# Patient Record
Sex: FEMALE | Race: WHITE | NOT HISPANIC OR LATINO | Employment: FULL TIME | URBAN - METROPOLITAN AREA
[De-identification: names, ages, dates, MRNs, and addresses within clinical notes are randomized per-mention and may not be internally consistent; named-entity substitution may affect disease eponyms.]

---

## 2017-02-24 ENCOUNTER — GENERIC CONVERSION - ENCOUNTER (OUTPATIENT)
Dept: OTHER | Facility: OTHER | Age: 47
End: 2017-02-24

## 2017-02-24 LAB
LEFT EYE DIABETIC RETINOPATHY: NORMAL
RIGHT EYE DIABETIC RETINOPATHY: NORMAL

## 2017-04-10 ENCOUNTER — ALLSCRIPTS OFFICE VISIT (OUTPATIENT)
Dept: OTHER | Facility: OTHER | Age: 47
End: 2017-04-10

## 2017-04-10 DIAGNOSIS — R73.01 IMPAIRED FASTING GLUCOSE: ICD-10-CM

## 2017-04-10 DIAGNOSIS — M79.601 PAIN OF RIGHT ARM: ICD-10-CM

## 2017-04-10 DIAGNOSIS — M79.89 OTHER DISORDERS OF SOFT TISSUE: ICD-10-CM

## 2017-04-10 DIAGNOSIS — E11.9 TYPE 2 DIABETES MELLITUS WITHOUT COMPLICATIONS (HCC): ICD-10-CM

## 2017-04-14 ENCOUNTER — APPOINTMENT (OUTPATIENT)
Dept: LAB | Facility: CLINIC | Age: 47
End: 2017-04-14
Payer: COMMERCIAL

## 2017-04-14 ENCOUNTER — TRANSCRIBE ORDERS (OUTPATIENT)
Dept: LAB | Facility: CLINIC | Age: 47
End: 2017-04-14

## 2017-04-14 DIAGNOSIS — R73.01 IMPAIRED FASTING GLUCOSE: ICD-10-CM

## 2017-04-14 LAB
ANION GAP SERPL CALCULATED.3IONS-SCNC: 12 MMOL/L (ref 4–13)
BUN SERPL-MCNC: 11 MG/DL (ref 5–25)
CALCIUM SERPL-MCNC: 8.8 MG/DL (ref 8.3–10.1)
CHLORIDE SERPL-SCNC: 102 MMOL/L (ref 100–108)
CO2 SERPL-SCNC: 26 MMOL/L (ref 21–32)
CREAT SERPL-MCNC: 0.57 MG/DL (ref 0.6–1.3)
EST. AVERAGE GLUCOSE BLD GHB EST-MCNC: 174 MG/DL
GFR SERPL CREATININE-BSD FRML MDRD: >60 ML/MIN/1.73SQ M
GLUCOSE P FAST SERPL-MCNC: 158 MG/DL (ref 65–99)
HBA1C MFR BLD: 7.7 % (ref 4.2–6.3)
POTASSIUM SERPL-SCNC: 3.9 MMOL/L (ref 3.5–5.3)
SODIUM SERPL-SCNC: 140 MMOL/L (ref 136–145)

## 2017-04-14 PROCEDURE — 80048 BASIC METABOLIC PNL TOTAL CA: CPT

## 2017-04-14 PROCEDURE — 83036 HEMOGLOBIN GLYCOSYLATED A1C: CPT

## 2017-04-14 PROCEDURE — 36415 COLL VENOUS BLD VENIPUNCTURE: CPT

## 2017-04-24 ENCOUNTER — ALLSCRIPTS OFFICE VISIT (OUTPATIENT)
Dept: OTHER | Facility: OTHER | Age: 47
End: 2017-04-24

## 2017-05-01 ENCOUNTER — GENERIC CONVERSION - ENCOUNTER (OUTPATIENT)
Dept: OTHER | Facility: OTHER | Age: 47
End: 2017-05-01

## 2017-05-17 ENCOUNTER — HOSPITAL ENCOUNTER (EMERGENCY)
Facility: HOSPITAL | Age: 47
Discharge: HOME | End: 2017-05-17
Attending: EMERGENCY MEDICINE
Payer: COMMERCIAL

## 2017-05-17 VITALS
RESPIRATION RATE: 18 BRPM | OXYGEN SATURATION: 96 % | SYSTOLIC BLOOD PRESSURE: 132 MMHG | WEIGHT: 224 LBS | BODY MASS INDEX: 34.06 KG/M2 | TEMPERATURE: 97.8 F | DIASTOLIC BLOOD PRESSURE: 74 MMHG | HEART RATE: 79 BPM

## 2017-05-17 DIAGNOSIS — K62.5 RECTAL BLEEDING: Primary | ICD-10-CM

## 2017-05-17 LAB
ABO GROUP BLD: NORMAL
ALBUMIN SERPL BCP-MCNC: 3.9 G/DL (ref 3.5–5)
ALP SERPL-CCNC: 85 U/L (ref 46–116)
ALT SERPL W P-5'-P-CCNC: 194 U/L (ref 12–78)
ANION GAP SERPL CALCULATED.3IONS-SCNC: 10 MMOL/L (ref 4–13)
APTT PPP: 31 SECONDS (ref 23–35)
AST SERPL W P-5'-P-CCNC: 86 U/L (ref 5–45)
BASOPHILS # BLD AUTO: 0.06 THOUSANDS/ΜL (ref 0–0.1)
BASOPHILS NFR BLD AUTO: 1 % (ref 0–1)
BILIRUB SERPL-MCNC: 0.4 MG/DL (ref 0.2–1)
BLD GP AB SCN SERPL QL: NEGATIVE
BUN SERPL-MCNC: 10 MG/DL (ref 5–25)
CALCIUM SERPL-MCNC: 9.3 MG/DL (ref 8.3–10.1)
CHLORIDE SERPL-SCNC: 101 MMOL/L (ref 100–108)
CO2 SERPL-SCNC: 27 MMOL/L (ref 21–32)
CREAT SERPL-MCNC: 0.68 MG/DL (ref 0.6–1.3)
EOSINOPHIL # BLD AUTO: 0.62 THOUSAND/ΜL (ref 0–0.61)
EOSINOPHIL NFR BLD AUTO: 5 % (ref 0–6)
ERYTHROCYTE [DISTWIDTH] IN BLOOD BY AUTOMATED COUNT: 12.9 % (ref 11.6–15.1)
GFR SERPL CREATININE-BSD FRML MDRD: >60 ML/MIN/1.73SQ M
GLUCOSE SERPL-MCNC: 107 MG/DL (ref 65–140)
HCT VFR BLD AUTO: 45.6 % (ref 34.8–46.1)
HGB BLD-MCNC: 15.1 G/DL (ref 11.5–15.4)
INR PPP: 0.93 (ref 0.86–1.16)
LYMPHOCYTES # BLD AUTO: 1.8 THOUSANDS/ΜL (ref 0.6–4.47)
LYMPHOCYTES NFR BLD AUTO: 15 % (ref 14–44)
MCH RBC QN AUTO: 29.7 PG (ref 26.8–34.3)
MCHC RBC AUTO-ENTMCNC: 33.1 G/DL (ref 31.4–37.4)
MCV RBC AUTO: 90 FL (ref 82–98)
MONOCYTES # BLD AUTO: 0.77 THOUSAND/ΜL (ref 0.17–1.22)
MONOCYTES NFR BLD AUTO: 6 % (ref 4–12)
NEUTROPHILS # BLD AUTO: 8.78 THOUSANDS/ΜL (ref 1.85–7.62)
NEUTS SEG NFR BLD AUTO: 73 % (ref 43–75)
PLATELET # BLD AUTO: 219 THOUSANDS/UL (ref 149–390)
PMV BLD AUTO: 10.4 FL (ref 8.9–12.7)
POTASSIUM SERPL-SCNC: 4 MMOL/L (ref 3.5–5.3)
PROT SERPL-MCNC: 8.4 G/DL (ref 6.4–8.2)
PROTHROMBIN TIME: 12.7 SECONDS (ref 12.1–14.4)
RBC # BLD AUTO: 5.08 MILLION/UL (ref 3.81–5.12)
RH BLD: POSITIVE
SODIUM SERPL-SCNC: 138 MMOL/L (ref 136–145)
SPECIMEN EXPIRATION DATE: NORMAL
WBC # BLD AUTO: 12.03 THOUSAND/UL (ref 4.31–10.16)

## 2017-05-17 PROCEDURE — 80053 COMPREHEN METABOLIC PANEL: CPT | Performed by: EMERGENCY MEDICINE

## 2017-05-17 PROCEDURE — 85730 THROMBOPLASTIN TIME PARTIAL: CPT | Performed by: EMERGENCY MEDICINE

## 2017-05-17 PROCEDURE — 96361 HYDRATE IV INFUSION ADD-ON: CPT

## 2017-05-17 PROCEDURE — 85610 PROTHROMBIN TIME: CPT | Performed by: EMERGENCY MEDICINE

## 2017-05-17 PROCEDURE — 86850 RBC ANTIBODY SCREEN: CPT | Performed by: EMERGENCY MEDICINE

## 2017-05-17 PROCEDURE — 99285 EMERGENCY DEPT VISIT HI MDM: CPT

## 2017-05-17 PROCEDURE — 86900 BLOOD TYPING SEROLOGIC ABO: CPT | Performed by: EMERGENCY MEDICINE

## 2017-05-17 PROCEDURE — 36415 COLL VENOUS BLD VENIPUNCTURE: CPT | Performed by: EMERGENCY MEDICINE

## 2017-05-17 PROCEDURE — 86901 BLOOD TYPING SEROLOGIC RH(D): CPT | Performed by: EMERGENCY MEDICINE

## 2017-05-17 PROCEDURE — 85025 COMPLETE CBC W/AUTO DIFF WBC: CPT | Performed by: EMERGENCY MEDICINE

## 2017-05-17 PROCEDURE — 96360 HYDRATION IV INFUSION INIT: CPT

## 2017-05-17 RX ADMIN — SODIUM CHLORIDE 1000 ML: 0.9 INJECTION, SOLUTION INTRAVENOUS at 17:15

## 2017-05-18 ENCOUNTER — ALLSCRIPTS OFFICE VISIT (OUTPATIENT)
Dept: OTHER | Facility: OTHER | Age: 47
End: 2017-05-18

## 2017-05-23 ENCOUNTER — ALLSCRIPTS OFFICE VISIT (OUTPATIENT)
Dept: OTHER | Facility: OTHER | Age: 47
End: 2017-05-23

## 2017-05-30 ENCOUNTER — ANESTHESIA (OUTPATIENT)
Dept: GASTROENTEROLOGY | Facility: HOSPITAL | Age: 47
End: 2017-05-30
Payer: COMMERCIAL

## 2017-05-30 ENCOUNTER — GENERIC CONVERSION - ENCOUNTER (OUTPATIENT)
Dept: OTHER | Facility: OTHER | Age: 47
End: 2017-05-30

## 2017-05-30 ENCOUNTER — ANESTHESIA EVENT (OUTPATIENT)
Dept: GASTROENTEROLOGY | Facility: HOSPITAL | Age: 47
End: 2017-05-30
Payer: COMMERCIAL

## 2017-05-30 ENCOUNTER — HOSPITAL ENCOUNTER (OUTPATIENT)
Facility: HOSPITAL | Age: 47
Setting detail: OUTPATIENT SURGERY
Discharge: HOME | End: 2017-05-30
Attending: INTERNAL MEDICINE | Admitting: INTERNAL MEDICINE
Payer: COMMERCIAL

## 2017-05-30 VITALS
OXYGEN SATURATION: 97 % | BODY MASS INDEX: 33.84 KG/M2 | RESPIRATION RATE: 18 BRPM | HEIGHT: 67 IN | TEMPERATURE: 97.6 F | SYSTOLIC BLOOD PRESSURE: 127 MMHG | HEART RATE: 77 BPM | WEIGHT: 215.61 LBS | DIASTOLIC BLOOD PRESSURE: 55 MMHG

## 2017-05-30 DIAGNOSIS — K62.5 RECTAL BLEEDING: ICD-10-CM

## 2017-05-30 PROCEDURE — 88305 TISSUE EXAM BY PATHOLOGIST: CPT | Performed by: INTERNAL MEDICINE

## 2017-05-30 RX ORDER — PROPOFOL 10 MG/ML
INJECTION, EMULSION INTRAVENOUS AS NEEDED
Status: DISCONTINUED | OUTPATIENT
Start: 2017-05-30 | End: 2017-05-30 | Stop reason: SURG

## 2017-05-30 RX ORDER — SODIUM CHLORIDE, SODIUM LACTATE, POTASSIUM CHLORIDE, CALCIUM CHLORIDE 600; 310; 30; 20 MG/100ML; MG/100ML; MG/100ML; MG/100ML
75 INJECTION, SOLUTION INTRAVENOUS CONTINUOUS
Status: DISCONTINUED | OUTPATIENT
Start: 2017-05-30 | End: 2017-05-30 | Stop reason: HOSPADM

## 2017-05-30 RX ORDER — SODIUM CHLORIDE 9 MG/ML
125 INJECTION, SOLUTION INTRAVENOUS CONTINUOUS
Status: DISCONTINUED | OUTPATIENT
Start: 2017-05-30 | End: 2017-05-30 | Stop reason: HOSPADM

## 2017-05-30 RX ORDER — PANTOPRAZOLE SODIUM 20 MG/1
20 TABLET, DELAYED RELEASE ORAL DAILY
COMMUNITY
End: 2018-02-12 | Stop reason: SDUPTHER

## 2017-05-30 RX ORDER — QUETIAPINE FUMARATE 25 MG/1
25 TABLET, FILM COATED ORAL
COMMUNITY
End: 2018-09-05 | Stop reason: SDUPTHER

## 2017-05-30 RX ORDER — BUPROPION HYDROCHLORIDE 150 MG/1
150 TABLET, EXTENDED RELEASE ORAL 2 TIMES DAILY
Status: ON HOLD | COMMUNITY
End: 2017-07-06 | Stop reason: ALTCHOICE

## 2017-05-30 RX ADMIN — PROPOFOL 30 MG: 10 INJECTION, EMULSION INTRAVENOUS at 09:36

## 2017-05-30 RX ADMIN — PROPOFOL 30 MG: 10 INJECTION, EMULSION INTRAVENOUS at 09:27

## 2017-05-30 RX ADMIN — PROPOFOL 30 MG: 10 INJECTION, EMULSION INTRAVENOUS at 09:25

## 2017-05-30 RX ADMIN — PROPOFOL 30 MG: 10 INJECTION, EMULSION INTRAVENOUS at 09:31

## 2017-05-30 RX ADMIN — PROPOFOL 50 MG: 10 INJECTION, EMULSION INTRAVENOUS at 09:28

## 2017-05-30 RX ADMIN — SODIUM CHLORIDE, POTASSIUM CHLORIDE, SODIUM LACTATE AND CALCIUM CHLORIDE 75 ML/HR: 600; 310; 30; 20 INJECTION, SOLUTION INTRAVENOUS at 08:33

## 2017-05-30 RX ADMIN — PROPOFOL 30 MG: 10 INJECTION, EMULSION INTRAVENOUS at 09:35

## 2017-05-30 RX ADMIN — PROPOFOL 30 MG: 10 INJECTION, EMULSION INTRAVENOUS at 09:33

## 2017-05-30 RX ADMIN — PROPOFOL 30 MG: 10 INJECTION, EMULSION INTRAVENOUS at 09:22

## 2017-05-30 RX ADMIN — PROPOFOL 30 MG: 10 INJECTION, EMULSION INTRAVENOUS at 09:34

## 2017-05-30 RX ADMIN — PROPOFOL 100 MG: 10 INJECTION, EMULSION INTRAVENOUS at 09:21

## 2017-05-30 RX ADMIN — PROPOFOL 20 MG: 10 INJECTION, EMULSION INTRAVENOUS at 09:24

## 2017-05-31 ENCOUNTER — GENERIC CONVERSION - ENCOUNTER (OUTPATIENT)
Dept: OTHER | Facility: OTHER | Age: 47
End: 2017-05-31

## 2017-06-04 ENCOUNTER — GENERIC CONVERSION - ENCOUNTER (OUTPATIENT)
Dept: OTHER | Facility: OTHER | Age: 47
End: 2017-06-04

## 2017-06-19 ENCOUNTER — APPOINTMENT (OUTPATIENT)
Dept: LAB | Facility: CLINIC | Age: 47
End: 2017-06-19
Payer: COMMERCIAL

## 2017-06-19 ENCOUNTER — TRANSCRIBE ORDERS (OUTPATIENT)
Dept: LAB | Facility: CLINIC | Age: 47
End: 2017-06-19

## 2017-06-19 DIAGNOSIS — E11.9 TYPE 2 DIABETES MELLITUS WITHOUT COMPLICATIONS (HCC): ICD-10-CM

## 2017-06-19 LAB
EST. AVERAGE GLUCOSE BLD GHB EST-MCNC: 157 MG/DL
HBA1C MFR BLD: 7.1 % (ref 4.2–6.3)

## 2017-06-19 PROCEDURE — 83036 HEMOGLOBIN GLYCOSYLATED A1C: CPT

## 2017-06-19 PROCEDURE — 36415 COLL VENOUS BLD VENIPUNCTURE: CPT

## 2017-07-06 ENCOUNTER — HOSPITAL ENCOUNTER (OUTPATIENT)
Dept: CT IMAGING | Facility: HOSPITAL | Age: 47
Discharge: HOME | End: 2017-07-06
Attending: OBSTETRICS & GYNECOLOGY
Payer: COMMERCIAL

## 2017-07-06 ENCOUNTER — GENERIC CONVERSION - ENCOUNTER (OUTPATIENT)
Dept: OTHER | Facility: OTHER | Age: 47
End: 2017-07-06

## 2017-07-06 ENCOUNTER — HOSPITAL ENCOUNTER (INPATIENT)
Facility: HOSPITAL | Age: 47
LOS: 5 days | Discharge: HOME | DRG: 390 | End: 2017-07-11
Attending: INTERNAL MEDICINE | Admitting: INTERNAL MEDICINE
Payer: COMMERCIAL

## 2017-07-06 ENCOUNTER — TRANSCRIBE ORDERS (OUTPATIENT)
Dept: ADMINISTRATIVE | Facility: HOSPITAL | Age: 47
End: 2017-07-06

## 2017-07-06 ENCOUNTER — ALLSCRIPTS OFFICE VISIT (OUTPATIENT)
Dept: OTHER | Facility: OTHER | Age: 47
End: 2017-07-06

## 2017-07-06 DIAGNOSIS — K58.9 IBS (IRRITABLE BOWEL SYNDROME): Primary | ICD-10-CM

## 2017-07-06 DIAGNOSIS — K56.7 ILEUS (HCC): ICD-10-CM

## 2017-07-06 DIAGNOSIS — K37 APPENDICITIS, UNSPECIFIED APPENDICITIS TYPE: ICD-10-CM

## 2017-07-06 DIAGNOSIS — K37 APPENDICITIS, UNSPECIFIED APPENDICITIS TYPE: Primary | ICD-10-CM

## 2017-07-06 LAB
ALBUMIN SERPL BCP-MCNC: 3.5 G/DL (ref 3.5–5)
ALP SERPL-CCNC: 62 U/L (ref 46–116)
ALT SERPL W P-5'-P-CCNC: 113 U/L (ref 12–78)
ANION GAP SERPL CALCULATED.3IONS-SCNC: 10 MMOL/L (ref 4–13)
APTT PPP: 36 SECONDS (ref 23–35)
AST SERPL W P-5'-P-CCNC: 66 U/L (ref 5–45)
BASOPHILS # BLD AUTO: 0.02 THOUSANDS/ΜL (ref 0–0.1)
BASOPHILS NFR BLD AUTO: 0 % (ref 0–1)
BILIRUB SERPL-MCNC: 0.77 MG/DL (ref 0.2–1)
BUN SERPL-MCNC: 10 MG/DL (ref 5–25)
CALCIUM SERPL-MCNC: 8.8 MG/DL (ref 8.3–10.1)
CHLORIDE SERPL-SCNC: 101 MMOL/L (ref 100–108)
CO2 SERPL-SCNC: 28 MMOL/L (ref 21–32)
CREAT SERPL-MCNC: 0.58 MG/DL (ref 0.6–1.3)
EOSINOPHIL # BLD AUTO: 0.39 THOUSAND/ΜL (ref 0–0.61)
EOSINOPHIL NFR BLD AUTO: 4 % (ref 0–6)
ERYTHROCYTE [DISTWIDTH] IN BLOOD BY AUTOMATED COUNT: 13 % (ref 11.6–15.1)
GFR SERPL CREATININE-BSD FRML MDRD: >60 ML/MIN/1.73SQ M
GLUCOSE SERPL-MCNC: 102 MG/DL (ref 65–140)
GLUCOSE SERPL-MCNC: 115 MG/DL (ref 65–140)
GLUCOSE SERPL-MCNC: 126 MG/DL (ref 65–140)
HCT VFR BLD AUTO: 39.6 % (ref 34.8–46.1)
HGB BLD-MCNC: 13.9 G/DL (ref 11.5–15.4)
INR PPP: 1.04 (ref 0.86–1.16)
LACTATE SERPL-SCNC: 0.6 MMOL/L (ref 0.5–2)
LYMPHOCYTES # BLD AUTO: 1.76 THOUSANDS/ΜL (ref 0.6–4.47)
LYMPHOCYTES NFR BLD AUTO: 17 % (ref 14–44)
MCH RBC QN AUTO: 31.6 PG (ref 26.8–34.3)
MCHC RBC AUTO-ENTMCNC: 35.1 G/DL (ref 31.4–37.4)
MCV RBC AUTO: 90 FL (ref 82–98)
MONOCYTES # BLD AUTO: 0.64 THOUSAND/ΜL (ref 0.17–1.22)
MONOCYTES NFR BLD AUTO: 6 % (ref 4–12)
NEUTROPHILS # BLD AUTO: 7.64 THOUSANDS/ΜL (ref 1.85–7.62)
NEUTS SEG NFR BLD AUTO: 73 % (ref 43–75)
NRBC BLD AUTO-RTO: 0 /100 WBCS
PLATELET # BLD AUTO: 216 THOUSANDS/UL (ref 149–390)
PMV BLD AUTO: 10.7 FL (ref 8.9–12.7)
POTASSIUM SERPL-SCNC: 3.9 MMOL/L (ref 3.5–5.3)
PROT SERPL-MCNC: 7.9 G/DL (ref 6.4–8.2)
PROTHROMBIN TIME: 13.6 SECONDS (ref 12.1–14.4)
RBC # BLD AUTO: 4.4 MILLION/UL (ref 3.81–5.12)
SODIUM SERPL-SCNC: 139 MMOL/L (ref 136–145)
WBC # BLD AUTO: 10.45 THOUSAND/UL (ref 4.31–10.16)

## 2017-07-06 PROCEDURE — 83605 ASSAY OF LACTIC ACID: CPT | Performed by: PHYSICIAN ASSISTANT

## 2017-07-06 PROCEDURE — 82948 REAGENT STRIP/BLOOD GLUCOSE: CPT

## 2017-07-06 PROCEDURE — C9113 INJ PANTOPRAZOLE SODIUM, VIA: HCPCS | Performed by: PHYSICIAN ASSISTANT

## 2017-07-06 PROCEDURE — 85730 THROMBOPLASTIN TIME PARTIAL: CPT | Performed by: PHYSICIAN ASSISTANT

## 2017-07-06 PROCEDURE — 80053 COMPREHEN METABOLIC PANEL: CPT | Performed by: PHYSICIAN ASSISTANT

## 2017-07-06 PROCEDURE — 85025 COMPLETE CBC W/AUTO DIFF WBC: CPT | Performed by: PHYSICIAN ASSISTANT

## 2017-07-06 PROCEDURE — 85610 PROTHROMBIN TIME: CPT | Performed by: PHYSICIAN ASSISTANT

## 2017-07-06 PROCEDURE — 74177 CT ABD & PELVIS W/CONTRAST: CPT

## 2017-07-06 RX ORDER — OXYCODONE HYDROCHLORIDE AND ACETAMINOPHEN 5; 325 MG/1; MG/1
1 TABLET ORAL EVERY 4 HOURS PRN
Status: DISCONTINUED | OUTPATIENT
Start: 2017-07-06 | End: 2017-07-09

## 2017-07-06 RX ORDER — HEPARIN SODIUM 5000 [USP'U]/ML
5000 INJECTION, SOLUTION INTRAVENOUS; SUBCUTANEOUS EVERY 8 HOURS SCHEDULED
Status: DISCONTINUED | OUTPATIENT
Start: 2017-07-06 | End: 2017-07-11 | Stop reason: HOSPADM

## 2017-07-06 RX ORDER — AMOXICILLIN 250 MG
1 CAPSULE ORAL 2 TIMES DAILY
Status: DISCONTINUED | OUTPATIENT
Start: 2017-07-06 | End: 2017-07-11

## 2017-07-06 RX ORDER — POLYETHYLENE GLYCOL 3350 17 G/17G
17 POWDER, FOR SOLUTION ORAL DAILY
Status: DISCONTINUED | OUTPATIENT
Start: 2017-07-07 | End: 2017-07-11

## 2017-07-06 RX ORDER — ESCITALOPRAM OXALATE 10 MG/1
20 TABLET ORAL DAILY
Status: DISCONTINUED | OUTPATIENT
Start: 2017-07-06 | End: 2017-07-11 | Stop reason: HOSPADM

## 2017-07-06 RX ORDER — SODIUM CHLORIDE 9 MG/ML
75 INJECTION, SOLUTION INTRAVENOUS CONTINUOUS
Status: DISCONTINUED | OUTPATIENT
Start: 2017-07-06 | End: 2017-07-11

## 2017-07-06 RX ORDER — PANTOPRAZOLE SODIUM 40 MG/1
40 INJECTION, POWDER, FOR SOLUTION INTRAVENOUS EVERY 12 HOURS SCHEDULED
Status: DISCONTINUED | OUTPATIENT
Start: 2017-07-06 | End: 2017-07-11

## 2017-07-06 RX ORDER — ACETAMINOPHEN 325 MG/1
650 TABLET ORAL EVERY 6 HOURS PRN
Status: DISCONTINUED | OUTPATIENT
Start: 2017-07-06 | End: 2017-07-11 | Stop reason: HOSPADM

## 2017-07-06 RX ORDER — MAGNESIUM HYDROXIDE/ALUMINUM HYDROXICE/SIMETHICONE 120; 1200; 1200 MG/30ML; MG/30ML; MG/30ML
30 SUSPENSION ORAL EVERY 6 HOURS PRN
Status: DISCONTINUED | OUTPATIENT
Start: 2017-07-06 | End: 2017-07-11 | Stop reason: HOSPADM

## 2017-07-06 RX ORDER — MORPHINE SULFATE 2 MG/ML
1 INJECTION, SOLUTION INTRAMUSCULAR; INTRAVENOUS EVERY 4 HOURS PRN
Status: DISCONTINUED | OUTPATIENT
Start: 2017-07-06 | End: 2017-07-09

## 2017-07-06 RX ORDER — QUETIAPINE FUMARATE 25 MG/1
25 TABLET, FILM COATED ORAL
Status: DISCONTINUED | OUTPATIENT
Start: 2017-07-06 | End: 2017-07-11 | Stop reason: HOSPADM

## 2017-07-06 RX ORDER — ONDANSETRON 2 MG/ML
4 INJECTION INTRAMUSCULAR; INTRAVENOUS EVERY 4 HOURS PRN
Status: DISCONTINUED | OUTPATIENT
Start: 2017-07-06 | End: 2017-07-11 | Stop reason: HOSPADM

## 2017-07-06 RX ADMIN — IOHEXOL 50 ML: 240 INJECTION, SOLUTION INTRATHECAL; INTRAVASCULAR; INTRAVENOUS; ORAL at 13:37

## 2017-07-06 RX ADMIN — PANTOPRAZOLE SODIUM 40 MG: 40 INJECTION, POWDER, FOR SOLUTION INTRAVENOUS at 21:50

## 2017-07-06 RX ADMIN — QUETIAPINE FUMARATE 25 MG: 25 TABLET, FILM COATED ORAL at 21:51

## 2017-07-06 RX ADMIN — SODIUM CHLORIDE 75 ML/HR: 0.9 INJECTION, SOLUTION INTRAVENOUS at 17:15

## 2017-07-06 RX ADMIN — Medication 1 TABLET: at 17:12

## 2017-07-06 RX ADMIN — OXYCODONE HYDROCHLORIDE AND ACETAMINOPHEN 1 TABLET: 5; 325 TABLET ORAL at 19:24

## 2017-07-06 RX ADMIN — HEPARIN SODIUM 5000 UNITS: 5000 INJECTION, SOLUTION INTRAVENOUS; SUBCUTANEOUS at 17:12

## 2017-07-06 RX ADMIN — ESCITALOPRAM OXALATE 20 MG: 10 TABLET ORAL at 17:12

## 2017-07-06 RX ADMIN — MORPHINE SULFATE 1 MG: 2 INJECTION, SOLUTION INTRAMUSCULAR; INTRAVENOUS at 21:51

## 2017-07-06 RX ADMIN — IOHEXOL 100 ML: 350 INJECTION, SOLUTION INTRAVENOUS at 13:37

## 2017-07-06 RX ADMIN — MORPHINE SULFATE 1 MG: 2 INJECTION, SOLUTION INTRAMUSCULAR; INTRAVENOUS at 17:20

## 2017-07-06 RX ADMIN — OXYCODONE HYDROCHLORIDE AND ACETAMINOPHEN 1 TABLET: 5; 325 TABLET ORAL at 23:34

## 2017-07-07 LAB
ALBUMIN SERPL BCP-MCNC: 3.1 G/DL (ref 3.5–5)
ALP SERPL-CCNC: 64 U/L (ref 46–116)
ALT SERPL W P-5'-P-CCNC: 112 U/L (ref 12–78)
ANION GAP SERPL CALCULATED.3IONS-SCNC: 8 MMOL/L (ref 4–13)
AST SERPL W P-5'-P-CCNC: 62 U/L (ref 5–45)
BILIRUB SERPL-MCNC: 0.76 MG/DL (ref 0.2–1)
BUN SERPL-MCNC: 7 MG/DL (ref 5–25)
CALCIUM SERPL-MCNC: 8.2 MG/DL (ref 8.3–10.1)
CHLORIDE SERPL-SCNC: 104 MMOL/L (ref 100–108)
CO2 SERPL-SCNC: 30 MMOL/L (ref 21–32)
CREAT SERPL-MCNC: 0.68 MG/DL (ref 0.6–1.3)
ERYTHROCYTE [DISTWIDTH] IN BLOOD BY AUTOMATED COUNT: 13.1 % (ref 11.6–15.1)
GFR SERPL CREATININE-BSD FRML MDRD: >60 ML/MIN/1.73SQ M
GLUCOSE SERPL-MCNC: 113 MG/DL (ref 65–140)
GLUCOSE SERPL-MCNC: 114 MG/DL (ref 65–140)
GLUCOSE SERPL-MCNC: 128 MG/DL (ref 65–140)
GLUCOSE SERPL-MCNC: 153 MG/DL (ref 65–140)
GLUCOSE SERPL-MCNC: 170 MG/DL (ref 65–140)
GLUCOSE SERPL-MCNC: 96 MG/DL (ref 65–140)
HCT VFR BLD AUTO: 37.6 % (ref 34.8–46.1)
HGB BLD-MCNC: 12.5 G/DL (ref 11.5–15.4)
MCH RBC QN AUTO: 30.5 PG (ref 26.8–34.3)
MCHC RBC AUTO-ENTMCNC: 33.2 G/DL (ref 31.4–37.4)
MCV RBC AUTO: 92 FL (ref 82–98)
PLATELET # BLD AUTO: 212 THOUSANDS/UL (ref 149–390)
PMV BLD AUTO: 10.7 FL (ref 8.9–12.7)
POTASSIUM SERPL-SCNC: 3.6 MMOL/L (ref 3.5–5.3)
PROT SERPL-MCNC: 7.2 G/DL (ref 6.4–8.2)
RBC # BLD AUTO: 4.1 MILLION/UL (ref 3.81–5.12)
SODIUM SERPL-SCNC: 142 MMOL/L (ref 136–145)
WBC # BLD AUTO: 7.81 THOUSAND/UL (ref 4.31–10.16)

## 2017-07-07 PROCEDURE — 80053 COMPREHEN METABOLIC PANEL: CPT | Performed by: PHYSICIAN ASSISTANT

## 2017-07-07 PROCEDURE — 82948 REAGENT STRIP/BLOOD GLUCOSE: CPT

## 2017-07-07 PROCEDURE — 87493 C DIFF AMPLIFIED PROBE: CPT | Performed by: PHYSICIAN ASSISTANT

## 2017-07-07 PROCEDURE — 85027 COMPLETE CBC AUTOMATED: CPT | Performed by: PHYSICIAN ASSISTANT

## 2017-07-07 PROCEDURE — C9113 INJ PANTOPRAZOLE SODIUM, VIA: HCPCS | Performed by: PHYSICIAN ASSISTANT

## 2017-07-07 RX ADMIN — MORPHINE SULFATE 1 MG: 2 INJECTION, SOLUTION INTRAMUSCULAR; INTRAVENOUS at 02:12

## 2017-07-07 RX ADMIN — Medication 1 TABLET: at 08:43

## 2017-07-07 RX ADMIN — INSULIN LISPRO 1 UNITS: 100 INJECTION, SOLUTION INTRAVENOUS; SUBCUTANEOUS at 08:43

## 2017-07-07 RX ADMIN — MORPHINE SULFATE 1 MG: 2 INJECTION, SOLUTION INTRAMUSCULAR; INTRAVENOUS at 15:33

## 2017-07-07 RX ADMIN — QUETIAPINE FUMARATE 25 MG: 25 TABLET, FILM COATED ORAL at 21:38

## 2017-07-07 RX ADMIN — ESCITALOPRAM OXALATE 20 MG: 10 TABLET ORAL at 08:42

## 2017-07-07 RX ADMIN — HEPARIN SODIUM 5000 UNITS: 5000 INJECTION, SOLUTION INTRAVENOUS; SUBCUTANEOUS at 02:15

## 2017-07-07 RX ADMIN — SODIUM CHLORIDE 75 ML/HR: 0.9 INJECTION, SOLUTION INTRAVENOUS at 20:30

## 2017-07-07 RX ADMIN — INSULIN LISPRO 1 UNITS: 100 INJECTION, SOLUTION INTRAVENOUS; SUBCUTANEOUS at 12:12

## 2017-07-07 RX ADMIN — PANTOPRAZOLE SODIUM 40 MG: 40 INJECTION, POWDER, FOR SOLUTION INTRAVENOUS at 21:38

## 2017-07-07 RX ADMIN — OXYCODONE HYDROCHLORIDE AND ACETAMINOPHEN 1 TABLET: 5; 325 TABLET ORAL at 06:22

## 2017-07-07 RX ADMIN — ONDANSETRON 4 MG: 2 INJECTION INTRAMUSCULAR; INTRAVENOUS at 09:34

## 2017-07-07 RX ADMIN — SODIUM CHLORIDE 75 ML/HR: 0.9 INJECTION, SOLUTION INTRAVENOUS at 06:23

## 2017-07-07 RX ADMIN — PANTOPRAZOLE SODIUM 40 MG: 40 INJECTION, POWDER, FOR SOLUTION INTRAVENOUS at 08:43

## 2017-07-07 RX ADMIN — HEPARIN SODIUM 5000 UNITS: 5000 INJECTION, SOLUTION INTRAVENOUS; SUBCUTANEOUS at 12:12

## 2017-07-07 RX ADMIN — HEPARIN SODIUM 5000 UNITS: 5000 INJECTION, SOLUTION INTRAVENOUS; SUBCUTANEOUS at 17:43

## 2017-07-07 RX ADMIN — OXYCODONE HYDROCHLORIDE AND ACETAMINOPHEN 1 TABLET: 5; 325 TABLET ORAL at 12:15

## 2017-07-07 RX ADMIN — POLYETHYLENE GLYCOL 3350 17 G: 17 POWDER, FOR SOLUTION ORAL at 08:43

## 2017-07-07 RX ADMIN — MORPHINE SULFATE 1 MG: 2 INJECTION, SOLUTION INTRAMUSCULAR; INTRAVENOUS at 08:57

## 2017-07-07 RX ADMIN — ACETAMINOPHEN 650 MG: 325 TABLET, FILM COATED ORAL at 20:33

## 2017-07-08 LAB
C DIFF TOX GENS STL QL NAA+PROBE: NORMAL
GLUCOSE SERPL-MCNC: 106 MG/DL (ref 65–140)
GLUCOSE SERPL-MCNC: 107 MG/DL (ref 65–140)
GLUCOSE SERPL-MCNC: 186 MG/DL (ref 65–140)
GLUCOSE SERPL-MCNC: 81 MG/DL (ref 65–140)

## 2017-07-08 PROCEDURE — C9113 INJ PANTOPRAZOLE SODIUM, VIA: HCPCS | Performed by: PHYSICIAN ASSISTANT

## 2017-07-08 PROCEDURE — 82948 REAGENT STRIP/BLOOD GLUCOSE: CPT

## 2017-07-08 RX ORDER — LACTULOSE 20 G/30ML
20 SOLUTION ORAL ONCE
Status: COMPLETED | OUTPATIENT
Start: 2017-07-08 | End: 2017-07-08

## 2017-07-08 RX ADMIN — ONDANSETRON 4 MG: 2 INJECTION INTRAMUSCULAR; INTRAVENOUS at 20:14

## 2017-07-08 RX ADMIN — QUETIAPINE FUMARATE 25 MG: 25 TABLET, FILM COATED ORAL at 23:38

## 2017-07-08 RX ADMIN — MORPHINE SULFATE 1 MG: 2 INJECTION, SOLUTION INTRAMUSCULAR; INTRAVENOUS at 20:25

## 2017-07-08 RX ADMIN — ONDANSETRON 4 MG: 2 INJECTION INTRAMUSCULAR; INTRAVENOUS at 11:50

## 2017-07-08 RX ADMIN — Medication 1 TABLET: at 18:02

## 2017-07-08 RX ADMIN — PANTOPRAZOLE SODIUM 40 MG: 40 INJECTION, POWDER, FOR SOLUTION INTRAVENOUS at 10:00

## 2017-07-08 RX ADMIN — HEPARIN SODIUM 5000 UNITS: 5000 INJECTION, SOLUTION INTRAVENOUS; SUBCUTANEOUS at 02:25

## 2017-07-08 RX ADMIN — HEPARIN SODIUM 5000 UNITS: 5000 INJECTION, SOLUTION INTRAVENOUS; SUBCUTANEOUS at 18:02

## 2017-07-08 RX ADMIN — ESCITALOPRAM OXALATE 20 MG: 10 TABLET ORAL at 10:00

## 2017-07-08 RX ADMIN — SODIUM CHLORIDE 75 ML/HR: 0.9 INJECTION, SOLUTION INTRAVENOUS at 23:34

## 2017-07-08 RX ADMIN — OXYCODONE HYDROCHLORIDE AND ACETAMINOPHEN 1 TABLET: 5; 325 TABLET ORAL at 02:25

## 2017-07-08 RX ADMIN — OXYCODONE HYDROCHLORIDE AND ACETAMINOPHEN 1 TABLET: 5; 325 TABLET ORAL at 10:08

## 2017-07-08 RX ADMIN — MORPHINE SULFATE 1 MG: 2 INJECTION, SOLUTION INTRAMUSCULAR; INTRAVENOUS at 11:50

## 2017-07-08 RX ADMIN — HEPARIN SODIUM 5000 UNITS: 5000 INJECTION, SOLUTION INTRAVENOUS; SUBCUTANEOUS at 11:50

## 2017-07-08 RX ADMIN — PANTOPRAZOLE SODIUM 40 MG: 40 INJECTION, POWDER, FOR SOLUTION INTRAVENOUS at 20:26

## 2017-07-08 RX ADMIN — Medication 1 TABLET: at 10:00

## 2017-07-08 RX ADMIN — LACTULOSE 20 G: 20 SOLUTION ORAL at 15:00

## 2017-07-09 ENCOUNTER — APPOINTMENT (INPATIENT)
Dept: RADIOLOGY | Facility: HOSPITAL | Age: 47
DRG: 390 | End: 2017-07-09
Payer: COMMERCIAL

## 2017-07-09 LAB
GLUCOSE SERPL-MCNC: 107 MG/DL (ref 65–140)
GLUCOSE SERPL-MCNC: 141 MG/DL (ref 65–140)
GLUCOSE SERPL-MCNC: 204 MG/DL (ref 65–140)
GLUCOSE SERPL-MCNC: 232 MG/DL (ref 65–140)

## 2017-07-09 PROCEDURE — 74022 RADEX COMPL AQT ABD SERIES: CPT

## 2017-07-09 PROCEDURE — C9113 INJ PANTOPRAZOLE SODIUM, VIA: HCPCS | Performed by: PHYSICIAN ASSISTANT

## 2017-07-09 PROCEDURE — 82948 REAGENT STRIP/BLOOD GLUCOSE: CPT

## 2017-07-09 RX ORDER — TRAMADOL HYDROCHLORIDE 50 MG/1
50 TABLET ORAL EVERY 6 HOURS PRN
Status: DISCONTINUED | OUTPATIENT
Start: 2017-07-09 | End: 2017-07-11 | Stop reason: HOSPADM

## 2017-07-09 RX ORDER — KETOROLAC TROMETHAMINE 30 MG/ML
15 INJECTION, SOLUTION INTRAMUSCULAR; INTRAVENOUS EVERY 6 HOURS PRN
Status: DISCONTINUED | OUTPATIENT
Start: 2017-07-09 | End: 2017-07-11 | Stop reason: HOSPADM

## 2017-07-09 RX ADMIN — Medication 1 TABLET: at 10:00

## 2017-07-09 RX ADMIN — POLYETHYLENE GLYCOL 3350 17 G: 17 POWDER, FOR SOLUTION ORAL at 10:00

## 2017-07-09 RX ADMIN — ESCITALOPRAM OXALATE 20 MG: 10 TABLET ORAL at 10:00

## 2017-07-09 RX ADMIN — ONDANSETRON 4 MG: 2 INJECTION INTRAMUSCULAR; INTRAVENOUS at 10:13

## 2017-07-09 RX ADMIN — HEPARIN SODIUM 5000 UNITS: 5000 INJECTION, SOLUTION INTRAVENOUS; SUBCUTANEOUS at 17:37

## 2017-07-09 RX ADMIN — INSULIN LISPRO 2 UNITS: 100 INJECTION, SOLUTION INTRAVENOUS; SUBCUTANEOUS at 21:52

## 2017-07-09 RX ADMIN — QUETIAPINE FUMARATE 25 MG: 25 TABLET, FILM COATED ORAL at 21:52

## 2017-07-09 RX ADMIN — HEPARIN SODIUM 5000 UNITS: 5000 INJECTION, SOLUTION INTRAVENOUS; SUBCUTANEOUS at 10:13

## 2017-07-09 RX ADMIN — TRAMADOL HYDROCHLORIDE 50 MG: 50 TABLET, COATED ORAL at 17:43

## 2017-07-09 RX ADMIN — MORPHINE SULFATE 1 MG: 2 INJECTION, SOLUTION INTRAMUSCULAR; INTRAVENOUS at 10:29

## 2017-07-09 RX ADMIN — Medication 1 TABLET: at 17:37

## 2017-07-09 RX ADMIN — PANTOPRAZOLE SODIUM 40 MG: 40 INJECTION, POWDER, FOR SOLUTION INTRAVENOUS at 10:00

## 2017-07-09 RX ADMIN — MAGNESIUM HYDROXIDE 30 ML: 400 SUSPENSION ORAL at 17:37

## 2017-07-09 RX ADMIN — PANTOPRAZOLE SODIUM 40 MG: 40 INJECTION, POWDER, FOR SOLUTION INTRAVENOUS at 21:52

## 2017-07-09 RX ADMIN — ONDANSETRON 4 MG: 2 INJECTION INTRAMUSCULAR; INTRAVENOUS at 21:56

## 2017-07-09 RX ADMIN — HEPARIN SODIUM 5000 UNITS: 5000 INJECTION, SOLUTION INTRAVENOUS; SUBCUTANEOUS at 02:35

## 2017-07-10 LAB
ALBUMIN SERPL BCP-MCNC: 2.3 G/DL (ref 3.5–5)
ALP SERPL-CCNC: 57 U/L (ref 46–116)
ALT SERPL W P-5'-P-CCNC: 138 U/L (ref 12–78)
ANION GAP SERPL CALCULATED.3IONS-SCNC: 8 MMOL/L (ref 4–13)
AST SERPL W P-5'-P-CCNC: 93 U/L (ref 5–45)
BILIRUB SERPL-MCNC: 0.39 MG/DL (ref 0.2–1)
BUN SERPL-MCNC: 3 MG/DL (ref 5–25)
CALCIUM SERPL-MCNC: 7 MG/DL (ref 8.3–10.1)
CHLORIDE SERPL-SCNC: 110 MMOL/L (ref 100–108)
CO2 SERPL-SCNC: 24 MMOL/L (ref 21–32)
CREAT SERPL-MCNC: 0.43 MG/DL (ref 0.6–1.3)
GFR SERPL CREATININE-BSD FRML MDRD: >60 ML/MIN/1.73SQ M
GLUCOSE SERPL-MCNC: 103 MG/DL (ref 65–140)
GLUCOSE SERPL-MCNC: 108 MG/DL (ref 65–140)
GLUCOSE SERPL-MCNC: 128 MG/DL (ref 65–140)
GLUCOSE SERPL-MCNC: 169 MG/DL (ref 65–140)
GLUCOSE SERPL-MCNC: 225 MG/DL (ref 65–140)
POTASSIUM SERPL-SCNC: 3.7 MMOL/L (ref 3.5–5.3)
PROT SERPL-MCNC: 5.8 G/DL (ref 6.4–8.2)
SODIUM SERPL-SCNC: 142 MMOL/L (ref 136–145)

## 2017-07-10 PROCEDURE — C9113 INJ PANTOPRAZOLE SODIUM, VIA: HCPCS | Performed by: PHYSICIAN ASSISTANT

## 2017-07-10 PROCEDURE — 80053 COMPREHEN METABOLIC PANEL: CPT | Performed by: PHYSICIAN ASSISTANT

## 2017-07-10 PROCEDURE — 82948 REAGENT STRIP/BLOOD GLUCOSE: CPT

## 2017-07-10 RX ORDER — METOCLOPRAMIDE HYDROCHLORIDE 5 MG/ML
5 INJECTION INTRAMUSCULAR; INTRAVENOUS ONCE
Status: COMPLETED | OUTPATIENT
Start: 2017-07-10 | End: 2017-07-10

## 2017-07-10 RX ORDER — LACTULOSE 20 G/30ML
20 SOLUTION ORAL ONCE
Status: COMPLETED | OUTPATIENT
Start: 2017-07-10 | End: 2017-07-10

## 2017-07-10 RX ADMIN — ESCITALOPRAM OXALATE 20 MG: 10 TABLET ORAL at 08:31

## 2017-07-10 RX ADMIN — MAGNESIUM HYDROXIDE 30 ML: 400 SUSPENSION ORAL at 08:31

## 2017-07-10 RX ADMIN — MAGNESIUM HYDROXIDE 30 ML: 400 SUSPENSION ORAL at 17:03

## 2017-07-10 RX ADMIN — PANTOPRAZOLE SODIUM 40 MG: 40 INJECTION, POWDER, FOR SOLUTION INTRAVENOUS at 21:30

## 2017-07-10 RX ADMIN — SODIUM CHLORIDE 75 ML/HR: 0.9 INJECTION, SOLUTION INTRAVENOUS at 02:56

## 2017-07-10 RX ADMIN — ONDANSETRON 4 MG: 2 INJECTION INTRAMUSCULAR; INTRAVENOUS at 12:49

## 2017-07-10 RX ADMIN — METOCLOPRAMIDE 5 MG: 5 INJECTION, SOLUTION INTRAMUSCULAR; INTRAVENOUS at 15:01

## 2017-07-10 RX ADMIN — INSULIN LISPRO 1 UNITS: 100 INJECTION, SOLUTION INTRAVENOUS; SUBCUTANEOUS at 21:31

## 2017-07-10 RX ADMIN — QUETIAPINE FUMARATE 25 MG: 25 TABLET, FILM COATED ORAL at 21:30

## 2017-07-10 RX ADMIN — Medication 1 TABLET: at 08:31

## 2017-07-10 RX ADMIN — HEPARIN SODIUM 5000 UNITS: 5000 INJECTION, SOLUTION INTRAVENOUS; SUBCUTANEOUS at 04:01

## 2017-07-10 RX ADMIN — INSULIN LISPRO 2 UNITS: 100 INJECTION, SOLUTION INTRAVENOUS; SUBCUTANEOUS at 11:06

## 2017-07-10 RX ADMIN — POLYETHYLENE GLYCOL 3350 17 G: 17 POWDER, FOR SOLUTION ORAL at 08:31

## 2017-07-10 RX ADMIN — SODIUM CHLORIDE 75 ML/HR: 0.9 INJECTION, SOLUTION INTRAVENOUS at 16:58

## 2017-07-10 RX ADMIN — BISACODYL 10 MG: 5 TABLET, COATED ORAL at 15:36

## 2017-07-10 RX ADMIN — HEPARIN SODIUM 5000 UNITS: 5000 INJECTION, SOLUTION INTRAVENOUS; SUBCUTANEOUS at 11:06

## 2017-07-10 RX ADMIN — Medication 1 TABLET: at 17:03

## 2017-07-10 RX ADMIN — HEPARIN SODIUM 5000 UNITS: 5000 INJECTION, SOLUTION INTRAVENOUS; SUBCUTANEOUS at 16:12

## 2017-07-10 RX ADMIN — ONDANSETRON 4 MG: 2 INJECTION INTRAMUSCULAR; INTRAVENOUS at 08:32

## 2017-07-10 RX ADMIN — LACTULOSE 20 G: 20 SOLUTION ORAL at 15:00

## 2017-07-10 RX ADMIN — PANTOPRAZOLE SODIUM 40 MG: 40 INJECTION, POWDER, FOR SOLUTION INTRAVENOUS at 08:31

## 2017-07-10 RX ADMIN — ONDANSETRON 4 MG: 2 INJECTION INTRAMUSCULAR; INTRAVENOUS at 23:58

## 2017-07-11 VITALS
DIASTOLIC BLOOD PRESSURE: 73 MMHG | HEIGHT: 67 IN | SYSTOLIC BLOOD PRESSURE: 129 MMHG | OXYGEN SATURATION: 96 % | HEART RATE: 61 BPM | RESPIRATION RATE: 17 BRPM | TEMPERATURE: 97.1 F

## 2017-07-11 LAB
GLUCOSE SERPL-MCNC: 143 MG/DL (ref 65–140)
GLUCOSE SERPL-MCNC: 147 MG/DL (ref 65–140)
GLUCOSE SERPL-MCNC: 186 MG/DL (ref 65–140)

## 2017-07-11 PROCEDURE — C9113 INJ PANTOPRAZOLE SODIUM, VIA: HCPCS | Performed by: PHYSICIAN ASSISTANT

## 2017-07-11 PROCEDURE — 82948 REAGENT STRIP/BLOOD GLUCOSE: CPT

## 2017-07-11 RX ORDER — PANTOPRAZOLE SODIUM 40 MG/1
40 TABLET, DELAYED RELEASE ORAL
Status: DISCONTINUED | OUTPATIENT
Start: 2017-07-11 | End: 2017-07-11 | Stop reason: HOSPADM

## 2017-07-11 RX ADMIN — PANTOPRAZOLE SODIUM 40 MG: 40 TABLET, DELAYED RELEASE ORAL at 16:00

## 2017-07-11 RX ADMIN — PANTOPRAZOLE SODIUM 40 MG: 40 INJECTION, POWDER, FOR SOLUTION INTRAVENOUS at 08:52

## 2017-07-11 RX ADMIN — ESCITALOPRAM OXALATE 20 MG: 10 TABLET ORAL at 08:52

## 2017-07-11 RX ADMIN — HEPARIN SODIUM 5000 UNITS: 5000 INJECTION, SOLUTION INTRAVENOUS; SUBCUTANEOUS at 05:26

## 2017-07-11 RX ADMIN — Medication 1 TABLET: at 08:52

## 2017-07-17 ENCOUNTER — ALLSCRIPTS OFFICE VISIT (OUTPATIENT)
Dept: OTHER | Facility: OTHER | Age: 47
End: 2017-07-17

## 2017-07-17 DIAGNOSIS — K58.0 IRRITABLE BOWEL SYNDROME WITH DIARRHEA: ICD-10-CM

## 2017-07-17 DIAGNOSIS — K76.0 FATTY (CHANGE OF) LIVER, NOT ELSEWHERE CLASSIFIED: ICD-10-CM

## 2017-07-17 DIAGNOSIS — N94.89 OTHER SPECIFIED CONDITIONS ASSOCIATED WITH FEMALE GENITAL ORGANS AND MENSTRUAL CYCLE: ICD-10-CM

## 2017-07-17 DIAGNOSIS — E11.9 TYPE 2 DIABETES MELLITUS WITHOUT COMPLICATIONS (HCC): ICD-10-CM

## 2017-07-22 ENCOUNTER — TRANSCRIBE ORDERS (OUTPATIENT)
Dept: LAB | Facility: CLINIC | Age: 47
End: 2017-07-22

## 2017-07-22 ENCOUNTER — APPOINTMENT (OUTPATIENT)
Dept: LAB | Facility: CLINIC | Age: 47
End: 2017-07-22
Payer: COMMERCIAL

## 2017-07-22 DIAGNOSIS — Z00.8 HEALTH EXAMINATION IN POPULATION SURVEY: Primary | ICD-10-CM

## 2017-07-22 DIAGNOSIS — Z00.8 HEALTH EXAMINATION IN POPULATION SURVEY: ICD-10-CM

## 2017-07-22 LAB
CHOLEST SERPL-MCNC: 237 MG/DL (ref 50–200)
EST. AVERAGE GLUCOSE BLD GHB EST-MCNC: 148 MG/DL
HBA1C MFR BLD: 6.8 % (ref 4.2–6.3)
HDLC SERPL-MCNC: 47 MG/DL (ref 40–60)
LDLC SERPL CALC-MCNC: 158 MG/DL (ref 0–100)
TRIGL SERPL-MCNC: 160 MG/DL

## 2017-07-22 PROCEDURE — 83036 HEMOGLOBIN GLYCOSYLATED A1C: CPT

## 2017-07-22 PROCEDURE — 36415 COLL VENOUS BLD VENIPUNCTURE: CPT

## 2017-07-22 PROCEDURE — 80061 LIPID PANEL: CPT

## 2017-08-03 ENCOUNTER — TRANSCRIBE ORDERS (OUTPATIENT)
Dept: ADMINISTRATIVE | Facility: HOSPITAL | Age: 47
End: 2017-08-03

## 2017-08-03 ENCOUNTER — ALLSCRIPTS OFFICE VISIT (OUTPATIENT)
Dept: OTHER | Facility: OTHER | Age: 47
End: 2017-08-03

## 2017-08-03 ENCOUNTER — APPOINTMENT (OUTPATIENT)
Dept: LAB | Facility: CLINIC | Age: 47
End: 2017-08-03
Payer: COMMERCIAL

## 2017-08-03 DIAGNOSIS — K76.0 FATTY (CHANGE OF) LIVER, NOT ELSEWHERE CLASSIFIED: ICD-10-CM

## 2017-08-03 DIAGNOSIS — K56.609 MECHANICAL ILEUS (HCC): Primary | ICD-10-CM

## 2017-08-03 DIAGNOSIS — N94.89 OTHER SPECIFIED CONDITIONS ASSOCIATED WITH FEMALE GENITAL ORGANS AND MENSTRUAL CYCLE: ICD-10-CM

## 2017-08-03 LAB
ALBUMIN SERPL BCP-MCNC: 3.5 G/DL (ref 3.5–5)
ALP SERPL-CCNC: 61 U/L (ref 46–116)
ALT SERPL W P-5'-P-CCNC: 96 U/L (ref 12–78)
AST SERPL W P-5'-P-CCNC: 48 U/L (ref 5–45)
B-HCG SERPL-ACNC: <2 MIU/ML
BILIRUB DIRECT SERPL-MCNC: 0.09 MG/DL (ref 0–0.2)
BILIRUB SERPL-MCNC: 0.3 MG/DL (ref 0.2–1)
CRP SERPL QL: 8.6 MG/L
ERYTHROCYTE [SEDIMENTATION RATE] IN BLOOD: 26 MM/HOUR (ref 0–20)
ESTRADIOL SERPL-MCNC: 50 PG/ML
FSH SERPL-ACNC: 3.8 MIU/ML
IGA SERPL-MCNC: 344 MG/DL (ref 70–400)
IGG SERPL-MCNC: 1050 MG/DL (ref 700–1600)
IGM SERPL-MCNC: 98 MG/DL (ref 40–230)
LH SERPL-ACNC: 3.8 MIU/ML
PROLACTIN SERPL-MCNC: 10.5 NG/ML
PROT SERPL-MCNC: 7.8 G/DL (ref 6.4–8.2)
T4 FREE SERPL-MCNC: 0.87 NG/DL (ref 0.76–1.46)
TSH SERPL DL<=0.05 MIU/L-ACNC: 3.9 UIU/ML (ref 0.36–3.74)

## 2017-08-03 PROCEDURE — 84146 ASSAY OF PROLACTIN: CPT

## 2017-08-03 PROCEDURE — 86704 HEP B CORE ANTIBODY TOTAL: CPT

## 2017-08-03 PROCEDURE — 83002 ASSAY OF GONADOTROPIN (LH): CPT

## 2017-08-03 PROCEDURE — 83001 ASSAY OF GONADOTROPIN (FSH): CPT

## 2017-08-03 PROCEDURE — 86706 HEP B SURFACE ANTIBODY: CPT

## 2017-08-03 PROCEDURE — 82784 ASSAY IGA/IGD/IGG/IGM EACH: CPT

## 2017-08-03 PROCEDURE — 84702 CHORIONIC GONADOTROPIN TEST: CPT

## 2017-08-03 PROCEDURE — 80076 HEPATIC FUNCTION PANEL: CPT

## 2017-08-03 PROCEDURE — 86235 NUCLEAR ANTIGEN ANTIBODY: CPT

## 2017-08-03 PROCEDURE — 82390 ASSAY OF CERULOPLASMIN: CPT

## 2017-08-03 PROCEDURE — 84439 ASSAY OF FREE THYROXINE: CPT

## 2017-08-03 PROCEDURE — 82103 ALPHA-1-ANTITRYPSIN TOTAL: CPT

## 2017-08-03 PROCEDURE — 86038 ANTINUCLEAR ANTIBODIES: CPT

## 2017-08-03 PROCEDURE — 86140 C-REACTIVE PROTEIN: CPT

## 2017-08-03 PROCEDURE — 82670 ASSAY OF TOTAL ESTRADIOL: CPT

## 2017-08-03 PROCEDURE — 85652 RBC SED RATE AUTOMATED: CPT

## 2017-08-03 PROCEDURE — 86256 FLUORESCENT ANTIBODY TITER: CPT

## 2017-08-03 PROCEDURE — 36415 COLL VENOUS BLD VENIPUNCTURE: CPT

## 2017-08-03 PROCEDURE — 80074 ACUTE HEPATITIS PANEL: CPT

## 2017-08-03 PROCEDURE — 84443 ASSAY THYROID STIM HORMONE: CPT

## 2017-08-04 ENCOUNTER — GENERIC CONVERSION - ENCOUNTER (OUTPATIENT)
Dept: OTHER | Facility: OTHER | Age: 47
End: 2017-08-04

## 2017-08-04 LAB
A1AT SERPL-MCNC: 146 MG/DL (ref 90–200)
ACTIN IGG SERPL-ACNC: 5 UNITS (ref 0–19)
CERULOPLASMIN SERPL-MCNC: 33.5 MG/DL (ref 19–39)
HAV IGM SER QL: NORMAL
HBV CORE AB SER QL: NORMAL
HBV CORE IGM SER QL: NORMAL
HBV SURFACE AB SER-ACNC: 62.23 MIU/ML
HBV SURFACE AG SER QL: NORMAL
HCV AB SER QL: NORMAL
MITOCHONDRIA M2 IGG SER-ACNC: 1.9 UNITS (ref 0–20)
RYE IGE QN: NEGATIVE

## 2017-09-01 ENCOUNTER — HOSPITAL ENCOUNTER (OUTPATIENT)
Dept: CT IMAGING | Facility: HOSPITAL | Age: 47
Discharge: HOME | End: 2017-09-01
Attending: INTERNAL MEDICINE
Payer: COMMERCIAL

## 2017-09-01 DIAGNOSIS — K58.0 IRRITABLE BOWEL SYNDROME WITH DIARRHEA: ICD-10-CM

## 2017-09-01 PROCEDURE — 74177 CT ABD & PELVIS W/CONTRAST: CPT

## 2017-09-01 RX ADMIN — IOHEXOL 100 ML: 350 INJECTION, SOLUTION INTRAVENOUS at 08:27

## 2017-09-03 DIAGNOSIS — K56.699 OTHER INTESTINAL OBSTRUCTION: ICD-10-CM

## 2017-09-05 ENCOUNTER — GENERIC CONVERSION - ENCOUNTER (OUTPATIENT)
Dept: OTHER | Facility: OTHER | Age: 47
End: 2017-09-05

## 2017-09-06 ENCOUNTER — GENERIC CONVERSION - ENCOUNTER (OUTPATIENT)
Dept: OTHER | Facility: OTHER | Age: 47
End: 2017-09-06

## 2017-09-14 ENCOUNTER — HOSPITAL ENCOUNTER (OUTPATIENT)
Facility: HOSPITAL | Age: 47
Setting detail: OUTPATIENT SURGERY
Discharge: HOME | End: 2017-09-14
Attending: INTERNAL MEDICINE | Admitting: INTERNAL MEDICINE
Payer: COMMERCIAL

## 2017-09-14 ENCOUNTER — ANESTHESIA (OUTPATIENT)
Dept: GASTROENTEROLOGY | Facility: HOSPITAL | Age: 47
End: 2017-09-14
Payer: COMMERCIAL

## 2017-09-14 ENCOUNTER — ANESTHESIA EVENT (OUTPATIENT)
Dept: GASTROENTEROLOGY | Facility: HOSPITAL | Age: 47
End: 2017-09-14
Payer: COMMERCIAL

## 2017-09-14 ENCOUNTER — GENERIC CONVERSION - ENCOUNTER (OUTPATIENT)
Dept: OTHER | Facility: OTHER | Age: 47
End: 2017-09-14

## 2017-09-14 VITALS
HEIGHT: 67 IN | SYSTOLIC BLOOD PRESSURE: 99 MMHG | BODY MASS INDEX: 33.74 KG/M2 | OXYGEN SATURATION: 96 % | TEMPERATURE: 97 F | RESPIRATION RATE: 16 BRPM | HEART RATE: 66 BPM | DIASTOLIC BLOOD PRESSURE: 68 MMHG | WEIGHT: 215 LBS

## 2017-09-14 DIAGNOSIS — K21.9 GASTROESOPHAGEAL REFLUX DISEASE: ICD-10-CM

## 2017-09-14 PROCEDURE — 82948 REAGENT STRIP/BLOOD GLUCOSE: CPT

## 2017-09-14 PROCEDURE — 88342 IMHCHEM/IMCYTCHM 1ST ANTB: CPT | Performed by: INTERNAL MEDICINE

## 2017-09-14 PROCEDURE — 88305 TISSUE EXAM BY PATHOLOGIST: CPT | Performed by: INTERNAL MEDICINE

## 2017-09-14 RX ORDER — LIDOCAINE HYDROCHLORIDE 10 MG/ML
INJECTION, SOLUTION INFILTRATION; PERINEURAL AS NEEDED
Status: DISCONTINUED | OUTPATIENT
Start: 2017-09-14 | End: 2017-09-14 | Stop reason: SURG

## 2017-09-14 RX ORDER — SODIUM CHLORIDE 9 MG/ML
INJECTION, SOLUTION INTRAVENOUS CONTINUOUS PRN
Status: DISCONTINUED | OUTPATIENT
Start: 2017-09-14 | End: 2017-09-14 | Stop reason: SURG

## 2017-09-14 RX ORDER — PROPOFOL 10 MG/ML
INJECTION, EMULSION INTRAVENOUS CONTINUOUS PRN
Status: DISCONTINUED | OUTPATIENT
Start: 2017-09-14 | End: 2017-09-14 | Stop reason: SURG

## 2017-09-14 RX ORDER — PROPOFOL 10 MG/ML
INJECTION, EMULSION INTRAVENOUS AS NEEDED
Status: DISCONTINUED | OUTPATIENT
Start: 2017-09-14 | End: 2017-09-14 | Stop reason: SURG

## 2017-09-14 RX ADMIN — LIDOCAINE HYDROCHLORIDE 100 MG: 10 INJECTION, SOLUTION INFILTRATION; PERINEURAL at 08:29

## 2017-09-14 RX ADMIN — SODIUM CHLORIDE: 0.9 INJECTION, SOLUTION INTRAVENOUS at 08:20

## 2017-09-14 RX ADMIN — PROPOFOL 50 MG: 10 INJECTION, EMULSION INTRAVENOUS at 08:33

## 2017-09-14 RX ADMIN — PROPOFOL 140 MCG/KG/MIN: 10 INJECTION, EMULSION INTRAVENOUS at 08:29

## 2017-09-14 RX ADMIN — SODIUM CHLORIDE: 0.9 INJECTION, SOLUTION INTRAVENOUS at 08:10

## 2017-09-14 RX ADMIN — PROPOFOL 100 MG: 10 INJECTION, EMULSION INTRAVENOUS at 08:29

## 2017-09-15 LAB — GLUCOSE SERPL-MCNC: 128 MG/DL (ref 65–140)

## 2017-09-21 ENCOUNTER — GENERIC CONVERSION - ENCOUNTER (OUTPATIENT)
Dept: OTHER | Facility: OTHER | Age: 47
End: 2017-09-21

## 2017-09-25 ENCOUNTER — GENERIC CONVERSION - ENCOUNTER (OUTPATIENT)
Dept: OTHER | Facility: OTHER | Age: 47
End: 2017-09-25

## 2017-10-06 ENCOUNTER — ALLSCRIPTS OFFICE VISIT (OUTPATIENT)
Dept: OTHER | Facility: OTHER | Age: 47
End: 2017-10-06

## 2017-10-07 NOTE — PROGRESS NOTES
Assessment  1  Amenorrhea, secondary (626 0) (N91 1)   2  Acute pelvic pain, female (625 9) (R10 2)    Plan  Acute pelvic pain, female, Amenorrhea, secondary    · Schedule Surgery Treatment  Procedure  Status: Hold For - Scheduling  Requested for:  00BPV9302   Ordered; For: Acute pelvic pain, female, Amenorrhea, secondary; Ordered By: Mariano Chahal Performed:  Due: 56GVG7457    Discussion/Summary  Goals and Barriers: The patient has the current Goals: Resolution of pain, return of menses  The patent has the current Barriers: Unsure of source of amenorrhea  Patient's Capacity to Self-Care: Patient is able to Self-Care  Chief Complaint  Chief Complaint Free Text Note Form: results/management      History of Present Illness  HPI: Pt is a 49 y o female with LMP 4/1/2017 who presents to review results and management options  She reports that she has a h o of an endometrial ablation, but continued to have monthly menses, just significantly lighter than prior  For the last 3 months, she reports she feels like she is going to have a period and has significant pain and cramping, which she has never had before, but does not have any bleeding  She reports she was seen by Dr Jake Nixon and was given blood work to have done to see if she achieved menopause and was given a prescription for Provera  She reports she only too the provera x 5 days and stopped secondary to pelvic pain  She reports she only had spotting after she stopped the pills  We reviewed that her labs are not consistent with menopause and that her prolactin and TSH is mildly elevated by her FT4 is normal  She reports she was told there is a possibility she is bleeding internally, but it is not coming out of the uterus due to her h o ablation and she wants to know how to see if that is a problem  We reviewed the option of u/s with possible SIS to evaluate endometrium vs D&C hysteroscopy which could be diagnostic and therapeutic at the same time   The pt reports she would like to proceed with D&C, hysteroscopy  We reviewed the technique of the procedure and the risks including bleeding, infection, uterine perforation, damage to adjacent structures, DVT/PE/MI and death and the pt desires to proceed  Consents were obtained  Review of Systems  Focused-Female:   Constitutional: feeling poorly, but-no fever-and-no chills  ENT: no ear ache, no loss of hearing, no nosebleeds or nasal discharge, no sore throat or hoarseness  Cardiovascular: no complaints of slow or fast heart rate, no chest pain, no palpitations, no leg claudication or lower extremity edema  Respiratory: no complaints of shortness of breath, no wheezing, no dyspnea on exertion, no orthopnea or PND  Breasts: no complaints of breast pain, breast lump or nipple discharge  Gastrointestinal: no complaints of abdominal pain, no constipation, no nausea or diarrhea, no vomiting, no bloody stools  Genitourinary: as noted in HPI  Musculoskeletal: no complaints of arthralgia, no myalgia, no joint swelling or stiffness, no limb pain or swelling  Integumentary: no complaints of skin rash or lesion, no itching or dry skin, no skin wounds  Neurological: no complaints of headache, no confusion, no numbness or tingling, no dizziness or fainting  Active Problems  1  Acute pelvic pain, female (625 9) (R10 2)   2  Amenorrhea, secondary (626 0) (N91 1)   3  Anxiety (300 00) (F41 9)   4  Cervical radiculopathy (723 4) (M54 12)   5  Depression (311) (F32 9)   6  Gastroesophageal reflux disease (530 81) (K21 9)   7  Hepatic steatosis (571 8) (K76 0)   8  Ileus (560 1) (K56 7)   9  Irritable bowel syndrome with diarrhea (564 1) (K58 0)   10  Microscopic hematuria (599 72) (R31 29)   11  Obesity (BMI 30 0-34 9) (278 00) (E66 9)   12  Perimenopause (627 2) (N95 1)   13  Pulmonary nodule seen on imaging study (793 11) (R91 1)   14  Right lumbar radiculopathy (724 4) (M54 16)   15   Suppression of menstruation (626  8) (N94 89)   16  Thickened small bowel (569 89) (K63 9)   17  Type 2 diabetes mellitus (250 00) (E11 9)   18  Vitamin D deficiency (268 9) (E55 9)    Past Medical History  1  History of Carpal tunnel syndrome, right (354 0) (G56 01)   2  History of Community acquired pneumonia [phone]) (J18 9)   3  History of Depression (311) (F32 9)   4  Denied: History of abnormality of cervix   5  History of colon polyps (V12 72) (Z86 010)   6  History of gestational diabetes (V12 21) (Z86 32)   7  History of rectal bleeding (V12 79) (Z87 19)   8  History of Labial cyst (624 8) (N90 7)   9  History of Left ovarian cyst (620 2) (N83 202)   10  History of Myofascial pain (729 1) (M79 1)   11  History of Trochanteric bursitis of both hips (726 5) (M70 61,M70 62)   12  History of Trochanteric bursitis of left hip (726 5) (M70 62)   13  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) (G56 20)  Active Problems And Past Medical History Reviewed: The active problems and past medical history were reviewed and updated today  Surgical History  1  History of  Section   2  History of Cholecystectomy   3  History of Endoscopic Removal Of Stone(S) From Biliary Tract   4  History of ERCP With Endoscopic Sphincterotomy   5  History of Gynecologic Services Thermal Endometrial Ablation   6  History of Neuroplasty With Transposition Of Ulnar Nerve - At Elbow   7  History of Oral Surgery Tooth Extraction   8  History of Thorax Excision Of First Rib   9  History of Tonsillectomy With Adenoidectomy   10  History of Venous Ligation With Stripping  Surgical History Reviewed: The surgical history was reviewed and updated today  Family History  Mother    1  Family history of Diabetes Mellitus (V18 0)   2  Family history of Essential Hypercholesterolemia   3  Family history of malignant neoplasm of breast (V16 3) (Z80 3)  Father    4  Family history of Diabetes Mellitus (V18 0)   5   Family history of Essential Hypercholesterolemia 6  Family history of lung cancer (V16 1) (Z80 1)  Brother    7  Family history of Diabetes Mellitus (V18 0)   8  Family history of Essential Hypercholesterolemia   9  Family history of Hypertension (V17 49)  Unknown    10  Family history of neuropathy (V17 2) (Z82 0)  Family History    11  Family history of Alcoholism   12  Family history of Asthma (V17 5)   13  Family history of Back Pain   14  Family history of Cancer   15  Family history of Depression   16  Family history of Diabetes Mellitus (V18 0)   17  Family history of Essential Hypercholesterolemia   18  Family history of Heart Disease (V17 49)   19  Family history of Hypertension (V17 49)  Family History Reviewed: The family history was reviewed and updated today  Social History   · Being A Social Drinker   ·    · Education history   · Exercise habits   · Former smoker (V15 82) (Y70 078)   · General equivalency diploma (GED)   · Denied: History of No drug use   · No preference on [employer]ism beliefs   · Occupation  Social History Reviewed: The social history was reviewed and updated today  The social history was reviewed and is unchanged  Current Meds   1  Escitalopram Oxalate 20 MG Oral Tablet; TAKE 1 TABLET DAILY  Requested for:   10Apr2017; Last Rx:10Apr2017 Ordered   2  MetFORMIN HCl - 500 MG Oral Tablet; 1 tab po qam and 2 po qhs  Requested for:   21CUO4241; Last DK:57FRQ6166 Ordered   3  OneTouch Delica Lancets Fine Miscellaneous; TEST ONCE DAILY; Therapy: 24Apr2017 to (Evaluate:54Fmq6566)  Requested for: 21Jun2017; Last   SA:26SHE3770 Ordered   4  OneTouch Ultra 2 w/Device Kit; USE AS DIRECTED; Therapy: 24Apr2017 to (Last Rx:24Apr2017) Ordered   5  OneTouch UltraSoft Lancets Miscellaneous; TEST ONCE DAILY; Therapy: 77Iic4393 to (); Last Rx:24Apr2017 Ordered   6  Pantoprazole Sodium 40 MG Oral Tablet Delayed Release; ONE TABLET THIRTY   MINUTES BEFORE BREAKFAST DAILY;    Therapy: 09DQB3735 to ([phone])  Requested for: 73MXS2202; Last   Rx:27Qxf1776 Ordered   7  QUEtiapine Fumarate 25 MG Oral Tablet; TAKE 1 TABLET AT BEDTIME  Requested for:   83Jwe7637; Last Rx:74Imb7637 Ordered  Medication List Reviewed: The medication list was reviewed and updated today  Allergies  1  No Known Drug Allergies    Vitals  Vital Signs    Recorded: 02KKU6954 58:10KM   Systolic 183   Diastolic 70   Height 5 ft 6 5 in   Weight 219 lb    BMI Calculated 34 82   BSA Calculated 2 09   LMP 35CSF2643     Physical Exam    Constitutional   General appearance: Abnormal   obese  Neck   Neck: Normal, supple, trachea midline, no masses  Pulmonary   Respiratory effort: No increased work of breathing or signs of respiratory distress  Auscultation of lungs: Clear to auscultation  Cardiovascular   Auscultation of heart: Normal rate and rhythm, normal S1 and S2, no murmurs  Peripheral vascular exam: Normal pulses Throughout  Genitourinary   External genitalia: Normal and no lesions appreciated  Vagina: Normal, no lesions or dryness appreciated  Urethra: Normal     Urethral meatus: Normal     Bladder: Normal, soft, non-tender and no prolapse or masses appreciated  Cervix: Normal, no palpable masses  Examination of the cervix revealed normal findings  Uterus: Normal, non-tender, not enlarged, and no palpable masses  -unable to palpate due to guarding  Abdomen   Abdomen: Abnormal   The abdomen was distended-and-obese  Bowel sounds were normal  There was moderate tenderness that was diffuse  Liver and spleen: No hepatomegaly or splenomegaly  Examination for hernias: No hernias appreciated  Skin   Skin and subcutaneous tissue: Normal skin turgor and no rashes      Psychiatric   Orientation to person, place, and time: Normal     Mood and affect: Normal        Results/Data  (1) ESTRADIOL 31Zyn2101 08:22AM Carolin Rosas Order Number: [id number]     Test Name Result Flag Reference   ESTRADIOL 50 0 pg/mL     ESTRADIOL:    Mentruating Females: Follicular phase:  10 4-162 6  pg/mL      Mid-cycle phase:   49 9- 367 2 pg/mL      Luteal phase:      40 2-259    pg/mL     Postmenopausal females (untreated):  <11-58 3  pg/mL  On Menopausal Hormone Therapy (MHT): < 1 pg/mL    Women taking the drug Fulvestrant (Faslodex) may have falsely elevated Estradiol results  Suggest ordering [facility] Estradiol, LC/MS -test number [id number], to monitor these patients  (1) PROLACTIN 03Aug2017 08:22AM Alen Oxford Order Number: [id number]     Test Name Result Flag Reference   PROLACTIN 10 5 ng/mL     PROLACTIN:     Females:   ? ??Non-pregnant ??? ???2 2-30 3 ? ? ?ng/mL   ? ??Pregnant ??? ??? ??? ???8 1-347 6 ng/mL   ? ? ?Post-menopausal 0 7-31 5 ? ??ng/mL     (1) [address] [place] [address] 03Aug2017 08:22AM Alen Oxford Order Number: [id number]     Test Name Result Flag Reference   FOLLICLE STIMULATING HORMONE 3 8 mIU/mL     FSH:  Menstruating Females: Follicular Phase  5 0-02 0 mIU/mL    Mid-Cycle Phase   5 2-17 5 mIU/mL    Luteal Phase      1 7-9 5  mIU/mL  Postmenopausal Females    On Menopausal Hormone Therapy(HRT) 5 9-72 8   mIU/mL    Untreated                          12 7-132 2 mIU/mL     (1) LH (LEUTINIZING HORMONE) 03Aug2017 08:22AM Alen Oxford Order Number: [id number]     Test Name Result Flag Reference   LUTEINIZING HORMONE 3 8 mIU/mL     LH:   Menstruating Females:   ??? Follicular Phase ? ??1 9-12 8 ? ? ? mIU/mL   ? ?? Mid-Cycle Phase ? ?? 22 8-76 1 mIU/mL   ? ?? Luteal Phase ??? ??? ???0 6-13 5 ? ? ? mIU/mL   Postmenopausal Females:   ??? On Menopausal Hormone Therapy(MHT) 1 1-52 4 mIU/mL   ? ?? Untreated ??? ??? ??? ??? ??? ??? ??? ??? ??? ??? ??? ??? ???8 6-61 8 mIU/mL     (1) TSH WITH FT4 REFLEX 73Xoi6887 08:22AM Alen Mueller Order Number: [id number]     Test Name Result Flag Reference   TSH 3 899 uIU/mL H 0 358-3 740   A FT4 has been ordered      Patients undergoing fluorescein dye angiography may retain small amounts of fluorescein in the body for 48-72 hours post procedure  Samples containing fluorescein can produce falsely depressed TSH values  If the patient had this procedure,a specimen should be resubmitted post fluorescein clearance  The recommended reference ranges for TSH during pregnancy are as follows:  First trimester 0 1 to 2 5 uIU/mL  Second trimester  0 2 to 3 0 uIU/mL  Third trimester 0 3 to 3 0 uIU/m     (1) TSH WITH FT4 REFLEX 31Qqh9558 08:22AM Melilssa Gonsales Order Number: [id number]     Test Name Result Flag Reference   TSH 3 899 uIU/mL H 0 358-3 740   A FT4 has been ordered      Patients undergoing fluorescein dye angiography may retain small amounts of fluorescein in the body for 48-72 hours post procedure  Samples containing fluorescein can produce falsely depressed TSH values  If the patient had this procedure,a specimen should be resubmitted post fluorescein clearance  The recommended reference ranges for TSH during pregnancy are as follows:  First trimester 0 1 to 2 5 uIU/mL  Second trimester  0 2 to 3 0 uIU/mL  Third trimester 0 3 to 3 0 uIU/m   T4,FREE 0 87 ng/dL  0 76-1 46     Health Management  History of colon polyps   COLONOSCOPY (GI, SURG); every 5 years; Last 06ZLM4658; Next Due: 18BES5413; Active    Future Appointments    Date/Time Provider Specialty Site   10/31/2017 08:00 AM Bariatric 1, Nurse Schedule  [facility]   10/31/2017 12:00 PM BIRDIE Kitchen   Obstetrics/Gynecology [facility]   11/28/2017 10:00 AM Medhat Liu MD Gastroenterology Adult [facility]   11/06/2017 10:20 AM Malaika Carlson DO Internal Medicine [facility]     Signatures   Electronically signed by : BIRDIE Garcia ; Oct  6 2017  2:19PM EST                       (Author)

## 2017-10-17 RX ORDER — IBUPROFEN 200 MG
200-800 TABLET ORAL EVERY 6 HOURS PRN
COMMUNITY
End: 2018-02-08 | Stop reason: HOSPADM

## 2017-10-17 NOTE — PRE-PROCEDURE INSTRUCTIONS
Pre-Surgery Instructions:   Medication Instructions    escitalopram (LEXAPRO) 20 mg tablet Patient was instructed by Physician and understands   ibuprofen (MOTRIN) 200 mg tablet Patient was instructed by Physician and understands   metFORMIN (GLUCOPHAGE) 500 mg tablet Patient was instructed by Physician and understands   pantoprazole (PROTONIX) 20 mg tablet Patient was instructed by Physician and understands   QUEtiapine (SEROquel) 25 mg tablet Patient was instructed by Physician and understands  Pt instructed to take protonix and lexapro with a small sip of water the morning of surgery  [facility] preop instructions reviewed with pt  Pt instructed to purchase dial antibacterial soap

## 2017-10-19 ENCOUNTER — ANESTHESIA EVENT (OUTPATIENT)
Dept: PERIOP | Facility: HOSPITAL | Age: 47
End: 2017-10-19
Payer: COMMERCIAL

## 2017-10-20 ENCOUNTER — TRANSCRIBE ORDERS (OUTPATIENT)
Dept: LAB | Facility: CLINIC | Age: 47
End: 2017-10-20

## 2017-10-20 ENCOUNTER — HOSPITAL ENCOUNTER (OUTPATIENT)
Facility: HOSPITAL | Age: 47
Setting detail: OUTPATIENT SURGERY
Discharge: HOME | End: 2017-10-20
Attending: OBSTETRICS & GYNECOLOGY | Admitting: OBSTETRICS & GYNECOLOGY
Payer: COMMERCIAL

## 2017-10-20 ENCOUNTER — ANESTHESIA (OUTPATIENT)
Dept: PERIOP | Facility: HOSPITAL | Age: 47
End: 2017-10-20
Payer: COMMERCIAL

## 2017-10-20 VITALS
WEIGHT: 219 LBS | SYSTOLIC BLOOD PRESSURE: 117 MMHG | BODY MASS INDEX: 35.2 KG/M2 | HEIGHT: 66 IN | DIASTOLIC BLOOD PRESSURE: 58 MMHG | RESPIRATION RATE: 20 BRPM | OXYGEN SATURATION: 94 % | TEMPERATURE: 98.4 F | HEART RATE: 79 BPM

## 2017-10-20 DIAGNOSIS — R10.2 PELVIC AND PERINEAL PAIN: ICD-10-CM

## 2017-10-20 DIAGNOSIS — N91.1 SECONDARY AMENORRHEA: ICD-10-CM

## 2017-10-20 PROBLEM — N91.2 AMENORRHEA: Status: ACTIVE | Noted: 2017-10-20

## 2017-10-20 LAB
EXT PREGNANCY TEST URINE: NEGATIVE
GLUCOSE SERPL-MCNC: 113 MG/DL (ref 65–140)
GLUCOSE SERPL-MCNC: 134 MG/DL (ref 65–140)

## 2017-10-20 PROCEDURE — 81025 URINE PREGNANCY TEST: CPT | Performed by: OBSTETRICS & GYNECOLOGY

## 2017-10-20 PROCEDURE — 82948 REAGENT STRIP/BLOOD GLUCOSE: CPT

## 2017-10-20 PROCEDURE — 88304 TISSUE EXAM BY PATHOLOGIST: CPT | Performed by: OBSTETRICS & GYNECOLOGY

## 2017-10-20 PROCEDURE — 88305 TISSUE EXAM BY PATHOLOGIST: CPT | Performed by: OBSTETRICS & GYNECOLOGY

## 2017-10-20 RX ORDER — MIDAZOLAM HYDROCHLORIDE 1 MG/ML
INJECTION INTRAMUSCULAR; INTRAVENOUS AS NEEDED
Status: DISCONTINUED | OUTPATIENT
Start: 2017-10-20 | End: 2017-10-20 | Stop reason: SURG

## 2017-10-20 RX ORDER — FENTANYL CITRATE 50 UG/ML
INJECTION, SOLUTION INTRAMUSCULAR; INTRAVENOUS AS NEEDED
Status: DISCONTINUED | OUTPATIENT
Start: 2017-10-20 | End: 2017-10-20 | Stop reason: SURG

## 2017-10-20 RX ORDER — ONDANSETRON 2 MG/ML
INJECTION INTRAMUSCULAR; INTRAVENOUS AS NEEDED
Status: DISCONTINUED | OUTPATIENT
Start: 2017-10-20 | End: 2017-10-20 | Stop reason: SURG

## 2017-10-20 RX ORDER — LIDOCAINE HYDROCHLORIDE 10 MG/ML
INJECTION, SOLUTION EPIDURAL; INFILTRATION; INTRACAUDAL; PERINEURAL AS NEEDED
Status: DISCONTINUED | OUTPATIENT
Start: 2017-10-20 | End: 2017-10-20 | Stop reason: HOSPADM

## 2017-10-20 RX ORDER — SODIUM CHLORIDE 9 MG/ML
125 INJECTION, SOLUTION INTRAVENOUS CONTINUOUS
Status: DISCONTINUED | OUTPATIENT
Start: 2017-10-20 | End: 2017-10-20 | Stop reason: HOSPADM

## 2017-10-20 RX ORDER — IBUPROFEN 600 MG/1
600 TABLET ORAL EVERY 6 HOURS PRN
Qty: 30 TABLET | Refills: 0
Start: 2017-10-20 | End: 2018-01-18

## 2017-10-20 RX ORDER — IBUPROFEN 600 MG/1
600 TABLET ORAL EVERY 6 HOURS PRN
Status: DISCONTINUED | OUTPATIENT
Start: 2017-10-20 | End: 2017-10-20 | Stop reason: HOSPADM

## 2017-10-20 RX ORDER — PROPOFOL 10 MG/ML
INJECTION, EMULSION INTRAVENOUS AS NEEDED
Status: DISCONTINUED | OUTPATIENT
Start: 2017-10-20 | End: 2017-10-20 | Stop reason: SURG

## 2017-10-20 RX ORDER — KETOROLAC TROMETHAMINE 30 MG/ML
INJECTION, SOLUTION INTRAMUSCULAR; INTRAVENOUS AS NEEDED
Status: DISCONTINUED | OUTPATIENT
Start: 2017-10-20 | End: 2017-10-20 | Stop reason: SURG

## 2017-10-20 RX ORDER — ONDANSETRON 2 MG/ML
4 INJECTION INTRAMUSCULAR; INTRAVENOUS ONCE AS NEEDED
Status: DISCONTINUED | OUTPATIENT
Start: 2017-10-20 | End: 2017-10-20 | Stop reason: HOSPADM

## 2017-10-20 RX ORDER — ONDANSETRON 2 MG/ML
4 INJECTION INTRAMUSCULAR; INTRAVENOUS EVERY 6 HOURS PRN
Status: DISCONTINUED | OUTPATIENT
Start: 2017-10-20 | End: 2017-10-20 | Stop reason: HOSPADM

## 2017-10-20 RX ORDER — FENTANYL CITRATE/PF 50 MCG/ML
25 SYRINGE (ML) INJECTION
Status: DISCONTINUED | OUTPATIENT
Start: 2017-10-20 | End: 2017-10-20 | Stop reason: HOSPADM

## 2017-10-20 RX ADMIN — KETOROLAC TROMETHAMINE 30 MG: 30 INJECTION, SOLUTION INTRAMUSCULAR at 13:56

## 2017-10-20 RX ADMIN — ONDANSETRON HYDROCHLORIDE 4 MG: 2 INJECTION, SOLUTION INTRAVENOUS at 13:54

## 2017-10-20 RX ADMIN — SODIUM CHLORIDE 125 ML/HR: 0.9 INJECTION, SOLUTION INTRAVENOUS at 12:26

## 2017-10-20 RX ADMIN — MIDAZOLAM HYDROCHLORIDE 2 MG: 1 INJECTION, SOLUTION INTRAMUSCULAR; INTRAVENOUS at 13:06

## 2017-10-20 RX ADMIN — PROPOFOL 200 MG: 10 INJECTION, EMULSION INTRAVENOUS at 13:17

## 2017-10-20 RX ADMIN — DEXAMETHASONE SODIUM PHOSPHATE 8 MG: 10 INJECTION INTRAMUSCULAR; INTRAVENOUS at 13:17

## 2017-10-20 RX ADMIN — SODIUM CHLORIDE: 0.9 INJECTION, SOLUTION INTRAVENOUS at 13:33

## 2017-10-20 RX ADMIN — FENTANYL CITRATE 50 MCG: 50 INJECTION, SOLUTION INTRAMUSCULAR; INTRAVENOUS at 13:17

## 2017-10-20 RX ADMIN — IBUPROFEN 600 MG: 600 TABLET, FILM COATED ORAL at 15:46

## 2017-10-20 RX ADMIN — FENTANYL CITRATE 50 MCG: 50 INJECTION, SOLUTION INTRAMUSCULAR; INTRAVENOUS at 13:53

## 2017-10-20 NOTE — ANESTHESIA PREPROCEDURE EVALUATION
Review of Systems/Medical History  Patient summary reviewed  Chart reviewed  No history of anesthetic complications     Cardiovascular  Hyperlipidemia, DVT   Pulmonary  Smoker ex-smoker , ,        GI/Hepatic    GERD well controlled,   Comment: IBS          Endo/Other  Diabetes well controlled type 2 Oral agent,      GYN       Hematology  Negative hematology ROS      Musculoskeletal  Obesity  morbid obesity, Back pain , chronic back pain, cervical pain and lumbar pain,        Neurology    No neuromuscular disease ,   Comment: Thoracic outlet syndrome  Cervical radiculopathy Psychology   Anxiety, Depression , being treated for depression,            Physical Exam    Airway    Mallampati score: II  TM Distance: >3 FB  Neck ROM: full     Dental   No notable dental hx     Cardiovascular  Rhythm: regular, Rate: normal, Cardiovascular exam normal    Pulmonary  Pulmonary exam normal Breath sounds clear to auscultation,     Other Findings        Anesthesia Plan  ASA Score- 3       Anesthesia Type- general with ASA Monitors  Additional Monitors:   Airway Plan:           Induction- intravenous  Informed Consent- Anesthetic plan and risks discussed with patient

## 2017-10-20 NOTE — DISCHARGE INSTRUCTIONS
Dilation and Curettage   WHAT YOU SHOULD KNOW:   Dilation and curettage (D and C) is a procedure to remove tissue from the lining of your uterus  AFTER YOU LEAVE:   Medicines:   · Pain medicine: You may be given medicine to take away or decrease pain  Do not wait until the pain is severe before you take your medicine  · Antibiotics: This medicine will help fight or prevent an infection  · Take your medicine as directed  Call your healthcare provider if you think your medicine is not helping or if you have side effects  Tell him if you are allergic to any medicine  Keep a list of the medicines, vitamins, and herbs you take  Include the amounts, and when and why you take them  Bring the list or the pill bottles to follow-up visits  Carry your medicine list with you in case of an emergency  Follow up with your healthcare provider as directed:  Write down your questions so you remember to ask them during your visits  Activity:  Ask your primary healthcare provider when you can return to your normal activities  Contact your primary healthcare provider if:   · You have foul-smelling vaginal discharge  · You do not get your monthly period  · You feel depressed or anxious  · You feel very tired and weak  · You have questions or concerns about your condition or care  Seek care immediately or call 911 if:   · You have heavy vaginal bleeding that soaks 1 pad in 1 hour for 2 hours in a row  · You have a fever and abdominal cramps  · Your pain does not get better, even after treatment  © 2014 [address] [employer] is for End User's use only and may not be sold, redistributed or otherwise used for commercial purposes  All illustrations and images included in CareNotes® are the copyrighted property of [employer]  or [address]  The above information is an  only  It is not intended as medical advice for individual conditions or treatments  Talk to your doctor, nurse or pharmacist before following any medical regimen to see if it is safe and effective for you  Ibuprofen (By mouth)   Ibuprofen (eye-bue-PROE-fen)  Treats pain and fever  This medicine is an NSAID  Brand Name(s): Advil, Advil Children's, Advil Liqui-Gels, Advil Migraine, All-Purpose First Aid Kit, Children's Ibuprofen, Children's Motrin, Comfort Pac, Concentrated Motrin Infants' Drops, Equate Ibuprofen Tank Strength, Genpril, Good Neighbor Cap-Profen, Good Neighbor Ibuprofen Infants', [facility] Children's Ibuprofen, [facility] Ibuprofen   There may be other brand names for this medicine  When This Medicine Should Not Be Used: This medicine is not right for everyone  Do not use if you had an allergic reaction (including asthma) to ibuprofen, aspirin, or another NSAID, or right before or after heart surgery  How to Use This Medicine:   Capsule, Liquid Filled Capsule, Suspension, Tablet, Chewable Tablet  · Your doctor will tell you how much medicine to use  Do not use more than directed  · Prescription ibuprofen should come with a Medication Guide  Ask your pharmacist for the Medication Guide if you do not have one  · Follow the instructions on the medicine label if you are using this medicine without a prescription  · Take this medicine with food or milk if it upsets your stomach  · Oral liquid: Shake well just before using  Measure with a marked measuring spoon, oral syringe, or medicine cup  · Chewable tablet: Chew completely before you swallow it  Then drink some water to make sure you swallow all of the medicine  · For Children: Ask your pharmacist if you are not sure how much medicine to give a child  The dose is usually based on weight, not age  Never give more medicine than directed  · For Adults: Do not take more than 6 pills in 1 day (24 hours) unless your doctor tells you to  · Missed dose:  If you take this medicine on a regular basis and miss a dose, take it as soon as you can  If it is almost time for your next dose, wait until then to use the medicine and skip the missed dose  Do not use extra medicine to make up for a missed dose  · Store the medicine in a closed container at room temperature, away from heat, moisture, and direct light  Do not freeze the oral liquid  Drugs and Foods to Avoid:   Ask your doctor or pharmacist before using any other medicine, including over-the-counter medicines, vitamins, and herbal products  · Some foods and medicine can affect how ibuprofen works  Tell your doctor if you are also using lithium, methotrexate, a blood thinner (such as warfarin), a steroid medicine (such as hydrocortisone, prednisolone, prednisone), a diuretic (water pill), or an ACE inhibitor blood pressure medicine  · Do not use any other NSAID medicine unless your doctor says it is okay  Some other NSAIDs are aspirin, diclofenac, naproxen, or celecoxib  · Do not drink alcohol while you are using this medicine  Warnings While Using This Medicine:   · Tell your doctor if you are pregnant or breastfeeding  Do not use this medicine during the later part of pregnancy  · Tell your doctor if you have kidney disease, liver disease, asthma, lupus or a similar connective tissue disease, or a history of ulcers or other digestion problems  Tell your doctor if you smoke or have heart or blood circulation problems, including high blood pressure, heart failure (CHF), or bleeding problems  · This medicine may cause the following problems:  ¨ Bleeding and ulcers in the stomach or intestines  ¨ Higher risk of heart attack or stroke  ¨ Liver damage  ¨ Kidney damage  ¨ Vision problems  · Call your doctor if symptoms get worse, pain lasts more than 10 days, or fever lasts more than 3 days  · This medicine might contain sugar or phenylalanine (aspartame)  · Tell any doctor or dentist who treats you that you are using this medicine    · Keep all medicine out of the reach of children  Never share your medicine with anyone  Possible Side Effects While Using This Medicine:   Call your doctor right away if you notice any of these side effects:  · Allergic reaction: Itching or hives, swelling in your face or hands, swelling or tingling in your mouth or throat, chest tightness, trouble breathing  · Blistering, peeling, or red skin rash  · Change in how much or how often you urinate  · Chest pain that may spread to your arms, jaw, back, or neck, trouble breathing, nausea, unusual sweating, faintness  · Chest pain, trouble breathing, weakness on one side of your body, severe headache, trouble seeing or talking, pain in your lower leg  · Dark urine or pale stools, nausea, vomiting, loss of appetite, stomach pain, yellow skin or eyes  · Fever, neck pain, stiff neck  · Severe stomach pain, vomiting blood, bloody or black, tarry stools  · Swelling in your hands, ankles, or feet, rapid weight gain  · Trouble seeing, blind spots, change in how you see colors  · Unusual bleeding, bruising, or weakness  If you notice these less serious side effects, talk with your doctor:   · Constipation, diarrhea, gas, mild upset stomach  · Dizziness, headache, ringing in the ears  If you notice other side effects that you think are caused by this medicine, tell your doctor  Call your doctor for medical advice about side effects  You may report side effects to FDA at 7-105-FDA-1569  © 2017 [address] [employer] Information is for End User's use only and may not be sold, redistributed or otherwise used for commercial purposes  The above information is an  only  It is not intended as medical advice for individual conditions or treatments  Talk to your doctor, nurse or pharmacist before following any medical regimen to see if it is safe and effective for you

## 2017-10-20 NOTE — OP NOTE
OPERATIVE REPORT  PATIENT NAME: Sabra Pepper    :  1970  MRN: [id number]  Pt Location: [facility] OR ROOM 07    SURGERY DATE: 10/20/2017    Surgeon(s) and Role:     * Uma Arora MD - Primary     * Iker Kelsey MD - Assisting    Preop Diagnosis:  Pelvic and perineal pain [R10 2]  Secondary amenorrhea [n91 1]  Right vulvar lesion    Post-Op Diagnosis Codes:     * Pelvic and perineal pain [R10 2]     * Secondary amenorrhea [n91 1]  Right vulvar lesion    Procedure(s) (LRB):  DILATATION AND CURETTAGE (D&C) WITH HYSTEROSCOPY  REMOVAL VULVAR RT  LESION (N/A)    Specimen(s):  ID Type Source Tests Collected by Time Destination   1 :  Tissue Polyp, Cervical/Endometrial TISSUE EXAM Uma Arora MD 10/20/2017 1251    2 : RIGHT VULVAR LESION Tissue Vaginal TISSUE EXAM Uma Arora MD 10/20/2017 1320        Estimated Blood Loss:   10 cc    IV fluids: 1400 ml  Urine: 350 ml clear urine via straight catheterization    Hysteroscopy in: 450 ml  Hysteroscopy out: 375 ml    Drains:   None    Anesthesia Type:   General LMA    Operative Indications:  Pelvic and perineal pain [R10 2]  Secondary amenorrhea [N91 1]    Operative Findings:  1  Grossly normal sized anteverted uterus with no palpable adnexal masses  2  Uterus via hysteroscopy had scant endometrial tissue, and appeared mostly atrophic following previous ablation  No ostia visualized  3   Small 5 mm right vulvar mass    Complications:   None    Procedure and Technique:  Brief History  50-year-old female with amenorrhea in labs not consistent with menopause presenting for hysteroscopy D & C today  She is status post endometrial ablation the past and had had regular menses monthly which has since stopped  Patient also complaining of right vulvar mass and is requesting removal     All risks, benefits, and alternatives to the procedure were discussed with the patient and she had the opportunity to ask questions  Informed consent was obtained       Description of Procedure    Patient was taken to the operating room were a time out was performed to confirm correct patient and correct procedure  General LMA anesthesia (LMA) was administered and the patient was positioned on the OR table in the dorsal lithotomy position  All pressure points were padded and a shantanu hugger was placed to maintain control of core body temperature  A bimanual exam was performed and the uterus was noted to be anteverted, normal in size and consistency with no palpable adnexal masses or fullness  The patient was prepped and draped in the usual sterile fashion  Operative Technique    A straight catheter was introduced into the bladder, which was drained of 350 cc of clear yellow urine  A weighted speculum was inserted into the vagina and a Marcelino retractor was used to visualize the anterior lip of the cervix, which was then grasped with a single toothed tenaculum  The uterus was sounded to 6 25 cm  The cervix was serially dilated to 16 using Hanks dilators for introduction of the hysteroscope  Hysteroscope was introduced under direct visualization using normal saline solution as the distention media  Hysteroscope was advanced to the uterine fundus and the entire uterine cavity was inspected in a systematic manner  There was noted to be a scant amount of endometrial tissue and atrophic endometrium consistent with previous ablation  No ostia were visualized  Hysteroscope was withdrawn  The cervix was dilated up to 20 using Hanks dilators and re- sounded  to 7 5 cm and sharp curetting was performed, starting at the 12'oclock position and rotating a total of 360 degrees to cover all surfaces  Minimal endometrial tissue was obtained and sent for pathology  The hysteroscope was then re-introduced under direct visualization, again using normal saline solution as the distention media  Entire uterine cavity was inspected in a systematic manner   Adequate curetting was confirmed and hysteroscopy was withdrawn  The single toothed tenaculum was removed from the anterior lip of the cervix  Good hemostasis was confirmed at the tenaculum puncture sites  Weighted speculum was then removed from the vagina  Attention was then drawn to the small 5 mm right vulvar lesion  An elliptical incision was formed around the mass using an 11 surgical blade and mass was incised and removed ensuring to capture the entire mass  The incision was brought together using 2 0 Vicryl rapide using a running subcuticular stitch followed by 2 figure-of-eight stitches in order to achieve hemostasis which was confirmed  At the conclusion of the procedure, all needle, sponge, and instrument counts were noted to be correct x2  Patient tolerated the procedure well and was transferred to PACU in stable condition prior to discharge with follow up in 1-2 weeks  Dr Jen Harrington was present and participated in all key portions of the case        Patient Disposition:  PACU     SIGNATURE: Kaylin Acuña MD  DATE: October 20, 2017  TIME: 2:32 PM

## 2017-10-28 ENCOUNTER — LAB (OUTPATIENT)
Dept: LAB | Facility: CLINIC | Age: 47
End: 2017-10-28
Payer: COMMERCIAL

## 2017-10-28 DIAGNOSIS — K56.699 OTHER INTESTINAL OBSTRUCTION: ICD-10-CM

## 2017-10-28 DIAGNOSIS — E11.9 TYPE 2 DIABETES MELLITUS WITHOUT COMPLICATIONS (HCC): ICD-10-CM

## 2017-10-28 LAB
BUN SERPL-MCNC: 18 MG/DL (ref 5–25)
CHOLEST SERPL-MCNC: 251 MG/DL (ref 50–200)
CREAT SERPL-MCNC: 0.56 MG/DL (ref 0.6–1.3)
CREAT UR-MCNC: 146 MG/DL
EST. AVERAGE GLUCOSE BLD GHB EST-MCNC: 166 MG/DL
GFR SERPL CREATININE-BSD FRML MDRD: 111 ML/MIN/1.73SQ M
HBA1C MFR BLD: 7.4 % (ref 4.2–6.3)
HDLC SERPL-MCNC: 38 MG/DL (ref 40–60)
LDLC SERPL CALC-MCNC: 149 MG/DL (ref 0–100)
MICROALBUMIN UR-MCNC: 656 MG/L (ref 0–20)
MICROALBUMIN/CREAT 24H UR: 449 MG/G CREATININE (ref 0–30)
TRIGL SERPL-MCNC: 321 MG/DL

## 2017-10-28 PROCEDURE — 36415 COLL VENOUS BLD VENIPUNCTURE: CPT

## 2017-10-28 PROCEDURE — 83036 HEMOGLOBIN GLYCOSYLATED A1C: CPT

## 2017-10-28 PROCEDURE — 80061 LIPID PANEL: CPT

## 2017-10-28 PROCEDURE — 82565 ASSAY OF CREATININE: CPT

## 2017-10-28 PROCEDURE — 84520 ASSAY OF UREA NITROGEN: CPT

## 2017-10-28 PROCEDURE — 82570 ASSAY OF URINE CREATININE: CPT

## 2017-10-28 PROCEDURE — 82043 UR ALBUMIN QUANTITATIVE: CPT

## 2017-10-29 ENCOUNTER — GENERIC CONVERSION - ENCOUNTER (OUTPATIENT)
Dept: OTHER | Facility: OTHER | Age: 47
End: 2017-10-29

## 2017-10-31 ENCOUNTER — GENERIC CONVERSION - ENCOUNTER (OUTPATIENT)
Dept: OTHER | Facility: OTHER | Age: 47
End: 2017-10-31

## 2017-11-06 ENCOUNTER — ALLSCRIPTS OFFICE VISIT (OUTPATIENT)
Dept: OTHER | Facility: OTHER | Age: 47
End: 2017-11-06

## 2017-11-06 DIAGNOSIS — E11.21 TYPE 2 DIABETES MELLITUS WITH DIABETIC NEPHROPATHY (HCC): ICD-10-CM

## 2017-11-06 DIAGNOSIS — E78.5 HYPERLIPIDEMIA: ICD-10-CM

## 2017-11-07 NOTE — PROGRESS NOTES
Assessment  1  Type 2 diabetes mellitus with diabetic nephropathy (250 40,583 81) (E11 21)   2  Hyperlipidemia (272 4) (E78 5)   3  Obesity (BMI 30 0-34 9) (278 00) (E66 9)   4  Depression (311) (F32 9)    Plan  Hyperlipidemia    · (1) LIPID PANEL, FASTING; Status:Active; Requested QPY:[id number];   Hyperlipidemia, Type 2 diabetes mellitus with diabetic nephropathy    · Atorvastatin Calcium 10 MG Oral Tablet (Lipitor); TAKE 1 TABLET AT BEDTIME   · (1) COMPREHENSIVE METABOLIC PANEL; Status:Active; Requested IGF:[id number];   Type 2 diabetes mellitus with diabetic nephropathy    · Lisinopril 5 MG Oral Tablet; Take 1 tablet daily   · MetFORMIN HCl - 1000 MG Oral Tablet; TAKE 1 TABLET TWICE DAILY   · (1) HEMOGLOBIN A1C; Status:Active; Requested ZFP:[id number];     Discussion/Summary  Discussion Summary:   Tawnya Newby was seen and examined in the office today  The following was discussed:2 DM with microalbuminuria: A1c increased to 7 4%  I am changing her metformin to 1000 mg BID and encouraged her to work on her diet and weight  To consider adding on Victoza as this will also help with her weight  With the detected microalbuminuria, I added Lisinopril to her regime  Advised to start Lipitor at bedtime  Follow up labs will be done in about 3 months  Appears relatively stable  Continue with current regime  Given her underlying conditions as well as morbid obesity with inability to lose weight, she would be a candidate for gastric bypass/sleeve  She is motivated to lose weight and is attending classes as well as follow up as instructed  Counseling Documentation With Imm: The patient was counseled regarding instructions for management  Medication SE Review and Pt Understands Tx: Possible side effects of new medications were reviewed with the patient/guardian today  The treatment plan was reviewed with the patient/guardian   The patient/guardian understands and agrees with the treatment plan      Chief Complaint  Chief Complaint Free Text Note Form: pt is here for follow up      History of Present Illness  HPI: Marilee Dupree is here today for a routine follow up  She has been having pelvic pain and is following with Dr Guera Bañuelos  She underwent a hysteroscopy and will be following her pain  There is a chance she will undergo a hysterectomy  reports also following with bariatric team and is strongly considering the gastric sleeve  see discussion for details  Review of Systems  Complete-Female:   Constitutional: No fever, no chills, feels well, no tiredness, no recent weight gain or weight loss  Eyes: no eyesight problems  Cardiovascular: No complaints of slow heart rate, no fast heart rate, no chest pain, no palpitations, no leg claudication, no lower extremity edema  Respiratory: No complaints of shortness of breath, no wheezing, no cough, no SOB on exertion, no orthopnea, no PND  Gastrointestinal: diarrhea, but-- no abdominal pain,-- no nausea,-- no vomiting-- and-- no constipation  Genitourinary: pelvic pain  Neurological: no headache,-- no numbness,-- no tingling-- and-- no dizziness  Psychiatric: depression, but-- no anxiety  Hematologic/Lymphatic: No complaints of swollen glands, no swollen glands in the neck, does not bleed easily, does not bruise easily  Active Problems  1  Acute pelvic pain, female (625 9) (R10 2)   2  Amenorrhea, secondary (626 0) (N91 1)   3  Anxiety (300 00) (F41 9)   4  Cervical radiculopathy (723 4) (M54 12)   5  Depression (311) (F32 9)   6  Gastroesophageal reflux disease (530 81) (K21 9)   7  Hepatic steatosis (571 8) (K76 0)   8  Irritable bowel syndrome with diarrhea (564 1) (K58 0)   9  Microscopic hematuria (599 72) (R31 29)   10  Obesity (BMI 30 0-34 9) (278 00) (E66 9)   11  Pulmonary nodule seen on imaging study (793 11) (R91 1)   12  Right lumbar radiculopathy (724 4) (M54 16)   13  Thickened small bowel (569 89) (K63 9)   14   Type 2 diabetes mellitus with diabetic nephropathy (250 40,583 81) (E11 21)   15  Vitamin D deficiency (268 9) (E55 9)    Past Medical History  1  History of Carpal tunnel syndrome, right (354 0) (G56 01)   2  History of Community acquired pneumonia (5) (J18 9)   3  History of colon polyps (V12 72) (Z86 010)   4  History of gestational diabetes (V12 21) (Z86 32)   5  History of ileus (V12 79) (Z87 19)   6  History of Labial cyst (624 8) (N90 7)   7  History of Left ovarian cyst (620 2) (N83 202)   8  History of Myofascial pain (729 1) (M79 1)   9  History of Trochanteric bursitis of both hips (726 5) (M70 61,M70 62)   10  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) (G56 20)    Surgical History  1  History of Biopsy Vulvar   2  History of  Section   3  History of Cholecystectomy   4  History of Endoscopic Removal Of Stone(S) From Biliary Tract   5  History of ERCP With Endoscopic Sphincterotomy   6  History of Gynecologic Services Thermal Endometrial Ablation   7  History of Hysteroscopy   8  History of Neuroplasty With Transposition Of Ulnar Nerve - At Elbow   9  History of Oral Surgery Tooth Extraction   10  History of Thorax Excision Of First Rib   11  History of Tonsillectomy With Adenoidectomy   12  History of Venous Ligation With Stripping  Surgical History Reviewed: The surgical history was reviewed and updated today  Family History  Mother    1  Family history of Diabetes Mellitus (V18 0)   2  Family history of Essential Hypercholesterolemia   3  Family history of malignant neoplasm of breast (V16 3) (Z80 3)  Father    4  Family history of Diabetes Mellitus (V18 0)   5  Family history of Essential Hypercholesterolemia   6  Family history of lung cancer (V16 1) (Z80 1)  Brother    7  Family history of Diabetes Mellitus (V18 0)   8  Family history of Essential Hypercholesterolemia   9  Family history of Hypertension (V17 49)  Unknown    10  Family history of neuropathy (V17 2) (Z82 0)  Family History    11  Family history of Alcoholism   12  Family history of Asthma (V17 5)   13  Family history of Back Pain   14  Family history of Cancer   15  Family history of Depression   16  Family history of Diabetes Mellitus (V18 0)   17  Family history of Essential Hypercholesterolemia   18  Family history of Heart Disease (V17 49)   19  Family history of Hypertension (V17 49)    Social History   · Being A Social Drinker   ·    · Education history   · Exercise habits   · Former smoker (V15 82) (Z56 453)   · General equivalency diploma (GED)   · Denied: History of No drug use   · No preference on Religion beliefs   · Occupation  Social History Reviewed: The social history was reviewed and updated today  Current Meds   1  Escitalopram Oxalate 20 MG Oral Tablet; TAKE 1 TABLET DAILY  Requested for: 18Xek9963; Last   Rx:65Mhn3910 Ordered   2  MetFORMIN HCl - 500 MG Oral Tablet; 1 tab po qam and 2 po qhs  Requested for: 93SVD3162; Last   EA:86YZU1276 Ordered   3  OneTouch Delica Lancets Fine Miscellaneous; TEST ONCE DAILY; Therapy: 04Awv2860 to (Evaluate:46Xtt1370)  Requested for: 21Jun2017; Last ME:42JNG7294   Ordered   4  OneTouch Ultra 2 w/Device Kit; USE AS DIRECTED; Therapy: 23Boh0671 to (Last Rx:24Apr2017) Ordered   5  OneTouch UltraSoft Lancets Miscellaneous; TEST ONCE DAILY; Therapy: 60Jbh2727 to (); Last Rx:24Apr2017 Ordered   6  Pantoprazole Sodium 40 MG Oral Tablet Delayed Release; ONE TABLET THIRTY MINUTES BEFORE   BREAKFAST DAILY; Therapy: 69NJL5775 to (Evaluate:01Apr2018)  Requested for: 96FHD8271; Last Rx:85Klh6318   Ordered   7  QUEtiapine Fumarate 25 MG Oral Tablet; TAKE 1 TABLET AT BEDTIME  Requested for: 10Apr2017;   Last Rx:60Frm0711 Ordered    Allergies  1   No Known Drug Allergies    Vitals  Vital Signs    Recorded: 71QTQ0088 09:58AM   Heart Rate 70   Systolic 982   Diastolic 72   Height 5 ft 6 in   Weight 222 lb 8 0 oz   BMI Calculated 35 91   BSA Calculated 2 1   O2 Saturation 96     Physical Exam    Constitutional   General appearance: No acute distress, well appearing and well nourished  obese  Eyes   Conjunctiva and lids: No swelling, erythema or discharge  Pulmonary   Respiratory effort: No increased work of breathing or signs of respiratory distress  Auscultation of lungs: Clear to auscultation  Cardiovascular   Auscultation of heart: Normal rate and rhythm, normal S1 and S2, without murmurs  Examination of extremities for edema and/or varicosities: Normal     Abdomen   Abdomen: Non-tender, no masses  Lymphatic   Palpation of lymph nodes in neck: No lymphadenopathy  Musculoskeletal   Gait and station: Normal     Psychiatric   Orientation to person, place, and time: Normal     Mood and affect: Normal          Results/Data  PHQ-9 Adult Depression Screening [id number] 10:32AM Derik Najera     Test Name Result Flag Reference   PHQ-9 Adult Depression Score 12     Over the last two weeks, how often have you been bothered by any of the following problems? Little interest or pleasure in doing things: Several days - 1  Feeling down, depressed, or hopeless: Several days - 1  Trouble falling or staying asleep, or sleeping too much: Nearly every day - 3  Feeling tired or having little energy: Nearly every day - 3  Poor appetite or over eating: Several days - 1  Feeling bad about yourself - or that you are a failure or have let yourself or your family down: More than half the days - 2  Trouble concentrating on things, such as reading the newspaper or watching television: Several days - 1  Moving or speaking so slowly that other people could have noticed   Or the opposite -  being so fidgety or restless that you have been moving around a lot more than usual: Not at all - 0  Thoughts that you would be better off dead, or of hurting yourself in some way: Not at all - 0   PHQ-9 Adult Depression Screening Positive     PHQ-9 Difficulty Level Not difficult at all     PHQ-9 Severity Moderate Depression       PHQ-2 Adult Depression Screening [id number] 10:32AM Chadwick Matamoros     Test Name Result Flag Reference   PHQ-2 Adult Depression Score 2     Over the last two weeks, how often have you been bothered by any of the following problems? Little interest or pleasure in doing things: Several days - 1  Feeling down, depressed, or hopeless: Several days - 1   PHQ-2 Adult Depression Screening Negative         Health Management  History of colon polyps   COLONOSCOPY (GI, SURG); every 5 years; Last 38KKV3869; Next Due: 86TQP9660; Active    Future Appointments    Date/Time Provider Specialty Site   12/15/2017 11:00 AM BIRDIE Merida   General Surgery [facility]   11/28/2017 10:00 AM Jerardo Campbell MD Gastroenterology [facility]   02/07/2018 10:20 AM Chadwick Matamoros DO Internal Medicine [facility]   11/29/2017 11:20 AM BIRDIE White , PhD Cardiology [facility]     Signatures   Electronically signed by : Phineas Kanner, DO; Nov 6 2017 10:59AM EST                       (Author)

## 2017-11-29 ENCOUNTER — ALLSCRIPTS OFFICE VISIT (OUTPATIENT)
Dept: OTHER | Facility: OTHER | Age: 47
End: 2017-11-29

## 2017-11-30 NOTE — CONSULTS
Plan  Palpitation    · EKG/ECG- POC; Status:Active - Perform Order,Retrospective By Protocol Authorization; Requested for:29Nov2017;    Perform: In Office; [phone]; Last Updated Yani Bautistap; 11/29/2017 11:33:57 AM;Ordered;Ordered By:Fang Delgadillo;    Discussion/Summary    50 y/o woman with PMHx of DMII and HLD on statin, metformin, and ACEi (last for proteinuria) p/f preop evaluation for bariatric surgery  ECG w/ RAD and RSR' but exam is benign  Patient is asymptomatic and has good exercise tolerance  Can achieve much > 4 METS based on hx  No indication for further testing  She is optimized from a CV standpoint for surgery  prn  Chief Complaint  F/U      History of Present Illness  Cardiology HPI Free Text Note Form [facility]: Very pleasant 50 y/o woman with PMHx of DMII and HLD on statin, metformin, and ACEi (last for proteinuria) p/f preop evaluation for bariatric surgery  Unclear which surgery yet, but likely gastric sleeve  She is asymptomatic  She can walk miles and has no trouble with stairs  She denies CP, SOB, palpitations, presyncope, or syncope  She also denies orthopnea, PND, or LE edema  Review of Systems     Cardiac: No complaints of chest pain, no palpitations, no fainting  Skin: No complaints of nonhealing sores or skin rash  Genitourinary: No complaints of recurrent urinary tract infections, frequent urination at night, difficult urination, blood in urine, kidney stones, loss of bladder control, kidney problems, denies any birth control or hormone replacement, is not post menopausal, not currently pregnant  Psychological: No complaints of feeling depressed, anxiety, panic attacks, or difficulty concentrating  General: No complaints of trouble sleeping, lack of energy, fatigue, appetite changes, weight changes, fever, frequent infections, or night sweats  Respiratory: No complaints of shortness of breath, cough with sputum, or wheezing    HEENT: No complaints of serious problems, hearing problems, nose problems, throat problems, or snoring  Gastrointestinal: No complaints of liver problems, nausea, vomiting, heartburn, constipation, bloody stools, diarrhea, problems swallowing, adbominal pain, or rectal bleeding  Hematologic: No complaints of bleeding disorders, anemia, blood clots, or excessive brusing  Neurological: No complaints of numbness, tingling, dizziness, weakness, seizures, headaches, syncope or fainting, AM fatigue, daytime sleepiness, no witnessed apnea episodes  Musculoskeletal: No complaints of arthritis, back pain, or painfull swelling  ROS reviewed  Active Problems  1  Acute pelvic pain, female (625 9) (R10 2)   2  Amenorrhea, secondary (626 0) (N91 1)   3  Anxiety (300 00) (F41 9)   4  Cervical radiculopathy (723 4) (M54 12)   5  Depression (311) (F32 9)   6  Gastroesophageal reflux disease (530 81) (K21 9)   7  Hepatic steatosis (571 8) (K76 0)   8  Hyperlipidemia (272 4) (E78 5)   9  Irritable bowel syndrome with diarrhea (564 1) (K58 0)   10  Microscopic hematuria (599 72) (R31 29)   11  Obesity (BMI 30 0-34 9) (278 00) (E66 9)   12  Pulmonary nodule seen on imaging study (793 11) (R91 1)   13  Right lumbar radiculopathy (724 4) (M54 16)   14  Thickened small bowel (569 89) (K63 9)   15  Type 2 diabetes mellitus with diabetic nephropathy (250 40,583 81) (E11 21)   16   Vitamin D deficiency (268 9) (E55 9)    Past Medical History   · History of Carpal tunnel syndrome, right (354 0) (G56 01)   · History of Community acquired pneumonia (5) (J18 9)   · History of colon polyps (V12 72) (Z86 010)   · History of gestational diabetes (V12 21) (I03 43)   · History of ileus (V12 79) (Z87 19)   · History of Labial cyst (624 8) (N90 7)   · History of Left ovarian cyst (620 2) (N83 202)   · History of Myofascial pain (729 1) (M79 1)   · History of Trochanteric bursitis of both hips (726 5) (M70 61,M70 62)   · 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) (G56 20)    The active problems and past medical history were reviewed and updated today  Surgical History   · History of Biopsy Vulvar   · History of  Section   · History of Cholecystectomy   · History of Endoscopic Removal Of Stone(S) From Biliary Tract   · History of ERCP With Endoscopic Sphincterotomy   · History of Gynecologic Services Thermal Endometrial Ablation   · History of Hysteroscopy   · History of Neuroplasty With Transposition Of Ulnar Nerve - At Elbow   · History of Oral Surgery Tooth Extraction   · History of Thorax Excision Of First Rib   · History of Tonsillectomy With Adenoidectomy   · History of Venous Ligation With Stripping    The surgical history was reviewed and updated today  Family History  Mother    · Family history of Diabetes Mellitus (V18 0)   · Family history of Essential Hypercholesterolemia   · Family history of malignant neoplasm of breast (V16 3) (Z80 3)  Father    · Family history of Diabetes Mellitus (V18 0)   · Family history of Essential Hypercholesterolemia   · Family history of lung cancer (V16 1) (Z80 1)  Brother    · Family history of Diabetes Mellitus (V18 0)   · Family history of Essential Hypercholesterolemia   · Family history of Hypertension (V17 49)  Unknown    · Family history of neuropathy (V17 2) (Z82 0)  Family History    · Family history of Alcoholism   · Family history of Asthma (V17 5)   · Family history of Back Pain   · Family history of Cancer   · Family history of Depression   · Family history of Diabetes Mellitus (V18 0)   · Family history of Essential Hypercholesterolemia   · Family history of Heart Disease (V17 49)   · Family history of Hypertension (V17 49)  Family History Reviewed: The family history was reviewed and updated today         Social History     · Being A Social Drinker   ·    · Education history   · College 2 year program   · Exercise habits   · minimal due to recent back injury   · Former smoker (V15 82) (Z81 330)   · General equivalency diploma (GED)   · Denied: History of No drug use   · No preference on [employer] beliefs   · 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[facility] [address]  The social history was reviewed and updated today  The social history was reviewed and is unchanged  Current Meds   1  Atorvastatin Calcium 10 MG Oral Tablet; TAKE 1 TABLET AT BEDTIME; Therapy: 54PRV8595 to (Evaluate:02Uou8985)  Requested for: 83KRX6127; Last Rx:06Nov2017 Ordered   2  Escitalopram Oxalate 20 MG Oral Tablet; TAKE 1 TABLET DAILY  Requested for: 07Oyp5623; Last Rx:10Apr2017 Ordered   3  Lisinopril 5 MG Oral Tablet; Take 1 tablet daily; Therapy: 07QEI3711 to (Evaluate:05Jan2018)  Requested for: 92BSR3401; Last Rx:06Nov2017 Ordered   4  MetFORMIN HCl  MG Oral Tablet Extended Release 24 Hour; TAKE 1 TABLET DAILY AS DIRECTED; Therapy: 38MHC3139 to (Kaylee Aguirre)  Requested for: 67LWM4468; Last Rx:14Nov2017 Ordered   5  OneTouch Delica Lancets Fine Miscellaneous; TEST ONCE DAILY; Therapy: 40Rok0826 to (Evaluate:47Rlx7038)  Requested for: 21Jun2017; Last VE:92OSY1648 Ordered   6  OneTouch Ultra 2 w/Device Kit; USE AS DIRECTED; Therapy: 03Hhp6741 to (Last Rx:24Apr2017) Ordered   7  OneTouch UltraSoft Lancets Miscellaneous; TEST ONCE DAILY; Therapy: 13Lig5261 to [phone]); Last Rx:24Apr2017 Ordered   8  Pantoprazole Sodium 40 MG Oral Tablet Delayed Release; ONE TABLET THIRTY MINUTES BEFORE BREAKFAST DAILY; Therapy: 91INV3234 to (Evaluate:01Apr2018)  Requested for: 17WMW3125; Last Rx:34Jdf8013 Ordered   9  QUEtiapine Fumarate 25 MG Oral Tablet; TAKE 1 TABLET AT BEDTIME  Requested for: 10Apr2017; Last Rx:10Apr2017 Ordered    The medication list was reviewed and updated today  Allergies  1   No Known Drug Allergies    Vitals  Signs     Heart Rate: 72, Apical  Systolic: 835, LUE, Sitting  Diastolic: 62, LUE, Sitting  Height: 5 ft 6 in  Weight: 223 lb   BMI Calculated: 35 99  BSA Calculated: 2 09  O2 Saturation: 96    Physical Exam   Constitutional  General appearance: No acute distress, well appearing and well nourished  Eyes  Conjunctiva and Sclera examination: Conjunctiva pink, sclera anicteric  Ears, Nose, Mouth, and Throat - Oropharynx: Clear, nares are clear, mucous membranes are moist   Neck  Neck and thyroid: Normal, supple, trachea midline, no thyromegaly  Pulmonary  Respiratory effort: No increased work of breathing or signs of respiratory distress  Auscultation of lungs: Clear to auscultation, no rales, no rhonchi, no wheezing, good air movement  Cardiovascular  Auscultation of heart: Normal rate and rhythm, normal S1 and S2, no murmurs  Carotid pulses: Normal, 2+ bilaterally  Peripheral vascular exam: Normal pulses throughout, no tenderness, erythema or swelling  Pedal pulses: Normal, 2+ bilaterally  Examination of extremities for edema and/or varicosities: Normal    Abdomen  Abdomen: Non-tender and no distention  Liver and spleen: No hepatomegaly or splenomegaly  Musculoskeletal Gait and station: Normal gait  -- Digits and nails: Normal without clubbing or cyanosis  -- Inspection/palpation of joints, bones, and muscles: Normal, ROM normal    Skin - Skin and subcutaneous tissue: Normal without rashes or lesions  Skin is warm and well perfused, normal turgor  Neurologic - Cranial nerves: II - XII intact  -- Speech: Normal    Psychiatric - Orientation to person, place, and time: Normal -- Mood and affect: Normal       Results/Data  I personally reviewed the recording/images in the office today  My interpretation follows  NSR w/ RAD and RV conduction delay      Future Appointments    Date/Time Provider Specialty Site   12/15/2017 11:00 AM BIRDIE Acevedo  General Surgery [facility]   02/07/2018 10:20 AM Oneyda Negrete DO Internal Medicine [facility]     End of Encounter Meds    1   Escitalopram Oxalate 20 MG Oral Tablet (Lexapro); TAKE 1 TABLET DAILY  Requested for: 85Nra7217; Last Rx:10Apr2017 Ordered   2  QUEtiapine Fumarate 25 MG Oral Tablet (SEROquel); TAKE 1 TABLET AT BEDTIME  Requested for: 68Lgy8582; Last Rx:10Apr2017 Ordered    3  Pantoprazole Sodium 40 MG Oral Tablet Delayed Release; ONE TABLET THIRTY MINUTES BEFORE BREAKFAST DAILY; Therapy: 79XNU1086 to (Evaluate:01Apr2018)  Requested for: 60YAC4789; Last Rx:06Efo9521 Ordered    4  Atorvastatin Calcium 10 MG Oral Tablet (Lipitor); TAKE 1 TABLET AT BEDTIME; Therapy: 26FDG6191 to (Evaluate:53Rbp7266)  Requested for: 83BZK2216; Last Rx:06Nov2017 Ordered    5  OneTouch Delica Lancets Fine Miscellaneous; TEST ONCE DAILY; Therapy: 35Qzp1996 to (Evaluate:49Zia6899)  Requested for: 21Jun2017; Last FJ:22SGC1093 Ordered   6  OneTouch Ultra 2 w/Device Kit; USE AS DIRECTED; Therapy: 15Pbi2384 to (Last Rx:24Apr2017) Ordered   7  OneTouch UltraSoft Lancets Miscellaneous; TEST ONCE DAILY; Therapy: 80Gdf4564 to (77 [phone]); Last Rx:24Apr2017 Ordered    8  Lisinopril 5 MG Oral Tablet; Take 1 tablet daily; Therapy: 10AYF8679 to (Evaluate:05Jan2018)  Requested for: 42SIF0991; Last Rx:06Nov2017 Ordered   9  MetFORMIN HCl  MG Oral Tablet Extended Release 24 Hour; TAKE 1 TABLET DAILY AS DIRECTED; Therapy: 32JDD9391 to (Teetee Levin)  Requested for: 21ZRX4366; Last Rx:14Nov2017 Ordered    Signatures   Electronically signed by : BIRDIE Rm  Nov 29 2017 11:47AM EST                       (Author)

## 2017-12-05 ENCOUNTER — ALLSCRIPTS OFFICE VISIT (OUTPATIENT)
Dept: OTHER | Facility: OTHER | Age: 47
End: 2017-12-05

## 2017-12-05 LAB
BILIRUB UR QL STRIP: NEGATIVE
CLARITY UR: NORMAL
COLOR UR: YELLOW
GLUCOSE (HISTORICAL): 250
GLUCOSE SERPL-MCNC: 152 MG/DL
HGB UR QL STRIP.AUTO: NEGATIVE
KETONES UR STRIP-MCNC: NEGATIVE MG/DL
LEUKOCYTE ESTERASE UR QL STRIP: NEGATIVE
NITRITE UR QL STRIP: NEGATIVE
PH UR STRIP.AUTO: 5 [PH]
PROT UR STRIP-MCNC: NEGATIVE MG/DL
SP GR UR STRIP.AUTO: 1.02
UROBILINOGEN UR QL STRIP.AUTO: 0.2

## 2017-12-15 ENCOUNTER — GENERIC CONVERSION - ENCOUNTER (OUTPATIENT)
Dept: OTHER | Facility: OTHER | Age: 47
End: 2017-12-15

## 2017-12-18 ENCOUNTER — GENERIC CONVERSION - ENCOUNTER (OUTPATIENT)
Dept: OTHER | Facility: OTHER | Age: 47
End: 2017-12-18

## 2017-12-18 DIAGNOSIS — E78.5 HYPERLIPIDEMIA: ICD-10-CM

## 2017-12-18 DIAGNOSIS — K76.0 FATTY (CHANGE OF) LIVER, NOT ELSEWHERE CLASSIFIED: ICD-10-CM

## 2017-12-18 DIAGNOSIS — E11.21 TYPE 2 DIABETES MELLITUS WITH DIABETIC NEPHROPATHY (HCC): ICD-10-CM

## 2017-12-18 DIAGNOSIS — E66.9 OBESITY: ICD-10-CM

## 2017-12-23 ENCOUNTER — APPOINTMENT (OUTPATIENT)
Dept: LAB | Facility: CLINIC | Age: 47
End: 2017-12-23
Payer: COMMERCIAL

## 2017-12-23 ENCOUNTER — TRANSCRIBE ORDERS (OUTPATIENT)
Dept: LAB | Facility: CLINIC | Age: 47
End: 2017-12-23

## 2017-12-23 DIAGNOSIS — K76.0 FATTY (CHANGE OF) LIVER, NOT ELSEWHERE CLASSIFIED: ICD-10-CM

## 2017-12-23 DIAGNOSIS — E66.9 OBESITY: ICD-10-CM

## 2017-12-23 DIAGNOSIS — E78.5 HYPERLIPIDEMIA: ICD-10-CM

## 2017-12-23 DIAGNOSIS — E11.21 TYPE 2 DIABETES MELLITUS WITH DIABETIC NEPHROPATHY (HCC): ICD-10-CM

## 2017-12-23 LAB
ALBUMIN SERPL BCP-MCNC: 3.6 G/DL (ref 3.5–5)
ALP SERPL-CCNC: 64 U/L (ref 46–116)
ALT SERPL W P-5'-P-CCNC: 96 U/L (ref 12–78)
ANION GAP SERPL CALCULATED.3IONS-SCNC: 9 MMOL/L (ref 4–13)
AST SERPL W P-5'-P-CCNC: 44 U/L (ref 5–45)
BILIRUB SERPL-MCNC: 0.4 MG/DL (ref 0.2–1)
BUN SERPL-MCNC: 11 MG/DL (ref 5–25)
CALCIUM SERPL-MCNC: 9 MG/DL (ref 8.3–10.1)
CHLORIDE SERPL-SCNC: 105 MMOL/L (ref 100–108)
CHOLEST SERPL-MCNC: 126 MG/DL (ref 50–200)
CO2 SERPL-SCNC: 26 MMOL/L (ref 21–32)
CREAT SERPL-MCNC: 0.58 MG/DL (ref 0.6–1.3)
ERYTHROCYTE [DISTWIDTH] IN BLOOD BY AUTOMATED COUNT: 12.6 % (ref 11.6–15.1)
EST. AVERAGE GLUCOSE BLD GHB EST-MCNC: 154 MG/DL
GFR SERPL CREATININE-BSD FRML MDRD: 109 ML/MIN/1.73SQ M
GLUCOSE P FAST SERPL-MCNC: 91 MG/DL (ref 65–99)
HBA1C MFR BLD: 7 % (ref 4.2–6.3)
HCT VFR BLD AUTO: 40.9 % (ref 34.8–46.1)
HDLC SERPL-MCNC: 42 MG/DL (ref 40–60)
HGB BLD-MCNC: 13.2 G/DL (ref 11.5–15.4)
LDLC SERPL CALC-MCNC: 44 MG/DL (ref 0–100)
MCH RBC QN AUTO: 29.3 PG (ref 26.8–34.3)
MCHC RBC AUTO-ENTMCNC: 32.3 G/DL (ref 31.4–37.4)
MCV RBC AUTO: 91 FL (ref 82–98)
PLATELET # BLD AUTO: 257 THOUSANDS/UL (ref 149–390)
PMV BLD AUTO: 10.5 FL (ref 8.9–12.7)
POTASSIUM SERPL-SCNC: 3.8 MMOL/L (ref 3.5–5.3)
PROT SERPL-MCNC: 7.3 G/DL (ref 6.4–8.2)
RBC # BLD AUTO: 4.51 MILLION/UL (ref 3.81–5.12)
SODIUM SERPL-SCNC: 140 MMOL/L (ref 136–145)
TRIGL SERPL-MCNC: 202 MG/DL
WBC # BLD AUTO: 8.12 THOUSAND/UL (ref 4.31–10.16)

## 2017-12-23 PROCEDURE — 80061 LIPID PANEL: CPT

## 2017-12-23 PROCEDURE — 83036 HEMOGLOBIN GLYCOSYLATED A1C: CPT

## 2017-12-23 PROCEDURE — 80053 COMPREHEN METABOLIC PANEL: CPT

## 2017-12-23 PROCEDURE — 36415 COLL VENOUS BLD VENIPUNCTURE: CPT

## 2017-12-23 PROCEDURE — 85027 COMPLETE CBC AUTOMATED: CPT

## 2018-01-10 NOTE — MISCELLANEOUS
Message   Recorded as Task   Date: 03/23/2016 02:16 PM, Created By: Raynard Halsted   Task Name: Miscellaneous   Assigned To: [facility] [facility]   Regarding Patient: Liberty Stephenson, Status: In Progress   Comment:    Dolores Howe - 23 Mar 2016 2:16 PM     TASK CREATED  Caller: Dianet[facility] pharm; General Medical Question; [phone]  Received a call from Georgette Hamm at [facility] at [phone] stating the Pennsaid cream you ordered is not covered under her WC and will cost $1500 and it would have to be ordered  What do you think about ordering Voltaren gel? It is in stock  Can you switch the order? HD to advise  Ankur Campbell - 23 Mar 2016 4:07 PM     TASK REPLIED TO: Previously Assigned To [facility] [facility]  I have changed order to LAILA NICHOLSILLEN [facility] - 24 Mar 2016 9:40 AM     TASK EDITED  Attempted to call pt  and left a detailed mom regarding order in change of cream  Pt  to CB c/ questions if any  Dolores Howe - 24 Mar 2016 9:40 AM     TASK IN PROGRESS   Dolores Howe - 24 Mar 2016 10:03 AM     TASK EDITED  s/w pt  and she would like the script sent to [facility] , because it is closer to her house  Caden Roper - 24 Mar 2016 12:15 PM     TASK REPLIED TO: Previously Assigned To Shiva Diggs to Dolores Quezada - 24 Mar 2016 1:04 PM     TASK EDITED  Received MLOM from [facility] stating the voltaren gel will not be covered unless [facility]  Attempted to call pt  and left VM stating as such  Sorry ! Can you please cancel script for [facility] and send back to [facility]  Thanks   Caden Roper - 24 Mar 2016 2:05 PM     TASK REPLIED TO: Previously Assigned To Shiva Diggs to Bere Cox - 24 Mar 2016 3:16 PM     TASK EDITED  Pt advised about the call we received from [facility] that voltaren gel will not be covered unless sent to [place] [facility] not [facility], so Dr Kylah Payne sent script to [facility]  Active Problems    1   Analgesic use (V58 69) (Z79 899)   2  Anxiety (300 00) (F41 9)   3  Back pain, sacroiliac (724 6) (M53 3)   4  Bartholin gland cyst (616 2) (N75 0)   5  Chronic pain syndrome (338 4) (G89 4)   6  DDD (degenerative disc disease), lumbosacral (722 52) (M51 37)   7  Depression (311) (F32 9)   8  Dietary calcium deficiency (269 3) (E58)   9  Esophageal reflux (530 81) (K21 9)   10  Fatty liver (571 8) (K76 0)   11  Impaired fasting glucose (790 21) (R73 01)   12  Irritable bowel syndrome (564 1) (K58 9)   13  Microscopic hematuria (599 72) (R31 2)   14  Myofascial pain (729 1) (M79 1)   15  Nonspecific (abnormal) findings on radiological and other examination of genitourinary    organs (793 5) (R93 8)   16  Obesity (BMI 30 0-34 9) (278 00) (E66 9)   17  Opioid dependence (304 00) (F11 20)   18  Pap smear for cervical cancer screening (V76 2) (Z12 4)   19  Pulmonary nodule seen on imaging study (793 11) (R91 1)   20  Right lumbar radiculopathy (724 4) (M54 16)   21  Sacroiliitis (720 2) (M46 1)   22  Trochanteric bursitis of both hips (726 5) (M70 61,M70 62)   23  Trochanteric bursitis of left hip (726 5) (M70 62)   24  Visit for screening mammogram (V76 12) (Z12 31)   25  Vitamin D deficiency (268 9) (E55 9)   26  Well female exam with routine gynecological exam (V72 31) (Z01 419)    Current Meds   1  Cyclobenzaprine HCl - 10 MG Oral Tablet; TAKE 1 TABLET 3 TIMES DAILY; Therapy: 52FGP2709 to (Evaluate:97Qqs2680)  Requested for: 62TAD9701; Last   Rx:18Mar2016 Ordered   2  Gabapentin 300 MG Oral Capsule; TAKE 1 CAPSULE 3 TIMES DAILY; Therapy: 11DGE8818 to (Evaluate:26Reb9436)  Requested for: 30BRR7790; Last   Rx:58Oiv0176 Ordered   3  Lexapro 20 MG Oral Tablet (Escitalopram Oxalate) Recorded   4  LORazepam 0 5 MG Oral Tablet; TAKE 1 TABLET Other PRN Take 1 tab 1 hour before   procedure, may repeat if needed in 15 mins; Therapy: 09EVL9330 to (Evaluate:19Mar2016); Last Rx:18Mar2016 Ordered   5   Multi Vitamin Daily Oral Tablet; Therapy: (Recorded:08Mar2016) to Recorded   6  Oxycodone-Acetaminophen 5-325 MG Oral Tablet; TAKE 1 TABLET EVERY 6 HOURS; Therapy: 97PSC3313 to (Evaluate:42Uog7210); Last Rx:18Mar2016 Ordered   7  SEROquel 50 MG Oral Tablet (QUEtiapine Fumarate); Therapy: (921 090 189) to Recorded   8  Vitamin B Complex Oral Tablet; Therapy: (Recorded:08Mar2016) to Recorded   9  Voltaren 1 % Transdermal Gel; Apply to affected joints 2 to 3 times daily; Therapy: 86KSZ7773 to (Last Rx:24Mar2016)  Requested for: 24Mar2016 Ordered    Allergies    1   No Known Drug Allergies    Signatures   Electronically signed by : Edyta Henry, ; Mar 24 2016  3:16PM EST                       (Author)

## 2018-01-10 NOTE — RESULT NOTES
Verified Results  (1) HEPATIC FUNCTION PANEL 03Aug2017 11:48AM [employer] Order Number: [id number]     Test Name Result Flag Reference   ALBUMIN 3 5 g/dL  3 5-5 0   ALK PHOSPHATAS 61 U/L     ALT (SGPT) 96 U/L H 12-78   AST(SGOT) 48 U/L H 5-45   BILI, DIRECT 0 09 mg/dL  0 00-0 20   BILI, TOTAL 0 30 mg/dL  0 20-1 00   TOTAL PROTEIN 7 8 g/dL  6 4-8 2     (1) ARLEY SCREEN W/REFLEX TO TITER/PATTERN 03Aug2017 11:48AM [facility] Order Number: [id number]     Test Name Result Flag Reference   ARLEY SCREEN   Negative  Negative     (1) IgG,IgA,IgM QUANTITATIVE, BLOOD 03Aug2017 11:48AM [employer] Order Number: [id number]     Test Name Result Flag Reference    0 mg/dL  70 0-400 0   GAMMAGLOBULIN; IGG 1050 0 mg/dL  700 0-1600 0   IGM 98 0 mg/dL  40 0-230 0     (1) CERULOPLASMIN 03Aug2017 11:48AM [employer] Order Number: [id number]     Test Name Result Flag Reference   CERULOPLASMIN 33 5 mg/dL  19 0 - 39 0   Performed at:  [facility] [address]  [id number]  : Garry Stokes MD, Phone:  [phone]     (1) ALPHA 1 ANTITRYPSIN 18PQN1163 11:48AM [employer] Order Number: [id number]     Test Name Result Flag Reference   ALPHA 1 ANTITRY 146 mg/dL  90 - 200   Performed at:  [facility] [address]  [id number]  : Garry Stokes MD, Phone:  [phone]     (1) CHRONIC HEPATITIS PANEL 03Aug2017 11:48AM [employer] Order Number: [id number]     Test Name Result Flag Reference   HEPATITIS B SURFACE ANTIGEN Non-reactive  Non-reactive, NonReactive - Confirmed   HEPATITIS C ANTIBODY Non-reactive  Non-reactive   HEPATITIS B CORE IGM ANTIBODY Non-reactive  Non-reactive   HEPATITIS B CORE TOTAL ANTIBODY Non-reactive  Non-reactive     (1) HEP B SURFACE ANTIBODY 60Nxk3210 11:48AM John Booker Order Number: [id number]     Test Name Result Flag Reference   HEPATITIS B SURFACE ANTIBODY 62 23 mIU/mL     Protective Immunity: Hep B Surface Antibody >= 10 mIu/ml (Traceable to [facility] Reference Preparation)     (1) HEP A AB, IGM 03Aug2017 11:48AM [facility] Order Number: [id number]     Test Name Result Flag Reference   HEPATITIS A IGM ANTIBODY Non-reactive  Non-reactive, Equivocal-Suggest Recollect     (1) C-REACTIVE PROTEIN 03Aug2017 11:48AM Franks Gosselin    Order Number: [id number]     Test Name Result Flag Reference   C-REACT PROTEIN 8 6 mg/L H <3 0     (1) SED RATE 03Aug2017 11:48AM [facility] Order Number: [id number]     Test Name Result Flag Reference   SED RATE 26 mm/hour H 0-20

## 2018-01-10 NOTE — MISCELLANEOUS
Message   Recorded as Task   Date: 02/17/2016 01:10 PM, Created By: Yesica Baxter   Task Name: Follow Up   Assigned To: [facility] [facility]   Regarding Patient: Junito White, Status: Active   Comment:    Yesica Baxter - 17 Feb 2016 1:10 PM     TASK CREATED  Please inform patient her MRI demonstrates no significant changes  The radiologist did call to discuss ovarian follicles that can cause back pain as well  She should f/u with PCP or GYN physicians  Ed Nichole - 17 Feb 2016 3:12 PM     TASK REPLIED TO: Previously Assigned To [facility] [facility]  Spoke with pt and advised of the same  Pt states she had no back pain until after her injury so she doesn't believe her pain would be caused by ovarian follicles  Pt states she still has bulging discs so would an injection help? Yesica Baxter - 17 Feb 2016 4:15 PM     TASK REPLIED TO: Previously Assigned To Caden Roper  There has not been significant changes on the MRI since the new injury, this does not explain what she is feeling  If she continues to have pain, we can schedule for L5-S1 JESSICA with right sided approach  She should still f/u with pcp/gyn as it is an incidental finding     Lisa Mason - 18 Feb 2016 9:02 AM     TASK EDITED  S/W patient - patient scheduled for Wednesday, Feb 24 at [place] with Dr Noe Ravi - patient advised nothing to eat or drink 1 hour prior to procedure but encouraged to have a light breakfast - patient denies any blood thinners/abx's - patient also advised to arrange for  - patient aware and agreed   Bere Gallego - 18 Feb 2016 9:03 AM     TASK EDITED  Patient scheduled for injection and is asking for Lorazepam prior to procedure to be sent to [facility] in [place] - thank you   Baudilio Marino - 18 Feb 2016 9:04 AM     TASK REASSIGNED: Previously Assigned To Allyn Nation - 18 Feb 2016 12:29 PM     TASK REPLIED TO: Previously Assigned To Yesica Baxter  Can you please call in Lorazepam 0 5mg 1 tab PO 1 hour before and can repeat 20 mins prior to prcedure #2? No refills  Thank you  Bere Araiza - 18 Feb 2016 12:58 PM     TASK EDITED  Pt informed that Dr Heather Moreno approved lorazepam 0 5mg 1 tab 1 Hr  prior to procedure, may repeat 20 min prior to procedure  Rx called to [facility] at [facility] in [facility]  Pt asked what an ovarian follicle was that was seen on her MRI, I explained I didn't know exactly how to describe but that it is a sac, could be an early cyst but she needs to f/u with her PCP or GYN for further explaination  Pt said she did sched appt with her GYN already  Lorazepam called to Mona at [facility] in [facility]  Active Problems    1  Analgesic use (V58 69) (Z79 899)   2  Anxiety (300 00) (F41 9)   3  Bartholin gland cyst (616 2) (N75 0)   4  Chronic pain syndrome (338 4) (G89 4)   5  DDD (degenerative disc disease), lumbosacral (722 52) (M51 37)   6  Depression (311) (F32 9)   7  Dietary calcium deficiency (269 3) (E58)   8  Esophageal reflux (530 81) (K21 9)   9  Fatty liver (571 8) (K76 0)   10  Flu vaccine need (V04 81) (Z23)   11  Impaired fasting glucose (790 21) (R73 01)   12  Irritable bowel syndrome (564 1) (K58 9)   13  Microscopic hematuria (599 72) (R31 2)   14  Myofascial pain (729 1) (M79 1)   15  Obesity (BMI 30 0-34 9) (278 00) (E66 9)   16  Opioid dependence (304 00) (F11 20)   17  Pap smear for cervical cancer screening (V76 2) (Z12 4)   18  Pulmonary nodule seen on imaging study (793 11) (R91 1)   19  Right lumbar radiculopathy (724 4) (M54 16)   20  Sacroiliitis (720 2) (M46 1)   21  Trochanteric bursitis of left hip (726 5) (M70 62)   22  Visit for screening mammogram (V76 12) (Z12 31)   23  Vitamin D deficiency (268 9) (E55 9)   24  Well female exam with routine gynecological exam (V72 31) (Z01 419)    Current Meds   1   Amrix 15 MG Oral Capsule Extended Release 24 Hour (Cyclobenzaprine HCl); TAKE 1   CAPSULE DAILY AS NEEDED; Therapy: 63CVL8997 to (Evaluate:91Dyj1868)  Requested for: 64HEO0928; Last   Rx:09Feb2016 Ordered   2  Gabapentin 100 MG Oral Capsule; TAKE 1 CAPSULE BEDTIME MAY INCREASE TO 3   CAPSULES AT BEDTIME AS TOLERATED; Therapy: 27KEQ5503 to (Evaluate:10Mar2016)  Requested for: 11IPM9220; Last   Rx:09Feb2016 Ordered   3  LORazepam 0 5 MG Oral Tablet; TAKE 1 TABLET Other PRN Take 1 tab 1 hour before   procedure, may repeat if needed in 15 mins; Therapy: 03BFX8594 to (Evaluate:02Oct2015); Last Rx:01Oct2015 Ordered   4  Oxycodone-Acetaminophen 5-325 MG Oral Tablet; TAKE 1 TABLET EVERY 6 HOURS; Therapy: 54BTG1607 to (Evaluate:24Feb2016); Last Rx:09Feb2016 Ordered   5  SEROquel 50 MG Oral Tablet (QUEtiapine Fumarate); Therapy: (643 398 189) to Recorded   6  Valium 5 MG Oral Tablet (Diazepam); TAKE 1 TABLET  AS NEEDED; Therapy: (Recorded:09Feb2016) to Recorded   7  Ventolin  (90 Base) MCG/ACT Inhalation Aerosol Solution; INHALE 1 PUFFS   Every 8 hours PRN Shortness of breath; Therapy: 98Vtx0152 to (Last Domingo Vera)  Requested for: 98Rhk0535 Ordered    Allergies    1   No Known Drug Allergies    Plan  Anxiety    · LORazepam 0 5 MG Oral Tablet; TAKE 1 TABLET Other PRN Take 1 tab 1 hour  before procedure, may repeat if needed in 15 mins    Signatures   Electronically signed by : Bessy Melgar, ; Feb 18 2016 12:58PM EST                       (Author)

## 2018-01-11 NOTE — MISCELLANEOUS
Provider Comments  Provider Comments:   Dear Barak Morgan,    You missed your follow up appointment with Dr Jaz Clemente on 09/25/2017; please contact our office to reschedule at [phone]  We understand that many situations arise that occasionally prevents patients from keeping scheduled appointments  It is the policy of [address] [place] [address] [facility] that patients notify us 24 hours in advance if unable to keep a scheduled appointment  Missed appointments jeopardize strong physician-patient relationships  The appointment you missed could have easily been made available to another patient if you had contacted us to cancel  We like to accommodate all of our patients, but when patients miss an appointment it prevents us from being able to help everyone  In the future, we request at least 24 hours' notice of cancellation so we can make your appointment available to someone else in need  Sincerely,    The Physicians and Staff of [facility]          Signatures   Electronically signed by :  Dennis Rosa, ; Sep 25 2017  2:31PM EST                       (Author)

## 2018-01-11 NOTE — RESULT NOTES
Verified Results  CT SMALL BOWEL ENTEROGRAPHY [id number] 07:13AM Madiha Gist   TW Order Number: [id number]    - Patient Instructions: To schedule this appointment, please contact Central Scheduling at [phone]  Test Name Result Flag Reference   CT SMALL BOWEL ENTEROGRAPHY (Report)     CT ABDOMEN AND PELVIS WITH IV CONTRAST- ENTEROGRAPHY - WITH CONTRAST     INDICATION: Irritable bowel syndrome with diarrhea     COMPARISON: July 6, 2017     TECHNIQUE: Contrast-enhanced CT examination of the abdomen and pelvis was performed utilizing thin section technique and after the administration of low density enteric contrast according to protocol designed specifically to obtain sensitive evaluation    of the small bowel  Reformatted images were created in axial, sagittal, and coronal planes  Radiation dose length product (DLP) for this visit: 755 mGy-cm   This examination, like all CT scans performed in the [facility], was performed utilizing techniques to minimize radiation dose exposure, including the use of iterative    reconstruction and automated exposure control  IV Contrast: 100 mL of iohexol (OMNIPAQUE)     Enteric Contrast: 1350 mL volumen     FINDINGS:      ABDOMEN     SMALL BOWEL: There is no small bowel wall thickening, abnormal bowel wall enhancement, or mesenteric inflammatory process  The terminal ileum appears within normal limits  Small bowel is normal in caliber  LARGE BOWEL: Normal caliber  There is no colonic wall thickening  Along the anterior aspect of the descending colon there is an area of localized inflammatory change surrounding a fat density structure, most compatible with epiploic appendagitis  In    retrospect, this may have been present on the prior study but was less prominent with less inflammation present at that time  Correlate for any pain along the left side of the abdomen  LOWER CHEST: No significant abnormality in the lung bases  STOMACH: Unremarkable  LIVER/BILIARY TREE: Extensive fatty infiltration throughout most of the liver with small areas of fatty sparing  Pneumobilia is no longer seen  GALLBLADDER: Gallbladder is surgically absent  SPLEEN: Unremarkable  PANCREAS: Unremarkable  ADRENAL GLANDS: Unremarkable  KIDNEYS/URETERS: Unremarkable  No hydronephrosis  PELVIS     REPRODUCTIVE ORGANS: Unremarkable for patient's age  URINARY BLADDER: Unremarkable  APPENDIX: No findings to suggest appendicitis  ABDOMINOPELVIC CAVITY: No ascites or free intraperitoneal air  No lymphadenopathy  VESSELS: Unremarkable for patient's age  ABDOMINAL WALL/INGUINAL REGIONS: There is a fat-containing midline anterior abdominal wall hernia which is 18 mm diameter  OSSEOUS STRUCTURES: No destructive osseous lesion  IMPRESSION:     Normal appearance of small bowel      Small area of inflammation adjacent to the descending colon with features most compatible with epiploic appendagitis       Extensive fatty infiltration of liver     Fat-containing midline abdominal wall hernia       Workstation performed: [id number]     Signed by:   Carlos Allen MD   9/5/17

## 2018-01-11 NOTE — RESULT NOTES
Verified Results  (1) BUN 28Oct2017 08:38AM Pratibha Vazquez    Order Number: [id number]     Test Name Result Flag Reference   BLOOD UREA NITROGEN 18 mg/dL  5-25     (1) CREATININE 28Oct2017 08:38AM [facility]: [id number]     Test Name Result Flag Reference   CREATININE 0 56 mg/dL L 0 60-1 30   Standardized to IDMS reference method   eGFR 111 ml/min/1 73sq m     National Kidney Disease Education Program recommendations are as follows:  GFR calculation is accurate only with a steady state creatinine  Chronic Kidney disease less than 60 ml/min/1 73 sq  meters  Kidney failure less than 15 ml/min/1 73 sq  meters

## 2018-01-11 NOTE — MISCELLANEOUS
Message   Recorded as Task   Date: 02/29/2016 10:22 AM, Created By: Adarsh Matos   Task Name: Follow Up   Assigned To: [facility]   Regarding Patient: Lum Sink, Status: Active   CommentRocathy Real - 29 Feb 2016 10:22 AM     TASK CREATED  Caller: Self; General Medical Question; [phone] (Home); [phone] (Work)  Received VM from pt  on [facility] triage line from 948 am  Pt  states that she is experiencing a lot of pain in her lower spine on both the L and R side  Pt  states that it becomes worse when the medications wear off  Pt  requesting c/b at [phone]  Ed Nichole - 29 Feb 2016 10:37 AM     TASK REPLIED TO: Previously Assigned To [employer] with pt who states she is having lower back pain that is worse on the left side-states she cannot lean over the kitchen sink  C/O muscle spasms  Pt states she is taking amrix qhs-she states it works but can only take once/day  Takes gabapentin tid-200mg,200mg,300mg  States she has about #10 percocets left which she will take q4hrs prn  Using heat prn  S/P LESI-L5-S1 2-24-16  ADvised to give the injection more time and that I would update you and cb with any other recommendations  Damon Salmeron - 29 Feb 2016 2:39 PM     TASK REPLIED TO: Previously Assigned To Caden Roper  For spasms, if she prefers to take flexeril TID prn, I can order that  Ed Nichole - 29 Feb 2016 2:55 PM     TASK REPLIED TO: Previously Assigned To [facility]  So,D/C amrix and start flexeril? Damon Salmeron - 29 Feb 2016 3:03 PM     TASK REPLIED TO: Previously Assigned To Damon Salmeron  yes, but i will order if she's intersted in that  if she still has severe spasms despite flexeril, I can consider valium  But she has relief wtih amrix, so the flexeril might be better choice at this time     Ed Nichole - 29 Feb 2016 3:51 PM     TASK REPLIED TO: Previously Assigned To [facility]  Spoke with pt,she would like to try the flexeril-pt aware to D/C amrix  Pharmacy per [employer]  Pt now asking about refill of percocets? Advised pt to give injection some more time and to see how she does with the flexeril  Caden Roper - 29 Feb 2016 4:06 PM     TASK REPLIED TO: Previously Assigned To Caden Roper  [facility]  For spasms/muscle pain, muscle relaxants are a better choice  Active Problems    1  Analgesic use (V58 69) (Z79 899)   2  Anxiety (300 00) (F41 9)   3  Bartholin gland cyst (616 2) (N75 0)   4  Chronic pain syndrome (338 4) (G89 4)   5  DDD (degenerative disc disease), lumbosacral (722 52) (M51 37)   6  Depression (311) (F32 9)   7  Dietary calcium deficiency (269 3) (E58)   8  Esophageal reflux (530 81) (K21 9)   9  Fatty liver (571 8) (K76 0)   10  Flu vaccine need (V04 81) (Z23)   11  Impaired fasting glucose (790 21) (R73 01)   12  Irritable bowel syndrome (564 1) (K58 9)   13  Microscopic hematuria (599 72) (R31 2)   14  Myofascial pain (729 1) (M79 1)   15  Obesity (BMI 30 0-34 9) (278 00) (E66 9)   16  Opioid dependence (304 00) (F11 20)   17  Pap smear for cervical cancer screening (V76 2) (Z12 4)   18  Pulmonary nodule seen on imaging study (793 11) (R91 1)   19  Right lumbar radiculopathy (724 4) (M54 16)   20  Sacroiliitis (720 2) (M46 1)   21  Trochanteric bursitis of left hip (726 5) (M70 62)   22  Visit for screening mammogram (V76 12) (Z12 31)   23  Vitamin D deficiency (268 9) (E55 9)   24  Well female exam with routine gynecological exam (V72 31) (Z01 419)    Current Meds   1  Cyclobenzaprine HCl - 10 MG Oral Tablet; TAKE 1 TABLET AT BEDTIME AS NEEDED; Therapy: 02ATL3102 to (Evaluate:30Mar2016)  Requested for: 10IEW9901; Last   Rx:42Lzk9132 Ordered   2  Gabapentin 100 MG Oral Capsule; TAKE 2 CAPSULES 3 TIMES DAILY; Therapy: 85OPS8314 to (Evaluate:25Mar2016)  Requested for: 61Joq7116; Last   Rx:88Oxi4669 Ordered   3   LORazepam 0 5 MG Oral Tablet; TAKE 1 TABLET Other PRN Take 1 tab 1 hour before   procedure, may repeat if needed in 15 mins; Therapy: 73CDK3334 to (Evaluate:88Fop6070); Last Rx:27Dny8209 Ordered   4  Oxycodone-Acetaminophen 5-325 MG Oral Tablet; TAKE 1 TABLET EVERY 6 HOURS; Therapy: 01RXU6963 to (Evaluate:64Uku5291); Last Rx:95Qsj3810 Ordered   5  SEROquel 50 MG Oral Tablet (QUEtiapine Fumarate); Therapy: (25-62-29-72) to Recorded   6  Valium 5 MG Oral Tablet (Diazepam); TAKE 1 TABLET  AS NEEDED; Therapy: (Recorded:09Feb2016) to Recorded   7  Ventolin  (90 Base) MCG/ACT Inhalation Aerosol Solution; INHALE 1 PUFFS   Every 8 hours PRN Shortness of breath; Therapy: 53Fwm2352 to (Last Nilam Ellsworthbenny)  Requested for: 51Loc1737 Ordered    Allergies    1  No Known Drug Allergies    Signatures   Electronically signed by :  Leif Ramsay, ; Feb 29 2016  4:11PM EST                       (Author)

## 2018-01-11 NOTE — RESULT NOTES
Discussion/Summary   Please inform patient that the 2 polyps removed were tubular adenomas, there was no high-grade dysplasia and no cancer  I recommend repeat colonoscopy in 5 years, please put in for recall  The random biopsies throughout her colon were normal also, no evidence of microscopic colitis  Please make sure that she takes a daily fiber supplement, I have prescribed Anusol suppositories for her hemorrhoids  Please have her follow up in the office as needed  Verified Results  (1) TISSUE EXAM 63CEC6564 09:26AM Yeni Elias     Test Name Result Flag Reference   LAB AP CASE REPORT (Report)     Surgical Pathology Report             Case: [id number]                   Authorizing Provider: Shiva Gutierrez MD      Collected:      05/30/2017 0926        Ordering Location:   [facility]    Received:      05/30/2017 [place] [address] Endoscopy                               Pathologist:      Zayra Neal MD                                 Specimens:  A) - Colon, Bx Random Colon                                      B) - Large Intestine, Right/Ascending Colon, Cold snare polypectomy ascending             C) - Large Intestine, Left/Descending Colon, Cold snare polypectomy descending   LAB AP FINAL DIAGNOSIS (Report)     A  Colon (random biopsy):    - Colonic mucosa with no significant pathologic abnormalities  - No active inflammation or histologic evidence of a microscopic   (lymphocytic)     or collagenous colitis noted  - No granulomas, dysplasia or neoplasia identified  B  Ascending colon polyp (cold snare polypectomy):    - Portions of serrated colon polyp with features of sessile serrated   adenoma  - No high-grade dysplasia or malignancy identified  C  Descending colon polyp (cold snare polypectomy):    - Tubular adenoma  - No high-grade dysplasia or malignancy identified     Electronically signed by Zayra Neal MD on 6/1/2017 at 1:11 PM   LAB AP NOTE Interpretation performed at [facility], [address] [facility], [address] [facility]  LAB AP SURGICAL ADDITIONAL INFORMATION (Report)     These tests were developed and their performance characteristics   determined by [facility] or [employer]  They may not be cleared or approved by the [place]  Food and   Drug Administration  The FDA has determined that such clearance or   approval is not necessary  These tests are used for clinical purposes  They should not be regarded as investigational or for research  This   laboratory has been approved by CLIA 88, designated as a high-complexity   laboratory and is qualified to perform these tests  LAB AP GROSS DESCRIPTION (Report)     A  The specimen is received in formalin, labeled with the patient's name   and hospital number, and is designated biopsy random colon rule out   microscopic colitis  The specimen consists of multiple tan soft tissue   fragments measuring in aggregate 0 5 x 0 5 x 0 1 cm  Entirely submitted  One cassette  B  The specimen is received in formalin, labeled with the patient's name   and hospital number, and is designated polypectomy ascending  The   specimen consists of a tan soft tissue fragment measuring 0 5 cm  Entirely   submitted  One cassette  C  The specimen is received in formalin, labeled with the patient's name   and hospital number, and is designated polypectomy descending  The   specimen consists of a tan soft tissue fragment measuring 0 4 cm  Entirely   submitted  One cassette  Note: The estimated total formalin fixation time based upon information   provided by the submitting clinician and the standard processing schedule   is 19 0 hours      [facility]   LAB AP CLINICAL INFORMATION Rectal bleeding     R/O microscopic colitis

## 2018-01-11 NOTE — RESULT NOTES
Verified Results  (1) C  DIFFICILE TOXIN BY PCR 81KMT3505 05:18PM [employer] Order Number: [id number]     Test Name Result Flag Reference   C  DIFFICILE TOXIN BY PCR   NEGATIVE for C difficle toxin by PCR  NEGATIVE for C difficle toxin by PCR  (1) CELIAC DISEASE AB PROFILE 07SLG1680 08:41AM [employer] Order Number: [id number]     Test Name Result Flag Reference   tTG IGG <2 U/mL  0 - 5   Negative        0 - 5                              Weak Positive   6 - 9                              Positive           >9   tTG IGA <2 U/mL  0 - 3   Negative        0 -  3                              Weak Positive   4 - 10                              Positive           >10 Tissue Transglutaminase (tTG) has been identified as the endomysial antigen  Studies have demonstr- ated that endomysial IgA antibodies have over 99% specificity for gluten sensitive enteropathy     GLIADA 8 units  0 - 19   Negative                   0 - 19                   Weak Positive             20 - 30                   Moderate to Strong Positive   >30   GLIADG 2 units  0 - 19   Negative                   0 - 19                   Weak Positive             20 - 30                   Moderate to Strong Positive   >30   ENDOMYSIAL AB IGA Negative  Negative   Performed at:  [address] [place] [address]  [id number]  : Remedios Ryan MD, Phone:  [phone]    mg/dL  87 - 352     (1) TSH 23Jun2016 08:41AM [employer] Order Number: [id number]   Order Number: [id number] Order Number: [id number]     Test Name Result Flag Reference   TSH 2 447 uIU/mL  0 358-3 740   The recommended reference ranges for TSH during pregnancy are as follows:  First trimester 0 1 to 2 5 uIU/mL  Second trimester  0 2 to 3 0 uIU/mL  Third trimester 0 3 to 3 0 uIU/m     (1) CBC/ PLT (NO DIFF) 28OCO7230 08:41AM [employer] Order Number: [id number]  TW Order Number: [id number]     Test Name Result Flag Reference   HEMATOCRIT 42 9 %  34 8-46 1   HEMOGLOBIN 14 4 g/dL  11 5-15 4   MCHC 33 6 g/dL  31 4-37 4   MCH 30 8 pg  26 8-34 3   MCV 92 fL  82-98   PLATELET COUNT 469 Thousands/uL  149-390   RBC COUNT 4 68 Million/uL  3 81-5 12   RDW 12 6 %  11 6-15 1   WBC COUNT 9 79 Thousand/uL  4 31-10 16   MPV 10 3 fL  8 9-12 7     (1) COMPREHENSIVE METABOLIC PANEL 16ACZ7567 45:44ZG Malik Felling Order Number: [id number]  TW Order Number: [id number] Order Number: [id number]     Test Name Result Flag Reference   GLUCOSE,RANDM 127 mg/dL     If the patient is fasting, the ADA then defines impaired fasting glucose as > 100 mg/dL and diabetes as > or equal to 123 mg/dL  SODIUM 138 mmol/L  136-145   POTASSIUM 4 1 mmol/L  3 5-5 3   CHLORIDE 103 mmol/L  100-108   CARBON DIOXIDE 28 mmol/L  21-32   ANION GAP (CALC) 7 mmol/L  4-13   BLOOD UREA NITROGEN 10 mg/dL  5-25   CREATININE 0 60 mg/dL  0 60-1 30   Standardized to IDMS reference method   CALCIUM 9 0 mg/dL  8 3-10 1   BILI, TOTAL 0 50 mg/dL  0 20-1 00   ALK PHOSPHATAS 62 U/L     ALT (SGPT) 112 U/L H 12-78   AST(SGOT) 50 U/L H 5-45   ALBUMIN 3 7 g/dL  3 5-5 0   TOTAL PROTEIN 7 9 g/dL  6 4-8 2   eGFR Non-African American      >60 0 ml/min/1 73sq [facility] Disease Education Program recommendations are as follows:  GFR calculation is accurate only with a steady state creatinine  Chronic Kidney disease less than 60 ml/min/1 73 sq  meters  Kidney failure less than 15 ml/min/1 73 sq  meters

## 2018-01-12 VITALS
BODY MASS INDEX: 34.37 KG/M2 | HEIGHT: 67 IN | DIASTOLIC BLOOD PRESSURE: 70 MMHG | WEIGHT: 219 LBS | SYSTOLIC BLOOD PRESSURE: 110 MMHG

## 2018-01-12 VITALS
BODY MASS INDEX: 36.54 KG/M2 | HEART RATE: 78 BPM | HEIGHT: 66 IN | SYSTOLIC BLOOD PRESSURE: 120 MMHG | RESPIRATION RATE: 18 BRPM | WEIGHT: 227.38 LBS | DIASTOLIC BLOOD PRESSURE: 70 MMHG | OXYGEN SATURATION: 94 %

## 2018-01-12 VITALS
BODY MASS INDEX: 34.55 KG/M2 | WEIGHT: 215 LBS | HEIGHT: 66 IN | SYSTOLIC BLOOD PRESSURE: 130 MMHG | DIASTOLIC BLOOD PRESSURE: 80 MMHG

## 2018-01-12 NOTE — MISCELLANEOUS
Message   Recorded as Task   Date: 03/23/2016 08:34 AM, Created By: Migel Holliday   Task Name: Follow Up   Assigned To: [facility]   Regarding Patient: Tian Tolentino, Status: Active   Comment:    DionisioRadhaEd - 23 Mar 2016 8:34 AM     TASK CREATED  While pt here for procedure today she is inquiring if you can order a back brace? Also states that her flexeril was increased to tid but the script says qd  Pt states script is at [facility] and is asking if you can change order to tid? Caden Frank - 24 Mar 2016 10:13 AM     TASK REPLIED TO: Previously Assigned To [facility]  We will send brace order to [facility]  She should expect call from them  Flexeril was ordered on 3/18 for TID and sent to [facility] aid  If she has nto received this, I can order again  Dolores Howe - 24 Mar 2016 12:14 PM     TASK EDITED  S/w pt  and she is aware  Active Problems    1  Analgesic use (V58 69) (Z79 899)   2  Anxiety (300 00) (F41 9)   3  Back pain, sacroiliac (724 6) (M53 3)   4  Bartholin gland cyst (616 2) (N75 0)   5  Chronic pain syndrome (338 4) (G89 4)   6  DDD (degenerative disc disease), lumbosacral (722 52) (M51 37)   7  Depression (311) (F32 9)   8  Dietary calcium deficiency (269 3) (E58)   9  Esophageal reflux (530 81) (K21 9)   10  Fatty liver (571 8) (K76 0)   11  Impaired fasting glucose (790 21) (R73 01)   12  Irritable bowel syndrome (564 1) (K58 9)   13  Microscopic hematuria (599 72) (R31 2)   14  Myofascial pain (729 1) (M79 1)   15  Nonspecific (abnormal) findings on radiological and other examination of genitourinary    organs (793 5) (R93 8)   16  Obesity (BMI 30 0-34 9) (278 00) (E66 9)   17  Opioid dependence (304 00) (F11 20)   18  Pap smear for cervical cancer screening (V76 2) (Z12 4)   19  Pulmonary nodule seen on imaging study (793 11) (R91 1)   20  Right lumbar radiculopathy (724 4) (M54 16)   21  Sacroiliitis (720 2) (M46 1)   22  Trochanteric bursitis of both hips (726 5) (M70 61,M70 62)   23  Trochanteric bursitis of left hip (726 5) (M70 62)   24  Visit for screening mammogram (V76 12) (Z12 31)   25  Vitamin D deficiency (268 9) (E55 9)   26  Well female exam with routine gynecological exam (V72 31) (Z01 419)    Current Meds   1  Cyclobenzaprine HCl - 10 MG Oral Tablet; TAKE 1 TABLET 3 TIMES DAILY; Therapy: 78PIT2472 to (Evaluate:40Avc7165)  Requested for: 33WVK9458; Last   Rx:18Mar2016 Ordered   2  Gabapentin 300 MG Oral Capsule; TAKE 1 CAPSULE 3 TIMES DAILY; Therapy: 08VTV6370 to (Evaluate:47Twi0302)  Requested for: 58OHU9535; Last   Rx:18Mar2016 Ordered   3  Lexapro 20 MG Oral Tablet (Escitalopram Oxalate) Recorded   4  LORazepam 0 5 MG Oral Tablet; TAKE 1 TABLET Other PRN Take 1 tab 1 hour before   procedure, may repeat if needed in 15 mins; Therapy: 38VQY1608 to (Evaluate:19Mar2016); Last Rx:18Mar2016 Ordered   5  Multi Vitamin Daily Oral Tablet; Therapy: (Recorded:08Mar2016) to Recorded   6  Oxycodone-Acetaminophen 5-325 MG Oral Tablet; TAKE 1 TABLET EVERY 6 HOURS; Therapy: 45QBV2631 to (Evaluate:37Yku0475); Last Rx:18Mar2016 Ordered   7  SEROquel 50 MG Oral Tablet (QUEtiapine Fumarate); Therapy: (Fadi Orozco) to Recorded   8  Vitamin B Complex Oral Tablet; Therapy: (Recorded:08Mar2016) to Recorded   9  Voltaren 1 % Transdermal Gel; Apply to affected joints 2 to 3 times daily; Therapy: 34ABA2431 to (Last Rx:23Mar2016)  Requested for: 64OYG9366 Ordered    Allergies    1   No Known Drug Allergies    Signatures   Electronically signed by : Rodrigo Hunter, ; Mar 24 2016 12:15PM EST                       (Author)

## 2018-01-12 NOTE — PROGRESS NOTES
Plan    1  DSMT/MNT Time Record; Status:Complete;   Done: 03KLH8146 05:05PM    Discussion/Summary    PATIENT EDUCATION RECORD   Indication for Services: type 2 Diabetes Mellitus and obesity  She is ready to learn  She has no barriers to learning  Diabetes Disease Process:   She understands the pathophysiology of diabetes: Method: Instruction  Response: Verbalizes Understanding   Discussed benefits of control: Method: Instruction  Response: Verbalizes Understanding   Discussed treatment options: Method: Instruction  Response: Verbalizes Understanding   Healthy Eating:   Discussed importance of meal timing/consistency: Method: Instruction and Handout  Response: Verbalizes Understanding   Discussed nutrient types ( Cho/Fat/Protein): Method: Instruction and Handout  Response: Verbalizes Understanding   Discussed portion sizes: Method: Instruction, Handout and Demonstration  Response: Verbalizes Understanding   Discussed Eating Out: Method: Instruction  Response: Verbalizes Understanding   Discussed food label reading: Method: Instruction, Handout and Demonstration  Response: Verbalizes Understanding  Provided food diary and instructions on use: Method: Instruction, Handout and DemonstrationResponse: Verbalizes Understanding   Provided meal planning: Method: Instruction, Handout and Demonstration  Response: Verbalizes Understanding Her keal needs are 1485  Her CHO's per meal are 111 g/day 30% carb  He/She was provided a meal plan for: fixed carbohydrates and weight loss  Discussed weight management/weight loss: Method: Instruction  Response: Verbalizes Understanding   Her current BMI 36  Discussed basic carbohydrate counting: Method: Instruction, Handout and Demonstration  Response: Verbalizes Understanding   Being Active:   Discussed proper exercise program: Method: Instruction  Response: Verbalizes Understanding   Her blood glucose targets are: Pre-meal target <110, Post-meal target <140 and HS target   Monitoring:   Discussed target blood glucose ranges: Method: Instruction and Handout  Response: Verbalizes Understanding  Discussed testing times: Method: Instruction and Handout  Response: Verbalizes Understanding  Discussed reporting of readings to M D : Method: Instruction and Handout  Response: Verbalizes Understanding  Taking Medication:   Stated name,dose, and timing of oral meds  Method: Instruction  Response: [employer]  Stated side effects/precautions with diabetes meds  Method: Instruction  Response: [employer]  She is taking  biguanides  Healthy Coping Class:   Identified lifestyle behaviors that need to change: Method: Instruction  Response: Verbalizes Understanding   Discussed motivation to change: Method: Instruction  Response: Verbalizes Understanding   Identified goals for behavior change: Method: Instruction  Response: Verbalizes Understanding   Discussed strategies for change: Method: Instruction  Response: Verbalizes Understanding   She participated in goal setting  She was given the following educational materials: portion book, Personal Meal Plan 1485 calories, Planning Healthy Meals and Calorie and Carbohydrate Tracking Books/Websites/Phone Apps   Chief Complaint  Patient is here today for Medical Nutrition Therapy for T2DM      History of Present Illness  Patient is a 50 y/o female with T2DM, Obesity  A1c 7 7, BMI 36  Patient lost 5 lbs in the past month  She is exercising 45 minutes at spin class 2-3 times per week  She was encouraged to do strength training to decrease insulin resistance  She is testing her BG fasting in the a m only  Self reported readings range from 134-283  She was given BG testing times and targets in order to find patterns of hyperglycemia  She is taking Metformin and complains of diarrhea  She states she has IBS   We discussed asking Dr  to change to extended release Metformin and use of psyllium seed husk fiber to reduce loose stool as well as reduce BG and LDL cholesterol  Her food history shows moderate carb intake, some processed foods and skipping of lunch  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  She was educated on meal planning/carb counting and given a low carb (30%) personal meal plan  We discussed alternative to some of the processed foods she is eating and importance of eating 3 meals per day  This is her initial assessment    Present at session: patient    She eats breakfast at  9-10 AM [employer] chocolate cereal with milk, water    She eats lunch at  [employer]   She snacks at PM Cucumbers and carrots, water  6 PM [place] [facility]) burger, salad, cheese and grapes, water   She snacks at at bedtime 1 c  cereal with milk and nuts     NUTRITION DIAGNOSES   Food And Nutrition Related Knowledge Defici   Food and nutrition related knowledge deficit related to  lack of prior exposure to accurate nutrition related information  As evidenced by  no prior knowledge of need for food and nutrition related recommendations  Medical Nutrition Therapy Intervention: Carbohydrate counting, Meal planning, Individualized meal plan, Strategies to monitor portion control, Label reading, Meal timing, Exercise Guidelines, Behavior modification strategies and Weight/BMI Goals  Her comprehension was good   Her motivation was good   Her compliance was good   Goals:  1  Follow meal plan/count carbs  2, Eat 3 meals per day  3  Add resistance training to exercise regimen      Active Problems    1  Abnormal mammogram (793 80) (R92 8)   2  Analgesic use (V58 69) (Z79 899)   3  Anxiety (300 00) (F41 9)   4  Carpal tunnel syndrome, right (354 0) (G56 01)   5  Cervical radiculopathy (723 4) (M54 12)   6  DDD (degenerative disc disease), lumbosacral (722 52) (M51 37)   7  Decreased sex drive (279 23) (C94 07)   8  Depression (311) (F32 9)   9  Dietary calcium deficiency (269 3) (E58)   10  Fatty liver (571 8) (K76 0)   11   Gastroesophageal reflux disease (530 81) (K21 9)   12  Irritable bowel syndrome with diarrhea (564 1) (K58 0)   13  Labial cyst (624 8) (N90 7)   14  Left ovarian cyst (620 2) (N83 202)   15  Microscopic hematuria (599 72) (R31 29)   16  Myofascial pain (729 1) (M79 1)   17  Obesity (BMI 30 0-34 9) (278 00) (E66 9)   18  Pain and swelling of right upper extremity (729 5,729 81) (M79 601,M79 89)   19  Pap smear for cervical cancer screening (V76 2) (Z12 4)   20  Pelvic pain in female (625 9) (R10 2)   21  Perimenopause (627 2) (N95 1)   22  Pulmonary nodule seen on imaging study (793 11) (R91 1)   23  Right lumbar radiculopathy (724 4) (M54 16)   24  Screening for colon cancer (V76 51) (Z12 11)   25  Tobacco use (305 1) (Z72 0)   26  Trochanteric bursitis of both hips (726 5) (M70 61,M70 62)   27  Trochanteric bursitis of left hip (726 5) (M70 62)   28  Type 2 diabetes mellitus (250 00) (E11 9)   29  Ulnar neuropathy at elbow (354 2) (G56 20)   30  Vitamin D deficiency (268 9) (E55 9)    Past Medical History    1  History of Community acquired pneumonia (5) (J18 9)   2  History of Depression (311) (F32 9)   3  Denied: History of abnormality of cervix   4  History of gestational diabetes (V12 21) (Z86 32)   5  History of Opioid dependence (304 00) (F11 20)    Surgical History    1  History of  Section   2  History of Cholecystectomy   3  History of Endoscopic Removal Of Stone(S) From Biliary Tract   4  History of ERCP With Endoscopic Sphincterotomy   5  History of Gynecologic Services Thermal Endometrial Ablation   6  History of Neuroplasty With Transposition Of Ulnar Nerve - At Elbow   7  History of Oral Surgery Tooth Extraction   8  History of Thorax Excision Of First Rib   9  History of Tonsillectomy With Adenoidectomy   10  History of Venous Ligation With Stripping    Family History  Mother    1  Family history of Diabetes Mellitus (V18 0)   2  Family history of Essential Hypercholesterolemia   3   Family history of malignant neoplasm of breast (V16 3) (Z80 3)  Father    4  Family history of Diabetes Mellitus (V18 0)   5  Family history of Essential Hypercholesterolemia   6  Family history of lung cancer (V16 1) (Z80 1)  Brother    7  Family history of Diabetes Mellitus (V18 0)   8  Family history of Essential Hypercholesterolemia   9  Family history of Hypertension (V17 49)  Unknown    10  Family history of neuropathy (V17 2) (Z82 0)  Family History    11  Family history of Alcoholism   12  Family history of Asthma (V17 5)   13  Family history of Back Pain   14  Family history of Cancer   15  Family history of Depression   16  Family history of Diabetes Mellitus (V18 0)   17  Family history of Essential Hypercholesterolemia   18  Family history of Heart Disease (V17 49)   19  Family history of Hypertension (V17 49)    Social History    · Being A Social Drinker   ·    · Education history   · Exercise habits   · General equivalency diploma (GED)   · Denied: History of No drug use   · No preference on [employer]alism beliefs   · Occupation   · Tobacco use (305 1) (Z72 0)   · Denied: History of Tobacco Use    Current Meds   1  BuPROPion HCl ER (SR) 150 MG Oral Tablet Extended Release 12 Hour; TAKE 1   TABLET EVERY 12 HOURS DAILY; Therapy: 99Xhh7657 to (Evaluate:59Yjw3501)  Requested for: 99Hvc6698; Last   Rx:79Iak0370 Ordered   2  Cyclobenzaprine HCl - 10 MG Oral Tablet; TAKE 1 TABLET 3 TIMES DAILY; Therapy: 48AYC3947 to (Evaluate:17Btz5729)  Requested for: 72SNA9062; Last   Rx:79Dtr3955 Ordered   3  Escitalopram Oxalate 20 MG Oral Tablet; TAKE 1 TABLET DAILY  Requested for:   68Gpi1300; Last Rx:84Fer1290 Ordered   4  MetFORMIN HCl - 500 MG Oral Tablet; take 1 tablet by mouth twice daily; Therapy: 78Bec0651 to (Last Rx:63Kqa8054)  Requested for: 89Gon9399 Ordered   5  OneTouch Delica Lancets Fine Miscellaneous; TEST ONCE DAILY; Therapy: 73Wje2697 to [phone]); Last Rx:64Vni3199 Ordered   6   OneTouch Ultra 2 w/Device Kit; USE AS DIRECTED; Therapy: 97Leu5285 to (Last Rx:24Apr2017) Ordered   7  OneTouch UltraSoft Lancets Miscellaneous; TEST ONCE DAILY; Therapy: 15Grr4547 to (03 [phone]); Last Rx:24Apr2017 Ordered   8  Pantoprazole Sodium 40 MG Oral Tablet Delayed Release; TAKE 1 TABLET DAILY; Therapy: 32WJV7942 to (Evaluate:33Dqs4506)  Requested for: 20Mar2017; Last   Rx:20Mar2017 Ordered   9  QUEtiapine Fumarate 25 MG Oral Tablet; TAKE 1 TABLET AT BEDTIME  Requested for:   10Apr2017; Last Rx:10Apr2017 Ordered   10  Voltaren 1 % Transdermal Gel; Apply to affected joints 2 to 3 times daily; Therapy: 83FKA9267 to (Last Rx:24Mar2016)  Requested for: 24Mar2016 Ordered    Allergies    1   No Known Drug Allergies    Vitals  Signs   Recorded: 92MBI9998 05:23PM   Weight: 221 lb 6 oz  BMI Calculated: 35 73  BSA Calculated: 2 09    Results/Data  DSMT/MNT Time Record 97VHU0399 05:05PM Latisha Koh     Test Name Result Flag Reference   Date of Service 5/18/17     Start - Stop Time 8:30-9:45     Total MInutes 75     Group Or Individual Instruction I-MNT       Future Appointments    Date/Time Provider Specialty Site   05/30/2017 10:00 AM Alma Aldana MD Gastroenterology Adult [facility]   07/19/2017 10:20 AM Juan Krishnamurthy DO Internal Medicine [facility]     Signatures   Electronically signed by : Georgette Amaya RD; May 18 2017  5:22PM EST                       (Author)    Electronically signed by : BIRDIE Colorado ; May 19 2017  9:39AM EST

## 2018-01-12 NOTE — MISCELLANEOUS
Message   Recorded as Task   Date: 05/13/2016 10:43 AM, Created By: System   Task Name: Schedule Appointment   Assigned To: [facility],Team   Regarding Patient: Molina Paulino, Status: Active   Comment:    System - 13 May 2016 10:43 AM     Preferred Communication: Mail  Ordering Site: [facility]    Referred To: Katlyn Rubio MD, Mildred Cohen  Neurosurgery  To Be Done: 13 May 2016   Barbara Barr - 16 May 2016 2:07 PM     Jarrett Gray - 16 May 2016 2:07 PM     TASK REASSIGNED: Previously Assigned To Ute Nirmala - 16 May 2016 2:47 PM     TASK EDITED  WAITING FOR PT TO CALL OUR OFFICE BACK WITH W/C Zelda Roger - 17 May 2016 11:24 AM     TASK EDITED  PT IS SCHEDULED W/DR Sandoval Ro ON 6/23/2016 @ 11:00am IN OUR [place] OFFICE  TY        Active Problems    1  Acute bronchitis, bacterial (466 0,041 9) (J20 8,B96 89)   2  Analgesic use (V58 69) (Z79 899)   3  Anxiety (300 00) (F41 9)   4  Back pain, sacroiliac (724 6) (M53 3)   5  Chronic pain syndrome (338 4) (G89 4)   6  DDD (degenerative disc disease), lumbosacral (722 52) (M51 37)   7  Depression (311) (F32 9)   8  Dietary calcium deficiency (269 3) (E58)   9  Esophageal reflux (530 81) (K21 9)   10  Fatty liver (571 8) (K76 0)   11  Impaired fasting glucose (790 21) (R73 01)   12  Irritable bowel syndrome (564 1) (K58 9)   13  Labial cyst (624 8) (N90 7)   14  Microscopic hematuria (599 72) (R31 2)   15  Myofascial pain (729 1) (M79 1)   16  Nonspecific (abnormal) findings on radiological and other examination of genitourinary    organs (793 5) (R93 8)   17  Obesity (BMI 30 0-34 9) (278 00) (E66 9)   18  Pulmonary nodule seen on imaging study (793 11) (R91 1)   19  Right lumbar radiculopathy (724 4) (M54 16)   20  Sacroiliitis (720 2) (M46 1)   21  Trochanteric bursitis of both hips (726 5) (M70 61,M70 62)   22  Trochanteric bursitis of left hip (726 5) (M70 62)   23   Visit for screening mammogram (V76 12) (Z12 31)   24  Vitamin D deficiency (268 9) (E55 9)   25  Well female exam with routine gynecological exam (V72 31) (Z01 419)    Current Meds   1  Cyclobenzaprine HCl - 10 MG Oral Tablet; TAKE 1 TABLET 3 TIMES DAILY; Therapy: 59XDB5554 to (Evaluate:10Kjx5836)  Requested for: 50EMR0513; Last   Rx:18Mar2016 Ordered   2  Gabapentin 300 MG Oral Capsule; TAKE 1 CAPSULE 3 TIMES DAILY; Therapy: 80LGM3808 to (Evaluate:54Ujz5391)  Requested for: 47CAR4292; Last   Rx:18Mar2016 Ordered   3  Lexapro 20 MG Oral Tablet (Escitalopram Oxalate) Recorded   4  LORazepam 0 5 MG Oral Tablet; TAKE 1 TABLET Other PRN Take 1 tab 1 hour before   procedure, may repeat if needed in 15 mins; Therapy: 67ZNY6946 to (Evaluate:19Mar2016); Last Rx:18Mar2016 Ordered   5  Multi Vitamin Daily Oral Tablet; Therapy: (Recorded:08Mar2016) to Recorded   6  Pennsaid 2 % Transdermal Solution; 2 pumps BID prn; Therapy: 66KUT4535 to (Evaluate:12Jun2016)  Requested for: 70NOP4949; Last   Rx:96Een8784 Ordered   7  SEROquel 50 MG Oral Tablet (QUEtiapine Fumarate); Therapy: (Zackary Marin) to Recorded   8  Voltaren 1 % Transdermal Gel (Diclofenac Sodium); Apply to affected joints 2 to 3 times   daily; Therapy: 92JLW1389 to (Last Rx:24Mar2016)  Requested for: 24Mar2016 Ordered    Allergies    1  No Known Drug Allergies    Signatures   Electronically signed by :  Edu Coy, ; May 17 2016 11:48AM EST                       (Author)

## 2018-01-12 NOTE — MISCELLANEOUS
Signatures   Electronically signed by : Hiram Sanz MD; Aug 11 2016  2:17PM EST                       (Author)

## 2018-01-13 VITALS
DIASTOLIC BLOOD PRESSURE: 80 MMHG | HEIGHT: 66 IN | SYSTOLIC BLOOD PRESSURE: 132 MMHG | WEIGHT: 215 LBS | BODY MASS INDEX: 34.55 KG/M2

## 2018-01-13 VITALS
HEIGHT: 67 IN | BODY MASS INDEX: 33.63 KG/M2 | SYSTOLIC BLOOD PRESSURE: 124 MMHG | HEART RATE: 64 BPM | OXYGEN SATURATION: 98 % | DIASTOLIC BLOOD PRESSURE: 82 MMHG | WEIGHT: 214.25 LBS

## 2018-01-13 VITALS
BODY MASS INDEX: 35.84 KG/M2 | HEIGHT: 66 IN | SYSTOLIC BLOOD PRESSURE: 100 MMHG | HEART RATE: 72 BPM | WEIGHT: 223 LBS | OXYGEN SATURATION: 96 % | DIASTOLIC BLOOD PRESSURE: 62 MMHG

## 2018-01-13 VITALS
DIASTOLIC BLOOD PRESSURE: 72 MMHG | HEART RATE: 70 BPM | OXYGEN SATURATION: 96 % | WEIGHT: 222.5 LBS | BODY MASS INDEX: 35.76 KG/M2 | HEIGHT: 66 IN | SYSTOLIC BLOOD PRESSURE: 112 MMHG

## 2018-01-13 VITALS
WEIGHT: 226 LBS | HEART RATE: 65 BPM | BODY MASS INDEX: 36.32 KG/M2 | OXYGEN SATURATION: 96 % | HEIGHT: 66 IN | SYSTOLIC BLOOD PRESSURE: 120 MMHG | DIASTOLIC BLOOD PRESSURE: 62 MMHG | RESPIRATION RATE: 16 BRPM

## 2018-01-13 VITALS
HEIGHT: 66 IN | OXYGEN SATURATION: 98 % | SYSTOLIC BLOOD PRESSURE: 119 MMHG | DIASTOLIC BLOOD PRESSURE: 81 MMHG | BODY MASS INDEX: 35.44 KG/M2 | RESPIRATION RATE: 18 BRPM | TEMPERATURE: 97.8 F | WEIGHT: 220.5 LBS | HEART RATE: 78 BPM

## 2018-01-13 NOTE — MISCELLANEOUS
Message  7/6/17 1420: pt aware will be admitted to medicine service for observation  Medicine aware I will follow        Signatures   Electronically signed by : BIRDIE Patterson ; Jul 6 2017  2:30PM EST                       (Author)

## 2018-01-13 NOTE — MISCELLANEOUS
Message  GI Reminder Recall [address] [place] [address] [address]:   Date: 02/03/2016   Dear Estephania Allen:     Review of our records shows you are due for the following: Follow Up Visit  Please call the following office to schedule your appointment:   [address] [facility], [address], [address] [facility] [phone]  We look forward to hearing from you!      Sincerely,     [facility]      Signatures   Electronically signed by : Dominique Hough, ; Feb  3 2016 10:41AM EST                       (Author)

## 2018-01-13 NOTE — RESULT NOTES
Discussion/Summary   biopsies are unremarkable  Verified Results  (1) TISSUE EXAM 73Foh0535 08:32AM Gareld Mass     Test Name Result Flag Reference   LAB AP CASE REPORT (Report)     Surgical Pathology Report             Case: [id number]                   Authorizing Provider: Rochelle Burk MD       Collected:      09/14/2017 4837        Ordering Location:   [address] [place] [address]   Received:      09/15/2017 9 [facility]                               Pathologist:      Skylar Gonzalez MD                              Specimens:  A) - Duodenum, R/o celiac                                       B) - Stomach, Gastric body r/o h pylori   LAB AP FINAL DIAGNOSIS (Report)     A  Duodenum, biopsy:    - No significant pathologic abnormalities  - No villous atrophy, increased intraepithelial lymphocytes or crypt   hyperplasia to suggest     malabsorptive enteropathy     - No active inflammation, granulomas, organisms, dysplasia or neoplasia   identified  B  Stomach, body, biopsy:    - Oxyntic mucosa with mild superficial chronic inactive gastritis  - Immunostain for H  pylori (with appropriate positive control) is   negative  - No intestinal metaplasia, dysplasia or neoplasia identified  Electronically signed by Skylar Gonzalez MD on 9/18/2017 at 3:03 PM   LAB AP SURGICAL ADDITIONAL INFORMATION (Report)     All controls performed with the immunohistochemical stains reported above   reacted appropriately  These tests were developed and their performance   characteristics determined by [facility] or   [facility]  They may not be cleared or approved by the [place]  Food and Drug Administration  The FDA has determined that such clearance   or approval is not necessary  These tests are used for clinical purposes  They should not be regarded as investigational or for research   This   laboratory has been approved by CLIA 88, designated as a high-complexity   laboratory and is qualified to perform these tests  - Interpretation performed at [employer]   LAB AP GROSS DESCRIPTION (Report)     A  The specimen is received in formalin, labeled with the patient's name   and hospital number, and is designated duodenum  The specimen consists   of multiple tan-pink soft tissue fragments measuring in aggregate 0 7 x   0 7 x 0 2 cm  Entirely submitted  One cassette  B  The specimen is received in formalin, labeled with the patient's name   and hospital number, and is designated gastric body  The specimen   consists of 2 tan-pink soft tissue fragments measuring 0 3 cm and 0 5 cm   in greatest dimension  Entirely submitted  One cassette  Note: The estimated total formalin fixation time based upon information   provided by the submitting clinician and the standard processing schedule   is under 72 hours    MAS

## 2018-01-13 NOTE — MISCELLANEOUS
Message  9/6/17 1930: LMOVM to call back  Not menopausal (what response to provera?) but thyroid ?slightly hypo  Recommend f/u thyroid testing late September  BEO  9/21/17 1550: pt aware of options (trial aygestin, Mirena for endometrial protection and recommendation for hysteroscopy c D&C to assess cavity for possible Mirena  Pt notes significant discomfort while using provera and discontinued same on D#7 rather than completing course  Had single day of light bleeding  on bleeding in September to date  (s/p ablation 2013)  Labs not supportive of menopause  Encouraged pt to schedle f/u appt to further address   BEO      Signatures   Electronically signed by : BIRDIE Ortiz ; Sep 21 2017  4:08PM EST                       (Author)

## 2018-01-14 VITALS — HEIGHT: 66 IN | WEIGHT: 219.5 LBS | BODY MASS INDEX: 35.27 KG/M2

## 2018-01-15 VITALS
SYSTOLIC BLOOD PRESSURE: 112 MMHG | DIASTOLIC BLOOD PRESSURE: 64 MMHG | TEMPERATURE: 98 F | RESPIRATION RATE: 22 BRPM | BODY MASS INDEX: 35.27 KG/M2 | WEIGHT: 219.5 LBS | HEIGHT: 66 IN | HEART RATE: 72 BPM

## 2018-01-15 NOTE — RESULT NOTES
Verified Results  COLONOSCOPY 65SYP3861 12:57PM Jazmin Sommers     Test Name Result Flag Reference   Colonoscopy 05/30/2017        Summary / No summary entered :      No summary entered   Documents attached :      EPIC OP NOTE - Sangita Paniagua: [id number] - Appointment - Swapnil Ya - (Gastroenterology Adult) (Additional Information Document)

## 2018-01-15 NOTE — MISCELLANEOUS
Assessment    1  Ileus (560 1) (K56 7)   2  Type 2 diabetes mellitus (250 00) (E11 9)   3  Obesity (BMI 30 0-34 9) (278 00) (E66 9)    Plan  Type 2 diabetes mellitus    · MetFORMIN HCl - 500 MG Oral Tablet; TAKE 1 TABLET IN THE AM AND 2 IN THE  PM   Rx By: Alma Delia Lindo; Dispense: 0 Days ; #:90 Tablet; Refill: 1; For: Type 2 diabetes mellitus; JESSICA = N; Verified Transmission to [address] [place] [address]; Last Updated By: System, [employer]; 7/17/2017 11:22:44 AM   · (1) HEMOGLOBIN A1C; Status:Active; Requested LINDSAY:[id number];    Perform:[facility]; Due:63Nou7530; Ordered; For:Type 2 diabetes mellitus; Ordered By:Jacki Ansari;   · (1) LIPID PANEL, FASTING; Status:Active; Requested for:27Pwh0457;    Perform:[facility]; Due:61Abf0096; Ordered; For:Type 2 diabetes mellitus; Ordered By:Jacki Ansari;   · (1) MICROALBUMIN CREATININE RATIO, RANDOM URINE; Status:Active; Requested  for:63Yfv9435;    Perform:[facility]; Due:87Wnv1580; Ordered; For:Type 2 diabetes mellitus; Ordered By:Jacki Ansari;    Discussion/Summary  Discussion Summary:   Rosi Encinas was seen and examined in the office today  Since her last visit, she has quit smoking after being on the wellbutrin  She is no longer taking the Wellbutrin and will have cravings but so far has done well with this  She has recovered from her ileus and unfortunately because of her previous bowel surgeries, she may be at increased risk for developing this again  She has managed to lose weight overall and she was encouraged to continue to do so  Her diabetic control has improved overall and will need a repeat A1c in the fall  Blood work was given to complete  Otherwise no other changes were made  Counseling Documentation With Imm: The patient was counseled regarding instructions for management  Medication SE Review and Pt Understands Tx: Possible side effects of new medications were reviewed with the patient/guardian today   The treatment plan was reviewed with the patient/guardian  The patient/guardian understands and agrees with the treatment plan      Chief Complaint  Chief Complaint Free Text Note Form: ARIEL      History of Present Illness  TCM Communication  [facility]: The patient is being contacted for follow-up after hospitalization  She was hospitalized [facility]  The dates of hospitalization: 07/06/2017, date of admission: 07/06/2017, date of discharge: 07/11/2017  Diagnosis: ADYNAMIC ILEUS  She was discharged to home  Medications were not reviewed today  The patient is currently asymptomatic  Counseling was provided to the patient  Communication performed and completed by Cristina Zarate   HPI: Rosi Encinas is here today for a follow up from her recent hospitalization  She initially presented to Dr Luca Mixon office with complaints of right lower quadrant pain  CT scan of the A/P was completed and revealed an ileus  She was then admitted to the hospital for care  She was kept NPO and supportive care was elected  She was discharged home on 7/11/17  She reports no complaints at this time but notes nausea at times  She reports monitoring her blood glucose readings  She checks her sugars fasting that have been running in the the 140s-180s range  She has been monitoring her diet as well  She has lost weight as well  Review of Systems  Complete-Female:   Constitutional: recent weight loss, but no fever, not feeling poorly, no chills and not feeling tired  Cardiovascular: No complaints of slow heart rate, no fast heart rate, no chest pain, no palpitations, no leg claudication, no lower extremity edema  Respiratory: No complaints of shortness of breath, no wheezing, no cough, no SOB on exertion, no orthopnea, no PND  Gastrointestinal: nausea, but no abdominal pain, no constipation and no diarrhea  Musculoskeletal: arthralgias  Neurological: no headache  Psychiatric: no anxiety, no sleep disturbances and no depression  Active Problems     1  Anxiety (300 00) (F41 9)   2  Cervical radiculopathy (723 4) (M54 12)   3  Depression (311) (F32 9)   4  Gastroesophageal reflux disease (530 81) (K21 9)   5  Hepatic steatosis (571 8) (K76 0)   6  Irritable bowel syndrome with diarrhea (564 1) (K58 0)   7  Microscopic hematuria (599 72) (R31 29)   8  Obesity (BMI 30 0-34 9) (278 00) (E66 9)   9  Perimenopause (627 2) (N95 1)   10  Pulmonary nodule seen on imaging study (793 11) (R91 1)   11  Right lumbar radiculopathy (724 4) (M54 16)   12  Type 2 diabetes mellitus (250 00) (E11 9)   13  Vitamin D deficiency (268 9) (E55 9)    Labial cyst (624 8) (N90 7)       History of colon polyps (V12 72) (Z86 010)          Past Medical History    1  History of Carpal tunnel syndrome, right (354 0) (G56 01)   2  History of Community acquired pneumonia (5) (J18 9)   3  History of Depression (311) (F32 9)   4  Denied: History of abnormality of cervix   5  History of gestational diabetes (V12 21) (Z86 32)   6  History of rectal bleeding (V12 79) (Z87 19)   7  History of Left ovarian cyst (620 2) (N83 202)   8  History of Myofascial pain (729 1) (M79 1)   9  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) (G56 20)    Surgical History    1  History of  Section   2  History of Cholecystectomy   3  History of Endoscopic Removal Of Stone(S) From Biliary Tract   4  History of ERCP With Endoscopic Sphincterotomy   5  History of Gynecologic Services Thermal Endometrial Ablation   6  History of Neuroplasty With Transposition Of Ulnar Nerve - At Elbow   7  History of Oral Surgery Tooth Extraction   8  History of Thorax Excision Of First Rib   9  History of Tonsillectomy With Adenoidectomy   10  History of Venous Ligation With Stripping    Family History  Mother    1  Family history of Diabetes Mellitus (V18 0)   2  Family history of Essential Hypercholesterolemia   3  Family history of malignant neoplasm of breast (V16 3) (Z80 3)  Father    4   Family history of Diabetes Mellitus (V18 0)   5  Family history of Essential Hypercholesterolemia   6  Family history of lung cancer (V16 1) (Z80 1)  Brother    7  Family history of Diabetes Mellitus (V18 0)   8  Family history of Essential Hypercholesterolemia   9  Family history of Hypertension (V17 49)  Unknown    10  Family history of neuropathy (V17 2) (Z82 0)  Family History    11  Family history of Alcoholism   12  Family history of Asthma (V17 5)   13  Family history of Back Pain   14  Family history of Cancer   15  Family history of Depression   16  Family history of Diabetes Mellitus (V18 0)   17  Family history of Essential Hypercholesterolemia   18  Family history of Heart Disease (V17 49)   19  Family history of Hypertension (V17 49)    Social History    · Being A Social Drinker   ·    · Education history   · Exercise habits   · General equivalency diploma (GED)   · Denied: History of No drug use   · No preference on Hinduism beliefs   · Occupation   · Denied: History of Tobacco Use    Current Meds   1  Escitalopram Oxalate 20 MG Oral Tablet; TAKE 1 TABLET DAILY  Requested for:   10Apr2017; Last Rx:10Apr2017 Ordered   2  MetFORMIN HCl - 500 MG Oral Tablet; take 1 tablet by mouth twice daily; Therapy: 24Apr2017 to (Last Rx:24Apr2017)  Requested for: 24Apr2017 Ordered   3  OneTouch Delica Lancets Fine Miscellaneous; TEST ONCE DAILY; Therapy: 24Apr2017 to (Evaluate:57Gtx1574)  Requested for: 21Jun2017; Last   IT:21WHO5349 Ordered   4  OneTouch Ultra 2 w/Device Kit; USE AS DIRECTED; Therapy: 24Apr2017 to (Last Rx:24Apr2017) Ordered   5  OneTouch UltraSoft Lancets Miscellaneous; TEST ONCE DAILY; Therapy: 24Apr2017 to ([id number]); Last Rx:24Apr2017 Ordered   6  Pantoprazole Sodium 40 MG Oral Tablet Delayed Release; ONE TABLET THIRTY   MINUTES BEFORE BREAKFAST DAILY; Therapy: 03AYN1735 to (Evaluate:01Apr2018)  Requested for: 65EPU7256; Last   Rx:52Wgr1436 Ordered   7   QUEtiapine Fumarate 25 MG Oral Tablet; TAKE 1 TABLET AT BEDTIME  Requested for:   10Apr2017; Last Rx:10Apr2017 Ordered    Allergies    1  No Known Drug Allergies    Vitals  Signs   Recorded: 17Jul2017 10:58AM   Heart Rate: 62  Pulse Quality: Normal  Respiration Quality: Normal  Respiration: 16  Systolic: 075, LUE, Sitting  Diastolic: 60, LUE, Sitting  BP Cuff Size: Large  Height: 5 ft 6 in  Weight: 212 lb   BMI Calculated: 34 22  BSA Calculated: 2 05  O2 Saturation: 98, RA    Physical Exam    Constitutional   General appearance: No acute distress, well appearing and well nourished  obese  Eyes   Conjunctiva and lids: No swelling, erythema or discharge  Pulmonary   Respiratory effort: No increased work of breathing or signs of respiratory distress  Auscultation of lungs: Clear to auscultation  Cardiovascular   Auscultation of heart: Normal rate and rhythm, normal S1 and S2, without murmurs  Examination of extremities for edema and/or varicosities: Normal     Abdomen   Abdomen: Non-tender, no masses  Lymphatic   Palpation of lymph nodes in neck: No lymphadenopathy  Musculoskeletal   Gait and station: Normal     Psychiatric   Orientation to person, place, and time: Normal     Mood and affect: Normal          Results/Data  PHQ-9 Adult Depression Screening 95Vju4401 11:00AM User, [employer]     Test Name Result Flag Reference   PHQ-9 Adult Depression Score 10     Over the last two weeks, how often have you been bothered by any of the following problems?   Little interest or pleasure in doing things: More than half the days - 2  Feeling down, depressed, or hopeless: Several days - 1  Trouble falling or staying asleep, or sleeping too much: Nearly every day - 3  Feeling tired or having little energy: More than half the days - 2  Poor appetite or over eating: Several days - 1  Feeling bad about yourself - or that you are a failure or have let yourself or your family down: Several days - 1  Trouble concentrating on things, such as reading the newspaper or watching television: Not at all - 0  Moving or speaking so slowly that other people could have noticed  Or the opposite -  being so fidgety or restless that you have been moving around a lot more than usual: Not at all - 0  Thoughts that you would be better off dead, or of hurting yourself in some way: Not at all - 0   PHQ-9 Adult Depression Screening Negative     PHQ-9 Difficulty Level Not difficult at all     PHQ-9 Severity Moderate Depression         Health Management  History of colon polyps   COLONOSCOPY (GI, SURG); every 5 years; Last 13NMR8735; Next Due: 75SJM4176;  Active    Future Appointments    Date/Time Provider Specialty Site   11/06/2017 10:20 AM Kaleigh Wagner DO Internal Medicine [facility] IM     Signatures   Electronically signed by : Candance Morelle, DO; Jul 17 2017 11:40AM EST                       (Author)
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
## 2018-01-17 NOTE — PROGRESS NOTES
History of Present Illness  Bariatric MNT [facility] Screening Preop [facility]:     She was on time  Her surgeon is Dr Marina Lawrence  The patient was present at the session  The diagnosis/reason for the appointment is: She is morbidly obese with a BMI of 35 4  She has the following comorbidities: diabetes type 2, diagnosis in Apirl 2017, most recent HgbA1C 7 4 and thyroid disease, vitamin D def, heartburn  Labs: Johnson Baltazar She was reminded to have her labs drawn   She self monitors her blood glucose  Her casual blood glucose is 160-200s fasting in the AM   Exercise Frequency:  She does not exercise  Relationship to food: She grazes  She does not know the difference between being comfortably full and uncomfortably full and does not know the difference between being stuffed and comfortably full  She felt/thought they felt her best at pounds   She completed a food journal (Patient is often skipping meals and is often choosing CHo based snacks (fruits) without a protein source)  She drinks 64oz plus daily cups of water daily She drinks 40 oz diet soda daily, 1 coffee daily caffienated beverages daily Her motivators are:improve health, improved management of DM  Her obesity/being overweight is related to excessive enrgy intakes, sedentary lifestyle and is evidenced by: BMI 35 4  Knowledge Deficit Prior to Education  She is new to the diet  Barriers to education:  She has no barriers to education  Medical Nutrition Therapy Intervention: Individualized Meal Plan, Daily Food /Exercise Diary, Lifestyle /Behavior Modification Techniques, Basic Pathophysiology of Obesity, Label Reading, Checklist of the Overview of Lesson Plans, Surgical changes to Stomach/GI and Food/Medication Interactions  Area's Reviewed: Post - surgery goal weight, Web forum, Lifestyle Rosanna Chahal Modification Techniques, [employer] in [employer] ( hand out for [employer]), Pre- surgery goals /rules and Appetite Awareness  Brief Review of Vitamins, diet progression for post-op and Pathophysiology of Obesity  Her comprehension of the presented material was very good  Her receptivity to the presented material was very good  Her motivation was very good  Provided: Nutrition Guidelines for Gastric Surgery, Food Journaling Application handout, Bariatric Program Pre- Surgery Nutrition and Goals  Patient is a 49year old who is here for nutritional evaluation for weight loss surgery  I reviewed comorbidities and medications prior to session  She first recalls having problems with weight gain in her late 29's   She has dieted in the past with variable success but would regain the weight back  (refer to diet history for details)  For her personal pre-operative goals she will:   1  consume 3 consistent meals per day  2  consume a lean protein source with all meals and snacks  3  meal plan/food journal at least 3 days per week   She will complete all 6 lesson plans in her bariatric booklet  She was instructed on the importance of consistent vitamin and mineral intake after her surgery to prevent deficiencies  She currently does not take any vitamin or mineral supplements   Recommended that she take an adult multivitamin/mineral plus 2000 IU of vitamin D3  Reviewed importance of support after surgery and discussed the web forum, [employer]ia[employer], pep rally and support group  Patient states adequate knowledge of nutrition, exercise and behavior modification required for long term success  Pt  is recommended for surgery and is aware that she will be required to follow the 2 week pre operative liver shrinking diet prior to surgery  Pt also understands that she needs to implement lifestyle changes in preparation for surgery  Active Problems    1  Acute pelvic pain, female (625 9) (R10 2)   2  Amenorrhea, secondary (626 0) (N91 1)   3  Anxiety (300 00) (F41 9)   4  Cervical radiculopathy (723 4) (M54 12)   5  Depression (311) (F32 9)   6  Gastroesophageal reflux disease (530 81) (K21 9)   7  Heartburn (787 1) (R12)   8  Hepatic steatosis (571 8) (K76 0)   9  Ileus (560 1) (K56 7)   10  Irritable bowel syndrome with diarrhea (564 1) (K58 0)   11  Microscopic hematuria (599 72) (R31 29)   12  Obesity (BMI 30 0-34 9) (278 00) (E66 9)   13  Perimenopause (627 2) (N95 1)   14  Pulmonary nodule seen on imaging study (793 11) (R91 1)   15  Right lumbar radiculopathy (724 4) (M54 16)   16  Thickened small bowel (569 89) (K63 9)   17  Thyroid disease (246 9) (E07 9)   18  Type 2 diabetes mellitus with diabetic nephropathy (250 40,583 81) (E11 21)   19  Vitamin D deficiency (268 9) (E55 9)    Past Medical History    1  History of Carpal tunnel syndrome, right (354 0) (G56 01)   2  History of Community acquired pneumonia (5) (J18 9)   3  History of colon polyps (V12 72) (Z86 010)   4  History of gestational diabetes (V12 21) (Z86 32)   5  History of Labial cyst (624 8) (N90 7)   6  History of Left ovarian cyst (620 2) (N83 202)   7  History of Myofascial pain (729 1) (M79 1)   8  History of Trochanteric bursitis of both hips (726 5) (M70 61,M70 62)   9  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) (G56 20)    Surgical History    1  History of  Section   2  History of Cholecystectomy   3  History of Endoscopic Removal Of Stone(S) From Biliary Tract   4  History of ERCP With Endoscopic Sphincterotomy   5  History of Gynecologic Services Thermal Endometrial Ablation   6  History of Neuroplasty With Transposition Of Ulnar Nerve - At Elbow   7  History of Oral Surgery Tooth Extraction   8  History of Thorax Excision Of First Rib   9  History of Tonsillectomy With Adenoidectomy   10  History of Venous Ligation With Stripping    Family History  Mother    1  Family history of Diabetes Mellitus (V18 0)   2  Family history of Essential Hypercholesterolemia   3  Family history of malignant neoplasm of breast (V16 3) (Z80 3)  Father    4   Family history of Diabetes Mellitus (V18 0)   5  Family history of Essential Hypercholesterolemia   6  Family history of lung cancer (V16 1) (Z80 1)  Brother    7  Family history of Diabetes Mellitus (V18 0)   8  Family history of Essential Hypercholesterolemia   9  Family history of Hypertension (V17 49)  Unknown    10  Family history of neuropathy (V17 2) (Z82 0)  Family History    11  Family history of Alcoholism   12  Family history of Asthma (V17 5)   13  Family history of Back Pain   14  Family history of Cancer   15  Family history of Depression   16  Family history of Diabetes Mellitus (V18 0)   17  Family history of Essential Hypercholesterolemia   18  Family history of Heart Disease (V17 49)   19  Family history of Hypertension (V17 49)    Social History    · Being A Social Drinker   ·    · Education history   · Exercise habits   · Former smoker (V15 82) (B38 455)   · General equivalency diploma (GED)   · Denied: History of No drug use   · No preference on Congregation beliefs   · Occupation    Current Meds   1  Escitalopram Oxalate 20 MG Oral Tablet; TAKE 1 TABLET DAILY  Requested for:   10Apr2017; Last Rx:03Ozi9922 Ordered   2  MetFORMIN HCl - 500 MG Oral Tablet; 1 tab po qam and 2 po qhs  Requested for:   86IUE3126; Last XW:65DBH6030 Ordered   3  OneTouch Delica Lancets Fine Miscellaneous; TEST ONCE DAILY; Therapy: 24Apr2017 to (Evaluate:87Czu2045)  Requested for: 21Jun2017; Last   PN:23RJP7688 Ordered   4  OneTouch Ultra 2 w/Device Kit; USE AS DIRECTED; Therapy: 24Apr2017 to (Last Rx:24Apr2017) Ordered   5  OneTouch UltraSoft Lancets Miscellaneous; TEST ONCE DAILY; Therapy: 24Apr2017 to ([id number]); Last Rx:24Apr2017 Ordered   6  Pantoprazole Sodium 40 MG Oral Tablet Delayed Release; ONE TABLET THIRTY   MINUTES BEFORE BREAKFAST DAILY; Therapy: 38YKJ2085 to (Evaluate:01Apr2018)  Requested for: 95OUJ7495; Last   Rx:31Exh1712 Ordered   7   QUEtiapine Fumarate 25 MG Oral Tablet; TAKE 1 TABLET AT BEDTIME Requested for:   33Txr9371; Last Rx:63Byt8960 Ordered    Allergies    1  No Known Drug Allergies    Vitals  Signs   Recorded: 94MGM3126 11:26AM   Height: 5 ft 6 in  Weight: 219 lb 8 0 oz  BMI Calculated: 35 43  BSA Calculated: 2 08    Future Appointments    Date/Time Provider Specialty Site   10/31/2017 12:00 PM BIRDIE Carl  Obstetrics/Gynecology [facility]   12/15/2017 11:00 AM BIRDIE Tavarez   General Surgery [facility]   11/28/2017 10:00 AM Kylee Alarcon MD Gastroenterology Adult [facility]   11/06/2017 10:20 AM Greg Horta DO Internal Medicine [facility]   11/29/2017 11:20 AM BIRDIE Freed , PhD Cardiology [address] [facility]     Signatures   Electronically signed by : BROOKLYNN Nova; Oct 31 2017 12:07PM EST                       (Author)    Electronically signed by : BIRDIE Covarrubias ; Nov 1 2017 11:30AM EST                       (Validation)

## 2018-01-18 ENCOUNTER — ALLSCRIPTS OFFICE VISIT (OUTPATIENT)
Dept: OTHER | Facility: OTHER | Age: 48
End: 2018-01-18

## 2018-01-18 ENCOUNTER — ANESTHESIA EVENT (OUTPATIENT)
Dept: PERIOP | Facility: HOSPITAL | Age: 48
DRG: 621 | End: 2018-01-18
Payer: COMMERCIAL

## 2018-01-18 RX ORDER — LISINOPRIL 10 MG/1
5 TABLET ORAL DAILY
COMMUNITY
End: 2018-02-08 | Stop reason: HOSPADM

## 2018-01-18 RX ORDER — METFORMIN HYDROCHLORIDE 750 MG/1
750 TABLET, EXTENDED RELEASE ORAL EVERY EVENING
COMMUNITY
End: 2018-02-08 | Stop reason: HOSPADM

## 2018-01-18 RX ORDER — LORAZEPAM 0.5 MG/1
TABLET ORAL
COMMUNITY
Start: 2015-10-01 | End: 2018-01-18

## 2018-01-18 RX ORDER — ATORVASTATIN CALCIUM 10 MG/1
10 TABLET, FILM COATED ORAL DAILY
COMMUNITY
End: 2018-04-03 | Stop reason: ALTCHOICE

## 2018-01-18 NOTE — ANESTHESIA PREPROCEDURE EVALUATION
Review of Systems/Medical History  Patient summary reviewed  Chart reviewed      Cardiovascular  Hyperlipidemia,    Pulmonary  Smoker ex-smoker  Cumulative Pack Years: 8,        GI/Hepatic    GERD well controlled,        Negative  ROS        Endo/Other  Diabetes well controlled type 2 Oral agent,   Comment: On metformin and Victoza    ( non insulin injection)    GYN  Negative gynecology ROS          Hematology  Negative hematology ROS      Musculoskeletal       Neurology    Neuromuscular disease ,   Comment: Surgery for thoracic outlet syndrome Psychology   Anxiety, Depression , being treated for depression,              Physical Exam    Airway    Mallampati score: III  TM Distance: >3 FB  Neck ROM: full     Dental   No notable dental hx     Cardiovascular  Rhythm: regular, Rate: normal, Cardiovascular exam normal    Pulmonary  Pulmonary exam normal Breath sounds clear to auscultation,     Other Findings        Anesthesia Plan  ASA Score- 3     Anesthesia Type- general with ASA Monitors  Additional Monitors:   Airway Plan: ETT  Plan Factors-    Induction- intravenous  Postoperative Plan- Plan for postoperative opioid use  Planned trial extubation    Informed Consent- Anesthetic plan and risks discussed with patient

## 2018-01-18 NOTE — MISCELLANEOUS
Message   Recorded as Task   Date: 03/11/2016 10:19 AM, Created By: Kirk Jones   Task Name: Follow Up   Assigned To: [facility],Team   Regarding Patient: Susan Hubbard, Status: In Progress   Comment:    TonyaJayashree pruett - 11 Mar 2016 10:19 AM     TASK CREATED  Caller: Self; General Medical Question; [phone] (Home); [phone] (Work)  Received VM from pt  on [facility]ieran triage line from 1003 am  Pt  states that she is returning a call and can be reached at [phone]  Dolores Howe - 11 Mar 2016 11:08 AM     TASK IN PROGRESS   Radha Dow - 11 Mar 2016 1:54 PM     TASK EDITED    I spoke with pt, states she is returning our call, states she spoke with someone earlier this week  I looked at task, pt is scheduled for OV 3/18/16, advised pt that Dr Lorraine Miller will reeval at [address] [facility] and discuss tx options at that time  Pt thankful for call  ************        Active Problems    1  Analgesic use (V58 69) (Z79 899)   2  Anxiety (300 00) (F41 9)   3  Bartholin gland cyst (616 2) (N75 0)   4  Chronic pain syndrome (338 4) (G89 4)   5  DDD (degenerative disc disease), lumbosacral (722 52) (M51 37)   6  Depression (311) (F32 9)   7  Dietary calcium deficiency (269 3) (E58)   8  Esophageal reflux (530 81) (K21 9)   9  Fatty liver (571 8) (K76 0)   10  Impaired fasting glucose (790 21) (R73 01)   11  Irritable bowel syndrome (564 1) (K58 9)   12  Microscopic hematuria (599 72) (R31 2)   13  Myofascial pain (729 1) (M79 1)   14  Nonspecific (abnormal) findings on radiological and other examination of genitourinary    organs (793 5) (R93 8)   15  Obesity (BMI 30 0-34 9) (278 00) (E66 9)   16  Opioid dependence (304 00) (F11 20)   17  Pap smear for cervical cancer screening (V76 2) (Z12 4)   18  Pulmonary nodule seen on imaging study (793 11) (R91 1)   19  Right lumbar radiculopathy (724 4) (M54 16)   20  Sacroiliitis (720 2) (M46 1)   21  Trochanteric bursitis of left hip (726 5) (M70 62)   22   Visit for screening mammogram (V76 12) (Z12 31)   23  Vitamin D deficiency (268 9) (E55 9)   24  Well female exam with routine gynecological exam (V72 31) (Z01 419)    Current Meds   1  Cyclobenzaprine HCl - 10 MG Oral Tablet; TAKE 1 TABLET AT BEDTIME AS NEEDED; Therapy: 96ORK5842 to (Evaluate:30Mar2016)  Requested for: 34WCU8049; Last   Rx:05Trq8717 Ordered   2  Gabapentin 100 MG Oral Capsule; TAKE 2 CAPSULES 3 TIMES DAILY; Therapy: 10TDL0272 to (Evaluate:25Mar2016)  Requested for: 66Fss2604; Last   Rx:22Zkf7592 Ordered   3  LORazepam 0 5 MG Oral Tablet; TAKE 1 TABLET Other PRN Take 1 tab 1 hour before   procedure, may repeat if needed in 15 mins; Therapy: 66WJZ0609 to (Evaluate:79Gxb3771); Last Rx:32Xlm6491 Ordered   4  Multi Vitamin Daily Oral Tablet; Therapy: (Recorded:08Mar2016) to Recorded   5  Oxycodone-Acetaminophen 5-325 MG Oral Tablet; TAKE 1 TABLET EVERY 6 HOURS; Therapy: 84BXS5660 to (Evaluate:61Bpc7469); Last Rx:08Kbm2807 Ordered   6  SEROquel 50 MG Oral Tablet (QUEtiapine Fumarate); Therapy: (Mikie Ye) to Recorded   7  Vitamin B Complex Oral Tablet; Therapy: (Recorded:08Mar2016) to Recorded    Allergies    1  No Known Drug Allergies    Signatures   Electronically signed by :  Virginia Ferrari, ; Mar 11 2016  1:54PM EST                       (Author)

## 2018-01-18 NOTE — PRE-PROCEDURE INSTRUCTIONS
Pre-Surgery Instructions:   Medication Instructions    atorvastatin (LIPITOR) 10 mg tablet Instructed patient per Anesthesia Guidelines   diclofenac sodium (VOLTAREN) 1 % Patient was instructed to contact Physician for medication instruction   escitalopram (LEXAPRO) 20 mg tablet Instructed patient per Anesthesia Guidelines   ibuprofen (MOTRIN) 200 mg tablet Patient was instructed by Physician and understands   Liraglutide (VICTOZA) 18 MG/3ML SOPN Instructed patient per Anesthesia Guidelines   lisinopril (ZESTRIL) 10 mg tablet Instructed patient per Anesthesia Guidelines   metFORMIN (GLUCOPHAGE-XR) 750 mg 24 hr tablet Instructed patient per Anesthesia Guidelines   pantoprazole (PROTONIX) 20 mg tablet Instructed patient per Anesthesia Guidelines   QUEtiapine (SEROquel) 25 mg tablet Instructed patient per Anesthesia Guidelines  Instructed to take pantoprazole and lexapro am of surgery with sip of water per anesthesia DR ORTEGA

## 2018-01-18 NOTE — RESULT NOTES
Message   Recorded as Task   Date: 03/30/2016 08:23 AM, Created By: Temo Chase   Task Name: Follow Up   Assigned To: [facility],Team   Regarding Patient: Susan Hubbard, Status: Active   CommentLucinda Bush - 30 Mar 6451 1:93 AM     TASK CREATED  Pt reports 60% improvement and would like to start PT, should she do that? Also advised pt that the BEL[employer] rep is trying to reach her because they have her belt ready  Gave patient the phone # to contact the rep - she said she would do so  Temo Chase - 30 Mar 5567 2:34 PM     TASK EDITED   Laurne Zamora - 30 Mar 2016 4:35 PM     TASK REPLIED TO: Previously Assigned To Lauren Zamora  She can return to PT  Temo Chase - 01 Apr 6665 0:49 AM     TASK EDITED  Pt advised          Signatures   Electronically signed by : Katie Hernandez, ; Apr 1 2016  8:42AM EST                       (Author)

## 2018-01-19 NOTE — PROGRESS NOTES
Assessment   1  Obesity (BMI 30 0-34 9) (278 00) (E66 9)   2  Hyperlipidemia (272 4) (E78 5)   3  Type 2 diabetes mellitus with diabetic nephropathy (250 40,583 81) (E11 21)   4  Gastroesophageal reflux disease (530 81) (K21 9)   5  Hepatic steatosis (571 8) (K76 0)    Discussion/Summary   Discussion Summary:    48 yo female morbidly obese found to be a good candidate to undergo a weight loss operation upon being enrolled here at the [facility]  has been pre certified to undergo a Laparoscopic Sleeve gastrectomy  today to review her pre op test results  been medically cleared for the procedure  have discussed with her at length the risks and benefits of the operation and reiterated the components of our multidisciplinary program and the importance of compliance and follow up in the post operative period  Although there is a great statistical chance of improvement or even resolution of most of her associated comorbidities, the results vary from patient to patient and they largely depend on her commitment  patient was also instructed with regards to the importance of behavior modification, nutritional counseling, support meeting attendance and lifestyle changes that are important to ensure success  was given the opportunity to ask questions and I have answered all of them   have addressed with the patient the level of CODE STATUS for this hospital stay and after explaining the different options currently she wishes to be a Level I  understands and wishes to proceed  has lost all the weight required prior to the surgery  Chief Complaint   Chief Complaint Free Text Note Form: Patient in office today for final H&P/sleeve  History of Present Illness   HPI: 49-year-old female with a long-standing history morbid obesity  was found to be a good candidate to undergo a bariatric operation upon [facility]    here today to discuss details of the surgery  Bariatric Surgery Pre-op History and Physical (Brief) [facility]: The patient is being seen for a pre-op history and physical visit for bariatric surgery  Symptoms:  inability to lose weight  Review of Systems   Complete-Female:      Constitutional: No fever, no chills, feels well, no tiredness, no recent weight gain or weight loss  Eyes: No complaints of eye pain, no red eyes, no eyesight problems, no discharge, no dry eyes, no itching of eyes  ENT: no complaints of earache, no loss of hearing, no nose bleeds, no nasal discharge, no sore throat, no hoarseness  Cardiovascular: No complaints of slow heart rate, no fast heart rate, no chest pain, no palpitations, no leg claudication, no lower extremity edema  Respiratory: No complaints of shortness of breath, no wheezing, no cough, no SOB on exertion, no orthopnea, no PND  Gastrointestinal: No complaints of abdominal pain, no constipation, no nausea or vomiting, no diarrhea, no bloody stools  Genitourinary: No complaints of dysuria, no incontinence, no pelvic pain, no dysmenorrhea, no vaginal discharge or bleeding  Musculoskeletal: No complaints of arthralgias, no myalgias, no joint swelling or stiffness, no limb pain or swelling  Integumentary: No complaints of skin rash or lesions, no itching, no skin wounds, no breast pain or lump  Neurological: No complaints of headache, no confusion, no convulsions, no numbness, no dizziness or fainting, no tingling, no limb weakness, no difficulty walking  Psychiatric: Not suicidal, no sleep disturbance, no anxiety or depression, no change in personality, no emotional problems  Endocrine: No complaints of proptosis, no hot flashes, no muscle weakness, no deepening of the voice, no feelings of weakness  Hematologic/Lymphatic: No complaints of swollen glands, no swollen glands in the neck, does not bleed easily, does not bruise easily      ROS Reviewed: ROS reviewed  Active Problems   1  Acute pelvic pain, female (625 9) (R10 2)   2  Amenorrhea, secondary (626 0) (N91 1)   3  Anxiety (300 00) (F41 9)   4  Cervical radiculopathy (723 4) (M54 12)   5  Depression (311) (F32 9)   6  Gastroesophageal reflux disease (530 81) (K21 9)   7  Hepatic steatosis (571 8) (K76 0)   8  Hyperlipidemia (272 4) (E78 5)   9  Irritable bowel syndrome with diarrhea (564 1) (K58 0)   10  Microscopic hematuria (599 72) (R31 29)   11  Obesity (BMI 30 0-34 9) (278 00) (E66 9)   12  Palpitation (785 1) (R00 2)   13  Pulmonary nodule seen on imaging study (793 11) (R91 1)   14  Right lumbar radiculopathy (724 4) (M54 16)   15  Thickened small bowel (569 89) (K63 9)   16  Type 2 diabetes mellitus with diabetic nephropathy (250 40,583 81) (E11 21)   17  Urinary incontinence (788 30) (R32)   18  Vitamin D deficiency (268 9) (E55 9)    Past Medical History   1  History of Carpal tunnel syndrome, right (354 0) (G56 01)   2  History of Community acquired pneumonia (5) (J18 9)   3  History of colon polyps (V12 72) (Z86 010)   4  History of gestational diabetes (V12 21) (Z86 32)   5  History of ileus (V12 79) (Z87 19)   6  History of Labial cyst (624 8) (N90 7)   7  History of Left ovarian cyst (620 2) (N83 202)   8  History of Myofascial pain (729 1) (M79 1)   9  History of Trochanteric bursitis of both hips (726 5) (M70 61,M70 62)   10  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) (G56 20)  Active Problems And Past Medical History Reviewed: The active problems and past medical history were reviewed and updated today  Surgical History   1  History of Biopsy Vulvar   2  History of  Section   3  History of Cholecystectomy   4  History of Endoscopic Removal Of Stone(S) From Biliary Tract   5  History of ERCP With Endoscopic Sphincterotomy   6  History of Gynecologic Services Thermal Endometrial Ablation   7  History of Hysteroscopy   8   History of Neuroplasty With Transposition Of Ulnar Nerve - At Elbow   9  History of Oral Surgery Tooth Extraction   10  History of Thorax Excision Of First Rib   11  History of Tonsillectomy With Adenoidectomy   12  History of Venous Ligation With Stripping  Surgical History Reviewed: The surgical history was reviewed and updated today  Family History   Mother    1  Family history of Diabetes Mellitus (V18 0)   2  Family history of Essential Hypercholesterolemia   3  Family history of malignant neoplasm of breast (V16 3) (Z80 3)  Father    4  Family history of Diabetes Mellitus (V18 0)   5  Family history of Essential Hypercholesterolemia   6  Family history of lung cancer (V16 1) (Z80 1)  Brother    7  Family history of Diabetes Mellitus (V18 0)   8  Family history of Essential Hypercholesterolemia   9  Family history of Hypertension (V17 49)  Unknown    10  Family history of neuropathy (V17 2) (Z82 0)  Family History    11  Family history of Alcoholism   12  Family history of Asthma (V17 5)   13  Family history of Back Pain   14  Family history of Cancer   15  Family history of Depression   16  Family history of Diabetes Mellitus (V18 0)   17  Family history of Essential Hypercholesterolemia   18  Family history of Heart Disease (V17 49)   19  Family history of Hypertension (V17 49)  Family History Reviewed: The family history was reviewed and updated today  Social History    · Being A Social Drinker   ·    · Education history   · Exercise habits   · Former smoker (V15 82) (E50 342)   · General equivalency diploma (GED)   · Denied: History of No drug use   · No preference on Caodaism beliefs   · Occupation  Social History Reviewed: The social history was reviewed and updated today  Current Meds    1  Atorvastatin Calcium 10 MG Oral Tablet; TAKE 1 TABLET AT BEDTIME; Therapy: 58NXY7147 to Danyel Hill)  Requested for: 01LLA8541; Last     Rx:04Jan2018 Ordered   2   BD Pen Needle Francesca U/F 32G X 4 MM Miscellaneous; Use as directed with Victozia     pens; Therapy: 49RLO3852 to (Last ID:[id number])  Requested for: 86NHG0927 Ordered   3  Escitalopram Oxalate 20 MG Oral Tablet; TAKE 1 TABLET DAILY  Requested for:     80CAG3259; Last Rx:2018 Ordered   4  Lisinopril 5 MG Oral Tablet; take 1 tablet by mouth daily; Therapy: 14XXL0839 to (Last Rx:2018)  Requested for: 41NTS3744 Ordered   5  MetFORMIN HCl  MG Oral Tablet Extended Release 24 Hour; TAKE 1 TABLET     DAILY AS DIRECTED; Therapy: 58QRX6713 to (Ashlee Mood)  Requested for: 87VUV4366; Last     R86QHZ0026 Ordered   6  OneTouch Delica Lancets Fine Miscellaneous; TEST ONCE DAILY; Therapy: 76Acm5401 to ()  Requested for: 25YJG1955; Last     Rx:2018 Ordered   7  OneTouch Ultra 2 w/Device Kit; USE AS DIRECTED; Therapy: 44Gfn4054 to (Last Rx:2017) Ordered   8  OneTouch UltraSoft Lancets Miscellaneous; TEST ONCE DAILY; Therapy: 33Ahl9669 to ()  Requested for: 07XEZ3705; Last     [id number] Ordered   9  Pantoprazole Sodium 40 MG Oral Tablet Delayed Release; ONE TABLET THIRTY     MINUTES BEFORE BREAKFAST DAILY; Therapy: 93FAS6509 to (Zulema Looney)  Requested for: 78POX9240; Last     Rx:2018 Ordered   10  QUEtiapine Fumarate 25 MG Oral Tablet; TAKE 1 TABLET AT BEDTIME  Requested for:      98EJQ1242; Last Rx:2018 Ordered   11  Victoza 18 MG/3ML Subcutaneous Solution Pen-injector; INJECT 1 2 MG DAILY; Therapy: 02BYX5586 to (Last IE:[id number])  Requested for: 64QSD7504 Ordered  Medication List Reviewed: The medication list was reviewed and updated today  Allergies   1   No Known Drug Allergies    Vitals   Vital Signs    Recorded: 75FKA3625 02:44PM   Temperature 98 2 F   Heart Rate 70   Respiration 18   Systolic 292   Diastolic 74   Height 5 ft 6 in   Weight 219 lb 8 0 oz   BMI Calculated 35 43   BSA Calculated 2 08     Physical Exam Constitutional      General appearance: No acute distress, well appearing and well nourished  well developed,-- appears healthy,-- morbidly obese,-- well hydrated-- and-- appearance reflects stated age  Eyes      Conjunctiva and lids: No swelling, erythema or discharge  Ears, Nose, Mouth, and Throat      External inspection of ears and nose: Normal        Oropharynx: Normal with no erythema, edema, exudate or lesions  Pulmonary      Respiratory effort: No increased work of breathing or signs of respiratory distress  Auscultation of lungs: Clear to auscultation  Cardiovascular      Auscultation of heart: Normal rate and rhythm, normal S1 and S2, without murmurs  Examination of extremities for edema and/or varicosities: Normal        Abdomen      Abdomen: Non-tender, no masses  The abdomen was obese  Bowel sounds were normal  Skin findings: a scar in the suprapubic area and in the mid-line   The abdomen was soft and nontender  The abdomen was normal to percussion  no CVA tenderness      Liver and spleen: No hepatomegaly or splenomegaly  Lymphatic      Palpation of lymph nodes in neck: No lymphadenopathy  Musculoskeletal      Gait and station: Normal        Digits and nails: Normal without clubbing or cyanosis  Inspection/palpation of joints, bones, and muscles: Normal        Skin      Skin and subcutaneous tissue: Normal without rashes or lesions  Neurologic      Sensation: No sensory loss  Psychiatric      Orientation to person, place, and time: Normal        Mood and affect: Normal           Health Management   History of colon polyps   COLONOSCOPY (GI, SURG); every 5 years; Last 46WAQ3383; Next Due: 68UFG2954; Active    Future Appointments      Date/Time Provider Specialty Site   02/16/2018 10:45 AM BIRDIE Beverly   General Surgery [facility]   02/07/2018 10:20 AM Anthony Trent DO Internal Medicine [facility]   02/12/2018 01:00 PM Hailee Howard, DO Internal Medicine [facility]     Signatures    Electronically signed by : BIRDIE Ann ; Jan 18 2018  3:06PM EST                       (Author)

## 2018-01-22 VITALS
OXYGEN SATURATION: 98 % | HEART RATE: 62 BPM | RESPIRATION RATE: 16 BRPM | HEIGHT: 66 IN | DIASTOLIC BLOOD PRESSURE: 60 MMHG | WEIGHT: 212 LBS | BODY MASS INDEX: 34.07 KG/M2 | SYSTOLIC BLOOD PRESSURE: 118 MMHG

## 2018-01-22 VITALS — BODY MASS INDEX: 35.73 KG/M2 | WEIGHT: 221.38 LBS

## 2018-01-22 VITALS — SYSTOLIC BLOOD PRESSURE: 114 MMHG | DIASTOLIC BLOOD PRESSURE: 64 MMHG

## 2018-01-23 VITALS
RESPIRATION RATE: 20 BRPM | HEIGHT: 66 IN | DIASTOLIC BLOOD PRESSURE: 60 MMHG | HEART RATE: 73 BPM | TEMPERATURE: 98.6 F | BODY MASS INDEX: 36.18 KG/M2 | WEIGHT: 225.13 LBS | OXYGEN SATURATION: 98 % | SYSTOLIC BLOOD PRESSURE: 100 MMHG

## 2018-01-23 VITALS
HEART RATE: 70 BPM | RESPIRATION RATE: 18 BRPM | DIASTOLIC BLOOD PRESSURE: 74 MMHG | HEIGHT: 66 IN | BODY MASS INDEX: 35.27 KG/M2 | SYSTOLIC BLOOD PRESSURE: 118 MMHG | TEMPERATURE: 98.2 F | WEIGHT: 219.5 LBS

## 2018-01-23 NOTE — RESULT NOTES
Dear Sandra Kaplan,   Your test results have returned and are listed below:      Plan  Hepatic steatosis, Hyperlipidemia, Obesity (BMI 30 0-34 9), Type 2  diabetes mellitus with diabetic nephropathy    · (1) CBC/ PLT (NO DIFF); Status:Active - Retrospective By Protocol  Authorization; Requested for:[id number];    · (1) COMPREHENSIVE METABOLIC PANEL; Status:Active -  Retrospective By Protocol Authorization; Requested for:[id number];     Discussion/Summary  We have reviewed your most recent bloodwork in your chart  Your Lipid Profile is elevated, which can increase your risk of cardiovascular disease  Please continue to follow up regularly with your primary care physician and take all medications as prescribed  Weight loss amd regular exercise can also help to improve this  Please also refer to chapter two in your bariatric manual for dietary information to help improve your cholesterol levels  Your Hemoglobin A1c (HgbA1c), which is a measure of your average blood sugar over a 3 month period, is elevated at 7 4%  Although this will not delay your surgery, elevated blood sugars can increase infection risk and delay wound healing  Please continue to follow up regularly with your primary care physician and take all medications as prescribed  Check your blood sugars regularly  Weight loss and regular physical activity can also help to improve blood sugars  Please also refer to chapter two in your bariatric manual for dietary information for control of blood sugars  Your Thyroid Stimulating Hormone (TSH), is elevated, which may indicate impaired thyroid function  However ,your free circulating thyroid hormone levels are normal   Please continue to follow up with your doctor on a regular basis for this as well  Enclosed is a lab slip to check your Complete Metabolic Panel (CMP), and Complete Blood Count (CBC), which are also required prior to surgery         If you have any questions, please don't hesitate to call the office     Sincerely,      Signatures   Electronically signed by : GUCCI Felipe RD; Dec 18 2017 11:33AM EST                       (Author)    Electronically signed by : BIRDIE Tanner ; Dec 21 2017 12:42PM EST                       (Validation)

## 2018-01-24 VITALS
BODY MASS INDEX: 34.96 KG/M2 | TEMPERATURE: 97.6 F | DIASTOLIC BLOOD PRESSURE: 82 MMHG | HEIGHT: 66 IN | HEART RATE: 72 BPM | RESPIRATION RATE: 18 BRPM | WEIGHT: 217.5 LBS | SYSTOLIC BLOOD PRESSURE: 118 MMHG

## 2018-02-01 ENCOUNTER — TELEPHONE (OUTPATIENT)
Dept: BARIATRICS | Facility: CLINIC | Age: 48
End: 2018-02-01

## 2018-02-02 DIAGNOSIS — Z78.9 DEEP VEIN THROMBOSIS (DVT) PROPHYLAXIS PRESCRIBED AT DISCHARGE: Primary | ICD-10-CM

## 2018-02-06 ENCOUNTER — HOSPITAL ENCOUNTER (INPATIENT)
Facility: HOSPITAL | Age: 48
LOS: 2 days | Discharge: HOME | DRG: 621 | End: 2018-02-08
Attending: SURGERY | Admitting: SURGERY
Payer: COMMERCIAL

## 2018-02-06 ENCOUNTER — ANESTHESIA (OUTPATIENT)
Dept: PERIOP | Facility: HOSPITAL | Age: 48
DRG: 621 | End: 2018-02-06
Payer: COMMERCIAL

## 2018-02-06 DIAGNOSIS — Z78.9 DEEP VEIN THROMBOSIS (DVT) PROPHYLAXIS PRESCRIBED AT DISCHARGE: ICD-10-CM

## 2018-02-06 DIAGNOSIS — E78.5 HYPERLIPIDEMIA: ICD-10-CM

## 2018-02-06 DIAGNOSIS — E11.21 TYPE 2 DIABETES MELLITUS WITH DIABETIC NEPHROPATHY (HCC): ICD-10-CM

## 2018-02-06 DIAGNOSIS — E66.01 MORBID OBESITY DUE TO EXCESS CALORIES (HCC): Primary | ICD-10-CM

## 2018-02-06 DIAGNOSIS — E66.9 OBESITY: ICD-10-CM

## 2018-02-06 DIAGNOSIS — K21.9 GASTRO-ESOPHAGEAL REFLUX DISEASE WITHOUT ESOPHAGITIS: ICD-10-CM

## 2018-02-06 LAB
EXT PREGNANCY TEST URINE: NEGATIVE
GLUCOSE SERPL-MCNC: 178 MG/DL (ref 65–140)
GLUCOSE SERPL-MCNC: 236 MG/DL (ref 65–140)
GLUCOSE SERPL-MCNC: 276 MG/DL (ref 65–140)

## 2018-02-06 PROCEDURE — C9113 INJ PANTOPRAZOLE SODIUM, VIA: HCPCS | Performed by: SURGERY

## 2018-02-06 PROCEDURE — 99253 IP/OBS CNSLTJ NEW/EST LOW 45: CPT | Performed by: PHYSICIAN ASSISTANT

## 2018-02-06 PROCEDURE — 0DJ08ZZ INSPECTION OF UPPER INTESTINAL TRACT, VIA NATURAL OR ARTIFICIAL OPENING ENDOSCOPIC: ICD-10-PCS | Performed by: SURGERY

## 2018-02-06 PROCEDURE — 43775 LAP SLEEVE GASTRECTOMY: CPT | Performed by: SURGERY

## 2018-02-06 PROCEDURE — 82948 REAGENT STRIP/BLOOD GLUCOSE: CPT

## 2018-02-06 PROCEDURE — 88307 TISSUE EXAM BY PATHOLOGIST: CPT | Performed by: PATHOLOGY

## 2018-02-06 PROCEDURE — 88307 TISSUE EXAM BY PATHOLOGIST: CPT | Performed by: SURGERY

## 2018-02-06 PROCEDURE — 81025 URINE PREGNANCY TEST: CPT | Performed by: ANESTHESIOLOGY

## 2018-02-06 PROCEDURE — 0DB64Z3 EXCISION OF STOMACH, PERCUTANEOUS ENDOSCOPIC APPROACH, VERTICAL: ICD-10-PCS | Performed by: SURGERY

## 2018-02-06 RX ORDER — MORPHINE SULFATE 4 MG/ML
4 INJECTION, SOLUTION INTRAMUSCULAR; INTRAVENOUS EVERY 2 HOUR PRN
Status: DISCONTINUED | OUTPATIENT
Start: 2018-02-06 | End: 2018-02-08 | Stop reason: HOSPADM

## 2018-02-06 RX ORDER — FENTANYL CITRATE 50 UG/ML
INJECTION, SOLUTION INTRAMUSCULAR; INTRAVENOUS AS NEEDED
Status: DISCONTINUED | OUTPATIENT
Start: 2018-02-06 | End: 2018-02-06 | Stop reason: SURG

## 2018-02-06 RX ORDER — PROPOFOL 10 MG/ML
INJECTION, EMULSION INTRAVENOUS AS NEEDED
Status: DISCONTINUED | OUTPATIENT
Start: 2018-02-06 | End: 2018-02-06 | Stop reason: SURG

## 2018-02-06 RX ORDER — FENTANYL CITRATE/PF 50 MCG/ML
50 SYRINGE (ML) INJECTION
Status: COMPLETED | OUTPATIENT
Start: 2018-02-06 | End: 2018-02-06

## 2018-02-06 RX ORDER — MORPHINE SULFATE 2 MG/ML
2 INJECTION, SOLUTION INTRAMUSCULAR; INTRAVENOUS EVERY 2 HOUR PRN
Status: DISCONTINUED | OUTPATIENT
Start: 2018-02-06 | End: 2018-02-08 | Stop reason: HOSPADM

## 2018-02-06 RX ORDER — ROCURONIUM BROMIDE 10 MG/ML
INJECTION, SOLUTION INTRAVENOUS AS NEEDED
Status: DISCONTINUED | OUTPATIENT
Start: 2018-02-06 | End: 2018-02-06 | Stop reason: SURG

## 2018-02-06 RX ORDER — GLYCOPYRROLATE 0.2 MG/ML
INJECTION INTRAMUSCULAR; INTRAVENOUS AS NEEDED
Status: DISCONTINUED | OUTPATIENT
Start: 2018-02-06 | End: 2018-02-06 | Stop reason: SURG

## 2018-02-06 RX ORDER — OXYCODONE HCL 5 MG/5 ML
10 SOLUTION, ORAL ORAL EVERY 4 HOURS PRN
Status: DISCONTINUED | OUTPATIENT
Start: 2018-02-06 | End: 2018-02-08 | Stop reason: HOSPADM

## 2018-02-06 RX ORDER — ACETAMINOPHEN 160 MG/5ML
320 SUSPENSION, ORAL (FINAL DOSE FORM) ORAL EVERY 4 HOURS PRN
Status: DISCONTINUED | OUTPATIENT
Start: 2018-02-06 | End: 2018-02-08 | Stop reason: HOSPADM

## 2018-02-06 RX ORDER — ONDANSETRON 2 MG/ML
4 INJECTION INTRAMUSCULAR; INTRAVENOUS ONCE AS NEEDED
Status: COMPLETED | OUTPATIENT
Start: 2018-02-06 | End: 2018-02-06

## 2018-02-06 RX ORDER — MIDAZOLAM HYDROCHLORIDE 1 MG/ML
INJECTION INTRAMUSCULAR; INTRAVENOUS AS NEEDED
Status: DISCONTINUED | OUTPATIENT
Start: 2018-02-06 | End: 2018-02-06 | Stop reason: SURG

## 2018-02-06 RX ORDER — SODIUM CHLORIDE, SODIUM LACTATE, POTASSIUM CHLORIDE, CALCIUM CHLORIDE 600; 310; 30; 20 MG/100ML; MG/100ML; MG/100ML; MG/100ML
125 INJECTION, SOLUTION INTRAVENOUS CONTINUOUS
Status: DISCONTINUED | OUTPATIENT
Start: 2018-02-06 | End: 2018-02-08 | Stop reason: HOSPADM

## 2018-02-06 RX ORDER — BUPIVACAINE HYDROCHLORIDE AND EPINEPHRINE 2.5; 5 MG/ML; UG/ML
INJECTION, SOLUTION EPIDURAL; INFILTRATION; INTRACAUDAL; PERINEURAL AS NEEDED
Status: DISCONTINUED | OUTPATIENT
Start: 2018-02-06 | End: 2018-02-06 | Stop reason: HOSPADM

## 2018-02-06 RX ORDER — SCOLOPAMINE TRANSDERMAL SYSTEM 1 MG/1
1 PATCH, EXTENDED RELEASE TRANSDERMAL ONCE AS NEEDED
Status: DISCONTINUED | OUTPATIENT
Start: 2018-02-06 | End: 2018-02-06

## 2018-02-06 RX ORDER — ONDANSETRON 2 MG/ML
INJECTION INTRAMUSCULAR; INTRAVENOUS AS NEEDED
Status: DISCONTINUED | OUTPATIENT
Start: 2018-02-06 | End: 2018-02-06 | Stop reason: SURG

## 2018-02-06 RX ORDER — ENALAPRILAT 2.5 MG/2ML
0.62 INJECTION INTRAVENOUS EVERY 6 HOURS PRN
Status: DISCONTINUED | OUTPATIENT
Start: 2018-02-06 | End: 2018-02-07

## 2018-02-06 RX ORDER — NALOXONE HYDROCHLORIDE 0.4 MG/ML
INJECTION, SOLUTION INTRAMUSCULAR; INTRAVENOUS; SUBCUTANEOUS AS NEEDED
Status: DISCONTINUED | OUTPATIENT
Start: 2018-02-06 | End: 2018-02-06 | Stop reason: SURG

## 2018-02-06 RX ORDER — PANTOPRAZOLE SODIUM 40 MG/1
40 INJECTION, POWDER, FOR SOLUTION INTRAVENOUS
Status: DISCONTINUED | OUTPATIENT
Start: 2018-02-06 | End: 2018-02-08 | Stop reason: HOSPADM

## 2018-02-06 RX ORDER — OXYCODONE HCL 5 MG/5 ML
5 SOLUTION, ORAL ORAL EVERY 4 HOURS PRN
Status: DISCONTINUED | OUTPATIENT
Start: 2018-02-06 | End: 2018-02-08 | Stop reason: HOSPADM

## 2018-02-06 RX ORDER — PROMETHAZINE HYDROCHLORIDE 25 MG/ML
25 INJECTION, SOLUTION INTRAMUSCULAR; INTRAVENOUS EVERY 4 HOURS PRN
Status: DISCONTINUED | OUTPATIENT
Start: 2018-02-06 | End: 2018-02-08 | Stop reason: HOSPADM

## 2018-02-06 RX ORDER — HYDROMORPHONE HYDROCHLORIDE 2 MG/ML
INJECTION, SOLUTION INTRAMUSCULAR; INTRAVENOUS; SUBCUTANEOUS AS NEEDED
Status: DISCONTINUED | OUTPATIENT
Start: 2018-02-06 | End: 2018-02-06 | Stop reason: SURG

## 2018-02-06 RX ORDER — METOCLOPRAMIDE HYDROCHLORIDE 5 MG/ML
INJECTION INTRAMUSCULAR; INTRAVENOUS AS NEEDED
Status: DISCONTINUED | OUTPATIENT
Start: 2018-02-06 | End: 2018-02-06 | Stop reason: SURG

## 2018-02-06 RX ORDER — ACETAMINOPHEN 160 MG/5ML
325 SUSPENSION, ORAL (FINAL DOSE FORM) ORAL EVERY 4 HOURS PRN
Status: DISCONTINUED | OUTPATIENT
Start: 2018-02-06 | End: 2018-02-08 | Stop reason: HOSPADM

## 2018-02-06 RX ORDER — ONDANSETRON 2 MG/ML
4 INJECTION INTRAMUSCULAR; INTRAVENOUS EVERY 4 HOURS PRN
Status: DISCONTINUED | OUTPATIENT
Start: 2018-02-06 | End: 2018-02-08 | Stop reason: HOSPADM

## 2018-02-06 RX ORDER — QUETIAPINE FUMARATE 25 MG/1
25 TABLET, FILM COATED ORAL
Status: DISCONTINUED | OUTPATIENT
Start: 2018-02-06 | End: 2018-02-08 | Stop reason: HOSPADM

## 2018-02-06 RX ORDER — SODIUM CHLORIDE 9 MG/ML
125 INJECTION, SOLUTION INTRAVENOUS CONTINUOUS
Status: DISCONTINUED | OUTPATIENT
Start: 2018-02-06 | End: 2018-02-06

## 2018-02-06 RX ADMIN — OXYCODONE HYDROCHLORIDE 10 MG: 5 SOLUTION ORAL at 22:47

## 2018-02-06 RX ADMIN — ACETAMINOPHEN 325 MG: 160 SUSPENSION ORAL at 18:35

## 2018-02-06 RX ADMIN — ONDANSETRON HYDROCHLORIDE 4 MG: 2 INJECTION, SOLUTION INTRAVENOUS at 10:09

## 2018-02-06 RX ADMIN — METOCLOPRAMIDE HYDROCHLORIDE 10 MG: 5 INJECTION INTRAMUSCULAR; INTRAVENOUS at 09:04

## 2018-02-06 RX ADMIN — MORPHINE SULFATE 4 MG: 4 INJECTION, SOLUTION INTRAMUSCULAR; INTRAVENOUS at 14:02

## 2018-02-06 RX ADMIN — TRIMETHOBENZAMIDE HYDROCHLORIDE 200 MG: 100 INJECTION INTRAMUSCULAR at 11:40

## 2018-02-06 RX ADMIN — PROPOFOL 200 MG: 10 INJECTION, EMULSION INTRAVENOUS at 08:47

## 2018-02-06 RX ADMIN — FENTANYL CITRATE 150 MCG: 50 INJECTION INTRAMUSCULAR; INTRAVENOUS at 08:47

## 2018-02-06 RX ADMIN — SODIUM CHLORIDE 125 ML/HR: 0.9 INJECTION, SOLUTION INTRAVENOUS at 07:02

## 2018-02-06 RX ADMIN — LIDOCAINE HYDROCHLORIDE 50 MG: 20 INJECTION, SOLUTION INTRAVENOUS at 08:47

## 2018-02-06 RX ADMIN — ACETAMINOPHEN 325 MG: 160 SUSPENSION ORAL at 22:46

## 2018-02-06 RX ADMIN — PANTOPRAZOLE SODIUM 40 MG: 40 INJECTION, POWDER, FOR SOLUTION INTRAVENOUS at 14:01

## 2018-02-06 RX ADMIN — FENTANYL CITRATE 50 MCG: 50 INJECTION, SOLUTION INTRAMUSCULAR; INTRAVENOUS at 11:00

## 2018-02-06 RX ADMIN — HYDROMORPHONE HYDROCHLORIDE 0.5 MG: 2 INJECTION, SOLUTION INTRAMUSCULAR; INTRAVENOUS; SUBCUTANEOUS at 09:34

## 2018-02-06 RX ADMIN — SCOPALAMINE 1 PATCH: 1 PATCH, EXTENDED RELEASE TRANSDERMAL at 07:04

## 2018-02-06 RX ADMIN — INSULIN LISPRO 4 UNITS: 100 INJECTION, SOLUTION INTRAVENOUS; SUBCUTANEOUS at 20:55

## 2018-02-06 RX ADMIN — NEOSTIGMINE METHYLSULFATE 5 MG: 1 INJECTION, SOLUTION INTRAMUSCULAR; INTRAVENOUS; SUBCUTANEOUS at 10:09

## 2018-02-06 RX ADMIN — FENTANYL CITRATE 50 MCG: 50 INJECTION, SOLUTION INTRAMUSCULAR; INTRAVENOUS at 10:52

## 2018-02-06 RX ADMIN — OXYCODONE HYDROCHLORIDE 10 MG: 5 SOLUTION ORAL at 18:35

## 2018-02-06 RX ADMIN — SODIUM CHLORIDE, SODIUM LACTATE, POTASSIUM CHLORIDE, AND CALCIUM CHLORIDE 125 ML/HR: .6; .31; .03; .02 INJECTION, SOLUTION INTRAVENOUS at 11:54

## 2018-02-06 RX ADMIN — QUETIAPINE FUMARATE 25 MG: 25 TABLET ORAL at 22:45

## 2018-02-06 RX ADMIN — NALOXONE HYDROCHLORIDE 0.05 MG: 0.4 INJECTION, SOLUTION INTRAMUSCULAR; INTRAVENOUS; SUBCUTANEOUS at 10:26

## 2018-02-06 RX ADMIN — ONDANSETRON 4 MG: 2 INJECTION INTRAMUSCULAR; INTRAVENOUS at 20:05

## 2018-02-06 RX ADMIN — SODIUM CHLORIDE, SODIUM LACTATE, POTASSIUM CHLORIDE, AND CALCIUM CHLORIDE 125 ML/HR: .6; .31; .03; .02 INJECTION, SOLUTION INTRAVENOUS at 20:04

## 2018-02-06 RX ADMIN — NALOXONE HYDROCHLORIDE 0.1 MG: 0.4 INJECTION, SOLUTION INTRAMUSCULAR; INTRAVENOUS; SUBCUTANEOUS at 10:34

## 2018-02-06 RX ADMIN — FENTANYL CITRATE 50 MCG: 50 INJECTION INTRAMUSCULAR; INTRAVENOUS at 09:33

## 2018-02-06 RX ADMIN — HYDROMORPHONE HYDROCHLORIDE 0.5 MG: 2 INJECTION, SOLUTION INTRAMUSCULAR; INTRAVENOUS; SUBCUTANEOUS at 09:27

## 2018-02-06 RX ADMIN — ROCURONIUM BROMIDE 20 MG: 10 INJECTION INTRAVENOUS at 09:33

## 2018-02-06 RX ADMIN — SODIUM CHLORIDE: 0.9 INJECTION, SOLUTION INTRAVENOUS at 09:57

## 2018-02-06 RX ADMIN — MIDAZOLAM HYDROCHLORIDE 2 MG: 1 INJECTION, SOLUTION INTRAMUSCULAR; INTRAVENOUS at 08:38

## 2018-02-06 RX ADMIN — ONDANSETRON 4 MG: 2 INJECTION INTRAMUSCULAR; INTRAVENOUS at 14:02

## 2018-02-06 RX ADMIN — HYDROMORPHONE HYDROCHLORIDE 0.5 MG: 2 INJECTION, SOLUTION INTRAMUSCULAR; INTRAVENOUS; SUBCUTANEOUS at 09:58

## 2018-02-06 RX ADMIN — CEFAZOLIN SODIUM 2000 MG: 2 SOLUTION INTRAVENOUS at 08:54

## 2018-02-06 RX ADMIN — ROCURONIUM BROMIDE 50 MG: 10 INJECTION INTRAVENOUS at 08:48

## 2018-02-06 RX ADMIN — GLYCOPYRROLATE 0.8 MG: 0.2 INJECTION, SOLUTION INTRAMUSCULAR; INTRAVENOUS at 10:09

## 2018-02-06 RX ADMIN — ONDANSETRON 4 MG: 2 INJECTION INTRAMUSCULAR; INTRAVENOUS at 11:12

## 2018-02-06 RX ADMIN — HYDROMORPHONE HYDROCHLORIDE 0.5 MG: 2 INJECTION, SOLUTION INTRAMUSCULAR; INTRAVENOUS; SUBCUTANEOUS at 09:21

## 2018-02-06 RX ADMIN — ENOXAPARIN SODIUM 40 MG: 40 INJECTION SUBCUTANEOUS at 08:07

## 2018-02-06 NOTE — ADDENDUM NOTE
Addendum  created 02/06/18 1147 by Newman Cranker, CRNA    Anesthesia Intra LDAs edited, LDA properties accepted

## 2018-02-06 NOTE — H&P
Patient seen and examined by me  H&P updated  Original H&P on paper in chart      Georgia Ramirez MD  2/6/2018  8:36 AM

## 2018-02-06 NOTE — PLAN OF CARE
DISCHARGE PLANNING     Discharge to home or other facility with appropriate resources Progressing        GASTROINTESTINAL - ADULT     Minimal or absence of nausea and/or vomiting Progressing        INFECTION - ADULT     Absence or prevention of progression during hospitalization Progressing     Absence of fever/infection during neutropenic period Progressing        Knowledge Deficit     Patient/family/caregiver demonstrates understanding of disease process, treatment plan, medications, and discharge instructions Progressing        PAIN - ADULT     Verbalizes/displays adequate comfort level or baseline comfort level Progressing        Potential for Falls     Patient will remain free of falls Progressing        SAFETY ADULT     Maintain or return to baseline ADL function Progressing     Maintain or return mobility status to optimal level Progressing     Patient will remain free of falls Progressing

## 2018-02-06 NOTE — OP NOTE
[facility]   [address] [facility], [address]  [phone] (Fax)      Operative Report  GASTRECTOMY SLEEVE LAPAROSCOPIC; INTRAOPERATIVE EGD      Patient Name: Amanda Gabriel    :  1970  MRN: [id number]  Patient Location: [facility] OR ROOM 07  Surgery Date : 2018  Surgeons:  Surgeon(s) and Role:     * Jake Bo MD - Primary     * Lillie Ang MD - Assistant    Diagnosis:    Pre-Op Diagnosis Codes:  Obesity [E66 9]  BMI 35  Type 2 diabetes mellitus with diabetic nephropathy ([facility] [address] ) [E11 21]  Gastro-esophageal reflux disease without esophagitis [K21 9]  Hyperlipidemia [E78 5]    Post-Op Diagnosis Codes:     * Obesity [E66 9]     * BMI 35     * Type 2 diabetes mellitus with diabetic nephropathy ([facility] [address] ) [E11 21]     * Gastro-esophageal reflux disease without esophagitis [K21 9]     * Hyperlipidemia [E78 5]    Procedure  1  Laparoscopic Sleeve Gastrectomy  2  Intraoperative Endoscopy    Specimen(s):  ID Type Source Tests Collected by Time Destination   1 : Portion of stomach Tissue Stomach TISSUE EXAM Jake Bo MD 2018 0940        Estimated Blood Loss:    Minimal   NG/OG/Enteral Tube Orogastric  Center mouth (Active)   Site Assessment Clean; Intact 2018  9:37 AM   Status Suction-low continuous 2018  9:37 AM   Drainage Appearance Bile;Clear; Thin 2018  9:37 AM   Output (mL) 50 mL 2018  9:37 AM   Number of days: 0       Anesthesia Type:     General    Operative Indications:    Obesity [E66 9]  BMI 35  Type 2 diabetes mellitus with diabetic nephropathy ([facility] [address] ) [E11 21]  Gastro-esophageal reflux disease without esophagitis [K21 9]  Hyperlipidemia [E78 5]      Operative Findings:    Intra abdominal adhesions  Small umbilical incisional hernia    Complications:     None    Procedure and Technique:    INDICATION    Amanda Gabriel is a 49 y o  female with a Body mass index is 34 11 kg/m²   and a long standing history of morbid obesity and inability to lose a significant amount of weight on its own  This patient was found to be a good candidate to undergo a bariatric procedure upon being enrolled here at the [address] [place] [address]  OPERATIVE TECHNIQUE    The patient was taken to the operating room and placed in a supine position  A dose of IV antibiotic prophylaxis that consisted of Ancef 2g was given  Also 40 mg of subcutaneous Enoxaparin to prevent deep vein thrombosis were administered  Sequential compression devices were placed on both lower extremities  After satisfactory general anesthesia induction and endotracheal intubation was achieved, the extremities were placed and properly secured to prevent neuromuscular damage as best as possible  Subsequently, the abdominal wall was prepped and draped in a surgical standard sterile fashion  After a timeout was done and the patient was properly identified and the type of procedure was confirmed a supra-umbilical transverse skin incision was made and the subcutaneous tissues dissected  Access to the peritoneal cavity was gained with the Visiport trocar  With this device, we were able to visualize the layers of the abdominal wall, and enter the peritoneal cavity under direct visualization  Pneumoperitoneum was then established with CO2 insufflation  Under direct laparoscopic visualization, four additional trocars were placed: a 12 mm in the right upper quadrant subcostal position in the anterior axillary line, a 12-mm port was placed in the right flank midclavicular line, a 12-mm port was placed in the left upper quadrant subcostal position in the midclavicular line and another 12-mm port was placed in the left quadrant anterior axillary line lateral to the supraumbilical port  The [facility] liver retractor was placed in the subxiphoid position through the use of a 5-mm trocar incision    Multiple dense adhesions were and countered involving the omentum to the anterior abdominal wall was a her previous surgeries  Some of these adhesions were taken down over the right flank in order to get access for the sleeve gastrectomy  We found a small umbilical incisional hernia flanked by omental adhesions inferiorly  The patient was repositioned to a reverse Trendelenburg position  With the trocars in place, the dissection was begun  We divided the gastrocolic ligament with the energy device to enter the lesser sac  We continued to divide this ligament along the greater curvature of the stomach towards the angle of His  Special care was taken while dividing the short gastric vessels close to the spleen  This process was completely hemostatic  We then turned to the creation of an elongated and thin gastric pouch  A 36 Ivorian calibration tube was placed by the anesthesia staff into the stomach under our laparoscopic surveillance  Once the tip of the bougie was confirmed to be next to the pylorus, serial firings of the laparoscopic stapler with 60-mm cartridges were utilized  We started in a point inferior to the incisura angularis and the Crows foot nerve looking to preserve the gastric emptying  This was 5 to 6 centimeters proximal to the pylorus  The staple lines were reinforced with buttressing material  We created a pouch based on the lesser curve, and in vertical orientation  We continued the vertical serial firings of the stapler to the angle of His gently cinching the bougie with our laparoscopic stapler looking to create a thin pouch  As we approached the fundus of the stomach and the angle of His, the stapler loads were changed appropriately according to the variable thickness of the tissue  This completely  the pouch from the remnant stomach  We then turned our attention to the newly created pouch and examined it for bleeding or obvious defects on the staple lines and none were found      The distal stomach pouch was occluded with a Daniella clamp, and an EGD as well as an air insufflation test was performed  Neither intraoperative bleeding nor leaks were detected  The periumbilical trocar site was dilated and the gastric remnant was externalized through it and passed off the surgical field to be sent to pathology  I then covered the staple line with a tongue of omentum in a Jax patch fashion and secured it in place with a single 2/0 Vicryl stitch  The sponge, needle and instrument count was reported complete  The previously dilated trocar site was then closed with use of a suture closure device and a figure-of-eight with absorbable suture  The liver retractor and the remainder ports were then removed under direct laparoscopic visualization and no back bleeding was noted  The skin incisions were all closed with 4-0 absorbable subcuticular suture  The patient tolerated the procedure well, was extubated uneventfully and was transferred to the recovery room in stable condition  I was present for the entire length of the procedure as the attending of record  No qualified resident was available to assist   The presence of an assistant was necessary for camera holding, traction and counter traction and for help with suturing and stapling in addition to performing the intraop-EGD        Patient Disposition:    PACU     Signature: Aisha Gipson MD  Date: February 6, 2018  Time: 10:11 AM

## 2018-02-06 NOTE — CASE MANAGEMENT
Initial Clinical Review    Age/Sex: 49 y o  female    Surgery Date:   2/6/2018    Procedure:    Pre-Op Diagnosis Codes:  Obesity [E66 9]  BMI 35  Type 2 diabetes mellitus with diabetic nephropathy ([address] ) [E11 21]  Gastro-esophageal reflux disease without esophagitis [K21 9]  Hyperlipidemia [E78 5]     Post-Op Diagnosis Codes:     * Obesity [E66 9]     * BMI 35     * Type 2 diabetes mellitus with diabetic nephropathy (HCC) [E11 21]     * Gastro-esophageal reflux disease without esophagitis [K21 9]     * Hyperlipidemia [E78 5]     Procedure  1  Laparoscopic Sleeve Gastrectomy  Intraoperative Endoscopy    Anesthesia:   general    Admission Orders: Date/Time/Statement: 2/6/18 @ 1016     Orders Placed This Encounter   Procedures    Inpatient Admission     Standing Status:   Standing     Number of Occurrences:   1     Order Specific Question:   Admitting Physician     Answer:   Yue Levi     Order Specific Question:   Level of Care     Answer:   Med Surg [16]     Order Specific Question:   Bed Type     Answer:   Bariatric [1]     Order Specific Question:   Estimated length of stay     Answer:   One Midnight       Vital Signs: /51   Pulse 80   Temp 98 °F (36 7 °C)   Resp 18   Ht 5' 7" (1 702 m)   Wt 98 8 kg (217 lb 12 8 oz)   LMP 11/18/2017   SpO2 93%   Breastfeeding? No   BMI 34 11 kg/m²     Diet:        Diet Orders            Start     Ordered    02/06/18 1800  Diet Bariatric; Bariatric Clear Liquid  Diet effective now     Question Answer Comment   Diet Type Bariatric    Bariatric Bariatric Clear Liquid    RD to adjust diet per protocol? No        02/06/18 1226    02/06/18 1225  Room Service  Once     Question:  Type of Service  Answer:  Room Service-Appropriate    02/06/18 1224          Mobility:    As james    DVT Prophylaxis:   SCD'S    Pain Control:   Pain Medications             escitalopram (LEXAPRO) 20 mg tablet Take 20 mg by mouth daily      QUEtiapine (SEROquel) 25 mg tablet Take 25 mg by mouth daily at bedtime    ibuprofen (MOTRIN) 200 mg tablet Take 200-800 mg by mouth every 6 (six) hours as needed for mild pain        bariatric cl liq  Diet  Tele  Cons  IM    Thank you,  [address] [facility] in the [facility] by [address] for 2017  Network Utilization Review Department  Phone: [phone]; Fax [phone]  ATTENTION: The Network Utilization Review Department is now centralized for our 7 Facilities  Make a note that we have a new phone and fax numbers for our Department  Please call with any questions or concerns to [phone] and carefully follow the prompts so that you are directed to the right person  All voicemails are confidential  Fax any determinations, approvals, denials, and requests for initial or continue stay review clinical to [phone]  Due to HIGH CALL volume, it would be easier if you could please send faxed requests to expedite your requests and in part, help us provide discharge notifications faster

## 2018-02-07 LAB
ANION GAP SERPL CALCULATED.3IONS-SCNC: 9 MMOL/L (ref 4–13)
BUN SERPL-MCNC: 8 MG/DL (ref 5–25)
CALCIUM SERPL-MCNC: 8.5 MG/DL (ref 8.3–10.1)
CHLORIDE SERPL-SCNC: 101 MMOL/L (ref 100–108)
CO2 SERPL-SCNC: 28 MMOL/L (ref 21–32)
CREAT SERPL-MCNC: 0.78 MG/DL (ref 0.6–1.3)
ERYTHROCYTE [DISTWIDTH] IN BLOOD BY AUTOMATED COUNT: 13.3 % (ref 11.6–15.1)
GFR SERPL CREATININE-BSD FRML MDRD: 90 ML/MIN/1.73SQ M
GLUCOSE SERPL-MCNC: 175 MG/DL (ref 65–140)
GLUCOSE SERPL-MCNC: 175 MG/DL (ref 65–140)
GLUCOSE SERPL-MCNC: 194 MG/DL (ref 65–140)
GLUCOSE SERPL-MCNC: 204 MG/DL (ref 65–140)
GLUCOSE SERPL-MCNC: 207 MG/DL (ref 65–140)
GLUCOSE SERPL-MCNC: 217 MG/DL (ref 65–140)
HCT VFR BLD AUTO: 38.4 % (ref 34.8–46.1)
HGB BLD-MCNC: 12.6 G/DL (ref 11.5–15.4)
MCH RBC QN AUTO: 30.4 PG (ref 26.8–34.3)
MCHC RBC AUTO-ENTMCNC: 32.8 G/DL (ref 31.4–37.4)
MCV RBC AUTO: 93 FL (ref 82–98)
PLATELET # BLD AUTO: 207 THOUSANDS/UL (ref 149–390)
PMV BLD AUTO: 10.4 FL (ref 8.9–12.7)
POTASSIUM SERPL-SCNC: 3.5 MMOL/L (ref 3.5–5.3)
RBC # BLD AUTO: 4.14 MILLION/UL (ref 3.81–5.12)
SODIUM SERPL-SCNC: 138 MMOL/L (ref 136–145)
WBC # BLD AUTO: 13.07 THOUSAND/UL (ref 4.31–10.16)

## 2018-02-07 PROCEDURE — 82948 REAGENT STRIP/BLOOD GLUCOSE: CPT

## 2018-02-07 PROCEDURE — 80048 BASIC METABOLIC PNL TOTAL CA: CPT | Performed by: SURGERY

## 2018-02-07 PROCEDURE — 99024 POSTOP FOLLOW-UP VISIT: CPT | Performed by: SURGERY

## 2018-02-07 PROCEDURE — C9113 INJ PANTOPRAZOLE SODIUM, VIA: HCPCS | Performed by: SURGERY

## 2018-02-07 PROCEDURE — 85027 COMPLETE CBC AUTOMATED: CPT | Performed by: SURGERY

## 2018-02-07 PROCEDURE — 99232 SBSQ HOSP IP/OBS MODERATE 35: CPT | Performed by: INTERNAL MEDICINE

## 2018-02-07 RX ORDER — DEXAMETHASONE SODIUM PHOSPHATE 4 MG/ML
4 INJECTION, SOLUTION INTRA-ARTICULAR; INTRALESIONAL; INTRAMUSCULAR; INTRAVENOUS; SOFT TISSUE ONCE
Status: COMPLETED | OUTPATIENT
Start: 2018-02-07 | End: 2018-02-07

## 2018-02-07 RX ORDER — LANCETS
EACH MISCELLANEOUS DAILY
COMMUNITY
Start: 2017-04-24 | End: 2018-04-20 | Stop reason: CLARIF

## 2018-02-07 RX ADMIN — OXYCODONE HYDROCHLORIDE 10 MG: 5 SOLUTION ORAL at 16:39

## 2018-02-07 RX ADMIN — OXYCODONE HYDROCHLORIDE 10 MG: 5 SOLUTION ORAL at 09:56

## 2018-02-07 RX ADMIN — INSULIN LISPRO 2 UNITS: 100 INJECTION, SOLUTION INTRAVENOUS; SUBCUTANEOUS at 17:20

## 2018-02-07 RX ADMIN — INSULIN LISPRO 1 UNITS: 100 INJECTION, SOLUTION INTRAVENOUS; SUBCUTANEOUS at 06:18

## 2018-02-07 RX ADMIN — ACETAMINOPHEN 325 MG: 160 SUSPENSION ORAL at 09:56

## 2018-02-07 RX ADMIN — SODIUM CHLORIDE, SODIUM LACTATE, POTASSIUM CHLORIDE, AND CALCIUM CHLORIDE 125 ML/HR: .6; .31; .03; .02 INJECTION, SOLUTION INTRAVENOUS at 16:55

## 2018-02-07 RX ADMIN — ONDANSETRON 4 MG: 2 INJECTION INTRAMUSCULAR; INTRAVENOUS at 09:56

## 2018-02-07 RX ADMIN — SODIUM CHLORIDE, SODIUM LACTATE, POTASSIUM CHLORIDE, AND CALCIUM CHLORIDE 125 ML/HR: .6; .31; .03; .02 INJECTION, SOLUTION INTRAVENOUS at 12:44

## 2018-02-07 RX ADMIN — OXYCODONE HYDROCHLORIDE 10 MG: 5 SOLUTION ORAL at 05:20

## 2018-02-07 RX ADMIN — OXYCODONE HYDROCHLORIDE 10 MG: 5 SOLUTION ORAL at 21:35

## 2018-02-07 RX ADMIN — ENOXAPARIN SODIUM 40 MG: 40 INJECTION SUBCUTANEOUS at 09:51

## 2018-02-07 RX ADMIN — ACETAMINOPHEN 325 MG: 160 SUSPENSION ORAL at 21:35

## 2018-02-07 RX ADMIN — ACETAMINOPHEN 325 MG: 160 SUSPENSION ORAL at 16:39

## 2018-02-07 RX ADMIN — ACETAMINOPHEN 325 MG: 160 SUSPENSION ORAL at 05:21

## 2018-02-07 RX ADMIN — DEXAMETHASONE SODIUM PHOSPHATE 4 MG: 4 INJECTION, SOLUTION INTRAMUSCULAR; INTRAVENOUS at 09:50

## 2018-02-07 RX ADMIN — SODIUM CHLORIDE, SODIUM LACTATE, POTASSIUM CHLORIDE, AND CALCIUM CHLORIDE 125 ML/HR: .6; .31; .03; .02 INJECTION, SOLUTION INTRAVENOUS at 04:50

## 2018-02-07 RX ADMIN — QUETIAPINE FUMARATE 25 MG: 25 TABLET ORAL at 21:35

## 2018-02-07 RX ADMIN — PANTOPRAZOLE SODIUM 40 MG: 40 INJECTION, POWDER, FOR SOLUTION INTRAVENOUS at 09:51

## 2018-02-07 RX ADMIN — INSULIN LISPRO 2 UNITS: 100 INJECTION, SOLUTION INTRAVENOUS; SUBCUTANEOUS at 12:31

## 2018-02-07 RX ADMIN — INSULIN LISPRO 1 UNITS: 100 INJECTION, SOLUTION INTRAVENOUS; SUBCUTANEOUS at 01:20

## 2018-02-07 NOTE — PROGRESS NOTES
Progress Note - Carina Delgado 49 y o  female MRN: [id number]    Unit/Bed#: E5 -01 Encounter: [id number]      Subjective: The patient is having quite a bit of pain  She is nauseous at times  She has not been taking liquids very well  She has been able to walk  She has no chest pain or shortness of breath  Physical Exam:   Temp:  [96 6 °F (35 9 °C)-100 1 °F (37 8 °C)] 100 1 °F (37 8 °C)  HR:  [66-92] 74  Resp:  [12-21] 20  BP: (104-165)/(51-67) 113/59    Gen:  Well-developed, obese, in no distress  Neck:  Supple  No lymphadenopathy, goiter, or bruit  Heart:  Regular rhythm  No murmur, gallop, or rub  Lungs:  Clear to auscultation and percussion  No wheezing, rales, or rhonchi   Abd:  Soft with active bowel sounds  Typical postoperative tenderness is noted  There is no mass or organomegaly  Extremities:  No clubbing, cyanosis, or edema  No calf tenderness  Neuro:  Alert and oriented  No focal sign  Skin:  Warm and dry      LABS:   CBC:   Lab Results   Component Value Date    WBC 13 07 (H) 02/07/2018    HGB 12 6 02/07/2018    HCT 38 4 02/07/2018    MCV 93 02/07/2018     02/07/2018    MCH 30 4 02/07/2018    MCHC 32 8 02/07/2018    RDW 13 3 02/07/2018    MPV 10 4 02/07/2018   , CMP:   Lab Results   Component Value Date     02/07/2018    K 3 5 02/07/2018     02/07/2018    CO2 28 02/07/2018    ANIONGAP 9 02/07/2018    BUN 8 02/07/2018    CREATININE 0 78 02/07/2018    GLUCOSE 204 (H) 02/07/2018    CALCIUM 8 5 02/07/2018    EGFR 90 02/07/2018       Assessment/Plan:  1  Obesity, status post sleeve gastrectomy  2  Type 2 diabetes  3  Hypertension  4  Hyperlipidemia  5  Peptic steatosis  6  History of depression  7  Gastroesophageal reflux    Overall, the patient is doing fairly well  Her blood pressure is stable  Her diabetic control is satisfactory  She will continue IV fluid for now until her oral intake is better    I would anticipate her going home off of diabetic medications      VTE Pharmacologic Prophylaxis: Enoxaparin (Lovenox)  VTE Mechanical Prophylaxis: sequential compression device

## 2018-02-07 NOTE — PROGRESS NOTES
Bariatric Surgery Progress Note    Subjective  No adverse events  Advancing, ambulation, spirometry  Pain/nausea control fair   Still unable to tolerate much PO due to pain/nausea    Objective  Vitals:    02/07/18 0700   BP: 113/59   Pulse: 74   Resp: 20   Temp: 100 1 °F (37 8 °C)   SpO2: 94%     NAD, alert  Normal inspiratory effort  Abdomen soft, non-distended, appropriately tender  Incisions c/d/i    Labs  BMP unremarkable  H/H [de-identified]    Assessment  47F POD 1 s/p lap sleeve gastrectomy    Plan  - stop tele/pulse ox  - encourage incentive spirometry, ambulation, clear liquids  - decadron 4 mg IV x1 for nausea  - will keep inpatient pending improved PO intake and pain/nausea control

## 2018-02-07 NOTE — CONSULTS
Inpatient Medical Consultation - [address] [facility]    Patient Information: Claire Huang 49 y o  female MRN: [id number]  Unit/Bed#: E5 -01 Encounter: [id number]  PCP: Alexander Gonzales DO  Date of Admission:  2/6/2018  Date of Consultation: 02/06/18  Requesting Physician: Renetta Mendoza MD    Reason For Consultation:   Obesity, DM II, GERD    Assessment/Plan:    · Morbid obesity   S/p lap sleeve gastrectomy w/robotics POD #0   Pain controlled w/ analgesics, nause w/o vomiting   Continue ppi, lovenox, bariatric diet per 1* team   Encouraged IS    DM II    NIDDM recent hgb a1c approx 7 5% by review of OP records   W/hyperglycemia, start SSI and POC BS checks   Recommend d/c'ing metformin on d/c Will need to restart victoza upon d/c will need to f/u with PCP for additional pharmacotherapy as indicated    HTN   Pt reports BP typically in low 100s on lisinopril which she takes for renal protective status   Recommend holding for at least next 48H postoperatively or until cleared by surgery for restarting    HLD   W/hypertriglyceridemia on lipitor 10mg   No hx of cad/cva/pad   Recommend OP f/u with PCP for lipid panel in 3-6 mo and titration as needed per [place] Risk Factor     Depression   Continue seroquel/lexapro   F/u with PCP and titrate as needed    GERD   Continue PPI    Lightheadedness   resolved   Suspect 2* hyperglycemia, no arrhythmias VS stable   Continue to monitor     VTE Prophylaxis: Enoxaparin (Lovenox)  / reason for no mechanical VTE prophylaxis  per 1* team     Recommendations for Discharge:  ·     Counseling / Coordination of Care Time: 30 minutes  Greater than 50% of total time spent on patient counseling and coordination of care  Collaboration of Care: Were Recommendations Directly Discussed with Primary Treatment Team? - No     History of Present Illness:    Claire Huang is a 49 y o  female who is originally admitted to the weight mgmt service on 2/6/2018 due to morbid obesity  We are consulted for morbid obesity/dm/hld  Patient has been dealing with her morbid obesity for some time  She is followed with weight management and try conservative measures such as weight loss and exercise with minimal improvement  She was found to be candidate given her comorbidities for bariatric surgery and is now status post laparoscopic back sleeve gastrectomy performed assist with robotic  Patient is postop day number 0 per she reports that her pain is well controlled with her analgesics  She has nausea but no vomiting  She reports some mild substernal and upper epigastric pain but reports that I do not think to heart attack   She says is mildly improved with burping although she is not passing much gas  She has no shortness of breath, currently has no lightheadedness or dizziness but was feeling lightheaded when ambulating earlier  Instructed patient's comorbidities she has diabetes and has never been on insulin  She is on Victoza and metformin her last hemoglobin A1c was approximately 7 5%  She checks her blood sugars once daily and reports that they are anywhere from upper 100s to to upper 200s max and 300  She does not attribute this to her diet  She reports that she is on lisinopril not because of hypertension but because of its renal protective status given her diabetes  She is also on Lipitor because of her hypertriglyceridemia as well as my LDL is always up and down   She denies any history of CAD, CVA, peripheral arterial disease  Review of Systems:    Review of Systems   Constitutional: Negative for chills and fever  Respiratory: Negative for shortness of breath  Cardiovascular: Positive for chest pain  Gastrointestinal: Positive for abdominal pain and nausea  Negative for vomiting  Neurological: Positive for light-headedness  All other systems reviewed and are negative        Past Medical and Surgical History:     Past Medical History:   Diagnosis Date    Anxiety     Bulging lumbar disc     Carpal tunnel syndrome     RIGHT  LAST ASSESSED: 16    Chronic back pain     low    Colon polyps     Depression     Diabetes mellitus ([facility] [address] )     on victoza    GERD (gastroesophageal reflux disease)     Gestational diabetes     Hearing loss     left ear    Hyperlipidemia     IBS (irritable bowel syndrome)     Ileus ([facility] [address] )     LAST ASSESSED: 8/3/17    Labial cyst     LAST ASSESSED: 16    Myofascial pain     LAST ASSESSED: 16    Obesity     Ovarian cyst     LEFT  LAST ASSESSED: 16    Seasonal allergies     Thoracic outlet syndrome     2010    Trochanteric bursitis of both hips     LAST ASSESSED: 3/18/16    Ulnar neuropathy at elbow     Wears glasses     Wears glasses        Past Surgical History:   Procedure Laterality Date    BILE DUCT EXPLORATION      ENDOSCOPIC REMOVAL OF STONES FROM BILIARY TRACT     SECTION      x3    CHOLECYSTECTOMY      COLONOSCOPY      DILATION AND CURETTAGE OF UTERUS      ENDOMETRIAL ABLATION      ERCP W/ SPHICTEROTOMY      FIRST RIB REMOVAL      FIRST RIB REMOVAL      THORAX EXCISION OF FIRST RIB    HYSTEROSCOPY      NEUROPLASTY / TRANSPOSITION ULNAR NERVE AT ELBOW      NC COLONOSCOPY FLX DX W/COLLJ SPEC WHEN PFRMD N/A 2017    Procedure: COLONOSCOPY;  Surgeon: Louie Atkinson MD;  [facility]; Service: Gastroenterology    NC ESOPHAGOGASTRODUODENOSCOPY TRANSORAL DIAGNOSTIC N/A 2017    Procedure: ESOPHAGOGASTRODUODENOSCOPY (EGD); Surgeon: Davian Bey MD;  Location: [facility];   Service: Gastroenterology    NC HYSTEROSCOPY,W/ENDO BX N/A 10/20/2017    Procedure: DILATATION AND CURETTAGE (D&C) WITH HYSTEROSCOPY  REMOVAL VULVAR RT  LESION;  Surgeon: Parris Solitario MD;  [facility]: [facility];  Service: Gynecology    TONSILLECTOMY AND ADENOIDECTOMY      TUBAL LIGATION      ULNAR NERVE REPAIR Right     VEIN LIGATION AND STRIPPING Right     VULVA SURGERY  10/20/2017    BIOPSY    GRIFFIN TOOTH EXTRACTION         Meds/Allergies:    all medications and allergies reviewed    Allergies: No Known Allergies    Social History:     Marital Status:     Substance Use History:   History   Alcohol Use    0 6 oz/week    1 Glasses of wine per week     Comment:  1x month     History   Smoking Status    Former Smoker    Quit date: 4/30/2017   Smokeless Tobacco    Never Used     History   Drug Use No       Family History:    Family History   Problem Relation Age of Onset    Diabetes Mother     Other Mother      HYPERCHOLESTEROLEMIA    Diabetes Father     Other Father      HYPERCHOLESTEROLEMIA    Hypertension Brother     Diabetes Brother     Other Brother      HYPERCHOLESTEROLEMIA    Alcohol abuse Family     Asthma Family     Other Family      BACK PAIN    Cancer Family     Depression Family     Neuropathy Family     Heart disease Family        Physical Exam:     Vitals:   Blood Pressure: 136/64 (02/06/18 1508)  Pulse: 74 (02/06/18 1508)  Temperature: (!) 96 6 °F (35 9 °C) (02/06/18 1508)  Temp Source: Temporal (02/06/18 1508)  Respirations: 20 (02/06/18 1508)  Height: 5' 7" (170 2 cm) (02/06/18 0640)  Weight - Scale: 98 8 kg (217 lb 12 8 oz) (02/06/18 0640)  SpO2: 90 % (02/06/18 1508)    Physical Exam   Constitutional: She appears well-developed  No distress  HENT:   Head: Normocephalic and atraumatic  Right Ear: External ear normal    Left Ear: External ear normal    Eyes: Conjunctivae are normal  Right eye exhibits no discharge  Left eye exhibits no discharge  No scleral icterus  Neck: Normal range of motion  Cardiovascular: Normal rate, regular rhythm and normal heart sounds  Exam reveals no gallop and no friction rub  No murmur heard  Pulmonary/Chest: Effort normal and breath sounds normal  No respiratory distress  She has no wheezes  She has no rales  Abdominal: She exhibits no distension  There is tenderness  There is no rebound and no guarding     Musculoskeletal: She exhibits no edema  Neurological: She is alert  Skin: Skin is warm and dry  She is not diaphoretic  Psychiatric: She has a normal mood and affect  Vitals reviewed  Additional Data:     Lab Results: I have personally reviewed pertinent reports  POC BS checks            Invalid input(s): LABALBU        Imaging:      No results found  EKG, Pathology, and Other Studies Reviewed on Admission:   · EKG:     ** Please Note: This note has been constructed using a voice recognition system   **

## 2018-02-08 VITALS
BODY MASS INDEX: 34.18 KG/M2 | DIASTOLIC BLOOD PRESSURE: 57 MMHG | RESPIRATION RATE: 18 BRPM | WEIGHT: 217.8 LBS | OXYGEN SATURATION: 95 % | HEART RATE: 65 BPM | TEMPERATURE: 99.2 F | HEIGHT: 67 IN | SYSTOLIC BLOOD PRESSURE: 122 MMHG

## 2018-02-08 PROBLEM — I10 ESSENTIAL HYPERTENSION: Status: ACTIVE | Noted: 2018-02-08

## 2018-02-08 PROBLEM — E11.9 TYPE 2 DIABETES MELLITUS WITHOUT COMPLICATION (HCC): Status: ACTIVE | Noted: 2018-02-08

## 2018-02-08 LAB — GLUCOSE SERPL-MCNC: 149 MG/DL (ref 65–140)

## 2018-02-08 PROCEDURE — 99232 SBSQ HOSP IP/OBS MODERATE 35: CPT | Performed by: INTERNAL MEDICINE

## 2018-02-08 PROCEDURE — C9113 INJ PANTOPRAZOLE SODIUM, VIA: HCPCS | Performed by: SURGERY

## 2018-02-08 PROCEDURE — 99024 POSTOP FOLLOW-UP VISIT: CPT | Performed by: PHYSICIAN ASSISTANT

## 2018-02-08 PROCEDURE — 82948 REAGENT STRIP/BLOOD GLUCOSE: CPT

## 2018-02-08 RX ORDER — OXYCODONE HYDROCHLORIDE AND ACETAMINOPHEN 5; 325 MG/1; MG/1
1-2 TABLET ORAL EVERY 4 HOURS PRN
Qty: 30 TABLET | Refills: 0 | Status: SHIPPED | OUTPATIENT
Start: 2018-02-08 | End: 2018-04-03 | Stop reason: ALTCHOICE

## 2018-02-08 RX ADMIN — PANTOPRAZOLE SODIUM 40 MG: 40 INJECTION, POWDER, FOR SOLUTION INTRAVENOUS at 09:12

## 2018-02-08 RX ADMIN — ENOXAPARIN SODIUM 40 MG: 40 INJECTION SUBCUTANEOUS at 09:12

## 2018-02-08 RX ADMIN — ACETAMINOPHEN 325 MG: 160 SUSPENSION ORAL at 04:14

## 2018-02-08 RX ADMIN — ACETAMINOPHEN 325 MG: 160 SUSPENSION ORAL at 09:12

## 2018-02-08 RX ADMIN — OXYCODONE HYDROCHLORIDE 10 MG: 5 SOLUTION ORAL at 04:14

## 2018-02-08 RX ADMIN — INSULIN LISPRO 2 UNITS: 100 INJECTION, SOLUTION INTRAVENOUS; SUBCUTANEOUS at 06:32

## 2018-02-08 RX ADMIN — OXYCODONE HYDROCHLORIDE 10 MG: 5 SOLUTION ORAL at 09:13

## 2018-02-08 RX ADMIN — SODIUM CHLORIDE, SODIUM LACTATE, POTASSIUM CHLORIDE, AND CALCIUM CHLORIDE 125 ML/HR: .6; .31; .03; .02 INJECTION, SOLUTION INTRAVENOUS at 03:44

## 2018-02-08 NOTE — DISCHARGE SUMMARY
Discharge Summary - Amanda Gabriel 49 y o  female MRN: [id number]    Unit/Bed#: E5 -01 Encounter: [id number]    Admission Date: 2/6/2018     Discharge Date: 02/08/18    Admitting Diagnosis: Obesity [E66 9]  Type 2 diabetes mellitus with diabetic nephropathy ([facility] [address] ) [E11 21]  Gastro-esophageal reflux disease without esophagitis [K21 9]  Hyperlipidemia [E78 5]    Secondary Diagnosis:   Past Medical History:   Diagnosis Date    Anxiety     Bulging lumbar disc     Carpal tunnel syndrome     RIGHT  LAST ASSESSED: 12/7/16    Chronic back pain     low    Colon polyps     Depression     Diabetes mellitus ([facility] [address] )     on victoza    GERD (gastroesophageal reflux disease)     Gestational diabetes     Hearing loss     left ear    Hyperlipidemia     IBS (irritable bowel syndrome)     [facility] )     LAST ASSESSED: 8/3/17    Labial cyst     LAST ASSESSED: 4/21/16    Myofascial pain     LAST ASSESSED: 4/12/16    Obesity     Ovarian cyst     LEFT  LAST ASSESSED: 9/2/16    Seasonal allergies     Thoracic outlet syndrome     2010    Trochanteric bursitis of both hips     LAST ASSESSED: 3/18/16    Ulnar neuropathy at elbow     Wears glasses     Wears glasses        Discharge Diagnosis: Same    Procedures Performed: Procedure(s):  GASTRECTOMY SLEEVE LAPAROSCOPIC; INTRAOPERATIVE EGD     Consults: [facility] for Medical Management      Hospital Course: Patient with morbid obesity was admitted to the hospital on 2/6/2018 for elective Procedure(s):  GASTRECTOMY SLEEVE LAPAROSCOPIC; INTRAOPERATIVE EGD   Postoperatively, the patient was stable and transferred to the [place] [address] floor for further observation  By POD #2, the patient was tolerating a liquid diet and pain was controlled oral pain medications  The patient was ambulating without assistance, vital signs and lab work were stable  The patient was cleared for discharge on 02/08/18          Disposition: The patient should follow up with [facility] Darrell Albert MD in 2 weeks for a post op check and with Angelica Collado, DO as needed for medical management  The patient should refer to the handout "Discharge Instructions" for further information  Call physician for temperature over 101, wound redness or discharge, vomiting, or intolerance to diet  Discharge Medications:  See after visit summary for reconciled discharge medications provided to patient and family  This text is generated with voice recognition software  There may be translation, syntax,  or grammatical errors  If you have any questions, please contact the dictating provider

## 2018-02-08 NOTE — CASE MANAGEMENT
Notification of Discharge  This is a Notification of Discharge from our facility [address]  Please be advised that this patient has been discharge from our facility  Below you will find the admission and discharge date and time including the patients disposition  PRESENTATION DATE: 2/6/2018  6:24 AM  IP ADMISSION DATE: 2/6/18 1016  DISCHARGE DATE: 2/8/2018  9:32 AM  DISPOSITION: Home/Self Care    [address] [facility] in the [place] by [address] for 2017  Network Utilization Review Department  Phone: [phone]; Fax [phone]  ATTENTION: The Network Utilization Review Department is now centralized for our 7 Facilities  Make a note that we have a new phone and fax numbers for our Department  Please call with any questions or concerns to [phone] and carefully follow the prompts so that you are directed to the right person  All voicemails are confidential  Fax any determinations, approvals, denials, and requests for initial or continue stay review clinical to [phone]  Due to HIGH CALL volume, it would be easier if you could please send faxed requests to expedite your requests and in part, help us provide discharge notifications faster

## 2018-02-08 NOTE — POST OP PROGRESS NOTES
Progress Note - General Surgery   Cookie Valenzuela 49 y o  female MRN: [id number]  Unit/Bed#: E5 -01 Encounter: [id number]      Subjective/Objective     Subjective: Patient with morbid obesity s/p Procedure(s):  GASTRECTOMY SLEEVE LAPAROSCOPIC; INTRAOPERATIVE EGD  POD #2   Feels better, pain better controlled  Tolerating liquid diet without nausea or vomiting, pain controlled on oral pain medication, ambulating without assistance, voiding well, using incentive spirometer  Objective:    /60 (BP Location: Left arm)   Pulse 76   Temp 97 6 °F (36 4 °C) (Temporal)   Resp 18   Ht 5' 7" (1 702 m)   Wt 98 8 kg (217 lb 12 8 oz)   LMP 11/18/2017   SpO2 94%   Breastfeeding? No   BMI 34 11 kg/m²       Intake/Output Summary (Last 24 hours) at 02/08/18 1746  Last data filed at 02/08/18 0401   Gross per 24 hour   Intake          3050 83 ml   Output             1700 ml   Net          1350 83 ml       Invasive Devices     Peripheral Intravenous Line            Peripheral IV 02/06/18 Left Hand 2 days                  Physical Exam    General Appearance:    Alert, cooperative, no distress, appears stated age   Head:    Normocephalic, without obvious abnormality, atraumatic   Lungs:     Clear to auscultation bilaterally, respirations unlabored   Heart:    Regular rate and rhythm, S1 and S2 normal, no murmur, rub    or gallop   Abdomen:     Soft, appropriate tenderness, bowel sounds active all four quadrants, no masses, no organomegaly, non distended, incisions clean, dry, and intact   Extremities:   Extremities normal, atraumatic, no cyanosis or edema   Neurologic:   CNII-XII intact  Normal strength and sensation               Lab, Imaging and other studies:I have personally reviewed pertinent lab results  Ayah Valiente yesterday    VTE Mechanical Prophylaxis: sequential compression device    Assessment/Plan  #1  Morbid Obesity s/p Procedure(s):  GASTRECTOMY SLEEVE LAPAROSCOPIC; INTRAOPERATIVE EGD  POD #2    Surgically stable for discharge  #2  DM: home off po DM medications per SLIM  #3  HTN: stable off meds  #4  Hyperlipidemia: cont lipitor  #5  MDD: restart seroquel    Plan of care was discussed with patient's nurse    Dispo: Continue liquid diet, ambulation, incentive spirometry  Discharge    This text is generated with voice recognition software  There may be translation, syntax,  or grammatical errors  If you have any questions, please contact the dictating provider

## 2018-02-08 NOTE — PROGRESS NOTES
Progress Note - Cookie Valenzuela 49 y o  female MRN: [id number]    Unit/Bed#: E5 -01 Encounter: [id number]      Subjective: The patient feels considerably better  Her pain is less  She has no nausea  She has been tolerating clear liquids  She is walking well  She denies chest pain or shortness of breath  Physical Exam:   Temp:  [97 6 °F (36 4 °C)-99 4 °F (37 4 °C)] 99 2 °F (37 3 °C)  HR:  [65-76] 65  Resp:  [18] 18  BP: (107-129)/(57-60) 122/57    Gen:  Well-developed, obese, in no distress  Neck:  Supple  No lymphadenopathy, goiter, or bruit  Heart:  Regular rhythm  No murmur, gallop, or rub  Lungs:  Clear to auscultation and percussion  No wheezing, rales, or rhonchi   Abd:  Soft with active bowel sounds  Minimal tenderness  No mass or organomegaly  Extremities:  No clubbing, cyanosis, or edema  No calf tenderness  Neuro:  Alert and oriented  No focal sign  Skin:  Warm and dry      LABS:  No new labs        Patient Active Problem List   Diagnosis    IBS (irritable bowel syndrome)    Hyperlipidemia    GERD (gastroesophageal reflux disease)    Type 2 diabetes mellitus with diabetic nephropathy (HCC)    Depression    Chronic back pain    Anxiety    Ileus (HCC)    Amenorrhea    Cervical radiculopathy    Hepatic steatosis    Pulmonary nodule seen on imaging study    Vitamin D deficiency    Morbid obesity due to excess calories (HCC)       Assessment/Plan:  1  Obesity, status post sleeve gastrectomy  2  Type 2 diabetes, improving  3  Hypertension  4  Hyperlipidemia  5  Hepatic steatosis  6  History of depression  7  Esophageal reflux    The patient is doing well  She anticipates discharge later today  She is medically stable for discharge  Her medications were reviewed      VTE Pharmacologic Prophylaxis: Enoxaparin (Lovenox)  VTE Mechanical Prophylaxis: sequential compression device

## 2018-02-08 NOTE — DISCHARGE INSTRUCTIONS
Bariatric/Weight Loss Surgery  Hospital Discharge Instructions  1  ACTIVITY:  a  Progress as feels comfortable - a good rule is:  if you are doing something and it begins to hurt, stop doing the activity  Walk at least 3 times per day at home  b  Sona Pace may walk stairs if you do so slowly  c  You may shower 48 hours after surgery  d  Use your incentive spirometer 10 times per hour while awake for 1 week  e  No driving if you are still taking certain prescription pain medication  Examples of such medication are Percocet, Darvocet, Oxycodone, Tylenol #3, and Tylenol with Codeine  Follow your pharmacists orders  2  DIET  a  Stay on a liquid diet for 7 days after your surgery date, sipping slowly  Refer to your manual for examples of choices  Remember to keep your liquids sugar free or low calorie  You may have protein drinks  Make sure to drink 48 to 64 ounces per day of fluids  b  On the 8th day after surgery, start a pureed/blenderized diet  (As an example, if you had surgery on a Monday, you can start your pureed/blenderized diet the following Monday)  Start 3 meals per day, breakfast, lunch and dinner, each meal to consist of 30 minutes without drinking, 20 minutes eating food and then another 60 minutes without drinking  Follow the directions in your manual under Diet Progression, section 3  You will be on a pureed/blenderized diet for 3 to 5 days  c  Once you start the pureed/blenderized diet, remember to keep hydrated by drinking water or low calorie liquids between meal times (48 to 64 ounces per day) following the 30/60 minute rule  d  Sona Pace may start a soft diet once you get approval from your surgeon at your first follow up visit  3  MEDICATIONS:  a  Start vitamins and minerals when you get home  b  Pain and Anti-acid Medication as per prescription  c  Other medications as indicated on the Physician Patient Discharge Instructions form given to you at the time of discharge    d  Make sure that you are splitting your pill or tablet medications in halves or fourths or even crushing them before you take them  Capsules should be opened and mixed with water or jello  You need to do this for at least 2 to 4 weeks after surgery  Eventually you will be able to take your medications the regular way as they were prescribed  Ask your nurse prior to discharge about your medications  benny  Rosangela Silvermanavan will need to consult with your Family Doctor in regards to all your prescribed medication, particularly those for blood pressure and diabetes  As you lose weight, medical conditions may change, requiring an alteration or elimination of the drug dose  4  INCISION CARE  a  You may shower and get incisions wet 2 days after surgery  b  If you have a drain, empty the drain as the nurses instructed  5  FOLLOW-UP APPOINTMENT should be made for one week after discharge  Call surgeons office at [phone] to schedule an appointment  6  CALL YOUR DOCTOR FOR:  pain not controlled by pain medications, a temperature greater than 101 5° F, any increase or change in drainage or redness from any incision, any vomiting or inability to keep liquids down, shortness of breath, shoulder pain, or bleeding    ********Letter and Information for Patient's Primary Health Care Provider************      Dear Perry Bae Provider,       Your patient had bariatric surgery on this admission to [address] [place] [address]  Due to the restrictive and/or malabsorptive nature of their procedure our surgeons recommend routine lab studies  Your patients surgeon will provide orders initially and ask that they continue to follow-up with us for life even if they see you  As time moves on some patients prefer to follow-up only with their family doctor  We ask that you discuss this regular work-up with them when they make an appointment to see you                           *The lab studies recommended to best identify deficiencies are: CBC, CMP, Lipid Profile, Fe, TIBC, %Sat, Vitamin D, Folate, B12, Whole Blood Thiamine, Vitamin A, PTH, Zinc and Ferritin  We recommend HgbA1C for diabetics  *These studies should be done minimally at 6 months and a year post-operatively and then yearly thereafter  *Recommended Daily Supplements: Please see the attached Vitamin Sheet    If your patient has any dietary or psycho-social concerns, they can follow-up with our Team Dietitian and Licensed Clinical   They can reach these team members by calling the [facility]  We also encourage them to follow up at our regularly scheduled support groups or join our BLOVES at [phone] Patient Forum  For your convenience we have also included a list of medicines that should be avoided after weight loss surgery and a list of the vitamin and minerals they should be taking for the rest of their lives  The patients are provided this information as well  The [facility] Bariatric Team would like to thank you for the opportunity to assist in the care of your patient  Feel free to contact us with any questions by calling the [facility] office at [phone]    Sincerely,         [facility] Team      ****************Additional Information for Providers and Patients******************                    Vitamins After       Rebecca en Y Gastric Bypass or Sleeve Gastrectomy Surgery    Due to the decreased absorption of nutrients and the decreased amount of food eaten it is difficult to obtain all the nutrients needed consuming food  We recommend a bariatric formulated vitamin for the rest of your life  If you wish to use an over the counter vitamins please understand you may not get all the recommended daily requirements  Use the following guidelines for over the counter vitamins        Multivitamins    We recommend 2 chewable multivitamins with iron (do not take gummy chewable as they do not contain thiamine)     You can continue with the chewable or take any well formulated, high potency multivitamin containing 22 nutrients including zinc and copper   If you decide to take a bariatric vitamin the number of vitamins that you need to take will vary  Refer to the chart provided at team meeting    Calcium - Calcium is absorbed in the part of the small bowel that is bypassed in gastric bypass patients  In addition, as you lose weight, you are more at risk for loss of bone density leading to osteoporosis   The best form of calcium is Calcium Citrate  This form of calcium is better absorbed after your surgery    Recommended daily dose is 1500 mg  Take 500 mg in (3) divided doses  You can only absorb about 500 mg at a time   We recommend 2000 IU of vitamin D3 per day, in addition to what is in your calcium supplement  If you were instructed to take a higher dose based on a deficiency, then continue to take the higher vitamin D dose  Iron - Iron is absorbed in the part of the small bowel that is bypassed   You will need to take extra iron in addition to what is already in the multivitamin if you are a menstruating woman (25-45 mg of additional elemental iron) or have been diagnosed with an iron deficiency   We recommend iron in the form of Ferrous Fumarate with Vitamin C    Follow the instructions on the package or bottle unless your physician has given other dosage amounts  B12 (Cyanocobalamin) may also be decreased   Additional Vitamin B12 is recommended if you are not taking a bariatric vitamin   Take 350 to 500 micrograms (mcg) per day of B12 (Cyanocobalamin) in a sublingual form (for under the tongue)   Note:  Calcium interferes with the absorption of iron, so it is recommended that you take the calcium at least 2 hours apart from iron  The tannins in tea also interfere with the absorption of iron       Note: Anti-ulcer medications interfere with the absorption of calcium iron and B12  Space your anti-ulcer medication 2 hours apart from your vitamins  * Based on the recommendations of the ASMBS and the National Osteoporosis foundation    Non-steroidal anti-inflammatory drugs or medications containing them  You should take caution or avoid these medications as they could harm your pouch or sleeve  **This is a sample list and is not all inclusive  Please read labels carefully  **      Non Steroidal anti-inflammatory drugs  Advil (ibuprofen)  Aleve (naproxen)  Anaprox (naproxen)  Ansaid (flurbiprofen)  Azolid (phenylbutazone)  Bextra (valdecoxib)  Butazolidin (phenylbutazone)  Celebrex (celecoxib)  Clinoril (sulindac)  Dolobid (diflunisal)  Excedrin IB (ibuprofen)  Feldene (piroxicam)  Ibuprin (ibuprofen)  Indocin (indomethacin)  Lodine (etodolac)  Meclomen (meclofenamate)  Midol IB (ibuprofen)  Motrin IB (ibuprofen)  Nalfon (fenoprofen)  Naprosyn (naproxen)  Nuprin (ibuprofen)  Orudis (ketoprofen)  Oruvail (ketoprofen)  Pamprin - IB (ibuprofen)  Ponstel (mefenamic acid)  Rexolate (sodium thiosalicylate)  Tandearil (oxyphenbutazone)  Tolectin (tolmetin)  Voltaren (diclofenac)      Barbiturate  Fiorinal (butalbital/aspirin/caffeine)    Salicylates  Amigesic (salsalate)  Anacin (aspirin)  Arthropan (choline salicylate)  Ascriptin (buffered aspirin)  Aspirin (aspirin)  Aspirtab (aspirin)  Bufferin (buffered aspirin)  Disalcid (salsalate)  Ecotrin (aspirin)  Uracel (sodium salicylate)    Analgesics  Equagesic (meprobamate/aspirin)  Micrainin (meprobamate/aspirin)  Percodan (oxycodone/aspirin)    OTC  Pepto-Bismol®  Amanda-[place]®  Excedrin®    For Gastric Bypass Patients  Extended Release Medications  Sustained Release Medications  Time Released Medications

## 2018-02-09 ENCOUNTER — TELEPHONE (OUTPATIENT)
Dept: BARIATRICS | Facility: CLINIC | Age: 48
End: 2018-02-09

## 2018-02-09 ENCOUNTER — TELEPHONE (OUTPATIENT)
Dept: INTERNAL MEDICINE CLINIC | Facility: CLINIC | Age: 48
End: 2018-02-09

## 2018-02-09 ENCOUNTER — TRANSITIONAL CARE MANAGEMENT (OUTPATIENT)
Dept: INTERNAL MEDICINE CLINIC | Facility: CLINIC | Age: 48
End: 2018-02-09

## 2018-02-09 NOTE — TELEPHONE ENCOUNTER
Post op follow up phone call completed  Pt is sipping liquids, using IS as instructed, reinforced importance of using IS to help prevent pneumonia  Ambulating about home without difficulty  Pain controlled with analgesia  Reaffirmed examples of liquid diet over the next week  Pt stated understanding about discharge instructions and medication adjustments  Tolerating self administration of Lovenox injections without difficulty  Follow up appt with surgeon scheduled for next week  Instructed to call with any additional questions or concerns

## 2018-02-09 NOTE — TELEPHONE ENCOUNTER
Post op follow up phone call attempted  No answer obtained on  listed phone number  Generic message left requesting call back with an update on progress

## 2018-02-12 ENCOUNTER — OFFICE VISIT (OUTPATIENT)
Dept: INTERNAL MEDICINE CLINIC | Facility: CLINIC | Age: 48
End: 2018-02-12
Payer: COMMERCIAL

## 2018-02-12 VITALS
SYSTOLIC BLOOD PRESSURE: 100 MMHG | OXYGEN SATURATION: 97 % | RESPIRATION RATE: 18 BRPM | BODY MASS INDEX: 32.68 KG/M2 | HEART RATE: 83 BPM | WEIGHT: 208.2 LBS | DIASTOLIC BLOOD PRESSURE: 70 MMHG | HEIGHT: 67 IN | TEMPERATURE: 97.9 F

## 2018-02-12 DIAGNOSIS — E11.9 TYPE 2 DIABETES MELLITUS WITHOUT COMPLICATION, WITHOUT LONG-TERM CURRENT USE OF INSULIN (HCC): ICD-10-CM

## 2018-02-12 PROCEDURE — 99496 TRANSJ CARE MGMT HIGH F2F 7D: CPT | Performed by: INTERNAL MEDICINE

## 2018-02-12 RX ORDER — PANTOPRAZOLE SODIUM 40 MG/1
1 TABLET, DELAYED RELEASE ORAL DAILY
Status: ON HOLD | COMMUNITY
Start: 2016-06-17 | End: 2018-09-01 | Stop reason: SDUPTHER

## 2018-02-12 NOTE — PROGRESS NOTES
Assessment/Plan:    No problem-specific Assessment & Plan notes found for this encounter  Diagnoses and all orders for this visit:    Type 2 diabetes mellitus without complication, without long-term current use of insulin (HCC)  -     Hemoglobin A1c; Future    Other orders  -     pantoprazole (PROTONIX) 40 mg tablet; Take 1 tablet by mouth daily  -     Liraglutide (VICTOZA) 18 MG/3ML SOPN; Inject under the skin daily  -     metFORMIN (GLUCOPHAGE) 1000 MG tablet; [facility] was seen and examined in the office today  Overall, she is recovering okay  She remains on a liquid diet and will have a follow up within a week with the bariatric team   Her Metformin, Victoza, and Lipitor are on hold right now  She has been monitoring sugars and they do seem to be trending downward now  She will follow up in 3-4 months  Subjective:      Patient ID: Dee Infante is a 49 y o  female  [facility] is here today for a follow up on her recent hospitalization  She was hospitalized last Tuesday for elective gastric bypass surgery (sleeve) and subsequently discharged home  She has been doing okay and remains on a liquid diet  She reports having diarrhea without blood  She notes no vomiting but will feel like something is sitting in her chest  She reports following instructions including Lovenox injections but she is rather mobile  She denies any fevers or LE swelling  Denies cough  The following portions of the patient's history were reviewed and updated as appropriate: past family history, past medical history and problem list     Review of Systems   Constitutional: Positive for appetite change  Negative for fatigue, fever and unexpected weight change  Weight loss from sleeve procedure   HENT: Negative for sinus pain, sinus pressure and sore throat  Respiratory: Negative for cough, chest tightness, shortness of breath and wheezing      Cardiovascular: Negative for chest pain, palpitations and leg swelling  Gastrointestinal: Positive for abdominal pain, diarrhea and nausea  Negative for blood in stool, constipation and vomiting  Musculoskeletal: Negative for arthralgias  Neurological: Negative for dizziness, numbness and headaches  Psychiatric/Behavioral: Negative for sleep disturbance  The patient is not nervous/anxious  Objective:    Vitals:    02/12/18 1253   BP: 100/70   Pulse: 83   Resp: 18   Temp: 97 9 °F (36 6 °C)   SpO2: 97%        Physical Exam   Constitutional: She is oriented to person, place, and time  She appears well-developed and well-nourished  HENT:   Head: Normocephalic and atraumatic  Eyes: Conjunctivae are normal    Neck: Neck supple  No thyromegaly present  Cardiovascular: Normal rate and regular rhythm  No murmur heard  Pulmonary/Chest: Effort normal and breath sounds normal  No respiratory distress  She has no wheezes  Abdominal: Soft  Bowel sounds are normal  There is no rebound  Incision sites noted   Neurological: She is alert and oriented to person, place, and time  Skin: Skin is warm and dry  Psychiatric: She has a normal mood and affect   Her behavior is normal  Judgment and thought content normal

## 2018-02-13 ENCOUNTER — TELEPHONE (OUTPATIENT)
Dept: BARIATRICS | Facility: CLINIC | Age: 48
End: 2018-02-13

## 2018-02-15 ENCOUNTER — DOCUMENTATION (OUTPATIENT)
Dept: BARIATRICS | Facility: CLINIC | Age: 48
End: 2018-02-15

## 2018-02-15 ENCOUNTER — OFFICE VISIT (OUTPATIENT)
Dept: BARIATRICS | Facility: CLINIC | Age: 48
End: 2018-02-15

## 2018-02-15 VITALS
RESPIRATION RATE: 20 BRPM | SYSTOLIC BLOOD PRESSURE: 106 MMHG | TEMPERATURE: 98.2 F | WEIGHT: 260 LBS | BODY MASS INDEX: 41.78 KG/M2 | HEART RATE: 72 BPM | DIASTOLIC BLOOD PRESSURE: 62 MMHG | HEIGHT: 66 IN

## 2018-02-15 DIAGNOSIS — E66.01 MORBID OBESITY DUE TO EXCESS CALORIES (HCC): ICD-10-CM

## 2018-02-15 DIAGNOSIS — L30.9 DERMATITIS: Primary | ICD-10-CM

## 2018-02-15 DIAGNOSIS — Z98.84 S/P LAPAROSCOPIC SLEEVE GASTRECTOMY: ICD-10-CM

## 2018-02-15 DIAGNOSIS — E11.9 TYPE 2 DIABETES MELLITUS WITHOUT COMPLICATION, WITHOUT LONG-TERM CURRENT USE OF INSULIN (HCC): ICD-10-CM

## 2018-02-15 PROCEDURE — 99024 POSTOP FOLLOW-UP VISIT: CPT | Performed by: SURGERY

## 2018-02-15 RX ORDER — HYDROXYZINE HCL 10 MG/5 ML
25 SOLUTION, ORAL ORAL 4 TIMES DAILY PRN
Qty: 240 ML | Refills: 0 | Status: SHIPPED | OUTPATIENT
Start: 2018-02-15 | End: 2018-04-03 | Stop reason: ALTCHOICE

## 2018-02-15 NOTE — PROGRESS NOTES
Assessment/Plan:  50-year-old woman 2 weeks status post laparoscopic sleeve gastrectomy  Had called the on-call bariatric surgeon concerned for a slightly opened epigastric incision as well as some dermatitis and erythema surrounding her other incisions  These appear to be related to her Dermabond bandage and a presently on concerning  She is otherwise doing well from a postoperative bariatric prospective and will be referred to dietitian today  Diagnoses and all orders for this visit:    Dermatitis  -     hydrOXYzine (ATARAX) 10 mg/5 mL syrup; Take 12 5 mL (25 mg total) by mouth 4 (four) times a day as needed for itching    Type 2 diabetes mellitus without complication, without long-term current use of insulin ([facility])    Morbid obesity due to excess calories ([address] )    S/P laparoscopic sleeve gastrectomy          Subjective:     Patient ID: Elizabeth Lynch is a 49 y o  female      HPI Per above    Review of Systems    No fever/chills  + 1 episode of early postprandial satiety he after eating a single ravioli  + difficulty drinking more than 1 bottle of water per day  + yellow urine  Denies nausea/vomiting with clears/protein shakes  No pain  No dyspnea/chest pain    Objective:     Physical Exam    Vitals:    02/15/18 0933   BP: 106/62   Pulse: 72   Resp: 20   Temp: 98 2 °F (36 8 °C)     NAD, alert  No pallor  MMM  No JVD  RRR  Normal inspiratory effort  Abdomen soft, NT/ND  Incisions c/d/i, no masses or hernias  + slight erythema surrounding most of the incisions, blanching, no drainage or pain  Epigastric incision slightly open with scant fibrinous exudate and scab  No peripheral edema  Normal affect, no agitation

## 2018-02-15 NOTE — PROGRESS NOTES
Weight Management Nutrition Class     Diagnosis: Obesity    Bariatric Surgeon: Dr Josephine Moreno    Surgery: Vertical Sleeve Gastrectomy    Class: first post op note    Topics discussed today include:     fluid goals post op, protein goals post op, constipation, chew food well, exercise, avoidance of alcohol, PPI use, diet progression, dumping syndrome, protein supplems, vitamin/mineral supplements and calcium supplements    Patient was able to verbalize basic diet (protein, fluid, vitamin and mineral) recommendations and possible nutrition-related complications   Yes

## 2018-03-20 ENCOUNTER — CLINICAL SUPPORT (OUTPATIENT)
Dept: BARIATRICS | Facility: CLINIC | Age: 48
End: 2018-03-20

## 2018-03-20 VITALS — HEIGHT: 67 IN | BODY MASS INDEX: 31.17 KG/M2 | WEIGHT: 198.6 LBS

## 2018-03-20 DIAGNOSIS — E66.01 MORBID (SEVERE) OBESITY DUE TO EXCESS CALORIES (HCC): Primary | ICD-10-CM

## 2018-03-20 PROCEDURE — RECHECK

## 2018-03-20 NOTE — PROGRESS NOTES
Weight Management Nutrition Class     Diagnosis: Morbid Obesity    Bariatric Surgeon: Dr Galen Ramesh    Surgery: Vertical Sleeve Gastrectomy    Class: 5 week post op     Topics discussed today include:     fluid goals post op, protein goals post op, constipation, chew food well, exercise, avoidance of alcohol, PPI use, diet progression, hypoglycemia, dumping syndrome, protein supplems, vitamin/mineral supplements, calcium supplements, additional vitamin B12 and iron supplements    Patient was able to verbalize basic diet (protein, fluid, vitamin and mineral) recommendations and possible nutrition-related complications   Yes

## 2018-04-03 ENCOUNTER — APPOINTMENT (OUTPATIENT)
Dept: LAB | Facility: CLINIC | Age: 48
End: 2018-04-03
Payer: COMMERCIAL

## 2018-04-03 ENCOUNTER — TRANSCRIBE ORDERS (OUTPATIENT)
Dept: LAB | Facility: CLINIC | Age: 48
End: 2018-04-03

## 2018-04-03 ENCOUNTER — OFFICE VISIT (OUTPATIENT)
Dept: INTERNAL MEDICINE CLINIC | Facility: CLINIC | Age: 48
End: 2018-04-03
Payer: COMMERCIAL

## 2018-04-03 VITALS
HEIGHT: 67 IN | SYSTOLIC BLOOD PRESSURE: 108 MMHG | HEART RATE: 56 BPM | WEIGHT: 192.8 LBS | OXYGEN SATURATION: 97 % | BODY MASS INDEX: 30.26 KG/M2 | TEMPERATURE: 97.7 F | DIASTOLIC BLOOD PRESSURE: 60 MMHG | RESPIRATION RATE: 16 BRPM

## 2018-04-03 DIAGNOSIS — R53.83 OTHER FATIGUE: ICD-10-CM

## 2018-04-03 DIAGNOSIS — E11.9 TYPE 2 DIABETES MELLITUS WITHOUT COMPLICATION, WITHOUT LONG-TERM CURRENT USE OF INSULIN (HCC): ICD-10-CM

## 2018-04-03 DIAGNOSIS — Z98.84 S/P LAPAROSCOPIC SLEEVE GASTRECTOMY: ICD-10-CM

## 2018-04-03 DIAGNOSIS — E55.9 VITAMIN D DEFICIENCY: ICD-10-CM

## 2018-04-03 DIAGNOSIS — Z98.84 S/P LAPAROSCOPIC SLEEVE GASTRECTOMY: Primary | ICD-10-CM

## 2018-04-03 LAB
25(OH)D3 SERPL-MCNC: 36.8 NG/ML (ref 30–100)
BASOPHILS # BLD AUTO: 0.01 THOUSANDS/ΜL (ref 0–0.1)
BASOPHILS NFR BLD AUTO: 0 % (ref 0–1)
EOSINOPHIL # BLD AUTO: 0.19 THOUSAND/ΜL (ref 0–0.61)
EOSINOPHIL NFR BLD AUTO: 2 % (ref 0–6)
ERYTHROCYTE [DISTWIDTH] IN BLOOD BY AUTOMATED COUNT: 13.2 % (ref 11.6–15.1)
ERYTHROCYTE [SEDIMENTATION RATE] IN BLOOD: 34 MM/HOUR (ref 0–20)
EST. AVERAGE GLUCOSE BLD GHB EST-MCNC: 146 MG/DL
FERRITIN SERPL-MCNC: 54 NG/ML (ref 8–388)
HBA1C MFR BLD: 6.7 % (ref 4.2–6.3)
HCT VFR BLD AUTO: 43.9 % (ref 34.8–46.1)
HGB BLD-MCNC: 14 G/DL (ref 11.5–15.4)
IRON SERPL-MCNC: 88 UG/DL (ref 50–170)
LYMPHOCYTES # BLD AUTO: 1.87 THOUSANDS/ΜL (ref 0.6–4.47)
LYMPHOCYTES NFR BLD AUTO: 21 % (ref 14–44)
MCH RBC QN AUTO: 30 PG (ref 26.8–34.3)
MCHC RBC AUTO-ENTMCNC: 31.9 G/DL (ref 31.4–37.4)
MCV RBC AUTO: 94 FL (ref 82–98)
MONOCYTES # BLD AUTO: 0.56 THOUSAND/ΜL (ref 0.17–1.22)
MONOCYTES NFR BLD AUTO: 6 % (ref 4–12)
NEUTROPHILS # BLD AUTO: 6.17 THOUSANDS/ΜL (ref 1.85–7.62)
NEUTS SEG NFR BLD AUTO: 71 % (ref 43–75)
NRBC BLD AUTO-RTO: 0 /100 WBCS
PLATELET # BLD AUTO: 242 THOUSANDS/UL (ref 149–390)
PMV BLD AUTO: 11.5 FL (ref 8.9–12.7)
RBC # BLD AUTO: 4.67 MILLION/UL (ref 3.81–5.12)
VIT B12 SERPL-MCNC: 1042 PG/ML (ref 100–900)
WBC # BLD AUTO: 8.82 THOUSAND/UL (ref 4.31–10.16)

## 2018-04-03 PROCEDURE — 36415 COLL VENOUS BLD VENIPUNCTURE: CPT | Performed by: INTERNAL MEDICINE

## 2018-04-03 PROCEDURE — 83036 HEMOGLOBIN GLYCOSYLATED A1C: CPT

## 2018-04-03 PROCEDURE — 82728 ASSAY OF FERRITIN: CPT | Performed by: INTERNAL MEDICINE

## 2018-04-03 PROCEDURE — 82306 VITAMIN D 25 HYDROXY: CPT

## 2018-04-03 PROCEDURE — 85652 RBC SED RATE AUTOMATED: CPT

## 2018-04-03 PROCEDURE — 83540 ASSAY OF IRON: CPT | Performed by: INTERNAL MEDICINE

## 2018-04-03 PROCEDURE — 82607 VITAMIN B-12: CPT

## 2018-04-03 PROCEDURE — 99213 OFFICE O/P EST LOW 20 MIN: CPT | Performed by: INTERNAL MEDICINE

## 2018-04-03 PROCEDURE — 85025 COMPLETE CBC W/AUTO DIFF WBC: CPT | Performed by: INTERNAL MEDICINE

## 2018-04-03 RX ORDER — GLUCOSAMINE/D3/BOSWELLIA SERRA 1500MG-400
TABLET ORAL
COMMUNITY
End: 2021-11-26

## 2018-04-03 NOTE — PROGRESS NOTES
Assessment/Plan:       Diagnoses and all orders for this visit:    S/P laparoscopic sleeve gastrectomy  -     CBC and differential  -     Ferritin  -     Vitamin B12; Future  -     HEMOGLOBIN A1C W/ EAG ESTIMATION; Future    Other fatigue  -     Sedimentation rate, automated; Future  -     CBC and differential  -     Ferritin  -     Vitamin B12; Future  -     Iron    Vitamin D deficiency  -     Vitamin D 25 hydroxy; Future    Other orders  -     Biotin 54271 MCG TABS; Take by mouth  -     Multiple Vitamins-Minerals (MULTI COMPLETE PO); Take by mouth  -     Calcium Citrate-Vitamin D 500-500 MG-UNIT PACK; Take by mouth  -     Cyanocobalamin ([employer] VITAMIN B12 PO); Take by mouth      Given her known gastric sleeve, I am going to check a few things including a B12 and iron/ferritin levels  In the past, there was an elevated sed rate noted so I'd like to repeat this  She is now off of metformin but reports sugars range from 130s-200s and I'd like to repeat the A1c as well  Will follow  Subjective:      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  Primitivo Guadarrama is here today with complaints of fatigue  See below for details  She reports feeling like that she is sleeping enough  She notes being more irritable at work because of feeling tired all the time  She is s/p lap sleeve and feels she is doing well with this overall  She continues to try and work out and remain active  Fatigue   This is a new problem  The current episode started 1 to 4 weeks ago  The problem occurs constantly  The problem has been gradually worsening  Associated symptoms include arthralgias, a change in bowel habit, chills and fatigue  Pertinent negatives include no abdominal pain, anorexia, chest pain, congestion, coughing, fever, headaches, joint swelling, neck pain, rash, sore throat, vertigo, visual change or vomiting  Nothing aggravates the symptoms  She has tried sleep for the symptoms  The treatment provided no relief         The following portions of the patient's history were reviewed and updated as appropriate: current medications, past family history, past medical history, past social history, past surgical history and problem list     Review of Systems   Constitutional: Positive for chills, fatigue and unexpected weight change  Negative for fever  HENT: Negative for congestion, sinus pain, sinus pressure and sore throat  Respiratory: Negative for cough  Cardiovascular: Negative for chest pain, palpitations and leg swelling  Gastrointestinal: Positive for change in bowel habit  Negative for abdominal pain, anorexia and vomiting  Musculoskeletal: Positive for arthralgias  Negative for joint swelling and neck pain  Skin: Negative for rash  Neurological: Negative for vertigo and headaches  Objective:      /60 (BP Location: Left arm, Patient Position: Sitting, Cuff Size: Large)   Pulse 56   Temp 97 7 °F (36 5 °C) (Tympanic)   Resp 16   Ht 5' 7" (1 702 m)   Wt 87 5 kg (192 lb 12 8 oz)   SpO2 97%   BMI 30 20 kg/m²          Physical Exam   Constitutional: She is oriented to person, place, and time  She appears well-developed and well-nourished  No distress  Eyes: Conjunctivae and EOM are normal    Neck: No thyromegaly present  Cardiovascular: Normal rate and regular rhythm  Pulmonary/Chest: Effort normal and breath sounds normal  No respiratory distress  Abdominal: Soft  Bowel sounds are normal    Lymphadenopathy:     She has no cervical adenopathy  Neurological: She is alert and oriented to person, place, and time  Skin: Skin is warm and dry  She is not diaphoretic  Psychiatric: She has a normal mood and affect   Her behavior is normal  Judgment and thought content normal

## 2018-04-04 DIAGNOSIS — R70.0 ELEVATED SED RATE: Primary | ICD-10-CM

## 2018-04-04 DIAGNOSIS — R53.83 OTHER FATIGUE: ICD-10-CM

## 2018-04-06 ENCOUNTER — APPOINTMENT (OUTPATIENT)
Dept: LAB | Facility: CLINIC | Age: 48
End: 2018-04-06
Payer: COMMERCIAL

## 2018-04-06 DIAGNOSIS — R53.83 OTHER FATIGUE: ICD-10-CM

## 2018-04-06 DIAGNOSIS — R70.0 ELEVATED SED RATE: ICD-10-CM

## 2018-04-06 PROCEDURE — 86200 CCP ANTIBODY: CPT

## 2018-04-06 PROCEDURE — 36415 COLL VENOUS BLD VENIPUNCTURE: CPT

## 2018-04-06 PROCEDURE — 86038 ANTINUCLEAR ANTIBODIES: CPT

## 2018-04-06 PROCEDURE — 86430 RHEUMATOID FACTOR TEST QUAL: CPT

## 2018-04-06 PROCEDURE — 86618 LYME DISEASE ANTIBODY: CPT

## 2018-04-08 LAB — CCP IGA+IGG SERPL IA-ACNC: 7 UNITS (ref 0–19)

## 2018-04-09 LAB
B BURGDOR IGG SER IA-ACNC: 0.11
B BURGDOR IGM SER IA-ACNC: 0.26
RHEUMATOID FACT SER QL LA: NEGATIVE
RYE IGE QN: NEGATIVE

## 2018-04-20 ENCOUNTER — ANNUAL EXAM (OUTPATIENT)
Dept: OBGYN CLINIC | Facility: CLINIC | Age: 48
End: 2018-04-20
Payer: COMMERCIAL

## 2018-04-20 VITALS
WEIGHT: 188 LBS | SYSTOLIC BLOOD PRESSURE: 90 MMHG | HEIGHT: 67 IN | BODY MASS INDEX: 29.51 KG/M2 | DIASTOLIC BLOOD PRESSURE: 68 MMHG

## 2018-04-20 DIAGNOSIS — Z12.4 PAP SMEAR FOR CERVICAL CANCER SCREENING: ICD-10-CM

## 2018-04-20 DIAGNOSIS — Z01.419 ENCOUNTER FOR ANNUAL ROUTINE GYNECOLOGICAL EXAMINATION: Primary | ICD-10-CM

## 2018-04-20 DIAGNOSIS — Z12.31 VISIT FOR SCREENING MAMMOGRAM: ICD-10-CM

## 2018-04-20 DIAGNOSIS — E66.3 OVERWEIGHT (BMI 25.0-29.9): ICD-10-CM

## 2018-04-20 PROBLEM — N91.2 AMENORRHEA: Status: RESOLVED | Noted: 2017-10-20 | Resolved: 2018-04-20

## 2018-04-20 PROCEDURE — 99396 PREV VISIT EST AGE 40-64: CPT | Performed by: OBSTETRICS & GYNECOLOGY

## 2018-04-20 PROCEDURE — G0145 SCR C/V CYTO,THINLAYER,RESCR: HCPCS | Performed by: OBSTETRICS & GYNECOLOGY

## 2018-04-20 PROCEDURE — 87624 HPV HI-RISK TYP POOLED RSLT: CPT | Performed by: OBSTETRICS & GYNECOLOGY

## 2018-04-20 RX ORDER — THIAMINE MONONITRATE (VIT B1) 100 MG
100 TABLET ORAL DAILY
COMMUNITY
End: 2018-04-20 | Stop reason: CLARIF

## 2018-04-20 NOTE — PROGRESS NOTES
Pt is a 49 y o  [id number] with Patient's last menstrual period was 2018 (exact date)  using BS/BTL for [facility] presents for preventive care  She notes the same partner since her last STI evaluation  In her lifetime she has been involved with >5 partners   Safe sexual practices (monogomy, condoms) are followed consistently  · She does  feel safe in the relationship  She does feel safe in her home  · Her calcium intake encompasses vitamin shake twice daily (each has 50% calcium), and calcium supplement for a total of 1500 mg daily servings daily on average  She does take additional Vitamin D (MVI or supplement)  · She exercises 3 times per week  · Her menses occur every irregular since weight loss surgery  Days, last 2-7 days and require regular pad every 3-24 hours hours  Menstrual History:  OB History      Para Term  AB Living    3 3 3     3    SAB TAB Ectopic Multiple Live Births                      Menarche age: 14  Patient's last menstrual period was 2018 (exact date)  ·      · She has never recieved an HPV vaccine  · tobacco use : does not use tobacco              · Colonoscopy: --removed precancerous polyps--repeat   · Last pap: --repeat today  · Last mammogram 2016--due for repeat    Past Medical History:   Diagnosis Date    Anxiety     Bulging lumbar disc     Carpal tunnel syndrome     RIGHT  LAST ASSESSED: 16    Chronic back pain     low    Colon polyps     Depression     Diabetes mellitus ([address] )     on victoza    GERD (gastroesophageal reflux disease)     Gestational diabetes     Hearing loss     left ear    Hyperlipidemia     IBS (irritable bowel syndrome)     Ileus ([address] )     LAST ASSESSED: 8/3/17    Labial cyst     LAST ASSESSED: 16    Myofascial pain     LAST ASSESSED: 16    Obesity     Ovarian cyst     LEFT   LAST ASSESSED: 16    Seasonal allergies     Thoracic outlet syndrome     2010    Trochanteric bursitis of both hips     LAST ASSESSED: 3/18/16    Ulnar neuropathy at elbow     Wears glasses        Past Surgical History:   Procedure Laterality Date    BILE DUCT EXPLORATION      ENDOSCOPIC REMOVAL OF STONES FROM BILIARY TRACT     SECTION      x3    CHOLECYSTECTOMY      COLONOSCOPY      DILATION AND CURETTAGE OF UTERUS      ENDOMETRIAL ABLATION      ERCP W/ SPHICTEROTOMY      FIRST RIB REMOVAL      FIRST RIB REMOVAL      THORAX EXCISION OF FIRST RIB    HYSTEROSCOPY      NEUROPLASTY / TRANSPOSITION ULNAR NERVE AT ELBOW      HI COLONOSCOPY FLX DX W/COLLJ SPEC WHEN PFRMD N/A 2017    Procedure: COLONOSCOPY;  Surgeon: Bev Thomas MD;  [facility]; Service: Gastroenterology    HI ESOPHAGOGASTRODUODENOSCOPY TRANSORAL DIAGNOSTIC N/A 2017    Procedure: ESOPHAGOGASTRODUODENOSCOPY (EGD); Surgeon: Raymond Alicea MD;  [facility];   Service: Gastroenterology    HI HYSTEROSCOPY,W/ENDO BX N/A 10/20/2017    Procedure: DILATATION AND CURETTAGE (D&C) WITH HYSTEROSCOPY  REMOVAL VULVAR RT  LESION;  Surgeon: Chandler Hahn MD;  [facility];  Service: Gynecology    HI LAP, ÁLVARO RESTRICT PROC, LONGITUDINAL GASTRECTOMY N/A 2018    Procedure: GASTRECTOMY SLEEVE LAPAROSCOPIC; INTRAOPERATIVE EGD ;  Surgeon: Chalmer Apley, MD;  [facility];  Service: Bariatrics    TONSILLECTOMY AND ADENOIDECTOMY      TUBAL LIGATION      ULNAR NERVE REPAIR Right     VEIN LIGATION AND [address] Right     VULVA SURGERY  10/20/2017    BIOPSY    WISDOM TOOTH EXTRACTION         OB History    Para Term  AB Living   3 3 3     3   SAB TAB Ectopic Multiple Live Births                  # Outcome Date GA Lbr Manuel/2nd Weight Sex Delivery Anes PTL Lv   3 Term            2 Term            1 Term                   Gyn HX:  menorrhagia and irregular menses       Current Outpatient Prescriptions:     Biotin 55051 MCG TABS, Take by mouth, Disp: , Rfl:     Calcium Citrate-Vitamin D 500-500 MG-UNIT PACK, Take by mouth, Disp: , Rfl:     Cyanocobalamin ([employer] VITAMIN B12 PO), Take by mouth, Disp: , Rfl:     escitalopram (LEXAPRO) 20 mg tablet, Take 20 mg by mouth daily  , Disp: , Rfl:     Multiple Vitamins-Minerals (MULTI COMPLETE PO), Take by mouth, Disp: , Rfl:     pantoprazole (PROTONIX) 40 mg tablet, Take 1 tablet by mouth daily, Disp: , Rfl:     QUEtiapine (SEROquel) 25 mg tablet, Take 25 mg by mouth daily at bedtime, Disp: , Rfl:     No Known Allergies    Social History     Social History    Marital status:      Spouse name: N/A    Number of children: 3    Years of education: GED then 2 year college program     Occupational History    [employer]     Social History Main Topics    Smoking status: Former Smoker     Quit date: 4/30/2017    Smokeless tobacco: Never Used    Alcohol use No    Drug use: No    Sexual activity: Yes     Partners: Male     Birth control/ protection: Female Sterilization      Comment: lifetime partners: 10     Other Topics Concern    None     Social History Narrative    NO PREFERENCE ON Gnosticism BELIEFS        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    Accepts blood products       Family History   Problem Relation Age of Onset    Diabetes Mother     Other Mother      HYPERCHOLESTEROLEMIA    Diabetes Father     Other Father      HYPERCHOLESTEROLEMIA    Colon cancer Father     Hypertension Brother     Diabetes Brother     Other Brother      HYPERCHOLESTEROLEMIA    Alcohol abuse Family     Asthma Family     Other Family      BACK PAIN    Cancer Family     Depression Family     Neuropathy Family     Heart disease Family        Blood pressure 90/68, height 5' 7 2" (1 707 m), weight 85 3 kg (188 lb), last menstrual period 04/09/2018, not currently breastfeeding  and Body mass index is 29 27 kg/m²  Physical Exam   Constitutional: She is oriented to person, place, and time  She appears well-developed and well-nourished  HENT:   Head: Normocephalic and atraumatic  Eyes: Conjunctivae and EOM are normal    Neck: Normal range of motion  Neck supple  No tracheal deviation present  No thyromegaly present  Cardiovascular: Normal rate, regular rhythm and normal heart sounds  Exam reveals no gallop and no friction rub  No murmur heard  Pulmonary/Chest: Effort normal and breath sounds normal  No stridor  No respiratory distress  She has no wheezes  She has no rales  Abdominal: Soft  Bowel sounds are normal  She exhibits no distension and no mass  There is no tenderness  There is no rebound and no guarding  Musculoskeletal: Normal range of motion  She exhibits no edema or tenderness  Lymphadenopathy:     She has no cervical adenopathy  Neurological: She is alert and oriented to person, place, and time  Skin: Skin is warm  No rash noted  No erythema  Psychiatric: She has a normal mood and affect  Her behavior is normal  Judgment and thought content normal    Breasts: breasts appear normal, no suspicious masses, no skin or nipple changes or axillary nodes, symmetric fibrous changes in both upper outer quadrants, right breast normal without mass, skin or nipple changes or axillary nodes, left breast normal without mass, skin or nipple changes or axillary nodes  vulva: normal external genitalia for age and no lesions, masses, epithelial changes, or exudate  vagina: color pink, rugae  well formed rugae and bleeding  without any bleeding  cervix: nullip, no lesions  and pap obtained  uterus: NSSC, AF, NT, mobile  adnexa: no masses or tenderness      A/P:  Pt is a 49 y o  [id number] with      There are no diagnoses linked to this encounter

## 2018-04-24 LAB — HPV RRNA GENITAL QL NAA+PROBE: NORMAL

## 2018-04-25 LAB
LAB AP GYN PRIMARY INTERPRETATION: NORMAL
LAB AP LMP: NORMAL
Lab: NORMAL

## 2018-06-21 ENCOUNTER — OFFICE VISIT (OUTPATIENT)
Dept: BARIATRICS | Facility: CLINIC | Age: 48
End: 2018-06-21
Payer: COMMERCIAL

## 2018-06-21 VITALS
DIASTOLIC BLOOD PRESSURE: 58 MMHG | HEART RATE: 62 BPM | WEIGHT: 175.5 LBS | BODY MASS INDEX: 28.21 KG/M2 | TEMPERATURE: 97.7 F | SYSTOLIC BLOOD PRESSURE: 90 MMHG | HEIGHT: 66 IN

## 2018-06-21 DIAGNOSIS — Z98.84 S/P LAPAROSCOPIC SLEEVE GASTRECTOMY: Primary | ICD-10-CM

## 2018-06-21 DIAGNOSIS — E66.3 OVERWEIGHT (BMI 25.0-29.9): ICD-10-CM

## 2018-06-21 DIAGNOSIS — K91.2 POSTSURGICAL MALABSORPTION: ICD-10-CM

## 2018-06-21 DIAGNOSIS — I10 ESSENTIAL HYPERTENSION: ICD-10-CM

## 2018-06-21 DIAGNOSIS — M79.10 MUSCLE PAIN: ICD-10-CM

## 2018-06-21 DIAGNOSIS — R53.83 OTHER FATIGUE: ICD-10-CM

## 2018-06-21 DIAGNOSIS — E78.1 PURE HYPERGLYCERIDEMIA: ICD-10-CM

## 2018-06-21 DIAGNOSIS — K21.9 GASTROESOPHAGEAL REFLUX DISEASE, ESOPHAGITIS PRESENCE NOT SPECIFIED: ICD-10-CM

## 2018-06-21 DIAGNOSIS — E11.9 TYPE 2 DIABETES MELLITUS WITHOUT COMPLICATION, WITHOUT LONG-TERM CURRENT USE OF INSULIN (HCC): ICD-10-CM

## 2018-06-21 PROBLEM — E66.01 MORBID OBESITY DUE TO EXCESS CALORIES (HCC): Status: RESOLVED | Noted: 2018-02-06 | Resolved: 2018-06-21

## 2018-06-21 PROCEDURE — 99213 OFFICE O/P EST LOW 20 MIN: CPT | Performed by: PHYSICIAN ASSISTANT

## 2018-06-21 NOTE — PROGRESS NOTES
Assessment/Plan:    Muscle pain  She reports 4 days ago she was putting on ice skates and notes when pulling them on hard and felt a "pop" in Left lateral side  She notes pain is dull to sharp in nature  Not related to meals  No nausea/vomiting/fever or chills  Pain is lateral/not around her incision sites  No incisional hernia appreciated  Appears to be muscle pull/strain or possibly muscle tear  Patient notes she "did not think it was from my surgery"  She denies any fever,chills, nausea/vomiting  Advised patient  that this is likely muscle pain/tear and should rest, use ice for 15 minutes on and 15 minutes off for at least 24 hours-then my try heat to the area for 15-20 minute intervals on and off but if ice feels better stay on ice- and  400 mg ibuprofen every 8 hours for the next 5 days  -take with food and Increase pantoprazole to twice daily during this time frame  S/P laparoscopic sleeve gastrectomy  -s/p Vertical Sleeve Gastrectomy with Dr Adrian Humphries on 2/4/2018  Overall doing Well  Initial:  219 5 lb  Current: 175 5 lb  EWL: 69% which is above average  Bautista: Current  Current BMI is Body mass index is 28 33 kg/m²  Tolerating a regular diet-yes  Eating at least 60 grams of protein per day-yes  Following 30/60 minute rule with liquids-yes  Drinking at least 64 ounces of fluid per day-yes  Drinking carbonated beverages-no  Sufficient exercise-yes  Using NSAIDs regularly-no  Using nicotine-no  Using alcohol-tried once and has not done so since/advised on effect and encouraged to abstain    Essential hypertension  Blood pressure ok/off medication /weight loss has helped  Type 2 diabetes mellitus without complication (HCC)  Lab Results   Component Value Date    HGBA1C 6 7 (H) 04/03/2018     She is followed by PCP-off diabetic medication-will be also getting hgA1c for her [employer]  Is improved from pre-op/weight loss has helped    No results for input(s): POCGLU in the last 72 hours  Blood Sugar Average: Last 72 hrs:      Overweight (BMI 25 0-29  9)  Improved  Weight loss is above average and advised she can expect weight loss to significantly slow or stop  Advised on diet to include some healthy carbohydrates as well    Other fatigue  She is complaining of some fatigue  She is eating primarily protein in diet and advised on balance to include some healthy carbohydrates-advised on good hydration and continuing  bariatric supplements    Her routine labs are being checked to assess for vitamin/mineral deficiency which could also contribute to fatigue    GERD (gastroesophageal reflux disease)  She is taking ppi daily-does note occasional breakthrough- will increase ppi to twice daily for now    Hyperlipidemia  Her TG was high on last lipids-she is also getting this checked with her labs for her [facility] insurance  Her excellent weight loss should likely improve this level  -but may have genetic component as well if levels remain high as her weight loss is above average       Diagnoses and all orders for this visit:    S/P laparoscopic sleeve gastrectomy  -     Comprehensive metabolic panel; Future  -     Copper Level; Future  -     Folate; Future  -     PTH, intact; Future  -     Vitamin A; Future  -     Vitamin B1, whole blood; Future  -     Zinc; Future    Gastroesophageal reflux disease, esophagitis presence not specified    Postsurgical malabsorption  -     Comprehensive metabolic panel; Future  -     Copper Level; Future  -     Folate; Future  -     PTH, intact; Future  -     Vitamin A; Future  -     Vitamin B1, whole blood; Future  -     Zinc; Future    Essential hypertension    Type 2 diabetes mellitus without complication, without long-term current use of insulin (HCC)    Muscle pain    Overweight (BMI 25 0-29  9)    Other fatigue    Pure hyperglyceridemia          Subjective:      Patient ID: Ran Cabrera is a 51 y o  female  She is here in routine follow-up   Notes a couple days ago she was lacing up her ice skates and pulled on laces hard-her a "pop" to her left side and has pain since  Pain is on side not abdominal  No associated fever, chills, nausea or vomiting    She is tolerating a regular diet  Taking supplements and up until a couple days ago she is exercising regularly  The following portions of the patient's history were reviewed and updated as appropriate: allergies, current medications, past family history, past medical history, past social history, past surgical history and problem list     Review of Systems   Constitutional: Negative for chills and fever  Unexpected weight change: planned weight loss  Respiratory: Negative for shortness of breath and wheezing  Cardiovascular: Negative for chest pain and palpitations  Gastrointestinal: Negative for abdominal pain, constipation, diarrhea, nausea and vomiting  Psychiatric/Behavioral: Suicidal ideas: no complait of anxiety or depression  Objective:      BP 90/58   Pulse 62   Temp 97 7 °F (36 5 °C)   Ht 5' 6" (1 676 m)   Wt 79 6 kg (175 lb 8 oz)   BMI 28 33 kg/m²          Physical Exam   Constitutional: She is oriented to person, place, and time  She appears well-developed and well-nourished  HENT:   Mouth/Throat: Oropharynx is clear and moist    Eyes: Conjunctivae are normal  No scleral icterus  Cardiovascular: Normal rate and regular rhythm  Pulmonary/Chest: Effort normal and breath sounds normal    Abdominal: Soft  Bowel sounds are normal  She exhibits no distension  There is no tenderness  There is no rebound and no guarding  No incisional hernias appreciated-left side particularly evaluated without an issue    Pain to left lateral side with some radiation to abdomen on palpation     Musculoskeletal:   Normal gait   Neurological: She is alert and oriented to person, place, and time  Psychiatric: She has a normal mood and affect   Her behavior is normal  Judgment and thought content normal    Nursing note and vitals reviewed  GOALS: Maintain +/-Continued weight loss with good nutrition intakes    Normal vitamin and mineral levels  Exercise as tolerated    BARRIERS: none identified

## 2018-06-21 NOTE — ASSESSMENT & PLAN NOTE
Lab Results   Component Value Date    HGBA1C 6 7 (H) 04/03/2018     She is followed by PCP-off diabetic medication-will be also getting hgA1c for her [employer]  Is improved from pre-op/weight loss has helped  No results for input(s): POCGLU in the last 72 hours      Blood Sugar Average: Last 72 hrs:

## 2018-06-21 NOTE — ASSESSMENT & PLAN NOTE
-s/p Vertical Sleeve Gastrectomy with Dr Loretta Turk on 2/4/2018  Overall doing Well  Initial:  219 5 lb  Current: 175 5 lb  EWL: 69% which is above average  Bautista: Current  Current BMI is Body mass index is 28 33 kg/m²      Tolerating a regular diet-yes  Eating at least 60 grams of protein per day-yes  Following 30/60 minute rule with liquids-yes  Drinking at least 64 ounces of fluid per day-yes  Drinking carbonated beverages-no  Sufficient exercise-yes  Using NSAIDs regularly-no  Using nicotine-no  Using alcohol-tried once and has not done so since/advised on effect and encouraged to abstain

## 2018-06-21 NOTE — PATIENT INSTRUCTIONS
Rest for the next few days as able  Avoid heavy lifting  Take 400 mg ibuprofen with food or milk every 8 hours for the next five days  Try ice to left side for 15 minute intervals on and off for 24 hours then can try heating pad/ or microwave heat roll up for 15-20 minutes on then 15-20 minutes off-do NOT sleep with heating pad (if ice feels better-can stay with use of ice)  If no better then recommend follow-up with PCP  Go to ER if you have pain with fever,chills, nausea/vomiting  Follow-up in 3 months  We kindly ask that you arrive 15 minutes before your scheduled appointment time with your provider to allow for our staff to room you and check your vital signs and update your chart  We thank you for your patience at your visit  Follow diet as discussed  Get lab work done prior to next office visit  It is recommended to check with your insurance BEFORE getting labs done to make sure they are covered by your policy  Make sure to HOLD any multivitamins that may contain biotin and any biotin supplements FOR 5 DAYS before any labs since it can affect the results  Follow vitamin  and mineral recommendations as reviewed with you  Exercise as tolerated  Call our office if you have any problems with abdominal pain especially associated with fever, chills, nausea, vomiting or any other concerns  All  Post-bariatric surgery patients should be aware that very small quantities of any alcohol  can cause impairment and it is very possible not to feel the effect  The effect can be in the system for several hours  It is also a stomach irritant  It is advised to AVOID alcohol, Nonsteroidal antiinflammatory drugs (NSAIDS) and nicotine of all forms   Any of these can cause stomach irritation/pain

## 2018-06-21 NOTE — ASSESSMENT & PLAN NOTE
Her TG was high on last lipids-she is also getting this checked with her labs for her [facility] insurance   Her excellent weight loss should likely improve this level  -but may have genetic component as well if levels remain high as her weight loss is above average

## 2018-06-21 NOTE — ASSESSMENT & PLAN NOTE
She reports 4 days ago she was putting on ice skates and notes when pulling them on hard and felt a "pop" in Left lateral side  She notes pain is dull to sharp in nature  Not related to meals  No nausea/vomiting/fever or chills  Pain is lateral/not around her incision sites  No incisional hernia appreciated  Appears to be muscle pull/strain or possibly muscle tear  Patient notes she "did not think it was from my surgery"  She denies any fever,chills, nausea/vomiting  Advised patient  that this is likely muscle pain/tear and should rest, use ice for 15 minutes on and 15 minutes off for at least 24 hours-then my try heat to the area for 15-20 minute intervals on and off but if ice feels better stay on ice- and  400 mg ibuprofen every 8 hours for the next 5 days  -take with food and Increase pantoprazole to twice daily during this time frame

## 2018-06-21 NOTE — ASSESSMENT & PLAN NOTE
Improved  Weight loss is above average and advised she can expect weight loss to significantly slow or stop    Advised on diet to include some healthy carbohydrates as well

## 2018-06-21 NOTE — ASSESSMENT & PLAN NOTE
She is complaining of some fatigue   She is eating primarily protein in diet and advised on balance to include some healthy carbohydrates-advised on good hydration and continuing  bariatric supplements    Her routine labs are being checked to assess for vitamin/mineral deficiency which could also contribute to fatigue

## 2018-07-03 ENCOUNTER — APPOINTMENT (OUTPATIENT)
Dept: LAB | Facility: CLINIC | Age: 48
End: 2018-07-03
Payer: COMMERCIAL

## 2018-07-03 ENCOUNTER — APPOINTMENT (OUTPATIENT)
Dept: LAB | Facility: CLINIC | Age: 48
End: 2018-07-03

## 2018-07-03 ENCOUNTER — TRANSCRIBE ORDERS (OUTPATIENT)
Dept: LAB | Facility: CLINIC | Age: 48
End: 2018-07-03

## 2018-07-03 DIAGNOSIS — Z98.84 S/P LAPAROSCOPIC SLEEVE GASTRECTOMY: ICD-10-CM

## 2018-07-03 DIAGNOSIS — K91.2 POSTSURGICAL MALABSORPTION: ICD-10-CM

## 2018-07-03 DIAGNOSIS — Z00.8 HEALTH EXAMINATION IN POPULATION SURVEY: Primary | ICD-10-CM

## 2018-07-03 DIAGNOSIS — Z00.8 HEALTH EXAMINATION IN POPULATION SURVEY: ICD-10-CM

## 2018-07-03 LAB
ALBUMIN SERPL BCP-MCNC: 3.4 G/DL (ref 3.5–5)
ALP SERPL-CCNC: 57 U/L (ref 46–116)
ALT SERPL W P-5'-P-CCNC: 31 U/L (ref 12–78)
ANION GAP SERPL CALCULATED.3IONS-SCNC: 7 MMOL/L (ref 4–13)
AST SERPL W P-5'-P-CCNC: 17 U/L (ref 5–45)
BILIRUB SERPL-MCNC: 0.3 MG/DL (ref 0.2–1)
BUN SERPL-MCNC: 21 MG/DL (ref 5–25)
CALCIUM SERPL-MCNC: 8.6 MG/DL (ref 8.3–10.1)
CHLORIDE SERPL-SCNC: 105 MMOL/L (ref 100–108)
CHOLEST SERPL-MCNC: 168 MG/DL (ref 50–200)
CO2 SERPL-SCNC: 29 MMOL/L (ref 21–32)
CREAT SERPL-MCNC: 0.61 MG/DL (ref 0.6–1.3)
EST. AVERAGE GLUCOSE BLD GHB EST-MCNC: 131 MG/DL
FOLATE SERPL-MCNC: >20 NG/ML (ref 3.1–17.5)
GFR SERPL CREATININE-BSD FRML MDRD: 107 ML/MIN/1.73SQ M
GLUCOSE SERPL-MCNC: 95 MG/DL (ref 65–140)
HBA1C MFR BLD: 6.2 % (ref 4.2–6.3)
HDLC SERPL-MCNC: 43 MG/DL (ref 40–60)
LDLC SERPL CALC-MCNC: 89 MG/DL (ref 0–100)
NONHDLC SERPL-MCNC: 125 MG/DL
POTASSIUM SERPL-SCNC: 3.8 MMOL/L (ref 3.5–5.3)
PROT SERPL-MCNC: 7.2 G/DL (ref 6.4–8.2)
PTH-INTACT SERPL-MCNC: 36.5 PG/ML (ref 18.4–80.1)
SODIUM SERPL-SCNC: 141 MMOL/L (ref 136–145)
TRIGL SERPL-MCNC: 182 MG/DL

## 2018-07-03 PROCEDURE — 36415 COLL VENOUS BLD VENIPUNCTURE: CPT

## 2018-07-03 PROCEDURE — 84630 ASSAY OF ZINC: CPT

## 2018-07-03 PROCEDURE — 83036 HEMOGLOBIN GLYCOSYLATED A1C: CPT

## 2018-07-03 PROCEDURE — 80053 COMPREHEN METABOLIC PANEL: CPT

## 2018-07-03 PROCEDURE — 82525 ASSAY OF COPPER: CPT

## 2018-07-03 PROCEDURE — 84425 ASSAY OF VITAMIN B-1: CPT

## 2018-07-03 PROCEDURE — 80061 LIPID PANEL: CPT

## 2018-07-03 PROCEDURE — 84590 ASSAY OF VITAMIN A: CPT

## 2018-07-03 PROCEDURE — 83970 ASSAY OF PARATHORMONE: CPT

## 2018-07-03 PROCEDURE — 82746 ASSAY OF FOLIC ACID SERUM: CPT

## 2018-07-06 ENCOUNTER — HOSPITAL ENCOUNTER (OUTPATIENT)
Dept: RADIOLOGY | Age: 48
Discharge: HOME | End: 2018-07-06
Payer: COMMERCIAL

## 2018-07-06 DIAGNOSIS — Z12.31 VISIT FOR SCREENING MAMMOGRAM: ICD-10-CM

## 2018-07-06 LAB
COPPER SERPL-MCNC: 123 UG/DL (ref 72–166)
VIT A SERPL-MCNC: 24.5 UG/DL (ref 33.1–100)
ZINC SERPL-MCNC: 90 UG/DL (ref 56–134)

## 2018-07-06 PROCEDURE — 77067 SCR MAMMO BI INCL CAD: CPT

## 2018-07-07 LAB — VIT B1 BLD-SCNC: 138.1 NMOL/L (ref 66.5–200)

## 2018-08-16 ENCOUNTER — OFFICE VISIT (OUTPATIENT)
Dept: OBGYN CLINIC | Facility: CLINIC | Age: 48
End: 2018-08-16
Payer: COMMERCIAL

## 2018-08-16 VITALS
HEART RATE: 75 BPM | HEIGHT: 65 IN | OXYGEN SATURATION: 97 % | DIASTOLIC BLOOD PRESSURE: 62 MMHG | SYSTOLIC BLOOD PRESSURE: 100 MMHG | BODY MASS INDEX: 28.32 KG/M2 | WEIGHT: 170 LBS

## 2018-08-16 DIAGNOSIS — N89.8 VAGINAL DISCHARGE: ICD-10-CM

## 2018-08-16 DIAGNOSIS — R10.2 PELVIC PAIN IN FEMALE: Primary | ICD-10-CM

## 2018-08-16 PROCEDURE — 87660 TRICHOMONAS VAGIN DIR PROBE: CPT | Performed by: NURSE PRACTITIONER

## 2018-08-16 PROCEDURE — 87480 CANDIDA DNA DIR PROBE: CPT | Performed by: NURSE PRACTITIONER

## 2018-08-16 PROCEDURE — 87510 GARDNER VAG DNA DIR PROBE: CPT | Performed by: NURSE PRACTITIONER

## 2018-08-16 PROCEDURE — 99213 OFFICE O/P EST LOW 20 MIN: CPT | Performed by: NURSE PRACTITIONER

## 2018-08-16 NOTE — PROGRESS NOTES
Assessment/Plan:    1  Pelvic pain in female  Elicited on pelvic exam   Check pelvic US  2  Vaginal discharge  r/o infection since vaginal discharge present and will treat accordingly  - US pelvis complete non OB; Future      Subjective:      Patient ID: Stefano Kurtz is a 51 y o  female  HPI  PROBLEM VISIT  CC: pelvic pain    Here with c/o ovarian pain-- "feels like I am ovulating all month"  Has had problems off and on through years-- ie, abnormal bleeding  H/o EMA, periods were light after this procedure, but have since become longer and heavier  H/o gastric sleeve-- unable to take NSAIDs  Down 50+ lbs-- no longer diabetic or with hyperlipidemia  H/o IBS  Colonoscopy Q 3yrs for h/o polyps    BC: tubal ligation    Periods every 28d, 7 d of bleeding, moderate amount, cramps  The following portions of the patient's history were reviewed and updated as appropriate: allergies, current medications, past family history, past medical history, past social history, past surgical history and problem list     Review of Systems   Constitutional: Negative for chills and fever  Gastrointestinal: Negative for abdominal distention, abdominal pain, constipation, diarrhea, nausea and vomiting  Genitourinary: Positive for menstrual problem and pelvic pain  Negative for difficulty urinating, dysuria, frequency, genital sores, hematuria, urgency, vaginal bleeding and vaginal discharge  Objective:    /62 (BP Location: Left arm, Patient Position: Sitting, Cuff Size: Standard)   Pulse 75   Ht 5' 5 25" (1 657 m)   Wt 77 1 kg (170 lb)   LMP 08/02/2018   SpO2 97%   BMI 28 07 kg/m²      Physical Exam   Constitutional: She appears well-developed  Genitourinary: There is no rash, tenderness, lesion or injury on the right labia  There is no rash, tenderness, lesion or injury on the left labia  Uterus is tender  Uterus is not enlarged   Cervix exhibits no motion tenderness, no discharge and no friability  Right adnexum displays tenderness  Right adnexum displays no mass  Left adnexum displays tenderness  Left adnexum displays no mass  No erythema, tenderness or bleeding in the vagina  No foreign body in the vagina  No signs of injury around the vagina  Vaginal discharge found  Genitourinary Comments: Diffusely tender in pelvis   Lymphadenopathy:        Right: No inguinal adenopathy present  Left: No inguinal adenopathy present

## 2018-08-19 LAB
CANDIDA RRNA VAG QL PROBE: NEGATIVE
G VAGINALIS RRNA GENITAL QL PROBE: NEGATIVE
T VAGINALIS RRNA GENITAL QL PROBE: NEGATIVE

## 2018-09-01 DIAGNOSIS — K21.9 GASTROESOPHAGEAL REFLUX DISEASE WITHOUT ESOPHAGITIS: Primary | ICD-10-CM

## 2018-09-03 ENCOUNTER — HOSPITAL ENCOUNTER (OUTPATIENT)
Facility: HOSPITAL | Age: 48
Setting detail: OBSERVATION
Discharge: HOME | End: 2018-09-05
Attending: EMERGENCY MEDICINE | Admitting: HOSPITALIST
Payer: COMMERCIAL

## 2018-09-03 ENCOUNTER — APPOINTMENT (EMERGENCY)
Dept: CT IMAGING | Facility: HOSPITAL | Age: 48
End: 2018-09-03
Payer: COMMERCIAL

## 2018-09-03 DIAGNOSIS — R07.9 CHEST PAIN: Primary | ICD-10-CM

## 2018-09-03 DIAGNOSIS — R42 DIZZINESS: ICD-10-CM

## 2018-09-03 DIAGNOSIS — M79.10 MUSCLE PAIN: ICD-10-CM

## 2018-09-03 LAB
ANION GAP SERPL CALCULATED.3IONS-SCNC: 7 MMOL/L (ref 4–13)
ATRIAL RATE: 68 BPM
BASOPHILS # BLD AUTO: 0.05 THOUSANDS/ΜL (ref 0–0.1)
BASOPHILS NFR BLD AUTO: 1 % (ref 0–1)
BUN SERPL-MCNC: 17 MG/DL (ref 5–25)
CALCIUM SERPL-MCNC: 8.8 MG/DL (ref 8.3–10.1)
CHLORIDE SERPL-SCNC: 102 MMOL/L (ref 100–108)
CO2 SERPL-SCNC: 30 MMOL/L (ref 21–32)
CREAT SERPL-MCNC: 0.67 MG/DL (ref 0.6–1.3)
DEPRECATED D DIMER PPP: 306 NG/ML (FEU) (ref 0–424)
EOSINOPHIL # BLD AUTO: 0.22 THOUSAND/ΜL (ref 0–0.61)
EOSINOPHIL NFR BLD AUTO: 3 % (ref 0–6)
ERYTHROCYTE [DISTWIDTH] IN BLOOD BY AUTOMATED COUNT: 12.5 % (ref 11.6–15.1)
GFR SERPL CREATININE-BSD FRML MDRD: 104 ML/MIN/1.73SQ M
GLUCOSE SERPL-MCNC: 112 MG/DL (ref 65–140)
HCT VFR BLD AUTO: 42.3 % (ref 34.8–46.1)
HGB BLD-MCNC: 13.8 G/DL (ref 11.5–15.4)
IMM GRANULOCYTES # BLD AUTO: 0.02 THOUSAND/UL (ref 0–0.2)
IMM GRANULOCYTES NFR BLD AUTO: 0 % (ref 0–2)
LYMPHOCYTES # BLD AUTO: 2.28 THOUSANDS/ΜL (ref 0.6–4.47)
LYMPHOCYTES NFR BLD AUTO: 28 % (ref 14–44)
MCH RBC QN AUTO: 30.3 PG (ref 26.8–34.3)
MCHC RBC AUTO-ENTMCNC: 32.6 G/DL (ref 31.4–37.4)
MCV RBC AUTO: 93 FL (ref 82–98)
MONOCYTES # BLD AUTO: 0.58 THOUSAND/ΜL (ref 0.17–1.22)
MONOCYTES NFR BLD AUTO: 7 % (ref 4–12)
NEUTROPHILS # BLD AUTO: 4.94 THOUSANDS/ΜL (ref 1.85–7.62)
NEUTS SEG NFR BLD AUTO: 61 % (ref 43–75)
NRBC BLD AUTO-RTO: 0 /100 WBCS
P AXIS: 256 DEGREES
PLATELET # BLD AUTO: 248 THOUSANDS/UL (ref 149–390)
PMV BLD AUTO: 9.7 FL (ref 8.9–12.7)
POTASSIUM SERPL-SCNC: 4 MMOL/L (ref 3.5–5.3)
PR INTERVAL: 184 MS
QRS AXIS: 124 DEGREES
QRSD INTERVAL: 88 MS
QT INTERVAL: 408 MS
QTC INTERVAL: 433 MS
RBC # BLD AUTO: 4.55 MILLION/UL (ref 3.81–5.12)
SODIUM SERPL-SCNC: 139 MMOL/L (ref 136–145)
T WAVE AXIS: 57 DEGREES
TROPONIN I SERPL-MCNC: <0.02 NG/ML
TROPONIN I SERPL-MCNC: <0.02 NG/ML
VENTRICULAR RATE: 68 BPM
WBC # BLD AUTO: 8.09 THOUSAND/UL (ref 4.31–10.16)

## 2018-09-03 PROCEDURE — 96375 TX/PRO/DX INJ NEW DRUG ADDON: CPT

## 2018-09-03 PROCEDURE — 99219 PR INITIAL OBSERVATION CARE/DAY 50 MINUTES: CPT | Performed by: PHYSICIAN ASSISTANT

## 2018-09-03 PROCEDURE — 99285 EMERGENCY DEPT VISIT HI MDM: CPT

## 2018-09-03 PROCEDURE — 84484 ASSAY OF TROPONIN QUANT: CPT | Performed by: EMERGENCY MEDICINE

## 2018-09-03 PROCEDURE — 96374 THER/PROPH/DIAG INJ IV PUSH: CPT

## 2018-09-03 PROCEDURE — 84484 ASSAY OF TROPONIN QUANT: CPT | Performed by: PHYSICIAN ASSISTANT

## 2018-09-03 PROCEDURE — 36415 COLL VENOUS BLD VENIPUNCTURE: CPT | Performed by: EMERGENCY MEDICINE

## 2018-09-03 PROCEDURE — 93010 ELECTROCARDIOGRAM REPORT: CPT | Performed by: INTERNAL MEDICINE

## 2018-09-03 PROCEDURE — 93005 ELECTROCARDIOGRAM TRACING: CPT

## 2018-09-03 PROCEDURE — 85025 COMPLETE CBC W/AUTO DIFF WBC: CPT | Performed by: EMERGENCY MEDICINE

## 2018-09-03 PROCEDURE — 96361 HYDRATE IV INFUSION ADD-ON: CPT

## 2018-09-03 PROCEDURE — 70498 CT ANGIOGRAPHY NECK: CPT

## 2018-09-03 PROCEDURE — 70496 CT ANGIOGRAPHY HEAD: CPT

## 2018-09-03 PROCEDURE — 85379 FIBRIN DEGRADATION QUANT: CPT | Performed by: EMERGENCY MEDICINE

## 2018-09-03 PROCEDURE — 80048 BASIC METABOLIC PNL TOTAL CA: CPT | Performed by: EMERGENCY MEDICINE

## 2018-09-03 RX ORDER — KETOROLAC TROMETHAMINE 30 MG/ML
15 INJECTION, SOLUTION INTRAMUSCULAR; INTRAVENOUS ONCE
Status: COMPLETED | OUTPATIENT
Start: 2018-09-03 | End: 2018-09-03

## 2018-09-03 RX ORDER — ASPIRIN 81 MG/1
324 TABLET, CHEWABLE ORAL ONCE
Status: COMPLETED | OUTPATIENT
Start: 2018-09-03 | End: 2018-09-03

## 2018-09-03 RX ORDER — PANTOPRAZOLE SODIUM 40 MG/1
40 TABLET, DELAYED RELEASE ORAL
Status: DISCONTINUED | OUTPATIENT
Start: 2018-09-04 | End: 2018-09-05 | Stop reason: HOSPADM

## 2018-09-03 RX ORDER — ACETAMINOPHEN 325 MG/1
650 TABLET ORAL EVERY 4 HOURS PRN
Status: DISCONTINUED | OUTPATIENT
Start: 2018-09-03 | End: 2018-09-05 | Stop reason: HOSPADM

## 2018-09-03 RX ORDER — ONDANSETRON 2 MG/ML
4 INJECTION INTRAMUSCULAR; INTRAVENOUS ONCE
Status: COMPLETED | OUTPATIENT
Start: 2018-09-03 | End: 2018-09-03

## 2018-09-03 RX ORDER — ONDANSETRON 2 MG/ML
4 INJECTION INTRAMUSCULAR; INTRAVENOUS EVERY 6 HOURS PRN
Status: DISCONTINUED | OUTPATIENT
Start: 2018-09-03 | End: 2018-09-05 | Stop reason: HOSPADM

## 2018-09-03 RX ORDER — MORPHINE SULFATE 4 MG/ML
4 INJECTION, SOLUTION INTRAMUSCULAR; INTRAVENOUS ONCE
Status: COMPLETED | OUTPATIENT
Start: 2018-09-03 | End: 2018-09-03

## 2018-09-03 RX ORDER — ESCITALOPRAM OXALATE 20 MG/1
20 TABLET ORAL DAILY
Status: DISCONTINUED | OUTPATIENT
Start: 2018-09-04 | End: 2018-09-05 | Stop reason: HOSPADM

## 2018-09-03 RX ORDER — QUETIAPINE FUMARATE 25 MG/1
25 TABLET, FILM COATED ORAL
Status: DISCONTINUED | OUTPATIENT
Start: 2018-09-03 | End: 2018-09-05 | Stop reason: HOSPADM

## 2018-09-03 RX ORDER — B-COMPLEX WITH VITAMIN C
1 TABLET ORAL
Status: DISCONTINUED | OUTPATIENT
Start: 2018-09-04 | End: 2018-09-05 | Stop reason: HOSPADM

## 2018-09-03 RX ADMIN — ACETAMINOPHEN 650 MG: 325 TABLET, FILM COATED ORAL at 23:32

## 2018-09-03 RX ADMIN — SODIUM CHLORIDE 1000 ML: 0.9 INJECTION, SOLUTION INTRAVENOUS at 20:10

## 2018-09-03 RX ADMIN — KETOROLAC TROMETHAMINE 15 MG: 30 INJECTION, SOLUTION INTRAMUSCULAR at 19:33

## 2018-09-03 RX ADMIN — QUETIAPINE FUMARATE 25 MG: 25 TABLET ORAL at 23:33

## 2018-09-03 RX ADMIN — MORPHINE SULFATE 4 MG: 4 INJECTION INTRAVENOUS at 20:24

## 2018-09-03 RX ADMIN — ONDANSETRON 4 MG: 2 INJECTION INTRAMUSCULAR; INTRAVENOUS at 23:32

## 2018-09-03 RX ADMIN — IOHEXOL 85 ML: 350 INJECTION, SOLUTION INTRAVENOUS at 20:01

## 2018-09-03 RX ADMIN — ASPIRIN 81 MG 324 MG: 81 TABLET ORAL at 18:37

## 2018-09-03 RX ADMIN — ONDANSETRON 4 MG: 2 INJECTION INTRAMUSCULAR; INTRAVENOUS at 18:41

## 2018-09-03 NOTE — LETTER
[facility]  [address] [address]  Dept: [phone]    September 5, 2018     Patient: Rio Addison   YOB: 1970   Date of Visit: 9/3/2018       To Whom it May Concern:    Rio Addison is under my professional care  She was seen in the hospital from 9/3/2018   to 09/05/18  She may return to work on 9/11/2018  If you have any questions or concerns, please don't hesitate to call           Sincerely,          Esther Whyte MD  [address] [place] [address] Internal Medicine Service

## 2018-09-04 ENCOUNTER — APPOINTMENT (OUTPATIENT)
Dept: MRI IMAGING | Facility: HOSPITAL | Age: 48
End: 2018-09-04
Payer: COMMERCIAL

## 2018-09-04 ENCOUNTER — APPOINTMENT (OUTPATIENT)
Dept: RADIOLOGY | Facility: HOSPITAL | Age: 48
End: 2018-09-04
Payer: COMMERCIAL

## 2018-09-04 LAB
ANION GAP SERPL CALCULATED.3IONS-SCNC: 7 MMOL/L (ref 4–13)
ATRIAL RATE: 56 BPM
ATRIAL RATE: 60 BPM
ATRIAL RATE: 68 BPM
BUN SERPL-MCNC: 12 MG/DL (ref 5–25)
CALCIUM SERPL-MCNC: 8.1 MG/DL (ref 8.3–10.1)
CHLORIDE SERPL-SCNC: 107 MMOL/L (ref 100–108)
CO2 SERPL-SCNC: 29 MMOL/L (ref 21–32)
CREAT SERPL-MCNC: 0.65 MG/DL (ref 0.6–1.3)
GFR SERPL CREATININE-BSD FRML MDRD: 105 ML/MIN/1.73SQ M
GLUCOSE SERPL-MCNC: 113 MG/DL (ref 65–140)
P AXIS: 57 DEGREES
P AXIS: 69 DEGREES
P AXIS: 69 DEGREES
POTASSIUM SERPL-SCNC: 3.4 MMOL/L (ref 3.5–5.3)
PR INTERVAL: 194 MS
PR INTERVAL: 208 MS
PR INTERVAL: 210 MS
QRS AXIS: 115 DEGREES
QRS AXIS: 116 DEGREES
QRS AXIS: 124 DEGREES
QRSD INTERVAL: 84 MS
QRSD INTERVAL: 96 MS
QRSD INTERVAL: 96 MS
QT INTERVAL: 436 MS
QT INTERVAL: 456 MS
QT INTERVAL: 458 MS
QTC INTERVAL: 440 MS
QTC INTERVAL: 458 MS
QTC INTERVAL: 463 MS
SODIUM SERPL-SCNC: 143 MMOL/L (ref 136–145)
T WAVE AXIS: 60 DEGREES
T WAVE AXIS: 65 DEGREES
T WAVE AXIS: 71 DEGREES
TROPONIN I SERPL-MCNC: <0.02 NG/ML
TROPONIN I SERPL-MCNC: <0.02 NG/ML
VENTRICULAR RATE: 56 BPM
VENTRICULAR RATE: 60 BPM
VENTRICULAR RATE: 68 BPM

## 2018-09-04 PROCEDURE — 93010 ELECTROCARDIOGRAM REPORT: CPT | Performed by: INTERNAL MEDICINE

## 2018-09-04 PROCEDURE — 72141 MRI NECK SPINE W/O DYE: CPT

## 2018-09-04 PROCEDURE — 93005 ELECTROCARDIOGRAM TRACING: CPT

## 2018-09-04 PROCEDURE — 84484 ASSAY OF TROPONIN QUANT: CPT | Performed by: HOSPITALIST

## 2018-09-04 PROCEDURE — 99225 PR SBSQ OBSERVATION CARE/DAY 25 MINUTES: CPT | Performed by: INTERNAL MEDICINE

## 2018-09-04 PROCEDURE — 84484 ASSAY OF TROPONIN QUANT: CPT | Performed by: PHYSICIAN ASSISTANT

## 2018-09-04 PROCEDURE — 72050 X-RAY EXAM NECK SPINE 4/5VWS: CPT

## 2018-09-04 PROCEDURE — 80048 BASIC METABOLIC PNL TOTAL CA: CPT | Performed by: PHYSICIAN ASSISTANT

## 2018-09-04 RX ORDER — METHOCARBAMOL 500 MG/1
500 TABLET, FILM COATED ORAL EVERY 8 HOURS SCHEDULED
Status: DISCONTINUED | OUTPATIENT
Start: 2018-09-04 | End: 2018-09-05 | Stop reason: HOSPADM

## 2018-09-04 RX ORDER — OXYCODONE HYDROCHLORIDE 5 MG/1
5 TABLET ORAL EVERY 4 HOURS PRN
Status: DISCONTINUED | OUTPATIENT
Start: 2018-09-04 | End: 2018-09-05 | Stop reason: HOSPADM

## 2018-09-04 RX ORDER — PANTOPRAZOLE SODIUM 40 MG/1
TABLET, DELAYED RELEASE ORAL
Qty: 90 TABLET | Refills: 0 | Status: SHIPPED | OUTPATIENT
Start: 2018-09-04 | End: 2018-09-05 | Stop reason: SDUPTHER

## 2018-09-04 RX ORDER — POTASSIUM CHLORIDE 20 MEQ/1
40 TABLET, EXTENDED RELEASE ORAL ONCE
Status: COMPLETED | OUTPATIENT
Start: 2018-09-04 | End: 2018-09-04

## 2018-09-04 RX ORDER — MORPHINE SULFATE 2 MG/ML
2 INJECTION, SOLUTION INTRAMUSCULAR; INTRAVENOUS EVERY 4 HOURS PRN
Status: DISCONTINUED | OUTPATIENT
Start: 2018-09-04 | End: 2018-09-05 | Stop reason: HOSPADM

## 2018-09-04 RX ORDER — SODIUM CHLORIDE 9 MG/ML
100 INJECTION, SOLUTION INTRAVENOUS CONTINUOUS
Status: DISCONTINUED | OUTPATIENT
Start: 2018-09-04 | End: 2018-09-05 | Stop reason: HOSPADM

## 2018-09-04 RX ORDER — KETOROLAC TROMETHAMINE 30 MG/ML
15 INJECTION, SOLUTION INTRAMUSCULAR; INTRAVENOUS EVERY 6 HOURS SCHEDULED
Status: COMPLETED | OUTPATIENT
Start: 2018-09-04 | End: 2018-09-04

## 2018-09-04 RX ADMIN — KETOROLAC TROMETHAMINE 15 MG: 30 INJECTION, SOLUTION INTRAMUSCULAR at 23:31

## 2018-09-04 RX ADMIN — PANTOPRAZOLE SODIUM 40 MG: 40 TABLET, DELAYED RELEASE ORAL at 05:31

## 2018-09-04 RX ADMIN — METHOCARBAMOL 500 MG: 500 TABLET ORAL at 22:20

## 2018-09-04 RX ADMIN — OXYCODONE HYDROCHLORIDE 5 MG: 5 TABLET ORAL at 16:21

## 2018-09-04 RX ADMIN — SODIUM CHLORIDE 1000 ML: 0.9 INJECTION, SOLUTION INTRAVENOUS at 12:05

## 2018-09-04 RX ADMIN — KETOROLAC TROMETHAMINE 15 MG: 30 INJECTION, SOLUTION INTRAMUSCULAR at 17:31

## 2018-09-04 RX ADMIN — ESCITALOPRAM OXALATE 20 MG: 20 TABLET ORAL at 08:14

## 2018-09-04 RX ADMIN — KETOROLAC TROMETHAMINE 15 MG: 30 INJECTION, SOLUTION INTRAMUSCULAR at 12:04

## 2018-09-04 RX ADMIN — OYSTER SHELL CALCIUM WITH VITAMIN D 1 TABLET: 500; 200 TABLET, FILM COATED ORAL at 08:14

## 2018-09-04 RX ADMIN — SODIUM CHLORIDE 100 ML/HR: 0.9 INJECTION, SOLUTION INTRAVENOUS at 16:31

## 2018-09-04 RX ADMIN — MORPHINE SULFATE 2 MG: 2 INJECTION, SOLUTION INTRAMUSCULAR; INTRAVENOUS at 20:00

## 2018-09-04 RX ADMIN — QUETIAPINE FUMARATE 25 MG: 25 TABLET ORAL at 22:20

## 2018-09-04 RX ADMIN — POTASSIUM CHLORIDE 40 MEQ: 1500 TABLET, EXTENDED RELEASE ORAL at 12:04

## 2018-09-04 RX ADMIN — ACETAMINOPHEN 650 MG: 325 TABLET, FILM COATED ORAL at 08:15

## 2018-09-04 RX ADMIN — METHOCARBAMOL 500 MG: 500 TABLET ORAL at 14:11

## 2018-09-04 NOTE — H&P
H&P- Leyda Howard 1970, 51 y o  female MRN: [id number]    Unit/Bed#: -01 Encounter: [id number]    Primary Care Provider: Salo Doherty DO   Date and time admitted to hospital: 9/3/2018  6:10 PM    * Dizziness   Assessment & Plan    · Cannot r/o anginal equivalent  · Pt does have h/o thoracic outlet syndrome  · CTA head and neck unremarkable   · Initial troponin negative, EKG unremarkable  · D-dimer negative  · LISS score 2, will check inpatient stress test   · Will trend troponins, serial EKG's   · Monitor on telemetry   · Will check C-spine XR as there is some suspicion that right arm pain/paraesthesias may be isolated symptom   · Orthostatic vitals         Diabetes mellitus (HCC)   Assessment & Plan    · Last A1c 6 7, has been monitored off medication following sleeve gastrectomy        Depression   Assessment & Plan    · Continue home medications          VTE Prophylaxis: not required, low risk   / reason for no mechanical VTE prophylaxis : ambulate pt   Code Status: FULL  POLST: POLST form is not discussed and not completed at this time  Discussion with family: none    Anticipated Length of Stay:  Patient will be admitted on an Observation basis with an anticipated length of stay of  < 2 midnights  Justification for Hospital Stay: per plan above    Total Time for Visit, including Counseling / Coordination of Care: 30 minutes  Greater than 50% of this total time spent on direct patient counseling and coordination of care  Chief Complaint:   Chest tightness    History of Present Illness:    Leyda Howard is a 51 y o  female with PMHx of DM who presents with chest tightness and lightheadedness  She states that she has been having right neck, shoulder and arm pain for the past two days with some paraesthesias and dorsal right hand numbness  Then today she was in [place] when she suddenly became extremely lightheaded and her vision "went black"  She says she broke out in a cold sweat    She stopped walking and closed her eyes and says eventually she was [facility]  She went right to [facility] and then started developing some chest pressure and return of the cold sweats  She also became very nauseous and almost vomited  She called a coworker in the ED who advised her to come in  She says she was worried she was having thoracic outlet syndrome on the other side as she says her symptoms are somewhat similar to when she had it on her left side years ago  However she denies any lightheadedness or increased pain with flexion of her neck or turning her head to the right side which she does admit to lightheadedness with turning her head to the left when she had left sided thoracic outlet syndrome  She does report some weakness of the right hand as well for the past couple days  She denies any trauma to her neck but does admit that 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  She otherwise denies abdominal pain or diarrhea, denies urinary symptoms, denies HA, denies palpitations, BOOKER or LE edema  Denies slurred speech, facial droop, gait imbalance  Review of Systems:    Review of Systems   HENT: Negative  Eyes: Negative  Respiratory: Positive for chest tightness  Gastrointestinal: Positive for nausea  Endocrine: Negative  Musculoskeletal: Positive for neck pain  Allergic/Immunologic: Negative  Neurological: Positive for weakness and light-headedness  Hematological: Negative  Psychiatric/Behavioral: Negative  Past Medical and Surgical History:     Past Medical History:   Diagnosis Date    Anxiety     Bulging lumbar disc     Carpal tunnel syndrome     RIGHT    LAST ASSESSED: 12/7/16    Chronic back pain     low    Colon polyps     Depression     Diabetes mellitus (HCC)     on victoza    GERD (gastroesophageal reflux disease)     Gestational diabetes     Hearing loss     left ear    Hyperlipidemia     IBS (irritable bowel syndrome)     Ileus ([facility] [address] )     LAST ASSESSED: 8/3/17    Labial cyst     LAST ASSESSED: 16    Myofascial pain     LAST ASSESSED: 16    Obesity     Ovarian cyst     LEFT  LAST ASSESSED: 16    Pap smear for cervical cancer screening     2018--pap wnl, HRHPV neg    Seasonal allergies     Thoracic outlet syndrome     2010    Trochanteric bursitis of both hips     LAST ASSESSED: 3/18/16    Ulnar neuropathy at elbow     Wears glasses        Past Surgical History:   Procedure Laterality Date    BILE DUCT EXPLORATION      ENDOSCOPIC REMOVAL OF STONES FROM BILIARY TRACT     SECTION      x3    CHOLECYSTECTOMY      COLONOSCOPY      DILATION AND CURETTAGE OF UTERUS      ENDOMETRIAL ABLATION      ERCP W/ SPHICTEROTOMY      FIRST RIB REMOVAL      FIRST RIB REMOVAL      THORAX EXCISION OF FIRST RIB    HYSTEROSCOPY      NEUROPLASTY / TRANSPOSITION ULNAR NERVE AT ELBOW      NH COLONOSCOPY FLX DX W/COLLJ SPEC WHEN PFRMD N/A 2017    Procedure: COLONOSCOPY;  Surgeon: Lucy Rico MD;  [facility]: [facility]; Service: Gastroenterology    NH ESOPHAGOGASTRODUODENOSCOPY TRANSORAL DIAGNOSTIC N/A 2017    Procedure: ESOPHAGOGASTRODUODENOSCOPY (EGD); Surgeon: Paulie Rodriguez MD;  Location: [facility];   Service: Gastroenterology    NH HYSTEROSCOPY,W/ENDO BX N/A 10/20/2017    Procedure: DILATATION AND CURETTAGE (D&C) WITH HYSTEROSCOPY  REMOVAL VULVAR RT  LESION;  Surgeon: Greer Burroughs MD;  Location: [facility];  Service: Gynecology    NH LAP, ÁLVARO RESTRICT [address], LONGITUDINAL GASTRECTOMY N/A 2018    Procedure: GASTRECTOMY SLEEVE LAPAROSCOPIC; INTRAOPERATIVE EGD ;  Surgeon: Levi Kenny MD;  [facility];  Service: Bariatrics    TONSILLECTOMY AND ADENOIDECTOMY      TUBAL LIGATION      ULNAR NERVE REPAIR Right     VEIN LIGATION AND STRIPPING Right     VULVA SURGERY  10/20/2017    BIOPSY    WISDOM TOOTH EXTRACTION         Meds/Allergies:    Prior to Admission medications    Medication Sig Start Date End Date Taking? Authorizing Provider   Biotin 47585 MCG TABS Take by mouth   Yes Historical Provider, MD   Calcium Citrate-Vitamin D 500-500 MG-UNIT PACK Take by mouth   Yes Historical Provider, MD   Cyanocobalamin ([employer] VITAMIN B12 PO) Take by mouth   Yes Historical Provider, MD   escitalopram (LEXAPRO) 20 mg tablet Take 20 mg by mouth daily  Yes Historical Provider, MD   Multiple Vitamins-Minerals (MULTI COMPLETE PO) Take by mouth   Yes Historical Provider, MD   pantoprazole (PROTONIX) 40 mg tablet Take 1 tablet by mouth daily 6/17/16  Yes Historical Provider, MD   QUEtiapine (SEROquel) 25 mg tablet Take 25 mg by mouth daily at bedtime   Yes Historical Provider, MD     I have reviewed home medications with patient personally  Allergies: No Known Allergies    Social History:     Marital Status:    Occupation: [employer]  Patient Pre-hospital Living Situation: not discussed  Patient Pre-hospital Level of Mobility: independent  Patient Pre-hospital Diet Restrictions: none  Substance Use History:   History   Alcohol Use No     History   Smoking Status    Former Smoker    Quit date: 4/30/2017   Smokeless Tobacco    Never Used     History   Drug Use No       Family History:    non-contributory    Physical Exam:     Vitals:   Blood Pressure: 96/52 (09/03/18 2258)  Pulse: 62 (09/03/18 2258)  Temperature: 97 9 °F (36 6 °C) (09/03/18 2258)  Temp Source: Oral (09/03/18 2258)  Respirations: 18 (09/03/18 2258)  Height: 5' 8" (172 7 cm) (09/03/18 1814)  Weight - Scale: 78 6 kg (173 lb 4 5 oz) (09/03/18 2251)  SpO2: 97 % (09/03/18 2258)    Physical Exam   Constitutional: She appears well-developed and well-nourished  No distress  HENT:   Head: Normocephalic  Mouth/Throat: Oropharynx is clear and moist    Cardiovascular: Normal rate, regular rhythm, normal heart sounds and intact distal pulses  Exam reveals no gallop and no friction rub      No murmur heard   Pulmonary/Chest: Effort normal and breath sounds normal  No respiratory distress  She has no wheezes  She has no rales  She exhibits no tenderness  Abdominal: Soft  Bowel sounds are normal  She exhibits no distension and no mass  There is no tenderness  There is no rebound and no guarding  Musculoskeletal: She exhibits no edema or tenderness  Neurological: She is alert  ?  strength 4/5 of right hand, 5/5 left hand   Decreased sensation of dorsum of right hand  Full active ROM of right arm/elbow/digits   Skin: Skin is warm and dry  No rash noted  She is not diaphoretic  No erythema  No pallor  Psychiatric: She has a normal mood and affect  Her behavior is normal    Nursing note and vitals reviewed  Additional Data:     Lab Results: I have personally reviewed pertinent reports  Results from last 7 days  Lab Units 09/03/18  1841   WBC Thousand/uL 8 09   HEMOGLOBIN g/dL 13 8   HEMATOCRIT % 42 3   PLATELETS Thousands/uL 248   NEUTROS PCT % 61   LYMPHS PCT % 28   MONOS PCT % 7   EOS PCT % 3       Results from last 7 days  Lab Units 09/03/18  1841   SODIUM mmol/L 139   POTASSIUM mmol/L 4 0   CHLORIDE mmol/L 102   CO2 mmol/L 30   BUN mg/dL 17   CREATININE mg/dL 0 67   CALCIUM mg/dL 8 8                   Imaging: I have personally reviewed pertinent reports  CTA head and neck w wo contrast   Final Result by Victor Hugo Mcgee MD (09/03 2037)      No acute noncontrast CT head or CTA head and neck findings  Nonspecific 1 9 cm right hilar lymph node  Left supraclavicular postsurgical changes  Findings were directly discussed with on Current Date                    Workstation performed: [id number]         XR spine cervical complete 4 or 5 vw non injury    (Results Pending)       EKG, Pathology, and Other Studies Reviewed on Admission:   · EKG: SR, RAD, 68 BPM     Allscripts / Epic Records Reviewed: Yes     ** Please Note: This note has been constructed using a voice recognition system   **

## 2018-09-04 NOTE — CASE MANAGEMENT
Thank you,  [address]  Utilization Review Department  Phone: [phone]; Fax [phone]  ATTENTION: Please call with any questions or concerns to [phone]  and carefully follow the prompts so that you are directed to the right person  Send all requests for admission clinical reviews, approved or denied determinations and any other requests to fax [phone]  All voicemails are confidential    Initial Clinical Review    Admission: Date/Time/Statement: observation admission 09/03/18 2147    Orders Placed This Encounter   Procedures    Place in Observation (expected length of stay for this patient is less than two midnights)     Standing Status:   Standing     Number of Occurrences:   1     Order Specific Question:   Admitting Physician     Answer:   Zoie Gilliam [[id number]]     Order Specific Question:   Level of Care     Answer:   Med Surg [16]         ED: Date/Time/Mode of Arrival:   ED Arrival Information     Expected Arrival Acuity Means of Arrival Escorted By Service Admission Type    - 9/3/2018 18:10 Urgent [facility] General Medicine Urgent    Arrival Complaint    DIZZINESS           Chief Complaint:   Chief Complaint   Patient presents with    Dizziness     Pt  reports while shoping today aprox  30 mins ago began to get lightheaded and dizziness then broke out in cold sweat  Upon arrival started with substernal chest pressure that worsens with inspiration and R arm numbness and tingling        History of Illness: 51 y o  female presents with chest tightness and lightheadedness  She states that she has been having right neck, shoulder and arm pain for the past two days with some paraesthesias and dorsal right hand numbness  Today she was in [place] when she suddenly became extremely lightheaded and her vision "went black"  She says she broke out in a cold sweat   She stopped walking and closed her eyes and says eventually she was able to walk out of [facility] without passing out  She went right to [facility] and then started developing some chest pressure and return of the cold sweats  She became very nauseous and almost vomited  She called a coworker in the ED who advised her to come in  She says she was worried she was having thoracic outlet syndrome on the other side as she says her symptoms are somewhat similar to when she had it on her left side years ago  She does admit to lightheadedness with turning her head to the left when she had left sided thoracic outlet syndrome  ED Vital Signs:   ED Triage Vitals   Temperature Pulse Respirations Blood Pressure SpO2   09/03/18 1846 09/03/18 1814 09/03/18 1814 09/03/18 1814 09/03/18 1935   98 4 °F (36 9 °C) 75 18 120/64 95 %      Temp Source Heart Rate Source Patient Position - Orthostatic VS BP Location FiO2 (%)   09/03/18 1846 09/03/18 1814 09/03/18 1814 09/03/18 1814 --   Oral Monitor Lying Right arm       Pain Score       09/03/18 1814       No Pain        Wt Readings from Last 1 Encounters:   09/03/18 78 6 kg (173 lb 4 5 oz)       Vital Signs (abnormal): none    Abnormal Labs/Diagnostic Test Results: 09/03: EKG: Sinus rhythm  Right axis deviation RSR' or QR pattern in V1 suggests right ventricular conduction delay Abnormal ECG;   09/04: potassium 3 4, calcium 8 1    ED Treatment:   Medication Administration from 09/03/2018 1810 to 09/03/2018 2203       Date/Time Order Dose Route Action Comments     09/03/2018 1837 aspirin chewable tablet 324 mg 324 mg Oral Given      09/03/2018 1841 ondansetron (ZOFRAN) injection 4 mg 4 mg Intravenous Given      09/03/2018 1933 ketorolac (TORADOL) injection 15 mg 15 mg Intravenous Given      09/03/2018 2010 sodium chloride 0 9 % bolus 1,000 mL 1,000 mL Intravenous New Bag      09/03/2018 2024 morphine (PF) 4 mg/mL injection 4 mg 4 mg Intravenous Given           Past Medical/Surgical History:    Active Ambulatory Problems     Diagnosis Date Noted    IBS (irritable bowel syndrome)     Hyperlipidemia     GERD (gastroesophageal reflux disease)     Diabetes mellitus ([facility] [address] )     Depression     Chronic back pain     Anxiety     Ileus ([facility] [address] ) 07/06/2017    Cervical radiculopathy 11/18/2016    Hepatic steatosis 02/18/2014    Pulmonary nodule seen on imaging study 02/18/2014    Vitamin D deficiency 10/28/2013    Type 2 diabetes mellitus without complication ([facility] [address] ) 48/81/1613    Essential hypertension 02/08/2018    Dermatitis 02/15/2018    S/P laparoscopic sleeve gastrectomy 02/15/2018    Other fatigue 04/03/2018    Elevated sed rate 04/04/2018    Encounter for annual routine gynecological examination 04/20/2018    Pap smear for cervical cancer screening 04/20/2018    Visit for screening mammogram 04/20/2018    Overweight (BMI 25 0-29 9) 04/20/2018    Muscle pain 06/21/2018    Postsurgical malabsorption 06/21/2018     Resolved Ambulatory Problems     Diagnosis Date Noted    Amenorrhea 10/20/2017    Morbid obesity due to excess calories ([facility] [address] ) 02/06/2018     Past Medical History:   Diagnosis Date    Anxiety     Bulging lumbar disc     Carpal tunnel syndrome     Chronic back pain     Colon polyps     Depression     Diabetes mellitus ([facility] [address] )     GERD (gastroesophageal reflux disease)     Gestational diabetes     Hearing loss     Hyperlipidemia     IBS (irritable bowel syndrome)     Ileus (HCC)     Labial cyst     Myofascial pain     Obesity     Ovarian cyst     Pap smear for cervical cancer screening     Seasonal allergies     Thoracic outlet syndrome     Trochanteric bursitis of both hips     Ulnar neuropathy at elbow     Wears glasses        Admitting Diagnosis: Dizziness [R42]  Chest pain [R07 9]    Age/Sex: 51 y o  female    Assessment/Plan:   Dizziness   Assessment & Plan     · Cannot r/o anginal equivalent  ? Pt does have h/o thoracic outlet syndrome  ? CTA head and neck unremarkable   ? Initial troponin negative, EKG unremarkable  ?  D-dimer negative  · LISS score 2, will check inpatient stress test   · Will trend troponins, serial EKG's   · Monitor on telemetry   · Will check C-spine XR as there is some suspicion that right arm pain/paraesthesias may be isolated symptom   · Orthostatic vitals           Diabetes mellitus (HCC)   Assessment & Plan     · Last A1c 6 7, has been monitored off medication following sleeve gastrectomy          Depression   Assessment & Plan     · Continue home medications         Admission Orders:  Scheduled Meds:   Current Facility-Administered Medications:  acetaminophen 650 mg Oral Q4H PRN   calcium carbonate-vitamin D 1 tablet Oral Daily With Breakfast   escitalopram 20 mg Oral Daily   ondansetron 4 mg Intravenous Q6H PRN   pantoprazole 40 mg Oral Early Morning   QUEtiapine 25 mg Oral HS     Continuous Infusions:    PRN Meds:   48 hr tele  peripheral IV  Continuous ST segment monitoring  ECG 12 lead lead with chest pain or ST segment changes  XR spine cervical complete 4-5 vw non injury  Ambulate pt  Regular diet

## 2018-09-04 NOTE — ASSESSMENT & PLAN NOTE
· Cannot r/o anginal equivalent  · Pt does have h/o thoracic outlet syndrome  · CTA head and neck unremarkable   · Initial troponin negative, EKG unremarkable  · D-dimer negative  · LISS score 2, will check inpatient stress test   · Will trend troponins, serial EKG's   · Monitor on telemetry   · Will check C-spine XR as there is some suspicion that right arm pain/paraesthesias may be isolated symptom   · Orthostatic vitals

## 2018-09-04 NOTE — PROGRESS NOTES
Patient's vitals are stable  Patient has no complaints or shows no signs of distress  Patient is normal sinus on the monitor  Will continue to monitor

## 2018-09-05 ENCOUNTER — TRANSITIONAL CARE MANAGEMENT (OUTPATIENT)
Dept: INTERNAL MEDICINE CLINIC | Facility: CLINIC | Age: 48
End: 2018-09-05

## 2018-09-05 VITALS
DIASTOLIC BLOOD PRESSURE: 57 MMHG | OXYGEN SATURATION: 97 % | TEMPERATURE: 97.7 F | BODY MASS INDEX: 26.26 KG/M2 | HEART RATE: 63 BPM | HEIGHT: 68 IN | SYSTOLIC BLOOD PRESSURE: 125 MMHG | RESPIRATION RATE: 18 BRPM | WEIGHT: 173.28 LBS

## 2018-09-05 DIAGNOSIS — F32.A DEPRESSION, UNSPECIFIED DEPRESSION TYPE: Primary | ICD-10-CM

## 2018-09-05 DIAGNOSIS — K21.9 GASTROESOPHAGEAL REFLUX DISEASE WITHOUT ESOPHAGITIS: ICD-10-CM

## 2018-09-05 LAB
ANION GAP SERPL CALCULATED.3IONS-SCNC: 7 MMOL/L (ref 4–13)
BUN SERPL-MCNC: 8 MG/DL (ref 5–25)
CALCIUM SERPL-MCNC: 8.2 MG/DL (ref 8.3–10.1)
CHLORIDE SERPL-SCNC: 109 MMOL/L (ref 100–108)
CO2 SERPL-SCNC: 28 MMOL/L (ref 21–32)
CREAT SERPL-MCNC: 0.57 MG/DL (ref 0.6–1.3)
GFR SERPL CREATININE-BSD FRML MDRD: 109 ML/MIN/1.73SQ M
GLUCOSE SERPL-MCNC: 81 MG/DL (ref 65–140)
POTASSIUM SERPL-SCNC: 4.3 MMOL/L (ref 3.5–5.3)
SODIUM SERPL-SCNC: 144 MMOL/L (ref 136–145)

## 2018-09-05 PROCEDURE — 80048 BASIC METABOLIC PNL TOTAL CA: CPT | Performed by: INTERNAL MEDICINE

## 2018-09-05 PROCEDURE — 99217 PR OBSERVATION CARE DISCHARGE MANAGEMENT: CPT | Performed by: INTERNAL MEDICINE

## 2018-09-05 RX ORDER — METHOCARBAMOL 500 MG/1
500 TABLET, FILM COATED ORAL EVERY 8 HOURS SCHEDULED
Qty: 30 TABLET | Refills: 0 | Status: SHIPPED | OUTPATIENT
Start: 2018-09-05 | End: 2018-12-27

## 2018-09-05 RX ORDER — METHYLPREDNISOLONE 4 MG/1
TABLET ORAL
Qty: 21 TABLET | Refills: 0 | Status: SHIPPED | OUTPATIENT
Start: 2018-09-05 | End: 2018-11-08

## 2018-09-05 RX ORDER — PANTOPRAZOLE SODIUM 40 MG/1
40 TABLET, DELAYED RELEASE ORAL
Qty: 90 TABLET | Refills: 0 | Status: SHIPPED | OUTPATIENT
Start: 2018-09-05 | End: 2019-01-31 | Stop reason: SDUPTHER

## 2018-09-05 RX ORDER — ESCITALOPRAM OXALATE 20 MG/1
20 TABLET ORAL DAILY
Qty: 90 TABLET | Refills: 0 | Status: SHIPPED | OUTPATIENT
Start: 2018-09-05 | End: 2019-01-31 | Stop reason: SDUPTHER

## 2018-09-05 RX ORDER — OXYCODONE HYDROCHLORIDE 5 MG/1
5 TABLET ORAL EVERY 4 HOURS PRN
Qty: 30 TABLET | Refills: 0 | Status: SHIPPED | OUTPATIENT
Start: 2018-09-05 | End: 2018-09-15

## 2018-09-05 RX ORDER — QUETIAPINE FUMARATE 25 MG/1
25 TABLET, FILM COATED ORAL
Qty: 90 TABLET | Refills: 0 | Status: SHIPPED | OUTPATIENT
Start: 2018-09-05 | End: 2019-01-31 | Stop reason: SDUPTHER

## 2018-09-05 RX ADMIN — OYSTER SHELL CALCIUM WITH VITAMIN D 1 TABLET: 500; 200 TABLET, FILM COATED ORAL at 08:59

## 2018-09-05 RX ADMIN — ESCITALOPRAM OXALATE 20 MG: 20 TABLET ORAL at 08:59

## 2018-09-05 RX ADMIN — SODIUM CHLORIDE 100 ML/HR: 0.9 INJECTION, SOLUTION INTRAVENOUS at 03:45

## 2018-09-05 RX ADMIN — OXYCODONE HYDROCHLORIDE 5 MG: 5 TABLET ORAL at 03:33

## 2018-09-05 RX ADMIN — METHOCARBAMOL 500 MG: 500 TABLET ORAL at 05:23

## 2018-09-05 RX ADMIN — PANTOPRAZOLE SODIUM 40 MG: 40 TABLET, DELAYED RELEASE ORAL at 05:22

## 2018-09-05 RX ADMIN — OXYCODONE HYDROCHLORIDE 5 MG: 5 TABLET ORAL at 09:02

## 2018-09-05 RX ADMIN — MORPHINE SULFATE 2 MG: 2 INJECTION, SOLUTION INTRAMUSCULAR; INTRAVENOUS at 07:47

## 2018-09-05 NOTE — NURSING NOTE
Patient resting in bed with pain of 8 out of 10 in neck  Pain medication given and hot packs applied, which patient stated she did find "some relief" from  Vital signs stable  Call bell and bed side table within reach

## 2018-09-05 NOTE — CASE MANAGEMENT
Thank you,  [address]  Utilization Review Department  Phone: [phone]; Fax [phone]  ATTENTION: Please call with any questions or concerns to [phone]  and carefully follow the prompts so that you are directed to the right person  Send all requests for admission clinical reviews, approved or denied determinations and any other requests to fax [phone]   All voicemails are confidential    Continued Stay Review    Date: 09/05/2018    Vital Signs: /57 (BP Location: Left arm)   Pulse 63   Temp 97 7 °F (36 5 °C) (Oral)   Resp 18   Ht 5' 8" (1 727 m)   Wt 78 6 kg (173 lb 4 5 oz)   SpO2 97%   BMI 26 35 kg/m²     Medications:   Scheduled Meds:   Current Facility-Administered Medications:  acetaminophen 650 mg Oral Q4H PRN    calcium carbonate-vitamin D 1 tablet Oral Daily With Breakfast    escitalopram 20 mg Oral Daily    methocarbamol 500 mg Oral Q8H [address]    morphine injection 2 mg Intravenous Q4H PRN    ondansetron 4 mg Intravenous Q6H PRN    oxyCODONE 5 mg Oral Q4H PRN    pantoprazole 40 mg Oral Early Morning    QUEtiapine 25 mg Oral HS    sodium chloride 100 mL/hr Intravenous Continuous Last Rate: 100 mL/hr (09/05/18 0345)     Continuous Infusions:   sodium chloride 100 mL/hr Last Rate: 100 mL/hr (09/05/18 0345)     PRN Meds:   acetaminophen    morphine injection x1 IV    ondansetron    oxyCODONE x2 PO    Abnormal Labs/Diagnostic Results: 09/05: chloride 109, CREAT 0 57, CA 8 2    Age/Sex: 51 y o  female     Assessment/Plan:     Discharge Plan: TBD

## 2018-09-05 NOTE — DISCHARGE SUMMARY
Discharge Summary - [facility]    Patient Information: Parker Ramires 51 y o  female MRN: [id number]  Unit/Bed#: -01 Encounter: [id number]    Discharging Physician / Practitioner: Jesse Hadley MD  PCP: Marianne Bardales DO  Admission Date: 9/3/2018  Discharge Date: 09/05/18    Disposition:     Home    Reason for Admission:  Neck pain and dizziness    Discharge Diagnoses:     Principal Problem:    Dizziness  Active Problems:    GERD (gastroesophageal reflux disease)    Diabetes mellitus ([facility] )    Depression    S/P laparoscopic sleeve gastrectomy  Resolved Problems:    * No resolved hospital problems  *      Consultations During Hospital Stay:  ·  none    Procedures Performed:     ·  MRI cervical spine:  Scattered mild degenerative changes are noted  No greater than mild central or foraminal narrowing  CTA head and neck  No acute noncontrast CT head or CTA head and neck findings      Nonspecific 1 9 cm right hilar lymph node  Left supraclavicular postsurgical changes        Significant Findings / Test Results:     ·  as above    Incidental Findings:   ·  right hilar lymph node will require follow-up    Test Results Pending at Discharge (will require follow up):   ·  none     Outpatient Tests Requested:  ·  none    Complications:   none    Hospital Course:     Parker Ramires is a 51 y o  female patient who originally presented to the hospital on 9/3/2018 due to  Dizziness and neck pain  Patient was shopping in [employer] and felt neck stiffness and pain radiating down right arm  In addition she felt very lightheaded and thought that she was going to pass out  He also experiences some chest tightness  She subsequently came to the emergency department for evaluation  She does have a history of thoracic outlet syndrome for which she had surgical resection of rib on the left side  She thought that she was having similar symptoms       she was subsequently evaluated in the emergency department and admitted  For cervical x-ray showed degenerative changes with findings consistent with muscle spasm  In addition on clinical examination she had stiffness  And tenderness of neck and shoulder muscles  She was treated with IV hydration and muscle relaxers/ local application of heat  Her symptoms gradually improved  Her dizziness resolved  Her presyncopal episode thought to be  Vasovagal response due to pain  she is being discharged home in stable condition with muscle relaxer Robaxin  And Medrol Dosepak  In addition she was prescribed oxycodone for few days  She was advised on rest and relaxation exercises  If symptoms are not improving her she was also to follow up with pain management for possible epidural steroid injections  She was provided with contact numbers for the same  Condition at Discharge: good     Discharge Day Visit / Exam:     Subjective:   Feeling better today  Dizziness resolved  Pain somewhat better  Vitals: Blood Pressure: 125/57 (09/05/18 0737)  Pulse: 63 (09/05/18 0737)  Temperature: 97 7 °F (36 5 °C) (09/05/18 0737)  Temp Source: Oral (09/05/18 0737)  Respirations: 18 (09/05/18 0737)  Height: 5' 8" (172 7 cm) (09/03/18 1814)  Weight - Scale: 78 6 kg (173 lb 4 5 oz) (09/03/18 2251)  SpO2: 97 % (09/05/18 0737)  Exam:   Physical Exam     Gen -Patient comfortable  At rest  Neck- Supple  No thyromegaly or lymphadenopathy  Lungs-Clear bilaterally without any wheeze or rales   Heart S1-S2, regular rate and rhythm, no murmurs  Abdomen-soft nontender, no organomegaly  Bowel sounds present  Extremities-no cyanosi,  clubbing or edema   tenderness right shoulder and neck muscles  Skin- no rash  Neuro-nonfocal     Discussion with Family:    Discharge instructions/Information to patient and family:   See after visit summary for information provided to patient and family        Provisions for Follow-Up Care:  See after visit summary for information related to follow-up care and any pertinent home health orders  Planned Readmission: no     Discharge Statement:  I spent 35 minutes discharging the patient  This time was spent on the day of discharge  I had direct contact with the patient on the day of discharge  Greater than 50% of the total time was spent examining patient, answering all patient questions, arranging and discussing plan of care with patient as well as directly providing post-discharge instructions  Additional time then spent on discharge activities  Discharge Medications:  See after visit summary for reconciled discharge medications provided to patient and family        ** Please Note: This note has been constructed using a voice recognition system **

## 2018-09-06 ENCOUNTER — TRANSITIONAL CARE MANAGEMENT (OUTPATIENT)
Dept: INTERNAL MEDICINE CLINIC | Facility: CLINIC | Age: 48
End: 2018-09-06

## 2018-09-06 NOTE — ED PROVIDER NOTES
History  Chief Complaint   Patient presents with    Dizziness     Pt  reports while shoping today aprox  30 mins ago began to get lightheaded and dizziness then broke out in cold sweat  Upon arrival started with substernal chest pressure that worsens with inspiration and R arm numbness and tingling      HPI     62-year-old female presents for lightheadedness and dizziness onset was just prior to arrival while shopping  Associated substernal chest pain nonradiating  Waxing waning constant since onset  Seems to be worse with breathing  Right arm numbness and tingling to start her on the same time  Patient had diaphoresis no vomiting no syncope  Patient recently had gastric sleeve surgery six months ago up until that was diabetic  No other modifying factors or associated symptoms moderate severity exam is unremarkable  Assessment plan chest pain, diaphoresis dizziness  D-dimer rule out PE   CTA head and neck to evaluate vertebral artery insufficiency  Cardiac workup may need admission given diaphoresis  Right arm pain possibly related to cardiac versus radiculopathy or thoracic outlet syndrome    Prior to Admission Medications   Prescriptions Last Dose Informant Patient Reported? Taking? Biotin 70875 MCG TABS 9/3/2018 at Unknown time Self Yes Yes   Sig: Take by mouth   Calcium Citrate-Vitamin D 500-500 MG-UNIT PACK 9/3/2018 at Unknown time  Yes Yes   Sig: Take by mouth   Cyanocobalamin ([employer] VITAMIN B12 PO) 9/3/2018 at Unknown time  Yes Yes   Sig: Take by mouth   Multiple Vitamins-Minerals (MULTI COMPLETE PO) 9/3/2018 at Unknown time  Yes Yes   Sig: Take by mouth      Facility-Administered Medications: None       Past Medical History:   Diagnosis Date    Anxiety     Bulging lumbar disc     Carpal tunnel syndrome     RIGHT    LAST ASSESSED: 12/7/16    Chronic back pain     low    Colon polyps     Depression     Diabetes mellitus (HCC)     on victoza    GERD (gastroesophageal reflux disease)     Gestational diabetes     Hearing loss     left ear    Hyperlipidemia     IBS (irritable bowel syndrome)     Ileus ([address] )     LAST ASSESSED: 8/3/17    Labial cyst     LAST ASSESSED: 16    Myofascial pain     LAST ASSESSED: 16    Obesity     Ovarian cyst     LEFT  LAST ASSESSED: 16    Pap smear for cervical cancer screening     2018--pap wnl, HRHPV neg    Seasonal allergies     Thoracic outlet syndrome     2010    Trochanteric bursitis of both hips     LAST ASSESSED: 3/18/16    Ulnar neuropathy at elbow     Wears glasses        Past Surgical History:   Procedure Laterality Date    BILE DUCT EXPLORATION      ENDOSCOPIC REMOVAL OF STONES FROM BILIARY TRACT     SECTION      x3    CHOLECYSTECTOMY      COLONOSCOPY      DILATION AND CURETTAGE OF UTERUS      ENDOMETRIAL ABLATION      ERCP W/ SPHICTEROTOMY      FIRST RIB REMOVAL      FIRST RIB REMOVAL      THORAX EXCISION OF FIRST RIB    HYSTEROSCOPY      NEUROPLASTY / TRANSPOSITION ULNAR NERVE AT ELBOW      ND COLONOSCOPY FLX DX W/COLLJ SPEC WHEN PFRMD N/A 2017    Procedure: COLONOSCOPY;  Surgeon: Kobe Chris MD;  [facility]; Service: Gastroenterology    ND ESOPHAGOGASTRODUODENOSCOPY TRANSORAL DIAGNOSTIC N/A 2017    Procedure: ESOPHAGOGASTRODUODENOSCOPY (EGD); Surgeon: Ruthie Hill MD;  [facility];   Service: Gastroenterology    ND HYSTEROSCOPY,W/ENDO BX N/A 10/20/2017    Procedure: DILATATION AND CURETTAGE (D&C) WITH HYSTEROSCOPY  REMOVAL VULVAR RT  LESION;  Surgeon: Karla Caldera MD;  [facility];  Service: Gynecology    ND LAP, ÁLVARO RESTRICT [address], LONGITUDINAL GASTRECTOMY N/A 2018    Procedure: GASTRECTOMY SLEEVE LAPAROSCOPIC; INTRAOPERATIVE EGD ;  Surgeon: Pooja Conn MD;  [facility];  Service: [address]     VEIN LIGATION AND [address] Right     VULVA SURGERY  10/20/2017 BIOPSY    WISDOM TOOTH EXTRACTION         Family History   Problem Relation Age of Onset    Diabetes Mother     Other Mother         HYPERCHOLESTEROLEMIA    Breast cancer Mother         >50    Diabetes Father     Other Father         HYPERCHOLESTEROLEMIA    Prostate cancer Father     Hypertension Brother     Diabetes Brother     Other Brother         HYPERCHOLESTEROLEMIA    Alcohol abuse Family     Asthma Family     Other Family         BACK PAIN    Cancer Family     Depression Family     Neuropathy Family     Heart disease Family     Colon cancer Maternal Grandfather     Ovarian cancer Neg Hx     Uterine cancer Neg Hx      I have reviewed and agree with the history as documented  Social History   Substance Use Topics    Smoking status: Former Smoker     Quit date: 4/30/2017    Smokeless tobacco: Never Used    Alcohol use No        Review of Systems   Constitutional: Negative for chills, fatigue and fever  Eyes: Negative for photophobia and visual disturbance  Respiratory: Negative for cough and shortness of breath  Cardiovascular: Positive for chest pain  Negative for palpitations and leg swelling  Gastrointestinal: Negative for diarrhea, nausea and vomiting  Endocrine: Negative for polydipsia and polyuria  Genitourinary: Negative for decreased urine volume, difficulty urinating, dysuria and frequency  Musculoskeletal: Negative for back pain, neck pain and neck stiffness  Skin: Negative for color change and rash  Allergic/Immunologic: Negative for environmental allergies and immunocompromised state  Neurological: Negative for dizziness and headaches  Hematological: Negative for adenopathy  Does not bruise/bleed easily  Psychiatric/Behavioral: Negative for dysphoric mood  The patient is not nervous/anxious  Physical Exam  Physical Exam   Constitutional: She is oriented to person, place, and time  She appears well-developed and well-nourished  No distress  HENT:   Head: Normocephalic and atraumatic  Nose: Nose normal    Eyes: Conjunctivae and EOM are normal  Pupils are equal, round, and reactive to light  No scleral icterus  Neck: Normal range of motion  Neck supple  No JVD present  No tracheal deviation present  No thyromegaly present  Cardiovascular: Normal rate, regular rhythm, normal heart sounds and intact distal pulses  Exam reveals no gallop and no friction rub  Pulmonary/Chest: Effort normal and breath sounds normal  No respiratory distress  She has no wheezes  She has no rales  She exhibits no tenderness  Abdominal: Soft  Bowel sounds are normal  She exhibits no distension and no mass  There is no tenderness  There is no rebound and no guarding  No hernia  Musculoskeletal: Normal range of motion  She exhibits no edema, tenderness or deformity  Neurological: She is alert and oriented to person, place, and time  She has normal reflexes  No cranial nerve deficit  Coordination normal    Skin: Skin is warm and dry  She is not diaphoretic  No erythema  Psychiatric: She has a normal mood and affect  Her behavior is normal    Nursing note and vitals reviewed        Vital Signs  ED Triage Vitals   Temperature Pulse Respirations Blood Pressure SpO2   09/03/18 1846 09/03/18 1814 09/03/18 1814 09/03/18 1814 09/03/18 1935   98 4 °F (36 9 °C) 75 18 120/64 95 %      Temp Source Heart Rate Source Patient Position - Orthostatic VS BP Location FiO2 (%)   09/03/18 1846 09/03/18 1814 09/03/18 1814 09/03/18 1814 --   Oral Monitor Lying Right arm       Pain Score       09/03/18 1814       No Pain           Vitals:    09/04/18 0919 09/04/18 1507 09/04/18 2224 09/05/18 0737   BP: 101/58 115/56 99/54 125/57   Pulse: 70 60 72 63   Patient Position - Orthostatic VS: Standing for 3 minutes - Orthostatic VS Lying Lying Sitting       Visual Acuity      ED Medications  Medications   aspirin chewable tablet 324 mg (324 mg Oral Given 9/3/18 1837)   ondansetron (ZOFRAN) injection 4 mg (4 mg Intravenous Given 9/3/18 1841)   ketorolac (TORADOL) injection 15 mg (15 mg Intravenous Given 9/3/18 1933)   sodium chloride 0 9 % bolus 1,000 mL (0 mL Intravenous Stopped 9/3/18 2300)   iohexol (OMNIPAQUE) 350 MG/ML injection (MULTI-DOSE) 85 mL (85 mL Intravenous Given 9/3/18 2001)   morphine (PF) 4 mg/mL injection 4 mg (4 mg Intravenous Given 9/3/18 2024)   potassium chloride (K-DUR,KLOR-CON) CR tablet 40 mEq (40 mEq Oral Given 9/4/18 1204)   ketorolac (TORADOL) injection 15 mg (15 mg Intravenous Given 9/4/18 2331)   sodium chloride 0 9 % bolus 1,000 mL (1,000 mL Intravenous New Bag 9/4/18 1205)       Diagnostic Studies  Results Reviewed     Procedure Component Value Units Date/Time    Troponin I [[id number]]  (Normal) Collected:  09/03/18 1841    Lab Status:  Final result Specimen:  Blood from Arm, Right Updated:  09/03/18 1909     Troponin I <0 02 ng/mL     D-Dimer [[id number]]  (Normal) Collected:  09/03/18 1841    Lab Status:  Final result Specimen:  Blood from Arm, Right Updated:  09/03/18 1904     D-Dimer, Quant 306 ng/ml (FEU)     Basic metabolic panel [[id number]] Collected:  09/03/18 1841    Lab Status:  Final result Specimen:  Blood from Arm, Right Updated:  09/03/18 1902     Sodium 139 mmol/L      Potassium 4 0 mmol/L      Chloride 102 mmol/L      CO2 30 mmol/L      ANION GAP 7 mmol/L      BUN 17 mg/dL      Creatinine 0 67 mg/dL      Glucose 112 mg/dL      Calcium 8 8 mg/dL      eGFR 104 ml/min/1 73sq m     Narrative:         National Kidney Disease Education Program recommendations are as follows:  GFR calculation is accurate only with a steady state creatinine  Chronic Kidney disease less than 60 ml/min/1 73 sq  meters  Kidney failure less than 15 ml/min/1 73 sq  meters      CBC and differential [[id number]] Collected:  09/03/18 1841    Lab Status:  Final result Specimen:  Blood from Arm, Right Updated:  09/03/18 1847     WBC 8 09 Thousand/uL      RBC 4 55 Million/uL      Hemoglobin 13 8 g/dL      Hematocrit 42 3 %      MCV 93 fL      MCH 30 3 pg      MCHC 32 6 g/dL      RDW 12 5 %      MPV 9 7 fL      Platelets 973 Thousands/uL      nRBC 0 /100 WBCs      Neutrophils Relative 61 %      Immat GRANS % 0 %      Lymphocytes Relative 28 %      Monocytes Relative 7 %      Eosinophils Relative 3 %      Basophils Relative 1 %      Neutrophils Absolute 4 94 Thousands/µL      Immature Grans Absolute 0 02 Thousand/uL      Lymphocytes Absolute 2 28 Thousands/µL      Monocytes Absolute 0 58 Thousand/µL      Eosinophils Absolute 0 22 Thousand/µL      Basophils Absolute 0 05 Thousands/µL                  MRI cervical spine wo contrast   Final Result by Eliza Barbour DO (09/05 0692)      Scattered mild degenerative changes are noted  No greater than mild central or foraminal narrowing  Workstation performed: [id number]         XR spine cervical complete 4 or 5 vw non injury   Final Result by Orlando Galindo MD (09/04 1056)      No acute osseous abnormality  Degenerative changes as above  Workstation performed: [id number]         CTA head and neck w wo contrast   Final Result by Piyush Morales MD (09/03 2037)      No acute noncontrast CT head or CTA head and neck findings  Nonspecific 1 9 cm right hilar lymph node  Left supraclavicular postsurgical changes  Findings were directly discussed with on Current Date                    Workstation performed: [id number]                    Procedures  Procedures       Phone Contacts  ED Phone Contact    ED Course                       LISS Risk Score      Most Recent Value   Age >= 72  0 Filed at: 09/03/2018 2214   Known CAD (stenosis >= 50%)  0 Filed at: 09/03/2018 2214   Recent (<=24 hrs) Service Angina  1 Filed at: 09/03/2018 2214   ST Deviation >= 0 5 mm  0 Filed at: 09/03/2018 2214   3+ CAD Risk Factors (FHx, HTN, HLP, DM, Smoker)  1 Filed at: 09/03/2018 2214   Aspirin Use Past 7 Days  0 Filed at: 09/03/2018 2214   Elevated Cardiac Markers  0 Filed at: 09/03/2018 2214   LISS Risk Score (Calculated)  2 Filed at: 09/03/2018 2214              [facility]  Number of Diagnoses or Management Options  Chest pain: new and requires workup  Dizziness: new and requires workup     Amount and/or Complexity of Data Reviewed  Clinical lab tests: reviewed and ordered  Tests in the radiology section of CPT®: reviewed and ordered  Tests in the medicine section of CPT®: ordered and reviewed  Independent visualization of images, tracings, or specimens: yes    Patient Progress  Patient progress: stable    CritCare Time    Disposition  Final diagnoses:   Chest pain   Dizziness     Time reflects when diagnosis was documented in both MDM as applicable and the Disposition within this note     Time User Action Codes Description Comment    9/3/2018  9:46 PM Milton Lands Add [R07 9] Chest pain     9/3/2018  9:46 PM Milton Lands Add [R42] Dizziness and giddiness     9/3/2018  9:46 PM Milton Lands Remove [R42] Dizziness and giddiness     9/3/2018  9:46 PM Pedro Lands Add [R42] Dizziness     9/5/2018 10:33 AM Gertrude Uriostegui Add [M79 1] Muscle pain       ED Disposition     ED Disposition Condition Comment    Admit  Case was discussed with Ivan Monk and the patient's admission status was agreed to be Admission Status: observation status to the service of Dr Dajuan Patterson   Follow-up Information     Follow up With Specialties Details Why [facility], DO Internal Medicine Follow up  [address]      Yvette Fan MD Pain Medicine Follow up call for appointment [address] [facility]  [phone]            Discharge Medication List as of 9/5/2018 10:45 AM      START taking these medications    Details   methocarbamol (ROBAXIN) 500 mg tablet Take 1 tablet (500 mg total) by mouth every 8 (eight) hours, Starting Wed 9/5/2018, Normal      Methylprednisolone 4 MG TBPK Use as directed on package, Normal      oxyCODONE (ROXICODONE) 5 mg immediate release tablet Take 1 tablet (5 mg total) by mouth every 4 (four) hours as needed for moderate pain for up to 10 days Max Daily Amount: 30 mg, Starting Wed 9/5/2018, Until Sat 9/15/2018, Normal         CONTINUE these medications which have NOT CHANGED    Details   Biotin 58546 MCG TABS Take by mouth, Historical Med      Calcium Citrate-Vitamin D 500-500 MG-UNIT PACK Take by mouth, Historical Med      Cyanocobalamin ([employer]S VITAMIN B12 PO) Take by mouth, Historical Med      Multiple Vitamins-Minerals (MULTI COMPLETE PO) Take by mouth, Historical Med      escitalopram (LEXAPRO) 20 mg tablet Take 20 mg by mouth daily  , Historical Med      QUEtiapine (SEROquel) 25 mg tablet Take 25 mg by mouth daily at bedtime, Historical Med      pantoprazole (PROTONIX) 40 mg tablet ONE TABLET THIRTY MINUTES BEFORE BREAKFAST DAILY, Normal             Outpatient Discharge Orders  Discharge Diet     Activity as tolerated         ED Provider  Electronically Signed by           Luz Jaramillo DO  09/06/18 6366

## 2018-09-10 ENCOUNTER — OFFICE VISIT (OUTPATIENT)
Dept: INTERNAL MEDICINE CLINIC | Facility: CLINIC | Age: 48
End: 2018-09-10
Payer: COMMERCIAL

## 2018-09-10 VITALS
SYSTOLIC BLOOD PRESSURE: 90 MMHG | BODY MASS INDEX: 25.64 KG/M2 | TEMPERATURE: 100 F | OXYGEN SATURATION: 94 % | HEART RATE: 74 BPM | DIASTOLIC BLOOD PRESSURE: 62 MMHG | WEIGHT: 169.2 LBS | HEIGHT: 68 IN

## 2018-09-10 DIAGNOSIS — M54.12 CERVICAL RADICULOPATHY: Primary | ICD-10-CM

## 2018-09-10 DIAGNOSIS — R59.9 LYMPH NODE ENLARGEMENT: ICD-10-CM

## 2018-09-10 PROBLEM — M79.10 MUSCLE PAIN: Status: RESOLVED | Noted: 2018-06-21 | Resolved: 2018-09-10

## 2018-09-10 PROBLEM — R42 DIZZINESS: Status: RESOLVED | Noted: 2018-09-03 | Resolved: 2018-09-10

## 2018-09-10 PROBLEM — Z12.4 PAP SMEAR FOR CERVICAL CANCER SCREENING: Status: RESOLVED | Noted: 2018-04-20 | Resolved: 2018-09-10

## 2018-09-10 PROBLEM — I10 ESSENTIAL HYPERTENSION: Status: RESOLVED | Noted: 2018-02-08 | Resolved: 2018-09-10

## 2018-09-10 PROBLEM — Z12.31 VISIT FOR SCREENING MAMMOGRAM: Status: RESOLVED | Noted: 2018-04-20 | Resolved: 2018-09-10

## 2018-09-10 PROCEDURE — 1111F DSCHRG MED/CURRENT MED MERGE: CPT | Performed by: INTERNAL MEDICINE

## 2018-09-10 PROCEDURE — 99495 TRANSJ CARE MGMT MOD F2F 14D: CPT | Performed by: INTERNAL MEDICINE

## 2018-09-10 NOTE — LETTER
September 10, 2018     Patient: Olaf Sheth   YOB: 1970   Date of Visit: 9/10/2018       To Whom it May Concern:    Olaf Sheth is under my professional care  She was seen in my office on 9/10/2018  She may return to work on 9/11/18  If you have any questions or concerns, please don't hesitate to call           Sincerely,          Joe Roque, DO        CC: No Recipients

## 2018-09-10 NOTE — PROGRESS NOTES
Assessment/Plan:     Diagnoses and all orders for this visit:    Cervical radiculopathy    Lymph node enlargement      Vianca Shipman was seen and examined in the office today  Examination today was largely normal  Imaging was reviewed and incidental hilar lymph node was noted  We discussed we can repeat imaging in about 3-4 months but denies any systemic symptoms including cough  In terms of her neck/arm discomfort, MRI does not show anything significant but has had previous similar symptoms on the left  She ended up having her first rib removed which resolved her symptoms  At this point, I am going to have her monitor  Her repeat BP was 92/58  She does appear to get vasovagal at times  Adequate hydration was recommended as well  Otherwise no other changes were made  Subjective:      Patient ID: Socorro Lawson is a 51 y o  female  Vianca Shipman is here today for a hospital follow up  Prior to going to the hospital, she had noted feeling lightheaded, like she was going to pass out, along with chest tightness/presure and headache  She noted neck pain with radiation down the right arm as well  She felt numbness and tingling  Because of these symptoms, she was went to the ED for evaluation  Chart was reviewed  She reports that the chest symptoms resolved essentially but notes some of the continued neck/arm symptoms  She denies trauma or known injury  She has been taking medication prescribed sparingly  Imaging was completed as well  The following portions of the patient's history were reviewed and updated as appropriate: allergies, current medications, past family history, past medical history, past social history, past surgical history and problem list     Review of Systems   Constitutional: Negative for chills, fever and unexpected weight change  Eyes: Negative for visual disturbance  Respiratory: Negative for cough, chest tightness, shortness of breath and wheezing      Cardiovascular: Negative for chest pain, palpitations and leg swelling  Gastrointestinal: Negative for abdominal pain, blood in stool, constipation, diarrhea, nausea and vomiting  Musculoskeletal: Negative for arthralgias, back pain and myalgias  Neurological: Positive for light-headedness, numbness and headaches  Negative for dizziness and syncope  Psychiatric/Behavioral: Negative for dysphoric mood and sleep disturbance  The patient is not nervous/anxious  Objective:      BP 90/62 (BP Location: Left arm, Patient Position: Sitting, Cuff Size: Standard)   Pulse 74   Temp 100 °F (37 8 °C) (Tympanic)   Ht 5' 8" (1 727 m)   Wt 76 7 kg (169 lb 3 2 oz)   SpO2 94%   BMI 25 73 kg/m²          Physical Exam   Constitutional: She is oriented to person, place, and time  She appears well-developed and well-nourished  No distress  HENT:   Head: Normocephalic and atraumatic  Eyes: Conjunctivae and EOM are normal  Right eye exhibits no discharge  Left eye exhibits no discharge  No scleral icterus  Neck: Normal range of motion  No thyromegaly present  Cardiovascular: Normal rate, regular rhythm and normal heart sounds  No murmur heard  Pulmonary/Chest: Effort normal and breath sounds normal  No respiratory distress  She has no wheezes  She exhibits no tenderness  Abdominal: There is no tenderness  Musculoskeletal: Normal range of motion  She exhibits no edema  Lymphadenopathy:     She has no cervical adenopathy  Neurological: She is alert and oriented to person, place, and time  No cranial nerve deficit  Skin: Skin is warm and dry  She is not diaphoretic  No erythema  Psychiatric: She has a normal mood and affect  Her speech is normal and behavior is normal  Judgment and thought content normal    Vitals reviewed          Date and time hospital follow up call was made:  9/5/2018 10:45 AM  Hospital care reviewed:  Records reviewed  Patient was hopsitalized at:  [facility]  Date of admission:  9/3/18  Date of discharge: 9/5/18  Diagnosis:  pinched cervical nerver  Disposition:  Home  Were the patients medicaitons reviewed and updated:  No  Current symptoms:  Headache, Arm pain - right side  Right side arm pain severity:  Moderate  Arm pain, right side, onset:  Progressive  Headache pain severity:  Moderate  Headache pain onset:  Progressive  Headache location / laterality:  Always unilateral  Preorbital:  Right  Frontal:  Right  Temporal:  Right  Occipital:  Right  Parietal:  Right  Hemicranium:  Right  Pain Descriptors:  Dull  Headache Radition / Laterality:  Radiating  Headache pain level:  8  Cause certainty:  Possibly  Clinical progress:  Unchanged  Should patient be enrolled in anticoag monitoring?:  No  Scheduled for follow up?:  Yes  Did you obtain your prescribed medications:  Yes  Do you need help managing your perscriptions or medications:  No  Is transportation to your appointments needed:  No  I have advised the patient to call PCP with any new or worsening symptoms (please type in name along with any credentials):  jennifer schultz   Living Arrangements:  Spouse or Significiant other  Support System:  Partner  Do you have social support:  Yes, as much as I need  Are you recieving outpatient services:  No  Are you recieving home care services:  No  Are you using any community resources:  No  Current waiver service:  No  Have you fallen in the last 12 months:  No  Interperter language line required?:  No  Counseling:  Patient

## 2018-09-24 ENCOUNTER — TELEPHONE (OUTPATIENT)
Dept: INTERNAL MEDICINE CLINIC | Facility: CLINIC | Age: 48
End: 2018-09-24

## 2018-09-24 NOTE — TELEPHONE ENCOUNTER
PT called to share she still feels lightheaded and dizzy off and on since hospitalization  This am her BP was 84/59  She Alexandra working at [employer] 19 today  PT aware of benefit of hydration  Offered PT appt today or tomorrow  What do you advise?

## 2018-11-08 ENCOUNTER — OFFICE VISIT (OUTPATIENT)
Dept: OBGYN CLINIC | Facility: CLINIC | Age: 48
End: 2018-11-08
Payer: COMMERCIAL

## 2018-11-08 VITALS
BODY MASS INDEX: 26.33 KG/M2 | HEART RATE: 66 BPM | SYSTOLIC BLOOD PRESSURE: 104 MMHG | WEIGHT: 173.2 LBS | DIASTOLIC BLOOD PRESSURE: 62 MMHG

## 2018-11-08 DIAGNOSIS — N92.6 IRREGULAR MENSES: Primary | ICD-10-CM

## 2018-11-08 DIAGNOSIS — N94.6 DYSMENORRHEA: ICD-10-CM

## 2018-11-08 PROBLEM — E11.9 TYPE 2 DIABETES MELLITUS WITHOUT COMPLICATION (HCC): Status: RESOLVED | Noted: 2018-02-08 | Resolved: 2018-11-08

## 2018-11-08 PROCEDURE — 99214 OFFICE O/P EST MOD 30 MIN: CPT | Performed by: OBSTETRICS & GYNECOLOGY

## 2018-11-08 RX ORDER — NORGESTIMATE AND ETHINYL ESTRADIOL 0.25-0.035
1 KIT ORAL DAILY
Qty: 28 TABLET | Refills: 0 | Status: SHIPPED | OUTPATIENT
Start: 2018-11-08 | End: 2018-11-08 | Stop reason: SDUPTHER

## 2018-11-08 RX ORDER — NORGESTIMATE AND ETHINYL ESTRADIOL 0.25-0.035
1 KIT ORAL DAILY
Qty: 84 TABLET | Refills: 0 | Status: SHIPPED | OUTPATIENT
Start: 2018-11-08 | End: 2019-02-08

## 2018-11-08 NOTE — PROGRESS NOTES
Patient is a 51 y o  Darel Sever with Patient's last menstrual period was 10/15/2018 (approximate)  who presents requesting evaluation of painful menses and irregular bleeding  She reports during ovulation she has pain and she has bleeding during that time and then 1-2 weeks later she has a menses again  She reports she gets pale and is tired during that time  She reports she can't take ibuprofen, but she uses tylenol  Sometimes she uses her old oxycodone from when she had a back injury  She reports that relieves her pain but she can't use that at work  She tried pamprin and midol which both don't work  She is interested in cycle regulation and pain management  Past Medical History:   Diagnosis Date    Anxiety     Bulging lumbar disc     Carpal tunnel syndrome     RIGHT  LAST ASSESSED: 16    Chronic back pain     low    Colon polyps     Depression     Diabetes mellitus ([facility] [address] )     on victoza    GERD (gastroesophageal reflux disease)     Gestational diabetes     Hearing loss     left ear    Hyperlipidemia     IBS (irritable bowel syndrome)     Ileus ([facility] [address] )     LAST ASSESSED: 8/3/17    Labial cyst     LAST ASSESSED: 16    Myofascial pain     LAST ASSESSED: 16    Obesity     Ovarian cyst     LEFT   LAST ASSESSED: 16    Pap smear for cervical cancer screening     2018--pap wnl, HRHPV neg    Seasonal allergies     Thoracic outlet syndrome         Trochanteric bursitis of both hips     LAST ASSESSED: 3/18/16    Ulnar neuropathy at elbow     Wears glasses        Past Surgical History:   Procedure Laterality Date    BILE DUCT EXPLORATION      ENDOSCOPIC REMOVAL OF STONES FROM BILIARY TRACT     SECTION      x3    CHOLECYSTECTOMY      COLONOSCOPY      DILATION AND CURETTAGE OF UTERUS      ENDOMETRIAL ABLATION      ERCP W/ SPHICTEROTOMY      FIRST RIB REMOVAL      FIRST RIB REMOVAL      THORAX EXCISION OF FIRST RIB    HYSTEROSCOPY      Atif Zabala / Barron Hernandez ULNAR NERVE AT ELBOW      DC COLONOSCOPY FLX DX W/COLLJ SPEC WHEN PFRMD N/A 2017    Procedure: COLONOSCOPY;  Surgeon: Faustino Sun MD;  [facility]; Service: Gastroenterology    DC ESOPHAGOGASTRODUODENOSCOPY TRANSORAL DIAGNOSTIC N/A 2017    Procedure: ESOPHAGOGASTRODUODENOSCOPY (EGD); Surgeon: Garry Vences MD;  [facility];   Service: Gastroenterology    DC HYSTEROSCOPY,W/ENDO BX N/A 10/20/2017    Procedure: DILATATION AND CURETTAGE (D&C) WITH HYSTEROSCOPY  REMOVAL VULVAR RT  LESION;  Surgeon: Eric Gabriel MD;  [facility];  Service: Gynecology    DC LAP, ÁLVARO RESTRICT PROC, LONGITUDINAL GASTRECTOMY N/A 2018    Procedure: GASTRECTOMY SLEEVE LAPAROSCOPIC; INTRAOPERATIVE EGD ;  Surgeon: Prabhu Jones MD;  [facility];  Service: Bariatrics    TONSILLECTOMY AND ADENOIDECTOMY      TUBAL LIGATION      ULNAR NERVE REPAIR Right     VEIN LIGATION AND STRIPPING Right     VULVA SURGERY  10/20/2017    BIOPSY    WISDOM TOOTH EXTRACTION         OB History    Para Term  AB Living   3 3 3     3   SAB TAB Ectopic Multiple Live Births                  # Outcome Date GA Lbr Manuel/2nd Weight Sex Delivery Anes PTL Lv   3 Term            2 Term            1 Term               Obstetric Comments   C/S x 3; BTL at time of third   Menarche 14           Current Outpatient Prescriptions:     Biotin 13950 MCG TABS, Take by mouth, Disp: , Rfl:     Calcium Citrate-Vitamin D 500-500 MG-UNIT PACK, Take by mouth, Disp: , Rfl:     Cyanocobalamin (CVS VITAMIN B12 PO), Take by mouth, Disp: , Rfl:     escitalopram (LEXAPRO) 20 mg tablet, Take 1 tablet (20 mg total) by mouth daily, Disp: 90 tablet, Rfl: 0    Multiple Vitamins-Minerals (MULTI COMPLETE PO), Take by mouth, Disp: , Rfl:     pantoprazole (PROTONIX) 40 mg tablet, Take 1 tablet (40 mg total) by mouth daily before breakfast Take 30 minutes before, Disp: 90 tablet, Rfl: 0    QUEtiapine (SEROquel) 25 mg tablet, Take 1 tablet (25 mg total) by mouth daily at bedtime, Disp: 90 tablet, Rfl: 0    methocarbamol (ROBAXIN) 500 mg tablet, Take 1 tablet (500 mg total) by mouth every 8 (eight) hours (Patient not taking: Reported on 11/8/2018 ), Disp: 30 tablet, Rfl: 0    norgestimate-ethinyl estradiol (ORTHO-CYCLEN) 0 25-35 MG-MCG per tablet, Take 1 tablet by mouth daily, Disp: 84 tablet, Rfl: 0    No Known Allergies    Social History     Social History    Marital status:      Spouse name: N/A    Number of children: 3    Years of education: GED then 2 year college program     Occupational History     [employer]     Social History Main Topics    Smoking status: Former Smoker     Quit date: 4/30/2017    Smokeless tobacco: Never Used    Alcohol use No    Drug use: No    Sexual activity: Yes     Partners: Male     Birth control/ protection: Female Sterilization      Comment: lifetime partners: 6     Other Topics Concern    None     Social History Narrative    NO PREFERENCE ON [employer] BELIEFS        COLLEGE 2 [facility] blood products       Family History   Problem Relation Age of Onset    Diabetes Mother     Other Mother         HYPERCHOLESTEROLEMIA    Breast cancer Mother         >50    Diabetes Father     Other Father         HYPERCHOLESTEROLEMIA    Prostate cancer Father     Hypertension Brother     Diabetes Brother     Other Brother         HYPERCHOLESTEROLEMIA    Alcohol abuse Family     Asthma Family     Other Family         BACK PAIN    Cancer Family     Depression Family     Neuropathy Family     Heart disease Family     Colon cancer Maternal Grandfather     Ovarian cancer Neg Hx     Uterine cancer Neg Hx        Review of Systems   Constitutional: Negative for chills, fatigue, fever and unexpected weight change  HENT: Negative for congestion, mouth sores and sore throat      Respiratory: Negative for cough, chest tightness, shortness of breath and wheezing  Cardiovascular: Negative for chest pain and palpitations  Gastrointestinal: Negative for abdominal distention, abdominal pain, constipation, diarrhea, nausea and vomiting  Endocrine: Negative for cold intolerance and heat intolerance  Genitourinary: Positive for menstrual problem and pelvic pain  Negative for dyspareunia, dysuria, genital sores, vaginal bleeding, vaginal discharge and vaginal pain  Musculoskeletal: Negative for arthralgias  Skin: Negative for color change and rash  Neurological: Positive for dizziness and light-headedness  Negative for headaches  Hematological: Negative for adenopathy  Blood pressure 104/62, pulse 66, weight 78 6 kg (173 lb 3 2 oz), last menstrual period 10/15/2018, not currently breastfeeding  and Body mass index is 26 33 kg/m²  Physical Exam   Constitutional: She is oriented to person, place, and time  She appears well-developed and well-nourished  HENT:   Head: Normocephalic  Eyes: Conjunctivae and EOM are normal    Neck: Normal range of motion  Pulmonary/Chest: Effort normal    Abdominal: Soft  She exhibits no distension and no mass  There is tenderness (generalized B/L LQ tenderness)  There is no rebound and no guarding  Musculoskeletal: Normal range of motion  She exhibits no edema or tenderness  Neurological: She is alert and oriented to person, place, and time  Skin: No rash noted  No erythema  Psychiatric: She has a normal mood and affect   Her behavior is normal  Judgment and thought content normal         vulva: normal external genitalia for age and no lesions, masses, epithelial changes, or exudate  vagina: color pink, rugae  well formed rugae and bleeding  without any bleeding  cervix: parous and no lesions   uterus: NSSC, AF, NT, mobile  adnexa: no masses or tenderness      A/P:  Pt is a 51 y o  [id number] with      Diagnoses and all orders for this visit:    Irregular menses  -     Discontinue: norgestimate-ethinyl estradiol (ORTHO-CYCLEN) 0 25-35 MG-MCG per tablet; Take 1 tablet by mouth daily  -     US pelvis complete w transvaginal; Future  -     norgestimate-ethinyl estradiol (ORTHO-CYCLEN) 0 25-35 MG-MCG per tablet; Take 1 tablet by mouth daily    Dysmenorrhea  -     Discontinue: norgestimate-ethinyl estradiol (ORTHO-CYCLEN) 0 25-35 MG-MCG per tablet; Take 1 tablet by mouth daily  -     US pelvis complete w transvaginal; Future  -     norgestimate-ethinyl estradiol (ORTHO-CYCLEN) 0 25-35 MG-MCG per tablet; Take 1 tablet by mouth daily      Reviewed options for cycle regulation as pt had benign D&C <1 year ago  Pt interested in OCPs--reviewed side effects of nausea, breast tenderness, btb, DVT/PE/MI and she desires to proceed  F/U 3 months

## 2018-11-26 ENCOUNTER — TELEPHONE (OUTPATIENT)
Dept: BARIATRICS | Facility: CLINIC | Age: 48
End: 2018-11-26

## 2018-11-26 NOTE — TELEPHONE ENCOUNTER
Patient called the office today and said she is experiencing difficulty with eating  After she eats she gets nauseous and has pain  She is taking a PPI once daily  She also reports having issues with constipation  She is not taking anything but said she has been increasing her fluids  I advised patient to start Miralax daily  Also advised patient to increase PPI to BID and go back to the post op liquid diet for 2 days  Patient said she feels like she is getting an ulcer  She denies NSAID use, says she only drinks very occasionally, she does not take Asprin  She states she just knows the symptoms and feels like she is starting with one  She said she would like to be seen and is off tomorrow  Told her the only appointment I have tomorrow is the emergency slot and she would need to call back in the morning to schedule it  She understood and said she would

## 2018-11-30 ENCOUNTER — HOSPITAL ENCOUNTER (OUTPATIENT)
Dept: ULTRASOUND IMAGING | Facility: HOSPITAL | Age: 48
Discharge: HOME | End: 2018-11-30
Payer: COMMERCIAL

## 2018-11-30 DIAGNOSIS — N94.6 DYSMENORRHEA: ICD-10-CM

## 2018-11-30 DIAGNOSIS — N92.6 IRREGULAR MENSES: ICD-10-CM

## 2018-11-30 PROCEDURE — 76830 TRANSVAGINAL US NON-OB: CPT

## 2018-11-30 PROCEDURE — 76856 US EXAM PELVIC COMPLETE: CPT

## 2018-12-03 ENCOUNTER — TELEPHONE (OUTPATIENT)
Dept: OBGYN CLINIC | Facility: CLINIC | Age: 48
End: 2018-12-03

## 2018-12-03 NOTE — TELEPHONE ENCOUNTER
I called the patient to review her pelvic u/s  Overall, pelvic ultrasound shows normal size uterus but heterogeneous in nature  ET 3 mm and bilateral adnexa wnl  We reviewed that her pain may be from adenomyosis but I cannot diagnose that without pathology  Pt reports she would like to trial her ocp x 3 months as planned and if no improvement she will proceed with hysterectomy

## 2018-12-10 ENCOUNTER — TELEPHONE (OUTPATIENT)
Dept: PAIN MEDICINE | Facility: CLINIC | Age: 48
End: 2018-12-10

## 2018-12-10 ENCOUNTER — TRANSCRIBE ORDERS (OUTPATIENT)
Dept: RADIOLOGY | Facility: CLINIC | Age: 48
End: 2018-12-10

## 2018-12-10 ENCOUNTER — APPOINTMENT (OUTPATIENT)
Dept: RADIOLOGY | Facility: CLINIC | Age: 48
End: 2018-12-10
Payer: COMMERCIAL

## 2018-12-10 ENCOUNTER — HOSPITAL ENCOUNTER (EMERGENCY)
Facility: HOSPITAL | Age: 48
Discharge: HOME | End: 2018-12-10
Attending: EMERGENCY MEDICINE
Payer: COMMERCIAL

## 2018-12-10 ENCOUNTER — OFFICE VISIT (OUTPATIENT)
Dept: PAIN MEDICINE | Facility: CLINIC | Age: 48
End: 2018-12-10
Payer: COMMERCIAL

## 2018-12-10 ENCOUNTER — TELEPHONE (OUTPATIENT)
Dept: PHYSICAL THERAPY | Facility: OTHER | Age: 48
End: 2018-12-10

## 2018-12-10 VITALS
WEIGHT: 172 LBS | DIASTOLIC BLOOD PRESSURE: 76 MMHG | BODY MASS INDEX: 26.15 KG/M2 | SYSTOLIC BLOOD PRESSURE: 120 MMHG | TEMPERATURE: 98.3 F | HEART RATE: 64 BPM

## 2018-12-10 VITALS
BODY MASS INDEX: 27.29 KG/M2 | RESPIRATION RATE: 18 BRPM | SYSTOLIC BLOOD PRESSURE: 116 MMHG | DIASTOLIC BLOOD PRESSURE: 66 MMHG | HEART RATE: 58 BPM | TEMPERATURE: 98.5 F | WEIGHT: 179.45 LBS | OXYGEN SATURATION: 96 %

## 2018-12-10 DIAGNOSIS — M54.50 ACUTE BILATERAL LOW BACK PAIN WITHOUT SCIATICA: Primary | ICD-10-CM

## 2018-12-10 DIAGNOSIS — M46.1 SACROILIITIS (HCC): ICD-10-CM

## 2018-12-10 DIAGNOSIS — M79.18 MYOFASCIAL PAIN SYNDROME: ICD-10-CM

## 2018-12-10 DIAGNOSIS — M25.552 LEFT HIP PAIN: ICD-10-CM

## 2018-12-10 DIAGNOSIS — S76.012A PSOAS MUSCLE STRAIN, LEFT, INITIAL ENCOUNTER: ICD-10-CM

## 2018-12-10 DIAGNOSIS — M25.552 LEFT HIP PAIN: Primary | ICD-10-CM

## 2018-12-10 PROCEDURE — 99283 EMERGENCY DEPT VISIT LOW MDM: CPT

## 2018-12-10 PROCEDURE — 99214 OFFICE O/P EST MOD 30 MIN: CPT | Performed by: NURSE PRACTITIONER

## 2018-12-10 PROCEDURE — 73502 X-RAY EXAM HIP UNI 2-3 VIEWS: CPT

## 2018-12-10 RX ORDER — CYCLOBENZAPRINE HCL 10 MG
10 TABLET ORAL 3 TIMES DAILY PRN
Qty: 20 TABLET | Refills: 0 | Status: SHIPPED | OUTPATIENT
Start: 2018-12-10 | End: 2018-12-27 | Stop reason: SDUPTHER

## 2018-12-10 RX ORDER — METHYLPREDNISOLONE 4 MG/1
TABLET ORAL
Qty: 21 TABLET | Refills: 0 | Status: SHIPPED | OUTPATIENT
Start: 2018-12-10 | End: 2018-12-27

## 2018-12-10 NOTE — ED PROVIDER NOTES
History  Chief Complaint   Patient presents with    Back Pain     Mid back pain that radiates to left hip, onset three days ago  Worse today       History provided by:  Patient  Back Pain   Location:  Lumbar spine  Quality:  Aching  Radiates to:  Does not radiate  Pain severity:  Moderate  Pain is: Worse during the day  Duration:  1 week  Timing:  Intermittent  Progression:  Worsening  Chronicity:  Recurrent  Context comment:  History of recurrent back pain, now also with pain over her left hip flexor, both do occur at the same time particularly with walking prompting ED evaluation  Relieved by:  Being still  Worsened by: Movement and twisting  Associated symptoms: no abdominal swelling, no bladder incontinence, no bowel incontinence, no chest pain, no fever, no headaches, no leg pain, no numbness, no paresthesias, no perianal numbness, no tingling, no weakness and no weight loss        Prior to Admission Medications   Prescriptions Last Dose Informant Patient Reported? Taking?    Biotin 54562 MCG TABS  Self Yes No   Sig: Take by mouth   Calcium Citrate-Vitamin D 500-500 MG-UNIT PACK  Self Yes No   Sig: Take by mouth   Cyanocobalamin (CVS VITAMIN B12 PO)  Self Yes No   Sig: Take by mouth   Multiple Vitamins-Minerals (MULTI COMPLETE PO)  Self Yes No   Sig: Take by mouth   QUEtiapine (SEROquel) 25 mg tablet  Self No No   Sig: Take 1 tablet (25 mg total) by mouth daily at bedtime   escitalopram (LEXAPRO) 20 mg tablet  Self No No   Sig: Take 1 tablet (20 mg total) by mouth daily   methocarbamol (ROBAXIN) 500 mg tablet  Self No No   Sig: Take 1 tablet (500 mg total) by mouth every 8 (eight) hours   Patient not taking: Reported on 11/8/2018    norgestimate-ethinyl estradiol (ORTHO-CYCLEN) 0 25-35 MG-MCG per tablet   No No   Sig: Take 1 tablet by mouth daily   pantoprazole (PROTONIX) 40 mg tablet  Self No No   Sig: Take 1 tablet (40 mg total) by mouth daily before breakfast Take 30 minutes before Facility-Administered Medications: None       Past Medical History:   Diagnosis Date    Anxiety     Bulging lumbar disc     Carpal tunnel syndrome     RIGHT  LAST ASSESSED: 16    Chronic back pain     low    Colon polyps     Depression     Diabetes mellitus ([facility] [address] )     on victoza    GERD (gastroesophageal reflux disease)     Gestational diabetes     Hearing loss     left ear    Hyperlipidemia     IBS (irritable bowel syndrome)     Ileus ([facility] [address] )     LAST ASSESSED: 8/3/17    Labial cyst     LAST ASSESSED: 16    Myofascial pain     LAST ASSESSED: 16    Obesity     Ovarian cyst     LEFT  LAST ASSESSED: 16    Pap smear for cervical cancer screening     2018--pap wnl, HRHPV neg    Seasonal allergies     Thoracic outlet syndrome     2010    Trochanteric bursitis of both hips     LAST ASSESSED: 3/18/16    Ulnar neuropathy at elbow     Wears glasses        Past Surgical History:   Procedure Laterality Date    BILE DUCT EXPLORATION      ENDOSCOPIC REMOVAL OF STONES FROM BILIARY TRACT     SECTION      x3    CHOLECYSTECTOMY      COLONOSCOPY      DILATION AND CURETTAGE OF UTERUS      ENDOMETRIAL ABLATION      ERCP W/ SPHICTEROTOMY      FIRST RIB REMOVAL      FIRST RIB REMOVAL      THORAX EXCISION OF FIRST RIB    HYSTEROSCOPY      NEUROPLASTY / TRANSPOSITION ULNAR NERVE AT ELBOW      FL COLONOSCOPY FLX DX W/COLLJ SPEC WHEN PFRMD N/A 2017    Procedure: COLONOSCOPY;  Surgeon: Karon Gray MD;  [facility]; Service: Gastroenterology    FL ESOPHAGOGASTRODUODENOSCOPY TRANSORAL DIAGNOSTIC N/A 2017    Procedure: ESOPHAGOGASTRODUODENOSCOPY (EGD); Surgeon: Greyson Chicas MD;  [facility];   Service: Gastroenterology    FL HYSTEROSCOPY,W/ENDO BX N/A 10/20/2017    Procedure: DILATATION AND CURETTAGE (D&C) WITH HYSTEROSCOPY  REMOVAL VULVAR RT  LESION;  Surgeon: Swapnil Rodriguez MD;  [facility];  Service: Gynecology    FL LAP, ÁLVARO RESTRICT PROC, LONGITUDINAL GASTRECTOMY N/A 2/6/2018    Procedure: GASTRECTOMY SLEEVE LAPAROSCOPIC; INTRAOPERATIVE EGD ;  Surgeon: Alvaro Mera MD;  [facility];  Service: Bariatrics    TONSILLECTOMY AND ADENOIDECTOMY      TUBAL LIGATION      ULNAR NERVE REPAIR Right     VEIN LIGATION AND STRIPPING Right     VULVA SURGERY  10/20/2017    BIOPSY    WISDOM TOOTH EXTRACTION         Family History   Problem Relation Age of Onset    Diabetes Mother     Other Mother         HYPERCHOLESTEROLEMIA    Breast cancer Mother         >50    Diabetes Father     Other Father         HYPERCHOLESTEROLEMIA    Prostate cancer Father     Hypertension Brother     Diabetes Brother     Other Brother         HYPERCHOLESTEROLEMIA    Alcohol abuse Family     Asthma Family     Other Family         BACK PAIN    Cancer Family     Depression Family     Neuropathy Family     Heart disease Family     Colon cancer Maternal Grandfather     Ovarian cancer Neg Hx     Uterine cancer Neg Hx      I have reviewed and agree with the history as documented  Social History   Substance Use Topics    Smoking status: Former Smoker     Quit date: 4/30/2017    Smokeless tobacco: Never Used    Alcohol use No        Review of Systems   Constitutional: Negative for fever and weight loss  Respiratory: Negative for chest tightness and shortness of breath  Cardiovascular: Negative for chest pain  Gastrointestinal: Negative for bowel incontinence  Genitourinary: Negative for bladder incontinence  Musculoskeletal: Positive for back pain  Skin: Negative for rash  Neurological: Negative for dizziness, tingling, weakness, light-headedness, numbness, headaches and paresthesias  Physical Exam  Physical Exam   Constitutional: She appears well-developed  HENT:   Head: Atraumatic  Eyes: Conjunctivae are normal  Right eye exhibits no discharge  Left eye exhibits no discharge  No scleral icterus  Neck: Neck supple   No tracheal deviation present  Pulmonary/Chest: Effort normal  No stridor  No respiratory distress  Musculoskeletal: She exhibits no deformity  No spinous process tenderness, hypertonicity paraspinal musculature consistent with acute muscle spasm , +2 patella and Achilles reflex bilaterally  Normal sensation normal muscle strength bilateral lower extremities     Neurological: She is alert  She exhibits normal muscle tone  Coordination normal    Skin: Skin is warm and dry  No rash noted  No erythema  No pallor  Psychiatric: She has a normal mood and affect  Vitals reviewed  Vital Signs  ED Triage Vitals   Temperature Pulse Respirations Blood Pressure SpO2   12/10/18 1014 12/10/18 1014 12/10/18 1014 12/10/18 1015 12/10/18 1014   98 5 °F (36 9 °C) 58 18 116/66 96 %      Temp Source Heart Rate Source Patient Position - Orthostatic VS BP Location FiO2 (%)   12/10/18 1014 12/10/18 1014 12/10/18 1014 12/10/18 1014 --   Oral Monitor Lying Right arm       Pain Score       --                  Vitals:    12/10/18 1014 12/10/18 1015   BP:  116/66   Pulse: 58    Patient Position - Orthostatic VS: Lying        Visual Acuity      ED Medications  Medications - No data to display    Diagnostic Studies  Results Reviewed     None                 No orders to display              Procedures  Procedures       Phone Contacts  ED Phone Contact    ED Course                               MDM  Number of Diagnoses or Management Options  Acute bilateral low back pain without sciatica: new and requires workup  Psoas muscle strain, left, initial encounter: new and requires workup  Diagnosis management comments: Denies history of severe or worsening lower extremity weakness/numbness  Denies history of saddle anesthesia/perineal anesthesia  Denies bowel or bladder incontinence/retention  History does not suggest diagnosis of cauda equina syndrome    Patient denies history of IVDA, denies history of fevers, no recent surgeries or any procedures to suggest a transient bacteremia leading to a diagnosis of epidural abscess  Denies history of blood thinner use with recent history of lumbar puncture or any violation of the epidural space to suggest history of epidural hematoma  Therefore these above diagnoses (cauda equina syndrome, epidural abscess, epidural hematoma) were not pursued with diagnostic imaging  Amount and/or Complexity of Data Reviewed  Review and summarize past medical records: yes      CritCare Time    Disposition  Final diagnoses:   Acute bilateral low back pain without sciatica   Psoas muscle strain, left, initial encounter     Time reflects when diagnosis was documented in both MDM as applicable and the Disposition within this note     Time User Action Codes Description Comment    12/10/2018 10:15 AM Timi Plascencia Add [M54 5] Acute bilateral low back pain without sciatica     12/10/2018 10:17 AM Timi Plascencia Add [S76 012A] Psoas muscle strain, left, initial encounter       ED Disposition     ED Disposition Condition Comment    Discharge  Baltazar Briceno discharge to home/self care      Condition at discharge: Good        Follow-up Information     Follow up With Specialties Details Why Contact Info Additional Information    [facility] Physical Therapy Call today To arrange for the next available appointment [address] [facility] [id number] [phone] [address] [facility], [place], [place], [address]          Discharge Medication List as of 12/10/2018 10:18 AM      START taking these medications    Details   cyclobenzaprine (FLEXERIL) 10 mg tablet Take 1 tablet (10 mg total) by mouth 3 (three) times a day as needed for muscle spasms, Starting Mon 12/10/2018, Normal         CONTINUE these medications which have NOT CHANGED    Details   Biotin 38131 MCG TABS Take by mouth, Historical Med      Calcium Citrate-Vitamin D 500-500 MG-UNIT PACK Take by mouth, Historical Med      Cyanocobalamin (CVS VITAMIN B12 PO) Take by mouth, Historical Med      escitalopram (LEXAPRO) 20 mg tablet Take 1 tablet (20 mg total) by mouth daily, Starting Wed 9/5/2018, Normal      methocarbamol (ROBAXIN) 500 mg tablet Take 1 tablet (500 mg total) by mouth every 8 (eight) hours, Starting Wed 9/5/2018, Normal      Multiple Vitamins-Minerals (MULTI COMPLETE PO) Take by mouth, Historical Med      norgestimate-ethinyl estradiol (ORTHO-CYCLEN) 0 25-35 MG-MCG per tablet Take 1 tablet by mouth daily, Starting u 11/8/2018, Normal      pantoprazole (PROTONIX) 40 mg tablet Take 1 tablet (40 mg total) by mouth daily before breakfast Take 30 minutes before, Starting Wed 9/5/2018, Normal      QUEtiapine (SEROquel) 25 mg tablet Take 1 tablet (25 mg total) by mouth daily at bedtime, Starting Wed 9/5/2018, Normal             Outpatient Discharge Orders  Ambulatory Referral to [facility]   Standing Status: Future  Standing Exp   Date: 06/10/19         ED Provider  Electronically Signed by           Valentin Gonzalez DO  12/10/18 1425

## 2018-12-10 NOTE — DISCHARGE INSTRUCTIONS
Acute Low Back Pain, Ambulatory Care   GENERAL INFORMATION:   Acute low back pain  is discomfort in your lower back area that lasts for less than 12 weeks  The word acute is used to describe pain that starts suddenly, worsens quickly, and lasts for a short time  Common symptoms include the following:   · Back stiffness or spasms    · Pain down the back or side of one leg    · Holding yourself in an unusual position or posture to decrease your back pain    · Not being able to find a sitting position that is comfortable    · Slow increase in your pain for 24 to 48 hours after you stress your back    · Tenderness on your lower back or severe pain when you move your back  Seek immediate care for the following symptoms:   · Severe pain    · Sudden stiffness and heaviness in both buttocks down to both legs    · Numbness or weakness in one leg, or pain in both legs    · Numbness in your genital area or across your lower back    · Unable to control your urine or bowel movements  Treatment for acute low back pain  may include any of the following:  · Medicines:      ¨ NSAIDs  help decrease swelling and pain or fever  This medicine is available with or without a doctor's order  NSAIDs can cause stomach bleeding or kidney problems in certain people  If you take blood thinner medicine, always ask your healthcare provider if NSAIDs are safe for you  Always read the medicine label and follow directions  ¨ Muscle relaxers  help decrease muscle spasms pain  ¨ Prescription pain medicine  may be given  Ask how to take this medicine safely  · Surgery  may be needed if your pain is severe and other treatments do not work  Surgery may be needed for conditions of the lumbar spine, such as herniated disc or spinal stenosis  Manage your symptoms:   · Sleep on a firm mattress  If you do not have a firm mattress, have someone move your mattress to the floor for a few days   A piece of plywood under your mattress can also help make it firmer  · Apply ice  on your lower back for 15 to 20 minutes every hour or as directed  Use an ice pack, or put crushed ice in a plastic bag  Cover it with a towel  Ice helps prevent tissue damage and decreases swelling and pain  You can alternate ice and heat  · Apply heat  on your lower back for 20 to 30 minutes every 2 hours for as many days as directed  Heat helps decrease pain and muscle spasms  · Go to physical therapy  A physical therapist teaches you exercises to help improve movement and strength, and to decrease pain  Prevent acute low back pain:   · Use proper body mechanics  ¨ Bend at the hips and knees when you  objects  Do not bend from the waist  Use your leg muscles as you lift the load  Do not use your back  Keep the object close to your chest as you lift it  Try not to twist or lift anything above your waist     ¨ Change your position often when you stand for long periods of time  Rest one foot on a small box or footrest, and then switch to the other foot often  ¨ Try not to sit for long periods of time  When you do, sit in a straight-backed chair with your feet flat on the floor  Never reach, pull, or push while you are sitting  · Exercise regularly  Warm up before you exercise  Do exercises that strengthen your back muscles  Ask about the best exercise plan for you  · Maintain a healthy weight  Ask your healthcare provider how much you should weigh  Ask him to help you create a weight loss plan if you are overweight  Follow up with your healthcare provider as directed:  Return for a follow-up visit if you still have pain after 1 to 3 weeks of treatment  You may need to visit an orthopedist if your back pain lasts more than 6 to 12 weeks  Write down your questions so you remember to ask them during your visits  CARE AGREEMENT:   You have the right to help plan your care  Learn about your health condition and how it may be treated   Discuss treatment options with your caregivers to decide what care you want to receive  You always have the right to refuse treatment  The above information is an  only  It is not intended as medical advice for individual conditions or treatments  Talk to your doctor, nurse or pharmacist before following any medical regimen to see if it is safe and effective for you  © 2014 [address] is for End User's use only and may not be sold, redistributed or otherwise used for commercial purposes  All illustrations and images included in CareNotes® are the copyrighted property of [employer]  or Omar Choudhury  Groin Strain   WHAT YOU NEED TO KNOW:   A groin strain occurs when a muscle or tendon is stretched or torn  The groin is the area where your abdomen meets your upper leg  Tendons are cords of tissue that attach muscle to bone  DISCHARGE INSTRUCTIONS:   Rest your groin area: You will need to rest your groin area from activities that may cause you pain  This will help decrease the risk of more damage to your groin  Use crutches or a cane as directed  Ice your groin area: Ice your groin area to help decrease swelling and pain  Put crushed ice in a plastic bag and cover it with a towel  Put the ice on your groin area for 15 to 20 minutes every hour  Do this for as many days as directed  Wrap your groin:  Your healthcare provider will instruct you on how to wrap your groin with an elastic bandage or tape  When you wrap your groin, it becomes more stable  Wrapping your groin can help decrease your pain  Elevate the injured area:  Keep the leg on your injured side raised to help decrease pain and swelling in your groin area  Use pillows, blankets, or rolled towels to elevate your leg as often as you can  Medicine:   · Pain medicine: You may be given medicine such as aspirin or ibuprofen to decrease or take away pain   Do not wait until the pain is severe before you take your medicine  · Take your medicine as directed:  Call your healthcare provider if you think your medicine is not helping or if you have side effects  Tell him if you take any vitamins, herbs, or other medicines  Keep a list of the medicines you take  Include the amounts, and when and why you take them  Bring the list or the pill bottles to follow-up visits  Activity:  You may need to exercise the injured area after your pain and swelling have decreased  Exercises will help prevent stiffness in the injured area and increase strength  Return to your normal activities slowly  You could injure yourself again if you try to return to normal activity too soon  Return to your normal level of activity when:  · You do not have pain when you walk, run, or jump  · Your injured leg moves like your uninjured leg  · Your injured leg feels as strong as your uninjured leg  Prevent another injury:   · Warm up and stretch before you exercise  · Wear shoes that fit well  · Wear equipment to protect yourself when you play sports  · Do exercises as directed to build muscle strength  Stop if you feel pain or tightness in your groin  Follow up with your healthcare provider as directed:  Write down any questions you have so you remember to ask them in your follow-up visits  Contact your healthcare provider if:   · You have increased swelling and pain in your injured area  · You have increased groin pain when standing or with movement  · You have questions or concerns about your injury or treatment  © 2017 [address] is for End User's use only and may not be sold, redistributed or otherwise used for commercial purposes  All illustrations and images included in CareNotes® are the copyrighted property of A D A M , Inc  or Omar Choudhury  The above information is an  only  It is not intended as medical advice for individual conditions or treatments   Talk to your doctor, nurse or pharmacist before following any medical regimen to see if it is safe and effective for you

## 2018-12-10 NOTE — TELEPHONE ENCOUNTER
Patient is calling asking to be seen by Dr Divya Shine  Patient is stating that she is having hip/lower back pain  Patient is stating that she has a herniated disc  We do have some records in the chart  Patient is also stating that she saw a pain management type of doctor by the name of Dr Nilam Griggs  Patient is stating that Dr Nilam Griggs was through [address] but she is no longer with us  Patient is stating that she saw this doctor through [address] [facility]  Patient went to [place] but she works at [employer] would like to be seen at [facility]  Please advise what we need to do to schedule this patient      [phone]

## 2018-12-10 NOTE — TELEPHONE ENCOUNTER
Looks like pt was last seen 5/13/16 by Dr Aneta Suárez for back pain, sacroiliitis  Pls advise and send task to [facility] clerical how this pt should be scheduled, ov or cons?

## 2018-12-10 NOTE — PROGRESS NOTES
Assessment:  1  Left hip pain    2  Myofascial pain syndrome    3  Sacroiliitis ([address] )        Plan:  Katelyn Navarrete is a 51 y o  female with a history of sacroiliitis  The patient present with left hip pain which started 2 days ago her pain and symptom are most likely stemming from her left hip  She was noted to have left hip tenderness on examination and pain with internal and external rotation of her left hip  Therefore, she was ordered an x-ray of her left hip for further evaluation  She was instructed that our office will call with results of this study once is completed  To help with the inflammatory component of the patient's pain I have started the patient on a Medrol Dosepak  She was instructed to take this medication as prescribed  She was educated on the medications most common side effects and was instructed to call the office after the completion of this medication, or with adverse medication side effects  She reports that she was also prescribed cyclobenzaprine by the ER and has not started taking his medication yet and is planning to start this medication tonight to help with the myofascial component of her pain  The patient will follow up after the completion of her left hip x-ray or sooner with the worsening of symptoms  My impressions and treatment recommendations were discussed in detail with the patient who verbalized understanding and had no further questions  Discharge instructions were provided  I personally saw and examined the patient and I agree with the above discussed plan of care  Orders Placed This Encounter   Procedures    XR hip/pelv 2-3 vws left if performed     Standing Status:   Future     Number of Occurrences:   1     Standing Expiration Date:   12/10/2022     Scheduling Instructions:      Bring along any outside films relating to this procedure             Order Specific Question:   Reason for Exam:     Answer:   Left hip pain     Order Specific Question: Is the patient pregnant? Answer:   No     New Medications Ordered This Visit   Medications    Methylprednisolone 4 MG TBPK     Sig: Use as directed on package     Dispense:  21 tablet     Refill:  0       History of Present Illness:  Anali Reyes is a 51 y o  female with a history of sacroiliitis  The patient was last seen office on 5/13/2016 where she was referred to Dr Latanya Lundberg for SI fusion and was prescribed Pennsaid cream   She presents for a follow up office visit in regards to Back Pain  The patients current symptoms include back pain that radiated into in her left groin  She reports that he symptoms started 2 days ago  She reports that she stepped off of a step at a friend's house and fell and landed on her left hip  She reports that she has had spasms in her low back  She denies weakness of her bilateral legs, or bowel or bladder issues  She describes her pain as throbbing, pulling pain that is intermittent nature  She reports that her pain is symptoms have worsened since last visit  She currently rates her pain at 10 out 10 numeric pain scale  I have personally reviewed and/or updated the patient's past medical history, past surgical history, family history, social history, current medications, allergies, and vital signs today  Review of Systems   Respiratory: Negative for shortness of breath  Cardiovascular: Negative for chest pain  Gastrointestinal: Negative for constipation, diarrhea, nausea and vomiting  Musculoskeletal: Positive for gait problem  Negative for arthralgias, joint swelling and myalgias  Skin: Negative for rash  Neurological: Negative for dizziness, seizures and weakness  All other systems reviewed and are negative        Patient Active Problem List   Diagnosis    IBS (irritable bowel syndrome)    GERD (gastroesophageal reflux disease)    Depression    Chronic back pain    Anxiety    Ileus (HCC)    Cervical radiculopathy    Hepatic steatosis    Pulmonary nodule seen on imaging study    Vitamin D deficiency    Dermatitis    S/P laparoscopic sleeve gastrectomy    Other fatigue    Elevated sed rate    Encounter for annual routine gynecological examination    Overweight (BMI 25 0-29  9)    Postsurgical malabsorption    Lymph node enlargement       Past Medical History:   Diagnosis Date    Anxiety     Bulging lumbar disc     Carpal tunnel syndrome     RIGHT  LAST ASSESSED: 16    Chronic back pain     low    Colon polyps     Depression     Diabetes mellitus ([facility] [address] )     on victoza    GERD (gastroesophageal reflux disease)     Gestational diabetes     Hearing loss     left ear    Hyperlipidemia     IBS (irritable bowel syndrome)     Ileus ([address] )     LAST ASSESSED: 8/3/17    Labial cyst     LAST ASSESSED: 16    Myofascial pain     LAST ASSESSED: 16    Obesity     Ovarian cyst     LEFT  LAST ASSESSED: 16    Pap smear for cervical cancer screening     2018--pap wnl, HRHPV neg    Seasonal allergies     Thoracic outlet syndrome     2010    Trochanteric bursitis of both hips     LAST ASSESSED: 3/18/16    Ulnar neuropathy at elbow     Wears glasses        Past Surgical History:   Procedure Laterality Date    BILE DUCT EXPLORATION      ENDOSCOPIC REMOVAL OF STONES FROM BILIARY TRACT     SECTION      x3    CHOLECYSTECTOMY      COLONOSCOPY      DILATION AND CURETTAGE OF UTERUS      ENDOMETRIAL ABLATION      ERCP W/ SPHICTEROTOMY      FIRST RIB REMOVAL      FIRST RIB REMOVAL      THORAX EXCISION OF FIRST RIB    HYSTEROSCOPY      NEUROPLASTY / TRANSPOSITION ULNAR NERVE AT ELBOW      AL COLONOSCOPY FLX DX W/COLLJ SPEC WHEN PFRMD N/A 2017    Procedure: COLONOSCOPY;  Surgeon: Manny Summers MD;  [facility]: [facility]; Service: Gastroenterology    AL ESOPHAGOGASTRODUODENOSCOPY TRANSORAL DIAGNOSTIC N/A 2017    Procedure: ESOPHAGOGASTRODUODENOSCOPY (EGD);   Surgeon: Dequan Mccormack MD;  [facility]: [facility]; Service: Gastroenterology    MO HYSTEROSCOPY,W/ENDO BX N/A 10/20/2017    Procedure: DILATATION AND CURETTAGE (D&C) WITH HYSTEROSCOPY  REMOVAL VULVAR RT  LESION;  Surgeon: Gemma Infante MD;  [facility];  Service: Gynecology    MO LAP, ÁLVARO RESTRICT PROC, LONGITUDINAL GASTRECTOMY N/A 2/6/2018    Procedure: GASTRECTOMY SLEEVE LAPAROSCOPIC; INTRAOPERATIVE EGD ;  Surgeon: Guru Bird MD;  [facility];  Service: Bariatrics    TONSILLECTOMY AND ADENOIDECTOMY      TUBAL LIGATION      ULNAR NERVE REPAIR Right     VEIN LIGATION AND [address] Right     VULVA SURGERY  10/20/2017    BIOPSY    WISDOM TOOTH EXTRACTION         Family History   Problem Relation Age of Onset    Diabetes Mother     Other Mother         HYPERCHOLESTEROLEMIA    Breast cancer Mother         >50    Diabetes Father     Other Father         HYPERCHOLESTEROLEMIA    Prostate cancer Father     Hypertension Brother     Diabetes Brother     Other Brother         HYPERCHOLESTEROLEMIA    Alcohol abuse Family     Asthma Family     Other Family         BACK PAIN    Cancer Family     Depression Family     Neuropathy Family     Heart disease Family     Colon cancer Maternal Grandfather     Ovarian cancer Neg Hx     Uterine cancer Neg Hx        Social History     Occupational History    [employer]     Social History Main Topics    Smoking status: Former Smoker     Quit date: 4/30/2017    Smokeless tobacco: Never Used    Alcohol use No    Drug use: No    Sexual activity: Not on file      Comment: lifetime partners: 6       Current Outpatient Prescriptions on File Prior to Visit   Medication Sig    Biotin 49102 MCG TABS Take by mouth    Calcium Citrate-Vitamin D 500-500 MG-UNIT PACK Take by mouth    Cyanocobalamin ([employer] VITAMIN B12 PO) Take by mouth    cyclobenzaprine (FLEXERIL) 10 mg tablet Take 1 tablet (10 mg total) by mouth 3 (three) times a day as needed for muscle spasms    escitalopram (LEXAPRO) 20 mg tablet Take 1 tablet (20 mg total) by mouth daily    methocarbamol (ROBAXIN) 500 mg tablet Take 1 tablet (500 mg total) by mouth every 8 (eight) hours (Patient not taking: Reported on 11/8/2018 )    Multiple Vitamins-Minerals (MULTI COMPLETE PO) Take by mouth    norgestimate-ethinyl estradiol (ORTHO-CYCLEN) 0 25-35 MG-MCG per tablet Take 1 tablet by mouth daily    pantoprazole (PROTONIX) 40 mg tablet Take 1 tablet (40 mg total) by mouth daily before breakfast Take 30 minutes before    QUEtiapine (SEROquel) 25 mg tablet Take 1 tablet (25 mg total) by mouth daily at bedtime     No current facility-administered medications on file prior to visit  No Known Allergies    Physical Exam:    /76   Pulse 64   Temp 98 3 °F (36 8 °C) (Oral)   Wt 78 kg (172 lb)   BMI 26 15 kg/m²     Constitutional:normal, well developed, well nourished, alert, in no distress and non-toxic and no overt pain behavior   and overweight  Eyes:anicteric  HEENT:grossly intact  Neck:supple, symmetric, trachea midline and no masses   Pulmonary:even and unlabored  Cardiovascular:No edema or pitting edema present  Skin:Normal without rashes or lesions and well hydrated  Psychiatric:Mood and affect appropriate  Neurologic:Cranial Nerves II-XII grossly intact  Musculoskeletal:normal     Lumbar Spine Exam    Appearance:  Normal lordosis  Palpation/Tenderness:  left sacroiliac joint tenderness  Left hip tenderness   Sensory:  no sensory deficits noted  Range of Motion:  Flexion:  Minimally limited  with pain  Extension:  Minimally limited  with pain  Lateral Flexion - Left:  Minimally limited  with pain  Lateral Flexion - Right:  Minimally limited  with pain  Rotation - Left:  Minimally limited  with pain  Rotation - Right:  Minimally limited  with pain  Motor Strength:  Left hip flexion:  4/5  Right hip flexion:  5/5  Left knee extension:  4/5  Right knee extension:  5/5  Left foot dorsiflexion:  5/5  Left foot plantar flexion:  5/5  Right foot dorsiflexion:  5/5  Right foot plantar flexion:  5/5        Imaging

## 2018-12-10 NOTE — TELEPHONE ENCOUNTER
TOOTIE BHAGAT  spoke with Pt  about the Comp  Spine program in detail, but Pt  has pain man  appointment at 3pm today  She would like to wait till after her injection before scheduling any physical therapy  Pt  stated that she has received these injections in the past and that they seem to work for this kind of back pain  R OLAYINKA  again gave the phone # for comp spine program and our hours

## 2018-12-11 ENCOUNTER — TELEPHONE (OUTPATIENT)
Dept: PAIN MEDICINE | Facility: CLINIC | Age: 48
End: 2018-12-11

## 2018-12-11 NOTE — TELEPHONE ENCOUNTER
Patient would like to know if the X-Ray results are back yet? Patient also would like to know if she can have a work note for tomorrow and Thursday

## 2018-12-12 ENCOUNTER — TELEPHONE (OUTPATIENT)
Dept: PAIN MEDICINE | Facility: CLINIC | Age: 48
End: 2018-12-12

## 2018-12-12 NOTE — TELEPHONE ENCOUNTER
Elizabeth  [phone]  Dr Alonso Perez    Patient called in to check on the status x-rays that were taken on 12/10/18  She says that she is still having back pain, and her back is very tight  She had to call out of work for 2 days due to this  Can you please also write her a work note please fax [phone]  Also please call patient before you fax the note over to her boss

## 2018-12-12 NOTE — TELEPHONE ENCOUNTER
1 I s/w pt and told her her x-ray done 12/10 only shows exam ended, there is no report yet  Told pt it can take a couple days to be read, we will call her once report is final  I told pt I would call the dept and ask how long until report would be avail  I s/w someone in the [facility] x-ray dept and was told x-ray reports can take up to 1 wk for results unless they are stat, it's all based on how many they have  2 Pt said she called out of work for today and tomorrow b/c pain and [facility]  Pt asking if we would write her a  work note for today and tomorrow? Tues 12/11 was her regular day off  Pt wants work note faxed to the Attention: Clarence KAPOOR, fax # [phone]  Pt also wants a copy of the work note mailed to her

## 2018-12-13 NOTE — TELEPHONE ENCOUNTER
Her left hip x-ray is still  pending without a final report  I have wrote a note in epic for the patient's employer excusing her from work on 12/12/2018 and 12/13/2018  This letter is ready to be faxed to her employer and mailed to the patient

## 2018-12-13 NOTE — TELEPHONE ENCOUNTER
SW patient, made aware a work note has been faxed to [employer]  (ER) at [phone]  Received confirmation that fax was received  Mailed a copy of the work note to patient's home address  Called the patient and made aware fax has been sent and a copy mailed to her home address  Advised patient, results of the x-rays are not back at this time  Patient verbalized understanding and appreciative of call back

## 2018-12-20 ENCOUNTER — TELEPHONE (OUTPATIENT)
Dept: PAIN MEDICINE | Facility: CLINIC | Age: 48
End: 2018-12-20

## 2018-12-20 NOTE — TELEPHONE ENCOUNTER
LALY patient and reviewed results of x-rays per HA previous noted  ++Patient states that she completed the medrol dose antonia a couple days ago, but continues to have spasms in her lower back and it is not getting any better  Patient  would like to know if she should have an injection for the pain?

## 2018-12-20 NOTE — TELEPHONE ENCOUNTER
Please call the patient and let her know that her left hip x-ray showed no significant degenerative changes and no fractures or dislocations  It did show degenerative changes of her pelvis as well as her lower spine  There also is a 6 mm thin curvilinear radiopaque density projecting over the right pelvis  Which was noted on a previous CT study  I would recommend that she follow up with her family doctor for further evaluation of this finding

## 2018-12-26 NOTE — TELEPHONE ENCOUNTER
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

## 2018-12-26 NOTE — TELEPHONE ENCOUNTER
2nd attempt to reach pt  I left a detailed vm on number provided that Jennifer Grey would like to know if she is taking the cyclobenzaprine prescribed by the ER to help with the back spasms? Did it help the spasms? Does she have any left? Jennifer Grey needs this info to determine what the next step should be  Pls c/b and provide info for Usha  C/B # and OH provided

## 2018-12-27 ENCOUNTER — HOSPITAL ENCOUNTER (OUTPATIENT)
Dept: RADIOLOGY | Facility: HOSPITAL | Age: 48
Discharge: HOME | End: 2018-12-27
Payer: COMMERCIAL

## 2018-12-27 ENCOUNTER — OFFICE VISIT (OUTPATIENT)
Dept: PAIN MEDICINE | Facility: CLINIC | Age: 48
End: 2018-12-27
Payer: COMMERCIAL

## 2018-12-27 ENCOUNTER — TRANSCRIBE ORDERS (OUTPATIENT)
Dept: ADMINISTRATIVE | Facility: HOSPITAL | Age: 48
End: 2018-12-27

## 2018-12-27 VITALS
RESPIRATION RATE: 20 BRPM | BODY MASS INDEX: 27.06 KG/M2 | TEMPERATURE: 98 F | SYSTOLIC BLOOD PRESSURE: 122 MMHG | WEIGHT: 178 LBS | DIASTOLIC BLOOD PRESSURE: 68 MMHG

## 2018-12-27 DIAGNOSIS — M54.42 CHRONIC LEFT-SIDED LOW BACK PAIN WITH LEFT-SIDED SCIATICA: ICD-10-CM

## 2018-12-27 DIAGNOSIS — G89.29 CHRONIC LEFT-SIDED LOW BACK PAIN WITH LEFT-SIDED SCIATICA: Primary | ICD-10-CM

## 2018-12-27 DIAGNOSIS — G89.29 CHRONIC LEFT-SIDED LOW BACK PAIN WITH LEFT-SIDED SCIATICA: ICD-10-CM

## 2018-12-27 DIAGNOSIS — M46.1 SACROILIITIS (HCC): ICD-10-CM

## 2018-12-27 DIAGNOSIS — M54.42 CHRONIC LEFT-SIDED LOW BACK PAIN WITH LEFT-SIDED SCIATICA: Primary | ICD-10-CM

## 2018-12-27 DIAGNOSIS — M79.18 MYOFASCIAL MUSCLE PAIN: ICD-10-CM

## 2018-12-27 PROCEDURE — 72110 X-RAY EXAM L-2 SPINE 4/>VWS: CPT

## 2018-12-27 PROCEDURE — 99213 OFFICE O/P EST LOW 20 MIN: CPT | Performed by: NURSE PRACTITIONER

## 2018-12-27 RX ORDER — CYCLOBENZAPRINE HCL 10 MG
10 TABLET ORAL
Qty: 30 TABLET | Refills: 0 | Status: SHIPPED | OUTPATIENT
Start: 2018-12-27 | End: 2019-03-19 | Stop reason: SDUPTHER

## 2018-12-27 NOTE — PROGRESS NOTES
Pt c/o back pain that radiates to the leg groin    Assessment:  1  Chronic left-sided low back pain with left-sided sciatica    2  Myofascial muscle pain    3  Sacroiliitis ([address] )        Plan:  Madhu Coppola is a 51 y o  female with a history of sacroiliitis, left hip pain and myofascial pain syndrome  The patient continues with ongoing left-sided low back pain  She was noted to have tenderness over her left sacroiliac joint  However, she reported that her symptoms and pain are different from the pain she experienced in the past from left-sided sacroiliitis  She is not interest in moving forward with a left sacroiliac joint injection at this time  She is also complaining of left facet tenderness and central spinal tenderness  Therefore, at this time I ordered an x-ray of her lumbar spine for further evaluation  She was instructed that our office will call with results of this study once is completed  The patient was also noted to have a large myofascial component to her pain  I believe that she will benefit from a course of physical therapy to help with her low back pain and the myofascial component of her pain  Therefore she was provided a referral to physical therapy at this time  I discussed with the patient that if she did not have relief of symptoms after completing 6 weeks of physical therapy, I can order an MRI of her lumbar spine for further evaluation  She verbalized understanding  The patient reports that cyclobenzaprine is providing about 50% relief of symptoms due to muscle spasms  Therefore she will continue on cyclobenzaprine at this time a refill for this medication was sent electronically to the patient's pharmacy on file  The patient will follow up in 6 weeks after the completion of physical therapy, or sooner with the worsening of symptoms       My impressions and treatment recommendations were discussed in detail with the patient who verbalized understanding and had no further questions  Discharge instructions were provided  I personally saw and examined the patient and I agree with the above discussed plan of care  Orders Placed This Encounter   Procedures    X-ray lumbar spine complete 4+ views     Standing Status:   Future     Standing Expiration Date:   12/27/2022     Scheduling Instructions:      Bring along any outside films relating to this procedure  Order Specific Question:   Reason for Exam:     Answer:   Low back pain     Order Specific Question:   Is the patient pregnant? Answer:   No    Ambulatory referral to Physical Therapy     Standing Status:   Future     Standing Expiration Date:   6/27/2019     Referral Priority:   Routine     Referral Type:   Physical Therapy     Referral Reason:   Specialty Services Required     Requested Specialty:   Physical Therapy     Number of Visits Requested:   1     Expiration Date:   12/27/2019     New Medications Ordered This Visit   Medications    cyclobenzaprine (FLEXERIL) 10 mg tablet     Sig: Take 1 tablet (10 mg total) by mouth daily at bedtime     Dispense:  30 tablet     Refill:  0       History of Present Illness:  Osmar Gifford is a 51 y o  female with a history of sacroiliitis, left hip pain and myofascial pain syndrome  The patient was last seen office on 12/10/2018 where she was ordered an x-ray of her left hip and prescribed a Medrol Dosepak  She presents for a follow up office visit in regards to Back Pain; Leg Pain; and Groin Pain  The patients current symptoms include low back pain that starts in her low back and radiates into her left groin  She denies bilateral leg weakness or bowel or bladder issues  She describes her pain as dull aching, throbbing, pressure-like pain that is constant nature with symptoms worsening during the morning hours  She reports that her pain is symptoms are unchanged since last office visit  She currently rates her pain 8 out 10 numeric pain scale      Current pain medications includes:  Cyclobenzaprine 10 mg at bedtime  The patient reports that this regimen is providing 50% pain relief  The patient is reporting sleepiness from this pain medication regimen  I have personally reviewed and/or updated the patient's past medical history, past surgical history, family history, social history, current medications, allergies, and vital signs today  Review of Systems   Respiratory: Negative for shortness of breath  Cardiovascular: Negative for chest pain  Gastrointestinal: Negative for constipation, diarrhea, nausea and vomiting  Musculoskeletal: Positive for gait problem  Negative for arthralgias, joint swelling and myalgias  Joint stiffness   Skin: Negative for rash  Neurological: Negative for dizziness, seizures and weakness  All other systems reviewed and are negative  Patient Active Problem List   Diagnosis    IBS (irritable bowel syndrome)    GERD (gastroesophageal reflux disease)    Depression    Chronic back pain    Anxiety    Ileus (HCC)    Cervical radiculopathy    Hepatic steatosis    Pulmonary nodule seen on imaging study    Vitamin D deficiency    Dermatitis    S/P laparoscopic sleeve gastrectomy    Other fatigue    Elevated sed rate    Encounter for annual routine gynecological examination    Overweight (BMI 25 0-29  9)    Postsurgical malabsorption    Lymph node enlargement       Past Medical History:   Diagnosis Date    Anxiety     Bulging lumbar disc     Carpal tunnel syndrome     RIGHT    LAST ASSESSED: 12/7/16    Chronic back pain     low    Colon polyps     Depression     Diabetes mellitus ([address] )     on victoza    GERD (gastroesophageal reflux disease)     Gestational diabetes     Hearing loss     left ear    Hyperlipidemia     IBS (irritable bowel syndrome)     Ileus ([address] )     LAST ASSESSED: 8/3/17    Labial cyst     LAST ASSESSED: 4/21/16    Myofascial pain     LAST ASSESSED: 4/12/16    Obesity     Ovarian cyst     LEFT  LAST ASSESSED: 16    Pap smear for cervical cancer screening     2018--pap wnl, HRHPV neg    Seasonal allergies     Thoracic outlet syndrome     2010    Trochanteric bursitis of both hips     LAST ASSESSED: 3/18/16    Ulnar neuropathy at elbow     Wears glasses        Past Surgical History:   Procedure Laterality Date    BILE DUCT EXPLORATION      ENDOSCOPIC REMOVAL OF STONES FROM BILIARY TRACT     SECTION      x3    CHOLECYSTECTOMY      COLONOSCOPY      DILATION AND CURETTAGE OF UTERUS      ENDOMETRIAL ABLATION      ERCP W/ SPHICTEROTOMY      FIRST RIB REMOVAL      FIRST RIB REMOVAL      THORAX EXCISION OF FIRST RIB    HYSTEROSCOPY      NEUROPLASTY / TRANSPOSITION ULNAR NERVE AT ELBOW      NH COLONOSCOPY FLX DX W/COLLJ SPEC WHEN PFRMD N/A 2017    Procedure: COLONOSCOPY;  Surgeon: Tri Keller MD;  [facility]; Service: Gastroenterology    NH ESOPHAGOGASTRODUODENOSCOPY TRANSORAL DIAGNOSTIC N/A 2017    Procedure: ESOPHAGOGASTRODUODENOSCOPY (EGD); Surgeon: Mary Griggs MD;  [facility];   Service: Gastroenterology    NH HYSTEROSCOPY,W/ENDO BX N/A 10/20/2017    Procedure: DILATATION AND CURETTAGE (D&C) WITH HYSTEROSCOPY  REMOVAL VULVAR RT  LESION;  Surgeon: Lindsey Yancey MD;  [facility];  Service: Gynecology    NH LAP, ÁLVARO RESTRICT PROC, LONGITUDINAL GASTRECTOMY N/A 2018    Procedure: GASTRECTOMY SLEEVE LAPAROSCOPIC; INTRAOPERATIVE EGD ;  Surgeon: Linus Voss MD;  [facility];  Service: Bariatrics    TONSILLECTOMY AND ADENOIDECTOMY      TUBAL LIGATION      ULNAR NERVE REPAIR Right     VEIN LIGATION AND STRIPPING Right     VULVA SURGERY  10/20/2017    BIOPSY    WISDOM TOOTH EXTRACTION         Family History   Problem Relation Age of Onset    Diabetes Mother     Other Mother         HYPERCHOLESTEROLEMIA    Breast cancer Mother         >50    Diabetes Father     Other Father HYPERCHOLESTEROLEMIA    Prostate cancer Father     Hypertension Brother     Diabetes Brother     Other Brother         HYPERCHOLESTEROLEMIA    Alcohol abuse Family     Asthma Family     Other Family         BACK PAIN    Cancer Family     Depression Family     Neuropathy Family     Heart disease Family     Colon cancer Maternal Grandfather     Ovarian cancer Neg Hx     Uterine cancer Neg Hx        Social History     Occupational History     [employer] Employees     Social History Main Topics    Smoking status: Former Smoker     Quit date: 4/30/2017    Smokeless tobacco: Never Used    Alcohol use No    Drug use: No    Sexual activity: Not on file      Comment: lifetime partners: 6       Current Outpatient Prescriptions on File Prior to Visit   Medication Sig    Biotin 03469 MCG TABS Take by mouth    Calcium Citrate-Vitamin D 500-500 MG-UNIT PACK Take by mouth    Cyanocobalamin ([employer] VITAMIN B12 PO) Take by mouth    escitalopram (LEXAPRO) 20 mg tablet Take 1 tablet (20 mg total) by mouth daily    Multiple Vitamins-Minerals (MULTI COMPLETE PO) Take by mouth    norgestimate-ethinyl estradiol (ORTHO-CYCLEN) 0 25-35 MG-MCG per tablet Take 1 tablet by mouth daily    pantoprazole (PROTONIX) 40 mg tablet Take 1 tablet (40 mg total) by mouth daily before breakfast Take 30 minutes before    QUEtiapine (SEROquel) 25 mg tablet Take 1 tablet (25 mg total) by mouth daily at bedtime    [DISCONTINUED] cyclobenzaprine (FLEXERIL) 10 mg tablet Take 1 tablet (10 mg total) by mouth 3 (three) times a day as needed for muscle spasms    [DISCONTINUED] methocarbamol (ROBAXIN) 500 mg tablet Take 1 tablet (500 mg total) by mouth every 8 (eight) hours    [DISCONTINUED] Methylprednisolone 4 MG TBPK Use as directed on package (Patient not taking: Reported on 12/27/2018 )     No current facility-administered medications on file prior to visit          No Known Allergies    Physical Exam:    /68   Temp 98 °F (36 7 °C)   Resp 20   Wt 80 7 kg (178 lb)   BMI 27 06 kg/m²     Constitutional:normal, well developed, well nourished, alert, in no distress and non-toxic and no overt pain behavior  and overweight  Eyes:anicteric  HEENT:grossly intact  Neck:supple, symmetric, trachea midline and no masses   Pulmonary:even and unlabored  Cardiovascular:No edema or pitting edema present  Skin:Normal without rashes or lesions and well hydrated  Psychiatric:Mood and affect appropriate  Neurologic:Cranial Nerves II-XII grossly intact  Musculoskeletal:normal     Lumbar Spine Exam    Appearance:  Normal lordosis  Palpation/Tenderness:  left lumbar paraspinal tenderness  left sacroiliac joint tenderness  Sensory:  no sensory deficits noted  Range of Motion:  Flexion:  Minimally limited  with pain  Extension:  Minimally limited  with pain  Lateral Flexion - Left:  No limitation  without pain  Lateral Flexion - Right:  No limitation  without pain  Rotation - Left:  No limitation  without pain  Rotation - Right:  No limitation  without pain  Motor Strength:  Left hip flexion:  5/5  Right hip flexion:  5/5  Left knee extension:  5/5  Right knee extension:  5/5  Left foot dorsiflexion:  5/5  Left foot plantar flexion:  5/5  Right foot dorsiflexion:  5/5  Right foot plantar flexion:  5/5    Imaging    MRI LUMBAR SPINE WITHOUT CONTRAST     INDICATION:  LBP since 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     COMPARISON: Lumbar MRIs from Mar 21, 2015 and Dec 6, 2006 and x-rays dated Jan 31, 2016      TECHNIQUE:  Sagittal T1, sagittal T2, sagittal inversion recovery, axial T1 and axial T2, coronal T2        IMAGE QUALITY:  Diagnostic     FINDINGS:     ALIGNMENT:  Normal alignment of the lumbar spine  No compression fracture  No spondylolysis or spondylolisthesis  No scoliosis      MARROW SIGNAL:  Again noted is heterogenous signal in the sacrum, T1/T2 hyperintense, unchanged, and likely representing hemangiomas   No discrete marrow lesion      DISTAL CORD AND CONUS:  Normal size and signal within the distal cord and conus  The conus ends at the L1 level      PARASPINAL SOFT TISSUES:  Paraspinal soft tissues are unremarkable      SACRUM:  Normal signal within the sacrum  No evidence of insufficiency or stress fracture      LOWER THORACIC DISC SPACES:  Normal disc height and signal   No disc herniation, canal stenosis or foraminal narrowing      LUMBAR DISC SPACES:  There is disc dessication involving the L3-S1 levels      L1-L2:  Normal      L2-L3:  Normal      L3-L4:  Normal      L4-L5:  Left foraminal disc bulge causing mild neuroforaminal narrowing, unchanged  No spinal canal stenosis   Mild endplate changes are noted at this level      L5-S1:  Right foraminal disc bulge causing mild neuroforaminal narrowing, unchanged  No spinal canal stenosis      Incidental note is made of partially imaged bilateral ovarian follicles      IMPRESSION:  1  Left L4-L5 and right L5-S1 foraminal disc bulges causing mild neuroforaminal narrowing, unchanged  2  Incidental note is made of partially imaged bilateral ovarian follicles   Pelvic ultrasound may be performed if clinically indicated      Discussed with Dr Phill Rasmussen by Dr Dulce Maria Bernard on 2/17/2016 at 12:30pm

## 2019-01-02 ENCOUNTER — TELEPHONE (OUTPATIENT)
Dept: OBGYN CLINIC | Facility: CLINIC | Age: 49
End: 2019-01-02

## 2019-01-02 NOTE — TELEPHONE ENCOUNTER
Pt decided to come in tomorrow on Veda's schedule just to be seen, she is concerned because her mother had cancer

## 2019-01-02 NOTE — TELEPHONE ENCOUNTER
Pt called stating she started with a vaginal odor and extremely sore breasts - she is due for her period in a few days but has never had these issues before stating birth control  Is this normal ?    I did let her know most likely you would need to see her to rule out infection for the vaginal odor  She works every day that you have an opening  What would you recommend?

## 2019-01-10 ENCOUNTER — EVALUATION (OUTPATIENT)
Dept: PHYSICAL THERAPY | Facility: CLINIC | Age: 49
End: 2019-01-10
Payer: COMMERCIAL

## 2019-01-10 ENCOUNTER — TELEPHONE (OUTPATIENT)
Dept: FAMILY MEDICINE CLINIC | Facility: CLINIC | Age: 49
End: 2019-01-10

## 2019-01-10 DIAGNOSIS — G89.29 CHRONIC LEFT-SIDED LOW BACK PAIN WITH LEFT-SIDED SCIATICA: Primary | ICD-10-CM

## 2019-01-10 DIAGNOSIS — M54.42 CHRONIC LEFT-SIDED LOW BACK PAIN WITH LEFT-SIDED SCIATICA: Primary | ICD-10-CM

## 2019-01-10 PROCEDURE — 97162 PT EVAL MOD COMPLEX 30 MIN: CPT | Performed by: PHYSICAL THERAPIST

## 2019-01-10 PROCEDURE — G8982 BODY POS GOAL STATUS: HCPCS | Performed by: PHYSICAL THERAPIST

## 2019-01-10 PROCEDURE — G8981 BODY POS CURRENT STATUS: HCPCS | Performed by: PHYSICAL THERAPIST

## 2019-01-10 PROCEDURE — 97110 THERAPEUTIC EXERCISES: CPT | Performed by: PHYSICAL THERAPIST

## 2019-01-10 NOTE — PROGRESS NOTES
PT Evaluation     Today's date: 1/10/2019  Patient name: Vikram Bell  : 1970  MRN: [id number]  Referring provider: OSMANI Nguyen  Dx:   Encounter Diagnosis     ICD-10-CM    1  Chronic left-sided low back pain with left-sided sciatica M54 42 Ambulatory referral to Physical Therapy    G89 [id number]                   Assessment  Assessment details: Pt presents with signs and symptoms synonymous of admitting diagnosis as well as mechanical diagnosis of Ext bias for centralization of L groin pain however this may cause a challenge as Ext caused some R radicular symptoms when OP performed  Pt presents with pain, decreased strength, decreased range, flexibility, as well as tolerance to activity and Postural awareness  Pt would benefit skilled PT intervention in order to address these impairments in order to be able to perform all desired activities with minimal to nil symptom exacerbation however given her extensive history of LS pain, trials of epidural, therapy, and rigorous vocational demands, she may benefit from Neurosurgery consultation if there are no improvement in the next 3-4 weeks  Thank you very much for this referral      Impairments: abnormal or restricted ROM, impaired physical strength, lacks appropriate home exercise program, pain with function and poor posture   Understanding of Dx/Px/POC: good   Prognosis: good    Goals  STG 4 Weeks:  Decrease pain at worst to 6/10 50% centralization  Improve range to 10 Degre LS flex/ext  Improve strength to TA draw 20", hip to 4-  Independent with HEP  LTG 8 Weeks:  Decrease pain at worst to 3/10, 90% centralization  Improve range to SLR to 65  B  Improve strength to TA draw 30", hip to 4     Able to perform all desired activities with minimal to nil symptom exacerbation      Plan  Patient would benefit from: skilled physical therapy  Planned modality interventions: cryotherapy, thermotherapy: hydrocollator packs and TENS  Planned therapy interventions: abdominal trunk stabilization, joint mobilization, manual therapy, neuromuscular re-education, patient education, postural training, strengthening, stretching, therapeutic activities, therapeutic exercise, home exercise program, graded motor, graded exercise, graded activity, gait training, functional ROM exercises and flexibility  Frequency: 2x week  Duration in weeks: 8  Treatment plan discussed with: patient        Subjective Evaluation    History of Present Illness  Date of onset: 1/10/2019  Mechanism of injury: Pt is a 51 yofemale who presents today stating that she has had been having LBP for quite a few years, but within the most recently had an exacerbation with increased lifting and 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, states that she was considering a fusion for her LS but declined  Pt reports that she does use muscle relaxers, pt reports that she does have periods of time where she is without pain, she states that she has had a 10/10 and did go to the Hospital within the last two months  She states that she has L sided low back pain and has tightness like a tourniquet within her L hip/groin  Pt reports that lifting, walking, and standing, but she says that it will improve with laying on her Side, or sitting down  Pt reports that she has had MRI in the past indicating HNP, she has had WC relation to this injury and she indicates that she also has had epidurals, medications and really considered surgical intervention but she is really hoping PT intervention will assist with her symptoms  Pt reports that Tlely Samuels offered her PT intervention and thus she presents today for that  Pt follows jesse Kerr on 19  Pt reports her goals at this time are to be able to improve her symptoms to continue working at the best of her ability  Pt denies any further radicular symptoms, no change in bowel or bladder    Pain  Current pain ratin  At best pain ratin  At worst pain rating: 10  Quality: needle-like, radiating and sharp  Aggravating factors: standing and walking  Progression: no change      Diagnostic Tests  X-ray: normal  MRI studies: abnormal  Treatments  Previous treatment: injection treatment, physical therapy and medication  Patient Goals  Patient goals for therapy: decreased pain, improved balance, increased motion, increased strength and return to sport/leisure activities          Objective     Active Range of Motion     Lumbar   Flexion: 40 degrees   Extension: 10 degrees   Left lateral flexion: 10 degrees with pain  Right lateral flexion: 10 degrees   Left rotation: 75 degrees   Right rotation: 75 degrees     Additional Active Range of Motion Details  Forward head, rounded shoulders, decreased Lordosis  B/L Sensation intact to L3,4,5,S1,S2  DTR 1+ B/L  Repeated motion   Flex back pain  Ext nil leg pain, back pain  SB R back pain  SB L back pain (less intense vs R)  Hip Strength  L  Flex 3+ Ext 4 Abd 3+ Add 4  R Flex 4 Ext 5 Abd 4- Add 4  Joint Mobility  Palpation: Pain along L3-4-5, S1 equal R Sided pain, no pain with L sided irritation  STs  L + Scour, + Maite, + Fadir, + Distraction , - SI compression/Distraction, SLR + at 55   (after R le assessment, radicular symptoms to R leg)  R + Scour, + Maite, + Fadir, Distraction painful, - SI compression/Distraction, SLR + at 55   (after R le assessment, radicular symptoms to R leg)     TA draw 12"        Flowsheet Rows      Most Recent Value   PT/OT G-Codes   Assessment Type  Evaluation   G code set  Changing & Maintaining Body Position   Changing and Maintaining Body Position Current Status ()  CK   Changing and Maintaining Body Position Goal Status ()  CI          Precautions: Hx of Depression and DM2    Daily Treatment Diary     Manual  1/10            L SL IASTM with Pillow in between knees                                                                     Exercise Diary              Abdominal Bracing Education 10" x 10 APT Seated 10" x 10            Standing LS Ext 3" x 15            Seated LS Ext with Bolster 3" x 15            Seated Pball (small) isometrics             Seated TA Draw Marchign             Tband Multi Rots B  RTB           Tband Row + Ext  RTB           Standing Hip Abd + Ext                                                                                                                                                                Modalities              HP to LS and L groin prn

## 2019-01-10 NOTE — TELEPHONE ENCOUNTER
----- Message from Jo Galo sent at 1/9/2019  2:47 PM EST -----  Regarding: RE: Schedule  Scheduled yearly phys for 1/31/19 pawan tellez   ----- Message -----  From: Harriet Garcia  Sent: 12/27/2018   1:59 PM  To:  Elaina Swan MA  Subject: Schedule                                         Please attempt to call and schedule pt for a PE/ DM check

## 2019-01-11 ENCOUNTER — OFFICE VISIT (OUTPATIENT)
Dept: GASTROENTEROLOGY | Facility: CLINIC | Age: 49
End: 2019-01-11
Payer: COMMERCIAL

## 2019-01-11 VITALS
BODY MASS INDEX: 27.21 KG/M2 | DIASTOLIC BLOOD PRESSURE: 70 MMHG | HEART RATE: 66 BPM | WEIGHT: 173.4 LBS | HEIGHT: 67 IN | TEMPERATURE: 97.6 F | SYSTOLIC BLOOD PRESSURE: 112 MMHG

## 2019-01-11 DIAGNOSIS — K21.9 GASTROESOPHAGEAL REFLUX DISEASE WITHOUT ESOPHAGITIS: ICD-10-CM

## 2019-01-11 DIAGNOSIS — K76.0 FATTY LIVER: ICD-10-CM

## 2019-01-11 DIAGNOSIS — K58.0 IRRITABLE BOWEL SYNDROME WITH DIARRHEA: Primary | ICD-10-CM

## 2019-01-11 PROCEDURE — 99214 OFFICE O/P EST MOD 30 MIN: CPT | Performed by: PHYSICIAN ASSISTANT

## 2019-01-11 NOTE — PROGRESS NOTES
[facility] - Outpatient Follow-up Note  Katelyn Navarrete 51 y o  female MRN: [id number]  Encounter: [id number]          ASSESSMENT AND PLAN:    1  IBS-D   - Her recent colonoscopy with random biopsy was negative for microscopic colitis  Her terminal ileum was also normal on her recent colonoscopy  -inflammatory markers were mildly elevated but fortunately a CT enterography to evaluate for small bowel Crohn's disease was unremarkable   -duodenal biopsies were negative for celiac disease   -recommend starting probiotics   -discussed the low FODMAP diet in detail today and she was given a handout regarding this, I recommend she use this to identify her trigger foods   -she does have a history of ileus and we discussed indications to present to the ED, she verbalized understanding      2  Hepatic steatosis was elevated LFTs    -in the past, she did have LFTs in a hepatocellular pattern but recently in the past year her LFTs have been normal   -workup for underlying cause of liver disease was negative   -repeat CMP, if her LFTs were to be elevated consistently would recommend liver biopsy for further evaluation    3  Gastroesophageal reflux disease    -continue pantoprazole 40 mg daily    -she had a recent upper endoscopy that showed mild nonerosive gastritis but was otherwise unremarkable, biopsies were negative for H pylori and duodenal biopsies were negative for celiac disease on a previous endoscopy    -discussed reflux precautions including lifestyle and dietary changes    Follow-up in 3 months to see how she is doing  ____________________________________________________________    SUBJECTIVE:    62-year-old female presents to the office for follow-up of multiple GI complaints  She has a history of reflux and is currently taking pantoprazole 40 mg daily  She states that she does occasionally get breakthrough symptoms but overall this is under fairly good control    She denies any new or worsening symptoms  She notes she has been having some occasional loose stools, she feels that in the past she has had more alternating constipation and diarrhea but recently her stools have been slightly looser  She has not tried anything for this yet at home  She does have associated bloating as well as abdominal pain prior to a bowel movement that resolved after  She denies any hematochezia, melena, change in appetite, unintended weight loss, difficulty swallowing or jaundice  She recently had an upper endoscopy that showed mild nonerosive gastritis but was otherwise unremarkable, biopsies were negative for H pylori  Duodenal biopsies on previous EGD were negative for celiac disease  She had a colonoscopy in 2017 with polyps removed and hemorrhoids noted, the polyps were tubular adenomas and random colon biopsies were negative for microscopic colitis  REVIEW OF SYSTEMS IS OTHERWISE NEGATIVE  Historical Information   Past Medical History:   Diagnosis Date    Anxiety     Bulging lumbar disc     Carpal tunnel syndrome     RIGHT  LAST ASSESSED: 16    Chronic back pain     low    Colon polyps     Depression     Diabetes mellitus ([facility] [address] )     on victoza    GERD (gastroesophageal reflux disease)     Gestational diabetes     Hearing loss     left ear    Hyperlipidemia     IBS (irritable bowel syndrome)     Ileus ([facility] [address] )     LAST ASSESSED: 8/3/17    Labial cyst     LAST ASSESSED: 16    Myofascial pain     LAST ASSESSED: 16    Obesity     Ovarian cyst     LEFT   LAST ASSESSED: 16    Pap smear for cervical cancer screening     2018--pap wnl, HRHPV neg    Seasonal allergies     Thoracic outlet syndrome         Trochanteric bursitis of both hips     LAST ASSESSED: 3/18/16    Ulnar neuropathy at elbow     Wears glasses      Past Surgical History:   Procedure Laterality Date    BILE DUCT EXPLORATION      ENDOSCOPIC REMOVAL OF STONES FROM BILIARY TRACT     SECTION      x3    CHOLECYSTECTOMY      COLONOSCOPY      DILATION AND CURETTAGE OF UTERUS      ENDOMETRIAL ABLATION      ERCP W/ SPHICTEROTOMY      FIRST RIB REMOVAL      FIRST RIB REMOVAL      THORAX EXCISION OF FIRST RIB    HYSTEROSCOPY      NEUROPLASTY / TRANSPOSITION ULNAR NERVE AT ELBOW      CO COLONOSCOPY FLX DX W/COLLJ SPEC WHEN PFRMD N/A 5/30/2017    Procedure: COLONOSCOPY;  Surgeon: Obdulia Hammond MD;  [facility]; Service: Gastroenterology    CO ESOPHAGOGASTRODUODENOSCOPY TRANSORAL DIAGNOSTIC N/A 9/14/2017    Procedure: ESOPHAGOGASTRODUODENOSCOPY (EGD); Surgeon: Ivana Rosales MD;  [facility];   Service: Gastroenterology    CO HYSTEROSCOPY,W/ENDO BX N/A 10/20/2017    Procedure: DILATATION AND CURETTAGE (D&C) WITH HYSTEROSCOPY  REMOVAL VULVAR RT  LESION;  Surgeon: Buck Palacios MD;  [facility];  Service: Gynecology    CO LAP, ÁLVARO RESTRICT PROC, LONGITUDINAL GASTRECTOMY N/A 2/6/2018    Procedure: GASTRECTOMY SLEEVE LAPAROSCOPIC; INTRAOPERATIVE EGD ;  Surgeon: Mariya Wick MD;  [facility];  Service: Bariatrics    TONSILLECTOMY AND ADENOIDECTOMY      TUBAL LIGATION      ULNAR NERVE REPAIR Right     VEIN LIGATION AND [address] Right     VULVA SURGERY  10/20/2017    BIOPSY    WISDOM TOOTH EXTRACTION       Social History   History   Alcohol Use    Yes     Comment: socially     History   Drug Use No     History   Smoking Status    Former Smoker    Quit date: 4/30/2017   Smokeless Tobacco    Never Used     Family History   Problem Relation Age of Onset    Diabetes Mother     Other Mother         HYPERCHOLESTEROLEMIA    Breast cancer Mother         >50    Diabetes Father     Other Father         HYPERCHOLESTEROLEMIA    Prostate cancer Father     Hypertension Brother     Diabetes Brother     Other Brother         HYPERCHOLESTEROLEMIA    Alcohol abuse Family     Asthma Family     Other Family         BACK PAIN    Cancer Family     Depression Family     Neuropathy Family     Heart disease Family     Colon cancer Maternal Grandfather     Ovarian cancer Neg Hx     Uterine cancer Neg Hx        Meds/Allergies       Current Outpatient Prescriptions:     Biotin 03124 MCG TABS    Calcium Citrate-Vitamin D 500-500 MG-UNIT PACK    Cyanocobalamin ([employer] VITAMIN B12 PO)    cyclobenzaprine (FLEXERIL) 10 mg tablet    escitalopram (LEXAPRO) 20 mg tablet    Multiple Vitamins-Minerals (MULTI COMPLETE PO)    norgestimate-ethinyl estradiol (ORTHO-CYCLEN) 0 25-35 MG-MCG per tablet    pantoprazole (PROTONIX) 40 mg tablet    QUEtiapine (SEROquel) 25 mg tablet    No Known Allergies        Objective     Blood pressure 112/70, pulse 66, temperature 97 6 °F (36 4 °C), temperature source Tympanic, height 5' 7" (1 702 m), weight 78 7 kg (173 lb 6 4 oz), not currently breastfeeding  PHYSICAL EXAM:      Physical Exam   Constitutional: She is oriented to person, place, and time  No distress  Over weight   HENT:   Head: Normocephalic and atraumatic  Eyes: Right eye exhibits no discharge  Left eye exhibits no discharge  No scleral icterus  Neck: Neck supple  No tracheal deviation present  Cardiovascular: Normal rate, regular rhythm and normal heart sounds  Exam reveals no gallop and no friction rub  No murmur heard  Pulmonary/Chest: Effort normal  No respiratory distress  She has no wheezes  She has no rales  Abdominal: Soft  Bowel sounds are normal  She exhibits no distension  There is no tenderness  There is no rebound and no guarding  Neurological: She is alert and oriented to person, place, and time  Skin: Skin is warm and dry  Psychiatric: She has a normal mood and affect  Lab Results:   No visits with results within 1 Day(s) from this visit     Latest known visit with results is:   Admission on 09/03/2018, Discharged on 09/05/2018   Component Date Value    WBC 09/03/2018 8 09     RBC 09/03/2018 4 55     Hemoglobin 09/03/2018 13 8     Hematocrit 09/03/2018 42 3     MCV 09/03/2018 93     MCH 09/03/2018 30 3     MCHC 09/03/2018 32 6     RDW 09/03/2018 12 5     MPV 09/03/2018 9 7     Platelets 36/68/2093 248     nRBC 09/03/2018 0     Neutrophils Relative 09/03/2018 61     Immat GRANS % 09/03/2018 0     Lymphocytes Relative 09/03/2018 28     Monocytes Relative 09/03/2018 7     Eosinophils Relative 09/03/2018 3     Basophils Relative 09/03/2018 1     Neutrophils Absolute 09/03/2018 4 94     Immature Grans Absolute 09/03/2018 0 02     Lymphocytes Absolute 09/03/2018 2 28     Monocytes Absolute 09/03/2018 0 58     Eosinophils Absolute 09/03/2018 0 22     Basophils Absolute 09/03/2018 0 05     Sodium 09/03/2018 139     Potassium 09/03/2018 4 0     Chloride 09/03/2018 102     CO2 09/03/2018 30     ANION GAP 09/03/2018 7     BUN 09/03/2018 17     Creatinine 09/03/2018 0 67     Glucose 09/03/2018 112     Calcium 09/03/2018 8 8     eGFR 09/03/2018 104     Troponin I 09/03/2018 <0 02     D-Dimer, Quant 09/03/2018 306     Ventricular Rate 09/03/2018 68     Atrial Rate 09/03/2018 68     SC Interval 09/03/2018 184     QRSD Interval 09/03/2018 88     QT Interval 09/03/2018 408     QTC Interval 09/03/2018 433     P Axis 09/03/2018 256     QRS Axis 09/03/2018 124     T Wave Axis 09/03/2018 57     Troponin I 09/03/2018 <0 02     Troponin I 09/04/2018 <0 02     Troponin I 09/04/2018 <0 02     Sodium 09/04/2018 143     Potassium 09/04/2018 3 4*    Chloride 09/04/2018 107     CO2 09/04/2018 29     ANION GAP 09/04/2018 7     BUN 09/04/2018 12     Creatinine 09/04/2018 0 65     Glucose 09/04/2018 113     Calcium 09/04/2018 8 1*    eGFR 09/04/2018 105     Ventricular Rate 09/03/2018 60     Atrial Rate 09/03/2018 60     SC Interval 09/03/2018 194     QRSD Interval 09/03/2018 84     QT Interval 09/03/2018 458     QTC Interval 09/03/2018 458     P Axis 09/03/2018 69     QRS [place] 09/03/2018 116     T Wave Axis 09/03/2018 65     Ventricular Rate 09/04/2018 68     Atrial Rate 09/04/2018 68     ID Interval 09/04/2018 208     QRSD Interval 09/04/2018 96     QT Interval 09/04/2018 436     QTC Interval 09/04/2018 463     P Axis 09/04/2018 57     QRS Axis 09/04/2018 124     T Wave Axis 09/04/2018 60     Ventricular Rate 09/04/2018 56     Atrial Rate 09/04/2018 56     ID Interval 09/04/2018 210     QRSD Interval 09/04/2018 96     QT Interval 09/04/2018 456     QTC Interval 09/04/2018 440     P Axis 09/04/2018 69     QRS Axis 09/04/2018 115     T Wave Axis 09/04/2018 71     Sodium 09/05/2018 144     Potassium 09/05/2018 4 3     Chloride 09/05/2018 109*    CO2 09/05/2018 28     ANION GAP 09/05/2018 7     BUN 09/05/2018 8     Creatinine 09/05/2018 0 57*    Glucose 09/05/2018 81     Calcium 09/05/2018 8 2*    eGFR 09/05/2018 109          Radiology Results:   X-ray Lumbar Spine Complete 4+ Views    Result Date: 12/27/2018  Narrative: LUMBAR SPINE INDICATION:   M54 42: Lumbago with sciatica, left side G89 29: Other chronic pain  COMPARISON:  11/6/2016 VIEWS:  XR SPINE LUMBAR MINIMUM 4 VIEWS NON INJURY FINDINGS: There is no evidence of acute fracture or destructive osseous lesion  Alignment is unremarkable  There is multilevel degenerative disc disease of the lumbar spine with further disc space narrowing at L3-L4, L4-L5, L5-S1  The pedicles appear intact  Soft tissues are unremarkable  Impression: Degenerative disc disease at L3-L4, L4-L5, L5-S1   There is no compression fracture or subluxation Workstation performed: [id number]

## 2019-01-17 ENCOUNTER — OFFICE VISIT (OUTPATIENT)
Dept: PHYSICAL THERAPY | Facility: CLINIC | Age: 49
End: 2019-01-17
Payer: COMMERCIAL

## 2019-01-17 ENCOUNTER — TELEPHONE (OUTPATIENT)
Dept: PAIN MEDICINE | Facility: CLINIC | Age: 49
End: 2019-01-17

## 2019-01-17 DIAGNOSIS — M54.42 CHRONIC LEFT-SIDED LOW BACK PAIN WITH LEFT-SIDED SCIATICA: Primary | ICD-10-CM

## 2019-01-17 DIAGNOSIS — G89.29 CHRONIC LEFT-SIDED LOW BACK PAIN WITH LEFT-SIDED SCIATICA: Primary | ICD-10-CM

## 2019-01-17 PROCEDURE — 97140 MANUAL THERAPY 1/> REGIONS: CPT | Performed by: PHYSICAL THERAPIST

## 2019-01-17 PROCEDURE — 97110 THERAPEUTIC EXERCISES: CPT | Performed by: PHYSICAL THERAPIST

## 2019-01-17 NOTE — PROGRESS NOTES
Daily Note     Today's date: 2019  Patient name: Jose R Bradshaw  : 1970  MRN: [id number]  Referring provider: OSMANI Abreu  Dx:   Encounter Diagnosis     ICD-10-CM    1  Chronic left-sided low back pain with left-sided sciatica M54 42     G89 29                   Subjective: Pt presents today stating the leg pain is about the same, back pain has been more intense though since she had a busy work week  Objective: See treatment diary below      Assessment: Reviewed HEP needing minor cues for correction as she believed she had a twisting exercises, Ext appeared to bother her in the seated position, but standing nil   Fair tolerance at best, continue to progress at best      Precautions: Hx of Depression and DM2    Daily Treatment Diary     Manual  1/10 1/17           L SL IASTM with Pillow in between knees  10 min                                                                   Exercise Diary              Abdominal Bracing Education 10" x 10 10" x 10           APT Seated 10" x 10 10" x 10 (TA Draw)           Standing LS Ext 3" x 15 3" x 20           Seated LS Ext with Bolster 3" x 15 3' x 20           Seated Pball (small) isometrics  10" x 10            Seated TA Draw Marching  nv           Tband Multi Rots B  RTB           Tband Row + Ext  RTB           Standing Hip Abd + Ext                                                                                                                                                                Modalities              HP to LS in SL R  10 min

## 2019-01-18 ENCOUNTER — OFFICE VISIT (OUTPATIENT)
Dept: PHYSICAL THERAPY | Facility: CLINIC | Age: 49
End: 2019-01-18
Payer: COMMERCIAL

## 2019-01-18 DIAGNOSIS — G89.29 CHRONIC LEFT-SIDED LOW BACK PAIN WITH LEFT-SIDED SCIATICA: Primary | ICD-10-CM

## 2019-01-18 DIAGNOSIS — M54.42 CHRONIC LEFT-SIDED LOW BACK PAIN WITH LEFT-SIDED SCIATICA: Primary | ICD-10-CM

## 2019-01-18 PROCEDURE — 97140 MANUAL THERAPY 1/> REGIONS: CPT

## 2019-01-18 PROCEDURE — 97110 THERAPEUTIC EXERCISES: CPT

## 2019-01-18 NOTE — PROGRESS NOTES
Daily Note     Today's date: 2019  Patient name: Cookie Valenzuela  : 1970  MRN: [id number]  Referring provider: OSMANI Gallo  Dx:   Encounter Diagnosis     ICD-10-CM    1  Chronic left-sided low back pain with left-sided sciatica M54 42     G89 29                   Subjective: Patient states that she noticed an increase in pain in R and LLE following yesterdays treatment session  States that symptoms dissipated by this AM   Patient states that she experiences the most discomfort when standing/walking for longer periods of time  Objective: See treatment diary below  Precautions: Hx of Depression and DM2    Daily Treatment Diary     Manual  1/10 1/17 1/18          L SL IASTM with Pillow in between knees  10 min 10 min                                                                  Exercise Diary              Abdominal Bracing Education 10" x 10 10" x 10 :10x10           APT Seated 10" x 10 10" x 10 (TA Draw) :10x10          Standing LS Ext 3" x 15 3" x 20 3" x 20          Seated LS Ext with Bolster 3" x 15 3' x 20 3" x 20          Seated Pball press down (small) isometrics  10" x 10  :10x10          Seated TA Draw Marching  nv 1x10           Tband Multi Rots B  RTB           Tband Row + Ext  RTB           Standing Hip Abd + Ext                                                                                                                                                                Modalities              HP to LS in SL R  10 min                                           Assessment: Tolerated treatment fair  Patient would benefit from continued PT  Patient was TTP with IASTM and tolerance was limited  Plan: Continue per plan of care

## 2019-01-24 ENCOUNTER — OFFICE VISIT (OUTPATIENT)
Dept: PAIN MEDICINE | Facility: CLINIC | Age: 49
End: 2019-01-24
Payer: COMMERCIAL

## 2019-01-24 ENCOUNTER — OFFICE VISIT (OUTPATIENT)
Dept: PHYSICAL THERAPY | Facility: CLINIC | Age: 49
End: 2019-01-24
Payer: COMMERCIAL

## 2019-01-24 ENCOUNTER — APPOINTMENT (OUTPATIENT)
Dept: URGENT CARE | Age: 49
End: 2019-01-24
Payer: OTHER MISCELLANEOUS

## 2019-01-24 VITALS
RESPIRATION RATE: 18 BRPM | BODY MASS INDEX: 27.15 KG/M2 | HEIGHT: 67 IN | SYSTOLIC BLOOD PRESSURE: 106 MMHG | DIASTOLIC BLOOD PRESSURE: 70 MMHG | WEIGHT: 173 LBS | TEMPERATURE: 98.6 F

## 2019-01-24 DIAGNOSIS — G89.29 CHRONIC LEFT-SIDED LOW BACK PAIN WITH LEFT-SIDED SCIATICA: Primary | ICD-10-CM

## 2019-01-24 DIAGNOSIS — M79.18 MYOFASCIAL PAIN SYNDROME: ICD-10-CM

## 2019-01-24 DIAGNOSIS — M47.816 LUMBAR SPONDYLOSIS: Primary | ICD-10-CM

## 2019-01-24 DIAGNOSIS — M54.16 LUMBAR RADICULOPATHY: ICD-10-CM

## 2019-01-24 DIAGNOSIS — M54.42 CHRONIC LEFT-SIDED LOW BACK PAIN WITH LEFT-SIDED SCIATICA: Primary | ICD-10-CM

## 2019-01-24 PROCEDURE — 99284 EMERGENCY DEPT VISIT MOD MDM: CPT | Performed by: PREVENTIVE MEDICINE

## 2019-01-24 PROCEDURE — 97112 NEUROMUSCULAR REEDUCATION: CPT | Performed by: PHYSICAL THERAPIST

## 2019-01-24 PROCEDURE — G0383 LEV 4 HOSP TYPE B ED VISIT: HCPCS | Performed by: PREVENTIVE MEDICINE

## 2019-01-24 PROCEDURE — 97014 ELECTRIC STIMULATION THERAPY: CPT | Performed by: PHYSICAL THERAPIST

## 2019-01-24 PROCEDURE — 99213 OFFICE O/P EST LOW 20 MIN: CPT | Performed by: NURSE PRACTITIONER

## 2019-01-24 NOTE — PROGRESS NOTES
Daily Note     Today's date: 2019  Patient name: Yesika Carmona  : 1970  MRN: [id number]  Referring provider: OSMANI Bolanos  Dx:   Encounter Diagnosis     ICD-10-CM    1  Chronic left-sided low back pain with left-sided sciatica M54 42     G89 29                   Subjective: Pt presents today stating that yesterday her Back popped while working with increased radicular symptoms on 19  She reports that she is going to be seeing pain management today after PT intervention  Pt reports her appointment is at [phone]  She does not have radicular symptoms today, but is in a lot of pain at the moment  Objective: See treatment diary below  Precautions: Hx of Depression and DM2    Daily Treatment Diary     Manual  1/10 1/17 1/18 1/24         L SL IASTM with Pillow in between knees  10 min 10 min                                                                  Exercise Diary             Abdominal Bracing Education 10" x 10 10" x 10 :10x10  10" x 10 seated         APT Seated 10" x 10 10" x 10 (TA Draw) :10x10 np         Standing LS Ext 3" x 15 3" x 20 3" x 20 np         Seated LS Ext with Bolster 3" x 15 3' x 20 3" x 20 np         Seated Pball press down (small) isometrics  10" x 10  :10x10          Seated TA Draw Marching  nv 1x10  2 x 10 seated         Tband Multi Rots B  RTB           Tband Row + Ext  RTB           Standing Hip Abd + Ext                                                                                                                                                                Modalities              HP to LS in SL R  10 min           TENs + MHP seated  10 min + HP  10 min + HP                              Assessment:  Session heavily modified due to recent pain exacerbation as well as needing to go to her appointment at [phone]     Pt was without radicular symptoms, indicated feeling "walkable" s/p TENs unit, follow up with pt with her appointment tomorrow in regards of her physician visit  Plan: Continue per plan of care

## 2019-01-24 NOTE — PROGRESS NOTES
Pt c/o back pain that radiates to the hips and legs    Assessment:  1  Lumbar spondylosis    2  Lumbar radiculopathy    3  Myofascial pain syndrome        Plan:  Leydi Charles is a 51 y o  female with a history of chronic left-sided low back pain with left-sided sciatica, sacroiliitis and myofascial pain  The patient presents today with worsening low back pain after a work injury that occurred 2 days ago  She has been attending physical therapy as well for her ongoing low back pain, without relief of symptoms  Therefore, at this time I will order her an MRI of her lumbar spine for further evaluation  She was instructed that our office will call the results of this study once is completed  The patient in the meantime has been taking left over Percocet she has prescribed in September for pain, which has been helpful  She has been taking cyclobenzaprine, however does not report that is effective  She reports that she has methocarbamol home and will try methocarbamol to help with the myofascial component of her pain  I discussed her about starting a Medrol Dosepak to help with the inflammatory component of her pain  However the patient reports that she recently completed to steroid packs in the last 2 months and they were not effective  Therefore at this time she would not like to move forward with a additional Medrol Dosepak at this time  She reports that she is off from work for the next 4 days,she encouraged to relax and apply heat and ice to her low back in 20 min increments  She will also try lidocaine patches that she has remaining at home  The patient will follow up after the completion of her lumbar MRI, or sooner with the worsening of symptoms  My impressions and treatment recommendations were discussed in detail with the patient who verbalized understanding and had no further questions  Discharge instructions were provided   I personally saw and examined the patient and I agree with the above discussed plan of care  Orders Placed This Encounter   Procedures    MRI lumbar spine wo contrast     Standing Status:   Future     Standing Expiration Date:   1/24/2023     Scheduling Instructions: There is no preparation for this test  Please leave your jewelry and valuables at home, wedding rings are the exception  Please bring your insurance cards, a form of photo ID and a list of your medications with you  Arrive 15 minutes prior to your appointment time in order to register  Please bring any prior CT or MRI studies of this area that were not performed at a [facility]  To schedule this appointment, please contact Central Scheduling at [phone]  Order Specific Question:   Is the patient pregnant? Answer:   No     Order Specific Question:   What is the patient's sedation requirement? Answer:   No Sedation     No orders of the defined types were placed in this encounter  History of Present Illness:  Sammi Rai is a 51 y o  female with a history of chronic left-sided low back pain with left-sided sciatica, sacroiliitis and myofascial pain  The patient was last seen office on 12/27/2018 where she was ordered an x-ray of her lumbar spine and referred to physical therapy  She was also continued on cyclobenzaprine for muscle spasms  She  presents for a follow up office visit in regards to Back Pain (radaites to the hips and legs); Leg Pain; and Hip Pain  The patients current symptoms include low back pain that with radiation of symptoms into her bilateral legs only when walking  She reports that her back pain worsening 2 days ago 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  She reports that she was not evaluated by the ER after this incident  She stated that she told her manager and went home  She reports that 10 minutes after going home she experienced shooting pain into her bilateral legs and buttocks   She denies bilateral leg weakness or bowel or bladder incontinence  She denies saddle paresthesia  She describes her pain as sharp, throbbing, shooting intermittent pain that is worsened during the morning hours  She reports that her pain is symptoms have worsened since last office visit  She currently rates her pain 8 out 10 numeric pain scale  Current pain medications includes:  Percocet and  cyclobenzaprine  The patient reports that this regimen is providing minimal to moderate % pain relief  The patient is reporting no side effects from this pain medication regimen  I have personally reviewed and/or updated the patient's past medical history, past surgical history, family history, social history, current medications, allergies, and vital signs today  Review of Systems   Respiratory: Negative for shortness of breath  Cardiovascular: Negative for chest pain  Gastrointestinal: Positive for constipation  Negative for diarrhea, nausea and vomiting  Musculoskeletal: Positive for gait problem  Negative for arthralgias, joint swelling and myalgias  Skin: Negative for rash  Neurological: Negative for dizziness, seizures and weakness  All other systems reviewed and are negative  Patient Active Problem List   Diagnosis    IBS (irritable bowel syndrome)    GERD (gastroesophageal reflux disease)    Depression    Chronic back pain    Anxiety    Ileus (HCC)    Cervical radiculopathy    Hepatic steatosis    Pulmonary nodule seen on imaging study    Vitamin D deficiency    Dermatitis    S/P laparoscopic sleeve gastrectomy    Other fatigue    Elevated sed rate    Encounter for annual routine gynecological examination    Overweight (BMI 25 0-29  9)    Postsurgical malabsorption    Lymph node enlargement       Past Medical History:   Diagnosis Date    Anxiety     Bulging lumbar disc     Carpal tunnel syndrome     RIGHT    LAST ASSESSED: 12/7/16    Chronic back pain     low    Colon polyps     Depression     Diabetes mellitus ([facility] [address] )     on victoza    GERD (gastroesophageal reflux disease)     Gestational diabetes     Hearing loss     left ear    Hyperlipidemia     IBS (irritable bowel syndrome)     Ileus ([facility] [address] )     LAST ASSESSED: 8/3/17    Labial cyst     LAST ASSESSED: 16    Myofascial pain     LAST ASSESSED: 16    Obesity     Ovarian cyst     LEFT  LAST ASSESSED: 16    Pap smear for cervical cancer screening     2018--pap wnl, HRHPV neg    Seasonal allergies     Thoracic outlet syndrome     2010    Trochanteric bursitis of both hips     LAST ASSESSED: 3/18/16    Ulnar neuropathy at elbow     Wears glasses        Past Surgical History:   Procedure Laterality Date    BILE DUCT EXPLORATION      ENDOSCOPIC REMOVAL OF STONES FROM BILIARY TRACT     SECTION      x3    CHOLECYSTECTOMY      COLONOSCOPY      DILATION AND CURETTAGE OF UTERUS      ENDOMETRIAL ABLATION      ERCP W/ SPHICTEROTOMY      FIRST RIB REMOVAL      FIRST RIB REMOVAL      THORAX EXCISION OF FIRST RIB    HYSTEROSCOPY      NEUROPLASTY / TRANSPOSITION ULNAR NERVE AT ELBOW      CA COLONOSCOPY FLX DX W/COLLJ SPEC WHEN PFRMD N/A 2017    Procedure: COLONOSCOPY;  Surgeon: Darlene Bernstein MD;  [facility]; Service: Gastroenterology    CA ESOPHAGOGASTRODUODENOSCOPY TRANSORAL DIAGNOSTIC N/A 2017    Procedure: ESOPHAGOGASTRODUODENOSCOPY (EGD); Surgeon: Evita Stevenson MD;  [facility];   Service: Gastroenterology    CA HYSTEROSCOPY,W/ENDO BX N/A 10/20/2017    Procedure: DILATATION AND CURETTAGE (D&C) WITH HYSTEROSCOPY  REMOVAL VULVAR RT  LESION;  Surgeon: Leighton Hidalgo MD;  [facility];  Service: Gynecology    CA LAP, ÁLVARO RESTRICT PROC, LONGITUDINAL GASTRECTOMY N/A 2018    Procedure: GASTRECTOMY SLEEVE LAPAROSCOPIC; INTRAOPERATIVE EGD ;  Surgeon: Risa Walsh MD;  [facility];  Service: [address] [place] [address] TUBAL LIGATION      ULNAR NERVE REPAIR Right     VEIN LIGATION AND STRIPPING Right     VULVA SURGERY  10/20/2017    BIOPSY    WISDOM TOOTH EXTRACTION         Family History   Problem Relation Age of Onset    Diabetes Mother     Other Mother         HYPERCHOLESTEROLEMIA    Breast cancer Mother         >50    Diabetes Father     Other Father         HYPERCHOLESTEROLEMIA    Prostate cancer Father     Hypertension Brother     Diabetes Brother     Other Brother         HYPERCHOLESTEROLEMIA    Alcohol abuse Family     Asthma Family     Other Family         BACK PAIN    Cancer Family     Depression Family     Neuropathy Family     Heart disease Family     Colon cancer Maternal Grandfather     Ovarian cancer Neg Hx     Uterine cancer Neg Hx        Social History     Occupational History     [employer]     Social History Main Topics    Smoking status: Former Smoker     Quit date: 4/30/2017    Smokeless tobacco: Never Used    Alcohol use Yes      Comment: socially    Drug use: No    Sexual activity: Not on file      Comment: lifetime partners: 6       Current Outpatient Prescriptions on File Prior to Visit   Medication Sig    Biotin 68680 MCG TABS Take by mouth    Calcium Citrate-Vitamin D 500-500 MG-UNIT PACK Take by mouth    Cyanocobalamin ([employer] VITAMIN B12 PO) Take by mouth    cyclobenzaprine (FLEXERIL) 10 mg tablet Take 1 tablet (10 mg total) by mouth daily at bedtime    escitalopram (LEXAPRO) 20 mg tablet Take 1 tablet (20 mg total) by mouth daily    Multiple Vitamins-Minerals (MULTI COMPLETE PO) Take by mouth    norgestimate-ethinyl estradiol (ORTHO-CYCLEN) 0 25-35 MG-MCG per tablet Take 1 tablet by mouth daily    pantoprazole (PROTONIX) 40 mg tablet Take 1 tablet (40 mg total) by mouth daily before breakfast Take 30 minutes before    QUEtiapine (SEROquel) 25 mg tablet Take 1 tablet (25 mg total) by mouth daily at bedtime     No current facility-administered medications on file prior to visit  No Known Allergies    Physical Exam:    /70   Temp 98 6 °F (37 °C)   Resp 18   Ht 5' 7" (1 702 m)   Wt 78 5 kg (173 lb)   BMI 27 10 kg/m²     Constitutional:normal, well developed, well nourished, alert, in no distress and non-toxic and no overt pain behavior  and overweight  Eyes:anicteric  HEENT:grossly intact  Neck:supple, symmetric, trachea midline and no masses   Pulmonary:even and unlabored  Cardiovascular:No edema or pitting edema present  Skin:Normal without rashes or lesions and well hydrated  Psychiatric:Mood and affect appropriate  Neurologic:Cranial Nerves II-XII grossly intact  Musculoskeletal:normal     Lumbar Spine Exam    Appearance:  Normal lordosis  Palpation/Tenderness:  left lumbar paraspinal tenderness  right lumbar paraspinal tenderness  Sensory:  no sensory deficits noted  Range of Motion:  Flexion:  Minimally limited  with pain  Extension:  Minimally limited  with pain  Lateral Flexion - Left:  Minimally limited  with pain  Lateral Flexion - Right:  Minimally limited  with pain  Rotation - Left:  Minimally limited  with pain  Rotation - Right:  Minimally limited  with pain  Motor Strength:  Left hip flexion:  5/5  Right hip flexion:  5/5  Left knee extension:  5/5  Right knee extension:  5/5  Left foot dorsiflexion:  5/5  Left foot plantar flexion:  5/5  Right foot dorsiflexion:  5/5  Right foot plantar flexion:  5/5        Imaging  LUMBAR SPINE     INDICATION:   M54 42: Lumbago with sciatica, left side  G89 29:  Other chronic pain      COMPARISON:  11/6/2016     VIEWS:  XR SPINE LUMBAR MINIMUM 4 VIEWS NON INJURY        FINDINGS:     There is no evidence of acute fracture or destructive osseous lesion      Alignment is unremarkable      There is multilevel degenerative disc disease of the lumbar spine with further disc space narrowing at L3-L4, L4-L5, L5-S1      The pedicles appear intact      Soft tissues are unremarkable      IMPRESSION:     Degenerative disc disease at L3-L4, L4-L5, L5-S1      There is no compression fracture or subluxation    MRI LUMBAR SPINE WITHOUT CONTRAST     INDICATION:  LBP since 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     COMPARISON: Lumbar MRIs from Mar 21, 2015 and Dec 6, 2006 and x-rays dated Jan 31, 2016      TECHNIQUE:  Sagittal T1, sagittal T2, sagittal inversion recovery, axial T1 and axial T2, coronal T2        IMAGE QUALITY:  Diagnostic     FINDINGS:     ALIGNMENT:  Normal alignment of the lumbar spine  No compression fracture  No spondylolysis or spondylolisthesis  No scoliosis      MARROW SIGNAL:  Again noted is heterogenous signal in the sacrum, T1/T2 hyperintense, unchanged, and likely representing hemangiomas  No discrete marrow lesion      DISTAL CORD AND CONUS:  Normal size and signal within the distal cord and conus  The conus ends at the L1 level      PARASPINAL SOFT TISSUES:  Paraspinal soft tissues are unremarkable      SACRUM:  Normal signal within the sacrum  No evidence of insufficiency or stress fracture      LOWER THORACIC DISC SPACES:  Normal disc height and signal   No disc herniation, canal stenosis or foraminal narrowing      LUMBAR DISC SPACES:  There is disc dessication involving the L3-S1 levels      L1-L2:  Normal      L2-L3:  Normal      L3-L4:  Normal      L4-L5:  Left foraminal disc bulge causing mild neuroforaminal narrowing, unchanged  No spinal canal stenosis   Mild endplate changes are noted at this level      L5-S1:  Right foraminal disc bulge causing mild neuroforaminal narrowing, unchanged  No spinal canal stenosis      Incidental note is made of partially imaged bilateral ovarian follicles      IMPRESSION:  1  Left L4-L5 and right L5-S1 foraminal disc bulges causing mild neuroforaminal narrowing, unchanged  2  Incidental note is made of partially imaged bilateral ovarian follicles   Pelvic ultrasound may be performed if clinically indicated      Discussed with Dr Mihaela Rodriguez by Dr Tiny Cardenas on 2/17/2016 at 12:30pm

## 2019-01-25 ENCOUNTER — OFFICE VISIT (OUTPATIENT)
Dept: PHYSICAL THERAPY | Facility: CLINIC | Age: 49
End: 2019-01-25
Payer: COMMERCIAL

## 2019-01-25 DIAGNOSIS — M54.42 CHRONIC LEFT-SIDED LOW BACK PAIN WITH LEFT-SIDED SCIATICA: Primary | ICD-10-CM

## 2019-01-25 DIAGNOSIS — G89.29 CHRONIC LEFT-SIDED LOW BACK PAIN WITH LEFT-SIDED SCIATICA: Primary | ICD-10-CM

## 2019-01-25 PROCEDURE — 97110 THERAPEUTIC EXERCISES: CPT | Performed by: PHYSICAL THERAPIST

## 2019-01-25 PROCEDURE — 97014 ELECTRIC STIMULATION THERAPY: CPT | Performed by: PHYSICAL THERAPIST

## 2019-01-25 NOTE — PROGRESS NOTES
Daily Note     Today's date: 2019  Patient name: Jaylin Cornelius  : 1970  MRN: [id number]  Referring provider: OSMANI Jackson  Dx:   Encounter Diagnosis     ICD-10-CM    1  Chronic left-sided low back pain with left-sided sciatica M54 42     G89 29                   Subjective: Pt presents today stating that she followed up with Ashley Gonzales yesterday and she scheduled for an MRI next week, she also had to follow up with [facility] as Ashley Gonzales indicated that the re-exacerbation occurred earlier this week  She followed up with [facility] and the physician said it was likely Arthritis  Pt reports that she has B/L LE symptoms right now  Pt reports that she was not pleased with her visit with [facility] physician  Pt reports that she has a long history of back pain, she knows she has several HNPs and likely OA and this being work related injury or not she believes aside from surgery she has done as much as she can to assist with her symptoms         Objective: See treatment diary below  Precautions: Hx of Depression and DM2    Daily Treatment Diary     Manual  1/10 1/17 1/18 1/24 1/25        L SL IASTM with Pillow in between knees  10 min 10 min                                                                  Exercise Diary            Abdominal Bracing  10" x 10 10" x 10 :10x10  10" x 10 seated 10" x 10        APT Seated 10" x 10 10" x 10 (TA Draw) :10x10 np 10" x 10        Standing LS Ext 3" x 15 3" x 20 3" x 20 np         Seated LS Ext with Bolster 3" x 15 3' x 20 3" x 20 np 3" x 20        Seated Pball press down (small) isometrics  10" x 10  :10x10  6"x  20         Seated TA Draw Marching  nv 1x10  2 x 10 seated 2 x 10 seated        Tband Multi Rots B  RTB           Tband Row + Ext  RTB           Standing Hip Abd + Ext                                                                                                                                                                Modalities HP to LS in SL R  10 min           TENs + MHP seated  10 min + HP  10 min + HP  10 min + HP                             Assessment:  Able to proceed with further trunk stabilization activities, only exacerbation of symptoms were initial sit to stand  Still very sensitive to palpation of LS, thus IASTM/DTM np, TENs response with initial irritation, Ext is very limited in range since last visit  Plan: Continue per plan of care

## 2019-01-29 ENCOUNTER — HOSPITAL ENCOUNTER (OUTPATIENT)
Dept: RADIOLOGY | Facility: IMAGING CENTER | Age: 49
Discharge: HOME | End: 2019-01-29
Payer: COMMERCIAL

## 2019-01-29 DIAGNOSIS — M54.16 LUMBAR RADICULOPATHY: ICD-10-CM

## 2019-01-29 DIAGNOSIS — M47.816 LUMBAR SPONDYLOSIS: ICD-10-CM

## 2019-01-29 PROCEDURE — 72148 MRI LUMBAR SPINE W/O DYE: CPT

## 2019-01-31 ENCOUNTER — APPOINTMENT (OUTPATIENT)
Dept: URGENT CARE | Age: 49
End: 2019-01-31
Payer: OTHER MISCELLANEOUS

## 2019-01-31 ENCOUNTER — OFFICE VISIT (OUTPATIENT)
Dept: PHYSICAL THERAPY | Facility: CLINIC | Age: 49
End: 2019-01-31
Payer: COMMERCIAL

## 2019-01-31 ENCOUNTER — TELEPHONE (OUTPATIENT)
Dept: PAIN MEDICINE | Facility: CLINIC | Age: 49
End: 2019-01-31

## 2019-01-31 ENCOUNTER — OFFICE VISIT (OUTPATIENT)
Dept: FAMILY MEDICINE CLINIC | Facility: CLINIC | Age: 49
End: 2019-01-31
Payer: COMMERCIAL

## 2019-01-31 VITALS
SYSTOLIC BLOOD PRESSURE: 110 MMHG | RESPIRATION RATE: 18 BRPM | OXYGEN SATURATION: 94 % | TEMPERATURE: 97.8 F | HEIGHT: 67 IN | BODY MASS INDEX: 27.81 KG/M2 | WEIGHT: 177.2 LBS | DIASTOLIC BLOOD PRESSURE: 74 MMHG | HEART RATE: 62 BPM

## 2019-01-31 DIAGNOSIS — K21.9 GASTROESOPHAGEAL REFLUX DISEASE, ESOPHAGITIS PRESENCE NOT SPECIFIED: ICD-10-CM

## 2019-01-31 DIAGNOSIS — G89.29 CHRONIC BILATERAL LOW BACK PAIN, WITH SCIATICA PRESENCE UNSPECIFIED: ICD-10-CM

## 2019-01-31 DIAGNOSIS — G89.29 CHRONIC LEFT-SIDED LOW BACK PAIN WITH LEFT-SIDED SCIATICA: Primary | ICD-10-CM

## 2019-01-31 DIAGNOSIS — M54.42 CHRONIC LEFT-SIDED LOW BACK PAIN WITH LEFT-SIDED SCIATICA: Primary | ICD-10-CM

## 2019-01-31 DIAGNOSIS — K21.9 GASTROESOPHAGEAL REFLUX DISEASE WITHOUT ESOPHAGITIS: ICD-10-CM

## 2019-01-31 DIAGNOSIS — E66.3 OVERWEIGHT (BMI 25.0-29.9): ICD-10-CM

## 2019-01-31 DIAGNOSIS — F32.A DEPRESSION, UNSPECIFIED DEPRESSION TYPE: ICD-10-CM

## 2019-01-31 DIAGNOSIS — Z00.00 ANNUAL PHYSICAL EXAM: Primary | ICD-10-CM

## 2019-01-31 DIAGNOSIS — M54.16 LUMBAR RADICULOPATHY: ICD-10-CM

## 2019-01-31 DIAGNOSIS — K76.0 HEPATIC STEATOSIS: ICD-10-CM

## 2019-01-31 DIAGNOSIS — M54.5 CHRONIC BILATERAL LOW BACK PAIN, WITH SCIATICA PRESENCE UNSPECIFIED: ICD-10-CM

## 2019-01-31 DIAGNOSIS — E11.8 TYPE 2 DIABETES MELLITUS WITH COMPLICATION, UNSPECIFIED WHETHER LONG TERM INSULIN USE: ICD-10-CM

## 2019-01-31 DIAGNOSIS — Z98.84 S/P LAPAROSCOPIC SLEEVE GASTRECTOMY: ICD-10-CM

## 2019-01-31 PROBLEM — K56.7 ILEUS (HCC): Status: RESOLVED | Noted: 2017-07-06 | Resolved: 2019-01-31

## 2019-01-31 PROBLEM — R70.0 ELEVATED SED RATE: Status: RESOLVED | Noted: 2018-04-04 | Resolved: 2019-01-31

## 2019-01-31 PROBLEM — K91.2 POSTSURGICAL MALABSORPTION: Status: RESOLVED | Noted: 2018-06-21 | Resolved: 2019-01-31

## 2019-01-31 PROBLEM — R59.9 LYMPH NODE ENLARGEMENT: Status: RESOLVED | Noted: 2018-09-10 | Resolved: 2019-01-31

## 2019-01-31 LAB — SL AMB POCT HEMOGLOBIN AIC: 6 (ref ?–6.5)

## 2019-01-31 PROCEDURE — 97110 THERAPEUTIC EXERCISES: CPT | Performed by: PHYSICAL THERAPIST

## 2019-01-31 PROCEDURE — 99396 PREV VISIT EST AGE 40-64: CPT | Performed by: INTERNAL MEDICINE

## 2019-01-31 PROCEDURE — 99213 OFFICE O/P EST LOW 20 MIN: CPT | Performed by: PREVENTIVE MEDICINE

## 2019-01-31 PROCEDURE — 97014 ELECTRIC STIMULATION THERAPY: CPT | Performed by: PHYSICAL THERAPIST

## 2019-01-31 PROCEDURE — 83036 HEMOGLOBIN GLYCOSYLATED A1C: CPT | Performed by: INTERNAL MEDICINE

## 2019-01-31 RX ORDER — ESCITALOPRAM OXALATE 20 MG/1
20 TABLET ORAL DAILY
Qty: 90 TABLET | Refills: 1 | Status: SHIPPED | OUTPATIENT
Start: 2019-01-31 | End: 2019-03-18

## 2019-01-31 RX ORDER — GABAPENTIN 100 MG/1
100 CAPSULE ORAL 2 TIMES DAILY
Qty: 60 CAPSULE | Refills: 1 | Status: SHIPPED | OUTPATIENT
Start: 2019-01-31 | End: 2019-02-05

## 2019-01-31 RX ORDER — PANTOPRAZOLE SODIUM 40 MG/1
40 TABLET, DELAYED RELEASE ORAL
Qty: 90 TABLET | Refills: 1 | Status: SHIPPED | OUTPATIENT
Start: 2019-01-31 | End: 2019-08-31 | Stop reason: SDUPTHER

## 2019-01-31 RX ORDER — QUETIAPINE FUMARATE 25 MG/1
25 TABLET, FILM COATED ORAL
Qty: 90 TABLET | Refills: 1 | Status: SHIPPED | OUTPATIENT
Start: 2019-01-31 | End: 2019-05-09

## 2019-01-31 NOTE — PROGRESS NOTES
Patient's shoes and socks removed  Right Foot/Ankle   Right Foot Inspection  Skin Exam: skin normal and skin intact no dry skin, no warmth, no callus, no erythema, no maceration, no abnormal color, no pre-ulcer, no ulcer and no callus                            Sensory   Vibration: intact    Monofilament testing: intact  Vascular    The right DP pulse is 2+  The right PT pulse is 2+  Left Foot/Ankle  Left Foot Inspection  Skin Exam: skin normal and skin intactno dry skin, no warmth, no erythema, no maceration, normal color, no pre-ulcer, no ulcer and no callus                                         Sensory   Vibration: intact    Monofilament: intact  Vascular    The left DP pulse is 2+  The left PT pulse is 2+  Assign Risk Category:  No deformity present; No loss of protective sensation;  No weak pulses       Risk: 0

## 2019-01-31 NOTE — PROGRESS NOTES
Assessment/Plan:       Diagnoses and all orders for this visit:    Annual physical exam    Overweight (BMI 25 0-29  9)    S/P laparoscopic sleeve gastrectomy    Hepatic steatosis    Gastroesophageal reflux disease, esophagitis presence not specified    Type 2 diabetes mellitus with complication, unspecified whether long term insulin use (HCC)  -     POCT hemoglobin A1c    Lumbar radiculopathy  -     gabapentin (NEURONTIN) 100 mg capsule; Take 1 capsule (100 mg total) by mouth 2 (two) times a day    Chronic bilateral low back pain, with sciatica presence unspecified  -     gabapentin (NEURONTIN) 100 mg capsule; Take 1 capsule (100 mg total) by mouth 2 (two) times a day    Depression, unspecified depression type  -     escitalopram (LEXAPRO) 20 mg tablet; Take 1 tablet (20 mg total) by mouth daily  -     QUEtiapine (SEROquel) 25 mg tablet; Take 1 tablet (25 mg total) by mouth daily at bedtime    Gastroesophageal reflux disease without esophagitis  -     pantoprazole (PROTONIX) 40 mg tablet; Take 1 tablet (40 mg total) by mouth daily before breakfast Take 30 minutes before      Pat Eden was seen and examined in the office today  BP is controlled  A1c was taken today and was 6 0%  Her weight can fluctuate but I encouraged that she stick to her diet and try and incorporate exercise  She is limited secondary to her back  We discussed possibly doing a trial of gabapentin to see if this helps  She was given refills for her medication as well  Reflux appears largely controlled  Otherwise no other changes were made  BMI Counseling: Body mass index is 27 75 kg/m²  Discussed the patient's BMI with her  The BMI is above average  BMI counseling and education was provided to the patient  Nutrition recommendations include decreasing overall calorie intake  Subjective:      Patient ID: Yesika Carmona is a 51 y o  female  Pat Eden is here today for a physical examination  Chart was reviewed and updated   She has been recently bothered by her back pain (has had chronic back pain 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)  She is following with PT and takes Flexeril at bedtime when she can  She notes it is worse when on her feet all day and does travel down her leg  She reports some increased depression secondary to her son but is seeing more help for him  He is autistic and things can get stressful  The following portions of the patient's history were reviewed and updated as appropriate: allergies, current medications, past family history, past medical history, past social history, past surgical history and problem list     Review of Systems   Constitutional: Negative for chills, fever and unexpected weight change  HENT: Negative for sinus pain, sinus pressure and sore throat  Eyes: Negative for visual disturbance  Respiratory: Negative for cough, chest tightness, shortness of breath and wheezing  Cardiovascular: Negative for chest pain, palpitations and leg swelling  Gastrointestinal: Negative for abdominal pain, blood in stool, constipation, diarrhea, nausea and vomiting  Genitourinary: Negative for difficulty urinating and dysuria  Musculoskeletal: Negative for arthralgias, back pain and myalgias  Neurological: Negative for dizziness, syncope, numbness and headaches  Hematological: Negative for adenopathy  Does not bruise/bleed easily  Psychiatric/Behavioral: Positive for dysphoric mood  Negative for sleep disturbance  The patient is not nervous/anxious  Objective:      /74   Pulse 62   Temp 97 8 °F (36 6 °C)   Resp 18   Ht 5' 7" (1 702 m)   Wt 80 4 kg (177 lb 3 2 oz)   SpO2 94%   BMI 27 75 kg/m²          Physical Exam   Constitutional: She is oriented to person, place, and time  She appears well-developed and well-nourished  No distress  HENT:   Head: Normocephalic and atraumatic     Right Ear: External ear normal    Left Ear: External ear normal    Mouth/Throat: Oropharynx is clear and moist    Eyes: Pupils are equal, round, and reactive to light  Conjunctivae and EOM are normal  Right eye exhibits no discharge  Left eye exhibits no discharge  Neck: Normal range of motion  Neck supple  No JVD present  No tracheal deviation present  No thyromegaly present  Cardiovascular: Normal rate, regular rhythm and normal heart sounds  Pulmonary/Chest: Effort normal and breath sounds normal  No respiratory distress  She has no wheezes  Abdominal: Soft  Bowel sounds are normal  There is no tenderness  Musculoskeletal: Normal range of motion  Neurological: She is alert and oriented to person, place, and time  No cranial nerve deficit  Skin: Skin is warm and dry  She is not diaphoretic  Psychiatric: She has a normal mood and affect  Her speech is normal and behavior is normal  Judgment and thought content normal    Vitals reviewed

## 2019-01-31 NOTE — PROGRESS NOTES
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 as she had MRI on Tuesday  Pt reports that she is very tired and wants something done with her spine as it has been several years of back pain with PT intervention, epidurals, and medication and she feels as though she has exhausted all of her avenues  Objective: See treatment diary below  Precautions: Hx of Depression and DM2    Daily Treatment Diary     Manual  1/10 1/17 1/18 1/24 1/25 1/31       L SL IASTM with Pillow in between knees  10 min 10 min                                                                  Exercise Diary           Abdominal Bracing  10" x 10 10" x 10 :10x10  10" x 10 seated 10" x 10 10" x 10       APT Seated 10" x 10 10" x 10 (TA Draw) :10x10 np 10" x 10 10" x 10       Standing LS Ext 3" x 15 3" x 20 3" x 20 np         Seated LS Ext with Bolster 3" x 15 3' x 20 3" x 20 np 3" x 20 3" x 20       Seated Pball press down (small) isometrics  10" x 10  :10x10  6"x  20  6" x 20       Seated TA Draw Marching  nv 1x10  2 x 10 seated 2 x 10 seated 2 x 10 seated       Tband Multi Rots B  RTB           Tband Row + Ext  RTB           Standing Hip Abd + Ext                                                                                                                                                                Modalities              HP to LS in SL R  10 min           TENs + MHP seated       10 min + HP  10 min + HP  10 min + HP  10 min + HP Assessment:  Able to tolerate further base activities without significant symptom exacerbation, no radicular symptoms however this may be related to pt having ample amount of rest   RE nv       Plan: Continue per plan of care

## 2019-02-01 NOTE — TELEPHONE ENCOUNTER
I called the patient and left a message on her voicemail to call the office back for the results of her lumbar MRI

## 2019-02-05 DIAGNOSIS — M54.16 LUMBAR RADICULOPATHY: Primary | ICD-10-CM

## 2019-02-05 RX ORDER — GABAPENTIN 300 MG/1
300 CAPSULE ORAL
Qty: 30 CAPSULE | Refills: 0 | Status: SHIPPED | OUTPATIENT
Start: 2019-02-05 | End: 2019-03-07 | Stop reason: SDUPTHER

## 2019-02-05 NOTE — TELEPHONE ENCOUNTER
Patient returned your call  She states she will be able to answer her phone after 3:15pm today so she is asking for you to call after

## 2019-02-05 NOTE — TELEPHONE ENCOUNTER
I called the patient back with the results her lumbar MRI  She reports ongoing left sided back pain and radiating left leg pain  She was noted to have a left sided disc protrusion,which is causing left L4 radiculopathy  This is unchanged since her last MRI  I discussed with her about moving forward with a left L4-L5 TFESI  She reported that she would not like to move forward with any injections at this time  She reported that her family doctor started her on gabapentin 100 mg 1 capsule 2 times daily on 01/31/2019  She denies any side effects on this medication and would like to see if gabapentin will help her pain and symptoms prior to moving forward with an injection  I discussed with the patient about increasing her gabapentin further to help with her neuropathic pain  I instructed her that increasing this medication can increase her risk for medication side effects such as dizziness and drowsiness  She verbalized understanding and would like to increase this medication  Therefore, a prescription for gabapentin 300 mg 1 capsule at bedtime, was sent to the patient's pharmacy on file  She was instructed to call the office in 1-2 weeks to update office on the effectiveness of increasing this medication, or sooner with adverse medication side effects

## 2019-02-06 NOTE — TELEPHONE ENCOUNTER
Patient  [phone]    Patient called stating that she would like to go ahead and continue with the injection  Please call her back tomorrow as she is working right now and wont be able to answer the phone

## 2019-02-07 NOTE — TELEPHONE ENCOUNTER
I left detailed vm for pt that HA and FQ are both out of the office until Mon  I stated I will forward a message to Ramya Javier to make her aware that the pt is now interested in moving forward with the inj that bakari had discussed   Once HA has placed the the order for the inj our  will call pt to set up date for the inj

## 2019-02-08 ENCOUNTER — OFFICE VISIT (OUTPATIENT)
Dept: OBGYN CLINIC | Facility: CLINIC | Age: 49
End: 2019-02-08
Payer: COMMERCIAL

## 2019-02-08 VITALS
SYSTOLIC BLOOD PRESSURE: 122 MMHG | BODY MASS INDEX: 27.78 KG/M2 | WEIGHT: 177 LBS | DIASTOLIC BLOOD PRESSURE: 68 MMHG | HEIGHT: 67 IN

## 2019-02-08 DIAGNOSIS — Z30.41 ORAL CONTRACEPTIVE PILL SURVEILLANCE: Primary | ICD-10-CM

## 2019-02-08 PROCEDURE — 99213 OFFICE O/P EST LOW 20 MIN: CPT | Performed by: OBSTETRICS & GYNECOLOGY

## 2019-02-08 RX ORDER — DROSPIRENONE AND ETHINYL ESTRADIOL 0.02-3(28)
1 KIT ORAL DAILY
Qty: 84 TABLET | Refills: 0 | Status: SHIPPED | OUTPATIENT
Start: 2019-02-08 | End: 2019-05-03 | Stop reason: SDUPTHER

## 2019-02-08 NOTE — PROGRESS NOTES
Patient is a 51 y o  Alcon Richard with Patient's last menstrual period was 2019 (approximate)  who presents for follow up to initiation to [place] [address]  The pt reports that while using OCPS she is still having cramping 3-4 days prior to her menses  She reports she then has bleeding for 3-7 days  She reports she uses a regular pad every 2-3 hours at its worst  She reports her cycle is every 28 days  She also reports significant breast tenderness before and during her menses  She would like to try another brand of pills to see if her symptoms are improved  Will try Merna-pt agreeable  Past Medical History:   Diagnosis Date    Anxiety     Bulging lumbar disc     Carpal tunnel syndrome     RIGHT  LAST ASSESSED: 16    Chronic back pain     low    Colon polyps     Depression     Diabetes mellitus ([facility] [address] )     on victoza    GERD (gastroesophageal reflux disease)     Gestational diabetes     Hearing loss     left ear    Hyperlipidemia     IBS (irritable bowel syndrome)     Ileus ([facility] [address] )     LAST ASSESSED: 8/3/17    Labial cyst     LAST ASSESSED: 16    Myofascial pain     LAST ASSESSED: 16    Obesity     Ovarian cyst     LEFT   LAST ASSESSED: 16    Pap smear for cervical cancer screening     2018--pap wnl, HRHPV neg    Seasonal allergies     Thoracic outlet syndrome     2010    Trochanteric bursitis of both hips     LAST ASSESSED: 3/18/16    Ulnar neuropathy at elbow     Varicella     Wears glasses        Past Surgical History:   Procedure Laterality Date    BILE DUCT EXPLORATION      ENDOSCOPIC REMOVAL OF STONES FROM BILIARY TRACT     SECTION      x3    CHOLECYSTECTOMY      COLONOSCOPY      DILATION AND CURETTAGE OF UTERUS      ENDOMETRIAL ABLATION      ERCP W/ SPHICTEROTOMY      FIRST RIB REMOVAL      FIRST RIB REMOVAL      THORAX EXCISION OF FIRST RIB    HYSTEROSCOPY      NEUROPLASTY / TRANSPOSITION ULNAR NERVE AT ELBOW      NM COLONOSCOPY FLX DX W/COLLJ SPEC WHEN PFRMD N/A 2017    Procedure: COLONOSCOPY;  Surgeon: Lucy Manzo MD;  [facility]; Service: Gastroenterology    [place] ESOPHAGOGASTRODUODENOSCOPY TRANSORAL DIAGNOSTIC N/A 2017    Procedure: ESOPHAGOGASTRODUODENOSCOPY (EGD); Surgeon: Joan Fitzgerald MD;  [facility];   Service: Gastroenterology    [place] HYSTEROSCOPY,W/ENDO BX N/A 10/20/2017    Procedure: DILATATION AND CURETTAGE (D&C) WITH HYSTEROSCOPY  REMOVAL VULVAR RT  LESION;  Surgeon: Johnson Cloud MD;  [facility];  Service: Gynecology    [place] LAP, ÁLVARO RESTRICT PROC, LONGITUDINAL GASTRECTOMY N/A 2018    Procedure: GASTRECTOMY SLEEVE LAPAROSCOPIC; INTRAOPERATIVE EGD ;  Surgeon: Aisha Gipson MD;  [facility];  Service: Bariatrics    TONSILLECTOMY AND ADENOIDECTOMY      TUBAL LIGATION      ULNAR NERVE REPAIR Right     VEIN LIGATION AND STRIPPING Right     VULVA SURGERY  10/20/2017    BIOPSY    WISDOM TOOTH EXTRACTION         OB History    Para Term  AB Living   3 3 3     3   SAB TAB Ectopic Multiple Live Births                  # Outcome Date GA Lbr Manuel/2nd Weight Sex Delivery Anes PTL Lv   3 Term            2 Term            1 Term               Obstetric Comments   C/S x 3; BTL at time of third   Menarche 14           Current Outpatient Prescriptions:     Biotin 55784 MCG TABS, Take by mouth, Disp: , Rfl:     Calcium Citrate-Vitamin D 500-500 MG-UNIT PACK, Take by mouth, Disp: , Rfl:     Cyanocobalamin (CVS VITAMIN B12 PO), Take by mouth, Disp: , Rfl:     cyclobenzaprine (FLEXERIL) 10 mg tablet, Take 1 tablet (10 mg total) by mouth daily at bedtime, Disp: 30 tablet, Rfl: 0    escitalopram (LEXAPRO) 20 mg tablet, Take 1 tablet (20 mg total) by mouth daily, Disp: 90 tablet, Rfl: 1    gabapentin (NEURONTIN) 300 mg capsule, Take 1 capsule (300 mg total) by mouth daily at bedtime, Disp: 30 capsule, Rfl: 0    Multiple Vitamins-Minerals (MULTI COMPLETE PO), Take by mouth, Disp: , Rfl:    pantoprazole (PROTONIX) 40 mg tablet, Take 1 tablet (40 mg total) by mouth daily before breakfast Take 30 minutes before, Disp: 90 tablet, Rfl: 1    Probiotic Product (PRO-BIOTIC BLEND PO), Take by mouth, Disp: , Rfl:     QUEtiapine (SEROquel) 25 mg tablet, Take 1 tablet (25 mg total) by mouth daily at bedtime, Disp: 90 tablet, Rfl: 1    drospirenone-ethinyl estradiol (LIEN) 3-0 02 MG per tablet, Take 1 tablet by mouth daily, Disp: 84 tablet, Rfl: 0    No Known Allergies    Social History     Social History    Marital status:      Spouse name: N/A    Number of children: 3    Years of education: GED then 2 year college program     Occupational History    [employer]     Social History Main Topics    Smoking status: Former Smoker     Quit date: 4/30/2017    Smokeless tobacco: Never Used    Alcohol use Yes      Comment: socially    Drug use: No    Sexual activity: Yes     Birth control/ protection: OCP      Comment: lifetime partners: 6     Other Topics Concern    None     Social History Narrative    NO PREFERENCE ON Buddhist BELIEFS        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    Accepts blood products       Family History   Problem Relation Age of Onset    Diabetes Mother     Other Mother         HYPERCHOLESTEROLEMIA    Breast cancer Mother         >50    Diabetes Father     Other Father         HYPERCHOLESTEROLEMIA    Prostate cancer Father     Hypertension Brother     Diabetes Brother     Other Brother         HYPERCHOLESTEROLEMIA    Alcohol abuse Family     Asthma Family     Other Family         BACK PAIN    Cancer Family     Depression Family     Neuropathy Family     Heart disease Family     Colon cancer Maternal Grandfather     Ovarian cancer Neg Hx     Uterine cancer Neg Hx        Review of Systems   Constitutional: Negative for chills, fatigue, fever and unexpected weight change  HENT: Negative for congestion, mouth sores and sore throat      Respiratory: Negative for cough, chest tightness, shortness of breath and wheezing  Cardiovascular: Negative for chest pain and palpitations  Gastrointestinal: Positive for nausea  Negative for abdominal distention, abdominal pain, constipation, diarrhea and vomiting  Endocrine: Negative for cold intolerance and heat intolerance  Genitourinary: Positive for menstrual problem (painful menses)  Negative for dyspareunia, dysuria, genital sores, pelvic pain, vaginal bleeding, vaginal discharge and vaginal pain  Musculoskeletal: Negative for arthralgias  Skin: Negative for color change and rash  Neurological: Negative for dizziness, light-headedness and headaches  Hematological: Negative for adenopathy  Blood pressure 122/68, height 5' 7" (1 702 m), weight 80 3 kg (177 lb), last menstrual period 01/07/2019, not currently breastfeeding  and Body mass index is 27 72 kg/m²  Physical Exam   Constitutional: She is oriented to person, place, and time  She appears well-developed and well-nourished  HENT:   Head: Normocephalic and atraumatic  Eyes: Conjunctivae and EOM are normal    Neck: Normal range of motion  Pulmonary/Chest: Effort normal    Musculoskeletal: Normal range of motion  She exhibits no edema or tenderness  Neurological: She is alert and oriented to person, place, and time  Skin: Skin is warm  No rash noted  No erythema  Psychiatric: She has a normal mood and affect  Her behavior is normal  Judgment and thought content normal            A/P:  Pt is a 51 y o  [id number] with      Diagnoses and all orders for this visit:    Oral contraceptive pill surveillance  -     drospirenone-ethinyl estradiol (LIEN) 3-0 02 MG per tablet; Take 1 tablet by mouth daily    Will change pills and reassess pain and side effects in 3 months

## 2019-02-11 DIAGNOSIS — M51.16 INTERVERTEBRAL DISC DISORDERS WITH RADICULOPATHY, LUMBAR REGION: Primary | ICD-10-CM

## 2019-02-11 NOTE — TELEPHONE ENCOUNTER
I called the patient back and discussed with her about moving forward with an injection  She would like to proceed  Therefore, I placed an order in Epic for the patient to undergo a Left L4 and Right L5 TFESI  Can you please call and schedule this patient for this procedure?

## 2019-02-13 ENCOUNTER — TELEPHONE (OUTPATIENT)
Dept: RADIOLOGY | Facility: CLINIC | Age: 49
End: 2019-02-13

## 2019-02-13 DIAGNOSIS — F41.9 ANXIETY: Primary | ICD-10-CM

## 2019-02-13 RX ORDER — LORAZEPAM 0.5 MG/1
TABLET ORAL
Qty: 2 TABLET | Refills: 0 | Status: SHIPPED | OUTPATIENT
Start: 2019-02-13 | End: 2019-04-04 | Stop reason: SDUPTHER

## 2019-02-13 NOTE — TELEPHONE ENCOUNTER
Scheduled patient for procedure and she is having it on March 5  She would like to know if you can prescribe her Ativan for the procedure  If so, please call it in to [facility]   Thank you

## 2019-02-13 NOTE — TELEPHONE ENCOUNTER
I informed pt that  has sent script for lorazepam 0 5 mg take o 5 tab 2 hrs before inj and may repeat after 1 hour  Pt should not drive after taking the ativan, pt confirmed she will have a  for the procedure

## 2019-02-21 NOTE — PROGRESS NOTES
PT Discharge    Today's date: 2019  Patient name: Katelyn Navarrete  : 1970  MRN: [id number]  Referring provider: OSMANI Rice  Dx:   Encounter Diagnosis     ICD-10-CM    1  Chronic left-sided low back pain with left-sided sciatica M54 42     G89 29        Start Time: 1000  Stop Time: 1035  Total time in clinic (min): 35 minutes    Assessment/Plan Pt has not been present since 19  Pt's chart will be DC in compliance of facility policy as all Charts are DC within 30 days of last scheduled visit          Subjective    Objective

## 2019-03-07 ENCOUNTER — TELEPHONE (OUTPATIENT)
Dept: RADIOLOGY | Facility: CLINIC | Age: 49
End: 2019-03-07

## 2019-03-07 ENCOUNTER — HOSPITAL ENCOUNTER (OUTPATIENT)
Dept: RADIOLOGY | Facility: CLINIC | Age: 49
Discharge: HOME | End: 2019-03-07
Attending: ANESTHESIOLOGY
Payer: COMMERCIAL

## 2019-03-07 VITALS
TEMPERATURE: 98.1 F | RESPIRATION RATE: 16 BRPM | DIASTOLIC BLOOD PRESSURE: 71 MMHG | HEART RATE: 67 BPM | OXYGEN SATURATION: 98 % | SYSTOLIC BLOOD PRESSURE: 106 MMHG

## 2019-03-07 DIAGNOSIS — M54.16 LUMBAR RADICULOPATHY: ICD-10-CM

## 2019-03-07 DIAGNOSIS — M51.16 INTERVERTEBRAL DISC DISORDERS WITH RADICULOPATHY, LUMBAR REGION: ICD-10-CM

## 2019-03-07 PROCEDURE — 64483 NJX AA&/STRD TFRM EPI L/S 1: CPT | Performed by: ANESTHESIOLOGY

## 2019-03-07 PROCEDURE — 64484 NJX AA&/STRD TFRM EPI L/S EA: CPT | Performed by: ANESTHESIOLOGY

## 2019-03-07 RX ORDER — BUPIVACAINE HCL/PF 2.5 MG/ML
10 VIAL (ML) INJECTION ONCE
Status: COMPLETED | OUTPATIENT
Start: 2019-03-07 | End: 2019-03-07

## 2019-03-07 RX ORDER — GABAPENTIN 300 MG/1
300 CAPSULE ORAL
Qty: 30 CAPSULE | Refills: 5 | Status: SHIPPED | OUTPATIENT
Start: 2019-03-07 | End: 2019-06-05

## 2019-03-07 RX ORDER — 0.9 % SODIUM CHLORIDE 0.9 %
10 VIAL (ML) INJECTION ONCE
Status: COMPLETED | OUTPATIENT
Start: 2019-03-07 | End: 2019-03-07

## 2019-03-07 RX ORDER — METHYLPREDNISOLONE ACETATE 80 MG/ML
80 INJECTION, SUSPENSION INTRA-ARTICULAR; INTRALESIONAL; INTRAMUSCULAR; PARENTERAL; SOFT TISSUE ONCE
Status: COMPLETED | OUTPATIENT
Start: 2019-03-07 | End: 2019-03-07

## 2019-03-07 RX ADMIN — METHYLPREDNISOLONE ACETATE 80 MG: 80 INJECTION, SUSPENSION INTRA-ARTICULAR; INTRALESIONAL; INTRAMUSCULAR; PARENTERAL; SOFT TISSUE at 11:08

## 2019-03-07 RX ADMIN — IOHEXOL 1 ML: 300 INJECTION, SOLUTION INTRAVENOUS at 11:08

## 2019-03-07 RX ADMIN — Medication 5 ML: at 11:06

## 2019-03-07 RX ADMIN — BUPIVACAINE HYDROCHLORIDE 2 ML: 2.5 INJECTION, SOLUTION EPIDURAL; INFILTRATION; INTRACAUDAL at 11:08

## 2019-03-07 RX ADMIN — SODIUM CHLORIDE 5 ML: 9 INJECTION, SOLUTION INTRAMUSCULAR; INTRAVENOUS; SUBCUTANEOUS at 11:06

## 2019-03-07 NOTE — H&P
History of Present Illness: The patient is a 51 y o  female who presents with complaints of lower back and bilateral lower extremity pain is here today for left L4 and right L5 transforaminal epidural steroid injection  Patient Active Problem List   Diagnosis    IBS (irritable bowel syndrome)    GERD (gastroesophageal reflux disease)    Depression    Chronic back pain    Anxiety    Cervical radiculopathy    Hepatic steatosis    Pulmonary nodule seen on imaging study    Vitamin D deficiency    Dermatitis    S/P laparoscopic sleeve gastrectomy    Other fatigue    Encounter for annual routine gynecological examination    Overweight (BMI 25 0-29  9)    Lumbar radiculopathy       Past Medical History:   Diagnosis Date    Anxiety     Bulging lumbar disc     Carpal tunnel syndrome     RIGHT  LAST ASSESSED: 16    Chronic back pain     low    Colon polyps     Depression     Diabetes mellitus ([address] )     on victoza    GERD (gastroesophageal reflux disease)     Gestational diabetes     Hearing loss     left ear    Hyperlipidemia     IBS (irritable bowel syndrome)     Ileus ([facility] [address] )     LAST ASSESSED: 8/3/17    Labial cyst     LAST ASSESSED: 16    Myofascial pain     LAST ASSESSED: 16    Obesity     Ovarian cyst     LEFT   LAST ASSESSED: 16    Pap smear for cervical cancer screening     2018--pap wnl, HRHPV neg    Seasonal allergies     Thoracic outlet syndrome     2010    Trochanteric bursitis of both hips     LAST ASSESSED: 3/18/16    Ulnar neuropathy at elbow     Varicella     Wears glasses        Past Surgical History:   Procedure Laterality Date    BILE DUCT EXPLORATION      ENDOSCOPIC REMOVAL OF STONES FROM BILIARY TRACT     SECTION      x3    CHOLECYSTECTOMY      COLONOSCOPY      DILATION AND CURETTAGE OF UTERUS      ENDOMETRIAL ABLATION      ERCP W/ SPHICTEROTOMY      FIRST RIB REMOVAL      FIRST RIB REMOVAL      THORAX EXCISION OF FIRST RIB    HYSTEROSCOPY      NEUROPLASTY / TRANSPOSITION ULNAR NERVE AT ELBOW      CT COLONOSCOPY FLX DX W/COLLJ SPEC WHEN PFRMD N/A 5/30/2017    Procedure: COLONOSCOPY;  Surgeon: Maryana Mckeon MD;  [facility]; Service: Gastroenterology    CT ESOPHAGOGASTRODUODENOSCOPY TRANSORAL DIAGNOSTIC N/A 9/14/2017    Procedure: ESOPHAGOGASTRODUODENOSCOPY (EGD); Surgeon: Gila Almeida MD;  [facility]; Service: Gastroenterology    CT HYSTEROSCOPY,W/ENDO BX N/A 10/20/2017    Procedure: DILATATION AND CURETTAGE (D&C) WITH HYSTEROSCOPY  REMOVAL VULVAR RT  LESION;  Surgeon: Bonnie Guaman MD;  [facility];  Service: Gynecology    CT LAP, ÁLVARO RESTRICT [address], LONGITUDINAL GASTRECTOMY N/A 2/6/2018    Procedure: GASTRECTOMY SLEEVE LAPAROSCOPIC; INTRAOPERATIVE EGD ;  Surgeon: Addi Santiago MD;  [facility];  Service: Bariatrics    TONSILLECTOMY AND ADENOIDECTOMY      TUBAL LIGATION      ULNAR NERVE REPAIR Right     VEIN LIGATION AND STRIPPING Right     VULVA SURGERY  10/20/2017    BIOPSY    WISDOM TOOTH EXTRACTION           Current Outpatient Medications:     Biotin 88734 MCG TABS, Take by mouth, Disp: , Rfl:     Calcium Citrate-Vitamin D 500-500 MG-UNIT PACK, Take by mouth, Disp: , Rfl:     Cyanocobalamin (CVS VITAMIN B12 PO), Take by mouth, Disp: , Rfl:     cyclobenzaprine (FLEXERIL) 10 mg tablet, Take 1 tablet (10 mg total) by mouth daily at bedtime, Disp: 30 tablet, Rfl: 0    drospirenone-ethinyl estradiol (LIEN) 3-0 02 MG per tablet, Take 1 tablet by mouth daily, Disp: 84 tablet, Rfl: 0    escitalopram (LEXAPRO) 20 mg tablet, Take 1 tablet (20 mg total) by mouth daily, Disp: 90 tablet, Rfl: 1    gabapentin (NEURONTIN) 300 mg capsule, Take 1 capsule (300 mg total) by mouth daily at bedtime, Disp: 30 capsule, Rfl: 0    LORazepam (ATIVAN) 0 5 mg tablet, Take 0 5 tablet 2 hours prior to injection    May repeat after 1 hour, Disp: 2 tablet, Rfl: 0    Multiple Vitamins-Minerals (MULTI COMPLETE PO), Take by mouth, Disp: , Rfl:     pantoprazole (PROTONIX) 40 mg tablet, Take 1 tablet (40 mg total) by mouth daily before breakfast Take 30 minutes before, Disp: 90 tablet, Rfl: 1    Probiotic Product (PRO-BIOTIC BLEND PO), Take by mouth, Disp: , Rfl:     QUEtiapine (SEROquel) 25 mg tablet, Take 1 tablet (25 mg total) by mouth daily at bedtime, Disp: 90 tablet, Rfl: 1    Current Facility-Administered Medications:     bupivacaine (PF) (MARCAINE) 0 25 % injection 10 mL, 10 mL, Epidural, Once, Jaquelin Navarrete MD    iohexol (OMNIPAQUE) 300 mg/mL injection 50 mL, 50 mL, Epidural, Once, Jaquelin Navarrete MD    lidocaine (PF) (XYLOCAINE-MPF) 2 % injection 5 mL, 5 mL, Infiltration, Once, Jaquelin Navarrete MD    methylPREDNISolone acetate (DEPO-MEDROL) injection 80 mg, 80 mg, Epidural, Once, Jaquelin Navarrete MD    sodium chloride (PF) 0 9 % injection 10 mL, 10 mL, Infiltration, Once, Jaquelin Navarrete MD    No Known Allergies    Physical Exam:   Vitals:    03/07/19 1052   BP: 105/66   Pulse: 69   Resp: 18   Temp: 98 1 °F (36 7 °C)   SpO2: 97%     General: Awake, Alert, Oriented x 3, Mood and affect appropriate  Respiratory: Respirations even and unlabored  Cardiovascular: Peripheral pulses intact; no edema  Musculoskeletal Exam:   Left lower back and right lower back tenderness    ASA Score: 2    Patient/Chart Verification  Patient ID Verified: Verbal  ID Band Applied: No  Consents Confirmed: Procedural  H&P( within 30 days) Verified: To be obtained in the Pre-Procedure area  Interval H&P(within 24 hr) Complete (required for Outpatients and Surgery Admit only): To be obtained in the Pre-Procedure area  Allergies Reviewed: Yes  Anticoag/NSAID held?: No  Currently on antibiotics?: No  Pregnancy denied?: NA    Assessment:   1   Intervertebral disc disorders with radiculopathy, lumbar region        Plan: Left L4 and Right L5 TFESI

## 2019-03-07 NOTE — DISCHARGE INSTR - LAB
Epidural Steroid Injection   WHAT YOU NEED TO KNOW:   An epidural steroid injection (JESSICA) is a procedure to inject steroid medicine into the epidural space  The epidural space is between your spinal cord and vertebrae  Steroids reduce inflammation and fluid buildup in your spine that may be causing pain  You may be given pain medicine along with the steroids  ACTIVITY  · Do not drive or operate machinery today  · No strenuous activity today - bending, lifting, etc   · You may resume normal activites starting tomorrow - start slowly and as tolerated  · You may shower today, but no tub baths or hot tubs  · You may have numbness for several hours from the local anesthetic  Please use caution and common sense, especially with weight-bearing activities  CARE OF THE INJECTION SITE  · If you have soreness or pain, apply ice to the area today (20 minutes on/20 minutes off)  · Starting tomorrow, you may use warm, moist heat or ice if needed  · You may have an increase or change in your discomfort for 36-48 hours after your treatment  · Apply ice and continue with any pain medication you have been prescribed  · Notify [facility] if you have any of the following: redness, drainage, swelling, headache, stiff neck or fever above 100°F     SPECIAL INSTRUCTIONS  · Our office will contact you in approximately 7 days for a progress report  MEDICATIONS  · Continue to take all routine medications  · Our office may have instructed you to hold some medications  If you have a problem specifically related to your procedure, please call our office at [phone]  Problems not related to your procedure should be directed to your primary care physician

## 2019-03-13 ENCOUNTER — TELEPHONE (OUTPATIENT)
Dept: PAIN MEDICINE | Facility: CLINIC | Age: 49
End: 2019-03-13

## 2019-03-13 NOTE — TELEPHONE ENCOUNTER
Patient  [phone]  Yaneth Mention     Patient called in stating that she had to schedule a fu appt with Dr Cheikh Fuentes  However I didn't see any notes in patients chart about this  I called the  by no one answered   Patient said that she works in the [employer] [facility] [employer] and [facility] and Friday's are best for her  Please have someone reach out to patient for scheduling

## 2019-03-14 ENCOUNTER — OFFICE VISIT (OUTPATIENT)
Dept: PAIN MEDICINE | Facility: CLINIC | Age: 49
End: 2019-03-14
Payer: COMMERCIAL

## 2019-03-14 ENCOUNTER — TELEPHONE (OUTPATIENT)
Dept: PAIN MEDICINE | Facility: CLINIC | Age: 49
End: 2019-03-14

## 2019-03-14 VITALS
HEIGHT: 67 IN | BODY MASS INDEX: 27.78 KG/M2 | SYSTOLIC BLOOD PRESSURE: 124 MMHG | DIASTOLIC BLOOD PRESSURE: 86 MMHG | RESPIRATION RATE: 16 BRPM | WEIGHT: 177 LBS | HEART RATE: 61 BPM

## 2019-03-14 DIAGNOSIS — M51.16 INTERVERTEBRAL DISC DISORDERS WITH RADICULOPATHY, LUMBAR REGION: Primary | ICD-10-CM

## 2019-03-14 DIAGNOSIS — M47.816 LUMBAR SPONDYLOSIS: ICD-10-CM

## 2019-03-14 DIAGNOSIS — M79.18 MYOFASCIAL PAIN SYNDROME: ICD-10-CM

## 2019-03-14 PROCEDURE — 99213 OFFICE O/P EST LOW 20 MIN: CPT | Performed by: NURSE PRACTITIONER

## 2019-03-14 NOTE — PROGRESS NOTES
Pt c/o lower back pain that radiates to the right hip and leg    Assessment:  1  Intervertebral disc disorders with radiculopathy, lumbar region    2  Lumbar spondylosis    3  Myofascial pain syndrome        Plan:  Dee Infante is a 51 y o  female with a history of lumbar spondylosis, lumbar radiculopathy and myofascial pain syndrome  The patient continues with ongoing low back pain  She recently underwent a left L4 transforaminal epidural steroid injection and a right L5 transforaminal steroid injection on 3/7/2019  She reports 50-60% improvement on the left side and improvement of her right leg radiculopathy, but no improvement in pain  I discussed with the patient that it can take up to 2 weeks to see the full effect of the steroid injection  She verbalized understanding and stated that she would give the injection some more time  She reports that Dr Erin Ruiz discussed with her about proceeding with a surgical consultation  I offered to provide her with a surgical consultation at this office visit  However the patient reports that she would like to give her injection 1 more week to see of she has increased relief of symptoms, prior to considering a surgical consultation  She will call the office in 1 week with an update  To help with the neuropathic component of the patient's pain I will increase her gabapentin to 300 mg 2 capsules at bedtime  She reports that she had increased drowsiness with taking gabapentin during the daytime hours  Therefore we will have her take the 2 capsules at bedtime to decrease the daytime sleepiness  She was educated that increasing her gabapentin dose can increase her risk for medication side effects such as dizziness, drowsiness and swelling of the hands and feet  She was instructed to call the office in 1-2 weeks to update office on the effectiveness of increasing this medication, or sooner with adverse medication side effects      The patient will call the office in 1-2 weeks with an update on increasing gabapentin, or sooner with adverse medication side effects, or the worsening of symptoms  My impressions and treatment recommendations were discussed in detail with the patient who verbalized understanding and had no further questions  Discharge instructions were provided  I personally saw and examined the patient and I agree with the above discussed plan of care  No orders of the defined types were placed in this encounter  No orders of the defined types were placed in this encounter  History of Present Illness:  Darlene Wylie is a 51 y o  female with a history of lumbar spondylosis, lumbar radiculopathy and myofascial pain syndrome  The patient was last seen office on 3/7/2019 where she underwent a left L4 and right L5 transforaminal steroid injection  She reports 50-60% relief of symptoms in the left side  She reports no improvement in pain on the right side as of yet  But she reports improvement in the radiating leg symptoms  she reports prior to her injection that her symptoms were radiating down her entire right leg  She now reports that her symptoms only radiate to her upper thigh  She presents for a follow up office visit in regards to Back Pain (radaites to the right hip and leg); Hip Pain; and Leg Pain  The patients current symptoms include right-sided back pain that radiates down the lateral aspect of her right thigh  She denies bilateral leg weakness, or bowel or bladder issues  She describes her pain as dull aching, throbbing, pressure-like pain that is intermittent nature with symptoms worsening during the morning and nighttime hours  She reports that her pain is symptoms have improved since last office visit  Current pain medications includes:  Gabapentin 300 mg at bedtime, cyclobenzaprine 10 mg at bedtime     The patient reports that this regimen is providing minimal to moderate % pain relief    The patient is reporting no side effects from this pain medication regimen  I have personally reviewed and/or updated the patient's past medical history, past surgical history, family history, social history, current medications, allergies, and vital signs today  Review of Systems   Respiratory: Negative for shortness of breath  Cardiovascular: Negative for chest pain  Gastrointestinal: Negative for constipation, diarrhea, nausea and vomiting  Musculoskeletal: Positive for gait problem and joint swelling  Negative for arthralgias and myalgias  Decreased ROM  Joint stiffness   Skin: Negative for rash  Neurological: Negative for dizziness, seizures and weakness  All other systems reviewed and are negative  Patient Active Problem List   Diagnosis    IBS (irritable bowel syndrome)    GERD (gastroesophageal reflux disease)    Depression    Chronic back pain    Anxiety    Cervical radiculopathy    Hepatic steatosis    Pulmonary nodule seen on imaging study    Vitamin D deficiency    Dermatitis    S/P laparoscopic sleeve gastrectomy    Other fatigue    Encounter for annual routine gynecological examination    Overweight (BMI 25 0-29  9)    Lumbar radiculopathy    Intervertebral disc disorders with radiculopathy, lumbar region       Past Medical History:   Diagnosis Date    Anxiety     Bulging lumbar disc     Carpal tunnel syndrome     RIGHT  LAST ASSESSED: 12/7/16    Chronic back pain     low    Colon polyps     Depression     Diabetes mellitus ([address] )     on victoza    GERD (gastroesophageal reflux disease)     Gestational diabetes     Hearing loss     left ear    Hyperlipidemia     IBS (irritable bowel syndrome)     Ileus ([address] )     LAST ASSESSED: 8/3/17    Labial cyst     LAST ASSESSED: 4/21/16    Myofascial pain     LAST ASSESSED: 4/12/16    Obesity     Ovarian cyst     LEFT   LAST ASSESSED: 9/2/16    Pap smear for cervical cancer screening     4/2018--pap wnl, HRHPV neg    Seasonal allergies     Thoracic outlet syndrome     2010    Trochanteric bursitis of both hips     LAST ASSESSED: 3/18/16    Ulnar neuropathy at elbow     Varicella     Wears glasses        Past Surgical History:   Procedure Laterality Date    BILE DUCT EXPLORATION      ENDOSCOPIC REMOVAL OF STONES FROM BILIARY TRACT     SECTION      x3    CHOLECYSTECTOMY      COLONOSCOPY      DILATION AND CURETTAGE OF UTERUS      ENDOMETRIAL ABLATION      ERCP W/ SPHICTEROTOMY      FIRST RIB REMOVAL      FIRST RIB REMOVAL      THORAX EXCISION OF FIRST RIB    HYSTEROSCOPY      NEUROPLASTY / TRANSPOSITION ULNAR NERVE AT ELBOW      DC COLONOSCOPY FLX DX W/COLLJ SPEC WHEN PFRMD N/A 2017    Procedure: COLONOSCOPY;  Surgeon: Karon Gray MD;  [facility]; Service: Gastroenterology    DC ESOPHAGOGASTRODUODENOSCOPY TRANSORAL DIAGNOSTIC N/A 2017    Procedure: ESOPHAGOGASTRODUODENOSCOPY (EGD); Surgeon: Greyson Chicas MD;  [facility];   Service: Gastroenterology    DC HYSTEROSCOPY,W/ENDO BX N/A 10/20/2017    Procedure: DILATATION AND CURETTAGE (D&C) WITH HYSTEROSCOPY  REMOVAL VULVAR RT  LESION;  Surgeon: Swapnil Rodriguez MD;  [facility];  Service: Gynecology    DC LAP, ÁLVARO RESTRICT PROC, LONGITUDINAL GASTRECTOMY N/A 2018    Procedure: GASTRECTOMY SLEEVE LAPAROSCOPIC; INTRAOPERATIVE EGD ;  Surgeon: Galen Ramesh MD;  [facility];  Service: Bariatrics    TONSILLECTOMY AND ADENOIDECTOMY      TUBAL LIGATION      ULNAR NERVE REPAIR Right     VEIN LIGATION AND STRIPPING Right     VULVA SURGERY  10/20/2017    BIOPSY    WISDOM TOOTH EXTRACTION         Family History   Problem Relation Age of Onset    Diabetes Mother     Other Mother         HYPERCHOLESTEROLEMIA    Breast cancer Mother         >50    Diabetes Father     Other Father         HYPERCHOLESTEROLEMIA    Prostate cancer Father     Hypertension Brother     Diabetes Brother     Other Brother         HYPERCHOLESTEROLEMIA    Alcohol abuse Family     Asthma Family     Other Family         BACK PAIN    Cancer Family     Depression Family     Neuropathy Family     Heart disease Family     Colon cancer Maternal Grandfather     Ovarian cancer Neg Hx     Uterine cancer Neg Hx        Social History     Occupational History    Occupation: ER TECH     Employer: [employer]   Tobacco Use    Smoking status: Former Smoker     Last attempt to quit: 2017     Years since quittin 8    Smokeless tobacco: Never Used   Substance and Sexual Activity    Alcohol use: Yes     Comment: socially    Drug use: No    Sexual activity: Yes     Birth control/protection: OCP     Comment: lifetime partners: 6       Current Outpatient Medications on File Prior to Visit   Medication Sig    Biotin 71942 MCG TABS Take by mouth    Calcium Citrate-Vitamin D 500-500 MG-UNIT PACK Take by mouth    Cyanocobalamin (CVS VITAMIN B12 PO) Take by mouth    cyclobenzaprine (FLEXERIL) 10 mg tablet Take 1 tablet (10 mg total) by mouth daily at bedtime    drospirenone-ethinyl estradiol (LIEN) 3-0 02 MG per tablet Take 1 tablet by mouth daily    escitalopram (LEXAPRO) 20 mg tablet Take 1 tablet (20 mg total) by mouth daily    gabapentin (NEURONTIN) 300 mg capsule Take 1 capsule (300 mg total) by mouth daily at bedtime    LORazepam (ATIVAN) 0 5 mg tablet Take 0 5 tablet 2 hours prior to injection  May repeat after 1 hour    Multiple Vitamins-Minerals (MULTI COMPLETE PO) Take by mouth    pantoprazole (PROTONIX) 40 mg tablet Take 1 tablet (40 mg total) by mouth daily before breakfast Take 30 minutes before    Probiotic Product (PRO-BIOTIC BLEND PO) Take by mouth    QUEtiapine (SEROquel) 25 mg tablet Take 1 tablet (25 mg total) by mouth daily at bedtime     No current facility-administered medications on file prior to visit          No Known Allergies    Physical Exam:    /86 (BP Location: Right arm, Patient Position: Sitting, Cuff Size: Standard)   Pulse 61   Resp 16   Ht 5' 7" (1 702 m)   Wt 80 3 kg (177 lb)   BMI 27 72 kg/m²     Constitutional:normal, well developed, well nourished, alert, in no distress and non-toxic and no overt pain behavior  and overweight  Eyes:anicteric  HEENT:grossly intact  Neck:supple, symmetric, trachea midline and no masses   Pulmonary:even and unlabored  Cardiovascular:No edema or pitting edema present  Skin:Normal without rashes or lesions and well hydrated  Psychiatric:Mood and affect appropriate  Neurologic:Cranial Nerves II-XII grossly intact  Musculoskeletal:normal     Lumbar Spine Exam    Appearance:  Normal lordosis  Palpation/Tenderness:  right lumbar paraspinal tenderness  Sensory:  no sensory deficits noted  Range of Motion:  Flexion:  Minimally limited  with pain  Extension:  Minimally limited  with pain  Lateral Flexion - Left:  Minimally limited  without pain  Lateral Flexion - Right:  Minimally limited  with pain  Rotation - Left:  No limitation  without pain  Rotation - Right:  No limitation  without pain  Motor Strength:  Left hip flexion:  5/5  Right hip flexion:  5/5  Left knee extension:  5/5  Right knee extension:  5/5  Left foot dorsiflexion:  5/5  Left foot plantar flexion:  5/5  Right foot dorsiflexion:  5/5  Right foot plantar flexion:  5/5      Imaging    MRI LUMBAR SPINE WITHOUT CONTRAST     INDICATION: Chronic low back pain, left leg pain     COMPARISON:  Lumbar spine MRI 2/16/2016     TECHNIQUE:  Sagittal T1, sagittal T2, sagittal inversion recovery, axial T1 and axial T2, coronal T2        IMAGE QUALITY:  Diagnostic     FINDINGS:     VERTEBRAL BODIES:  There is mild straightening of normal lumbar lordosis  No spondylolisthesis  No compression fracture deformity  No abnormal bone marrow signal or suspicious discrete marrow lesion     SACRUM:  Normal signal within the sacrum   No evidence of insufficiency or stress fracture      DISTAL CORD AND CONUS:  Normal size and signal within the distal cord and conus        PARASPINAL SOFT TISSUES:  Visualized prevertebral and paraspinal soft tissue are unremarkable     LOWER THORACIC DISC SPACES:  Unremarkable     LUMBAR DISC SPACES:  Disc desiccation and disc height loss more pronounced at level L4-5 and L5-S1     L1-L2:  Mild bilateral facet arthropathy  No canal or foraminal stenosis     L2-L3:  Mild bilateral facet arthropathy  No canal or foraminal stenosis     L3-L4:  Minimal right asymmetric disc bulge  Mild bilateral facet arthropathy  No canal or foraminal stenosis     L4-L5:  Disc bulge with superimposed small left subarticular disc protrusion with small endplate osteophyte abutting infrasurface of exiting left L4 nerve root  Mild bilateral facet arthropathy  No spinal canal stenosis  Mild left foraminal narrowing      L5-S1:  Disc bulge with superimposed right subarticular disc protrusion abutting infrasurface of exiting right L5 nerve root  Mild bilateral facet arthropathy      IMPRESSION:     1  Degenerative changes without high-grade canal or foraminal stenosis  This is not significantly progressed from 2/16/2016      2  At the level L4-5 there is disc bulge with superimposed small left subarticular disc protrusion and a small endplate osteophyte abutting infrasurface of exiting left L4 nerve root without significant foraminal stenosis, not significantly changed from   2/16/2016  Correlate for clinical findings of left L4 radiculopathy      3  At the level of L5-S1 there is disc bulge with superimposed right subarticular disc protrusion abutting the infrasurface of exiting right L5 nerve root without significant foraminal stenosis, not significantly changed from 2/16/2016    Correlate for   clinical findings of right L5 radiculopathy

## 2019-03-18 ENCOUNTER — TELEPHONE (OUTPATIENT)
Dept: FAMILY MEDICINE CLINIC | Facility: CLINIC | Age: 49
End: 2019-03-18

## 2019-03-18 DIAGNOSIS — F32.A DEPRESSION, UNSPECIFIED DEPRESSION TYPE: ICD-10-CM

## 2019-03-18 RX ORDER — ESCITALOPRAM OXALATE 20 MG/1
10 TABLET ORAL DAILY
Qty: 90 TABLET | Refills: 1 | OUTPATIENT
Start: 2019-03-18 | End: 2019-03-27

## 2019-03-18 RX ORDER — VENLAFAXINE 25 MG/1
25 TABLET ORAL 2 TIMES DAILY
Qty: 60 TABLET | Refills: 0 | OUTPATIENT
Start: 2019-03-18 | End: 2019-04-04 | Stop reason: ALTCHOICE

## 2019-03-18 NOTE — TELEPHONE ENCOUNTER
She would be willing to try this, but is concerned that if she has to wean off lexapro , that she will get worse until the other med takes effect  How is she to wean off of it?

## 2019-03-18 NOTE — TELEPHONE ENCOUNTER
20 mg of the Lexapro tends to the max and I dont know if she'd really have much more benefit from it  Does she possibly want to wean off the lexapro and try something like cymbalta or effexor?

## 2019-03-18 NOTE — TELEPHONE ENCOUNTER
She is having increased anxiety  Not sleeping  Works in [employer] and it is very busy  On lexapro and seroquel  Also taking gabapentin , which was increased recently,  And on BC pills  Doesn't know if any of these other meds can cause her increased anxiety, but asking if she can increase her lexapro dose?

## 2019-03-18 NOTE — TELEPHONE ENCOUNTER
If she can cut it, I would start doing 1/2 tablet daily for the next 5 days   She can stop it on the 5th day and start effexor 25 mg bid to start

## 2019-03-19 ENCOUNTER — TELEPHONE (OUTPATIENT)
Dept: PAIN MEDICINE | Facility: CLINIC | Age: 49
End: 2019-03-19

## 2019-03-19 DIAGNOSIS — M79.18 MYOFASCIAL MUSCLE PAIN: ICD-10-CM

## 2019-03-19 RX ORDER — CYCLOBENZAPRINE HCL 10 MG
10 TABLET ORAL
Qty: 30 TABLET | Refills: 0 | Status: SHIPPED | OUTPATIENT
Start: 2019-03-19 | End: 2019-09-05 | Stop reason: SDUPTHER

## 2019-03-19 NOTE — TELEPHONE ENCOUNTER
I spoke to pt regarding her request for her refill of Flexeril  She expressed she is taking it as prescribed, it is helping with her pain  She denies SE and only has a few left, as she takes it PRN, not every night as written  Please send scropt into [address] [place] [address], thank you

## 2019-03-19 NOTE — TELEPHONE ENCOUNTER
Patient  [phone]  Loribelem Gates    Patient is calling in to remind you that she was requesting a refill of Flexeril  Please send script to the [facility] @ [facility] thank you

## 2019-03-26 ENCOUNTER — TELEPHONE (OUTPATIENT)
Dept: FAMILY MEDICINE CLINIC | Facility: CLINIC | Age: 49
End: 2019-03-26

## 2019-03-27 ENCOUNTER — OFFICE VISIT (OUTPATIENT)
Dept: FAMILY MEDICINE CLINIC | Facility: CLINIC | Age: 49
End: 2019-03-27
Payer: COMMERCIAL

## 2019-03-27 VITALS
BODY MASS INDEX: 27.47 KG/M2 | TEMPERATURE: 96.7 F | HEART RATE: 65 BPM | HEIGHT: 67 IN | OXYGEN SATURATION: 98 % | WEIGHT: 175 LBS | DIASTOLIC BLOOD PRESSURE: 80 MMHG | SYSTOLIC BLOOD PRESSURE: 110 MMHG

## 2019-03-27 DIAGNOSIS — J01.10 ACUTE FRONTAL SINUSITIS, RECURRENCE NOT SPECIFIED: ICD-10-CM

## 2019-03-27 DIAGNOSIS — H65.93 BILATERAL SEROUS OTITIS MEDIA, UNSPECIFIED CHRONICITY: Primary | ICD-10-CM

## 2019-03-27 PROCEDURE — 1036F TOBACCO NON-USER: CPT | Performed by: FAMILY MEDICINE

## 2019-03-27 PROCEDURE — 99213 OFFICE O/P EST LOW 20 MIN: CPT | Performed by: FAMILY MEDICINE

## 2019-03-27 RX ORDER — AMOXICILLIN 875 MG/1
875 TABLET, COATED ORAL 2 TIMES DAILY
Qty: 20 TABLET | Refills: 0 | Status: SHIPPED | OUTPATIENT
Start: 2019-03-27 | End: 2019-04-06

## 2019-03-27 NOTE — PROGRESS NOTES
Assessment/Plan:    She has serous otitis and probable early sinusitis  Will have her continue with the Mucinex, Flonase and increased hydration with water  I have given her prescription for amoxicillin 875 mg to take b i d  For a 10 day course  She should contact us if symptoms are not resolving  Diagnoses and all orders for this visit:    Bilateral serous otitis media, unspecified chronicity  -     amoxicillin (AMOXIL) 875 mg tablet; Take 1 tablet (875 mg total) by mouth 2 (two) times a day for 10 days    Acute frontal sinusitis, recurrence not specified  -     amoxicillin (AMOXIL) 875 mg tablet; Take 1 tablet (875 mg total) by mouth 2 (two) times a day for 10 days          Subjective:      Patient ID: Damian Herbert is a 51 y o  female  She has had ear pressure for about 3 weeks  It started on the right side and then spread to the left side  The last few days she has had increasing sinus pressure he and achiness in the back of the neck  She has had chills but no fever  She feels nasal congestion but not getting a lot of mucus out when she blows her nose  She has been using Flonase nasal spray and Mucinex without relief  She denies cough  No one else is sick at home but 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[place] [address]  The following portions of the patient's history were reviewed and updated as appropriate: allergies, current medications, past family history, past medical history, past social history, past surgical history and problem list     Review of Systems   Constitutional: Positive for chills and fever  HENT: Positive for congestion, ear pain and hearing loss  Respiratory: Positive for cough  Gastrointestinal: Negative for diarrhea and nausea  Neurological: Positive for dizziness           Objective:      /80   Pulse 65   Temp (!) 96 7 °F (35 9 °C) (Tympanic)   Ht 5' 7" (1 702 m)   Wt 79 4 kg (175 lb)   SpO2 98%   BMI 27 41 kg/m²          Physical Exam Constitutional: She is oriented to person, place, and time  She appears well-developed and well-nourished  HENT:   Head: Normocephalic and atraumatic  Right Ear: External ear normal    Left Ear: External ear normal    Mouth/Throat: Oropharynx is clear and moist    TMs are both slightly dull but normal color  Nose with mucosal edema  Tender frontal sinuses   Neck: Normal range of motion  Neck supple  Cardiovascular: Normal rate, regular rhythm and normal heart sounds  Pulmonary/Chest: Effort normal and breath sounds normal    Musculoskeletal: Normal range of motion  Lymphadenopathy:     She has no cervical adenopathy  Neurological: She is alert and oriented to person, place, and time  Skin: Skin is warm and dry  Nursing note and vitals reviewed

## 2019-04-03 ENCOUNTER — TELEPHONE (OUTPATIENT)
Dept: FAMILY MEDICINE CLINIC | Facility: CLINIC | Age: 49
End: 2019-04-03

## 2019-04-04 ENCOUNTER — OFFICE VISIT (OUTPATIENT)
Dept: FAMILY MEDICINE CLINIC | Facility: CLINIC | Age: 49
End: 2019-04-04
Payer: COMMERCIAL

## 2019-04-04 ENCOUNTER — TELEPHONE (OUTPATIENT)
Dept: PSYCHIATRY | Facility: CLINIC | Age: 49
End: 2019-04-04

## 2019-04-04 VITALS
SYSTOLIC BLOOD PRESSURE: 112 MMHG | TEMPERATURE: 98.1 F | WEIGHT: 178.4 LBS | HEIGHT: 67 IN | DIASTOLIC BLOOD PRESSURE: 76 MMHG | HEART RATE: 70 BPM | BODY MASS INDEX: 28 KG/M2 | RESPIRATION RATE: 18 BRPM | OXYGEN SATURATION: 96 %

## 2019-04-04 DIAGNOSIS — F41.9 ANXIETY: ICD-10-CM

## 2019-04-04 DIAGNOSIS — F32.A DEPRESSION, UNSPECIFIED DEPRESSION TYPE: ICD-10-CM

## 2019-04-04 PROCEDURE — 3008F BODY MASS INDEX DOCD: CPT | Performed by: INTERNAL MEDICINE

## 2019-04-04 PROCEDURE — 99213 OFFICE O/P EST LOW 20 MIN: CPT | Performed by: INTERNAL MEDICINE

## 2019-04-04 RX ORDER — VENLAFAXINE HYDROCHLORIDE 75 MG/1
75 CAPSULE, EXTENDED RELEASE ORAL DAILY
Qty: 30 CAPSULE | Refills: 1 | Status: SHIPPED | OUTPATIENT
Start: 2019-04-04 | End: 2019-05-09 | Stop reason: SDUPTHER

## 2019-04-04 RX ORDER — LORAZEPAM 0.5 MG/1
TABLET ORAL
Qty: 5 TABLET | Refills: 0 | Status: SHIPPED | OUTPATIENT
Start: 2019-04-04 | End: 2019-07-12 | Stop reason: SDUPTHER

## 2019-04-05 ENCOUNTER — OFFICE VISIT (OUTPATIENT)
Dept: BEHAVIORAL/MENTAL HEALTH CLINIC | Facility: CLINIC | Age: 49
End: 2019-04-05
Payer: COMMERCIAL

## 2019-04-05 DIAGNOSIS — F32.A DEPRESSION, UNSPECIFIED DEPRESSION TYPE: ICD-10-CM

## 2019-04-05 DIAGNOSIS — F41.9 ANXIETY: ICD-10-CM

## 2019-04-05 DIAGNOSIS — F43.10 POST TRAUMATIC STRESS DISORDER (PTSD): Primary | ICD-10-CM

## 2019-04-05 PROCEDURE — 90791 PSYCH DIAGNOSTIC EVALUATION: CPT | Performed by: SOCIAL WORKER

## 2019-04-05 PROCEDURE — 96127 BRIEF EMOTIONAL/BEHAV ASSMT: CPT | Performed by: SOCIAL WORKER

## 2019-04-08 PROBLEM — F43.10 POST TRAUMATIC STRESS DISORDER (PTSD): Status: ACTIVE | Noted: 2019-04-08

## 2019-04-16 ENCOUNTER — OFFICE VISIT (OUTPATIENT)
Dept: BEHAVIORAL/MENTAL HEALTH CLINIC | Facility: CLINIC | Age: 49
End: 2019-04-16
Payer: COMMERCIAL

## 2019-04-16 DIAGNOSIS — F41.9 ANXIETY: ICD-10-CM

## 2019-04-16 DIAGNOSIS — F43.10 POST TRAUMATIC STRESS DISORDER (PTSD): Primary | ICD-10-CM

## 2019-04-16 DIAGNOSIS — F33.1 MODERATE EPISODE OF RECURRENT MAJOR DEPRESSIVE DISORDER (HCC): ICD-10-CM

## 2019-04-16 PROCEDURE — 90834 PSYTX W PT 45 MINUTES: CPT | Performed by: SOCIAL WORKER

## 2019-04-19 ENCOUNTER — TELEPHONE (OUTPATIENT)
Dept: PSYCHIATRY | Facility: CLINIC | Age: 49
End: 2019-04-19

## 2019-04-26 ENCOUNTER — OFFICE VISIT (OUTPATIENT)
Dept: BEHAVIORAL/MENTAL HEALTH CLINIC | Facility: CLINIC | Age: 49
End: 2019-04-26
Payer: COMMERCIAL

## 2019-04-26 DIAGNOSIS — F41.9 ANXIETY: Primary | ICD-10-CM

## 2019-04-26 DIAGNOSIS — F33.1 MODERATE EPISODE OF RECURRENT MAJOR DEPRESSIVE DISORDER (HCC): ICD-10-CM

## 2019-04-26 PROCEDURE — 90834 PSYTX W PT 45 MINUTES: CPT | Performed by: SOCIAL WORKER

## 2019-05-02 ENCOUNTER — OFFICE VISIT (OUTPATIENT)
Dept: BEHAVIORAL/MENTAL HEALTH CLINIC | Facility: CLINIC | Age: 49
End: 2019-05-02
Payer: COMMERCIAL

## 2019-05-02 DIAGNOSIS — F41.9 ANXIETY: Primary | ICD-10-CM

## 2019-05-02 DIAGNOSIS — F43.10 POST TRAUMATIC STRESS DISORDER (PTSD): ICD-10-CM

## 2019-05-02 DIAGNOSIS — F33.1 MODERATE EPISODE OF RECURRENT MAJOR DEPRESSIVE DISORDER (HCC): ICD-10-CM

## 2019-05-02 PROCEDURE — 90834 PSYTX W PT 45 MINUTES: CPT | Performed by: SOCIAL WORKER

## 2019-05-03 ENCOUNTER — OFFICE VISIT (OUTPATIENT)
Dept: OBGYN CLINIC | Facility: CLINIC | Age: 49
End: 2019-05-03
Payer: COMMERCIAL

## 2019-05-03 VITALS — SYSTOLIC BLOOD PRESSURE: 114 MMHG | DIASTOLIC BLOOD PRESSURE: 72 MMHG | BODY MASS INDEX: 27.64 KG/M2 | WEIGHT: 176.5 LBS

## 2019-05-03 DIAGNOSIS — Z30.41 ORAL CONTRACEPTIVE PILL SURVEILLANCE: Primary | ICD-10-CM

## 2019-05-03 PROCEDURE — 99214 OFFICE O/P EST MOD 30 MIN: CPT | Performed by: OBSTETRICS & GYNECOLOGY

## 2019-05-03 RX ORDER — DROSPIRENONE AND ETHINYL ESTRADIOL 0.02-3(28)
1 KIT ORAL DAILY
Qty: 84 TABLET | Refills: 2 | Status: SHIPPED | OUTPATIENT
Start: 2019-05-03 | End: 2019-09-25 | Stop reason: SDUPTHER

## 2019-05-09 ENCOUNTER — OFFICE VISIT (OUTPATIENT)
Dept: PSYCHIATRY | Facility: CLINIC | Age: 49
End: 2019-05-09
Payer: COMMERCIAL

## 2019-05-09 ENCOUNTER — OFFICE VISIT (OUTPATIENT)
Dept: FAMILY MEDICINE CLINIC | Facility: CLINIC | Age: 49
End: 2019-05-09
Payer: COMMERCIAL

## 2019-05-09 VITALS
WEIGHT: 175 LBS | HEART RATE: 64 BPM | BODY MASS INDEX: 27.41 KG/M2 | SYSTOLIC BLOOD PRESSURE: 111 MMHG | DIASTOLIC BLOOD PRESSURE: 74 MMHG

## 2019-05-09 VITALS
BODY MASS INDEX: 28.25 KG/M2 | TEMPERATURE: 98.7 F | WEIGHT: 180 LBS | DIASTOLIC BLOOD PRESSURE: 74 MMHG | SYSTOLIC BLOOD PRESSURE: 116 MMHG | OXYGEN SATURATION: 97 % | HEIGHT: 67 IN | RESPIRATION RATE: 20 BRPM | HEART RATE: 68 BPM

## 2019-05-09 DIAGNOSIS — F32.A DEPRESSION, UNSPECIFIED DEPRESSION TYPE: ICD-10-CM

## 2019-05-09 DIAGNOSIS — F33.1 MODERATE EPISODE OF RECURRENT MAJOR DEPRESSIVE DISORDER (HCC): Primary | ICD-10-CM

## 2019-05-09 DIAGNOSIS — F90.2 ADHD (ATTENTION DEFICIT HYPERACTIVITY DISORDER), COMBINED TYPE: ICD-10-CM

## 2019-05-09 DIAGNOSIS — J32.0 MAXILLARY SINUSITIS, UNSPECIFIED CHRONICITY: ICD-10-CM

## 2019-05-09 DIAGNOSIS — F33.2 SEVERE EPISODE OF RECURRENT MAJOR DEPRESSIVE DISORDER, WITHOUT PSYCHOTIC FEATURES (HCC): Primary | ICD-10-CM

## 2019-05-09 DIAGNOSIS — F41.9 ANXIETY: ICD-10-CM

## 2019-05-09 DIAGNOSIS — J30.2 SEASONAL ALLERGIES: ICD-10-CM

## 2019-05-09 PROCEDURE — 90834 PSYTX W PT 45 MINUTES: CPT | Performed by: NURSE PRACTITIONER

## 2019-05-09 PROCEDURE — 99213 OFFICE O/P EST LOW 20 MIN: CPT | Performed by: INTERNAL MEDICINE

## 2019-05-09 RX ORDER — QUETIAPINE FUMARATE 50 MG/1
50 TABLET, FILM COATED ORAL
Qty: 30 TABLET | Refills: 2 | Status: SHIPPED | OUTPATIENT
Start: 2019-05-09 | End: 2019-06-21 | Stop reason: SDUPTHER

## 2019-05-09 RX ORDER — AMOXICILLIN 875 MG/1
875 TABLET, COATED ORAL 2 TIMES DAILY
Qty: 20 TABLET | Refills: 0 | Status: SHIPPED | OUTPATIENT
Start: 2019-05-09 | End: 2019-05-19

## 2019-05-09 RX ORDER — MONTELUKAST SODIUM 10 MG/1
10 TABLET ORAL
Qty: 30 TABLET | Refills: 1 | Status: SHIPPED | OUTPATIENT
Start: 2019-05-09 | End: 2019-06-13 | Stop reason: SDUPTHER

## 2019-05-09 RX ORDER — VENLAFAXINE HYDROCHLORIDE 75 MG/1
75 CAPSULE, EXTENDED RELEASE ORAL DAILY
Qty: 30 CAPSULE | Refills: 1 | Status: SHIPPED | OUTPATIENT
Start: 2019-05-09 | End: 2019-06-21

## 2019-05-09 RX ORDER — ATOMOXETINE 10 MG/1
10 CAPSULE ORAL DAILY
Qty: 30 CAPSULE | Refills: 2 | Status: SHIPPED | OUTPATIENT
Start: 2019-05-09 | End: 2019-06-21

## 2019-05-10 ENCOUNTER — OFFICE VISIT (OUTPATIENT)
Dept: BEHAVIORAL/MENTAL HEALTH CLINIC | Facility: CLINIC | Age: 49
End: 2019-05-10
Payer: COMMERCIAL

## 2019-05-10 DIAGNOSIS — F33.2 MAJOR DEPRESSIVE DISORDER, RECURRENT SEVERE WITHOUT PSYCHOTIC FEATURES (HCC): Primary | ICD-10-CM

## 2019-05-10 DIAGNOSIS — F43.10 POST TRAUMATIC STRESS DISORDER (PTSD): ICD-10-CM

## 2019-05-10 DIAGNOSIS — F41.1 GENERALIZED ANXIETY DISORDER: ICD-10-CM

## 2019-05-10 PROCEDURE — 90834 PSYTX W PT 45 MINUTES: CPT | Performed by: SOCIAL WORKER

## 2019-05-17 ENCOUNTER — OFFICE VISIT (OUTPATIENT)
Dept: BEHAVIORAL/MENTAL HEALTH CLINIC | Facility: CLINIC | Age: 49
End: 2019-05-17
Payer: COMMERCIAL

## 2019-05-17 DIAGNOSIS — F43.10 POST TRAUMATIC STRESS DISORDER (PTSD): ICD-10-CM

## 2019-05-17 DIAGNOSIS — F41.1 GENERALIZED ANXIETY DISORDER: ICD-10-CM

## 2019-05-17 DIAGNOSIS — F33.2 MAJOR DEPRESSIVE DISORDER, RECURRENT SEVERE WITHOUT PSYCHOTIC FEATURES (HCC): Primary | ICD-10-CM

## 2019-05-17 PROCEDURE — 90834 PSYTX W PT 45 MINUTES: CPT | Performed by: SOCIAL WORKER

## 2019-05-27 ENCOUNTER — APPOINTMENT (EMERGENCY)
Dept: RADIOLOGY | Facility: HOSPITAL | Age: 49
End: 2019-05-27
Payer: COMMERCIAL

## 2019-05-27 ENCOUNTER — HOSPITAL ENCOUNTER (OUTPATIENT)
Facility: HOSPITAL | Age: 49
Setting detail: OBSERVATION
Discharge: HOME | End: 2019-05-28
Attending: EMERGENCY MEDICINE | Admitting: HOSPITALIST
Payer: COMMERCIAL

## 2019-05-27 DIAGNOSIS — R07.9 CHEST PAIN: Primary | ICD-10-CM

## 2019-05-27 DIAGNOSIS — R07.9 CHEST PAIN, RULE OUT ACUTE MYOCARDIAL INFARCTION: ICD-10-CM

## 2019-05-27 LAB
ALBUMIN SERPL BCP-MCNC: 3.2 G/DL (ref 3.5–5)
ALP SERPL-CCNC: 47 U/L (ref 46–116)
ALT SERPL W P-5'-P-CCNC: 24 U/L (ref 12–78)
ANION GAP SERPL CALCULATED.3IONS-SCNC: 12 MMOL/L (ref 4–13)
AST SERPL W P-5'-P-CCNC: 19 U/L (ref 5–45)
ATRIAL RATE: 66 BPM
ATRIAL RATE: 67 BPM
BASOPHILS # BLD AUTO: 0.04 THOUSANDS/ΜL (ref 0–0.1)
BASOPHILS NFR BLD AUTO: 1 % (ref 0–1)
BILIRUB SERPL-MCNC: 0.1 MG/DL (ref 0.2–1)
BUN SERPL-MCNC: 15 MG/DL (ref 5–25)
CALCIUM SERPL-MCNC: 8.3 MG/DL (ref 8.3–10.1)
CHLORIDE SERPL-SCNC: 104 MMOL/L (ref 100–108)
CO2 SERPL-SCNC: 24 MMOL/L (ref 21–32)
CREAT SERPL-MCNC: 0.75 MG/DL (ref 0.6–1.3)
EOSINOPHIL # BLD AUTO: 0.19 THOUSAND/ΜL (ref 0–0.61)
EOSINOPHIL NFR BLD AUTO: 3 % (ref 0–6)
ERYTHROCYTE [DISTWIDTH] IN BLOOD BY AUTOMATED COUNT: 12.2 % (ref 11.6–15.1)
GFR SERPL CREATININE-BSD FRML MDRD: 93 ML/MIN/1.73SQ M
GLUCOSE SERPL-MCNC: 127 MG/DL (ref 65–140)
HCT VFR BLD AUTO: 37.8 % (ref 34.8–46.1)
HGB BLD-MCNC: 12.7 G/DL (ref 11.5–15.4)
IMM GRANULOCYTES # BLD AUTO: 0.03 THOUSAND/UL (ref 0–0.2)
IMM GRANULOCYTES NFR BLD AUTO: 0 % (ref 0–2)
LYMPHOCYTES # BLD AUTO: 2.01 THOUSANDS/ΜL (ref 0.6–4.47)
LYMPHOCYTES NFR BLD AUTO: 27 % (ref 14–44)
MCH RBC QN AUTO: 31 PG (ref 26.8–34.3)
MCHC RBC AUTO-ENTMCNC: 33.6 G/DL (ref 31.4–37.4)
MCV RBC AUTO: 92 FL (ref 82–98)
MONOCYTES # BLD AUTO: 0.44 THOUSAND/ΜL (ref 0.17–1.22)
MONOCYTES NFR BLD AUTO: 6 % (ref 4–12)
NEUTROPHILS # BLD AUTO: 4.87 THOUSANDS/ΜL (ref 1.85–7.62)
NEUTS SEG NFR BLD AUTO: 63 % (ref 43–75)
NRBC BLD AUTO-RTO: 0 /100 WBCS
P AXIS: 57 DEGREES
P AXIS: 87 DEGREES
PLATELET # BLD AUTO: 236 THOUSANDS/UL (ref 149–390)
PMV BLD AUTO: 10 FL (ref 8.9–12.7)
POTASSIUM SERPL-SCNC: 3.9 MMOL/L (ref 3.5–5.3)
PR INTERVAL: 158 MS
PR INTERVAL: 176 MS
PROT SERPL-MCNC: 7.2 G/DL (ref 6.4–8.2)
QRS AXIS: 118 DEGREES
QRS AXIS: 118 DEGREES
QRSD INTERVAL: 72 MS
QRSD INTERVAL: 76 MS
QT INTERVAL: 420 MS
QT INTERVAL: 426 MS
QTC INTERVAL: 443 MS
QTC INTERVAL: 446 MS
RBC # BLD AUTO: 4.1 MILLION/UL (ref 3.81–5.12)
SODIUM SERPL-SCNC: 140 MMOL/L (ref 136–145)
T WAVE AXIS: 37 DEGREES
T WAVE AXIS: 41 DEGREES
TROPONIN I SERPL-MCNC: <0.02 NG/ML
TSH SERPL DL<=0.05 MIU/L-ACNC: 2.75 UIU/ML (ref 0.36–3.74)
VENTRICULAR RATE: 66 BPM
VENTRICULAR RATE: 67 BPM
WBC # BLD AUTO: 7.58 THOUSAND/UL (ref 4.31–10.16)

## 2019-05-27 PROCEDURE — 93005 ELECTROCARDIOGRAM TRACING: CPT

## 2019-05-27 PROCEDURE — 99244 OFF/OP CNSLTJ NEW/EST MOD 40: CPT | Performed by: INTERNAL MEDICINE

## 2019-05-27 PROCEDURE — 93010 ELECTROCARDIOGRAM REPORT: CPT | Performed by: INTERNAL MEDICINE

## 2019-05-27 PROCEDURE — 80053 COMPREHEN METABOLIC PANEL: CPT | Performed by: EMERGENCY MEDICINE

## 2019-05-27 PROCEDURE — 36415 COLL VENOUS BLD VENIPUNCTURE: CPT | Performed by: EMERGENCY MEDICINE

## 2019-05-27 PROCEDURE — 84484 ASSAY OF TROPONIN QUANT: CPT | Performed by: HOSPITALIST

## 2019-05-27 PROCEDURE — 84484 ASSAY OF TROPONIN QUANT: CPT | Performed by: EMERGENCY MEDICINE

## 2019-05-27 PROCEDURE — 99284 EMERGENCY DEPT VISIT MOD MDM: CPT | Performed by: EMERGENCY MEDICINE

## 2019-05-27 PROCEDURE — 84443 ASSAY THYROID STIM HORMONE: CPT | Performed by: NURSE PRACTITIONER

## 2019-05-27 PROCEDURE — 99219 PR INITIAL OBSERVATION CARE/DAY 50 MINUTES: CPT | Performed by: HOSPITALIST

## 2019-05-27 PROCEDURE — 99285 EMERGENCY DEPT VISIT HI MDM: CPT

## 2019-05-27 PROCEDURE — 71046 X-RAY EXAM CHEST 2 VIEWS: CPT

## 2019-05-27 PROCEDURE — 85025 COMPLETE CBC W/AUTO DIFF WBC: CPT | Performed by: EMERGENCY MEDICINE

## 2019-05-27 RX ORDER — QUETIAPINE FUMARATE 25 MG/1
50 TABLET, FILM COATED ORAL
Status: DISCONTINUED | OUTPATIENT
Start: 2019-05-27 | End: 2019-05-28 | Stop reason: HOSPADM

## 2019-05-27 RX ORDER — ASPIRIN 81 MG/1
81 TABLET, CHEWABLE ORAL DAILY
Status: DISCONTINUED | OUTPATIENT
Start: 2019-05-28 | End: 2019-05-28 | Stop reason: HOSPADM

## 2019-05-27 RX ORDER — MONTELUKAST SODIUM 10 MG/1
10 TABLET ORAL
Status: DISCONTINUED | OUTPATIENT
Start: 2019-05-27 | End: 2019-05-28 | Stop reason: HOSPADM

## 2019-05-27 RX ORDER — NITROGLYCERIN 0.4 MG/1
0.4 TABLET SUBLINGUAL
Status: DISCONTINUED | OUTPATIENT
Start: 2019-05-27 | End: 2019-05-28 | Stop reason: HOSPADM

## 2019-05-27 RX ORDER — VENLAFAXINE HYDROCHLORIDE 37.5 MG/1
75 CAPSULE, EXTENDED RELEASE ORAL DAILY
Status: DISCONTINUED | OUTPATIENT
Start: 2019-05-27 | End: 2019-05-28 | Stop reason: HOSPADM

## 2019-05-27 RX ORDER — GABAPENTIN 300 MG/1
300 CAPSULE ORAL
Status: DISCONTINUED | OUTPATIENT
Start: 2019-05-27 | End: 2019-05-28 | Stop reason: HOSPADM

## 2019-05-27 RX ORDER — ONDANSETRON 2 MG/ML
4 INJECTION INTRAMUSCULAR; INTRAVENOUS EVERY 6 HOURS PRN
Status: DISCONTINUED | OUTPATIENT
Start: 2019-05-27 | End: 2019-05-28 | Stop reason: HOSPADM

## 2019-05-27 RX ORDER — ASPIRIN 81 MG/1
324 TABLET, CHEWABLE ORAL ONCE
Status: COMPLETED | OUTPATIENT
Start: 2019-05-27 | End: 2019-05-27

## 2019-05-27 RX ORDER — PANTOPRAZOLE SODIUM 40 MG/1
40 TABLET, DELAYED RELEASE ORAL
Status: DISCONTINUED | OUTPATIENT
Start: 2019-05-27 | End: 2019-05-28 | Stop reason: HOSPADM

## 2019-05-27 RX ORDER — MORPHINE SULFATE 4 MG/ML
4 INJECTION, SOLUTION INTRAMUSCULAR; INTRAVENOUS EVERY 4 HOURS PRN
Status: DISCONTINUED | OUTPATIENT
Start: 2019-05-27 | End: 2019-05-27

## 2019-05-27 RX ORDER — ACETAMINOPHEN 325 MG/1
650 TABLET ORAL EVERY 4 HOURS PRN
Status: DISCONTINUED | OUTPATIENT
Start: 2019-05-27 | End: 2019-05-28 | Stop reason: HOSPADM

## 2019-05-27 RX ADMIN — GABAPENTIN 300 MG: 300 CAPSULE ORAL at 21:18

## 2019-05-27 RX ADMIN — MONTELUKAST SODIUM 10 MG: 10 TABLET, FILM COATED ORAL at 21:18

## 2019-05-27 RX ADMIN — ASPIRIN 81 MG 324 MG: 81 TABLET ORAL at 07:15

## 2019-05-27 RX ADMIN — QUETIAPINE FUMARATE 50 MG: 25 TABLET ORAL at 21:18

## 2019-05-27 RX ADMIN — SODIUM CHLORIDE 500 ML: 0.9 INJECTION, SOLUTION INTRAVENOUS at 06:38

## 2019-05-28 ENCOUNTER — APPOINTMENT (OUTPATIENT)
Dept: NUCLEAR MEDICINE | Facility: HOSPITAL | Age: 49
End: 2019-05-28
Payer: COMMERCIAL

## 2019-05-28 ENCOUNTER — APPOINTMENT (OUTPATIENT)
Dept: NON INVASIVE DIAGNOSTICS | Facility: HOSPITAL | Age: 49
End: 2019-05-28
Payer: COMMERCIAL

## 2019-05-28 VITALS
BODY MASS INDEX: 27.97 KG/M2 | OXYGEN SATURATION: 96 % | DIASTOLIC BLOOD PRESSURE: 59 MMHG | RESPIRATION RATE: 19 BRPM | WEIGHT: 178.57 LBS | TEMPERATURE: 98.2 F | SYSTOLIC BLOOD PRESSURE: 108 MMHG | HEART RATE: 64 BPM

## 2019-05-28 LAB
ALBUMIN SERPL BCP-MCNC: 3 G/DL (ref 3.5–5)
ALP SERPL-CCNC: 47 U/L (ref 46–116)
ALT SERPL W P-5'-P-CCNC: 22 U/L (ref 12–78)
ANION GAP SERPL CALCULATED.3IONS-SCNC: 9 MMOL/L (ref 4–13)
AST SERPL W P-5'-P-CCNC: 15 U/L (ref 5–45)
BILIRUB SERPL-MCNC: 0.2 MG/DL (ref 0.2–1)
BUN SERPL-MCNC: 11 MG/DL (ref 5–25)
CALCIUM SERPL-MCNC: 8.3 MG/DL (ref 8.3–10.1)
CHEST PAIN STATEMENT: NORMAL
CHLORIDE SERPL-SCNC: 106 MMOL/L (ref 100–108)
CHOLEST SERPL-MCNC: 208 MG/DL (ref 50–200)
CO2 SERPL-SCNC: 27 MMOL/L (ref 21–32)
CREAT SERPL-MCNC: 0.72 MG/DL (ref 0.6–1.3)
ERYTHROCYTE [DISTWIDTH] IN BLOOD BY AUTOMATED COUNT: 12.2 % (ref 11.6–15.1)
EST. AVERAGE GLUCOSE BLD GHB EST-MCNC: 134 MG/DL
GFR SERPL CREATININE-BSD FRML MDRD: 98 ML/MIN/1.73SQ M
GLUCOSE P FAST SERPL-MCNC: 97 MG/DL (ref 65–99)
GLUCOSE SERPL-MCNC: 97 MG/DL (ref 65–140)
HBA1C MFR BLD: 6.3 % (ref 4.2–6.3)
HCT VFR BLD AUTO: 37.2 % (ref 34.8–46.1)
HDLC SERPL-MCNC: 54 MG/DL (ref 40–60)
HGB BLD-MCNC: 12.3 G/DL (ref 11.5–15.4)
LDLC SERPL CALC-MCNC: 102 MG/DL (ref 0–100)
MAGNESIUM SERPL-MCNC: 1.9 MG/DL (ref 1.6–2.6)
MAX DIASTOLIC BP: 78 MMHG
MAX HEART RATE: 179 BPM
MAX PREDICTED HEART RATE: 170 BPM
MAX. SYSTOLIC BP: 132 MMHG
MCH RBC QN AUTO: 30.4 PG (ref 26.8–34.3)
MCHC RBC AUTO-ENTMCNC: 33.1 G/DL (ref 31.4–37.4)
MCV RBC AUTO: 92 FL (ref 82–98)
PLATELET # BLD AUTO: 227 THOUSANDS/UL (ref 149–390)
PMV BLD AUTO: 9.8 FL (ref 8.9–12.7)
POTASSIUM SERPL-SCNC: 3.8 MMOL/L (ref 3.5–5.3)
PROT SERPL-MCNC: 6.9 G/DL (ref 6.4–8.2)
PROTOCOL NAME: NORMAL
RBC # BLD AUTO: 4.04 MILLION/UL (ref 3.81–5.12)
REASON FOR TERMINATION: NORMAL
SODIUM SERPL-SCNC: 142 MMOL/L (ref 136–145)
TARGET HR FORMULA: NORMAL
TEST INDICATION: NORMAL
TIME IN EXERCISE PHASE: NORMAL
TRIGL SERPL-MCNC: 260 MG/DL
WBC # BLD AUTO: 7.82 THOUSAND/UL (ref 4.31–10.16)

## 2019-05-28 PROCEDURE — 80053 COMPREHEN METABOLIC PANEL: CPT | Performed by: HOSPITALIST

## 2019-05-28 PROCEDURE — 83735 ASSAY OF MAGNESIUM: CPT | Performed by: HOSPITALIST

## 2019-05-28 PROCEDURE — 93017 CV STRESS TEST TRACING ONLY: CPT

## 2019-05-28 PROCEDURE — 99217 PR OBSERVATION CARE DISCHARGE MANAGEMENT: CPT | Performed by: HOSPITALIST

## 2019-05-28 PROCEDURE — 99213 OFFICE O/P EST LOW 20 MIN: CPT | Performed by: INTERNAL MEDICINE

## 2019-05-28 PROCEDURE — 99225 PR SBSQ OBSERVATION CARE/DAY 25 MINUTES: CPT | Performed by: HOSPITALIST

## 2019-05-28 PROCEDURE — 93016 CV STRESS TEST SUPVJ ONLY: CPT | Performed by: INTERNAL MEDICINE

## 2019-05-28 PROCEDURE — 80061 LIPID PANEL: CPT | Performed by: NURSE PRACTITIONER

## 2019-05-28 PROCEDURE — 78452 HT MUSCLE IMAGE SPECT MULT: CPT | Performed by: INTERNAL MEDICINE

## 2019-05-28 PROCEDURE — 83036 HEMOGLOBIN GLYCOSYLATED A1C: CPT | Performed by: HOSPITALIST

## 2019-05-28 PROCEDURE — 78452 HT MUSCLE IMAGE SPECT MULT: CPT

## 2019-05-28 PROCEDURE — A9502 TC99M TETROFOSMIN: HCPCS

## 2019-05-28 PROCEDURE — 93018 CV STRESS TEST I&R ONLY: CPT | Performed by: INTERNAL MEDICINE

## 2019-05-28 PROCEDURE — 85027 COMPLETE CBC AUTOMATED: CPT | Performed by: HOSPITALIST

## 2019-05-28 RX ORDER — CYCLOBENZAPRINE HCL 10 MG
10 TABLET ORAL 3 TIMES DAILY PRN
Status: DISCONTINUED | OUTPATIENT
Start: 2019-05-28 | End: 2019-05-28 | Stop reason: HOSPADM

## 2019-05-28 RX ORDER — ASPIRIN 81 MG/1
81 TABLET, CHEWABLE ORAL DAILY
Qty: 30 TABLET | Refills: 1 | Status: SHIPPED | OUTPATIENT
Start: 2019-05-28 | End: 2019-11-15

## 2019-05-28 RX ADMIN — VENLAFAXINE HYDROCHLORIDE 75 MG: 37.5 CAPSULE, EXTENDED RELEASE ORAL at 07:57

## 2019-05-28 RX ADMIN — ASPIRIN 81 MG 81 MG: 81 TABLET ORAL at 07:56

## 2019-05-28 RX ADMIN — CYCLOBENZAPRINE HYDROCHLORIDE 10 MG: 10 TABLET, FILM COATED ORAL at 11:29

## 2019-05-28 RX ADMIN — PANTOPRAZOLE SODIUM 40 MG: 40 TABLET, DELAYED RELEASE ORAL at 05:03

## 2019-05-30 LAB
ATRIAL RATE: 64 BPM
ATRIAL RATE: 69 BPM
ATRIAL RATE: 70 BPM
P AXIS: 61 DEGREES
P AXIS: 62 DEGREES
P AXIS: 66 DEGREES
PR INTERVAL: 170 MS
PR INTERVAL: 186 MS
PR INTERVAL: 194 MS
QRS AXIS: 111 DEGREES
QRS AXIS: 118 DEGREES
QRS AXIS: 123 DEGREES
QRSD INTERVAL: 78 MS
QRSD INTERVAL: 80 MS
QRSD INTERVAL: 84 MS
QT INTERVAL: 386 MS
QT INTERVAL: 404 MS
QT INTERVAL: 430 MS
QTC INTERVAL: 413 MS
QTC INTERVAL: 416 MS
QTC INTERVAL: 464 MS
T WAVE AXIS: 47 DEGREES
T WAVE AXIS: 50 DEGREES
T WAVE AXIS: 53 DEGREES
VENTRICULAR RATE: 64 BPM
VENTRICULAR RATE: 69 BPM
VENTRICULAR RATE: 70 BPM

## 2019-05-30 PROCEDURE — 93010 ELECTROCARDIOGRAM REPORT: CPT | Performed by: INTERNAL MEDICINE

## 2019-05-31 ENCOUNTER — TRANSITIONAL CARE MANAGEMENT (OUTPATIENT)
Dept: FAMILY MEDICINE CLINIC | Facility: CLINIC | Age: 49
End: 2019-05-31

## 2019-06-04 ENCOUNTER — TELEPHONE (OUTPATIENT)
Dept: FAMILY MEDICINE CLINIC | Facility: CLINIC | Age: 49
End: 2019-06-04

## 2019-06-05 ENCOUNTER — TELEPHONE (OUTPATIENT)
Dept: PAIN MEDICINE | Facility: CLINIC | Age: 49
End: 2019-06-05

## 2019-06-05 DIAGNOSIS — M54.16 LUMBAR RADICULOPATHY: ICD-10-CM

## 2019-06-05 RX ORDER — GABAPENTIN 600 MG/1
600 TABLET ORAL
Qty: 90 TABLET | Refills: 3 | Status: SHIPPED | OUTPATIENT
Start: 2019-06-05 | End: 2020-02-27 | Stop reason: SDUPTHER

## 2019-06-13 ENCOUNTER — OFFICE VISIT (OUTPATIENT)
Dept: FAMILY MEDICINE CLINIC | Facility: CLINIC | Age: 49
End: 2019-06-13
Payer: COMMERCIAL

## 2019-06-13 VITALS
HEIGHT: 67 IN | BODY MASS INDEX: 27.34 KG/M2 | OXYGEN SATURATION: 87 % | RESPIRATION RATE: 18 BRPM | DIASTOLIC BLOOD PRESSURE: 68 MMHG | HEART RATE: 72 BPM | WEIGHT: 174.2 LBS | SYSTOLIC BLOOD PRESSURE: 112 MMHG | TEMPERATURE: 97.2 F

## 2019-06-13 DIAGNOSIS — F33.2 MAJOR DEPRESSIVE DISORDER, RECURRENT SEVERE WITHOUT PSYCHOTIC FEATURES (HCC): ICD-10-CM

## 2019-06-13 DIAGNOSIS — R39.89 ABNORMAL URINE COLOR: ICD-10-CM

## 2019-06-13 DIAGNOSIS — K58.8 OTHER IRRITABLE BOWEL SYNDROME: ICD-10-CM

## 2019-06-13 DIAGNOSIS — J30.2 SEASONAL ALLERGIES: ICD-10-CM

## 2019-06-13 DIAGNOSIS — E78.5 DYSLIPIDEMIA: Primary | ICD-10-CM

## 2019-06-13 LAB
SL AMB  POCT GLUCOSE, UA: NEGATIVE
SL AMB LEUKOCYTE ESTERASE,UA: NEGATIVE
SL AMB POCT BILIRUBIN,UA: NORMAL
SL AMB POCT BLOOD,UA: NEGATIVE
SL AMB POCT CLARITY,UA: CLEAR
SL AMB POCT COLOR,UA: NORMAL
SL AMB POCT KETONES,UA: NEGATIVE
SL AMB POCT NITRITE,UA: NEGATIVE
SL AMB POCT PH,UA: 8
SL AMB POCT SPECIFIC GRAVITY,UA: 1.01
SL AMB POCT URINE PROTEIN: NORMAL
SL AMB POCT UROBILINOGEN: 0.2

## 2019-06-13 PROCEDURE — 3008F BODY MASS INDEX DOCD: CPT | Performed by: INTERNAL MEDICINE

## 2019-06-13 PROCEDURE — 99214 OFFICE O/P EST MOD 30 MIN: CPT | Performed by: INTERNAL MEDICINE

## 2019-06-13 PROCEDURE — 81002 URINALYSIS NONAUTO W/O SCOPE: CPT | Performed by: INTERNAL MEDICINE

## 2019-06-13 RX ORDER — MONTELUKAST SODIUM 10 MG/1
10 TABLET ORAL
Qty: 90 TABLET | Refills: 1 | Status: SHIPPED | OUTPATIENT
Start: 2019-06-13 | End: 2020-01-16 | Stop reason: SDUPTHER

## 2019-06-14 ENCOUNTER — OFFICE VISIT (OUTPATIENT)
Dept: BEHAVIORAL/MENTAL HEALTH CLINIC | Facility: CLINIC | Age: 49
End: 2019-06-14
Payer: COMMERCIAL

## 2019-06-14 DIAGNOSIS — F41.1 GENERALIZED ANXIETY DISORDER: Primary | ICD-10-CM

## 2019-06-14 DIAGNOSIS — F33.2 MAJOR DEPRESSIVE DISORDER, RECURRENT SEVERE WITHOUT PSYCHOTIC FEATURES (HCC): ICD-10-CM

## 2019-06-14 DIAGNOSIS — F43.10 POST TRAUMATIC STRESS DISORDER (PTSD): ICD-10-CM

## 2019-06-14 PROCEDURE — 90834 PSYTX W PT 45 MINUTES: CPT | Performed by: SOCIAL WORKER

## 2019-06-19 ENCOUNTER — TELEPHONE (OUTPATIENT)
Dept: PSYCHIATRY | Facility: CLINIC | Age: 49
End: 2019-06-19

## 2019-06-21 ENCOUNTER — OFFICE VISIT (OUTPATIENT)
Dept: PSYCHIATRY | Facility: CLINIC | Age: 49
End: 2019-06-21
Payer: COMMERCIAL

## 2019-06-21 DIAGNOSIS — F90.2 ADHD (ATTENTION DEFICIT HYPERACTIVITY DISORDER), COMBINED TYPE: ICD-10-CM

## 2019-06-21 DIAGNOSIS — F33.2 SEVERE EPISODE OF RECURRENT MAJOR DEPRESSIVE DISORDER, WITHOUT PSYCHOTIC FEATURES (HCC): Primary | ICD-10-CM

## 2019-06-21 PROCEDURE — 99213 OFFICE O/P EST LOW 20 MIN: CPT | Performed by: NURSE PRACTITIONER

## 2019-06-21 RX ORDER — QUETIAPINE FUMARATE 50 MG/1
50 TABLET, FILM COATED ORAL
Qty: 90 TABLET | Refills: 0 | Status: SHIPPED | OUTPATIENT
Start: 2019-06-21 | End: 2019-09-26 | Stop reason: SDUPTHER

## 2019-06-21 RX ORDER — VENLAFAXINE HYDROCHLORIDE 150 MG/1
150 CAPSULE, EXTENDED RELEASE ORAL DAILY
Qty: 90 CAPSULE | Refills: 0 | Status: SHIPPED | OUTPATIENT
Start: 2019-06-21 | End: 2019-08-19 | Stop reason: SDUPTHER

## 2019-06-21 RX ORDER — ATOMOXETINE 25 MG/1
25 CAPSULE ORAL DAILY
Qty: 90 CAPSULE | Refills: 0 | Status: SHIPPED | OUTPATIENT
Start: 2019-06-21 | End: 2019-08-19 | Stop reason: SDUPTHER

## 2019-07-02 ENCOUNTER — TELEPHONE (OUTPATIENT)
Dept: BARIATRICS | Facility: CLINIC | Age: 49
End: 2019-07-02

## 2019-07-02 NOTE — TELEPHONE ENCOUNTER
called pt to schedule overdue f/u apt - pt stated that she is working and will call back when she is ready         ----- Message from Hanna Scherer RN sent at 2019 10:41 AM EDT -----  Regardin year f/u appointment  Please call patient to schedule 1 year f/u appointment with our office  Thank you

## 2019-07-03 ENCOUNTER — TELEPHONE (OUTPATIENT)
Dept: BARIATRICS | Facility: CLINIC | Age: 49
End: 2019-07-03

## 2019-07-03 ENCOUNTER — TELEPHONE (OUTPATIENT)
Dept: OBGYN CLINIC | Facility: CLINIC | Age: 49
End: 2019-07-03

## 2019-07-03 NOTE — TELEPHONE ENCOUNTER
Many patients try black cohosh but it has been proven in studies to not really help  Pt may try if she desires  Otherwise, I would recommend scheduling an appointment to discuss the risks and benefits of hormonal replacement therapy

## 2019-07-03 NOTE — TELEPHONE ENCOUNTER
Patient is having extreme hot flashes  She is asking if there is anything over the counter that can try to help her  Patient can be reached on her cell phone, she stated staff can leave a message

## 2019-07-09 ENCOUNTER — TELEPHONE (OUTPATIENT)
Dept: PAIN MEDICINE | Facility: MEDICAL CENTER | Age: 49
End: 2019-07-09

## 2019-07-09 NOTE — TELEPHONE ENCOUNTER
Pt called stating that her back pain is returning and would like to know what would be the next recommendation will be to help her with her pain      Pt can be reached at [phone]

## 2019-07-09 NOTE — TELEPHONE ENCOUNTER
Left detailed vm for pt that she should c/b and make ov with HA since she has not been seen since March  Kathya Grey has open appt on wed/Thurs  C/B # to make appt provided along with OH  Pls make ov with HA for this week when pt c/b

## 2019-07-11 ENCOUNTER — OFFICE VISIT (OUTPATIENT)
Dept: PAIN MEDICINE | Facility: CLINIC | Age: 49
End: 2019-07-11
Payer: COMMERCIAL

## 2019-07-11 VITALS
HEART RATE: 71 BPM | WEIGHT: 174 LBS | SYSTOLIC BLOOD PRESSURE: 118 MMHG | DIASTOLIC BLOOD PRESSURE: 84 MMHG | BODY MASS INDEX: 27.31 KG/M2 | RESPIRATION RATE: 16 BRPM | HEIGHT: 67 IN

## 2019-07-11 DIAGNOSIS — F41.9 ANXIETY: ICD-10-CM

## 2019-07-11 DIAGNOSIS — M79.18 MYOFASCIAL PAIN SYNDROME: ICD-10-CM

## 2019-07-11 DIAGNOSIS — M47.816 LUMBAR SPONDYLOSIS: ICD-10-CM

## 2019-07-11 DIAGNOSIS — Z12.31 VISIT FOR SCREENING MAMMOGRAM: Primary | ICD-10-CM

## 2019-07-11 DIAGNOSIS — M51.16 INTERVERTEBRAL DISC DISORDERS WITH RADICULOPATHY, LUMBAR REGION: Primary | ICD-10-CM

## 2019-07-11 PROCEDURE — 99214 OFFICE O/P EST MOD 30 MIN: CPT | Performed by: NURSE PRACTITIONER

## 2019-07-11 NOTE — TELEPHONE ENCOUNTER
Spoke with patient, she will try supplement first  Order placed for mammogram  Has appointment scheduled for November 2019

## 2019-07-11 NOTE — PROGRESS NOTES
Pt c/o back pain that radiates to the hips    Assessment:  1  Intervertebral disc disorders with radiculopathy, lumbar region    2  Anxiety    3  Lumbar spondylosis    4  Myofascial pain syndrome        Plan:  Krish Dixon is a 47 y o  female with lumbar intervertebral disc disorder radiculopathy, lumbar spondylosis and myofascial pain syndrome  The patient presents with ongoing low back pain, which has improved since last office visit  She contributes the improvement of her low back pain due to undergoing a left L4 and right L5 transforaminal epidural steroid injection on 3/7/2019  She reports that her pain and symptoms are starting to return and  would like to schedule a repeat injection at this time  She was educated on the procedures most common risks, and was given a brochure the procedure  She verbalized understanding, would like to proceed with the procedure  Therefore the patient be scheduled for upcoming Tuesday, Thursday or Friday  The patient reports increased anxiety with procedures  Therefore she was prescribed lorazepam 0 5 mg to be taken 2 hours prior to her procedure  She was instructed that she may repeat a 2nd dose just prior to the procedure if anxiety persists  This prescription was sent to her pharmacy on file  She will continue on gabapentin 600 mg 3 times daily and reports that she does not refills at this time  She is will continue on cyclobenzaprine 10 mg at bedtime as needed for muscle spasms  She reports that she does not need a refill this medication at this time  Complete risks and benefits including bleeding, infection, tissue reaction, nerve injury and allergic reaction were discussed  The approach was demonstrated using models and literature was provided  The patient will follow up after the completion of her left L4 and right L5 transforaminal steroid injection, or sooner with the worsening of symptoms      My impressions and treatment recommendations were discussed in detail with the patient who verbalized understanding and had no further questions  Discharge instructions were provided  I personally saw and examined the patient and I agree with the above discussed plan of care  Orders Placed This Encounter   Procedures    FL spine and pain procedure     Standing Status:   Future     Standing Expiration Date:   7/11/2023     Order Specific Question:   Reason for Exam:     Answer:   Left L4 and right L5 TFESI     Order Specific Question:   Is the patient pregnant? Answer:   No     Order Specific Question:   Anticoagulant hold needed? Answer:   No     New Medications Ordered This Visit   Medications    LORazepam (ATIVAN) 0 5 mg tablet     Sig: Take 1 tablet 2 hours prior to procedure and may repeat just prior to procedure if anxiety persists  Dispense:  2 tablet     Refill:  0     Must have a  to and from procedure  History of Present Illness:  Rio Addison is a 47 y o  female with lumbar intervertebral disc disorder radiculopathy, lumbar spondylosis and myofascial pain syndrome  The patient was last seen in the office on 3/14/2019 where she followed up after a left L4 and right L5 transforaminal steroid injection  She reported that this injection provided her 80-90% pain relief until 2 weeks ago when symptoms re emerged  She reports ongoing residual pain relief,but feels like her pain and symptoms are slowly worsening  She presents for a follow up office visit in regards to Back Pain and Hip Pain  The patients current symptoms include bilateral low back pain that radiates into her right and down the lateral aspect of her right leg to her calf  She denies bilateral leg weakness, or bowel or bladder issues  She describes her pain as throbbing, pressure-like pain that is intermittent nature with symptoms worsening during the morning hours  The patient reports that her pain and symptoms have improved since last office visit        I have personally reviewed and/or updated the patient's past medical history, past surgical history, family history, social history, current medications, allergies, and vital signs today  Review of Systems   Respiratory: Negative for shortness of breath  Cardiovascular: Negative for chest pain  Gastrointestinal: Negative for constipation, diarrhea, nausea and vomiting  Musculoskeletal: Negative for arthralgias, gait problem, joint swelling and myalgias  Decreased ROM  Joint stiffness   Skin: Negative for rash  Neurological: Negative for dizziness, seizures and weakness  All other systems reviewed and are negative  Patient Active Problem List   Diagnosis    IBS (irritable bowel syndrome)    GERD (gastroesophageal reflux disease)    Depression    Chronic back pain    Anxiety    Cervical radiculopathy    Hepatic steatosis    Pulmonary nodule seen on imaging study    Vitamin D deficiency    Dermatitis    S/P laparoscopic sleeve gastrectomy    Other fatigue    Encounter for annual routine gynecological examination    Overweight (BMI 25 0-29  9)    Lumbar radiculopathy    Intervertebral disc disorders with radiculopathy, lumbar region    Post traumatic stress disorder (PTSD)    Major depressive disorder, recurrent severe without psychotic features ([address] )    Generalized anxiety disorder       Past Medical History:   Diagnosis Date    ADHD (attention deficit hyperactivity disorder)     Anxiety     Bulging lumbar disc     Carpal tunnel syndrome     RIGHT    LAST ASSESSED: 12/7/16    Chronic back pain     low    Colon polyps     Depression     Diabetes mellitus ([address] )     on victoza    GERD (gastroesophageal reflux disease)     Gestational diabetes     Hearing loss     left ear    Hyperlipidemia     IBS (irritable bowel syndrome)     Ileus ([address] )     LAST ASSESSED: 8/3/17    Labial cyst     LAST ASSESSED: 4/21/16    Myofascial pain     LAST ASSESSED: 4/12/16    Obesity  Ovarian cyst     LEFT  LAST ASSESSED: 16    Panic attack     Pap smear for cervical cancer screening     2018--pap wnl, HRHPV neg    PTSD (post-traumatic stress disorder)     Seasonal allergies     Thoracic outlet syndrome     2010    Trochanteric bursitis of both hips     LAST ASSESSED: 3/18/16    Ulnar neuropathy at elbow     Varicella     Wears glasses        Past Surgical History:   Procedure Laterality Date    BILE DUCT EXPLORATION      ENDOSCOPIC REMOVAL OF STONES FROM BILIARY TRACT     SECTION      x3    CHOLECYSTECTOMY      COLONOSCOPY      DILATION AND CURETTAGE OF UTERUS      ENDOMETRIAL ABLATION      ERCP W/ SPHICTEROTOMY      FIRST RIB REMOVAL      FIRST RIB REMOVAL      THORAX EXCISION OF FIRST RIB    HYSTEROSCOPY      NEUROPLASTY / TRANSPOSITION ULNAR NERVE AT ELBOW      OR COLONOSCOPY FLX DX W/COLLJ SPEC WHEN PFRMD N/A 2017    Procedure: COLONOSCOPY;  Surgeon: Mendez Pedraza MD;  [facility]; Service: Gastroenterology    OR ESOPHAGOGASTRODUODENOSCOPY TRANSORAL DIAGNOSTIC N/A 2017    Procedure: ESOPHAGOGASTRODUODENOSCOPY (EGD); Surgeon: Bertrand Ramos MD;  [facility];   Service: Gastroenterology    OR HYSTEROSCOPY,W/ENDO BX N/A 10/20/2017    Procedure: DILATATION AND CURETTAGE (D&C) WITH HYSTEROSCOPY  REMOVAL VULVAR RT  LESION;  Surgeon: Cari Grande MD;  [facility];  Service: Gynecology    OR LAP, ÁLVARO RESTRICT PROC, LONGITUDINAL GASTRECTOMY N/A 2018    Procedure: GASTRECTOMY SLEEVE LAPAROSCOPIC; INTRAOPERATIVE EGD ;  Surgeon: Adore Grijalva MD;  [facility];  Service: Bariatrics    TONSILLECTOMY AND ADENOIDECTOMY      TUBAL LIGATION      ULNAR NERVE REPAIR Right     VEIN LIGATION AND STRIPPING Right     VULVA SURGERY  10/20/2017    BIOPSY    WISDOM TOOTH EXTRACTION         Family History   Problem Relation Age of Onset    Diabetes Mother     Other Mother         HYPERCHOLESTEROLEMIA    Breast cancer Mother         >50    Diabetes Father     Other Father         HYPERCHOLESTEROLEMIA    Prostate cancer Father     Hypertension Brother     Diabetes Brother     Other Brother         HYPERCHOLESTEROLEMIA    Alcohol abuse Family     Asthma Family     Other Family         BACK PAIN    Cancer Family     Depression Family     Neuropathy Family     Heart disease Family     Colon cancer Maternal Grandfather     Ovarian cancer Neg Hx     Uterine cancer Neg Hx        Social History     Occupational History    Occupation: ER TECH     Employer: [employer]   Tobacco Use    Smoking status: Former Smoker     Last attempt to quit: 2017     Years since quittin 2    Smokeless tobacco: Never Used   Substance and Sexual Activity    Alcohol use:  Yes     Alcohol/week: 1 0 standard drinks     Types: 1 Standard drinks or equivalent per week     Comment: socially    Drug use: No    Sexual activity: Yes     Birth control/protection: OCP     Comment: lifetime partners: 6       Current Outpatient Medications on File Prior to Visit   Medication Sig    aspirin 81 mg chewable tablet Chew 1 tablet (81 mg total) daily    atoMOXetine (STRATTERA) 25 mg capsule Take 1 capsule (25 mg total) by mouth daily for 90 days    Biotin 76511 MCG TABS Take by mouth    Calcium Citrate-Vitamin D 500-500 MG-UNIT PACK Take by mouth    Cyanocobalamin ([employer]S VITAMIN B12 PO) Take by mouth    cyclobenzaprine (FLEXERIL) 10 mg tablet Take 1 tablet (10 mg total) by mouth daily at bedtime    drospirenone-ethinyl estradiol (LIEN) 3-0 02 MG per tablet Take 1 tablet by mouth daily    gabapentin (NEURONTIN) 600 MG tablet Take 1 tablet (600 mg total) by mouth daily at bedtime    montelukast (SINGULAIR) 10 mg tablet Take 1 tablet (10 mg total) by mouth daily at bedtime    Multiple Vitamins-Minerals (MULTI COMPLETE PO) Take by mouth    pantoprazole (PROTONIX) 40 mg tablet Take 1 tablet (40 mg total) by mouth daily before breakfast Take 30 minutes before    Probiotic Product (PRO-BIOTIC BLEND PO) Take by mouth    QUEtiapine (SEROquel) 50 mg tablet Take 1 tablet (50 mg total) by mouth daily at bedtime for 90 days    venlafaxine (EFFEXOR-XR) 150 mg 24 hr capsule Take 1 capsule (150 mg total) by mouth daily for 90 days    [DISCONTINUED] LORazepam (ATIVAN) 0 5 mg tablet Take 0 5 tablet as needed daily  No current facility-administered medications on file prior to visit  Allergies   Allergen Reactions    Ibuprofen      Due to gastric sleeve       Physical Exam:    /84 (BP Location: Left arm, Patient Position: Sitting, Cuff Size: Standard)   Pulse 71   Resp 16   Ht 5' 7" (1 702 m)   Wt 78 9 kg (174 lb)   BMI 27 25 kg/m²     Constitutional:normal, well developed, well nourished, alert, in no distress and non-toxic and no overt pain behavior   and overweight  Eyes:anicteric  HEENT:grossly intact  Neck:supple, symmetric, trachea midline and no masses   Pulmonary:even and unlabored  Cardiovascular:No edema or pitting edema present  Skin:Normal without rashes or lesions and well hydrated  Psychiatric:Mood and affect appropriate  Neurologic:Cranial Nerves II-XII grossly intact  Musculoskeletal:antalgic     Lumbar Spine Exam    Appearance:  Normal lordosis  Palpation/Tenderness:  left lumbar paraspinal tenderness  right lumbar paraspinal tenderness  Sensory:  no sensory deficits noted  Range of Motion:  Flexion:  Minimally limited  with pain  Extension:  Minimally limited  with pain  Lateral Flexion - Left:  Minimally limited  with pain  Lateral Flexion - Right:  Minimally limited  with pain  Rotation - Left:  Minimally limited  with pain  Rotation - Right:  Minimally limited  with pain  Motor Strength:  Left hip flexion:  5/5  Right hip flexion:  5/5  Left knee extension:  5/5  Right knee extension:  5/5  Left foot dorsiflexion:  5/5  Left foot plantar flexion:  5/5  Right foot dorsiflexion:  5/5  Right foot plantar flexion:  5/5      Imaging  MRI LUMBAR SPINE WITHOUT CONTRAST     INDICATION: Chronic low back pain, left leg pain     COMPARISON:  Lumbar spine MRI 2/16/2016     TECHNIQUE:  Sagittal T1, sagittal T2, sagittal inversion recovery, axial T1 and axial T2, coronal T2        IMAGE QUALITY:  Diagnostic     FINDINGS:     VERTEBRAL BODIES:  There is mild straightening of normal lumbar lordosis  No spondylolisthesis  No compression fracture deformity  No abnormal bone marrow signal or suspicious discrete marrow lesion     SACRUM:  Normal signal within the sacrum  No evidence of insufficiency or stress fracture      DISTAL CORD AND CONUS:  Normal size and signal within the distal cord and conus        PARASPINAL SOFT TISSUES:  Visualized prevertebral and paraspinal soft tissue are unremarkable     LOWER THORACIC DISC SPACES:  Unremarkable     LUMBAR DISC SPACES:  Disc desiccation and disc height loss more pronounced at level L4-5 and L5-S1     L1-L2:  Mild bilateral facet arthropathy  No canal or foraminal stenosis     L2-L3:  Mild bilateral facet arthropathy  No canal or foraminal stenosis     L3-L4:  Minimal right asymmetric disc bulge  Mild bilateral facet arthropathy  No canal or foraminal stenosis     L4-L5:  Disc bulge with superimposed small left subarticular disc protrusion with small endplate osteophyte abutting infrasurface of exiting left L4 nerve root  Mild bilateral facet arthropathy  No spinal canal stenosis  Mild left foraminal narrowing      L5-S1:  Disc bulge with superimposed right subarticular disc protrusion abutting infrasurface of exiting right L5 nerve root  Mild bilateral facet arthropathy      IMPRESSION:     1  Degenerative changes without high-grade canal or foraminal stenosis  This is not significantly progressed from 2/16/2016      2    At the level L4-5 there is disc bulge with superimposed small left subarticular disc protrusion and a small endplate osteophyte abutting infrasurface of exiting left L4 nerve root without significant foraminal stenosis, not significantly changed from   2/16/2016  Correlate for clinical findings of left L4 radiculopathy      3  At the level of L5-S1 there is disc bulge with superimposed right subarticular disc protrusion abutting the infrasurface of exiting right L5 nerve root without significant foraminal stenosis, not significantly changed from 2/16/2016    Correlate for   clinical findings of right L5 radiculopathy

## 2019-07-12 ENCOUNTER — SOCIAL WORK (OUTPATIENT)
Dept: BEHAVIORAL/MENTAL HEALTH CLINIC | Facility: CLINIC | Age: 49
End: 2019-07-12
Payer: COMMERCIAL

## 2019-07-12 DIAGNOSIS — F43.10 POST TRAUMATIC STRESS DISORDER (PTSD): ICD-10-CM

## 2019-07-12 DIAGNOSIS — F41.1 GENERALIZED ANXIETY DISORDER: ICD-10-CM

## 2019-07-12 DIAGNOSIS — F33.2 MAJOR DEPRESSIVE DISORDER, RECURRENT SEVERE WITHOUT PSYCHOTIC FEATURES (HCC): Primary | ICD-10-CM

## 2019-07-12 PROCEDURE — 90834 PSYTX W PT 45 MINUTES: CPT | Performed by: SOCIAL WORKER

## 2019-07-12 RX ORDER — LORAZEPAM 0.5 MG/1
TABLET ORAL
Qty: 2 TABLET | Refills: 0 | Status: SHIPPED | OUTPATIENT
Start: 2019-07-12 | End: 2019-07-12 | Stop reason: SDUPTHER

## 2019-07-12 RX ORDER — LORAZEPAM 0.5 MG/1
TABLET ORAL
Qty: 2 TABLET | Refills: 0 | Status: SHIPPED | OUTPATIENT
Start: 2019-07-12 | End: 2019-09-18 | Stop reason: SDUPTHER

## 2019-07-12 NOTE — PSYCH
Psychotherapy Provided: Individual Psychotherapy 50 minutes     Length of time in session: 50 minutes, follow up in 2 weeks    Goals addressed in session: Goal 1     Pain:      none    0    Current suicide risk : Low      DATA: Met with Jayesh Lamas for scheduled individual session  Christina's father was injured by the back door of a truck and she has been assisting him with medical care and going from one doctor to the next  He has fallen twice since the incident and it is recommended he see a neurologist  Jayesh Cydney feels bad she was late to today's appointment because she has been running around  This clinician encouraged her to be easy on herself, as we were able to work out a plan to complete the session, as she had expressed a need for therapy today  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  Jayesh Lamas forgot to take her medication the other day (Effexor) and she states that she experienced significant emotion dysregulation  She states she is typically very mindful about taking her medications on a regular basis  Jayesh Lamas rates her mood today at about a 5 with 10 being the most positive mood  Lavonne Aase discussed her plans for her ongoing education  She states that her highest source of stress is her education  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  She is not sure what she would like to do  She expressed a great deal of fear about taking licensing exams  She has a plan to take an A&P course to see how she does  If she does well, she will consider changing her major  This clinician offered validation for her having this plan in place  Yessy and I reviewed her treatment plan  She states, "I want to know why I am the way I am " She states she has never processed any of her past trauma and is interested in doing so  We discussed doing this in a safe manner, by working on developing and practicing effective coping skills   She agrees that this is a good plan for the next few months  ASSESSMENT: Gisel Allen presents with an anxious mood  Full range of affect is congruent with topic of conversation  Gisel Allen exhibits good rapport with this clinician  Gisel Allen appears to have good insight regarding her mood and her mood stability in relation to he medication  Gisel Allen exhibits good judgement with regard to her plans for determining her future plans for her education  Gisel Allen continues to exhibit willingness to work on treatment goals and objectives  PLAN: Gisel Allen will return in two weeks for the next session  Between sessions, Gisel Allen will monitor her moods and continue to work on balancing her multiple responsibilities and stressors  She will report back during the next session re: successes and barriers  At the next session, this clinician will use DBT-informed skills and client-centered therapy to address her mood regulation and stress management, in an effort to assist Gisel Allen with meeting treatment goals  Behavioral Health Treatment Plan [facility]: Diagnosis and Treatment Plan explained to Judah Peralta relates understanding diagnosis and is agreeable to Treatment Plan   Yes

## 2019-07-12 NOTE — BH TREATMENT PLAN
Rella Host  1970       Date of Initial Treatment Plan: April 5, 2019   Date of Current Treatment Plan: 07/12/19    Treatment Plan Number 2     Strengths/Personal Resources for Self Care: "I always do things for people, not expecting anything in return "      Regular attendance in therapy  Good therapeutic relationship  Motivation to work on improving herself  Diagnosis:   1  Major depressive disorder, recurrent severe without psychotic features ([address] )     2  Generalized anxiety disorder     3  Post traumatic stress disorder (PTSD)         Area of Needs: Depression; Anxiety; History of trauma that continues to impact her life today      Long Term Goal 1: "I would like to eventually be able to manage my moods without needing psychiatric medications "    Target Date: October 31, 2019  Completion Date: to be determined         Short Term Objectives for Goal 1:  1  Rani Araujo will identify the triggers and prompting events that increase her level of depression and anxiety; and she will discuss these        in sessions           2  Rani Araujo will learn DBT skills to improve her distress tolerance  She will practice skills between sessions  [facility] will learn DBT emotion regulation skills and practice these skills between sessions  4  When she is ready and has shown evidence of utilizing appropriate coping skills, Rani Araujo will begin to process past traumatic events      GOAL 1: Modality: Individual 1-2 x per month   Completion Date to be determined, Medication Management and The person(s) responsible for carrying out the plan is  Rani Araujo (client), Chauncey Alfaro (clinician); Dorotha Gilford (CRNP)    Clinician will use mindfulness-based strategies, DBT-informed skills, client-centered therapy, and Prolonged Exposure techniques to address Christina's symptoms of depression, anxiety, and PTSD  Davis Rojas will practice skills between sessions and will report back, during subsequent sessions regarding successes and barriers  Behavioral Health Treatment Plan [facility]: Diagnosis and Treatment Plan explained to Christopher Butler relates understanding diagnosis and is agreeable to Treatment Plan         Client Comments : Please share your thoughts, feelings, need and/or experiences regarding your treatment plan: "That's good "

## 2019-07-24 ENCOUNTER — DOCUMENTATION (OUTPATIENT)
Dept: PSYCHIATRY | Facility: CLINIC | Age: 49
End: 2019-07-24

## 2019-07-24 NOTE — PROGRESS NOTES
Treatment Plan not completed within required time limits due to: John Oconnor  cancelled appointment  on 7/25/2019

## 2019-07-26 ENCOUNTER — HOSPITAL ENCOUNTER (OUTPATIENT)
Dept: RADIOLOGY | Facility: CLINIC | Age: 49
Discharge: HOME | End: 2019-07-26
Attending: ANESTHESIOLOGY
Payer: COMMERCIAL

## 2019-07-26 VITALS
OXYGEN SATURATION: 97 % | RESPIRATION RATE: 20 BRPM | HEART RATE: 72 BPM | SYSTOLIC BLOOD PRESSURE: 110 MMHG | DIASTOLIC BLOOD PRESSURE: 68 MMHG | TEMPERATURE: 98.3 F

## 2019-07-26 DIAGNOSIS — M51.16 INTERVERTEBRAL DISC DISORDERS WITH RADICULOPATHY, LUMBAR REGION: ICD-10-CM

## 2019-07-26 PROCEDURE — 64484 NJX AA&/STRD TFRM EPI L/S EA: CPT | Performed by: ANESTHESIOLOGY

## 2019-07-26 PROCEDURE — 64483 NJX AA&/STRD TFRM EPI L/S 1: CPT | Performed by: ANESTHESIOLOGY

## 2019-07-26 RX ORDER — BUPIVACAINE HCL/PF 2.5 MG/ML
10 VIAL (ML) INJECTION ONCE
Status: COMPLETED | OUTPATIENT
Start: 2019-07-26 | End: 2019-07-26

## 2019-07-26 RX ORDER — 0.9 % SODIUM CHLORIDE 0.9 %
10 VIAL (ML) INJECTION ONCE
Status: COMPLETED | OUTPATIENT
Start: 2019-07-26 | End: 2019-07-26

## 2019-07-26 RX ORDER — METHYLPREDNISOLONE ACETATE 80 MG/ML
80 INJECTION, SUSPENSION INTRA-ARTICULAR; INTRALESIONAL; INTRAMUSCULAR; PARENTERAL; SOFT TISSUE ONCE
Status: COMPLETED | OUTPATIENT
Start: 2019-07-26 | End: 2019-07-26

## 2019-07-26 RX ADMIN — Medication 5 ML: at 08:34

## 2019-07-26 RX ADMIN — SODIUM CHLORIDE 5 ML: 9 INJECTION, SOLUTION INTRAMUSCULAR; INTRAVENOUS; SUBCUTANEOUS at 08:34

## 2019-07-26 RX ADMIN — IOHEXOL 1 ML: 300 INJECTION, SOLUTION INTRAVENOUS at 08:36

## 2019-07-26 RX ADMIN — BUPIVACAINE HYDROCHLORIDE 2 ML: 2.5 INJECTION, SOLUTION EPIDURAL; INFILTRATION; INTRACAUDAL at 08:36

## 2019-07-26 RX ADMIN — METHYLPREDNISOLONE ACETATE 80 MG: 80 INJECTION, SUSPENSION INTRA-ARTICULAR; INTRALESIONAL; INTRAMUSCULAR; PARENTERAL; SOFT TISSUE at 08:36

## 2019-07-26 NOTE — H&P
History of Present Illness: The patient is a 47 y o  female who presents with complaints of lower back and leg pain and is here today for left L4 right L5 transforaminal epidural steroid injection  Patient Active Problem List   Diagnosis    IBS (irritable bowel syndrome)    GERD (gastroesophageal reflux disease)    Depression    Chronic back pain    Anxiety    Cervical radiculopathy    Hepatic steatosis    Pulmonary nodule seen on imaging study    Vitamin D deficiency    Dermatitis    S/P laparoscopic sleeve gastrectomy    Other fatigue    Encounter for annual routine gynecological examination    Overweight (BMI 25 0-29  9)    Lumbar radiculopathy    Intervertebral disc disorders with radiculopathy, lumbar region    Post traumatic stress disorder (PTSD)    Major depressive disorder, recurrent severe without psychotic features ([address] )    Generalized anxiety disorder       Past Medical History:   Diagnosis Date    ADHD (attention deficit hyperactivity disorder)     Anxiety     Bulging lumbar disc     Carpal tunnel syndrome     RIGHT  LAST ASSESSED: 12/7/16    Chronic back pain     low    Colon polyps     Depression     Diabetes mellitus ([address] )     on victoza    GERD (gastroesophageal reflux disease)     Gestational diabetes     Hearing loss     left ear    Hyperlipidemia     IBS (irritable bowel syndrome)     Ileus ([address] )     LAST ASSESSED: 8/3/17    Labial cyst     LAST ASSESSED: 4/21/16    Myofascial pain     LAST ASSESSED: 4/12/16    Obesity     Ovarian cyst     LEFT   LAST ASSESSED: 9/2/16    Panic attack     Pap smear for cervical cancer screening     4/2018--pap wnl, HRHPV neg    PTSD (post-traumatic stress disorder)     Seasonal allergies     Thoracic outlet syndrome     2010    Trochanteric bursitis of both hips     LAST ASSESSED: 3/18/16    Ulnar neuropathy at elbow     Varicella     Wears glasses        Past Surgical History:   Procedure Laterality Date    BILE DUCT EXPLORATION      ENDOSCOPIC REMOVAL OF STONES FROM BILIARY TRACT     SECTION      x3    CHOLECYSTECTOMY      COLONOSCOPY      DILATION AND CURETTAGE OF UTERUS      ENDOMETRIAL ABLATION      ERCP W/ SPHICTEROTOMY      FIRST RIB REMOVAL      FIRST RIB REMOVAL      THORAX EXCISION OF FIRST RIB    HYSTEROSCOPY      NEUROPLASTY / TRANSPOSITION ULNAR NERVE AT ELBOW      AZ COLONOSCOPY FLX DX W/COLLJ SPEC WHEN PFRMD N/A 2017    Procedure: COLONOSCOPY;  Surgeon: Jazmin Baca MD;  [facility]; Service: Gastroenterology    AZ ESOPHAGOGASTRODUODENOSCOPY TRANSORAL DIAGNOSTIC N/A 2017    Procedure: ESOPHAGOGASTRODUODENOSCOPY (EGD); Surgeon: Sheryle Congress, MD;  [facility];   Service: Gastroenterology    AZ HYSTEROSCOPY,W/ENDO BX N/A 10/20/2017    Procedure: DILATATION AND CURETTAGE (D&C) WITH HYSTEROSCOPY  REMOVAL VULVAR RT  LESION;  Surgeon: Juan J Freeman MD;  [facility];  Service: Gynecology    AZ LAP, ÁLVARO RESTRICT PROC, LONGITUDINAL GASTRECTOMY N/A 2018    Procedure: GASTRECTOMY SLEEVE LAPAROSCOPIC; INTRAOPERATIVE EGD ;  Surgeon: Medhat Hyman MD;  [facility];  Service: Bariatrics    TONSILLECTOMY AND ADENOIDECTOMY      TUBAL LIGATION      ULNAR NERVE REPAIR Right     VEIN LIGATION AND STRIPPING Right     VULVA SURGERY  10/20/2017    BIOPSY    WISDOM TOOTH EXTRACTION           Current Outpatient Medications:     aspirin 81 mg chewable tablet, Chew 1 tablet (81 mg total) daily, Disp: 30 tablet, Rfl: 1    atoMOXetine (STRATTERA) 25 mg capsule, Take 1 capsule (25 mg total) by mouth daily for 90 days, Disp: 90 capsule, Rfl: 0    Biotin 53381 MCG TABS, Take by mouth, Disp: , Rfl:     Calcium Citrate-Vitamin D 500-500 MG-UNIT PACK, Take by mouth, Disp: , Rfl:     Cyanocobalamin ([employer] VITAMIN B12 PO), Take by mouth, Disp: , Rfl:     cyclobenzaprine (FLEXERIL) 10 mg tablet, Take 1 tablet (10 mg total) by mouth daily at bedtime, Disp: 30 tablet, Rfl: 0    drospirenone-ethinyl estradiol (LIEN) 3-0 02 MG per tablet, Take 1 tablet by mouth daily, Disp: 84 tablet, Rfl: 2    gabapentin (NEURONTIN) 600 MG tablet, Take 1 tablet (600 mg total) by mouth daily at bedtime, Disp: 90 tablet, Rfl: 3    LORazepam (ATIVAN) 0 5 mg tablet, Take 1 tablet 2 hours prior to procedure and may repeat just prior to procedure if anxiety persists  , Disp: 2 tablet, Rfl: 0    montelukast (SINGULAIR) 10 mg tablet, Take 1 tablet (10 mg total) by mouth daily at bedtime, Disp: 90 tablet, Rfl: 1    Multiple Vitamins-Minerals (MULTI COMPLETE PO), Take by mouth, Disp: , Rfl:     pantoprazole (PROTONIX) 40 mg tablet, Take 1 tablet (40 mg total) by mouth daily before breakfast Take 30 minutes before, Disp: 90 tablet, Rfl: 1    Probiotic Product (PRO-BIOTIC BLEND PO), Take by mouth, Disp: , Rfl:     QUEtiapine (SEROquel) 50 mg tablet, Take 1 tablet (50 mg total) by mouth daily at bedtime for 90 days, Disp: 90 tablet, Rfl: 0    venlafaxine (EFFEXOR-XR) 150 mg 24 hr capsule, Take 1 capsule (150 mg total) by mouth daily for 90 days, Disp: 90 capsule, Rfl: 0    Current Facility-Administered Medications:     bupivacaine (PF) (MARCAINE) 0 25 % injection 10 mL, 10 mL, Epidural, Once, Nohemi Eden MD    iohexol (OMNIPAQUE) 300 mg/mL injection 50 mL, 50 mL, Epidural, Once, Nohemi Eden MD    lidocaine (PF) (XYLOCAINE-MPF) 2 % injection 5 mL, 5 mL, Infiltration, Once, Nohemi Eden MD    methylPREDNISolone acetate (DEPO-MEDROL) injection 80 mg, 80 mg, Epidural, Once, Nohemi Eden MD    sodium chloride (PF) 0 9 % injection 10 mL, 10 mL, Infiltration, Once, Nohemi Eden MD    Allergies   Allergen Reactions    Ibuprofen      Due to gastric sleeve       Physical Exam:   Vitals:    07/26/19 0811   BP: 104/66   Pulse: 73   Resp: 18   Temp: 98 3 °F (36 8 °C)   SpO2: 98%     General: Awake, Alert, Oriented x 3, Mood and affect appropriate  Respiratory: Respirations even and unlabored  Cardiovascular: Peripheral pulses intact; no edema  Musculoskeletal Exam:   Lower back tenderness    ASA Score: 2    Patient/Chart Verification  Patient ID Verified: Verbal  ID Band Applied: No  Consents Confirmed: Procedural, To be obtained in the Pre-Procedure area  H&P( within 30 days) Verified: To be obtained in the Pre-Procedure area  Interval H&P(within 24 hr) Complete (required for Outpatients and Surgery Admit only): To be obtained in the Pre-Procedure area  Allergies Reviewed: Yes  Anticoag/NSAID held?: NA  Currently on antibiotics?: No    Assessment:   1   Intervertebral disc disorders with radiculopathy, lumbar region        Plan: Left L4 and right L5 TFESI

## 2019-07-26 NOTE — DISCHARGE INSTR - LAB
Epidural Steroid Injection   WHAT YOU NEED TO KNOW:   An epidural steroid injection (JESSICA) is a procedure to inject steroid medicine into the epidural space  The epidural space is between your spinal cord and vertebrae  Steroids reduce inflammation and fluid buildup in your spine that may be causing pain  You may be given pain medicine along with the steroids  ACTIVITY  · Do not drive or operate machinery today  · No strenuous activity today - bending, lifting, etc   · You may resume normal activites starting tomorrow - start slowly and as tolerated  · You may shower today, but no tub baths or hot tubs  · You may have numbness for several hours from the local anesthetic  Please use caution and common sense, especially with weight-bearing activities  CARE OF THE INJECTION SITE  · If you have soreness or pain, apply ice to the area today (20 minutes on/20 minutes off)  · Starting tomorrow, you may use warm, moist heat or ice if needed  · You may have an increase or change in your discomfort for 36-48 hours after your treatment  · Apply ice and continue with any pain medication you have been prescribed  · Notify [facility] if you have any of the following: redness, drainage, swelling, headache, stiff neck or fever above 100°F     SPECIAL INSTRUCTIONS  · Our office will contact you in approximately 7 days for a progress report  MEDICATIONS  · Continue to take all routine medications  · Our office may have instructed you to hold some medications  If you have a problem specifically related to your procedure, please call our office at [phone]  Problems not related to your procedure should be directed to your primary care physician

## 2019-08-01 ENCOUNTER — OFFICE VISIT (OUTPATIENT)
Dept: BARIATRICS | Facility: CLINIC | Age: 49
End: 2019-08-01
Payer: COMMERCIAL

## 2019-08-01 VITALS
DIASTOLIC BLOOD PRESSURE: 82 MMHG | BODY MASS INDEX: 26.53 KG/M2 | HEART RATE: 99 BPM | HEIGHT: 67 IN | RESPIRATION RATE: 18 BRPM | SYSTOLIC BLOOD PRESSURE: 118 MMHG | TEMPERATURE: 98.2 F | WEIGHT: 169 LBS

## 2019-08-01 DIAGNOSIS — K91.2 POSTSURGICAL MALABSORPTION: ICD-10-CM

## 2019-08-01 DIAGNOSIS — E78.2 MIXED HYPERLIPIDEMIA: ICD-10-CM

## 2019-08-01 DIAGNOSIS — E66.3 OVERWEIGHT (BMI 25.0-29.9): ICD-10-CM

## 2019-08-01 DIAGNOSIS — K21.9 GASTROESOPHAGEAL REFLUX DISEASE, ESOPHAGITIS PRESENCE NOT SPECIFIED: ICD-10-CM

## 2019-08-01 DIAGNOSIS — Z98.84 S/P LAPAROSCOPIC SLEEVE GASTRECTOMY: Primary | ICD-10-CM

## 2019-08-01 DIAGNOSIS — R53.83 OTHER FATIGUE: ICD-10-CM

## 2019-08-01 PROCEDURE — 99214 OFFICE O/P EST MOD 30 MIN: CPT | Performed by: PHYSICIAN ASSISTANT

## 2019-08-01 NOTE — ASSESSMENT & PLAN NOTE
-s/p Vertical Sleeve Gastrectomy with Dr Adrian Humphries on 2/6/2018  Overall doing Well  Initial:219 5 lb  Current:169 lb  EWL  which is the average weight loss  Bautista:  Current BMI is Body mass index is 26 47 kg/m²      Tolerating a regular diet-yes  Eating at least 60 grams of protein per day-yes  Following 30/60 minute rule with liquids-no-and does drink with meals-advised on rationale of same  Drinking at least 64 ounces of fluid per day-yes  Drinking carbonated beverages-no  Sufficient exercise-yes  Using NSAIDs regularly-yes-started on baby ASA/she continues on daily ppi-would continue same  Using nicotine-no  Using alcohol-yes-notes vodka and diet cranberry once per month or less-advised on effect after gastric surgery and encouraged to avoid

## 2019-08-01 NOTE — ASSESSMENT & PLAN NOTE
Improved from her 3 month post-op visit in June 2018- was morbidly obese with bmi of 46 1 at that time-now 27 3-overweight

## 2019-08-01 NOTE — ASSESSMENT & PLAN NOTE
Recent lipids with quite high TG, slightly high LDL-advised on diet implications   Her weight loss has been excellent-advised to follow-up with PCP as they ordered these labs as she may need medication for further management

## 2019-08-01 NOTE — PATIENT INSTRUCTIONS
Follow-up in one year  We kindly ask that you arrive 15 minutes before your scheduled appointment time with your provider to allow you to be roomed, have your vital signs checked and your chart updated by our staff  We thank you for your patience at your visit  Follow diet as discussed  Follow  vitamin and mineral recommendations as reviewed with you  Bariatric vitamins are highly recommended  Vitamins are important for a life-time  Low levels can lead to deficiency which can lead to other medical problems  Exercise as tolerated    If you have gotten a lab slip at this visit, please note that most labs are FASTING-but you need to drink water the night before and the morning before your labs are done  It is HIGHLY RECOMMENDED that you check with your insurance to make sure all the labs ordered are covered by your individual insurance policy  This is especially important if you also get labs done by other providers outside of [facility]  You want to avoid having duplicate labs done  Note you will be given a lab slip AFTER  your annual visit next year to check your vitamin and mineral levels  You will not need to make a second appointment after the labs are received/reviewed  You will receive a letter/and or phone call with your results  Most labs do NOT honor a lab slip dated one year in advance now  Call our office if he have any problems with abdominal pain especially if associated with fever, chills, nausea, vomiting or any other concerns  All  Post-bariatric surgery patients should be aware that very small quantities of any alcohol  can cause impairment and it is very possible not to feel the effect  The effect can be in the system for several hours  It is also a stomach irritant  It is advised to AVOID alcohol, Nonsteroidal antiinflammatory drugs (NSAIDS) and nicotine of all forms   Any of these can cause stomach irritation/pain

## 2019-08-01 NOTE — ASSESSMENT & PLAN NOTE
Malabsorption- patient is at risk for malabsorption of vitamins/minerals secondary to malabsorption from her procedure and restriction of intakes  Reviewed current supplements and advised on same    She is taking one procare with iron-does not know the dose  She is taking 3 upcal calcium

## 2019-08-01 NOTE — PROGRESS NOTES
Assessment/Plan:    S/P laparoscopic sleeve gastrectomy  -s/p Vertical Sleeve Gastrectomy with Dr Alexander Duong on 2/6/2018  Overall doing Well  Initial:219 5 lb  Current:169 lb  EWL  which is the average weight loss  Bautista:  Current BMI is Body mass index is 26 47 kg/m²  Tolerating a regular diet-yes  Eating at least 60 grams of protein per day-yes  Following 30/60 minute rule with liquids-no-and does drink with meals-advised on rationale of same  Drinking at least 64 ounces of fluid per day-yes  Drinking carbonated beverages-no  Sufficient exercise-yes  Using NSAIDs regularly-yes-started on baby ASA/she continues on daily ppi-would continue same  Using nicotine-no  Using alcohol-yes-notes vodka and diet cranberry once per month or less-advised on effect after gastric surgery and encouraged to avoid      Postsurgical malabsorption  Malabsorption- patient is at risk for malabsorption of vitamins/minerals secondary to malabsorption from her procedure and restriction of intakes  Reviewed current supplements and advised on same    She is taking one procare with iron-does not know the dose  She is taking 3 upcal calcium     GERD (gastroesophageal reflux disease)  Controlled on ppi/she is on daily aspirin -so would continue ppi for now    Overweight (BMI 25 0-29  9)  Improved from her 3 month post-op visit in June 2018- was morbidly obese with bmi of 46 1 at that time-now 27 3-overweight    Other fatigue  She notes long-term fatigue-she does have a busy life-style -working and going to school  May be multifactorial and advised her of same  I will check her routine vitamin/mineral levels now    Mixed hyperlipidemia  Recent lipids with quite high TG, slightly high LDL-advised on diet implications   Her weight loss has been excellent-advised to follow-up with PCP as they ordered these labs as she may need medication for further management       Diagnoses and all orders for this visit:    S/P laparoscopic sleeve gastrectomy  -     Copper Level; Future  -     Ferritin; Future  -     Folate; Future  -     Iron Saturation %; Future  -     PTH, intact; Future  -     Vitamin A; Future  -     Vitamin B1, whole blood; Future  -     Vitamin B12; Future  -     Vitamin D 25 hydroxy; Future  -     Zinc; Future    Postsurgical malabsorption  -     Copper Level; Future  -     Ferritin; Future  -     Folate; Future  -     Iron Saturation %; Future  -     PTH, intact; Future  -     Vitamin A; Future  -     Vitamin B1, whole blood; Future  -     Vitamin B12; Future  -     Vitamin D 25 hydroxy; Future  -     Zinc; Future    Gastroesophageal reflux disease, esophagitis presence not specified    Overweight (BMI 25 0-29 9)  -     Copper Level; Future  -     Ferritin; Future  -     Folate; Future  -     Iron Saturation %; Future  -     PTH, intact; Future  -     Vitamin A; Future  -     Vitamin B1, whole blood; Future  -     Vitamin B12; Future  -     Vitamin D 25 hydroxy; Future  -     Zinc; Future    Other fatigue  -     Copper Level; Future  -     Ferritin; Future  -     Folate; Future  -     Iron Saturation %; Future  -     PTH, intact; Future  -     Vitamin A; Future  -     Vitamin B1, whole blood; Future  -     Vitamin B12; Future  -     Vitamin D 25 hydroxy; Future  -     Zinc; Future    Mixed hyperlipidemia          Subjective:      Patient ID: Dayami Dumont is a 47 y o  female  She is here in late follow-up  She has been lost to follow-up since her 3 month post-op visit with us  She is tolerating a regular diet  She has been exercising and taking bariatric supplements  She has no  Complaints today      The following portions of the patient's history were reviewed and updated as appropriate: allergies, current medications, past family history, past medical history, past social history, past surgical history and problem list     Review of Systems   Constitutional: Negative for chills and fever   Unexpected weight change: planned weight loss    Respiratory: Negative for shortness of breath and wheezing  Cardiovascular: Negative for chest pain and palpitations  Gastrointestinal: Negative for abdominal pain, constipation, diarrhea, nausea and vomiting  Psychiatric/Behavioral: Suicidal ideas: no complait of anxiety or depression  Objective:      /82   Pulse 99   Temp 98 2 °F (36 8 °C)   Resp 18   Ht 5' 7" (1 702 m)   Wt 76 7 kg (169 lb)   BMI 26 47 kg/m²          Physical Exam   Constitutional: She is oriented to person, place, and time  She appears well-developed and well-nourished  HENT:   Mouth/Throat: Oropharynx is clear and moist    Eyes: Conjunctivae are normal  No scleral icterus  Cardiovascular: Normal rate, regular rhythm and normal heart sounds  Pulmonary/Chest: Effort normal and breath sounds normal    Abdominal: Soft  There is no tenderness  No incisional hernias appreciated   Musculoskeletal:   Normal gait   Neurological: She is alert and oriented to person, place, and time  Psychiatric: She has a normal mood and affect  Her behavior is normal  Judgment and thought content normal    Nursing note and vitals reviewed  GOALS: Maintain  weight loss with good nutrition intakes    Normal vitamin and mineral levels  Exercise as tolerated    BARRIERS: none identified

## 2019-08-01 NOTE — ASSESSMENT & PLAN NOTE
She notes long-term fatigue-she does have a busy life-style -working and going to school  May be multifactorial and advised her of same      I will check her routine vitamin/mineral levels now

## 2019-08-13 ENCOUNTER — TELEPHONE (OUTPATIENT)
Dept: FAMILY MEDICINE CLINIC | Facility: CLINIC | Age: 49
End: 2019-08-13

## 2019-08-13 NOTE — TELEPHONE ENCOUNTER
Pt called wanting to know if she should still be taking ASA daily  If so, she will need a refill sent to the pharmacy because it is covered by her insurance

## 2019-08-16 ENCOUNTER — APPOINTMENT (OUTPATIENT)
Dept: LAB | Facility: CLINIC | Age: 49
End: 2019-08-16
Payer: COMMERCIAL

## 2019-08-16 ENCOUNTER — TRANSCRIBE ORDERS (OUTPATIENT)
Dept: LAB | Facility: CLINIC | Age: 49
End: 2019-08-16

## 2019-08-16 DIAGNOSIS — Z98.84 S/P LAPAROSCOPIC SLEEVE GASTRECTOMY: ICD-10-CM

## 2019-08-16 DIAGNOSIS — K91.2 POSTSURGICAL MALABSORPTION: ICD-10-CM

## 2019-08-16 DIAGNOSIS — R53.83 OTHER FATIGUE: ICD-10-CM

## 2019-08-16 DIAGNOSIS — E66.3 OVERWEIGHT (BMI 25.0-29.9): ICD-10-CM

## 2019-08-16 LAB
25(OH)D3 SERPL-MCNC: 23.7 NG/ML (ref 30–100)
FERRITIN SERPL-MCNC: 76 NG/ML (ref 8–388)
FOLATE SERPL-MCNC: >20 NG/ML (ref 3.1–17.5)
IRON SATN MFR SERPL: 26 %
IRON SERPL-MCNC: 113 UG/DL (ref 50–170)
PTH-INTACT SERPL-MCNC: 70.2 PG/ML (ref 18.4–80.1)
TIBC SERPL-MCNC: 429 UG/DL (ref 250–450)
VIT B12 SERPL-MCNC: 691 PG/ML (ref 100–900)

## 2019-08-16 PROCEDURE — 84590 ASSAY OF VITAMIN A: CPT

## 2019-08-16 PROCEDURE — 83970 ASSAY OF PARATHORMONE: CPT

## 2019-08-16 PROCEDURE — 36415 COLL VENOUS BLD VENIPUNCTURE: CPT

## 2019-08-16 PROCEDURE — 84425 ASSAY OF VITAMIN B-1: CPT

## 2019-08-16 PROCEDURE — 83540 ASSAY OF IRON: CPT

## 2019-08-16 PROCEDURE — 83550 IRON BINDING TEST: CPT

## 2019-08-16 PROCEDURE — 82306 VITAMIN D 25 HYDROXY: CPT

## 2019-08-16 PROCEDURE — 82746 ASSAY OF FOLIC ACID SERUM: CPT

## 2019-08-16 PROCEDURE — 84630 ASSAY OF ZINC: CPT

## 2019-08-16 PROCEDURE — 82607 VITAMIN B-12: CPT

## 2019-08-16 PROCEDURE — 82525 ASSAY OF COPPER: CPT

## 2019-08-16 PROCEDURE — 82728 ASSAY OF FERRITIN: CPT

## 2019-08-17 LAB — ZINC SERPL-MCNC: 93 UG/DL (ref 56–134)

## 2019-08-19 DIAGNOSIS — F90.2 ADHD (ATTENTION DEFICIT HYPERACTIVITY DISORDER), COMBINED TYPE: ICD-10-CM

## 2019-08-19 DIAGNOSIS — F33.2 SEVERE EPISODE OF RECURRENT MAJOR DEPRESSIVE DISORDER, WITHOUT PSYCHOTIC FEATURES (HCC): ICD-10-CM

## 2019-08-19 RX ORDER — ATOMOXETINE 25 MG/1
CAPSULE ORAL
Qty: 90 CAPSULE | Refills: 0 | Status: SHIPPED | OUTPATIENT
Start: 2019-08-19 | End: 2019-09-05 | Stop reason: SDUPTHER

## 2019-08-19 RX ORDER — VENLAFAXINE HYDROCHLORIDE 150 MG/1
CAPSULE, EXTENDED RELEASE ORAL
Qty: 90 CAPSULE | Refills: 0 | Status: SHIPPED | OUTPATIENT
Start: 2019-08-19 | End: 2019-09-05 | Stop reason: SDUPTHER

## 2019-08-20 ENCOUNTER — TELEPHONE (OUTPATIENT)
Dept: PAIN MEDICINE | Facility: CLINIC | Age: 49
End: 2019-08-20

## 2019-08-20 LAB
COPPER SERPL-MCNC: 207 UG/DL (ref 72–166)
VIT A SERPL-MCNC: 52 UG/DL (ref 20.1–62)
VIT B1 BLD-SCNC: 117.7 NMOL/L (ref 66.5–200)

## 2019-08-20 NOTE — TELEPHONE ENCOUNTER
Left a detailed MOM informing pt, FQ is doing procedures Friday AM, keep appt on Thursday with Caryle Purser  Left c/b# for questions

## 2019-08-20 NOTE — TELEPHONE ENCOUNTER
Pt says she is having left side back pain  She says she prefers to see dr Meryl Ch  I did make her an appt with Chiquita Russell on 8/22  Pt said she is off on Friday if dr Meryl Ch can see her   Call back# [phone]

## 2019-08-28 DIAGNOSIS — R78.79 HIGH BLOOD COPPER LEVEL: Primary | ICD-10-CM

## 2019-08-28 DIAGNOSIS — Z98.84 BARIATRIC SURGERY STATUS: ICD-10-CM

## 2019-08-30 ENCOUNTER — SOCIAL WORK (OUTPATIENT)
Dept: BEHAVIORAL/MENTAL HEALTH CLINIC | Facility: CLINIC | Age: 49
End: 2019-08-30

## 2019-08-30 DIAGNOSIS — F33.1 MODERATE EPISODE OF RECURRENT MAJOR DEPRESSIVE DISORDER (HCC): Primary | ICD-10-CM

## 2019-08-30 DIAGNOSIS — F41.9 ANXIETY: ICD-10-CM

## 2019-08-30 PROCEDURE — 90834 PSYTX W PT 45 MINUTES: CPT | Performed by: SOCIAL WORKER

## 2019-08-30 NOTE — PSYCH
Psychotherapy Provided: Individual Psychotherapy 45 minutes     Length of time in session: 45 minutes, follow up in 2 weeks    Goals addressed in session: Goal 1     Pain:      moderate to severe symptoms of anxiety        Current suicide risk : Low     DATA: Met with Nneka Castillo for scheduled individual session  Nneka Castillo has some anxiety about starting a new school semester in which she will be taking a biology class  She had done well in the past when she had taken biology  She has reached out to her professor about her anxiety about the class and he or she encouraged her in looking at it as something new and a different age and time for her to be taking the class  Nneka Castillo feels pressured as she has to work on the weekend and needs to complete the first week's course work on this date  Nneka Castillo reported success in her past two classes  She got an A in psychology and a B in communications last semester  She reported she is not sure how she passed communications because she does not test well  She has not asked for any accommodations for her learning disability but feels that taking ADD medication (Strattera) has been helping her  She sometimes contemplates whether going to school was a good choice because she find it very stressful however she wants to advance in her life  Nneka Castillo has set a date to get  (August 1, 2020)  She feels a push to get  to appease her parents  They are getting older and they believe in marriage  She has some apprehension about getting  as she has been  twice before and she admits there are some things she is bothered by in the relationship  She thinks this is true of any relationship  She reported some stressors regarding his children and his inability to "say no" to his kids and a lack of boundaries  He has two daughters, ages 15 and 25  His one daughter is diagnosed with Bipolar and high functioning autism  Christina's partner is in recovery for six years   Her first  had issues with addiction  He recently got  and she stated she hopes he treats his new wife better than he did her  Laquita Moe also discussed feeling frustrated in trying to help her son who has a learning disability  He has a job but they are saying he is too slow  She has been trying to communicate with [employer] but they are not returning her calls  She reports feeling "stressed" because she does not want to financially support him forever  Her plan is if he can get an accomodation she may be able file for disability for him  Laquita Moe is also struggling with his schooling as he is not getting the instruction she feels he needs  He has an upcoming IEP meeting  Laquita Moe reports she feels "frustrated" overall  She reported she is 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  She states that she does not want to be a "," and just wants to work on the floor  She reported feeling irritable at work dealing with patients who come in reporting they are suicidal  She thinks they have various motivations and reported particular frustration dealing with one particular patient and her daughter who threatened to file a complaint against her  ASSESSMENT: Laquita Moe presents with a slightly depressed, somewhat agitated and anxious mood  Full range of affect is congruent to topic area  Laquita Moe exhibits good rapport with this clinician  Laquita Moe appears to have fair insight regarding triggers to her irritable mood  Laquita Moe continues to exhibit willingness to work on treatment goals and objectives  PLAN: Laquita Moe will return in two weeks for the next session  Between sessions, Laquita Moe will manage her moods and practice some mindfulness-based strategies and will report back during the next session re: successes and barriers   At the next session, this clinician will use client-centered therapy and mindfulness-based strategies to address her anxiety and depression, in an effort to assist Laquita Moe with meeting treatment goals  Behavioral Health Treatment Plan [facility]: Diagnosis and Treatment Plan explained to Mylene Mota relates understanding diagnosis and is agreeable to Treatment Plan   Yes

## 2019-08-31 DIAGNOSIS — K21.9 GASTROESOPHAGEAL REFLUX DISEASE WITHOUT ESOPHAGITIS: ICD-10-CM

## 2019-09-03 RX ORDER — PANTOPRAZOLE SODIUM 40 MG/1
TABLET, DELAYED RELEASE ORAL
Qty: 90 TABLET | Refills: 0 | Status: SHIPPED | OUTPATIENT
Start: 2019-09-03 | End: 2019-12-13 | Stop reason: SDUPTHER

## 2019-09-05 ENCOUNTER — OFFICE VISIT (OUTPATIENT)
Dept: PAIN MEDICINE | Facility: CLINIC | Age: 49
End: 2019-09-05
Payer: COMMERCIAL

## 2019-09-05 VITALS
HEIGHT: 67 IN | BODY MASS INDEX: 26.53 KG/M2 | DIASTOLIC BLOOD PRESSURE: 84 MMHG | SYSTOLIC BLOOD PRESSURE: 126 MMHG | WEIGHT: 169 LBS | RESPIRATION RATE: 18 BRPM

## 2019-09-05 DIAGNOSIS — F90.2 ADHD (ATTENTION DEFICIT HYPERACTIVITY DISORDER), COMBINED TYPE: ICD-10-CM

## 2019-09-05 DIAGNOSIS — M79.18 MYOFASCIAL PAIN SYNDROME: ICD-10-CM

## 2019-09-05 DIAGNOSIS — M51.16 INTERVERTEBRAL DISC DISORDERS WITH RADICULOPATHY, LUMBAR REGION: ICD-10-CM

## 2019-09-05 DIAGNOSIS — F33.2 SEVERE EPISODE OF RECURRENT MAJOR DEPRESSIVE DISORDER, WITHOUT PSYCHOTIC FEATURES (HCC): ICD-10-CM

## 2019-09-05 DIAGNOSIS — M47.816 LUMBAR SPONDYLOSIS: ICD-10-CM

## 2019-09-05 DIAGNOSIS — M79.18 MYOFASCIAL MUSCLE PAIN: ICD-10-CM

## 2019-09-05 DIAGNOSIS — M46.1 SACROILIITIS (HCC): Primary | ICD-10-CM

## 2019-09-05 PROCEDURE — 99214 OFFICE O/P EST MOD 30 MIN: CPT | Performed by: NURSE PRACTITIONER

## 2019-09-05 RX ORDER — VENLAFAXINE HYDROCHLORIDE 150 MG/1
CAPSULE, EXTENDED RELEASE ORAL
Qty: 90 CAPSULE | Refills: 0 | Status: SHIPPED | OUTPATIENT
Start: 2019-09-05 | End: 2019-11-07 | Stop reason: SDUPTHER

## 2019-09-05 RX ORDER — CYCLOBENZAPRINE HCL 10 MG
10 TABLET ORAL
Qty: 30 TABLET | Refills: 0 | Status: SHIPPED | OUTPATIENT
Start: 2019-09-05 | End: 2020-04-02 | Stop reason: ALTCHOICE

## 2019-09-05 RX ORDER — ATOMOXETINE 25 MG/1
CAPSULE ORAL
Qty: 90 CAPSULE | Refills: 0 | Status: SHIPPED | OUTPATIENT
Start: 2019-09-05 | End: 2019-11-07 | Stop reason: SDUPTHER

## 2019-09-05 NOTE — PROGRESS NOTES
Pt c/o lower back pain that radiates to the hip and leg    Assessment:  1  Sacroiliitis ([address] )    2  Intervertebral disc disorders with radiculopathy, lumbar region    3  Lumbar spondylosis    4  Myofascial pain syndrome    5  Myofascial muscle pain        Plan:  Stefano Kurtz is a 47 y o  female with a history of lumbar intervertebral disc disorder radiculopathy, lumbar spondylosis and myofascial pain syndrome  The patient presents today with ongoing left sided low back pain, despite recently undergoing a left L4 and right L5 transforaminal steroid injection  She reports 100% resolution of her right low back pain  She continues today with left-sided low back pain  She was noted to have tenderness over her left sacroiliac joint as well as a positive left Gaenslen and Fred's maneuver on examination  I discussed with the patient about proceeding with a left sacroiliac joint injection  She was educated on the procedures most common risks  She verbalized understanding, would like to proceed with the procedure  Therefore the patient be scheduled for an upcoming Tuesday, Thursday or Friday  The patient will continue on cyclobenzaprine as prescribed and a refill for this medication was sent electronically to the patient's pharmacy on file  Complete risks and benefits including bleeding, infection, tissue reaction, nerve injury and allergic reaction were discussed  The approach was demonstrated using models and literature was provided  The patient will follow up after her left sacroiliac joint injection, or sooner with the worsening of symptoms  My impressions and treatment recommendations were discussed in detail with the patient who verbalized understanding and had no further questions  Discharge instructions were provided  I personally saw and examined the patient and I agree with the above discussed plan of care      Orders Placed This Encounter   Procedures    FL spine and pain procedure Standing Status:   Future     Standing Expiration Date:   9/5/2023     Order Specific Question:   Reason for Exam:     Answer:   Left SI injection     Order Specific Question:   Is the patient pregnant? Answer:   No     Order Specific Question:   Anticoagulant hold needed? Answer:   No     New Medications Ordered This Visit   Medications    cyclobenzaprine (FLEXERIL) 10 mg tablet     Sig: Take 1 tablet (10 mg total) by mouth daily at bedtime     Dispense:  30 tablet     Refill:  0       History of Present Illness:  Leyda Howard is a 47 y o  female with a history of lumbar intervertebral disc disorder radiculopathy, lumbar spondylosis and myofascial pain syndrome  The patient was last seen office on 7/26/2019 where she underwent a left L4 and right L5 transforaminal steroid injection  She reports a 100% ongoing relief of symptoms on the right   She reports no relief of symptoms on the left  She presents for a follow up office visit in regards to Back Pain (radiates to the hips and legs); Hip Pain; and Leg Pain  The patients current symptoms include low back pain that radiates down the lateral aspect of her left leg to her knee  She denies bilateral leg weakness, or bowel or bladder issues  She describes her pain as sharp, throbbing, shooting pain that is intermittent nature with symptoms worsening during the evening hours  The patient reports that her pain and symptoms are unchanged since last office visit  She currently rates her pain a 6/10 numeric pain scale  I have personally reviewed and/or updated the patient's past medical history, past surgical history, family history, social history, current medications, allergies, and vital signs today  Review of Systems   Respiratory: Negative for shortness of breath  Cardiovascular: Negative for chest pain  Gastrointestinal: Negative for constipation, diarrhea, nausea and vomiting  Musculoskeletal: Positive for gait problem   Negative for arthralgias, joint swelling and myalgias  Decreased ROM  Joint stiffness   Skin: Negative for rash  Neurological: Negative for dizziness, seizures and weakness  All other systems reviewed and are negative  Patient Active Problem List   Diagnosis    IBS (irritable bowel syndrome)    Mixed hyperlipidemia    GERD (gastroesophageal reflux disease)    Depression    Chronic back pain    Anxiety    Cervical radiculopathy    Hepatic steatosis    Pulmonary nodule seen on imaging study    Dermatitis    S/P laparoscopic sleeve gastrectomy    Other fatigue    Encounter for annual routine gynecological examination    Overweight (BMI 25 0-29  9)    Postsurgical malabsorption    Lumbar radiculopathy    Intervertebral disc disorders with radiculopathy, lumbar region    Post traumatic stress disorder (PTSD)    Major depressive disorder, recurrent severe without psychotic features ([address] )    Generalized anxiety disorder       Past Medical History:   Diagnosis Date    ADHD (attention deficit hyperactivity disorder)     Anxiety     Bulging lumbar disc     Carpal tunnel syndrome     RIGHT  LAST ASSESSED: 12/7/16    Chronic back pain     low    Colon polyps     Depression     Diabetes mellitus ([address] )     on victoza    GERD (gastroesophageal reflux disease)     Gestational diabetes     Hearing loss     left ear    Hyperlipidemia     IBS (irritable bowel syndrome)     Ileus [address] [address] [facility] )     LAST ASSESSED: 8/3/17    Labial cyst     LAST ASSESSED: 4/21/16    Myofascial pain     LAST ASSESSED: 4/12/16    Obesity     Ovarian cyst     LEFT   LAST ASSESSED: 9/2/16    Panic attack     Pap smear for cervical cancer screening     4/2018--pap wnl, HRHPV neg    PTSD (post-traumatic stress disorder)     Seasonal allergies     Thoracic outlet syndrome     2010    Trochanteric bursitis of both hips     LAST ASSESSED: 3/18/16    Ulnar neuropathy at elbow     Varicella     Wears glasses        Past Surgical History:   Procedure Laterality Date    BILE DUCT EXPLORATION      ENDOSCOPIC REMOVAL OF STONES FROM BILIARY TRACT     SECTION      x3    CHOLECYSTECTOMY      COLONOSCOPY      DILATION AND CURETTAGE OF UTERUS      ENDOMETRIAL ABLATION      ERCP W/ SPHICTEROTOMY      FIRST RIB REMOVAL      FIRST RIB REMOVAL      THORAX EXCISION OF FIRST RIB    HYSTEROSCOPY      NEUROPLASTY / TRANSPOSITION ULNAR NERVE AT ELBOW      MT COLONOSCOPY FLX DX W/COLLJ SPEC WHEN PFRMD N/A 2017    Procedure: COLONOSCOPY;  Surgeon: Bev Thomas MD;  [facility]; Service: Gastroenterology    MT ESOPHAGOGASTRODUODENOSCOPY TRANSORAL DIAGNOSTIC N/A 2017    Procedure: ESOPHAGOGASTRODUODENOSCOPY (EGD); Surgeon: Raymond Alicea MD;  [facility];   Service: Gastroenterology    MT HYSTEROSCOPY,W/ENDO BX N/A 10/20/2017    Procedure: DILATATION AND CURETTAGE (D&C) WITH HYSTEROSCOPY  REMOVAL VULVAR RT  LESION;  Surgeon: Chandler Hahn MD;  [facility];  Service: Gynecology    MT LAP, ÁLVARO RESTRICT PROC, LONGITUDINAL GASTRECTOMY N/A 2018    Procedure: GASTRECTOMY SLEEVE LAPAROSCOPIC; INTRAOPERATIVE EGD ;  Surgeon: Chalmer Apley, MD;  [facility];  Service: Bariatrics    TONSILLECTOMY AND ADENOIDECTOMY      TUBAL LIGATION      ULNAR NERVE REPAIR Right     VEIN LIGATION AND STRIPPING Right     VULVA SURGERY  10/20/2017    BIOPSY    WISDOM TOOTH EXTRACTION         Family History   Problem Relation Age of Onset    Diabetes Mother     Other Mother         HYPERCHOLESTEROLEMIA    Breast cancer Mother         >50    Diabetes Father     Other Father         HYPERCHOLESTEROLEMIA    Prostate cancer Father     Hypertension Brother     Diabetes Brother     Other Brother         HYPERCHOLESTEROLEMIA    Alcohol abuse Family     Asthma Family     Other Family         BACK PAIN    Cancer Family     Depression Family     Neuropathy Family     Heart disease Family     Colon cancer Maternal Grandfather     Ovarian cancer Neg Hx     Uterine cancer Neg Hx        Social History     Occupational History    Occupation: ER TECH     Employer: [employer]   Tobacco Use    Smoking status: Former Smoker     Last attempt to quit: 2017     Years since quittin 3    Smokeless tobacco: Never Used   Substance and Sexual Activity    Alcohol use: Yes     Alcohol/week: 1 0 standard drinks     Types: 1 Standard drinks or equivalent per week     Comment: socially    Drug use: No    Sexual activity: Yes     Birth control/protection: OCP     Comment: lifetime partners: 6       Current Outpatient Medications on File Prior to Visit   Medication Sig    Biotin 94557 MCG TABS Take by mouth    Calcium Citrate-Vitamin D 500-500 MG-UNIT PACK Take by mouth    Cyanocobalamin ([employer]S VITAMIN B12 PO) Take by mouth    drospirenone-ethinyl estradiol (LIEN) 3-0 02 MG per tablet Take 1 tablet by mouth daily    gabapentin (NEURONTIN) 600 MG tablet Take 1 tablet (600 mg total) by mouth daily at bedtime    LORazepam (ATIVAN) 0 5 mg tablet Take 1 tablet 2 hours prior to procedure and may repeat just prior to procedure if anxiety persists   montelukast (SINGULAIR) 10 mg tablet Take 1 tablet (10 mg total) by mouth daily at bedtime    Multiple Vitamins-Minerals (MULTI COMPLETE PO) Take by mouth    pantoprazole (PROTONIX) 40 mg tablet TAKE ONE TABLET BY MOUTH DAILY 30 MINUTES BEFORE BEFORE BREAKFAST    Probiotic Product (PRO-BIOTIC BLEND PO) Take by mouth    QUEtiapine (SEROquel) 50 mg tablet Take 1 tablet (50 mg total) by mouth daily at bedtime for 90 days    aspirin 81 mg chewable tablet Chew 1 tablet (81 mg total) daily (Patient not taking: Reported on 2019)     No current facility-administered medications on file prior to visit          Allergies   Allergen Reactions    Ibuprofen      Due to gastric sleeve       Physical Exam:    /84 (BP Location: Left arm, Patient Position: Sitting, Cuff Size: Standard)   Resp 18   Ht 5' 7" (1 702 m)   Wt 76 7 kg (169 lb)   BMI 26 47 kg/m²     Constitutional:normal, well developed, well nourished, alert, in no distress and non-toxic and no overt pain behavior  and overweight  Eyes:anicteric  HEENT:grossly intact  Neck:supple, symmetric, trachea midline and no masses   Pulmonary:even and unlabored  Cardiovascular:No edema or pitting edema present  Skin:Normal without rashes or lesions and well hydrated  Psychiatric:Mood and affect appropriate  Neurologic:Cranial Nerves II-XII grossly intact  Musculoskeletal:antalgic     Lumbar Spine Exam    Appearance:  Normal lordosis  Palpation/Tenderness:  left sacroiliac joint tenderness  Sensory:  no sensory deficits noted  Range of Motion:  Flexion:  Minimally limited  with pain  Extension:  Minimally limited  with pain  Lateral Flexion - Left:  Minimally limited  with pain  Lateral Flexion - Right:  Minimally limited  with pain  Rotation - Left:  Minimally limited  with pain  Rotation - Right:  Minimally limited  with pain  Motor Strength:  Left hip flexion:  5/5  Right hip flexion:  5/5  Left knee extension:  5/5  Right knee extension:  5/5  Left foot dorsiflexion:  5/5  Left foot plantar flexion:  5/5  Right foot dorsiflexion:  5/5  Right foot plantar flexion:  5/5  Special Tests:  Left Fred's Maneuver:  positive  Right Fred's Maneuver:  negative  Left Gaenslen's Test:  positive  Right Gaenslen's Test:  negative]      Imaging  MRI LUMBAR SPINE WITHOUT CONTRAST     INDICATION: Chronic low back pain, left leg pain     COMPARISON:  Lumbar spine MRI 2/16/2016     TECHNIQUE:  Sagittal T1, sagittal T2, sagittal inversion recovery, axial T1 and axial T2, coronal T2        IMAGE QUALITY:  Diagnostic     FINDINGS:     VERTEBRAL BODIES:  There is mild straightening of normal lumbar lordosis  No spondylolisthesis  No compression fracture deformity    No abnormal bone marrow signal or suspicious discrete marrow lesion     SACRUM:  Normal signal within the sacrum  No evidence of insufficiency or stress fracture      DISTAL CORD AND CONUS:  Normal size and signal within the distal cord and conus        PARASPINAL SOFT TISSUES:  Visualized prevertebral and paraspinal soft tissue are unremarkable     LOWER THORACIC DISC SPACES:  Unremarkable     LUMBAR DISC SPACES:  Disc desiccation and disc height loss more pronounced at level L4-5 and L5-S1     L1-L2:  Mild bilateral facet arthropathy  No canal or foraminal stenosis     L2-L3:  Mild bilateral facet arthropathy  No canal or foraminal stenosis     L3-L4:  Minimal right asymmetric disc bulge  Mild bilateral facet arthropathy  No canal or foraminal stenosis     L4-L5:  Disc bulge with superimposed small left subarticular disc protrusion with small endplate osteophyte abutting infrasurface of exiting left L4 nerve root  Mild bilateral facet arthropathy  No spinal canal stenosis  Mild left foraminal narrowing      L5-S1:  Disc bulge with superimposed right subarticular disc protrusion abutting infrasurface of exiting right L5 nerve root  Mild bilateral facet arthropathy      IMPRESSION:     1  Degenerative changes without high-grade canal or foraminal stenosis  This is not significantly progressed from 2/16/2016      2  At the level L4-5 there is disc bulge with superimposed small left subarticular disc protrusion and a small endplate osteophyte abutting infrasurface of exiting left L4 nerve root without significant foraminal stenosis, not significantly changed from   2/16/2016  Correlate for clinical findings of left L4 radiculopathy      3  At the level of L5-S1 there is disc bulge with superimposed right subarticular disc protrusion abutting the infrasurface of exiting right L5 nerve root without significant foraminal stenosis, not significantly changed from 2/16/2016    Correlate for   clinical findings of right L5 radiculopathy         Workstation performed: [id number]

## 2019-09-11 ENCOUNTER — TELEPHONE (OUTPATIENT)
Dept: BEHAVIORAL/MENTAL HEALTH CLINIC | Facility: CLINIC | Age: 49
End: 2019-09-11

## 2019-09-11 NOTE — TELEPHONE ENCOUNTER
T/C to confirm appointment  Client did not auto-confirm this appointment; therefore, I made a direct confirmation call  She answered the call and confirmed that she will be in attendance

## 2019-09-12 ENCOUNTER — SOCIAL WORK (OUTPATIENT)
Dept: BEHAVIORAL/MENTAL HEALTH CLINIC | Facility: CLINIC | Age: 49
End: 2019-09-12
Payer: COMMERCIAL

## 2019-09-12 DIAGNOSIS — F33.2 MAJOR DEPRESSIVE DISORDER, RECURRENT SEVERE WITHOUT PSYCHOTIC FEATURES (HCC): Primary | ICD-10-CM

## 2019-09-12 DIAGNOSIS — F43.10 POST TRAUMATIC STRESS DISORDER (PTSD): ICD-10-CM

## 2019-09-12 DIAGNOSIS — F41.1 GENERALIZED ANXIETY DISORDER: ICD-10-CM

## 2019-09-12 PROCEDURE — 90834 PSYTX W PT 45 MINUTES: CPT | Performed by: SOCIAL WORKER
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
## 2019-09-16 ENCOUNTER — HOSPITAL ENCOUNTER (EMERGENCY)
Facility: HOSPITAL | Age: 49
Discharge: HOME | End: 2019-09-16
Attending: EMERGENCY MEDICINE
Payer: COMMERCIAL

## 2019-09-16 ENCOUNTER — APPOINTMENT (EMERGENCY)
Dept: RADIOLOGY | Facility: HOSPITAL | Age: 49
End: 2019-09-16
Payer: COMMERCIAL

## 2019-09-16 VITALS
BODY MASS INDEX: 27.11 KG/M2 | OXYGEN SATURATION: 96 % | DIASTOLIC BLOOD PRESSURE: 73 MMHG | HEART RATE: 70 BPM | RESPIRATION RATE: 18 BRPM | WEIGHT: 173.06 LBS | TEMPERATURE: 98.1 F | SYSTOLIC BLOOD PRESSURE: 118 MMHG

## 2019-09-16 DIAGNOSIS — R07.9 CHEST PAIN: ICD-10-CM

## 2019-09-16 DIAGNOSIS — R00.2 RAPID PALPITATIONS: Primary | ICD-10-CM

## 2019-09-16 DIAGNOSIS — R55 NEAR SYNCOPE: ICD-10-CM

## 2019-09-16 LAB
ALBUMIN SERPL BCP-MCNC: 3.4 G/DL (ref 3.5–5)
ALP SERPL-CCNC: 45 U/L (ref 46–116)
ALT SERPL W P-5'-P-CCNC: 33 U/L (ref 12–78)
ANION GAP SERPL CALCULATED.3IONS-SCNC: 10 MMOL/L (ref 4–13)
AST SERPL W P-5'-P-CCNC: 22 U/L (ref 5–45)
BASOPHILS # BLD AUTO: 0.01 THOUSANDS/ΜL (ref 0–0.1)
BASOPHILS NFR BLD AUTO: 0 % (ref 0–1)
BILIRUB SERPL-MCNC: 0.4 MG/DL (ref 0.2–1)
BUN SERPL-MCNC: 16 MG/DL (ref 5–25)
CALCIUM SERPL-MCNC: 8.9 MG/DL (ref 8.3–10.1)
CHLORIDE SERPL-SCNC: 104 MMOL/L (ref 100–108)
CO2 SERPL-SCNC: 26 MMOL/L (ref 21–32)
CREAT SERPL-MCNC: 0.75 MG/DL (ref 0.6–1.3)
EOSINOPHIL # BLD AUTO: 0.1 THOUSAND/ΜL (ref 0–0.61)
EOSINOPHIL NFR BLD AUTO: 2 % (ref 0–6)
ERYTHROCYTE [DISTWIDTH] IN BLOOD BY AUTOMATED COUNT: 12.4 % (ref 11.6–15.1)
GFR SERPL CREATININE-BSD FRML MDRD: 93 ML/MIN/1.73SQ M
GLUCOSE SERPL-MCNC: 108 MG/DL (ref 65–140)
HCT VFR BLD AUTO: 39.2 % (ref 34.8–46.1)
HGB BLD-MCNC: 13 G/DL (ref 11.5–15.4)
IMM GRANULOCYTES # BLD AUTO: 0.01 THOUSAND/UL (ref 0–0.2)
IMM GRANULOCYTES NFR BLD AUTO: 0 % (ref 0–2)
LYMPHOCYTES # BLD AUTO: 1.46 THOUSANDS/ΜL (ref 0.6–4.47)
LYMPHOCYTES NFR BLD AUTO: 22 % (ref 14–44)
MCH RBC QN AUTO: 30.1 PG (ref 26.8–34.3)
MCHC RBC AUTO-ENTMCNC: 33.2 G/DL (ref 31.4–37.4)
MCV RBC AUTO: 91 FL (ref 82–98)
MONOCYTES # BLD AUTO: 0.43 THOUSAND/ΜL (ref 0.17–1.22)
MONOCYTES NFR BLD AUTO: 6 % (ref 4–12)
NEUTROPHILS # BLD AUTO: 4.7 THOUSANDS/ΜL (ref 1.85–7.62)
NEUTS SEG NFR BLD AUTO: 70 % (ref 43–75)
NRBC BLD AUTO-RTO: 0 /100 WBCS
PLATELET # BLD AUTO: 236 THOUSANDS/UL (ref 149–390)
PMV BLD AUTO: 9.7 FL (ref 8.9–12.7)
POTASSIUM SERPL-SCNC: 3.9 MMOL/L (ref 3.5–5.3)
PROT SERPL-MCNC: 7.3 G/DL (ref 6.4–8.2)
RBC # BLD AUTO: 4.32 MILLION/UL (ref 3.81–5.12)
SODIUM SERPL-SCNC: 140 MMOL/L (ref 136–145)
TROPONIN I SERPL-MCNC: <0.02 NG/ML
TROPONIN I SERPL-MCNC: <0.02 NG/ML
WBC # BLD AUTO: 6.71 THOUSAND/UL (ref 4.31–10.16)

## 2019-09-16 PROCEDURE — 99285 EMERGENCY DEPT VISIT HI MDM: CPT

## 2019-09-16 PROCEDURE — 96361 HYDRATE IV INFUSION ADD-ON: CPT

## 2019-09-16 PROCEDURE — 80053 COMPREHEN METABOLIC PANEL: CPT | Performed by: EMERGENCY MEDICINE

## 2019-09-16 PROCEDURE — 99285 EMERGENCY DEPT VISIT HI MDM: CPT | Performed by: EMERGENCY MEDICINE

## 2019-09-16 PROCEDURE — 85025 COMPLETE CBC W/AUTO DIFF WBC: CPT | Performed by: EMERGENCY MEDICINE

## 2019-09-16 PROCEDURE — 36415 COLL VENOUS BLD VENIPUNCTURE: CPT | Performed by: EMERGENCY MEDICINE

## 2019-09-16 PROCEDURE — 96374 THER/PROPH/DIAG INJ IV PUSH: CPT

## 2019-09-16 PROCEDURE — 84484 ASSAY OF TROPONIN QUANT: CPT | Performed by: EMERGENCY MEDICINE

## 2019-09-16 PROCEDURE — 71046 X-RAY EXAM CHEST 2 VIEWS: CPT

## 2019-09-16 PROCEDURE — 93005 ELECTROCARDIOGRAM TRACING: CPT

## 2019-09-16 RX ORDER — MELATONIN
2000 DAILY
COMMUNITY
End: 2020-01-09 | Stop reason: ALTCHOICE

## 2019-09-16 RX ORDER — ONDANSETRON 2 MG/ML
4 INJECTION INTRAMUSCULAR; INTRAVENOUS ONCE
Status: COMPLETED | OUTPATIENT
Start: 2019-09-16 | End: 2019-09-16

## 2019-09-16 RX ADMIN — SODIUM CHLORIDE 1000 ML: 0.9 INJECTION, SOLUTION INTRAVENOUS at 08:13

## 2019-09-16 RX ADMIN — ONDANSETRON 4 MG: 2 INJECTION INTRAMUSCULAR; INTRAVENOUS at 08:14

## 2019-09-16 NOTE — ED PROVIDER NOTES
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, pt  felt "like a cold sweat, heart racing, and room was spinning " Pt  heart rate 180s  Pt  diaphoretic     50-year-old female presents to the ED with rapid palpitations  Patient is a hospital employee  She ran up 4 flights of stairs carrying a fire extinguisher and became very fatigued and diaphoretic and her heart began to race  When she return to her job she felt as if she was going to pass out and had a near syncopal episode  She did not lose consciousness  She denies feeling short of breath  She also developed left-sided chest pain and pain into the left arm that lasted for a few seconds  She was diaphoretic after exerting herself  She notified nursing that she was not feeling well and felt that her heart was racing and when she was initially placed on the monitor her heart rate was reading greater than 180 beats per minute  Her initial EKG was interpreted by the computer to have a rate of 148 however this is an error  Her pulse and EKG rate correlates with a heart rate of 75  It does appear that she has a possible ectopic P wave focus on the initial EKG that had resolved by the repeat EKG 5 minutes later  Patient currently states that she feels better  She admits to feeling anxious after having the rapid heart rate  She had a recent cardiac workup in May of this year after having an episode of chest pain  She had a normal exercise stress test at that time    She is scheduled to have an outpatient echocardiogram         History provided by:  Parent and medical records   used: No    Chest Pain   Pain location:  L chest  Pain quality: aching and sharp    Pain radiates to:  L arm  Pain radiates to the back: no    Pain severity:  Unable to specify  Onset quality:  Sudden  Progression:  Resolved  Chronicity: New  Context: movement and stress    Context: no trauma    Relieved by:  Rest  Worsened by:  Exertion and movement  Ineffective treatments:  None tried  Associated symptoms: anxiety, diaphoresis, dizziness, fatigue and palpitations    Associated symptoms: no abdominal pain, no anorexia, no cough, no fever, no shortness of breath and no syncope    Risk factors: diabetes mellitus and high cholesterol    Risk factors: no coronary artery disease and no smoking        Prior to Admission Medications   Prescriptions Last Dose Informant Patient Reported? Taking? Biotin 63533 MCG TABS  Self Yes Yes   Sig: Take by mouth   Calcium Citrate-Vitamin D 500-500 MG-UNIT PACK  Self Yes Yes   Sig: Take by mouth   Cyanocobalamin ([employer] VITAMIN B12 PO)  Self Yes Yes   Sig: Take by mouth   LORazepam (ATIVAN) 0 5 mg tablet   No Yes   Sig: Take 1 tablet 2 hours prior to procedure and may repeat just prior to procedure if anxiety persists     Multiple Vitamins-Minerals (MULTI COMPLETE PO)  Self Yes Yes   Sig: Take by mouth   Probiotic Product (PRO-BIOTIC BLEND PO)  Self Yes Yes   Sig: Take by mouth   QUEtiapine (SEROquel) 50 mg tablet   No Yes   Sig: Take 1 tablet (50 mg total) by mouth daily at bedtime for 90 days   aspirin 81 mg chewable tablet Not Taking at Unknown time  No No   Sig: Chew 1 tablet (81 mg total) daily   Patient not taking: Reported on 9/5/2019   atoMOXetine (STRATTERA) 25 mg capsule   No Yes   Sig: TAKE ONE CAPSULE BY MOUTH DAILY   cholecalciferol (VITAMIN D3) 1,000 units tablet   Yes Yes   Sig: Take 2,000 Units by mouth daily   cyclobenzaprine (FLEXERIL) 10 mg tablet   No Yes   Sig: Take 1 tablet (10 mg total) by mouth daily at bedtime   drospirenone-ethinyl estradiol (LIEN) 3-0 02 MG per tablet   No Yes   Sig: Take 1 tablet by mouth daily   gabapentin (NEURONTIN) 600 MG tablet   No Yes   Sig: Take 1 tablet (600 mg total) by mouth daily at bedtime   montelukast (SINGULAIR) 10 mg tablet   No Yes   Sig: Take 1 tablet (10 mg total) by mouth daily at bedtime   pantoprazole (PROTONIX) 40 mg tablet   No Yes   Sig: TAKE ONE TABLET BY MOUTH DAILY 30 MINUTES BEFORE BEFORE BREAKFAST   venlafaxine (EFFEXOR-XR) 150 mg 24 hr capsule   No Yes   Sig: TAKE ONE CAPSULE BY MOUTH DAILY      Facility-Administered Medications: None       Past Medical History:   Diagnosis Date    ADHD (attention deficit hyperactivity disorder)     Anxiety     Bulging lumbar disc     Carpal tunnel syndrome     RIGHT  LAST ASSESSED: 16    Chronic back pain     low    Colon polyps     Depression     Diabetes mellitus ([facility] [address] )     on victoza    GERD (gastroesophageal reflux disease)     Gestational diabetes     Hearing loss     left ear    Hyperlipidemia     IBS (irritable bowel syndrome)     Ileus ([facility] [address] )     LAST ASSESSED: 8/3/17    Labial cyst     LAST ASSESSED: 16    Myofascial pain     LAST ASSESSED: 16    Obesity     Ovarian cyst     LEFT  LAST ASSESSED: 16    Panic attack     Pap smear for cervical cancer screening     2018--pap wnl, HRHPV neg    PTSD (post-traumatic stress disorder)     Seasonal allergies     Thoracic outlet syndrome     2010    Trochanteric bursitis of both hips     LAST ASSESSED: 3/18/16    Ulnar neuropathy at elbow     Varicella     Wears glasses        Past Surgical History:   Procedure Laterality Date    BILE DUCT EXPLORATION      ENDOSCOPIC REMOVAL OF STONES FROM BILIARY TRACT     SECTION      x3    CHOLECYSTECTOMY      COLONOSCOPY      DILATION AND CURETTAGE OF UTERUS      ENDOMETRIAL ABLATION      ERCP W/ SPHICTEROTOMY      FIRST RIB REMOVAL      FIRST RIB REMOVAL      THORAX EXCISION OF FIRST RIB    HYSTEROSCOPY      NEUROPLASTY / TRANSPOSITION ULNAR NERVE AT ELBOW      ND COLONOSCOPY FLX DX W/COLLJ SPEC WHEN PFRMD N/A 2017    Procedure: COLONOSCOPY;  Surgeon: Mary Kate Slater MD;  [facility];   Service: Gastroenterology    ND ESOPHAGOGASTRODUODENOSCOPY TRANSORAL DIAGNOSTIC N/A 2017    Procedure: ESOPHAGOGASTRODUODENOSCOPY (EGD); Surgeon: Andriy Weaver MD;  [facility]; Service: Gastroenterology    GA HYSTEROSCOPY,W/ENDO BX N/A 10/20/2017    Procedure: DILATATION AND CURETTAGE (D&C) WITH HYSTEROSCOPY  REMOVAL VULVAR RT  LESION;  Surgeon: Debo Bo MD;  [facility];  Service: Gynecology    GA LAP, ÁLVARO RESTRICT PROC, LONGITUDINAL GASTRECTOMY N/A 2018    Procedure: GASTRECTOMY SLEEVE LAPAROSCOPIC; INTRAOPERATIVE EGD ;  Surgeon: Luz Maria Powell MD;  [facility];  Service: Bariatrics    TONSILLECTOMY AND ADENOIDECTOMY      TUBAL LIGATION      ULNAR NERVE REPAIR Right     VEIN LIGATION AND STRIPPING Right     VULVA SURGERY  10/20/2017    BIOPSY    WISDOM TOOTH EXTRACTION         Family History   Problem Relation Age of Onset    Diabetes Mother     Other Mother         HYPERCHOLESTEROLEMIA    Breast cancer Mother         >50    Diabetes Father     Other Father         HYPERCHOLESTEROLEMIA    Prostate cancer Father     Hypertension Brother     Diabetes Brother     Other Brother         HYPERCHOLESTEROLEMIA    Alcohol abuse Family     Asthma Family     Other Family         BACK PAIN    Cancer Family     Depression Family     Neuropathy Family     Heart disease Family     Colon cancer Maternal Grandfather     Ovarian cancer Neg Hx     Uterine cancer Neg Hx      I have reviewed and agree with the history as documented  Social History     Tobacco Use    Smoking status: Former Smoker     Last attempt to quit: 2017     Years since quittin 3    Smokeless tobacco: Never Used   Substance Use Topics    Alcohol use: Yes     Alcohol/week: 1 0 standard drinks     Types: 1 Standard drinks or equivalent per week     Comment: socially    Drug use: No        Review of Systems   Constitutional: Positive for diaphoresis and fatigue  Negative for fever  Respiratory: Negative for cough and shortness of breath  Cardiovascular: Positive for chest pain and palpitations  Negative for syncope  Gastrointestinal: Negative for abdominal pain and anorexia  Neurological: Positive for dizziness  All other systems reviewed and are negative  Physical Exam  Physical Exam   Constitutional: She is oriented to person, place, and time  She appears well-developed and well-nourished  No distress  HENT:   Head: Normocephalic  Nose: Nose normal    Mouth/Throat: Oropharynx is clear and moist  No oropharyngeal exudate  Eyes: Pupils are equal, round, and reactive to light  Conjunctivae and EOM are normal    Neck: Normal range of motion  Neck supple  Cardiovascular: Normal rate, regular rhythm, normal heart sounds and intact distal pulses  No extrasystoles are present  No murmur heard  Pulmonary/Chest: Effort normal and breath sounds normal  No tachypnea  No respiratory distress  She has no wheezes  She has no rhonchi  She has no rales  Abdominal: Soft  Bowel sounds are normal  She exhibits no distension  There is no tenderness  There is no rebound and no guarding  Musculoskeletal: Normal range of motion  She exhibits no edema, tenderness or deformity  Lymphadenopathy:     She has no cervical adenopathy  Neurological: She is alert and oriented to person, place, and time  She has normal strength and normal reflexes  No cranial nerve deficit or sensory deficit  She exhibits normal muscle tone  Coordination and gait normal    Skin: Skin is warm, dry and intact  No rash noted  Psychiatric: Her behavior is normal  Judgment and thought content normal  Her mood appears anxious  Nursing note and vitals reviewed        Vital Signs  ED Triage Vitals [09/16/19 0746]   Temperature Pulse Respirations Blood Pressure SpO2   98 1 °F (36 7 °C) (!) 187 20 137/62 98 %      Temp Source Heart Rate Source Patient Position - Orthostatic VS BP Location FiO2 (%)   Oral Monitor Lying Right arm --      Pain Score       5           Vitals: 09/16/19 0746 09/16/19 0749 09/16/19 0846   BP: 137/62 125/75 118/73   Pulse: (!) 187 82 70   Patient Position - Orthostatic VS: Lying Lying Lying         Visual Acuity      ED Medications  Medications   ondansetron (ZOFRAN) injection 4 mg (4 mg Intravenous Given 9/16/19 0814)   sodium chloride 0 9 % bolus 1,000 mL (0 mL Intravenous Stopped 9/16/19 0913)       Diagnostic Studies  Results Reviewed     Procedure Component Value Units Date/Time    Troponin I [[id number]]  (Normal) Collected:  09/16/19 1151    Lab Status:  Final result Specimen:  Blood from Arm, Right Updated:  09/16/19 1217     Troponin I <0 02 ng/mL     Troponin I [[id number]]  (Normal) Collected:  09/16/19 0809    Lab Status:  Final result Specimen:  Blood from Arm, Right Updated:  09/16/19 0833     Troponin I <0 02 ng/mL     Comprehensive metabolic panel [[id number]]  (Abnormal) Collected:  09/16/19 0809    Lab Status:  Final result Specimen:  Blood from Arm, Right Updated:  09/16/19 0830     Sodium 140 mmol/L      Potassium 3 9 mmol/L      Chloride 104 mmol/L      CO2 26 mmol/L      ANION GAP 10 mmol/L      BUN 16 mg/dL      Creatinine 0 75 mg/dL      Glucose 108 mg/dL      Calcium 8 9 mg/dL      AST 22 U/L      ALT 33 U/L      Alkaline Phosphatase 45 U/L      Total Protein 7 3 g/dL      Albumin 3 4 g/dL      Total Bilirubin 0 40 mg/dL      eGFR 93 ml/min/1 73sq m     Narrative:       José guidelines for Chronic Kidney Disease (CKD):     Stage 1 with normal or high GFR (GFR > 90 mL/min/1 73 square meters)    Stage 2 Mild CKD (GFR = 60-89 mL/min/1 73 square meters)    Stage 3A Moderate CKD (GFR = 45-59 mL/min/1 73 square meters)    Stage 3B Moderate CKD (GFR = 30-44 mL/min/1 73 square meters)    Stage 4 Severe CKD (GFR = 15-29 mL/min/1 73 square meters)    Stage 5 End Stage CKD (GFR <15 mL/min/1 73 square meters)  Note: GFR calculation is accurate only with a steady state creatinine    CBC and differential [[id number]] Collected:  09/16/19 0809    Lab Status:  Final result Specimen:  Blood from Arm, Right Updated:  09/16/19 0816     WBC 6 71 Thousand/uL      RBC 4 32 Million/uL      Hemoglobin 13 0 g/dL      Hematocrit 39 2 %      MCV 91 fL      MCH 30 1 pg      MCHC 33 2 g/dL      RDW 12 4 %      MPV 9 7 fL      Platelets 143 Thousands/uL      nRBC 0 /100 WBCs      Neutrophils Relative 70 %      Immat GRANS % 0 %      Lymphocytes Relative 22 %      Monocytes Relative 6 %      Eosinophils Relative 2 %      Basophils Relative 0 %      Neutrophils Absolute 4 70 Thousands/µL      Immature Grans Absolute 0 01 Thousand/uL      Lymphocytes Absolute 1 46 Thousands/µL      Monocytes Absolute 0 43 Thousand/µL      Eosinophils Absolute 0 10 Thousand/µL      Basophils Absolute 0 01 Thousands/µL                  XR chest 2 views   Final Result by Adithya Ortiz MD (09/16 0830)      No acute cardiopulmonary disease              Workstation performed: [id number]                    Procedures  ECG 12 Lead Documentation Only  Date/Time: 9/16/2019 7:49 AM  Performed by: Bharathi Mckeon DO  Authorized by: Bharathi Mckeon DO     Indications / Diagnosis:  Near syncope  ECG reviewed by me, the ED Provider: yes    Patient location:  ED  Previous ECG:     Previous ECG:  Compared to current    Comparison ECG info:  5/27/19    Similarity:  Changes noted  Interpretation:     Interpretation: non-specific    Quality:     Tracing quality:  Limited by artifact  Rate:     ECG rate:  75    ECG rate assessment: normal    Rhythm:     Rhythm: other rhythm      Rhythm comment:  ? ectopic atrial focus  QRS:     QRS axis:  Right    QRS intervals:  Normal  Conduction:     Conduction: abnormal      Abnormal conduction: incomplete RBBB    ST segments:     ST segments:  Normal  T waves:     T waves: normal      ECG 12 Lead Documentation Only  Date/Time: 9/16/2019 7:54 AM  Performed by: Bharathi Mckeon DO  Authorized by: Bharathi Mckeon DO     Indications / Diagnosis: Repeat  ECG reviewed by me, the ED Provider: yes    Patient location:  ED  Previous ECG:     Previous ECG:  Compared to current    Similarity:  Changes noted  Interpretation:     Interpretation: non-specific    Rate:     ECG rate:  70    ECG rate assessment: normal    Rhythm:     Rhythm: sinus rhythm    Ectopy:     Ectopy: none    QRS:     QRS axis:  Normal  Conduction:     Conduction: normal    ST segments:     ST segments:  Normal  T waves:     T waves: normal             ED Course  ED Course as of Sep 16 1657   Mon Sep 16, 2019   [id number] Patient resting comfortably in no acute distress she was updated with available blood work results              HEART Risk Score      Most Recent Value   History  1 Filed at: 09/16/2019 0813   ECG  0 Filed at: 09/16/2019 0813   Age  1 Filed at: 09/16/2019 0813   Risk Factors  2 Filed at: 09/16/2019 0813   Troponin  0 Filed at: 09/16/2019 0813   Heart Score Risk Calculator   History  1 Filed at: 09/16/2019 0813   ECG  0 Filed at: 09/16/2019 0813   Age  1 Filed at: 09/16/2019 0813   Risk Factors  2 Filed at: 09/16/2019 0813   Troponin  0 Filed at: 09/16/2019 0813   HEART Score  4 Filed at: 09/16/2019 0813   HEART Score  4 Filed at: 09/16/2019 0813                            MDM  Number of Diagnoses or Management Options  Chest pain: new and requires workup  Near syncope: new and requires workup  Rapid palpitations: new and requires workup     Amount and/or Complexity of Data Reviewed  Clinical lab tests: ordered and reviewed  Tests in the radiology section of CPT®: ordered and reviewed  Tests in the medicine section of CPT®: ordered and reviewed  Decide to obtain previous medical records or to obtain history from someone other than the patient: yes  Obtain history from someone other than the patient: yes  Independent visualization of images, tracings, or specimens: yes    Risk of Complications, Morbidity, and/or Mortality  General comments: 58-year-old female presents with rapid palpitations and near syncopal episode after extreme exertion running up steps and carrying a fire extinguisher  Patient had a brief episode of chest pain  She has a normal EKG and 2 negative troponins in the department  Her symptoms resolved shortly after onset and have not recurred she reports feeling better  I suspect she may have had a brief episode of SVT that may have caused her symptoms that resolved by the time she was placed on the monitor  She had a recent outpatient cardiac stress test (May) that was normal   She does have follow up with her PCP and does plan to have an outpatient echocardiogram   If she has recurrent palpitations she may benefit from Holter monitor  Discussed signs and symptoms to return to the emergency department  Patient Progress  Patient progress: stable      Disposition  Final diagnoses:   Rapid palpitations   Chest pain   Near syncope     Time reflects when diagnosis was documented in both MDM as applicable and the Disposition within this note     Time User Action Codes Description Comment    9/16/2019 12:39 PM Eda Madrid [R00 2] Rapid palpitations     9/16/2019 12:39 PM Eda Madrid [R07 9] Chest pain     9/16/2019  1:02 PM Eda Madrid [R55] Near syncope       ED Disposition     ED Disposition Condition Date/Time Comment    Discharge Stable Mon Sep 16, 2019 12:39 PM Blane Barron discharge to home/self care  Follow-up Information     Follow up With Specialties Details Why Contact Info Additional [address] [facility] [place] Schedule an appointment as soon as possible for a visit in 2 days For recheck of current symptoms [phone] Cardiology Schedule an appointment as soon as possible for a visit  For recheck of current symptoms [phone] [facility] [place], [facility] [place], [place], [address]          Discharge Medication List as of 9/16/2019 12:41 PM      CONTINUE these medications which have NOT CHANGED    Details   atoMOXetine (STRATTERA) 25 mg capsule TAKE ONE CAPSULE BY MOUTH DAILY, Normal      Biotin 31940 MCG TABS Take by mouth, Historical Med      Calcium Citrate-Vitamin D 500-500 MG-UNIT PACK Take by mouth, Historical Med      cholecalciferol (VITAMIN D3) 1,000 units tablet Take 2,000 Units by mouth daily, Historical Med      Cyanocobalamin ([employer] VITAMIN B12 PO) Take by mouth, Historical Med      cyclobenzaprine (FLEXERIL) 10 mg tablet Take 1 tablet (10 mg total) by mouth daily at bedtime, Starting Thu 9/5/2019, Normal      drospirenone-ethinyl estradiol (LIEN) 3-0 02 MG per tablet Take 1 tablet by mouth daily, Starting Fri 5/3/2019, Normal      gabapentin (NEURONTIN) 600 MG tablet Take 1 tablet (600 mg total) by mouth daily at bedtime, Starting Wed 6/5/2019, Normal      LORazepam (ATIVAN) 0 5 mg tablet Take 1 tablet 2 hours prior to procedure and may repeat just prior to procedure if anxiety persists , Normal      montelukast (SINGULAIR) 10 mg tablet Take 1 tablet (10 mg total) by mouth daily at bedtime, Starting Thu 6/13/2019, Normal      Multiple Vitamins-Minerals (MULTI COMPLETE PO) Take by mouth, Historical Med      pantoprazole (PROTONIX) 40 mg tablet TAKE ONE TABLET BY MOUTH DAILY 30 MINUTES BEFORE BEFORE BREAKFAST, Normal      Probiotic Product (PRO-BIOTIC BLEND PO) Take by mouth, Historical Med      QUEtiapine (SEROquel) 50 mg tablet Take 1 tablet (50 mg total) by mouth daily at bedtime for 90 days, Starting Fri 6/21/2019, Until Thu 9/19/2019, Normal      venlafaxine (EFFEXOR-XR) 150 mg 24 hr capsule TAKE ONE CAPSULE BY MOUTH DAILY, Normal      aspirin 81 mg chewable tablet Chew 1 tablet (81 mg total) daily, Starting Tue 5/28/2019, Normal           No discharge procedures on file      ED Provider  Electronically Signed by           James Peters,   09/16/19 Kaitlyn [id number], DO  09/16/19 8668

## 2019-09-17 ENCOUNTER — TELEPHONE (OUTPATIENT)
Dept: PAIN MEDICINE | Facility: MEDICAL CENTER | Age: 49
End: 2019-09-17

## 2019-09-17 LAB
ATRIAL RATE: 66 BPM
ATRIAL RATE: 74 BPM
ATRIAL RATE: 88 BPM
P AXIS: 229 DEGREES
P AXIS: 57 DEGREES
P AXIS: 67 DEGREES
PR INTERVAL: 154 MS
PR INTERVAL: 182 MS
PR INTERVAL: 182 MS
QRS AXIS: 131 DEGREES
QRS AXIS: 141 DEGREES
QRS AXIS: 155 DEGREES
QRSD INTERVAL: 82 MS
QRSD INTERVAL: 88 MS
QRSD INTERVAL: 90 MS
QT INTERVAL: 360 MS
QT INTERVAL: 380 MS
QT INTERVAL: 404 MS
QTC INTERVAL: 422 MS
QTC INTERVAL: 423 MS
QTC INTERVAL: 565 MS
T WAVE AXIS: 50 DEGREES
T WAVE AXIS: 50 DEGREES
T WAVE AXIS: 68 DEGREES
VENTRICULAR RATE: 148 BPM
VENTRICULAR RATE: 66 BPM
VENTRICULAR RATE: 74 BPM

## 2019-09-17 PROCEDURE — 93010 ELECTROCARDIOGRAM REPORT: CPT | Performed by: INTERNAL MEDICINE

## 2019-09-17 NOTE — TELEPHONE ENCOUNTER
Pt called stating [facility] was going to send ativan over to the pharmacy because pt is getting injections on Thursday      Pt can be reached at [phone]

## 2019-09-18 DIAGNOSIS — F41.9 ANXIETY: ICD-10-CM

## 2019-09-18 RX ORDER — LORAZEPAM 0.5 MG/1
TABLET ORAL
Qty: 2 TABLET | Refills: 0 | Status: SHIPPED | OUTPATIENT
Start: 2019-09-18 | End: 2019-12-16 | Stop reason: SDUPTHER

## 2019-09-18 NOTE — TELEPHONE ENCOUNTER
The message below from yest was never routed until now  Pt needs HA to send the ativan over to the pharmacy ASAP as her inj is tomorrow  Looks like you LP prescribed the ativan back in July before her inj too  Pls notify pt once script has been sent

## 2019-09-18 NOTE — TELEPHONE ENCOUNTER
Prescription for lorazepam was sent to the patient's pharmacy on file  She was instructed to take lorazepam 0 5 mg 1 tablet 2 hours prior to her procedure and she may take an additional tablet just prior to her procedure if anxiety persist     Please inform her that she must have a  to and from the procedure  [place] prescription monitoring drug program report was reviewed and was appropriate

## 2019-09-19 ENCOUNTER — HOSPITAL ENCOUNTER (OUTPATIENT)
Dept: RADIOLOGY | Facility: CLINIC | Age: 49
Discharge: HOME | End: 2019-09-19
Attending: ANESTHESIOLOGY | Admitting: ANESTHESIOLOGY
Payer: COMMERCIAL

## 2019-09-19 VITALS
SYSTOLIC BLOOD PRESSURE: 129 MMHG | RESPIRATION RATE: 16 BRPM | DIASTOLIC BLOOD PRESSURE: 80 MMHG | TEMPERATURE: 98.2 F | HEART RATE: 68 BPM | OXYGEN SATURATION: 98 %

## 2019-09-19 DIAGNOSIS — M46.1 SACROILIITIS (HCC): ICD-10-CM

## 2019-09-19 PROCEDURE — 27096 INJECT SACROILIAC JOINT: CPT | Performed by: ANESTHESIOLOGY

## 2019-09-19 RX ORDER — METHYLPREDNISOLONE ACETATE 80 MG/ML
80 INJECTION, SUSPENSION INTRA-ARTICULAR; INTRALESIONAL; INTRAMUSCULAR; PARENTERAL; SOFT TISSUE ONCE
Status: COMPLETED | OUTPATIENT
Start: 2019-09-19 | End: 2019-09-19

## 2019-09-19 RX ORDER — BUPIVACAINE HCL/PF 2.5 MG/ML
10 VIAL (ML) INJECTION ONCE
Status: COMPLETED | OUTPATIENT
Start: 2019-09-19 | End: 2019-09-19

## 2019-09-19 RX ORDER — 0.9 % SODIUM CHLORIDE 0.9 %
10 VIAL (ML) INJECTION ONCE
Status: COMPLETED | OUTPATIENT
Start: 2019-09-19 | End: 2019-09-19

## 2019-09-19 RX ADMIN — SODIUM CHLORIDE 3 ML: 9 INJECTION, SOLUTION INTRAMUSCULAR; INTRAVENOUS; SUBCUTANEOUS at 15:20

## 2019-09-19 RX ADMIN — IOHEXOL 1 ML: 300 INJECTION, SOLUTION INTRAVENOUS at 15:21

## 2019-09-19 RX ADMIN — Medication 3 ML: at 15:20

## 2019-09-19 RX ADMIN — METHYLPREDNISOLONE ACETATE 80 MG: 80 INJECTION, SUSPENSION INTRA-ARTICULAR; INTRALESIONAL; INTRAMUSCULAR; PARENTERAL; SOFT TISSUE at 15:21

## 2019-09-19 RX ADMIN — BUPIVACAINE HYDROCHLORIDE 3 ML: 2.5 INJECTION, SOLUTION EPIDURAL; INFILTRATION; INTRACAUDAL at 15:21

## 2019-09-19 NOTE — DISCHARGE INSTRUCTIONS
Steroid Joint Injection   WHAT YOU NEED TO KNOW:   A steroid joint injection is a procedure to inject steroid medicine into a joint  Steroid medicine decreases pain and inflammation  The injection may also contain an anesthetic (numbing medicine) to decrease pain  It may be done to treat conditions such as arthritis, gout, or carpal tunnel syndrome  The injections may be given in your knee, ankle, shoulder, elbow, wrist, ankle or sacroiliac joint  1  Do not apply heat to any area that is numb  If you have discomfort or soreness at the injection site, you may apply ice today, 20 minutes on and 20 minutes off  Tomorrow you may use ice or warm, moist heat  Do not apply ice or heat directly to the skin  2  You may have an increase or change in the discomfort for 36-48 hours after your treatment  Apply ice and continue with any pain medicine you have been prescribed  3  Do not do anything strenuous today  You may shower, but no tub baths or hot tubs today  You may resume your normal activities tomorrow, but do not overdo it  Resume normal activities slowly when you are feeling better  4  If you experience redness, drainage or swelling at the injection site, or if you develop a fever above 100 degrees, please call [facility] at [phone] or go to the Emergency Room  5  Continue to take all routine medicines prescribed by your primary care physician unless otherwise instructed by our staff  Most blood thinners should be started again according to your regularly scheduled dosing  If you have any questions, please give our office a call  If you have a problem specifically related to your procedure, please call our office at [phone]  Problems not related to your procedure should be directed to your primary care physician

## 2019-09-19 NOTE — H&P
History of Present Illness: The patient is a 47 y o  female who presents with complaints of left low back pain secondary to sacroiliitis and is here today for left-sided sacroiliac joint injection  Patient Active Problem List   Diagnosis    IBS (irritable bowel syndrome)    Mixed hyperlipidemia    GERD (gastroesophageal reflux disease)    Depression    Chronic back pain    Anxiety    Cervical radiculopathy    Hepatic steatosis    Pulmonary nodule seen on imaging study    Dermatitis    S/P laparoscopic sleeve gastrectomy    Other fatigue    Encounter for annual routine gynecological examination    Overweight (BMI 25 0-29  9)    Postsurgical malabsorption    Lumbar radiculopathy    Intervertebral disc disorders with radiculopathy, lumbar region    Post traumatic stress disorder (PTSD)    Major depressive disorder, recurrent severe without psychotic features ([address] )    Generalized anxiety disorder       Past Medical History:   Diagnosis Date    ADHD (attention deficit hyperactivity disorder)     Anxiety     Bulging lumbar disc     Carpal tunnel syndrome     RIGHT  LAST ASSESSED: 12/7/16    Chronic back pain     low    Colon polyps     Depression     Diabetes mellitus ([address] )     on victoza    GERD (gastroesophageal reflux disease)     Gestational diabetes     Hearing loss     left ear    Hyperlipidemia     IBS (irritable bowel syndrome)     Ileus ([address] )     LAST ASSESSED: 8/3/17    Labial cyst     LAST ASSESSED: 4/21/16    Myofascial pain     LAST ASSESSED: 4/12/16    Obesity     Ovarian cyst     LEFT   LAST ASSESSED: 9/2/16    Panic attack     Pap smear for cervical cancer screening     4/2018--pap wnl, HRHPV neg    PTSD (post-traumatic stress disorder)     Seasonal allergies     Thoracic outlet syndrome     2010    Trochanteric bursitis of both hips     LAST ASSESSED: 3/18/16    Ulnar neuropathy at elbow     Varicella     Wears glasses        Past Surgical History: Procedure Laterality Date    BILE DUCT EXPLORATION      ENDOSCOPIC REMOVAL OF STONES FROM BILIARY TRACT     SECTION      x3    CHOLECYSTECTOMY      COLONOSCOPY      DILATION AND CURETTAGE OF UTERUS      ENDOMETRIAL ABLATION      ERCP W/ SPHICTEROTOMY      FIRST RIB REMOVAL      FIRST RIB REMOVAL      THORAX EXCISION OF FIRST RIB    HYSTEROSCOPY      NEUROPLASTY / TRANSPOSITION ULNAR NERVE AT ELBOW      NE COLONOSCOPY FLX DX W/COLLJ SPEC WHEN PFRMD N/A 2017    Procedure: COLONOSCOPY;  Surgeon: Mary Kate Slater MD;  [facility]; Service: Gastroenterology    NE ESOPHAGOGASTRODUODENOSCOPY TRANSORAL DIAGNOSTIC N/A 2017    Procedure: ESOPHAGOGASTRODUODENOSCOPY (EGD); Surgeon: Steve Costa MD;  [facility];   Service: Gastroenterology    NE HYSTEROSCOPY,W/ENDO BX N/A 10/20/2017    Procedure: DILATATION AND CURETTAGE (D&C) WITH HYSTEROSCOPY  REMOVAL VULVAR RT  LESION;  Surgeon: Yvonne Christian MD;  [facility];  Service: Gynecology    NE LAP, ÁLVARO RESTRICT PROC, LONGITUDINAL GASTRECTOMY N/A 2018    Procedure: GASTRECTOMY SLEEVE LAPAROSCOPIC; INTRAOPERATIVE EGD ;  Surgeon: Eder Marroquin MD;  [facility];  Service: Bariatrics    TONSILLECTOMY AND ADENOIDECTOMY      TUBAL LIGATION      ULNAR NERVE REPAIR Right     VEIN LIGATION AND STRIPPING Right     VULVA SURGERY  10/20/2017    BIOPSY    WISDOM TOOTH EXTRACTION           Current Outpatient Medications:     aspirin 81 mg chewable tablet, Chew 1 tablet (81 mg total) daily (Patient not taking: Reported on 2019), Disp: 30 tablet, Rfl: 1    atoMOXetine (STRATTERA) 25 mg capsule, TAKE ONE CAPSULE BY MOUTH DAILY, Disp: 90 capsule, Rfl: 0    Biotin 17121 MCG TABS, Take by mouth, Disp: , Rfl:     Calcium Citrate-Vitamin D 500-500 MG-UNIT PACK, Take by mouth, Disp: , Rfl:     cholecalciferol (VITAMIN D3) 1,000 units tablet, Take 2,000 Units by mouth daily, Disp: , Rfl:     Cyanocobalamin ([employer] VITAMIN B12 PO), Take by mouth, Disp: , Rfl:     cyclobenzaprine (FLEXERIL) 10 mg tablet, Take 1 tablet (10 mg total) by mouth daily at bedtime, Disp: 30 tablet, Rfl: 0    drospirenone-ethinyl estradiol (LIEN) 3-0 02 MG per tablet, Take 1 tablet by mouth daily, Disp: 84 tablet, Rfl: 2    gabapentin (NEURONTIN) 600 MG tablet, Take 1 tablet (600 mg total) by mouth daily at bedtime, Disp: 90 tablet, Rfl: 3    LORazepam (ATIVAN) 0 5 mg tablet, Take 1 tablet 2 hours prior to procedure and may repeat just prior to procedure if anxiety persists  , Disp: 2 tablet, Rfl: 0    montelukast (SINGULAIR) 10 mg tablet, Take 1 tablet (10 mg total) by mouth daily at bedtime, Disp: 90 tablet, Rfl: 1    Multiple Vitamins-Minerals (MULTI COMPLETE PO), Take by mouth, Disp: , Rfl:     pantoprazole (PROTONIX) 40 mg tablet, TAKE ONE TABLET BY MOUTH DAILY 30 MINUTES BEFORE BEFORE BREAKFAST, Disp: 90 tablet, Rfl: 0    Probiotic Product (PRO-BIOTIC BLEND PO), Take by mouth, Disp: , Rfl:     QUEtiapine (SEROquel) 50 mg tablet, Take 1 tablet (50 mg total) by mouth daily at bedtime for 90 days, Disp: 90 tablet, Rfl: 0    venlafaxine (EFFEXOR-XR) 150 mg 24 hr capsule, TAKE ONE CAPSULE BY MOUTH DAILY, Disp: 90 capsule, Rfl: 0    Current Facility-Administered Medications:     bupivacaine (PF) (MARCAINE) 0 25 % injection 10 mL, 10 mL, Intra-articular, Once, Moy Brunson MD    iohexol (OMNIPAQUE) 300 mg/mL injection 50 mL, 50 mL, Intra-articular, Once, Moy Brunson MD    lidocaine (PF) (XYLOCAINE-MPF) 2 % injection 5 mL, 5 mL, Infiltration, Once, Moy Brunson MD    methylPREDNISolone acetate (DEPO-MEDROL) injection 80 mg, 80 mg, Intra-articular, Once, Moy Brunson MD    sodium chloride (PF) 0 9 % injection 10 mL, 10 mL, Infiltration, Once, Moy Brunson MD    Allergies   Allergen Reactions    Ibuprofen      Due to gastric sleeve       Physical Exam:   Vitals:    09/19/19 1451   BP: 112/75   Pulse: 67   Resp: 16   Temp: 98 2 °F (36 8 °C)   SpO2: 99%     General: Awake, Alert, Oriented x 3, Mood and affect appropriate  Respiratory: Respirations even and unlabored  Cardiovascular: Peripheral pulses intact; no edema  Musculoskeletal Exam:   Left lower back pain    ASA Score: 2    Patient/Chart Verification  Patient ID Verified: Verbal  Consents Confirmed: Procedural, To be obtained in the Pre-Procedure area  H&P( within 30 days) Verified: To be obtained in the Pre-Procedure area  Allergies Reviewed: No  Anticoag/NSAID held?: No  Currently on antibiotics?: No  Pregnancy denied?: NA    Assessment:   1   Sacroiliitis (HCC)        Plan: Left SI injection

## 2019-09-25 ENCOUNTER — TELEPHONE (OUTPATIENT)
Dept: OBGYN CLINIC | Facility: CLINIC | Age: 49
End: 2019-09-25

## 2019-09-25 DIAGNOSIS — Z30.41 ORAL CONTRACEPTIVE PILL SURVEILLANCE: ICD-10-CM

## 2019-09-25 RX ORDER — DROSPIRENONE AND ETHINYL ESTRADIOL 0.02-3(28)
1 KIT ORAL DAILY
Qty: 84 TABLET | Refills: 0 | Status: SHIPPED | OUTPATIENT
Start: 2019-09-25 | End: 2019-11-15 | Stop reason: SDUPTHER
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
## 2019-09-25 NOTE — TELEPHONE ENCOUNTER
Patient called asking for 90 day refill of her birth control medication   Has an annual scheduled for 11/15/2019 with Dr Chastity Lopez

## 2019-09-26 ENCOUNTER — OFFICE VISIT (OUTPATIENT)
Dept: PSYCHIATRY | Facility: CLINIC | Age: 49
End: 2019-09-26
Payer: COMMERCIAL

## 2019-09-26 ENCOUNTER — OFFICE VISIT (OUTPATIENT)
Dept: PAIN MEDICINE | Facility: CLINIC | Age: 49
End: 2019-09-26
Payer: COMMERCIAL

## 2019-09-26 ENCOUNTER — TELEPHONE (OUTPATIENT)
Dept: PAIN MEDICINE | Facility: CLINIC | Age: 49
End: 2019-09-26

## 2019-09-26 ENCOUNTER — HOSPITAL ENCOUNTER (OUTPATIENT)
Dept: RADIOLOGY | Facility: HOSPITAL | Age: 49
Discharge: HOME | End: 2019-09-26
Payer: COMMERCIAL

## 2019-09-26 VITALS
HEIGHT: 67 IN | RESPIRATION RATE: 16 BRPM | WEIGHT: 173 LBS | DIASTOLIC BLOOD PRESSURE: 86 MMHG | SYSTOLIC BLOOD PRESSURE: 130 MMHG | BODY MASS INDEX: 27.15 KG/M2

## 2019-09-26 DIAGNOSIS — M53.3 COCCYX PAIN: ICD-10-CM

## 2019-09-26 DIAGNOSIS — F33.2 SEVERE EPISODE OF RECURRENT MAJOR DEPRESSIVE DISORDER, WITHOUT PSYCHOTIC FEATURES (HCC): Primary | ICD-10-CM

## 2019-09-26 DIAGNOSIS — M54.16 LUMBAR RADICULOPATHY: ICD-10-CM

## 2019-09-26 DIAGNOSIS — M53.3 COCCYX PAIN: Primary | ICD-10-CM

## 2019-09-26 DIAGNOSIS — M51.16 INTERVERTEBRAL DISC DISORDERS WITH RADICULOPATHY, LUMBAR REGION: ICD-10-CM

## 2019-09-26 DIAGNOSIS — F90.2 ADHD (ATTENTION DEFICIT HYPERACTIVITY DISORDER), COMBINED TYPE: ICD-10-CM

## 2019-09-26 DIAGNOSIS — M54.12 CERVICAL RADICULOPATHY: ICD-10-CM

## 2019-09-26 PROCEDURE — 99213 OFFICE O/P EST LOW 20 MIN: CPT | Performed by: NURSE PRACTITIONER

## 2019-09-26 PROCEDURE — 72220 X-RAY EXAM SACRUM TAILBONE: CPT

## 2019-09-26 RX ORDER — QUETIAPINE FUMARATE 50 MG/1
50 TABLET, FILM COATED ORAL
Qty: 90 TABLET | Refills: 1 | Status: SHIPPED | OUTPATIENT
Start: 2019-09-26 | End: 2019-09-27 | Stop reason: SDUPTHER

## 2019-09-26 RX ORDER — LAMOTRIGINE 25 MG/1
25 TABLET ORAL DAILY
Qty: 90 TABLET | Refills: 0 | Status: SHIPPED | OUTPATIENT
Start: 2019-09-26 | End: 2019-09-27 | Stop reason: SDUPTHER

## 2019-09-26 NOTE — PROGRESS NOTES
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 and reports that her pain was slightly irritated further  She denies any new symptoms at this time  She recently had a left sacroiliac joint injection performed on 9/19/2019  I discussed with the patient that it can take up to 2 weeks to see the full results of this procedure  I encouraged her to give the injection a little more time to see if it will provide her any increased pain relief  She verbalized understanding  She is reporting increased sacral pain today and was noted to have pain with palpation of her sacrum  Therefore at this time I ordered the patient an x-ray of her sacrum for further evaluation  She was instructed that our office will call with results of this study once is completed  The patient was prescribed cyclobenzaprine last office visit for the myofascial component her pain  I did encourage her to continue with this medication on a consistent basis to help with the myofascial component her pain  She verbalized understanding  The patient will follow up in 4 weeks or sooner with the worsening of symptoms  My impressions and treatment recommendations were discussed in detail with the patient who verbalized understanding and had no further questions  Discharge instructions were provided  I personally saw and examined the patient and I agree with the above discussed plan of care      Orders Placed This Encounter Procedures    XR sacrum and coccyx     Standing Status:   Future     Number of Occurrences:   1     Standing Expiration Date:   9/26/2023     Scheduling Instructions:      Bring along any outside films relating to this procedure  Order Specific Question:   Is the patient pregnant? Answer:   No     No orders of the defined types were placed in this encounter  History of Present Illness:  John Oconnor is a 47 y o  female with a history of lumbar intervertebral disc disorder radiculopathy, sacroiliitis, lumbar spondylosis, myofascial pain syndrome  The patient was last seen office on 9/19/2019 where she underwent a left sacroiliac joint injection  She is reporting no relief of symptoms as of yet  She presents for a follow up office visit in regards to Back Pain (radiates to the left leg); Hip Pain; and Leg Pain  The patients current symptoms include left-sided low back pain that radiates into her left hip  She describes her pain as dull aching, sharp, throbbing, shooting pain that is constant nature with symptoms worsening during the morning hours  The patient reports that her pain and symptoms are unchanged since last office visit  She reports that her pain and symptoms slightly worsened since last office visit due to 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  She reported that she ran after the patient and grabbed the patient and felt increased pain in her low back and left leg  Current pain medications includes:  Cyclobenzaprine 10 mg at bedtime  The patient reports that this regimen is providing minimal pain relief  The patient is reporting no side effects from this pain medication regimen  I have personally reviewed and/or updated the patient's past medical history, past surgical history, family history, social history, current medications, allergies, and vital signs today  Review of Systems   Respiratory: Negative for shortness of breath  Cardiovascular: Negative for chest pain  Gastrointestinal: Negative for constipation, diarrhea, nausea and vomiting  Musculoskeletal: Positive for gait problem  Negative for arthralgias, joint swelling and myalgias  Joint stiffness   Skin: Negative for rash  Neurological: Negative for dizziness, seizures and weakness  All other systems reviewed and are negative  Patient Active Problem List   Diagnosis    IBS (irritable bowel syndrome)    Mixed hyperlipidemia    GERD (gastroesophageal reflux disease)    Depression    Chronic back pain    Anxiety    Cervical radiculopathy    Hepatic steatosis    Pulmonary nodule seen on imaging study    Dermatitis    S/P laparoscopic sleeve gastrectomy    Other fatigue    Encounter for annual routine gynecological examination    Overweight (BMI 25 0-29  9)    Postsurgical malabsorption    Lumbar radiculopathy    Intervertebral disc disorders with radiculopathy, lumbar region    Post traumatic stress disorder (PTSD)    Major depressive disorder, recurrent severe without psychotic features ([address] )    Generalized anxiety disorder    Sacroiliitis ([address] )       Past Medical History:   Diagnosis Date    ADHD (attention deficit hyperactivity disorder)     Anxiety     Bulging lumbar disc     Carpal tunnel syndrome     RIGHT  LAST ASSESSED: 12/7/16    Chronic back pain     low    Colon polyps     Depression     Diabetes mellitus ([address] )     on victoza    GERD (gastroesophageal reflux disease)     Gestational diabetes     Hearing loss     left ear    Hyperlipidemia     IBS (irritable bowel syndrome)     Ileus ([address] )     LAST ASSESSED: 8/3/17    Labial cyst     LAST ASSESSED: 4/21/16    Myofascial pain     LAST ASSESSED: 4/12/16    Obesity     Ovarian cyst     LEFT   LAST ASSESSED: 9/2/16    Panic attack     Pap smear for cervical cancer screening     4/2018--pap wnl, HRHPV neg    PTSD (post-traumatic stress disorder)     Seasonal allergies     Thoracic outlet syndrome     2010    Trochanteric bursitis of both hips     LAST ASSESSED: 3/18/16    Ulnar neuropathy at elbow     Varicella     Wears glasses        Past Surgical History:   Procedure Laterality Date    BILE DUCT EXPLORATION      ENDOSCOPIC REMOVAL OF STONES FROM BILIARY TRACT     SECTION      x3    CHOLECYSTECTOMY      COLONOSCOPY      DILATION AND CURETTAGE OF UTERUS      ENDOMETRIAL ABLATION      ERCP W/ SPHICTEROTOMY      FIRST RIB REMOVAL      FIRST RIB REMOVAL      THORAX EXCISION OF FIRST RIB    HYSTEROSCOPY      NEUROPLASTY / TRANSPOSITION ULNAR NERVE AT ELBOW      TX COLONOSCOPY FLX DX W/COLLJ SPEC WHEN PFRMD N/A 2017    Procedure: COLONOSCOPY;  Surgeon: Kylie Hendrickson MD;  [facility]; Service: Gastroenterology    TX ESOPHAGOGASTRODUODENOSCOPY TRANSORAL DIAGNOSTIC N/A 2017    Procedure: ESOPHAGOGASTRODUODENOSCOPY (EGD); Surgeon: Claudia Ahumada MD;  [facility];   Service: Gastroenterology    TX HYSTEROSCOPY,W/ENDO BX N/A 10/20/2017    Procedure: DILATATION AND CURETTAGE (D&C) WITH HYSTEROSCOPY  REMOVAL VULVAR RT  LESION;  Surgeon: Mohan Brush MD;  [facility];  Service: Gynecology    TX LAP, ÁLVARO RESTRICT PROC, LONGITUDINAL GASTRECTOMY N/A 2018    Procedure: GASTRECTOMY SLEEVE LAPAROSCOPIC; INTRAOPERATIVE EGD ;  Surgeon: Trista Talley MD;  [facility];  Service: Bariatrics    TONSILLECTOMY AND ADENOIDECTOMY      TUBAL LIGATION      ULNAR NERVE REPAIR Right     VEIN LIGATION AND STRIPPING Right     VULVA SURGERY  10/20/2017    BIOPSY    WISDOM TOOTH EXTRACTION         Family History   Problem Relation Age of Onset    Diabetes Mother     Other Mother         HYPERCHOLESTEROLEMIA    Breast cancer Mother         >50    Diabetes Father     Other Father         HYPERCHOLESTEROLEMIA    Prostate cancer Father     Hypertension Brother     Diabetes Brother     Other Brother HYPERCHOLESTEROLEMIA    Alcohol abuse Family     Asthma Family     Other Family         BACK PAIN    Cancer Family     Depression Family     Neuropathy Family     Heart disease Family     Colon cancer Maternal Grandfather     Ovarian cancer Neg Hx     Uterine cancer Neg Hx        Social History     Occupational History    Occupation: ER TECH     Employer: [employer]   Tobacco Use    Smoking status: Former Smoker     Last attempt to quit: 2017     Years since quittin 4    Smokeless tobacco: Never Used   Substance and Sexual Activity    Alcohol use: Yes     Alcohol/week: 1 0 standard drinks     Types: 1 Standard drinks or equivalent per week     Comment: socially    Drug use: No    Sexual activity: Not on file     Comment: lifetime partners: 6       Current Outpatient Medications on File Prior to Visit   Medication Sig    aspirin 81 mg chewable tablet Chew 1 tablet (81 mg total) daily    atoMOXetine (STRATTERA) 25 mg capsule TAKE ONE CAPSULE BY MOUTH DAILY    Biotin 69996 MCG TABS Take by mouth    Calcium Citrate-Vitamin D 500-500 MG-UNIT PACK Take by mouth    cholecalciferol (VITAMIN D3) 1,000 units tablet Take 2,000 Units by mouth daily    Cyanocobalamin ([employer] VITAMIN B12 PO) Take by mouth    cyclobenzaprine (FLEXERIL) 10 mg tablet Take 1 tablet (10 mg total) by mouth daily at bedtime    drospirenone-ethinyl estradiol (ILEN) 3-0 02 MG per tablet Take 1 tablet by mouth daily    gabapentin (NEURONTIN) 600 MG tablet Take 1 tablet (600 mg total) by mouth daily at bedtime    lamoTRIgine (LaMICtal) 25 mg tablet Take 1 tablet (25 mg total) by mouth daily Take 1 tablet (25mg total) for 3 weeks then increase to 2 tablets (50mg total)   LORazepam (ATIVAN) 0 5 mg tablet Take 1 tablet 2 hours prior to procedure and may repeat just prior to procedure if anxiety persists      montelukast (SINGULAIR) 10 mg tablet Take 1 tablet (10 mg total) by mouth daily at bedtime    Multiple Vitamins-Minerals (MULTI COMPLETE PO) Take by mouth    pantoprazole (PROTONIX) 40 mg tablet TAKE ONE TABLET BY MOUTH DAILY 30 MINUTES BEFORE BEFORE BREAKFAST    Probiotic Product (PRO-BIOTIC BLEND PO) Take by mouth    QUEtiapine (SEROquel) 50 mg tablet Take 1 tablet (50 mg total) by mouth daily at bedtime    venlafaxine (EFFEXOR-XR) 150 mg 24 hr capsule TAKE ONE CAPSULE BY MOUTH DAILY     No current facility-administered medications on file prior to visit  Allergies   Allergen Reactions    Ibuprofen      Due to gastric sleeve       Physical Exam:    /86 (BP Location: Right arm, Patient Position: Sitting, Cuff Size: Standard)   Resp 16   Ht 5' 7" (1 702 m)   Wt 78 5 kg (173 lb)   BMI 27 10 kg/m²     Constitutional:normal, well developed, well nourished, alert, in no distress and non-toxic and no overt pain behavior   and overweight  Eyes:anicteric  HEENT:grossly intact  Neck:supple, symmetric, trachea midline and no masses   Pulmonary:even and unlabored  Cardiovascular:No edema or pitting edema present  Skin:Normal without rashes or lesions and well hydrated  Psychiatric:Mood and affect appropriate  Neurologic:Cranial Nerves II-XII grossly intact  Musculoskeletal:antalgic    Lumbar Spine Exam    Appearance:  Normal lordosis  Palpation/Tenderness:  Sacral pain  Sensory:  no sensory deficits noted  Range of Motion:  Flexion:  Minimally limited  with pain  Extension:  Minimally limited  with pain  Lateral Flexion - Left:  Minimally limited  with pain  Lateral Flexion - Right:  Minimally limited  with pain  Rotation - Left:  Minimally limited  with pain  Rotation - Right:  Minimally limited  with pain  Motor Strength:  Left hip flexion:  5/5  Right hip flexion:  5/5  Left knee extension:  5/5  Right knee extension:  5/5  Left foot dorsiflexion:  5/5  Left foot plantar flexion:  5/5  Right foot dorsiflexion:  5/5  Right foot plantar flexion:  5/5    Imaging  MRI LUMBAR SPINE WITHOUT CONTRAST     INDICATION: Chronic low back pain, left leg pain     COMPARISON:  Lumbar spine MRI 2/16/2016     TECHNIQUE:  Sagittal T1, sagittal T2, sagittal inversion recovery, axial T1 and axial T2, coronal T2        IMAGE QUALITY:  Diagnostic     FINDINGS:     VERTEBRAL BODIES:  There is mild straightening of normal lumbar lordosis  No spondylolisthesis  No compression fracture deformity  No abnormal bone marrow signal or suspicious discrete marrow lesion     SACRUM:  Normal signal within the sacrum  No evidence of insufficiency or stress fracture      DISTAL CORD AND CONUS:  Normal size and signal within the distal cord and conus        PARASPINAL SOFT TISSUES:  Visualized prevertebral and paraspinal soft tissue are unremarkable     LOWER THORACIC DISC SPACES:  Unremarkable     LUMBAR DISC SPACES:  Disc desiccation and disc height loss more pronounced at level L4-5 and L5-S1     L1-L2:  Mild bilateral facet arthropathy  No canal or foraminal stenosis     L2-L3:  Mild bilateral facet arthropathy  No canal or foraminal stenosis     L3-L4:  Minimal right asymmetric disc bulge  Mild bilateral facet arthropathy  No canal or foraminal stenosis     L4-L5:  Disc bulge with superimposed small left subarticular disc protrusion with small endplate osteophyte abutting infrasurface of exiting left L4 nerve root  Mild bilateral facet arthropathy  No spinal canal stenosis  Mild left foraminal narrowing      L5-S1:  Disc bulge with superimposed right subarticular disc protrusion abutting infrasurface of exiting right L5 nerve root  Mild bilateral facet arthropathy      IMPRESSION:     1  Degenerative changes without high-grade canal or foraminal stenosis  This is not significantly progressed from 2/16/2016      2    At the level L4-5 there is disc bulge with superimposed small left subarticular disc protrusion and a small endplate osteophyte abutting infrasurface of exiting left L4 nerve root without significant foraminal stenosis, not significantly changed from   2/16/2016  Correlate for clinical findings of left L4 radiculopathy      3  At the level of L5-S1 there is disc bulge with superimposed right subarticular disc protrusion abutting the infrasurface of exiting right L5 nerve root without significant foraminal stenosis, not significantly changed from 2/16/2016    Correlate for   clinical findings of right L5 radiculopathy

## 2019-09-27 DIAGNOSIS — F33.2 SEVERE EPISODE OF RECURRENT MAJOR DEPRESSIVE DISORDER, WITHOUT PSYCHOTIC FEATURES (HCC): ICD-10-CM

## 2019-09-27 RX ORDER — QUETIAPINE FUMARATE 50 MG/1
50 TABLET, FILM COATED ORAL
Qty: 90 TABLET | Refills: 1 | Status: SHIPPED | OUTPATIENT
Start: 2019-09-27 | End: 2019-11-07 | Stop reason: SDUPTHER

## 2019-09-27 RX ORDER — LAMOTRIGINE 25 MG/1
25 TABLET ORAL DAILY
Qty: 90 TABLET | Refills: 0 | Status: SHIPPED | OUTPATIENT
Start: 2019-09-27 | End: 2019-11-07 | Stop reason: SDUPTHER

## 2019-09-27 NOTE — PROGRESS NOTES
Patient called office and stated that Enoc Alcantara in [place] never received the order that was placed for her Seroquel and Lamictal  Resent orders

## 2019-10-01 NOTE — TELEPHONE ENCOUNTER
Patient has upcoming appt with Donna Myers on 10/17  Please change appt to be with me on 10/18 or 10/21   Ok to double book

## 2019-10-02 NOTE — TELEPHONE ENCOUNTER
Left v/m for patient to call and schedule with Dr Per Flores  Per Dr Per Flores ok to book either 10/18 or 10/21

## 2019-10-03 ENCOUNTER — TELEPHONE (OUTPATIENT)
Dept: PAIN MEDICINE | Facility: CLINIC | Age: 49
End: 2019-10-03

## 2019-10-03 NOTE — TELEPHONE ENCOUNTER
Please call and let the patient know that the x-ray of her sacrum and coccyx was with in normal limits

## 2019-10-04 ENCOUNTER — SOCIAL WORK (OUTPATIENT)
Dept: BEHAVIORAL/MENTAL HEALTH CLINIC | Facility: CLINIC | Age: 49
End: 2019-10-04
Payer: COMMERCIAL

## 2019-10-04 DIAGNOSIS — F33.2 MAJOR DEPRESSIVE DISORDER, RECURRENT SEVERE WITHOUT PSYCHOTIC FEATURES (HCC): ICD-10-CM

## 2019-10-04 DIAGNOSIS — F43.10 POST TRAUMATIC STRESS DISORDER (PTSD): ICD-10-CM

## 2019-10-04 DIAGNOSIS — F41.1 GENERALIZED ANXIETY DISORDER: Primary | ICD-10-CM

## 2019-10-04 PROCEDURE — 90834 PSYTX W PT 45 MINUTES: CPT | Performed by: SOCIAL WORKER

## 2019-10-04 NOTE — PSYCH
Psychotherapy Provided: Individual Psychotherapy 45 minutes     Length of time in session: 45 minutes, follow up in 2 weeks    Goals addressed in session: Goal 1     Pain:      moderate to severe anxiety symptoms      Current suicide risk : Low      DATA: Met with Sasha Temple for scheduled individual session  "My anxiety has been really bad " Sasha Temple began taking Lamictal following a recent visit with Sulam Singh  She will progressively increase her dosage  She has been taking the medication for only a week and does not feel any difference in her anxiety at this time  Sasha Temple is concerned that her heart has been racing and wondering if her anxiety is the cause  Kenton Ranulfo states she has an upcoming cardiology appointment to ensure that there is no physiological reason for her tachycardia  This clinician provided positive encouragement regarding her decision to seek medical clearance for her symptoms, rather than just assuming that they were related to her anxiety  She reports stress at work and due to school work  She is also having a lot of stress with family relationships  She reports trying to help her son Elsy Getting has "been a lot " She is also frustrated with her fitiffany's daughter who has regular visits in the home and not respecting her rules  Sasha Temple also recently learned of the suicide of a co-worker  She feels she is a bit numb to suicide as she has experienced those around her commit suicide in the past  Kenton Winchester feels like she is unable to maintain focus at this time, and she states she has noticed that she is jumping from one thing to the next  She reports recently having to attend a meeting with human resources and needing to keep herself focused and calm which felt like "work," given her mood  She was advocating for a raise during the meeting for her level of employment and was supported by her boss  She reported she has to wait for the process now to see what happens   Kenton Winchester stated she is not afraid to speak up about what is right  This clinician pointed out that this is a strength of hers and validated her willingness to speak up in an appropriate manner  ASSESSMENT: Marybel Yarbrough presents with a depressed, anxious and irrtable mood  Affect is somewhat constricted, mood congruent and consistent with topic area  Marybel Yarbrough exhibits continued positive rapport with this clinician  Marybel Yarbrough appears to have normal insight and judgment  Marybel Yarbrough continues to exhibit willingness to work on treatment goals and objectives  PLAN: Marybel Yarbrough will return in 2 weeks for the next scheduled session  Between sessions, Marybel Yarbrough will continue to take her medications as prescribed and track her mood and will report back during the next session re: successes and barriers  At the next session, this clinician will use client-centered therapy, mindfulness-based strategies, DBT-informed skills and CBT techniques to address Christina's anxiety and depression in an effort to assist Marybel Yarbrough with meeting treatment goals  Behavioral Health Treatment Plan [facility]: Diagnosis and Treatment Plan explained to Harleen Small relates understanding diagnosis and is agreeable to Treatment Plan   Yes

## 2019-10-12 ENCOUNTER — HOSPITAL ENCOUNTER (EMERGENCY)
Facility: HOSPITAL | Age: 49
Discharge: HOME | End: 2019-10-12
Attending: EMERGENCY MEDICINE | Admitting: EMERGENCY MEDICINE
Payer: COMMERCIAL

## 2019-10-12 ENCOUNTER — APPOINTMENT (EMERGENCY)
Dept: CT IMAGING | Facility: HOSPITAL | Age: 49
End: 2019-10-12
Payer: COMMERCIAL

## 2019-10-12 VITALS
OXYGEN SATURATION: 97 % | WEIGHT: 181.44 LBS | HEART RATE: 79 BPM | RESPIRATION RATE: 18 BRPM | DIASTOLIC BLOOD PRESSURE: 64 MMHG | TEMPERATURE: 98 F | BODY MASS INDEX: 28.42 KG/M2 | SYSTOLIC BLOOD PRESSURE: 107 MMHG

## 2019-10-12 DIAGNOSIS — K52.9 GASTROENTERITIS: Primary | ICD-10-CM

## 2019-10-12 LAB
ALBUMIN SERPL BCP-MCNC: 3.2 G/DL (ref 3.5–5)
ALP SERPL-CCNC: 49 U/L (ref 46–116)
ALT SERPL W P-5'-P-CCNC: 32 U/L (ref 12–78)
ANION GAP SERPL CALCULATED.3IONS-SCNC: 10 MMOL/L (ref 4–13)
AST SERPL W P-5'-P-CCNC: 18 U/L (ref 5–45)
BASOPHILS # BLD AUTO: 0.03 THOUSANDS/ΜL (ref 0–0.1)
BASOPHILS NFR BLD AUTO: 0 % (ref 0–1)
BILIRUB SERPL-MCNC: 0.2 MG/DL (ref 0.2–1)
BILIRUB UR QL STRIP: NEGATIVE
BUN SERPL-MCNC: 12 MG/DL (ref 5–25)
CALCIUM SERPL-MCNC: 8.4 MG/DL (ref 8.3–10.1)
CHLORIDE SERPL-SCNC: 104 MMOL/L (ref 100–108)
CLARITY UR: CLEAR
CO2 SERPL-SCNC: 26 MMOL/L (ref 21–32)
COLOR UR: YELLOW
CREAT SERPL-MCNC: 0.65 MG/DL (ref 0.6–1.3)
EOSINOPHIL # BLD AUTO: 0.15 THOUSAND/ΜL (ref 0–0.61)
EOSINOPHIL NFR BLD AUTO: 2 % (ref 0–6)
ERYTHROCYTE [DISTWIDTH] IN BLOOD BY AUTOMATED COUNT: 12.7 % (ref 11.6–15.1)
GFR SERPL CREATININE-BSD FRML MDRD: 104 ML/MIN/1.73SQ M
GLUCOSE SERPL-MCNC: 94 MG/DL (ref 65–140)
GLUCOSE UR STRIP-MCNC: NEGATIVE MG/DL
HCT VFR BLD AUTO: 39.2 % (ref 34.8–46.1)
HGB BLD-MCNC: 13 G/DL (ref 11.5–15.4)
HGB UR QL STRIP.AUTO: NEGATIVE
IMM GRANULOCYTES # BLD AUTO: 0.02 THOUSAND/UL (ref 0–0.2)
IMM GRANULOCYTES NFR BLD AUTO: 0 % (ref 0–2)
KETONES UR STRIP-MCNC: NEGATIVE MG/DL
LACTATE SERPL-SCNC: 1.3 MMOL/L (ref 0.5–2)
LEUKOCYTE ESTERASE UR QL STRIP: NEGATIVE
LYMPHOCYTES # BLD AUTO: 1.97 THOUSANDS/ΜL (ref 0.6–4.47)
LYMPHOCYTES NFR BLD AUTO: 24 % (ref 14–44)
MCH RBC QN AUTO: 30.7 PG (ref 26.8–34.3)
MCHC RBC AUTO-ENTMCNC: 33.2 G/DL (ref 31.4–37.4)
MCV RBC AUTO: 93 FL (ref 82–98)
MONOCYTES # BLD AUTO: 0.56 THOUSAND/ΜL (ref 0.17–1.22)
MONOCYTES NFR BLD AUTO: 7 % (ref 4–12)
NEUTROPHILS # BLD AUTO: 5.47 THOUSANDS/ΜL (ref 1.85–7.62)
NEUTS SEG NFR BLD AUTO: 67 % (ref 43–75)
NITRITE UR QL STRIP: NEGATIVE
NRBC BLD AUTO-RTO: 0 /100 WBCS
PH UR STRIP.AUTO: 6.5 [PH] (ref 4.5–8)
PLATELET # BLD AUTO: 236 THOUSANDS/UL (ref 149–390)
PMV BLD AUTO: 10.4 FL (ref 8.9–12.7)
POTASSIUM SERPL-SCNC: 3.7 MMOL/L (ref 3.5–5.3)
PROT SERPL-MCNC: 7.3 G/DL (ref 6.4–8.2)
PROT UR STRIP-MCNC: NEGATIVE MG/DL
RBC # BLD AUTO: 4.24 MILLION/UL (ref 3.81–5.12)
SODIUM SERPL-SCNC: 140 MMOL/L (ref 136–145)
SP GR UR STRIP.AUTO: <=1.005 (ref 1–1.03)
UROBILINOGEN UR QL STRIP.AUTO: 0.2 E.U./DL
WBC # BLD AUTO: 8.2 THOUSAND/UL (ref 4.31–10.16)

## 2019-10-12 PROCEDURE — 96375 TX/PRO/DX INJ NEW DRUG ADDON: CPT

## 2019-10-12 PROCEDURE — 83605 ASSAY OF LACTIC ACID: CPT | Performed by: EMERGENCY MEDICINE

## 2019-10-12 PROCEDURE — 80053 COMPREHEN METABOLIC PANEL: CPT | Performed by: EMERGENCY MEDICINE

## 2019-10-12 PROCEDURE — 99285 EMERGENCY DEPT VISIT HI MDM: CPT | Performed by: EMERGENCY MEDICINE

## 2019-10-12 PROCEDURE — 81003 URINALYSIS AUTO W/O SCOPE: CPT

## 2019-10-12 PROCEDURE — 96361 HYDRATE IV INFUSION ADD-ON: CPT

## 2019-10-12 PROCEDURE — 74177 CT ABD & PELVIS W/CONTRAST: CPT

## 2019-10-12 PROCEDURE — 36415 COLL VENOUS BLD VENIPUNCTURE: CPT | Performed by: EMERGENCY MEDICINE

## 2019-10-12 PROCEDURE — 96374 THER/PROPH/DIAG INJ IV PUSH: CPT

## 2019-10-12 PROCEDURE — 96376 TX/PRO/DX INJ SAME DRUG ADON: CPT

## 2019-10-12 PROCEDURE — 99284 EMERGENCY DEPT VISIT MOD MDM: CPT

## 2019-10-12 PROCEDURE — 85025 COMPLETE CBC W/AUTO DIFF WBC: CPT | Performed by: EMERGENCY MEDICINE

## 2019-10-12 RX ORDER — ONDANSETRON 2 MG/ML
4 INJECTION INTRAMUSCULAR; INTRAVENOUS ONCE
Status: COMPLETED | OUTPATIENT
Start: 2019-10-12 | End: 2019-10-12

## 2019-10-12 RX ORDER — DIPHENHYDRAMINE HYDROCHLORIDE 50 MG/ML
50 INJECTION INTRAMUSCULAR; INTRAVENOUS ONCE
Status: COMPLETED | OUTPATIENT
Start: 2019-10-12 | End: 2019-10-12

## 2019-10-12 RX ORDER — METOCLOPRAMIDE 10 MG/1
10 TABLET ORAL EVERY 6 HOURS PRN
Qty: 15 TABLET | Refills: 0 | Status: SHIPPED | OUTPATIENT
Start: 2019-10-12 | End: 2020-04-02 | Stop reason: ALTCHOICE

## 2019-10-12 RX ORDER — HYDROMORPHONE HCL/PF 1 MG/ML
0.5 SYRINGE (ML) INJECTION ONCE
Status: COMPLETED | OUTPATIENT
Start: 2019-10-12 | End: 2019-10-12

## 2019-10-12 RX ORDER — METOCLOPRAMIDE HYDROCHLORIDE 5 MG/ML
10 INJECTION INTRAMUSCULAR; INTRAVENOUS ONCE
Status: COMPLETED | OUTPATIENT
Start: 2019-10-12 | End: 2019-10-12

## 2019-10-12 RX ADMIN — HYDROMORPHONE HYDROCHLORIDE 0.5 MG: 1 INJECTION, SOLUTION INTRAMUSCULAR; INTRAVENOUS; SUBCUTANEOUS at 09:31

## 2019-10-12 RX ADMIN — DIPHENHYDRAMINE HYDROCHLORIDE 50 MG: 50 INJECTION, SOLUTION INTRAMUSCULAR; INTRAVENOUS at 10:07

## 2019-10-12 RX ADMIN — IOHEXOL 50 ML: 240 INJECTION, SOLUTION INTRATHECAL; INTRAVASCULAR; INTRAVENOUS; ORAL at 08:49

## 2019-10-12 RX ADMIN — SODIUM CHLORIDE 1000 ML: 0.9 INJECTION, SOLUTION INTRAVENOUS at 08:15

## 2019-10-12 RX ADMIN — ONDANSETRON 4 MG: 2 INJECTION INTRAMUSCULAR; INTRAVENOUS at 08:24

## 2019-10-12 RX ADMIN — ONDANSETRON 4 MG: 2 INJECTION INTRAMUSCULAR; INTRAVENOUS at 08:58

## 2019-10-12 RX ADMIN — METOCLOPRAMIDE 10 MG: 5 INJECTION, SOLUTION INTRAMUSCULAR; INTRAVENOUS at 09:31

## 2019-10-12 RX ADMIN — IOHEXOL 100 ML: 350 INJECTION, SOLUTION INTRAVENOUS at 10:31

## 2019-10-12 RX ADMIN — HYDROMORPHONE HYDROCHLORIDE 0.5 MG: 1 INJECTION, SOLUTION INTRAMUSCULAR; INTRAVENOUS; SUBCUTANEOUS at 08:26

## 2019-10-12 NOTE — ED PROVIDER NOTES
History  Chief Complaint   Patient presents with    Abdominal Pain     abd pain mostly left mid quadrant, "feels bloated" unable to keep anything down  , diarrhea yesterday with vomiting today, hx  of gastric bypass approx 3 years ago     Patient is a 50-year-old female who presents to the emergency department for evaluation with discomfort on left side nausea vomiting  Appetite intake decreased over last days she general malaise & sensation of fullness  She notes that she had an episode diarrhea yesterday  This was soft brown without appreciable blood or mucus  This morning she vomited between 4 and 05:00-the coffee she had consumed a few hours earlier  She has been nauseous since with some dry heaves  Discomfort in left side started shortly after vomiting  She gestures to the left flank and left abdomen and describes a throbbing sensation  She additionally notes her abdomen is much more bloated than usual   She has not had a bowel movement today nor passed flatus to her knowledge  She does not have an urge to do so  She did void once today and notes that this was unremarkable  She did consume her vitamins in usual morning medications a couple of hours ago and describes it feeling is though they are sitting in her stomach  She feels lightheaded and dizzy  History significant for having undergone gastric bypass surgery in February 2017  She did experience 1 bowel obstruction following this at the region of the ileum which resolved with medical treatment  No history of kidney stones  Prior to Admission Medications   Prescriptions Last Dose Informant Patient Reported? Taking?    Biotin 85392 MCG TABS  Self Yes Yes   Sig: Take by mouth   Calcium Citrate-Vitamin D 500-500 MG-UNIT PACK  Self Yes Yes   Sig: Take by mouth   Cyanocobalamin ([employer] VITAMIN B12 PO)  Self Yes Yes   Sig: Take by mouth   LORazepam (ATIVAN) 0 5 mg tablet   No Yes   Sig: Take 1 tablet 2 hours prior to procedure and may repeat just prior to procedure if anxiety persists  Multiple Vitamins-Minerals (MULTI COMPLETE PO)  Self Yes Yes   Sig: Take by mouth   Probiotic Product (PRO-BIOTIC BLEND PO)  Self Yes Yes   Sig: Take by mouth   QUEtiapine (SEROquel) 50 mg tablet   No Yes   Sig: Take 1 tablet (50 mg total) by mouth daily at bedtime   aspirin 81 mg chewable tablet Not Taking at Unknown time  No No   Sig: Chew 1 tablet (81 mg total) daily   Patient not taking: Reported on 10/12/2019   atoMOXetine (STRATTERA) 25 mg capsule   No Yes   Sig: TAKE ONE CAPSULE BY MOUTH DAILY   cholecalciferol (VITAMIN D3) 1,000 units tablet   Yes Yes   Sig: Take 2,000 Units by mouth daily   cyclobenzaprine (FLEXERIL) 10 mg tablet   No Yes   Sig: Take 1 tablet (10 mg total) by mouth daily at bedtime   drospirenone-ethinyl estradiol (LIEN) 3-0 02 MG per tablet   No Yes   Sig: Take 1 tablet by mouth daily   gabapentin (NEURONTIN) 600 MG tablet   No Yes   Sig: Take 1 tablet (600 mg total) by mouth daily at bedtime   Patient taking differently: Take 900 mg by mouth daily at bedtime    lamoTRIgine (LaMICtal) 25 mg tablet   No Yes   Sig: Take 1 tablet (25 mg total) by mouth daily Take 1 tablet (25mg total) for 3 weeks then increase to 2 tablets (50mg total)  montelukast (SINGULAIR) 10 mg tablet   No Yes   Sig: Take 1 tablet (10 mg total) by mouth daily at bedtime   pantoprazole (PROTONIX) 40 mg tablet   No Yes   Sig: TAKE ONE TABLET BY MOUTH DAILY 30 MINUTES BEFORE BEFORE BREAKFAST   venlafaxine (EFFEXOR-XR) 150 mg 24 hr capsule   No Yes   Sig: TAKE ONE CAPSULE BY MOUTH DAILY      Facility-Administered Medications: None       Past Medical History:   Diagnosis Date    ADHD (attention deficit hyperactivity disorder)     Anxiety     Bulging lumbar disc     Carpal tunnel syndrome     RIGHT    LAST ASSESSED: 12/7/16    Chronic back pain     low    Colon polyps     Depression     Diabetes mellitus (HCC)     on victoza    GERD (gastroesophageal reflux disease)     Gestational diabetes     Hearing loss     left ear    Hyperlipidemia     IBS (irritable bowel syndrome)     Ileus ([address] )     LAST ASSESSED: 8/3/17    Labial cyst     LAST ASSESSED: 16    Myofascial pain     LAST ASSESSED: 16    Obesity     Ovarian cyst     LEFT  LAST ASSESSED: 16    Panic attack     Pap smear for cervical cancer screening     2018--pap wnl, HRHPV neg    PTSD (post-traumatic stress disorder)     Seasonal allergies     Thoracic outlet syndrome         Trochanteric bursitis of both hips     LAST ASSESSED: 3/18/16    Ulnar neuropathy at elbow     Varicella     Wears glasses        Past Surgical History:   Procedure Laterality Date    BILE DUCT EXPLORATION      ENDOSCOPIC REMOVAL OF STONES FROM BILIARY TRACT     SECTION      x3    CHOLECYSTECTOMY      COLONOSCOPY      DILATION AND CURETTAGE OF UTERUS      ENDOMETRIAL ABLATION      ERCP W/ SPHICTEROTOMY      FIRST RIB REMOVAL      FIRST RIB REMOVAL      THORAX EXCISION OF FIRST RIB    HYSTEROSCOPY      NEUROPLASTY / TRANSPOSITION ULNAR NERVE AT ELBOW      DC COLONOSCOPY FLX DX W/COLLJ SPEC WHEN PFRMD N/A 2017    Procedure: COLONOSCOPY;  Surgeon: Mani Gomez MD;  [facility]; Service: Gastroenterology    DC ESOPHAGOGASTRODUODENOSCOPY TRANSORAL DIAGNOSTIC N/A 2017    Procedure: ESOPHAGOGASTRODUODENOSCOPY (EGD); Surgeon: Miquel Rivera MD;  [facility];   Service: Gastroenterology    DC HYSTEROSCOPY,W/ENDO BX N/A 10/20/2017    Procedure: DILATATION AND CURETTAGE (D&C) WITH HYSTEROSCOPY  REMOVAL VULVAR RT  LESION;  Surgeon: Alfredo Larson MD;  [facility];  Service: Gynecology    DC LAP, ÁLVARO RESTRICT PROC, LONGITUDINAL GASTRECTOMY N/A 2018    Procedure: GASTRECTOMY SLEEVE LAPAROSCOPIC; INTRAOPERATIVE EGD ;  Surgeon: Cuong Gillespie MD;  [facility];  Service: [address] [place] [address] Right     VEIN LIGATION AND STRIPPING Right     VULVA SURGERY  10/20/2017    BIOPSY    WISDOM TOOTH EXTRACTION         Family History   Problem Relation Age of Onset    Diabetes Mother     Other Mother         HYPERCHOLESTEROLEMIA    Breast cancer Mother         >50    Diabetes Father     Other Father         HYPERCHOLESTEROLEMIA    Prostate cancer Father     Hypertension Brother     Diabetes Brother     Other Brother         HYPERCHOLESTEROLEMIA    Alcohol abuse Family     Asthma Family     Other Family         BACK PAIN    Cancer Family     Depression Family     Neuropathy Family     Heart disease Family     Colon cancer Maternal Grandfather     Ovarian cancer Neg Hx     Uterine cancer Neg Hx      I have reviewed and agree with the history as documented  Social History     Tobacco Use    Smoking status: Former Smoker     Last attempt to quit: 2017     Years since quittin 4    Smokeless tobacco: Never Used   Substance Use Topics    Alcohol use: Yes     Alcohol/week: 1 0 standard drinks     Types: 1 Standard drinks or equivalent per week     Comment: socially    Drug use: No        Review of Systems   All other systems reviewed and are negative  Physical Exam  Physical Exam   Constitutional: She is oriented to person, place, and time  She appears well-developed and well-nourished  Appears malaised   HENT:   Head: Normocephalic  Eyes: Pupils are equal, round, and reactive to light  EOM are normal    Cardiovascular: Normal rate and regular rhythm  Pulmonary/Chest: Effort normal and breath sounds normal    Abdominal: Normal appearance and bowel sounds are normal  She exhibits distension (mild)  There is tenderness in the epigastric area, left upper quadrant and left lower quadrant  There is CVA tenderness (left)  There is no guarding  Neurological: She is alert and oriented to person, place, and time  Skin: Skin is warm and dry     Psychiatric: She has a normal mood and affect  Her behavior is normal    Nursing note and vitals reviewed        Vital Signs  ED Triage Vitals [10/12/19 0752]   Temperature Pulse Respirations Blood Pressure SpO2   98 °F (36 7 °C) 73 20 134/78 96 %      Temp Source Heart Rate Source Patient Position - Orthostatic VS BP Location FiO2 (%)   Oral Monitor Lying Right arm --      Pain Score       7           Vitals:    10/12/19 0752 10/12/19 1013 10/12/19 1214   BP: 134/78 125/74 107/64   Pulse: 73 86 79   Patient Position - Orthostatic VS: Lying           Visual Acuity      ED Medications  Medications   sodium chloride 0 9 % bolus 1,000 mL (0 mL Intravenous Stopped 10/12/19 0937)   ondansetron (ZOFRAN) injection 4 mg (4 mg Intravenous Given 10/12/19 0824)   HYDROmorphone (DILAUDID) injection 0 5 mg (0 5 mg Intravenous Given 10/12/19 0826)   iohexol (OMNIPAQUE) 240 MG/ML solution 50 mL (50 mL Oral Given 10/12/19 0849)   ondansetron (ZOFRAN) injection 4 mg (4 mg Intravenous Given 10/12/19 0858)   metoclopramide (REGLAN) injection 10 mg (10 mg Intravenous Given 10/12/19 0931)   HYDROmorphone (DILAUDID) injection 0 5 mg (0 5 mg Intravenous Given 10/12/19 0931)   diphenhydrAMINE (BENADRYL) injection 50 mg (50 mg Intravenous Given 10/12/19 1007)   iohexol (OMNIPAQUE) 350 MG/ML injection (SINGLE-DOSE) 100 mL (100 mL Intravenous Given 10/12/19 1031)       Diagnostic Studies  Results Reviewed     Procedure Component Value Units Date/Time    ED Urine Macroscopic [[id number]] Collected:  10/12/19 1217    Lab Status:  Final result Specimen:  Urine Updated:  10/12/19 1201     Color, UA Yellow     Clarity, UA Clear     pH, UA 6 5     Leukocytes, UA Negative     Nitrite, UA Negative     Protein, UA Negative mg/dl      Glucose, UA Negative mg/dl      Ketones, UA Negative mg/dl      Urobilinogen, UA 0 2 E U /dl      Bilirubin, UA Negative     Blood, UA Negative     Specific Gravity, UA <=1 005    Narrative:       CLINITEK RESULT    Lactic acid, plasma [[id number]]  (Normal) Collected:  10/12/19 0835    Lab Status:  Final result Specimen:  Blood from Arm, Right Updated:  10/12/19 0906     LACTIC ACID 1 3 mmol/L     Narrative:       Result may be elevated if tourniquet was used during collection      Comprehensive metabolic panel [[id number]]  (Abnormal) Collected:  10/12/19 0835    Lab Status:  Final result Specimen:  Blood from Arm, Right Updated:  10/12/19 0904     Sodium 140 mmol/L      Potassium 3 7 mmol/L      Chloride 104 mmol/L      CO2 26 mmol/L      ANION GAP 10 mmol/L      BUN 12 mg/dL      Creatinine 0 65 mg/dL      Glucose 94 mg/dL      Calcium 8 4 mg/dL      AST 18 U/L      ALT 32 U/L      Alkaline Phosphatase 49 U/L      Total Protein 7 3 g/dL      Albumin 3 2 g/dL      Total Bilirubin 0 20 mg/dL      eGFR 104 ml/min/1 73sq m     Narrative:       National Kidney Disease Foundation guidelines for Chronic Kidney Disease (CKD):     Stage 1 with normal or high GFR (GFR > 90 mL/min/1 73 square meters)    Stage 2 Mild CKD (GFR = 60-89 mL/min/1 73 square meters)    Stage 3A Moderate CKD (GFR = 45-59 mL/min/1 73 square meters)    Stage 3B Moderate CKD (GFR = 30-44 mL/min/1 73 square meters)    Stage 4 Severe CKD (GFR = 15-29 mL/min/1 73 square meters)    Stage 5 End Stage CKD (GFR <15 mL/min/1 73 square meters)  Note: GFR calculation is accurate only with a steady state creatinine    CBC and differential [[id number]] Collected:  10/12/19 0835    Lab Status:  Final result Specimen:  Blood from Arm, Right Updated:  10/12/19 0847     WBC 8 20 Thousand/uL      RBC 4 24 Million/uL      Hemoglobin 13 0 g/dL      Hematocrit 39 2 %      MCV 93 fL      MCH 30 7 pg      MCHC 33 2 g/dL      RDW 12 7 %      MPV 10 4 fL      Platelets 277 Thousands/uL      nRBC 0 /100 WBCs      Neutrophils Relative 67 %      Immat GRANS % 0 %      Lymphocytes Relative 24 %      Monocytes Relative 7 %      Eosinophils Relative 2 %      Basophils Relative 0 %      Neutrophils Absolute 5 47 Thousands/µL Immature Grans Absolute 0 02 Thousand/uL      Lymphocytes Absolute 1 97 Thousands/µL      Monocytes Absolute 0 56 Thousand/µL      Eosinophils Absolute 0 15 Thousand/µL      Basophils Absolute 0 03 Thousands/µL                  CT abdomen pelvis with contrast   Final Result by Velvet Mar MD (10/12 1050)   1  No acute intra-abdominal pathology  2   Stable splenic hypodensities  Workstation performed: [id number]                    Procedures  Procedures       ED Course  ED Course as of Oct 12 1228   Sat Oct 12, 2019   3377 Patient has not appreciated any improvement nausea thus far  She has had dry heaves  Additional antiemetic dose ordered  4475 Patient experiencing any improvement in symptoms  Additional analgesic and antiemetic ordered      0957 I spoke with patient's RN  She relates that the CT tech was in with the patient he patient expressed concern for having had itching after last CT scan  I spoke with her in further detail regarding this  She has had numerous CT scans with contrast without any adverse effect  She noted that she was slightly itchy after her last CT but did not have any rash or other associated symptoms  She relates that she thought the itching might have been from pain medicine she received around the same time  She notes that she did receive a small amount of Benadryl and did not require any further treatment  I am comfortable proceeding with IV contrast   Will premedicate with diphenhydramine  CT tech aware  Patient has consumed close to half of the oral contrast thus far  She does not feel like she will be able to drink any further as she feels that it is completely filling her stomach and backing up to her throat  She notes that the nausea does feel improved following metoclopramide  Pain is better controlled as well  She reports that this is adequately relieved presently  1126 Patient no longer nauseous  CT scan unremarkable    Patient updated on this  She will provide urine specimen shortly  1220 Urine results unremarkable  Patient is starting to notice some cramping in the left side of the abdomen and believes that she will have diarrhea again  Suspect viral etiology to symptoms  Patient will be prescribed metoclopramide see use as needed at home  She is aware to take frequent sips of clears and to advance her diet as improved  Off work the rest of today and tomorrow with return beyond that as improved  MDM    Disposition  Final diagnoses:   Gastroenteritis     Time reflects when diagnosis was documented in both MDM as applicable and the Disposition within this note     Time User Action Codes Description Comment    10/12/2019 12:24 PM Devora MAJOR Add [K52 9] Gastroenteritis       ED Disposition     ED Disposition Condition Date/Time Comment    Discharge Stable Sat Oct 12, 2019 12:24 PM Josephine Iyer discharge to home/self care  Follow-up Information     Follow up With Specialties Details Why Contact Info Additional [address] [facility] [place],  Internal Medicine Schedule an appointment as soon as possible for a visit   [address] [place] [address],[address] Emergency Department Emergency Medicine  As needed, If symptoms worsen [address] [address]  [phone] [facility],  [address] [place] [address], Critical access hospital          Patient's Medications   Discharge Prescriptions    METOCLOPRAMIDE (REGLAN) 10 MG TABLET    Take 1 tablet (10 mg total) by mouth every 6 (six) hours as needed (nausea)       Start Date: 10/12/2019End Date: --       Order Dose: 10 mg       Quantity: 15 tablet    Refills: 0     No discharge procedures on file      ED Provider  Electronically Signed by           Kenton Mendez MD  10/12/19 5260

## 2019-10-12 NOTE — DISCHARGE INSTRUCTIONS
Drink plenty of fluids to stay well hydrated  You may take metoclopramide 10 mg every 6 hours as needed for nausea  Gradually advance your diet to plain foods & ultimately regular foods over the next few days as you feel improved

## 2019-10-17 ENCOUNTER — IMMUNIZATIONS (OUTPATIENT)
Dept: FAMILY MEDICINE CLINIC | Facility: CLINIC | Age: 49
End: 2019-10-17
Payer: COMMERCIAL

## 2019-10-17 ENCOUNTER — TELEPHONE (OUTPATIENT)
Dept: BEHAVIORAL/MENTAL HEALTH CLINIC | Facility: CLINIC | Age: 49
End: 2019-10-17

## 2019-10-17 DIAGNOSIS — Z23 FLU VACCINE NEED: Primary | ICD-10-CM

## 2019-10-17 PROCEDURE — 90471 IMMUNIZATION ADMIN: CPT

## 2019-10-17 PROCEDURE — 90682 RIV4 VACC RECOMBINANT DNA IM: CPT

## 2019-10-17 NOTE — TELEPHONE ENCOUNTER
T/C to confirm appointment scheduled for October 18, 2019 @ 3:00pm  Luis Antonio Brantley did not auto-confirm this appointment; therefore, I made a direct confirmation call  Spoke with Emmett  Confirmed she will be in attendance at the scheduled time

## 2019-10-18 ENCOUNTER — OFFICE VISIT (OUTPATIENT)
Dept: PAIN MEDICINE | Facility: CLINIC | Age: 49
End: 2019-10-18
Payer: COMMERCIAL

## 2019-10-18 ENCOUNTER — TELEPHONE (OUTPATIENT)
Dept: PAIN MEDICINE | Facility: CLINIC | Age: 49
End: 2019-10-18

## 2019-10-18 ENCOUNTER — SOCIAL WORK (OUTPATIENT)
Dept: BEHAVIORAL/MENTAL HEALTH CLINIC | Facility: CLINIC | Age: 49
End: 2019-10-18
Payer: COMMERCIAL

## 2019-10-18 ENCOUNTER — TRANSCRIBE ORDERS (OUTPATIENT)
Dept: LAB | Facility: CLINIC | Age: 49
End: 2019-10-18

## 2019-10-18 ENCOUNTER — APPOINTMENT (OUTPATIENT)
Dept: LAB | Facility: CLINIC | Age: 49
End: 2019-10-18
Payer: COMMERCIAL

## 2019-10-18 VITALS
WEIGHT: 181 LBS | HEART RATE: 97 BPM | TEMPERATURE: 98.2 F | SYSTOLIC BLOOD PRESSURE: 120 MMHG | DIASTOLIC BLOOD PRESSURE: 78 MMHG | BODY MASS INDEX: 28.35 KG/M2

## 2019-10-18 DIAGNOSIS — F33.1 MODERATE EPISODE OF RECURRENT MAJOR DEPRESSIVE DISORDER (HCC): Primary | ICD-10-CM

## 2019-10-18 DIAGNOSIS — M46.1 SACROILIITIS (HCC): ICD-10-CM

## 2019-10-18 DIAGNOSIS — M46.1 SACROILIITIS (HCC): Primary | ICD-10-CM

## 2019-10-18 DIAGNOSIS — F41.1 GENERALIZED ANXIETY DISORDER: ICD-10-CM

## 2019-10-18 DIAGNOSIS — M51.16 INTERVERTEBRAL DISC DISORDERS WITH RADICULOPATHY, LUMBAR REGION: ICD-10-CM

## 2019-10-18 DIAGNOSIS — F43.10 POST TRAUMATIC STRESS DISORDER (PTSD): ICD-10-CM

## 2019-10-18 LAB — CRP SERPL QL: 5.5 MG/L

## 2019-10-18 PROCEDURE — 90834 PSYTX W PT 45 MINUTES: CPT | Performed by: SOCIAL WORKER

## 2019-10-18 PROCEDURE — 86038 ANTINUCLEAR ANTIBODIES: CPT

## 2019-10-18 PROCEDURE — 36415 COLL VENOUS BLD VENIPUNCTURE: CPT

## 2019-10-18 PROCEDURE — 86140 C-REACTIVE PROTEIN: CPT

## 2019-10-18 PROCEDURE — 86430 RHEUMATOID FACTOR TEST QUAL: CPT

## 2019-10-18 PROCEDURE — 99214 OFFICE O/P EST MOD 30 MIN: CPT | Performed by: ANESTHESIOLOGY

## 2019-10-18 RX ORDER — PREDNISONE 10 MG/1
TABLET ORAL
Qty: 30 TABLET | Refills: 0 | Status: SHIPPED | OUTPATIENT
Start: 2019-10-18 | End: 2019-11-15

## 2019-10-18 RX ORDER — TRAMADOL HYDROCHLORIDE 50 MG/1
50 TABLET ORAL 2 TIMES DAILY PRN
Qty: 30 TABLET | Refills: 0 | Status: SHIPPED | OUTPATIENT
Start: 2019-10-18 | End: 2019-11-12 | Stop reason: SDUPTHER

## 2019-10-18 NOTE — PSYCH
Psychotherapy Provided: Individual Psychotherapy 45 minutes     Length of time in session: 45 minutes, follow up in approximately 1 month    Goals addressed in session: Goal 1     Pain:      moderate to severe back pain    Current suicide risk : Low     DATA: Met with Lou Moreira for scheduled individual session  Lou Moreira has been struggling with her back pain  She is going to have a follow up MRI and surgery to shave down her discs is possible  She has a prescription for Tramadol for five days  She would rather not have to take it but is in serious pain  She is frustrated that her mobility is impacted by her pain  She is also experiencing stress because her fiance is on strike and she is responsible for ensuring they have money coming in  She has been working but in great pain  Christina's son received accommodations through work so that there is less stress at work  Despite this he doesn't like it but Lou Moreira is supporting that he remains there to maintain he job especially because he has support to help him and limit his responsibilities  Lou Moreira discussed some of her past trauma and how it might impact her currently  She was able to focus on her strengths and her resilience at this point in her life in part due to her experiences  ASSESSMENT: Lou Moreira presents with a anxious, irritable mood  Affect is somewhat constricted, congruent to topic area  Lou Moreira exhibits continued positive rapport with this clinician  Lou Moreira appears to have normal insight and judgment  Lou Moreira continues to exhibit willingness to work on treatment goals and objectives  PLAN: Lou Moreira will return in 1 month for the next scheduled session; however, she will remain on the cancellation wait list for a sooner appointment  Between sessions, Lou Moreira will follow up on her medical needs and practice coping skills for her anxiety and will report back during the next session re: successes and barriers   At the next session, this clinician will use client-centered therapy, mindfulness-based strategies and DBT-informed skills to address , in an effort to assist BASIAganesh BASIA with meeting treatment goals  Behavioral Health Treatment Plan [facility]: Diagnosis and Treatment Plan explained to Harleen Small relates understanding diagnosis and is agreeable to Treatment Plan   Yes

## 2019-10-18 NOTE — PROGRESS NOTES
Assessment:  1  Sacroiliitis ([facility] [address] )    2  Intervertebral disc disorders with radiculopathy, lumbar region        Plan:  The patient is currently experiencing a significant exacerbation of lower back pain with symptoms radiating into the right leg  At this time, due to the weakness she has in her right leg I will order an updated MRI of the lumbar spine  I advised we will call the results and discuss treatment moving forward  In addition, I will order an inflammatory blood work panel and start her on a prednisone taper  I will also prescribe her short term course of tramadol 50 mg twice daily as needed for pain  There are risks associated with opioid medications, including dependence, addiction and tolerance  The patient understands and agrees to use these medications only as prescribed  Potential side effects of the medications include, but are not limited to, constipation, drowsiness, addiction, impaired judgment and risk of fatal overdose if not taken as prescribed  The patient was warned against driving while taking sedation medications  Sharing medications is a felony  At this point in time, the patient is showing no signs of addiction, abuse, diversion or suicidal ideation  [facility] Prescription Drug Monitoring Program report was reviewed and was appropriate     My impressions and treatment recommendations were discussed in detail with the patient who verbalized understanding and had no further questions  Discharge instructions were provided  I personally saw and examined the patient and I agree with the above discussed plan of care  Orders Placed This Encounter   Procedures    MRI lumbar spine without contrast     Standing Status:   Future     Standing Expiration Date:   10/18/2023     Scheduling Instructions: There is no preparation for this test  Please leave your jewelry and valuables at home, wedding rings are the exception   Please bring your insurance cards, a form of photo ID and a list of your medications with you  Arrive 15 minutes prior to your appointment time in order to register  Please bring any prior CT or MRI studies of this area that were not performed at a [facility]  To schedule this appointment, please contact Central Scheduling at [phone]  Order Specific Question:   Is the patient pregnant? Answer:   No     Order Specific Question:   What is the patient's sedation requirement? Answer:   No Sedation    ARLEY Screen w/ Reflex to Titer/Pattern     Standing Status:   Future     Standing Expiration Date:   10/18/2020    RF Screen w/ Reflex to Titer     Standing Status:   Future     Standing Expiration Date:   10/18/2020    C-reactive protein     Standing Status:   Future     Standing Expiration Date:   10/18/2020     New Medications Ordered This Visit   Medications    predniSONE 10 mg tablet     Sig: Take 4 pills for 4 days, 3 pills for 3 days, 2 pills for 2 days, then 1 pill for 1 day     Dispense:  30 tablet     Refill:  0    traMADol (ULTRAM) 50 mg tablet     Sig: Take 1 tablet (50 mg total) by mouth 2 (two) times a day as needed for moderate pain     Dispense:  30 tablet     Refill:  0       History of Present Illness:  Jessie Joyce is a 47 y o  female who presents for a follow up office visit in regards to Back Pain and Leg Pain  The patient has a history of lumbar disc disorder with radiculopathy and is status post bilateral L5 transforaminal epidural steroid injection in March and then again in July as well as sacroiliitis and is status with bilateral sacroiliac joint injection in September  She reports no improvement and continues with constant pain in her lower back that is throbbing, pressure-like and shooting traveling into both legs, but worse on the right  She has been using gabapentin with minimal relief      I have personally reviewed and/or updated the patient's past medical history, past surgical history, family history, social history, current medications, allergies, and vital signs today  Review of Systems   Respiratory: Negative for shortness of breath  Cardiovascular: Negative for chest pain  Gastrointestinal: Negative for constipation, diarrhea, nausea and vomiting  Musculoskeletal: Positive for gait problem  Negative for arthralgias, joint swelling and myalgias  Skin: Negative for rash  Neurological: Negative for dizziness, seizures and weakness  All other systems reviewed and are negative  Patient Active Problem List   Diagnosis    IBS (irritable bowel syndrome)    Mixed hyperlipidemia    GERD (gastroesophageal reflux disease)    Depression    Chronic back pain    Anxiety    Cervical radiculopathy    Hepatic steatosis    Pulmonary nodule seen on imaging study    Dermatitis    S/P laparoscopic sleeve gastrectomy    Other fatigue    Encounter for annual routine gynecological examination    Overweight (BMI 25 0-29  9)    Postsurgical malabsorption    Lumbar radiculopathy    Intervertebral disc disorders with radiculopathy, lumbar region    Post traumatic stress disorder (PTSD)    Major depressive disorder, recurrent severe without psychotic features ([address] )    Generalized anxiety disorder    Sacroiliitis ([address] )       Past Medical History:   Diagnosis Date    ADHD (attention deficit hyperactivity disorder)     Anxiety     Bulging lumbar disc     Carpal tunnel syndrome     RIGHT  LAST ASSESSED: 12/7/16    Chronic back pain     low    Colon polyps     Depression     Diabetes mellitus ([address] )     on victoza    GERD (gastroesophageal reflux disease)     Gestational diabetes     Hearing loss     left ear    Hyperlipidemia     IBS (irritable bowel syndrome)     Ileus ([address] )     LAST ASSESSED: 8/3/17    Labial cyst     LAST ASSESSED: 4/21/16    Myofascial pain     LAST ASSESSED: 4/12/16    Obesity     Ovarian cyst     LEFT   LAST ASSESSED: 9/2/16    Panic attack     Pap smear for cervical cancer screening     2018--pap wnl, HRHPV neg    PTSD (post-traumatic stress disorder)     Seasonal allergies     Thoracic outlet syndrome     2010    Trochanteric bursitis of both hips     LAST ASSESSED: 3/18/16    Ulnar neuropathy at elbow     Varicella     Wears glasses        Past Surgical History:   Procedure Laterality Date    BILE DUCT EXPLORATION      ENDOSCOPIC REMOVAL OF STONES FROM BILIARY TRACT     SECTION      x3    CHOLECYSTECTOMY      COLONOSCOPY      DILATION AND CURETTAGE OF UTERUS      ENDOMETRIAL ABLATION      ERCP W/ SPHICTEROTOMY      FIRST RIB REMOVAL      FIRST RIB REMOVAL      THORAX EXCISION OF FIRST RIB    HYSTEROSCOPY      NEUROPLASTY / TRANSPOSITION ULNAR NERVE AT ELBOW      [place] COLONOSCOPY FLX DX W/COLLJ SPEC WHEN PFRMD N/A 2017    Procedure: COLONOSCOPY;  Surgeon: Jessi Addison MD;  [facility]; Service: Gastroenterology    [place] ESOPHAGOGASTRODUODENOSCOPY TRANSORAL DIAGNOSTIC N/A 2017    Procedure: ESOPHAGOGASTRODUODENOSCOPY (EGD); Surgeon: Radha Fan MD;  [facility];   Service: Gastroenterology    [place] HYSTEROSCOPY,W/ENDO BX N/A 10/20/2017    Procedure: DILATATION AND CURETTAGE (D&C) WITH HYSTEROSCOPY  REMOVAL VULVAR RT  LESION;  Surgeon: Leonor Crawford MD;  [facility];  Service: Gynecology    NJ LAP, ÁLVARO RESTRICT PROC, LONGITUDINAL GASTRECTOMY N/A 2018    Procedure: GASTRECTOMY SLEEVE LAPAROSCOPIC; INTRAOPERATIVE EGD ;  Surgeon: Yessenia Finch MD;  [facility];  Service: Bariatrics    TONSILLECTOMY AND ADENOIDECTOMY      TUBAL LIGATION      ULNAR NERVE REPAIR Right     VEIN LIGATION AND STRIPPING Right     VULVA SURGERY  10/20/2017    BIOPSY    WISDOM TOOTH EXTRACTION         Family History   Problem Relation Age of Onset    Diabetes Mother     Other Mother         HYPERCHOLESTEROLEMIA    Breast cancer Mother         >50    Diabetes Father     Other Father HYPERCHOLESTEROLEMIA    Prostate cancer Father     Hypertension Brother     Diabetes Brother     Other Brother         HYPERCHOLESTEROLEMIA    Alcohol abuse Family     Asthma Family     Other Family         BACK PAIN    Cancer Family     Depression Family     Neuropathy Family     Heart disease Family     Colon cancer Maternal Grandfather     Ovarian cancer Neg Hx     Uterine cancer Neg Hx        Social History     Occupational History    Occupation: ER TECH     Employer: [employer]   Tobacco Use    Smoking status: Former Smoker     Last attempt to quit: 2017     Years since quittin 4    Smokeless tobacco: Never Used   Substance and Sexual Activity    Alcohol use: Yes     Alcohol/week: 1 0 standard drinks     Types: 1 Standard drinks or equivalent per week     Comment: socially    Drug use: No    Sexual activity: Not on file     Comment: lifetime partners: 6       Current Outpatient Medications on File Prior to Visit   Medication Sig    aspirin 81 mg chewable tablet Chew 1 tablet (81 mg total) daily (Patient not taking: Reported on 10/12/2019)    atoMOXetine (STRATTERA) 25 mg capsule TAKE ONE CAPSULE BY MOUTH DAILY    Biotin 93306 MCG TABS Take by mouth    Calcium Citrate-Vitamin D 500-500 MG-UNIT PACK Take by mouth    cholecalciferol (VITAMIN D3) 1,000 units tablet Take 2,000 Units by mouth daily    Cyanocobalamin (CVS VITAMIN B12 PO) Take by mouth    cyclobenzaprine (FLEXERIL) 10 mg tablet Take 1 tablet (10 mg total) by mouth daily at bedtime    drospirenone-ethinyl estradiol (LIEN) 3-0 02 MG per tablet Take 1 tablet by mouth daily    gabapentin (NEURONTIN) 600 MG tablet Take 1 tablet (600 mg total) by mouth daily at bedtime (Patient taking differently: Take 900 mg by mouth daily at bedtime )    lamoTRIgine (LaMICtal) 25 mg tablet Take 1 tablet (25 mg total) by mouth daily Take 1 tablet (25mg total) for 3 weeks then increase to 2 tablets (50mg total)      LORazepam (ATIVAN) 0 5 mg tablet Take 1 tablet 2 hours prior to procedure and may repeat just prior to procedure if anxiety persists   metoclopramide (REGLAN) 10 mg tablet Take 1 tablet (10 mg total) by mouth every 6 (six) hours as needed (nausea)    montelukast (SINGULAIR) 10 mg tablet Take 1 tablet (10 mg total) by mouth daily at bedtime    Multiple Vitamins-Minerals (MULTI COMPLETE PO) Take by mouth    pantoprazole (PROTONIX) 40 mg tablet TAKE ONE TABLET BY MOUTH DAILY 30 MINUTES BEFORE BEFORE BREAKFAST    Probiotic Product (PRO-BIOTIC BLEND PO) Take by mouth    QUEtiapine (SEROquel) 50 mg tablet Take 1 tablet (50 mg total) by mouth daily at bedtime    venlafaxine (EFFEXOR-XR) 150 mg 24 hr capsule TAKE ONE CAPSULE BY MOUTH DAILY     No current facility-administered medications on file prior to visit  Allergies   Allergen Reactions    Ibuprofen      Due to gastric sleeve       Physical Exam:    /78   Pulse 97   Temp 98 2 °F (36 8 °C) (Oral)   Wt 82 1 kg (181 lb)   BMI 28 35 kg/m²     Constitutional:normal, well developed, well nourished, alert, in no distress and non-toxic and no overt pain behavior  Eyes:anicteric  HEENT:grossly intact  Neck:supple, symmetric, trachea midline and no masses   Pulmonary:even and unlabored  Cardiovascular:No edema or pitting edema present  Skin:Normal without rashes or lesions and well hydrated  Psychiatric:Mood and affect appropriate  Neurologic:Cranial Nerves II-XII grossly intact  Musculoskeletal:normal     Lumbar Spine Exam  Appearance:  Normal lordosis  Palpation/Tenderness:  left lumbar paraspinal tenderness  right lumbar paraspinal tenderness  left sacroiliac joint tenderness  right sacroiliac joint tenderness  Sensory:  no sensory deficits noted  Range of Motion:  Flexion:   Moderately limited  with pain  Extension:  Moderately limited  with pain  Lateral Flexion - Left:  Moderately limited  with pain  Lateral Flexion - Right:  Moderately limited with pain  Rotation - Left:  Moderately limited  with pain  Rotation - Right:  Moderately limited  with pain  Motor Strength:  Left hip flexion:  5/5  Left hip extension:  5/5  Right hip flexion:  4/5  Right hip extension:  5/5  Left knee flexion:  5/5  Left knee extension:  5/5  Right knee flexion:  5/5  Right knee extension:  4/5  Left foot dorsiflexion:  5/5  Left foot plantar flexion:  5/5  Right foot dorsiflexion:  5/5  Right foot plantar flexion:  5/5  Special Tests:  Left Straight Leg Test:  negative  Right Straight Leg Test:  positive  Left Fred's Maneuver:  negative  Right Fred's Maneuver:  negative      Imaging    MRI LUMBAR SPINE WITHOUT CONTRAST (1/29/2019)     INDICATION: Chronic low back pain, left leg pain     COMPARISON:  Lumbar spine MRI 2/16/2016     TECHNIQUE:  Sagittal T1, sagittal T2, sagittal inversion recovery, axial T1 and axial T2, coronal T2        IMAGE QUALITY:  Diagnostic     FINDINGS:     VERTEBRAL BODIES:  There is mild straightening of normal lumbar lordosis  No spondylolisthesis  No compression fracture deformity  No abnormal bone marrow signal or suspicious discrete marrow lesion     SACRUM:  Normal signal within the sacrum  No evidence of insufficiency or stress fracture      DISTAL CORD AND CONUS:  Normal size and signal within the distal cord and conus        PARASPINAL SOFT TISSUES:  Visualized prevertebral and paraspinal soft tissue are unremarkable     LOWER THORACIC DISC SPACES:  Unremarkable     LUMBAR DISC SPACES:  Disc desiccation and disc height loss more pronounced at level L4-5 and L5-S1     L1-L2:  Mild bilateral facet arthropathy  No canal or foraminal stenosis     L2-L3:  Mild bilateral facet arthropathy  No canal or foraminal stenosis     L3-L4:  Minimal right asymmetric disc bulge  Mild bilateral facet arthropathy    No canal or foraminal stenosis     L4-L5:  Disc bulge with superimposed small left subarticular disc protrusion with small endplate osteophyte abutting infrasurface of exiting left L4 nerve root  Mild bilateral facet arthropathy  No spinal canal stenosis  Mild left foraminal narrowing      L5-S1:  Disc bulge with superimposed right subarticular disc protrusion abutting infrasurface of exiting right L5 nerve root  Mild bilateral facet arthropathy

## 2019-10-18 NOTE — TELEPHONE ENCOUNTER
Patient states that she was written for a 30 day supply for Tramadol today at her visit  [employer] will only pay for 5 days   Prior Authorization is needed  Patient has never had a script written for Tramadol [facility]  Please notify the patient and pharmacy when approved

## 2019-10-21 LAB
RHEUMATOID FACT SER QL LA: NEGATIVE
RYE IGE QN: NEGATIVE

## 2019-10-25 ENCOUNTER — HOSPITAL ENCOUNTER (OUTPATIENT)
Dept: RADIOLOGY | Facility: HOSPITAL | Age: 49
Discharge: HOME | End: 2019-10-25
Attending: ANESTHESIOLOGY
Payer: COMMERCIAL

## 2019-10-25 DIAGNOSIS — M51.16 INTERVERTEBRAL DISC DISORDERS WITH RADICULOPATHY, LUMBAR REGION: ICD-10-CM

## 2019-10-25 PROCEDURE — 72148 MRI LUMBAR SPINE W/O DYE: CPT

## 2019-10-29 ENCOUNTER — TELEPHONE (OUTPATIENT)
Dept: PAIN MEDICINE | Facility: MEDICAL CENTER | Age: 49
End: 2019-10-29

## 2019-10-29 DIAGNOSIS — M51.16 INTERVERTEBRAL DISC DISORDER WITH RADICULOPATHY OF LUMBAR REGION: Primary | ICD-10-CM

## 2019-10-29 NOTE — TELEPHONE ENCOUNTER
Discussed MRI L-spine results with patient and advise that very mild degenerative changes and would not recommend surgical consultation  Based on her symptoms, feel she would be a good candidate for spinal cord stimulation which she is interested in  Will place order for MRI T-spine and paper order for psychological referral   She already see a a psychiatric nurse practitioner who can fill out form    Please set up for education with [facility] and send brochure

## 2019-10-29 NOTE — TELEPHONE ENCOUNTER
MRI L-spine shows stable findings multilevel spondylosis/arthritis and small left L4-L5 disc protrusion and small right L5-S1 foraminal disc protrusion

## 2019-10-29 NOTE — TELEPHONE ENCOUNTER
Pt called stating she would like the results to her MRI   Please advise pt can be reached at [phone]

## 2019-10-30 ENCOUNTER — TELEPHONE (OUTPATIENT)
Dept: BEHAVIORAL/MENTAL HEALTH CLINIC | Facility: CLINIC | Age: 49
End: 2019-10-30

## 2019-10-30 NOTE — TELEPHONE ENCOUNTER
Spoke with patient and she is scheduled for education 11/14/19 @ 1400 in [place] office  Pt will stop up and  script for psych eval and [facility] booklet

## 2019-10-30 NOTE — TELEPHONE ENCOUNTER
Patient called to speak with you in reference to needing a psychiatric evaluation for surgery to have a nerve stimulator placed in spine    Please call [phone]

## 2019-10-31 ENCOUNTER — CONSULT (OUTPATIENT)
Dept: CARDIOLOGY CLINIC | Facility: CLINIC | Age: 49
End: 2019-10-31
Payer: COMMERCIAL

## 2019-10-31 VITALS
WEIGHT: 168 LBS | BODY MASS INDEX: 26.37 KG/M2 | HEIGHT: 67 IN | OXYGEN SATURATION: 95 % | DIASTOLIC BLOOD PRESSURE: 74 MMHG | HEART RATE: 82 BPM | SYSTOLIC BLOOD PRESSURE: 118 MMHG

## 2019-10-31 DIAGNOSIS — R07.89 CHEST PRESSURE: Primary | ICD-10-CM

## 2019-10-31 DIAGNOSIS — R06.02 SOB (SHORTNESS OF BREATH): ICD-10-CM

## 2019-10-31 DIAGNOSIS — R00.0 TACHYCARDIA: ICD-10-CM

## 2019-10-31 DIAGNOSIS — R42 DIZZINESS: ICD-10-CM

## 2019-10-31 PROCEDURE — 99243 OFF/OP CNSLTJ NEW/EST LOW 30: CPT | Performed by: INTERNAL MEDICINE

## 2019-11-05 PROBLEM — R00.0 TACHYCARDIA: Status: ACTIVE | Noted: 2019-11-05

## 2019-11-05 PROBLEM — R06.02 SOB (SHORTNESS OF BREATH): Status: ACTIVE | Noted: 2019-11-05

## 2019-11-05 PROBLEM — R42 DIZZINESS: Status: ACTIVE | Noted: 2019-11-05

## 2019-11-05 PROCEDURE — 93000 ELECTROCARDIOGRAM COMPLETE: CPT | Performed by: INTERNAL MEDICINE

## 2019-11-05 NOTE — PROGRESS NOTES
Consultation - Cardiology   Shelby Solorzano 47 y o  female MRN: [id number]  Unit/Bed#:  Encounter: [id number]  Physician Requesting Consult: No att  providers found  Reason for Consult / Principal Problem: tachycardia    Assessment:  1  Tachycardia  2  SOB / dizziness  3  Chest pressure    Plan:  We discussed the possibility that she went into a SVT which broke by the time she was placed on telemetry and had an ECG  She has a normal EF, no ischemia by recent nuclear stress test, and a normal heart exam   I discussed the option of a 30 day event recorder  We decided to follow her clinically for now  I have reviewed her medications and made no changes  History of Present Illness     HPI: Shelby Solorzano is a 47y o  year old female who denies any past cardiac history  She did have a nuclear stress test 5/28/2019 - 7 minutes 30 seconds Avinash protocol  EF 58 %  No ischemia  ECGs have shown possible ectopic atrial rhythm  She is seen for cardiac evaluation after ER visit on 9/16/2019  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  She then became SOB and dizziness and felt her heart racing  Her Apple Watch recorded a HR of 187 BPM   She was brought down to the ER and a pulse ox also recorded a HR on 180s  On telemetry, she was in a SR with normal HR and ECG showed the same  She was discharged and has not had any episodes since  She is active and denies CP, SOB, dizziness,    ECG today - NSR , low QRS voltage  TSH was normal   She denies smoking or recreational drug use          Review of Systems:    Alert awake oriented, comfortable, denies any complaints  No fevers chills nausea vomiting  No weakness, dizziness, seizures  no cough, shortness of breath, or wheezing  Denies any palpitations, chest pain, diaphoresis  Denies leg edema, pain or paresthesias  Denies any skin rashes  Denies abdominal pain, bloody stools, masses  Denies any depression or suicidal ideations      Historical Information   Past Medical History:   Diagnosis Date    ADHD (attention deficit hyperactivity disorder)     Anxiety     Bulging lumbar disc     Carpal tunnel syndrome     RIGHT  LAST ASSESSED: 16    Chronic back pain     low    Colon polyps     Depression     Diabetes mellitus ([facility] [address] )     on victoza    GERD (gastroesophageal reflux disease)     Gestational diabetes     Hearing loss     left ear    Hyperlipidemia     IBS (irritable bowel syndrome)     Ileus ([facility] [address] )     LAST ASSESSED: 8/3/17    Labial cyst     LAST ASSESSED: 16    Myofascial pain     LAST ASSESSED: 16    Obesity     Ovarian cyst     LEFT  LAST ASSESSED: 16    Panic attack     Pap smear for cervical cancer screening     2018--pap wnl, HRHPV neg    PTSD (post-traumatic stress disorder)     Seasonal allergies     Thoracic outlet syndrome     2010    Trochanteric bursitis of both hips     LAST ASSESSED: 3/18/16    Ulnar neuropathy at elbow     Varicella     Wears glasses      Past Surgical History:   Procedure Laterality Date    BILE DUCT EXPLORATION      ENDOSCOPIC REMOVAL OF STONES FROM BILIARY TRACT     SECTION      x3    CHOLECYSTECTOMY      COLONOSCOPY      DILATION AND CURETTAGE OF UTERUS      ENDOMETRIAL ABLATION      ERCP W/ SPHICTEROTOMY      FIRST RIB REMOVAL      FIRST RIB REMOVAL      THORAX EXCISION OF FIRST RIB    HYSTEROSCOPY      NEUROPLASTY / TRANSPOSITION ULNAR NERVE AT ELBOW      [place] COLONOSCOPY FLX DX W/COLLJ SPEC WHEN PFRMD N/A 2017    Procedure: COLONOSCOPY;  Surgeon: Erlinda Estrada MD;  [facility]; Service: Gastroenterology    MN ESOPHAGOGASTRODUODENOSCOPY TRANSORAL DIAGNOSTIC N/A 2017    Procedure: ESOPHAGOGASTRODUODENOSCOPY (EGD); Surgeon: Siddharth Gross MD;  [facility];   Service: Gastroenterology    MN HYSTEROSCOPY,W/ENDO BX N/A 10/20/2017    Procedure: DILATATION AND CURETTAGE (D&C) WITH HYSTEROSCOPY  REMOVAL VULVAR RT  LESION;  Surgeon: Brittany Ahn MD;  [facility];  Service: Gynecology    [place] LAP, ÁLVARO RESTRICT PROC, LONGITUDINAL GASTRECTOMY N/A 2018    Procedure: GASTRECTOMY SLEEVE LAPAROSCOPIC; INTRAOPERATIVE EGD ;  Surgeon: Leif Moser MD;  [facility];  Service: [address]      TUBAL LIGATION      ULNAR NERVE REPAIR Right     VEIN LIGATION AND [address] Right     VULVA SURGERY  10/20/2017    BIOPSY    WISDOM TOOTH EXTRACTION       Social History     Substance and Sexual Activity   Alcohol Use Yes    Alcohol/week: 1 0 standard drinks    Types: 1 Standard drinks or equivalent per week    Comment: socially     Social History     Substance and Sexual Activity   Drug Use No     Social History     Tobacco Use   Smoking Status Former Smoker    Last attempt to quit: 2017    Years since quittin 5   Smokeless Tobacco Never Used     Family History: non-contributory    Meds/Allergies   all current active meds have been reviewed  Allergies   Allergen Reactions    Ibuprofen      Due to gastric sleeve       Objective   Vitals: Blood pressure 118/74, pulse 82, height 5' 7" (1 702 m), weight 76 2 kg (168 lb), last menstrual period 2019, SpO2 95 %, not currently breastfeeding , Body mass index is 26 31 kg/m²  ,   Weight (last 2 days)     None                    Physical Exam:  GEN: Jacob Stanton appears well, alert and oriented x 3, pleasant and cooperative   HEENT: pupils equal, round, and reactive to light; extraocular muscles intact  NECK: supple, no carotid bruits   HEART: regular rhythm, normal S1 and S2, no murmurs, clicks, gallops or rubs   LUNGS: clear to auscultation bilaterally; no wheezes, rales, or rhonchi   ABDOMEN: normal bowel sounds, soft, no tenderness, no distention  EXTREMITIES: peripheral pulses normal; no clubbing, cyanosis, or edema  NEURO: no focal findings   SKIN: normal without suspicious lesions on exposed skin    Lab Results:   Consult on 10/31/2019   Component Date Value Ref Range Status    INTERPRETATIONS 11/05/2019    Final    NSR  PRWP  Imaging: I have personally reviewed pertinent reports

## 2019-11-06 NOTE — PSYCH
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 increased anxiety related to treatment with stimulant medications  She verbalizes understanding and agreement for treatment  Controlled Medication Discussion:     Swapna Eden has been filling controlled prescriptions on time as [facility]  Discussed with Swapna Eden the risks of sedation, respiratory depression, impairment of ability to drive and potential for abuse and addiction related to treatment with benzodiazepine medications  She understands risk of treatment with benzodiazepine medications, agrees to not drive if feels impaired and agrees to take medications as prescribed  Discussed with Filemon Acosta warning on concurrent use of benzodiazepines and opioid medications including sedation, respiratory depression, coma and death  She understands the risk of treatment with benzodiazepines in addition to opioids and wants to continue taking those medications    Psychotherapy Provided:     Individual psychotherapy provided: No      Portions of the record may have been created with voice recognition software   Occasional wrong word or "sound a like" substitutions may have occurred due to the inherent limitations of voice recognition software  Read the chart carefully and recognize, using context, where substitutions have occurred

## 2019-11-07 ENCOUNTER — OFFICE VISIT (OUTPATIENT)
Dept: PSYCHIATRY | Facility: CLINIC | Age: 49
End: 2019-11-07
Payer: COMMERCIAL

## 2019-11-07 DIAGNOSIS — F33.2 SEVERE EPISODE OF RECURRENT MAJOR DEPRESSIVE DISORDER, WITHOUT PSYCHOTIC FEATURES (HCC): Primary | ICD-10-CM

## 2019-11-07 DIAGNOSIS — F90.2 ADHD (ATTENTION DEFICIT HYPERACTIVITY DISORDER), COMBINED TYPE: ICD-10-CM

## 2019-11-07 PROCEDURE — 99213 OFFICE O/P EST LOW 20 MIN: CPT | Performed by: NURSE PRACTITIONER

## 2019-11-07 RX ORDER — VENLAFAXINE HYDROCHLORIDE 150 MG/1
150 CAPSULE, EXTENDED RELEASE ORAL DAILY
Qty: 90 CAPSULE | Refills: 1 | Status: SHIPPED | OUTPATIENT
Start: 2019-11-07 | End: 2020-06-04 | Stop reason: SDUPTHER

## 2019-11-07 RX ORDER — LAMOTRIGINE 25 MG/1
25 TABLET ORAL DAILY
Qty: 90 TABLET | Refills: 1 | Status: SHIPPED | OUTPATIENT
Start: 2019-11-07 | End: 2019-12-26 | Stop reason: SDUPTHER

## 2019-11-07 RX ORDER — ATOMOXETINE 25 MG/1
25 CAPSULE ORAL DAILY
Qty: 90 CAPSULE | Refills: 0 | Status: SHIPPED | OUTPATIENT
Start: 2019-11-07 | End: 2020-04-03

## 2019-11-07 RX ORDER — LAMOTRIGINE 100 MG/1
50 TABLET ORAL DAILY
Qty: 45 TABLET | Refills: 1 | Status: SHIPPED | OUTPATIENT
Start: 2019-11-07 | End: 2019-12-05

## 2019-11-07 RX ORDER — QUETIAPINE FUMARATE 50 MG/1
50 TABLET, FILM COATED ORAL
Qty: 90 TABLET | Refills: 1 | Status: SHIPPED | OUTPATIENT
Start: 2019-11-07 | End: 2020-06-04 | Stop reason: SDUPTHER

## 2019-11-12 ENCOUNTER — TELEPHONE (OUTPATIENT)
Dept: PAIN MEDICINE | Facility: MEDICAL CENTER | Age: 49
End: 2019-11-12

## 2019-11-12 DIAGNOSIS — M46.1 SACROILIITIS (HCC): ICD-10-CM

## 2019-11-12 DIAGNOSIS — M51.16 INTERVERTEBRAL DISC DISORDERS WITH RADICULOPATHY, LUMBAR REGION: ICD-10-CM

## 2019-11-12 RX ORDER — TRAMADOL HYDROCHLORIDE 50 MG/1
50 TABLET ORAL DAILY PRN
Qty: 30 TABLET | Refills: 2 | Status: SHIPPED | OUTPATIENT
Start: 2019-11-12 | End: 2020-01-29 | Stop reason: HOSPADM

## 2019-11-12 NOTE — TELEPHONE ENCOUNTER
S/w pt, she said the Tramadol 50 mg is helping a little bit, but she does not want to be on it forever  Pt said that she takes it daily PRN, not BID  Pt said she has about a week left  Pt said she completed her psych eval last week, and has her stim class Thursday and MRI on Friday   Benja Fernandez did you receive pt's psych eval?

## 2019-11-12 NOTE — TELEPHONE ENCOUNTER
Pt contacted Call Center requested refill of their medication          Medication Name:  tamadol     Dosage of Med:  50 mg     Frequency of Med:  1 tab BID     Remaining Medication:  1 week left     Pharmacy and Location:    [facility]         Pt states she has an MRI for Friday and the spinal stimulator class on Thursday

## 2019-11-13 ENCOUNTER — SOCIAL WORK (OUTPATIENT)
Dept: BEHAVIORAL/MENTAL HEALTH CLINIC | Facility: CLINIC | Age: 49
End: 2019-11-13
Payer: COMMERCIAL

## 2019-11-13 ENCOUNTER — TELEPHONE (OUTPATIENT)
Dept: BEHAVIORAL/MENTAL HEALTH CLINIC | Facility: CLINIC | Age: 49
End: 2019-11-13

## 2019-11-13 DIAGNOSIS — F43.10 POST TRAUMATIC STRESS DISORDER (PTSD): ICD-10-CM

## 2019-11-13 DIAGNOSIS — F33.2 MAJOR DEPRESSIVE DISORDER, RECURRENT SEVERE WITHOUT PSYCHOTIC FEATURES (HCC): Primary | ICD-10-CM

## 2019-11-13 DIAGNOSIS — F41.1 GENERALIZED ANXIETY DISORDER: ICD-10-CM

## 2019-11-13 PROCEDURE — 90834 PSYTX W PT 45 MINUTES: CPT | Performed by: SOCIAL WORKER

## 2019-11-13 NOTE — BH TREATMENT PLAN
Chencho Arenas  1970       Date of Initial Treatment Plan: April 5, 2019   Date of Current Treatment Plan: 11/13/19    Treatment Plan Number 3     Strengths/Personal Resources for Self Care: supportive fiance; motivation to be a good mother; caring person    Diagnosis:   1  Major depressive disorder, recurrent severe without psychotic features ([facility] [address] )     2  Generalized anxiety disorder     3  Post traumatic stress disorder (PTSD)         Area of Needs: Depression, anxiety, past trauma history      Long Term Goal 1: "I want to get rid of the nightmares and feeling bad about myself  I felt bad about myself since I was little  I have never had very much self-worth  I want to get better "     Target Date: March 12, 2020  Completion Date: to be determined         Short Term Objectives for Goal 1:      1  Stephen Alfredo will identify triggers and prompting events that increase symptoms of depression and anxiety   2  Stephen Alfredo will learn and exhibit understanding of a minimum of three distress tolerance skills to assist with management of depression and anxiety symptoms  3  Stephen Alfredo will learn and exhibit understanding of effective communication skills (using a DBT-informed perspective), so she can effectively discuss her emotions and her thoughts  4  When appropriate, the clinician and Stephen Alfredo will begin to identify target areas to explore and process past trauma that is effecting current relationships and functioning   5  Clinician will provide psychoeducation regarding options for processing past trauma (including, but not limited to, prolonged exposure, bilateral stimulation)   6  Stephen Alfredo will maintain a level of depression that does not surpass a 5/10 on most days  Incidents that surpass this limit will be process in therapy sessions  GOAL 1: Modality: Individual 1-2 x per month   Completion Date to be determined, Medication Management and The person(s) responsible for carrying out the plan is  Christoph Ramos (client);  Nandini Glasgow Brenda (clinician); Black Maciel (CRNP)     Clinician will use client-centered therapy, mindfulness-based strategies, DBT-informed skills, prolonged exposure, bilateral stimulation and solution-focused therapy to address Christina's symptoms of PTSD, anxiety, and depression  Tedjason Cool will practice skills between sessions and will report back, during subsequent sessions regarding successes and barriers  Behavioral Health Treatment Plan [facility]: Diagnosis and Treatment Plan explained to Mechelle Javier relates understanding diagnosis and is agreeable to Treatment Plan  Client Comments : Please share your thoughts, feelings, need and/or experiences regarding your treatment plan: "I like coming here  I feel like you care   If I can't get in to see you, and I see Enmanuel Simms, she helps me "
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
## 2019-11-13 NOTE — TELEPHONE ENCOUNTER
Patient called in reference to the letter for the stimulator  [facility] is stating it is not correct  I must specifically state that the evaluation is for said stimulator  When complete this can be faxed to [phone]  Mirna Berkowitz    Thank you

## 2019-11-13 NOTE — TELEPHONE ENCOUNTER
Spoke with patient and advised this was a psychiatric eval from 5/2019 and that we require an evaluation specific for clearance for elective surgery for stim  Advised pt to call back if there are any questions or concerns

## 2019-11-14 NOTE — TELEPHONE ENCOUNTER
All clinical info including signed off psych eval faxed to [facility] for [facility]  Pt aware

## 2019-11-15 ENCOUNTER — ANNUAL EXAM (OUTPATIENT)
Dept: OBGYN CLINIC | Facility: CLINIC | Age: 49
End: 2019-11-15
Payer: COMMERCIAL

## 2019-11-15 ENCOUNTER — HOSPITAL ENCOUNTER (OUTPATIENT)
Dept: MRI IMAGING | Facility: HOSPITAL | Age: 49
Discharge: HOME | End: 2019-11-15
Attending: ANESTHESIOLOGY
Payer: COMMERCIAL

## 2019-11-15 VITALS
DIASTOLIC BLOOD PRESSURE: 68 MMHG | HEIGHT: 67 IN | SYSTOLIC BLOOD PRESSURE: 118 MMHG | BODY MASS INDEX: 26.18 KG/M2 | WEIGHT: 166.8 LBS

## 2019-11-15 DIAGNOSIS — Z01.419 ENCOUNTER FOR ANNUAL ROUTINE GYNECOLOGICAL EXAMINATION: Primary | ICD-10-CM

## 2019-11-15 DIAGNOSIS — Z12.31 VISIT FOR SCREENING MAMMOGRAM: ICD-10-CM

## 2019-11-15 DIAGNOSIS — Z30.41 ORAL CONTRACEPTIVE PILL SURVEILLANCE: ICD-10-CM

## 2019-11-15 DIAGNOSIS — Z12.11 COLON CANCER SCREENING: ICD-10-CM

## 2019-11-15 DIAGNOSIS — M51.16 INTERVERTEBRAL DISC DISORDER WITH RADICULOPATHY OF LUMBAR REGION: ICD-10-CM

## 2019-11-15 PROBLEM — R42 DIZZINESS: Status: RESOLVED | Noted: 2019-11-05 | Resolved: 2019-11-15

## 2019-11-15 PROCEDURE — 72146 MRI CHEST SPINE W/O DYE: CPT

## 2019-11-15 PROCEDURE — 99396 PREV VISIT EST AGE 40-64: CPT | Performed by: OBSTETRICS & GYNECOLOGY

## 2019-11-15 RX ORDER — DROSPIRENONE AND ETHINYL ESTRADIOL 0.02-3(28)
1 KIT ORAL DAILY
Qty: 84 TABLET | Refills: 4 | Status: SHIPPED | OUTPATIENT
Start: 2019-11-15 | End: 2020-11-19 | Stop reason: SDUPTHER

## 2019-11-15 NOTE — PROGRESS NOTES
Pt is a 47 y o  [id number] with Patient's last menstrual period was 2019 (approximate)  using BS/BTL for [facility] presents for preventive care  She notes the same partner since her last STI evaluation  In her lifetime she has been involved with >5 partners   Safe sexual practices (monogomy, condoms) are followed consistently  · She does  feel safe in the relationship  She does feel safe in her home  · Her calcium intake encompasses bariatric vitamins, milk (cow, goat, almond, cashew, soy, etc), cheese and yogurt for a total of 4-5 servings daily on average  She does take additional Vitamin D (MVI or supplement)  · She exercises 0 times per week  · Her menses are very irregular  She last had a menses 2019  ·   Menstrual History:  OB History        3    Para   3    Term   3            AB        Living   3       SAB        TAB        Ectopic        Multiple        Live Births               Obstetric Comments   C/S x 3; BTL at time of third  Menarche 11    Menses: last menses 2019            Menarche age: 14  Patient's last menstrual period was 2019 (approximate)  ·      · She has never recieved an HPV vaccine  · tobacco use : does not use tobacco              · Colonoscopy: -polyp, repeat , hemoccult given  · Pap: 2018-wnl, HRHPV neg; repeat   · Mammogram:2018-wnl, rx given    Past Medical History:   Diagnosis Date    ADHD (attention deficit hyperactivity disorder)     Anxiety     Bulging lumbar disc     Carpal tunnel syndrome     RIGHT    LAST ASSESSED: 16    Chronic back pain     low    Colon polyps     Depression     Diabetes mellitus ([facility] [address] )     on victoza    GERD (gastroesophageal reflux disease)     Gestational diabetes     Hearing loss     left ear    Hyperlipidemia     IBS (irritable bowel syndrome)     Ileus ([address] )     LAST ASSESSED: 8/3/17    Labial cyst     LAST ASSESSED: 16    Myofascial pain     LAST ASSESSED: 16    Obesity     Ovarian cyst     LEFT  LAST ASSESSED: 16    Panic attack     Pap smear for cervical cancer screening     2018--pap wnl, HRHPV neg    PTSD (post-traumatic stress disorder)     Seasonal allergies     Thoracic outlet syndrome     2010    Trochanteric bursitis of both hips     LAST ASSESSED: 3/18/16    Ulnar neuropathy at elbow     Varicella     Wears glasses        Past Surgical History:   Procedure Laterality Date    BILE DUCT EXPLORATION      ENDOSCOPIC REMOVAL OF STONES FROM BILIARY TRACT     SECTION      x3    CHOLECYSTECTOMY      COLONOSCOPY      -polyp, repeat     DILATION AND CURETTAGE OF UTERUS      ENDOMETRIAL ABLATION      ERCP W/ SPHICTEROTOMY      FIRST RIB REMOVAL      FIRST RIB REMOVAL      THORAX EXCISION OF FIRST RIB    HYSTEROSCOPY      NEUROPLASTY / TRANSPOSITION ULNAR NERVE AT ELBOW      WV COLONOSCOPY FLX DX W/COLLJ SPEC WHEN PFRMD N/A 2017    Procedure: COLONOSCOPY;  Surgeon: Rosie Acosta MD;  [facility]: [facility]; Service: Gastroenterology    WV ESOPHAGOGASTRODUODENOSCOPY TRANSORAL DIAGNOSTIC N/A 2017    Procedure: ESOPHAGOGASTRODUODENOSCOPY (EGD); Surgeon: Amina Braxton MD;  Location: [facility];   Service: Gastroenterology    WV HYSTEROSCOPY,W/ENDO BX N/A 10/20/2017    Procedure: DILATATION AND CURETTAGE (D&C) WITH HYSTEROSCOPY  REMOVAL VULVAR RT  LESION;  Surgeon: Latia Arora MD;  [facility];  Service: Gynecology    WV LAP, ÁLVARO RESTRICT PROC, LONGITUDINAL GASTRECTOMY N/A 2018    Procedure: GASTRECTOMY SLEEVE LAPAROSCOPIC; INTRAOPERATIVE EGD ;  Surgeon: James Myles MD;  [facility];  Service: Bariatrics    TONSILLECTOMY AND ADENOIDECTOMY      TUBAL LIGATION      ULNAR NERVE REPAIR Right     VEIN LIGATION AND STRIPPING Right     VULVA SURGERY  10/20/2017    BIOPSY    WISDOM TOOTH EXTRACTION         OB History    Para Term  AB Living   3 3 3     3   SAB TAB Ectopic Multiple Live Births                  # Outcome Date GA Lbr Manuel/2nd Weight Sex Delivery Anes PTL Lv   3 Term            2 Term            1 Term               Obstetric Comments   C/S x 3; BTL at time of third   Menarche 14      Menses: last menses 1/2019              Current Outpatient Medications:     atoMOXetine (STRATTERA) 25 mg capsule, Take 1 capsule (25 mg total) by mouth daily, Disp: 90 capsule, Rfl: 0    Biotin 23485 MCG TABS, Take by mouth, Disp: , Rfl:     Calcium Citrate-Vitamin D 500-500 MG-UNIT PACK, Take by mouth, Disp: , Rfl:     cholecalciferol (VITAMIN D3) 1,000 units tablet, Take 2,000 Units by mouth daily, Disp: , Rfl:     Cyanocobalamin (CVS VITAMIN B12 PO), Take by mouth, Disp: , Rfl:     cyclobenzaprine (FLEXERIL) 10 mg tablet, Take 1 tablet (10 mg total) by mouth daily at bedtime, Disp: 30 tablet, Rfl: 0    drospirenone-ethinyl estradiol (LIEN) 3-0 02 MG per tablet, Take 1 tablet by mouth daily, Disp: 84 tablet, Rfl: 4    gabapentin (NEURONTIN) 600 MG tablet, Take 1 tablet (600 mg total) by mouth daily at bedtime (Patient taking differently: Take 900 mg by mouth daily at bedtime ), Disp: 90 tablet, Rfl: 3    lamoTRIgine (LaMICtal) 100 mg tablet, Take 0 5 tablets (50 mg total) by mouth daily, Disp: 45 tablet, Rfl: 1    lamoTRIgine (LaMICtal) 25 mg tablet, Take 1 tablet (25 mg total) by mouth daily, Disp: 90 tablet, Rfl: 1    LORazepam (ATIVAN) 0 5 mg tablet, Take 1 tablet 2 hours prior to procedure and may repeat just prior to procedure if anxiety persists  , Disp: 2 tablet, Rfl: 0    metoclopramide (REGLAN) 10 mg tablet, Take 1 tablet (10 mg total) by mouth every 6 (six) hours as needed (nausea), Disp: 15 tablet, Rfl: 0    montelukast (SINGULAIR) 10 mg tablet, Take 1 tablet (10 mg total) by mouth daily at bedtime, Disp: 90 tablet, Rfl: 1    Multiple Vitamins-Minerals (MULTI COMPLETE PO), Take by mouth, Disp: , Rfl:     pantoprazole (PROTONIX) 40 mg tablet, TAKE ONE TABLET BY MOUTH DAILY 30 MINUTES BEFORE BEFORE BREAKFAST, Disp: 90 tablet, Rfl: 0    QUEtiapine (SEROquel) 50 mg tablet, Take 1 tablet (50 mg total) by mouth daily at bedtime, Disp: 90 tablet, Rfl: 1    traMADol (ULTRAM) 50 mg tablet, Take 1 tablet (50 mg total) by mouth daily as needed for moderate pain, Disp: 30 tablet, Rfl: 2    venlafaxine (EFFEXOR-XR) 150 mg 24 hr capsule, Take 1 capsule (150 mg total) by mouth daily, Disp: 90 capsule, Rfl: 1    Probiotic Product (PRO-BIOTIC BLEND PO), Take by mouth, Disp: , Rfl:     Allergies   Allergen Reactions    Ibuprofen      Due to gastric sleeve -can only take for 5 days, then needs to stop       Social History     Socioeconomic History    Marital status:      Spouse name: None    Number of children: 3    Years of education: GED then 2 year college program    Highest education level: None   Occupational History    Occupation: ER TECH     Employer: [employer]   Social Needs    Financial resource strain: Somewhat hard    Food insecurity:     Worry: Never true     Inability: Never true    Transportation needs:     Medical: No     Non-medical: No   Tobacco Use    Smoking status: Former Smoker     Last attempt to quit: 2017     Years since quittin 5    Smokeless tobacco: Never Used   Substance and Sexual Activity    Alcohol use:  Yes     Alcohol/week: 1 0 standard drinks     Types: 1 Standard drinks or equivalent per week     Frequency: Monthly or less     Comment: socially    Drug use: No    Sexual activity: Yes     Partners: Male     Birth control/protection: OCP     Comment: lifetime partners: 6; current partner    Lifestyle    Physical activity:     Days per week: 0 days     Minutes per session: None    Stress: Very much   Relationships    Social connections:     Talks on phone: Once a week     Gets together: Once a week     Attends Muslim service: Never     Active member of club or organization: No     Attends meetings of clubs or organizations: Never     Relationship status:     Intimate partner violence:     Fear of current or ex partner: No     Emotionally abused: No     Physically abused: No     Forced sexual activity: No   Other Topics Concern    None   Social History Narrative    Judaism: no preference    Accepts blood products        Exercise: unable with back issues    Calcium: 1 cheese 4-5x/week, 1 yogurt a few times/week, 1 c almond milk daily, calcium in bariatric vitamin       Family History   Problem Relation Age of Onset    Diabetes Mother     Other Mother         HYPERCHOLESTEROLEMIA    Breast cancer Mother         >50    Diabetes Father     Other Father         traumatic brain injury    Prostate cancer Father     Alcohol abuse Father         in remission    Heart disease Father     Neuropathy Father     Hyperlipidemia Father     Hypertension Brother     Diabetes Brother     Other Brother         HYPERCHOLESTEROLEMIA    Alcohol abuse Brother     Depression Brother         attempted suicide    Colon cancer Maternal Grandfather     Diabetes Brother     Alcohol abuse Brother     Asthma Son     Ovarian cancer Neg Hx     Uterine cancer Neg Hx        Blood pressure 118/68, height 5' 7" (1 702 m), weight 75 7 kg (166 lb 12 8 oz), last menstrual period 01/07/2019, not currently breastfeeding  and Body mass index is 26 12 kg/m²  Physical Exam   Constitutional: She is oriented to person, place, and time  She appears well-developed and well-nourished  HENT:   Head: Normocephalic and atraumatic  Eyes: Conjunctivae and EOM are normal    Neck: Normal range of motion  Neck supple  No tracheal deviation present  No thyromegaly present  Cardiovascular: Normal rate, regular rhythm and normal heart sounds  Pulmonary/Chest: Effort normal and breath sounds normal  No stridor  No respiratory distress  She has no wheezes  She has no rales  Abdominal: Soft   Bowel sounds are normal  She exhibits no distension and no mass  There is no tenderness  There is no rebound and no guarding  Musculoskeletal: Normal range of motion  She exhibits no edema or tenderness  Lymphadenopathy:     She has no cervical adenopathy  Neurological: She is alert and oriented to person, place, and time  Skin: Skin is warm  No rash noted  No erythema  Psychiatric: She has a normal mood and affect  Her behavior is normal  Judgment and thought content normal      Breasts: breasts appear normal, no suspicious masses, no skin or nipple changes or axillary nodes, symmetric fibrous changes in both upper outer quadrants  vulva: normal external genitalia for age and no lesions, masses, epithelial changes, or exudate  vagina: color pink and rugae  well formed rugae  cervix: nullip and no lesions   uterus: NSSC, AF, NT, mobile  adnexa: no masses or tenderness  rectum: no masses or nodularity      A/P:  Pt is a 47 y o  [id number] with      Diagnoses and all orders for this visit:    Encounter for annual routine gynecological examination    Visit for screening mammogram  -     Mammo screening bilateral w cad; Future    Colon cancer screening  -     Occult Blood, Fecal Immunochemical; Future    Oral contraceptive pill surveillance  -     drospirenone-ethinyl estradiol (LIEN) 3-0 02 MG per tablet;  Take 1 tablet by mouth daily

## 2019-11-18 ENCOUNTER — TELEPHONE (OUTPATIENT)
Dept: PAIN MEDICINE | Facility: CLINIC | Age: 49
End: 2019-11-18

## 2019-11-22 NOTE — TELEPHONE ENCOUNTER
S/w Behavioral Health have been trying to fax eval form on this patient to  back fax line [phone] all day w/no success      Per rafael Recinos to fax to #: [phone]

## 2019-11-22 NOTE — TELEPHONE ENCOUNTER
S/w patient, she states her insurance has approved the STIM  Is in a lot of pain       # [phone]

## 2019-11-25 RX ORDER — CEFAZOLIN SODIUM 1 G/50ML
1000 SOLUTION INTRAVENOUS ONCE
Status: CANCELLED | OUTPATIENT
Start: 2019-11-25 | End: 2019-11-25

## 2019-11-25 NOTE — TELEPHONE ENCOUNTER
Received authorization, Auth #[id number], valid 11/20/19-2/21/20    Spoke with patient and she is scheduled as follows:  12/11/19 @ 1130 for consents  12/17/19 arriving @ 1350 for 1450 trial, ok'd by   12/23/19 @ 4711 with Aubree Salazar for lead removal  No PCN allergy  Pt takes PRN Ibuprofen but denies any other blood thinning meds  Email sent to [facility] to inform them of trial dates

## 2019-12-04 DIAGNOSIS — M54.16 LUMBAR RADICULOPATHY: ICD-10-CM

## 2019-12-04 DIAGNOSIS — G89.29 CHRONIC BILATERAL LOW BACK PAIN WITH LEFT-SIDED SCIATICA: ICD-10-CM

## 2019-12-04 DIAGNOSIS — M54.10 CHRONIC RADICULAR PAIN OF LOWER EXTREMITY: Primary | ICD-10-CM

## 2019-12-04 DIAGNOSIS — Z79.01 CHRONIC ANTICOAGULATION: ICD-10-CM

## 2019-12-04 DIAGNOSIS — Z79.899 ENCOUNTER FOR LONG-TERM CURRENT USE OF MEDICATION: ICD-10-CM

## 2019-12-04 DIAGNOSIS — G89.29 CHRONIC RADICULAR PAIN OF LOWER EXTREMITY: Primary | ICD-10-CM

## 2019-12-04 DIAGNOSIS — M54.42 CHRONIC BILATERAL LOW BACK PAIN WITH LEFT-SIDED SCIATICA: ICD-10-CM
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
## 2019-12-05 ENCOUNTER — OFFICE VISIT (OUTPATIENT)
Dept: PSYCHIATRY | Facility: CLINIC | Age: 49
End: 2019-12-05
Payer: COMMERCIAL

## 2019-12-05 ENCOUNTER — HOSPITAL ENCOUNTER (OUTPATIENT)
Dept: MAMMOGRAPHY | Facility: MEDICAL CENTER | Age: 49
Discharge: HOME | End: 2019-12-05
Payer: COMMERCIAL

## 2019-12-05 VITALS — BODY MASS INDEX: 25.74 KG/M2 | WEIGHT: 164 LBS | HEIGHT: 67 IN

## 2019-12-05 DIAGNOSIS — Z12.31 VISIT FOR SCREENING MAMMOGRAM: ICD-10-CM

## 2019-12-05 DIAGNOSIS — F33.2 SEVERE EPISODE OF RECURRENT MAJOR DEPRESSIVE DISORDER, WITHOUT PSYCHOTIC FEATURES (HCC): Primary | ICD-10-CM

## 2019-12-05 PROCEDURE — 99213 OFFICE O/P EST LOW 20 MIN: CPT | Performed by: NURSE PRACTITIONER

## 2019-12-05 PROCEDURE — 77063 BREAST TOMOSYNTHESIS BI: CPT

## 2019-12-05 PROCEDURE — 77067 SCR MAMMO BI INCL CAD: CPT

## 2019-12-05 RX ORDER — VENLAFAXINE HYDROCHLORIDE 75 MG/1
75 CAPSULE, EXTENDED RELEASE ORAL DAILY
Qty: 90 CAPSULE | Refills: 0 | Status: SHIPPED | OUTPATIENT
Start: 2019-12-05 | End: 2020-02-27

## 2019-12-05 RX ORDER — LAMOTRIGINE 100 MG/1
100 TABLET ORAL DAILY
Qty: 90 TABLET | Refills: 0
Start: 2019-12-05 | End: 2019-12-23 | Stop reason: SDUPTHER

## 2019-12-06 ENCOUNTER — SOCIAL WORK (OUTPATIENT)
Dept: BEHAVIORAL/MENTAL HEALTH CLINIC | Facility: CLINIC | Age: 49
End: 2019-12-06
Payer: COMMERCIAL

## 2019-12-06 DIAGNOSIS — F33.2 MAJOR DEPRESSIVE DISORDER, RECURRENT SEVERE WITHOUT PSYCHOTIC FEATURES (HCC): Primary | ICD-10-CM

## 2019-12-06 DIAGNOSIS — F43.10 POST TRAUMATIC STRESS DISORDER (PTSD): ICD-10-CM

## 2019-12-06 DIAGNOSIS — F41.1 GENERALIZED ANXIETY DISORDER: ICD-10-CM

## 2019-12-06 PROCEDURE — 90834 PSYTX W PT 45 MINUTES: CPT | Performed by: SOCIAL WORKER
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
## 2019-12-11 ENCOUNTER — APPOINTMENT (OUTPATIENT)
Dept: LAB | Facility: CLINIC | Age: 49
End: 2019-12-11
Payer: COMMERCIAL

## 2019-12-11 ENCOUNTER — OFFICE VISIT (OUTPATIENT)
Dept: PAIN MEDICINE | Facility: CLINIC | Age: 49
End: 2019-12-11
Payer: COMMERCIAL

## 2019-12-11 VITALS
WEIGHT: 164 LBS | BODY MASS INDEX: 25.69 KG/M2 | TEMPERATURE: 98.5 F | HEART RATE: 69 BPM | DIASTOLIC BLOOD PRESSURE: 64 MMHG | SYSTOLIC BLOOD PRESSURE: 121 MMHG

## 2019-12-11 DIAGNOSIS — Z79.899 ENCOUNTER FOR LONG-TERM CURRENT USE OF MEDICATION: ICD-10-CM

## 2019-12-11 DIAGNOSIS — M54.16 LUMBAR RADICULOPATHY: ICD-10-CM

## 2019-12-11 DIAGNOSIS — G89.29 CHRONIC RADICULAR PAIN OF LOWER EXTREMITY: ICD-10-CM

## 2019-12-11 DIAGNOSIS — M54.10 CHRONIC RADICULAR PAIN OF LOWER EXTREMITY: ICD-10-CM

## 2019-12-11 DIAGNOSIS — M51.16 INTERVERTEBRAL DISC DISORDER WITH RADICULOPATHY OF LUMBAR REGION: ICD-10-CM

## 2019-12-11 DIAGNOSIS — G89.4 CHRONIC PAIN SYNDROME: Primary | ICD-10-CM

## 2019-12-11 DIAGNOSIS — Z79.01 CHRONIC ANTICOAGULATION: ICD-10-CM

## 2019-12-11 DIAGNOSIS — M54.42 CHRONIC BILATERAL LOW BACK PAIN WITH LEFT-SIDED SCIATICA: ICD-10-CM

## 2019-12-11 DIAGNOSIS — G89.29 CHRONIC BILATERAL LOW BACK PAIN WITH LEFT-SIDED SCIATICA: ICD-10-CM

## 2019-12-11 LAB
ALBUMIN SERPL BCP-MCNC: 3.6 G/DL (ref 3.5–5)
ALP SERPL-CCNC: 48 U/L (ref 46–116)
ALT SERPL W P-5'-P-CCNC: 31 U/L (ref 12–78)
ANION GAP SERPL CALCULATED.3IONS-SCNC: 9 MMOL/L (ref 4–13)
APTT PPP: 29 SECONDS (ref 23–37)
AST SERPL W P-5'-P-CCNC: 22 U/L (ref 5–45)
BILIRUB SERPL-MCNC: 0.14 MG/DL (ref 0.2–1)
BUN SERPL-MCNC: 13 MG/DL (ref 5–25)
CALCIUM SERPL-MCNC: 9.1 MG/DL (ref 8.3–10.1)
CHLORIDE SERPL-SCNC: 103 MMOL/L (ref 100–108)
CO2 SERPL-SCNC: 29 MMOL/L (ref 21–32)
CREAT SERPL-MCNC: 0.58 MG/DL (ref 0.6–1.3)
ERYTHROCYTE [DISTWIDTH] IN BLOOD BY AUTOMATED COUNT: 12.6 % (ref 11.6–15.1)
EST. AVERAGE GLUCOSE BLD GHB EST-MCNC: 117 MG/DL
GFR SERPL CREATININE-BSD FRML MDRD: 108 ML/MIN/1.73SQ M
GLUCOSE SERPL-MCNC: 93 MG/DL (ref 65–140)
HBA1C MFR BLD: 5.7 % (ref 4.2–6.3)
HCT VFR BLD AUTO: 40.1 % (ref 34.8–46.1)
HGB BLD-MCNC: 13 G/DL (ref 11.5–15.4)
INR PPP: 1.01 (ref 0.84–1.19)
MCH RBC QN AUTO: 30.4 PG (ref 26.8–34.3)
MCHC RBC AUTO-ENTMCNC: 32.4 G/DL (ref 31.4–37.4)
MCV RBC AUTO: 94 FL (ref 82–98)
PLATELET # BLD AUTO: 248 THOUSANDS/UL (ref 149–390)
PMV BLD AUTO: 10.1 FL (ref 8.9–12.7)
POTASSIUM SERPL-SCNC: 3.6 MMOL/L (ref 3.5–5.3)
PROT SERPL-MCNC: 7.7 G/DL (ref 6.4–8.2)
PROTHROMBIN TIME: 12.7 SECONDS (ref 11.6–14.5)
RBC # BLD AUTO: 4.28 MILLION/UL (ref 3.81–5.12)
SODIUM SERPL-SCNC: 141 MMOL/L (ref 136–145)
WBC # BLD AUTO: 7.96 THOUSAND/UL (ref 4.31–10.16)

## 2019-12-11 PROCEDURE — 36415 COLL VENOUS BLD VENIPUNCTURE: CPT

## 2019-12-11 PROCEDURE — 85027 COMPLETE CBC AUTOMATED: CPT

## 2019-12-11 PROCEDURE — 99214 OFFICE O/P EST MOD 30 MIN: CPT | Performed by: ANESTHESIOLOGY

## 2019-12-11 PROCEDURE — 80053 COMPREHEN METABOLIC PANEL: CPT

## 2019-12-11 PROCEDURE — 83036 HEMOGLOBIN GLYCOSYLATED A1C: CPT

## 2019-12-11 PROCEDURE — 85730 THROMBOPLASTIN TIME PARTIAL: CPT

## 2019-12-11 PROCEDURE — 85610 PROTHROMBIN TIME: CPT

## 2019-12-11 NOTE — PROGRESS NOTES
Assessment:  1  Chronic pain syndrome     2  Intervertebral disc disorder with radiculopathy of lumbar region         Plan:  The spinal cord stimulator trial was discussed in depth with the patient  The patient was advised that a stimulator lead will be placed percutaneously through an epidural needle, with intra-op stimulation done to confirm appropriate lead placement  The lead will then be connected to an external generator and secured to the skin  The device representative will then program the stimulator and explain how to use it  During the trial, the patient was instructed to perform their activities of daily living, but limit twisting and bending motions, and no lifting greater than 10 pounds  The patient was advised to refrain from tub baths, showers, swimming, and hot tubs during the trial  The patient will return to the office for trial evaluation and lead removal on 12/23/2019  At that time, if the trial is successful, the patient will be referred to Dr Kailey Nieto for permanent spinal cord stimulator placement  Pre-procedure instructions were reviewed with the patient  The patient was given a prescription for blood work to be done  Complete risks and benefits including bleeding, infection, headache, tissue reaction, nerve injury and allergic reaction were discussed  The approach was demonstrated using models and literature was provided  Verbal and written consent was obtained  The patient will next follow- up on 12/17/2019 for the stimulator trial     History of Present Illness: The patient is a 47 y o  female scheduled for an Abbott spinal cord stimulator trial on 12/17/2019 to treat chronic pain from lumbar disc disorder with radiculopathy  The current pain pattern includes constant lower back pain that is sharp, throbbing, shooting that radiates in both legs rated 8/10  She has difficulty with walking or standing and working      I have personally reviewed and/or updated the patient's past medical history, past surgical history, family history, social history, current medications, allergies, and vital signs today  Review of Systems  Review of Systems   Respiratory: Negative for shortness of breath  Cardiovascular: Negative for chest pain  Gastrointestinal: Negative for constipation, diarrhea, nausea and vomiting  Musculoskeletal: Positive for gait problem and joint swelling  Negative for arthralgias and myalgias  Skin: Negative for rash  Neurological: Negative for dizziness, seizures and weakness  All other systems reviewed and are negative  Past Medical History:   Diagnosis Date    ADHD (attention deficit hyperactivity disorder)     Anxiety     Bulging lumbar disc     Carpal tunnel syndrome     RIGHT  LAST ASSESSED: 16    Chronic back pain     low    Colon polyps     Depression     [facility]     on victoza    GERD (gastroesophageal reflux disease)     Gestational diabetes     Hearing loss     left ear    Hyperlipidemia     IBS (irritable bowel syndrome)     Ileus ([facility] )     LAST ASSESSED: 8/3/17    Labial cyst     LAST ASSESSED: 16    Myofascial pain     LAST ASSESSED: 16    Obesity     Ovarian cyst     LEFT   LAST ASSESSED: 16    Panic attack     Pap smear for cervical cancer screening     2018--pap wnl, HRHPV neg    PTSD (post-traumatic stress disorder)     Seasonal allergies     Thoracic outlet syndrome     2010    Trochanteric bursitis of both hips     LAST ASSESSED: 3/18/16    Ulnar neuropathy at elbow     Varicella     Wears glasses        Past Surgical History:   Procedure Laterality Date    BILE DUCT EXPLORATION      ENDOSCOPIC REMOVAL OF STONES FROM BILIARY TRACT     SECTION      x3    CHOLECYSTECTOMY      COLONOSCOPY      -polyp, repeat     DILATION AND CURETTAGE OF UTERUS      ENDOMETRIAL ABLATION      ERCP W/ SPHICTEROTOMY      FIRST RIB REMOVAL      FIRST RIB REMOVAL      THORAX EXCISION OF FIRST RIB    HYSTEROSCOPY      NEUROPLASTY / TRANSPOSITION ULNAR NERVE AT ELBOW      GA COLONOSCOPY FLX DX W/COLLJ SPEC WHEN PFRMD N/A 5/30/2017    Procedure: COLONOSCOPY;  Surgeon: Mani Gomez MD;  [facility]; Service: Gastroenterology    GA ESOPHAGOGASTRODUODENOSCOPY TRANSORAL DIAGNOSTIC N/A 9/14/2017    Procedure: ESOPHAGOGASTRODUODENOSCOPY (EGD); Surgeon: Miquel Rivera MD;  [facility];   Service: Gastroenterology    GA HYSTEROSCOPY,W/ENDO BX N/A 10/20/2017    Procedure: DILATATION AND CURETTAGE (D&C) WITH HYSTEROSCOPY  REMOVAL VULVAR RT  LESION;  Surgeon: Alfredo Larson MD;  [facility];  Service: Gynecology    GA LAP, ÁLVARO RESTRICT PROC, LONGITUDINAL GASTRECTOMY N/A 2/6/2018    Procedure: GASTRECTOMY SLEEVE LAPAROSCOPIC; INTRAOPERATIVE EGD ;  Surgeon: Cuong Gillespie MD;  [facility];  Service: Bariatrics    TONSILLECTOMY AND ADENOIDECTOMY      TUBAL LIGATION      ULNAR NERVE REPAIR Right     VEIN LIGATION AND [address] Right     VULVA SURGERY  10/20/2017    BIOPSY    WISDOM TOOTH EXTRACTION         Family History   Problem Relation Age of Onset    Diabetes Mother     Other Mother         HYPERCHOLESTEROLEMIA    Breast cancer Mother         >50    BRCA1 Negative Mother     BRCA2 Negative Mother     Diabetes Father     Other Father         traumatic brain injury    Prostate cancer Father     Alcohol abuse Father         in remission    Heart disease Father     Neuropathy Father     Hyperlipidemia Father     Hypertension Brother     Diabetes Brother     Other Brother         HYPERCHOLESTEROLEMIA    Alcohol abuse Brother     Depression Brother         attempted suicide    Colon cancer Maternal Grandfather     No Known Problems Maternal Grandmother     No Known Problems Paternal Grandmother     No Known Problems Paternal Grandfather     Diabetes Brother     Alcohol abuse Brother     Asthma Son     No Known Problems Daughter     Ovarian cancer Neg Hx     Uterine cancer Neg Hx        Social History     Occupational History    Occupation: ER TECH     Employer: [employer]   Tobacco Use    Smoking status: Former Smoker     Last attempt to quit: 2017     Years since quittin 6    Smokeless tobacco: Never Used   Substance and Sexual Activity    Alcohol use: Yes     Alcohol/week: 1 0 standard drinks     Types: 1 Standard drinks or equivalent per week     Frequency: Monthly or less     Comment: socially    Drug use: No    Sexual activity: Yes     Partners: Male     Birth control/protection: OCP     Comment: lifetime partners: 6; current partner 2014         Current Outpatient Medications:     atoMOXetine (STRATTERA) 25 mg capsule, Take 1 capsule (25 mg total) by mouth daily, Disp: 90 capsule, Rfl: 0    Biotin 71882 MCG TABS, Take by mouth, Disp: , Rfl:     Calcium Citrate-Vitamin D 500-500 MG-UNIT PACK, Take by mouth, Disp: , Rfl:     cholecalciferol (VITAMIN D3) 1,000 units tablet, Take 2,000 Units by mouth daily, Disp: , Rfl:     Cyanocobalamin ([employer] VITAMIN B12 PO), Take by mouth, Disp: , Rfl:     cyclobenzaprine (FLEXERIL) 10 mg tablet, Take 1 tablet (10 mg total) by mouth daily at bedtime, Disp: 30 tablet, Rfl: 0    drospirenone-ethinyl estradiol (LIEN) 3-0 02 MG per tablet, Take 1 tablet by mouth daily, Disp: 84 tablet, Rfl: 4    gabapentin (NEURONTIN) 600 MG tablet, Take 1 tablet (600 mg total) by mouth daily at bedtime (Patient taking differently: Take 900 mg by mouth daily at bedtime ), Disp: 90 tablet, Rfl: 3    lamoTRIgine (LaMICtal) 100 mg tablet, Take 1 tablet (100 mg total) by mouth daily, Disp: 90 tablet, Rfl: 0    lamoTRIgine (LaMICtal) 25 mg tablet, Take 1 tablet (25 mg total) by mouth daily, Disp: 90 tablet, Rfl: 1    LORazepam (ATIVAN) 0 5 mg tablet, Take 1 tablet 2 hours prior to procedure and may repeat just prior to procedure if anxiety persists  , Disp: 2 tablet, Rfl: 0    metoclopramide (REGLAN) 10 mg tablet, Take 1 tablet (10 mg total) by mouth every 6 (six) hours as needed (nausea), Disp: 15 tablet, Rfl: 0    montelukast (SINGULAIR) 10 mg tablet, Take 1 tablet (10 mg total) by mouth daily at bedtime, Disp: 90 tablet, Rfl: 1    Multiple Vitamins-Minerals (MULTI COMPLETE PO), Take by mouth, Disp: , Rfl:     pantoprazole (PROTONIX) 40 mg tablet, TAKE ONE TABLET BY MOUTH DAILY 30 MINUTES BEFORE BEFORE BREAKFAST, Disp: 90 tablet, Rfl: 0    Probiotic Product (PRO-BIOTIC BLEND PO), Take by mouth, Disp: , Rfl:     QUEtiapine (SEROquel) 50 mg tablet, Take 1 tablet (50 mg total) by mouth daily at bedtime, Disp: 90 tablet, Rfl: 1    traMADol (ULTRAM) 50 mg tablet, Take 1 tablet (50 mg total) by mouth daily as needed for moderate pain, Disp: 30 tablet, Rfl: 2    venlafaxine (EFFEXOR-XR) 150 mg 24 hr capsule, Take 1 capsule (150 mg total) by mouth daily, Disp: 90 capsule, Rfl: 1    venlafaxine (EFFEXOR-XR) 75 mg 24 hr capsule, Take 1 capsule (75 mg total) by mouth daily, Disp: 90 capsule, Rfl: 0    Allergies   Allergen Reactions    Ibuprofen      Due to gastric sleeve -can only take for 5 days, then needs to stop       Physical Exam:    /64   Pulse 69   Temp 98 5 °F (36 9 °C) (Oral)   Wt 74 4 kg (164 lb)   LMP 01/07/2019 (Approximate)   BMI 25 69 kg/m²     Constitutional:normal, well developed, well nourished, alert, in no distress and non-toxic and no overt pain behavior    Eyes:anicteric  HEENT:grossly intact  Neck:supple, symmetric, trachea midline and no masses   Pulmonary:even and unlabored  Cardiovascular:No edema or pitting edema present  Skin:Normal without rashes or lesions and well hydrated  Psychiatric:Mood and affect appropriate  Neurologic:Cranial Nerves II-XII grossly intact  Musculoskeletal:normal     Lumbar Spine Exam  Appearance:  Normal lordosis  Palpation/Tenderness:  left lumbar paraspinal tenderness  right lumbar paraspinal tenderness  left sacroiliac joint tenderness  right sacroiliac joint tenderness  Sensory:  no sensory deficits noted  Range of Motion:  Flexion: Moderately limited  with pain  Extension:  Moderately limited  with pain  Lateral Flexion - Left:  Moderately limited  with pain  Lateral Flexion - Right:  Moderately limited  with pain  Rotation - Left:  Moderately limited  with pain  Rotation - Right:  Moderately limited  with pain  Motor Strength:  Left hip flexion:  5/5  Left hip extension:  5/5  Right hip flexion:  5/5  Right hip extension:  5/5  Left knee flexion:  5/5  Left knee extension:  5/5  Right knee flexion:  5/5  Right knee extension:  5/5  Left foot dorsiflexion:  5/5  Left foot plantar flexion:  5/5  Right foot dorsiflexion:  5/5  Right foot plantar flexion:  5/5    Imaging  MRI THORACIC SPINE WITHOUT CONTRAST (11/15/2019)     INDICATION: M51 16: Intervertebral disc disorders with radiculopathy, lumbar region      COMPARISON:  None      TECHNIQUE:  Sagittal T1, sagittal T2, sagittal inversion recovery, axial T2,  axial 2D MERGE      IMAGE QUALITY: Diagnostic      FINDINGS:     ALIGNMENT: Normal alignment of the thoracic spine  No compression fracture  No subluxation  No scoliosis      MARROW SIGNAL:  Normal marrow signal is identified within the visualized bony structures  No discrete marrow lesion      THORACIC CORD: Normal signal within the thoracic cord      PARAVERTEBRAL SOFT TISSUES:  Several slightly heterogeneous T2 hyperintense abnormalities are identified within the periphery of the spleen  These appear unchanged from multiple prior CT examinations      THORACIC DEGENERATIVE CHANGE: No disc herniation  No canal stenosis or foraminal narrowing  There are small root sleeve cysts identified within several thoracic neural foramen

## 2019-12-11 NOTE — H&P (VIEW-ONLY)
Assessment:  1  Chronic pain syndrome     2  Intervertebral disc disorder with radiculopathy of lumbar region         Plan:  The spinal cord stimulator trial was discussed in depth with the patient  The patient was advised that a stimulator lead will be placed percutaneously through an epidural needle, with intra-op stimulation done to confirm appropriate lead placement  The lead will then be connected to an external generator and secured to the skin  The device representative will then program the stimulator and explain how to use it  During the trial, the patient was instructed to perform their activities of daily living, but limit twisting and bending motions, and no lifting greater than 10 pounds  The patient was advised to refrain from tub baths, showers, swimming, and hot tubs during the trial  The patient will return to the office for trial evaluation and lead removal on 12/23/2019  At that time, if the trial is successful, the patient will be referred to Dr Leeanne Yepez for permanent spinal cord stimulator placement  Pre-procedure instructions were reviewed with the patient  The patient was given a prescription for blood work to be done  Complete risks and benefits including bleeding, infection, headache, tissue reaction, nerve injury and allergic reaction were discussed  The approach was demonstrated using models and literature was provided  Verbal and written consent was obtained  The patient will next follow- up on 12/17/2019 for the stimulator trial     History of Present Illness: The patient is a 47 y o  female scheduled for an Abbott spinal cord stimulator trial on 12/17/2019 to treat chronic pain from lumbar disc disorder with radiculopathy  The current pain pattern includes constant lower back pain that is sharp, throbbing, shooting that radiates in both legs rated 8/10  She has difficulty with walking or standing and working      I have personally reviewed and/or updated the patient's past medical history, past surgical history, family history, social history, current medications, allergies, and vital signs today  Review of Systems  Review of Systems   Respiratory: Negative for shortness of breath  Cardiovascular: Negative for chest pain  Gastrointestinal: Negative for constipation, diarrhea, nausea and vomiting  Musculoskeletal: Positive for gait problem and joint swelling  Negative for arthralgias and myalgias  Skin: Negative for rash  Neurological: Negative for dizziness, seizures and weakness  All other systems reviewed and are negative  Past Medical History:   Diagnosis Date    ADHD (attention deficit hyperactivity disorder)     Anxiety     Bulging lumbar disc     Carpal tunnel syndrome     RIGHT  LAST ASSESSED: 16    Chronic back pain     low    Colon polyps     Depression     [facility]     on victoza    GERD (gastroesophageal reflux disease)     Gestational diabetes     Hearing loss     left ear    Hyperlipidemia     IBS (irritable bowel syndrome)     Ileus ([facility] )     LAST ASSESSED: 8/3/17    Labial cyst     LAST ASSESSED: 16    Myofascial pain     LAST ASSESSED: 16    Obesity     Ovarian cyst     LEFT   LAST ASSESSED: 16    Panic attack     Pap smear for cervical cancer screening     2018--pap wnl, HRHPV neg    PTSD (post-traumatic stress disorder)     Seasonal allergies     Thoracic outlet syndrome     2010    Trochanteric bursitis of both hips     LAST ASSESSED: 3/18/16    Ulnar neuropathy at elbow     Varicella     Wears glasses        Past Surgical History:   Procedure Laterality Date    BILE DUCT EXPLORATION      ENDOSCOPIC REMOVAL OF STONES FROM BILIARY TRACT     SECTION      x3    CHOLECYSTECTOMY      COLONOSCOPY      -polyp, repeat     DILATION AND CURETTAGE OF UTERUS      ENDOMETRIAL ABLATION      ERCP W/ SPHICTEROTOMY      FIRST RIB REMOVAL      FIRST RIB REMOVAL      THORAX EXCISION OF FIRST RIB    HYSTEROSCOPY      NEUROPLASTY / TRANSPOSITION ULNAR NERVE AT ELBOW      IN COLONOSCOPY FLX DX W/COLLJ SPEC WHEN PFRMD N/A 5/30/2017    Procedure: COLONOSCOPY;  Surgeon: Yudelka Bethea MD;  [facility]; Service: Gastroenterology    IN ESOPHAGOGASTRODUODENOSCOPY TRANSORAL DIAGNOSTIC N/A 9/14/2017    Procedure: ESOPHAGOGASTRODUODENOSCOPY (EGD); Surgeon: Linda Trotter MD;  [facility];   Service: Gastroenterology    IN HYSTEROSCOPY,W/ENDO BX N/A 10/20/2017    Procedure: DILATATION AND CURETTAGE (D&C) WITH HYSTEROSCOPY  REMOVAL VULVAR RT  LESION;  Surgeon: Cele Kruse MD;  [facility];  Service: Gynecology    IN LAP, ÁLVARO RESTRICT PROC, LONGITUDINAL GASTRECTOMY N/A 2/6/2018    Procedure: GASTRECTOMY SLEEVE LAPAROSCOPIC; INTRAOPERATIVE EGD ;  Surgeon: Adelaide Blas MD;  [facility];  Service: Bariatrics    TONSILLECTOMY AND ADENOIDECTOMY      TUBAL LIGATION      ULNAR NERVE REPAIR Right     VEIN LIGATION AND [address]     VULVA SURGERY  10/20/2017    BIOPSY    WISDOM TOOTH EXTRACTION         Family History   Problem Relation Age of Onset    Diabetes Mother     Other Mother         HYPERCHOLESTEROLEMIA    Breast cancer Mother         >50    BRCA1 Negative Mother     BRCA2 Negative Mother     Diabetes Father     Other Father         traumatic brain injury    Prostate cancer Father     Alcohol abuse Father         in remission    Heart disease Father     Neuropathy Father     Hyperlipidemia Father     Hypertension Brother     Diabetes Brother     Other Brother         HYPERCHOLESTEROLEMIA    Alcohol abuse Brother     Depression Brother         attempted suicide    Colon cancer Maternal Grandfather     No Known Problems Maternal Grandmother     No Known Problems Paternal Grandmother     No Known Problems Paternal Grandfather     Diabetes Brother     Alcohol abuse Brother     Asthma Son     No Known Problems Daughter     Ovarian cancer Neg Hx     Uterine cancer Neg Hx        Social History     Occupational History    Occupation: ER TECH     Employer: [employer]   Tobacco Use    Smoking status: Former Smoker     Last attempt to quit: 2017     Years since quittin 6    Smokeless tobacco: Never Used   Substance and Sexual Activity    Alcohol use: Yes     Alcohol/week: 1 0 standard drinks     Types: 1 Standard drinks or equivalent per week     Frequency: Monthly or less     Comment: socially    Drug use: No    Sexual activity: Yes     Partners: Male     Birth control/protection: OCP     Comment: lifetime partners: 6; current partner 2014         Current Outpatient Medications:     atoMOXetine (STRATTERA) 25 mg capsule, Take 1 capsule (25 mg total) by mouth daily, Disp: 90 capsule, Rfl: 0    Biotin 74040 MCG TABS, Take by mouth, Disp: , Rfl:     Calcium Citrate-Vitamin D 500-500 MG-UNIT PACK, Take by mouth, Disp: , Rfl:     cholecalciferol (VITAMIN D3) 1,000 units tablet, Take 2,000 Units by mouth daily, Disp: , Rfl:     Cyanocobalamin ([employer]S VITAMIN B12 PO), Take by mouth, Disp: , Rfl:     cyclobenzaprine (FLEXERIL) 10 mg tablet, Take 1 tablet (10 mg total) by mouth daily at bedtime, Disp: 30 tablet, Rfl: 0    drospirenone-ethinyl estradiol (LIEN) 3-0 02 MG per tablet, Take 1 tablet by mouth daily, Disp: 84 tablet, Rfl: 4    gabapentin (NEURONTIN) 600 MG tablet, Take 1 tablet (600 mg total) by mouth daily at bedtime (Patient taking differently: Take 900 mg by mouth daily at bedtime ), Disp: 90 tablet, Rfl: 3    lamoTRIgine (LaMICtal) 100 mg tablet, Take 1 tablet (100 mg total) by mouth daily, Disp: 90 tablet, Rfl: 0    lamoTRIgine (LaMICtal) 25 mg tablet, Take 1 tablet (25 mg total) by mouth daily, Disp: 90 tablet, Rfl: 1    LORazepam (ATIVAN) 0 5 mg tablet, Take 1 tablet 2 hours prior to procedure and may repeat just prior to procedure if anxiety persists  , Disp: 2 tablet, Rfl: 0    metoclopramide (REGLAN) 10 mg tablet, Take 1 tablet (10 mg total) by mouth every 6 (six) hours as needed (nausea), Disp: 15 tablet, Rfl: 0    montelukast (SINGULAIR) 10 mg tablet, Take 1 tablet (10 mg total) by mouth daily at bedtime, Disp: 90 tablet, Rfl: 1    Multiple Vitamins-Minerals (MULTI COMPLETE PO), Take by mouth, Disp: , Rfl:     pantoprazole (PROTONIX) 40 mg tablet, TAKE ONE TABLET BY MOUTH DAILY 30 MINUTES BEFORE BEFORE BREAKFAST, Disp: 90 tablet, Rfl: 0    Probiotic Product (PRO-BIOTIC BLEND PO), Take by mouth, Disp: , Rfl:     QUEtiapine (SEROquel) 50 mg tablet, Take 1 tablet (50 mg total) by mouth daily at bedtime, Disp: 90 tablet, Rfl: 1    traMADol (ULTRAM) 50 mg tablet, Take 1 tablet (50 mg total) by mouth daily as needed for moderate pain, Disp: 30 tablet, Rfl: 2    venlafaxine (EFFEXOR-XR) 150 mg 24 hr capsule, Take 1 capsule (150 mg total) by mouth daily, Disp: 90 capsule, Rfl: 1    venlafaxine (EFFEXOR-XR) 75 mg 24 hr capsule, Take 1 capsule (75 mg total) by mouth daily, Disp: 90 capsule, Rfl: 0    Allergies   Allergen Reactions    Ibuprofen      Due to gastric sleeve -can only take for 5 days, then needs to stop       Physical Exam:    /64   Pulse 69   Temp 98 5 °F (36 9 °C) (Oral)   Wt 74 4 kg (164 lb)   LMP 01/07/2019 (Approximate)   BMI 25 69 kg/m²     Constitutional:normal, well developed, well nourished, alert, in no distress and non-toxic and no overt pain behavior    Eyes:anicteric  HEENT:grossly intact  Neck:supple, symmetric, trachea midline and no masses   Pulmonary:even and unlabored  Cardiovascular:No edema or pitting edema present  Skin:Normal without rashes or lesions and well hydrated  Psychiatric:Mood and affect appropriate  Neurologic:Cranial Nerves II-XII grossly intact  Musculoskeletal:normal     Lumbar Spine Exam  Appearance:  Normal lordosis  Palpation/Tenderness:  left lumbar paraspinal tenderness  right lumbar paraspinal tenderness  left sacroiliac joint tenderness  right sacroiliac joint tenderness  Sensory:  no sensory deficits noted  Range of Motion:  Flexion: Moderately limited  with pain  Extension:  Moderately limited  with pain  Lateral Flexion - Left:  Moderately limited  with pain  Lateral Flexion - Right:  Moderately limited  with pain  Rotation - Left:  Moderately limited  with pain  Rotation - Right:  Moderately limited  with pain  Motor Strength:  Left hip flexion:  5/5  Left hip extension:  5/5  Right hip flexion:  5/5  Right hip extension:  5/5  Left knee flexion:  5/5  Left knee extension:  5/5  Right knee flexion:  5/5  Right knee extension:  5/5  Left foot dorsiflexion:  5/5  Left foot plantar flexion:  5/5  Right foot dorsiflexion:  5/5  Right foot plantar flexion:  5/5    Imaging  MRI THORACIC SPINE WITHOUT CONTRAST (11/15/2019)     INDICATION: M51 16: Intervertebral disc disorders with radiculopathy, lumbar region      COMPARISON:  None      TECHNIQUE:  Sagittal T1, sagittal T2, sagittal inversion recovery, axial T2,  axial 2D MERGE      IMAGE QUALITY: Diagnostic      FINDINGS:     ALIGNMENT: Normal alignment of the thoracic spine  No compression fracture  No subluxation  No scoliosis      MARROW SIGNAL:  Normal marrow signal is identified within the visualized bony structures  No discrete marrow lesion      THORACIC CORD: Normal signal within the thoracic cord      PARAVERTEBRAL SOFT TISSUES:  Several slightly heterogeneous T2 hyperintense abnormalities are identified within the periphery of the spleen  These appear unchanged from multiple prior CT examinations      THORACIC DEGENERATIVE CHANGE: No disc herniation  No canal stenosis or foraminal narrowing  There are small root sleeve cysts identified within several thoracic neural foramen

## 2019-12-13 DIAGNOSIS — K21.9 GASTROESOPHAGEAL REFLUX DISEASE WITHOUT ESOPHAGITIS: ICD-10-CM

## 2019-12-13 RX ORDER — PANTOPRAZOLE SODIUM 40 MG/1
40 TABLET, DELAYED RELEASE ORAL DAILY
Qty: 90 TABLET | Refills: 0 | Status: SHIPPED | OUTPATIENT
Start: 2019-12-13 | End: 2020-04-02 | Stop reason: SDUPTHER

## 2019-12-16 ENCOUNTER — TELEPHONE (OUTPATIENT)
Dept: PAIN MEDICINE | Facility: CLINIC | Age: 49
End: 2019-12-16

## 2019-12-16 DIAGNOSIS — F41.9 ANXIETY: ICD-10-CM

## 2019-12-16 RX ORDER — LORAZEPAM 0.5 MG/1
TABLET ORAL
Qty: 2 TABLET | Refills: 0 | Status: SHIPPED | OUTPATIENT
Start: 2019-12-16 | End: 2020-01-09 | Stop reason: ALTCHOICE

## 2019-12-16 NOTE — TELEPHONE ENCOUNTER
Patient states that there was suppose to be a script sent over to [address] for Ativan  Ativan is be prescribed for STIM procedure tomorrow  Can you please send a script?

## 2019-12-17 ENCOUNTER — HOSPITAL ENCOUNTER (OUTPATIENT)
Dept: RADIOLOGY | Facility: CLINIC | Age: 49
Discharge: HOME | End: 2019-12-17
Payer: COMMERCIAL

## 2019-12-17 ENCOUNTER — TELEPHONE (OUTPATIENT)
Dept: RADIOLOGY | Facility: CLINIC | Age: 49
End: 2019-12-17

## 2019-12-17 VITALS
HEART RATE: 71 BPM | OXYGEN SATURATION: 95 % | TEMPERATURE: 98.1 F | SYSTOLIC BLOOD PRESSURE: 133 MMHG | RESPIRATION RATE: 20 BRPM | DIASTOLIC BLOOD PRESSURE: 61 MMHG

## 2019-12-17 DIAGNOSIS — G89.29 OTHER CHRONIC PAIN: ICD-10-CM

## 2019-12-17 DIAGNOSIS — M54.16 LUMBAR RADICULOPATHY: ICD-10-CM

## 2019-12-17 PROCEDURE — 95972 ALYS CPLX SP/PN NPGT W/PRGRM: CPT | Performed by: ANESTHESIOLOGY

## 2019-12-17 PROCEDURE — 96374 THER/PROPH/DIAG INJ IV PUSH: CPT

## 2019-12-17 PROCEDURE — 63650 IMPLANT NEUROELECTRODES: CPT | Performed by: ANESTHESIOLOGY

## 2019-12-17 PROCEDURE — C1787 PATIENT PROGR, NEUROSTIM: HCPCS

## 2019-12-17 PROCEDURE — C1897 LEAD, NEUROSTIM TEST KIT: HCPCS

## 2019-12-17 RX ORDER — CEPHALEXIN 500 MG/1
500 CAPSULE ORAL EVERY 6 HOURS SCHEDULED
Qty: 28 CAPSULE | Refills: 0 | Status: SHIPPED | OUTPATIENT
Start: 2019-12-17 | End: 2019-12-24

## 2019-12-17 RX ORDER — CEFAZOLIN SODIUM 1 G/50ML
1000 SOLUTION INTRAVENOUS ONCE
Status: COMPLETED | OUTPATIENT
Start: 2019-12-17 | End: 2019-12-17

## 2019-12-17 RX ADMIN — CEFAZOLIN SODIUM 1000 MG: 1 SOLUTION INTRAVENOUS at 14:08

## 2019-12-17 NOTE — DISCHARGE INSTR - LAB
SPINE AND PAIN: SPINAL CORD STIMULATION/PERIPHERAL NERVE STIMULATION TRIAL/ PERMANENT IMPLANT DISCHARGE INSTRUCTIONS    ACTIVITY RESTRICTIONS DURING TRIAL PERIOD  · Do not drive with the SCS "on"  · Do not bend or twist at the waist   · Do not lift anything that weighs over 5 pounds  · When you lie down, "log roll" (keep spine aligned) to change positions  Do not sleep on your stomach  · Limit stair climbing and strenuous activity  CARE OF THE INJECTION SITE  · CHECK THE DRESSING DAILY  It should intact  · Notify [facility] if you have any of the following: redness, drainage, swelling, chills or fever over 100°F   · Do not change dressing, only reinforce edges if necessary  · Keep the dressing dry  Do not shower, (sponge baths only) until evaluated in office  SPECIAL INSTRUCTIONS  · Take your temperature daily  · Follow-up for lead removal scheduled for ***  · The  box is expensive  DO NOT drop or get wet  · Do not pull on the cable or leads  Do not disconnect the cable and lead  · Do activities that normally cause you to have pain and try different  settings  MEDICATIONS  · Continue to take all routine medications  · Our office may have instructed you to hold some medications  · You may not taken any aspirin containing medications, aleve, advil, motrin or ibuprofen  · Take antiobiotic as prescribed:__keflex every 6 hrs for 7 days  If you have any problems specifically related to your procedure, please call our office at [phone]  Problems not related to your procedure should be directed at your primary care physician  Contact the company representative with any questions regarding settings or operation of the external  box

## 2019-12-17 NOTE — INTERVAL H&P NOTE
Update:     H&P reviewed  After examining the patient, I find no changed to the H&P since it had been written  Patient re-evaluated   Accept as history and physical     Gloriann Severe, MD/December 17, 2019/2:48 PM

## 2019-12-18 NOTE — TELEPHONE ENCOUNTER
Pt reports last night was rough b/c she was hurting a lot at the procedure site and it was uncomfortable to try and sleep  She took a muscle relaxer and (2) tramadol and felt a little better  Her drsg last night was fine, no one is awake yet to look at it this morning  She denies fevers and started her abx this morning  Her right ankle and foot feel like they are asleep and the Rt foot feels like it's cold  When she touches her feet they both feel equally cold  I advised her the sensations she feels in her Rt ankle and foot may improve over the next 1-2 days as the stimulator settles in but she could also d/w with Heather Khan from [facility] when he calls  She was happy to say that she is not having any more of the shooting pain down her legs, back/buttock or knee pain  I confirmed lead removal for 12/23, arriving at 8:15  Pt told to contact office with any issues or concerns

## 2019-12-23 ENCOUNTER — OFFICE VISIT (OUTPATIENT)
Dept: PAIN MEDICINE | Facility: CLINIC | Age: 49
End: 2019-12-23

## 2019-12-23 VITALS
DIASTOLIC BLOOD PRESSURE: 76 MMHG | SYSTOLIC BLOOD PRESSURE: 118 MMHG | BODY MASS INDEX: 25.69 KG/M2 | HEART RATE: 79 BPM | TEMPERATURE: 98.7 F | WEIGHT: 164 LBS

## 2019-12-23 DIAGNOSIS — G89.4 CHRONIC PAIN SYNDROME: Primary | ICD-10-CM

## 2019-12-23 DIAGNOSIS — M54.50 CHRONIC BILATERAL LOW BACK PAIN, UNSPECIFIED WHETHER SCIATICA PRESENT: ICD-10-CM

## 2019-12-23 DIAGNOSIS — M51.16 INTERVERTEBRAL DISC DISORDER WITH RADICULOPATHY OF LUMBAR REGION: ICD-10-CM

## 2019-12-23 DIAGNOSIS — F33.2 SEVERE EPISODE OF RECURRENT MAJOR DEPRESSIVE DISORDER, WITHOUT PSYCHOTIC FEATURES (HCC): ICD-10-CM

## 2019-12-23 DIAGNOSIS — G89.29 CHRONIC BILATERAL LOW BACK PAIN, UNSPECIFIED WHETHER SCIATICA PRESENT: ICD-10-CM

## 2019-12-23 PROCEDURE — 99024 POSTOP FOLLOW-UP VISIT: CPT | Performed by: NURSE PRACTITIONER

## 2019-12-23 NOTE — PROGRESS NOTES
Assessment  1  Chronic pain syndrome     2  Chronic bilateral low back pain, unspecified whether sciatica present     3  Intervertebral disc disorder with radiculopathy of lumbar region         Plan  The patient had a successful Abbott spinal cord stimulator trial   The patient will be referred to Dr Freddy Beavers for permanent implantation  The patient will then follow back in our office 6 weeks after permanent implantation for re-evaluation  The patient was advised to complete their antibiotics course and educated on the signs and symptoms of infection which would include pain at the incision site, redness, fever, and purulent drainage  She was told that she can shower today, but keep a Band-Aid over the incision site  I asked that she refrain from tub baths, swimming, or hot tubs, until her incision site heals     I asked that she make an appointment 6 weeks after permanent placement of her spinal cord stimulator    No orders of the defined types were placed in this encounter  History of Present Illness    The patient is a 47 y o  female status post Abbott spinal cord stimulator percutaneous lead placement on 12/17/19   The patient's reported an overall reduction in pain of 100% during the trial   The patient reported functional improvement included walking, standing, and lifting  During the trial she did still have pain with sitting  She states she went to the movies, and still felt uncomfortable  However when she walked, and stood she had no pain  She was also able to lift heavy scaffolding with no pain  The patient states she also got  this past weekend and was able to do so without pain   She is interested in permanent placement     Pain Assessment Measures   Numeric Rating Scale 0   Oswestry Disability Index Score/Neck Disability Index Score 1   PROMIS-29   - Physical Function 20   - Anxiety 12   - Depression 11   - Fatigue 4   - Sleep Disturbance 9   - Ability to Participate in Social Roles And Activities 20   - Pain Interference 4     Review of Systems   Respiratory: Negative for shortness of breath  Cardiovascular: Negative for chest pain  Gastrointestinal: Negative for constipation, diarrhea, nausea and vomiting  Musculoskeletal: Negative for arthralgias, gait problem, joint swelling and myalgias  Skin: Negative for rash  Neurological: Negative for dizziness, seizures and weakness  All other systems reviewed and are negative  Patient Active Problem List   Diagnosis    IBS (irritable bowel syndrome)    Mixed hyperlipidemia    GERD (gastroesophageal reflux disease)    Depression    Chronic back pain    Anxiety    Cervical radiculopathy    Hepatic steatosis    Pulmonary nodule seen on imaging study    Dermatitis    S/P laparoscopic sleeve gastrectomy    Other fatigue    Encounter for annual routine gynecological examination    Overweight (BMI 25 0-29  9)    Postsurgical malabsorption    Lumbar radiculopathy    Intervertebral disc disorder with radiculopathy of lumbar region    Post traumatic stress disorder (PTSD)    Major depressive disorder, recurrent severe without psychotic features ([address] )    Generalized anxiety disorder    Sacroiliitis (HCC)    Tachycardia    SOB (shortness of breath)    Chronic pain syndrome    Other chronic pain        Past Medical History:   Diagnosis Date    ADHD (attention deficit hyperactivity disorder)     Anxiety     Bulging lumbar disc     Carpal tunnel syndrome     RIGHT    LAST ASSESSED: 12/7/16    Chronic back pain     low    Colon polyps     Depression     Diabetes mellitus ([address] )     on victoza    GERD (gastroesophageal reflux disease)     Gestational diabetes     Hearing loss     left ear    Hyperlipidemia     IBS (irritable bowel syndrome)     Ileus ([address] )     LAST ASSESSED: 8/3/17    Labial cyst     LAST ASSESSED: 4/21/16    Myofascial pain     LAST ASSESSED: 4/12/16    Obesity     Ovarian cyst LEFT  LAST ASSESSED: 16    Panic attack     Pap smear for cervical cancer screening     2018--pap wnl, HRHPV neg    PTSD (post-traumatic stress disorder)     Seasonal allergies     Thoracic outlet syndrome     2010    Trochanteric bursitis of both hips     LAST ASSESSED: 3/18/16    Ulnar neuropathy at elbow     Varicella     Wears glasses        Past Surgical History:   Procedure Laterality Date    BILE DUCT EXPLORATION      ENDOSCOPIC REMOVAL OF STONES FROM BILIARY TRACT     SECTION      x3    CHOLECYSTECTOMY      COLONOSCOPY      -polyp, repeat     DILATION AND CURETTAGE OF UTERUS      ENDOMETRIAL ABLATION      ERCP W/ SPHICTEROTOMY      FIRST RIB REMOVAL      FIRST RIB REMOVAL      THORAX EXCISION OF FIRST RIB    HYSTEROSCOPY      NEUROPLASTY / TRANSPOSITION ULNAR NERVE AT ELBOW      WY COLONOSCOPY FLX DX W/COLLJ SPEC WHEN PFRMD N/A 2017    Procedure: COLONOSCOPY;  Surgeon: Girish You MD;  [facility]; Service: Gastroenterology    WY ESOPHAGOGASTRODUODENOSCOPY TRANSORAL DIAGNOSTIC N/A 2017    Procedure: ESOPHAGOGASTRODUODENOSCOPY (EGD); Surgeon: Tavia Esquivel MD;  [facility];   Service: Gastroenterology    WY HYSTEROSCOPY,W/ENDO BX N/A 10/20/2017    Procedure: DILATATION AND CURETTAGE (D&C) WITH HYSTEROSCOPY  REMOVAL VULVAR RT  LESION;  Surgeon: Mickie Anne MD;  [facility];  Service: Gynecology    WY LAP, ÁLVARO RESTRICT PROC, LONGITUDINAL GASTRECTOMY N/A 2018    Procedure: GASTRECTOMY SLEEVE LAPAROSCOPIC; INTRAOPERATIVE EGD ;  Surgeon: Max Ramirez MD;  [facility];  Service: Bariatrics    TONSILLECTOMY AND ADENOIDECTOMY      TUBAL LIGATION      ULNAR NERVE REPAIR Right     VEIN LIGATION AND STRIPPING Right     VULVA SURGERY  10/20/2017    BIOPSY    WISDOM TOOTH EXTRACTION         Family History   Problem Relation Age of Onset    Diabetes Mother     Other Mother         HYPERCHOLESTEROLEMIA    Breast cancer Mother         >50    BRCA1 Negative Mother     BRCA2 Negative Mother     Diabetes Father     Other Father         traumatic brain injury    Prostate cancer Father     Alcohol abuse Father         in remission    Heart disease Father     Neuropathy Father     Hyperlipidemia Father     Hypertension Brother     Diabetes Brother     Other Brother         HYPERCHOLESTEROLEMIA    Alcohol abuse Brother     Depression Brother         attempted suicide    Colon cancer Maternal Grandfather     No Known Problems Maternal Grandmother     No Known Problems Paternal Grandmother     No Known Problems Paternal Grandfather     Diabetes Brother     Alcohol abuse Brother     Asthma Son     No Known Problems Daughter     Ovarian cancer Neg Hx     Uterine cancer Neg Hx        Social History     Occupational History    Occupation: ER TECH     Employer: [employer]   Tobacco Use    Smoking status: Former Smoker     Last attempt to quit: 2017     Years since quittin 6    Smokeless tobacco: Never Used   Substance and Sexual Activity    Alcohol use:  Yes     Alcohol/week: 1 0 standard drinks     Types: 1 Standard drinks or equivalent per week     Frequency: Monthly or less     Comment: socially    Drug use: No    Sexual activity: Yes     Partners: Male     Birth control/protection: OCP     Comment: lifetime partners: 6; current partner          Current Outpatient Medications:     atoMOXetine (STRATTERA) 25 mg capsule, Take 1 capsule (25 mg total) by mouth daily, Disp: 90 capsule, Rfl: 0    Biotin 23048 MCG TABS, Take by mouth, Disp: , Rfl:     Calcium Citrate-Vitamin D 500-500 MG-UNIT PACK, Take by mouth, Disp: , Rfl:     cephalexin (KEFLEX) 500 mg capsule, Take 1 capsule (500 mg total) by mouth every 6 (six) hours for 7 days, Disp: 28 capsule, Rfl: 0    cholecalciferol (VITAMIN D3) 1,000 units tablet, Take 2,000 Units by mouth daily, Disp: , Rfl:     Cyanocobalamin ([employer] VITAMIN B12 PO), Take by mouth, Disp: , Rfl:     cyclobenzaprine (FLEXERIL) 10 mg tablet, Take 1 tablet (10 mg total) by mouth daily at bedtime, Disp: 30 tablet, Rfl: 0    drospirenone-ethinyl estradiol (LIEN) 3-0 02 MG per tablet, Take 1 tablet by mouth daily, Disp: 84 tablet, Rfl: 4    gabapentin (NEURONTIN) 600 MG tablet, Take 1 tablet (600 mg total) by mouth daily at bedtime (Patient taking differently: Take 900 mg by mouth daily at bedtime ), Disp: 90 tablet, Rfl: 3    lamoTRIgine (LaMICtal) 100 mg tablet, Take 1 tablet (100 mg total) by mouth daily, Disp: 90 tablet, Rfl: 0    lamoTRIgine (LaMICtal) 25 mg tablet, Take 1 tablet (25 mg total) by mouth daily, Disp: 90 tablet, Rfl: 1    LORazepam (ATIVAN) 0 5 mg tablet, Take 1 tablet 2 hours prior to procedure and may repeat just prior to procedure if anxiety persists  , Disp: 2 tablet, Rfl: 0    metoclopramide (REGLAN) 10 mg tablet, Take 1 tablet (10 mg total) by mouth every 6 (six) hours as needed (nausea), Disp: 15 tablet, Rfl: 0    montelukast (SINGULAIR) 10 mg tablet, Take 1 tablet (10 mg total) by mouth daily at bedtime, Disp: 90 tablet, Rfl: 1    Multiple Vitamins-Minerals (MULTI COMPLETE PO), Take by mouth, Disp: , Rfl:     pantoprazole (PROTONIX) 40 mg tablet, Take 1 tablet (40 mg total) by mouth daily, Disp: 90 tablet, Rfl: 0    Probiotic Product (PRO-BIOTIC BLEND PO), Take by mouth, Disp: , Rfl:     QUEtiapine (SEROquel) 50 mg tablet, Take 1 tablet (50 mg total) by mouth daily at bedtime, Disp: 90 tablet, Rfl: 1    traMADol (ULTRAM) 50 mg tablet, Take 1 tablet (50 mg total) by mouth daily as needed for moderate pain, Disp: 30 tablet, Rfl: 2    venlafaxine (EFFEXOR-XR) 150 mg 24 hr capsule, Take 1 capsule (150 mg total) by mouth daily, Disp: 90 capsule, Rfl: 1    venlafaxine (EFFEXOR-XR) 75 mg 24 hr capsule, Take 1 capsule (75 mg total) by mouth daily, Disp: 90 capsule, Rfl: 0    Allergies   Allergen Reactions    Ibuprofen      Due to gastric sleeve -can only take for 5 days, then needs to stop         Physical Exam    LMP 01/07/2019 (Approximate)     Thoracic Spine:  Spinal cord stimulator lead removed with tip intact; no erythema, tenderness or signs of infection; area cleansed with alcohol and bandage placed

## 2019-12-23 NOTE — TELEPHONE ENCOUNTER
Patient called and said she was taking the 25mg and she was told to up the dosage in 2 weeks to the whole pill instead of the half of pill but she doesn't have enough  Only requesting the 100mg

## 2019-12-23 NOTE — LETTER
December 23, 2019     Patient: Tish Wilson   YOB: 1970   Date of Visit: 12/23/2019       To Whom it May Concern:    Tish Wilson is under my professional care  Please excuse her form work from 12/17/19-12/22/19  She was seen in my office on 12/23/2019  She may return to work on 12/23/19 with no restrictions  If you have any questions or concerns, please don't hesitate to call           Sincerely,          OSMANI Alexander        CC: No Recipients

## 2019-12-24 RX ORDER — LAMOTRIGINE 100 MG/1
100 TABLET ORAL DAILY
Qty: 90 TABLET | Refills: 0
Start: 2019-12-24 | End: 2019-12-26 | Stop reason: SDUPTHER

## 2019-12-26 DIAGNOSIS — F33.2 SEVERE EPISODE OF RECURRENT MAJOR DEPRESSIVE DISORDER, WITHOUT PSYCHOTIC FEATURES (HCC): ICD-10-CM

## 2019-12-26 RX ORDER — LAMOTRIGINE 25 MG/1
25 TABLET ORAL DAILY
Qty: 90 TABLET | Refills: 1 | Status: SHIPPED | OUTPATIENT
Start: 2019-12-26 | End: 2020-01-10

## 2019-12-26 RX ORDER — LAMOTRIGINE 100 MG/1
100 TABLET ORAL DAILY
Qty: 90 TABLET | Refills: 0
Start: 2019-12-26 | End: 2019-12-26 | Stop reason: SDUPTHER

## 2019-12-26 RX ORDER — LAMOTRIGINE 100 MG/1
100 TABLET ORAL DAILY
Qty: 90 TABLET | Refills: 0 | Status: SHIPPED | OUTPATIENT
Start: 2019-12-26 | End: 2020-01-10

## 2019-12-26 NOTE — TELEPHONE ENCOUNTER
Arsenio Garcia called and she said she needs a refill for the (LaMICtal) 100MG she is about to run out before her scheduled apt  Can you please give her a call thank you

## 2019-12-26 NOTE — TELEPHONE ENCOUNTER
12/26 @ [id number] [facility] Muriel called back regarding her RX  I see it says discontinued? Not sure what to tell the pt  Is there going to be another medication for her?

## 2019-12-31 ENCOUNTER — TELEPHONE (OUTPATIENT)
Dept: BEHAVIORAL/MENTAL HEALTH CLINIC | Facility: CLINIC | Age: 49
End: 2019-12-31

## 2019-12-31 NOTE — TELEPHONE ENCOUNTER
T/C to confirm appointment scheduled for 1/2/2020 @ 9:30am  Chio Kohli did not auto-confirm this appointment; therefore, I made a direct confirmation call  Spoke with client  Confirmed they will be in attendance at the scheduled time

## 2020-01-02 ENCOUNTER — SOCIAL WORK (OUTPATIENT)
Dept: BEHAVIORAL/MENTAL HEALTH CLINIC | Facility: CLINIC | Age: 50
End: 2020-01-02
Payer: COMMERCIAL

## 2020-01-02 DIAGNOSIS — F33.2 MAJOR DEPRESSIVE DISORDER, RECURRENT SEVERE WITHOUT PSYCHOTIC FEATURES (HCC): Primary | ICD-10-CM

## 2020-01-02 DIAGNOSIS — F43.10 POST TRAUMATIC STRESS DISORDER (PTSD): ICD-10-CM

## 2020-01-02 DIAGNOSIS — F41.1 GENERALIZED ANXIETY DISORDER: ICD-10-CM

## 2020-01-02 PROCEDURE — 90834 PSYTX W PT 45 MINUTES: CPT | Performed by: SOCIAL WORKER
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
## 2020-01-09 ENCOUNTER — APPOINTMENT (OUTPATIENT)
Dept: LAB | Facility: AMBULARY SURGERY CENTER | Age: 50
End: 2020-01-09
Payer: COMMERCIAL

## 2020-01-09 ENCOUNTER — CONSULT (OUTPATIENT)
Dept: NEUROSURGERY | Facility: CLINIC | Age: 50
End: 2020-01-09
Payer: COMMERCIAL

## 2020-01-09 VITALS
WEIGHT: 167.4 LBS | SYSTOLIC BLOOD PRESSURE: 100 MMHG | DIASTOLIC BLOOD PRESSURE: 71 MMHG | RESPIRATION RATE: 16 BRPM | HEART RATE: 170 BPM | HEIGHT: 68 IN | BODY MASS INDEX: 25.37 KG/M2

## 2020-01-09 DIAGNOSIS — M54.16 LUMBAR RADICULOPATHY: ICD-10-CM

## 2020-01-09 DIAGNOSIS — Z01.818 PRE-PROCEDURAL EXAMINATION: ICD-10-CM

## 2020-01-09 DIAGNOSIS — M54.16 LUMBAR RADICULOPATHY: Primary | ICD-10-CM

## 2020-01-09 DIAGNOSIS — G89.4 CHRONIC PAIN SYNDROME: ICD-10-CM

## 2020-01-09 DIAGNOSIS — E78.5 DYSLIPIDEMIA: ICD-10-CM

## 2020-01-09 LAB
B-HCG SERPL-ACNC: <2 MIU/ML
BASOPHILS # BLD AUTO: 0.04 THOUSANDS/ΜL (ref 0–0.1)
BASOPHILS NFR BLD AUTO: 0 % (ref 0–1)
CHOLEST SERPL-MCNC: 224 MG/DL (ref 50–200)
EOSINOPHIL # BLD AUTO: 0.09 THOUSAND/ΜL (ref 0–0.61)
EOSINOPHIL NFR BLD AUTO: 1 % (ref 0–6)
ERYTHROCYTE [DISTWIDTH] IN BLOOD BY AUTOMATED COUNT: 12.2 % (ref 11.6–15.1)
HCT VFR BLD AUTO: 44.3 % (ref 34.8–46.1)
HDLC SERPL-MCNC: 73 MG/DL
HGB BLD-MCNC: 14.4 G/DL (ref 11.5–15.4)
IMM GRANULOCYTES # BLD AUTO: 0.03 THOUSAND/UL (ref 0–0.2)
IMM GRANULOCYTES NFR BLD AUTO: 0 % (ref 0–2)
LDLC SERPL CALC-MCNC: 104 MG/DL (ref 0–100)
LYMPHOCYTES # BLD AUTO: 0.62 THOUSANDS/ΜL (ref 0.6–4.47)
LYMPHOCYTES NFR BLD AUTO: 5 % (ref 14–44)
MCH RBC QN AUTO: 30.1 PG (ref 26.8–34.3)
MCHC RBC AUTO-ENTMCNC: 32.5 G/DL (ref 31.4–37.4)
MCV RBC AUTO: 93 FL (ref 82–98)
MONOCYTES # BLD AUTO: 0.46 THOUSAND/ΜL (ref 0.17–1.22)
MONOCYTES NFR BLD AUTO: 4 % (ref 4–12)
NEUTROPHILS # BLD AUTO: 10.41 THOUSANDS/ΜL (ref 1.85–7.62)
NEUTS SEG NFR BLD AUTO: 90 % (ref 43–75)
NONHDLC SERPL-MCNC: 151 MG/DL
NRBC BLD AUTO-RTO: 0 /100 WBCS
PLATELET # BLD AUTO: 240 THOUSANDS/UL (ref 149–390)
PMV BLD AUTO: 10.4 FL (ref 8.9–12.7)
RBC # BLD AUTO: 4.79 MILLION/UL (ref 3.81–5.12)
TRIGL SERPL-MCNC: 233 MG/DL
WBC # BLD AUTO: 11.65 THOUSAND/UL (ref 4.31–10.16)

## 2020-01-09 PROCEDURE — 80061 LIPID PANEL: CPT

## 2020-01-09 PROCEDURE — 99244 OFF/OP CNSLTJ NEW/EST MOD 40: CPT | Performed by: NEUROLOGICAL SURGERY

## 2020-01-09 PROCEDURE — 85025 COMPLETE CBC W/AUTO DIFF WBC: CPT

## 2020-01-09 PROCEDURE — 84702 CHORIONIC GONADOTROPIN TEST: CPT

## 2020-01-09 PROCEDURE — 36415 COLL VENOUS BLD VENIPUNCTURE: CPT

## 2020-01-09 RX ORDER — CEFAZOLIN SODIUM 2 G/50ML
2000 SOLUTION INTRAVENOUS ONCE
Status: CANCELLED | OUTPATIENT
Start: 2020-01-09 | End: 2020-01-09

## 2020-01-09 RX ORDER — CHLORHEXIDINE GLUCONATE 0.12 MG/ML
15 RINSE ORAL ONCE
Status: CANCELLED | OUTPATIENT
Start: 2020-01-09 | End: 2020-01-09

## 2020-01-09 RX ORDER — ACETAMINOPHEN 325 MG/1
975 TABLET ORAL ONCE
Status: CANCELLED | OUTPATIENT
Start: 2020-01-09 | End: 2020-01-09

## 2020-01-09 NOTE — PROGRESS NOTES
Office Note - Neurosurgery   Roxana Field 47 y o  female MRN: [id number]      Assessment:    Patient is stable  79-year-old woman with chronic pain syndrome and bilateral lumbar radiculopathy  Over the years she has tried a number of different pain medications and physical therapy without improvement in her symptoms  She recently underwent [employer] spinal cord stimulator trial and had 80 percent improvement in both her lower back and bilateral leg pain  Overall her activity level improved and she did not take any retracted all during the trial   Her chronic pain symptoms returned after removing the trial electrode  She is a candidate for insertion of thoracic spinal cord stimulator electrode via laminotomy and placement of left buttock implantable pulse generator  The goal of surgery is to improve chronic pain but not necessarily completely resolved pain  Weakness and numbness are unlikely to improve  The risks of surgery were reviewed in detail  1   Risk of general anesthetic with possible cardiac and respiratory complication  Risk of infection and bleeding/transfusion  2   Risk of neurological injury with new pain, weakness or numbness, paralysis, difficulties with bowel bladder function  Risk of CSF leak  3   Risk of device malfunction, and failure of treatment  Need for revision surgery  Possible limitations on MRI were reviewed as well  Expected postoperative course, including activity restrictions, expected pain and postoperative medication were reviewed  Patient provided verbal consent to surgical procedure and signed consent form: Yes  History, physical examination and diagnostic tests were reviewed and questions answered  Diagnosis, care plan and treatment options were discussed  The patient and spouse/SO understand instructions and will follow up as directed      Plan:    Follow-up:  Surgery    Problem List Items Addressed This Visit        Nervous and Auditory    Lumbar radiculopathy - Primary    Relevant Orders    Case request operating room: Insertion of thoracic spinal cord stimulator extra via laminotomy and placement of left buttock implantable pulse generator (Completed)       Other    Chronic pain syndrome    Relevant Orders    Case request operating room: Insertion of thoracic spinal cord stimulator extra via laminotomy and placement of left buttock implantable pulse generator (Completed)          Subjective/Objective     Chief Complaint    Lower back and bilateral leg pain  HPI    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 after slipping on ice  She describes constant lower back pain radiating diffusely into both legs regardless of position or activity  She constantly needs to change position in order to try to improve her symptoms  She has tried a number of different pain medications and physical therapy without significant improvement in her symptoms  She is able to work but not comfortably  She denies any difficulties with bowel bladder function or changing perineal sensation  She does feel weakness in her legs on occasion  She has not had any previous lower back surgery  She underwent [employer] spinal cord stimulator trial and noted 80 percent improvement in both her lower back and bilateral leg pain  She is more comfortable and more active and stop taking her pain medication for the duration of the trial   Her symptoms recurred after the electrode was removed  She presents today to discuss implantation of a permanent system  MARLINE HORAN personally reviewed and updated  Review of Systems   Constitutional: Negative  HENT: Negative  Eyes: Negative  Respiratory: Negative  Cardiovascular: Negative  Gastrointestinal: Negative  Endocrine: Negative  Genitourinary: Negative      Musculoskeletal: Positive for back pain (across lower back radiates into bilateral buttocks, bilateral hips, and down bilateral legs ) and gait problem  Skin: Negative  Allergic/Immunologic: Positive for environmental allergies  Neurological: Positive for weakness (bilateral legs )  Negative for dizziness, seizures, syncope, numbness and headaches  Hematological: Negative  Psychiatric/Behavioral: Negative          Family History    Family History   Problem Relation Age of Onset    Diabetes Mother     Other Mother         HYPERCHOLESTEROLEMIA    Breast cancer Mother         >50    BRCA1 Negative Mother     BRCA2 Negative Mother     Diabetes Father     Other Father         traumatic brain injury    Prostate cancer Father     Alcohol abuse Father         in remission    Heart disease Father     Neuropathy Father     Hyperlipidemia Father     Hypertension Brother     Diabetes Brother     Other Brother         HYPERCHOLESTEROLEMIA    Alcohol abuse Brother     Depression Brother         attempted suicide    Colon cancer Maternal Grandfather     No Known Problems Maternal Grandmother     No Known Problems Paternal Grandmother     No Known Problems Paternal Grandfather     Diabetes Brother     Alcohol abuse Brother     Asthma Son     No Known Problems Daughter     Ovarian cancer Neg Hx     Uterine cancer Neg Hx        Social History    Social History     Socioeconomic History    Marital status:      Spouse name: Not on file    Number of children: 3    Years of education: GED then 2 year college program    Highest education level: Not on file   Occupational History    Occupation: ER TECH     Employer: [employer]   Social Needs    Financial resource strain: Somewhat hard    Food insecurity:     Worry: Never true     Inability: Never true    Transportation needs:     Medical: No     Non-medical: No   Tobacco Use    Smoking status: Former Smoker     Last attempt to quit: 2017     Years since quittin 6    Smokeless tobacco: Never Used   Substance and Sexual Activity    Alcohol use: Yes     Alcohol/week: 1 0 standard drinks     Types: 1 Standard drinks or equivalent per week     Frequency: Monthly or less     Comment: socially    Drug use: No    Sexual activity: Yes     Partners: Male     Birth control/protection: OCP     Comment: lifetime partners: 6; current partner 2014   Lifestyle    Physical activity:     Days per week: 0 days     Minutes per session: Not on file    Stress: Very much   Relationships    Social connections:     Talks on phone: Once a week     Gets together: Once a week     Attends Restorationist service: Never     Active member of club or organization: No     Attends meetings of clubs or organizations: Never     Relationship status:     Intimate partner violence:     Fear of current or ex partner: No     Emotionally abused: No     Physically abused: No     Forced sexual activity: No   Other Topics Concern    Not on file   Social History Narrative    Jewish: no preference    Accepts blood products        Exercise: unable with back issues    Calcium: 1 cheese 4-5x/week, 1 yogurt a few times/week, 1 c almond milk daily, calcium in bariatric vitamin       Past Medical History    Past Medical History:   Diagnosis Date    ADHD (attention deficit hyperactivity disorder)     Anxiety     Bulging lumbar disc     Carpal tunnel syndrome     RIGHT  LAST ASSESSED: 12/7/16    Chronic back pain     low    Colon polyps     Depression     Diabetes mellitus ([facility] [address] )     on victoza    GERD (gastroesophageal reflux disease)     Gestational diabetes     Hearing loss     left ear    Hyperlipidemia     IBS (irritable bowel syndrome)     Ileus ([facility] [address] )     LAST ASSESSED: 8/3/17    Labial cyst     LAST ASSESSED: 4/21/16    Myofascial pain     LAST ASSESSED: 4/12/16    Obesity     Ovarian cyst     LEFT   LAST ASSESSED: 9/2/16    Panic attack     Pap smear for cervical cancer screening     4/2018--pap wnl, HRHPV neg    PTSD (post-traumatic stress disorder)     Seasonal allergies     Thoracic outlet syndrome     2010    Trochanteric bursitis of both hips     LAST ASSESSED: 3/18/16    Ulnar neuropathy at elbow     Varicella     Wears glasses        Surgical History    Past Surgical History:   Procedure Laterality Date    BILE DUCT EXPLORATION      ENDOSCOPIC REMOVAL OF STONES FROM BILIARY TRACT     SECTION      x3    CHOLECYSTECTOMY      COLONOSCOPY      -polyp, repeat     DILATION AND CURETTAGE OF UTERUS      ENDOMETRIAL ABLATION      ERCP W/ SPHICTEROTOMY      FIRST RIB REMOVAL      FIRST RIB REMOVAL      THORAX EXCISION OF FIRST RIB    HYSTEROSCOPY      NEUROPLASTY / TRANSPOSITION ULNAR NERVE AT ELBOW      NC COLONOSCOPY FLX DX W/COLLJ SPEC WHEN PFRMD N/A 2017    Procedure: COLONOSCOPY;  Surgeon: Wilfred Pozo MD;  [facility]; Service: Gastroenterology    NC ESOPHAGOGASTRODUODENOSCOPY TRANSORAL DIAGNOSTIC N/A 2017    Procedure: ESOPHAGOGASTRODUODENOSCOPY (EGD); Surgeon: Mona Lim MD;  [facility];   Service: Gastroenterology    NC HYSTEROSCOPY,W/ENDO BX N/A 10/20/2017    Procedure: DILATATION AND CURETTAGE (D&C) WITH HYSTEROSCOPY  REMOVAL VULVAR RT  LESION;  Surgeon: Radha Mcclure MD;  [facility];  Service: Gynecology    NC LAP, ÁLVARO RESTRICT PROC, LONGITUDINAL GASTRECTOMY N/A 2018    Procedure: GASTRECTOMY SLEEVE LAPAROSCOPIC; INTRAOPERATIVE EGD ;  Surgeon: Francisca Watt MD;  [facility];  Service: Bariatrics    TONSILLECTOMY AND ADENOIDECTOMY      TUBAL LIGATION      ULNAR NERVE REPAIR Right     VEIN LIGATION AND STRIPPING Right     VULVA SURGERY  10/20/2017    BIOPSY    WISDOM TOOTH EXTRACTION         Medications      Current Outpatient Medications:     atoMOXetine (STRATTERA) 25 mg capsule, Take 1 capsule (25 mg total) by mouth daily, Disp: 90 capsule, Rfl: 0    Biotin 33385 MCG TABS, Take by mouth, Disp: , Rfl:     Calcium Carbonate-Vit D-Min (CALCIUM 1200 PO), Take 2,400 mg by mouth daily, Disp: , Rfl:     Cyanocobalamin ([employer] VITAMIN B12 PO), Take by mouth, Disp: , Rfl:     cyclobenzaprine (FLEXERIL) 10 mg tablet, Take 1 tablet (10 mg total) by mouth daily at bedtime (Patient taking differently: Take 10 mg by mouth as needed ), Disp: 30 tablet, Rfl: 0    drospirenone-ethinyl estradiol (LIEN) 3-0 02 MG per tablet, Take 1 tablet by mouth daily, Disp: 84 tablet, Rfl: 4    gabapentin (NEURONTIN) 600 MG tablet, Take 1 tablet (600 mg total) by mouth daily at bedtime (Patient taking differently: Take 900 mg by mouth daily at bedtime ), Disp: 90 tablet, Rfl: 3    lamoTRIgine (LaMICtal) 100 mg tablet, Take 1 tablet (100 mg total) by mouth daily, Disp: 90 tablet, Rfl: 0    lamoTRIgine (LaMICtal) 25 mg tablet, Take 1 tablet (25 mg total) by mouth daily, Disp: 90 tablet, Rfl: 1    metoclopramide (REGLAN) 10 mg tablet, Take 1 tablet (10 mg total) by mouth every 6 (six) hours as needed (nausea), Disp: 15 tablet, Rfl: 0    montelukast (SINGULAIR) 10 mg tablet, Take 1 tablet (10 mg total) by mouth daily at bedtime, Disp: 90 tablet, Rfl: 1    Multiple Vitamins-Minerals (MULTI COMPLETE PO), Take by mouth, Disp: , Rfl:     pantoprazole (PROTONIX) 40 mg tablet, Take 1 tablet (40 mg total) by mouth daily, Disp: 90 tablet, Rfl: 0    Probiotic Product (PRO-BIOTIC BLEND PO), Take by mouth as needed , Disp: , Rfl:     QUEtiapine (SEROquel) 50 mg tablet, Take 1 tablet (50 mg total) by mouth daily at bedtime, Disp: 90 tablet, Rfl: 1    traMADol (ULTRAM) 50 mg tablet, Take 1 tablet (50 mg total) by mouth daily as needed for moderate pain, Disp: 30 tablet, Rfl: 2    venlafaxine (EFFEXOR-XR) 150 mg 24 hr capsule, Take 1 capsule (150 mg total) by mouth daily, Disp: 90 capsule, Rfl: 1    venlafaxine (EFFEXOR-XR) 75 mg 24 hr capsule, Take 1 capsule (75 mg total) by mouth daily, Disp: 90 capsule, Rfl: 0    Allergies    Allergies   Allergen Reactions    Ibuprofen      Due to gastric sleeve -can only take for 5 days, then needs to stop       The following portions of the patient's history were reviewed and updated as appropriate: allergies, current medications, past family history, past medical history, past social history, past surgical history and problem list     Investigations    I personally reviewed the MRI results with the patient:    MRI of the thoracic spine without contrast dated November 15th, 2019  Normal thoracic alignment  Minimal degenerative changes throughout the thoracic spine  No compression of neural structures  No significant central canal stenosis  Spinal cord is normal in signal     Physical Exam    Vitals:  Blood pressure 100/71, pulse (!) 170, resp  rate 16, height 5' 8" (1 727 m), weight 75 9 kg (167 lb 6 4 oz), last menstrual period 01/07/2019, not currently breastfeeding  ,Body mass index is 25 45 kg/m²  Physical Exam   Constitutional: She is oriented to person, place, and time  She appears well-developed and well-nourished  She appears distressed  HENT:   Head: Atraumatic  Eyes: EOM are normal    Cardiovascular: Normal rate, regular rhythm and normal heart sounds  Pulmonary/Chest: Effort normal and breath sounds normal  No respiratory distress  Musculoskeletal: She exhibits no deformity  Neurological: She is alert and oriented to person, place, and time  5/5 power in lower extremities  Reports normal light touch sensation lower extremities  Walks with a steady gait  Skin: Skin is warm and dry  Psychiatric: Her speech is normal and behavior is normal  Her mood appears anxious  Vitals reviewed  Neurologic Exam     Mental Status   Oriented to person, place, and time     Speech: speech is normal     Cranial Nerves     CN III, IV, VI   Extraocular motions are normal

## 2020-01-09 NOTE — H&P (VIEW-ONLY)
Office Note - Neurosurgery   Silvana Wilson 47 y o  female MRN: [id number]      Assessment:    Patient is stable  69-year-old woman with chronic pain syndrome and bilateral lumbar radiculopathy  Over the years she has tried a number of different pain medications and physical therapy without improvement in her symptoms  She recently underwent [employer] spinal cord stimulator trial and had 80 percent improvement in both her lower back and bilateral leg pain  Overall her activity level improved and she did not take any retracted all during the trial   Her chronic pain symptoms returned after removing the trial electrode  She is a candidate for insertion of thoracic spinal cord stimulator electrode via laminotomy and placement of left buttock implantable pulse generator  The goal of surgery is to improve chronic pain but not necessarily completely resolved pain  Weakness and numbness are unlikely to improve  The risks of surgery were reviewed in detail  1   Risk of general anesthetic with possible cardiac and respiratory complication  Risk of infection and bleeding/transfusion  2   Risk of neurological injury with new pain, weakness or numbness, paralysis, difficulties with bowel bladder function  Risk of CSF leak  3   Risk of device malfunction, and failure of treatment  Need for revision surgery  Possible limitations on MRI were reviewed as well  Expected postoperative course, including activity restrictions, expected pain and postoperative medication were reviewed  Patient provided verbal consent to surgical procedure and signed consent form: Yes  History, physical examination and diagnostic tests were reviewed and questions answered  Diagnosis, care plan and treatment options were discussed  The patient and spouse/SO understand instructions and will follow up as directed      Plan:    Follow-up:  Surgery    Problem List Items Addressed This Visit        Nervous and Auditory    Lumbar radiculopathy - Primary    Relevant Orders    Case request operating room: Insertion of thoracic spinal cord stimulator extra via laminotomy and placement of left buttock implantable pulse generator (Completed)       Other    Chronic pain syndrome    Relevant Orders    Case request operating room: Insertion of thoracic spinal cord stimulator extra via laminotomy and placement of left buttock implantable pulse generator (Completed)          Subjective/Objective     Chief Complaint    Lower back and bilateral leg pain  HPI    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 after slipping on ice  She describes constant lower back pain radiating diffusely into both legs regardless of position or activity  She constantly needs to change position in order to try to improve her symptoms  She has tried a number of different pain medications and physical therapy without significant improvement in her symptoms  She is able to work but not comfortably  She denies any difficulties with bowel bladder function or changing perineal sensation  She does feel weakness in her legs on occasion  She has not had any previous lower back surgery  She underwent Abbott spinal cord stimulator trial and noted 80 percent improvement in both her lower back and bilateral leg pain  She is more comfortable and more active and stop taking her pain medication for the duration of the trial   Her symptoms recurred after the electrode was removed  She presents today to discuss implantation of a permanent system  MARLINE HORAN personally reviewed and updated  Review of Systems   Constitutional: Negative  HENT: Negative  Eyes: Negative  Respiratory: Negative  Cardiovascular: Negative  Gastrointestinal: Negative  Endocrine: Negative  Genitourinary: Negative      Musculoskeletal: Positive for back pain (across lower back radiates into bilateral buttocks, bilateral hips, and down bilateral legs ) and gait problem  Skin: Negative  Allergic/Immunologic: Positive for environmental allergies  Neurological: Positive for weakness (bilateral legs )  Negative for dizziness, seizures, syncope, numbness and headaches  Hematological: Negative  Psychiatric/Behavioral: Negative          Family History    Family History   Problem Relation Age of Onset    Diabetes Mother     Other Mother         HYPERCHOLESTEROLEMIA    Breast cancer Mother         >50    BRCA1 Negative Mother     BRCA2 Negative Mother     Diabetes Father     Other Father         traumatic brain injury    Prostate cancer Father     Alcohol abuse Father         in remission    Heart disease Father     Neuropathy Father     Hyperlipidemia Father     Hypertension Brother     Diabetes Brother     Other Brother         HYPERCHOLESTEROLEMIA    Alcohol abuse Brother     Depression Brother         attempted suicide    Colon cancer Maternal Grandfather     No Known Problems Maternal Grandmother     No Known Problems Paternal Grandmother     No Known Problems Paternal Grandfather     Diabetes Brother     Alcohol abuse Brother     Asthma Son     No Known Problems Daughter     Ovarian cancer Neg Hx     Uterine cancer Neg Hx        Social History    Social History     Socioeconomic History    Marital status:      Spouse name: Not on file    Number of children: 3    Years of education: GED then 2 year college program    Highest education level: Not on file   Occupational History    Occupation: ER TECH     Employer: [employer]   Social Needs    Financial resource strain: Somewhat hard    Food insecurity:     Worry: Never true     Inability: Never true    Transportation needs:     Medical: No     Non-medical: No   Tobacco Use    Smoking status: Former Smoker     Last attempt to quit: 2017     Years since quittin 6    Smokeless tobacco: Never Used   Substance and Sexual Activity    Alcohol use: Yes     Alcohol/week: 1 0 standard drinks     Types: 1 Standard drinks or equivalent per week     Frequency: Monthly or less     Comment: socially    Drug use: No    Sexual activity: Yes     Partners: Male     Birth control/protection: OCP     Comment: lifetime partners: 6; current partner 2014   Lifestyle    Physical activity:     Days per week: 0 days     Minutes per session: Not on file    Stress: Very much   Relationships    Social connections:     Talks on phone: Once a week     Gets together: Once a week     Attends Evangelical service: Never     Active member of club or organization: No     Attends meetings of clubs or organizations: Never     Relationship status:     Intimate partner violence:     Fear of current or ex partner: No     Emotionally abused: No     Physically abused: No     Forced sexual activity: No   Other Topics Concern    Not on file   Social History Narrative    Sikhism: no preference    Accepts blood products        Exercise: unable with back issues    Calcium: 1 cheese 4-5x/week, 1 yogurt a few times/week, 1 c almond milk daily, calcium in bariatric vitamin       Past Medical History    Past Medical History:   Diagnosis Date    ADHD (attention deficit hyperactivity disorder)     Anxiety     Bulging lumbar disc     Carpal tunnel syndrome     RIGHT  LAST ASSESSED: 12/7/16    Chronic back pain     low    Colon polyps     Depression     Diabetes mellitus ([facility] [address] )     on victoza    GERD (gastroesophageal reflux disease)     Gestational diabetes     Hearing loss     left ear    Hyperlipidemia     IBS (irritable bowel syndrome)     Ileus ([facility] [address] )     LAST ASSESSED: 8/3/17    Labial cyst     LAST ASSESSED: 4/21/16    Myofascial pain     LAST ASSESSED: 4/12/16    Obesity     Ovarian cyst     LEFT   LAST ASSESSED: 9/2/16    Panic attack     Pap smear for cervical cancer screening     4/2018--pap wnl, HRHPV neg    PTSD (post-traumatic stress disorder)     Seasonal allergies     Thoracic outlet syndrome     2010    Trochanteric bursitis of both hips     LAST ASSESSED: 3/18/16    Ulnar neuropathy at elbow     Varicella     Wears glasses        Surgical History    Past Surgical History:   Procedure Laterality Date    BILE DUCT EXPLORATION      ENDOSCOPIC REMOVAL OF STONES FROM BILIARY TRACT     SECTION      x3    CHOLECYSTECTOMY      COLONOSCOPY      -polyp, repeat     DILATION AND CURETTAGE OF UTERUS      ENDOMETRIAL ABLATION      ERCP W/ SPHICTEROTOMY      FIRST RIB REMOVAL      FIRST RIB REMOVAL      THORAX EXCISION OF FIRST RIB    HYSTEROSCOPY      NEUROPLASTY / TRANSPOSITION ULNAR NERVE AT ELBOW      HI COLONOSCOPY FLX DX W/COLLJ SPEC WHEN PFRMD N/A 2017    Procedure: COLONOSCOPY;  Surgeon: Kat Tillman MD;  [facility]; Service: Gastroenterology    HI ESOPHAGOGASTRODUODENOSCOPY TRANSORAL DIAGNOSTIC N/A 2017    Procedure: ESOPHAGOGASTRODUODENOSCOPY (EGD); Surgeon: Joan Mercado MD;  [facility];   Service: Gastroenterology    HI HYSTEROSCOPY,W/ENDO BX N/A 10/20/2017    Procedure: DILATATION AND CURETTAGE (D&C) WITH HYSTEROSCOPY  REMOVAL VULVAR RT  LESION;  Surgeon: Treva Perry MD;  [facility];  Service: Gynecology    HI LAP, ÁLVARO RESTRICT PROC, LONGITUDINAL GASTRECTOMY N/A 2018    Procedure: GASTRECTOMY SLEEVE LAPAROSCOPIC; INTRAOPERATIVE EGD ;  Surgeon: Vicki Tiwari MD;  [facility];  Service: Bariatrics    TONSILLECTOMY AND ADENOIDECTOMY      TUBAL LIGATION      ULNAR NERVE REPAIR Right     VEIN LIGATION AND STRIPPING Right     VULVA SURGERY  10/20/2017    BIOPSY    WISDOM TOOTH EXTRACTION         Medications      Current Outpatient Medications:     atoMOXetine (STRATTERA) 25 mg capsule, Take 1 capsule (25 mg total) by mouth daily, Disp: 90 capsule, Rfl: 0    Biotin 03885 MCG TABS, Take by mouth, Disp: , Rfl:     Calcium Carbonate-Vit D-Min (CALCIUM 1200 PO), Take 2,400 mg by mouth daily, Disp: , Rfl:     Cyanocobalamin ([employer] VITAMIN B12 PO), Take by mouth, Disp: , Rfl:     cyclobenzaprine (FLEXERIL) 10 mg tablet, Take 1 tablet (10 mg total) by mouth daily at bedtime (Patient taking differently: Take 10 mg by mouth as needed ), Disp: 30 tablet, Rfl: 0    drospirenone-ethinyl estradiol (LIEN) 3-0 02 MG per tablet, Take 1 tablet by mouth daily, Disp: 84 tablet, Rfl: 4    gabapentin (NEURONTIN) 600 MG tablet, Take 1 tablet (600 mg total) by mouth daily at bedtime (Patient taking differently: Take 900 mg by mouth daily at bedtime ), Disp: 90 tablet, Rfl: 3    lamoTRIgine (LaMICtal) 100 mg tablet, Take 1 tablet (100 mg total) by mouth daily, Disp: 90 tablet, Rfl: 0    lamoTRIgine (LaMICtal) 25 mg tablet, Take 1 tablet (25 mg total) by mouth daily, Disp: 90 tablet, Rfl: 1    metoclopramide (REGLAN) 10 mg tablet, Take 1 tablet (10 mg total) by mouth every 6 (six) hours as needed (nausea), Disp: 15 tablet, Rfl: 0    montelukast (SINGULAIR) 10 mg tablet, Take 1 tablet (10 mg total) by mouth daily at bedtime, Disp: 90 tablet, Rfl: 1    Multiple Vitamins-Minerals (MULTI COMPLETE PO), Take by mouth, Disp: , Rfl:     pantoprazole (PROTONIX) 40 mg tablet, Take 1 tablet (40 mg total) by mouth daily, Disp: 90 tablet, Rfl: 0    Probiotic Product (PRO-BIOTIC BLEND PO), Take by mouth as needed , Disp: , Rfl:     QUEtiapine (SEROquel) 50 mg tablet, Take 1 tablet (50 mg total) by mouth daily at bedtime, Disp: 90 tablet, Rfl: 1    traMADol (ULTRAM) 50 mg tablet, Take 1 tablet (50 mg total) by mouth daily as needed for moderate pain, Disp: 30 tablet, Rfl: 2    venlafaxine (EFFEXOR-XR) 150 mg 24 hr capsule, Take 1 capsule (150 mg total) by mouth daily, Disp: 90 capsule, Rfl: 1    venlafaxine (EFFEXOR-XR) 75 mg 24 hr capsule, Take 1 capsule (75 mg total) by mouth daily, Disp: 90 capsule, Rfl: 0    Allergies    Allergies   Allergen Reactions    Ibuprofen      Due to gastric sleeve -can only take for 5 days, then needs to stop       The following portions of the patient's history were reviewed and updated as appropriate: allergies, current medications, past family history, past medical history, past social history, past surgical history and problem list     Investigations    I personally reviewed the MRI results with the patient:    MRI of the thoracic spine without contrast dated November 15th, 2019  Normal thoracic alignment  Minimal degenerative changes throughout the thoracic spine  No compression of neural structures  No significant central canal stenosis  Spinal cord is normal in signal     Physical Exam    Vitals:  Blood pressure 100/71, pulse (!) 170, resp  rate 16, height 5' 8" (1 727 m), weight 75 9 kg (167 lb 6 4 oz), last menstrual period 01/07/2019, not currently breastfeeding  ,Body mass index is 25 45 kg/m²  Physical Exam   Constitutional: She is oriented to person, place, and time  She appears well-developed and well-nourished  She appears distressed  HENT:   Head: Atraumatic  Eyes: EOM are normal    Cardiovascular: Normal rate, regular rhythm and normal heart sounds  Pulmonary/Chest: Effort normal and breath sounds normal  No respiratory distress  Musculoskeletal: She exhibits no deformity  Neurological: She is alert and oriented to person, place, and time  5/5 power in lower extremities  Reports normal light touch sensation lower extremities  Walks with a steady gait  Skin: Skin is warm and dry  Psychiatric: Her speech is normal and behavior is normal  Her mood appears anxious  Vitals reviewed  Neurologic Exam     Mental Status   Oriented to person, place, and time     Speech: speech is normal     Cranial Nerves     CN III, IV, VI   Extraocular motions are normal

## 2020-01-10 ENCOUNTER — OFFICE VISIT (OUTPATIENT)
Dept: PSYCHIATRY | Facility: CLINIC | Age: 50
End: 2020-01-10
Payer: COMMERCIAL

## 2020-01-10 DIAGNOSIS — F33.2 MDD (MAJOR DEPRESSIVE DISORDER), RECURRENT SEVERE, WITHOUT PSYCHOSIS (HCC): Primary | ICD-10-CM

## 2020-01-10 PROCEDURE — 99213 OFFICE O/P EST LOW 20 MIN: CPT | Performed by: NURSE PRACTITIONER

## 2020-01-10 RX ORDER — LAMOTRIGINE 150 MG/1
150 TABLET ORAL DAILY
Qty: 90 TABLET | Refills: 1 | Status: SHIPPED | OUTPATIENT
Start: 2020-01-10 | End: 2020-06-03

## 2020-01-10 NOTE — PRE-PROCEDURE INSTRUCTIONS
Pre-Surgery Instructions:   Medication Instructions    atoMOXetine (STRATTERA) 25 mg capsule Instructed patient per Anesthesia Guidelines   Biotin 30803 MCG TABS Instructed patient per Anesthesia Guidelines   Calcium Carbonate-Vit D-Min (CALCIUM 1200 PO) Instructed patient per Anesthesia Guidelines   Cyanocobalamin (CVS VITAMIN B12 PO) Instructed patient per Anesthesia Guidelines   cyclobenzaprine (FLEXERIL) 10 mg tablet Instructed patient per Anesthesia Guidelines   drospirenone-ethinyl estradiol (LIEN) 3-0 02 MG per tablet Instructed patient per Anesthesia Guidelines   gabapentin (NEURONTIN) 600 MG tablet Instructed patient per Anesthesia Guidelines   lamoTRIgine (LaMICtal) 150 MG tablet Instructed patient per Anesthesia Guidelines   metoclopramide (REGLAN) 10 mg tablet Instructed patient per Anesthesia Guidelines   montelukast (SINGULAIR) 10 mg tablet Instructed patient per Anesthesia Guidelines   Multiple Vitamins-Minerals (MULTI COMPLETE PO) Instructed patient per Anesthesia Guidelines   pantoprazole (PROTONIX) 40 mg tablet Instructed patient per Anesthesia Guidelines   Probiotic Product (PRO-BIOTIC BLEND PO) Instructed patient per Anesthesia Guidelines   QUEtiapine (SEROquel) 50 mg tablet Instructed patient per Anesthesia Guidelines   traMADol (ULTRAM) 50 mg tablet Instructed patient per Anesthesia Guidelines      Pre op,medications and showering instructionsper Dr Shannon Clifford reviewed-Patient has hibiclens soap and wipes

## 2020-01-16 ENCOUNTER — TELEPHONE (OUTPATIENT)
Dept: BEHAVIORAL/MENTAL HEALTH CLINIC | Facility: CLINIC | Age: 50
End: 2020-01-16

## 2020-01-16 DIAGNOSIS — J30.2 SEASONAL ALLERGIES: ICD-10-CM

## 2020-01-16 RX ORDER — MONTELUKAST SODIUM 10 MG/1
10 TABLET ORAL
Qty: 90 TABLET | Refills: 1 | Status: SHIPPED | OUTPATIENT
Start: 2020-01-16 | End: 2020-04-02

## 2020-01-16 NOTE — TELEPHONE ENCOUNTER
T/C to confirm appointment scheduled for 1/17/2020 @ 2:00pm  Marlene Miller did not auto-confirm this appointment; therefore, I made a direct confirmation call  Spoke with client  Confirmed they will be in attendance at the scheduled time

## 2020-01-17 ENCOUNTER — SOCIAL WORK (OUTPATIENT)
Dept: BEHAVIORAL/MENTAL HEALTH CLINIC | Facility: CLINIC | Age: 50
End: 2020-01-17
Payer: COMMERCIAL

## 2020-01-17 DIAGNOSIS — F33.2 MAJOR DEPRESSIVE DISORDER, RECURRENT SEVERE WITHOUT PSYCHOTIC FEATURES (HCC): Primary | Chronic | ICD-10-CM

## 2020-01-17 DIAGNOSIS — F41.1 GENERALIZED ANXIETY DISORDER: Chronic | ICD-10-CM

## 2020-01-17 DIAGNOSIS — F43.10 POST TRAUMATIC STRESS DISORDER (PTSD): Chronic | ICD-10-CM

## 2020-01-17 PROCEDURE — 90834 PSYTX W PT 45 MINUTES: CPT | Performed by: SOCIAL WORKER
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
## 2020-01-28 ENCOUNTER — ANESTHESIA EVENT (OUTPATIENT)
Dept: PERIOP | Facility: HOSPITAL | Age: 50
End: 2020-01-28
Payer: COMMERCIAL

## 2020-01-28 ENCOUNTER — DOCUMENTATION (OUTPATIENT)
Dept: NEUROSURGERY | Facility: CLINIC | Age: 50
End: 2020-01-28

## 2020-01-28 NOTE — ANESTHESIA PREPROCEDURE EVALUATION
Review of Systems/Medical History  Patient summary reviewed  Chart reviewed      Cardiovascular  EKG reviewed, Exercise tolerance (METS): >4,  Hyperlipidemia,   Comment: Normal sinus rhythm  Low voltage QRS  RSR' or QR pattern in V1 suggests right ventricular conduction delay  Poor R wave progression  Abnormal ECG  When compared with ECG of 16-SEP-2019 07:54, (unconfirmed)  No significant change was found  Confirmed by Emily Landis ([id number]) on 9/17/2019 8:16:55 AM,  Pulmonary  Pneumonia, Shortness of breath,        GI/Hepatic    GERD well controlled, Liver disease , Bariatric surgery,        Negative  ROS        Endo/Other  Diabetes ,   Comment: Lost 80 LBs no DM now    GYN  Negative gynecology ROS          Hematology  Negative hematology ROS      Musculoskeletal    Arthritis     Neurology   Psychology   Anxiety, Depression ,              Physical Exam    Airway    Mallampati score: II  TM Distance: >3 FB  Neck ROM: full     Dental   No notable dental hx     Cardiovascular  Cardiovascular exam normal    Pulmonary  Pulmonary exam normal     Other Findings        Anesthesia Plan  ASA Score- 2     Anesthesia Type- general with ASA Monitors  Additional Monitors:   Airway Plan:         Plan Factors-  Patient did not smoke on day of surgery  Induction- intravenous  Postoperative Plan-     Informed Consent- Anesthetic plan and risks discussed with patient  I personally reviewed this patient with the CRNA  Discussed and agreed on the Anesthesia Plan with the CRNA  Paola Angela

## 2020-01-29 ENCOUNTER — ANESTHESIA (OUTPATIENT)
Dept: PERIOP | Facility: HOSPITAL | Age: 50
End: 2020-01-29
Payer: COMMERCIAL

## 2020-01-29 ENCOUNTER — HOSPITAL ENCOUNTER (OUTPATIENT)
Facility: HOSPITAL | Age: 50
Setting detail: OUTPATIENT SURGERY
Discharge: HOME | End: 2020-01-29
Attending: NEUROLOGICAL SURGERY | Admitting: NEUROLOGICAL SURGERY
Payer: COMMERCIAL

## 2020-01-29 ENCOUNTER — APPOINTMENT (OUTPATIENT)
Dept: RADIOLOGY | Facility: HOSPITAL | Age: 50
End: 2020-01-29
Payer: COMMERCIAL

## 2020-01-29 VITALS
OXYGEN SATURATION: 100 % | HEART RATE: 75 BPM | HEIGHT: 68 IN | WEIGHT: 167 LBS | SYSTOLIC BLOOD PRESSURE: 120 MMHG | RESPIRATION RATE: 18 BRPM | BODY MASS INDEX: 25.31 KG/M2 | TEMPERATURE: 98.7 F | DIASTOLIC BLOOD PRESSURE: 65 MMHG

## 2020-01-29 DIAGNOSIS — G89.4 CHRONIC PAIN SYNDROME: Primary | ICD-10-CM

## 2020-01-29 DIAGNOSIS — G89.18 POST-OPERATIVE PAIN: Primary | ICD-10-CM

## 2020-01-29 DIAGNOSIS — I47.1 PAROXYSMAL SVT (SUPRAVENTRICULAR TACHYCARDIA) (HCC): ICD-10-CM

## 2020-01-29 DIAGNOSIS — G89.29 CHRONIC BILATERAL LOW BACK PAIN, UNSPECIFIED WHETHER SCIATICA PRESENT: ICD-10-CM

## 2020-01-29 DIAGNOSIS — M54.50 CHRONIC BILATERAL LOW BACK PAIN, UNSPECIFIED WHETHER SCIATICA PRESENT: ICD-10-CM

## 2020-01-29 DIAGNOSIS — M79.18 MYOFASCIAL MUSCLE PAIN: ICD-10-CM

## 2020-01-29 DIAGNOSIS — M54.16 LUMBAR RADICULOPATHY: ICD-10-CM

## 2020-01-29 LAB — GLUCOSE SERPL-MCNC: 106 MG/DL (ref 65–140)

## 2020-01-29 PROCEDURE — 63655 IMPLANT NEUROELECTRODES: CPT | Performed by: NEUROLOGICAL SURGERY

## 2020-01-29 PROCEDURE — C1778 LEAD, NEUROSTIMULATOR: HCPCS | Performed by: NEUROLOGICAL SURGERY

## 2020-01-29 PROCEDURE — C1767 GENERATOR, NEURO NON-RECHARG: HCPCS | Performed by: NEUROLOGICAL SURGERY

## 2020-01-29 PROCEDURE — 72070 X-RAY EXAM THORAC SPINE 2VWS: CPT

## 2020-01-29 PROCEDURE — 82948 REAGENT STRIP/BLOOD GLUCOSE: CPT

## 2020-01-29 PROCEDURE — 93005 ELECTROCARDIOGRAM TRACING: CPT

## 2020-01-29 PROCEDURE — C1787 PATIENT PROGR, NEUROSTIM: HCPCS | Performed by: NEUROLOGICAL SURGERY

## 2020-01-29 PROCEDURE — 99243 OFF/OP CNSLTJ NEW/EST LOW 30: CPT | Performed by: INTERNAL MEDICINE

## 2020-01-29 PROCEDURE — 63685 INS/RPLC SPI NPG/RCVR POCKET: CPT | Performed by: NEUROLOGICAL SURGERY

## 2020-01-29 DEVICE — 3MM LEAD, 60 CM
Type: IMPLANTABLE DEVICE | Site: SPINE THORACIC | Status: FUNCTIONAL
Brand: PENTA™

## 2020-01-29 DEVICE — IMPLANTABLE PULSE GENERATOR
Type: IMPLANTABLE DEVICE | Site: SPINE THORACIC | Status: FUNCTIONAL
Brand: PROCLAIM™

## 2020-01-29 RX ORDER — BUPIVACAINE HYDROCHLORIDE AND EPINEPHRINE 5; 5 MG/ML; UG/ML
INJECTION, SOLUTION PERINEURAL AS NEEDED
Status: DISCONTINUED | OUTPATIENT
Start: 2020-01-29 | End: 2020-01-29 | Stop reason: HOSPADM

## 2020-01-29 RX ORDER — SODIUM CHLORIDE 9 MG/ML
100 INJECTION, SOLUTION INTRAVENOUS CONTINUOUS
Status: DISCONTINUED | OUTPATIENT
Start: 2020-01-29 | End: 2020-01-29 | Stop reason: HOSPADM

## 2020-01-29 RX ORDER — DEXAMETHASONE SODIUM PHOSPHATE 10 MG/ML
INJECTION, SOLUTION INTRAMUSCULAR; INTRAVENOUS AS NEEDED
Status: DISCONTINUED | OUTPATIENT
Start: 2020-01-29 | End: 2020-01-29 | Stop reason: SURG

## 2020-01-29 RX ORDER — MAGNESIUM HYDROXIDE 1200 MG/15ML
LIQUID ORAL AS NEEDED
Status: DISCONTINUED | OUTPATIENT
Start: 2020-01-29 | End: 2020-01-29 | Stop reason: HOSPADM

## 2020-01-29 RX ORDER — TRAMADOL HYDROCHLORIDE 50 MG/1
50 TABLET ORAL EVERY 6 HOURS PRN
Qty: 28 TABLET | Refills: 0 | Status: SHIPPED | OUTPATIENT
Start: 2020-01-29 | End: 2020-01-29 | Stop reason: SDUPTHER

## 2020-01-29 RX ORDER — CEPHALEXIN 500 MG/1
500 CAPSULE ORAL EVERY 6 HOURS SCHEDULED
Qty: 20 CAPSULE | Refills: 0 | Status: SHIPPED | OUTPATIENT
Start: 2020-01-29 | End: 2020-02-03

## 2020-01-29 RX ORDER — ROCURONIUM BROMIDE 10 MG/ML
INJECTION, SOLUTION INTRAVENOUS AS NEEDED
Status: DISCONTINUED | OUTPATIENT
Start: 2020-01-29 | End: 2020-01-29 | Stop reason: SURG

## 2020-01-29 RX ORDER — PROPOFOL 10 MG/ML
INJECTION, EMULSION INTRAVENOUS AS NEEDED
Status: DISCONTINUED | OUTPATIENT
Start: 2020-01-29 | End: 2020-01-29 | Stop reason: SURG

## 2020-01-29 RX ORDER — LIDOCAINE HYDROCHLORIDE AND EPINEPHRINE 10; 10 MG/ML; UG/ML
INJECTION, SOLUTION INFILTRATION; PERINEURAL AS NEEDED
Status: DISCONTINUED | OUTPATIENT
Start: 2020-01-29 | End: 2020-01-29 | Stop reason: HOSPADM

## 2020-01-29 RX ORDER — METOPROLOL SUCCINATE 25 MG/1
25 TABLET, EXTENDED RELEASE ORAL DAILY
Status: DISCONTINUED | OUTPATIENT
Start: 2020-01-29 | End: 2020-01-29 | Stop reason: HOSPADM

## 2020-01-29 RX ORDER — SUCCINYLCHOLINE/SOD CL,ISO/PF 100 MG/5ML
SYRINGE (ML) INTRAVENOUS AS NEEDED
Status: DISCONTINUED | OUTPATIENT
Start: 2020-01-29 | End: 2020-01-29 | Stop reason: SURG

## 2020-01-29 RX ORDER — SODIUM CHLORIDE, SODIUM LACTATE, POTASSIUM CHLORIDE, CALCIUM CHLORIDE 600; 310; 30; 20 MG/100ML; MG/100ML; MG/100ML; MG/100ML
125 INJECTION, SOLUTION INTRAVENOUS CONTINUOUS
Status: DISCONTINUED | OUTPATIENT
Start: 2020-01-29 | End: 2020-01-29 | Stop reason: HOSPADM

## 2020-01-29 RX ORDER — METOPROLOL TARTRATE 5 MG/5ML
5 INJECTION INTRAVENOUS
Status: DISCONTINUED | OUTPATIENT
Start: 2020-01-29 | End: 2020-01-29 | Stop reason: HOSPADM

## 2020-01-29 RX ORDER — FENTANYL CITRATE 50 UG/ML
INJECTION, SOLUTION INTRAMUSCULAR; INTRAVENOUS AS NEEDED
Status: DISCONTINUED | OUTPATIENT
Start: 2020-01-29 | End: 2020-01-29 | Stop reason: SURG

## 2020-01-29 RX ORDER — CHLORHEXIDINE GLUCONATE 0.12 MG/ML
15 RINSE ORAL ONCE
Status: COMPLETED | OUTPATIENT
Start: 2020-01-29 | End: 2020-01-29

## 2020-01-29 RX ORDER — TRAMADOL HYDROCHLORIDE 50 MG/1
50 TABLET ORAL EVERY 6 HOURS PRN
Qty: 28 TABLET | Refills: 0 | Status: SHIPPED | OUTPATIENT
Start: 2020-01-29 | End: 2020-02-05

## 2020-01-29 RX ORDER — HYDROMORPHONE HCL/PF 1 MG/ML
0.5 SYRINGE (ML) INJECTION
Status: COMPLETED | OUTPATIENT
Start: 2020-01-29 | End: 2020-01-29

## 2020-01-29 RX ORDER — KETAMINE HCL IN NACL, ISO-OSM 100MG/10ML
SYRINGE (ML) INJECTION AS NEEDED
Status: DISCONTINUED | OUTPATIENT
Start: 2020-01-29 | End: 2020-01-29 | Stop reason: SURG

## 2020-01-29 RX ORDER — DIPHENHYDRAMINE HYDROCHLORIDE 50 MG/ML
25 INJECTION INTRAMUSCULAR; INTRAVENOUS ONCE
Status: COMPLETED | OUTPATIENT
Start: 2020-01-29 | End: 2020-01-29

## 2020-01-29 RX ORDER — ESMOLOL HYDROCHLORIDE 10 MG/ML
30 INJECTION, SOLUTION INTRAVENOUS
Status: DISCONTINUED | OUTPATIENT
Start: 2020-01-29 | End: 2020-01-29

## 2020-01-29 RX ORDER — LORAZEPAM 2 MG/ML
1 INJECTION INTRAMUSCULAR ONCE
Status: COMPLETED | OUTPATIENT
Start: 2020-01-29 | End: 2020-01-29

## 2020-01-29 RX ORDER — GLYCOPYRROLATE 0.2 MG/ML
INJECTION INTRAMUSCULAR; INTRAVENOUS AS NEEDED
Status: DISCONTINUED | OUTPATIENT
Start: 2020-01-29 | End: 2020-01-29 | Stop reason: SURG

## 2020-01-29 RX ORDER — METOPROLOL SUCCINATE 25 MG/1
25 TABLET, EXTENDED RELEASE ORAL DAILY
Status: DISCONTINUED | OUTPATIENT
Start: 2020-01-29 | End: 2020-01-29

## 2020-01-29 RX ORDER — ONDANSETRON 2 MG/ML
4 INJECTION INTRAMUSCULAR; INTRAVENOUS EVERY 6 HOURS PRN
Status: DISCONTINUED | OUTPATIENT
Start: 2020-01-29 | End: 2020-01-29 | Stop reason: HOSPADM

## 2020-01-29 RX ORDER — ONDANSETRON 2 MG/ML
4 INJECTION INTRAMUSCULAR; INTRAVENOUS EVERY 6 HOURS PRN
Status: DISCONTINUED | OUTPATIENT
Start: 2020-01-29 | End: 2020-01-29

## 2020-01-29 RX ORDER — LIDOCAINE HYDROCHLORIDE 10 MG/ML
INJECTION, SOLUTION EPIDURAL; INFILTRATION; INTRACAUDAL; PERINEURAL AS NEEDED
Status: DISCONTINUED | OUTPATIENT
Start: 2020-01-29 | End: 2020-01-29 | Stop reason: SURG

## 2020-01-29 RX ORDER — CEFAZOLIN SODIUM 2 G/50ML
2000 SOLUTION INTRAVENOUS ONCE
Status: COMPLETED | OUTPATIENT
Start: 2020-01-29 | End: 2020-01-29

## 2020-01-29 RX ORDER — ACETAMINOPHEN 325 MG/1
975 TABLET ORAL ONCE
Status: COMPLETED | OUTPATIENT
Start: 2020-01-29 | End: 2020-01-29

## 2020-01-29 RX ORDER — TRAMADOL HYDROCHLORIDE 50 MG/1
50 TABLET ORAL EVERY 6 HOURS PRN
Status: DISCONTINUED | OUTPATIENT
Start: 2020-01-29 | End: 2020-01-29 | Stop reason: HOSPADM

## 2020-01-29 RX ORDER — METHOCARBAMOL 500 MG/1
500 TABLET, FILM COATED ORAL EVERY 6 HOURS PRN
Status: DISCONTINUED | OUTPATIENT
Start: 2020-01-29 | End: 2020-01-29 | Stop reason: HOSPADM

## 2020-01-29 RX ORDER — FENTANYL CITRATE/PF 50 MCG/ML
25 SYRINGE (ML) INJECTION
Status: DISCONTINUED | OUTPATIENT
Start: 2020-01-29 | End: 2020-01-29 | Stop reason: HOSPADM

## 2020-01-29 RX ORDER — ONDANSETRON 2 MG/ML
4 INJECTION INTRAMUSCULAR; INTRAVENOUS ONCE AS NEEDED
Status: DISCONTINUED | OUTPATIENT
Start: 2020-01-29 | End: 2020-01-29 | Stop reason: HOSPADM

## 2020-01-29 RX ORDER — HYDROMORPHONE HCL/PF 1 MG/ML
0.2 SYRINGE (ML) INJECTION
Status: DISCONTINUED | OUTPATIENT
Start: 2020-01-29 | End: 2020-01-29

## 2020-01-29 RX ORDER — ESMOLOL HYDROCHLORIDE 10 MG/ML
30 INJECTION INTRAVENOUS
Status: COMPLETED | OUTPATIENT
Start: 2020-01-29 | End: 2020-01-29

## 2020-01-29 RX ORDER — METOPROLOL SUCCINATE 25 MG/1
25 TABLET, EXTENDED RELEASE ORAL DAILY
Qty: 30 TABLET | Refills: 2 | Status: SHIPPED | OUTPATIENT
Start: 2020-01-29 | End: 2020-02-12 | Stop reason: SDUPTHER

## 2020-01-29 RX ORDER — CEPHALEXIN 500 MG/1
500 CAPSULE ORAL EVERY 6 HOURS SCHEDULED
Qty: 20 CAPSULE | Refills: 0 | Status: SHIPPED | OUTPATIENT
Start: 2020-01-29 | End: 2020-01-29 | Stop reason: SDUPTHER

## 2020-01-29 RX ORDER — ONDANSETRON 2 MG/ML
INJECTION INTRAMUSCULAR; INTRAVENOUS AS NEEDED
Status: DISCONTINUED | OUTPATIENT
Start: 2020-01-29 | End: 2020-01-29 | Stop reason: SURG

## 2020-01-29 RX ORDER — PROPOFOL 10 MG/ML
INJECTION, EMULSION INTRAVENOUS CONTINUOUS PRN
Status: DISCONTINUED | OUTPATIENT
Start: 2020-01-29 | End: 2020-01-29 | Stop reason: SURG

## 2020-01-29 RX ORDER — MIDAZOLAM HYDROCHLORIDE 2 MG/2ML
INJECTION, SOLUTION INTRAMUSCULAR; INTRAVENOUS AS NEEDED
Status: DISCONTINUED | OUTPATIENT
Start: 2020-01-29 | End: 2020-01-29 | Stop reason: SURG

## 2020-01-29 RX ORDER — MORPHINE SULFATE 10 MG/ML
1 INJECTION, SOLUTION INTRAMUSCULAR; INTRAVENOUS
Status: DISCONTINUED | OUTPATIENT
Start: 2020-01-29 | End: 2020-01-29 | Stop reason: HOSPADM

## 2020-01-29 RX ADMIN — FENTANYL CITRATE 50 MCG: 50 INJECTION INTRAMUSCULAR; INTRAVENOUS at 07:35

## 2020-01-29 RX ADMIN — ESMOLOL HYDROCHLORIDE 10 MG: 10 INJECTION, SOLUTION INTRAVENOUS at 10:06

## 2020-01-29 RX ADMIN — FENTANYL CITRATE 50 MCG: 50 INJECTION INTRAMUSCULAR; INTRAVENOUS at 09:19

## 2020-01-29 RX ADMIN — MIDAZOLAM HYDROCHLORIDE 2 MG: 1 INJECTION, SOLUTION INTRAMUSCULAR; INTRAVENOUS at 07:30

## 2020-01-29 RX ADMIN — ONDANSETRON 4 MG: 2 INJECTION INTRAMUSCULAR; INTRAVENOUS at 08:34

## 2020-01-29 RX ADMIN — ESMOLOL HYDROCHLORIDE 10 MG: 10 INJECTION, SOLUTION INTRAVENOUS at 10:01

## 2020-01-29 RX ADMIN — METHOCARBAMOL TABLETS 500 MG: 500 TABLET, COATED ORAL at 12:06

## 2020-01-29 RX ADMIN — DEXAMETHASONE SODIUM PHOSPHATE 8 MG: 10 INJECTION, SOLUTION INTRAMUSCULAR; INTRAVENOUS at 07:36

## 2020-01-29 RX ADMIN — HYDROMORPHONE HYDROCHLORIDE 0.5 MG: 1 INJECTION, SOLUTION INTRAMUSCULAR; INTRAVENOUS; SUBCUTANEOUS at 09:51

## 2020-01-29 RX ADMIN — CHLORHEXIDINE GLUCONATE 0.12% ORAL RINSE 15 ML: 1.2 LIQUID ORAL at 07:10

## 2020-01-29 RX ADMIN — HYDROMORPHONE HYDROCHLORIDE 0.2 MG: 1 INJECTION, SOLUTION INTRAMUSCULAR; INTRAVENOUS; SUBCUTANEOUS at 09:29

## 2020-01-29 RX ADMIN — CEFAZOLIN SODIUM 2000 MG: 2 SOLUTION INTRAVENOUS at 07:44

## 2020-01-29 RX ADMIN — PHENYLEPHRINE HYDROCHLORIDE 20 MCG/MIN: 10 INJECTION INTRAVENOUS at 08:00

## 2020-01-29 RX ADMIN — LORAZEPAM 1 MG: 2 INJECTION INTRAMUSCULAR; INTRAVENOUS at 12:12

## 2020-01-29 RX ADMIN — REMIFENTANIL HYDROCHLORIDE 0.2 MCG/KG/MIN: 1 INJECTION, POWDER, LYOPHILIZED, FOR SOLUTION INTRAVENOUS at 07:40

## 2020-01-29 RX ADMIN — LIDOCAINE HYDROCHLORIDE 50 MG: 10 INJECTION, SOLUTION EPIDURAL; INFILTRATION; INTRACAUDAL; PERINEURAL at 07:35

## 2020-01-29 RX ADMIN — SODIUM CHLORIDE, SODIUM LACTATE, POTASSIUM CHLORIDE, AND CALCIUM CHLORIDE: .6; .31; .03; .02 INJECTION, SOLUTION INTRAVENOUS at 07:30

## 2020-01-29 RX ADMIN — Medication 100 MG: at 07:35

## 2020-01-29 RX ADMIN — METOPROLOL TARTRATE 5 MG: 1 INJECTION, SOLUTION INTRAVENOUS at 11:32

## 2020-01-29 RX ADMIN — FENTANYL CITRATE 50 MCG: 50 INJECTION INTRAMUSCULAR; INTRAVENOUS at 09:15

## 2020-01-29 RX ADMIN — HYDROMORPHONE HYDROCHLORIDE 0.5 MG: 1 INJECTION, SOLUTION INTRAMUSCULAR; INTRAVENOUS; SUBCUTANEOUS at 09:37

## 2020-01-29 RX ADMIN — ROCURONIUM BROMIDE 10 MG: 10 SOLUTION INTRAVENOUS at 07:35

## 2020-01-29 RX ADMIN — Medication 50 MG: at 07:35

## 2020-01-29 RX ADMIN — ACETAMINOPHEN 975 MG: 325 TABLET, FILM COATED ORAL at 07:10

## 2020-01-29 RX ADMIN — HYDROMORPHONE HYDROCHLORIDE 0.5 MG: 1 INJECTION, SOLUTION INTRAMUSCULAR; INTRAVENOUS; SUBCUTANEOUS at 09:57

## 2020-01-29 RX ADMIN — Medication 0.2 MCG/KG/HR: at 07:40

## 2020-01-29 RX ADMIN — DIPHENHYDRAMINE HYDROCHLORIDE 25 MG: 50 INJECTION, SOLUTION INTRAMUSCULAR; INTRAVENOUS at 10:50

## 2020-01-29 RX ADMIN — ESMOLOL HYDROCHLORIDE 30 MG: 10 INJECTION, SOLUTION INTRAVENOUS at 10:08

## 2020-01-29 RX ADMIN — METOPROLOL SUCCINATE 25 MG: 25 TABLET, EXTENDED RELEASE ORAL at 12:42

## 2020-01-29 RX ADMIN — FENTANYL CITRATE 25 MCG: 50 INJECTION, SOLUTION INTRAMUSCULAR; INTRAVENOUS at 10:54

## 2020-01-29 RX ADMIN — ESMOLOL HYDROCHLORIDE 30 MG: 10 INJECTION, SOLUTION INTRAVENOUS at 10:12

## 2020-01-29 RX ADMIN — GLYCOPYRROLATE 0.1 MG: 0.2 INJECTION, SOLUTION INTRAMUSCULAR; INTRAVENOUS at 08:36

## 2020-01-29 RX ADMIN — FENTANYL CITRATE 50 MCG: 50 INJECTION INTRAMUSCULAR; INTRAVENOUS at 09:13

## 2020-01-29 RX ADMIN — TRAMADOL HYDROCHLORIDE 50 MG: 50 TABLET, FILM COATED ORAL at 12:04

## 2020-01-29 RX ADMIN — MORPHINE SULFATE 1 MG: 10 INJECTION INTRAVENOUS at 14:42

## 2020-01-29 RX ADMIN — PROPOFOL 50 MG: 10 INJECTION, EMULSION INTRAVENOUS at 07:40

## 2020-01-29 RX ADMIN — HYDROMORPHONE HYDROCHLORIDE 0.5 MG: 1 INJECTION, SOLUTION INTRAMUSCULAR; INTRAVENOUS; SUBCUTANEOUS at 09:44

## 2020-01-29 RX ADMIN — PROPOFOL 150 MG: 10 INJECTION, EMULSION INTRAVENOUS at 07:35

## 2020-01-29 RX ADMIN — PROPOFOL 120 MCG/KG/MIN: 10 INJECTION, EMULSION INTRAVENOUS at 07:40

## 2020-01-29 NOTE — ANESTHESIA POSTPROCEDURE EVALUATION
Post-Op Assessment Note    CV Status:  Stable  Pain Score: 10    Pain management: adequate     Mental Status:  Alert and awake   Hydration Status:  Euvolemic   PONV Controlled:  Controlled   Airway Patency:  Patent   Post Op Vitals Reviewed: Yes      Staff: CRNA   Comments: 150 mcg fentanyl total given after emergence for pain           BP   147/84   Temp   98 2   Pulse  115   Resp   20   SpO2 100

## 2020-01-29 NOTE — NURSING NOTE
Pt transferred to [facility] awake alert and oriented  Upon placing pt on monitor HR up to 150-160 range, regualar on monitor  Pt complaints of tightness in her chest  Anesthesia made aware and Lopressor given as ordered however only 2 5 mg given due to decrease in HR on monitor to 100 ST on monitorl Cardiology called and will be down to see pt

## 2020-01-29 NOTE — NURSING NOTE
Pt  Ready for discharge  No further tachycardia episodes or chest pain  Pt pain under control   No resp  Issues and awake and oriented  Pt  and daughter here to take pt home  meds obtained from pharmacy with exception of tramadol  Pt states she has enough for 2 days  Pharmacy called and they will be calling the insurance company in order to get an override to fill medication due to dose change  Pt  will come back to  script at later time

## 2020-01-29 NOTE — CONSULTS
Consultation - Cardiology Team One  Kobe Neely 47 y o  female MRN: [id number]  Unit/Bed#: [facility] Encounter: [id number]    Inpatient consult to Cardiology  Consult performed by: OSMANI Carreon  Consult ordered by: Susan Grider MD      Physician Requesting Consult: Tip Zamudio MD  Reason for Consult / Principal Problem: Post-op tachycardia    Assessment/ Plan    1  Paroxysmal SVT  Telemetry reviewed showing paroxysmal SVT with -160  Now back in NSR/sinus tachycardia after IV beta blocker  Pt reports other episodes of palpitations at home, resolve on their own  Start Toprol XL 25 mg daily  Give dose now and monitor for 1 hour in PACU  If no recurrence of SVT, okay for discharge home from cardiology perspective  Will arrange close cardiology follow up in the office  Instructed her to go to ED if develops symptoms lasting several hours  2  Chronic pain syndrome s/p spinal cord stimulator   3  S/p gastric sleeve    History of Present Illness   HPI: Kobe Neely is a 47y o  year old female who has a history of chronic pain syndrome, s/p laparoscopic sleeve gastrectomy, type 2 diabetes and hyperlipidemia (both improved s/p gastric sleeve), bipolar depression, and GERD  She was evaluated by the cardiology team while hospitalized back in May of 2019 due to complaints of atypical right-sided chest pain  She did undergo a nuclear stress test at that time which did not demonstrate any ischemia, EF 58%  She was able to walk for 7 minutes and 30 seconds on Avinash protocol  She was seen by Dr Jeanne Corona in October 2019 after an ED visit in September  Her ED visit in September was due to shortness of breath, palpitations, and dizziness after responding to a fire alarm and running up the stairs  Her Apple watch recorded a heart rate of 187 beats per minute  EKG at that time showed sinus rhythm with normal heart rate        She presented to the [address] [place] [address] for elective spinal cord stimulator placement by   Tyson today  Surgery went as planned but when she was recovering in PACU she was noted to have a heart rate in the 150s for approximately 15-20 minutes associated with palpitations and chest tightness  She was given a total of 80 mg esmolol with return of normal heart rate  EKG demonstrated normal sinus rhythm  Cardiology has been consulted for evaluation of tachycardia  EKG reviewed personally: NSR     Telemetry reviewed personally: Sinus tachycardia, episode of paroxysmal SVT  Review of Systems   Constitution: Negative for chills and diaphoresis  Cardiovascular: Positive for chest pain (tightness when rates fast) and palpitations  Negative for dyspnea on exertion, leg swelling and syncope  Respiratory: Negative for cough, shortness of breath and sputum production  Skin: Positive for itching  Gastrointestinal: Negative for bloating, nausea and vomiting  Neurological: Negative for dizziness, headaches and light-headedness  All other systems reviewed and are negative  Historical Information   Past Medical History:   Diagnosis Date    ADHD (attention deficit hyperactivity disorder)     Anxiety     Bipolar disorder (HCC)     Bulging lumbar disc     Carpal tunnel syndrome     RIGHT  LAST ASSESSED: 12/7/16    Chronic back pain     low    Chronic pain disorder     Colon polyps     Depression     Diabetes mellitus ([address] )     Resolved post weight loss    GERD (gastroesophageal reflux disease)     Gestational diabetes     Hearing loss     left ear    Hyperlipidemia     Resolved with weight loss    IBS (irritable bowel syndrome)     Ileus ([address] )     LAST ASSESSED: 8/3/17    Labial cyst     LAST ASSESSED: 4/21/16    Myofascial pain     LAST ASSESSED: 4/12/16    Obesity     Ovarian cyst     LEFT   LAST ASSESSED: 9/2/16    Panic attack     Pap smear for cervical cancer screening     4/2018--pap wnl, HRHPV neg    Pneumonia     PTSD (post-traumatic stress disorder)     Seasonal allergies     Thoracic outlet syndrome     2010    Trochanteric bursitis of both hips     LAST ASSESSED: 3/18/16    Ulnar neuropathy at elbow     Varicella     Wears glasses      Past Surgical History:   Procedure Laterality Date    BILE DUCT EXPLORATION      ENDOSCOPIC REMOVAL OF STONES FROM BILIARY TRACT     SECTION      x3    CHOLECYSTECTOMY      COLONOSCOPY      -polyp, repeat     DILATION AND CURETTAGE OF UTERUS      ENDOMETRIAL ABLATION      ERCP W/ SPHICTEROTOMY      FIRST RIB REMOVAL      THORAX EXCISION OF FIRST RIB    HYSTEROSCOPY      CO COLONOSCOPY FLX DX W/COLLJ SPEC WHEN PFRMD N/A 2017    Procedure: COLONOSCOPY;  Surgeon: Girish You MD;  [facility]: [facility]; Service: Gastroenterology    CO ESOPHAGOGASTRODUODENOSCOPY TRANSORAL DIAGNOSTIC N/A 2017    Procedure: ESOPHAGOGASTRODUODENOSCOPY (EGD); Surgeon: Tavia Esquivel MD;  Location[facility];   Service: Gastroenterology    CO HYSTEROSCOPY,W/ENDO BX N/A 10/20/2017    Procedure: DILATATION AND CURETTAGE (D&C) WITH HYSTEROSCOPY  REMOVAL VULVAR RT  LESION;  Surgeon: Mickie Anne MD;  [facility];  Service: Gynecology    CO LAP, ÁLVARO RESTRICT PROC, LONGITUDINAL GASTRECTOMY N/A 2018    Procedure: GASTRECTOMY SLEEVE LAPAROSCOPIC; INTRAOPERATIVE EGD ;  Surgeon: Max Ramirez MD;  [facility];  Service: [place] TRIAL W/ LAMINOTOMY      TONSILLECTOMY AND ADENOIDECTOMY      TUBAL LIGATION      VEIN LIGATION AND [address]     VULVA SURGERY  10/20/2017    BIOPSY    WISDOM TOOTH EXTRACTION       Social History     Substance and Sexual Activity   Alcohol Use Yes    Frequency: Monthly or less    Drinks per session: 1 or 2    Binge frequency: Never    Comment: socially     Social History     Substance and Sexual Activity   Drug Use No     Social History     Tobacco Use   Smoking Status Former Smoker    Last attempt to quit: 2017    Years since quittin 7   Smokeless Tobacco Never Used     Family History:   Family History   Problem Relation Age of Onset    Diabetes Mother     Other Mother         HYPERCHOLESTEROLEMIA    Breast cancer Mother         >50    BRCA1 Negative Mother     BRCA2 Negative Mother     Diabetes Father     Other Father         traumatic brain injury    Prostate cancer Father     Alcohol abuse Father         in remission    Heart disease Father     Neuropathy Father     Hyperlipidemia Father     Hypertension Brother     Diabetes Brother     Other Brother         HYPERCHOLESTEROLEMIA    Alcohol abuse Brother     Depression Brother         attempted suicide    Colon cancer Maternal Grandfather     No Known Problems Maternal Grandmother     No Known Problems Paternal Grandmother     No Known Problems Paternal Grandfather     Diabetes Brother     Alcohol abuse Brother     Asthma Son     No Known Problems Daughter     Ovarian cancer Neg Hx     Uterine cancer Neg Hx        Meds/Allergies   all current active meds have been reviewed and current meds:   Current Facility-Administered Medications   Medication Dose Route Frequency    fentaNYL (SUBLIMAZE) injection 25 mcg  25 mcg Intravenous Q5 Min PRN    lactated ringers infusion  125 mL/hr Intravenous Continuous    methocarbamol (ROBAXIN) tablet 500 mg  500 mg Oral Q6H PRN    morphine (PF) 10 mg/mL injection 1 mg  1 mg Intravenous Q3H PRN    ondansetron (ZOFRAN) injection 4 mg  4 mg Intravenous Q6H PRN    ondansetron (ZOFRAN) injection 4 mg  4 mg Intravenous Q6H PRN    ondansetron (ZOFRAN) injection 4 mg  4 mg Intravenous Once PRN    sodium chloride 0 9 % infusion  100 mL/hr Intravenous Continuous    traMADol (ULTRAM) tablet 50 mg  50 mg Oral Q6H PRN       lactated ringers 125 mL/hr       Allergies   Allergen Reactions    Ibuprofen Other (See Comments)     Due to gastric sleeve -can only take for 5 days, then needs to stop       Objective   Vitals: Blood pressure 114/66, pulse 82, temperature 98 2 °F (36 8 °C), resp  rate (!) 28, height 5' 8" (1 727 m), weight 75 8 kg (167 lb), last menstrual period 01/07/2019, SpO2 97 %, not currently breastfeeding ,     Body mass index is 25 39 kg/m²  ,     Systolic (46WXU), UAQ:565 , Min:106 , TLB:836     Diastolic (91YTP), IHR:37, Min:60, Max:84    Intake/Output Summary (Last 24 hours) at 1/29/2020 1048  Last data filed at 1/29/2020 0919  Gross per 24 hour   Intake 700 ml   Output    Net 700 ml     Weight (last 2 days)     None        Invasive Devices     Peripheral Intravenous Line            Peripheral IV 01/29/20 Left Forearm less than 1 day    Peripheral IV 01/29/20 Left Hand less than 1 day    Peripheral IV 01/29/20 Right Antecubital less than 1 day              Physical Exam   Constitutional: She is oriented to person, place, and time  No distress  Neck: Neck supple  No JVD present  Cardiovascular: Normal rate, regular rhythm, normal heart sounds and intact distal pulses  Exam reveals no gallop and no friction rub  No murmur heard  Pulmonary/Chest: Effort normal  No respiratory distress  She has no rales  2LNC   Abdominal: Soft  Bowel sounds are normal  She exhibits no distension  Musculoskeletal: Normal range of motion  She exhibits no edema  Neurological: She is alert and oriented to person, place, and time  Skin: Skin is warm and dry  She is not diaphoretic  No erythema  Spinal cord stimulator incision dressing C/D/I  Psychiatric: She has a normal mood and affect        LABORATORY RESULTS:      Lab Results   Component Value Date    CHOL 219 07/17/2015    CHOL 224 06/10/2014     Lab Results   Component Value Date    HDL 73 01/09/2020    HDL 54 05/28/2019    HDL 43 07/03/2018     Lab Results   Component Value Date    LDLCALC 104 (H) 01/09/2020    LDLCALC 102 (H) 05/28/2019    LDLCALC 89 07/03/2018     Lab Results   Component Value Date    TRIG 233 (H) 01/09/2020    TRIG 260 (H) 05/28/2019    TRIG 182 (H) 2018     Cardiac testing:   No results found for this or any previous visit  No results found for this or any previous visit  No procedure found  Results for orders placed during the hospital encounter of 19   NM myocardial perfusion spect (stress and/or rest)    Narrative [address] [place], [address] [place] [address]  [phone]    Rest/Stress Gated SPECT Myocardial Perfusion Imaging After Exercise    Patient: Valeria Ventura  MR number: [id number]  Account number: [de-identified]  : 1970  Age: 47 years  Gender: Female  Status: Outpatient  Location: Stress lab  Height: 67 in  Weight: 178 lb  BP: 98/ 70 mmHg    Allergies: IBUPROFEN    Diagnosis: R07 9 - Chest pain, unspecified    Primary Physician:  Ajith Vega MD  RN:  Edilson Karimi RN  Technician:  Levant Power  Group:  [facility]  Report Prepared By[de-identified]  Edilson Karimi RN  Interpreting Physician:  Conrad Evans MD    INDICATIONS: Detection of Coronary artery disease  HISTORY: The patient is a 47year old  female  Chest pain status: chest pain  Other symptoms: dyspnea  Coronary artery disease risk factors: dyslipidemia, hypertension, smoking, family history of premature coronary artery disease,  and diabetes mellitus  Cardiovascular history: none significant  Co-morbidity: obesity  Medications: no cardiac drugs  PHYSICAL EXAM: Baseline physical exam screening: no wheezes audible  REST ECG: Normal sinus rhythm  74 beats per minute  PROCEDURE: The study was performed in the the Stress lab  Treadmill exercise testing was performed, using the Avinash protocol  Gated SPECT myocardial perfusion imaging was performed after stress and at rest  Systolic blood pressure was 98  mmHg, at the start of the study  Diastolic blood pressure was 70 mmHg, at the start of the study  The heart rate was 74 bpm, at the start of the study  IV double checked      AVINASH PROTOCOL:  HR bpm SBP mmHg DBP mmHg Symptoms  Baseline 74 98 70 none  Stage 1 108 110 58 none  Stage 2 136 122 60 mild chest discomfort, mild dyspnea, mild fatigue  Stage 3 176 -- -- mild chest discomfort, mild dyspnea, moderate fatigue  Immediate 176 110 60 same as above  Recovery 1 81 132 78 subsiding  Recovery 2 76 120 80 none  No medications or fluids given  STRESS SUMMARY: Duration of exercise was 7 min and 31 sec  The patient exercised to protocol stage 3  Maximal work rate was 9 3 METs  Maximal heart rate during stress was 179 bpm ( 105 % of maximal predicted heart rate)  The heart rate  response to stress was normal  There was normal resting blood pressure with an appropriate response to stress  The rate-pressure product for the peak heart rate and blood pressure was 00363  The patient experienced chest pain during  stress; pain resolved spontaneously  The stress test was terminated due to moderate fatigue  The stress test was terminated due to moderate fatigue  Pre oxygen saturation: 98 %  Peak oxygen saturation: 98 %  The stress ECG was negative for  ischemia and normal  There were no stress arrhythmias or conduction abnormalities  ISOTOPE ADMINISTRATION:  Resting isotope administration Stress isotope administration  Agent Tetrofosmin Tetrofosmin  Dose 10 97 mCi 33 mCi  Date 05/28/2019 05/28/2019  Injection time 08:12 09:50  Injection-image interval 69 min 58 min    The radiopharmaceutical was injected one minute before the end of exercise  The radiopharmaceutical was injected one minute before the end of exercise  MYOCARDIAL PERFUSION IMAGING:  The image quality was good  Left ventricular size was normal  The TID ratio was 1 32  Visually, there was no TID present  PERFUSION DEFECTS:  -  There were no perfusion defects  GATED SPECT:  The calculated left ventricular ejection fraction was 58 %  Left ventricular ejection fraction was within normal limits by visual estimate   There was no left ventricular regional abnormality  SUMMARY:  -  Stress results: Duration of exercise was 7 min and 31 sec  Target heart rate was achieved  The patient experienced chest pain during stress; pain resolved spontaneously  -  ECG conclusions: The stress ECG was negative for ischemia and normal   -  Perfusion imaging: There were no perfusion defects   -  Gated SPECT: The calculated left ventricular ejection fraction was 58 %  Left ventricular ejection fraction was within normal limits by visual estimate  There was no left ventricular regional abnormality  IMPRESSIONS: Normal study after maximal exercise without reproduction of symptoms  Myocardial perfusion imaging was normal at rest and with stress  Left ventricular systolic function was normal     Prepared and signed by    Janneth Stephenson MD  Signed 05/28/2019 13:50:55       Thank you for allowing us to participate in this patient's care  This pt will follow up with Dr Miguel Sanchez once discharged  Counseling / Coordination of Care  Total floor / unit time spent today 45 minutes  Greater than 50% of total time was spent with the patient and / or family counseling and / or coordination of care  A description of the counseling / coordination of care: Review of history, current assessment, development of a plan  Code Status: Prior    ** Please Note: Dragon 360 Dictation voice to text software may have been used in the creation of this document   **

## 2020-01-29 NOTE — NURSING NOTE
Cardiology in to see pt  Pt denies any shortness of breath or chest pain at this time  Resting comfortably   SR-ST on monitor

## 2020-01-29 NOTE — INTERVAL H&P NOTE
H&P reviewed  After examining the patient I find no changes in the patients condition since the H&P had been written  Patient personally seen and examined  Neurological examination unchanged compared to last office/progress note, with the following exceptions:    /78   Pulse 72   Temp 97 6 °F (36 4 °C) (Temporal)   Resp 18   Ht 5' 8" (1 727 m)   Wt 75 8 kg (167 lb)   LMP 01/07/2019 (Approximate)   SpO2 98%   BMI 25 39 kg/m²      None  Patient stopped all antiplatelet/anticoagulation medication as instructed  Post operative instructions and medications have been reviewed with the patient and family  Assessment and Plan:    All questions have been answered to the patient and family satisfaction  Plan to proceed with insertion of thoracic spinal cord stimulator trial via laminotomy and placement of left buttock implantable pulse generator  They are in agreement with proceeding

## 2020-01-29 NOTE — OP NOTE
OPERATIVE REPORT  PATIENT NAME: Olvin Rollins    :  1970  MRN: [id number]  Pt Location: [facility] ROOM 01    SURGERY DATE: 2020    Surgeon(s) and Role:     * Darren Howard MD - Primary     * Xander Patel PA-C - Assisting    No qualified resident was available  PURNIMA was present for the procedure, and provided essential assistance with proper exposure, retraction, hemostasis, placement of electrode and IPG, and wound closure, which was necessary secondary to the complex nature of this case  Preop Diagnosis:  Chronic pain syndrome [G89 4]  Lumbar radiculopathy [M54 16]    Post-Op Diagnosis Codes:     * Chronic pain syndrome [G89 4]     * Lumbar radiculopathy [M54 16]    Procedure:  1  Insertion of Abbott spinal cord stimulator electrode via T9-T10 laminotomy and left buttock implantable pulse generator  (#[id number]  9;#[id number])    Specimen(s):  * No specimens in log *    Estimated Blood Loss:   Minimal    Drains:  NG/OG/Enteral Tube Orogastric 18 Fr Center mouth (Active)   Number of days: 0       Anesthesia Type:   General    Operative Indications:  Chronic pain syndrome [G89 4]  Lumbar radiculopathy [M54 16]    Operative Findings:  Stable intraoperative electrophysiological monitoring  Intraoperative stimulation produced adequate bilateral lower extremity responses  Complications:   None    Procedure and Technique:  Clinical Note:    The goals and alternatives to the procedure described above were discussed with patient and family  Surgery is intended to reproduce the results of spinal cord stimulator trial   Weakness, numbness and back pain are less likely to improve  The risks of surgery were described in detail  1  Risk of general anesthetic, with possible cardiac and respiratory complication  There is risk of infection and bleeding  2  Risk of neurological injury with new pain, weakness or numbness or difficulties with bowel and bladder function  The risk of CSF leak was described   Risk of paraplegia and spinal cord injury  3  Risk of system malfunction and failure of treatment  Need for revision surgery  Once all questions were answered to their satisfaction, they asked to proceed with surgery  Operating Room Note    The patient was brought to the operating room and placed under general anesthetic  Once all appropriate lines were in position, the patient was carefully turned in the prone position onto Javier Door frame  Care was taken to ensure that all pressure points were padded and the neck was in a neutral position  AP and lateral fluoroscopy were used to identify midline of the thoracic spine and T9-T10 level  An incision was also planned over the left buttock  The skin was then prepped and draped in usual sterile fashion  A full surgical timeout was undertaken identifying the site, side and level of surgery  One percent lidocaine with 100,000 of epinephrine was used to infiltrate the soft tissue  The patient received preoperative antibiotics  10 blade was used to incise the skin at the thoracic incision  Monopolar cautery was used to dissect down along the spinous processes and lamina  The surgical site was irrigated with 50% dilute peroxide solution  The T9-T10 level was confirmed with fluoroscopy  The interspinous ligament was removed as was the base of the spinous processes  The underlying ligamentum flavum and lamina was undermined with curved curette and removed with Kerrison punch  The underlying epidural fat was identified and a Leonor Raghav was used to dissect along the dorsal aspect of the dura  The lead was then advanced under fluoroscopy to cover the T8-T9 interspace  Electrophysiological monitoring remained stable  The lead was repositioned as appropriate to identify physiological midline  The lead was then secured in position to the spinous process with a silk suture, and a strain relief loop was created by securing the lead within the paraspinal musculature and fascia  The surgical site was irrigated copiously with irrigation  FloSeal was used for hemostasis  Ten blade was used to incise the skin over the planned buttock incision  Monopolar cautery was used to dissect through the subcutaneous tissue above the fascia  Blunt and sharp dissection was used for further creation of a pocket  The electrode was then passed from the thoracic incision with a tunneler to the buttock incision  The distal portion of the electrode was connected to the generator  Excess lead and generator was then placed in the pocket and secured in position with silk suture  The system was then interrogated and was noted to be working within normal limits and impedances  Both incisions were irrigated with antibiotic irrigation  Bipolar cautery was used for further hemostasis  At the thoracic incision, Vicryl suture was used to approximate the fascia  Inverted Vicryl suture was used to approximate the subcutaneous tissues at both incisions  Running Monocryl was then used to close both incisions  0 5% Marcaine with 1/100,000 of epinephrine was used to infiltrate the soft tissue  A Miplex was applied to both incisions  The count was correct at the end of the case and there were no complications  Electrophysiological monitoring remained stable  The patient was carefully transferred onto the operating gurney and extubated  The patient was transferred to the PACU where they were noted to be hemodynamically stable and neurologically unchanged  The results of surgery were discussed with patient and family  In the postoperative period, the patient underwent electronic analysis and complex programming of the spinal cord stimulator system with the spinal cord stimulator meggan      I was present for the entire procedure    Patient Disposition:  PACU     SIGNATURE: Emily Escoto MD  DATE: January 29, 2020  TIME: 9:02 AM

## 2020-01-29 NOTE — DISCHARGE INSTRUCTIONS
Spinal Cord Stimulator Discharge Instructions    Activity:    1  Do not lift more than 20 pounds for 2 weeks  2  Avoid bending, lifting and twisting for 2 weeks  Surgical incision care:    1  Keep dressings in place for 3 days  2  Keep incisions dry for 3 days  3  May allow clean water to flow over incisions after 3 days  4  Do not immerse the incisions in water for 4 weeks  5  After 3 days, incisions may be left open to air, but should remain clean  6  Do not apply any creams or ointments to the incision, unless otherwise instructed by [facility] [place] [facility]  7  Contact office if increasing redness, drainage, pain or swelling around the incisions  8  Complete upright xray of thoracic and lumbar spine prior to 6 week post operative appointment  Postoperative medication:    1  [facility] will provide pain medication for the first 2 weeks after surgery as coordinated with your pain specialist    2  If [facility] is providing pain medication, please contact office for questions regarding dosage and modifications  3  If [facility] is not providing pain medication postoperatively, then contact pain specialist for additional instructions and prescriptions  4  No antiplatelet or anticoagulation medication for 2 weeks after surgery, unless otherwise instructed  Please contact [facility] if you have any questions about the effects of any of your medications on blood clotting  5  Do not operate heavy machinery or vehicles while taking sedating medications  *Note that it may take some time for the stimulator to improve chronic pain symptoms  Adjustment of the stimulator program can begin 2 weeks after placement  Please contact the stimulator representative if you have any questions regarding programing

## 2020-01-30 ENCOUNTER — DOCUMENTATION (OUTPATIENT)
Dept: NEUROSURGERY | Facility: CLINIC | Age: 50
End: 2020-01-30

## 2020-01-30 LAB
ATRIAL RATE: 82 BPM
P AXIS: 68 DEGREES
PR INTERVAL: 174 MS
QRS AXIS: 136 DEGREES
QRSD INTERVAL: 84 MS
QT INTERVAL: 380 MS
QTC INTERVAL: 433 MS
T WAVE AXIS: 57 DEGREES
VENTRICULAR RATE: 78 BPM

## 2020-01-30 PROCEDURE — 93010 ELECTROCARDIOGRAM REPORT: CPT | Performed by: INTERNAL MEDICINE

## 2020-01-30 RX ORDER — ACETAMINOPHEN AND CODEINE PHOSPHATE 300; 30 MG/1; MG/1
1 TABLET ORAL EVERY 6 HOURS PRN
Qty: 24 TABLET | Refills: 0 | Status: SHIPPED | OUTPATIENT
Start: 2020-01-30 | End: 2020-04-02 | Stop reason: ALTCHOICE

## 2020-01-30 NOTE — TELEPHONE ENCOUNTER
Per Ana Ny, pt may d/c Tramadol and Tylenol #3 will be ordered  She may also increase muscle relaxer to tid prn   Pt notified

## 2020-01-30 NOTE — TELEPHONE ENCOUNTER
Pt reports that she has had to double up dose of Tramadol since d/c yesterday, taking 100mg q 6 hr  She is using Flexeril as well but 1 x/day as originally prescribed by pain specialist  She denies medication allergy and when asked reports Tylenol #3 has worked best for her in the past   Percocet causes stomach upset  Will discuss with provider  Did verify while talking that she was provided with d/c instructions that were reviewed as well  She offers no questions at this time  Reviewed s/s of infection and when to call the office or present to the ED  She stated an understanding  She questioned a new med started by cardiology, and suggested she discuss with them as she has a f/u in Feb  Will call her back with medication suggestions

## 2020-02-05 DIAGNOSIS — G89.4 CHRONIC PAIN SYNDROME: ICD-10-CM

## 2020-02-05 DIAGNOSIS — M54.16 LUMBAR RADICULOPATHY: ICD-10-CM

## 2020-02-05 RX ORDER — TRAMADOL HYDROCHLORIDE 50 MG/1
TABLET ORAL
Qty: 28 TABLET | Refills: 0 | Status: SHIPPED | OUTPATIENT
Start: 2020-02-05 | End: 2020-04-02 | Stop reason: ALTCHOICE

## 2020-02-06 NOTE — PROGRESS NOTES
Hospital  Follow Up   Office Visit Note  [place]   47 y o    female   MRN: [id number]  [address]  [address]  [place] [address] [facility] Lacy Pereira [address]  [phone]    PCP: Gloria Clinton DO  Cardiologist: Dr Jennyfer Warren        Assessment/plan  SVT  On Toprol 25 mg/d  Occ palpitations with associated BOOKER  --> 7 day Zio ambulatory event monitor  Hypertension, essential; hx of   /78  Blood pressures improved after weight loss/gastric sleeve surgery  Chronic pain;S/P recent nerve stimulator implant  Now on cyclobenzaprine daily  Hyperlipidemia, mixed with hypertriglyceridemia  She is on combination oral contraceptives with estradiol  This likely contributes to her elevated triglycerides  She may benefit from alternate therapy We discussed this today  Defer to GYN      Ref  Range 5/28/2019 06:23 1/9/2020 10:17   Cholesterol Latest Ref Range: 50 - 200 mg/dL 208 (H) 224 (H)   Triglycerides Latest Ref Range: <=150 mg/dL 260 (H) 233 (H)   HDL Latest Ref Range: >=40 mg/dL 54 73   Non-HDL Cholesterol Latest Units: mg/dl  151   LDL Direct Latest Ref Range: 0 - 100 mg/dL 102 (H) 104 (H)     Type 2 diabetes mellitus  Hemoglobin A1c 5 7 December 2019, previous 6 3  Hx Obesity, S/P gastric sleeve surgery  230s--> 160s  BMI  Now 25  Chronic pain, status post nerve stimulator  She is on Flexeril HS  Cardiac testing  --NM myocardiao SPECT 5/19:  Avinash protocol x 7 min and 31 sec  Achieved target heart rate  EKG was negative for ischemia  Perfusion imaging showed no perfusion defects  Left ventricular systolic function was normal        Summary of recommendations  --Heart healthy diet  Educational information provided  --She is on estrogen by OBGYN  This likely in contributing to her elevated triglycerides; she may benefit from alterrnative therapy  --7 day ambulatory Holter monitor    Continue Toprol  --Follow up will be scheduled with Dr Lizeth Kinney interval            HPI  Jannet Paulson is a 48 yo female with a history of obesity, chronic pain, hypertension, diabetes, hyperlipidemia, bipolar depression and GERD  In the past she underwent a gastric sleeve; her hypertension and hyperlipidemia improved  She has kept weight off, her BMI is now 25  She was hospitalized May 2019 with atypical chest pain  She underwent an exercise Myoview  She had normal perfusion, and normal LV function after maximum exercise  She was in the emergency room September 2019 with shortness of breath and palpitations, after running up several flights of stairs, responding to a fire alarm  Her Apple watch showed a heart rate of 187  When in the emergency room, her EKG showed sinus rhythm  She was seen in the office by cardiologist Dr Jake Martinez afterwards  Her EKG was noted to show a possible ectopic atrial rhythm  SVT was suspected; a 30 day event monitor was discussed, however after further discussion this was deferred  Interval history  Recently she had a spinal cord stimulator placed by Dr Charan Ziegler  Surgery was uneventful  While monitored in the PACU, she was noted to have heart rates in the 150s for 15-20 minutes with associated palpitations and chest tightness  She developed chest pain and palpitations afterwards, prompting an evaluation by Cardiology  Her EKGs suggested SVT  IV Esmolol was given; her heart rate improved to 80  She had recurrence and labetalol was given, maintaining normal sinus rhythm  Per Cardiology she was initiated on Toprol 25 mg PO daily  After observation she was discharged and asked to follow in the office  She did note she had palpitations at home and they resolved on their own        2/10/2020  She returns for follow-up  She is taking the Toprol as directed  Occasionally, she notices when she walks she becomes short of breath and has some palpitations  She is not sure whether this occurs daily but is fairly often    She is currently out of work given recent back surgery  She works in the emergency room as a tech, in in the past was reluctant to wear a monitor because she thought it would be very obvious to others  She is willing to wear a monitor for a brief time  Since her back surgery she has now been taking Flexeril almost on a daily basis which does help her back  This potentially can cause some arrhythmias,but is reported as very low incidence  For now will continue the current plan  She is maintaining adequate hydration  Agreeable to wearing a monitor      Assessment  Diagnoses and all orders for this visit:    Palpitations    Paroxysmal SVT (supraventricular tachycardia) ([facility] [address] )    Mixed hyperlipidemia          Past Medical History:   Diagnosis Date    ADHD (attention deficit hyperactivity disorder)     Anxiety     Bipolar disorder (HCC)     Bulging lumbar disc     Carpal tunnel syndrome     RIGHT  LAST ASSESSED: 12/7/16    Chronic back pain     low    Chronic pain disorder     Colon polyps     Depression     Diabetes mellitus ([facility] [address] )     Resolved post weight loss    GERD (gastroesophageal reflux disease)     Gestational diabetes     Hearing loss     left ear    Hyperlipidemia     Resolved with weight loss    IBS (irritable bowel syndrome)     Ileus ([facility] [address] )     LAST ASSESSED: 8/3/17    Labial cyst     LAST ASSESSED: 4/21/16    Myofascial pain     LAST ASSESSED: 4/12/16    Obesity     Ovarian cyst     LEFT  LAST ASSESSED: 9/2/16    Panic attack     Pap smear for cervical cancer screening     4/2018--pap wnl, HRHPV neg    Pneumonia     PTSD (post-traumatic stress disorder)     Seasonal allergies     Thoracic outlet syndrome     2010    Trochanteric bursitis of both hips     LAST ASSESSED: 3/18/16    Ulnar neuropathy at elbow     Varicella     Wears glasses        Review of Systems   Constitution: Negative for chills  Cardiovascular: Positive for dyspnea on exertion and palpitations   Negative for chest pain, claudication, cyanosis, irregular heartbeat, leg swelling, near-syncope, orthopnea, paroxysmal nocturnal dyspnea and syncope  Respiratory: Negative for cough and shortness of breath  Gastrointestinal: Negative for heartburn and nausea  Neurological: Negative for dizziness, focal weakness, headaches, light-headedness and weakness  All other systems reviewed and are negative  Allergies   Allergen Reactions    Ibuprofen Other (See Comments)     Due to gastric sleeve -can only take for 5 days, then needs to stop           Current Outpatient Medications:     acetaminophen-codeine (TYLENOL #3) 300-30 mg per tablet, Take 1 tablet by mouth every 6 (six) hours as needed for moderate pain, Disp: 24 tablet, Rfl: 0    atoMOXetine (STRATTERA) 25 mg capsule, Take 1 capsule (25 mg total) by mouth daily (Patient taking differently: Take 25 mg by mouth daily at bedtime ), Disp: 90 capsule, Rfl: 0    Biotin 97639 MCG TABS, Take by mouth, Disp: , Rfl:     Calcium Carbonate-Vit D-Min (CALCIUM 1200 PO), Take 2,400 mg by mouth daily, Disp: , Rfl:     Cyanocobalamin ([employer] VITAMIN B12 PO), Take by mouth, Disp: , Rfl:     cyclobenzaprine (FLEXERIL) 10 mg tablet, Take 1 tablet (10 mg total) by mouth daily at bedtime (Patient taking differently: Take 10 mg by mouth as needed ), Disp: 30 tablet, Rfl: 0    drospirenone-ethinyl estradiol (LIEN) 3-0 02 MG per tablet, Take 1 tablet by mouth daily (Patient taking differently: Take 1 tablet by mouth daily at bedtime ), Disp: 84 tablet, Rfl: 4    gabapentin (NEURONTIN) 600 MG tablet, Take 1 tablet (600 mg total) by mouth daily at bedtime (Patient taking differently: Take 900 mg by mouth daily at bedtime Takes 1 5 tablets at Bedtime=900mg), Disp: 90 tablet, Rfl: 3    lamoTRIgine (LaMICtal) 150 MG tablet, Take 1 tablet (150 mg total) by mouth daily (Patient taking differently: Take 150 mg by mouth daily in the early morning ), Disp: 90 tablet, Rfl: 1    metoclopramide (REGLAN) 10 mg tablet, Take 1 tablet (10 mg total) by mouth every 6 (six) hours as needed (nausea), Disp: 15 tablet, Rfl: 0    metoprolol succinate (TOPROL-XL) 25 mg 24 hr tablet, Take 1 tablet (25 mg total) by mouth daily, Disp: 30 tablet, Rfl: 2    montelukast (SINGULAIR) 10 mg tablet, Take 1 tablet (10 mg total) by mouth daily at bedtime, Disp: 90 tablet, Rfl: 1    Multiple Vitamins-Minerals (MULTI COMPLETE PO), Take by mouth, Disp: , Rfl:     pantoprazole (PROTONIX) 40 mg tablet, Take 1 tablet (40 mg total) by mouth daily (Patient taking differently: Take 40 mg by mouth daily in the early morning ), Disp: 90 tablet, Rfl: 0    Probiotic Product (PRO-BIOTIC BLEND PO), Take by mouth as needed , Disp: , Rfl:     QUEtiapine (SEROquel) 50 mg tablet, Take 1 tablet (50 mg total) by mouth daily at bedtime, Disp: 90 tablet, Rfl: 1    traMADol (ULTRAM) 50 mg tablet, TAKE ONE TABLET BY MOUTH EVERY 6 HOURS AS NEEDED FOR MODERATE PAIN, Disp: 28 tablet, Rfl: 0    venlafaxine (EFFEXOR-XR) 150 mg 24 hr capsule, Take 1 capsule (150 mg total) by mouth daily, Disp: 90 capsule, Rfl: 1    venlafaxine (EFFEXOR-XR) 75 mg 24 hr capsule, Take 1 capsule (75 mg total) by mouth daily, Disp: 90 capsule, Rfl: 0        Social History     Socioeconomic History    Marital status: /Civil Union     Spouse name: Not on file    Number of children: 3    Years of education: GED then 2 year college program    Highest education level: Not on file   Occupational History    Occupation: ER TECH     Employer: [employer]   Social Needs    Financial resource strain: Somewhat hard    Food insecurity:     Worry: Never true     Inability: Never true    Transportation needs:     Medical: No     Non-medical: No   Tobacco Use    Smoking status: Former Smoker     Last attempt to quit: 2017     Years since quittin 7    Smokeless tobacco: Never Used   Substance and Sexual Activity    Alcohol use: Yes     Frequency: Monthly or less Drinks per session: 1 or 2     Binge frequency: Never     Comment: socially    Drug use: No    Sexual activity: Yes     Partners: Male     Birth control/protection: OCP     Comment: lifetime partners: 6; current partner 2014   Lifestyle    Physical activity:     Days per week: 0 days     Minutes per session: Not on file    Stress: Very much   Relationships    Social connections:     Talks on phone: Once a week     Gets together: Once a week     Attends Protestant service: Never     Active member of club or organization: No     Attends meetings of clubs or organizations: Never     Relationship status:     Intimate partner violence:     Fear of current or ex partner: No     Emotionally abused: No     Physically abused: No     Forced sexual activity: No   Other Topics Concern    Not on file   Social History Narrative    Taoist: no preference    Accepts blood products        Exercise: unable with back issues    Calcium: 1 cheese 4-5x/week, 1 yogurt a few times/week, 1 c almond milk daily, calcium in bariatric vitamin       Family History   Problem Relation Age of Onset    Diabetes Mother     Other Mother         HYPERCHOLESTEROLEMIA    Breast cancer Mother         >50    BRCA1 Negative Mother     BRCA2 Negative Mother     Diabetes Father     Other Father         traumatic brain injury    Prostate cancer Father     Alcohol abuse Father         in remission    Heart disease Father     Neuropathy Father     Hyperlipidemia Father     Hypertension Brother     Diabetes Brother     Other Brother         HYPERCHOLESTEROLEMIA    Alcohol abuse Brother     Depression Brother         attempted suicide    Colon cancer Maternal Grandfather     No Known Problems Maternal Grandmother     No Known Problems Paternal Grandmother     No Known Problems Paternal Grandfather     Diabetes Brother     Alcohol abuse Brother     Asthma Son     No Known Problems Daughter     Ovarian cancer Neg Hx     Uterine cancer Neg Hx        Physical Exam   Constitutional: She is oriented to person, place, and time  No distress  HENT:   Head: Normocephalic and atraumatic  Eyes: Conjunctivae and EOM are normal    Neck: Normal range of motion  Neck supple  Cardiovascular: Normal rate, regular rhythm, normal heart sounds and intact distal pulses  Pulmonary/Chest: Effort normal and breath sounds normal    Abdominal: Soft  Bowel sounds are normal    Musculoskeletal: Normal range of motion  Neurological: She is alert and oriented to person, place, and time  Skin: Skin is warm and dry  She is not diaphoretic  Psychiatric: She has a normal mood and affect  Nursing note and vitals reviewed  Vitals: Last menstrual period 01/07/2019, not currently breastfeeding  Wt Readings from Last 3 Encounters:   01/10/20 75 8 kg (167 lb)   01/09/20 75 9 kg (167 lb 6 4 oz)   12/23/19 74 4 kg (164 lb)         Labs & Results:  Lab Results   Component Value Date    WBC 11 65 (H) 01/09/2020    HGB 14 4 01/09/2020    HCT 44 3 01/09/2020    MCV 93 01/09/2020     01/09/2020     No results found for: BNP  No components found for: CHEM  Troponin I   Date Value Ref Range Status   09/16/2019 <0 02 <=0 04 ng/mL Final     Comment:       [employer]ens Chemistry analyzer 99% cutoff is > 0 04 ng/mL in network labs     o cTnI 99% cutoff is useful only when applied to patients in the clinical setting of myocardial ischemia   o cTnI 99% cutoff should be interpreted in the context of clinical history, ECG findings and possibly cardiac imaging to establish correct diagnosis     o cTnI 99% cutoff may be suggestive but clearly not indicative of a coronary event without the clinical setting of myocardial ischemia      09/16/2019 <0 02 <=0 04 ng/mL Final     Comment:       Siemens Chemistry analyzer 99% cutoff is > 0 04 ng/mL in network labs     o cTnI 99% cutoff is useful only when applied to patients in the clinical setting of myocardial ischemia o cTnI 99% cutoff should be interpreted in the context of clinical history, ECG findings and possibly cardiac imaging to establish correct diagnosis  o cTnI 99% cutoff may be suggestive but clearly not indicative of a coronary event without the clinical setting of myocardial ischemia      05/27/2019 <0 02 <=0 04 ng/mL Final     Comment:       Siemens Chemistry analyzer 99% cutoff is > 0 04 ng/mL in network labs     o cTnI 99% cutoff is useful only when applied to patients in the clinical setting of myocardial ischemia   o cTnI 99% cutoff should be interpreted in the context of clinical history, ECG findings and possibly cardiac imaging to establish correct diagnosis  o cTnI 99% cutoff may be suggestive but clearly not indicative of a coronary event without the clinical setting of myocardial ischemia  No results found for this or any previous visit  No results found for this or any previous visit  This note was completed in part utilizing [employer] direct voice recognition software  Grammatical errors, random word insertion, spelling mistakes, and incomplete sentences may be an occasional consequence of the system secondary to software limitations, ambient noise and hardware issues  At the time of dictation, efforts were made to edit, clarify and /or correct errors  Please read the chart carefully and recognize, using context, where substitutions have occurred    If you have any questions or concerns about the context, text or information contained within the body of this dictation, please contact myself, the provider, for further clarification

## 2020-02-10 ENCOUNTER — TELEPHONE (OUTPATIENT)
Dept: PSYCHIATRY | Facility: CLINIC | Age: 50
End: 2020-02-10

## 2020-02-10 ENCOUNTER — OFFICE VISIT (OUTPATIENT)
Dept: CARDIOLOGY CLINIC | Facility: CLINIC | Age: 50
End: 2020-02-10
Payer: COMMERCIAL

## 2020-02-10 ENCOUNTER — CLINICAL SUPPORT (OUTPATIENT)
Dept: CARDIOLOGY CLINIC | Facility: CLINIC | Age: 50
End: 2020-02-10
Payer: COMMERCIAL

## 2020-02-10 ENCOUNTER — TRANSCRIBE ORDERS (OUTPATIENT)
Dept: NEUROSURGERY | Facility: CLINIC | Age: 50
End: 2020-02-10

## 2020-02-10 ENCOUNTER — TELEPHONE (OUTPATIENT)
Dept: CARDIOLOGY CLINIC | Facility: CLINIC | Age: 50
End: 2020-02-10

## 2020-02-10 VITALS
WEIGHT: 167 LBS | SYSTOLIC BLOOD PRESSURE: 118 MMHG | BODY MASS INDEX: 25.31 KG/M2 | HEART RATE: 78 BPM | HEIGHT: 68 IN | DIASTOLIC BLOOD PRESSURE: 78 MMHG | OXYGEN SATURATION: 97 %

## 2020-02-10 DIAGNOSIS — E78.2 MIXED HYPERLIPIDEMIA: ICD-10-CM

## 2020-02-10 DIAGNOSIS — Z98.890 STATUS POST SURGERY: Primary | ICD-10-CM

## 2020-02-10 DIAGNOSIS — R00.2 PALPITATIONS: Primary | ICD-10-CM

## 2020-02-10 DIAGNOSIS — I47.1 PAROXYSMAL SVT (SUPRAVENTRICULAR TACHYCARDIA) (HCC): ICD-10-CM

## 2020-02-10 DIAGNOSIS — R00.2 PALPITATION: Primary | ICD-10-CM

## 2020-02-10 PROCEDURE — 3008F BODY MASS INDEX DOCD: CPT | Performed by: NURSE PRACTITIONER

## 2020-02-10 PROCEDURE — 99214 OFFICE O/P EST MOD 30 MIN: CPT | Performed by: NURSE PRACTITIONER

## 2020-02-10 PROCEDURE — 3078F DIAST BP <80 MM HG: CPT | Performed by: NURSE PRACTITIONER

## 2020-02-10 PROCEDURE — 1036F TOBACCO NON-USER: CPT | Performed by: NURSE PRACTITIONER

## 2020-02-10 PROCEDURE — 3074F SYST BP LT 130 MM HG: CPT | Performed by: NURSE PRACTITIONER

## 2020-02-10 PROCEDURE — 0296T PR EXT ECG > 48HR TO 21 DAY RCRD W/CONECT INTL RCRD: CPT

## 2020-02-10 NOTE — TELEPHONE ENCOUNTER
Spoke to Alfredo Argueta at [facility] patient does not require Prior authorization for [employer] and Interpretation [facility] up   Ref # [id number]

## 2020-02-10 NOTE — LETTER
February 10, 2020     DO Vamshi  [address] [facility]  [address] [facility] [place]    Patient: Reyna Rai   YOB: 1970   Date of Visit: 2/10/2020       Dear Dr Libra Manrique: Thank you for referring Reyna Rai to me for evaluation  Below are my notes for this consultation  If you have questions, please do not hesitate to call me  I look forward to following your patient along with you  Sincerely,        OSMANI Osman        CC: MD Brittany Monet MD Orlin Dingwall, [facility]  2/10/2020 10:05 AM  Sign at close encounter  Hospital  Follow Up   Office Visit Note  Reyna Rai   47 y o    female   MRN: [id number]  [address]  [address] [facility] [address] [facility] [place] [address]  [phone]    PCP: DO Vamshi  Cardiologist: Dr WESTON [facility]        Assessment/plan  SVT  On Toprol 25 mg/d  Occ palpitations with associated BOOKER  --> 7 day Zio ambulatory event monitor  Hypertension, essential; hx of   /78  Blood pressures improved after weight loss/gastric sleeve surgery  Chronic pain;S/P recent nerve stimulator implant  Now on cyclobenzaprine daily  Hyperlipidemia, mixed with hypertriglyceridemia  She is on combination oral contraceptives with estradiol  This likely contributes to her elevated triglycerides  She may benefit from alternate therapy We discussed this today  Defer to GYN      Ref  Range 5/28/2019 06:23 1/9/2020 10:17   Cholesterol Latest Ref Range: 50 - 200 mg/dL 208 (H) 224 (H)   Triglycerides Latest Ref Range: <=150 mg/dL 260 (H) 233 (H)   HDL Latest Ref Range: >=40 mg/dL 54 73   Non-HDL Cholesterol Latest Units: mg/dl  151   LDL Direct Latest Ref Range: 0 - 100 mg/dL 102 (H) 104 (H)     Type 2 diabetes mellitus  Hemoglobin A1c 5 7 December 2019, previous 6 3  Hx Obesity, S/P gastric sleeve surgery  230s--> 160s    BMI  Now 25  Chronic pain, status post nerve stimulator  She is on Flexeril HS  Cardiac testing  --NM myocardiao SPECT 5/19:  Avinash protocol x 7 min and 31 sec  Achieved target heart rate  EKG was negative for ischemia  Perfusion imaging showed no perfusion defects  Left ventricular systolic function was normal        Summary of recommendations  --Heart healthy diet  Educational information provided  --She is on estrogen by OBGYN  This likely in contributing to her elevated triglycerides; she may benefit from alterrnative therapy  --7 day ambulatory Holter monitor  Continue Toprol  --Follow up will be scheduled with Dr Nicole Boo interval            HPI  Dennie Ok is a 46 yo female with a history of obesity, chronic pain, hypertension, diabetes, hyperlipidemia, bipolar depression and GERD  In the past she underwent a gastric sleeve; her hypertension and hyperlipidemia improved  She has kept weight off, her BMI is now 25  She was hospitalized May 2019 with atypical chest pain  She underwent an exercise Myoview  She had normal perfusion, and normal LV function after maximum exercise  She was in the emergency room September 2019 with shortness of breath and palpitations, after running up several flights of stairs, responding to a fire alarm  Her Apple watch showed a heart rate of 187  When in the emergency room, her EKG showed sinus rhythm  She was seen in the office by cardiologist Dr Meriam Curling afterwards  Her EKG was noted to show a possible ectopic atrial rhythm  SVT was suspected; a 30 day event monitor was discussed, however after further discussion this was deferred  Interval history  Recently she had a spinal cord stimulator placed by Dr Alma Delia Kim  Surgery was uneventful  While monitored in the PACU, she was noted to have heart rates in the 150s for 15-20 minutes with associated palpitations and chest tightness  She developed chest pain and palpitations afterwards, prompting an evaluation by Cardiology  Her EKGs suggested SVT    IV Esmolol was given; her heart rate improved to 80  She had recurrence and labetalol was given, maintaining normal sinus rhythm  Per Cardiology she was initiated on Toprol 25 mg PO daily  After observation she was discharged and asked to follow in the office  She did note she had palpitations at home and they resolved on their own        2/10/2020  She returns for follow-up  She is taking the Toprol as directed  Occasionally, she notices when she walks she becomes short of breath and has some palpitations  She is not sure whether this occurs daily but is fairly often  She is currently out of work given recent back surgery  She works in the emergency room as a tech, in in the past was reluctant to wear a monitor because she thought it would be very obvious to others  She is willing to wear a monitor for a brief time  Since her back surgery she has now been taking Flexeril almost on a daily basis which does help her back  This potentially can cause some arrhythmias,but is reported as very low incidence  For now will continue the current plan  She is maintaining adequate hydration  Agreeable to wearing a monitor      Assessment  Diagnoses and all orders for this visit:    Palpitations    Paroxysmal SVT (supraventricular tachycardia) ([address] )    Mixed hyperlipidemia          Past Medical History:   Diagnosis Date    ADHD (attention deficit hyperactivity disorder)     Anxiety     Bipolar disorder (HCC)     Bulging lumbar disc     Carpal tunnel syndrome     RIGHT    LAST ASSESSED: 12/7/16    Chronic back pain     low    Chronic pain disorder     Colon polyps     Depression     Diabetes mellitus ([address] )     Resolved post weight loss    GERD (gastroesophageal reflux disease)     Gestational diabetes     Hearing loss     left ear    Hyperlipidemia     Resolved with weight loss    IBS (irritable bowel syndrome)     Ileus ([address] )     LAST ASSESSED: 8/3/17    Labial cyst     LAST ASSESSED: 4/21/16    Myofascial pain     LAST ASSESSED: 4/12/16    Obesity     Ovarian cyst     LEFT  LAST ASSESSED: 9/2/16    Panic attack     Pap smear for cervical cancer screening     4/2018--pap wnl, HRHPV neg    Pneumonia     PTSD (post-traumatic stress disorder)     Seasonal allergies     Thoracic outlet syndrome     2010    Trochanteric bursitis of both hips     LAST ASSESSED: 3/18/16    Ulnar neuropathy at elbow     Varicella     Wears glasses        Review of Systems   Constitution: Negative for chills  Cardiovascular: Positive for dyspnea on exertion and palpitations  Negative for chest pain, claudication, cyanosis, irregular heartbeat, leg swelling, near-syncope, orthopnea, paroxysmal nocturnal dyspnea and syncope  Respiratory: Negative for cough and shortness of breath  Gastrointestinal: Negative for heartburn and nausea  Neurological: Negative for dizziness, focal weakness, headaches, light-headedness and weakness  All other systems reviewed and are negative  Allergies   Allergen Reactions    Ibuprofen Other (See Comments)     Due to gastric sleeve -can only take for 5 days, then needs to stop           Current Outpatient Medications:     acetaminophen-codeine (TYLENOL #3) 300-30 mg per tablet, Take 1 tablet by mouth every 6 (six) hours as needed for moderate pain, Disp: 24 tablet, Rfl: 0    atoMOXetine (STRATTERA) 25 mg capsule, Take 1 capsule (25 mg total) by mouth daily (Patient taking differently: Take 25 mg by mouth daily at bedtime ), Disp: 90 capsule, Rfl: 0    Biotin 04625 MCG TABS, Take by mouth, Disp: , Rfl:     Calcium Carbonate-Vit D-Min (CALCIUM 1200 PO), Take 2,400 mg by mouth daily, Disp: , Rfl:     Cyanocobalamin (CVS VITAMIN B12 PO), Take by mouth, Disp: , Rfl:     cyclobenzaprine (FLEXERIL) 10 mg tablet, Take 1 tablet (10 mg total) by mouth daily at bedtime (Patient taking differently: Take 10 mg by mouth as needed ), Disp: 30 tablet, Rfl: 0    drospirenone-ethinyl estradiol (LIEN) 3-0 02 MG per tablet, Take 1 tablet by mouth daily (Patient taking differently: Take 1 tablet by mouth daily at bedtime ), Disp: 84 tablet, Rfl: 4    gabapentin (NEURONTIN) 600 MG tablet, Take 1 tablet (600 mg total) by mouth daily at bedtime (Patient taking differently: Take 900 mg by mouth daily at bedtime Takes 1 5 tablets at Bedtime=900mg), Disp: 90 tablet, Rfl: 3    lamoTRIgine (LaMICtal) 150 MG tablet, Take 1 tablet (150 mg total) by mouth daily (Patient taking differently: Take 150 mg by mouth daily in the early morning ), Disp: 90 tablet, Rfl: 1    metoclopramide (REGLAN) 10 mg tablet, Take 1 tablet (10 mg total) by mouth every 6 (six) hours as needed (nausea), Disp: 15 tablet, Rfl: 0    metoprolol succinate (TOPROL-XL) 25 mg 24 hr tablet, Take 1 tablet (25 mg total) by mouth daily, Disp: 30 tablet, Rfl: 2    montelukast (SINGULAIR) 10 mg tablet, Take 1 tablet (10 mg total) by mouth daily at bedtime, Disp: 90 tablet, Rfl: 1    Multiple Vitamins-Minerals (MULTI COMPLETE PO), Take by mouth, Disp: , Rfl:     pantoprazole (PROTONIX) 40 mg tablet, Take 1 tablet (40 mg total) by mouth daily (Patient taking differently: Take 40 mg by mouth daily in the early morning ), Disp: 90 tablet, Rfl: 0    Probiotic Product (PRO-BIOTIC BLEND PO), Take by mouth as needed , Disp: , Rfl:     QUEtiapine (SEROquel) 50 mg tablet, Take 1 tablet (50 mg total) by mouth daily at bedtime, Disp: 90 tablet, Rfl: 1    traMADol (ULTRAM) 50 mg tablet, TAKE ONE TABLET BY MOUTH EVERY 6 HOURS AS NEEDED FOR MODERATE PAIN, Disp: 28 tablet, Rfl: 0    venlafaxine (EFFEXOR-XR) 150 mg 24 hr capsule, Take 1 capsule (150 mg total) by mouth daily, Disp: 90 capsule, Rfl: 1    venlafaxine (EFFEXOR-XR) 75 mg 24 hr capsule, Take 1 capsule (75 mg total) by mouth daily, Disp: 90 capsule, Rfl: 0        Social History     Socioeconomic History    Marital status: /Civil Union     Spouse name: Not on file    Number of children: 3    Years of education: GED then 2 year college program    Highest education level: Not on file   Occupational History    Occupation: ER TECH     Employer: [employer] 2[place] Needs    Financial resource strain: Somewhat hard    Food insecurity:     Worry: Never true     Inability: Never true    Transportation needs:     Medical: No     Non-medical: No   Tobacco Use    Smoking status: Former Smoker     Last attempt to quit: 2017     Years since quittin 7    Smokeless tobacco: Never Used   Substance and Sexual Activity    Alcohol use: Yes     Frequency: Monthly or less     Drinks per session: 1 or 2     Binge frequency: Never     Comment: socially    Drug use: No    Sexual activity: Yes     Partners: Male     Birth control/protection: OCP     Comment: lifetime partners: 6; current partner    Lifestyle    Physical activity:     Days per week: 0 days     Minutes per session: Not on file    Stress: Very much   Relationships    Social connections:     Talks on phone: Once a week     Gets together: Once a week     Attends Tenriism service: Never     Active member of club or organization: No     Attends meetings of clubs or organizations: Never     Relationship status:     Intimate partner violence:     Fear of current or ex partner: No     Emotionally abused: No     Physically abused: No     Forced sexual activity: No   Other Topics Concern    Not on file   Social History Narrative    Pentecostal: no preference    Accepts blood products        Exercise: unable with back issues    Calcium: 1 cheese 4-5x/week, 1 yogurt a few times/week, 1 c almond milk daily, calcium in bariatric vitamin       Family History   Problem Relation Age of Onset    Diabetes Mother     Other Mother         HYPERCHOLESTEROLEMIA    Breast cancer Mother         >50    BRCA1 Negative Mother     BRCA2 Negative Mother     Diabetes Father     Other Father         traumatic brain injury    Prostate cancer Father     Alcohol abuse Father in remission    Heart disease Father     Neuropathy Father     Hyperlipidemia Father     Hypertension Brother     Diabetes Brother     Other Brother         HYPERCHOLESTEROLEMIA    Alcohol abuse Brother     Depression Brother         attempted suicide    Colon cancer Maternal Grandfather     No Known Problems Maternal Grandmother     No Known Problems Paternal Grandmother     No Known Problems Paternal Grandfather     Diabetes Brother     Alcohol abuse Brother     Asthma Son     No Known Problems Daughter     Ovarian cancer Neg Hx     Uterine cancer Neg Hx        Physical Exam   Constitutional: She is oriented to person, place, and time  No distress  HENT:   Head: Normocephalic and atraumatic  Eyes: Conjunctivae and EOM are normal    Neck: Normal range of motion  Neck supple  Cardiovascular: Normal rate, regular rhythm, normal heart sounds and intact distal pulses  Pulmonary/Chest: Effort normal and breath sounds normal    Abdominal: Soft  Bowel sounds are normal    Musculoskeletal: Normal range of motion  Neurological: She is alert and oriented to person, place, and time  Skin: Skin is warm and dry  She is not diaphoretic  Psychiatric: She has a normal mood and affect  Nursing note and vitals reviewed  Vitals: Last menstrual period 01/07/2019, not currently breastfeeding     Wt Readings from Last 3 Encounters:   01/10/20 75 8 kg (167 lb)   01/09/20 75 9 kg (167 lb 6 4 oz)   12/23/19 74 4 kg (164 lb)         Labs & Results:  Lab Results   Component Value Date    WBC 11 65 (H) 01/09/2020    HGB 14 4 01/09/2020    HCT 44 3 01/09/2020    MCV 93 01/09/2020     01/09/2020     No results found for: BNP  No components found for: CHEM  Troponin I   Date Value Ref Range Status   09/16/2019 <0 02 <=0 04 ng/mL Final     Comment:       Siemens Chemistry analyzer 99% cutoff is > 0 04 ng/mL in network labs     o cTnI 99% cutoff is useful only when applied to patients in the clinical setting of myocardial ischemia   o cTnI 99% cutoff should be interpreted in the context of clinical history, ECG findings and possibly cardiac imaging to establish correct diagnosis  o cTnI 99% cutoff may be suggestive but clearly not indicative of a coronary event without the clinical setting of myocardial ischemia      09/16/2019 <0 02 <=0 04 ng/mL Final     Comment:       Siemens Chemistry analyzer 99% cutoff is > 0 04 ng/mL in network labs     o cTnI 99% cutoff is useful only when applied to patients in the clinical setting of myocardial ischemia   o cTnI 99% cutoff should be interpreted in the context of clinical history, ECG findings and possibly cardiac imaging to establish correct diagnosis  o cTnI 99% cutoff may be suggestive but clearly not indicative of a coronary event without the clinical setting of myocardial ischemia      05/27/2019 <0 02 <=0 04 ng/mL Final     Comment:       Siemens Chemistry analyzer 99% cutoff is > 0 04 ng/mL in network labs     o cTnI 99% cutoff is useful only when applied to patients in the clinical setting of myocardial ischemia   o cTnI 99% cutoff should be interpreted in the context of clinical history, ECG findings and possibly cardiac imaging to establish correct diagnosis  o cTnI 99% cutoff may be suggestive but clearly not indicative of a coronary event without the clinical setting of myocardial ischemia  No results found for this or any previous visit  No results found for this or any previous visit  This note was completed in part utilizing [employer] direct voice recognition software  Grammatical errors, random word insertion, spelling mistakes, and incomplete sentences may be an occasional consequence of the system secondary to software limitations, ambient noise and hardware issues  At the time of dictation, efforts were made to edit, clarify and /or correct errors    Please read the chart carefully and recognize, using context, where substitutions have occurred    If you have any questions or concerns about the context, text or information contained within the body of this dictation, please contact myself, the provider, for further clarification

## 2020-02-11 ENCOUNTER — CLINICAL SUPPORT (OUTPATIENT)
Dept: NEUROSURGERY | Facility: CLINIC | Age: 50
End: 2020-02-11

## 2020-02-11 VITALS
HEIGHT: 68 IN | DIASTOLIC BLOOD PRESSURE: 68 MMHG | RESPIRATION RATE: 18 BRPM | WEIGHT: 168 LBS | BODY MASS INDEX: 25.46 KG/M2 | SYSTOLIC BLOOD PRESSURE: 118 MMHG | TEMPERATURE: 98.1 F

## 2020-02-11 DIAGNOSIS — Z98.890 STATUS POST SURGERY: Primary | ICD-10-CM

## 2020-02-11 PROCEDURE — 3008F BODY MASS INDEX DOCD: CPT

## 2020-02-11 PROCEDURE — 3074F SYST BP LT 130 MM HG: CPT

## 2020-02-11 PROCEDURE — 99024 POSTOP FOLLOW-UP VISIT: CPT

## 2020-02-11 PROCEDURE — 3078F DIAST BP <80 MM HG: CPT

## 2020-02-11 NOTE — PROGRESS NOTES
Post-Op Visit-Neurosurgery    Bethany Home 47 y o  female MRN: [id number]    Chief Complaint:  Patient presents post: Insertion of Abbott spinal cord stimulator electrode via T9-T10 laminotomy and left buttock implantable pulse generator  History of Present Illness:  Patient presents for 2 week POV for incision check unaccompanied and ambulating without a device  She reports that she is doing okay overall and denies any incisional issues or fevers  She reports having muscular soreness around the thoracic incision as well as the pain that she was experiencing prior to surgery  She reports having occasional shooting pain down her bilateral legs to her knees  She is excited to work with the rep to program the stimulator at today's visit  Denies any new weakness, numbness or tingling since the surgery         Current Outpatient Medications:     acetaminophen-codeine (TYLENOL #3) 300-30 mg per tablet, Take 1 tablet by mouth every 6 (six) hours as needed for moderate pain, Disp: 24 tablet, Rfl: 0    atoMOXetine (STRATTERA) 25 mg capsule, Take 1 capsule (25 mg total) by mouth daily (Patient taking differently: Take 25 mg by mouth daily at bedtime ), Disp: 90 capsule, Rfl: 0    Biotin 81175 MCG TABS, Take by mouth, Disp: , Rfl:     Calcium Carbonate-Vit D-Min (CALCIUM 1200 PO), Take 2,400 mg by mouth daily, Disp: , Rfl:     Cyanocobalamin (CVS VITAMIN B12 PO), Take by mouth, Disp: , Rfl:     cyclobenzaprine (FLEXERIL) 10 mg tablet, Take 1 tablet (10 mg total) by mouth daily at bedtime (Patient taking differently: Take 10 mg by mouth as needed ), Disp: 30 tablet, Rfl: 0    drospirenone-ethinyl estradiol (LIEN) 3-0 02 MG per tablet, Take 1 tablet by mouth daily (Patient taking differently: Take 1 tablet by mouth daily at bedtime ), Disp: 84 tablet, Rfl: 4    gabapentin (NEURONTIN) 600 MG tablet, Take 1 tablet (600 mg total) by mouth daily at bedtime (Patient taking differently: Take 900 mg by mouth daily at bedtime Takes 1 5 tablets at Bedtime=900mg), Disp: 90 tablet, Rfl: 3    lamoTRIgine (LaMICtal) 150 MG tablet, Take 1 tablet (150 mg total) by mouth daily (Patient taking differently: Take 150 mg by mouth daily in the early morning ), Disp: 90 tablet, Rfl: 1    metoprolol succinate (TOPROL-XL) 25 mg 24 hr tablet, Take 1 tablet (25 mg total) by mouth daily, Disp: 30 tablet, Rfl: 2    montelukast (SINGULAIR) 10 mg tablet, Take 1 tablet (10 mg total) by mouth daily at bedtime, Disp: 90 tablet, Rfl: 1    Multiple Vitamins-Minerals (MULTI COMPLETE PO), Take by mouth, Disp: , Rfl:     pantoprazole (PROTONIX) 40 mg tablet, Take 1 tablet (40 mg total) by mouth daily (Patient taking differently: Take 40 mg by mouth daily in the early morning ), Disp: 90 tablet, Rfl: 0    Probiotic Product (PRO-BIOTIC BLEND PO), Take by mouth as needed , Disp: , Rfl:     QUEtiapine (SEROquel) 50 mg tablet, Take 1 tablet (50 mg total) by mouth daily at bedtime, Disp: 90 tablet, Rfl: 1    traMADol (ULTRAM) 50 mg tablet, TAKE ONE TABLET BY MOUTH EVERY 6 HOURS AS NEEDED FOR MODERATE PAIN, Disp: 28 tablet, Rfl: 0    venlafaxine (EFFEXOR-XR) 150 mg 24 hr capsule, Take 1 capsule (150 mg total) by mouth daily, Disp: 90 capsule, Rfl: 1    venlafaxine (EFFEXOR-XR) 75 mg 24 hr capsule, Take 1 capsule (75 mg total) by mouth daily, Disp: 90 capsule, Rfl: 0    metoclopramide (REGLAN) 10 mg tablet, Take 1 tablet (10 mg total) by mouth every 6 (six) hours as needed (nausea) (Patient not taking: Reported on 2/10/2020), Disp: 15 tablet, Rfl: 0   Allergies   Allergen Reactions    Ibuprofen Other (See Comments)     Due to gastric sleeve -can only take for 5 days, then needs to stop          Assessment:    Vitals:    02/11/20 1054   BP: 118/68   Resp: 18   Temp: 98 1 °F (36 7 °C)       Wound Exam: Incisions both well approximated    Mild erythema surrounding sutures of both incisions, mild edema to left flank incision areas, no drainage present  Location: Midline back and left flank  Complications: None  Incisions GABI  Discussion/Summary:  Reviewed incision care with patient including daily observation for s/s infection including: increased erythema, edema, drainage, dehiscence of incision or fever >101  Should these be observed, she understands that she is to call and/or return immediately for reassessment  Advised patient to continue cleansing area with mild soap and water and pat dry  Not to apply any lotions, creams, or ointments, & not to submerge in any water for two more weeks  She is to maintain activity restrictions until cleared by the surgeon  Activity levels were also reviewed with the patient in detail, she is to lift no greater than 10 pounds, advised to limit bend/twisting at the waist and ambulation is encouraged as tolerated  Verified date/time/location of upcoming POV and reminded her to complete x-rays prior to her visit on 3/12/2020  She is to call the office with any further questions or concerns, or if any incisional issues or fevers would arise  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

## 2020-02-12 ENCOUNTER — TELEPHONE (OUTPATIENT)
Dept: PSYCHIATRY | Facility: CLINIC | Age: 50
End: 2020-02-12

## 2020-02-12 ENCOUNTER — TELEPHONE (OUTPATIENT)
Dept: CARDIOLOGY CLINIC | Facility: CLINIC | Age: 50
End: 2020-02-12

## 2020-02-12 DIAGNOSIS — I47.1 PAROXYSMAL SVT (SUPRAVENTRICULAR TACHYCARDIA) (HCC): ICD-10-CM

## 2020-02-12 RX ORDER — METOPROLOL SUCCINATE 25 MG/1
25 TABLET, EXTENDED RELEASE ORAL DAILY
Qty: 90 TABLET | Refills: 0 | Status: SHIPPED | OUTPATIENT
Start: 2020-02-12 | End: 2020-03-02 | Stop reason: SDUPTHER

## 2020-02-12 NOTE — TELEPHONE ENCOUNTER
Adelaide FLOR called to question how much venlafaxine she should be taking  States she is only taking the 125 mg  Per Office notes, Informed her she should be taking the whole 225 mg  Adelaide FLOR will start taking the correct dose

## 2020-02-12 NOTE — TELEPHONE ENCOUNTER
Patient needs the following medication sent to [facility] in 90 day qty (patient does not want 30 day qty) with refills  metoprolol succinate (TOPROL-XL) 25 mg 24 hr tablet

## 2020-02-14 ENCOUNTER — SOCIAL WORK (OUTPATIENT)
Dept: BEHAVIORAL/MENTAL HEALTH CLINIC | Facility: CLINIC | Age: 50
End: 2020-02-14
Payer: COMMERCIAL

## 2020-02-14 DIAGNOSIS — F43.10 POST TRAUMATIC STRESS DISORDER (PTSD): Chronic | ICD-10-CM

## 2020-02-14 DIAGNOSIS — F41.1 GENERALIZED ANXIETY DISORDER: Chronic | ICD-10-CM

## 2020-02-14 DIAGNOSIS — F33.2 MAJOR DEPRESSIVE DISORDER, RECURRENT SEVERE WITHOUT PSYCHOTIC FEATURES (HCC): Primary | Chronic | ICD-10-CM

## 2020-02-14 PROCEDURE — 90834 PSYTX W PT 45 MINUTES: CPT | Performed by: SOCIAL WORKER
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
## 2020-02-20 ENCOUNTER — TELEPHONE (OUTPATIENT)
Dept: PAIN MEDICINE | Facility: CLINIC | Age: 50
End: 2020-02-20

## 2020-02-20 NOTE — TELEPHONE ENCOUNTER
Patient called in regard to her disability forms needed   They are stating that they need page 4 of the paper  Also is there anything on page 4    Patient contact # [phone]        Marybel Nieto  If we can email it to him   [email or web address]

## 2020-02-21 ENCOUNTER — OFFICE VISIT (OUTPATIENT)
Dept: PSYCHIATRY | Facility: CLINIC | Age: 50
End: 2020-02-21

## 2020-02-21 DIAGNOSIS — F33.2 MAJOR DEPRESSIVE DISORDER, RECURRENT SEVERE WITHOUT PSYCHOTIC FEATURES (HCC): Primary | ICD-10-CM

## 2020-02-21 PROCEDURE — NOSHOW: Performed by: NURSE PRACTITIONER

## 2020-02-25 ENCOUNTER — TELEPHONE (OUTPATIENT)
Dept: NEUROSURGERY | Facility: CLINIC | Age: 50
End: 2020-02-25

## 2020-02-25 NOTE — TELEPHONE ENCOUNTER
Pt is approx 4 weeks post SCS placement  She c/o throbbing at battery site, and sutures have not dissolved and are poking thru catching on her clothes  She denies knowledge of any trauma to battery site, no redness, bruising, or swelling  She requests they be trimmed  Nurse visit offered and accepted

## 2020-02-27 ENCOUNTER — CLINICAL SUPPORT (OUTPATIENT)
Dept: NEUROSURGERY | Facility: CLINIC | Age: 50
End: 2020-02-27

## 2020-02-27 VITALS
RESPIRATION RATE: 16 BRPM | WEIGHT: 168 LBS | HEIGHT: 68 IN | BODY MASS INDEX: 25.46 KG/M2 | TEMPERATURE: 97.7 F | DIASTOLIC BLOOD PRESSURE: 72 MMHG | SYSTOLIC BLOOD PRESSURE: 118 MMHG

## 2020-02-27 DIAGNOSIS — M54.16 LUMBAR RADICULOPATHY: ICD-10-CM

## 2020-02-27 DIAGNOSIS — F33.2 SEVERE EPISODE OF RECURRENT MAJOR DEPRESSIVE DISORDER, WITHOUT PSYCHOTIC FEATURES (HCC): ICD-10-CM

## 2020-02-27 DIAGNOSIS — Z98.890 STATUS POST SURGERY: Primary | ICD-10-CM

## 2020-02-27 PROCEDURE — 3008F BODY MASS INDEX DOCD: CPT

## 2020-02-27 PROCEDURE — 3078F DIAST BP <80 MM HG: CPT

## 2020-02-27 PROCEDURE — 99024 POSTOP FOLLOW-UP VISIT: CPT

## 2020-02-27 PROCEDURE — 3074F SYST BP LT 130 MM HG: CPT

## 2020-02-27 RX ORDER — GABAPENTIN 600 MG/1
900 TABLET ORAL
Qty: 135 TABLET | Refills: 0 | Status: SHIPPED | OUTPATIENT
Start: 2020-02-27 | End: 2020-04-02 | Stop reason: ALTCHOICE

## 2020-02-27 RX ORDER — VENLAFAXINE HYDROCHLORIDE 75 MG/1
CAPSULE, EXTENDED RELEASE ORAL
Qty: 90 CAPSULE | Refills: 0 | Status: SHIPPED | OUTPATIENT
Start: 2020-02-27 | End: 2020-05-22

## 2020-02-27 NOTE — PROGRESS NOTES
Post-Op Visit-Neurosurgery    Chris Hay 47 y o  female MRN: [id number]    Chief Complaint:  Patient presents post: Insertion of [employer] spinal cord stimulator electrode via T9-T10 laminotomy and left buttock implantable pulse generator    History of Present Illness:  Patient presents 4 weeks post-op for incision check  She called the nurse line yesterday regarding throbbing/sharp pain at the batter site as well as requesting that her dissolvable sutures be trimmed because they are irritating her  She feels that her left flank incision area is swollen as well  She admits that she may be "overdoing it" with her level of activity since the surgery but states that her thoracic incision only hurts when lifting stuff  Denies any new weakness, numbness or tingling since the surgery  Patient also requests refill of her Gabapentin which she currently takes at bedtime          Current Outpatient Medications:     acetaminophen-codeine (TYLENOL #3) 300-30 mg per tablet, Take 1 tablet by mouth every 6 (six) hours as needed for moderate pain, Disp: 24 tablet, Rfl: 0    atoMOXetine (STRATTERA) 25 mg capsule, Take 1 capsule (25 mg total) by mouth daily (Patient taking differently: Take 25 mg by mouth daily at bedtime ), Disp: 90 capsule, Rfl: 0    Biotin 10306 MCG TABS, Take by mouth, Disp: , Rfl:     Calcium Carbonate-Vit D-Min (CALCIUM 1200 PO), Take 2,400 mg by mouth daily, Disp: , Rfl:     Cyanocobalamin ([employer] VITAMIN B12 PO), Take by mouth, Disp: , Rfl:     cyclobenzaprine (FLEXERIL) 10 mg tablet, Take 1 tablet (10 mg total) by mouth daily at bedtime (Patient taking differently: Take 10 mg by mouth as needed ), Disp: 30 tablet, Rfl: 0    drospirenone-ethinyl estradiol (LIEN) 3-0 02 MG per tablet, Take 1 tablet by mouth daily (Patient taking differently: Take 1 tablet by mouth daily at bedtime ), Disp: 84 tablet, Rfl: 4    lamoTRIgine (LaMICtal) 150 MG tablet, Take 1 tablet (150 mg total) by mouth daily (Patient taking differently: Take 150 mg by mouth daily in the early morning ), Disp: 90 tablet, Rfl: 1    metoclopramide (REGLAN) 10 mg tablet, Take 1 tablet (10 mg total) by mouth every 6 (six) hours as needed (nausea), Disp: 15 tablet, Rfl: 0    metoprolol succinate (TOPROL-XL) 25 mg 24 hr tablet, Take 1 tablet (25 mg total) by mouth daily, Disp: 90 tablet, Rfl: 0    montelukast (SINGULAIR) 10 mg tablet, Take 1 tablet (10 mg total) by mouth daily at bedtime, Disp: 90 tablet, Rfl: 1    Multiple Vitamins-Minerals (MULTI COMPLETE PO), Take by mouth, Disp: , Rfl:     pantoprazole (PROTONIX) 40 mg tablet, Take 1 tablet (40 mg total) by mouth daily (Patient taking differently: Take 40 mg by mouth daily in the early morning ), Disp: 90 tablet, Rfl: 0    Probiotic Product (PRO-BIOTIC BLEND PO), Take by mouth as needed , Disp: , Rfl:     QUEtiapine (SEROquel) 50 mg tablet, Take 1 tablet (50 mg total) by mouth daily at bedtime, Disp: 90 tablet, Rfl: 1    traMADol (ULTRAM) 50 mg tablet, TAKE ONE TABLET BY MOUTH EVERY 6 HOURS AS NEEDED FOR MODERATE PAIN, Disp: 28 tablet, Rfl: 0    venlafaxine (EFFEXOR-XR) 150 mg 24 hr capsule, Take 1 capsule (150 mg total) by mouth daily, Disp: 90 capsule, Rfl: 1    venlafaxine (EFFEXOR-XR) 75 mg 24 hr capsule, Take 1 capsule (75 mg total) by mouth daily, Disp: 90 capsule, Rfl: 0    gabapentin (NEURONTIN) 600 MG tablet, Take 1 5 tablets (900 mg total) by mouth daily at bedtime, Disp: 135 tablet, Rfl: 0   Allergies   Allergen Reactions    Ibuprofen Other (See Comments)     Due to gastric sleeve -can only take for 5 days, then needs to stop          Assessment:    Vitals:    02/27/20 0935   BP: 118/72   Resp: 16   Temp: 97 7 °F (36 5 °C)       Wound Exam: Incision well approximated  Mild erythema surrounding sutures, no edema or drainage present   (see images below)  Location: midline back and left flank  Complications: None  Incisions GABI  Discussion/Summary:  Reviewed incision care with patient including daily observation for s/s infection including: increased erythema, edema, drainage, dehiscence of incision or fever >101  Should these be observed, she understands that she is to call and/or return immediately for reassessment  Advised patient to continue cleansing area with mild soap and water and pat dry    She is to maintain activity restrictions until cleared by the surgeon  Activity levels were also reviewed with the patient in detail, she is to lift no greater than 10 pounds, advised to limit bend/twisting at the waist and ambulation is encouraged as tolerated  Advised patient that it is important to follow these restrictions and not to over do it because it can cause increased pain and complicate her healing process  Verified date/time/location of upcoming POV and reminded her to complete x-rays prior to her visit on 3/12/2020  She is to call the office with any further questions or concerns, or if any incisional issues or fevers would arise  Spoke with MADISON Barahona about Gabapentin refill and he authorized medication to be e-scribed to her pharmacy on file  Patient met with rep from [employer] for programming after today's visit

## 2020-02-27 NOTE — TELEPHONE ENCOUNTER
Patient requesting refill of her gabapentin  She takes 900mg Qhs as originally ordered by Dr Per Flores (1 5 tabs)  She asked if we could do a 90 day supply because it's cheaper for her and also she said it will give her time to get back in with Per Flores for any future refills  Authorized by MADISON Zimmerman and e-scribed to her pharmacy on file

## 2020-02-28 ENCOUNTER — TELEPHONE (OUTPATIENT)
Dept: BEHAVIORAL/MENTAL HEALTH CLINIC | Facility: CLINIC | Age: 50
End: 2020-02-28

## 2020-02-28 ENCOUNTER — CLINICAL SUPPORT (OUTPATIENT)
Dept: CARDIOLOGY CLINIC | Facility: CLINIC | Age: 50
End: 2020-02-28
Payer: COMMERCIAL

## 2020-02-28 DIAGNOSIS — I47.1 PAROXYSMAL SVT (SUPRAVENTRICULAR TACHYCARDIA) (HCC): ICD-10-CM

## 2020-02-28 DIAGNOSIS — R00.2 PALPITATIONS: ICD-10-CM

## 2020-02-28 PROCEDURE — 0296T PR EXT ECG > 48HR TO 21 DAY RCRD W/CONECT INTL RCRD: CPT | Performed by: INTERNAL MEDICINE

## 2020-02-28 NOTE — TELEPHONE ENCOUNTER
T/C to client  She states she had the incorrect time written in her calendar for her appointment today  She states she has been dealing with the  about her cat (who had a fever and had to be admitted to the hospital last night)  She hopes to be able to pick the cat up later today  Yessy asked about a refill on her effexor  I checked the clinical record and informed her that the prescription was sent to the pharmacy yesterday  She apologized for missing today's session  I told her that I will see her in two weeks and encouraged her to call if she has any additional needs prior to her next scheduled appointment

## 2020-03-02 DIAGNOSIS — R06.00 DYSPNEA ON EXERTION: Primary | ICD-10-CM

## 2020-03-02 DIAGNOSIS — I47.1 PAROXYSMAL SVT (SUPRAVENTRICULAR TACHYCARDIA) (HCC): ICD-10-CM

## 2020-03-02 RX ORDER — METOPROLOL SUCCINATE 25 MG/1
25 TABLET, EXTENDED RELEASE ORAL DAILY
Qty: 90 TABLET | Refills: 0 | Status: SHIPPED | OUTPATIENT
Start: 2020-03-02 | End: 2020-04-06 | Stop reason: SDUPTHER

## 2020-03-02 NOTE — PROGRESS NOTES
called to discuss Holter results- 5 runs brief PSVT in about 5 days of monitoring; advised F/U with cardiologst at next OV and to continue low dose Toprol ( refilled)    Reported BOOKER, like " I cant get my air in  " Recent surgery  Palpitations, PSVT, DM, hx obesity  Will R/O PE with CTA  Pt reports she had a tubal ligation in the past and there is no chance of pregnancy

## 2020-03-03 ENCOUNTER — HOSPITAL ENCOUNTER (OUTPATIENT)
Dept: CT IMAGING | Facility: HOSPITAL | Age: 50
Discharge: HOME | End: 2020-03-03
Payer: COMMERCIAL

## 2020-03-03 DIAGNOSIS — R06.00 DYSPNEA ON EXERTION: ICD-10-CM

## 2020-03-03 PROCEDURE — 71275 CT ANGIOGRAPHY CHEST: CPT

## 2020-03-03 RX ADMIN — IOHEXOL 85 ML: 350 INJECTION, SOLUTION INTRAVENOUS at 11:06

## 2020-03-12 ENCOUNTER — OFFICE VISIT (OUTPATIENT)
Dept: NEUROSURGERY | Facility: CLINIC | Age: 50
End: 2020-03-12

## 2020-03-12 VITALS
RESPIRATION RATE: 16 BRPM | DIASTOLIC BLOOD PRESSURE: 70 MMHG | TEMPERATURE: 100 F | HEIGHT: 68 IN | HEART RATE: 71 BPM | BODY MASS INDEX: 25.76 KG/M2 | WEIGHT: 170 LBS | SYSTOLIC BLOOD PRESSURE: 118 MMHG

## 2020-03-12 DIAGNOSIS — Z96.89 S/P INSERTION OF SPINAL CORD STIMULATOR: Primary | ICD-10-CM

## 2020-03-12 PROCEDURE — 3008F BODY MASS INDEX DOCD: CPT | Performed by: NEUROLOGICAL SURGERY

## 2020-03-12 PROCEDURE — 99024 POSTOP FOLLOW-UP VISIT: CPT | Performed by: NEUROLOGICAL SURGERY

## 2020-03-12 PROCEDURE — 3078F DIAST BP <80 MM HG: CPT | Performed by: NEUROLOGICAL SURGERY

## 2020-03-12 PROCEDURE — 3074F SYST BP LT 130 MM HG: CPT | Performed by: NEUROLOGICAL SURGERY

## 2020-03-12 PROCEDURE — 1036F TOBACCO NON-USER: CPT | Performed by: NEUROLOGICAL SURGERY

## 2020-03-12 NOTE — PROGRESS NOTES
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, here for 6 weeks follow-up of T9-10 Abott spinal cord stimulator placement and left buttock IPG battery placement  Patient reports remarkable improvement with her lower back and radiculopathy symptoms  Currently, she complains mild left buttock incision pain, otherwise denies any numbness, weakness or paresthesia and extremities  Denies fever, chills, rigors, drainage or discharge from the incision site  Patient noticed she is able to walk more distance compared to prior to placement of spinal cord stimulator  Neuro exam-strength is 5/5 bilaterally, sensation intact throughout  DTR 2+ without clonus bilaterally  Incisions healed well without erythema, swelling, drainage or discharge or sign of dehiscence  Patient forgot to have 6 weeks follow-up x-rays, but had Recent CTA of Chest and abdomen which demonstrates the electrodes in the center of the spinal cord canal at T9-10  Hx, PE, images, management plan and prognosis discussed with the patient  Neurosurgery perspective patient does not need any more follow-up with us-we recommend patient can resume her job without restriction  Also 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 here at [facility] came for follow-up of 6 weeks from T9-10 spinal cord stimulator placement and also left buttock IPG placement  Patient reports significant improvement with her lower back pain associated with radiculopathy symptoms  Denies any radiculopathy symptoms at this time, but complains mild left buttock incisional pain  Denies any fever, chills, rigors, cough or chest pain  Denies any drainage or discharge from the incision site  Denies any bowel or bladder issues  Denies any gait instability or tendency to fall  REVIEW OF SYSTEMS  Personally reviewed and updated  Review of Systems   Constitutional: Negative  HENT: Negative  Eyes: Negative  Cardiovascular: Negative  Gastrointestinal: Positive for diarrhea  Endocrine: Negative  Genitourinary: Negative  Musculoskeletal:        SCS is helping with pain  Every once in a while spasms on right buttock  IPG soreness   Neurological: Negative  Hematological: Negative  Psychiatric/Behavioral: Negative            Meds/Allergies     Current Outpatient Medications   Medication Sig Dispense Refill    atoMOXetine (STRATTERA) 25 mg capsule Take 1 capsule (25 mg total) by mouth daily (Patient taking differently: Take 25 mg by mouth daily at bedtime ) 90 capsule 0    Biotin 01627 MCG TABS Take by mouth      Calcium Carbonate-Vit D-Min (CALCIUM 1200 PO) Take 2,400 mg by mouth daily      Cyanocobalamin ([employer] VITAMIN B12 PO) Take by mouth      drospirenone-ethinyl estradiol (LIEN) 3-0 02 MG per tablet Take 1 tablet by mouth daily (Patient taking differently: Take 1 tablet by mouth daily at bedtime ) 84 tablet 4    gabapentin (NEURONTIN) 600 MG tablet Take 1 5 tablets (900 mg total) by mouth daily at bedtime 135 tablet 0    lamoTRIgine (LaMICtal) 150 MG tablet Take 1 tablet (150 mg total) by mouth daily (Patient taking differently: Take 150 mg by mouth daily in the early morning ) 90 tablet 1    metoprolol succinate (TOPROL-XL) 25 mg 24 hr tablet Take 1 tablet (25 mg total) by mouth daily 90 tablet 0  montelukast (SINGULAIR) 10 mg tablet Take 1 tablet (10 mg total) by mouth daily at bedtime 90 tablet 1    Multiple Vitamins-Minerals (MULTI COMPLETE PO) Take by mouth      pantoprazole (PROTONIX) 40 mg tablet Take 1 tablet (40 mg total) by mouth daily (Patient taking differently: Take 40 mg by mouth daily in the early morning ) 90 tablet 0    Probiotic Product (PRO-BIOTIC BLEND PO) Take by mouth as needed       QUEtiapine (SEROquel) 50 mg tablet Take 1 tablet (50 mg total) by mouth daily at bedtime 90 tablet 1    venlafaxine (EFFEXOR-XR) 150 mg 24 hr capsule Take 1 capsule (150 mg total) by mouth daily 90 capsule 1    venlafaxine (EFFEXOR-XR) 75 mg 24 hr capsule TAKE ONE CAPSULE BY MOUTH DAILY 90 capsule 0    acetaminophen-codeine (TYLENOL #3) 300-30 mg per tablet Take 1 tablet by mouth every 6 (six) hours as needed for moderate pain (Patient not taking: Reported on 3/12/2020) 24 tablet 0    cyclobenzaprine (FLEXERIL) 10 mg tablet Take 1 tablet (10 mg total) by mouth daily at bedtime (Patient not taking: Reported on 3/12/2020) 30 tablet 0    metoclopramide (REGLAN) 10 mg tablet Take 1 tablet (10 mg total) by mouth every 6 (six) hours as needed (nausea) (Patient not taking: Reported on 3/12/2020) 15 tablet 0    traMADol (ULTRAM) 50 mg tablet TAKE ONE TABLET BY MOUTH EVERY 6 HOURS AS NEEDED FOR MODERATE PAIN (Patient not taking: Reported on 3/12/2020) 28 tablet 0     No current facility-administered medications for this visit  Allergies   Allergen Reactions    Ibuprofen Other (See Comments)     Due to gastric sleeve -can only take for 5 days, then needs to stop       PAST HISTORY    Past Medical History:   Diagnosis Date    ADHD (attention deficit hyperactivity disorder)     Anxiety     Bipolar disorder (HCC)     Bulging lumbar disc     Carpal tunnel syndrome     RIGHT    LAST ASSESSED: 12/7/16    Chronic back pain     low    Chronic pain disorder     Colon polyps     Depression     Diabetes mellitus ([facility] [address] )     Resolved post weight loss    GERD (gastroesophageal reflux disease)     Gestational diabetes     Hearing loss     left ear    Hyperlipidemia     Resolved with weight loss    IBS (irritable bowel syndrome)     Ileus ([facility] [address] )     LAST ASSESSED: 8/3/17    Labial cyst     LAST ASSESSED: 16    Myofascial pain     LAST ASSESSED: 16    Obesity     Ovarian cyst     LEFT  LAST ASSESSED: 16    Panic attack     Pap smear for cervical cancer screening     2018--pap wnl, HRHPV neg    Pneumonia     PTSD (post-traumatic stress disorder)     Seasonal allergies     Thoracic outlet syndrome         Trochanteric bursitis of both hips     LAST ASSESSED: 3/18/16    Ulnar neuropathy at elbow     Varicella     Wears glasses        Past Surgical History:   Procedure Laterality Date    BILE DUCT EXPLORATION      ENDOSCOPIC REMOVAL OF STONES FROM BILIARY TRACT     SECTION      x3    CHOLECYSTECTOMY      COLONOSCOPY      -polyp, repeat     DILATION AND CURETTAGE OF UTERUS      ENDOMETRIAL ABLATION      ERCP W/ SPHICTEROTOMY      FIRST RIB REMOVAL      THORAX EXCISION OF FIRST RIB    HYSTEROSCOPY      NH COLONOSCOPY FLX DX W/COLLJ SPEC WHEN PFRMD N/A 2017    Procedure: COLONOSCOPY;  Surgeon: Digna Roman MD;  [facility]; Service: Gastroenterology    NH ESOPHAGOGASTRODUODENOSCOPY TRANSORAL DIAGNOSTIC N/A 2017    Procedure: ESOPHAGOGASTRODUODENOSCOPY (EGD); Surgeon: Tania Dela Cruz MD;  [facility];   Service: Gastroenterology    NH HYSTEROSCOPY,W/ENDO BX N/A 10/20/2017    Procedure: DILATATION AND CURETTAGE (D&C) WITH HYSTEROSCOPY  REMOVAL VULVAR RT  LESION;  Surgeon: Marcie Castro MD;  [facility];  Service: Gynecology    NH LAP, ÁLVARO RESTRICT PROC, LONGITUDINAL GASTRECTOMY N/A 2018    Procedure: GASTRECTOMY SLEEVE LAPAROSCOPIC; INTRAOPERATIVE EGD ;  Surgeon: Jeremy Alejo MD;  [facility];  Service: Bariatrics    NV SURG IMPLNT NEUROELECT,EPIDURAL Left 2020    Procedure: Insertion of thoracic spinal cord stimulator electrode via laminotomy and placement of left buttock implantable pulse generator (NEUROMONITORING); Surgeon: Silas Kunz MD;  [facility];  Service: Neurosurgery    SPINAL CORD STIMULATOR TRIAL W/ LAMINOTOMY      TONSILLECTOMY AND ADENOIDECTOMY      TUBAL LIGATION      VEIN LIGATION AND STRIPPING Right     VULVA SURGERY  10/20/2017    BIOPSY    WISDOM TOOTH EXTRACTION         Social History     Tobacco Use    Smoking status: Former Smoker     Last attempt to quit: 2017     Years since quittin 8    Smokeless tobacco: Never Used   Substance Use Topics    Alcohol use: Yes     Frequency: Monthly or less     Drinks per session: 1 or 2     Binge frequency: Never     Comment: socially    Drug use: No       Family History   Problem Relation Age of Onset    Diabetes Mother     Other Mother         HYPERCHOLESTEROLEMIA    Breast cancer Mother         >50    BRCA1 Negative Mother     BRCA2 Negative Mother     Diabetes Father     Other Father         traumatic brain injury    Prostate cancer Father     Alcohol abuse Father         in remission    Heart disease Father     Neuropathy Father     Hyperlipidemia Father     Hypertension Brother     Diabetes Brother     Other Brother         HYPERCHOLESTEROLEMIA    Alcohol abuse Brother     Depression Brother         attempted suicide    Colon cancer Maternal Grandfather     No Known Problems Maternal Grandmother     No Known Problems Paternal Grandmother     No Known Problems Paternal Grandfather     Diabetes Brother     Alcohol abuse Brother     Asthma Son     No Known Problems Daughter     Ovarian cancer Neg Hx     Uterine cancer Neg Hx          Above history personally reviewed  EXAM    Vitals:Blood pressure 118/70, pulse 71, temperature 100 °F (37 8 °C), temperature source Tympanic, resp   rate 16, height 5' 8" (1 727 m), weight 77 1 kg (170 lb), last menstrual period 01/07/2019, not currently breastfeeding  ,Body mass index is 25 85 kg/m²  Physical Exam   Constitutional: She is oriented to person, place, and time  She appears well-developed and well-nourished  HENT:   Head: Normocephalic and atraumatic  Eyes: EOM are normal    Neck: Normal range of motion  Cardiovascular: Normal rate  Pulmonary/Chest: Effort normal    Musculoskeletal: She exhibits tenderness  Slight tenderness at left buttock IPG battery incision otherwise without signs of infection or drainage   Neurological: She is alert and oriented to person, place, and time  She has normal strength and normal reflexes  No cranial nerve deficit or sensory deficit  She has a normal Finger-Nose-Finger Test  She displays no Babinski's sign on the right side  She displays no Babinski's sign on the left side  Reflex Scores:       Tricep reflexes are 2+ on the right side and 2+ on the left side  Bicep reflexes are 2+ on the right side and 2+ on the left side  Brachioradialis reflexes are 2+ on the right side and 2+ on the left side  Patellar reflexes are 2+ on the right side and 2+ on the left side  Achilles reflexes are 2+ on the right side and 2+ on the left side  Psychiatric: Her speech is normal        Neurologic Exam     Mental Status   Oriented to person, place, and time  Speech: speech is normal   Level of consciousness: alert    Cranial Nerves     CN III, IV, VI   Extraocular motions are normal    Nystagmus: none     CN V   Right facial sensation deficit: none  Left facial sensation deficit: none    CN VII   Facial expression full, symmetric     Right facial weakness: none  Left facial weakness: none    CN XI   CN XI normal      Motor Exam   Muscle bulk: normal  Overall muscle tone: normal  Right arm tone: normal  Left arm tone: normal  Right arm pronator drift: absent  Left arm pronator drift: absent  Right leg tone: normal  Left leg tone: normal    Strength   Strength 5/5 throughout  Sensory Exam   Light touch normal      Gait, Coordination, and Reflexes     Coordination   Finger to nose coordination: normal    Reflexes   Right brachioradialis: 2+  Left brachioradialis: 2+  Right biceps: 2+  Left biceps: 2+  Right triceps: 2+  Left triceps: 2+  Right patellar: 2+  Left patellar: 2+  Right achilles: 2+  Left achilles: 2+  Right : 2+  Left : 2+  Right plantar: normal  Left plantar: normal  Right Heredia: absent  Left Heredia: absent  Right ankle clonus: absent  Left ankle clonus: absent        MEDICAL DECISION MAKING    Imaging Studies:     Cta Chest Pe Study    Result Date: 3/3/2020  Narrative: CTA - CHEST WITH IV CONTRAST - PULMONARY ANGIOGRAM INDICATION:  R06 09: Other forms of dyspnea  Dyspnea on exertion, recent stimulator implant  COMPARISON: CT chest, abdomen and pelvis 2/16/2014, chest film 9/16/2019 TECHNIQUE: CTA examination of the chest was performed using angiographic technique according to a protocol specifically tailored to evaluate for pulmonary embolism  Axial, sagittal, and coronal 2D reformatted images were created from the source data and  submitted for interpretation  In addition, coronal 3D MIP postprocessing was performed on the acquisition scanner  Radiation dose length product (DLP) for this visit:  245 mGy-cm   This examination, like all CT scans performed in the [facility], was performed utilizing techniques to minimize radiation dose exposure, including the use of iterative reconstruction and automated exposure control  IV Contrast:  85 mL of iohexol (OMNIPAQUE)  FINDINGS: PULMONARY ARTERIAL TREE:  No pulmonary embolus is seen to the segmental level  Slightly limited evaluation of the subsegmental vessels  LUNGS:  No consolidation or endobronchial lesions  Minimal subsegmental atelectasis in the inferior lingula   Stable 3 mm right upper lobe nodule just above the minor fissure, benign  Small calcified granuloma left lower lobe  PLEURA:  Unremarkable  HEART/GREAT VESSELS:  The heart is borderline enlarged  MEDIASTINUM AND DAAMS:  Subcentimeter short axis right hilar lymph node  The esophagus is unremarkable  CHEST WALL AND LOWER NECK:   Unremarkable  VISUALIZED STRUCTURES IN THE UPPER ABDOMEN:  Gastric sleeve surgery noted  Small amount of pneumobilia noted  OSSEOUS STRUCTURES:  No acute fracture or destructive osseous lesion  Dorsal column similar lower thoracic spine  Impression: No evidence of pulmonary embolus to the segmental level  Borderline cardiomegaly   Workstation performed: [id number]       I have personally reviewed pertinent reports   , I have personally reviewed pertinent films in PACS and I have personally reviewed pertinent films in PACS with a Radiologist

## 2020-03-12 NOTE — LETTER
March 12, 2020     Patient: Srinivasan Gibson   YOB: 1970   Date of Visit: 3/12/2020       To Whom it May Concern:    Srinivasan Gibson is under my professional care  She was seen in my office on 3/12/2020  She may return to work on March 16, 2020  No restrictions  If you have any questions or concerns, please don't hesitate to call           Sincerely,          Merlyn Olszewski, MD        CC: No Recipients

## 2020-03-13 ENCOUNTER — SOCIAL WORK (OUTPATIENT)
Dept: BEHAVIORAL/MENTAL HEALTH CLINIC | Facility: CLINIC | Age: 50
End: 2020-03-13
Payer: COMMERCIAL

## 2020-03-13 DIAGNOSIS — F43.10 POST TRAUMATIC STRESS DISORDER (PTSD): Chronic | ICD-10-CM

## 2020-03-13 DIAGNOSIS — F41.1 GENERALIZED ANXIETY DISORDER: Chronic | ICD-10-CM

## 2020-03-13 DIAGNOSIS — F33.2 MAJOR DEPRESSIVE DISORDER, RECURRENT SEVERE WITHOUT PSYCHOTIC FEATURES (HCC): Primary | Chronic | ICD-10-CM

## 2020-03-13 PROCEDURE — 90834 PSYTX W PT 45 MINUTES: CPT | Performed by: SOCIAL WORKER

## 2020-03-13 NOTE — PSYCH
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, 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 people do not use their common sense  She states that she returns to her work (in the [employer]) on Monday  She states that she is feeling excited to go back to work, "but I'm too hyper " Juan Shannon has been taking Strattera since November  She believes she needs to have a medication adjustment; however, she missed a medication management appointment  She reports she is not able to see her prescriber until May  She feels that the Strattera initially helped, but she is experiencing a lot of "racing thoughts " She is able to acknowledge that she has a great deal of stress in her life--with her family, work, etc  She states she is struggling with her 's daughter and the custody/child support issues  Juan Shannon states she has been struggling with her memory  She states that she sometimes has difficulty finding the correct words  We discussed continuing to monitor her memory-related symptoms, and will readdress this in the future, if these symptoms continue  ASSESSMENT: Quincy Avilez presents with a primarily anxious mood  Her affect is mood-congruent  Quincy Barrientose exhibits a positive therapeutic rapport with this clinician  Radhaelliot Avilez appears to have normal insight and judgment  Frankstuart Avilez continues to exhibit willingness to work on treatment goals and objectives  PLAN: [employer] will return in two weeks for the next scheduled session  Between sessions, [employer] will continue to monitor her mood  She will practice mindfulness-based skills to manage her anxiety and will report back during the next session re: successes and barriers  At the next session, this clinician will use client-centered therapy, mindfulness-based strategies, DBT-informed skills and solution-focused therapy to address her anxiety and mood regulation, in an effort to assist [employer] with meeting treatment goals  Behavioral Health Treatment Plan [facility]: Diagnosis and Treatment Plan explained to Marcelo Colbert relates understanding diagnosis and is agreeable to Treatment Plan   Yes

## 2020-03-27 ENCOUNTER — SOCIAL WORK (OUTPATIENT)
Dept: BEHAVIORAL/MENTAL HEALTH CLINIC | Facility: CLINIC | Age: 50
End: 2020-03-27
Payer: COMMERCIAL

## 2020-03-27 DIAGNOSIS — F41.1 GENERALIZED ANXIETY DISORDER: Chronic | ICD-10-CM

## 2020-03-27 DIAGNOSIS — F33.2 MAJOR DEPRESSIVE DISORDER, RECURRENT SEVERE WITHOUT PSYCHOTIC FEATURES (HCC): Primary | Chronic | ICD-10-CM

## 2020-03-27 DIAGNOSIS — F43.10 POST TRAUMATIC STRESS DISORDER (PTSD): Chronic | ICD-10-CM

## 2020-03-27 PROCEDURE — 90834 PSYTX W PT 45 MINUTES: CPT | Performed by: SOCIAL WORKER

## 2020-03-27 NOTE — PSYCH
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  Yessy expressed some frustration with people who are not following the preventative measures (including some EMTs and other first responders)  Maria A Malin states that, even though she is not very concerned about it, she does have an increase in anxiety when she is at work  She states that it is difficult for her to stay at home, "because it's boring " She states that she went to a farmer's market today, to get some food for her family  Her  works for [employer]  He is currently unemployed and has applied for unemployment compensation  Maria A Malin states that her 's ex-wife wants to "take him back to court to try to get some of my paycheck " Maria A Malin talked about some of the family stressors she has been going through  Maria A Malin states she is struggling with her relationship with her 's daughter  Maria A Malin states that his daughter lives with them full-time, and she struggles with her disrespect  Maria A Malin states that her  is very non-confrontational with his children, and Maria A Malin wants him to set some limits and boundaries with her  Maria A Malin states that they live downstairs (in a basement apartment) from her mother-in-law  Maria A Malin states that the plan for them is to inherit the house when she dies   At that point, she plans to have her daughter move into the basement apartment, so she can assist with the care of her son  "I have been having problems with my memory " She states that she has been having some difficulty with word-finding  She states, "I know what I want to say  I just don't remember the words " She states that she has noticed this for about 4 months  She is wondering if it might be a side effect to the medications I am taking  She states that she spoke with her pain and spine doctor, and she plans to stop taking her neurontin, to see if this helps with the memory issues  Baltazar Brown states that she is doing well in school  She states that it is hard, due to her hours  She reports that her teachers are working with her and being flexible  ASSESSMENT: Katherine Melo presents with a primarily euthymic mood  She endorses anxiety when she is at work  Her affect is normal and mood-congruent  Katherine Melo exhibits a positive therapeutic rapport with this clinician  Katherine Melo appears to have normal insight and judgment  Katherine Melo continues to exhibit willingness to work on treatment goals and objectives  PLAN: Katherine Melo will return in two weeks for the next scheduled session  Between sessions, Katherine Melo will work with her doctor to decrease/discontinue her neurontin  She will continue to use her existing coping skills and will report back during the next session re: successes and barriers  At the next session, this clinician will use client-centered therapy, mindfulness-based strategies, CBT techniques and solution-focused therapy to address her anxiety and mood regulation, in an effort to assist Katherine Melo with meeting treatment goals  Behavioral Health Treatment Plan [facility]: Diagnosis and Treatment Plan explained to Socorro Case relates understanding diagnosis and is agreeable to Treatment Plan   Yes

## 2020-04-02 ENCOUNTER — TELEMEDICINE (OUTPATIENT)
Dept: FAMILY MEDICINE CLINIC | Facility: CLINIC | Age: 50
End: 2020-04-02
Payer: COMMERCIAL

## 2020-04-02 DIAGNOSIS — J30.2 SEASONAL ALLERGIES: ICD-10-CM

## 2020-04-02 DIAGNOSIS — K21.9 GASTROESOPHAGEAL REFLUX DISEASE WITHOUT ESOPHAGITIS: ICD-10-CM

## 2020-04-02 PROCEDURE — 99213 OFFICE O/P EST LOW 20 MIN: CPT | Performed by: INTERNAL MEDICINE

## 2020-04-02 RX ORDER — MONTELUKAST SODIUM 10 MG/1
10 TABLET ORAL
Qty: 90 TABLET | Refills: 1 | Status: SHIPPED | OUTPATIENT
Start: 2020-04-02 | End: 2020-12-09

## 2020-04-02 RX ORDER — IPRATROPIUM BROMIDE 21 UG/1
2 SPRAY, METERED NASAL EVERY 12 HOURS
Qty: 30 ML | Refills: 0 | Status: SHIPPED | OUTPATIENT
Start: 2020-04-02 | End: 2020-11-19 | Stop reason: ALTCHOICE

## 2020-04-02 RX ORDER — PANTOPRAZOLE SODIUM 40 MG/1
40 TABLET, DELAYED RELEASE ORAL DAILY
Qty: 90 TABLET | Refills: 0 | Status: SHIPPED | OUTPATIENT
Start: 2020-04-02 | End: 2020-05-11

## 2020-04-03 DIAGNOSIS — F90.2 ADHD (ATTENTION DEFICIT HYPERACTIVITY DISORDER), COMBINED TYPE: ICD-10-CM

## 2020-04-03 RX ORDER — ATOMOXETINE 25 MG/1
CAPSULE ORAL
Qty: 90 CAPSULE | Refills: 0 | Status: SHIPPED | OUTPATIENT
Start: 2020-04-03 | End: 2020-06-04 | Stop reason: SDUPTHER

## 2020-04-06 ENCOUNTER — TELEPHONE (OUTPATIENT)
Dept: OBGYN CLINIC | Facility: CLINIC | Age: 50
End: 2020-04-06

## 2020-04-06 ENCOUNTER — TELEPHONE (OUTPATIENT)
Dept: CARDIOLOGY CLINIC | Facility: CLINIC | Age: 50
End: 2020-04-06

## 2020-04-06 ENCOUNTER — HOSPITAL ENCOUNTER (EMERGENCY)
Facility: HOSPITAL | Age: 50
Discharge: HOME | End: 2020-04-06
Attending: EMERGENCY MEDICINE | Admitting: EMERGENCY MEDICINE
Payer: COMMERCIAL

## 2020-04-06 VITALS
DIASTOLIC BLOOD PRESSURE: 76 MMHG | BODY MASS INDEX: 26.41 KG/M2 | TEMPERATURE: 98 F | SYSTOLIC BLOOD PRESSURE: 126 MMHG | OXYGEN SATURATION: 99 % | RESPIRATION RATE: 18 BRPM | HEART RATE: 90 BPM | WEIGHT: 173.72 LBS

## 2020-04-06 DIAGNOSIS — I47.1 PAROXYSMAL SVT (SUPRAVENTRICULAR TACHYCARDIA) (HCC): ICD-10-CM

## 2020-04-06 DIAGNOSIS — R00.2 HEART PALPITATIONS: ICD-10-CM

## 2020-04-06 DIAGNOSIS — I47.1 SVT (SUPRAVENTRICULAR TACHYCARDIA) (HCC): Primary | ICD-10-CM

## 2020-04-06 PROCEDURE — 99284 EMERGENCY DEPT VISIT MOD MDM: CPT

## 2020-04-06 PROCEDURE — 93005 ELECTROCARDIOGRAM TRACING: CPT

## 2020-04-06 PROCEDURE — 99285 EMERGENCY DEPT VISIT HI MDM: CPT | Performed by: EMERGENCY MEDICINE

## 2020-04-06 RX ADMIN — METOPROLOL TARTRATE 25 MG: 25 TABLET ORAL at 12:14

## 2020-04-07 ENCOUNTER — APPOINTMENT (OUTPATIENT)
Dept: LAB | Facility: CLINIC | Age: 50
End: 2020-04-07
Payer: COMMERCIAL

## 2020-04-07 ENCOUNTER — TELEMEDICINE (OUTPATIENT)
Dept: FAMILY MEDICINE CLINIC | Facility: CLINIC | Age: 50
End: 2020-04-07
Payer: COMMERCIAL

## 2020-04-07 DIAGNOSIS — R39.9 URINARY TRACT INFECTION SYMPTOMS: Primary | ICD-10-CM

## 2020-04-07 DIAGNOSIS — N30.01 ACUTE CYSTITIS WITH HEMATURIA: Primary | ICD-10-CM

## 2020-04-07 LAB
ATRIAL RATE: 88 BPM
BACTERIA UR QL AUTO: ABNORMAL /HPF
BILIRUB UR QL STRIP: ABNORMAL
CLARITY UR: ABNORMAL
COLOR UR: ABNORMAL
GLUCOSE UR STRIP-MCNC: NEGATIVE MG/DL
HGB UR QL STRIP.AUTO: ABNORMAL
KETONES UR STRIP-MCNC: ABNORMAL MG/DL
LEUKOCYTE ESTERASE UR QL STRIP: ABNORMAL
NITRITE UR QL STRIP: NEGATIVE
NON-SQ EPI CELLS URNS QL MICRO: ABNORMAL /HPF
P AXIS: 67 DEGREES
PH UR STRIP.AUTO: 6 [PH]
PR INTERVAL: 128 MS
PROT UR STRIP-MCNC: >=300 MG/DL
QRS AXIS: 157 DEGREES
QRSD INTERVAL: 84 MS
QT INTERVAL: 358 MS
QTC INTERVAL: 585 MS
RBC #/AREA URNS AUTO: ABNORMAL /HPF
SP GR UR STRIP.AUTO: >=1.03 (ref 1–1.03)
T WAVE AXIS: 70 DEGREES
UROBILINOGEN UR QL STRIP.AUTO: 1 E.U./DL
VENTRICULAR RATE: 160 BPM
WBC #/AREA URNS AUTO: ABNORMAL /HPF

## 2020-04-07 PROCEDURE — 87186 SC STD MICRODIL/AGAR DIL: CPT | Performed by: INTERNAL MEDICINE

## 2020-04-07 PROCEDURE — 99213 OFFICE O/P EST LOW 20 MIN: CPT | Performed by: INTERNAL MEDICINE

## 2020-04-07 PROCEDURE — 87086 URINE CULTURE/COLONY COUNT: CPT | Performed by: INTERNAL MEDICINE

## 2020-04-07 PROCEDURE — 93010 ELECTROCARDIOGRAM REPORT: CPT | Performed by: INTERNAL MEDICINE

## 2020-04-07 PROCEDURE — 87077 CULTURE AEROBIC IDENTIFY: CPT | Performed by: INTERNAL MEDICINE

## 2020-04-07 PROCEDURE — 81001 URINALYSIS AUTO W/SCOPE: CPT | Performed by: INTERNAL MEDICINE

## 2020-04-07 RX ORDER — METOPROLOL SUCCINATE 25 MG/1
25 TABLET, EXTENDED RELEASE ORAL 2 TIMES DAILY
Qty: 180 TABLET | Refills: 3 | Status: SHIPPED | OUTPATIENT
Start: 2020-04-07 | End: 2020-04-30 | Stop reason: SDUPTHER

## 2020-04-07 RX ORDER — NITROFURANTOIN 25; 75 MG/1; MG/1
100 CAPSULE ORAL 2 TIMES DAILY
Qty: 10 CAPSULE | Refills: 0 | Status: SHIPPED | OUTPATIENT
Start: 2020-04-07 | End: 2020-04-12

## 2020-04-09 LAB
BACTERIA UR CULT: ABNORMAL

## 2020-04-10 ENCOUNTER — TELEMEDICINE (OUTPATIENT)
Dept: BEHAVIORAL/MENTAL HEALTH CLINIC | Facility: CLINIC | Age: 50
End: 2020-04-10
Payer: COMMERCIAL

## 2020-04-10 DIAGNOSIS — F43.10 POST TRAUMATIC STRESS DISORDER (PTSD): Chronic | ICD-10-CM

## 2020-04-10 DIAGNOSIS — F33.2 MAJOR DEPRESSIVE DISORDER, RECURRENT SEVERE WITHOUT PSYCHOTIC FEATURES (HCC): Primary | Chronic | ICD-10-CM

## 2020-04-10 DIAGNOSIS — F41.1 GENERALIZED ANXIETY DISORDER: Chronic | ICD-10-CM

## 2020-04-10 PROCEDURE — 90834 PSYTX W PT 45 MINUTES: CPT | Performed by: SOCIAL WORKER

## 2020-04-24 ENCOUNTER — TELEMEDICINE (OUTPATIENT)
Dept: BEHAVIORAL/MENTAL HEALTH CLINIC | Facility: CLINIC | Age: 50
End: 2020-04-24
Payer: COMMERCIAL

## 2020-04-24 DIAGNOSIS — F43.10 POST TRAUMATIC STRESS DISORDER (PTSD): Chronic | ICD-10-CM

## 2020-04-24 DIAGNOSIS — F41.1 GENERALIZED ANXIETY DISORDER: Chronic | ICD-10-CM

## 2020-04-24 DIAGNOSIS — F33.2 MAJOR DEPRESSIVE DISORDER, RECURRENT SEVERE WITHOUT PSYCHOTIC FEATURES (HCC): Primary | Chronic | ICD-10-CM

## 2020-04-24 PROCEDURE — 90834 PSYTX W PT 45 MINUTES: CPT | Performed by: SOCIAL WORKER

## 2020-04-30 ENCOUNTER — TELEMEDICINE (OUTPATIENT)
Dept: CARDIOLOGY CLINIC | Facility: CLINIC | Age: 50
End: 2020-04-30
Payer: COMMERCIAL

## 2020-04-30 VITALS — DIASTOLIC BLOOD PRESSURE: 60 MMHG | SYSTOLIC BLOOD PRESSURE: 100 MMHG

## 2020-04-30 DIAGNOSIS — R00.0 TACHYCARDIA: Primary | ICD-10-CM

## 2020-04-30 DIAGNOSIS — I47.1 PAROXYSMAL SVT (SUPRAVENTRICULAR TACHYCARDIA) (HCC): ICD-10-CM

## 2020-04-30 PROCEDURE — 99215 OFFICE O/P EST HI 40 MIN: CPT | Performed by: INTERNAL MEDICINE

## 2020-04-30 RX ORDER — METOPROLOL SUCCINATE 25 MG/1
25 TABLET, EXTENDED RELEASE ORAL 2 TIMES DAILY
Qty: 180 TABLET | Refills: 3 | Status: SHIPPED | OUTPATIENT
Start: 2020-04-30 | End: 2020-08-28 | Stop reason: SDUPTHER

## 2020-05-08 ENCOUNTER — TELEMEDICINE (OUTPATIENT)
Dept: BEHAVIORAL/MENTAL HEALTH CLINIC | Facility: CLINIC | Age: 50
End: 2020-05-08
Payer: COMMERCIAL

## 2020-05-08 DIAGNOSIS — F43.10 POST TRAUMATIC STRESS DISORDER (PTSD): Chronic | ICD-10-CM

## 2020-05-08 DIAGNOSIS — F33.2 MAJOR DEPRESSIVE DISORDER, RECURRENT SEVERE WITHOUT PSYCHOTIC FEATURES (HCC): Primary | Chronic | ICD-10-CM

## 2020-05-08 DIAGNOSIS — F41.1 GENERALIZED ANXIETY DISORDER: Chronic | ICD-10-CM

## 2020-05-08 PROCEDURE — 90834 PSYTX W PT 45 MINUTES: CPT | Performed by: SOCIAL WORKER

## 2020-05-11 DIAGNOSIS — K21.9 GASTROESOPHAGEAL REFLUX DISEASE WITHOUT ESOPHAGITIS: ICD-10-CM

## 2020-05-11 RX ORDER — PANTOPRAZOLE SODIUM 40 MG/1
TABLET, DELAYED RELEASE ORAL
Qty: 90 TABLET | Refills: 0 | Status: SHIPPED | OUTPATIENT
Start: 2020-05-11 | End: 2020-07-27

## 2020-05-13 ENCOUNTER — TELEPHONE (OUTPATIENT)
Dept: CARDIOLOGY CLINIC | Facility: CLINIC | Age: 50
End: 2020-05-13

## 2020-05-14 ENCOUNTER — TELEMEDICINE (OUTPATIENT)
Dept: FAMILY MEDICINE CLINIC | Facility: CLINIC | Age: 50
End: 2020-05-14
Payer: COMMERCIAL

## 2020-05-14 VITALS
HEIGHT: 68 IN | HEART RATE: 104 BPM | BODY MASS INDEX: 26.52 KG/M2 | SYSTOLIC BLOOD PRESSURE: 130 MMHG | WEIGHT: 175 LBS | DIASTOLIC BLOOD PRESSURE: 80 MMHG

## 2020-05-14 DIAGNOSIS — K21.9 GASTROESOPHAGEAL REFLUX DISEASE, ESOPHAGITIS PRESENCE NOT SPECIFIED: Primary | ICD-10-CM

## 2020-05-14 DIAGNOSIS — K58.8 OTHER IRRITABLE BOWEL SYNDROME: ICD-10-CM

## 2020-05-14 PROBLEM — R06.02 SOB (SHORTNESS OF BREATH): Status: RESOLVED | Noted: 2019-11-05 | Resolved: 2020-05-14

## 2020-05-14 PROBLEM — L30.9 DERMATITIS: Status: RESOLVED | Noted: 2018-02-15 | Resolved: 2020-05-14

## 2020-05-14 PROBLEM — Z01.419 ENCOUNTER FOR ANNUAL ROUTINE GYNECOLOGICAL EXAMINATION: Status: RESOLVED | Noted: 2018-04-20 | Resolved: 2020-05-14

## 2020-05-14 PROCEDURE — 99214 OFFICE O/P EST MOD 30 MIN: CPT | Performed by: INTERNAL MEDICINE

## 2020-05-14 RX ORDER — FAMOTIDINE 20 MG/1
20 TABLET, FILM COATED ORAL 2 TIMES DAILY
Qty: 60 TABLET | Refills: 0 | Status: SHIPPED | OUTPATIENT
Start: 2020-05-14 | End: 2020-05-28

## 2020-05-22 ENCOUNTER — TELEMEDICINE (OUTPATIENT)
Dept: BEHAVIORAL/MENTAL HEALTH CLINIC | Facility: CLINIC | Age: 50
End: 2020-05-22
Payer: COMMERCIAL

## 2020-05-22 DIAGNOSIS — F43.10 POST TRAUMATIC STRESS DISORDER (PTSD): Chronic | ICD-10-CM

## 2020-05-22 DIAGNOSIS — F41.1 GENERALIZED ANXIETY DISORDER: Chronic | ICD-10-CM

## 2020-05-22 DIAGNOSIS — F33.2 MAJOR DEPRESSIVE DISORDER, RECURRENT SEVERE WITHOUT PSYCHOTIC FEATURES (HCC): Primary | Chronic | ICD-10-CM

## 2020-05-22 DIAGNOSIS — F33.2 SEVERE EPISODE OF RECURRENT MAJOR DEPRESSIVE DISORDER, WITHOUT PSYCHOTIC FEATURES (HCC): Primary | ICD-10-CM

## 2020-05-22 PROCEDURE — 90834 PSYTX W PT 45 MINUTES: CPT | Performed by: SOCIAL WORKER

## 2020-05-22 RX ORDER — VENLAFAXINE HYDROCHLORIDE 75 MG/1
75 CAPSULE, EXTENDED RELEASE ORAL DAILY
Qty: 90 CAPSULE | Refills: 1 | Status: SHIPPED | OUTPATIENT
Start: 2020-05-22 | End: 2020-11-13 | Stop reason: SDUPTHER

## 2020-05-28 ENCOUNTER — TELEMEDICINE (OUTPATIENT)
Dept: FAMILY MEDICINE CLINIC | Facility: CLINIC | Age: 50
End: 2020-05-28
Payer: COMMERCIAL

## 2020-05-28 VITALS — WEIGHT: 167 LBS | BODY MASS INDEX: 25.31 KG/M2 | HEIGHT: 68 IN

## 2020-05-28 DIAGNOSIS — F33.2 MAJOR DEPRESSIVE DISORDER, RECURRENT SEVERE WITHOUT PSYCHOTIC FEATURES (HCC): Primary | Chronic | ICD-10-CM

## 2020-05-28 DIAGNOSIS — E78.2 MIXED HYPERLIPIDEMIA: ICD-10-CM

## 2020-05-28 DIAGNOSIS — K21.9 GASTROESOPHAGEAL REFLUX DISEASE, ESOPHAGITIS PRESENCE NOT SPECIFIED: ICD-10-CM

## 2020-05-28 PROCEDURE — 99214 OFFICE O/P EST MOD 30 MIN: CPT | Performed by: INTERNAL MEDICINE

## 2020-05-28 RX ORDER — FAMOTIDINE 20 MG/1
20 TABLET, FILM COATED ORAL DAILY
Qty: 60 TABLET | Refills: 0
Start: 2020-05-28 | End: 2020-10-22 | Stop reason: ALTCHOICE

## 2020-06-01 DIAGNOSIS — F33.2 SEVERE EPISODE OF RECURRENT MAJOR DEPRESSIVE DISORDER, WITHOUT PSYCHOTIC FEATURES (HCC): ICD-10-CM

## 2020-06-02 RX ORDER — LAMOTRIGINE 100 MG/1
TABLET ORAL
Qty: 45 TABLET | Refills: 0 | OUTPATIENT
Start: 2020-06-02

## 2020-06-03 DIAGNOSIS — F33.2 MDD (MAJOR DEPRESSIVE DISORDER), RECURRENT SEVERE, WITHOUT PSYCHOSIS (HCC): ICD-10-CM

## 2020-06-03 RX ORDER — LAMOTRIGINE 150 MG/1
TABLET ORAL
Qty: 90 TABLET | Refills: 0 | Status: SHIPPED | OUTPATIENT
Start: 2020-06-03 | End: 2020-09-22

## 2020-06-04 ENCOUNTER — TELEMEDICINE (OUTPATIENT)
Dept: PSYCHIATRY | Facility: CLINIC | Age: 50
End: 2020-06-04
Payer: COMMERCIAL

## 2020-06-04 DIAGNOSIS — F41.0 PANIC ATTACKS: ICD-10-CM

## 2020-06-04 DIAGNOSIS — F33.2 SEVERE EPISODE OF RECURRENT MAJOR DEPRESSIVE DISORDER, WITHOUT PSYCHOTIC FEATURES (HCC): Primary | ICD-10-CM

## 2020-06-04 DIAGNOSIS — F90.2 ADHD (ATTENTION DEFICIT HYPERACTIVITY DISORDER), COMBINED TYPE: ICD-10-CM

## 2020-06-04 PROCEDURE — 99214 OFFICE O/P EST MOD 30 MIN: CPT | Performed by: NURSE PRACTITIONER

## 2020-06-04 RX ORDER — HYDROXYZINE HYDROCHLORIDE 25 MG/1
25 TABLET, FILM COATED ORAL EVERY 6 HOURS PRN
Qty: 90 TABLET | Refills: 0 | Status: SHIPPED | OUTPATIENT
Start: 2020-06-04 | End: 2020-11-19 | Stop reason: ALTCHOICE

## 2020-06-04 RX ORDER — QUETIAPINE FUMARATE 50 MG/1
50 TABLET, FILM COATED ORAL
Qty: 90 TABLET | Refills: 1 | Status: SHIPPED | OUTPATIENT
Start: 2020-06-04 | End: 2020-11-13

## 2020-06-04 RX ORDER — LAMOTRIGINE 100 MG/1
100 TABLET ORAL DAILY
Qty: 90 TABLET | Refills: 1 | Status: SHIPPED | OUTPATIENT
Start: 2020-06-04 | End: 2020-11-13 | Stop reason: SDUPTHER

## 2020-06-04 RX ORDER — ATOMOXETINE 25 MG/1
25 CAPSULE ORAL DAILY
Qty: 90 CAPSULE | Refills: 0 | Status: SHIPPED | OUTPATIENT
Start: 2020-06-04 | End: 2020-09-22

## 2020-06-04 RX ORDER — VENLAFAXINE HYDROCHLORIDE 150 MG/1
150 CAPSULE, EXTENDED RELEASE ORAL DAILY
Qty: 90 CAPSULE | Refills: 1 | Status: SHIPPED | OUTPATIENT
Start: 2020-06-04 | End: 2020-11-13 | Stop reason: SDUPTHER

## 2020-06-05 ENCOUNTER — TELEMEDICINE (OUTPATIENT)
Dept: BEHAVIORAL/MENTAL HEALTH CLINIC | Facility: CLINIC | Age: 50
End: 2020-06-05
Payer: COMMERCIAL

## 2020-06-05 DIAGNOSIS — F43.10 POST TRAUMATIC STRESS DISORDER (PTSD): Chronic | ICD-10-CM

## 2020-06-05 DIAGNOSIS — F33.2 MAJOR DEPRESSIVE DISORDER, RECURRENT SEVERE WITHOUT PSYCHOTIC FEATURES (HCC): Primary | Chronic | ICD-10-CM

## 2020-06-05 DIAGNOSIS — F41.1 GENERALIZED ANXIETY DISORDER: Chronic | ICD-10-CM

## 2020-06-05 PROCEDURE — 90834 PSYTX W PT 45 MINUTES: CPT | Performed by: SOCIAL WORKER

## 2020-06-10 ENCOUNTER — TELEPHONE (OUTPATIENT)
Dept: PAIN MEDICINE | Facility: CLINIC | Age: 50
End: 2020-06-10

## 2020-06-10 DIAGNOSIS — G89.4 CHRONIC PAIN SYNDROME: Primary | ICD-10-CM

## 2020-06-11 RX ORDER — MELOXICAM 7.5 MG/1
7.5 TABLET ORAL DAILY
Qty: 15 TABLET | Refills: 0 | Status: SHIPPED | OUTPATIENT
Start: 2020-06-11 | End: 2021-06-28

## 2020-06-16 ENCOUNTER — TELEMEDICINE (OUTPATIENT)
Dept: FAMILY MEDICINE CLINIC | Facility: CLINIC | Age: 50
End: 2020-06-16
Payer: COMMERCIAL

## 2020-06-16 VITALS — OXYGEN SATURATION: 96 % | SYSTOLIC BLOOD PRESSURE: 140 MMHG | DIASTOLIC BLOOD PRESSURE: 90 MMHG

## 2020-06-16 DIAGNOSIS — R06.02 SHORTNESS OF BREATH: Primary | ICD-10-CM

## 2020-06-16 DIAGNOSIS — R00.0 TACHYCARDIA: ICD-10-CM

## 2020-06-16 PROCEDURE — 99214 OFFICE O/P EST MOD 30 MIN: CPT | Performed by: INTERNAL MEDICINE

## 2020-06-16 RX ORDER — ALBUTEROL SULFATE 90 UG/1
2 AEROSOL, METERED RESPIRATORY (INHALATION) EVERY 6 HOURS PRN
Qty: 1 INHALER | Refills: 0 | Status: SHIPPED | OUTPATIENT
Start: 2020-06-16 | End: 2020-12-12

## 2020-06-19 ENCOUNTER — TELEMEDICINE (OUTPATIENT)
Dept: BEHAVIORAL/MENTAL HEALTH CLINIC | Facility: CLINIC | Age: 50
End: 2020-06-19
Payer: COMMERCIAL

## 2020-06-19 DIAGNOSIS — F43.10 POST TRAUMATIC STRESS DISORDER (PTSD): Chronic | ICD-10-CM

## 2020-06-19 DIAGNOSIS — F33.2 MAJOR DEPRESSIVE DISORDER, RECURRENT SEVERE WITHOUT PSYCHOTIC FEATURES (HCC): Primary | Chronic | ICD-10-CM

## 2020-06-19 DIAGNOSIS — F41.1 GENERALIZED ANXIETY DISORDER: Chronic | ICD-10-CM

## 2020-06-19 PROCEDURE — 90834 PSYTX W PT 45 MINUTES: CPT | Performed by: SOCIAL WORKER

## 2020-07-02 ENCOUNTER — TELEMEDICINE (OUTPATIENT)
Dept: BEHAVIORAL/MENTAL HEALTH CLINIC | Facility: CLINIC | Age: 50
End: 2020-07-02
Payer: COMMERCIAL

## 2020-07-02 DIAGNOSIS — F41.1 GENERALIZED ANXIETY DISORDER: Chronic | ICD-10-CM

## 2020-07-02 DIAGNOSIS — F43.10 POST TRAUMATIC STRESS DISORDER (PTSD): Chronic | ICD-10-CM

## 2020-07-02 DIAGNOSIS — F33.2 MAJOR DEPRESSIVE DISORDER, RECURRENT SEVERE WITHOUT PSYCHOTIC FEATURES (HCC): Primary | Chronic | ICD-10-CM

## 2020-07-02 PROCEDURE — 90834 PSYTX W PT 45 MINUTES: CPT | Performed by: SOCIAL WORKER

## 2020-07-02 PROCEDURE — 1036F TOBACCO NON-USER: CPT | Performed by: SOCIAL WORKER

## 2020-07-02 NOTE — PSYCH
Virtual Regular Visit      Assessment/Plan:    Problem List Items Addressed This Visit        Other    Post traumatic stress disorder (PTSD) (Chronic)    Major depressive disorder, recurrent severe without psychotic features ([facility] [address] ) - Primary (Chronic)    Generalized anxiety disorder (Chronic)               Reason for visit is Behavioral Health session, conducted through video, due to COVID-19 precautions       Encounter provider LALY Sarmiento    Provider located at [address] [place] [address] [address] [id number]      Recent Visits  No visits were found meeting these conditions  Showing recent visits within past 7 days and meeting all other requirements     Future Appointments  No visits were found meeting these conditions  Showing future appointments within next 150 days and meeting all other requirements        The patient was identified by name and date of birth  Cheko López was informed that this is a telemedicine visit and that the visit is being conducted through [employer]  My office door was closed  No one else was in the room  She acknowledged consent and understanding of privacy and security of the video platform  The patient has agreed to participate and understands they can discontinue the visit at any time  Patient is aware this is a billable service  Subjective  Cheko López is a 45 y o  female  DATA: Met with Sharmaine Solis for scheduled individual session  "I'm bored  I want to be able to do things with my family " Dorado Nasra discussed her frustration with the current COVID situation and not being able to do the things she had planned for the summer  She discussed her anxiety management  "That breathing thing has gotten better  I definitely think it's related to my anxiety " We discussed any changes she has made that has led to this improvement   She states, "When things [at work] are a little slower, it's not as hectic " We discussed her work responsibilities  She states that the new supervisor is "really nice " She states that she was "petrified" of the previous boss-- "She always singled me out " WE discussed how getting a new supervisor, who she feels more comfortable with, probably helped her to decrease her anxiety  We spent some time talking about her OCD symptoms  "I wish I could just let it go " More at work than at home  At work, sheets have to be done right or I re-do them  At home, have to have everything cleaned b/f she can cook  She states that, when she is at work, she likes to make sure that everything is done correctly  She states that she is able to get all of her work done, and she tends to be frustrated--not by her own behaviors, but rather the behaviors of others  We will continue to explore potential OCD and ways that she can change behaviors that she feels are hindering her enjoyment of life  ASSESSMENT: Barrett Bermudez presents with a somewhat anxious mood  Her affect is mood-congruent  Barrett Bermudez exhibits a positive therapeutic rapport with this clinician  Barrett Bermudez appears to have normal insight and judgment  Barrett Bermudez continues to exhibit willingness to work on treatment goals and objectives  PLAN: Barrett Bermudez will return in two weeks for the next scheduled session  Between sessions, Barrett Bermudez will continue to utilize mindfulness-based strategies to manage her mood and will report back during the next session re: successes and barriers  At the next session, this clinician will use client-centered therapy, mindfulness-based strategies, DBT-informed skills and solution-focused therapy to address her mood regulation, in an effort to assist Barrett Bermudez with meeting treatment goals  HPI     Past Medical History:   Diagnosis Date    ADHD (attention deficit hyperactivity disorder)     Bulging lumbar disc     Carpal tunnel syndrome     RIGHT    LAST ASSESSED: 12/7/16    Chronic back pain     low    Chronic pain disorder     Colon polyps     Diabetes mellitus ([facility] [address] )     Resolved post weight loss    GERD (gastroesophageal reflux disease)     Gestational diabetes     Hearing loss     left ear    Hyperlipidemia     Resolved with weight loss    IBS (irritable bowel syndrome)     Ileus ([facility] [address] )     LAST ASSESSED: 8/3/17    Labial cyst     LAST ASSESSED: 16    Myofascial pain     LAST ASSESSED: 16    Obesity     Ovarian cyst     LEFT  LAST ASSESSED: 16    Panic attack     Pap smear for cervical cancer screening     2018--pap wnl, HRHPV neg    Pneumonia     Seasonal allergies     Thoracic outlet syndrome     2010    Trochanteric bursitis of both hips     LAST ASSESSED: 3/18/16    Ulnar neuropathy at elbow     Varicella     Wears glasses        Past Surgical History:   Procedure Laterality Date    BILE DUCT EXPLORATION      ENDOSCOPIC REMOVAL OF STONES FROM BILIARY TRACT     SECTION      x3    CHOLECYSTECTOMY      COLONOSCOPY      -polyp, repeat     DILATION AND CURETTAGE OF UTERUS      ENDOMETRIAL ABLATION      ERCP W/ SPHICTEROTOMY      FIRST RIB REMOVAL      THORAX EXCISION OF FIRST RIB    HYSTEROSCOPY      ME COLONOSCOPY FLX DX W/COLLJ SPEC WHEN PFRMD N/A 2017    Procedure: COLONOSCOPY;  Surgeon: Po Pepper MD;  [facility]; Service: Gastroenterology    ME ESOPHAGOGASTRODUODENOSCOPY TRANSORAL DIAGNOSTIC N/A 2017    Procedure: ESOPHAGOGASTRODUODENOSCOPY (EGD); Surgeon: Ashleigh Cueva MD;  [facility];   Service: Gastroenterology    ME HYSTEROSCOPY,W/ENDO BX N/A 10/20/2017    Procedure: DILATATION AND CURETTAGE (D&C) WITH HYSTEROSCOPY  REMOVAL VULVAR RT  LESION;  Surgeon: Modesto Warren MD;  [facility];  Service: Gynecology    ME LAP, ÁLVARO RESTRICT PROC, LONGITUDINAL GASTRECTOMY N/A 2018    Procedure: GASTRECTOMY SLEEVE LAPAROSCOPIC; INTRAOPERATIVE EGD ;  Surgeon: Ju Stallings MD;  [facility];  Service: Bariatrics    ME SURG IMPLNT NEUROELECT,EPIDURAL Left 1/29/2020    Procedure: Insertion of thoracic spinal cord stimulator electrode via laminotomy and placement of left buttock implantable pulse generator (NEUROMONITORING);   Surgeon: Jacqueline Garcia MD;  [facility];  Service: Neurosurgery    SPINAL CORD STIMULATOR TRIAL W/ LAMINOTOMY      TONSILLECTOMY AND ADENOIDECTOMY      TUBAL LIGATION      VEIN LIGATION AND STRIPPING Right     VULVA SURGERY  10/20/2017    BIOPSY    WISDOM TOOTH EXTRACTION         Current Outpatient Medications   Medication Sig Dispense Refill    albuterol (Ventolin HFA) 90 mcg/act inhaler Inhale 2 puffs every 6 (six) hours as needed for wheezing or shortness of breath 1 Inhaler 0    atoMOXetine (STRATTERA) 25 mg capsule Take 1 capsule (25 mg total) by mouth daily 90 capsule 0    Biotin 98079 MCG TABS Take by mouth      Calcium Carbonate-Vit D-Min (CALCIUM 1200 PO) Take 2,400 mg by mouth daily      Cyanocobalamin ([employer] VITAMIN B12 PO) Take by mouth      drospirenone-ethinyl estradiol (LIEN) 3-0 02 MG per tablet Take 1 tablet by mouth daily (Patient taking differently: Take 1 tablet by mouth daily at bedtime ) 84 tablet 4    famotidine (PEPCID) 20 mg tablet Take 1 tablet (20 mg total) by mouth daily 60 tablet 0    hydrOXYzine HCL (ATARAX) 25 mg tablet Take 1 tablet (25 mg total) by mouth every 6 (six) hours as needed for itching 90 tablet 0    ipratropium (ATROVENT) 0 03 % nasal spray 2 sprays into each nostril every 12 (twelve) hours 30 mL 0    lamoTRIgine (LaMICtal) 100 mg tablet Take 1 tablet (100 mg total) by mouth daily 90 tablet 1    lamoTRIgine (LaMICtal) 150 MG tablet TAKE ONE TABLET BY MOUTH DAILY 90 tablet 0    meloxicam (MOBIC) 7 5 mg tablet Take 1 tablet (7 5 mg total) by mouth daily 15 tablet 0    metoprolol succinate (TOPROL-XL) 25 mg 24 hr tablet Take 1 tablet (25 mg total) by mouth 2 (two) times a day 180 tablet 3    montelukast (SINGULAIR) 10 mg tablet Take 1 tablet (10 mg total) by mouth daily at bedtime 90 tablet 1    Multiple Vitamins-Minerals (MULTI COMPLETE PO) Take by mouth      pantoprazole (PROTONIX) 40 mg tablet TAKE ONE TABLET BY MOUTH EVERY DAY 90 tablet 0    Probiotic Product (PRO-BIOTIC BLEND PO) Take by mouth as needed       QUEtiapine (SEROquel) 50 mg tablet Take 1 tablet (50 mg total) by mouth daily at bedtime 90 tablet 1    venlafaxine (EFFEXOR-XR) 150 mg 24 hr capsule Take 1 capsule (150 mg total) by mouth daily 90 capsule 1    venlafaxine (EFFEXOR-XR) 75 mg 24 hr capsule Take 1 capsule (75 mg total) by mouth daily 90 capsule 1     No current facility-administered medications for this visit  Allergies   Allergen Reactions    Ibuprofen Other (See Comments)     Due to gastric sleeve -can only take for 5 days, then needs to stop       Review of Systems    Video Exam    There were no vitals filed for this visit  Physical Exam     As a result of this visit, I have not referred the patient for further respiratory evaluation  I spent 50 minutes directly with the patient during this visit      VIRTUAL VISIT DISCLAIMER    Lexa Post acknowledges that she has consented to an online visit or consultation  She understands that the online visit is based solely on information provided by her, and that, in the absence of a face-to-face physical evaluation by the physician, the diagnosis she receives is both limited and provisional in terms of accuracy and completeness  This is not intended to replace a full medical face-to-face evaluation by the physician  Lexa Post understands and accepts these terms

## 2020-07-14 ENCOUNTER — TELEPHONE (OUTPATIENT)
Dept: OTOLARYNGOLOGY | Facility: CLINIC | Age: 50
End: 2020-07-14

## 2020-07-14 NOTE — TELEPHONE ENCOUNTER
COVID Pre-Visit Screening     1  Is this a family member screening? NO  2  Have you traveled outside of your state in the past 2 weeks? No  3  Do you presently have a fever or flu-like symptoms? NO  4  Do you have symptoms of an upper respiratory infection like runny nose, sore throat, or cough? NO  5  Are you suffering from new headache that you have not had in the past?NO  6  Do you have/have you experienced any new shortness of breath recently? NO  7  Do you have any new diarrhea, nausea or vomiting? NO  8  Have you been in contact with anyone who has been sick or diagnosed with COVID-19? NO  9  Do you have any new loss of taste or smell? NO  10  Are you able to wear a mask without a valve for the entire visit   YES

## 2020-07-16 ENCOUNTER — OFFICE VISIT (OUTPATIENT)
Dept: AUDIOLOGY | Facility: CLINIC | Age: 50
End: 2020-07-16
Payer: COMMERCIAL

## 2020-07-16 ENCOUNTER — OFFICE VISIT (OUTPATIENT)
Dept: OTOLARYNGOLOGY | Facility: CLINIC | Age: 50
End: 2020-07-16
Payer: COMMERCIAL

## 2020-07-16 VITALS
BODY MASS INDEX: 26.14 KG/M2 | SYSTOLIC BLOOD PRESSURE: 117 MMHG | HEIGHT: 68 IN | TEMPERATURE: 97.1 F | RESPIRATION RATE: 16 BRPM | HEART RATE: 76 BPM | WEIGHT: 172.5 LBS | DIASTOLIC BLOOD PRESSURE: 73 MMHG

## 2020-07-16 DIAGNOSIS — H90.3 SENSORINEURAL HEARING LOSS OF BOTH EARS: Primary | ICD-10-CM

## 2020-07-16 DIAGNOSIS — H90.3 SENSORINEURAL HEARING LOSS (SNHL) OF BOTH EARS: Primary | ICD-10-CM

## 2020-07-16 DIAGNOSIS — J34.89 NASAL VESTIBULITIS: ICD-10-CM

## 2020-07-16 PROCEDURE — 99203 OFFICE O/P NEW LOW 30 MIN: CPT | Performed by: OTOLARYNGOLOGY

## 2020-07-16 PROCEDURE — 1036F TOBACCO NON-USER: CPT | Performed by: OTOLARYNGOLOGY

## 2020-07-16 PROCEDURE — 3008F BODY MASS INDEX DOCD: CPT | Performed by: OTOLARYNGOLOGY

## 2020-07-16 PROCEDURE — 3078F DIAST BP <80 MM HG: CPT | Performed by: OTOLARYNGOLOGY

## 2020-07-16 PROCEDURE — 92567 TYMPANOMETRY: CPT | Performed by: AUDIOLOGIST

## 2020-07-16 PROCEDURE — 3074F SYST BP LT 130 MM HG: CPT | Performed by: OTOLARYNGOLOGY

## 2020-07-16 PROCEDURE — 92557 COMPREHENSIVE HEARING TEST: CPT | Performed by: AUDIOLOGIST
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
## 2020-07-17 ENCOUNTER — TELEMEDICINE (OUTPATIENT)
Dept: BEHAVIORAL/MENTAL HEALTH CLINIC | Facility: CLINIC | Age: 50
End: 2020-07-17
Payer: COMMERCIAL

## 2020-07-17 DIAGNOSIS — F43.10 POST TRAUMATIC STRESS DISORDER (PTSD): Chronic | ICD-10-CM

## 2020-07-17 DIAGNOSIS — F33.2 MAJOR DEPRESSIVE DISORDER, RECURRENT SEVERE WITHOUT PSYCHOTIC FEATURES (HCC): Primary | Chronic | ICD-10-CM

## 2020-07-17 DIAGNOSIS — F41.1 GENERALIZED ANXIETY DISORDER: Chronic | ICD-10-CM

## 2020-07-17 PROCEDURE — 90834 PSYTX W PT 45 MINUTES: CPT | Performed by: SOCIAL WORKER

## 2020-07-17 PROCEDURE — 1036F TOBACCO NON-USER: CPT | Performed by: SOCIAL WORKER

## 2020-07-17 NOTE — PSYCH
Virtual Regular Visit      Assessment/Plan:    Problem List Items Addressed This Visit        Other    Post traumatic stress disorder (PTSD) (Chronic)    Major depressive disorder, recurrent severe without psychotic features ([facility] [address] ) - Primary (Chronic)    Generalized anxiety disorder (Chronic)               Reason for visit is Behavioral Health session, conducted through video, due to COVID-19 precautions       Encounter provider LALY Grey Sa    Provider located at [address] [place] [address] [address] [id number]      Recent Visits  No visits were found meeting these conditions  Showing recent visits within past 7 days and meeting all other requirements     Future Appointments  No visits were found meeting these conditions  Showing future appointments within next 150 days and meeting all other requirements        The patient was identified by name and date of birth  Shayan Giron was informed that this is a telemedicine visit and that the visit is being conducted through [employer]  My office door was closed  No one else was in the room  She acknowledged consent and understanding of privacy and security of the video platform  The patient has agreed to participate and understands they can discontinue the visit at any time  Patient is aware this is a billable service  Subjective  Shayan Giron is a 45 y o  female  DATA: Met with Quincy Miranda for scheduled individual session  "I had to cancel my reception " We spent some time talking about the disappointment she is experiencing regarding having to cancel her reception  She states she is hoping that she is still able to take her trip to [place]  She is monitoring the situation  Ynes Puentes discussed her cruise, which is planned for next week  She is not yet sure whether or not she will be able to go on the cruise   She states that she did pay for insurance for the cruise, so she will be able to get a refund for the payments that she made  She discussed the uncertainty about the situation and the frustration with the changes in the rules from day-to-day  Yessy discussed some health-related concerns  She states that she went to the doctor to discuss her hearing  She is struggling to hear people, especially when people are wearing masks  She states that she does have some mild hearing loss; however, there is nothing she can do at this point to resolve the problems  This is difficult for her while she is at work  She states that she has been taking some vacation days recently  We spent some time talking about Yessy's stress level  She states that, when she is at home, she has very little stress  She states that, when she is at work, she has significantly more anxiety  She states that she has very little time for practicing her mindfulness and deep breathing when she is at work  This clinician encouraged her to take time for herself, even if the breathing practice is only one minute at a time  Connor King discussed some of the things that bring her rajani in her life (e g , going on day trips, [employer], etc)  ASSESSMENT: Rosi Encinas presents with a primarily euthymic mood, except when we were talking about her work-related anxiety  Her affect is mood-congruent  Rosi Encinas exhibits a positive therapeutic rapport with this clinician  Rosi Encinas appears to have normal insight and judgment  Rosi Encinas continues to exhibit willingness to work on treatment goals and objectives  PLAN: Rosi Encinas will return in two weeks for the next scheduled session  Between sessions, Rosi Encinas will continue to practice her mindful breathing, to help her manage her physical symptoms, and she will report back during the next session re: successes and barriers   At the next session, this clinician will use client-centered therapy, mindfulness-based strategies, DBT-informed skills and solution-focused therapy to address her anxiety and mood regulation, in an effort to assist Quincy Miranda with meeting treatment goals  HPI     Past Medical History:   Diagnosis Date    ADHD (attention deficit hyperactivity disorder)     Bulging lumbar disc     Carpal tunnel syndrome     RIGHT  LAST ASSESSED: 16    Chronic back pain     low    Chronic pain disorder     Colon polyps     Diabetes mellitus ([facility] [address] )     Resolved post weight loss    GERD (gastroesophageal reflux disease)     Gestational diabetes     Hearing loss     left ear    Hyperlipidemia     Resolved with weight loss    IBS (irritable bowel syndrome)     Ileus ([facility] [address] )     LAST ASSESSED: 8/3/17    Labial cyst     LAST ASSESSED: 16    Myofascial pain     LAST ASSESSED: 16    Obesity     Ovarian cyst     LEFT  LAST ASSESSED: 16    Panic attack     Pap smear for cervical cancer screening     2018--pap wnl, HRHPV neg    Pneumonia     Seasonal allergies     Thoracic outlet syndrome     2010    Trochanteric bursitis of both hips     LAST ASSESSED: 3/18/16    Ulnar neuropathy at elbow     Varicella     Wears glasses        Past Surgical History:   Procedure Laterality Date    BILE DUCT EXPLORATION      ENDOSCOPIC REMOVAL OF STONES FROM BILIARY TRACT     SECTION      x3    CHOLECYSTECTOMY      COLONOSCOPY      -polyp, repeat     DILATION AND CURETTAGE OF UTERUS      ENDOMETRIAL ABLATION      ERCP W/ SPHICTEROTOMY      FIRST RIB REMOVAL      THORAX EXCISION OF FIRST RIB    HYSTEROSCOPY      ID COLONOSCOPY FLX DX W/COLLJ SPEC WHEN PFRMD N/A 2017    Procedure: COLONOSCOPY;  Surgeon: Tesha Zamora MD;  [facility]; Service: Gastroenterology    ID ESOPHAGOGASTRODUODENOSCOPY TRANSORAL DIAGNOSTIC N/A 2017    Procedure: ESOPHAGOGASTRODUODENOSCOPY (EGD); Surgeon: Rodolfo Nieto MD;  [facility]; Service: Gastroenterology    ID HYSTEROSCOPY,W/ENDO BX N/A 10/20/2017    Procedure: DILATATION AND CURETTAGE (D&C) WITH HYSTEROSCOPY  REMOVAL VULVAR RT  LESION;  Surgeon: Lindsey Yancey MD;  [facility];  Service: Gynecology    VT LAP, ÁLVARO RESTRICT PROC, LONGITUDINAL GASTRECTOMY N/A 2/6/2018    Procedure: GASTRECTOMY SLEEVE LAPAROSCOPIC; INTRAOPERATIVE EGD ;  Surgeon: Linus Voss MD;  [facility];  Service: Celina Fostre SURG IMPLNT [address] Left 1/29/2020    Procedure: Insertion of thoracic spinal cord stimulator electrode via laminotomy and placement of left buttock implantable pulse generator (NEUROMONITORING);   Surgeon: Yonas Early MD;  [facility];  Service: Neurosurgery    SPINAL CORD STIMULATOR TRIAL W/ LAMINOTOMY      TONSILLECTOMY AND ADENOIDECTOMY      TUBAL LIGATION      VEIN LIGATION AND STRIPPING Right     VULVA SURGERY  10/20/2017    BIOPSY    WISDOM TOOTH EXTRACTION         Current Outpatient Medications   Medication Sig Dispense Refill    albuterol (Ventolin HFA) 90 mcg/act inhaler Inhale 2 puffs every 6 (six) hours as needed for wheezing or shortness of breath 1 Inhaler 0    atoMOXetine (STRATTERA) 25 mg capsule Take 1 capsule (25 mg total) by mouth daily 90 capsule 0    Biotin 46046 MCG TABS Take by mouth      Calcium Carbonate-Vit D-Min (CALCIUM 1200 PO) Take 2,400 mg by mouth daily      Cyanocobalamin ([employer] VITAMIN B12 PO) Take by mouth      drospirenone-ethinyl estradiol (LIEN) 3-0 02 MG per tablet Take 1 tablet by mouth daily (Patient taking differently: Take 1 tablet by mouth daily at bedtime ) 84 tablet 4    famotidine (PEPCID) 20 mg tablet Take 1 tablet (20 mg total) by mouth daily 60 tablet 0    hydrOXYzine HCL (ATARAX) 25 mg tablet Take 1 tablet (25 mg total) by mouth every 6 (six) hours as needed for itching 90 tablet 0    ipratropium (ATROVENT) 0 03 % nasal spray 2 sprays into each nostril every 12 (twelve) hours 30 mL 0    lamoTRIgine (LaMICtal) 100 mg tablet Take 1 tablet (100 mg total) by mouth daily 90 tablet 1    lamoTRIgine (LaMICtal) 150 MG tablet TAKE ONE TABLET BY MOUTH DAILY 90 tablet 0    meloxicam (MOBIC) 7 5 mg tablet Take 1 tablet (7 5 mg total) by mouth daily 15 tablet 0    metoprolol succinate (TOPROL-XL) 25 mg 24 hr tablet Take 1 tablet (25 mg total) by mouth 2 (two) times a day 180 tablet 3    montelukast (SINGULAIR) 10 mg tablet Take 1 tablet (10 mg total) by mouth daily at bedtime 90 tablet 1    Multiple Vitamins-Minerals (MULTI COMPLETE PO) Take by mouth      pantoprazole (PROTONIX) 40 mg tablet TAKE ONE TABLET BY MOUTH EVERY DAY 90 tablet 0    Probiotic Product (PRO-BIOTIC BLEND PO) Take by mouth as needed       QUEtiapine (SEROquel) 50 mg tablet Take 1 tablet (50 mg total) by mouth daily at bedtime 90 tablet 1    venlafaxine (EFFEXOR-XR) 150 mg 24 hr capsule Take 1 capsule (150 mg total) by mouth daily 90 capsule 1    venlafaxine (EFFEXOR-XR) 75 mg 24 hr capsule Take 1 capsule (75 mg total) by mouth daily 90 capsule 1     No current facility-administered medications for this visit  Allergies   Allergen Reactions    Ibuprofen Other (See Comments)     Due to gastric sleeve -can only take for 5 days, then needs to stop       Review of Systems    Video Exam    There were no vitals filed for this visit  Physical Exam     I spent 45 minutes directly with the patient during this visit      VIRTUAL VISIT DISCLAIMER    Gamaliel Truong acknowledges that she has consented to an online visit or consultation  She understands that the online visit is based solely on information provided by her, and that, in the absence of a face-to-face physical evaluation by the physician, the diagnosis she receives is both limited and provisional in terms of accuracy and completeness  This is not intended to replace a full medical face-to-face evaluation by the physician  Gamaliel Truong understands and accepts these terms

## 2020-07-23 ENCOUNTER — TELEMEDICINE (OUTPATIENT)
Dept: DERMATOLOGY | Facility: CLINIC | Age: 50
End: 2020-07-23
Payer: COMMERCIAL

## 2020-07-23 DIAGNOSIS — Z87.2 HISTORY OF SUNBURN, BLISTERING: ICD-10-CM

## 2020-07-23 DIAGNOSIS — D23.9 INTRADERMAL NEVUS: Primary | ICD-10-CM

## 2020-07-23 DIAGNOSIS — D22.9 MULTIPLE BENIGN MELANOCYTIC NEVI: ICD-10-CM

## 2020-07-23 PROCEDURE — 99204 OFFICE O/P NEW MOD 45 MIN: CPT | Performed by: DERMATOLOGY

## 2020-07-23 NOTE — PROGRESS NOTES
Virtual Regular Visit      Assessment/Plan:    Problem List Items Addressed This Visit     None               Reason for visit is   Chief Complaint   Patient presents with    Virtual Regular Visit        Encounter provider Raymon Norton MD    Provider located at [address] [place] [address]  [facility]  [phone]      Recent Visits  No visits were found meeting these conditions  Showing recent visits within past 7 days and meeting all other requirements     Future Appointments  No visits were found meeting these conditions  Showing future appointments within next 150 days and meeting all other requirements        The patient was identified by name and date of birth  Vaughn Moraes was informed that this is a telemedicine visit and that the visit is being conducted through [facility] and patient was informed that this is a secure, HIPAA-compliant platform  She agrees to proceed     My office door was closed  The following individuals were in the room with me and the patient informed Federico Voss  She acknowledged consent and understanding of privacy and security of the video platform  The patient has agreed to participate and understands they can discontinue the visit at any time  Patient is aware this is a billable service  Subjective  Vaughn Moraes is a 45 y o  female See note below  HPI     Past Medical History:   Diagnosis Date    ADHD (attention deficit hyperactivity disorder)     Bulging lumbar disc     Carpal tunnel syndrome     RIGHT    LAST ASSESSED: 12/7/16    Chronic back pain     low    Chronic pain disorder     Colon polyps     Diabetes mellitus ([address] )     Resolved post weight loss    GERD (gastroesophageal reflux disease)     Gestational diabetes     Hearing loss     left ear    Hyperlipidemia     Resolved with weight loss    IBS (irritable bowel syndrome)     Ileus ([address] )     LAST ASSESSED: 8/3/17    Labial cyst LAST ASSESSED: 16    Myofascial pain     LAST ASSESSED: 16    Obesity     Ovarian cyst     LEFT  LAST ASSESSED: 16    Panic attack     Pap smear for cervical cancer screening     2018--pap wnl, HRHPV neg    Pneumonia     Seasonal allergies     Thoracic outlet syndrome     2010    Trochanteric bursitis of both hips     LAST ASSESSED: 3/18/16    Ulnar neuropathy at elbow     Varicella     Wears glasses        Past Surgical History:   Procedure Laterality Date    BILE DUCT EXPLORATION      ENDOSCOPIC REMOVAL OF STONES FROM BILIARY TRACT     SECTION      x3    CHOLECYSTECTOMY      COLONOSCOPY      -polyp, repeat     DILATION AND CURETTAGE OF UTERUS      ENDOMETRIAL ABLATION      ERCP W/ SPHICTEROTOMY      FIRST RIB REMOVAL      THORAX EXCISION OF FIRST RIB    HYSTEROSCOPY      MI COLONOSCOPY FLX DX W/COLLJ SPEC WHEN PFRMD N/A 2017    Procedure: COLONOSCOPY;  Surgeon: Esteban Aviles MD;  [facility]; Service: Gastroenterology    MI ESOPHAGOGASTRODUODENOSCOPY TRANSORAL DIAGNOSTIC N/A 2017    Procedure: ESOPHAGOGASTRODUODENOSCOPY (EGD); Surgeon: Lily Rice MD;  [facility]; Service: Gastroenterology    MI HYSTEROSCOPY,W/ENDO BX N/A 10/20/2017    Procedure: DILATATION AND CURETTAGE (D&C) WITH HYSTEROSCOPY  REMOVAL VULVAR RT  LESION;  Surgeon: Obdulia Dey MD;  [facility];  Service: Gynecology    MI LAP, ÁLVARO RESTRICT PROC, LONGITUDINAL GASTRECTOMY N/A 2018    Procedure: GASTRECTOMY SLEEVE LAPAROSCOPIC; INTRAOPERATIVE EGD ;  Surgeon: Misha Jacobson MD;  [facility];  Service: Andria Dura SURG IMPLNT [address] Left 2020    Procedure: Insertion of thoracic spinal cord stimulator electrode via laminotomy and placement of left buttock implantable pulse generator (NEUROMONITORING);   Surgeon: Erasto Cross MD;  [facility];  Service: Neurosurgery    SPINAL CORD STIMULATOR TRIAL W/ LAMINOTOMY  TONSILLECTOMY AND ADENOIDECTOMY      TUBAL LIGATION      VEIN LIGATION AND STRIPPING Right     VULVA SURGERY  10/20/2017    BIOPSY    WISDOM TOOTH EXTRACTION         Current Outpatient Medications   Medication Sig Dispense Refill    albuterol (Ventolin HFA) 90 mcg/act inhaler Inhale 2 puffs every 6 (six) hours as needed for wheezing or shortness of breath 1 Inhaler 0    atoMOXetine (STRATTERA) 25 mg capsule Take 1 capsule (25 mg total) by mouth daily 90 capsule 0    Biotin 54443 MCG TABS Take by mouth      Calcium Carbonate-Vit D-Min (CALCIUM 1200 PO) Take 2,400 mg by mouth daily      Cyanocobalamin (CVS VITAMIN B12 PO) Take by mouth      drospirenone-ethinyl estradiol (LIEN) 3-0 02 MG per tablet Take 1 tablet by mouth daily (Patient taking differently: Take 1 tablet by mouth daily at bedtime ) 84 tablet 4    famotidine (PEPCID) 20 mg tablet Take 1 tablet (20 mg total) by mouth daily 60 tablet 0    hydrOXYzine HCL (ATARAX) 25 mg tablet Take 1 tablet (25 mg total) by mouth every 6 (six) hours as needed for itching 90 tablet 0    ipratropium (ATROVENT) 0 03 % nasal spray 2 sprays into each nostril every 12 (twelve) hours 30 mL 0    lamoTRIgine (LaMICtal) 100 mg tablet Take 1 tablet (100 mg total) by mouth daily 90 tablet 1    lamoTRIgine (LaMICtal) 150 MG tablet TAKE ONE TABLET BY MOUTH DAILY 90 tablet 0    meloxicam (MOBIC) 7 5 mg tablet Take 1 tablet (7 5 mg total) by mouth daily 15 tablet 0    metoprolol succinate (TOPROL-XL) 25 mg 24 hr tablet Take 1 tablet (25 mg total) by mouth 2 (two) times a day 180 tablet 3    montelukast (SINGULAIR) 10 mg tablet Take 1 tablet (10 mg total) by mouth daily at bedtime 90 tablet 1    Multiple Vitamins-Minerals (MULTI COMPLETE PO) Take by mouth      pantoprazole (PROTONIX) 40 mg tablet TAKE ONE TABLET BY MOUTH EVERY DAY 90 tablet 0    Probiotic Product (PRO-BIOTIC BLEND PO) Take by mouth as needed       QUEtiapine (SEROquel) 50 mg tablet Take 1 tablet (50 mg total) by mouth daily at bedtime 90 tablet 1    venlafaxine (EFFEXOR-XR) 150 mg 24 hr capsule Take 1 capsule (150 mg total) by mouth daily 90 capsule 1    venlafaxine (EFFEXOR-XR) 75 mg 24 hr capsule Take 1 capsule (75 mg total) by mouth daily 90 capsule 1     No current facility-administered medications for this visit  Allergies   Allergen Reactions    Ibuprofen Other (See Comments)     Due to gastric sleeve -can only take for 5 days, then needs to stop       Review of Systems    Video Exam    There were no vitals filed for this visit  Physical Exam     I spent 10 minutes directly with the patient during this visit      VIRTUAL VISIT DISCLAIMER    Marcos Gutierrez acknowledges that she has consented to an online visit or consultation  She understands that the online visit is based solely on information provided by her, and that, in the absence of a face-to-face physical evaluation by the physician, the diagnosis she receives is both limited and provisional in terms of accuracy and completeness  This is not intended to replace a full medical face-to-face evaluation by the physician  Marcos Gutierrez understands and accepts these terms  [facility] VIRTUAL Clinic Note     Patient Name: Marcos Gutierrez  Encounter Date: 7/23/2020     Have you been cared for by a [facility] Dermatologist in the last 3 years and, if so, which one? No    · Have you traveled outside of the [address] [place] [address] in the past 3 months or outside of the [facility] area in the last 2 weeks? No     May we call your Preferred Phone number to discuss your specific medical information? Yes     May we leave a detailed message that includes your specific medical information? Yes      Today's Chief Concerns:   Concern #1:  Mole check     Past Medical History:  Have you personally ever had or currently have any of the following?     · Skin cancer (such as Melanoma, Basal Cell Carcinoma, Squamous Cell Carcinoma? (If Yes, please provide more detail)- No  · Eczema: No  · Psoriasis: No  · HIV/AIDS: No  · Hepatitis B or C: No  · Tuberculosis: No  · Systemic Immunosuppression such as Diabetes, Biologic or Immunotherapy, Chemotherapy, Organ Transplantation, Bone Marrow Transplantation (If YES, please provide more detail): YES, was a diabetic before gastric bypass   · Radiation Treatment (If YES, please provide more detail): No  · Any other major medical conditions/concerns? (If Yes, which types)- No    Social History:     What is/was your primary occupation? [employer]      What are your hobbies/past-times? Shopping, swimming     Family History:  Have any of your "first degree relatives" (parent, brother, sister, or child) had any of the following       · Skin cancer such as Melanoma or Merkel Cell Carcinoma or Pancreatic Cancer? No  · Eczema, Asthma, Hay Fever or Seasonal Allergies: No  · Psoriasis or Psoriatic Arthritis: No  · Do any other medical conditions seem to run in your family? If Yes, what condition and which relatives?   YES, family history of diabeties     Current Medications:   (please update all dermatological medications before printing patient's AVS!)      Current Outpatient Medications:     albuterol (Ventolin HFA) 90 mcg/act inhaler, Inhale 2 puffs every 6 (six) hours as needed for wheezing or shortness of breath, Disp: 1 Inhaler, Rfl: 0    atoMOXetine (STRATTERA) 25 mg capsule, Take 1 capsule (25 mg total) by mouth daily, Disp: 90 capsule, Rfl: 0    Biotin 33234 MCG TABS, Take by mouth, Disp: , Rfl:     Calcium Carbonate-Vit D-Min (CALCIUM 1200 PO), Take 2,400 mg by mouth daily, Disp: , Rfl:     Cyanocobalamin (CVS VITAMIN B12 PO), Take by mouth, Disp: , Rfl:     drospirenone-ethinyl estradiol (LIEN) 3-0 02 MG per tablet, Take 1 tablet by mouth daily (Patient taking differently: Take 1 tablet by mouth daily at bedtime ), Disp: 84 tablet, Rfl: 4    famotidine (PEPCID) 20 mg tablet, Take 1 tablet (20 mg total) by mouth daily, Disp: 60 tablet, Rfl: 0    hydrOXYzine HCL (ATARAX) 25 mg tablet, Take 1 tablet (25 mg total) by mouth every 6 (six) hours as needed for itching, Disp: 90 tablet, Rfl: 0    ipratropium (ATROVENT) 0 03 % nasal spray, 2 sprays into each nostril every 12 (twelve) hours, Disp: 30 mL, Rfl: 0    lamoTRIgine (LaMICtal) 100 mg tablet, Take 1 tablet (100 mg total) by mouth daily, Disp: 90 tablet, Rfl: 1    lamoTRIgine (LaMICtal) 150 MG tablet, TAKE ONE TABLET BY MOUTH DAILY, Disp: 90 tablet, Rfl: 0    meloxicam (MOBIC) 7 5 mg tablet, Take 1 tablet (7 5 mg total) by mouth daily, Disp: 15 tablet, Rfl: 0    metoprolol succinate (TOPROL-XL) 25 mg 24 hr tablet, Take 1 tablet (25 mg total) by mouth 2 (two) times a day, Disp: 180 tablet, Rfl: 3    montelukast (SINGULAIR) 10 mg tablet, Take 1 tablet (10 mg total) by mouth daily at bedtime, Disp: 90 tablet, Rfl: 1    Multiple Vitamins-Minerals (MULTI COMPLETE PO), Take by mouth, Disp: , Rfl:     pantoprazole (PROTONIX) 40 mg tablet, TAKE ONE TABLET BY MOUTH EVERY DAY, Disp: 90 tablet, Rfl: 0    Probiotic Product (PRO-BIOTIC BLEND PO), Take by mouth as needed , Disp: , Rfl:     QUEtiapine (SEROquel) 50 mg tablet, Take 1 tablet (50 mg total) by mouth daily at bedtime, Disp: 90 tablet, Rfl: 1    venlafaxine (EFFEXOR-XR) 150 mg 24 hr capsule, Take 1 capsule (150 mg total) by mouth daily, Disp: 90 capsule, Rfl: 1    venlafaxine (EFFEXOR-XR) 75 mg 24 hr capsule, Take 1 capsule (75 mg total) by mouth daily, Disp: 90 capsule, Rfl: 1      Review of Systems:  Have you recently had or currently have any of the following? If YES, what are you doing for the problem?     · Fever, chills or unintended weight loss: No  · Sudden loss or change in your vision: No  · Nausea, vomiting or blood in your stool: No  · Painful or swollen joints: No  · Wheezing or cough: No  · Changing mole or non-healing wound: No  · Nosebleeds: No  · Excessive sweating: No  · Easy or prolonged bleeding? No  · Over the last 2 weeks, how often have you been bothered by the following problems? · Taking little interest or pleasure in doing things: 1 - Not at All  · Feeling down, depressed, or hopeless: 1 - Not at All  · Rapid heartbeat with epinephrine:  No    · FEMALES ONLY:    · Are you pregnant or planning to become pregnant? No  · Are you currently or planning to be nursing or breast feeding? No    · Any known allergies?      Allergies   Allergen Reactions    Ibuprofen Other (See Comments)     Due to gastric sleeve -can only take for 5 days, then needs to stop   ·       Physical Exam:     Was a chaperone (Derm Clinical Assistant) present throughout the entire virtual Physical Exam? Yes     Did the Dermatology Team specifically  the patient on the technical and practical limitations of a virtual Physical Exam? Yes}  o Did the patient ultimately request or accept a virtual Physical Exam?  Yes  o Did the patient specifically refuse to have the areas "under-the-bra" examined by the Dermatologist? No  o Did the patient specifically refuse to have the areas "under-the-underwear" examined by the Dermatologist? No    CONSTITUTIONAL:   Appearance: alert, well appearing, and in no distress  PSYCH: Normal mood and affect  EYES: Normal appearing eyes with normal color; no obvious deformities  ENT: Normal ears, nose, lips and neck with normal color and no obvious deformities; no obvious difficulty swallowing  CARDIOVASCULAR: No obvious edema; no obvious jugular venous distension  RESPIRATORY: Normal appearing respirations; no obvious shortness of breath  HEME/LYMPH/IMMUNO:  Normal color without obvious pallor, jaundice, petechiae or bleeding; no obvious cervical chain masses    SKIN:  FULL ORGAN SYSTEM EXAM  Hair, Scalp, Ears, Face Normal except as noted below in Assessment   Neck, Cervical Chain Nodes    Right Arm/Hand/Fingers    Left Arm/Hand/Fingers Chest/Breasts/Axillae Viewed areas Normal except as noted below in Assessment   Abdomen, Umbilicus    Back/Spine    Groin/Genitalia/Buttocks    Right Leg, Foot, Toes    Left Leg, Foot, Toes         Assessment and Plan by Diagnosis:    History of Present Condition:     Duration:  How long has this been an issue for you?    o  years    Location Affected:  Where on the body is this affecting you?    o  back, shoulder, and face    Quality:  Is there any bleeding, pain, itch, burning/irritation, or redness associated with the skin lesion? o  brown spots    Severity:  Describe any bleeding, pain, itch, burning/irritation, or redness on a scale of 1 to 10 (with 10 being the worst)  o  0   Timing:  Does this condition seem to be there pretty constantly or do you notice it more at specific times throughout the day? o  constant    Context:  Have you ever noticed that this condition seems to be associated with specific activities you do?    o  denies   Modifying Factors:    o Anything that seems to make the condition worse?    -  sunburns   o What have you tried to do to make the condition better? -  two mole removals    Associated Signs and Symptoms:  Does this skin lesion seem to be associated with any of the following:  o  SL AMB DERM SIGNS AND SYMPTOMS: Skin color changes     1  NEVUS    Physical Exam:   Anatomic Location Affected:  Right jaw   Morphological Description:  5mm tan pap   Onset:   Recent Changes noted by patient:    Additional History of Present Condition:  Present for years  Located on the back, shoulder, face and breast  Patient reports brown spots that darken in the sun, history of blistering sunburns  Attempted Traitement with two previous mole removals which were both benign  Patinet grows facial hair from medication and uses redd to remove unwanted hair which bothers the nevus       Assessment and Plan:  Based on a thorough discussion of this condition and the management approach to it (including a comprehensive discussion of the known risks, side effects and potential benefits of treatment), the patient (family) agrees to implement the following specific plan:   In office shave biopsy, low chance of scar     Start Heliocare 240 mg caps- helps prevent burning along with sunscreen       Melanocytic Nevi  Melanocytic nevi ("moles") are caused by collections of the color producing skin cells, or melanocytes, in 1 area in the skin  They can range in color from pink to dark brown and be either raised or flat  Some moles are present at birth (I e , "congenital nevi"), while others come up later in life (i e , "acquired nevi")  Amanda Loss exposure also stimulates the body to make more moles, ie the more sun you get the more moles you'll grow  Clinically distinguishing a healthy mole from melanoma may be difficult  The "ABCDE's" of moles have been suggested as a means of helping to alert a person to a suspicious mole and the possible increased risk of melanoma  Asymmetry: Healthy moles tend to be symmetric, while melanomas are often asymmetric  Asymmetry means if you draw a line through the mole, the two halves do not match in color, size, shape, or surface texture Any mole that starts to demonstrate "asymmetry" should be examined promptly by a board certified dermatologist      Border: Healthy moles tend to have discrete, even borders  The border of a melanoma often blends into the normal skin and does not sharply delineate the mole from normal skin  Any mole that starts to demonstrate "uneven borders" should be examined promptly     Color: Healthy moles tend to be one color throughout  Melanomas tend to be made up of different colors ranging from dark black, blue, white, or red  Any mole that demonstrates a color change should be examined promptly    Diameter: Healthy moles tend to be smaller than 0 6 cm in size; an exception are "congenital nevi" that can be larger    Melanomas tend to grow and can often be greater than 0 6 cm (1/4 of an inch, or the size of a pencil eraser)  This is only a guideline, and many normal moles may be larger than 0 6 cm without being unhealthy  Any mole that starts to change in size (small to bigger or bigger to smaller) should be examined promptly    Evolving: Healthy moles tend to "stay the same "  Melanomas may often show signs of change or evolution such as a change in size, shape, color, or elevation  Any mole that starts to itch, bleed, crust, burn, hurt, or ulcerate or demonstrate a change or evolution should be examined promptly by a board certified dermatologist       What are atypical moles or dysplastic nevi? Dysplastic moles are moles that have some of the ABCDE  changes listed above but  are not cancerous  Sometimes a biopsy and microscopic examination are needed to determine the difference  They may indicate an increased risk of melanoma in that person, especially if there is a family history of melanoma  What is a Melanoma? The main concern when looking at a new or changing mole it to evaluate whether it may be a melanoma  The appearance of a "new mole" remains one of the most reliable methods for identifying a malignant melanoma  A melanoma is a type of skin cancer that can be deadly if it spreads throughout the body  The prognosis of a Melanoma depends on how deep it has penetrated in the skin  If caught early, they generally will not have had time to grow into the deeper layers of the skin and they cure rate is then very high  Once the melanoma grows deeper into the skin, the cure rate drops dramatically  Therefore, early detection and removal of a malignant melanoma results in a much better chance of complete cure      2  MELANOCYTIC NEVI ("Moles") intradermal nevous     Physical Exam:   Anatomic Location Affected:   Mostly on sun-exposed areas of the trunk and extremities    Morphological Description:  Scattered, 1-4mm round to ovoid, symmetrical-appearing, even bordered, skin colored to dark brown macules/papules, mostly in sun-exposed areas    Assessment and Plan:  Based on a thorough discussion of this condition and the management approach to it (including a comprehensive discussion of the known risks, side effects and potential benefits of treatment), the patient (family) agrees to implement the following specific plan:   Monitor for change   When outside we recommend using a wide brim hat, sunglasses, long sleeve and pants, sunscreen with SPF 47+ with reapplication every 2 hours, or SPF specific clothing      Melanocytic Nevi  Melanocytic nevi ("moles") are tan or brown, raised or flat areas of the skin which have an increased number of melanocytes  Melanocytes are the cells in our body which make pigment and account for skin color  Some moles are present at birth (I e , "congenital nevi"), while others come up later in life (i e , "acquired nevi")  The sun can stimulate the body to make more moles  Sunburns are not the only thing that triggers more moles  Chronic sun exposure can do it too  Clinically distinguishing a healthy mole from melanoma may be difficult, even for experienced dermatologists  The "ABCDE's" of moles have been suggested as a means of helping to alert a person to a suspicious mole and the possible increased risk of melanoma  The suggestions for raising alert are as follows:    Asymmetry: Healthy moles tend to be symmetric, while melanomas are often asymmetric  Asymmetry means if you draw a line through the mole, the two halves do not match in color, size, shape, or surface texture  Asymmetry can be a result of rapid enlargement of a mole, the development of a raised area on a previously flat lesion, scaling, ulceration, bleeding or scabbing within the mole    Any mole that starts to demonstrate "asymmetry" should be examined promptly by a board certified dermatologist      Border: Healthy moles tend to have discrete, even borders  The border of a melanoma often blends into the normal skin and does not sharply delineate the mole from normal skin  Any mole that starts to demonstrate "uneven borders" should be examined promptly by a board certified dermatologist      Color: Healthy moles tend to be one color throughout  Melanomas tend to be made up of different colors ranging from dark black, blue, white, or red  Any mole that demonstrates a color change should be examined promptly by a board certified dermatologist      Diameter: Healthy moles tend to be smaller than 0 6 cm in size; an exception are "congenital nevi" that can be larger  Melanomas tend to grow and can often be greater than 0 6 cm (1/4 of an inch, or the size of a pencil eraser)  This is only a guideline, and many normal moles may be larger than 0 6 cm without being unhealthy  Any mole that starts to change in size (small to bigger or bigger to smaller) should be examined promptly by a board certified dermatologist      Evolving: Healthy moles tend to "stay the same "  Melanomas may often show signs of change or evolution such as a change in size, shape, color, or elevation  Any mole that starts to itch, bleed, crust, burn, hurt, or ulcerate or demonstrate a change or evolution should be examined promptly by a board certified dermatologist       Dysplastic Nevi  Dysplastic moles are moles that fit the ABCDE rules of melanoma but are not identified as melanomas when examined under the microscope  They may indicate an increased risk of melanoma in that person  If there is a family history of melanoma, most experts agree that the person may be at an increased risk for developing a melanoma  Experts still do not agree on what dysplastic moles mean in patients without a personal or family history of melanoma  Dysplastic moles are usually larger than common moles and have different colors within it with irregular borders   The appearance can be very similar to a melanoma  Biopsies of dysplastic moles may show abnormalities which are different from a regular mole  Melanoma  Malignant melanoma is a type of skin cancer that can be deadly if it spreads throughout the body  The incidence of melanoma in the [place] is growing faster than any other cancer  Melanoma usually grows near the surface of the skin for a period of time, and then begins to grow deeper into the skin  Once it grows deeper into the skin, the risk of spread to other organs greatly increases  Therefore, early detection and removal of a malignant melanoma may result in a better chance at a complete cure; removal after the tumor has spread may not be as effective, leading to worse clinical outcomes such as death  The true rate of nevus transformation into a melanoma is unknown  It has been estimated that the lifetime risk for any acquired melanocytic nevus on any 21year-old individual transforming into melanoma by age [de-identified] is 0 03% (1 in 3,164) for men and 0 009% (1 in 10,800) for women  The appearance of a "new mole" remains one of the most reliable methods for identifying a malignant melanoma  Occasionally, melanomas appear as rapidly growing, blue-black, dome-shaped bumps within a previous mole or previous area of normal skin  Other times, melanomas are suspected when a mole suddenly appears or changes  Itching, burning, or pain in a pigmented lesion should increase suspicion, but most patients with early melanoma have no skin discomfort whatsoever  Melanoma can occur anywhere on the skin, including areas that are difficult for self-examination  Many melanomas are first noticed by other family members  Suspicious-looking moles may be removed for microscopic examination  You may be able to prevent death from melanoma by doing two simple things:    1  Try to avoid unnecessary sun exposure and protect your skin when it is exposed to the sun    People who live near the equator, people who have intermittent exposures to large amounts of sun, and people who have had sunburns in childhood or adolescence have an increased risk for melanoma  Sun sense and vigilant sun protection may be keys to helping to prevent melanoma  We recommend wearing UPF-rated sun protective clothing and sunglasses whenever possible and applying a moisturizer-sunscreen combination product (SPF 50+) such as Neutrogena Daily Defense to sun exposed areas of skin at least three times a day  2  Have your moles regularly examined by a board certified dermatologist AND by yourself or a family member/friend at home  We recommend that you have your moles examined at least once a year by a board certified dermatologist   Use your birthday as an annual reminder to have your "Birthday Suit" (I e , your skin) examined; it is a nice birthday gift to yourself to know that your skin is healthy appearing! Additionally, at-home self examinations may be helpful for detecting a possible melanoma  Use the ABCDEs we discussed and check your moles once a month at home      3 HISTORY OF BLISTERING SUNBURNS    Scribe Attestation    I,:   Jennifer May MA am acting as a scribe while in the presence of the attending physician :        I,:   Gretta Bar MD personally performed the services described in this documentation    as scribed in my presence :

## 2020-07-23 NOTE — PATIENT INSTRUCTIONS
1  NEVUS    Assessment and Plan:  Based on a thorough discussion of this condition and the management approach to it (including a comprehensive discussion of the known risks, side effects and potential benefits of treatment), the patient (family) agrees to implement the following specific plan:   In office shave biopsy, low chance of scar     Start Heliocare 240 mg caps- helps prevent burning along with sunscreen       Melanocytic Nevi  Melanocytic nevi ("moles") are caused by collections of the color producing skin cells, or melanocytes, in 1 area in the skin  They can range in color from pink to dark brown and be either raised or flat  Some moles are present at birth (I e , "congenital nevi"), while others come up later in life (i e , "acquired nevi")  Nevaeh Colonel exposure also stimulates the body to make more moles, ie the more sun you get the more moles you'll grow  Clinically distinguishing a healthy mole from melanoma may be difficult  The "ABCDE's" of moles have been suggested as a means of helping to alert a person to a suspicious mole and the possible increased risk of melanoma  Asymmetry: Healthy moles tend to be symmetric, while melanomas are often asymmetric  Asymmetry means if you draw a line through the mole, the two halves do not match in color, size, shape, or surface texture Any mole that starts to demonstrate "asymmetry" should be examined promptly by a board certified dermatologist      Border: Healthy moles tend to have discrete, even borders  The border of a melanoma often blends into the normal skin and does not sharply delineate the mole from normal skin  Any mole that starts to demonstrate "uneven borders" should be examined promptly     Color: Healthy moles tend to be one color throughout  Melanomas tend to be made up of different colors ranging from dark black, blue, white, or red    Any mole that demonstrates a color change should be examined promptly    Diameter: Healthy moles tend to be smaller than 0 6 cm in size; an exception are "congenital nevi" that can be larger  Melanomas tend to grow and can often be greater than 0 6 cm (1/4 of an inch, or the size of a pencil eraser)  This is only a guideline, and many normal moles may be larger than 0 6 cm without being unhealthy  Any mole that starts to change in size (small to bigger or bigger to smaller) should be examined promptly    Evolving: Healthy moles tend to "stay the same "  Melanomas may often show signs of change or evolution such as a change in size, shape, color, or elevation  Any mole that starts to itch, bleed, crust, burn, hurt, or ulcerate or demonstrate a change or evolution should be examined promptly by a board certified dermatologist       What are atypical moles or dysplastic nevi? Dysplastic moles are moles that have some of the ABCDE  changes listed above but  are not cancerous  Sometimes a biopsy and microscopic examination are needed to determine the difference  They may indicate an increased risk of melanoma in that person, especially if there is a family history of melanoma  What is a Melanoma? The main concern when looking at a new or changing mole it to evaluate whether it may be a melanoma  The appearance of a "new mole" remains one of the most reliable methods for identifying a malignant melanoma  A melanoma is a type of skin cancer that can be deadly if it spreads throughout the body  The prognosis of a Melanoma depends on how deep it has penetrated in the skin  If caught early, they generally will not have had time to grow into the deeper layers of the skin and they cure rate is then very high  Once the melanoma grows deeper into the skin, the cure rate drops dramatically  Therefore, early detection and removal of a malignant melanoma results in a much better chance of complete cure      2  MELANOCYTIC NEVI ("Moles") intradermal nevous     Assessment and Plan:  Based on a thorough discussion of this condition and the management approach to it (including a comprehensive discussion of the known risks, side effects and potential benefits of treatment), the patient (family) agrees to implement the following specific plan:   Monitor for change   When outside we recommend using a wide brim hat, sunglasses, long sleeve and pants, sunscreen with SPF 15+ with reapplication every 2 hours, or SPF specific clothing      Melanocytic Nevi  Melanocytic nevi ("moles") are tan or brown, raised or flat areas of the skin which have an increased number of melanocytes  Melanocytes are the cells in our body which make pigment and account for skin color  Some moles are present at birth (I e , "congenital nevi"), while others come up later in life (i e , "acquired nevi")  The sun can stimulate the body to make more moles  Sunburns are not the only thing that triggers more moles  Chronic sun exposure can do it too  Clinically distinguishing a healthy mole from melanoma may be difficult, even for experienced dermatologists  The "ABCDE's" of moles have been suggested as a means of helping to alert a person to a suspicious mole and the possible increased risk of melanoma  The suggestions for raising alert are as follows:    Asymmetry: Healthy moles tend to be symmetric, while melanomas are often asymmetric  Asymmetry means if you draw a line through the mole, the two halves do not match in color, size, shape, or surface texture  Asymmetry can be a result of rapid enlargement of a mole, the development of a raised area on a previously flat lesion, scaling, ulceration, bleeding or scabbing within the mole  Any mole that starts to demonstrate "asymmetry" should be examined promptly by a board certified dermatologist      Border: Healthy moles tend to have discrete, even borders  The border of a melanoma often blends into the normal skin and does not sharply delineate the mole from normal skin    Any mole that starts to demonstrate "uneven borders" should be examined promptly by a board certified dermatologist      Color: Healthy moles tend to be one color throughout  Melanomas tend to be made up of different colors ranging from dark black, blue, white, or red  Any mole that demonstrates a color change should be examined promptly by a board certified dermatologist      Diameter: Healthy moles tend to be smaller than 0 6 cm in size; an exception are "congenital nevi" that can be larger  Melanomas tend to grow and can often be greater than 0 6 cm (1/4 of an inch, or the size of a pencil eraser)  This is only a guideline, and many normal moles may be larger than 0 6 cm without being unhealthy  Any mole that starts to change in size (small to bigger or bigger to smaller) should be examined promptly by a board certified dermatologist      Evolving: Healthy moles tend to "stay the same "  Melanomas may often show signs of change or evolution such as a change in size, shape, color, or elevation  Any mole that starts to itch, bleed, crust, burn, hurt, or ulcerate or demonstrate a change or evolution should be examined promptly by a board certified dermatologist       Dysplastic Nevi  Dysplastic moles are moles that fit the ABCDE rules of melanoma but are not identified as melanomas when examined under the microscope  They may indicate an increased risk of melanoma in that person  If there is a family history of melanoma, most experts agree that the person may be at an increased risk for developing a melanoma  Experts still do not agree on what dysplastic moles mean in patients without a personal or family history of melanoma  Dysplastic moles are usually larger than common moles and have different colors within it with irregular borders  The appearance can be very similar to a melanoma  Biopsies of dysplastic moles may show abnormalities which are different from a regular mole        Melanoma  Malignant melanoma is a type of skin cancer that can be deadly if it spreads throughout the body  The incidence of melanoma in the [place] is growing faster than any other cancer  Melanoma usually grows near the surface of the skin for a period of time, and then begins to grow deeper into the skin  Once it grows deeper into the skin, the risk of spread to other organs greatly increases  Therefore, early detection and removal of a malignant melanoma may result in a better chance at a complete cure; removal after the tumor has spread may not be as effective, leading to worse clinical outcomes such as death  The true rate of nevus transformation into a melanoma is unknown  It has been estimated that the lifetime risk for any acquired melanocytic nevus on any 21year-old individual transforming into melanoma by age [de-identified] is 0 03% (1 in 3,164) for men and 0 009% (1 in 10,800) for women  The appearance of a "new mole" remains one of the most reliable methods for identifying a malignant melanoma  Occasionally, melanomas appear as rapidly growing, blue-black, dome-shaped bumps within a previous mole or previous area of normal skin  Other times, melanomas are suspected when a mole suddenly appears or changes  Itching, burning, or pain in a pigmented lesion should increase suspicion, but most patients with early melanoma have no skin discomfort whatsoever  Melanoma can occur anywhere on the skin, including areas that are difficult for self-examination  Many melanomas are first noticed by other family members  Suspicious-looking moles may be removed for microscopic examination  You may be able to prevent death from melanoma by doing two simple things:    1  Try to avoid unnecessary sun exposure and protect your skin when it is exposed to the sun  People who live near the equator, people who have intermittent exposures to large amounts of sun, and people who have had sunburns in childhood or adolescence have an increased risk for melanoma   Angie Bigger sense and vigilant sun protection may be keys to helping to prevent melanoma  We recommend wearing UPF-rated sun protective clothing and sunglasses whenever possible and applying a moisturizer-sunscreen combination product (SPF 50+) such as Neutrogena Daily Defense to sun exposed areas of skin at least three times a day  2  Have your moles regularly examined by a board certified dermatologist AND by yourself or a family member/friend at home  We recommend that you have your moles examined at least once a year by a board certified dermatologist   Use your birthday as an annual reminder to have your "Birthday Suit" (I e , your skin) examined; it is a nice birthday gift to yourself to know that your skin is healthy appearing! Additionally, at-home self examinations may be helpful for detecting a possible melanoma  Use the ABCDEs we discussed and check your moles once a month at home

## 2020-07-26 ENCOUNTER — OFFICE VISIT (OUTPATIENT)
Dept: URGENT CARE | Age: 50
End: 2020-07-26
Payer: COMMERCIAL

## 2020-07-26 VITALS
TEMPERATURE: 98 F | HEIGHT: 68 IN | SYSTOLIC BLOOD PRESSURE: 140 MMHG | BODY MASS INDEX: 25.76 KG/M2 | OXYGEN SATURATION: 98 % | HEART RATE: 68 BPM | WEIGHT: 170 LBS | RESPIRATION RATE: 18 BRPM | DIASTOLIC BLOOD PRESSURE: 87 MMHG

## 2020-07-26 DIAGNOSIS — K21.9 GASTROESOPHAGEAL REFLUX DISEASE WITHOUT ESOPHAGITIS: ICD-10-CM

## 2020-07-26 DIAGNOSIS — R35.0 FREQUENT URINATION: Primary | ICD-10-CM

## 2020-07-26 LAB
SL AMB  POCT GLUCOSE, UA: ABNORMAL
SL AMB LEUKOCYTE ESTERASE,UA: ABNORMAL
SL AMB POCT BILIRUBIN,UA: ABNORMAL
SL AMB POCT BLOOD,UA: ABNORMAL
SL AMB POCT CLARITY,UA: CLEAR
SL AMB POCT COLOR,UA: ABNORMAL
SL AMB POCT KETONES,UA: ABNORMAL
SL AMB POCT NITRITE,UA: ABNORMAL
SL AMB POCT PH,UA: 6
SL AMB POCT SPECIFIC GRAVITY,UA: 1
SL AMB POCT URINE PROTEIN: 30
SL AMB POCT UROBILINOGEN: 2

## 2020-07-26 PROCEDURE — 87186 SC STD MICRODIL/AGAR DIL: CPT | Performed by: PREVENTIVE MEDICINE

## 2020-07-26 PROCEDURE — G0382 LEV 3 HOSP TYPE B ED VISIT: HCPCS | Performed by: PREVENTIVE MEDICINE

## 2020-07-26 PROCEDURE — 87077 CULTURE AEROBIC IDENTIFY: CPT | Performed by: PREVENTIVE MEDICINE

## 2020-07-26 PROCEDURE — 81002 URINALYSIS NONAUTO W/O SCOPE: CPT | Performed by: PREVENTIVE MEDICINE

## 2020-07-26 PROCEDURE — 87086 URINE CULTURE/COLONY COUNT: CPT | Performed by: PREVENTIVE MEDICINE

## 2020-07-26 RX ORDER — PHENAZOPYRIDINE HYDROCHLORIDE 100 MG/1
100 TABLET, FILM COATED ORAL 3 TIMES DAILY PRN
Qty: 10 TABLET | Refills: 0 | Status: SHIPPED | OUTPATIENT
Start: 2020-07-26 | End: 2020-11-19

## 2020-07-26 RX ORDER — SULFAMETHOXAZOLE AND TRIMETHOPRIM 800; 160 MG/1; MG/1
1 TABLET ORAL EVERY 12 HOURS SCHEDULED
Qty: 6 TABLET | Refills: 0 | Status: SHIPPED | OUTPATIENT
Start: 2020-07-26 | End: 2020-07-29

## 2020-07-26 NOTE — PATIENT INSTRUCTIONS
Dysuria   AMBULATORY CARE:   Dysuria  is trouble urinating, or pain, burning, or discomfort when you urinate  Dysuria is usually a symptom of another problem, such as a blockage or urinary tract infection  Common symptoms include the following:   · Fever     · Cloudy, bad smelling urine     · Urge to urinate often but urinating little     · Back, side, or abdominal pain     · Blood in your urine     · Discharge that smells bad     · Itching  Seek care immediately if:   · You have severe back, side, or abdominal pain  · You have fever and shaking chills  · You vomit several times in a row  Contact your healthcare provider if:   · Your symptoms do not go away, even after treatment  · You have questions or concerns about your condition or care  Treatment for dysuria  may include medicines to treat a bacterial infection or help decrease bladder spasms  Manage your dysuria:   · Drink more liquids  Liquids help flush out bacteria that may be causing an infection  Ask your healthcare provider how much liquid to drink each day and which liquids are best for you  · Take sitz baths as directed  Fill a bathtub with 4 to 6 inches of warm water  You may also use a sitz bath pan that fits over a toilet  Sit in the sitz bath for 20 minutes  Do this 2 to 3 times a day, or as directed  The warm water can help decrease pain and swelling  Follow up with your healthcare provider as directed:  Write down your questions so you remember to ask them during your visits  © 2017 [employer] [employer] Information is for End User's use only and may not be sold, redistributed or otherwise used for commercial purposes  All illustrations and images included in CareNotes® are the copyrighted property of [employer] , [employer]  or Omar Choudhury  The above information is an  only  It is not intended as medical advice for individual conditions or treatments   Talk to your doctor, nurse or pharmacist before following any medical regimen to see if it is safe and effective for you

## 2020-07-27 RX ORDER — PANTOPRAZOLE SODIUM 40 MG/1
TABLET, DELAYED RELEASE ORAL
Qty: 90 TABLET | Refills: 0 | Status: SHIPPED | OUTPATIENT
Start: 2020-07-27 | End: 2020-10-07

## 2020-07-28 LAB
BACTERIA UR CULT: ABNORMAL

## 2020-08-05 ENCOUNTER — TELEPHONE (OUTPATIENT)
Dept: PAIN MEDICINE | Facility: CLINIC | Age: 50
End: 2020-08-05

## 2020-08-05 DIAGNOSIS — G89.4 CHRONIC PAIN SYNDROME: Primary | ICD-10-CM

## 2020-08-05 PROBLEM — Z48.815 ENCOUNTER FOR SURGICAL AFTERCARE FOLLOWING SURGERY OF DIGESTIVE SYSTEM: Status: ACTIVE | Noted: 2020-08-05

## 2020-08-05 RX ORDER — METHYLPREDNISOLONE 4 MG/1
TABLET ORAL
Qty: 21 TABLET | Refills: 0 | Status: SHIPPED | OUTPATIENT
Start: 2020-08-05 | End: 2020-08-20

## 2020-08-05 NOTE — TELEPHONE ENCOUNTER
SW patient, advised of FQ recommendations  Patient states she would like to try solu medrol dosepak

## 2020-08-05 NOTE — TELEPHONE ENCOUNTER
SW patient, states her lower back is hurting and very stiff  Patient states she is having a hard time trying to lean backwards  Patient is using a heating patient to help with the pain  Patient is taking mobic and flexeril without relieve  Pain is 7/10  Ask patient if she had spoken with Pepper García from [facility] regarding checking the SCS  Patient states, it has nothing to do with her SCS  Please advise   TY

## 2020-08-05 NOTE — TELEPHONE ENCOUNTER
Patient   [phone]  Dr Destiney Del Valle    Patient is calling into say that she has the stem and it is working  She isn't having pain shooting down the leg, but she does have the pain in the spine again   Please follow up with patient     Please try to call patient after 3:15 pm she will be off the floor then

## 2020-08-16 ENCOUNTER — HOSPITAL ENCOUNTER (EMERGENCY)
Facility: HOSPITAL | Age: 50
Discharge: HOME | End: 2020-08-16
Attending: EMERGENCY MEDICINE | Admitting: EMERGENCY MEDICINE
Payer: COMMERCIAL

## 2020-08-16 VITALS
WEIGHT: 170 LBS | BODY MASS INDEX: 25.85 KG/M2 | DIASTOLIC BLOOD PRESSURE: 77 MMHG | SYSTOLIC BLOOD PRESSURE: 133 MMHG | OXYGEN SATURATION: 99 % | HEART RATE: 91 BPM | RESPIRATION RATE: 18 BRPM | TEMPERATURE: 98.1 F

## 2020-08-16 DIAGNOSIS — N30.90 CYSTITIS: Primary | ICD-10-CM

## 2020-08-16 LAB
CLARITY, POC: ABNORMAL
COLOR, POC: ABNORMAL
EXT BILIRUBIN, UA: ABNORMAL
EXT BLOOD URINE: ABNORMAL
EXT GLUCOSE, UA: NEGATIVE
EXT KETONES: NEGATIVE
EXT NITRITE, UA: NEGATIVE
EXT PH, UA: 6
EXT PROTEIN, UA: 100
EXT SPECIFIC GRAVITY, UA: 1.03
EXT UROBILINOGEN: 0.2
WBC # BLD EST: ABNORMAL 10*3/UL

## 2020-08-16 PROCEDURE — 99284 EMERGENCY DEPT VISIT MOD MDM: CPT | Performed by: EMERGENCY MEDICINE

## 2020-08-16 PROCEDURE — 81002 URINALYSIS NONAUTO W/O SCOPE: CPT | Performed by: EMERGENCY MEDICINE

## 2020-08-16 PROCEDURE — 99283 EMERGENCY DEPT VISIT LOW MDM: CPT

## 2020-08-16 RX ORDER — CEPHALEXIN 250 MG/1
500 CAPSULE ORAL ONCE
Status: COMPLETED | OUTPATIENT
Start: 2020-08-16 | End: 2020-08-16

## 2020-08-16 RX ORDER — CEPHALEXIN 500 MG/1
500 CAPSULE ORAL EVERY 12 HOURS SCHEDULED
Qty: 14 CAPSULE | Refills: 0 | Status: SHIPPED | OUTPATIENT
Start: 2020-08-16 | End: 2020-08-23

## 2020-08-16 RX ADMIN — CEPHALEXIN 500 MG: 250 CAPSULE ORAL at 09:47

## 2020-08-16 NOTE — ED PROVIDER NOTES
History  Chief Complaint   Patient presents with    Possible UTI     urinary frequency, burning and bladder tenderness  symptoms started mildly yesterday, increased this morning     This is a 45 y o  old female who presents to the ED for evaluation of urinary frequency  Patient presents these today complaining of burning and dysuria with frequeny and urgency  Mild suprapubic pain  No back pain  No fever chills  History of frequent UTIs  Last 1 3 weeks ago treated with Bactrim  No nausea or vomiting  Reports recent coitus without voiding post intercourse  No history of kidney stones  Took Azo immediately prior to evaluation  Otherwise denies cough congestion runny nose, chest pain or shortness of breath, leg pain or swelling  Prior to Admission Medications   Prescriptions Last Dose Informant Patient Reported? Taking?    Biotin 05766 MCG TABS  Self Yes Yes   Sig: Take by mouth   Calcium Carbonate-Vit D-Min (CALCIUM 1200 PO)  Self Yes Yes   Sig: Take 2,400 mg by mouth daily   Cyanocobalamin ([employer] VITAMIN B12 PO)  Self Yes Yes   Sig: Take by mouth   Multiple Vitamins-Minerals (MULTI COMPLETE PO)  Self Yes Yes   Sig: Take by mouth   Polypodium Leucotomos (Heliocare) 240 MG CAPS   No Yes   Sig: Take two tablets once a day   Probiotic Product (PRO-BIOTIC BLEND PO)  Self Yes Yes   Sig: Take by mouth as needed    QUEtiapine (SEROquel) 50 mg tablet   No Yes   Sig: Take 1 tablet (50 mg total) by mouth daily at bedtime   albuterol (Ventolin HFA) 90 mcg/act inhaler   No Yes   Sig: Inhale 2 puffs every 6 (six) hours as needed for wheezing or shortness of breath   atoMOXetine (STRATTERA) 25 mg capsule   No Yes   Sig: Take 1 capsule (25 mg total) by mouth daily   drospirenone-ethinyl estradiol (LIEN) 3-0 02 MG per tablet  Self No Yes   Sig: Take 1 tablet by mouth daily   Patient taking differently: Take 1 tablet by mouth daily at bedtime    famotidine (PEPCID) 20 mg tablet   No Yes   Sig: Take 1 tablet (20 mg total) by mouth daily   hydrOXYzine HCL (ATARAX) 25 mg tablet   No Yes   Sig: Take 1 tablet (25 mg total) by mouth every 6 (six) hours as needed for itching   ipratropium (ATROVENT) 0 03 % nasal spray  Self No Yes   Si sprays into each nostril every 12 (twelve) hours   lamoTRIgine (LaMICtal) 100 mg tablet   No Yes   Sig: Take 1 tablet (100 mg total) by mouth daily   lamoTRIgine (LaMICtal) 150 MG tablet   No Yes   Sig: TAKE ONE TABLET BY MOUTH DAILY   meloxicam (MOBIC) 7 5 mg tablet   No Yes   Sig: Take 1 tablet (7 5 mg total) by mouth daily   methylPREDNISolone 4 MG tablet therapy pack   No Yes   Sig: Use as directed on package   metoprolol succinate (TOPROL-XL) 25 mg 24 hr tablet   No Yes   Sig: Take 1 tablet (25 mg total) by mouth 2 (two) times a day   montelukast (SINGULAIR) 10 mg tablet  Self No Yes   Sig: Take 1 tablet (10 mg total) by mouth daily at bedtime   pantoprazole (PROTONIX) 40 mg tablet   No Yes   Sig: TAKE ONE TABLET BY MOUTH EVERY DAY   phenazopyridine (PYRIDIUM) 100 mg tablet Not Taking at Unknown time  No No   Sig: Take 1 tablet (100 mg total) by mouth 3 (three) times a day as needed for bladder spasms   Patient not taking: Reported on 2020   venlafaxine (EFFEXOR-XR) 150 mg 24 hr capsule   No Yes   Sig: Take 1 capsule (150 mg total) by mouth daily   venlafaxine (EFFEXOR-XR) 75 mg 24 hr capsule   No Yes   Sig: Take 1 capsule (75 mg total) by mouth daily      Facility-Administered Medications: None     Past Medical History:   Diagnosis Date    ADHD (attention deficit hyperactivity disorder)     Bulging lumbar disc     Carpal tunnel syndrome     RIGHT    LAST ASSESSED: 16    Chronic back pain     low    Chronic pain disorder     Colon polyps     Diabetes mellitus ([address] )     Resolved post weight loss    GERD (gastroesophageal reflux disease)     Gestational diabetes     Hearing loss     left ear    Hyperlipidemia     Resolved with weight loss    IBS (irritable bowel syndrome)  Ileus ([facility] [address] )     LAST ASSESSED: 8/3/17    Labial cyst     LAST ASSESSED: 16    Myofascial pain     LAST ASSESSED: 16    Obesity     Ovarian cyst     LEFT  LAST ASSESSED: 16    Panic attack     Pap smear for cervical cancer screening     2018--pap wnl, HRHPV neg    Pneumonia     Seasonal allergies     Thoracic outlet syndrome         Trochanteric bursitis of both hips     LAST ASSESSED: 3/18/16    Ulnar neuropathy at elbow     Varicella     Wears glasses      Past Surgical History:   Procedure Laterality Date    BILE DUCT EXPLORATION      ENDOSCOPIC REMOVAL OF STONES FROM BILIARY TRACT     SECTION      x3    CHOLECYSTECTOMY      COLONOSCOPY      -polyp, repeat     DILATION AND CURETTAGE OF UTERUS      ENDOMETRIAL ABLATION      ERCP W/ SPHICTEROTOMY      FIRST RIB REMOVAL      THORAX EXCISION OF FIRST RIB    HYSTEROSCOPY      MT COLONOSCOPY FLX DX W/COLLJ SPEC WHEN PFRMD N/A 2017    Procedure: COLONOSCOPY;  Surgeon: Dickson Levine MD;  [facility]; Service: Gastroenterology    MT ESOPHAGOGASTRODUODENOSCOPY TRANSORAL DIAGNOSTIC N/A 2017    Procedure: ESOPHAGOGASTRODUODENOSCOPY (EGD); Surgeon: Tremayne Martines MD;  [facility]; Service: Gastroenterology    MT HYSTEROSCOPY,W/ENDO BX N/A 10/20/2017    Procedure: DILATATION AND CURETTAGE (D&C) WITH HYSTEROSCOPY  REMOVAL VULVAR RT  LESION;  Surgeon: Ping Llamas MD;  [facility];  Service: Gynecology    MT LAP, ÁLVARO RESTRICT PROC, LONGITUDINAL GASTRECTOMY N/A 2018    Procedure: GASTRECTOMY SLEEVE LAPAROSCOPIC; INTRAOPERATIVE EGD ;  Surgeon: Claudean Jubilee, MD;  [facility];  Service: [place]  SURG IMPLNT [address] Left 2020    Procedure: Insertion of thoracic spinal cord stimulator electrode via laminotomy and placement of left buttock implantable pulse generator (NEUROMONITORING);   Surgeon: Domitila Reddy MD;  [facility];  Service: Neurosurgery    SPINAL CORD STIMULATOR TRIAL W/ LAMINOTOMY      TONSILLECTOMY AND ADENOIDECTOMY      TUBAL LIGATION      VEIN LIGATION AND STRIPPING Right     VULVA SURGERY  10/20/2017    BIOPSY    WISDOM TOOTH EXTRACTION       Family History   Problem Relation Age of Onset    Diabetes Mother     Other Mother         HYPERCHOLESTEROLEMIA    Breast cancer Mother         >50    BRCA1 Negative Mother     BRCA2 Negative Mother     Diabetes Father     Other Father         traumatic brain injury    Prostate cancer Father     Alcohol abuse Father         in remission    Heart disease Father     Neuropathy Father     Hyperlipidemia Father     Hypertension Brother     Diabetes Brother     Other Brother         HYPERCHOLESTEROLEMIA    Alcohol abuse Brother     Depression Brother         attempted suicide    Colon cancer Maternal Grandfather     Heart attack Maternal Grandmother     No Known Problems Paternal Grandmother     No Known Problems Paternal Grandfather     Diabetes Brother     Alcohol abuse Brother     Asthma Son     No Known Problems Daughter     Ovarian cancer Neg Hx     Uterine cancer Neg Hx      I have reviewed and agree with the history as documented  E-Cigarette/Vaping    E-Cigarette Use Never User      E-Cigarette/Vaping Substances    Nicotine No     THC No     CBD No     Flavoring No     Other No     Unknown No      Social History     Tobacco Use    Smoking status: Former Smoker     Last attempt to quit: 4/30/2017     Years since quitting: 3 2    Smokeless tobacco: Never Used   Substance Use Topics    Alcohol use: Yes     Frequency: Monthly or less     Drinks per session: 1 or 2     Binge frequency: Never     Comment: socially    Drug use: No     Review of Systems   Constitutional: Negative for chills, fatigue, fever and unexpected weight change  HENT: Negative for congestion, rhinorrhea and sore throat  Eyes: Negative for redness and visual disturbance  Respiratory: Negative for cough and shortness of breath  Cardiovascular: Negative for chest pain and leg swelling  Gastrointestinal: Negative for abdominal pain, constipation, diarrhea, nausea and vomiting  Endocrine: Negative for cold intolerance and heat intolerance  Genitourinary: Positive for dysuria, frequency and urgency  Musculoskeletal: Negative for back pain  Skin: Negative for rash  Neurological: Negative for dizziness, syncope and numbness  All other systems reviewed and are negative      Physical Exam  Physical Exam    Vital Signs  ED Triage Vitals   Temperature Pulse Respirations Blood Pressure SpO2   08/16/20 0929 08/16/20 0922 08/16/20 0922 08/16/20 0922 08/16/20 0922   98 1 °F (36 7 °C) 91 18 133/77 99 %      Temp Source Heart Rate Source Patient Position - Orthostatic VS BP Location FiO2 (%)   08/16/20 0929 08/16/20 0922 08/16/20 0922 08/16/20 0922 --   Oral Monitor Sitting Right arm       Pain Score       08/16/20 0922       4           Vitals:    08/16/20 0922   BP: 133/77   Pulse: 91   Patient Position - Orthostatic VS: Sitting         Visual Acuity      ED Medications  Medications   cephalexin (KEFLEX) capsule 500 mg (500 mg Oral Given 8/16/20 0947)       Diagnostic Studies  Results Reviewed     Procedure Component Value Units Date/Time    POCT urinalysis dipstick [[id number]]  (Abnormal) Resulted:  08/16/20 0929    Lab Status:  Edited Result - FINAL Specimen:  Urine Updated:  08/16/20 0944     Color, UA brown     Clarity, UA hazy     Glucose, UA (Ref: Negative) negative     Bilirubin, UA (Ref: Negative) moderate     Ketones, UA (Ref: Negative) negative     Spec Grav, UA (Ref:1 003-1 030) 1 030     Blood, UA (Ref: Negative) large     pH, UA (Ref: 4 5-8 0) 6 0     Protein, UA (Ref: Negative) 100     Urobilinogen, UA (Ref: 0 2- 1 0) 0 2      Leukocytes, UA (Ref: Negative) moderate     Nitrite, UA (Ref: Negative) negative             No orders to display Procedures  Procedures     ED Course      A/P: This is a 45 y o  female who presents to the ED for evaluation of urinary symptoms  The UA is c/w UTI  Will treat symptomatically  I personally discussed return precautions with this patient  I provided the patient with written discharge instructions and particularly highlighted specific areas of interest to this patient, including but not limited to: medications for symptom managment, follow up recommendations, and return precautions  Patient is in agreement with this plan as outlined above  MDM      Disposition  Final diagnoses:   Cystitis     Time reflects when diagnosis was documented in both MDM as applicable and the Disposition within this note     Time User Action Codes Description Comment    8/16/2020  9:41 AM Sharyle Pontes Add [N30 90] Cystitis       ED Disposition     ED Disposition Condition Date/Time Comment    Discharge Stable Sun Aug 16, 2020  9:41 AM Wilfredo Norwood discharge to home/self care  Follow-up Information     Follow up With Specialties Details Why [facility], DO Internal Medicine Call   [address]    [address] [facility]  [phone]            Discharge Medication List as of 8/16/2020  9:42 AM      START taking these medications    Details   cephalexin (KEFLEX) 500 mg capsule Take 1 capsule (500 mg total) by mouth every 12 (twelve) hours for 7 days, Starting Sun 8/16/2020, Until Sun 8/23/2020, Normal         CONTINUE these medications which have NOT CHANGED    Details   albuterol (Ventolin HFA) 90 mcg/act inhaler Inhale 2 puffs every 6 (six) hours as needed for wheezing or shortness of breath, Starting Tue 6/16/2020, Normal      atoMOXetine (STRATTERA) 25 mg capsule Take 1 capsule (25 mg total) by mouth daily, Starting Thu 6/4/2020, Normal      Biotin 50744 MCG TABS Take by mouth, Historical Med      Calcium Carbonate-Vit D-Min (CALCIUM 1200 PO) Take 2,400 mg by mouth daily, Historical Med      Cyanocobalamin ([employer]S VITAMIN B12 PO) Take by mouth, Historical Med      drospirenone-ethinyl estradiol (LIEN) 3-0 02 MG per tablet Take 1 tablet by mouth daily, Starting Fri 11/15/2019, Normal      famotidine (PEPCID) 20 mg tablet Take 1 tablet (20 mg total) by mouth daily, Starting Thu 5/28/2020, No Print      hydrOXYzine HCL (ATARAX) 25 mg tablet Take 1 tablet (25 mg total) by mouth every 6 (six) hours as needed for itching, Starting Thu 6/4/2020, Until Wed 9/2/2020, Normal      ipratropium (ATROVENT) 0 03 % nasal spray 2 sprays into each nostril every 12 (twelve) hours, Starting Thu 4/2/2020, Normal      !! lamoTRIgine (LaMICtal) 100 mg tablet Take 1 tablet (100 mg total) by mouth daily, Starting Thu 6/4/2020, Until Tue 12/1/2020, Normal      !! lamoTRIgine (LaMICtal) 150 MG tablet TAKE ONE TABLET BY MOUTH DAILY, Normal      meloxicam (MOBIC) 7 5 mg tablet Take 1 tablet (7 5 mg total) by mouth daily, Starting Thu 6/11/2020, Normal      methylPREDNISolone 4 MG tablet therapy pack Use as directed on package, Normal      metoprolol succinate (TOPROL-XL) 25 mg 24 hr tablet Take 1 tablet (25 mg total) by mouth 2 (two) times a day, Starting Thu 4/30/2020, Normal      montelukast (SINGULAIR) 10 mg tablet Take 1 tablet (10 mg total) by mouth daily at bedtime, Starting Thu 4/2/2020, Normal      Multiple Vitamins-Minerals (MULTI COMPLETE PO) Take by mouth, Historical Med      pantoprazole (PROTONIX) 40 mg tablet TAKE ONE TABLET BY MOUTH EVERY DAY, Normal      Polypodium Leucotomos (Heliocare) 240 MG CAPS Take two tablets once a day, Normal      Probiotic Product (PRO-BIOTIC BLEND PO) Take by mouth as needed , Historical Med      QUEtiapine (SEROquel) 50 mg tablet Take 1 tablet (50 mg total) by mouth daily at bedtime, Starting Thu 6/4/2020, Until Tue 12/1/2020, Normal      !! venlafaxine (EFFEXOR-XR) 150 mg 24 hr capsule Take 1 capsule (150 mg total) by mouth daily, Starting Thu 6/4/2020, Normal      !! venlafaxine [facility] ) 75 mg 24 hr capsule Take 1 capsule (75 mg total) by mouth daily, Starting Fri 5/22/2020, Until Wed 11/18/2020, Normal      phenazopyridine (PYRIDIUM) 100 mg tablet Take 1 tablet (100 mg total) by mouth 3 (three) times a day as needed for bladder spasms, Starting Sun 7/26/2020, Normal       !! - Potential duplicate medications found  Please discuss with provider  No discharge procedures on file      PDMP Review       Value Time User    PDMP Reviewed  Yes 4/6/2020 12:20 PM Citlaly Horta DO          ED Provider  Electronically Signed by           Samantha Fragoso MD  08/16/20 6539

## 2020-08-20 ENCOUNTER — OFFICE VISIT (OUTPATIENT)
Dept: PAIN MEDICINE | Facility: CLINIC | Age: 50
End: 2020-08-20
Payer: COMMERCIAL

## 2020-08-20 VITALS
DIASTOLIC BLOOD PRESSURE: 70 MMHG | HEART RATE: 83 BPM | SYSTOLIC BLOOD PRESSURE: 104 MMHG | WEIGHT: 170 LBS | BODY MASS INDEX: 25.85 KG/M2 | TEMPERATURE: 97.8 F

## 2020-08-20 DIAGNOSIS — G89.4 CHRONIC PAIN SYNDROME: Primary | ICD-10-CM

## 2020-08-20 DIAGNOSIS — G89.29 CHRONIC BILATERAL LOW BACK PAIN WITH SCIATICA, SCIATICA LATERALITY UNSPECIFIED: ICD-10-CM

## 2020-08-20 DIAGNOSIS — M79.18 MYOFASCIAL PAIN SYNDROME: ICD-10-CM

## 2020-08-20 DIAGNOSIS — M51.16 INTERVERTEBRAL DISC DISORDER WITH RADICULOPATHY OF LUMBAR REGION: ICD-10-CM

## 2020-08-20 DIAGNOSIS — M54.16 LUMBAR RADICULOPATHY: ICD-10-CM

## 2020-08-20 DIAGNOSIS — M54.40 CHRONIC BILATERAL LOW BACK PAIN WITH SCIATICA, SCIATICA LATERALITY UNSPECIFIED: ICD-10-CM

## 2020-08-20 PROCEDURE — 3074F SYST BP LT 130 MM HG: CPT | Performed by: NURSE PRACTITIONER

## 2020-08-20 PROCEDURE — 99214 OFFICE O/P EST MOD 30 MIN: CPT | Performed by: NURSE PRACTITIONER

## 2020-08-20 PROCEDURE — 1036F TOBACCO NON-USER: CPT | Performed by: NURSE PRACTITIONER

## 2020-08-20 PROCEDURE — 3078F DIAST BP <80 MM HG: CPT | Performed by: NURSE PRACTITIONER

## 2020-08-20 RX ORDER — METHOCARBAMOL 750 MG/1
750 TABLET, FILM COATED ORAL EVERY 8 HOURS SCHEDULED
Qty: 90 TABLET | Refills: 1 | Status: SHIPPED | OUTPATIENT
Start: 2020-08-20 | End: 2021-06-28

## 2020-08-20 NOTE — PROGRESS NOTES
Assessment:  1  Chronic pain syndrome    2  Chronic bilateral low back pain with sciatica, sciatica laterality unspecified    3  Intervertebral disc disorder with radiculopathy of lumbar region    4  Lumbar radiculopathy    5  Myofascial pain syndrome        Plan:    Michelle You is a 45 y o  female who presents for a follow up office visit in regards to Back Pain and Hip Pain  The patient has a history of chronic pain syndrome secondary to low back pain, lumbar intervertebral disc disorder with radiculopathy, with [employer] spinal cord stimulator  Patient presents today with mid back pain that started after performing 3 codes in one day  She had met with a [employer] representative and there were no impedence's  On exam, her pain appears myofascial, with palpable trigger points  To help decrease his pain, I will prescribe her methocarbomol 750 mg twice a day  She was made aware the medication may cause drowsiness and dizziness but she may be able to tolerate this more than the cyclobenzaprine  I will also schedule her for trigger point injections which may require ultrasound guidance sutures spinal cord stimulator lead  Complete risks and benefits including bleeding, infection, tissue reaction, nerve injury and allergic reaction were discussed  The approach was demonstrated using models and literature was provided  Verbal and written consent was obtained  My impressions and treatment recommendations were discussed in detail with the patient who verbalized understanding and had no further questions  Discharge instructions were provided  I personally saw and examined the patient and I agree with the above discussed plan of care      Orders Placed This Encounter   Procedures    Injection trigger point 1 or 2 muscles     Standing Status:   Future     Standing Expiration Date:   8/20/2021     Scheduling Instructions:      Bilateral thoracic and lumbar paraspinal musculature     New Medications Ordered This Visit Medications    methocarbamol (ROBAXIN) 750 mg tablet     Sig: Take 1 tablet (750 mg total) by mouth every 8 (eight) hours     Dispense:  90 tablet     Refill:  1       History of Present Illness:  Vaughn Moraes is a 45 y o  female who presents for a follow up office visit in regards to Back Pain and Hip Pain  The patient has a history of chronic pain syndrome secondary to low back pain, lumbar intervertebral disc disorder with radiculopathy, with [employer] spinal cord stimulator  She was last seen in the office in December 23, 2019 in which she underwent a successful spinal cord stimulator trial   Since the last office visit she had implantation of her spinal cord stimulator on January 29, 2020  Since implantation, the patient states that her pain has been minimal   She is receiving excellent coverage in the low back, and 100% pain relief in her legs  However, 2 weeks ago, the patient states she had 3 codes in the emergency room  After that day she began experiencing pain in the mid back  It is worse on the right side  It is intermittent and described as dull aching, throbbing and pressure-like  She is rating it a 6/10 on numeric rating scale  She had contact our office when the increased pain started and was prescribed a Medrol Dosepak  She states she took 1 day and developed diarrhea so she stopped  She also has cyclobenzaprine 10 mg which was prescribed in the past which she restarted but this causes drowsiness so she only takes it at night  She also was seen in the emergency room on August 16, 2020 for urinary tract infection and was prescribed Keflex for 7 days  I have personally reviewed and/or updated the patient's past medical history, past surgical history, family history, social history, current medications, allergies, and vital signs today  Review of Systems   Respiratory: Negative for shortness of breath  Cardiovascular: Negative for chest pain     Gastrointestinal: Negative for constipation, diarrhea, nausea and vomiting  Musculoskeletal: Positive for back pain and joint swelling  Negative for arthralgias, gait problem and myalgias  Skin: Negative for rash  Neurological: Negative for dizziness, seizures and weakness  All other systems reviewed and are negative  Patient Active Problem List   Diagnosis    IBS (irritable bowel syndrome)    Mixed hyperlipidemia    GERD (gastroesophageal reflux disease)    Chronic back pain    Cervical radiculopathy    Hepatic steatosis    Pulmonary nodule seen on imaging study    S/P laparoscopic sleeve gastrectomy    Other fatigue    Overweight    Postsurgical malabsorption    Lumbar radiculopathy    Intervertebral disc disorder with radiculopathy of lumbar region    Post traumatic stress disorder (PTSD)    Major depressive disorder, recurrent severe without psychotic features (HCC)    Generalized anxiety disorder    Sacroiliitis (HCC)    Tachycardia    Chronic pain syndrome    Other chronic pain    Encounter for surgical aftercare following surgery of digestive system       Past Medical History:   Diagnosis Date    ADHD (attention deficit hyperactivity disorder)     Bulging lumbar disc     Carpal tunnel syndrome     RIGHT  LAST ASSESSED: 12/7/16    Chronic back pain     low    Chronic pain disorder     Colon polyps     Diabetes mellitus ([address] )     Resolved post weight loss    GERD (gastroesophageal reflux disease)     Gestational diabetes     Hearing loss     left ear    Hyperlipidemia     Resolved with weight loss    IBS (irritable bowel syndrome)     Ileus ([address] )     LAST ASSESSED: 8/3/17    Labial cyst     LAST ASSESSED: 4/21/16    Myofascial pain     LAST ASSESSED: 4/12/16    Obesity     Ovarian cyst     LEFT   LAST ASSESSED: 9/2/16    Panic attack     Pap smear for cervical cancer screening     4/2018--pap wnl, HRHPV neg    Pneumonia     Seasonal allergies     Thoracic outlet syndrome     2010    Trochanteric bursitis of both hips     LAST ASSESSED: 3/18/16    Ulnar neuropathy at elbow     Varicella     Wears glasses        Past Surgical History:   Procedure Laterality Date    BILE DUCT EXPLORATION      ENDOSCOPIC REMOVAL OF STONES FROM BILIARY TRACT     SECTION      x3    CHOLECYSTECTOMY      COLONOSCOPY      -polyp, repeat     DILATION AND CURETTAGE OF UTERUS      ENDOMETRIAL ABLATION      ERCP W/ SPHICTEROTOMY      FIRST RIB REMOVAL      THORAX EXCISION OF FIRST RIB    HYSTEROSCOPY      RI COLONOSCOPY FLX DX W/COLLJ SPEC WHEN PFRMD N/A 2017    Procedure: COLONOSCOPY;  Surgeon: Louie Atkinson MD;  [facility]; Service: Gastroenterology    RI ESOPHAGOGASTRODUODENOSCOPY TRANSORAL DIAGNOSTIC N/A 2017    Procedure: ESOPHAGOGASTRODUODENOSCOPY (EGD); Surgeon: Davian Bey MD;  [facility]; Service: Gastroenterology    RI HYSTEROSCOPY,W/ENDO BX N/A 10/20/2017    Procedure: DILATATION AND CURETTAGE (D&C) WITH HYSTEROSCOPY  REMOVAL VULVAR RT  LESION;  Surgeon: Parris Solitario MD;  [facility];  Service: Gynecology    RI LAP, ÁLVARO RESTRICT PROC, LONGITUDINAL GASTRECTOMY N/A 2018    Procedure: GASTRECTOMY SLEEVE LAPAROSCOPIC; INTRAOPERATIVE EGD ;  Surgeon: Eric Spivey MD;  [facility];  Service: Alean Odin SURG IMPLNT [address] Left 2020    Procedure: Insertion of thoracic spinal cord stimulator electrode via laminotomy and placement of left buttock implantable pulse generator (NEUROMONITORING);   Surgeon: Gerald Dudley MD;  [facility];  Service: Neurosurgery    SPINAL CORD STIMULATOR TRIAL W/ LAMINOTOMY      TONSILLECTOMY AND ADENOIDECTOMY      TUBAL LIGATION      VEIN LIGATION AND STRIPPING Right     VULVA SURGERY  10/20/2017    BIOPSY    WISDOM TOOTH EXTRACTION         Family History   Problem Relation Age of Onset    Diabetes Mother     Other Mother         HYPERCHOLESTEROLEMIA    Breast cancer Mother         >50    BRCA1 Negative Mother     BRCA2 Negative Mother     Diabetes Father     Other Father         traumatic brain injury    Prostate cancer Father     Alcohol abuse Father         in remission    Heart disease Father     Neuropathy Father     Hyperlipidemia Father     Hypertension Brother     Diabetes Brother     Other Brother         HYPERCHOLESTEROLEMIA    Alcohol abuse Brother     Depression Brother         attempted suicide    Colon cancer Maternal Grandfather     Heart attack Maternal Grandmother     No Known Problems Paternal Grandmother     No Known Problems Paternal Grandfather     Diabetes Brother     Alcohol abuse Brother     Asthma Son     No Known Problems Daughter     Ovarian cancer Neg Hx     Uterine cancer Neg Hx        Social History     Occupational History    Occupation: ER TECH     Employer: [employer]   Tobacco Use    Smoking status: Former Smoker     Last attempt to quit: 4/30/2017     Years since quitting: 3 3    Smokeless tobacco: Never Used   Substance and Sexual Activity    Alcohol use: Yes     Frequency: Monthly or less     Drinks per session: 1 or 2     Binge frequency: Never     Comment: socially    Drug use: No    Sexual activity: Yes     Partners: Male     Birth control/protection: OCP     Comment: lifetime partners: 6; current partner 2014       Current Outpatient Medications on File Prior to Visit   Medication Sig    albuterol (Ventolin HFA) 90 mcg/act inhaler Inhale 2 puffs every 6 (six) hours as needed for wheezing or shortness of breath    atoMOXetine (STRATTERA) 25 mg capsule Take 1 capsule (25 mg total) by mouth daily    Biotin 21117 MCG TABS Take by mouth    Calcium Carbonate-Vit D-Min (CALCIUM 1200 PO) Take 2,400 mg by mouth daily    cephalexin (KEFLEX) 500 mg capsule Take 1 capsule (500 mg total) by mouth every 12 (twelve) hours for 7 days    Cyanocobalamin ([employer] VITAMIN B12 PO) Take by mouth    drospirenone-ethinyl estradiol (LIEN) 3-0 02 MG per tablet Take 1 tablet by mouth daily (Patient taking differently: Take 1 tablet by mouth daily at bedtime )    famotidine (PEPCID) 20 mg tablet Take 1 tablet (20 mg total) by mouth daily    hydrOXYzine HCL (ATARAX) 25 mg tablet Take 1 tablet (25 mg total) by mouth every 6 (six) hours as needed for itching    ipratropium (ATROVENT) 0 03 % nasal spray 2 sprays into each nostril every 12 (twelve) hours    lamoTRIgine (LaMICtal) 100 mg tablet Take 1 tablet (100 mg total) by mouth daily    lamoTRIgine (LaMICtal) 150 MG tablet TAKE ONE TABLET BY MOUTH DAILY    meloxicam (MOBIC) 7 5 mg tablet Take 1 tablet (7 5 mg total) by mouth daily    metoprolol succinate (TOPROL-XL) 25 mg 24 hr tablet Take 1 tablet (25 mg total) by mouth 2 (two) times a day    montelukast (SINGULAIR) 10 mg tablet Take 1 tablet (10 mg total) by mouth daily at bedtime    Multiple Vitamins-Minerals (MULTI COMPLETE PO) Take by mouth    pantoprazole (PROTONIX) 40 mg tablet TAKE ONE TABLET BY MOUTH EVERY DAY    phenazopyridine (PYRIDIUM) 100 mg tablet Take 1 tablet (100 mg total) by mouth 3 (three) times a day as needed for bladder spasms    Polypodium Leucotomos (Heliocare) 240 MG CAPS Take two tablets once a day    Probiotic Product (PRO-BIOTIC BLEND PO) Take by mouth as needed     QUEtiapine (SEROquel) 50 mg tablet Take 1 tablet (50 mg total) by mouth daily at bedtime    venlafaxine (EFFEXOR-XR) 150 mg 24 hr capsule Take 1 capsule (150 mg total) by mouth daily    venlafaxine (EFFEXOR-XR) 75 mg 24 hr capsule Take 1 capsule (75 mg total) by mouth daily    [DISCONTINUED] methylPREDNISolone 4 MG tablet therapy pack Use as directed on package     No current facility-administered medications on file prior to visit          Allergies   Allergen Reactions    Ibuprofen Other (See Comments)     Due to gastric sleeve -can only take for 5 days, then needs to stop       Physical Exam:    /70 Pulse 83   Temp 97 8 °F (36 6 °C) (Temporal)   Wt 77 1 kg (170 lb)   LMP 01/07/2019 (Approximate)   BMI 25 85 kg/m²     Constitutional:normal, well developed, well nourished, alert, in no distress and non-toxic and no overt pain behavior  Eyes:anicteric  HEENT:grossly intact  Neck:supple, symmetric, trachea midline and no masses   Pulmonary:even and unlabored  Cardiovascular:No edema or pitting edema present  Skin:Normal without rashes or lesions and well hydrated  Psychiatric:Mood and affect appropriate  Neurologic:Cranial Nerves II-XII grossly intact  Musculoskeletal:normal    Thoracic Spine Exam    Appearance:  Normal lordosis  Palpation/Tenderness:  left thoracic paraspinal tenderness  right thoracic paraspinal tenderness  left thoracic spasm  right thoracic spasm  trigger points palpable  lumbar paraspinal musculature spasms          Imaging  MRI THORACIC SPINE WITHOUT CONTRAST     INDICATION: M51 [id number]: Intervertebral disc disorders with radiculopathy, lumbar region      COMPARISON:  None      TECHNIQUE:  Sagittal T1, sagittal T2, sagittal inversion recovery, axial T2,  axial 2D MERGE      IMAGE QUALITY: Diagnostic      FINDINGS:     ALIGNMENT: Normal alignment of the thoracic spine  No compression fracture  No subluxation  No scoliosis      MARROW SIGNAL:  Normal marrow signal is identified within the visualized bony structures  No discrete marrow lesion      THORACIC CORD: Normal signal within the thoracic cord      PARAVERTEBRAL SOFT TISSUES:  Several slightly heterogeneous T2 hyperintense abnormalities are identified within the periphery of the spleen  These appear unchanged from multiple prior CT examinations      THORACIC DEGENERATIVE CHANGE: No disc herniation  No canal stenosis or foraminal narrowing    There are small root sleeve cysts identified within several thoracic neural foramen      IMPRESSION:     Unremarkable MRI of the thoracic spine      Incidental note of small root sleeve cysts within the thoracic neural foramen      Incidentally noted slightly heterogeneous T2 hyperintensities are seen within the spleen which are unchanged from multiple prior CT exams, possibly representing hemangiomas  MRI LUMBAR SPINE WITHOUT CONTRAST     INDICATION: M51 [id number]: Intervertebral disc disorders with radiculopathy, lumbar region      COMPARISON:  1/29/2019 MRI     TECHNIQUE:  Sagittal T1, sagittal T2, sagittal inversion recovery, axial T1 and axial T2, coronal T2     IMAGE QUALITY:  Diagnostic     FINDINGS:     VERTEBRAL BODIES:  Normal alignment of the lumbar spine  No spondylolysis or spondylolisthesis  No scoliosis  No compression fracture  Normal marrow signal is identified within the visualized bony structures  No discrete marrow lesion      SACRUM:  Normal signal within the sacrum   No evidence of insufficiency or stress fracture      DISTAL CORD AND CONUS:  Normal size and signal within the distal cord and conus        PARASPINAL SOFT TISSUES:  Paraspinal soft tissues are unremarkable      LOWER THORACIC DISC SPACES:  Normal disc height and signal   No disc herniation, canal stenosis or foraminal narrowing      LUMBAR DISC SPACES:     L1-L2:  Normal disc, mild degenerative facet arthrosis, no stenosis, stable     L2-L3:  Normal disc, mild degenerative facet arthrosis, no stenosis, stable     L3-L4:  Mild degenerative spondylosis and bulging annulus, no stenosis, stable     L4-L5:  Mild degenerative spondylosis, small left foraminal disc protrusion, possible left L4 nerve root encroachment, stable      L5-S1:  Mild degenerative spondylosis, small right foraminal disc protrusion, possible right L5 nerve root encroachment, stable      IMPRESSION:  Persistent mild multilevel degenerative spondylosis     Persistent small left foraminal disc protrusion L4-5     Persistent small right foraminal disc protrusion L5-S1     Similar to previous MRI study

## 2020-08-21 ENCOUNTER — TRANSCRIBE ORDERS (OUTPATIENT)
Dept: PAIN MEDICINE | Facility: CLINIC | Age: 50
End: 2020-08-21

## 2020-08-24 ENCOUNTER — HOSPITAL ENCOUNTER (EMERGENCY)
Facility: HOSPITAL | Age: 50
Discharge: HOME | End: 2020-08-24
Attending: EMERGENCY MEDICINE | Admitting: EMERGENCY MEDICINE
Payer: COMMERCIAL

## 2020-08-24 ENCOUNTER — APPOINTMENT (EMERGENCY)
Dept: RADIOLOGY | Facility: HOSPITAL | Age: 50
End: 2020-08-24
Payer: COMMERCIAL

## 2020-08-24 VITALS
HEART RATE: 74 BPM | RESPIRATION RATE: 18 BRPM | DIASTOLIC BLOOD PRESSURE: 61 MMHG | OXYGEN SATURATION: 96 % | HEIGHT: 68 IN | TEMPERATURE: 98.4 F | SYSTOLIC BLOOD PRESSURE: 97 MMHG | WEIGHT: 174.6 LBS | BODY MASS INDEX: 26.46 KG/M2

## 2020-08-24 DIAGNOSIS — R00.2 PALPITATIONS: Primary | ICD-10-CM

## 2020-08-24 DIAGNOSIS — R07.89 ATYPICAL CHEST PAIN: ICD-10-CM

## 2020-08-24 LAB
ALBUMIN SERPL BCP-MCNC: 3.7 G/DL (ref 3.5–5)
ALP SERPL-CCNC: 51 U/L (ref 46–116)
ALT SERPL W P-5'-P-CCNC: 35 U/L (ref 12–78)
ANION GAP SERPL CALCULATED.3IONS-SCNC: 9 MMOL/L (ref 4–13)
AST SERPL W P-5'-P-CCNC: 18 U/L (ref 5–45)
ATRIAL RATE: 80 BPM
BASOPHILS # BLD AUTO: 0.01 THOUSANDS/ΜL (ref 0–0.1)
BASOPHILS NFR BLD AUTO: 0 % (ref 0–1)
BILIRUB SERPL-MCNC: 0.3 MG/DL (ref 0.2–1)
BUN SERPL-MCNC: 14 MG/DL (ref 5–25)
CALCIUM SERPL-MCNC: 9.4 MG/DL (ref 8.3–10.1)
CHLORIDE SERPL-SCNC: 102 MMOL/L (ref 100–108)
CO2 SERPL-SCNC: 27 MMOL/L (ref 21–32)
CREAT SERPL-MCNC: 0.85 MG/DL (ref 0.6–1.3)
EOSINOPHIL # BLD AUTO: 0 THOUSAND/ΜL (ref 0–0.61)
EOSINOPHIL NFR BLD AUTO: 0 % (ref 0–6)
ERYTHROCYTE [DISTWIDTH] IN BLOOD BY AUTOMATED COUNT: 12.5 % (ref 11.6–15.1)
GFR SERPL CREATININE-BSD FRML MDRD: 80 ML/MIN/1.73SQ M
GLUCOSE SERPL-MCNC: 111 MG/DL (ref 65–140)
HCT VFR BLD AUTO: 42.3 % (ref 34.8–46.1)
HGB BLD-MCNC: 14.1 G/DL (ref 11.5–15.4)
IMM GRANULOCYTES # BLD AUTO: 0.02 THOUSAND/UL (ref 0–0.2)
IMM GRANULOCYTES NFR BLD AUTO: 0 % (ref 0–2)
LYMPHOCYTES # BLD AUTO: 1.77 THOUSANDS/ΜL (ref 0.6–4.47)
LYMPHOCYTES NFR BLD AUTO: 21 % (ref 14–44)
MCH RBC QN AUTO: 30.6 PG (ref 26.8–34.3)
MCHC RBC AUTO-ENTMCNC: 33.3 G/DL (ref 31.4–37.4)
MCV RBC AUTO: 92 FL (ref 82–98)
MONOCYTES # BLD AUTO: 0.49 THOUSAND/ΜL (ref 0.17–1.22)
MONOCYTES NFR BLD AUTO: 6 % (ref 4–12)
NEUTROPHILS # BLD AUTO: 5.99 THOUSANDS/ΜL (ref 1.85–7.62)
NEUTS SEG NFR BLD AUTO: 73 % (ref 43–75)
NRBC BLD AUTO-RTO: 0 /100 WBCS
P AXIS: 57 DEGREES
PLATELET # BLD AUTO: 285 THOUSANDS/UL (ref 149–390)
PMV BLD AUTO: 9.5 FL (ref 8.9–12.7)
POTASSIUM SERPL-SCNC: 3.7 MMOL/L (ref 3.5–5.3)
PR INTERVAL: 174 MS
PROT SERPL-MCNC: 8.1 G/DL (ref 6.4–8.2)
QRS AXIS: 139 DEGREES
QRSD INTERVAL: 74 MS
QT INTERVAL: 360 MS
QTC INTERVAL: 415 MS
RBC # BLD AUTO: 4.61 MILLION/UL (ref 3.81–5.12)
SODIUM SERPL-SCNC: 138 MMOL/L (ref 136–145)
T WAVE AXIS: 58 DEGREES
TROPONIN I SERPL-MCNC: <0.02 NG/ML
TROPONIN I SERPL-MCNC: <0.02 NG/ML
TSH SERPL DL<=0.05 MIU/L-ACNC: 3.67 UIU/ML (ref 0.36–3.74)
VENTRICULAR RATE: 80 BPM
WBC # BLD AUTO: 8.28 THOUSAND/UL (ref 4.31–10.16)

## 2020-08-24 PROCEDURE — 84484 ASSAY OF TROPONIN QUANT: CPT | Performed by: EMERGENCY MEDICINE

## 2020-08-24 PROCEDURE — 93010 ELECTROCARDIOGRAM REPORT: CPT | Performed by: INTERNAL MEDICINE

## 2020-08-24 PROCEDURE — 93005 ELECTROCARDIOGRAM TRACING: CPT

## 2020-08-24 PROCEDURE — 71045 X-RAY EXAM CHEST 1 VIEW: CPT

## 2020-08-24 PROCEDURE — 99285 EMERGENCY DEPT VISIT HI MDM: CPT

## 2020-08-24 PROCEDURE — 99284 EMERGENCY DEPT VISIT MOD MDM: CPT | Performed by: EMERGENCY MEDICINE

## 2020-08-24 PROCEDURE — 85025 COMPLETE CBC W/AUTO DIFF WBC: CPT | Performed by: EMERGENCY MEDICINE

## 2020-08-24 PROCEDURE — 36415 COLL VENOUS BLD VENIPUNCTURE: CPT | Performed by: EMERGENCY MEDICINE

## 2020-08-24 PROCEDURE — 84443 ASSAY THYROID STIM HORMONE: CPT | Performed by: EMERGENCY MEDICINE

## 2020-08-24 PROCEDURE — 80053 COMPREHEN METABOLIC PANEL: CPT | Performed by: EMERGENCY MEDICINE

## 2020-08-24 RX ORDER — METOPROLOL SUCCINATE 25 MG/1
25 TABLET, EXTENDED RELEASE ORAL 2 TIMES DAILY
Qty: 20 TABLET | Refills: 0 | Status: SHIPPED | OUTPATIENT
Start: 2020-08-24 | End: 2020-08-28 | Stop reason: SDUPTHER

## 2020-08-24 NOTE — ED PROVIDER NOTES
History  Chief Complaint   Patient presents with    Chest Pain     c/o sharp chest pain/pressure that radiates into RUE, SOB, rapid heart rate/palpitations and increased sweating x 5 minutes PTA     Patient is a 59-year-old female with a history of paroxysmal SVT who presents with multiple complaints including palpitations  She was at work this morning and developed acute onset of palpitations which she describes as her heart racing  She has associated diaphoresis and lightheadedness  She states that her Apple watch recorded a heart rate in the 140s  She does have a history paroxysmal SVT and is currently metoprolol  She has been on metoprolol for several months and recently increased her dose from 25 mg daily to 25 mg b i d  She describes multiple similar episodes previously and describes 1-2 episodes per week  This episode was different because she had associated chest pressure which radiated into her right upper extremity  She has been trying to contact her cardiologist recently because of her recurrent episodes but they have not gotten back to her  She is currently feeling better now that she is laying in bed  History provided by:  Patient  Palpitations   Palpitations quality:  Fast  Onset quality:  Sudden  Timing:  Constant  Progression:  Improving  Chronicity:  New  Relieved by:  Bed rest  Associated symptoms: chest pressure, diaphoresis and shortness of breath    Associated symptoms: no back pain, no chest pain, no cough, no dizziness, no lower extremity edema, no nausea and no vomiting        Prior to Admission Medications   Prescriptions Last Dose Informant Patient Reported? Taking?    Biotin 75688 MCG TABS  Self Yes No   Sig: Take by mouth   Calcium Carbonate-Vit D-Min (CALCIUM 1200 PO)  Self Yes No   Sig: Take 2,400 mg by mouth daily   Cyanocobalamin ([employer] VITAMIN B12 PO)  Self Yes No   Sig: Take by mouth   Multiple Vitamins-Minerals (MULTI COMPLETE PO)  Self Yes No   Sig: Take by mouth Polypodium Leucotomos (Heliocare) 240 MG CAPS   No No   Sig: Take two tablets once a day   Probiotic Product (PRO-BIOTIC BLEND PO)  Self Yes No   Sig: Take by mouth as needed    QUEtiapine (SEROquel) 50 mg tablet   No No   Sig: Take 1 tablet (50 mg total) by mouth daily at bedtime   albuterol (Ventolin HFA) 90 mcg/act inhaler   No No   Sig: Inhale 2 puffs every 6 (six) hours as needed for wheezing or shortness of breath   atoMOXetine (STRATTERA) 25 mg capsule   No No   Sig: Take 1 capsule (25 mg total) by mouth daily   cephalexin (KEFLEX) 500 mg capsule   No No   Sig: Take 1 capsule (500 mg total) by mouth every 12 (twelve) hours for 7 days   drospirenone-ethinyl estradiol (LIEN) 3-0 02 MG per tablet  Self No No   Sig: Take 1 tablet by mouth daily   Patient taking differently: Take 1 tablet by mouth daily at bedtime    famotidine (PEPCID) 20 mg tablet   No No   Sig: Take 1 tablet (20 mg total) by mouth daily   hydrOXYzine HCL (ATARAX) 25 mg tablet   No No   Sig: Take 1 tablet (25 mg total) by mouth every 6 (six) hours as needed for itching   ipratropium (ATROVENT) 0 03 % nasal spray  Self No No   Si sprays into each nostril every 12 (twelve) hours   lamoTRIgine (LaMICtal) 100 mg tablet   No No   Sig: Take 1 tablet (100 mg total) by mouth daily   lamoTRIgine (LaMICtal) 150 MG tablet   No No   Sig: TAKE ONE TABLET BY MOUTH DAILY   meloxicam (MOBIC) 7 5 mg tablet   No No   Sig: Take 1 tablet (7 5 mg total) by mouth daily   methocarbamol (ROBAXIN) 750 mg tablet   No No   Sig: Take 1 tablet (750 mg total) by mouth every 8 (eight) hours   metoprolol succinate (TOPROL-XL) 25 mg 24 hr tablet   No No   Sig: Take 1 tablet (25 mg total) by mouth 2 (two) times a day   montelukast (SINGULAIR) 10 mg tablet  Self No No   Sig: Take 1 tablet (10 mg total) by mouth daily at bedtime   pantoprazole (PROTONIX) 40 mg tablet   No No   Sig: TAKE ONE TABLET BY MOUTH EVERY DAY   phenazopyridine (PYRIDIUM) 100 mg tablet   No No   Sig: Take 1 tablet (100 mg total) by mouth 3 (three) times a day as needed for bladder spasms   venlafaxine (EFFEXOR-XR) 150 mg 24 hr capsule   No No   Sig: Take 1 capsule (150 mg total) by mouth daily   venlafaxine (EFFEXOR-XR) 75 mg 24 hr capsule   No No   Sig: Take 1 capsule (75 mg total) by mouth daily      Facility-Administered Medications: None       Past Medical History:   Diagnosis Date    ADHD (attention deficit hyperactivity disorder)     Bulging lumbar disc     Carpal tunnel syndrome     RIGHT  LAST ASSESSED: 16    Chronic back pain     low    Chronic pain disorder     Colon polyps     Diabetes mellitus ([facility] [address] )     Resolved post weight loss    GERD (gastroesophageal reflux disease)     Gestational diabetes     Hearing loss     left ear    Hyperlipidemia     Resolved with weight loss    IBS (irritable bowel syndrome)     Ileus ([facility] [address] )     LAST ASSESSED: 8/3/17    Labial cyst     LAST ASSESSED: 16    Myofascial pain     LAST ASSESSED: 16    Obesity     Ovarian cyst     LEFT  LAST ASSESSED: 16    Panic attack     Pap smear for cervical cancer screening     2018--pap wnl, HRHPV neg    Pneumonia     Seasonal allergies     Thoracic outlet syndrome     2010    Trochanteric bursitis of both hips     LAST ASSESSED: 3/18/16    Ulnar neuropathy at elbow     Varicella     Wears glasses        Past Surgical History:   Procedure Laterality Date    BILE DUCT EXPLORATION      ENDOSCOPIC REMOVAL OF STONES FROM BILIARY TRACT     SECTION      x3    CHOLECYSTECTOMY      COLONOSCOPY      -polyp, repeat     DILATION AND CURETTAGE OF UTERUS      ENDOMETRIAL ABLATION      ERCP W/ SPHICTEROTOMY      FIRST RIB REMOVAL      THORAX EXCISION OF FIRST RIB    HYSTEROSCOPY      FL COLONOSCOPY FLX DX W/COLLJ SPEC WHEN PFRMD N/A 2017    Procedure: COLONOSCOPY;  Surgeon: Lily Alfaro MD;  [facility];   Service: Gastroenterology    FL ESOPHAGOGASTRODUODENOSCOPY TRANSORAL DIAGNOSTIC N/A 9/14/2017    Procedure: ESOPHAGOGASTRODUODENOSCOPY (EGD); Surgeon: Dequan Mccormack MD;  [facility]; Service: Gastroenterology    CT HYSTEROSCOPY,W/ENDO BX N/A 10/20/2017    Procedure: DILATATION AND CURETTAGE (D&C) WITH HYSTEROSCOPY  REMOVAL VULVAR RT  LESION;  Surgeon: Radha Peterson MD;  [facility];  Service: Gynecology    CT LAP, ÁLVARO RESTRICT PROC, LONGITUDINAL GASTRECTOMY N/A 2/6/2018    Procedure: GASTRECTOMY SLEEVE LAPAROSCOPIC; INTRAOPERATIVE EGD ;  Surgeon: Marlene Post MD;  [facility];  Service: Robyne Lunger SURG IMPLNT Dudley Márquez Left 1/29/2020    Procedure: Insertion of thoracic spinal cord stimulator electrode via laminotomy and placement of left buttock implantable pulse generator (NEUROMONITORING);   Surgeon: Cathy Cedeño MD;  [facility];  Service: Neurosurgery    SPINAL CORD STIMULATOR TRIAL W/ LAMINOTOMY      TONSILLECTOMY AND ADENOIDECTOMY      TUBAL LIGATION      VEIN LIGATION AND STRIPPING Right     VULVA SURGERY  10/20/2017    BIOPSY    WISDOM TOOTH EXTRACTION         Family History   Problem Relation Age of Onset    Diabetes Mother     Other Mother         HYPERCHOLESTEROLEMIA    Breast cancer Mother         >50    BRCA1 Negative Mother     BRCA2 Negative Mother     Diabetes Father     Other Father         traumatic brain injury    Prostate cancer Father     Alcohol abuse Father         in remission    Heart disease Father     Neuropathy Father     Hyperlipidemia Father     Hypertension Brother     Diabetes Brother     Other Brother         HYPERCHOLESTEROLEMIA    Alcohol abuse Brother     Depression Brother         attempted suicide    Colon cancer Maternal Grandfather     Heart attack Maternal Grandmother     No Known Problems Paternal Grandmother     No Known Problems Paternal Grandfather     Diabetes Brother     Alcohol abuse Brother     Asthma Son     No Known Problems Daughter  Ovarian cancer Neg Hx     Uterine cancer Neg Hx      I have reviewed and agree with the history as documented  E-Cigarette/Vaping    E-Cigarette Use Never User      E-Cigarette/Vaping Substances    Nicotine No     THC No     CBD No     Flavoring No     Other No     Unknown No      Social History     Tobacco Use    Smoking status: Former Smoker     Last attempt to quit: 4/30/2017     Years since quitting: 3 3    Smokeless tobacco: Never Used   Substance Use Topics    Alcohol use: Yes     Frequency: Monthly or less     Drinks per session: 1 or 2     Binge frequency: Never     Comment: socially    Drug use: No       Review of Systems   Constitutional: Positive for diaphoresis  Negative for chills and fever  HENT: Negative for nosebleeds, sore throat and trouble swallowing  Eyes: Negative for photophobia, pain and visual disturbance  Respiratory: Positive for shortness of breath  Negative for cough and chest tightness  Cardiovascular: Negative for chest pain, palpitations and leg swelling  Gastrointestinal: Negative for abdominal pain, constipation, diarrhea, nausea and vomiting  Endocrine: Negative for polydipsia and polyuria  Genitourinary: Negative for difficulty urinating, dysuria, hematuria, pelvic pain, vaginal bleeding and vaginal discharge  Musculoskeletal: Negative for back pain, neck pain and neck stiffness  Skin: Negative for pallor and rash  Neurological: Negative for dizziness, seizures, light-headedness and headaches  All other systems reviewed and are negative  Physical Exam  Physical Exam  Vitals signs and nursing note reviewed  Constitutional:       General: She is not in acute distress  Appearance: She is well-developed  HENT:      Head: Normocephalic and atraumatic  Eyes:      Pupils: Pupils are equal, round, and reactive to light  Neck:      Musculoskeletal: Normal range of motion and neck supple     Cardiovascular:      Rate and Rhythm: Normal rate and regular rhythm  Pulses: Normal pulses  Heart sounds: Normal heart sounds  Pulmonary:      Effort: Pulmonary effort is normal  No respiratory distress  Breath sounds: Normal breath sounds  Abdominal:      General: There is no distension  Palpations: Abdomen is soft  Abdomen is not rigid  Tenderness: There is no abdominal tenderness  There is no guarding or rebound  Musculoskeletal: Normal range of motion  General: No tenderness  Lymphadenopathy:      Cervical: No cervical adenopathy  Skin:     General: Skin is warm and dry  Capillary Refill: Capillary refill takes less than 2 seconds  Neurological:      Mental Status: She is alert and oriented to person, place, and time  Cranial Nerves: No cranial nerve deficit  Sensory: No sensory deficit           Vital Signs  ED Triage Vitals   Temperature Pulse Respirations Blood Pressure SpO2   08/24/20 0800 08/24/20 0750 08/24/20 0750 08/24/20 0750 08/24/20 0750   98 4 °F (36 9 °C) (!) 142 (!) 24 129/68 100 %      Temp Source Heart Rate Source Patient Position - Orthostatic VS BP Location FiO2 (%)   08/24/20 0800 08/24/20 0750 08/24/20 0750 08/24/20 0750 --   Oral Monitor Lying Right arm       Pain Score       08/24/20 0750       6           Vitals:    08/24/20 0945 08/24/20 1045 08/24/20 1100 08/24/20 1130   BP: 104/67 109/71 106/67 97/61   Pulse: 74 70 74 74   Patient Position - Orthostatic VS: Lying Lying Lying Lying         Visual Acuity  Visual Acuity      Most Recent Value   L Pupil Size (mm)  3   R Pupil Size (mm)  3          ED Medications  Medications - No data to display    Diagnostic Studies  Results Reviewed     Procedure Component Value Units Date/Time    Troponin I [[id number]]  (Normal) Collected:  08/24/20 1055    Lab Status:  Final result Specimen:  Blood from Arm, Right Updated:  08/24/20 1124     Troponin I <0 02 ng/mL     TSH [[id number]]  (Normal) Collected:  08/24/20 0755    Lab Status:  Final result Specimen:  Blood from Arm, Right Updated:  08/24/20 0855     TSH 3RD GENERATON 3 670 uIU/mL     Narrative:       Patients undergoing fluorescein dye angiography may retain small amounts of fluorescein in the body for 48-72 hours post procedure  Samples containing fluorescein can produce falsely depressed TSH values  If the patient had this procedure,a specimen should be resubmitted post fluorescein clearance        Troponin I [[id number]]  (Normal) Collected:  08/24/20 0755    Lab Status:  Final result Specimen:  Blood from Arm, Right Updated:  08/24/20 0851     Troponin I <0 02 ng/mL     Comprehensive metabolic panel [[id number]] Collected:  08/24/20 0755    Lab Status:  Final result Specimen:  Blood from Arm, Right Updated:  08/24/20 0843     Sodium 138 mmol/L      Potassium 3 7 mmol/L      Chloride 102 mmol/L      CO2 27 mmol/L      ANION GAP 9 mmol/L      BUN 14 mg/dL      Creatinine 0 85 mg/dL      Glucose 111 mg/dL      Calcium 9 4 mg/dL      AST 18 U/L      ALT 35 U/L      Alkaline Phosphatase 51 U/L      Total Protein 8 1 g/dL      Albumin 3 7 g/dL      Total Bilirubin 0 30 mg/dL      eGFR 80 ml/min/1 73sq m     Narrative:       [place] guidelines for Chronic Kidney Disease (CKD):     Stage 1 with normal or high GFR (GFR > 90 mL/min/1 73 square meters)    Stage 2 Mild CKD (GFR = 60-89 mL/min/1 73 square meters)    Stage 3A Moderate CKD (GFR = 45-59 mL/min/1 73 square meters)    Stage 3B Moderate CKD (GFR = 30-44 mL/min/1 73 square meters)    Stage 4 Severe CKD (GFR = 15-29 mL/min/1 73 square meters)    Stage 5 End Stage CKD (GFR <15 mL/min/1 73 square meters)  Note: GFR calculation is accurate only with a steady state creatinine    CBC and differential [[id number]] Collected:  08/24/20 0755    Lab Status:  Final result Specimen:  Blood from Arm, Right Updated:  08/24/20 0821     WBC 8 28 Thousand/uL      RBC 4 61 Million/uL      Hemoglobin 14 1 g/dL Hematocrit 42 3 %      MCV 92 fL      MCH 30 6 pg      MCHC 33 3 g/dL      RDW 12 5 %      MPV 9 5 fL      Platelets 778 Thousands/uL      nRBC 0 /100 WBCs      Neutrophils Relative 73 %      Immat GRANS % 0 %      Lymphocytes Relative 21 %      Monocytes Relative 6 %      Eosinophils Relative 0 %      Basophils Relative 0 %      Neutrophils Absolute 5 99 Thousands/µL      Immature Grans Absolute 0 02 Thousand/uL      Lymphocytes Absolute 1 77 Thousands/µL      Monocytes Absolute 0 49 Thousand/µL      Eosinophils Absolute 0 00 Thousand/µL      Basophils Absolute 0 01 Thousands/µL                  XR chest 1 view portable   ED Interpretation by Wilber Bonilla DO (08/24 3342)   No infiltrates  No cardiomegaly  Final Result by Sandy Garcia MD (08/24 1136)      No acute cardiopulmonary disease  Workstation performed: [id number]                    Procedures  ECG 12 Lead Documentation Only    Date/Time: 8/24/2020 8:07 AM  Performed by: Wilber Bonilla DO  Authorized by: Wilber Bonilla DO     ECG reviewed by me, the ED Provider: yes    Patient location:  ED  Previous ECG:     Previous ECG:  Compared to current    Similarity:  No change    Comparison to cardiac monitor: Yes    Comments:      Normal sinus rhythm at a rate of 80 beats per minute  Normal intervals  Right axis deviation  Incomplete right bundle-branch block  No ST T wave abnormalities  Similar to previous from 04/06/2020  ECG 12 Lead Documentation Only    Date/Time: 8/24/2020 12:43 PM  Performed by: Wilber Bonilla DO  Authorized by: Wilber Bonilla DO     ECG reviewed by me, the ED Provider: yes    Patient location:  ED  Previous ECG:     Previous ECG:  Unavailable    Comparison to cardiac monitor: Yes    Comments:      Normal sinus rhythm at a rate of 70 beats per minute  Normal intervals  Right axis deviation  Incomplete right bundle branch block  No ST T wave abnormalities    Similar to previous from earlier today              ED Course  ED Course as of Aug 24 1644   Mon Aug 24, 2020   5136 I spoke with patient who agrees with a 2nd troponin  If negative, patient will likely be discharged home  Will increase metoprolol to 50 mg b i d  KODY/DAST-10      Most Recent Value   How many times in the past year have you    Used an illegal drug or used a prescription medication for non-medical reasons? Never Filed at: 08/24/2020 8987                                MDM  Number of Diagnoses or Management Options  Atypical chest pain: new and requires workup  Palpitations: new and requires workup  Diagnosis management comments: Patient presents with palpitations and associated diaphoresis, lightheadedness and chest pressure  On cardiac monitor, patient has been in normal sinus rhythm  EKG x2 reveals no evidence of acute ischemia or dysrhythmias  Troponin negative x2  I discussed case with cardiology who recommends increasing her metoprolol to 50 mg b i d   Patient was uncomfortable with this increase so I suggested taking 25 mg in the a m  And 50 mg at night  She is comfortable with this plan and will call cardiology today to schedule a follow-up appointment  Do not suspect ACS, aortic dissection, pneumothorax, pericardial tamponade, pulmonary embolism, esophageal rupture as cause of chest pain  HEART score completed and patient is considered low risk for adverse cardiac event  Shared decision making used and patient prefers discharge and outpatient follow up  Patient will follow up with PCP and cardiology to facilitate further workup  Advised to return to ED immediately if symptoms return          Amount and/or Complexity of Data Reviewed  Clinical lab tests: ordered and reviewed  Tests in the radiology section of CPT®: ordered and reviewed  Tests in the medicine section of CPT®: ordered and reviewed  Review and summarize past medical records: yes  Discuss the patient with other providers: yes  Independent visualization of images, tracings, or specimens: yes    Risk of Complications, Morbidity, and/or Mortality  Presenting problems: high  Diagnostic procedures: moderate  Management options: moderate    Patient Progress  Patient progress: stable        Disposition  Final diagnoses:   Palpitations   Atypical chest pain     Time reflects when diagnosis was documented in both MDM as applicable and the Disposition within this note     Time User Action Codes Description Comment    8/24/2020 11:36 AM Shelah Dance L Add [R00 2] Palpitations     8/24/2020 11:36 AM Mariam Sanchez Add [R07 89] Atypical chest pain       ED Disposition     ED Disposition Condition Date/Time Comment    Discharge Stable Mon Aug 24, 2020 11:36 AM Clayton Goodpasture discharge to home/self care  Follow-up Information     Follow up With Specialties Details Why [address] [facility], DO Internal Medicine Schedule an appointment as soon as possible for a visit   [address],  Cardiology, Multidisciplinary Schedule an appointment as soon as possible for a visit   [place] [address] [address] [facility]  [phone]            Discharge Medication List as of 8/24/2020 11:45 AM      CONTINUE these medications which have NOT CHANGED    Details   albuterol (Ventolin HFA) 90 mcg/act inhaler Inhale 2 puffs every 6 (six) hours as needed for wheezing or shortness of breath, Starting Tue 6/16/2020, Normal      atoMOXetine (STRATTERA) 25 mg capsule Take 1 capsule (25 mg total) by mouth daily, Starting Thu 6/4/2020, Normal      Biotin 44708 MCG TABS Take by mouth, Historical Med      Calcium Carbonate-Vit D-Min (CALCIUM 1200 PO) Take 2,400 mg by mouth daily, Historical Med      Cyanocobalamin ([employer] VITAMIN B12 PO) Take by mouth, Historical Med      drospirenone-ethinyl estradiol (LIEN) 3-0 02 MG per tablet Take 1 tablet by mouth daily, Starting Fri 11/15/2019, Normal      famotidine (PEPCID) 20 mg tablet Take 1 tablet (20 mg total) by mouth daily, Starting Thu 5/28/2020, No Print      hydrOXYzine HCL (ATARAX) 25 mg tablet Take 1 tablet (25 mg total) by mouth every 6 (six) hours as needed for itching, Starting Thu 6/4/2020, Until Wed 9/2/2020, Normal      ipratropium (ATROVENT) 0 03 % nasal spray 2 sprays into each nostril every 12 (twelve) hours, Starting Thu 4/2/2020, Normal      !! lamoTRIgine (LaMICtal) 100 mg tablet Take 1 tablet (100 mg total) by mouth daily, Starting Thu 6/4/2020, Until Tue 12/1/2020, Normal      !! lamoTRIgine (LaMICtal) 150 MG tablet TAKE ONE TABLET BY MOUTH DAILY, Normal      meloxicam (MOBIC) 7 5 mg tablet Take 1 tablet (7 5 mg total) by mouth daily, Starting Thu 6/11/2020, Normal      methocarbamol (ROBAXIN) 750 mg tablet Take 1 tablet (750 mg total) by mouth every 8 (eight) hours, Starting Thu 8/20/2020, Normal      metoprolol succinate (TOPROL-XL) 25 mg 24 hr tablet Take 1 tablet (25 mg total) by mouth 2 (two) times a day, Starting Thu 4/30/2020, Normal      montelukast (SINGULAIR) 10 mg tablet Take 1 tablet (10 mg total) by mouth daily at bedtime, Starting Thu 4/2/2020, Normal      Multiple Vitamins-Minerals (MULTI COMPLETE PO) Take by mouth, Historical Med      pantoprazole (PROTONIX) 40 mg tablet TAKE ONE TABLET BY MOUTH EVERY DAY, Normal      phenazopyridine (PYRIDIUM) 100 mg tablet Take 1 tablet (100 mg total) by mouth 3 (three) times a day as needed for bladder spasms, Starting Sun 7/26/2020, Normal      Polypodium Leucotomos (Heliocare) 240 MG CAPS Take two tablets once a day, Normal      Probiotic Product (PRO-BIOTIC BLEND PO) Take by mouth as needed , Historical Med      QUEtiapine (SEROquel) 50 mg tablet Take 1 tablet (50 mg total) by mouth daily at bedtime, Starting Thu 6/4/2020, Until Tue 12/1/2020, Normal      !! venlafaxine (EFFEXOR-XR) 150 mg 24 hr capsule Take 1 capsule (150 mg total) by mouth daily, Starting Thu 6/4/2020, Normal      !! venlafaxine (EFFEXOR-XR) 75 mg 24 hr capsule Take 1 capsule (75 mg total) by mouth daily, Starting Fri 5/22/2020, Until Wed 11/18/2020, Normal       !! - Potential duplicate medications found  Please discuss with provider  STOP taking these medications       cephalexin (KEFLEX) 500 mg capsule Comments:   Reason for Stopping:             No discharge procedures on file      PDMP Review       Value Time User    PDMP Reviewed  Yes 4/6/2020 12:20 PM [address] Mu[address]oz DO          ED Provider  Electronically Signed by           Dominga Walsh DO  08/24/20 4945

## 2020-08-24 NOTE — ED NOTES
Pt laying on stretcher with HOB elevated watching TV/using cell phone in negative distress  Pt denies CP, SOB, or palpitations at present time  NSR on monitor  VSS  Will continue to monitor       Princess Fuentes RN  08/24/20 5443

## 2020-08-25 LAB
ATRIAL RATE: 70 BPM
P AXIS: 57 DEGREES
PR INTERVAL: 190 MS
QRS AXIS: 132 DEGREES
QRSD INTERVAL: 88 MS
QT INTERVAL: 394 MS
QTC INTERVAL: 425 MS
T WAVE AXIS: 45 DEGREES
VENTRICULAR RATE: 70 BPM

## 2020-08-25 PROCEDURE — 93010 ELECTROCARDIOGRAM REPORT: CPT | Performed by: INTERNAL MEDICINE

## 2020-08-28 ENCOUNTER — TELEMEDICINE (OUTPATIENT)
Dept: BEHAVIORAL/MENTAL HEALTH CLINIC | Facility: CLINIC | Age: 50
End: 2020-08-28
Payer: COMMERCIAL

## 2020-08-28 ENCOUNTER — OFFICE VISIT (OUTPATIENT)
Dept: CARDIOLOGY CLINIC | Facility: CLINIC | Age: 50
End: 2020-08-28
Payer: COMMERCIAL

## 2020-08-28 VITALS
WEIGHT: 172.3 LBS | TEMPERATURE: 97.3 F | BODY MASS INDEX: 26.11 KG/M2 | DIASTOLIC BLOOD PRESSURE: 64 MMHG | HEART RATE: 78 BPM | HEIGHT: 68 IN | OXYGEN SATURATION: 98 % | SYSTOLIC BLOOD PRESSURE: 118 MMHG

## 2020-08-28 DIAGNOSIS — I47.1 PAROXYSMAL SVT (SUPRAVENTRICULAR TACHYCARDIA) (HCC): ICD-10-CM

## 2020-08-28 DIAGNOSIS — F33.2 MAJOR DEPRESSIVE DISORDER, RECURRENT SEVERE WITHOUT PSYCHOTIC FEATURES (HCC): Primary | Chronic | ICD-10-CM

## 2020-08-28 DIAGNOSIS — F41.1 GENERALIZED ANXIETY DISORDER: Chronic | ICD-10-CM

## 2020-08-28 DIAGNOSIS — F43.10 POST TRAUMATIC STRESS DISORDER (PTSD): Chronic | ICD-10-CM

## 2020-08-28 PROCEDURE — 99213 OFFICE O/P EST LOW 20 MIN: CPT | Performed by: INTERNAL MEDICINE

## 2020-08-28 PROCEDURE — 90834 PSYTX W PT 45 MINUTES: CPT | Performed by: SOCIAL WORKER

## 2020-08-28 PROCEDURE — 3078F DIAST BP <80 MM HG: CPT | Performed by: INTERNAL MEDICINE

## 2020-08-28 PROCEDURE — 3074F SYST BP LT 130 MM HG: CPT | Performed by: INTERNAL MEDICINE

## 2020-08-28 PROCEDURE — 3008F BODY MASS INDEX DOCD: CPT | Performed by: INTERNAL MEDICINE

## 2020-08-28 PROCEDURE — 1036F TOBACCO NON-USER: CPT | Performed by: INTERNAL MEDICINE

## 2020-08-28 RX ORDER — METOPROLOL SUCCINATE 50 MG/1
50 TABLET, EXTENDED RELEASE ORAL 2 TIMES DAILY
Qty: 180 TABLET | Refills: 1 | Status: SHIPPED | OUTPATIENT
Start: 2020-08-28 | End: 2020-11-19
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
## 2020-09-06 ENCOUNTER — OFFICE VISIT (OUTPATIENT)
Dept: URGENT CARE | Age: 50
End: 2020-09-06
Payer: COMMERCIAL

## 2020-09-06 VITALS
TEMPERATURE: 97.6 F | WEIGHT: 170 LBS | RESPIRATION RATE: 18 BRPM | OXYGEN SATURATION: 99 % | HEART RATE: 67 BPM | DIASTOLIC BLOOD PRESSURE: 62 MMHG | SYSTOLIC BLOOD PRESSURE: 125 MMHG | HEIGHT: 68 IN | BODY MASS INDEX: 25.76 KG/M2

## 2020-09-06 DIAGNOSIS — R31.9 URINARY TRACT INFECTION WITH HEMATURIA, SITE UNSPECIFIED: Primary | ICD-10-CM

## 2020-09-06 DIAGNOSIS — N39.0 URINARY TRACT INFECTION WITH HEMATURIA, SITE UNSPECIFIED: Primary | ICD-10-CM

## 2020-09-06 LAB
SL AMB  POCT GLUCOSE, UA: ABNORMAL
SL AMB LEUKOCYTE ESTERASE,UA: ABNORMAL
SL AMB POCT BILIRUBIN,UA: ABNORMAL
SL AMB POCT BLOOD,UA: ABNORMAL
SL AMB POCT CLARITY,UA: CLEAR
SL AMB POCT COLOR,UA: ABNORMAL
SL AMB POCT KETONES,UA: ABNORMAL
SL AMB POCT NITRITE,UA: ABNORMAL
SL AMB POCT PH,UA: 5
SL AMB POCT SPECIFIC GRAVITY,UA: 1
SL AMB POCT URINE PROTEIN: ABNORMAL
SL AMB POCT UROBILINOGEN: 0.2

## 2020-09-06 PROCEDURE — 87186 SC STD MICRODIL/AGAR DIL: CPT | Performed by: PHYSICIAN ASSISTANT

## 2020-09-06 PROCEDURE — 87086 URINE CULTURE/COLONY COUNT: CPT | Performed by: PHYSICIAN ASSISTANT

## 2020-09-06 PROCEDURE — 87077 CULTURE AEROBIC IDENTIFY: CPT | Performed by: PHYSICIAN ASSISTANT

## 2020-09-06 PROCEDURE — G0382 LEV 3 HOSP TYPE B ED VISIT: HCPCS | Performed by: PHYSICIAN ASSISTANT

## 2020-09-06 PROCEDURE — 81002 URINALYSIS NONAUTO W/O SCOPE: CPT | Performed by: PHYSICIAN ASSISTANT

## 2020-09-06 RX ORDER — CIPROFLOXACIN 500 MG/1
500 TABLET, FILM COATED ORAL EVERY 12 HOURS SCHEDULED
Qty: 14 TABLET | Refills: 0 | Status: SHIPPED | OUTPATIENT
Start: 2020-09-06 | End: 2020-09-06 | Stop reason: RX

## 2020-09-06 RX ORDER — CIPROFLOXACIN 500 MG/1
500 TABLET, FILM COATED ORAL EVERY 12 HOURS SCHEDULED
Qty: 20 TABLET | Refills: 0 | Status: SHIPPED | COMMUNITY
Start: 2020-09-06 | End: 2020-09-16

## 2020-09-06 NOTE — PROGRESS NOTES
[facility]        NAME: Rebecca Mason is a 45 y o  female  : 1970    MRN: [id number]  DATE: 2020  TIME: 7:14 PM    Assessment and Plan   Urinary tract infection with hematuria, site unspecified [N39 0, R31 9]  1  Urinary tract infection with hematuria, site unspecified  POCT urine dip    Urine culture    ciprofloxacin (CIPRO) 500 mg tablet    DISCONTINUED: ciprofloxacin (CIPRO) 500 mg tablet         Patient Instructions       Follow up with PCP in 3-5 days  Proceed to  ER if symptoms worsen  Chief Complaint     Chief Complaint   Patient presents with    Possible UTI     painful urination, and frequency          History of Present Illness       Patient is here for evaluation urinary burning, pressure, frequency  Patient had a UTI about 2-3 weeks ago  She was on Keflex at that time  She had a UTI few weeks before that and was on Bactrim at that time  The cultures did show sensitivities  Review of Systems   Review of Systems   Constitutional: Negative  Genitourinary: Positive for dysuria, frequency, hematuria and urgency  Negative for flank pain and pelvic pain  Musculoskeletal: Negative            Current Medications       Current Outpatient Medications:     famotidine (PEPCID) 20 mg tablet, Take 1 tablet (20 mg total) by mouth daily, Disp: 60 tablet, Rfl: 0    ipratropium (ATROVENT) 0 03 % nasal spray, 2 sprays into each nostril every 12 (twelve) hours, Disp: 30 mL, Rfl: 0    lamoTRIgine (LaMICtal) 100 mg tablet, Take 1 tablet (100 mg total) by mouth daily, Disp: 90 tablet, Rfl: 1    lamoTRIgine (LaMICtal) 150 MG tablet, TAKE ONE TABLET BY MOUTH DAILY, Disp: 90 tablet, Rfl: 0    meloxicam (MOBIC) 7 5 mg tablet, Take 1 tablet (7 5 mg total) by mouth daily, Disp: 15 tablet, Rfl: 0    methocarbamol (ROBAXIN) 750 mg tablet, Take 1 tablet (750 mg total) by mouth every 8 (eight) hours, Disp: 90 tablet, Rfl: 1    metoprolol succinate (TOPROL-XL) 50 mg 24 hr tablet, Take 1 tablet (50 mg total) by mouth 2 (two) times a day, Disp: 180 tablet, Rfl: 1    montelukast (SINGULAIR) 10 mg tablet, Take 1 tablet (10 mg total) by mouth daily at bedtime, Disp: 90 tablet, Rfl: 1    Multiple Vitamins-Minerals (MULTI COMPLETE PO), Take by mouth, Disp: , Rfl:     pantoprazole (PROTONIX) 40 mg tablet, TAKE ONE TABLET BY MOUTH EVERY DAY, Disp: 90 tablet, Rfl: 0    phenazopyridine (PYRIDIUM) 100 mg tablet, Take 1 tablet (100 mg total) by mouth 3 (three) times a day as needed for bladder spasms, Disp: 10 tablet, Rfl: 0    Polypodium Leucotomos (Heliocare) 240 MG CAPS, Take two tablets once a day, Disp: 60 capsule, Rfl: 3    Probiotic Product (PRO-BIOTIC BLEND PO), Take by mouth as needed , Disp: , Rfl:     QUEtiapine (SEROquel) 50 mg tablet, Take 1 tablet (50 mg total) by mouth daily at bedtime, Disp: 90 tablet, Rfl: 1    venlafaxine (EFFEXOR-XR) 150 mg 24 hr capsule, Take 1 capsule (150 mg total) by mouth daily, Disp: 90 capsule, Rfl: 1    venlafaxine (EFFEXOR-XR) 75 mg 24 hr capsule, Take 1 capsule (75 mg total) by mouth daily, Disp: 90 capsule, Rfl: 1    albuterol (Ventolin HFA) 90 mcg/act inhaler, Inhale 2 puffs every 6 (six) hours as needed for wheezing or shortness of breath, Disp: 1 Inhaler, Rfl: 0    atoMOXetine (STRATTERA) 25 mg capsule, Take 1 capsule (25 mg total) by mouth daily, Disp: 90 capsule, Rfl: 0    Biotin 39684 MCG TABS, Take by mouth, Disp: , Rfl:     Calcium Carbonate-Vit D-Min (CALCIUM 1200 PO), Take 2,400 mg by mouth daily, Disp: , Rfl:     ciprofloxacin (CIPRO) 500 mg tablet, Take 1 tablet (500 mg total) by mouth every 12 (twelve) hours for 10 days, Disp: 20 tablet, Rfl: 0    Cyanocobalamin (CVS VITAMIN B12 PO), Take by mouth, Disp: , Rfl:     drospirenone-ethinyl estradiol (LIEN) 3-0 02 MG per tablet, Take 1 tablet by mouth daily (Patient taking differently: Take 1 tablet by mouth daily at bedtime ), Disp: 84 tablet, Rfl: 4    hydrOXYzine HCL (ATARAX) 25 mg tablet, Take 1 tablet (25 mg total) by mouth every 6 (six) hours as needed for itching, Disp: 90 tablet, Rfl: 0    Current Allergies     Allergies as of 2020 - Reviewed 2020   Allergen Reaction Noted    Ibuprofen Other (See Comments) 2019            The following portions of the patient's history were reviewed and updated as appropriate: allergies, current medications, past family history, past medical history, past social history, past surgical history and problem list      Past Medical History:   Diagnosis Date    ADHD (attention deficit hyperactivity disorder)     Bulging lumbar disc     Carpal tunnel syndrome     RIGHT  LAST ASSESSED: 16    Chronic back pain     low    Chronic pain disorder     Colon polyps     Diabetes mellitus ([facility] [address] )     Resolved post weight loss    GERD (gastroesophageal reflux disease)     Gestational diabetes     Hearing loss     left ear    Hyperlipidemia     Resolved with weight loss    IBS (irritable bowel syndrome)     Ileus ([facility] [address] )     LAST ASSESSED: 8/3/17    Labial cyst     LAST ASSESSED: 16    Myofascial pain     LAST ASSESSED: 16    Obesity     Ovarian cyst     LEFT   LAST ASSESSED: 16    Panic attack     Pap smear for cervical cancer screening     2018--pap wnl, HRHPV neg    Pneumonia     Seasonal allergies     Thoracic outlet syndrome     2010    Trochanteric bursitis of both hips     LAST ASSESSED: 3/18/16    Ulnar neuropathy at elbow     Varicella     Wears glasses        Past Surgical History:   Procedure Laterality Date    BILE DUCT EXPLORATION      ENDOSCOPIC REMOVAL OF STONES FROM BILIARY TRACT     SECTION      x3    CHOLECYSTECTOMY      COLONOSCOPY      -polyp, repeat     DILATION AND CURETTAGE OF UTERUS      ENDOMETRIAL ABLATION      ERCP W/ SPHICTEROTOMY      FIRST RIB REMOVAL      THORAX EXCISION OF FIRST RIB    HYSTEROSCOPY      MA COLONOSCOPY FLX DX W/COLLJ SPEC WHEN PFRMD N/A 5/30/2017    Procedure: COLONOSCOPY;  Surgeon: Foreign Engle MD;  [facility]; Service: Gastroenterology    MO ESOPHAGOGASTRODUODENOSCOPY TRANSORAL DIAGNOSTIC N/A 9/14/2017    Procedure: ESOPHAGOGASTRODUODENOSCOPY (EGD); Surgeon: James Ramirez MD;  [facility]; Service: Gastroenterology    MO HYSTEROSCOPY,W/ENDO BX N/A 10/20/2017    Procedure: DILATATION AND CURETTAGE (D&C) WITH HYSTEROSCOPY  REMOVAL VULVAR RT  LESION;  Surgeon: Tarah Martinez MD;  [facility];  Service: Gynecology    MO LAP, ÁLVARO RESTRICT PROC, LONGITUDINAL GASTRECTOMY N/A 2/6/2018    Procedure: GASTRECTOMY SLEEVE LAPAROSCOPIC; INTRAOPERATIVE EGD ;  Surgeon: Jeremie Camargo MD;  [facility];  Service: [facility] SURG IMPLNT [address] Left 1/29/2020    Procedure: Insertion of thoracic spinal cord stimulator electrode via laminotomy and placement of left buttock implantable pulse generator (NEUROMONITORING);   Surgeon: Peggy Pfeiffer MD;  [facility];  Service: Neurosurgery    SPINAL CORD STIMULATOR TRIAL W/ LAMINOTOMY      TONSILLECTOMY AND ADENOIDECTOMY      TUBAL LIGATION      VEIN LIGATION AND STRIPPING Right     VULVA SURGERY  10/20/2017    BIOPSY    WISDOM TOOTH EXTRACTION         Family History   Problem Relation Age of Onset    Diabetes Mother     Other Mother         HYPERCHOLESTEROLEMIA    Breast cancer Mother         >50    BRCA1 Negative Mother     BRCA2 Negative Mother     Diabetes Father     Other Father         traumatic brain injury    Prostate cancer Father     Alcohol abuse Father         in remission    Heart disease Father     Neuropathy Father     Hyperlipidemia Father     Hypertension Brother     Diabetes Brother     Other Brother         HYPERCHOLESTEROLEMIA    Alcohol abuse Brother     Depression Brother         attempted suicide    Colon cancer Maternal Grandfather     Heart attack Maternal Grandmother     No Known Problems Paternal Grandmother     No Known Problems Paternal Grandfather     Diabetes Brother     Alcohol abuse Brother     Asthma Son     No Known Problems Daughter     Ovarian cancer Neg Hx     Uterine cancer Neg Hx          Medications have been verified  Objective   /62   Pulse 67   Temp 97 6 °F (36 4 °C)   Resp 18   Ht 5' 8" (1 727 m)   Wt 77 1 kg (170 lb)   LMP 01/07/2019 (Approximate)   SpO2 99%   BMI 25 85 kg/m²        Physical Exam     Physical Exam  Vitals signs and nursing note reviewed  Constitutional:       General: She is not in acute distress  Appearance: She is well-developed  She is not diaphoretic  HENT:      Head: Normocephalic and atraumatic  Abdominal:      General: Bowel sounds are normal  There is no distension  Palpations: Abdomen is soft  There is no mass  Tenderness: There is no abdominal tenderness  There is no guarding or rebound  Skin:     General: Skin is warm and dry  Findings: No rash  Neurological:      Mental Status: She is alert and oriented to person, place, and time  Psychiatric:         Behavior: Behavior normal          Thought Content: Thought content normal          Judgment: Judgment normal        Recommend following up with Urology if she has continued UTIs

## 2020-09-08 LAB — BACTERIA UR CULT: ABNORMAL

## 2020-09-11 ENCOUNTER — PROCEDURE VISIT (OUTPATIENT)
Dept: PAIN MEDICINE | Facility: CLINIC | Age: 50
End: 2020-09-11
Payer: COMMERCIAL

## 2020-09-11 ENCOUNTER — TRANSCRIBE ORDERS (OUTPATIENT)
Dept: PAIN MEDICINE | Facility: CLINIC | Age: 50
End: 2020-09-11

## 2020-09-11 VITALS
DIASTOLIC BLOOD PRESSURE: 63 MMHG | TEMPERATURE: 98 F | WEIGHT: 170 LBS | SYSTOLIC BLOOD PRESSURE: 101 MMHG | HEART RATE: 72 BPM | BODY MASS INDEX: 25.85 KG/M2

## 2020-09-11 DIAGNOSIS — M79.18 MYOFASCIAL PAIN SYNDROME: ICD-10-CM

## 2020-09-11 DIAGNOSIS — M54.50 CHRONIC BILATERAL LOW BACK PAIN WITHOUT SCIATICA: Primary | ICD-10-CM

## 2020-09-11 DIAGNOSIS — G89.29 CHRONIC BILATERAL LOW BACK PAIN WITHOUT SCIATICA: Primary | ICD-10-CM

## 2020-09-11 PROCEDURE — 20552 NJX 1/MLT TRIGGER POINT 1/2: CPT | Performed by: ANESTHESIOLOGY

## 2020-09-11 RX ORDER — BUPIVACAINE HYDROCHLORIDE 2.5 MG/ML
10 INJECTION, SOLUTION EPIDURAL; INFILTRATION; INTRACAUDAL ONCE
Status: COMPLETED | OUTPATIENT
Start: 2020-09-11 | End: 2020-09-11

## 2020-09-11 RX ADMIN — BUPIVACAINE HYDROCHLORIDE 10 ML: 2.5 INJECTION, SOLUTION EPIDURAL; INFILTRATION; INTRACAUDAL at 15:58

## 2020-09-11 NOTE — PROGRESS NOTES
Pre-procedure Diagnosis:   Myofascial pain  Post-procedure Diagnosis:   Myofascial pain  Operation Title(s):  Trigger point injections into left lumbar paraspinal x2  Attending Surgeon:   Mahnaz Holcomb MD  Anesthesia:   Local    Indications: The patient is a 45y o  year-old female with a diagnosis of myofascial pain  The patient's history and physical exam were reviewed  The risks, benefits and alternatives to the procedure were discussed, and all questions were answered to the patient's satisfaction  The patient agreed to proceed, and written informed consent was obtained  Procedure in Detail: The patient was brought into the exam room and placed in the sitting position on the exam room chair with the head flexed on a pillow  Trigger points were identified in the: left lumbar paraspinal x2    Areas were cleansed with alcohol  Then, using a dry needling technique, each trigger point was injected with a 1 5 inch 25 gauge needle and 1 mL 0 25% bupivacaine     Disposition: The patient tolerated the procedure well and there were no apparent complications  The patient was given written discharge instructions for the procedure

## 2020-09-17 ENCOUNTER — TELEPHONE (OUTPATIENT)
Dept: CARDIOLOGY CLINIC | Facility: CLINIC | Age: 50
End: 2020-09-17

## 2020-09-17 NOTE — TELEPHONE ENCOUNTER
Patient called to report that Zio patch has been falling off and her skin is getting irritated  Pt will be taking off patch and sending it back

## 2020-09-18 ENCOUNTER — TELEPHONE (OUTPATIENT)
Dept: PAIN MEDICINE | Facility: CLINIC | Age: 50
End: 2020-09-18

## 2020-09-21 DIAGNOSIS — F90.2 ADHD (ATTENTION DEFICIT HYPERACTIVITY DISORDER), COMBINED TYPE: ICD-10-CM

## 2020-09-21 DIAGNOSIS — F33.2 MDD (MAJOR DEPRESSIVE DISORDER), RECURRENT SEVERE, WITHOUT PSYCHOSIS (HCC): ICD-10-CM

## 2020-09-22 RX ORDER — LAMOTRIGINE 150 MG/1
TABLET ORAL
Qty: 90 TABLET | Refills: 0 | Status: SHIPPED | OUTPATIENT
Start: 2020-09-22 | End: 2020-11-13 | Stop reason: SDUPTHER

## 2020-09-22 RX ORDER — ATOMOXETINE 25 MG/1
CAPSULE ORAL
Qty: 90 CAPSULE | Refills: 0 | Status: SHIPPED | OUTPATIENT
Start: 2020-09-22 | End: 2020-11-13 | Stop reason: SDUPTHER

## 2020-09-22 NOTE — TELEPHONE ENCOUNTER
Will ask covering provider to review in [facility] absence   Next appointment 10/9/20

## 2020-09-24 ENCOUNTER — APPOINTMENT (EMERGENCY)
Dept: RADIOLOGY | Facility: HOSPITAL | Age: 50
End: 2020-09-24
Payer: COMMERCIAL

## 2020-09-24 ENCOUNTER — HOSPITAL ENCOUNTER (EMERGENCY)
Facility: HOSPITAL | Age: 50
Discharge: HOME | End: 2020-09-24
Attending: EMERGENCY MEDICINE | Admitting: EMERGENCY MEDICINE
Payer: COMMERCIAL

## 2020-09-24 VITALS
HEART RATE: 80 BPM | OXYGEN SATURATION: 100 % | TEMPERATURE: 98.9 F | SYSTOLIC BLOOD PRESSURE: 107 MMHG | RESPIRATION RATE: 20 BRPM | DIASTOLIC BLOOD PRESSURE: 64 MMHG

## 2020-09-24 DIAGNOSIS — M25.571 RIGHT ANKLE PAIN: Primary | ICD-10-CM
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
## 2020-09-24 PROCEDURE — 96372 THER/PROPH/DIAG INJ SC/IM: CPT

## 2020-09-24 PROCEDURE — 73610 X-RAY EXAM OF ANKLE: CPT

## 2020-09-24 PROCEDURE — 73630 X-RAY EXAM OF FOOT: CPT

## 2020-09-24 PROCEDURE — 99283 EMERGENCY DEPT VISIT LOW MDM: CPT

## 2020-09-24 PROCEDURE — 99285 EMERGENCY DEPT VISIT HI MDM: CPT | Performed by: PHYSICIAN ASSISTANT

## 2020-09-24 PROCEDURE — 29125 APPL SHORT ARM SPLINT STATIC: CPT | Performed by: PHYSICIAN ASSISTANT

## 2020-09-24 RX ORDER — MORPHINE SULFATE 4 MG/ML
4 INJECTION, SOLUTION INTRAMUSCULAR; INTRAVENOUS ONCE
Status: COMPLETED | OUTPATIENT
Start: 2020-09-24 | End: 2020-09-24

## 2020-09-24 RX ORDER — TRAMADOL HYDROCHLORIDE 50 MG/1
50 TABLET ORAL ONCE
Status: COMPLETED | OUTPATIENT
Start: 2020-09-24 | End: 2020-09-24

## 2020-09-24 RX ORDER — TRAMADOL HYDROCHLORIDE 50 MG/1
50 TABLET ORAL EVERY 8 HOURS PRN
Qty: 6 TABLET | Refills: 0 | Status: SHIPPED | OUTPATIENT
Start: 2020-09-24 | End: 2020-09-26

## 2020-09-24 RX ADMIN — MORPHINE SULFATE 4 MG: 4 INJECTION INTRAVENOUS at 12:18

## 2020-09-24 RX ADMIN — TRAMADOL HYDROCHLORIDE 50 MG: 50 TABLET, FILM COATED ORAL at 13:43

## 2020-09-24 NOTE — ED PROVIDER NOTES
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) with a history of paroxysmal SVT, HTN, HLD presents to the emergency department for evaluation of right ankle pain  She reports she "missed a step" and twisted her ankle  She reports hearing a "crack" followed by intense pain  She denies any numbness or tingling in the foot or prior injuries to that extremity  She has had difficulty ambulating since the fall  She has not taken anything for the pain  She denies any chest pain, dizziness, lightheadedness prior to the fall  She denies any head strike trauma or fall on outstretched hand  Ankle Pain   Associated symptoms: no fever        Prior to Admission Medications   Prescriptions Last Dose Informant Patient Reported? Taking?    Biotin 49719 MCG TABS  Self Yes No   Sig: Take by mouth   Calcium Carbonate-Vit D-Min (CALCIUM 1200 PO)  Self Yes No   Sig: Take 2,400 mg by mouth daily   Cyanocobalamin ([employer]S VITAMIN B12 PO)  Self Yes No   Sig: Take by mouth   Multiple Vitamins-Minerals (MULTI COMPLETE PO)  Self Yes No   Sig: Take by mouth   Polypodium Leucotomos (Heliocare) 240 MG CAPS  Self No No   Sig: Take two tablets once a day   Probiotic Product (PRO-BIOTIC BLEND PO)  Self Yes No   Sig: Take by mouth as needed    QUEtiapine (SEROquel) 50 mg tablet  Self No No   Sig: Take 1 tablet (50 mg total) by mouth daily at bedtime   albuterol (Ventolin HFA) 90 mcg/act inhaler  Self No No   Sig: Inhale 2 puffs every 6 (six) hours as needed for wheezing or shortness of breath   atoMOXetine (STRATTERA) 25 mg capsule   No No   Sig: TAKE ONE CAPSULE BY MOUTH DAILY   drospirenone-ethinyl estradiol (LIEN) 3-0 02 MG per tablet  Self No No   Sig: Take 1 tablet by mouth daily   Patient taking differently: Take 1 tablet by mouth daily at bedtime    famotidine (PEPCID) 20 mg tablet  Self No No   Sig: Take 1 tablet (20 mg total) by mouth daily   hydrOXYzine HCL (ATARAX) 25 mg tablet  Self No No   Sig: Take 1 tablet (25 mg total) by mouth every 6 (six) hours as needed for itching   ipratropium (ATROVENT) 0 03 % nasal spray  Self No No   Si sprays into each nostril every 12 (twelve) hours   lamoTRIgine (LaMICtal) 100 mg tablet  Self No No   Sig: Take 1 tablet (100 mg total) by mouth daily   lamoTRIgine (LaMICtal) 150 MG tablet   No No   Sig: TAKE ONE TABLET BY MOUTH DAILY   meloxicam (MOBIC) 7 5 mg tablet  Self No No   Sig: Take 1 tablet (7 5 mg total) by mouth daily   methocarbamol (ROBAXIN) 750 mg tablet  Self No No   Sig: Take 1 tablet (750 mg total) by mouth every 8 (eight) hours   metoprolol succinate (TOPROL-XL) 50 mg 24 hr tablet   No No   Sig: Take 1 tablet (50 mg total) by mouth 2 (two) times a day   montelukast (SINGULAIR) 10 mg tablet  Self No No   Sig: Take 1 tablet (10 mg total) by mouth daily at bedtime   pantoprazole (PROTONIX) 40 mg tablet  Self No No   Sig: TAKE ONE TABLET BY MOUTH EVERY DAY   phenazopyridine (PYRIDIUM) 100 mg tablet  Self No No   Sig: Take 1 tablet (100 mg total) by mouth 3 (three) times a day as needed for bladder spasms   venlafaxine (EFFEXOR-XR) 150 mg 24 hr capsule  Self No No   Sig: Take 1 capsule (150 mg total) by mouth daily   venlafaxine (EFFEXOR-XR) 75 mg 24 hr capsule  Self No No   Sig: Take 1 capsule (75 mg total) by mouth daily      Facility-Administered Medications: None       Past Medical History:   Diagnosis Date    ADHD (attention deficit hyperactivity disorder)     Bulging lumbar disc     Carpal tunnel syndrome     RIGHT    LAST ASSESSED: 16    Chronic back pain     low    Chronic pain disorder     Colon polyps     Diabetes mellitus ([address] )     Resolved post weight loss    GERD (gastroesophageal reflux disease)     Gestational diabetes     Hearing loss     left ear    Hyperlipidemia     Resolved with weight loss    IBS (irritable bowel syndrome)     Ileus ([address] ) LAST ASSESSED: 8/3/17    Labial cyst     LAST ASSESSED: 16    Myofascial pain     LAST ASSESSED: 16    Obesity     Ovarian cyst     LEFT  LAST ASSESSED: 16    Panic attack     Pap smear for cervical cancer screening     2018--pap wnl, HRHPV neg    Pneumonia     Seasonal allergies     Thoracic outlet syndrome         Trochanteric bursitis of both hips     LAST ASSESSED: 3/18/16    Ulnar neuropathy at elbow     Varicella     Wears glasses        Past Surgical History:   Procedure Laterality Date    BILE DUCT EXPLORATION      ENDOSCOPIC REMOVAL OF STONES FROM BILIARY TRACT     SECTION      x3    CHOLECYSTECTOMY      COLONOSCOPY      -polyp, repeat     DILATION AND CURETTAGE OF UTERUS      ENDOMETRIAL ABLATION      ERCP W/ SPHICTEROTOMY      FIRST RIB REMOVAL      THORAX EXCISION OF FIRST RIB    HYSTEROSCOPY      ID COLONOSCOPY FLX DX W/COLLJ SPEC WHEN PFRMD N/A 2017    Procedure: COLONOSCOPY;  Surgeon: Foreign Engle MD;  [facility]; Service: Gastroenterology    ID ESOPHAGOGASTRODUODENOSCOPY TRANSORAL DIAGNOSTIC N/A 2017    Procedure: ESOPHAGOGASTRODUODENOSCOPY (EGD); Surgeon: James Ramirez MD;  [facility]; Service: Gastroenterology    ID HYSTEROSCOPY,W/ENDO BX N/A 10/20/2017    Procedure: DILATATION AND CURETTAGE (D&C) WITH HYSTEROSCOPY  REMOVAL VULVAR RT  LESION;  Surgeon: Tarah Martinez MD;  [facility];  Service: Gynecology    ID LAP, ÁLVARO RESTRICT PROC, LONGITUDINAL GASTRECTOMY N/A 2018    Procedure: GASTRECTOMY SLEEVE LAPAROSCOPIC; INTRAOPERATIVE EGD ;  Surgeon: Jeremie Camargo MD;  [facility];  Service: [place] SURG IMPLNT [address] Left 2020    Procedure: Insertion of thoracic spinal cord stimulator electrode via laminotomy and placement of left buttock implantable pulse generator (NEUROMONITORING);   Surgeon: Peggy Pfeiffer MD;  [facility];  Service: Neurosurgery    SPINAL CORD STIMULATOR TRIAL W/ LAMINOTOMY      TONSILLECTOMY AND ADENOIDECTOMY      TUBAL LIGATION      VEIN LIGATION AND STRIPPING Right     VULVA SURGERY  10/20/2017    BIOPSY    WISDOM TOOTH EXTRACTION         Family History   Problem Relation Age of Onset    Diabetes Mother     Other Mother         HYPERCHOLESTEROLEMIA    Breast cancer Mother         >50    BRCA1 Negative Mother     BRCA2 Negative Mother     Diabetes Father     Other Father         traumatic brain injury    Prostate cancer Father     Alcohol abuse Father         in remission    Heart disease Father     Neuropathy Father     Hyperlipidemia Father     Hypertension Brother     Diabetes Brother     Other Brother         HYPERCHOLESTEROLEMIA    Alcohol abuse Brother     Depression Brother         attempted suicide    Colon cancer Maternal Grandfather     Heart attack Maternal Grandmother     No Known Problems Paternal Grandmother     No Known Problems Paternal Grandfather     Diabetes Brother     Alcohol abuse Brother     Asthma Son     No Known Problems Daughter     Ovarian cancer Neg Hx     Uterine cancer Neg Hx      I have reviewed and agree with the history as documented  E-Cigarette/Vaping    E-Cigarette Use Never User      E-Cigarette/Vaping Substances    Nicotine No     THC No     CBD No     Flavoring No     Other No     Unknown No      Social History     Tobacco Use    Smoking status: Former Smoker     Last attempt to quit: 4/30/2017     Years since quitting: 3 4    Smokeless tobacco: Never Used   Substance Use Topics    Alcohol use: Yes     Frequency: Monthly or less     Drinks per session: 1 or 2     Binge frequency: Never     Comment: socially    Drug use: No       Review of Systems   Constitutional: Negative for chills and fever  HENT: Negative for congestion and sore throat  Respiratory: Negative for cough and shortness of breath  Cardiovascular: Negative for chest pain  Gastrointestinal: Negative for abdominal pain, diarrhea, nausea and vomiting  Musculoskeletal: Positive for arthralgias  Negative for joint swelling  Skin: Negative for rash  Neurological: Negative for headaches  All other systems reviewed and are negative  Physical Exam  Physical Exam  Vitals signs and nursing note reviewed  Constitutional:       General: She is in acute distress (Distress secondary to pain)  Appearance: She is well-developed  HENT:      Head: Normocephalic and atraumatic  Right Ear: Hearing and external ear normal       Left Ear: Hearing and external ear normal       Nose: Nose normal    Eyes:      Conjunctiva/sclera: Conjunctivae normal    Neck:      Musculoskeletal: Full passive range of motion without pain  Cardiovascular:      Rate and Rhythm: Normal rate and regular rhythm  Pulses:           Dorsalis pedis pulses are 2+ on the right side  Posterior tibial pulses are 2+ on the right side  Heart sounds: Normal heart sounds, S1 normal and S2 normal    Pulmonary:      Effort: Pulmonary effort is normal       Breath sounds: Normal breath sounds  No decreased breath sounds, wheezing, rhonchi or rales  Musculoskeletal:      Right ankle: She exhibits decreased range of motion  She exhibits no swelling, no ecchymosis, no deformity, no laceration and normal pulse  Tenderness  Lateral malleolus tenderness found  Right foot: Decreased range of motion  Normal capillary refill  Tenderness and bony tenderness present  No swelling, crepitus, deformity or laceration  Feet:    Skin:     General: Skin is warm and dry  Findings: No rash  Neurological:      Mental Status: She is alert and oriented to person, place, and time  GCS: GCS eye subscore is 4  GCS verbal subscore is 5  GCS motor subscore is 6     Psychiatric:         Mood and Affect: Mood normal          Speech: Speech normal          Vital Signs  ED Triage Vitals [09/24/20 1131] Temperature Pulse Respirations Blood Pressure SpO2   98 9 °F (37 2 °C) 80 20 107/64 100 %      Temp Source Heart Rate Source Patient Position - Orthostatic VS BP Location FiO2 (%)   Oral Monitor Lying Right arm --      Pain Score       Worst Possible Pain           Vitals:    09/24/20 1131   BP: 107/64   Pulse: 80   Patient Position - Orthostatic VS: Lying         Visual Acuity      ED Medications  Medications   morphine (PF) 4 mg/mL injection 4 mg (4 mg Intramuscular Given 9/24/20 1218)   traMADol (ULTRAM) tablet 50 mg (50 mg Oral Given 9/24/20 1343)       Diagnostic Studies  Results Reviewed     None                 XR foot 3+ views RIGHT   ED Interpretation by Fernanda Diop PA-C (09/24 1256)   Preliminary read currently by myself:  No acute osseous abnormality  Final Result by Shahnaz Franks MD (09/24 1342)      No acute osseous abnormality  Workstation performed: [id number]         XR ankle 3+ views RIGHT   ED Interpretation by Fernanda Diop PA-C (09/24 1230)   Preliminary read currently by myself:  No acute osseous abnormality  Final Result by Kylie Moraes DO (09/24 1309)      No acute osseous abnormality        Workstation performed: [id number]                    Procedures  Splint application    Date/Time: 9/24/2020 1:21 PM  Performed by: Fernanda Diop PA-C  Authorized by: Fernanda Diop PA-C     Patient location:  ED  Performing Provider:  PA  Other Assisting Provider: No    Consent:     Consent obtained:  Verbal    Consent given by:  Patient    Risks discussed:  Discoloration, numbness, pain and swelling    Alternatives discussed:  No treatment, alternative treatment and referral  Universal protocol:     Procedure explained and questions answered to patient or proxy's satisfaction: yes      Patient identity confirmed:  Verbally with patient  Indication:     Indications: sprain/strain    Pre-procedure details:     Sensation:  Normal  Procedure details: Laterality:  Right    Location:  Ankle    Ankle:  R ankle    Splint type:  Short leg    Supplies:  Ortho-Glass  Post-procedure details:     Pain:  Improved    Sensation:  Normal    Neurovascular Exam: skin pink, capillary refill <2 sec, normal pulses and skin intact, warm, and dry      Patient tolerance of procedure: Tolerated well, no immediate complications             ED Course  ED Course as of Sep 24 1912   Thu Sep 24, 2020   1230 No fracture   XR ankle 3+ views RIGHT                                       MDM  Number of Diagnoses or Management Options  Fall, initial encounter:   Right ankle pain:   Diagnosis management comments: 50-year-old female presents for right ankle pain after mechanical fall  X-ray shows no acute fracture  Due to the degree of pain, patient was splinted for comfort  Instructed to follow up with Podiatry  I or my delegate reviewed this patient through the [facility] portal and did not find any evidence of narcotic abuse or doctor shopping  Counseled the patient on importance of rest, ice, elevation, and to keep splint in place until follow up with Orthopedics for repeat evaluation  Following splint application, the patient had good capillary refill and denied any parasthesias  Counseled patient that should numbness or tingling occur, it is okay to loosen the ace wrap  Okay to remove for bathing and showering, but use caution to not wrap the ace too tight  Explained and instructed the patient to follow the RICE regimen at home, which consists of: rest, application of ice, compression of affected extremity with ace bandage, and elevation of affected extremity  Instructed patient not to immobilize for extended periods of time as this may potentially lead to DVT  The management plan was discussed in detail with the patient at bedside and all questions were answered  The prior to discharge, we provided both verbal and written instructions    We discussed with the patient the signs and symptoms for which to return to the emergency department  All questions were answered and patient was comfortable with the plan of care and discharged to home  Instructed the patient to follow up with the primary care provider and/or special as provided and their written instructions  The patient verbalized understanding of our discussion and plan of care, and agrees to return to the Emergency Department for concerns and progression of illness  Amount and/or Complexity of Data Reviewed  Tests in the radiology section of CPT®: ordered and reviewed        Disposition  Final diagnoses:   Right ankle pain   Fall, initial encounter     Time reflects when diagnosis was documented in both MDM as applicable and the Disposition within this note     Time User Action Codes Description Comment    9/24/2020  1:20 PM Matthew Armstrong 11 Right ankle pain     9/24/2020  7:08 PM Janee Fink Add [[id number]  Jose Elias Bravo, initial encounter       ED Disposition     ED Disposition Condition Date/Time Comment    Discharge Stable Thu Sep 24, 2020  1:20 PM Drema Osgood discharge to home/self care  Follow-up Information     Follow up With Specialties Details Why Contact Info Additional [address] [facility], DO Internal Medicine Schedule an appointment as soon as possible for a visit in 3 days  [address] [place] [address] Podiatry Call today follow up right ankle pain [address] [facility] [id number]  [address] [facility], [address] [place] [address]  Bridgette           Discharge Medication List as of 9/24/2020  1:24 PM      START taking these medications    Details   traMADol (ULTRAM) 50 mg tablet Take 1 tablet (50 mg total) by mouth every 8 (eight) hours as needed for moderate pain for up to 2 days, Starting Thu 9/24/2020, Until Sat 9/26/2020, Normal CONTINUE these medications which have NOT CHANGED    Details   albuterol (Ventolin HFA) 90 mcg/act inhaler Inhale 2 puffs every 6 (six) hours as needed for wheezing or shortness of breath, Starting Tue 6/16/2020, Normal      atoMOXetine (STRATTERA) 25 mg capsule TAKE ONE CAPSULE BY MOUTH DAILY, Normal      Biotin 18444 MCG TABS Take by mouth, Historical Med      Calcium Carbonate-Vit D-Min (CALCIUM 1200 PO) Take 2,400 mg by mouth daily, Historical Med      Cyanocobalamin (CVS VITAMIN B12 PO) Take by mouth, Historical Med      drospirenone-ethinyl estradiol (LIEN) 3-0 02 MG per tablet Take 1 tablet by mouth daily, Starting Fri 11/15/2019, Normal      famotidine (PEPCID) 20 mg tablet Take 1 tablet (20 mg total) by mouth daily, Starting Thu 5/28/2020, No Print      hydrOXYzine HCL (ATARAX) 25 mg tablet Take 1 tablet (25 mg total) by mouth every 6 (six) hours as needed for itching, Starting Thu 6/4/2020, Until Wed 9/2/2020, Normal      ipratropium (ATROVENT) 0 03 % nasal spray 2 sprays into each nostril every 12 (twelve) hours, Starting Thu 4/2/2020, Normal      !! lamoTRIgine (LaMICtal) 100 mg tablet Take 1 tablet (100 mg total) by mouth daily, Starting Thu 6/4/2020, Until Tue 12/1/2020, Normal      !! lamoTRIgine (LaMICtal) 150 MG tablet TAKE ONE TABLET BY MOUTH DAILY, Normal      meloxicam (MOBIC) 7 5 mg tablet Take 1 tablet (7 5 mg total) by mouth daily, Starting Thu 6/11/2020, Normal      methocarbamol (ROBAXIN) 750 mg tablet Take 1 tablet (750 mg total) by mouth every 8 (eight) hours, Starting Thu 8/20/2020, Normal      metoprolol succinate (TOPROL-XL) 50 mg 24 hr tablet Take 1 tablet (50 mg total) by mouth 2 (two) times a day, Starting Fri 8/28/2020, Normal      montelukast (SINGULAIR) 10 mg tablet Take 1 tablet (10 mg total) by mouth daily at bedtime, Starting Thu 4/2/2020, Normal      Multiple Vitamins-Minerals (MULTI COMPLETE PO) Take by mouth, Historical Med      pantoprazole (PROTONIX) 40 mg tablet TAKE ONE TABLET BY MOUTH EVERY DAY, Normal      phenazopyridine (PYRIDIUM) 100 mg tablet Take 1 tablet (100 mg total) by mouth 3 (three) times a day as needed for bladder spasms, Starting Sun 7/26/2020, Normal      Polypodium Leucotomos (Heliocare) 240 MG CAPS Take two tablets once a day, Normal      Probiotic Product (PRO-BIOTIC BLEND PO) Take by mouth as needed , Historical Med      QUEtiapine (SEROquel) 50 mg tablet Take 1 tablet (50 mg total) by mouth daily at bedtime, Starting Thu 6/4/2020, Until Tue 12/1/2020, Normal      !! venlafaxine (EFFEXOR-XR) 150 mg 24 hr capsule Take 1 capsule (150 mg total) by mouth daily, Starting Thu 6/4/2020, Normal      !! venlafaxine (EFFEXOR-XR) 75 mg 24 hr capsule Take 1 capsule (75 mg total) by mouth daily, Starting Fri 5/22/2020, Until Wed 11/18/2020, Normal       !! - Potential duplicate medications found  Please discuss with provider  No discharge procedures on file      PDMP Review       Value Time User    PDMP Reviewed  Yes 9/24/2020  1:01 PM Hinda Severe, PA-C          ED Provider  Electronically Signed by           Hinda Severe, PA-C  09/24/20 1912

## 2020-09-28 ENCOUNTER — CLINICAL SUPPORT (OUTPATIENT)
Dept: CARDIOLOGY CLINIC | Facility: CLINIC | Age: 50
End: 2020-09-28
Payer: COMMERCIAL

## 2020-09-28 DIAGNOSIS — I47.1 PAROXYSMAL SVT (SUPRAVENTRICULAR TACHYCARDIA) (HCC): ICD-10-CM

## 2020-09-28 PROCEDURE — 0298T PR EXT ECG > 48HR TO 21 DAY REVIEW AND INTERPRETATN: CPT | Performed by: INTERNAL MEDICINE

## 2020-09-30 ENCOUNTER — OFFICE VISIT (OUTPATIENT)
Dept: PODIATRY | Facility: CLINIC | Age: 50
End: 2020-09-30
Payer: COMMERCIAL

## 2020-09-30 ENCOUNTER — TELEPHONE (OUTPATIENT)
Dept: CARDIOLOGY CLINIC | Facility: CLINIC | Age: 50
End: 2020-09-30

## 2020-09-30 VITALS
WEIGHT: 170 LBS | TEMPERATURE: 97.2 F | BODY MASS INDEX: 25.76 KG/M2 | DIASTOLIC BLOOD PRESSURE: 65 MMHG | HEART RATE: 80 BPM | SYSTOLIC BLOOD PRESSURE: 101 MMHG | HEIGHT: 68 IN

## 2020-09-30 DIAGNOSIS — S84.11XA: ICD-10-CM

## 2020-09-30 DIAGNOSIS — S93.401A SPRAIN OF RIGHT ANKLE, UNSPECIFIED LIGAMENT, INITIAL ENCOUNTER: Primary | ICD-10-CM

## 2020-09-30 DIAGNOSIS — M25.571 ACUTE RIGHT ANKLE PAIN: ICD-10-CM

## 2020-09-30 DIAGNOSIS — S86.311A STRAIN OF PERONEAL TENDON, RIGHT, INITIAL ENCOUNTER: ICD-10-CM

## 2020-09-30 PROCEDURE — 99243 OFF/OP CNSLTJ NEW/EST LOW 30: CPT | Performed by: PODIATRIST

## 2020-09-30 RX ORDER — TRAMADOL HYDROCHLORIDE 50 MG/1
50 TABLET ORAL EVERY 6 HOURS PRN
COMMUNITY
End: 2021-06-28

## 2020-10-05 ENCOUNTER — TELEPHONE (OUTPATIENT)
Dept: PODIATRY | Facility: CLINIC | Age: 50
End: 2020-10-05

## 2020-10-07 DIAGNOSIS — K21.9 GASTROESOPHAGEAL REFLUX DISEASE WITHOUT ESOPHAGITIS: ICD-10-CM

## 2020-10-07 RX ORDER — PANTOPRAZOLE SODIUM 40 MG/1
TABLET, DELAYED RELEASE ORAL
Qty: 90 TABLET | Refills: 0 | Status: SHIPPED | OUTPATIENT
Start: 2020-10-07 | End: 2020-10-16

## 2020-10-08 ENCOUNTER — TELEPHONE (OUTPATIENT)
Dept: CARDIOLOGY CLINIC | Facility: CLINIC | Age: 50
End: 2020-10-08

## 2020-10-08 ENCOUNTER — OFFICE VISIT (OUTPATIENT)
Dept: CARDIOLOGY CLINIC | Facility: CLINIC | Age: 50
End: 2020-10-08
Payer: COMMERCIAL

## 2020-10-08 VITALS
TEMPERATURE: 98.7 F | HEART RATE: 64 BPM | DIASTOLIC BLOOD PRESSURE: 70 MMHG | WEIGHT: 174.8 LBS | BODY MASS INDEX: 26.49 KG/M2 | SYSTOLIC BLOOD PRESSURE: 118 MMHG | HEIGHT: 68 IN

## 2020-10-08 DIAGNOSIS — I47.1 SVT (SUPRAVENTRICULAR TACHYCARDIA) (HCC): ICD-10-CM

## 2020-10-08 DIAGNOSIS — R00.0 TACHYCARDIA: ICD-10-CM

## 2020-10-08 DIAGNOSIS — R00.2 PALPITATIONS: ICD-10-CM

## 2020-10-08 DIAGNOSIS — I47.1 PAROXYSMAL SVT (SUPRAVENTRICULAR TACHYCARDIA) (HCC): Primary | ICD-10-CM

## 2020-10-08 DIAGNOSIS — I47.1 SVT (SUPRAVENTRICULAR TACHYCARDIA) (HCC): Primary | ICD-10-CM

## 2020-10-08 PROCEDURE — 99243 OFF/OP CNSLTJ NEW/EST LOW 30: CPT | Performed by: PHYSICIAN ASSISTANT

## 2020-10-08 PROCEDURE — 93000 ELECTROCARDIOGRAM COMPLETE: CPT | Performed by: PHYSICIAN ASSISTANT

## 2020-10-09 ENCOUNTER — OFFICE VISIT (OUTPATIENT)
Dept: PSYCHIATRY | Facility: CLINIC | Age: 50
End: 2020-10-09

## 2020-10-09 ENCOUNTER — IMMUNIZATIONS (OUTPATIENT)
Dept: FAMILY MEDICINE CLINIC | Facility: CLINIC | Age: 50
End: 2020-10-09
Payer: COMMERCIAL

## 2020-10-09 VITALS — TEMPERATURE: 98 F

## 2020-10-09 DIAGNOSIS — F33.2 MAJOR DEPRESSIVE DISORDER, RECURRENT SEVERE WITHOUT PSYCHOTIC FEATURES (HCC): Primary | ICD-10-CM

## 2020-10-09 DIAGNOSIS — Z23 FLU VACCINE NEED: Primary | ICD-10-CM

## 2020-10-09 PROCEDURE — 90682 RIV4 VACC RECOMBINANT DNA IM: CPT

## 2020-10-09 PROCEDURE — 90471 IMMUNIZATION ADMIN: CPT

## 2020-10-14 DIAGNOSIS — R00.2 PALPITATIONS: ICD-10-CM

## 2020-10-14 DIAGNOSIS — R00.0 TACHYCARDIA: Primary | ICD-10-CM

## 2020-10-14 RX ORDER — DILTIAZEM HYDROCHLORIDE 180 MG/1
180 CAPSULE, COATED, EXTENDED RELEASE ORAL DAILY
Qty: 30 CAPSULE | Refills: 3 | Status: SHIPPED | OUTPATIENT
Start: 2020-10-14 | End: 2020-11-19 | Stop reason: ALTCHOICE

## 2020-10-15 DIAGNOSIS — K21.9 GASTROESOPHAGEAL REFLUX DISEASE WITHOUT ESOPHAGITIS: ICD-10-CM

## 2020-10-16 ENCOUNTER — OFFICE VISIT (OUTPATIENT)
Dept: PODIATRY | Facility: CLINIC | Age: 50
End: 2020-10-16
Payer: COMMERCIAL

## 2020-10-16 VITALS
HEIGHT: 68 IN | DIASTOLIC BLOOD PRESSURE: 75 MMHG | BODY MASS INDEX: 26.58 KG/M2 | SYSTOLIC BLOOD PRESSURE: 112 MMHG | HEART RATE: 67 BPM

## 2020-10-16 DIAGNOSIS — S84.11XD INJURY OF PERONEAL NERVE AT RIGHT LOWER LEG LEVEL, SUBSEQUENT ENCOUNTER: ICD-10-CM

## 2020-10-16 DIAGNOSIS — S86.311D STRAIN OF PERONEAL TENDON, RIGHT, SUBSEQUENT ENCOUNTER: ICD-10-CM

## 2020-10-16 DIAGNOSIS — S93.401D SPRAIN OF RIGHT ANKLE, UNSPECIFIED LIGAMENT, SUBSEQUENT ENCOUNTER: Primary | ICD-10-CM

## 2020-10-16 PROCEDURE — 99213 OFFICE O/P EST LOW 20 MIN: CPT | Performed by: PODIATRIST

## 2020-10-16 RX ORDER — PANTOPRAZOLE SODIUM 40 MG/1
TABLET, DELAYED RELEASE ORAL
Qty: 90 TABLET | Refills: 0 | Status: SHIPPED | OUTPATIENT
Start: 2020-10-16 | End: 2020-10-22 | Stop reason: SDUPTHER

## 2020-10-22 ENCOUNTER — OFFICE VISIT (OUTPATIENT)
Dept: FAMILY MEDICINE CLINIC | Facility: CLINIC | Age: 50
End: 2020-10-22
Payer: COMMERCIAL

## 2020-10-22 VITALS
RESPIRATION RATE: 18 BRPM | WEIGHT: 174 LBS | OXYGEN SATURATION: 97 % | BODY MASS INDEX: 26.37 KG/M2 | HEIGHT: 68 IN | HEART RATE: 88 BPM | TEMPERATURE: 97.3 F | DIASTOLIC BLOOD PRESSURE: 80 MMHG | SYSTOLIC BLOOD PRESSURE: 110 MMHG

## 2020-10-22 DIAGNOSIS — S86.311D STRAIN OF PERONEAL TENDON, RIGHT, SUBSEQUENT ENCOUNTER: ICD-10-CM

## 2020-10-22 DIAGNOSIS — Z80.3 FAMILY HISTORY OF BREAST CANCER: ICD-10-CM

## 2020-10-22 DIAGNOSIS — E66.3 OVERWEIGHT (BMI 25.0-29.9): ICD-10-CM

## 2020-10-22 DIAGNOSIS — K21.9 GASTROESOPHAGEAL REFLUX DISEASE WITHOUT ESOPHAGITIS: ICD-10-CM

## 2020-10-22 DIAGNOSIS — S93.401D SPRAIN OF RIGHT ANKLE, UNSPECIFIED LIGAMENT, SUBSEQUENT ENCOUNTER: Primary | ICD-10-CM

## 2020-10-22 DIAGNOSIS — Z00.00 ANNUAL PHYSICAL EXAM: Primary | ICD-10-CM

## 2020-10-22 DIAGNOSIS — F33.2 MAJOR DEPRESSIVE DISORDER, RECURRENT SEVERE WITHOUT PSYCHOTIC FEATURES (HCC): Chronic | ICD-10-CM

## 2020-10-22 DIAGNOSIS — Z12.31 ENCOUNTER FOR SCREENING MAMMOGRAM FOR BREAST CANCER: ICD-10-CM

## 2020-10-22 PROBLEM — R31.9 URINARY TRACT INFECTION WITH HEMATURIA: Status: RESOLVED | Noted: 2020-09-06 | Resolved: 2020-10-22

## 2020-10-22 PROBLEM — Z48.815 ENCOUNTER FOR SURGICAL AFTERCARE FOLLOWING SURGERY OF DIGESTIVE SYSTEM: Status: RESOLVED | Noted: 2020-08-05 | Resolved: 2020-10-22

## 2020-10-22 PROBLEM — N39.0 URINARY TRACT INFECTION WITH HEMATURIA: Status: RESOLVED | Noted: 2020-09-06 | Resolved: 2020-10-22

## 2020-10-22 PROBLEM — R53.83 OTHER FATIGUE: Status: RESOLVED | Noted: 2018-04-03 | Resolved: 2020-10-22

## 2020-10-22 PROCEDURE — 99396 PREV VISIT EST AGE 40-64: CPT | Performed by: INTERNAL MEDICINE

## 2020-10-22 RX ORDER — PANTOPRAZOLE SODIUM 40 MG/1
40 TABLET, DELAYED RELEASE ORAL DAILY
Qty: 90 TABLET | Refills: 3 | Status: SHIPPED | OUTPATIENT
Start: 2020-10-22 | End: 2021-10-01 | Stop reason: SDUPTHER

## 2020-10-23 ENCOUNTER — TELEMEDICINE (OUTPATIENT)
Dept: BEHAVIORAL/MENTAL HEALTH CLINIC | Facility: CLINIC | Age: 50
End: 2020-10-23
Payer: COMMERCIAL

## 2020-10-23 DIAGNOSIS — F41.1 GENERALIZED ANXIETY DISORDER: Chronic | ICD-10-CM

## 2020-10-23 DIAGNOSIS — F33.2 MAJOR DEPRESSIVE DISORDER, RECURRENT SEVERE WITHOUT PSYCHOTIC FEATURES (HCC): Primary | Chronic | ICD-10-CM

## 2020-10-23 DIAGNOSIS — F43.10 POST TRAUMATIC STRESS DISORDER (PTSD): Chronic | ICD-10-CM

## 2020-10-23 PROCEDURE — 90834 PSYTX W PT 45 MINUTES: CPT | Performed by: SOCIAL WORKER

## 2020-10-29 ENCOUNTER — HOSPITAL ENCOUNTER (OUTPATIENT)
Dept: NON INVASIVE DIAGNOSTICS | Facility: HOSPITAL | Age: 50
Discharge: HOME | End: 2020-10-29
Payer: COMMERCIAL

## 2020-10-29 DIAGNOSIS — R00.0 TACHYCARDIA: ICD-10-CM

## 2020-10-29 DIAGNOSIS — I47.1 PAROXYSMAL SVT (SUPRAVENTRICULAR TACHYCARDIA) (HCC): ICD-10-CM

## 2020-10-29 DIAGNOSIS — R00.2 PALPITATIONS: ICD-10-CM

## 2020-10-29 LAB
CHEST PAIN STATEMENT: NORMAL
MAX DIASTOLIC BP: 80 MMHG
MAX HEART RATE: 173 BPM
MAX PREDICTED HEART RATE: 169 BPM
MAX. SYSTOLIC BP: 122 MMHG
PROTOCOL NAME: NORMAL
TARGET HR FORMULA: NORMAL
TEST INDICATION: NORMAL
TIME IN EXERCISE PHASE: NORMAL

## 2020-10-29 PROCEDURE — 93018 CV STRESS TEST I&R ONLY: CPT | Performed by: INTERNAL MEDICINE

## 2020-10-29 PROCEDURE — 93017 CV STRESS TEST TRACING ONLY: CPT

## 2020-10-29 PROCEDURE — 93306 TTE W/DOPPLER COMPLETE: CPT

## 2020-10-29 PROCEDURE — 93306 TTE W/DOPPLER COMPLETE: CPT | Performed by: INTERNAL MEDICINE

## 2020-10-29 PROCEDURE — 93016 CV STRESS TEST SUPVJ ONLY: CPT | Performed by: INTERNAL MEDICINE

## 2020-11-05 ENCOUNTER — HOSPITAL ENCOUNTER (OUTPATIENT)
Dept: RADIOLOGY | Facility: HOSPITAL | Age: 50
Discharge: HOME | End: 2020-11-05
Payer: COMMERCIAL

## 2020-11-05 ENCOUNTER — HOSPITAL ENCOUNTER (OUTPATIENT)
Dept: RADIOLOGY | Facility: HOSPITAL | Age: 50
Discharge: HOME | End: 2020-11-05
Attending: PODIATRIST
Payer: COMMERCIAL

## 2020-11-05 ENCOUNTER — TRANSCRIBE ORDERS (OUTPATIENT)
Dept: RADIOLOGY | Facility: HOSPITAL | Age: 50
End: 2020-11-05

## 2020-11-05 DIAGNOSIS — S86.311D STRAIN OF PERONEAL TENDON, RIGHT, SUBSEQUENT ENCOUNTER: ICD-10-CM

## 2020-11-05 DIAGNOSIS — S93.401D SPRAIN OF RIGHT ANKLE, UNSPECIFIED LIGAMENT, SUBSEQUENT ENCOUNTER: ICD-10-CM

## 2020-11-05 DIAGNOSIS — S93.401D SPRAIN OF LIGAMENT OF RIGHT ANKLE, SUBSEQUENT ENCOUNTER: ICD-10-CM

## 2020-11-05 PROCEDURE — 73721 MRI JNT OF LWR EXTRE W/O DYE: CPT

## 2020-11-05 PROCEDURE — G1004 CDSM NDSC: HCPCS

## 2020-11-13 ENCOUNTER — TELEMEDICINE (OUTPATIENT)
Dept: PSYCHIATRY | Facility: CLINIC | Age: 50
End: 2020-11-13
Payer: COMMERCIAL

## 2020-11-13 DIAGNOSIS — F33.2 SEVERE EPISODE OF RECURRENT MAJOR DEPRESSIVE DISORDER, WITHOUT PSYCHOTIC FEATURES (HCC): Primary | ICD-10-CM

## 2020-11-13 DIAGNOSIS — F33.2 MDD (MAJOR DEPRESSIVE DISORDER), RECURRENT SEVERE, WITHOUT PSYCHOSIS (HCC): ICD-10-CM

## 2020-11-13 DIAGNOSIS — F33.2 SEVERE EPISODE OF RECURRENT MAJOR DEPRESSIVE DISORDER, WITHOUT PSYCHOTIC FEATURES (HCC): ICD-10-CM

## 2020-11-13 DIAGNOSIS — F90.2 ADHD (ATTENTION DEFICIT HYPERACTIVITY DISORDER), COMBINED TYPE: ICD-10-CM

## 2020-11-13 PROCEDURE — 99213 OFFICE O/P EST LOW 20 MIN: CPT | Performed by: NURSE PRACTITIONER

## 2020-11-13 RX ORDER — ATOMOXETINE 25 MG/1
25 CAPSULE ORAL DAILY
Qty: 90 CAPSULE | Refills: 0 | Status: SHIPPED | OUTPATIENT
Start: 2020-11-13 | End: 2020-11-20

## 2020-11-13 RX ORDER — VENLAFAXINE HYDROCHLORIDE 75 MG/1
75 CAPSULE, EXTENDED RELEASE ORAL DAILY
Qty: 90 CAPSULE | Refills: 0 | Status: SHIPPED | OUTPATIENT
Start: 2020-11-13 | End: 2021-02-12

## 2020-11-13 RX ORDER — LAMOTRIGINE 150 MG/1
150 TABLET ORAL DAILY
Qty: 90 TABLET | Refills: 0 | Status: SHIPPED | OUTPATIENT
Start: 2020-11-13 | End: 2020-12-10

## 2020-11-13 RX ORDER — QUETIAPINE FUMARATE 50 MG/1
75 TABLET, FILM COATED ORAL
Qty: 135 TABLET | Refills: 0
Start: 2020-11-13 | End: 2020-12-10

## 2020-11-13 RX ORDER — LAMOTRIGINE 100 MG/1
TABLET ORAL
Qty: 90 TABLET | Refills: 0 | OUTPATIENT
Start: 2020-11-13

## 2020-11-13 RX ORDER — LAMOTRIGINE 100 MG/1
100 TABLET ORAL DAILY
Qty: 90 TABLET | Refills: 0 | Status: SHIPPED | OUTPATIENT
Start: 2020-11-13 | End: 2021-02-12

## 2020-11-13 RX ORDER — VENLAFAXINE HYDROCHLORIDE 150 MG/1
150 CAPSULE, EXTENDED RELEASE ORAL DAILY
Qty: 90 CAPSULE | Refills: 0 | Status: SHIPPED | OUTPATIENT
Start: 2020-11-13 | End: 2021-03-05 | Stop reason: SDUPTHER

## 2020-11-19 ENCOUNTER — ANNUAL EXAM (OUTPATIENT)
Dept: OBGYN CLINIC | Facility: CLINIC | Age: 50
End: 2020-11-19
Payer: COMMERCIAL

## 2020-11-19 ENCOUNTER — OFFICE VISIT (OUTPATIENT)
Dept: CARDIOLOGY CLINIC | Facility: CLINIC | Age: 50
End: 2020-11-19
Payer: COMMERCIAL

## 2020-11-19 ENCOUNTER — TELEMEDICINE (OUTPATIENT)
Dept: BEHAVIORAL/MENTAL HEALTH CLINIC | Facility: CLINIC | Age: 50
End: 2020-11-19
Payer: COMMERCIAL

## 2020-11-19 VITALS
SYSTOLIC BLOOD PRESSURE: 110 MMHG | TEMPERATURE: 96.8 F | BODY MASS INDEX: 28.09 KG/M2 | HEART RATE: 66 BPM | WEIGHT: 179 LBS | OXYGEN SATURATION: 98 % | HEIGHT: 67 IN | DIASTOLIC BLOOD PRESSURE: 68 MMHG

## 2020-11-19 VITALS — WEIGHT: 177.2 LBS | HEIGHT: 68 IN | BODY MASS INDEX: 26.86 KG/M2 | TEMPERATURE: 98.2 F

## 2020-11-19 DIAGNOSIS — Z72.3 INADEQUATE EXERCISE: ICD-10-CM

## 2020-11-19 DIAGNOSIS — F41.1 GENERALIZED ANXIETY DISORDER: Chronic | ICD-10-CM

## 2020-11-19 DIAGNOSIS — Z12.31 VISIT FOR SCREENING MAMMOGRAM: ICD-10-CM

## 2020-11-19 DIAGNOSIS — I47.1 PAROXYSMAL SVT (SUPRAVENTRICULAR TACHYCARDIA) (HCC): Primary | ICD-10-CM

## 2020-11-19 DIAGNOSIS — Z30.41 ORAL CONTRACEPTIVE PILL SURVEILLANCE: ICD-10-CM

## 2020-11-19 DIAGNOSIS — Z01.419 ENCOUNTER FOR ANNUAL ROUTINE GYNECOLOGICAL EXAMINATION: Primary | ICD-10-CM

## 2020-11-19 DIAGNOSIS — F43.10 POST TRAUMATIC STRESS DISORDER (PTSD): Chronic | ICD-10-CM

## 2020-11-19 DIAGNOSIS — F33.2 MAJOR DEPRESSIVE DISORDER, RECURRENT SEVERE WITHOUT PSYCHOTIC FEATURES (HCC): Primary | Chronic | ICD-10-CM

## 2020-11-19 DIAGNOSIS — Z12.11 COLON CANCER SCREENING: ICD-10-CM

## 2020-11-19 DIAGNOSIS — E58 DIETARY CALCIUM DEFICIENCY: ICD-10-CM

## 2020-11-19 PROCEDURE — 99214 OFFICE O/P EST MOD 30 MIN: CPT | Performed by: INTERNAL MEDICINE

## 2020-11-19 PROCEDURE — 99396 PREV VISIT EST AGE 40-64: CPT | Performed by: OBSTETRICS & GYNECOLOGY

## 2020-11-19 PROCEDURE — 93000 ELECTROCARDIOGRAM COMPLETE: CPT | Performed by: INTERNAL MEDICINE

## 2020-11-19 PROCEDURE — 90834 PSYTX W PT 45 MINUTES: CPT | Performed by: SOCIAL WORKER

## 2020-11-19 RX ORDER — PROPRANOLOL HCL 60 MG
60 CAPSULE, EXTENDED RELEASE 24HR ORAL 2 TIMES DAILY
Qty: 180 CAPSULE | Refills: 3 | Status: SHIPPED | OUTPATIENT
Start: 2020-11-19 | End: 2021-05-20 | Stop reason: SDUPTHER

## 2020-11-19 RX ORDER — DROSPIRENONE AND ETHINYL ESTRADIOL 0.02-3(28)
1 KIT ORAL DAILY
Qty: 84 TABLET | Refills: 4 | Status: SHIPPED | OUTPATIENT
Start: 2020-11-19 | End: 2021-11-26 | Stop reason: SDUPTHER

## 2020-11-20 DIAGNOSIS — F90.2 ADHD (ATTENTION DEFICIT HYPERACTIVITY DISORDER), COMBINED TYPE: Primary | ICD-10-CM

## 2020-11-20 RX ORDER — ATOMOXETINE 18 MG/1
18 CAPSULE ORAL DAILY
Qty: 14 CAPSULE | Refills: 0 | Status: SHIPPED | OUTPATIENT
Start: 2020-11-20 | End: 2020-12-04

## 2020-11-23 ENCOUNTER — TRANSCRIBE ORDERS (OUTPATIENT)
Dept: PAIN MEDICINE | Facility: CLINIC | Age: 50
End: 2020-11-23

## 2020-11-23 ENCOUNTER — OFFICE VISIT (OUTPATIENT)
Dept: PAIN MEDICINE | Facility: CLINIC | Age: 50
End: 2020-11-23
Payer: COMMERCIAL

## 2020-11-23 VITALS
DIASTOLIC BLOOD PRESSURE: 61 MMHG | TEMPERATURE: 97.9 F | WEIGHT: 179 LBS | SYSTOLIC BLOOD PRESSURE: 106 MMHG | HEART RATE: 64 BPM | BODY MASS INDEX: 28.46 KG/M2

## 2020-11-23 DIAGNOSIS — M70.62 TROCHANTERIC BURSITIS, LEFT HIP: Primary | ICD-10-CM

## 2020-11-23 DIAGNOSIS — M51.16 INTERVERTEBRAL DISC DISORDER WITH RADICULOPATHY OF LUMBAR REGION: ICD-10-CM

## 2020-11-23 PROCEDURE — 20611 DRAIN/INJ JOINT/BURSA W/US: CPT | Performed by: ANESTHESIOLOGY

## 2020-11-23 PROCEDURE — 99214 OFFICE O/P EST MOD 30 MIN: CPT | Performed by: ANESTHESIOLOGY

## 2020-11-23 RX ORDER — BUPIVACAINE HYDROCHLORIDE 2.5 MG/ML
10 INJECTION, SOLUTION EPIDURAL; INFILTRATION; INTRACAUDAL ONCE
Status: COMPLETED | OUTPATIENT
Start: 2020-11-23 | End: 2020-11-23

## 2020-11-23 RX ORDER — METHYLPREDNISOLONE ACETATE 40 MG/ML
40 INJECTION, SUSPENSION INTRA-ARTICULAR; INTRALESIONAL; INTRAMUSCULAR; SOFT TISSUE ONCE
Status: COMPLETED | OUTPATIENT
Start: 2020-11-23 | End: 2020-11-23

## 2020-11-23 RX ADMIN — BUPIVACAINE HYDROCHLORIDE 10 ML: 2.5 INJECTION, SOLUTION EPIDURAL; INFILTRATION; INTRACAUDAL at 12:39

## 2020-11-23 RX ADMIN — METHYLPREDNISOLONE ACETATE 40 MG: 40 INJECTION, SUSPENSION INTRA-ARTICULAR; INTRALESIONAL; INTRAMUSCULAR; SOFT TISSUE at 12:38

## 2020-12-03 ENCOUNTER — TELEPHONE (OUTPATIENT)
Dept: PSYCHIATRY | Facility: CLINIC | Age: 50
End: 2020-12-03

## 2020-12-03 ENCOUNTER — TELEMEDICINE (OUTPATIENT)
Dept: FAMILY MEDICINE CLINIC | Facility: CLINIC | Age: 50
End: 2020-12-03
Payer: COMMERCIAL

## 2020-12-03 VITALS
WEIGHT: 174 LBS | TEMPERATURE: 97.9 F | DIASTOLIC BLOOD PRESSURE: 81 MMHG | HEIGHT: 66 IN | SYSTOLIC BLOOD PRESSURE: 120 MMHG | HEART RATE: 80 BPM | BODY MASS INDEX: 27.97 KG/M2

## 2020-12-03 DIAGNOSIS — B34.9 VIRAL INFECTION, UNSPECIFIED: ICD-10-CM

## 2020-12-03 DIAGNOSIS — B34.9 VIRAL INFECTION, UNSPECIFIED: Primary | ICD-10-CM

## 2020-12-03 PROCEDURE — U0003 INFECTIOUS AGENT DETECTION BY NUCLEIC ACID (DNA OR RNA); SEVERE ACUTE RESPIRATORY SYNDROME CORONAVIRUS 2 (SARS-COV-2) (CORONAVIRUS DISEASE [COVID-19]), AMPLIFIED PROBE TECHNIQUE, MAKING USE OF HIGH THROUGHPUT TECHNOLOGIES AS DESCRIBED BY CMS-2020-01-R: HCPCS | Performed by: INTERNAL MEDICINE

## 2020-12-03 PROCEDURE — 99213 OFFICE O/P EST LOW 20 MIN: CPT | Performed by: INTERNAL MEDICINE

## 2020-12-04 DIAGNOSIS — F90.2 ADHD (ATTENTION DEFICIT HYPERACTIVITY DISORDER), COMBINED TYPE: Primary | ICD-10-CM

## 2020-12-04 RX ORDER — ATOMOXETINE 25 MG/1
25 CAPSULE ORAL DAILY
Qty: 90 CAPSULE | Refills: 1 | Status: SHIPPED | OUTPATIENT
Start: 2020-12-04 | End: 2021-03-05 | Stop reason: SDUPTHER

## 2020-12-05 LAB — SARS-COV-2 RNA SPEC QL NAA+PROBE: DETECTED

## 2020-12-07 ENCOUNTER — TELEMEDICINE (OUTPATIENT)
Dept: FAMILY MEDICINE CLINIC | Facility: CLINIC | Age: 50
End: 2020-12-07
Payer: COMMERCIAL

## 2020-12-07 VITALS — TEMPERATURE: 100.1 F

## 2020-12-07 DIAGNOSIS — U07.1 COVID-19: Primary | ICD-10-CM

## 2020-12-07 PROCEDURE — 99213 OFFICE O/P EST LOW 20 MIN: CPT | Performed by: INTERNAL MEDICINE

## 2020-12-09 DIAGNOSIS — F33.2 SEVERE EPISODE OF RECURRENT MAJOR DEPRESSIVE DISORDER, WITHOUT PSYCHOTIC FEATURES (HCC): ICD-10-CM

## 2020-12-09 DIAGNOSIS — F33.2 MDD (MAJOR DEPRESSIVE DISORDER), RECURRENT SEVERE, WITHOUT PSYCHOSIS (HCC): ICD-10-CM

## 2020-12-09 DIAGNOSIS — J30.2 SEASONAL ALLERGIES: ICD-10-CM

## 2020-12-09 RX ORDER — MONTELUKAST SODIUM 10 MG/1
TABLET ORAL
Qty: 90 TABLET | Refills: 3 | Status: SHIPPED | OUTPATIENT
Start: 2020-12-09 | End: 2021-11-14

## 2020-12-10 RX ORDER — QUETIAPINE FUMARATE 50 MG/1
TABLET, FILM COATED ORAL
Qty: 90 TABLET | Refills: 0 | Status: SHIPPED | OUTPATIENT
Start: 2020-12-10 | End: 2021-01-08

## 2020-12-10 RX ORDER — LAMOTRIGINE 150 MG/1
TABLET ORAL
Qty: 90 TABLET | Refills: 0 | Status: SHIPPED | OUTPATIENT
Start: 2020-12-10 | End: 2021-03-05 | Stop reason: SDUPTHER

## 2020-12-12 ENCOUNTER — APPOINTMENT (EMERGENCY)
Dept: RADIOLOGY | Facility: HOSPITAL | Age: 50
DRG: 177 | End: 2020-12-12
Payer: COMMERCIAL

## 2020-12-12 ENCOUNTER — HOSPITAL ENCOUNTER (INPATIENT)
Facility: HOSPITAL | Age: 50
LOS: 4 days | Discharge: HOME | DRG: 177 | End: 2020-12-17
Attending: EMERGENCY MEDICINE | Admitting: INTERNAL MEDICINE
Payer: COMMERCIAL

## 2020-12-12 DIAGNOSIS — U07.1 COVID-19 VIRUS INFECTION: ICD-10-CM

## 2020-12-12 DIAGNOSIS — U07.1 PNEUMONIA DUE TO COVID-19 VIRUS: Primary | ICD-10-CM

## 2020-12-12 DIAGNOSIS — J12.82 PNEUMONIA DUE TO COVID-19 VIRUS: Primary | ICD-10-CM

## 2020-12-12 DIAGNOSIS — E87.1 ACUTE HYPONATREMIA: ICD-10-CM

## 2020-12-12 DIAGNOSIS — R19.7 DIARRHEA: ICD-10-CM

## 2020-12-12 PROBLEM — I47.10 PAROXYSMAL SVT (SUPRAVENTRICULAR TACHYCARDIA): Status: ACTIVE | Noted: 2020-12-12

## 2020-12-12 PROBLEM — I47.1 PAROXYSMAL SVT (SUPRAVENTRICULAR TACHYCARDIA) (HCC): Status: ACTIVE | Noted: 2020-12-12

## 2020-12-12 LAB
ABO GROUP BLD: NORMAL
ALBUMIN SERPL BCP-MCNC: 2.7 G/DL (ref 3.5–5)
ALP SERPL-CCNC: 92 U/L (ref 46–116)
ALT SERPL W P-5'-P-CCNC: 29 U/L (ref 12–78)
ANION GAP SERPL CALCULATED.3IONS-SCNC: 7 MMOL/L (ref 4–13)
AST SERPL W P-5'-P-CCNC: 34 U/L (ref 5–45)
BASOPHILS # BLD AUTO: 0.01 THOUSANDS/ΜL (ref 0–0.1)
BASOPHILS NFR BLD AUTO: 0 % (ref 0–1)
BILIRUB SERPL-MCNC: 0.28 MG/DL (ref 0.2–1)
BUN SERPL-MCNC: 7 MG/DL (ref 5–25)
CALCIUM ALBUM COR SERPL-MCNC: 9.6 MG/DL (ref 8.3–10.1)
CALCIUM SERPL-MCNC: 8.6 MG/DL (ref 8.3–10.1)
CHLORIDE SERPL-SCNC: 95 MMOL/L (ref 100–108)
CK SERPL-CCNC: 58 U/L (ref 26–192)
CO2 SERPL-SCNC: 30 MMOL/L (ref 21–32)
CREAT SERPL-MCNC: 0.69 MG/DL (ref 0.6–1.3)
CRP SERPL QL: 178.8 MG/L
D DIMER PPP FEU-MCNC: 1.02 UG/ML FEU
EOSINOPHIL # BLD AUTO: 0 THOUSAND/ΜL (ref 0–0.61)
EOSINOPHIL NFR BLD AUTO: 0 % (ref 0–6)
ERYTHROCYTE [DISTWIDTH] IN BLOOD BY AUTOMATED COUNT: 12 % (ref 11.6–15.1)
FERRITIN SERPL-MCNC: 333 NG/ML (ref 8–388)
GFR SERPL CREATININE-BSD FRML MDRD: 101 ML/MIN/1.73SQ M
GLUCOSE SERPL-MCNC: 121 MG/DL (ref 65–140)
HCT VFR BLD AUTO: 40.9 % (ref 34.8–46.1)
HGB BLD-MCNC: 13.2 G/DL (ref 11.5–15.4)
IMM GRANULOCYTES # BLD AUTO: 0.03 THOUSAND/UL (ref 0–0.2)
IMM GRANULOCYTES NFR BLD AUTO: 1 % (ref 0–2)
LYMPHOCYTES # BLD AUTO: 0.58 THOUSANDS/ΜL (ref 0.6–4.47)
LYMPHOCYTES NFR BLD AUTO: 9 % (ref 14–44)
MCH RBC QN AUTO: 29.4 PG (ref 26.8–34.3)
MCHC RBC AUTO-ENTMCNC: 32.3 G/DL (ref 31.4–37.4)
MCV RBC AUTO: 91 FL (ref 82–98)
MONOCYTES # BLD AUTO: 0.21 THOUSAND/ΜL (ref 0.17–1.22)
MONOCYTES NFR BLD AUTO: 3 % (ref 4–12)
NEUTROPHILS # BLD AUTO: 5.78 THOUSANDS/ΜL (ref 1.85–7.62)
NEUTS SEG NFR BLD AUTO: 87 % (ref 43–75)
NRBC BLD AUTO-RTO: 0 /100 WBCS
NT-PROBNP SERPL-MCNC: 142 PG/ML
PLATELET # BLD AUTO: 177 THOUSANDS/UL (ref 149–390)
PLATELET # BLD AUTO: 182 THOUSANDS/UL (ref 149–390)
PMV BLD AUTO: 10 FL (ref 8.9–12.7)
PMV BLD AUTO: 10 FL (ref 8.9–12.7)
POTASSIUM SERPL-SCNC: 4.3 MMOL/L (ref 3.5–5.3)
PROCALCITONIN SERPL-MCNC: <0.05 NG/ML
PROT SERPL-MCNC: 7.5 G/DL (ref 6.4–8.2)
RBC # BLD AUTO: 4.49 MILLION/UL (ref 3.81–5.12)
RH BLD: POSITIVE
SODIUM SERPL-SCNC: 132 MMOL/L (ref 136–145)
TROPONIN I SERPL-MCNC: <0.02 NG/ML
WBC # BLD AUTO: 6.61 THOUSAND/UL (ref 4.31–10.16)

## 2020-12-12 PROCEDURE — 99285 EMERGENCY DEPT VISIT HI MDM: CPT

## 2020-12-12 PROCEDURE — 85049 AUTOMATED PLATELET COUNT: CPT | Performed by: INTERNAL MEDICINE

## 2020-12-12 PROCEDURE — 80053 COMPREHEN METABOLIC PANEL: CPT | Performed by: EMERGENCY MEDICINE

## 2020-12-12 PROCEDURE — 86901 BLOOD TYPING SEROLOGIC RH(D): CPT | Performed by: INTERNAL MEDICINE

## 2020-12-12 PROCEDURE — 96360 HYDRATION IV INFUSION INIT: CPT

## 2020-12-12 PROCEDURE — 86900 BLOOD TYPING SEROLOGIC ABO: CPT | Performed by: INTERNAL MEDICINE

## 2020-12-12 PROCEDURE — 71045 X-RAY EXAM CHEST 1 VIEW: CPT

## 2020-12-12 PROCEDURE — 82550 ASSAY OF CK (CPK): CPT | Performed by: INTERNAL MEDICINE

## 2020-12-12 PROCEDURE — 36415 COLL VENOUS BLD VENIPUNCTURE: CPT | Performed by: EMERGENCY MEDICINE

## 2020-12-12 PROCEDURE — 85379 FIBRIN DEGRADATION QUANT: CPT | Performed by: INTERNAL MEDICINE

## 2020-12-12 PROCEDURE — 84145 PROCALCITONIN (PCT): CPT | Performed by: INTERNAL MEDICINE

## 2020-12-12 PROCEDURE — 99285 EMERGENCY DEPT VISIT HI MDM: CPT | Performed by: EMERGENCY MEDICINE

## 2020-12-12 PROCEDURE — 86140 C-REACTIVE PROTEIN: CPT | Performed by: INTERNAL MEDICINE

## 2020-12-12 PROCEDURE — 84484 ASSAY OF TROPONIN QUANT: CPT | Performed by: INTERNAL MEDICINE

## 2020-12-12 PROCEDURE — 83880 ASSAY OF NATRIURETIC PEPTIDE: CPT | Performed by: INTERNAL MEDICINE

## 2020-12-12 PROCEDURE — 99220 PR INITIAL OBSERVATION CARE/DAY 70 MINUTES: CPT | Performed by: INTERNAL MEDICINE

## 2020-12-12 PROCEDURE — 85025 COMPLETE CBC W/AUTO DIFF WBC: CPT | Performed by: EMERGENCY MEDICINE

## 2020-12-12 PROCEDURE — 82728 ASSAY OF FERRITIN: CPT | Performed by: INTERNAL MEDICINE

## 2020-12-12 RX ORDER — MONTELUKAST SODIUM 10 MG/1
10 TABLET ORAL
Status: DISCONTINUED | OUTPATIENT
Start: 2020-12-12 | End: 2020-12-17 | Stop reason: HOSPADM

## 2020-12-12 RX ORDER — MAGNESIUM SULFATE 1 G/100ML
1 INJECTION INTRAVENOUS ONCE
Status: COMPLETED | OUTPATIENT
Start: 2020-12-12 | End: 2020-12-13

## 2020-12-12 RX ORDER — VENLAFAXINE HYDROCHLORIDE 150 MG/1
150 CAPSULE, EXTENDED RELEASE ORAL DAILY
Status: DISCONTINUED | OUTPATIENT
Start: 2020-12-12 | End: 2020-12-17 | Stop reason: HOSPADM

## 2020-12-12 RX ORDER — PANTOPRAZOLE SODIUM 40 MG/1
40 TABLET, DELAYED RELEASE ORAL DAILY
Status: DISCONTINUED | OUTPATIENT
Start: 2020-12-12 | End: 2020-12-17 | Stop reason: HOSPADM

## 2020-12-12 RX ORDER — PROPRANOLOL HCL 60 MG
60 CAPSULE, EXTENDED RELEASE 24HR ORAL 2 TIMES DAILY
Status: DISCONTINUED | OUTPATIENT
Start: 2020-12-12 | End: 2020-12-17 | Stop reason: HOSPADM

## 2020-12-12 RX ORDER — ATOMOXETINE 25 MG/1
25 CAPSULE ORAL DAILY
Status: DISCONTINUED | OUTPATIENT
Start: 2020-12-12 | End: 2020-12-13

## 2020-12-12 RX ORDER — GLUCOSAMINE/D3/BOSWELLIA SERRA 1500MG-400
TABLET ORAL DAILY
Status: DISCONTINUED | OUTPATIENT
Start: 2020-12-12 | End: 2020-12-13

## 2020-12-12 RX ORDER — VENLAFAXINE HYDROCHLORIDE 37.5 MG/1
75 CAPSULE, EXTENDED RELEASE ORAL DAILY
Status: DISCONTINUED | OUTPATIENT
Start: 2020-12-12 | End: 2020-12-17 | Stop reason: HOSPADM

## 2020-12-12 RX ORDER — LAMOTRIGINE 100 MG/1
150 TABLET ORAL DAILY
Status: DISCONTINUED | OUTPATIENT
Start: 2020-12-12 | End: 2020-12-17 | Stop reason: HOSPADM

## 2020-12-12 RX ORDER — ONDANSETRON 2 MG/ML
4 INJECTION INTRAMUSCULAR; INTRAVENOUS EVERY 8 HOURS PRN
Status: DISCONTINUED | OUTPATIENT
Start: 2020-12-12 | End: 2020-12-17 | Stop reason: HOSPADM

## 2020-12-12 RX ORDER — DIPHENHYDRAMINE HYDROCHLORIDE 50 MG/ML
12.5 INJECTION INTRAMUSCULAR; INTRAVENOUS ONCE
Status: COMPLETED | OUTPATIENT
Start: 2020-12-12 | End: 2020-12-12

## 2020-12-12 RX ORDER — KETOROLAC TROMETHAMINE 30 MG/ML
15 INJECTION, SOLUTION INTRAMUSCULAR; INTRAVENOUS ONCE
Status: COMPLETED | OUTPATIENT
Start: 2020-12-12 | End: 2020-12-12

## 2020-12-12 RX ORDER — SODIUM CHLORIDE 9 MG/ML
100 INJECTION, SOLUTION INTRAVENOUS CONTINUOUS
Status: DISCONTINUED | OUTPATIENT
Start: 2020-12-12 | End: 2020-12-16

## 2020-12-12 RX ORDER — LAMOTRIGINE 100 MG/1
100 TABLET ORAL DAILY
Status: DISCONTINUED | OUTPATIENT
Start: 2020-12-12 | End: 2020-12-17 | Stop reason: HOSPADM

## 2020-12-12 RX ORDER — METHOCARBAMOL 750 MG/1
750 TABLET, FILM COATED ORAL EVERY 8 HOURS SCHEDULED
Status: DISCONTINUED | OUTPATIENT
Start: 2020-12-12 | End: 2020-12-17 | Stop reason: HOSPADM

## 2020-12-12 RX ORDER — ZINC SULFATE 50(220)MG
220 CAPSULE ORAL DAILY
Status: DISCONTINUED | OUTPATIENT
Start: 2020-12-12 | End: 2020-12-17 | Stop reason: HOSPADM

## 2020-12-12 RX ORDER — GUAIFENESIN 600 MG
600 TABLET, EXTENDED RELEASE 12 HR ORAL EVERY 12 HOURS SCHEDULED
Status: DISCONTINUED | OUTPATIENT
Start: 2020-12-12 | End: 2020-12-14

## 2020-12-12 RX ORDER — QUETIAPINE FUMARATE 25 MG/1
50 TABLET, FILM COATED ORAL
Status: DISCONTINUED | OUTPATIENT
Start: 2020-12-12 | End: 2020-12-17 | Stop reason: HOSPADM

## 2020-12-12 RX ORDER — MELATONIN
2000 DAILY
Status: DISCONTINUED | OUTPATIENT
Start: 2020-12-12 | End: 2020-12-17 | Stop reason: HOSPADM

## 2020-12-12 RX ORDER — ASCORBIC ACID 500 MG
1000 TABLET ORAL EVERY 12 HOURS SCHEDULED
Status: DISCONTINUED | OUTPATIENT
Start: 2020-12-12 | End: 2020-12-17 | Stop reason: HOSPADM

## 2020-12-12 RX ORDER — MULTIVITAMIN/IRON/FOLIC ACID 18MG-0.4MG
1 TABLET ORAL DAILY
Status: DISCONTINUED | OUTPATIENT
Start: 2020-12-19 | End: 2020-12-17 | Stop reason: HOSPADM

## 2020-12-12 RX ORDER — ACETAMINOPHEN 325 MG/1
975 TABLET ORAL EVERY 6 HOURS SCHEDULED
Status: DISCONTINUED | OUTPATIENT
Start: 2020-12-12 | End: 2020-12-13

## 2020-12-12 RX ADMIN — ZINC SULFATE 220 MG (50 MG) CAPSULE 220 MG: CAPSULE at 16:05

## 2020-12-12 RX ADMIN — DIPHENHYDRAMINE HYDROCHLORIDE 12.5 MG: 50 INJECTION, SOLUTION INTRAMUSCULAR; INTRAVENOUS at 23:59

## 2020-12-12 RX ADMIN — GUAIFENESIN 600 MG: 600 TABLET, EXTENDED RELEASE ORAL at 21:55

## 2020-12-12 RX ADMIN — SODIUM CHLORIDE 75 ML/HR: 0.9 INJECTION, SOLUTION INTRAVENOUS at 14:46

## 2020-12-12 RX ADMIN — MONTELUKAST 10 MG: 10 TABLET, FILM COATED ORAL at 23:01

## 2020-12-12 RX ADMIN — Medication 1000 MG: at 14:37

## 2020-12-12 RX ADMIN — PANTOPRAZOLE SODIUM 40 MG: 40 TABLET, DELAYED RELEASE ORAL at 14:36

## 2020-12-12 RX ADMIN — Medication 2000 UNITS: at 14:36

## 2020-12-12 RX ADMIN — ENOXAPARIN SODIUM 30 MG: 30 INJECTION SUBCUTANEOUS at 21:57

## 2020-12-12 RX ADMIN — ACETAMINOPHEN 975 MG: 325 TABLET, FILM COATED ORAL at 23:45

## 2020-12-12 RX ADMIN — METHOCARBAMOL TABLETS 750 MG: 750 TABLET, COATED ORAL at 14:36

## 2020-12-12 RX ADMIN — Medication 1000 MG: at 21:53

## 2020-12-12 RX ADMIN — QUETIAPINE FUMARATE 50 MG: 25 TABLET ORAL at 21:56

## 2020-12-12 RX ADMIN — ACETAMINOPHEN 975 MG: 325 TABLET, FILM COATED ORAL at 14:35

## 2020-12-12 RX ADMIN — PROPRANOLOL HYDROCHLORIDE 60 MG: 60 CAPSULE, EXTENDED RELEASE ORAL at 18:30

## 2020-12-12 RX ADMIN — KETOROLAC TROMETHAMINE 15 MG: 30 INJECTION, SOLUTION INTRAMUSCULAR at 23:57

## 2020-12-12 RX ADMIN — ENOXAPARIN SODIUM 30 MG: 30 INJECTION SUBCUTANEOUS at 14:38

## 2020-12-12 RX ADMIN — ACETAMINOPHEN 975 MG: 325 TABLET, FILM COATED ORAL at 18:30

## 2020-12-12 RX ADMIN — GUAIFENESIN 600 MG: 600 TABLET, EXTENDED RELEASE ORAL at 14:37

## 2020-12-12 RX ADMIN — SODIUM CHLORIDE 1000 ML: 0.9 INJECTION, SOLUTION INTRAVENOUS at 11:11

## 2020-12-12 RX ADMIN — METHOCARBAMOL TABLETS 750 MG: 750 TABLET, COATED ORAL at 21:55

## 2020-12-13 LAB
ALBUMIN SERPL BCP-MCNC: 2.4 G/DL (ref 3.5–5)
ALP SERPL-CCNC: 78 U/L (ref 46–116)
ALT SERPL W P-5'-P-CCNC: 24 U/L (ref 12–78)
ANION GAP SERPL CALCULATED.3IONS-SCNC: 12 MMOL/L (ref 4–13)
AST SERPL W P-5'-P-CCNC: 18 U/L (ref 5–45)
BASOPHILS # BLD AUTO: 0.01 THOUSANDS/ΜL (ref 0–0.1)
BASOPHILS NFR BLD AUTO: 0 % (ref 0–1)
BILIRUB SERPL-MCNC: 0.32 MG/DL (ref 0.2–1)
BUN SERPL-MCNC: 8 MG/DL (ref 5–25)
CALCIUM ALBUM COR SERPL-MCNC: 9.3 MG/DL (ref 8.3–10.1)
CALCIUM SERPL-MCNC: 8 MG/DL (ref 8.3–10.1)
CHLORIDE SERPL-SCNC: 103 MMOL/L (ref 100–108)
CO2 SERPL-SCNC: 23 MMOL/L (ref 21–32)
CREAT SERPL-MCNC: 0.78 MG/DL (ref 0.6–1.3)
D DIMER PPP FEU-MCNC: 0.55 UG/ML FEU
EOSINOPHIL # BLD AUTO: 0 THOUSAND/ΜL (ref 0–0.61)
EOSINOPHIL NFR BLD AUTO: 0 % (ref 0–6)
ERYTHROCYTE [DISTWIDTH] IN BLOOD BY AUTOMATED COUNT: 12.1 % (ref 11.6–15.1)
GFR SERPL CREATININE-BSD FRML MDRD: 88 ML/MIN/1.73SQ M
GLUCOSE P FAST SERPL-MCNC: 148 MG/DL (ref 65–99)
GLUCOSE SERPL-MCNC: 148 MG/DL (ref 65–140)
HCT VFR BLD AUTO: 34.8 % (ref 34.8–46.1)
HGB BLD-MCNC: 11.1 G/DL (ref 11.5–15.4)
IMM GRANULOCYTES # BLD AUTO: 0.04 THOUSAND/UL (ref 0–0.2)
IMM GRANULOCYTES NFR BLD AUTO: 1 % (ref 0–2)
LYMPHOCYTES # BLD AUTO: 0.7 THOUSANDS/ΜL (ref 0.6–4.47)
LYMPHOCYTES NFR BLD AUTO: 10 % (ref 14–44)
MCH RBC QN AUTO: 29.2 PG (ref 26.8–34.3)
MCHC RBC AUTO-ENTMCNC: 31.9 G/DL (ref 31.4–37.4)
MCV RBC AUTO: 92 FL (ref 82–98)
MONOCYTES # BLD AUTO: 0.27 THOUSAND/ΜL (ref 0.17–1.22)
MONOCYTES NFR BLD AUTO: 4 % (ref 4–12)
NEUTROPHILS # BLD AUTO: 5.76 THOUSANDS/ΜL (ref 1.85–7.62)
NEUTS SEG NFR BLD AUTO: 85 % (ref 43–75)
NRBC BLD AUTO-RTO: 0 /100 WBCS
PLATELET # BLD AUTO: 157 THOUSANDS/UL (ref 149–390)
PMV BLD AUTO: 9.7 FL (ref 8.9–12.7)
POTASSIUM SERPL-SCNC: 3.7 MMOL/L (ref 3.5–5.3)
PROCALCITONIN SERPL-MCNC: 0.05 NG/ML
PROT SERPL-MCNC: 6.1 G/DL (ref 6.4–8.2)
RBC # BLD AUTO: 3.8 MILLION/UL (ref 3.81–5.12)
SODIUM SERPL-SCNC: 138 MMOL/L (ref 136–145)
WBC # BLD AUTO: 6.78 THOUSAND/UL (ref 4.31–10.16)

## 2020-12-13 PROCEDURE — 85025 COMPLETE CBC W/AUTO DIFF WBC: CPT | Performed by: INTERNAL MEDICINE

## 2020-12-13 PROCEDURE — XW033E5 INTRODUCTION OF REMDESIVIR ANTI-INFECTIVE INTO PERIPHERAL VEIN, PERCUTANEOUS APPROACH, NEW TECHNOLOGY GROUP 5: ICD-10-PCS | Performed by: INTERNAL MEDICINE

## 2020-12-13 PROCEDURE — 80053 COMPREHEN METABOLIC PANEL: CPT | Performed by: INTERNAL MEDICINE

## 2020-12-13 PROCEDURE — 85379 FIBRIN DEGRADATION QUANT: CPT | Performed by: INTERNAL MEDICINE

## 2020-12-13 PROCEDURE — 99232 SBSQ HOSP IP/OBS MODERATE 35: CPT | Performed by: INTERNAL MEDICINE

## 2020-12-13 PROCEDURE — 84145 PROCALCITONIN (PCT): CPT | Performed by: INTERNAL MEDICINE

## 2020-12-13 RX ORDER — LOPERAMIDE HYDROCHLORIDE 2 MG/1
2 CAPSULE ORAL 3 TIMES DAILY PRN
Status: DISCONTINUED | OUTPATIENT
Start: 2020-12-13 | End: 2020-12-17 | Stop reason: HOSPADM

## 2020-12-13 RX ORDER — ACETAMINOPHEN 325 MG/1
650 TABLET ORAL EVERY 6 HOURS SCHEDULED
Status: DISCONTINUED | OUTPATIENT
Start: 2020-12-13 | End: 2020-12-17 | Stop reason: HOSPADM

## 2020-12-13 RX ORDER — BUTALBITAL, ACETAMINOPHEN AND CAFFEINE 50; 325; 40 MG/1; MG/1; MG/1
1 TABLET ORAL EVERY 4 HOURS PRN
Status: DISCONTINUED | OUTPATIENT
Start: 2020-12-13 | End: 2020-12-17 | Stop reason: HOSPADM

## 2020-12-13 RX ORDER — ATOMOXETINE 25 MG/1
25 CAPSULE ORAL DAILY
Status: DISCONTINUED | OUTPATIENT
Start: 2020-12-13 | End: 2020-12-17 | Stop reason: HOSPADM

## 2020-12-13 RX ORDER — ALBUMIN, HUMAN INJ 5% 5 %
12.5 SOLUTION INTRAVENOUS ONCE
Status: COMPLETED | OUTPATIENT
Start: 2020-12-13 | End: 2020-12-13

## 2020-12-13 RX ADMIN — Medication 1000 MG: at 08:52

## 2020-12-13 RX ADMIN — ACETAMINOPHEN 975 MG: 325 TABLET, FILM COATED ORAL at 05:28

## 2020-12-13 RX ADMIN — METHOCARBAMOL TABLETS 750 MG: 750 TABLET, COATED ORAL at 13:09

## 2020-12-13 RX ADMIN — SODIUM CHLORIDE 75 ML/HR: 0.9 INJECTION, SOLUTION INTRAVENOUS at 02:16

## 2020-12-13 RX ADMIN — ENOXAPARIN SODIUM 30 MG: 30 INJECTION SUBCUTANEOUS at 08:50

## 2020-12-13 RX ADMIN — NORGESTREL AND ETHINYL ESTRADIOL 1 TABLET: KIT at 08:52

## 2020-12-13 RX ADMIN — QUETIAPINE FUMARATE 50 MG: 25 TABLET ORAL at 21:05

## 2020-12-13 RX ADMIN — ALBUMIN (HUMAN) 12.5 G: 12.5 INJECTION, SOLUTION INTRAVENOUS at 00:51

## 2020-12-13 RX ADMIN — PROPRANOLOL HYDROCHLORIDE 60 MG: 60 CAPSULE, EXTENDED RELEASE ORAL at 17:27

## 2020-12-13 RX ADMIN — MAGNESIUM SULFATE HEPTAHYDRATE 1 G: 1 INJECTION, SOLUTION INTRAVENOUS at 00:00

## 2020-12-13 RX ADMIN — GUAIFENESIN 600 MG: 600 TABLET, EXTENDED RELEASE ORAL at 08:52

## 2020-12-13 RX ADMIN — VENLAFAXINE HYDROCHLORIDE 150 MG: 150 CAPSULE, EXTENDED RELEASE ORAL at 08:53

## 2020-12-13 RX ADMIN — VENLAFAXINE HYDROCHLORIDE 75 MG: 37.5 CAPSULE, EXTENDED RELEASE ORAL at 08:51

## 2020-12-13 RX ADMIN — PANTOPRAZOLE SODIUM 40 MG: 40 TABLET, DELAYED RELEASE ORAL at 08:51

## 2020-12-13 RX ADMIN — ACETAMINOPHEN 650 MG: 325 TABLET, FILM COATED ORAL at 17:26

## 2020-12-13 RX ADMIN — GUAIFENESIN 600 MG: 600 TABLET, EXTENDED RELEASE ORAL at 21:05

## 2020-12-13 RX ADMIN — METHOCARBAMOL TABLETS 750 MG: 750 TABLET, COATED ORAL at 05:28

## 2020-12-13 RX ADMIN — MONTELUKAST 10 MG: 10 TABLET, FILM COATED ORAL at 21:05

## 2020-12-13 RX ADMIN — REMDESIVIR 200 MG: 100 INJECTION, POWDER, LYOPHILIZED, FOR SOLUTION INTRAVENOUS at 14:13

## 2020-12-13 RX ADMIN — ENOXAPARIN SODIUM 30 MG: 30 INJECTION SUBCUTANEOUS at 21:06

## 2020-12-13 RX ADMIN — LOPERAMIDE HYDROCHLORIDE 2 MG: 2 CAPSULE ORAL at 10:27

## 2020-12-13 RX ADMIN — LOPERAMIDE HYDROCHLORIDE 2 MG: 2 CAPSULE ORAL at 17:26

## 2020-12-13 RX ADMIN — METHOCARBAMOL TABLETS 750 MG: 750 TABLET, COATED ORAL at 21:05

## 2020-12-13 RX ADMIN — BUTALBITAL, ACETAMINOPHEN AND CAFFEINE 1 TABLET: 50; 325; 40 TABLET ORAL at 10:27

## 2020-12-13 RX ADMIN — Medication 1000 MG: at 21:05

## 2020-12-13 RX ADMIN — LAMOTRIGINE 100 MG: 100 TABLET ORAL at 08:51

## 2020-12-13 RX ADMIN — Medication 2000 UNITS: at 08:51

## 2020-12-13 RX ADMIN — LAMOTRIGINE 150 MG: 100 TABLET ORAL at 08:52

## 2020-12-13 RX ADMIN — ATOMOXETINE HYDROCHLORIDE 25 MG: 25 CAPSULE ORAL at 15:33

## 2020-12-13 RX ADMIN — ZINC SULFATE 220 MG (50 MG) CAPSULE 220 MG: CAPSULE at 08:53

## 2020-12-13 RX ADMIN — ONDANSETRON 4 MG: 2 INJECTION INTRAMUSCULAR; INTRAVENOUS at 17:26

## 2020-12-14 PROBLEM — E87.1 HYPONATREMIA: Status: RESOLVED | Noted: 2020-12-12 | Resolved: 2020-12-14

## 2020-12-14 LAB
ALBUMIN SERPL BCP-MCNC: 2.2 G/DL (ref 3.5–5)
ALP SERPL-CCNC: 80 U/L (ref 46–116)
ALT SERPL W P-5'-P-CCNC: 21 U/L (ref 12–78)
ANION GAP SERPL CALCULATED.3IONS-SCNC: 7 MMOL/L (ref 4–13)
AST SERPL W P-5'-P-CCNC: 20 U/L (ref 5–45)
BASOPHILS # BLD AUTO: 0.01 THOUSANDS/ΜL (ref 0–0.1)
BASOPHILS NFR BLD AUTO: 0 % (ref 0–1)
BILIRUB SERPL-MCNC: 0.15 MG/DL (ref 0.2–1)
BUN SERPL-MCNC: 4 MG/DL (ref 5–25)
CALCIUM ALBUM COR SERPL-MCNC: 9.5 MG/DL (ref 8.3–10.1)
CALCIUM SERPL-MCNC: 8.1 MG/DL (ref 8.3–10.1)
CHLORIDE SERPL-SCNC: 105 MMOL/L (ref 100–108)
CO2 SERPL-SCNC: 27 MMOL/L (ref 21–32)
CREAT SERPL-MCNC: 0.58 MG/DL (ref 0.6–1.3)
CRP SERPL QL: 166.5 MG/L
EOSINOPHIL # BLD AUTO: 0 THOUSAND/ΜL (ref 0–0.61)
EOSINOPHIL NFR BLD AUTO: 0 % (ref 0–6)
ERYTHROCYTE [DISTWIDTH] IN BLOOD BY AUTOMATED COUNT: 12.4 % (ref 11.6–15.1)
FERRITIN SERPL-MCNC: 331 NG/ML (ref 8–388)
GFR SERPL CREATININE-BSD FRML MDRD: 107 ML/MIN/1.73SQ M
GLUCOSE SERPL-MCNC: 80 MG/DL (ref 65–140)
HCT VFR BLD AUTO: 34.7 % (ref 34.8–46.1)
HGB BLD-MCNC: 11.2 G/DL (ref 11.5–15.4)
IMM GRANULOCYTES # BLD AUTO: 0.03 THOUSAND/UL (ref 0–0.2)
IMM GRANULOCYTES NFR BLD AUTO: 0 % (ref 0–2)
LYMPHOCYTES # BLD AUTO: 1.1 THOUSANDS/ΜL (ref 0.6–4.47)
LYMPHOCYTES NFR BLD AUTO: 15 % (ref 14–44)
MCH RBC QN AUTO: 30 PG (ref 26.8–34.3)
MCHC RBC AUTO-ENTMCNC: 32.3 G/DL (ref 31.4–37.4)
MCV RBC AUTO: 93 FL (ref 82–98)
MONOCYTES # BLD AUTO: 0.32 THOUSAND/ΜL (ref 0.17–1.22)
MONOCYTES NFR BLD AUTO: 4 % (ref 4–12)
NEUTROPHILS # BLD AUTO: 5.82 THOUSANDS/ΜL (ref 1.85–7.62)
NEUTS SEG NFR BLD AUTO: 81 % (ref 43–75)
NRBC BLD AUTO-RTO: 0 /100 WBCS
PLATELET # BLD AUTO: 189 THOUSANDS/UL (ref 149–390)
PMV BLD AUTO: 9.5 FL (ref 8.9–12.7)
POTASSIUM SERPL-SCNC: 4.2 MMOL/L (ref 3.5–5.3)
PROT SERPL-MCNC: 6.4 G/DL (ref 6.4–8.2)
RBC # BLD AUTO: 3.73 MILLION/UL (ref 3.81–5.12)
SODIUM SERPL-SCNC: 139 MMOL/L (ref 136–145)
WBC # BLD AUTO: 7.28 THOUSAND/UL (ref 4.31–10.16)

## 2020-12-14 PROCEDURE — 86140 C-REACTIVE PROTEIN: CPT | Performed by: INTERNAL MEDICINE

## 2020-12-14 PROCEDURE — 94760 N-INVAS EAR/PLS OXIMETRY 1: CPT

## 2020-12-14 PROCEDURE — 82728 ASSAY OF FERRITIN: CPT | Performed by: INTERNAL MEDICINE

## 2020-12-14 PROCEDURE — 94762 N-INVAS EAR/PLS OXIMTRY CONT: CPT

## 2020-12-14 PROCEDURE — 80053 COMPREHEN METABOLIC PANEL: CPT | Performed by: INTERNAL MEDICINE

## 2020-12-14 PROCEDURE — 99232 SBSQ HOSP IP/OBS MODERATE 35: CPT | Performed by: INTERNAL MEDICINE

## 2020-12-14 PROCEDURE — 85025 COMPLETE CBC W/AUTO DIFF WBC: CPT | Performed by: INTERNAL MEDICINE

## 2020-12-14 RX ORDER — DEXAMETHASONE SODIUM PHOSPHATE 4 MG/ML
6 INJECTION, SOLUTION INTRA-ARTICULAR; INTRALESIONAL; INTRAMUSCULAR; INTRAVENOUS; SOFT TISSUE EVERY 24 HOURS
Status: DISCONTINUED | OUTPATIENT
Start: 2020-12-14 | End: 2020-12-17 | Stop reason: HOSPADM

## 2020-12-14 RX ORDER — GUAIFENESIN 100 MG/5ML
200 SOLUTION ORAL EVERY 4 HOURS PRN
Status: DISCONTINUED | OUTPATIENT
Start: 2020-12-14 | End: 2020-12-17 | Stop reason: HOSPADM

## 2020-12-14 RX ORDER — ALBUTEROL SULFATE 90 UG/1
2 AEROSOL, METERED RESPIRATORY (INHALATION) EVERY 4 HOURS PRN
Status: DISCONTINUED | OUTPATIENT
Start: 2020-12-14 | End: 2020-12-17 | Stop reason: HOSPADM

## 2020-12-14 RX ORDER — BENZONATATE 100 MG/1
100 CAPSULE ORAL 3 TIMES DAILY PRN
Status: DISCONTINUED | OUTPATIENT
Start: 2020-12-14 | End: 2020-12-17 | Stop reason: HOSPADM

## 2020-12-14 RX ADMIN — LAMOTRIGINE 150 MG: 100 TABLET ORAL at 08:37

## 2020-12-14 RX ADMIN — QUETIAPINE FUMARATE 50 MG: 25 TABLET ORAL at 21:37

## 2020-12-14 RX ADMIN — DEXAMETHASONE SODIUM PHOSPHATE 6 MG: 4 INJECTION, SOLUTION INTRAMUSCULAR; INTRAVENOUS at 10:29

## 2020-12-14 RX ADMIN — ACETAMINOPHEN 650 MG: 325 TABLET, FILM COATED ORAL at 17:51

## 2020-12-14 RX ADMIN — ENOXAPARIN SODIUM 30 MG: 30 INJECTION SUBCUTANEOUS at 21:37

## 2020-12-14 RX ADMIN — BUTALBITAL, ACETAMINOPHEN AND CAFFEINE 1 TABLET: 50; 325; 40 TABLET ORAL at 03:43

## 2020-12-14 RX ADMIN — SODIUM CHLORIDE 100 ML/HR: 0.9 INJECTION, SOLUTION INTRAVENOUS at 03:41

## 2020-12-14 RX ADMIN — PANTOPRAZOLE SODIUM 40 MG: 40 TABLET, DELAYED RELEASE ORAL at 08:38

## 2020-12-14 RX ADMIN — GUAIFENESIN 200 MG: 100 SOLUTION ORAL at 21:37

## 2020-12-14 RX ADMIN — ATOMOXETINE HYDROCHLORIDE 25 MG: 25 CAPSULE ORAL at 09:19

## 2020-12-14 RX ADMIN — LAMOTRIGINE 100 MG: 100 TABLET ORAL at 08:37

## 2020-12-14 RX ADMIN — METHOCARBAMOL TABLETS 750 MG: 750 TABLET, COATED ORAL at 14:36

## 2020-12-14 RX ADMIN — ACETAMINOPHEN 650 MG: 325 TABLET, FILM COATED ORAL at 23:51

## 2020-12-14 RX ADMIN — PROPRANOLOL HYDROCHLORIDE 60 MG: 60 CAPSULE, EXTENDED RELEASE ORAL at 08:40

## 2020-12-14 RX ADMIN — Medication 1000 MG: at 08:38

## 2020-12-14 RX ADMIN — BUTALBITAL, ACETAMINOPHEN AND CAFFEINE 1 TABLET: 50; 325; 40 TABLET ORAL at 08:38

## 2020-12-14 RX ADMIN — BENZONATATE 100 MG: 100 CAPSULE ORAL at 20:14

## 2020-12-14 RX ADMIN — SODIUM CHLORIDE 100 ML/HR: 0.9 INJECTION, SOLUTION INTRAVENOUS at 14:36

## 2020-12-14 RX ADMIN — ACETAMINOPHEN 650 MG: 325 TABLET, FILM COATED ORAL at 05:12

## 2020-12-14 RX ADMIN — GUAIFENESIN 600 MG: 600 TABLET, EXTENDED RELEASE ORAL at 08:38

## 2020-12-14 RX ADMIN — ALBUTEROL SULFATE 2 PUFF: 90 AEROSOL, METERED RESPIRATORY (INHALATION) at 14:52

## 2020-12-14 RX ADMIN — REMDESIVIR 100 MG: 100 INJECTION, POWDER, LYOPHILIZED, FOR SOLUTION INTRAVENOUS at 10:30

## 2020-12-14 RX ADMIN — BENZONATATE 100 MG: 100 CAPSULE ORAL at 08:38

## 2020-12-14 RX ADMIN — ENOXAPARIN SODIUM 30 MG: 30 INJECTION SUBCUTANEOUS at 08:38

## 2020-12-14 RX ADMIN — VENLAFAXINE HYDROCHLORIDE 150 MG: 150 CAPSULE, EXTENDED RELEASE ORAL at 08:37

## 2020-12-14 RX ADMIN — METHOCARBAMOL TABLETS 750 MG: 750 TABLET, COATED ORAL at 21:37

## 2020-12-14 RX ADMIN — ZINC SULFATE 220 MG (50 MG) CAPSULE 220 MG: CAPSULE at 08:38

## 2020-12-14 RX ADMIN — MONTELUKAST 10 MG: 10 TABLET, FILM COATED ORAL at 21:37

## 2020-12-14 RX ADMIN — ACETAMINOPHEN 650 MG: 325 TABLET, FILM COATED ORAL at 00:00

## 2020-12-14 RX ADMIN — NORGESTREL AND ETHINYL ESTRADIOL 1 TABLET: KIT at 08:39

## 2020-12-14 RX ADMIN — VENLAFAXINE HYDROCHLORIDE 75 MG: 37.5 CAPSULE, EXTENDED RELEASE ORAL at 08:37

## 2020-12-14 RX ADMIN — Medication 1000 MG: at 21:37

## 2020-12-14 RX ADMIN — METHOCARBAMOL TABLETS 750 MG: 750 TABLET, COATED ORAL at 05:13

## 2020-12-14 RX ADMIN — Medication 2000 UNITS: at 08:38

## 2020-12-15 LAB
ALBUMIN SERPL BCP-MCNC: 2.3 G/DL (ref 3.5–5)
ALP SERPL-CCNC: 81 U/L (ref 46–116)
ALT SERPL W P-5'-P-CCNC: 19 U/L (ref 12–78)
ANION GAP SERPL CALCULATED.3IONS-SCNC: 9 MMOL/L (ref 4–13)
AST SERPL W P-5'-P-CCNC: 17 U/L (ref 5–45)
BILIRUB SERPL-MCNC: 0.11 MG/DL (ref 0.2–1)
BUN SERPL-MCNC: 5 MG/DL (ref 5–25)
CALCIUM ALBUM COR SERPL-MCNC: 9.8 MG/DL (ref 8.3–10.1)
CALCIUM SERPL-MCNC: 8.4 MG/DL (ref 8.3–10.1)
CHLORIDE SERPL-SCNC: 105 MMOL/L (ref 100–108)
CO2 SERPL-SCNC: 27 MMOL/L (ref 21–32)
CREAT SERPL-MCNC: 0.58 MG/DL (ref 0.6–1.3)
CRP SERPL QL: 140.6 MG/L
D DIMER PPP FEU-MCNC: 0.64 UG/ML FEU
ERYTHROCYTE [DISTWIDTH] IN BLOOD BY AUTOMATED COUNT: 12.3 % (ref 11.6–15.1)
FERRITIN SERPL-MCNC: 431 NG/ML (ref 8–388)
GFR SERPL CREATININE-BSD FRML MDRD: 107 ML/MIN/1.73SQ M
GLUCOSE SERPL-MCNC: 163 MG/DL (ref 65–140)
HCT VFR BLD AUTO: 36 % (ref 34.8–46.1)
HGB BLD-MCNC: 11.7 G/DL (ref 11.5–15.4)
MCH RBC QN AUTO: 30.1 PG (ref 26.8–34.3)
MCHC RBC AUTO-ENTMCNC: 32.5 G/DL (ref 31.4–37.4)
MCV RBC AUTO: 93 FL (ref 82–98)
PLATELET # BLD AUTO: 258 THOUSANDS/UL (ref 149–390)
PMV BLD AUTO: 9.3 FL (ref 8.9–12.7)
POTASSIUM SERPL-SCNC: 4.2 MMOL/L (ref 3.5–5.3)
PROT SERPL-MCNC: 6.5 G/DL (ref 6.4–8.2)
RBC # BLD AUTO: 3.89 MILLION/UL (ref 3.81–5.12)
SODIUM SERPL-SCNC: 141 MMOL/L (ref 136–145)
WBC # BLD AUTO: 4.83 THOUSAND/UL (ref 4.31–10.16)

## 2020-12-15 PROCEDURE — 80053 COMPREHEN METABOLIC PANEL: CPT | Performed by: INTERNAL MEDICINE

## 2020-12-15 PROCEDURE — 85027 COMPLETE CBC AUTOMATED: CPT | Performed by: INTERNAL MEDICINE

## 2020-12-15 PROCEDURE — 82728 ASSAY OF FERRITIN: CPT | Performed by: INTERNAL MEDICINE

## 2020-12-15 PROCEDURE — 85379 FIBRIN DEGRADATION QUANT: CPT | Performed by: INTERNAL MEDICINE

## 2020-12-15 PROCEDURE — 94760 N-INVAS EAR/PLS OXIMETRY 1: CPT

## 2020-12-15 PROCEDURE — 99233 SBSQ HOSP IP/OBS HIGH 50: CPT | Performed by: INTERNAL MEDICINE

## 2020-12-15 PROCEDURE — 94762 N-INVAS EAR/PLS OXIMTRY CONT: CPT

## 2020-12-15 PROCEDURE — 86140 C-REACTIVE PROTEIN: CPT | Performed by: INTERNAL MEDICINE

## 2020-12-15 RX ORDER — DOCUSATE SODIUM 100 MG/1
100 CAPSULE, LIQUID FILLED ORAL 2 TIMES DAILY
Status: DISCONTINUED | OUTPATIENT
Start: 2020-12-16 | End: 2020-12-17 | Stop reason: HOSPADM

## 2020-12-15 RX ORDER — POLYETHYLENE GLYCOL 3350 17 G/17G
17 POWDER, FOR SOLUTION ORAL DAILY
Status: DISCONTINUED | OUTPATIENT
Start: 2020-12-15 | End: 2020-12-17 | Stop reason: HOSPADM

## 2020-12-15 RX ORDER — SENNOSIDES 8.6 MG
1 TABLET ORAL 2 TIMES DAILY PRN
Status: DISCONTINUED | OUTPATIENT
Start: 2020-12-15 | End: 2020-12-17 | Stop reason: HOSPADM

## 2020-12-15 RX ADMIN — ENOXAPARIN SODIUM 30 MG: 30 INJECTION SUBCUTANEOUS at 08:27

## 2020-12-15 RX ADMIN — Medication 1000 MG: at 21:51

## 2020-12-15 RX ADMIN — POLYETHYLENE GLYCOL 3350 17 G: 17 POWDER, FOR SOLUTION ORAL at 22:41

## 2020-12-15 RX ADMIN — DEXAMETHASONE SODIUM PHOSPHATE 6 MG: 4 INJECTION, SOLUTION INTRAMUSCULAR; INTRAVENOUS at 08:32

## 2020-12-15 RX ADMIN — ACETAMINOPHEN 650 MG: 325 TABLET, FILM COATED ORAL at 05:12

## 2020-12-15 RX ADMIN — ATOMOXETINE HYDROCHLORIDE 25 MG: 25 CAPSULE ORAL at 09:00

## 2020-12-15 RX ADMIN — QUETIAPINE FUMARATE 50 MG: 25 TABLET ORAL at 21:51

## 2020-12-15 RX ADMIN — GUAIFENESIN 200 MG: 100 SOLUTION ORAL at 17:34

## 2020-12-15 RX ADMIN — REMDESIVIR 100 MG: 100 INJECTION, POWDER, LYOPHILIZED, FOR SOLUTION INTRAVENOUS at 11:13

## 2020-12-15 RX ADMIN — ENOXAPARIN SODIUM 30 MG: 30 INJECTION SUBCUTANEOUS at 21:51

## 2020-12-15 RX ADMIN — METHOCARBAMOL TABLETS 750 MG: 750 TABLET, COATED ORAL at 05:12

## 2020-12-15 RX ADMIN — METHOCARBAMOL TABLETS 750 MG: 750 TABLET, COATED ORAL at 13:54

## 2020-12-15 RX ADMIN — ACETAMINOPHEN 650 MG: 325 TABLET, FILM COATED ORAL at 11:13

## 2020-12-15 RX ADMIN — ACETAMINOPHEN 650 MG: 325 TABLET, FILM COATED ORAL at 17:34

## 2020-12-15 RX ADMIN — LAMOTRIGINE 100 MG: 100 TABLET ORAL at 08:28

## 2020-12-15 RX ADMIN — MONTELUKAST 10 MG: 10 TABLET, FILM COATED ORAL at 21:51

## 2020-12-15 RX ADMIN — SODIUM CHLORIDE 100 ML/HR: 0.9 INJECTION, SOLUTION INTRAVENOUS at 00:51

## 2020-12-15 RX ADMIN — Medication 2000 UNITS: at 08:28

## 2020-12-15 RX ADMIN — SODIUM CHLORIDE 100 ML/HR: 0.9 INJECTION, SOLUTION INTRAVENOUS at 23:03

## 2020-12-15 RX ADMIN — PROPRANOLOL HYDROCHLORIDE 60 MG: 60 CAPSULE, EXTENDED RELEASE ORAL at 17:35

## 2020-12-15 RX ADMIN — LAMOTRIGINE 150 MG: 100 TABLET ORAL at 08:28

## 2020-12-15 RX ADMIN — ZINC SULFATE 220 MG (50 MG) CAPSULE 220 MG: CAPSULE at 08:29

## 2020-12-15 RX ADMIN — PANTOPRAZOLE SODIUM 40 MG: 40 TABLET, DELAYED RELEASE ORAL at 08:29

## 2020-12-15 RX ADMIN — METHOCARBAMOL TABLETS 750 MG: 750 TABLET, COATED ORAL at 21:51

## 2020-12-15 RX ADMIN — Medication 1000 MG: at 08:27

## 2020-12-15 RX ADMIN — SODIUM CHLORIDE 100 ML/HR: 0.9 INJECTION, SOLUTION INTRAVENOUS at 11:13

## 2020-12-15 RX ADMIN — VENLAFAXINE HYDROCHLORIDE 150 MG: 150 CAPSULE, EXTENDED RELEASE ORAL at 08:27

## 2020-12-15 RX ADMIN — ALBUTEROL SULFATE 2 PUFF: 90 AEROSOL, METERED RESPIRATORY (INHALATION) at 08:27

## 2020-12-15 RX ADMIN — VENLAFAXINE HYDROCHLORIDE 75 MG: 37.5 CAPSULE, EXTENDED RELEASE ORAL at 08:28

## 2020-12-15 RX ADMIN — BENZONATATE 100 MG: 100 CAPSULE ORAL at 17:34

## 2020-12-16 PROCEDURE — 94762 N-INVAS EAR/PLS OXIMTRY CONT: CPT

## 2020-12-16 PROCEDURE — 94760 N-INVAS EAR/PLS OXIMETRY 1: CPT

## 2020-12-16 PROCEDURE — 99232 SBSQ HOSP IP/OBS MODERATE 35: CPT | Performed by: INTERNAL MEDICINE

## 2020-12-16 RX ADMIN — METHOCARBAMOL TABLETS 750 MG: 750 TABLET, COATED ORAL at 21:51

## 2020-12-16 RX ADMIN — PROPRANOLOL HYDROCHLORIDE 60 MG: 60 CAPSULE, EXTENDED RELEASE ORAL at 09:40

## 2020-12-16 RX ADMIN — ZINC SULFATE 220 MG (50 MG) CAPSULE 220 MG: CAPSULE at 09:38

## 2020-12-16 RX ADMIN — Medication 2000 UNITS: at 09:37

## 2020-12-16 RX ADMIN — NORGESTREL AND ETHINYL ESTRADIOL 1 TABLET: KIT at 09:40

## 2020-12-16 RX ADMIN — QUETIAPINE FUMARATE 50 MG: 25 TABLET ORAL at 21:51

## 2020-12-16 RX ADMIN — VENLAFAXINE HYDROCHLORIDE 150 MG: 150 CAPSULE, EXTENDED RELEASE ORAL at 09:38

## 2020-12-16 RX ADMIN — LAMOTRIGINE 100 MG: 100 TABLET ORAL at 09:38

## 2020-12-16 RX ADMIN — PANTOPRAZOLE SODIUM 40 MG: 40 TABLET, DELAYED RELEASE ORAL at 09:37

## 2020-12-16 RX ADMIN — METHOCARBAMOL TABLETS 750 MG: 750 TABLET, COATED ORAL at 06:19

## 2020-12-16 RX ADMIN — ACETAMINOPHEN 650 MG: 325 TABLET, FILM COATED ORAL at 17:54

## 2020-12-16 RX ADMIN — ENOXAPARIN SODIUM 30 MG: 30 INJECTION SUBCUTANEOUS at 21:51

## 2020-12-16 RX ADMIN — DOCUSATE SODIUM 100 MG: 100 CAPSULE ORAL at 09:38

## 2020-12-16 RX ADMIN — PROPRANOLOL HYDROCHLORIDE 60 MG: 60 CAPSULE, EXTENDED RELEASE ORAL at 17:55

## 2020-12-16 RX ADMIN — MONTELUKAST 10 MG: 10 TABLET, FILM COATED ORAL at 21:51

## 2020-12-16 RX ADMIN — DOCUSATE SODIUM 100 MG: 100 CAPSULE ORAL at 17:54

## 2020-12-16 RX ADMIN — METHOCARBAMOL TABLETS 750 MG: 750 TABLET, COATED ORAL at 14:57

## 2020-12-16 RX ADMIN — ENOXAPARIN SODIUM 30 MG: 30 INJECTION SUBCUTANEOUS at 09:37

## 2020-12-16 RX ADMIN — ACETAMINOPHEN 650 MG: 325 TABLET, FILM COATED ORAL at 06:19

## 2020-12-16 RX ADMIN — ATOMOXETINE HYDROCHLORIDE 25 MG: 25 CAPSULE ORAL at 09:40

## 2020-12-16 RX ADMIN — POLYETHYLENE GLYCOL 3350 17 G: 17 POWDER, FOR SOLUTION ORAL at 09:37

## 2020-12-16 RX ADMIN — LAMOTRIGINE 150 MG: 100 TABLET ORAL at 09:38

## 2020-12-16 RX ADMIN — VENLAFAXINE HYDROCHLORIDE 75 MG: 37.5 CAPSULE, EXTENDED RELEASE ORAL at 09:37

## 2020-12-16 RX ADMIN — REMDESIVIR 100 MG: 100 INJECTION, POWDER, LYOPHILIZED, FOR SOLUTION INTRAVENOUS at 12:12

## 2020-12-16 RX ADMIN — GUAIFENESIN 200 MG: 100 SOLUTION ORAL at 17:58

## 2020-12-16 RX ADMIN — Medication 1000 MG: at 09:37

## 2020-12-16 RX ADMIN — DEXAMETHASONE SODIUM PHOSPHATE 6 MG: 4 INJECTION, SOLUTION INTRAMUSCULAR; INTRAVENOUS at 09:37

## 2020-12-16 RX ADMIN — ACETAMINOPHEN 650 MG: 325 TABLET, FILM COATED ORAL at 12:12

## 2020-12-16 RX ADMIN — Medication 1000 MG: at 21:51

## 2020-12-17 VITALS
OXYGEN SATURATION: 93 % | SYSTOLIC BLOOD PRESSURE: 123 MMHG | RESPIRATION RATE: 18 BRPM | HEART RATE: 55 BPM | TEMPERATURE: 98.4 F | DIASTOLIC BLOOD PRESSURE: 69 MMHG

## 2020-12-17 LAB
ANION GAP SERPL CALCULATED.3IONS-SCNC: 9 MMOL/L (ref 4–13)
BUN SERPL-MCNC: 13 MG/DL (ref 5–25)
CALCIUM SERPL-MCNC: 9.4 MG/DL (ref 8.3–10.1)
CHLORIDE SERPL-SCNC: 101 MMOL/L (ref 100–108)
CO2 SERPL-SCNC: 29 MMOL/L (ref 21–32)
CREAT SERPL-MCNC: 0.68 MG/DL (ref 0.6–1.3)
ERYTHROCYTE [DISTWIDTH] IN BLOOD BY AUTOMATED COUNT: 12 % (ref 11.6–15.1)
GFR SERPL CREATININE-BSD FRML MDRD: 102 ML/MIN/1.73SQ M
GLUCOSE SERPL-MCNC: 117 MG/DL (ref 65–140)
HCT VFR BLD AUTO: 38.5 % (ref 34.8–46.1)
HGB BLD-MCNC: 12.4 G/DL (ref 11.5–15.4)
MCH RBC QN AUTO: 29.4 PG (ref 26.8–34.3)
MCHC RBC AUTO-ENTMCNC: 32.2 G/DL (ref 31.4–37.4)
MCV RBC AUTO: 91 FL (ref 82–98)
PLATELET # BLD AUTO: 396 THOUSANDS/UL (ref 149–390)
PMV BLD AUTO: 9.5 FL (ref 8.9–12.7)
POTASSIUM SERPL-SCNC: 4.6 MMOL/L (ref 3.5–5.3)
RBC # BLD AUTO: 4.22 MILLION/UL (ref 3.81–5.12)
SODIUM SERPL-SCNC: 139 MMOL/L (ref 136–145)
WBC # BLD AUTO: 9.76 THOUSAND/UL (ref 4.31–10.16)

## 2020-12-17 PROCEDURE — 85027 COMPLETE CBC AUTOMATED: CPT | Performed by: INTERNAL MEDICINE

## 2020-12-17 PROCEDURE — 80048 BASIC METABOLIC PNL TOTAL CA: CPT | Performed by: INTERNAL MEDICINE

## 2020-12-17 PROCEDURE — 99239 HOSP IP/OBS DSCHRG MGMT >30: CPT | Performed by: INTERNAL MEDICINE

## 2020-12-17 RX ORDER — GUAIFENESIN/DEXTROMETHORPHAN 100-10MG/5
5 SYRUP ORAL 3 TIMES DAILY PRN
Qty: 118 ML | Refills: 0 | Status: SHIPPED | OUTPATIENT
Start: 2020-12-17 | End: 2021-05-20 | Stop reason: ALTCHOICE

## 2020-12-17 RX ORDER — MULTIVITAMIN/IRON/FOLIC ACID 18MG-0.4MG
1 TABLET ORAL DAILY
Qty: 30 TABLET | Refills: 0 | Status: SHIPPED | OUTPATIENT
Start: 2020-12-19 | End: 2021-11-26

## 2020-12-17 RX ORDER — DEXAMETHASONE 6 MG/1
6 TABLET ORAL
Qty: 6 TABLET | Refills: 0 | Status: SHIPPED | OUTPATIENT
Start: 2020-12-17 | End: 2020-12-23

## 2020-12-17 RX ADMIN — REMDESIVIR 100 MG: 100 INJECTION, POWDER, LYOPHILIZED, FOR SOLUTION INTRAVENOUS at 10:50

## 2020-12-17 RX ADMIN — ZINC SULFATE 220 MG (50 MG) CAPSULE 220 MG: CAPSULE at 08:01

## 2020-12-17 RX ADMIN — METHOCARBAMOL TABLETS 750 MG: 750 TABLET, COATED ORAL at 05:55

## 2020-12-17 RX ADMIN — NORGESTREL AND ETHINYL ESTRADIOL 1 TABLET: KIT at 08:00

## 2020-12-17 RX ADMIN — ENOXAPARIN SODIUM 30 MG: 30 INJECTION SUBCUTANEOUS at 08:02

## 2020-12-17 RX ADMIN — PROPRANOLOL HYDROCHLORIDE 60 MG: 60 CAPSULE, EXTENDED RELEASE ORAL at 08:00

## 2020-12-17 RX ADMIN — LAMOTRIGINE 150 MG: 100 TABLET ORAL at 08:01

## 2020-12-17 RX ADMIN — LAMOTRIGINE 100 MG: 100 TABLET ORAL at 08:02

## 2020-12-17 RX ADMIN — DEXAMETHASONE SODIUM PHOSPHATE 6 MG: 4 INJECTION, SOLUTION INTRAMUSCULAR; INTRAVENOUS at 09:44

## 2020-12-17 RX ADMIN — Medication 1000 MG: at 08:01

## 2020-12-17 RX ADMIN — ACETAMINOPHEN 650 MG: 325 TABLET, FILM COATED ORAL at 11:57

## 2020-12-17 RX ADMIN — GUAIFENESIN 200 MG: 100 SOLUTION ORAL at 10:50

## 2020-12-17 RX ADMIN — Medication 2000 UNITS: at 08:02

## 2020-12-17 RX ADMIN — VENLAFAXINE HYDROCHLORIDE 150 MG: 150 CAPSULE, EXTENDED RELEASE ORAL at 08:02

## 2020-12-17 RX ADMIN — ATOMOXETINE HYDROCHLORIDE 25 MG: 25 CAPSULE ORAL at 08:01

## 2020-12-17 RX ADMIN — VENLAFAXINE HYDROCHLORIDE 75 MG: 37.5 CAPSULE, EXTENDED RELEASE ORAL at 08:01

## 2020-12-17 RX ADMIN — PANTOPRAZOLE SODIUM 40 MG: 40 TABLET, DELAYED RELEASE ORAL at 08:02

## 2020-12-17 RX ADMIN — ACETAMINOPHEN 650 MG: 325 TABLET, FILM COATED ORAL at 05:55

## 2020-12-18 ENCOUNTER — TRANSITIONAL CARE MANAGEMENT (OUTPATIENT)
Dept: FAMILY MEDICINE CLINIC | Facility: CLINIC | Age: 50
End: 2020-12-18

## 2020-12-18 ENCOUNTER — PATIENT OUTREACH (OUTPATIENT)
Dept: FAMILY MEDICINE CLINIC | Facility: CLINIC | Age: 50
End: 2020-12-18

## 2020-12-18 ENCOUNTER — TELEMEDICINE (OUTPATIENT)
Dept: BEHAVIORAL/MENTAL HEALTH CLINIC | Facility: CLINIC | Age: 50
End: 2020-12-18
Payer: COMMERCIAL

## 2020-12-18 DIAGNOSIS — F43.10 POST TRAUMATIC STRESS DISORDER (PTSD): Chronic | ICD-10-CM

## 2020-12-18 DIAGNOSIS — F41.1 GENERALIZED ANXIETY DISORDER: Chronic | ICD-10-CM

## 2020-12-18 DIAGNOSIS — Z71.89 COMPLEX CARE COORDINATION: Primary | ICD-10-CM

## 2020-12-18 DIAGNOSIS — F33.2 MAJOR DEPRESSIVE DISORDER, RECURRENT SEVERE WITHOUT PSYCHOTIC FEATURES (HCC): Primary | Chronic | ICD-10-CM

## 2020-12-18 PROCEDURE — 90834 PSYTX W PT 45 MINUTES: CPT | Performed by: SOCIAL WORKER

## 2020-12-18 RX ORDER — DEXAMETHASONE 2 MG/1
TABLET ORAL
COMMUNITY
Start: 2020-12-17 | End: 2021-11-26

## 2020-12-18 RX ORDER — ALBUTEROL SULFATE 90 UG/1
2 AEROSOL, METERED RESPIRATORY (INHALATION) EVERY 6 HOURS PRN
Qty: 6.7 G | Refills: 0 | Status: SHIPPED | OUTPATIENT
Start: 2020-12-18 | End: 2021-01-15 | Stop reason: ALTCHOICE

## 2020-12-21 ENCOUNTER — TELEMEDICINE (OUTPATIENT)
Dept: FAMILY MEDICINE CLINIC | Facility: CLINIC | Age: 50
End: 2020-12-21
Payer: COMMERCIAL

## 2020-12-21 ENCOUNTER — TELEMEDICINE (OUTPATIENT)
Dept: CARDIOLOGY CLINIC | Facility: CLINIC | Age: 50
End: 2020-12-21
Payer: COMMERCIAL

## 2020-12-21 VITALS
WEIGHT: 165 LBS | HEIGHT: 67 IN | DIASTOLIC BLOOD PRESSURE: 70 MMHG | HEART RATE: 111 BPM | BODY MASS INDEX: 25.9 KG/M2 | SYSTOLIC BLOOD PRESSURE: 97 MMHG

## 2020-12-21 DIAGNOSIS — I47.1 PAROXYSMAL SVT (SUPRAVENTRICULAR TACHYCARDIA) (HCC): Primary | ICD-10-CM

## 2020-12-21 DIAGNOSIS — U07.1 COVID-19: Primary | ICD-10-CM

## 2020-12-21 PROCEDURE — 99495 TRANSJ CARE MGMT MOD F2F 14D: CPT | Performed by: INTERNAL MEDICINE

## 2020-12-21 PROCEDURE — 99214 OFFICE O/P EST MOD 30 MIN: CPT | Performed by: INTERNAL MEDICINE

## 2020-12-31 ENCOUNTER — OFFICE VISIT (OUTPATIENT)
Dept: FAMILY MEDICINE CLINIC | Facility: CLINIC | Age: 50
End: 2020-12-31
Payer: COMMERCIAL

## 2020-12-31 VITALS
HEART RATE: 71 BPM | RESPIRATION RATE: 12 BRPM | OXYGEN SATURATION: 98 % | SYSTOLIC BLOOD PRESSURE: 110 MMHG | TEMPERATURE: 98.1 F | WEIGHT: 173 LBS | HEIGHT: 67 IN | BODY MASS INDEX: 27.15 KG/M2 | DIASTOLIC BLOOD PRESSURE: 70 MMHG

## 2020-12-31 DIAGNOSIS — R59.1 LYMPHADENOPATHY: ICD-10-CM

## 2020-12-31 DIAGNOSIS — R59.9 SWELLING OF GLAND: Primary | ICD-10-CM

## 2020-12-31 PROCEDURE — 99212 OFFICE O/P EST SF 10 MIN: CPT | Performed by: INTERNAL MEDICINE

## 2020-12-31 RX ORDER — AMOXICILLIN AND CLAVULANATE POTASSIUM 875; 125 MG/1; MG/1
1 TABLET, FILM COATED ORAL EVERY 12 HOURS SCHEDULED
Qty: 14 TABLET | Refills: 0 | Status: SHIPPED | OUTPATIENT
Start: 2020-12-31 | End: 2021-01-07

## 2021-01-07 LAB
LEFT EYE DIABETIC RETINOPATHY: NORMAL
RIGHT EYE DIABETIC RETINOPATHY: NORMAL

## 2021-01-08 ENCOUNTER — TELEMEDICINE (OUTPATIENT)
Dept: PSYCHIATRY | Facility: CLINIC | Age: 51
End: 2021-01-08
Payer: COMMERCIAL

## 2021-01-08 DIAGNOSIS — F33.2 SEVERE EPISODE OF RECURRENT MAJOR DEPRESSIVE DISORDER, WITHOUT PSYCHOTIC FEATURES (HCC): Primary | ICD-10-CM

## 2021-01-08 PROCEDURE — 99213 OFFICE O/P EST LOW 20 MIN: CPT | Performed by: NURSE PRACTITIONER

## 2021-01-08 RX ORDER — QUETIAPINE FUMARATE 50 MG/1
75 TABLET, FILM COATED ORAL
Qty: 135 TABLET | Refills: 1 | Status: SHIPPED | OUTPATIENT
Start: 2021-01-08 | End: 2021-03-05 | Stop reason: SDUPTHER
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
## 2021-01-15 ENCOUNTER — OFFICE VISIT (OUTPATIENT)
Dept: FAMILY MEDICINE CLINIC | Facility: CLINIC | Age: 51
End: 2021-01-15
Payer: COMMERCIAL

## 2021-01-15 ENCOUNTER — TELEMEDICINE (OUTPATIENT)
Dept: BEHAVIORAL/MENTAL HEALTH CLINIC | Facility: CLINIC | Age: 51
End: 2021-01-15
Payer: COMMERCIAL

## 2021-01-15 VITALS
OXYGEN SATURATION: 95 % | HEIGHT: 67 IN | BODY MASS INDEX: 27.97 KG/M2 | DIASTOLIC BLOOD PRESSURE: 70 MMHG | HEART RATE: 75 BPM | TEMPERATURE: 97.6 F | SYSTOLIC BLOOD PRESSURE: 112 MMHG | WEIGHT: 178.2 LBS

## 2021-01-15 DIAGNOSIS — F43.10 POST TRAUMATIC STRESS DISORDER (PTSD): Chronic | ICD-10-CM

## 2021-01-15 DIAGNOSIS — R73.03 PRE-DIABETES: ICD-10-CM

## 2021-01-15 DIAGNOSIS — F33.2 MAJOR DEPRESSIVE DISORDER, RECURRENT SEVERE WITHOUT PSYCHOTIC FEATURES (HCC): Chronic | ICD-10-CM

## 2021-01-15 DIAGNOSIS — F33.2 MAJOR DEPRESSIVE DISORDER, RECURRENT SEVERE WITHOUT PSYCHOTIC FEATURES (HCC): Primary | Chronic | ICD-10-CM

## 2021-01-15 DIAGNOSIS — F41.1 GENERALIZED ANXIETY DISORDER: Chronic | ICD-10-CM

## 2021-01-15 DIAGNOSIS — G89.4 CHRONIC PAIN SYNDROME: ICD-10-CM

## 2021-01-15 DIAGNOSIS — K91.2 POSTSURGICAL MALABSORPTION: Chronic | ICD-10-CM

## 2021-01-15 DIAGNOSIS — U07.1 COVID-19: ICD-10-CM

## 2021-01-15 DIAGNOSIS — Z00.00 ANNUAL PHYSICAL EXAM: Primary | ICD-10-CM

## 2021-01-15 PROCEDURE — 90834 PSYTX W PT 45 MINUTES: CPT | Performed by: SOCIAL WORKER

## 2021-01-15 PROCEDURE — 99386 PREV VISIT NEW AGE 40-64: CPT | Performed by: FAMILY MEDICINE

## 2021-01-15 NOTE — PSYCH
Virtual Regular Visit    This note was not shared with the patient due to this is a psychotherapy note    Assessment/Plan:    Problem List Items Addressed This Visit        Other    Post traumatic stress disorder (PTSD) (Chronic)    Major depressive disorder, recurrent severe without psychotic features ([facility] [address] ) - Primary (Chronic)    Generalized anxiety disorder (Chronic)           Reason for visit is Behavioral Health session, conducted through video, due to COVID-19 precautions  Apurva Whelan has verbalized a preference to continue with virtual sessions at this time  Apurva Whelan has been offered in-person sessions and has declined  Encounter provider LALY Dale    Provider located at [id number] [place] [address] [address]  [facility] [id number]      Recent Visits  No visits were found meeting these conditions  Showing recent visits within past 7 days and meeting all other requirements     Today's Visits  Date Type Provider Dept   01/15/21 Office Visit Brianne Rodriguez MD [address] today's visits and meeting all other requirements     Future Appointments  No visits were found meeting these conditions  Showing future appointments within next 150 days and meeting all other requirements        The patient was identified by name and date of birth  Yaquelin Landaverde was informed that this is a telemedicine visit and that the visit is being conducted through [employer] and patient was informed that this is a secure, HIPAA-compliant platform  She agrees to proceed     My office door was closed  The patient was notified the following individuals were present in the room: Khai Lee, student intern  She acknowledged consent and understanding of privacy and security of the video platform  The patient has agreed to participate and understands they can discontinue the visit at any time  Patient is aware this is a billable service       Lisa Parkinson Siri Echeverria is a 45 y o  female  DATA: Met with Leia Castano for scheduled individual session  Topics of discussion included recovery from covid infection; return to work; relationship with ; communication with her ex-  Krystal Gardner discussed her recovery from covid-19  She states that she is feeling significantly better; however, she reports she continues to have extreme fatigue  She talked about the stress related to her job  She states that she is out of school for another couple weeks, but she will be returning soon  She states that she has a few more classes to complete her associates degree and then plans to move on to get her bachelors degree  Krystalerica Salinass discussed her son's work-related stressors  He is currently looking for other job opportunities  She states that she wants to have him apply for SSDI when he is 21  We discussed the possibility of him applying prior to age 24  This clinician provided her with a telephone number to call a potential  to consult regarding the options for [facility] applications  She states that she has called an  in the past, but she was told that he would not be able to get SSDI due to an issue with income limit  She agreed to call another  for a second opinion regarding this issue  She states that her ex- has concerns about the potential that Carlitos's SSDI might impact his [facility]  I assured her that this was not the case  During this session, this clinician used the following therapeutic modalities: supportive psychotherapy, client-centered therapy, mindfulness-based strategies, DBT-informed skills, Motivational Interviewing and solution-focused therapy  Clinician provided education regarding basic information for SSDI applications  This session was attended by Marty Mckay, Student Intern  Leia Castano provided verbal consent for the Student Intern to sit in on this and future sessions   She acknowledges understanding that she can opt out of student observations/participation at any time  ASSESSMENT: Lavell Azul presents with a primarily euthymic mood  Her affect is normal range and intensity, appropriate  Lavell Azul exhibits good therapeutic rapport with this clinician  Lavell Azul continues to exhibit willingness to work on treatment goals and objectives  Lavell Azul presents with a minimal risk of suicide, minimal risk of self-harm, and minimal risk of harm to others  PLAN: Lavell Azul will return in three weeks for the next scheduled session  Between sessions, Lavell Azul will consider calling an  for a second opinion on Shlomos ability to apply for [employer] and will report back during the next session re: successes and barriers  At the next session, this clinician will use supportive psychotherapy, client-centered therapy, mindfulness-based strategies, DBT-informed skills, Motivational Interviewing and solution-focused therapy to address her anxiety and depression, in an effort to assist Lavell Azul with meeting treatment goals  HPI     Past Medical History:   Diagnosis Date    ADHD (attention deficit hyperactivity disorder)     Bulging lumbar disc     Carpal tunnel syndrome     RIGHT  LAST ASSESSED: 12/7/16    Chronic back pain     low    Chronic pain disorder     Colon polyps     Diabetes mellitus ([address] )     Resolved post weight loss    GERD (gastroesophageal reflux disease)     Gestational diabetes     Hearing loss     left ear    Hyperlipidemia     Resolved with weight loss    Hyponatremia 12/12/2020    IBS (irritable bowel syndrome)     Ileus ([address] )     LAST ASSESSED: 8/3/17    Labial cyst     LAST ASSESSED: 4/21/16    Myofascial pain     LAST ASSESSED: 4/12/16    Obesity     Ovarian cyst     LEFT   LAST ASSESSED: 9/2/16    Panic attack     Pap smear for cervical cancer screening     4/2018--pap wnl, HRHPV neg    Pneumonia     Seasonal allergies     Thoracic outlet syndrome     2010    Trochanteric bursitis of both hips     LAST ASSESSED: 3/18/16    Ulnar neuropathy at elbow     Varicella     Wears glasses        Past Surgical History:   Procedure Laterality Date    BILE DUCT EXPLORATION      ENDOSCOPIC REMOVAL OF STONES FROM BILIARY TRACT     SECTION      x3    CHOLECYSTECTOMY      COLONOSCOPY      -polyp, repeat     DILATION AND CURETTAGE OF UTERUS      ENDOMETRIAL ABLATION      ERCP W/ SPHICTEROTOMY      FIRST RIB REMOVAL      THORAX EXCISION OF FIRST RIB    HYSTEROSCOPY      NEUROPLASTY / TRANSPOSITION ULNAR NERVE AT ELBOW Right     WI COLONOSCOPY FLX DX W/COLLJ SPEC WHEN PFRMD N/A 2017    Procedure: COLONOSCOPY;  Surgeon: Eunice Enriquez MD;  [facility]; Service: Gastroenterology    WI ESOPHAGOGASTRODUODENOSCOPY TRANSORAL DIAGNOSTIC N/A 2017    Procedure: ESOPHAGOGASTRODUODENOSCOPY (EGD); Surgeon: Lucero Wood MD;  [facility]; Service: Gastroenterology    WI HYSTEROSCOPY,W/ENDO BX N/A 10/20/2017    Procedure: DILATATION AND CURETTAGE (D&C) WITH HYSTEROSCOPY  REMOVAL VULVAR RT  LESION;  Surgeon: Etta Gordon MD;  [facility];  Service: Gynecology    WI LAP, ÁLVARO RESTRICT PROC, LONGITUDINAL GASTRECTOMY N/A 2018    Procedure: GASTRECTOMY SLEEVE LAPAROSCOPIC; INTRAOPERATIVE EGD ;  Surgeon: Kamila Kraft MD;  [facility];  Service: Gabrielle Keith SURG IMPLNT [address] Left 2020    Procedure: Insertion of thoracic spinal cord stimulator electrode via laminotomy and placement of left buttock implantable pulse generator (NEUROMONITORING);   Surgeon: Gabriel Kunz MD;  [facility];  Service: Neurosurgery    SPINAL CORD STIMULATOR TRIAL W/ LAMINOTOMY      TONSILLECTOMY AND ADENOIDECTOMY      TUBAL LIGATION      VEIN LIGATION AND STRIPPING Right     VULVA SURGERY  10/20/2017    BIOPSY    WISDOM TOOTH EXTRACTION         Current Outpatient Medications   Medication Sig Dispense Refill    atoMOXetine (STRATTERA) 25 mg capsule Take 1 capsule (25 mg total) by mouth daily 90 capsule 1    Biotin 31222 MCG TABS Take by mouth      Calcium Carbonate-Vit D-Min (CALCIUM 1200 PO) Take 2,400 mg by mouth daily      dexamethasone (DECADRON) 2 mg tablet       dextromethorphan-guaiFENesin (ROBITUSSIN DM)  mg/5 mL syrup Take 5 mL by mouth 3 (three) times a day as needed for cough 118 mL 0    drospirenone-ethinyl estradiol (LIEN) 3-0 02 MG per tablet Take 1 tablet by mouth daily 84 tablet 4    lamoTRIgine (LaMICtal) 100 mg tablet Take 1 tablet (100 mg total) by mouth daily 90 tablet 0    lamoTRIgine (LaMICtal) 150 MG tablet TAKE ONE TABLET BY MOUTH DAILY 90 tablet 0    meloxicam (MOBIC) 7 5 mg tablet Take 1 tablet (7 5 mg total) by mouth daily 15 tablet 0    methocarbamol (ROBAXIN) 750 mg tablet Take 1 tablet (750 mg total) by mouth every 8 (eight) hours 90 tablet 1    montelukast (SINGULAIR) 10 mg tablet TAKE ONE TABLET BY MOUTH AT BEDTIME DAILY 90 tablet 3    Multiple Vitamins-Minerals (MULTI COMPLETE PO) Take by mouth      multivitamin-minerals (CENTRUM ADULTS) tablet Take 1 tablet by mouth daily 30 tablet 0    pantoprazole (PROTONIX) 40 mg tablet Take 1 tablet (40 mg total) by mouth daily 90 tablet 3    Polypodium Leucotomos (Heliocare) 240 MG CAPS Take two tablets once a day 60 capsule 3    Probiotic Product (PRO-BIOTIC BLEND PO) Take by mouth as needed       propranolol (INDERAL LA) 60 mg 24 hr capsule Take 1 capsule (60 mg total) by mouth 2 (two) times a day 180 capsule 3    QUEtiapine (SEROquel) 50 mg tablet Take 1 5 tablets (75 mg total) by mouth daily at bedtime 135 tablet 1    traMADol (ULTRAM) 50 mg tablet Take 50 mg by mouth every 6 (six) hours as needed for moderate pain      venlafaxine (EFFEXOR-XR) 150 mg 24 hr capsule Take 1 capsule (150 mg total) by mouth daily 90 capsule 0    venlafaxine (EFFEXOR-XR) 75 mg 24 hr capsule Take 1 capsule (75 mg total) by mouth daily 90 capsule 0     No current facility-administered medications for this visit  Allergies   Allergen Reactions    Ibuprofen Other (See Comments)     Due to gastric sleeve -can only take for 5 days, then needs to stop       Review of Systems    Video Exam    There were no vitals filed for this visit  Physical Exam     I spent 50 minutes directly with the patient during this visit      VIRTUAL VISIT DISCLAIMER    Gail Juan acknowledges that she has consented to an online visit or consultation  She understands that the online visit is based solely on information provided by her, and that, in the absence of a face-to-face physical evaluation by the physician, the diagnosis she receives is both limited and provisional in terms of accuracy and completeness  This is not intended to replace a full medical face-to-face evaluation by the physician  Gail Yessica understands and accepts these terms

## 2021-01-15 NOTE — PROGRESS NOTES
[address] [facility]    NAME: Karen Guerrero  AGE: 45 y o  SEX: female  : 1970     DATE: 1/15/2021     Assessment and Plan:     Problem List Items Addressed This Visit        Digestive    Postsurgical malabsorption (Chronic)    Relevant Orders    Comprehensive metabolic panel    Comprehensive metabolic panel    TSH, 3rd generation with Free T4 reflex    Vitamin B12    Vitamin A    Vitamin B1, whole blood    Iron    CBC and differential    Lipid Panel with Direct LDL reflex       Other    Post traumatic stress disorder (PTSD) (Chronic)    Major depressive disorder, recurrent severe without psychotic features (HCC) (Chronic)    Chronic pain syndrome      Other Visit Diagnoses     Annual physical exam    -  Primary    Relevant Orders    Lipid Panel with Direct LDL reflex    Pre-diabetes        Relevant Orders    Hemoglobin A1C    Lipid Panel with Direct LDL reflex    COVID-19        resolving   to recheck chest X ray     Relevant Orders    XR chest pa & lateral          Immunizations and preventive care screenings were discussed with patient today  Appropriate education was printed on patient's after visit summary  Counseling:  Alcohol/drug use: discussed moderation in alcohol intake, the recommendations for healthy alcohol use, and avoidance of illicit drug use  Dental Health: discussed importance of regular tooth brushing, flossing, and dental visits  Injury prevention: discussed safety/seat belts, safety helmets, smoke detectors, carbon dioxide detectors, and smoking near bedding or upholstery  Sexual health: discussed sexually transmitted diseases, partner selection, use of condoms, avoidance of unintended pregnancy, and contraceptive alternatives  · Exercise: the importance of regular exercise/physical activity was discussed  Recommend exercise 3-5 times per week for at least 30 minutes  No follow-ups on file       Chief Complaint:     Chief Complaint   Patient presents with   [address]     Patient is here today as a new patient to establish care   Back Pain     Patient is here today with back pain  x 2 days  History of Present Illness:     Adult Annual Physical     Patient here for a comprehensive physical exam  The patient reports no problems  New patient   Gastric sleeve back in 2016  Keeping her weight off  covid back in 2020 and was in hospital for 1 week      Diet and Physical Activity  · Diet/Nutrition: consuming 3-5 servings of fruits/vegetables daily  · Exercise: walking  Depression Screening  PHQ-9 Depression Screening    PHQ-9:   Frequency of the following problems over the past two weeks:      Little interest or pleasure in doing things: 0 - not at all  Feeling down, depressed, or hopeless: 0 - not at all  Trouble falling or staying asleep, or sleeping too much: 0 - not at all  Feeling tired or having little energy: 0 - not at all  Poor appetite or overeatin - not at all  Feeling bad about yourself - or that you are a failure or have let yourself or your family down: 0 - not at all  Trouble concentrating on things, such as reading the newspaper or watching television: 0 - not at all  Moving or speaking so slowly that other people could have noticed  Or the opposite - being so fidgety or restless that you have been moving around a lot more than usual: 0 - not at all  Thoughts that you would be better off dead, or of hurting yourself in some way: 0 - not at all  PHQ-2 Score: 0  PHQ-9 Score: 0       General Health  · Sleep: sleeps well and gets 7-8 hours of sleep on average  · Hearing: normal - bilateral   · Vision: goes for regular eye exams and wears glasses  · Dental: regular dental visits and brushes teeth twice daily  /GYN Health  · Patient is: postmenopausal  · - 31, 27, 18   · Last menstrual period: 3 years ago  · Contraceptive method: none       Review of Systems: Review of Systems   Constitutional: Negative for chills and fever  HENT: Negative for ear pain and sore throat  Eyes: Negative for pain and visual disturbance  Respiratory: Negative for cough and shortness of breath  Cardiovascular: Negative for chest pain and palpitations  Gastrointestinal: Negative for abdominal pain and vomiting  Genitourinary: Negative for dysuria and hematuria  Musculoskeletal: Negative for arthralgias and back pain  Skin: Negative for color change and rash  Neurological: Negative for seizures and syncope  All other systems reviewed and are negative  Past Medical History:     Past Medical History:   Diagnosis Date    ADHD (attention deficit hyperactivity disorder)     Bulging lumbar disc     Carpal tunnel syndrome     RIGHT  LAST ASSESSED: 16    Chronic back pain     low    Chronic pain disorder     Colon polyps     Diabetes mellitus ([facility] [address] )     Resolved post weight loss    GERD (gastroesophageal reflux disease)     Gestational diabetes     Hearing loss     left ear    Hyperlipidemia     Resolved with weight loss    Hyponatremia 2020    IBS (irritable bowel syndrome)     Ileus ([facility] [address] )     LAST ASSESSED: 8/3/17    Labial cyst     LAST ASSESSED: 16    Myofascial pain     LAST ASSESSED: 16    Obesity     Ovarian cyst     LEFT   LAST ASSESSED: 16    Panic attack     Pap smear for cervical cancer screening     2018--pap wnl, HRHPV neg    Pneumonia     Seasonal allergies     Thoracic outlet syndrome         Trochanteric bursitis of both hips     LAST ASSESSED: 3/18/16    Ulnar neuropathy at elbow     Varicella     Wears glasses       Past Surgical History:     Past Surgical History:   Procedure Laterality Date    BILE DUCT EXPLORATION      ENDOSCOPIC REMOVAL OF STONES FROM BILIARY TRACT     SECTION      x3    CHOLECYSTECTOMY      COLONOSCOPY      -polyp, repeat     DILATION AND CURETTAGE OF UTERUS  ENDOMETRIAL ABLATION      ERCP W/ SPHICTEROTOMY      FIRST RIB REMOVAL      THORAX EXCISION OF FIRST RIB    HYSTEROSCOPY      NEUROPLASTY / TRANSPOSITION ULNAR NERVE AT ELBOW Right 2011    OK COLONOSCOPY FLX DX W/COLLJ SPEC WHEN PFRMD N/A 5/30/2017    Procedure: COLONOSCOPY;  Surgeon: Agustin Nash MD;  [facility]; Service: Gastroenterology    OK ESOPHAGOGASTRODUODENOSCOPY TRANSORAL DIAGNOSTIC N/A 9/14/2017    Procedure: ESOPHAGOGASTRODUODENOSCOPY (EGD); Surgeon: Graeme Mensah MD;  [facility]; Service: Gastroenterology    OK HYSTEROSCOPY,W/ENDO BX N/A 10/20/2017    Procedure: DILATATION AND CURETTAGE (D&C) WITH HYSTEROSCOPY  REMOVAL VULVAR RT  LESION;  Surgeon: Pal Salamanca MD;  [facility];  Service: Gynecology    OK LAP, ÁLVARO RESTRICT PROC, LONGITUDINAL GASTRECTOMY N/A 2/6/2018    Procedure: GASTRECTOMY SLEEVE LAPAROSCOPIC; INTRAOPERATIVE EGD ;  Surgeon: Remy Birch MD;  [facility];  Service: [facility] SURG IMPLNT [address] Left 1/29/2020    Procedure: Insertion of thoracic spinal cord stimulator electrode via laminotomy and placement of left buttock implantable pulse generator (NEUROMONITORING);   Surgeon: Randy Ganser, MD;  [facility];  Service: Neurosurgery    SPINAL CORD STIMULATOR TRIAL W/ LAMINOTOMY      TONSILLECTOMY AND ADENOIDECTOMY      TUBAL LIGATION      VEIN LIGATION AND STRIPPING Right     VULVA SURGERY  10/20/2017    BIOPSY    WISDOM TOOTH EXTRACTION        Social History:     E-Cigarette/Vaping    E-Cigarette Use Never User      E-Cigarette/Vaping Substances    Nicotine No     THC No     CBD No     Flavoring No     Other No     Unknown No      Social History     Socioeconomic History    Marital status: /Civil Union     Spouse name: Shyam Saeed Number of children: 3    Years of education: GED then 2 year college program    Highest education level: None   Occupational History    Occupation: ER TECH     Employer: [employer]   Social Needs    Financial resource strain: Somewhat hard    Food insecurity     Worry: Never true     Inability: Never true    Transportation needs     Medical: No     Non-medical: No   Tobacco Use    Smoking status: Former Smoker     Quit date: 2013     Years since quittin 7    Smokeless tobacco: Never Used   Substance and Sexual Activity    Alcohol use: Not Currently    Drug use: No    Sexual activity: Yes     Partners: Male     Birth control/protection: OCP     Comment: lifetime partners: 10; current partner    Lifestyle    Physical activity     Days per week: 0 days     Minutes per session: None    Stress: Very much   Relationships    Social connections     Talks on phone: Once a week     Gets together: Once a week     Attends Restoration service: Never     Active member of club or organization: No     Attends meetings of clubs or organizations: Never     Relationship status:     Intimate partner violence     Fear of current or ex partner: No     Emotionally abused: No     Physically abused: No     Forced sexual activity: No   Other Topics Concern    None   Social History Narrative    Jain: no preference    Accepts blood products        Exercise: unable with back issues    Calcium: calcium supplement, multivitamin      Family History:     Family History   Problem Relation Age of Onset    Diabetes Mother     Breast cancer Mother         >50    BRCA1 Negative Mother     BRCA2 Negative Mother     Hyperlipidemia Mother         HYPERCHOLESTEROLEMIA    Diabetes Father     Other Father         traumatic brain injury    Prostate cancer Father     Alcohol abuse Father         in remission    Heart disease Father     Neuropathy Father     Hyperlipidemia Father     Hypertension Brother     Diabetes Brother     Other Brother         HYPERCHOLESTEROLEMIA    Alcohol abuse Brother     Depression Brother         attempted suicide    Colon cancer Maternal Grandfather     Heart attack Maternal Grandmother     No Known Problems Paternal Grandmother         dad is adopted    No Known Problems Paternal Grandfather         dad is adopted    Diabetes Brother     Alcohol abuse Brother     Asthma Son     No Known Problems Daughter     Ovarian cancer Neg Hx     Uterine cancer Neg Hx       Current Medications:     Current Outpatient Medications   Medication Sig Dispense Refill    atoMOXetine (STRATTERA) 25 mg capsule Take 1 capsule (25 mg total) by mouth daily 90 capsule 1    Biotin 57088 MCG TABS Take by mouth      Calcium Carbonate-Vit D-Min (CALCIUM 1200 PO) Take 2,400 mg by mouth daily      dexamethasone (DECADRON) 2 mg tablet       dextromethorphan-guaiFENesin (ROBITUSSIN DM)  mg/5 mL syrup Take 5 mL by mouth 3 (three) times a day as needed for cough 118 mL 0    drospirenone-ethinyl estradiol (LIEN) 3-0 02 MG per tablet Take 1 tablet by mouth daily 84 tablet 4    lamoTRIgine (LaMICtal) 100 mg tablet Take 1 tablet (100 mg total) by mouth daily 90 tablet 0    lamoTRIgine (LaMICtal) 150 MG tablet TAKE ONE TABLET BY MOUTH DAILY 90 tablet 0    meloxicam (MOBIC) 7 5 mg tablet Take 1 tablet (7 5 mg total) by mouth daily 15 tablet 0    methocarbamol (ROBAXIN) 750 mg tablet Take 1 tablet (750 mg total) by mouth every 8 (eight) hours 90 tablet 1    montelukast (SINGULAIR) 10 mg tablet TAKE ONE TABLET BY MOUTH AT BEDTIME DAILY 90 tablet 3    Multiple Vitamins-Minerals (MULTI COMPLETE PO) Take by mouth      multivitamin-minerals (CENTRUM ADULTS) tablet Take 1 tablet by mouth daily 30 tablet 0    pantoprazole (PROTONIX) 40 mg tablet Take 1 tablet (40 mg total) by mouth daily 90 tablet 3    Polypodium Leucotomos (Heliocare) 240 MG CAPS Take two tablets once a day 60 capsule 3    Probiotic Product (PRO-BIOTIC BLEND PO) Take by mouth as needed       propranolol (INDERAL LA) 60 mg 24 hr capsule Take 1 capsule (60 mg total) by mouth 2 (two) times a day 180 capsule 3    QUEtiapine (SEROquel) 50 mg tablet Take 1 5 tablets (75 mg total) by mouth daily at bedtime 135 tablet 1    traMADol (ULTRAM) 50 mg tablet Take 50 mg by mouth every 6 (six) hours as needed for moderate pain      venlafaxine (EFFEXOR-XR) 150 mg 24 hr capsule Take 1 capsule (150 mg total) by mouth daily 90 capsule 0    venlafaxine (EFFEXOR-XR) 75 mg 24 hr capsule Take 1 capsule (75 mg total) by mouth daily 90 capsule 0     No current facility-administered medications for this visit  Allergies: Allergies   Allergen Reactions    Ibuprofen Other (See Comments)     Due to gastric sleeve -can only take for 5 days, then needs to stop      Physical Exam:     /70 (BP Location: Left arm, Patient Position: Sitting, Cuff Size: Adult)   Pulse 75   Temp 97 6 °F (36 4 °C) (Tympanic)   Ht 5' 7" (1 702 m)   Wt 80 8 kg (178 lb 3 2 oz)   LMP 01/07/2019 (Approximate)   SpO2 95%   BMI 27 91 kg/m²     Physical Exam  Vitals signs and nursing note reviewed  Constitutional:       General: She is not in acute distress  Appearance: She is well-developed  HENT:      Head: Normocephalic and atraumatic  Right Ear: Tympanic membrane normal       Left Ear: Tympanic membrane normal       Nose: Nose normal       Mouth/Throat:      Mouth: Mucous membranes are moist       Pharynx: No oropharyngeal exudate  Eyes:      Conjunctiva/sclera: Conjunctivae normal    Neck:      Musculoskeletal: Neck supple  Cardiovascular:      Rate and Rhythm: Normal rate and regular rhythm  Pulses: Normal pulses  Heart sounds: No murmur  Pulmonary:      Effort: Pulmonary effort is normal  No respiratory distress  Breath sounds: Normal breath sounds  Abdominal:      Palpations: Abdomen is soft  Tenderness: There is no abdominal tenderness  Musculoskeletal: Normal range of motion  Skin:     General: Skin is warm and dry     Neurological:      General: No focal deficit present  Mental Status: She is alert and oriented to person, place, and time     Psychiatric:         Mood and Affect: Mood normal          Behavior: Behavior normal           Sydney Argueta MD  [facility]

## 2021-01-15 NOTE — PATIENT INSTRUCTIONS

## 2021-01-24 DIAGNOSIS — Z23 ENCOUNTER FOR IMMUNIZATION: ICD-10-CM

## 2021-02-07 DIAGNOSIS — F33.2 SEVERE EPISODE OF RECURRENT MAJOR DEPRESSIVE DISORDER, WITHOUT PSYCHOTIC FEATURES (HCC): ICD-10-CM

## 2021-02-12 ENCOUNTER — TELEMEDICINE (OUTPATIENT)
Dept: BEHAVIORAL/MENTAL HEALTH CLINIC | Facility: CLINIC | Age: 51
End: 2021-02-12
Payer: COMMERCIAL

## 2021-02-12 DIAGNOSIS — F33.2 MAJOR DEPRESSIVE DISORDER, RECURRENT SEVERE WITHOUT PSYCHOTIC FEATURES (HCC): Primary | Chronic | ICD-10-CM

## 2021-02-12 DIAGNOSIS — F43.10 POST TRAUMATIC STRESS DISORDER (PTSD): Chronic | ICD-10-CM

## 2021-02-12 DIAGNOSIS — F41.1 GENERALIZED ANXIETY DISORDER: Chronic | ICD-10-CM

## 2021-02-12 PROCEDURE — 90834 PSYTX W PT 45 MINUTES: CPT | Performed by: SOCIAL WORKER

## 2021-02-12 RX ORDER — LAMOTRIGINE 100 MG/1
TABLET ORAL
Qty: 90 TABLET | Refills: 0 | Status: SHIPPED | OUTPATIENT
Start: 2021-02-12 | End: 2021-05-11

## 2021-02-12 RX ORDER — VENLAFAXINE HYDROCHLORIDE 75 MG/1
CAPSULE, EXTENDED RELEASE ORAL
Qty: 90 CAPSULE | Refills: 0 | Status: SHIPPED | OUTPATIENT
Start: 2021-02-12 | End: 2021-03-05 | Stop reason: SDUPTHER

## 2021-02-16 NOTE — PSYCH
Virtual Regular Visit    This note was not shared with the patient due to this is a psychotherapy note        Assessment/Plan:    Problem List Items Addressed This Visit        Other    Post traumatic stress disorder (PTSD) (Chronic)    Major depressive disorder, recurrent severe without psychotic features ([facility] [address] ) - Primary (Chronic)    Generalized anxiety disorder (Chronic)               Reason for visit is No chief complaint on file  Encounter provider LALY Browning    Provider located at [address] [place] [id number] [phone]      Recent Visits  No visits were found meeting these conditions  Showing recent visits within past 7 days and meeting all other requirements     Future Appointments  No visits were found meeting these conditions  Showing future appointments within next 150 days and meeting all other requirements        The patient was identified by name and date of birth  Nandini Whatley was informed that this is a telemedicine visit and that the visit is being conducted through [employer] and patient was informed that this is a secure, HIPAA-compliant platform  She agrees to proceed     My office door was closed  The patient was notified the following individuals were present in the room lisa Dumas  She acknowledged consent and understanding of privacy and security of the video platform  The patient has agreed to participate and understands they can discontinue the visit at any time  Patient is aware this is a billable service  Subjective  Nandini Whatley is a 45 y o  female  DATA: Met with Susan Allen for scheduled individual session  This session was attended by Catalina Ferrer Student Intern  Susan Allen provided verbal consent for the Student Intern to sit in on this and future sessions   She acknowledges understanding that she can opt out of student observations/participation at any time  Topics of discussion included work-related stressors; health-related issues; family concerns  Aryan Beasley discussed her concerns about her ex-'s communications with her  She states that he occasionally reaches out to her to ask about their son  He then becomes inappropriate with her  She states that she does not want to tell her current , because she does not want him to be angry  She states that she shuts down the conversation with her ex- and refuses to engage with him about any topic other than their son  Aryan Beasley states that she continues to improve physically after her recent covid infection  She states that work remains her primary stressor  Client shows evidence of utilizing some basic mindfulness-based skills to manage mental health symptoms  During this session, this clinician used the following therapeutic modalities: supportive psychotherapy, client-centered therapy, mindfulness-based strategies, DBT-informed skills, Motivational Interviewing and solution-focused therapy  ASSESSMENT: Susan Allen presents with a primarily euthymic mood  Her affect is normal range and intensity, appropriate  Susan Allen exhibits good therapeutic rapport with this clinician  Susan Allen continues to exhibit willingness to work on treatment goals and objectives  Susan Allen presents with a minimal risk of suicide, minimal risk of self-harm, and minimal risk of harm to others  PLAN: Susan Allen will return in four weeks for the next scheduled session  Between sessions, Susan Allen will continue to monitor her moods and anxiety management skills and will report back during the next session re: successes and barriers   At the next session, this clinician will use supportive psychotherapy, client-centered therapy, mindfulness-based strategies, DBT-informed skills, Motivational Interviewing and solution-focused therapy to address her mood regulation and relationship issues, in an effort to assist [facility] with meeting treatment goals  HPI     Past Medical History:   Diagnosis Date    ADHD (attention deficit hyperactivity disorder)     Bulging lumbar disc     Carpal tunnel syndrome     RIGHT  LAST ASSESSED: 16    Chronic back pain     low    Chronic pain disorder     Colon polyps     Diabetes mellitus ([address] )     Resolved post weight loss    GERD (gastroesophageal reflux disease)     Gestational diabetes     Hearing loss     left ear    Hyperlipidemia     Resolved with weight loss    Hyponatremia 2020    IBS (irritable bowel syndrome)     Ileus ([address] )     LAST ASSESSED: 8/3/17    Labial cyst     LAST ASSESSED: 16    Myofascial pain     LAST ASSESSED: 16    Obesity     Ovarian cyst     LEFT  LAST ASSESSED: 16    Panic attack     Pap smear for cervical cancer screening     2018--pap wnl, HRHPV neg    Pneumonia     Seasonal allergies     Thoracic outlet syndrome     2010    Trochanteric bursitis of both hips     LAST ASSESSED: 3/18/16    Ulnar neuropathy at elbow     Varicella     Wears glasses        Past Surgical History:   Procedure Laterality Date    BILE DUCT EXPLORATION      ENDOSCOPIC REMOVAL OF STONES FROM BILIARY TRACT     SECTION      x3    CHOLECYSTECTOMY      COLONOSCOPY      -polyp, repeat     DILATION AND CURETTAGE OF UTERUS      ENDOMETRIAL ABLATION      ERCP W/ SPHICTEROTOMY      FIRST RIB REMOVAL      THORAX EXCISION OF FIRST RIB    HYSTEROSCOPY      NEUROPLASTY / TRANSPOSITION ULNAR NERVE AT ELBOW Right     TN COLONOSCOPY FLX DX W/COLLJ SPEC WHEN PFRMD N/A 2017    Procedure: COLONOSCOPY;  Surgeon: Yudelka Bethea MD;  [facility]; Service: Gastroenterology    TN ESOPHAGOGASTRODUODENOSCOPY TRANSORAL DIAGNOSTIC N/A 2017    Procedure: ESOPHAGOGASTRODUODENOSCOPY (EGD); Surgeon: Linda Trotter MD;  [facility];   Service: Gastroenterology    TN HYSTEROSCOPY,W/ENDO BX N/A 10/20/2017 Procedure: DILATATION AND CURETTAGE (D&C) WITH HYSTEROSCOPY  REMOVAL VULVAR RT  LESION;  Surgeon: Luis Manuel Montanez MD;  [facility];  Service: Gynecology    ME LAP, ÁLVARO RESTRICT PROC, LONGITUDINAL GASTRECTOMY N/A 2/6/2018    Procedure: GASTRECTOMY SLEEVE LAPAROSCOPIC; INTRAOPERATIVE EGD ;  Surgeon: Ellen Mcgowan MD;  [facility];  Service: [facility] SURG IMPLNT [address] Left 1/29/2020    Procedure: Insertion of thoracic spinal cord stimulator electrode via laminotomy and placement of left buttock implantable pulse generator (NEUROMONITORING);   Surgeon: Sincere Wilkinson MD;  [facility];  Service: Neurosurgery    SPINAL CORD STIMULATOR TRIAL W/ LAMINOTOMY      TONSILLECTOMY AND ADENOIDECTOMY      TUBAL LIGATION      VEIN LIGATION AND STRIPPING Right     VULVA SURGERY  10/20/2017    BIOPSY    WISDOM TOOTH EXTRACTION         Current Outpatient Medications   Medication Sig Dispense Refill    atoMOXetine (STRATTERA) 25 mg capsule Take 1 capsule (25 mg total) by mouth daily 90 capsule 1    Biotin 94589 MCG TABS Take by mouth      Calcium Carbonate-Vit D-Min (CALCIUM 1200 PO) Take 2,400 mg by mouth daily      dexamethasone (DECADRON) 2 mg tablet       dextromethorphan-guaiFENesin (ROBITUSSIN DM)  mg/5 mL syrup Take 5 mL by mouth 3 (three) times a day as needed for cough 118 mL 0    drospirenone-ethinyl estradiol (LIEN) 3-0 02 MG per tablet Take 1 tablet by mouth daily 84 tablet 4    lamoTRIgine (LaMICtal) 100 mg tablet TAKE ONE TABLET BY MOUTH DAILY 90 tablet 0    lamoTRIgine (LaMICtal) 150 MG tablet TAKE ONE TABLET BY MOUTH DAILY 90 tablet 0    meloxicam (MOBIC) 7 5 mg tablet Take 1 tablet (7 5 mg total) by mouth daily 15 tablet 0    methocarbamol (ROBAXIN) 750 mg tablet Take 1 tablet (750 mg total) by mouth every 8 (eight) hours 90 tablet 1    montelukast (SINGULAIR) 10 mg tablet TAKE ONE TABLET BY MOUTH AT BEDTIME DAILY 90 tablet 3    Multiple Vitamins-Minerals (MULTI COMPLETE PO) Take by mouth      multivitamin-minerals (CENTRUM ADULTS) tablet Take 1 tablet by mouth daily 30 tablet 0    pantoprazole (PROTONIX) 40 mg tablet Take 1 tablet (40 mg total) by mouth daily 90 tablet 3    Polypodium Leucotomos (Heliocare) 240 MG CAPS Take two tablets once a day 60 capsule 3    Probiotic Product (PRO-BIOTIC BLEND PO) Take by mouth as needed       propranolol (INDERAL LA) 60 mg 24 hr capsule Take 1 capsule (60 mg total) by mouth 2 (two) times a day 180 capsule 3    QUEtiapine (SEROquel) 50 mg tablet Take 1 5 tablets (75 mg total) by mouth daily at bedtime 135 tablet 1    traMADol (ULTRAM) 50 mg tablet Take 50 mg by mouth every 6 (six) hours as needed for moderate pain      venlafaxine (EFFEXOR-XR) 150 mg 24 hr capsule Take 1 capsule (150 mg total) by mouth daily 90 capsule 0    venlafaxine (EFFEXOR-XR) 75 mg 24 hr capsule TAKE ONE CAPSULE BY MOUTH DAILY 90 capsule 0     No current facility-administered medications for this visit  Allergies   Allergen Reactions    Ibuprofen Other (See Comments)     Due to gastric sleeve -can only take for 5 days, then needs to stop       Review of Systems    Video Exam    There were no vitals filed for this visit  Physical Exam     I spent 50 minutes directly with the patient during this visit      VIRTUAL VISIT DISCLAIMER    Malachi Nunez acknowledges that she has consented to an online visit or consultation  She understands that the online visit is based solely on information provided by her, and that, in the absence of a face-to-face physical evaluation by the physician, the diagnosis she receives is both limited and provisional in terms of accuracy and completeness  This is not intended to replace a full medical face-to-face evaluation by the physician  Malachi Nunez understands and accepts these terms

## 2021-02-18 ENCOUNTER — TELEMEDICINE (OUTPATIENT)
Dept: CARDIOLOGY CLINIC | Facility: CLINIC | Age: 51
End: 2021-02-18
Payer: COMMERCIAL

## 2021-02-18 DIAGNOSIS — R06.02 EXERTIONAL SHORTNESS OF BREATH: Primary | ICD-10-CM

## 2021-02-18 PROCEDURE — 99214 OFFICE O/P EST MOD 30 MIN: CPT | Performed by: INTERNAL MEDICINE

## 2021-02-18 NOTE — PROGRESS NOTES
Virtual Regular Visit      Assessment/Plan:  46 yo female:  Problem List Items Addressed This Visit     None      Visit Diagnoses     Exertional shortness of breath    -  Primary    Relevant Orders    Echo stress test w contrast if indicated    Complete PFT with post bronchodilator      1  SVT- Atrial tachycardia seen on Zio patch  Symptomatic  She also has likey inappropriate sinus tachycardia  SYmtomatic w palpitations and dizzyness  Abrupt onset and offset of tachcyardia sometimes, others gradual  HR  140bpm during episodes mostly  Tachycardia w minimal exercise on stress test  Normal echo     On propranolol 60mg bid and feeling better but now since Covid shortness of breath is worse even though palpitations are better  2  S/p Covid mild protocol, received inpatient care/[facility]  She said worse sickness she ever had, Dec 2020  2  ANxiety on mutlple meds  3  ON Stattera ADHD med  4  Chronic pain and spinal stimulator      Plan  1) Check stress echo given exertional shortness of breath w/o palpitations to r/o ischemia, see if any structural heart disease after Covid, etc  Also can assess rhythm  2) Check PFT and f/u CXR as maybe shortness of breath is primary pulmonary issue and not cardiac  Reason for visit is   Chief Complaint   Patient presents with    Virtual Regular Visit        Encounter provider Shelley Tolbert MD    Provider located at [address] [place] [address]      Recent Visits  No visits were found meeting these conditions  Showing recent visits within past 7 days and meeting all other requirements     Today's Visits  Date Type Provider Dept   02/18/21 Telemedicine Shelley Tolbert MD [facility] [address] today's visits and meeting all other requirements     Future Appointments  No visits were found meeting these conditions     Showing future appointments within next 150 days and meeting all other requirements        The patient was identified by name and date of birth  Charan Méndez was informed that this is a telemedicine visit and that the visit is being conducted through [facility] and patient was informed that this is a secure, HIPAA-compliant platform  She agrees to proceed     My office door was closed  No one else was in the room  She acknowledged consent and understanding of privacy and security of the video platform  The patient has agreed to participate and understands they can discontinue the visit at any time  Patient is aware this is a billable service  Subjective  Charan Méndez is a 45 y o  female with atrial tachycardia and inappropriate sinus tachycardia who is post COVID 2 months ago and now having exertional shortness of breath more than palpitations which are better on propranolol  She feels very short of breath working 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 (She works in [employer])  No CP/edema  Rare cough  HPI     Past Medical History:   Diagnosis Date    ADHD (attention deficit hyperactivity disorder)     Bulging lumbar disc     Carpal tunnel syndrome     RIGHT  LAST ASSESSED: 12/7/16    Chronic back pain     low    Chronic pain disorder     Colon polyps     Diabetes mellitus ([address] )     Resolved post weight loss    GERD (gastroesophageal reflux disease)     Gestational diabetes     Hearing loss     left ear    Hyperlipidemia     Resolved with weight loss    Hyponatremia 12/12/2020    IBS (irritable bowel syndrome)     Ileus ([address] )     LAST ASSESSED: 8/3/17    Labial cyst     LAST ASSESSED: 4/21/16    Myofascial pain     LAST ASSESSED: 4/12/16    Obesity     Ovarian cyst     LEFT   LAST ASSESSED: 9/2/16    Panic attack     Pap smear for cervical cancer screening     4/2018--pap wnl, HRHPV neg    Pneumonia     Seasonal allergies     Thoracic outlet syndrome     2010    Trochanteric bursitis of both hips     LAST ASSESSED: 3/18/16    Ulnar neuropathy at elbow     Varicella     Wears glasses        Past Surgical History:   Procedure Laterality Date    BILE DUCT EXPLORATION      ENDOSCOPIC REMOVAL OF STONES FROM BILIARY TRACT     SECTION      x3    CHOLECYSTECTOMY      COLONOSCOPY      2017-polyp, repeat     DILATION AND CURETTAGE OF UTERUS      ENDOMETRIAL ABLATION      ERCP W/ SPHICTEROTOMY      FIRST RIB REMOVAL      THORAX EXCISION OF FIRST RIB    HYSTEROSCOPY      NEUROPLASTY / TRANSPOSITION ULNAR NERVE AT ELBOW Right     ID COLONOSCOPY FLX DX W/COLLJ SPEC WHEN PFRMD N/A 2017    Procedure: COLONOSCOPY;  Surgeon: Thomas Garcia MD;  [facility]; Service: Gastroenterology    ID ESOPHAGOGASTRODUODENOSCOPY TRANSORAL DIAGNOSTIC N/A 2017    Procedure: ESOPHAGOGASTRODUODENOSCOPY (EGD); Surgeon: Di Peacock MD;  [facility]; Service: Gastroenterology    ID HYSTEROSCOPY,W/ENDO BX N/A 10/20/2017    Procedure: DILATATION AND CURETTAGE (D&C) WITH HYSTEROSCOPY  REMOVAL VULVAR RT  LESION;  Surgeon: Estee Mcmullen MD;  [facility];  Service: Gynecology    ID LAP, ÁLVARO RESTRICT PROC, LONGITUDINAL GASTRECTOMY N/A 2018    Procedure: GASTRECTOMY SLEEVE LAPAROSCOPIC; INTRAOPERATIVE EGD ;  Surgeon: Tye Anderson MD;  [facility];  Service: Chris Heal SURG IMPLNT [address] Left 2020    Procedure: Insertion of thoracic spinal cord stimulator electrode via laminotomy and placement of left buttock implantable pulse generator (NEUROMONITORING);   Surgeon: Sol Zayas MD;  [facility];  Service: Neurosurgery    SPINAL CORD STIMULATOR TRIAL W/ LAMINOTOMY      TONSILLECTOMY AND ADENOIDECTOMY      TUBAL LIGATION      VEIN LIGATION AND STRIPPING Right     VULVA SURGERY  10/20/2017    BIOPSY    WISDOM TOOTH EXTRACTION         Current Outpatient Medications   Medication Sig Dispense Refill    atoMOXetine (STRATTERA) 25 mg capsule Take 1 capsule (25 mg total) by mouth daily 90 capsule 1    Biotin 62277 MCG TABS Take by mouth      Calcium Carbonate-Vit D-Min (CALCIUM 1200 PO) Take 2,400 mg by mouth daily      dexamethasone (DECADRON) 2 mg tablet       dextromethorphan-guaiFENesin (ROBITUSSIN DM)  mg/5 mL syrup Take 5 mL by mouth 3 (three) times a day as needed for cough 118 mL 0    drospirenone-ethinyl estradiol (LIEN) 3-0 02 MG per tablet Take 1 tablet by mouth daily 84 tablet 4    lamoTRIgine (LaMICtal) 100 mg tablet TAKE ONE TABLET BY MOUTH DAILY 90 tablet 0    lamoTRIgine (LaMICtal) 150 MG tablet TAKE ONE TABLET BY MOUTH DAILY 90 tablet 0    meloxicam (MOBIC) 7 5 mg tablet Take 1 tablet (7 5 mg total) by mouth daily 15 tablet 0    methocarbamol (ROBAXIN) 750 mg tablet Take 1 tablet (750 mg total) by mouth every 8 (eight) hours 90 tablet 1    montelukast (SINGULAIR) 10 mg tablet TAKE ONE TABLET BY MOUTH AT BEDTIME DAILY 90 tablet 3    Multiple Vitamins-Minerals (MULTI COMPLETE PO) Take by mouth      multivitamin-minerals (CENTRUM ADULTS) tablet Take 1 tablet by mouth daily 30 tablet 0    pantoprazole (PROTONIX) 40 mg tablet Take 1 tablet (40 mg total) by mouth daily 90 tablet 3    Polypodium Leucotomos (Heliocare) 240 MG CAPS Take two tablets once a day 60 capsule 3    Probiotic Product (PRO-BIOTIC BLEND PO) Take by mouth as needed       propranolol (INDERAL LA) 60 mg 24 hr capsule Take 1 capsule (60 mg total) by mouth 2 (two) times a day 180 capsule 3    QUEtiapine (SEROquel) 50 mg tablet Take 1 5 tablets (75 mg total) by mouth daily at bedtime 135 tablet 1    traMADol (ULTRAM) 50 mg tablet Take 50 mg by mouth every 6 (six) hours as needed for moderate pain      venlafaxine (EFFEXOR-XR) 150 mg 24 hr capsule Take 1 capsule (150 mg total) by mouth daily 90 capsule 0    venlafaxine (EFFEXOR-XR) 75 mg 24 hr capsule TAKE ONE CAPSULE BY MOUTH DAILY 90 capsule 0     No current facility-administered medications for this visit           Allergies Allergen Reactions    Ibuprofen Other (See Comments)     Due to gastric sleeve -can only take for 5 days, then needs to stop     Complete 12 point ROS reviewed and otherwise non pertinent or negative except as per HPI  Video Exam  GEN: Appears well  NAD, alert and oriented x3  Neck supple no JVD  Face symetric  HEENT: Mucus membranes appear most, eyes anicteric and midline  Pulm: No obvious respiratory distress  Normal effert and rate  No audible wheezing  Skin appears normal color, no pallor or erythema or rash  [employer] Psych: Normal affect, judgement, linear speech  Vitals She reports home BP and HR wnl  Numbers unavailable  mI spent 30 minutes directly with the patient during this visit  Including chart review and documentation  VIRTUAL VISIT DISCLAIMER    Nandini Whatley acknowledges that she has consented to an online visit or consultation  She understands that the online visit is based solely on information provided by her, and that, in the absence of a face-to-face physical evaluation by the physician, the diagnosis she receives is both limited and provisional in terms of accuracy and completeness  This is not intended to replace a full medical face-to-face evaluation by the physician  Nandini Whatley understands and accepts these terms

## 2021-03-05 ENCOUNTER — TELEMEDICINE (OUTPATIENT)
Dept: PSYCHIATRY | Facility: CLINIC | Age: 51
End: 2021-03-05
Payer: COMMERCIAL

## 2021-03-05 DIAGNOSIS — F33.2 SEVERE EPISODE OF RECURRENT MAJOR DEPRESSIVE DISORDER, WITHOUT PSYCHOTIC FEATURES (HCC): Primary | ICD-10-CM

## 2021-03-05 DIAGNOSIS — F90.2 ADHD (ATTENTION DEFICIT HYPERACTIVITY DISORDER), COMBINED TYPE: ICD-10-CM

## 2021-03-05 DIAGNOSIS — F33.2 MDD (MAJOR DEPRESSIVE DISORDER), RECURRENT SEVERE, WITHOUT PSYCHOSIS (HCC): ICD-10-CM

## 2021-03-05 PROCEDURE — 99213 OFFICE O/P EST LOW 20 MIN: CPT | Performed by: NURSE PRACTITIONER

## 2021-03-05 RX ORDER — VENLAFAXINE HYDROCHLORIDE 75 MG/1
75 CAPSULE, EXTENDED RELEASE ORAL DAILY
Qty: 90 CAPSULE | Refills: 0 | Status: SHIPPED | OUTPATIENT
Start: 2021-03-05 | End: 2021-06-11 | Stop reason: SDUPTHER

## 2021-03-05 RX ORDER — ATOMOXETINE 25 MG/1
25 CAPSULE ORAL DAILY
Qty: 90 CAPSULE | Refills: 1 | Status: SHIPPED | OUTPATIENT
Start: 2021-03-05 | End: 2021-06-11

## 2021-03-05 RX ORDER — QUETIAPINE FUMARATE 50 MG/1
75 TABLET, FILM COATED ORAL
Qty: 135 TABLET | Refills: 1 | Status: SHIPPED | OUTPATIENT
Start: 2021-03-05 | End: 2021-10-07 | Stop reason: SDUPTHER

## 2021-03-05 RX ORDER — LAMOTRIGINE 150 MG/1
150 TABLET ORAL DAILY
Qty: 90 TABLET | Refills: 0 | Status: SHIPPED | OUTPATIENT
Start: 2021-03-05 | End: 2021-06-11 | Stop reason: SDUPTHER

## 2021-03-05 RX ORDER — VENLAFAXINE HYDROCHLORIDE 150 MG/1
150 CAPSULE, EXTENDED RELEASE ORAL DAILY
Qty: 90 CAPSULE | Refills: 0 | Status: SHIPPED | OUTPATIENT
Start: 2021-03-05 | End: 2021-06-11 | Stop reason: SDUPTHER

## 2021-03-05 NOTE — PSYCH
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 face-to-face physical evaluation by the physician, the diagnosis she receives is both limited and provisional in terms of accuracy and completeness  This is not intended to replace a full medical face-to-face evaluation by the physician  Wolfgang Massey understands and accepts these terms  Portions of the record may have been created with voice recognition software  Occasional wrong word or "sound a like" substitutions may have occurred due to the inherent limitations of voice recognition software  Read the chart carefully and recognize, using context, where substitutions have occurred

## 2021-03-06 ENCOUNTER — IMMUNIZATIONS (OUTPATIENT)
Dept: FAMILY MEDICINE CLINIC | Facility: HOSPITAL | Age: 51
End: 2021-03-06

## 2021-03-06 DIAGNOSIS — Z23 ENCOUNTER FOR IMMUNIZATION: Primary | ICD-10-CM

## 2021-03-06 PROCEDURE — 0001A SARS-COV-2 / COVID-19 MRNA VACCINE (PFIZER-BIONTECH) 30 MCG: CPT

## 2021-03-06 PROCEDURE — 91300 SARS-COV-2 / COVID-19 MRNA VACCINE (PFIZER-BIONTECH) 30 MCG: CPT

## 2021-03-10 ENCOUNTER — TELEPHONE (OUTPATIENT)
Dept: PSYCHIATRY | Facility: CLINIC | Age: 51
End: 2021-03-10

## 2021-03-10 NOTE — TELEPHONE ENCOUNTER
Patient has next appointment scheduled in office, made a note on appointment desk to collect copay and any outstanding balances

## 2021-03-12 ENCOUNTER — TELEMEDICINE (OUTPATIENT)
Dept: BEHAVIORAL/MENTAL HEALTH CLINIC | Facility: CLINIC | Age: 51
End: 2021-03-12
Payer: COMMERCIAL

## 2021-03-12 DIAGNOSIS — F43.10 POST TRAUMATIC STRESS DISORDER (PTSD): Chronic | ICD-10-CM

## 2021-03-12 DIAGNOSIS — F41.1 GENERALIZED ANXIETY DISORDER: Chronic | ICD-10-CM

## 2021-03-12 DIAGNOSIS — F33.2 MAJOR DEPRESSIVE DISORDER, RECURRENT SEVERE WITHOUT PSYCHOTIC FEATURES (HCC): Primary | Chronic | ICD-10-CM

## 2021-03-12 PROCEDURE — 90834 PSYTX W PT 45 MINUTES: CPT | Performed by: SOCIAL WORKER

## 2021-03-12 NOTE — PSYCH
Virtual Regular Visit    This note was not shared with the patient due to this is a psychotherapy note    Assessment/Plan:    Problem List Items Addressed This Visit        Other    Post traumatic stress disorder (PTSD) (Chronic)    Major depressive disorder, recurrent severe without psychotic features ([facility] [address] ) - Primary (Chronic)    Generalized anxiety disorder (Chronic)           Reason for visit is Behavioral Health session, conducted through video, due to COVID-19 precautions  Chio Kohli has verbalized a preference to continue with virtual sessions at this time  Chio Kohli has been offered in-person sessions and has declined  Encounter provider LALY Harper    Provider located at [address] [place] [id number] [phone]      Recent Visits  No visits were found meeting these conditions  Showing recent visits within past 7 days and meeting all other requirements     Future Appointments  No visits were found meeting these conditions  Showing future appointments within next 150 days and meeting all other requirements        The patient was identified by name and date of birth  Blinda Boeck was informed that this is a telemedicine visit and that the visit is being conducted through [employer] and patient was informed that this is a secure, HIPAA-compliant platform  She agrees to proceed     My office door was closed  The patient was notified the following individuals were present in the room: Amina Verdugo, student intern  She acknowledged consent and understanding of privacy and security of the video platform  The patient has agreed to participate and understands they can discontinue the visit at any time  Patient is aware this is a billable service  Subjective  Blinda Boeck is a 45 y o  female  DATA: Met with Chio Kohli for scheduled individual session   This session was attended by Blake Quintero, Student Intern  James Salazar provided verbal consent for the Student Intern to sit in on this and future sessions  She acknowledges understanding that she can opt out of student observations/participation at any time  Topics of discussion included [place] current paper that was do for one of her classes  Once in session Noe Fan was asked how her medications been working and Noe Fan reported well  Noe Fan did express that her anxiety has been little more up than usual, but client showed good evidence of her awareness around making sure she is taking her medication daily  During this session, this clinician used the following therapeutic modalities: client-centered therapy and solution focused therapy  The clinician assigned the following for the client to complete prior to the next session: being aware on what helps her anxiety  Yessy rates having no levels of depression  ASSESSMENT: Noe Fan presents with a euthymic mood  Full-range affect is congruent for mood  Noe Fan exhibits a trusted rapport with this clinician with evidence of sharing great details on current anxieties  Noe Fan continues to exhibit willingness to work on treatment goals and objectives  Noe Fan presents with minimal risk of suicide, minimal risk of self-harm, and minimal risk of harm to others  PLAN: Noe Fan will return in four weeks for the next scheduled session  Between sessions, Noe Fan will work on tracking what helps relieve her anxieties and will report back during the next session re: successes and barriers  At the next session, this clinician will use client-centered and solution-focus to address her symptoms of anxiety, in an effort to assist Noe Fan with meeting treatment goals  HPI     Past Medical History:   Diagnosis Date    ADHD (attention deficit hyperactivity disorder)     Bulging lumbar disc     Carpal tunnel syndrome     RIGHT    LAST ASSESSED: 12/7/16    Chronic back pain     low    Chronic pain disorder     Colon polyps     Diabetes mellitus ([facility] [address] )     Resolved post weight loss    GERD (gastroesophageal reflux disease)     Gestational diabetes     Hearing loss     left ear    Hyperlipidemia     Resolved with weight loss    Hyponatremia 2020    IBS (irritable bowel syndrome)     Ileus ([facility] [address] )     LAST ASSESSED: 8/3/17    Labial cyst     LAST ASSESSED: 16    Myofascial pain     LAST ASSESSED: 16    Obesity     Ovarian cyst     LEFT  LAST ASSESSED: 16    Panic attack     Pap smear for cervical cancer screening     2018--pap wnl, HRHPV neg    Pneumonia     Seasonal allergies     Thoracic outlet syndrome         Trochanteric bursitis of both hips     LAST ASSESSED: 3/18/16    Ulnar neuropathy at elbow     Varicella     Wears glasses        Past Surgical History:   Procedure Laterality Date    BILE DUCT EXPLORATION      ENDOSCOPIC REMOVAL OF STONES FROM BILIARY TRACT     SECTION      x3    CHOLECYSTECTOMY      COLONOSCOPY      -polyp, repeat     DILATION AND CURETTAGE OF UTERUS      ENDOMETRIAL ABLATION      ERCP W/ SPHICTEROTOMY      FIRST RIB REMOVAL      THORAX EXCISION OF FIRST RIB    HYSTEROSCOPY      NEUROPLASTY / TRANSPOSITION ULNAR NERVE AT ELBOW Right 2011    [place] COLONOSCOPY FLX DX W/COLLJ SPEC WHEN PFRMD N/A 2017    Procedure: COLONOSCOPY;  Surgeon: Agustin Nash MD;  [facility]; Service: Gastroenterology    W[place] ESOPHAGOGASTRODUODENOSCOPY TRANSORAL DIAGNOSTIC N/A 2017    Procedure: ESOPHAGOGASTRODUODENOSCOPY (EGD); Surgeon: Graeme Mensah MD;  [facility];   Service: Gastroenterology    [place] HYSTEROSCOPY,W/ENDO BX N/A 10/20/2017    Procedure: DILATATION AND CURETTAGE (D&C) WITH HYSTEROSCOPY  REMOVAL VULVAR RT  LESION;  Surgeon: Pal Salamanca MD;  [facility];  Service: Gynecology    W[place] LAP, ÁLVARO RESTRICT PROC, LONGITUDINAL GASTRECTOMY N/A 2018    Procedure: GASTRECTOMY SLEEVE LAPAROSCOPIC; INTRAOPERATIVE EGD ;  Surgeon: Jeremy Alejo MD;  [facility];  Service: [place] [facility] SURG IMPLNT [address] Left 1/29/2020    Procedure: Insertion of thoracic spinal cord stimulator electrode via laminotomy and placement of left buttock implantable pulse generator (NEUROMONITORING);   Surgeon: Matt Prieto MD;  [facility];  Service: Neurosurgery    SPINAL CORD STIMULATOR TRIAL W/ LAMINOTOMY      TONSILLECTOMY AND ADENOIDECTOMY      TUBAL LIGATION      VEIN LIGATION AND STRIPPING Right     VULVA SURGERY  10/20/2017    BIOPSY    WISDOM TOOTH EXTRACTION         Current Outpatient Medications   Medication Sig Dispense Refill    atoMOXetine (STRATTERA) 25 mg capsule Take 1 capsule (25 mg total) by mouth daily 90 capsule 1    Biotin 73015 MCG TABS Take by mouth      Calcium Carbonate-Vit D-Min (CALCIUM 1200 PO) Take 2,400 mg by mouth daily      dexamethasone (DECADRON) 2 mg tablet       dextromethorphan-guaiFENesin (ROBITUSSIN DM)  mg/5 mL syrup Take 5 mL by mouth 3 (three) times a day as needed for cough 118 mL 0    drospirenone-ethinyl estradiol (LIEN) 3-0 02 MG per tablet Take 1 tablet by mouth daily 84 tablet 4    lamoTRIgine (LaMICtal) 100 mg tablet TAKE ONE TABLET BY MOUTH DAILY 90 tablet 0    lamoTRIgine (LaMICtal) 150 MG tablet Take 1 tablet (150 mg total) by mouth daily 90 tablet 0    meloxicam (MOBIC) 7 5 mg tablet Take 1 tablet (7 5 mg total) by mouth daily 15 tablet 0    methocarbamol (ROBAXIN) 750 mg tablet Take 1 tablet (750 mg total) by mouth every 8 (eight) hours 90 tablet 1    montelukast (SINGULAIR) 10 mg tablet TAKE ONE TABLET BY MOUTH AT BEDTIME DAILY 90 tablet 3    Multiple Vitamins-Minerals (MULTI COMPLETE PO) Take by mouth      multivitamin-minerals (CENTRUM ADULTS) tablet Take 1 tablet by mouth daily 30 tablet 0    pantoprazole (PROTONIX) 40 mg tablet Take 1 tablet (40 mg total) by mouth daily 90 tablet 3    Polypodium Leucotomos (Heliocare) 240 MG CAPS Take two tablets once a day 60 capsule 3    Probiotic Product (PRO-BIOTIC BLEND PO) Take by mouth as needed       propranolol (INDERAL LA) 60 mg 24 hr capsule Take 1 capsule (60 mg total) by mouth 2 (two) times a day 180 capsule 3    QUEtiapine (SEROquel) 50 mg tablet Take 1 5 tablets (75 mg total) by mouth daily at bedtime 135 tablet 1    traMADol (ULTRAM) 50 mg tablet Take 50 mg by mouth every 6 (six) hours as needed for moderate pain      venlafaxine (EFFEXOR-XR) 150 mg 24 hr capsule Take 1 capsule (150 mg total) by mouth daily 90 capsule 0    venlafaxine (EFFEXOR-XR) 75 mg 24 hr capsule Take 1 capsule (75 mg total) by mouth daily 90 capsule 0     No current facility-administered medications for this visit  Allergies   Allergen Reactions    Ibuprofen Other (See Comments)     Due to gastric sleeve -can only take for 5 days, then needs to stop       Review of Systems    Video Exam    There were no vitals filed for this visit  Physical Exam     I spent 45 minutes directly with the patient during this visit         VIRTUAL VISIT DISCLAIMER    Malachi Nunez acknowledges that she has consented to an online visit or consultation  She understands that the online visit is based solely on information provided by her, and that, in the absence of a face-to-face physical evaluation by the physician, the diagnosis she receives is both limited and provisional in terms of accuracy and completeness  This is not intended to replace a full medical face-to-face evaluation by the physician  Malachi Nunez understands and accepts these terms

## 2021-03-16 ENCOUNTER — TELEPHONE (OUTPATIENT)
Dept: PSYCHIATRY | Facility: CLINIC | Age: 51
End: 2021-03-16

## 2021-03-16 NOTE — TELEPHONE ENCOUNTER
Spoke with patient regarding collection of copay for DOS: 3/12/2021 with Christen Britt LCSW, JOLENE  Patient declined payment over the phone and requested to be billed

## 2021-03-17 DIAGNOSIS — F33.2 SEVERE EPISODE OF RECURRENT MAJOR DEPRESSIVE DISORDER, WITHOUT PSYCHOTIC FEATURES (HCC): ICD-10-CM

## 2021-03-17 RX ORDER — LAMOTRIGINE 100 MG/1
TABLET ORAL
Qty: 90 TABLET | Refills: 0 | OUTPATIENT
Start: 2021-03-17

## 2021-03-24 ENCOUNTER — TELEPHONE (OUTPATIENT)
Dept: PAIN MEDICINE | Facility: CLINIC | Age: 51
End: 2021-03-24

## 2021-03-24 ENCOUNTER — TELEPHONE (OUTPATIENT)
Dept: PSYCHIATRY | Facility: CLINIC | Age: 51
End: 2021-03-24

## 2021-03-24 NOTE — TELEPHONE ENCOUNTER
Patient is asking for another injection  Patient states that she is having low back pain that shoots down her left leg  Patient states that the pain is similar before the STIM implanted  Patient has the STIM turned all the way up   Last injection was 12/2020    Can we order another injection or do you need her to do an office visit first?

## 2021-03-24 NOTE — TELEPHONE ENCOUNTER
Scheduled 6/11 appt with Dr Roxanne Geronimo  Patient was aware that Chato Raymond would not longer be treating her

## 2021-03-25 ENCOUNTER — IMMUNIZATIONS (OUTPATIENT)
Dept: FAMILY MEDICINE CLINIC | Facility: HOSPITAL | Age: 51
End: 2021-03-25

## 2021-03-25 DIAGNOSIS — Z23 ENCOUNTER FOR IMMUNIZATION: Primary | ICD-10-CM

## 2021-03-25 PROCEDURE — 91300 SARS-COV-2 / COVID-19 MRNA VACCINE (PFIZER-BIONTECH) 30 MCG: CPT

## 2021-03-25 PROCEDURE — 0002A SARS-COV-2 / COVID-19 MRNA VACCINE (PFIZER-BIONTECH) 30 MCG: CPT

## 2021-03-30 ENCOUNTER — TELEPHONE (OUTPATIENT)
Dept: NON INVASIVE DIAGNOSTICS | Facility: CLINIC | Age: 51
End: 2021-03-30

## 2021-04-02 ENCOUNTER — TELEMEDICINE (OUTPATIENT)
Dept: FAMILY MEDICINE CLINIC | Facility: CLINIC | Age: 51
End: 2021-04-02
Payer: COMMERCIAL

## 2021-04-02 ENCOUNTER — TELEPHONE (OUTPATIENT)
Dept: FAMILY MEDICINE CLINIC | Facility: CLINIC | Age: 51
End: 2021-04-02

## 2021-04-02 VITALS — SYSTOLIC BLOOD PRESSURE: 125 MMHG | HEART RATE: 80 BPM | DIASTOLIC BLOOD PRESSURE: 80 MMHG

## 2021-04-02 DIAGNOSIS — J01.00 ACUTE NON-RECURRENT MAXILLARY SINUSITIS: ICD-10-CM

## 2021-04-02 DIAGNOSIS — R05.9 COUGH: ICD-10-CM

## 2021-04-02 DIAGNOSIS — R05.9 COUGH: Primary | ICD-10-CM

## 2021-04-02 LAB — SARS-COV-2 RNA RESP QL NAA+PROBE: NEGATIVE

## 2021-04-02 PROCEDURE — U0005 INFEC AGEN DETEC AMPLI PROBE: HCPCS | Performed by: FAMILY MEDICINE

## 2021-04-02 PROCEDURE — U0003 INFECTIOUS AGENT DETECTION BY NUCLEIC ACID (DNA OR RNA); SEVERE ACUTE RESPIRATORY SYNDROME CORONAVIRUS 2 (SARS-COV-2) (CORONAVIRUS DISEASE [COVID-19]), AMPLIFIED PROBE TECHNIQUE, MAKING USE OF HIGH THROUGHPUT TECHNOLOGIES AS DESCRIBED BY CMS-2020-01-R: HCPCS | Performed by: FAMILY MEDICINE

## 2021-04-02 PROCEDURE — 99214 OFFICE O/P EST MOD 30 MIN: CPT | Performed by: FAMILY MEDICINE

## 2021-04-02 RX ORDER — AMOXICILLIN AND CLAVULANATE POTASSIUM 875; 125 MG/1; MG/1
1 TABLET, FILM COATED ORAL EVERY 12 HOURS SCHEDULED
Qty: 14 TABLET | Refills: 0 | Status: SHIPPED | OUTPATIENT
Start: 2021-04-02 | End: 2021-04-09

## 2021-04-02 NOTE — TELEPHONE ENCOUNTER
Pt called she got her second vaccine on 03/25/21  Pt says she has been a cough/runny nose and headache  Pt works in [place] and had [place]id in [address]   Pt asking if that is a side affect or should you see her? pls advise

## 2021-04-02 NOTE — PROGRESS NOTES
COVID-19 Outpatient Progress Note    Assessment/Plan:    Problem List Items Addressed This Visit     None      Visit Diagnoses     Cough    -  Primary    Relevant Orders    Novel Coronavirus (Covid-19),PCR [facility] - Collected at Mobile [facility] or [facility]    Acute non-recurrent maxillary sinusitis        Relevant Medications    amoxicillin-clavulanate (AUGMENTIN) 875-125 mg per tablet         Disposition:     I have spent 15 minutes directly with the patient  Encounter provider Sara Crews MD    Provider located at [address] [address]    Recent Visits  No visits were found meeting these conditions  Showing recent visits within past 7 days and meeting all other requirements     Today's Visits  Date Type Provider Dept   04/02/21 Telemedicine Sara Crews MD [address]   04/02/21 Telephone Aline Chang   Showing today's visits and meeting all other requirements     Future Appointments  No visits were found meeting these conditions  Showing future appointments within next 150 days and meeting all other requirements      This virtual check-in was done via [employer]ohbe and patient was informed that this is a secure, HIPAA-compliant platform  She agrees to proceed  Patient agrees to participate in a virtual check in via telephone or video visit instead of presenting to the office to address urgent/immediate medical needs  Patient is aware this is a billable service  After connecting through [facility], the patient was identified by name and date of birth  Lex Halo was informed that this was a telemedicine visit and that the exam was being conducted confidentially over secure lines  My office door was closed  No one else was in the room  Lex Oliver acknowledged consent and understanding of privacy and security of the telemedicine visit   I informed the patient that I have reviewed her record in Epic and presented the opportunity for her to ask any questions regarding the visit today  The patient agreed to participate  Subjective:   Lex Oliver is a 45 y o  female who is concerned about COVID-19  Patient's symptoms include fatigue, nasal congestion, rhinorrhea, sore throat and cough  Patient denies fever, chills, malaise, anosmia, loss of taste, shortness of breath, chest tightness, abdominal pain, nausea, vomiting, diarrhea, myalgias and headaches  Lab Results   Component Value Date    SARSCOV2 Detected (A) 2020     Past Medical History:   Diagnosis Date    ADHD (attention deficit hyperactivity disorder)     Bulging lumbar disc     Carpal tunnel syndrome     RIGHT  LAST ASSESSED: 16    Chronic back pain     low    Chronic pain disorder     Colon polyps     Diabetes mellitus ([facility] [address] )     Resolved post weight loss    GERD (gastroesophageal reflux disease)     Gestational diabetes     Hearing loss     left ear    Hyperlipidemia     Resolved with weight loss    Hyponatremia 2020    IBS (irritable bowel syndrome)     Ileus ([facility] [address] )     LAST ASSESSED: 8/3/17    Labial cyst     LAST ASSESSED: 16    Myofascial pain     LAST ASSESSED: 16    Obesity     Ovarian cyst     LEFT   LAST ASSESSED: 16    Panic attack     Pap smear for cervical cancer screening     2018--pap wnl, HRHPV neg    Pneumonia     Seasonal allergies     Thoracic outlet syndrome         Trochanteric bursitis of both hips     LAST ASSESSED: 3/18/16    Ulnar neuropathy at elbow     Varicella     Wears glasses      Past Surgical History:   Procedure Laterality Date    BILE DUCT EXPLORATION      ENDOSCOPIC REMOVAL OF STONES FROM BILIARY TRACT     SECTION      x3    CHOLECYSTECTOMY      COLONOSCOPY      -polyp, repeat     DILATION AND CURETTAGE OF UTERUS      ENDOMETRIAL ABLATION      ERCP W/ SPHICTEROTOMY      FIRST RIB REMOVAL      THORAX EXCISION OF FIRST RIB    HYSTEROSCOPY  NEUROPLASTY / TRANSPOSITION ULNAR NERVE AT ELBOW Right 2011    [place] COLONOSCOPY FLX DX W/COLLJ SPEC WHEN PFRMD N/A 5/30/2017    Procedure: COLONOSCOPY;  Surgeon: Jimenez Hurd MD;  [facility]; Service: Gastroenterology    RI ESOPHAGOGASTRODUODENOSCOPY TRANSORAL DIAGNOSTIC N/A 9/14/2017    Procedure: ESOPHAGOGASTRODUODENOSCOPY (EGD); Surgeon: Hemal López MD;  [facility]; Service: Gastroenterology    RI HYSTEROSCOPY,W/ENDO BX N/A 10/20/2017    Procedure: DILATATION AND CURETTAGE (D&C) WITH HYSTEROSCOPY  REMOVAL VULVAR RT  LESION;  Surgeon: Sarai Naidu MD;  [facility];  Service: Gynecology    RI LAP, ÁLVARO RESTRICT PROC, LONGITUDINAL GASTRECTOMY N/A 2/6/2018    Procedure: GASTRECTOMY SLEEVE LAPAROSCOPIC; INTRAOPERATIVE EGD ;  Surgeon: Carito Shaw MD;  [facility];  Service: [facility] SURG IMPLNT [address] Left 1/29/2020    Procedure: Insertion of thoracic spinal cord stimulator electrode via laminotomy and placement of left buttock implantable pulse generator (NEUROMONITORING);   Surgeon: Troy White MD;  [facility];  Service: Neurosurgery    SPINAL CORD STIMULATOR TRIAL W/ LAMINOTOMY      TONSILLECTOMY AND ADENOIDECTOMY      TUBAL LIGATION      VEIN LIGATION AND STRIPPING Right     VULVA SURGERY  10/20/2017    BIOPSY    WISDOM TOOTH EXTRACTION       Current Outpatient Medications   Medication Sig Dispense Refill    atoMOXetine (STRATTERA) 25 mg capsule Take 1 capsule (25 mg total) by mouth daily 90 capsule 1    Biotin 99665 MCG TABS Take by mouth      Calcium Carbonate-Vit D-Min (CALCIUM 1200 PO) Take 2,400 mg by mouth daily      dexamethasone (DECADRON) 2 mg tablet       dextromethorphan-guaiFENesin (ROBITUSSIN DM)  mg/5 mL syrup Take 5 mL by mouth 3 (three) times a day as needed for cough 118 mL 0    drospirenone-ethinyl estradiol (LIEN) 3-0 02 MG per tablet Take 1 tablet by mouth daily 84 tablet 4    lamoTRIgine (LaMICtal) 100 mg tablet TAKE ONE TABLET BY MOUTH DAILY 90 tablet 0    lamoTRIgine (LaMICtal) 150 MG tablet Take 1 tablet (150 mg total) by mouth daily 90 tablet 0    meloxicam (MOBIC) 7 5 mg tablet Take 1 tablet (7 5 mg total) by mouth daily 15 tablet 0    methocarbamol (ROBAXIN) 750 mg tablet Take 1 tablet (750 mg total) by mouth every 8 (eight) hours 90 tablet 1    montelukast (SINGULAIR) 10 mg tablet TAKE ONE TABLET BY MOUTH AT BEDTIME DAILY 90 tablet 3    Multiple Vitamins-Minerals (MULTI COMPLETE PO) Take by mouth      multivitamin-minerals (CENTRUM ADULTS) tablet Take 1 tablet by mouth daily 30 tablet 0    pantoprazole (PROTONIX) 40 mg tablet Take 1 tablet (40 mg total) by mouth daily 90 tablet 3    Polypodium Leucotomos (Heliocare) 240 MG CAPS Take two tablets once a day 60 capsule 3    Probiotic Product (PRO-BIOTIC BLEND PO) Take by mouth as needed       propranolol (INDERAL LA) 60 mg 24 hr capsule Take 1 capsule (60 mg total) by mouth 2 (two) times a day 180 capsule 3    QUEtiapine (SEROquel) 50 mg tablet Take 1 5 tablets (75 mg total) by mouth daily at bedtime 135 tablet 1    traMADol (ULTRAM) 50 mg tablet Take 50 mg by mouth every 6 (six) hours as needed for moderate pain      venlafaxine (EFFEXOR-XR) 150 mg 24 hr capsule Take 1 capsule (150 mg total) by mouth daily 90 capsule 0    venlafaxine (EFFEXOR-XR) 75 mg 24 hr capsule Take 1 capsule (75 mg total) by mouth daily 90 capsule 0    amoxicillin-clavulanate (AUGMENTIN) 875-125 mg per tablet Take 1 tablet by mouth every 12 (twelve) hours for 7 days 14 tablet 0     No current facility-administered medications for this visit  Allergies   Allergen Reactions    Ibuprofen Other (See Comments)     Due to gastric sleeve -can only take for 5 days, then needs to stop       Review of Systems   Constitutional: Positive for fatigue  Negative for chills and fever  HENT: Positive for congestion, rhinorrhea and sore throat      Respiratory: Positive for cough  Negative for chest tightness and shortness of breath  Gastrointestinal: Negative for abdominal pain, diarrhea, nausea and vomiting  Musculoskeletal: Negative for myalgias  Neurological: Negative for headaches  Objective:    Vitals:    04/02/21 1159   BP: 125/80   Pulse: 80       Physical Exam  Constitutional:       Appearance: Normal appearance  Pulmonary:      Effort: Pulmonary effort is normal  No respiratory distress  Neurological:      General: No focal deficit present  Mental Status: She is alert and oriented to person, place, and time  Psychiatric:         Mood and Affect: Mood normal          Behavior: Behavior normal        VIRTUAL VISIT DISCLAIMER    Eryn Martinez acknowledges that she has consented to an online visit or consultation  She understands that the online visit is based solely on information provided by her, and that, in the absence of a face-to-face physical evaluation by the physician, the diagnosis she receives is both limited and provisional in terms of accuracy and completeness  This is not intended to replace a full medical face-to-face evaluation by the physician  Eryn Martinez understands and accepts these terms

## 2021-04-09 ENCOUNTER — TELEMEDICINE (OUTPATIENT)
Dept: BEHAVIORAL/MENTAL HEALTH CLINIC | Facility: CLINIC | Age: 51
End: 2021-04-09
Payer: COMMERCIAL

## 2021-04-09 DIAGNOSIS — F33.2 MAJOR DEPRESSIVE DISORDER, RECURRENT SEVERE WITHOUT PSYCHOTIC FEATURES (HCC): Primary | Chronic | ICD-10-CM

## 2021-04-09 DIAGNOSIS — F43.10 POST TRAUMATIC STRESS DISORDER (PTSD): Chronic | ICD-10-CM

## 2021-04-09 DIAGNOSIS — F41.1 GENERALIZED ANXIETY DISORDER: Chronic | ICD-10-CM

## 2021-04-09 PROCEDURE — 90834 PSYTX W PT 45 MINUTES: CPT | Performed by: SOCIAL WORKER

## 2021-04-15 NOTE — PSYCH
Virtual Regular Visit    This note was not shared with the patient due to this is a psychotherapy note      Assessment/Plan:    Problem List Items Addressed This Visit        Other    Post traumatic stress disorder (PTSD) (Chronic)    Major depressive disorder, recurrent severe without psychotic features ([facility] [address] ) - Primary (Chronic)    Generalized anxiety disorder (Chronic)               Reason for visit is No chief complaint on file  Encounter provider LALY Atwood    Provider located at [address] [place] [id number] [phone]      Recent Visits  No visits were found meeting these conditions  Showing recent visits within past 7 days and meeting all other requirements     Future Appointments  No visits were found meeting these conditions  Showing future appointments within next 150 days and meeting all other requirements        The patient was identified by name and date of birth  Laureen Robison was informed that this is a telemedicine visit and that the visit is being conducted through [employer] and patient was informed that this is a secure, HIPAA-compliant platform  She agrees to proceed     My office door was closed  The patient was notified the following individuals were present in the room: Dominique Garcia student intern  She acknowledged consent and understanding of privacy and security of the video platform  The patient has agreed to participate and understands they can discontinue the visit at any time  Patient is aware this is a billable service  Subjective  Laureen Robison is a 45 y o  female  Goals addressed: Goal #1    DATA: Met with Dennis Carvalho for scheduled individual session  This session was attended by Dominique Garcia, Student Intern  Dennis Carvalho provided verbal consent for the Student Intern to sit in on this and future sessions   She acknowledges understanding that she can opt out of student observations/participation at any time  Topics of discussion included Austen recent altercation with her step-daughter and her location for her sweet 16 birthday party  Katalina Francis was very unhappy with her daughters change of plans and states that she might hold this grudge for a while  Katalina Francis also talked about the one-year debra yesterday of her friend who past away from COVID-19 last year  Katalina Francis explained that it was a very hard day yesterday, but she is feeling better today  Towards the end Katalina Francis talked about her schedule at work changing to what she has been requesting so that makes her very happy  During this session, this clinician used the following therapeutic modalities: solution focus and client-centered  The clinician assigned the following for the client to complete prior to the next session: start her exercising to improve her feelings of her self-image  ASSESSMENT: Katalina Francis presents with a primarily euthymic mood  Austen affect is full-range and is congruent with mood  Katalina Francis exhibits a good rapport with this clinician  Katalina Francis continues to exhibit willingness to work on treatment goals and objectives  Katalina Francis presents with a minimal risk of suicide, minimal risk of self-harm, and minimal risk of harm to others  PLAN: Katalina Francis will return in one month for the next scheduled session  Between sessions, Katalina Francis will work on being more active and will report back during the next session re: successes and barriers  At the next session, this clinician will use client-centered to address concerns related to her step-daughters sweet 16 that is coming up, in an effort to assist Katalina Francis with meeting treatment goals  HPI     Past Medical History:   Diagnosis Date    ADHD (attention deficit hyperactivity disorder)     Bulging lumbar disc     Carpal tunnel syndrome     RIGHT    LAST ASSESSED: 12/7/16    Chronic back pain     low    Chronic pain disorder     Colon polyps     Diabetes mellitus ([facility] [address] )     Resolved post weight loss    GERD (gastroesophageal reflux disease)     Gestational diabetes     Hearing loss     left ear    Hyperlipidemia     Resolved with weight loss    Hyponatremia 2020    IBS (irritable bowel syndrome)     Ileus ([facility] [address] )     LAST ASSESSED: 8/3/17    Labial cyst     LAST ASSESSED: 16    Myofascial pain     LAST ASSESSED: 16    Obesity     Ovarian cyst     LEFT  LAST ASSESSED: 16    Panic attack     Pap smear for cervical cancer screening     2018--pap wnl, HRHPV neg    Pneumonia     Seasonal allergies     Thoracic outlet syndrome     2010    Trochanteric bursitis of both hips     LAST ASSESSED: 3/18/16    Ulnar neuropathy at elbow     Varicella     Wears glasses        Past Surgical History:   Procedure Laterality Date    BILE DUCT EXPLORATION      ENDOSCOPIC REMOVAL OF STONES FROM BILIARY TRACT     SECTION      x3    CHOLECYSTECTOMY      COLONOSCOPY      -polyp, repeat     DILATION AND CURETTAGE OF UTERUS      ENDOMETRIAL ABLATION      ERCP W/ SPHICTEROTOMY      FIRST RIB REMOVAL      THORAX EXCISION OF FIRST RIB    HYSTEROSCOPY      NEUROPLASTY / TRANSPOSITION ULNAR NERVE AT ELBOW Right 2011    [place] COLONOSCOPY FLX DX W/COLLJ SPEC WHEN PFRMD N/A 2017    Procedure: COLONOSCOPY;  Surgeon: Dmitriy Alberto MD;  [facility]; Service: Gastroenterology    WV ESOPHAGOGASTRODUODENOSCOPY TRANSORAL DIAGNOSTIC N/A 2017    Procedure: ESOPHAGOGASTRODUODENOSCOPY (EGD); Surgeon: Dameon Katz MD;  [facility];   Service: Gastroenterology    [place] HYSTEROSCOPY,W/ENDO BX N/A 10/20/2017    Procedure: DILATATION AND CURETTAGE (D&C) WITH HYSTEROSCOPY  REMOVAL VULVAR RT  LESION;  Surgeon: Rik Ponce MD;  [facility];  Service: Gynecology    WV LAP, ÁLVARO RESTRICT [address], LONGITUDINAL GASTRECTOMY N/A 2018    Procedure: GASTRECTOMY SLEEVE LAPAROSCOPIC; INTRAOPERATIVE EGD ;  Surgeon: Nash Metzger Nima Duncan MD;  [facility];  Service: Agustina Roots SURG IMPLNT [address] Left 1/29/2020    Procedure: Insertion of thoracic spinal cord stimulator electrode via laminotomy and placement of left buttock implantable pulse generator (NEUROMONITORING);   Surgeon: Massiel King MD;  [facility];  Service: Neurosurgery    SPINAL CORD STIMULATOR TRIAL W/ LAMINOTOMY      TONSILLECTOMY AND ADENOIDECTOMY      TUBAL LIGATION      VEIN LIGATION AND STRIPPING Right     VULVA SURGERY  10/20/2017    BIOPSY    WISDOM TOOTH EXTRACTION         Current Outpatient Medications   Medication Sig Dispense Refill    atoMOXetine (STRATTERA) 25 mg capsule Take 1 capsule (25 mg total) by mouth daily 90 capsule 1    Biotin 16834 MCG TABS Take by mouth      Calcium Carbonate-Vit D-Min (CALCIUM 1200 PO) Take 2,400 mg by mouth daily      dexamethasone (DECADRON) 2 mg tablet       dextromethorphan-guaiFENesin (ROBITUSSIN DM)  mg/5 mL syrup Take 5 mL by mouth 3 (three) times a day as needed for cough 118 mL 0    drospirenone-ethinyl estradiol (LIEN) 3-0 02 MG per tablet Take 1 tablet by mouth daily 84 tablet 4    lamoTRIgine (LaMICtal) 100 mg tablet TAKE ONE TABLET BY MOUTH DAILY 90 tablet 0    lamoTRIgine (LaMICtal) 150 MG tablet Take 1 tablet (150 mg total) by mouth daily 90 tablet 0    meloxicam (MOBIC) 7 5 mg tablet Take 1 tablet (7 5 mg total) by mouth daily 15 tablet 0    methocarbamol (ROBAXIN) 750 mg tablet Take 1 tablet (750 mg total) by mouth every 8 (eight) hours 90 tablet 1    montelukast (SINGULAIR) 10 mg tablet TAKE ONE TABLET BY MOUTH AT BEDTIME DAILY 90 tablet 3    Multiple Vitamins-Minerals (MULTI COMPLETE PO) Take by mouth      multivitamin-minerals (CENTRUM ADULTS) tablet Take 1 tablet by mouth daily 30 tablet 0    pantoprazole (PROTONIX) 40 mg tablet Take 1 tablet (40 mg total) by mouth daily 90 tablet 3    Polypodium Leucotomos (Heliocare) 240 MG CAPS Take two tablets once a day 60 capsule 3    Probiotic Product (PRO-BIOTIC BLEND PO) Take by mouth as needed       propranolol (INDERAL LA) 60 mg 24 hr capsule Take 1 capsule (60 mg total) by mouth 2 (two) times a day 180 capsule 3    QUEtiapine (SEROquel) 50 mg tablet Take 1 5 tablets (75 mg total) by mouth daily at bedtime 135 tablet 1    traMADol (ULTRAM) 50 mg tablet Take 50 mg by mouth every 6 (six) hours as needed for moderate pain      venlafaxine (EFFEXOR-XR) 150 mg 24 hr capsule Take 1 capsule (150 mg total) by mouth daily 90 capsule 0    venlafaxine (EFFEXOR-XR) 75 mg 24 hr capsule Take 1 capsule (75 mg total) by mouth daily 90 capsule 0     No current facility-administered medications for this visit  Allergies   Allergen Reactions    Ibuprofen Other (See Comments)     Due to gastric sleeve -can only take for 5 days, then needs to stop       Review of Systems    Video Exam    There were no vitals filed for this visit  Physical Exam     I spent 50  minutes directly with the patient during this visit      VIRTUAL VISIT DISCLAIMER    Columba Danielle acknowledges that she has consented to an online visit or consultation  She understands that the online visit is based solely on information provided by her, and that, in the absence of a face-to-face physical evaluation by the physician, the diagnosis she receives is both limited and provisional in terms of accuracy and completeness  This is not intended to replace a full medical face-to-face evaluation by the physician  Columba Danielle understands and accepts these terms

## 2021-04-16 ENCOUNTER — PROCEDURE VISIT (OUTPATIENT)
Dept: PAIN MEDICINE | Facility: CLINIC | Age: 51
End: 2021-04-16
Payer: COMMERCIAL

## 2021-04-16 ENCOUNTER — TRANSCRIBE ORDERS (OUTPATIENT)
Dept: PAIN MEDICINE | Facility: CLINIC | Age: 51
End: 2021-04-16

## 2021-04-16 VITALS
DIASTOLIC BLOOD PRESSURE: 64 MMHG | WEIGHT: 178 LBS | BODY MASS INDEX: 27.88 KG/M2 | SYSTOLIC BLOOD PRESSURE: 101 MMHG | HEART RATE: 77 BPM

## 2021-04-16 DIAGNOSIS — M79.18 MYOFASCIAL PAIN SYNDROME: Primary | ICD-10-CM

## 2021-04-16 PROCEDURE — 20552 NJX 1/MLT TRIGGER POINT 1/2: CPT | Performed by: ANESTHESIOLOGY

## 2021-04-16 RX ORDER — BUPIVACAINE HYDROCHLORIDE 2.5 MG/ML
10 INJECTION, SOLUTION EPIDURAL; INFILTRATION; INTRACAUDAL ONCE
Status: COMPLETED | OUTPATIENT
Start: 2021-04-16 | End: 2021-04-16

## 2021-04-16 RX ADMIN — BUPIVACAINE HYDROCHLORIDE 10 ML: 2.5 INJECTION, SOLUTION EPIDURAL; INFILTRATION; INTRACAUDAL at 08:32

## 2021-04-16 NOTE — PROGRESS NOTES
Pre-procedure Diagnosis:   Myofascial pain  Post-procedure Diagnosis:   Myofascial pain  Operation Title(s):  Trigger point injections into   Left sacral paraspinal x2  Attending Surgeon:   Haresh Casey MD  Anesthesia:   Local    Indications: The patient is a 45y o  year-old female with a diagnosis of myofascial pain  The patient's history and physical exam were reviewed  The risks, benefits and alternatives to the procedure were discussed, and all questions were answered to the patient's satisfaction  The patient agreed to proceed, and written informed consent was obtained  Procedure in Detail: The patient was brought into the exam room and placed in the sitting position on the exam room chair with the head flexed on a pillow  Trigger points were identified in the: left sacral paraspinal x2    Areas were cleansed with alcohol  Then, using a dry needling technique, each trigger point was injected with a 1 5 inch 25 gauge needle and  1 mL 0 25% bupivacaine     Disposition: The patient tolerated the procedure well and there were no apparent complications  The patient was given written discharge instructions for the procedure

## 2021-04-17 ENCOUNTER — OFFICE VISIT (OUTPATIENT)
Dept: URGENT CARE | Age: 51
End: 2021-04-17
Payer: COMMERCIAL

## 2021-04-17 VITALS
DIASTOLIC BLOOD PRESSURE: 69 MMHG | OXYGEN SATURATION: 96 % | HEIGHT: 68 IN | WEIGHT: 185 LBS | TEMPERATURE: 97.7 F | RESPIRATION RATE: 18 BRPM | BODY MASS INDEX: 28.04 KG/M2 | SYSTOLIC BLOOD PRESSURE: 115 MMHG | HEART RATE: 66 BPM

## 2021-04-17 DIAGNOSIS — R39.9 UTI SYMPTOMS: Primary | ICD-10-CM

## 2021-04-17 LAB
SL AMB  POCT GLUCOSE, UA: ABNORMAL
SL AMB LEUKOCYTE ESTERASE,UA: ABNORMAL
SL AMB POCT BILIRUBIN,UA: ABNORMAL
SL AMB POCT BLOOD,UA: ABNORMAL
SL AMB POCT CLARITY,UA: ABNORMAL
SL AMB POCT COLOR,UA: YELLOW
SL AMB POCT KETONES,UA: ABNORMAL
SL AMB POCT NITRITE,UA: ABNORMAL
SL AMB POCT PH,UA: 7.5
SL AMB POCT SPECIFIC GRAVITY,UA: 1
SL AMB POCT URINE PROTEIN: ABNORMAL
SL AMB POCT UROBILINOGEN: 0.2

## 2021-04-17 PROCEDURE — 87186 SC STD MICRODIL/AGAR DIL: CPT | Performed by: PREVENTIVE MEDICINE

## 2021-04-17 PROCEDURE — G0382 LEV 3 HOSP TYPE B ED VISIT: HCPCS | Performed by: PREVENTIVE MEDICINE

## 2021-04-17 PROCEDURE — 81002 URINALYSIS NONAUTO W/O SCOPE: CPT | Performed by: PREVENTIVE MEDICINE

## 2021-04-17 PROCEDURE — 87086 URINE CULTURE/COLONY COUNT: CPT | Performed by: PREVENTIVE MEDICINE

## 2021-04-17 PROCEDURE — 87077 CULTURE AEROBIC IDENTIFY: CPT | Performed by: PREVENTIVE MEDICINE

## 2021-04-17 RX ORDER — PHENAZOPYRIDINE HYDROCHLORIDE 100 MG/1
100 TABLET, FILM COATED ORAL 3 TIMES DAILY PRN
Qty: 10 TABLET | Refills: 0 | Status: SHIPPED | OUTPATIENT
Start: 2021-04-17 | End: 2021-11-26

## 2021-04-17 RX ORDER — SULFAMETHOXAZOLE AND TRIMETHOPRIM 800; 160 MG/1; MG/1
1 TABLET ORAL EVERY 12 HOURS SCHEDULED
Qty: 6 TABLET | Refills: 0 | Status: SHIPPED | COMMUNITY
Start: 2021-04-17 | End: 2021-04-20

## 2021-04-17 NOTE — PATIENT INSTRUCTIONS

## 2021-04-19 ENCOUNTER — TELEPHONE (OUTPATIENT)
Dept: CARDIOLOGY CLINIC | Facility: CLINIC | Age: 51
End: 2021-04-19

## 2021-04-19 LAB — BACTERIA UR CULT: ABNORMAL

## 2021-04-21 NOTE — TELEPHONE ENCOUNTER
I called the pt back for update  While at work, pt had intermittent episodes all day of increased heart rates, cold sweats, (scrubs were soaked, per pt)  Pt had head pounding and head "spinning", but as soon as the HR dropped into the 50's, headache an head spinning resolved  Pt was drinking water all day to stay hydrated  Pt has been on the inderal 60 mg BID and taking daily as directed  She has had same symptoms in the past, Tatoo done back in [place] atrial tachycardia  Pt is scheduled for a stress echo on May 6th, then F/U with Dr Manny Birch thereafter  Pt is due May, 2021 in recall with you

## 2021-04-26 ENCOUNTER — TELEPHONE (OUTPATIENT)
Dept: CARDIOLOGY CLINIC | Facility: CLINIC | Age: 51
End: 2021-04-26

## 2021-04-26 DIAGNOSIS — I47.1 PAROXYSMAL SVT (SUPRAVENTRICULAR TACHYCARDIA) (HCC): Primary | ICD-10-CM

## 2021-04-26 DIAGNOSIS — R00.0 TACHYCARDIA: ICD-10-CM

## 2021-04-26 NOTE — TELEPHONE ENCOUNTER
Patient called, c/o Heart rate going up into the 160-170's and then down to the 50's, fluctuates throughout the day with symptoms of SOB, lightheadedness, head pounding and some chest soreness  She has a stress echo scheduled for 5/6/21, but feels like she cannot live with these symptoms as she feels terrible  She is currently taking Propranolol 60mg bid  Do you want to increase medication until she has the stress test       You have also discussed ablation with patient and she is willing to move forward also        States her BP's have been normal

## 2021-05-03 ENCOUNTER — HOSPITAL ENCOUNTER (OUTPATIENT)
Dept: NON INVASIVE DIAGNOSTICS | Facility: HOSPITAL | Age: 51
Discharge: HOME | End: 2021-05-03
Attending: INTERNAL MEDICINE
Payer: COMMERCIAL

## 2021-05-03 DIAGNOSIS — I47.1 PAROXYSMAL SVT (SUPRAVENTRICULAR TACHYCARDIA) (HCC): ICD-10-CM

## 2021-05-03 PROCEDURE — 93225 XTRNL ECG REC<48 HRS REC: CPT

## 2021-05-03 PROCEDURE — 93226 XTRNL ECG REC<48 HR SCAN A/R: CPT

## 2021-05-04 ENCOUNTER — TELEPHONE (OUTPATIENT)
Dept: NON INVASIVE DIAGNOSTICS | Facility: CLINIC | Age: 51
End: 2021-05-04

## 2021-05-06 ENCOUNTER — HOSPITAL ENCOUNTER (OUTPATIENT)
Dept: NON INVASIVE DIAGNOSTICS | Facility: CLINIC | Age: 51
Discharge: HOME | End: 2021-05-06
Payer: COMMERCIAL

## 2021-05-06 DIAGNOSIS — R06.02 EXERTIONAL SHORTNESS OF BREATH: ICD-10-CM

## 2021-05-06 PROCEDURE — 93350 STRESS TTE ONLY: CPT

## 2021-05-06 PROCEDURE — 93351 STRESS TTE COMPLETE: CPT | Performed by: INTERNAL MEDICINE

## 2021-05-07 ENCOUNTER — TELEMEDICINE (OUTPATIENT)
Dept: BEHAVIORAL/MENTAL HEALTH CLINIC | Facility: CLINIC | Age: 51
End: 2021-05-07
Payer: COMMERCIAL

## 2021-05-07 DIAGNOSIS — F43.10 POST TRAUMATIC STRESS DISORDER (PTSD): Chronic | ICD-10-CM

## 2021-05-07 DIAGNOSIS — F41.1 GENERALIZED ANXIETY DISORDER: Chronic | ICD-10-CM

## 2021-05-07 DIAGNOSIS — F33.2 MAJOR DEPRESSIVE DISORDER, RECURRENT SEVERE WITHOUT PSYCHOTIC FEATURES (HCC): Primary | Chronic | ICD-10-CM

## 2021-05-07 LAB
CHEST PAIN STATEMENT: NORMAL
MAX DIASTOLIC BP: 82 MMHG
MAX HEART RATE: 153 BPM
MAX PREDICTED HEART RATE: 168 BPM
MAX. SYSTOLIC BP: 134 MMHG
PROTOCOL NAME: NORMAL
REASON FOR TERMINATION: NORMAL
TARGET HR FORMULA: NORMAL
TEST INDICATION: NORMAL
TIME IN EXERCISE PHASE: NORMAL

## 2021-05-07 PROCEDURE — 90834 PSYTX W PT 45 MINUTES: CPT | Performed by: SOCIAL WORKER

## 2021-05-07 NOTE — BH TREATMENT PLAN
Nalini Urbano  1970      Date of Initial Treatment Plan: April 5, 2020   Date of Current Treatment Plan: May 7, 2021     Treatment Plan Number 6     Strengths/Personal Resources for Self Care: Good relationship with spouse; Good mother; Hard worker     Diagnosis:   1  Major depressive disorder, recurrent severe without psychotic features ([address] )      2  Generalized anxiety disorder      3  Post traumatic stress disorder (PTSD)            Area of Needs: Mood regulation and PTSD     Long Term Goal 1: "I want to complete school (including associates, bachelors, and masters degree)  "     Target Date:                                            September 4, 2021  Treatment Plan Expiration:          November 3, 2021  Completion Date: To be determined         Short Term Objectives for Goal 1:                  1  Christina will identify triggers and prompting events that increase symptoms of depression and anxiety  Update 05/07/2021: Henna Huber exhibits the ability to identify some of the triggers and prompting events that impact her moods and anxiety  She identifies that her current stressors include, work ("it's very busy there"), school-related stress (including needing to take a placement test for math), and her own physical health (including her weight)                 2  Jack Long learn and exhibit understanding of a minimum of three distress tolerance skills to assist with management of depression and anxiety symptoms        Update 05/07/2021: Henna Huber states, "Sometimes I talk to someone that I trust " She states that, at times, she watches TV  Henna Huber continues to express an interest in developing more easy to use coping skills to help her manage her emotions                  3  Christina will learn and exhibit understanding of effective communication skills (using a DBT-informed perspective), so she can effectively discuss her emotions and her thoughts      Update 05/07/2021: Henna Huber states, "If I'm not happy with something, everybody knows it " She states that she has been at her workplace longer than anyone else, so she is comfortable telling people about the job and what needs to be done  She states that she feels that she needs to be able to assert herself in an appropriate way  She states that she has some fears about telling her superiors about problems  She states, "It makes me worried that things will get worse for me " We will continue to work on practice of some DBT-informed skills to help Katalina Francis develop her ability to effectively communicate with others (both at work and at home)                 4  When appropriate, the clinician and Christina will begin to identify target areas to explore and process past trauma that is effecting current relationships and functioning    Update 05/07/2021: Katalina Francis states that she struggles with trust-related issues (even with her ); however, she fears bringing up her past trauma  She states that she does not want to return to having disturbing nightmares  We will discuss her return to the office to work on trauma processing                   5  Clinician will provide psychoeducation regarding options for processing past trauma (including, but not limited to, prolonged exposure, bilateral stimulation)    Update 05/07/2021: Once Katalina Francis returns to the office, we will develop a trauma timeline and a plan for trauma processing, including determining the appropriate treatment options for trauma processing                   6  Christina will maintain a level of depression and anxiety that does not surpass a 5/10 on most days  Incidents that surpass this limit will be process in therapy sessions  Update 05/07/2021: This objective will be rewritten to utilize the PHQ-9 and CONNOR-7 to measure depression and anxiety  The objective will be to maintain a "mild" level of both anxiety and depression                               [facility] will maintain medication adherence and follow up with her psychiatric provider  Update 05/07/2021: Yessy's previous psychiatric provider is no longer providing outpatient medication management  Connor Malik has been transferred to Dr Wilner Diaz for ongoing medication management  Connor Malik will maintain adherence with her medication regimen       GOAL 1: Modality: Individual 1-2 x per month   Completion Date to be determined, Medication Management and The person(s) responsible for carrying out the plan is  Yessy (client); Rocael Paulino (therapist); Dr Oseas Sahu (psychiatrist)      Clinician will use client-centered therapy, mindfulness-based strategies, DBT-informed skills, prolonged exposure, bilateral stimulation and solution-focused therapy to address Christina's symptoms of PTSD and emotion dysregulation  Christina will practice skills between sessions and will report back, during subsequent sessions regarding successes and barriers          [address] [place] [address]: Diagnosis and Treatment Plan explained to Christina Christina relates understanding diagnosis and is agreeable to Treatment Plan        Client Comments : Please share your thoughts, feelings, need and/or experiences regarding your treatment plan: Not at this time       This treatment plan was created between this clinician and this client on 05/07/2021 @ 3:58p  Due to the current COVID-19 precautions, this treatment plan was created during a Virtual Visit (through the use of [employer])  The client provided verbal consent at the time of the actual session   The treatment plan was transcribed into the Electronic Health Record at a later date

## 2021-05-07 NOTE — PSYCH
Virtual Regular Visit    This note was not shared with the patient due to this is a psychotherapy note    Assessment/Plan:    Problem List Items Addressed This Visit        Other    Post traumatic stress disorder (PTSD) (Chronic)    Major depressive disorder, recurrent severe without psychotic features (HCC) - Primary (Chronic)    Generalized anxiety disorder (Chronic)          Goals addressed in session: Goal 1          Reason for visit is No chief complaint on file  Encounter provider LALY James    Provider located at [address] [place] [id number] [phone]      Recent Visits  No visits were found meeting these conditions  Showing recent visits within past 7 days and meeting all other requirements     Today's Visits  Date Type Provider Dept   05/07/21 [address], [address] today's visits and meeting all other requirements     Future Appointments  No visits were found meeting these conditions  Showing future appointments within next 150 days and meeting all other requirements        The patient was identified by name and date of birth  Cindy Isidro was informed that this is a telemedicine visit and that the visit is being conducted through [employer] and patient was informed that this is a secure, HIPAA-compliant platform  She agrees to proceed     My office door was closed  No one else was in the room  She acknowledged consent and understanding of privacy and security of the video platform  The patient has agreed to participate and understands they can discontinue the visit at any time  Patient is aware this is a billable service  Subjective  Cindy Isidro is a 45 y o  female  DATA: Met with Parvin Sanz for scheduled individual session   Topics of discussion included work-related stress, education-related stress, physical health concerns and treatment plan review  Coral Villagomez states that, overall, she is doing well  She discussed her work situation  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  She states that 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  She discussed her plans for the future and her desire to complete her degree  Coral Villagomez actively participated in the review and update of her treatment plan  At this point  Corla Villagomez wants to work on utilizing the skills she needs to complete her degree and make decisions about her future  During this session, this clinician used the following therapeutic modalities: supportive psychotherapy, client-centered therapy, mindfulness-based strategies, DBT-informed skills, Motivational Interviewing and solution-focused therapy  ASSESSMENT: Santos Da Silva presents with a primarily euthymic mood  Her affect is normal range and intensity, appropriate  Santos Da Silva exhibits good therapeutic rapport with this clinician  Santos Da Silva continues to exhibit willingness to work on treatment goals and objectives  Santos Da Silva presents with a minimal risk of suicide, minimal risk of self-harm, and minimal risk of harm to others  PLAN: Santos Da Silva will return in four weeks for the next scheduled session  Between sessions, Santos Da Silva will continue to work on monitoring her moods and utilizing some basic mindfulness skills  She will report back during the next session re: successes and barriers  At the next session, this clinician will use supportive psychotherapy, client-centered therapy, mindfulness-based strategies, DBT-informed skills, Motivational Interviewing and solution-focused therapy to address her mood regulation and relationship issues, in an effort to assist Santos Da Silva with meeting treatment goals  HPI     Past Medical History:   Diagnosis Date    ADHD (attention deficit hyperactivity disorder)     Bulging lumbar disc     Carpal tunnel syndrome     RIGHT    LAST ASSESSED: 16    Chronic back pain     low    Chronic pain disorder     Colon polyps     Diabetes mellitus ([facility] )     Resolved post weight loss    GERD (gastroesophageal reflux disease)     Gestational diabetes     Hearing loss     left ear    Hyperlipidemia     Resolved with weight loss    Hyponatremia 2020    IBS (irritable bowel syndrome)     Ileus ([facility] )     LAST ASSESSED: 8/3/17    Labial cyst     LAST ASSESSED: 16    Myofascial pain     LAST ASSESSED: 16    Obesity     Ovarian cyst     LEFT  LAST ASSESSED: 16    Panic attack     Pap smear for cervical cancer screening     2018--pap wnl, HRHPV neg    Pneumonia     Seasonal allergies     Thoracic outlet syndrome     2010    Trochanteric bursitis of both hips     LAST ASSESSED: 3/18/16    Ulnar neuropathy at elbow     Varicella     Wears glasses        Past Surgical History:   Procedure Laterality Date    BILE DUCT EXPLORATION      ENDOSCOPIC REMOVAL OF STONES FROM BILIARY TRACT     SECTION      x3    CHOLECYSTECTOMY      COLONOSCOPY      -polyp, repeat     DILATION AND CURETTAGE OF UTERUS      ENDOMETRIAL ABLATION      ERCP W/ SPHICTEROTOMY      FIRST RIB REMOVAL      THORAX EXCISION OF FIRST RIB    HYSTEROSCOPY      NEUROPLASTY / TRANSPOSITION ULNAR NERVE AT ELBOW Right     WA COLONOSCOPY FLX DX W/COLLJ SPEC WHEN PFRMD N/A 2017    Procedure: COLONOSCOPY;  Surgeon: Dmitriy Alberto MD;  [facility]; Service: Gastroenterology    WA ESOPHAGOGASTRODUODENOSCOPY TRANSORAL DIAGNOSTIC N/A 2017    Procedure: ESOPHAGOGASTRODUODENOSCOPY (EGD); Surgeon: Dameon Katz MD;  [facility];   Service: Gastroenterology    WA HYSTEROSCOPY,W/ENDO BX N/A 10/20/2017    Procedure: DILATATION AND CURETTAGE (D&C) WITH HYSTEROSCOPY  REMOVAL VULVAR RT  LESION;  Surgeon: Rik Ponce MD;  [facility];  Service: Gynecology    WA LAP, ÁLVARO RESTRICT PROC, LONGITUDINAL GASTRECTOMY N/A 2/6/2018    Procedure: GASTRECTOMY SLEEVE LAPAROSCOPIC; INTRAOPERATIVE EGD ;  Surgeon: Reza Carrasquillo MD;  [facility];  Service: Edel Chen SURG IMPLNT [address] Left 1/29/2020    Procedure: Insertion of thoracic spinal cord stimulator electrode via laminotomy and placement of left buttock implantable pulse generator (NEUROMONITORING);   Surgeon: Jeannette Hare MD;  [facility];  Service: Neurosurgery    SPINAL CORD STIMULATOR TRIAL W/ LAMINOTOMY      TONSILLECTOMY AND ADENOIDECTOMY      TUBAL LIGATION      VEIN LIGATION AND STRIPPING Right     VULVA SURGERY  10/20/2017    BIOPSY    WISDOM TOOTH EXTRACTION         Current Outpatient Medications   Medication Sig Dispense Refill    atoMOXetine (STRATTERA) 25 mg capsule Take 1 capsule (25 mg total) by mouth daily 90 capsule 1    Biotin 91057 MCG TABS Take by mouth      Calcium Carbonate-Vit D-Min (CALCIUM 1200 PO) Take 2,400 mg by mouth daily      dexamethasone (DECADRON) 2 mg tablet       dextromethorphan-guaiFENesin (ROBITUSSIN DM)  mg/5 mL syrup Take 5 mL by mouth 3 (three) times a day as needed for cough 118 mL 0    drospirenone-ethinyl estradiol (LIEN) 3-0 02 MG per tablet Take 1 tablet by mouth daily 84 tablet 4    lamoTRIgine (LaMICtal) 100 mg tablet TAKE ONE TABLET BY MOUTH DAILY 90 tablet 0    lamoTRIgine (LaMICtal) 150 MG tablet Take 1 tablet (150 mg total) by mouth daily 90 tablet 0    meloxicam (MOBIC) 7 5 mg tablet Take 1 tablet (7 5 mg total) by mouth daily 15 tablet 0    methocarbamol (ROBAXIN) 750 mg tablet Take 1 tablet (750 mg total) by mouth every 8 (eight) hours 90 tablet 1    montelukast (SINGULAIR) 10 mg tablet TAKE ONE TABLET BY MOUTH AT BEDTIME DAILY 90 tablet 3    Multiple Vitamins-Minerals (MULTI COMPLETE PO) Take by mouth      multivitamin-minerals (CENTRUM ADULTS) tablet Take 1 tablet by mouth daily 30 tablet 0    pantoprazole (PROTONIX) 40 mg tablet Take 1 tablet (40 mg total) by mouth daily 90 tablet 3    phenazopyridine (PYRIDIUM) 100 mg tablet Take 1 tablet (100 mg total) by mouth 3 (three) times a day as needed for bladder spasms 10 tablet 0    Polypodium Leucotomos (Heliocare) 240 MG CAPS Take two tablets once a day 60 capsule 3    Probiotic Product (PRO-BIOTIC BLEND PO) Take by mouth as needed       propranolol (INDERAL LA) 60 mg 24 hr capsule Take 1 capsule (60 mg total) by mouth 2 (two) times a day 180 capsule 3    QUEtiapine (SEROquel) 50 mg tablet Take 1 5 tablets (75 mg total) by mouth daily at bedtime 135 tablet 1    traMADol (ULTRAM) 50 mg tablet Take 50 mg by mouth every 6 (six) hours as needed for moderate pain      venlafaxine (EFFEXOR-XR) 150 mg 24 hr capsule Take 1 capsule (150 mg total) by mouth daily 90 capsule 0    venlafaxine (EFFEXOR-XR) 75 mg 24 hr capsule Take 1 capsule (75 mg total) by mouth daily 90 capsule 0     No current facility-administered medications for this visit  Allergies   Allergen Reactions    Ibuprofen Other (See Comments)     Due to gastric sleeve -can only take for 5 days, then needs to stop       Review of Systems    Video Exam    There were no vitals filed for this visit  Physical Exam     I spent 50 minutes directly with the patient during this visit      VIRTUAL VISIT DISCLAIMER    René Flex acknowledges that she has consented to an online visit or consultation  She understands that the online visit is based solely on information provided by her, and that, in the absence of a face-to-face physical evaluation by the physician, the diagnosis she receives is both limited and provisional in terms of accuracy and completeness  This is not intended to replace a full medical face-to-face evaluation by the physician  René Mccord understands and accepts these terms

## 2021-05-11 DIAGNOSIS — F33.2 SEVERE EPISODE OF RECURRENT MAJOR DEPRESSIVE DISORDER, WITHOUT PSYCHOTIC FEATURES (HCC): ICD-10-CM

## 2021-05-11 PROCEDURE — 93227 XTRNL ECG REC<48 HR R&I: CPT | Performed by: INTERNAL MEDICINE

## 2021-05-11 RX ORDER — LAMOTRIGINE 100 MG/1
TABLET ORAL
Qty: 90 TABLET | Refills: 0 | Status: SHIPPED | OUTPATIENT
Start: 2021-05-11 | End: 2021-06-11 | Stop reason: SDUPTHER

## 2021-05-11 NOTE — TELEPHONE ENCOUNTER
Will ask covering provider to review in [facility] absence   Next appointment 6/11/21 with Dr Cooper Alexis

## 2021-05-13 NOTE — TELEPHONE ENCOUNTER
Results for holter and stress were reviewed by Dr Asif Every  LMOM regarding normal results, no return call yet  Has F/U with Dr Anson Salguero on 05/20

## 2021-05-20 ENCOUNTER — APPOINTMENT (OUTPATIENT)
Dept: LAB | Facility: AMBULARY SURGERY CENTER | Age: 51
End: 2021-05-20
Payer: COMMERCIAL

## 2021-05-20 ENCOUNTER — OFFICE VISIT (OUTPATIENT)
Dept: FAMILY MEDICINE CLINIC | Facility: CLINIC | Age: 51
End: 2021-05-20
Payer: COMMERCIAL

## 2021-05-20 ENCOUNTER — OFFICE VISIT (OUTPATIENT)
Dept: CARDIOLOGY CLINIC | Facility: CLINIC | Age: 51
End: 2021-05-20
Payer: COMMERCIAL

## 2021-05-20 VITALS
BODY MASS INDEX: 29.83 KG/M2 | SYSTOLIC BLOOD PRESSURE: 110 MMHG | TEMPERATURE: 96.5 F | OXYGEN SATURATION: 98 % | RESPIRATION RATE: 12 BRPM | WEIGHT: 185.6 LBS | HEART RATE: 62 BPM | HEIGHT: 66 IN | DIASTOLIC BLOOD PRESSURE: 70 MMHG

## 2021-05-20 VITALS
HEART RATE: 78 BPM | BODY MASS INDEX: 28.14 KG/M2 | OXYGEN SATURATION: 97 % | DIASTOLIC BLOOD PRESSURE: 64 MMHG | SYSTOLIC BLOOD PRESSURE: 100 MMHG | HEIGHT: 68 IN | WEIGHT: 185.7 LBS

## 2021-05-20 DIAGNOSIS — R73.03 PRE-DIABETES: ICD-10-CM

## 2021-05-20 DIAGNOSIS — E78.2 MIXED HYPERLIPIDEMIA: ICD-10-CM

## 2021-05-20 DIAGNOSIS — Z12.11 COLON CANCER SCREENING: ICD-10-CM

## 2021-05-20 DIAGNOSIS — K21.9 GASTROESOPHAGEAL REFLUX DISEASE, UNSPECIFIED WHETHER ESOPHAGITIS PRESENT: ICD-10-CM

## 2021-05-20 DIAGNOSIS — I47.1 PAROXYSMAL SVT (SUPRAVENTRICULAR TACHYCARDIA) (HCC): ICD-10-CM

## 2021-05-20 DIAGNOSIS — R73.03 PREDIABETES: ICD-10-CM

## 2021-05-20 DIAGNOSIS — F33.2 MAJOR DEPRESSIVE DISORDER, RECURRENT SEVERE WITHOUT PSYCHOTIC FEATURES (HCC): Primary | Chronic | ICD-10-CM

## 2021-05-20 DIAGNOSIS — M54.50 CHRONIC BILATERAL LOW BACK PAIN, UNSPECIFIED WHETHER SCIATICA PRESENT: ICD-10-CM

## 2021-05-20 DIAGNOSIS — K91.2 POSTSURGICAL MALABSORPTION: Chronic | ICD-10-CM

## 2021-05-20 DIAGNOSIS — K58.8 OTHER IRRITABLE BOWEL SYNDROME: ICD-10-CM

## 2021-05-20 DIAGNOSIS — R00.2 PALPITATIONS: ICD-10-CM

## 2021-05-20 DIAGNOSIS — I10 ESSENTIAL HYPERTENSION: ICD-10-CM

## 2021-05-20 DIAGNOSIS — Z00.00 ANNUAL PHYSICAL EXAM: ICD-10-CM

## 2021-05-20 DIAGNOSIS — I47.1 PAROXYSMAL ATRIAL TACHYCARDIA (HCC): Primary | ICD-10-CM

## 2021-05-20 DIAGNOSIS — G89.29 CHRONIC BILATERAL LOW BACK PAIN, UNSPECIFIED WHETHER SCIATICA PRESENT: ICD-10-CM

## 2021-05-20 PROBLEM — R00.0 TACHYCARDIA: Status: RESOLVED | Noted: 2019-11-05 | Resolved: 2021-05-20

## 2021-05-20 PROBLEM — U07.1 COVID-19 VIRUS INFECTION: Status: RESOLVED | Noted: 2020-12-12 | Resolved: 2021-05-20

## 2021-05-20 LAB
ALBUMIN SERPL BCP-MCNC: 3.4 G/DL (ref 3.5–5)
ALP SERPL-CCNC: 54 U/L (ref 46–116)
ALT SERPL W P-5'-P-CCNC: 40 U/L (ref 12–78)
ANION GAP SERPL CALCULATED.3IONS-SCNC: 6 MMOL/L (ref 4–13)
AST SERPL W P-5'-P-CCNC: 29 U/L (ref 5–45)
BASOPHILS # BLD AUTO: 0.01 THOUSANDS/ΜL (ref 0–0.1)
BASOPHILS NFR BLD AUTO: 0 % (ref 0–1)
BILIRUB SERPL-MCNC: 0.36 MG/DL (ref 0.2–1)
BUN SERPL-MCNC: 11 MG/DL (ref 5–25)
CALCIUM ALBUM COR SERPL-MCNC: 9.7 MG/DL (ref 8.3–10.1)
CALCIUM SERPL-MCNC: 9.2 MG/DL (ref 8.3–10.1)
CHLORIDE SERPL-SCNC: 109 MMOL/L (ref 100–108)
CHOLEST SERPL-MCNC: 278 MG/DL (ref 50–200)
CO2 SERPL-SCNC: 26 MMOL/L (ref 21–32)
CREAT SERPL-MCNC: 0.66 MG/DL (ref 0.6–1.3)
EOSINOPHIL # BLD AUTO: 0 THOUSAND/ΜL (ref 0–0.61)
EOSINOPHIL NFR BLD AUTO: 0 % (ref 0–6)
ERYTHROCYTE [DISTWIDTH] IN BLOOD BY AUTOMATED COUNT: 12.4 % (ref 11.6–15.1)
EST. AVERAGE GLUCOSE BLD GHB EST-MCNC: 131 MG/DL
GFR SERPL CREATININE-BSD FRML MDRD: 102 ML/MIN/1.73SQ M
GLUCOSE P FAST SERPL-MCNC: 124 MG/DL (ref 65–99)
HBA1C MFR BLD: 6.2 %
HCT VFR BLD AUTO: 40.6 % (ref 34.8–46.1)
HDLC SERPL-MCNC: 66 MG/DL
HGB BLD-MCNC: 13.1 G/DL (ref 11.5–15.4)
IMM GRANULOCYTES # BLD AUTO: 0.02 THOUSAND/UL (ref 0–0.2)
IMM GRANULOCYTES NFR BLD AUTO: 0 % (ref 0–2)
IRON SERPL-MCNC: 111 UG/DL (ref 50–170)
LDLC SERPL CALC-MCNC: 135 MG/DL (ref 0–100)
LYMPHOCYTES # BLD AUTO: 1.7 THOUSANDS/ΜL (ref 0.6–4.47)
LYMPHOCYTES NFR BLD AUTO: 27 % (ref 14–44)
MCH RBC QN AUTO: 29.4 PG (ref 26.8–34.3)
MCHC RBC AUTO-ENTMCNC: 32.3 G/DL (ref 31.4–37.4)
MCV RBC AUTO: 91 FL (ref 82–98)
MONOCYTES # BLD AUTO: 0.45 THOUSAND/ΜL (ref 0.17–1.22)
MONOCYTES NFR BLD AUTO: 7 % (ref 4–12)
NEUTROPHILS # BLD AUTO: 4.14 THOUSANDS/ΜL (ref 1.85–7.62)
NEUTS SEG NFR BLD AUTO: 66 % (ref 43–75)
NRBC BLD AUTO-RTO: 0 /100 WBCS
PLATELET # BLD AUTO: 258 THOUSANDS/UL (ref 149–390)
PMV BLD AUTO: 10.3 FL (ref 8.9–12.7)
POTASSIUM SERPL-SCNC: 4.1 MMOL/L (ref 3.5–5.3)
PROT SERPL-MCNC: 7.3 G/DL (ref 6.4–8.2)
RBC # BLD AUTO: 4.46 MILLION/UL (ref 3.81–5.12)
SODIUM SERPL-SCNC: 141 MMOL/L (ref 136–145)
TRIGL SERPL-MCNC: 386 MG/DL
TSH SERPL DL<=0.05 MIU/L-ACNC: 1.66 UIU/ML (ref 0.36–3.74)
VIT B12 SERPL-MCNC: 509 PG/ML (ref 100–900)
WBC # BLD AUTO: 6.32 THOUSAND/UL (ref 4.31–10.16)

## 2021-05-20 PROCEDURE — 85025 COMPLETE CBC W/AUTO DIFF WBC: CPT

## 2021-05-20 PROCEDURE — 99214 OFFICE O/P EST MOD 30 MIN: CPT | Performed by: INTERNAL MEDICINE

## 2021-05-20 PROCEDURE — 36415 COLL VENOUS BLD VENIPUNCTURE: CPT | Performed by: FAMILY MEDICINE

## 2021-05-20 PROCEDURE — 84425 ASSAY OF VITAMIN B-1: CPT

## 2021-05-20 PROCEDURE — 83540 ASSAY OF IRON: CPT

## 2021-05-20 PROCEDURE — 83036 HEMOGLOBIN GLYCOSYLATED A1C: CPT | Performed by: FAMILY MEDICINE

## 2021-05-20 PROCEDURE — 84443 ASSAY THYROID STIM HORMONE: CPT

## 2021-05-20 PROCEDURE — 82607 VITAMIN B-12: CPT

## 2021-05-20 PROCEDURE — 84590 ASSAY OF VITAMIN A: CPT

## 2021-05-20 PROCEDURE — 80053 COMPREHEN METABOLIC PANEL: CPT | Performed by: FAMILY MEDICINE

## 2021-05-20 PROCEDURE — 99214 OFFICE O/P EST MOD 30 MIN: CPT | Performed by: FAMILY MEDICINE

## 2021-05-20 PROCEDURE — 80061 LIPID PANEL: CPT

## 2021-05-20 RX ORDER — PROPRANOLOL HCL 60 MG
60 CAPSULE, EXTENDED RELEASE 24HR ORAL 2 TIMES DAILY
Qty: 180 CAPSULE | Refills: 1 | Status: SHIPPED | OUTPATIENT
Start: 2021-05-20 | End: 2022-03-07 | Stop reason: SDUPTHER

## 2021-05-20 RX ORDER — ATORVASTATIN CALCIUM 10 MG/1
10 TABLET, FILM COATED ORAL DAILY
Qty: 90 TABLET | Refills: 3 | Status: SHIPPED | OUTPATIENT
Start: 2021-05-20 | End: 2022-05-21

## 2021-05-20 RX ORDER — DICYCLOMINE HCL 20 MG
20 TABLET ORAL EVERY 6 HOURS
Qty: 90 TABLET | Refills: 0 | Status: SHIPPED | OUTPATIENT
Start: 2021-05-20 | End: 2021-11-26

## 2021-05-20 NOTE — PROGRESS NOTES
Assessment/Plan:    No problem-specific Assessment & Plan notes found for this encounter  Diagnoses and all orders for this visit:    Major depressive disorder, recurrent severe without psychotic features ([facility] )  Comments:  stable on current meds   care per psychiatrist     Postsurgical malabsorption  Comments:  multivitamin as directed     Paroxysmal SVT (supraventricular tachycardia) ([facility] )  Comments:  propranolol is helping     Gastroesophageal reflux disease, unspecified whether esophagitis present  Comments:  pantoprazole 40 mg daily     Other irritable bowel syndrome  Comments:  to get another colonoscopy   Orders:  -     dicyclomine (BENTYL) 20 mg tablet; Take 1 tablet (20 mg total) by mouth every 6 (six) hours    Mixed hyperlipidemia  -     atorvastatin (LIPITOR) 10 mg tablet; Take 1 tablet (10 mg total) by mouth daily  -     Lipid Panel with Direct LDL reflex; Future    Chronic bilateral low back pain, unspecified whether sciatica present  Comments:  S/p Insertion of [employer] spinal cord stimulator electrode via T9-T10 laminotomy and left buttock implantable pulse generator (1/29/2020)  her pain has improved     Colon cancer screening  -     Ambulatory referral to Gastroenterology; Future    Prediabetes  -     Comprehensive metabolic panel  -     Hemoglobin A1C        BMI Counseling: Body mass index is 29 97 kg/m²  The BMI is above normal  Nutrition recommendations include decreasing portion sizes and encouraging healthy choices of fruits and vegetables  Exercise recommendations include moderate physical activity 150 minutes/week  No pharmacotherapy was ordered  Subjective:      Patient ID: Pat Weber is a 45 y o  female  Here for follow up  C/o bump over her forehead she noticed last night   +painful and sore  Eating her fruits and vegetables  Hard time losing weight       Anxiety  Presents for follow-up visit  Symptoms include nervous/anxious behavior   Patient reports no chest pain, compulsions, confusion, decreased concentration, depressed mood, dizziness, dry mouth, excessive worry, feeling of choking, hyperventilation, impotence, insomnia, irritability, malaise, muscle tension, nausea, obsessions, palpitations, panic, restlessness, shortness of breath or suicidal ideas  Symptoms occur occasionally  The quality of sleep is good  Compliance with medications is %  The following portions of the patient's history were reviewed and updated as appropriate: allergies, current medications, past family history, past medical history, past social history, past surgical history and problem list     Review of Systems   Constitutional: Negative for irritability  Respiratory: Negative for shortness of breath  Cardiovascular: Negative for chest pain and palpitations  Gastrointestinal: Negative for nausea  Genitourinary: Negative for impotence  Neurological: Negative for dizziness  Psychiatric/Behavioral: Negative for confusion, decreased concentration and suicidal ideas  The patient is nervous/anxious  The patient does not have insomnia  Objective:      /70   Pulse 62   Temp (!) 96 5 °F (35 8 °C) (Tympanic)   Resp 12   Ht 5' 5 98" (1 676 m)   Wt 84 2 kg (185 lb 9 6 oz)   LMP 01/07/2019 (Approximate)   SpO2 98%   BMI 29 97 kg/m²          Physical Exam  Constitutional:       Appearance: Normal appearance  Cardiovascular:      Rate and Rhythm: Normal rate and regular rhythm  Pulses: Normal pulses  Heart sounds: Normal heart sounds  Neurological:      General: No focal deficit present  Mental Status: She is alert and oriented to person, place, and time     Psychiatric:         Mood and Affect: Mood normal          Behavior: Behavior normal
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
## 2021-05-24 LAB — VIT A SERPL-MCNC: 79.5 UG/DL (ref 20.1–62)

## 2021-05-25 LAB — VIT B1 BLD-SCNC: 131.1 NMOL/L (ref 66.5–200)

## 2021-06-04 ENCOUNTER — TELEMEDICINE (OUTPATIENT)
Dept: BEHAVIORAL/MENTAL HEALTH CLINIC | Facility: CLINIC | Age: 51
End: 2021-06-04
Payer: COMMERCIAL

## 2021-06-04 DIAGNOSIS — F41.1 GENERALIZED ANXIETY DISORDER: Chronic | ICD-10-CM

## 2021-06-04 DIAGNOSIS — F43.10 POST TRAUMATIC STRESS DISORDER (PTSD): Chronic | ICD-10-CM

## 2021-06-04 DIAGNOSIS — F33.2 MAJOR DEPRESSIVE DISORDER, RECURRENT SEVERE WITHOUT PSYCHOTIC FEATURES (HCC): Primary | Chronic | ICD-10-CM

## 2021-06-04 PROCEDURE — 90834 PSYTX W PT 45 MINUTES: CPT | Performed by: SOCIAL WORKER

## 2021-06-04 NOTE — PSYCH
Virtual Regular Visit    This note was not shared with the patient due to this is a psychotherapy note    Assessment/Plan:    Problem List Items Addressed This Visit        Other    Post traumatic stress disorder (PTSD) (Chronic)    Major depressive disorder, recurrent severe without psychotic features ([facility] [address] ) - Primary (Chronic)    Generalized anxiety disorder (Chronic)          Goals addressed in session: Goal 1        Reason for visit is Behavioral Health session, conducted through video, due to COVID-19 precautions  Ravinder Schuler has verbalized a preference to continue with virtual sessions at this time  Ravinder Schuler has been offered in-person sessions and has declined  Encounter provider ALLY Mustafa    Provider located at [address] [place] [id number] [phone]      Recent Visits  No visits were found meeting these conditions  Showing recent visits within past 7 days and meeting all other requirements     Future Appointments  No visits were found meeting these conditions  Showing future appointments within next 150 days and meeting all other requirements        The patient was identified by name and date of birth  Oni Mahan was informed that this is a telemedicine visit and that the visit is being conducted through [address] [place] [address] Now and patient was informed that this is a secure, HIPAA-compliant platform  She agrees to proceed     My office door was closed  No one else was in the room  She acknowledged consent and understanding of privacy and security of the video platform  The patient has agreed to participate and understands they can discontinue the visit at any time  Patient is aware this is a billable service  Subjective  Oni Mahan is a 45 y o  female  DATA: Met with Ravinder Schuler for scheduled individual session   Topics of discussion included work-related stress, education-related stress and physical health concerns  Chiqui Connors discussed a recent incident of sexual harassment at work  She states, "I told someone, and he got fired " Chiqui Connors 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  She states that she becomes tearful, and "I get really hard on myself " We discussed her goals for her future  She has some mixed emotions regarding her plans for the future (e g , what she would like to major in)  We discussed the possibility of getting ADA accommodations to help her achieve success in her education  She reports that her relationship with her  remains very supportive  She states that her son is doing well  Client shows evidence of utilizing some basic mindfulness skills to manage mental health symptoms  During this session, this clinician used the following therapeutic modalities: supportive psychotherapy, client-centered therapy, mindfulness-based strategies, DBT-informed skills, Motivational Interviewing and solution-focused therapy  ASSESSMENT: Edelmira Castañeda presents with a euthymic mood  Her affect is normal range and intensity, appropriate  Edelmira Castañeda exhibits good therapeutic rapport with this clinician  Edelmira Castañeda continues to exhibit willingness to work on treatment goals and objectives  Edelmira Castañeda presents with a minimal risk of suicide, minimal risk of self-harm, and minimal risk of harm to others  PLAN: Edelmira Castañeda will return in three weeks for the next scheduled session  Between sessions, Edelmira Castañeda will continue to work on identifying and maintaining personal limits/boundaries and will report back during the next session re: successes and barriers  At the next session, this clinician will use supportive psychotherapy, client-centered therapy, mindfulness-based strategies, DBT-informed skills, Motivational Interviewing and solution-focused therapy to address her mood regulation and relationship concerns, in an effort to assist Edelmira Castañeda with meeting treatment goals         HPI     Past Medical History:   Diagnosis Date    ADHD (attention deficit hyperactivity disorder)     Bulging lumbar disc     Carpal tunnel syndrome     RIGHT  LAST ASSESSED: 16    Chronic back pain     low    Chronic pain disorder     Colon polyps     Diabetes mellitus ([facility] [address] )     Resolved post weight loss    GERD (gastroesophageal reflux disease)     Gestational diabetes     Hearing loss     left ear    Hyperlipidemia     Resolved with weight loss    Hyponatremia 2020    IBS (irritable bowel syndrome)     Ileus ([facility] [address] )     LAST ASSESSED: 8/3/17    Labial cyst     LAST ASSESSED: 16    Myofascial pain     LAST ASSESSED: 16    Obesity     Ovarian cyst     LEFT  LAST ASSESSED: 16    Panic attack     Pap smear for cervical cancer screening     2018--pap wnl, HRHPV neg    Pneumonia     Seasonal allergies     Thoracic outlet syndrome         Trochanteric bursitis of both hips     LAST ASSESSED: 3/18/16    Ulnar neuropathy at elbow     Varicella     Wears glasses        Past Surgical History:   Procedure Laterality Date    BILE DUCT EXPLORATION      ENDOSCOPIC REMOVAL OF STONES FROM BILIARY TRACT     SECTION      x3    CHOLECYSTECTOMY      COLONOSCOPY      -polyp, repeat     DILATION AND CURETTAGE OF UTERUS      ENDOMETRIAL ABLATION      ERCP W/ SPHICTEROTOMY      FIRST RIB REMOVAL      THORAX EXCISION OF FIRST RIB    HYSTEROSCOPY      NEUROPLASTY / TRANSPOSITION ULNAR NERVE AT ELBOW Right     MD COLONOSCOPY FLX DX W/COLLJ SPEC WHEN PFRMD N/A 2017    Procedure: COLONOSCOPY;  Surgeon: Tri Keller MD;  [facility]; Service: Gastroenterology    MD ESOPHAGOGASTRODUODENOSCOPY TRANSORAL DIAGNOSTIC N/A 2017    Procedure: ESOPHAGOGASTRODUODENOSCOPY (EGD); Surgeon: Mary Griggs MD;  [facility];   Service: Gastroenterology    MD HYSTEROSCOPY,W/ENDO BX N/A 10/20/2017    Procedure: DILATATION AND CURETTAGE (D&C) WITH HYSTEROSCOPY REMOVAL VULVAR RT  LESION;  Surgeon: Ml Serrano MD;  [facility];  Service: Gynecology    WI LAP, ÁLVARO RESTRICT PROC, LONGITUDINAL GASTRECTOMY N/A 2/6/2018    Procedure: GASTRECTOMY SLEEVE LAPAROSCOPIC; INTRAOPERATIVE EGD ;  Surgeon: Johnny Peter MD;  [facility];  Service: [place] SURG IMPLNT [address] Left 1/29/2020    Procedure: Insertion of thoracic spinal cord stimulator electrode via laminotomy and placement of left buttock implantable pulse generator (NEUROMONITORING);   Surgeon: Reynold Hawkins MD;  [facility];  Service: Neurosurgery    SPINAL CORD STIMULATOR TRIAL W/ LAMINOTOMY      TONSILLECTOMY AND ADENOIDECTOMY      TUBAL LIGATION      VEIN LIGATION AND STRIPPING Right     VULVA SURGERY  10/20/2017    BIOPSY    WISDOM TOOTH EXTRACTION         Current Outpatient Medications   Medication Sig Dispense Refill    atoMOXetine (STRATTERA) 25 mg capsule Take 1 capsule (25 mg total) by mouth daily 90 capsule 1    atorvastatin (LIPITOR) 10 mg tablet Take 1 tablet (10 mg total) by mouth daily 90 tablet 3    Biotin 65150 MCG TABS Take by mouth      Calcium Carbonate-Vit D-Min (CALCIUM 1200 PO) Take 2,400 mg by mouth daily      dexamethasone (DECADRON) 2 mg tablet       dicyclomine (BENTYL) 20 mg tablet Take 1 tablet (20 mg total) by mouth every 6 (six) hours 90 tablet 0    drospirenone-ethinyl estradiol (LIEN) 3-0 02 MG per tablet Take 1 tablet by mouth daily 84 tablet 4    lamoTRIgine (LaMICtal) 100 mg tablet TAKE ONE TABLET BY MOUTH DAILY 90 tablet 0    lamoTRIgine (LaMICtal) 150 MG tablet Take 1 tablet (150 mg total) by mouth daily 90 tablet 0    meloxicam (MOBIC) 7 5 mg tablet Take 1 tablet (7 5 mg total) by mouth daily 15 tablet 0    methocarbamol (ROBAXIN) 750 mg tablet Take 1 tablet (750 mg total) by mouth every 8 (eight) hours 90 tablet 1    montelukast (SINGULAIR) 10 mg tablet TAKE ONE TABLET BY MOUTH AT BEDTIME DAILY 90 tablet 3    Multiple Vitamins-Minerals (MULTI COMPLETE PO) Take by mouth      multivitamin-minerals (CENTRUM ADULTS) tablet Take 1 tablet by mouth daily 30 tablet 0    pantoprazole (PROTONIX) 40 mg tablet Take 1 tablet (40 mg total) by mouth daily 90 tablet 3    phenazopyridine (PYRIDIUM) 100 mg tablet Take 1 tablet (100 mg total) by mouth 3 (three) times a day as needed for bladder spasms 10 tablet 0    Polypodium Leucotomos (Heliocare) 240 MG CAPS Take two tablets once a day 60 capsule 3    Probiotic Product (PRO-BIOTIC BLEND PO) Take by mouth as needed       propranolol (INDERAL LA) 60 mg 24 hr capsule Take 1 capsule (60 mg total) by mouth 2 (two) times a day 180 capsule 1    QUEtiapine (SEROquel) 50 mg tablet Take 1 5 tablets (75 mg total) by mouth daily at bedtime 135 tablet 1    traMADol (ULTRAM) 50 mg tablet Take 50 mg by mouth every 6 (six) hours as needed for moderate pain      venlafaxine (EFFEXOR-XR) 150 mg 24 hr capsule Take 1 capsule (150 mg total) by mouth daily 90 capsule 0    venlafaxine (EFFEXOR-XR) 75 mg 24 hr capsule Take 1 capsule (75 mg total) by mouth daily 90 capsule 0     No current facility-administered medications for this visit  Allergies   Allergen Reactions    Ibuprofen Other (See Comments)     Due to gastric sleeve -can only take for 5 days, then needs to stop       Review of Systems    Video Exam    There were no vitals filed for this visit  Physical Exam     I spent 45 minutes directly with the patient during this visit      VIRTUAL VISIT DISCLAIMER    Al Hernandez acknowledges that she has consented to an online visit or consultation  She understands that the online visit is based solely on information provided by her, and that, in the absence of a face-to-face physical evaluation by the physician, the diagnosis she receives is both limited and provisional in terms of accuracy and completeness   This is not intended to replace a full medical face-to-face evaluation by the physician  Oni Mahan understands and accepts these terms

## 2021-06-11 ENCOUNTER — OFFICE VISIT (OUTPATIENT)
Dept: PSYCHIATRY | Facility: CLINIC | Age: 51
End: 2021-06-11
Payer: COMMERCIAL

## 2021-06-11 DIAGNOSIS — F41.1 GENERALIZED ANXIETY DISORDER: Chronic | ICD-10-CM

## 2021-06-11 DIAGNOSIS — F33.2 MAJOR DEPRESSIVE DISORDER, RECURRENT SEVERE WITHOUT PSYCHOTIC FEATURES (HCC): Chronic | ICD-10-CM

## 2021-06-11 DIAGNOSIS — F33.2 MDD (MAJOR DEPRESSIVE DISORDER), RECURRENT SEVERE, WITHOUT PSYCHOSIS (HCC): ICD-10-CM

## 2021-06-11 DIAGNOSIS — F43.10 POST TRAUMATIC STRESS DISORDER (PTSD): Primary | Chronic | ICD-10-CM

## 2021-06-11 DIAGNOSIS — F33.2 SEVERE EPISODE OF RECURRENT MAJOR DEPRESSIVE DISORDER, WITHOUT PSYCHOTIC FEATURES (HCC): ICD-10-CM

## 2021-06-11 DIAGNOSIS — F90.2 ADHD (ATTENTION DEFICIT HYPERACTIVITY DISORDER), COMBINED TYPE: ICD-10-CM

## 2021-06-11 PROCEDURE — 99214 OFFICE O/P EST MOD 30 MIN: CPT | Performed by: STUDENT IN AN ORGANIZED HEALTH CARE EDUCATION/TRAINING PROGRAM

## 2021-06-11 PROCEDURE — 90833 PSYTX W PT W E/M 30 MIN: CPT | Performed by: STUDENT IN AN ORGANIZED HEALTH CARE EDUCATION/TRAINING PROGRAM

## 2021-06-11 RX ORDER — LAMOTRIGINE 100 MG/1
100 TABLET ORAL DAILY
Qty: 90 TABLET | Refills: 0 | Status: SHIPPED | OUTPATIENT
Start: 2021-06-11 | End: 2021-10-07 | Stop reason: SDUPTHER

## 2021-06-11 RX ORDER — VENLAFAXINE HYDROCHLORIDE 37.5 MG/1
37.5 CAPSULE, EXTENDED RELEASE ORAL DAILY
Qty: 90 CAPSULE | Refills: 0 | Status: SHIPPED | OUTPATIENT
Start: 2021-06-11 | End: 2021-08-20 | Stop reason: SDUPTHER

## 2021-06-11 RX ORDER — ATOMOXETINE 40 MG/1
40 CAPSULE ORAL DAILY
Qty: 30 CAPSULE | Refills: 1 | Status: SHIPPED | OUTPATIENT
Start: 2021-06-11 | End: 2021-08-01

## 2021-06-11 RX ORDER — VENLAFAXINE HYDROCHLORIDE 150 MG/1
150 CAPSULE, EXTENDED RELEASE ORAL DAILY
Qty: 90 CAPSULE | Refills: 0 | Status: SHIPPED | OUTPATIENT
Start: 2021-06-11 | End: 2021-08-20 | Stop reason: SDUPTHER

## 2021-06-11 RX ORDER — LAMOTRIGINE 150 MG/1
150 TABLET ORAL DAILY
Qty: 90 TABLET | Refills: 0 | Status: SHIPPED | OUTPATIENT
Start: 2021-06-11 | End: 2021-10-07 | Stop reason: SDUPTHER

## 2021-06-11 RX ORDER — VENLAFAXINE HYDROCHLORIDE 75 MG/1
75 CAPSULE, EXTENDED RELEASE ORAL DAILY
Qty: 90 CAPSULE | Refills: 0 | Status: SHIPPED | OUTPATIENT
Start: 2021-06-11 | End: 2021-08-20 | Stop reason: SDUPTHER

## 2021-06-18 ENCOUNTER — TELEPHONE (OUTPATIENT)
Dept: PSYCHIATRY | Facility: CLINIC | Age: 51
End: 2021-06-18

## 2021-06-18 NOTE — TELEPHONE ENCOUNTER
Luzmaria Mckee called and LM for return call regarding dosage questions  Nursing returned call  Avi Hernandez was questioning the Venlafaxine dose- Advised according to the note dated 6/11/21, Dr Shahbaz Frey increased the dose of Venlafaxine to 262 5 mg  She stated she got 150 mg, 75 mg and 37 5 mg Venlafaxine from the pharmacy and was asking if she is to take all 3 daily  Advised this was correct  The total of all three is the ordered dose  She verbalized understanding of same         FRANCISCO

## 2021-06-24 ENCOUNTER — HOSPITAL ENCOUNTER (OUTPATIENT)
Dept: MAMMOGRAPHY | Facility: HOSPITAL | Age: 51
Discharge: HOME | End: 2021-06-24
Attending: OBSTETRICS & GYNECOLOGY
Payer: COMMERCIAL

## 2021-06-24 VITALS — WEIGHT: 185 LBS | BODY MASS INDEX: 29.73 KG/M2 | HEIGHT: 66 IN

## 2021-06-24 DIAGNOSIS — Z12.31 VISIT FOR SCREENING MAMMOGRAM: ICD-10-CM

## 2021-06-24 PROCEDURE — 77063 BREAST TOMOSYNTHESIS BI: CPT

## 2021-06-24 PROCEDURE — 77067 SCR MAMMO BI INCL CAD: CPT

## 2021-06-28 ENCOUNTER — OFFICE VISIT (OUTPATIENT)
Dept: FAMILY MEDICINE CLINIC | Facility: CLINIC | Age: 51
End: 2021-06-28
Payer: COMMERCIAL

## 2021-06-28 VITALS
BODY MASS INDEX: 30.53 KG/M2 | HEIGHT: 66 IN | DIASTOLIC BLOOD PRESSURE: 72 MMHG | RESPIRATION RATE: 12 BRPM | WEIGHT: 190 LBS | HEART RATE: 74 BPM | OXYGEN SATURATION: 98 % | SYSTOLIC BLOOD PRESSURE: 110 MMHG | TEMPERATURE: 97.9 F

## 2021-06-28 DIAGNOSIS — R10.84 GENERALIZED ABDOMINAL PAIN: ICD-10-CM

## 2021-06-28 DIAGNOSIS — R35.0 URINARY FREQUENCY: Primary | ICD-10-CM

## 2021-06-28 LAB
SL AMB  POCT GLUCOSE, UA: NEGATIVE
SL AMB LEUKOCYTE ESTERASE,UA: NEGATIVE
SL AMB POCT BILIRUBIN,UA: ABNORMAL
SL AMB POCT BLOOD,UA: NEGATIVE
SL AMB POCT CLARITY,UA: ABNORMAL
SL AMB POCT COLOR,UA: YELLOW
SL AMB POCT KETONES,UA: NEGATIVE
SL AMB POCT NITRITE,UA: NEGATIVE
SL AMB POCT PH,UA: 6
SL AMB POCT SPECIFIC GRAVITY,UA: 1.02
SL AMB POCT URINE PROTEIN: ABNORMAL
SL AMB POCT UROBILINOGEN: ABNORMAL

## 2021-06-28 PROCEDURE — 99214 OFFICE O/P EST MOD 30 MIN: CPT | Performed by: FAMILY MEDICINE

## 2021-06-28 PROCEDURE — 87186 SC STD MICRODIL/AGAR DIL: CPT | Performed by: FAMILY MEDICINE

## 2021-06-28 PROCEDURE — 87086 URINE CULTURE/COLONY COUNT: CPT | Performed by: FAMILY MEDICINE

## 2021-06-28 PROCEDURE — 81002 URINALYSIS NONAUTO W/O SCOPE: CPT | Performed by: FAMILY MEDICINE

## 2021-06-28 PROCEDURE — 87077 CULTURE AEROBIC IDENTIFY: CPT | Performed by: FAMILY MEDICINE

## 2021-06-28 NOTE — PROGRESS NOTES
Assessment/Plan:    No problem-specific Assessment & Plan notes found for this encounter  Diagnoses and all orders for this visit:    Urinary frequency  Comments:  recurrent UTI  to see urology  will get U/S kidneys   Orders:  -     POCT urine dip  -     Ambulatory referral to Urology  -     US kidney and bladder; Future    Generalized abdominal pain  Comments:  ? IBS vs  dyspepsia  to add pepcid at night  to take bentyl daily   seeing GI next month   Orders:  -     US kidney and bladder; Future          Subjective:      Patient ID: Blanka Monterroso is a 45 y o  female  Possible UTI (Patient is here today with possible UTI  x1 day)   Abdominal Pain (Patient has been having abdominal pain for the past week  It comes and goes)   Pressure in the bladder sensation      Abdominal Pain  This is a new problem  The current episode started in the past 7 days  The onset quality is sudden  The problem occurs intermittently  The pain is located in the epigastric region and generalized abdominal region  The quality of the pain is burning and colicky (contractions)  The abdominal pain does not radiate  Pertinent negatives include no anorexia, arthralgias, belching, constipation, diarrhea, dysuria, fever, flatus, frequency, headaches, hematochezia, hematuria, melena, myalgias, nausea, vomiting or weight loss  The following portions of the patient's history were reviewed and updated as appropriate: allergies, current medications, past family history, past medical history, past social history, past surgical history and problem list     Review of Systems   Constitutional: Negative for fever and weight loss  Gastrointestinal: Positive for abdominal pain  Negative for anorexia, constipation, diarrhea, flatus, hematochezia, melena, nausea and vomiting  Genitourinary: Negative for dysuria, frequency and hematuria  Musculoskeletal: Negative for arthralgias and myalgias  Neurological: Negative for headaches  Objective:      /72 (BP Location: Left arm, Patient Position: Sitting, Cuff Size: Adult)   Pulse 74   Temp 97 9 °F (36 6 °C) (Tympanic)   Resp 12   Ht 5' 5 98" (1 676 m)   Wt 86 2 kg (190 lb)   LMP 01/07/2019 (Approximate)   SpO2 98%   BMI 30 69 kg/m²          Physical Exam  Constitutional:       Appearance: She is well-developed  Cardiovascular:      Rate and Rhythm: Normal rate and regular rhythm  Abdominal:      General: Bowel sounds are normal       Palpations: Abdomen is soft  Tenderness: There is generalized abdominal tenderness and tenderness in the epigastric area  There is no right CVA tenderness or left CVA tenderness  Neurological:      Mental Status: She is alert

## 2021-07-01 LAB — BACTERIA UR CULT: ABNORMAL

## 2021-07-02 ENCOUNTER — TELEMEDICINE (OUTPATIENT)
Dept: BEHAVIORAL/MENTAL HEALTH CLINIC | Facility: CLINIC | Age: 51
End: 2021-07-02
Payer: COMMERCIAL

## 2021-07-02 DIAGNOSIS — F43.10 POST TRAUMATIC STRESS DISORDER (PTSD): Chronic | ICD-10-CM

## 2021-07-02 DIAGNOSIS — F33.2 MAJOR DEPRESSIVE DISORDER, RECURRENT SEVERE WITHOUT PSYCHOTIC FEATURES (HCC): Primary | Chronic | ICD-10-CM

## 2021-07-02 DIAGNOSIS — F41.1 GENERALIZED ANXIETY DISORDER: Chronic | ICD-10-CM

## 2021-07-02 PROCEDURE — 90834 PSYTX W PT 45 MINUTES: CPT | Performed by: SOCIAL WORKER

## 2021-07-02 NOTE — PSYCH
Virtual Regular Visit    Assessment/Plan:    Problem List Items Addressed This Visit     None        Goals addressed in session: Goal 1      Reason for visit is Behavioral Health session, conducted through video, due to COVID-19 precautions  Velvet Head has verbalized a preference to continue with virtual sessions at this time  Velvet Head has been offered in-person sessions and has declined  Encounter provider LALY Mosqueda    Provider located at [address] [place] [id number] [phone]    Recent Visits  Date Type Provider Dept   06/28/21 Office Visit Victor Manuel Green MD [address] [facility] recent visits within past 7 days and meeting all other requirements  Future Appointments  No visits were found meeting these conditions  Showing future appointments within next 150 days and meeting all other requirements       The patient was identified by name and date of birth  Louis Wilde was informed that this is a telemedicine visit and that the visit is being conducted through [address] [place] [address] Now and patient was informed that this is a secure, HIPAA-compliant platform  She agrees to proceed     My office door was closed  No one else was in the room  She acknowledged consent and understanding of privacy and security of the video platform  The patient has agreed to participate and understands they can discontinue the visit at any time  Patient is aware this is a billable service  Subjective  Louis Wilde is a 45 y o  female  DATA: Met with Yessy for scheduled individual session  Topics of discussion included family stressors, relationships with family, work-related stress, education-related stress and physical health concerns  "I go to [place] next week " [facility] states that her son will be staying home (with Yessy's mother-in-law) and will be helping with the dog   She states that he left employment at [employer] and has had an interview at [employer]  Johnny Cartagena states that her mood has been relatively stable since our last session  She discussed her recent meeting with Dr Yanet Joya  She states that she feels that the session went very well  She states that she has changed her major at school and 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  She states that work continues to be very busy  She states that she gets very frustrated by other people who "don't do their job " Johnny Cartagena states that she reached out to her older son (who has not spoken with her in years)  She states that she sent him a message on [employer]  She reports that she told him that she knows she was not a perfect mother, and she wishes she could have been different  She states that he has not responded to her at all  She states that she does not understand why he is not responding to her  She states, "I'm sad  I'm lost  I don't know what to do anymore " This clinician validated her emotions and her experience and offered her encouragement  Client shows evidence of utilizing emotion regulation skills skills to manage mental health symptoms  During this session, this clinician used the following therapeutic modalities: supportive psychotherapy, client-centered therapy, mindfulness-based strategies, DBT-informed skills, Motivational Interviewing and solution-focused therapy  ASSESSMENT: Johnny Cartagena presents with a euthymic mood  Her affect is normal range and intensity, appropriate  Johnny Cartagena exhibits good therapeutic rapport with this clinician  Johnny Cartagena continues to exhibit willingness to work on treatment goals and objectives  Johnny Cartagena presents with a minimal risk of suicide, minimal risk of self-harm, and minimal risk of harm to others  PLAN: Johnny Cartagena will return in one month for the next scheduled session   Between sessions, Johnny Cartagena will continue to monitor her mood (including anxiety and irritability) and will report back during the next session re: successes and barriers  At the next session, this clinician will use supportive psychotherapy, client-centered therapy, mindfulness-based strategies, DBT-informed skills, Motivational Interviewing and solution-focused therapy to address her mood regulation and relationship issues, in an effort to assist Emmett with meeting treatment goals  HPI     Past Medical History:   Diagnosis Date    ADHD (attention deficit hyperactivity disorder)     Bulging lumbar disc     Carpal tunnel syndrome     RIGHT  LAST ASSESSED: 16    Chronic back pain     low    Chronic pain disorder     Colon polyps     Diabetes mellitus ([facility] [address] )     Resolved post weight loss    GERD (gastroesophageal reflux disease)     Gestational diabetes     Hearing loss     left ear    Hyperlipidemia     Resolved with weight loss    Hyponatremia 2020    IBS (irritable bowel syndrome)     Ileus ([facility] [address] )     LAST ASSESSED: 8/3/17    Labial cyst     LAST ASSESSED: 16    Myofascial pain     LAST ASSESSED: 16    Obesity     Ovarian cyst     LEFT   LAST ASSESSED: 16    Panic attack     Pap smear for cervical cancer screening     2018--pap wnl, HRHPV neg    Pneumonia     Seasonal allergies     Thoracic outlet syndrome     2010    Trochanteric bursitis of both hips     LAST ASSESSED: 3/18/16    Ulnar neuropathy at elbow     Varicella     Wears glasses        Past Surgical History:   Procedure Laterality Date    BILE DUCT EXPLORATION      ENDOSCOPIC REMOVAL OF STONES FROM BILIARY TRACT     SECTION      x3    CHOLECYSTECTOMY      COLONOSCOPY      -polyp, repeat     DILATION AND CURETTAGE OF UTERUS      ENDOMETRIAL ABLATION      ERCP W/ SPHICTEROTOMY      FIRST RIB REMOVAL      THORAX EXCISION OF FIRST RIB    HYSTEROSCOPY      NEUROPLASTY / TRANSPOSITION ULNAR NERVE AT ELBOW Right     [place] COLONOSCOPY FLX DX W/COLLJ SPEC WHEN PFRMD N/A 2017    Procedure: COLONOSCOPY;  Surgeon: Kylie Hendrickson MD; [facility]; Service: Gastroenterology    MI ESOPHAGOGASTRODUODENOSCOPY TRANSORAL DIAGNOSTIC N/A 9/14/2017    Procedure: ESOPHAGOGASTRODUODENOSCOPY (EGD); Surgeon: Arturo Kahn MD;  [facility]; Service: Gastroenterology    MI HYSTEROSCOPY,W/ENDO BX N/A 10/20/2017    Procedure: DILATATION AND CURETTAGE (D&C) WITH HYSTEROSCOPY  REMOVAL VULVAR RT  LESION;  Surgeon: Kyleigh Hernandez MD;  [facility];  Service: Gynecology    MI LAP, ÁLVARO RESTRICT PROC, LONGITUDINAL GASTRECTOMY N/A 2/6/2018    Procedure: GASTRECTOMY SLEEVE LAPAROSCOPIC; INTRAOPERATIVE EGD ;  Surgeon: Mike Lopez MD;  [facility];  Service: Vernestine Barbara SURG IMPLNT [address] Left 1/29/2020    Procedure: Insertion of thoracic spinal cord stimulator electrode via laminotomy and placement of left buttock implantable pulse generator (NEUROMONITORING);   Surgeon: Darien Blakely MD;  [facility];  Service: Neurosurgery    SPINAL CORD STIMULATOR TRIAL W/ LAMINOTOMY      TONSILLECTOMY AND ADENOIDECTOMY      TUBAL LIGATION      VEIN LIGATION AND STRIPPING Right     VULVA SURGERY  10/20/2017    BIOPSY    WISDOM TOOTH EXTRACTION         Current Outpatient Medications   Medication Sig Dispense Refill    atoMOXetine (STRATTERA) 40 mg capsule Take 1 capsule (40 mg total) by mouth daily 30 capsule 1    atorvastatin (LIPITOR) 10 mg tablet Take 1 tablet (10 mg total) by mouth daily 90 tablet 3    Biotin 61787 MCG TABS Take by mouth      Calcium Carbonate-Vit D-Min (CALCIUM 1200 PO) Take 2,400 mg by mouth daily      dexamethasone (DECADRON) 2 mg tablet       dicyclomine (BENTYL) 20 mg tablet Take 1 tablet (20 mg total) by mouth every 6 (six) hours (Patient not taking: Reported on 6/28/2021) 90 tablet 0    drospirenone-ethinyl estradiol (LIEN) 3-0 02 MG per tablet Take 1 tablet by mouth daily 84 tablet 4    lamoTRIgine (LaMICtal) 100 mg tablet Take 1 tablet (100 mg total) by mouth daily 90 tablet 0  lamoTRIgine (LaMICtal) 150 MG tablet Take 1 tablet (150 mg total) by mouth daily 90 tablet 0    montelukast (SINGULAIR) 10 mg tablet TAKE ONE TABLET BY MOUTH AT BEDTIME DAILY 90 tablet 3    Multiple Vitamins-Minerals (MULTI COMPLETE PO) Take by mouth      multivitamin-minerals (CENTRUM ADULTS) tablet Take 1 tablet by mouth daily 30 tablet 0    nitrofurantoin (MACROBID) 100 mg capsule Take 1 capsule (100 mg total) by mouth 2 (two) times a day for 5 days 10 capsule 0    pantoprazole (PROTONIX) 40 mg tablet Take 1 tablet (40 mg total) by mouth daily 90 tablet 3    phenazopyridine (PYRIDIUM) 100 mg tablet Take 1 tablet (100 mg total) by mouth 3 (three) times a day as needed for bladder spasms 10 tablet 0    Polypodium Leucotomos (Heliocare) 240 MG CAPS Take two tablets once a day (Patient not taking: Reported on 6/28/2021) 60 capsule 3    Probiotic Product (PRO-BIOTIC BLEND PO) Take by mouth as needed  (Patient not taking: Reported on 6/28/2021)      propranolol (INDERAL LA) 60 mg 24 hr capsule Take 1 capsule (60 mg total) by mouth 2 (two) times a day (Patient not taking: Reported on 6/28/2021) 180 capsule 1    QUEtiapine (SEROquel) 50 mg tablet Take 1 5 tablets (75 mg total) by mouth daily at bedtime 135 tablet 1    venlafaxine (EFFEXOR-XR) 150 mg 24 hr capsule Take 1 capsule (150 mg total) by mouth daily 90 capsule 0    venlafaxine (EFFEXOR-XR) 37 5 mg 24 hr capsule Take 1 capsule (37 5 mg total) by mouth daily 90 capsule 0    venlafaxine (EFFEXOR-XR) 75 mg 24 hr capsule Take 1 capsule (75 mg total) by mouth daily 90 capsule 0     No current facility-administered medications for this visit  Allergies   Allergen Reactions    Ibuprofen Other (See Comments)     Due to gastric sleeve -can only take for 5 days, then needs to stop       Review of Systems    Video Exam    There were no vitals filed for this visit      Physical Exam     I spent 50 minutes directly with the patient during this visit      VIRTUAL VISIT DISCLAIMER    Lobo Lakhani acknowledges that she has consented to an online visit or consultation  She understands that the online visit is based solely on information provided by her, and that, in the absence of a face-to-face physical evaluation by the physician, the diagnosis she receives is both limited and provisional in terms of accuracy and completeness  This is not intended to replace a full medical face-to-face evaluation by the physician  Baldwin Lesvia understands and accepts these terms

## 2021-07-28 ENCOUNTER — TELEPHONE (OUTPATIENT)
Dept: PAIN MEDICINE | Facility: CLINIC | Age: 51
End: 2021-07-28

## 2021-07-28 NOTE — TELEPHONE ENCOUNTER
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

## 2021-08-01 DIAGNOSIS — F90.2 ADHD (ATTENTION DEFICIT HYPERACTIVITY DISORDER), COMBINED TYPE: ICD-10-CM

## 2021-08-01 RX ORDER — ATOMOXETINE 40 MG/1
CAPSULE ORAL
Qty: 60 CAPSULE | Refills: 0 | Status: SHIPPED | OUTPATIENT
Start: 2021-08-01 | End: 2021-08-20

## 2021-08-04 DIAGNOSIS — K21.9 GASTROESOPHAGEAL REFLUX DISEASE: ICD-10-CM

## 2021-08-05 RX ORDER — FAMOTIDINE 20 MG/1
TABLET, FILM COATED ORAL
Qty: 60 TABLET | Refills: 0 | Status: SHIPPED | OUTPATIENT
Start: 2021-08-05 | End: 2021-09-27

## 2021-08-06 ENCOUNTER — TELEMEDICINE (OUTPATIENT)
Dept: BEHAVIORAL/MENTAL HEALTH CLINIC | Facility: CLINIC | Age: 51
End: 2021-08-06
Payer: COMMERCIAL

## 2021-08-06 ENCOUNTER — PROCEDURE VISIT (OUTPATIENT)
Dept: PAIN MEDICINE | Facility: CLINIC | Age: 51
End: 2021-08-06
Payer: COMMERCIAL

## 2021-08-06 VITALS
RESPIRATION RATE: 14 BRPM | WEIGHT: 190 LBS | DIASTOLIC BLOOD PRESSURE: 62 MMHG | SYSTOLIC BLOOD PRESSURE: 102 MMHG | HEART RATE: 70 BPM | BODY MASS INDEX: 30.53 KG/M2 | HEIGHT: 66 IN

## 2021-08-06 DIAGNOSIS — M79.18 MYOFASCIAL PAIN SYNDROME: Primary | ICD-10-CM

## 2021-08-06 DIAGNOSIS — F41.1 GENERALIZED ANXIETY DISORDER: Chronic | ICD-10-CM

## 2021-08-06 DIAGNOSIS — F43.10 POST TRAUMATIC STRESS DISORDER (PTSD): Chronic | ICD-10-CM

## 2021-08-06 DIAGNOSIS — F33.2 MAJOR DEPRESSIVE DISORDER, RECURRENT SEVERE WITHOUT PSYCHOTIC FEATURES (HCC): Primary | Chronic | ICD-10-CM

## 2021-08-06 PROCEDURE — 90834 PSYTX W PT 45 MINUTES: CPT | Performed by: SOCIAL WORKER

## 2021-08-06 PROCEDURE — 20552 NJX 1/MLT TRIGGER POINT 1/2: CPT | Performed by: ANESTHESIOLOGY

## 2021-08-06 RX ORDER — BUPIVACAINE HYDROCHLORIDE 2.5 MG/ML
10 INJECTION, SOLUTION EPIDURAL; INFILTRATION; INTRACAUDAL ONCE
Status: COMPLETED | OUTPATIENT
Start: 2021-08-06 | End: 2021-08-06

## 2021-08-06 RX ORDER — TRAMADOL HYDROCHLORIDE 50 MG/1
50 TABLET ORAL DAILY PRN
Qty: 30 TABLET | Refills: 0 | Status: SHIPPED | OUTPATIENT
Start: 2021-08-06 | End: 2022-01-28 | Stop reason: SDUPTHER

## 2021-08-06 RX ORDER — METHYLPREDNISOLONE ACETATE 40 MG/ML
40 INJECTION, SUSPENSION INTRA-ARTICULAR; INTRALESIONAL; INTRAMUSCULAR; SOFT TISSUE ONCE
Status: COMPLETED | OUTPATIENT
Start: 2021-08-06 | End: 2021-08-06

## 2021-08-06 RX ADMIN — BUPIVACAINE HYDROCHLORIDE 10 ML: 2.5 INJECTION, SOLUTION EPIDURAL; INFILTRATION; INTRACAUDAL at 08:32

## 2021-08-06 RX ADMIN — METHYLPREDNISOLONE ACETATE 40 MG: 40 INJECTION, SUSPENSION INTRA-ARTICULAR; INTRALESIONAL; INTRAMUSCULAR; SOFT TISSUE at 08:33

## 2021-08-06 NOTE — PROGRESS NOTES
Pre-procedure Diagnosis:   Myofascial pain  Post-procedure Diagnosis:   Myofascial pain  Operation Title(s):  Trigger point injections into left sacral paraspinal x2  Attending Surgeon:   Maria Luisa Martel MD  Anesthesia:   Local    Indications: The patient is a 45y o  year-old female with a diagnosis of myofascial pain  The patient's history and physical exam were reviewed  The risks, benefits and alternatives to the procedure were discussed, and all questions were answered to the patient's satisfaction  The patient agreed to proceed, and written informed consent was obtained  Procedure in Detail: The patient was brought into the exam room and placed in the sitting position on the exam room chair with the head flexed on a pillow  Trigger points were identified in the:  Left sacral paraspinal x2    Areas were cleansed with alcohol  Then, using a dry needling technique, each trigger point was injected with a 1 5 inch 25 gauge needle and 0 5 mL 0 25% bupivacaine and 0 5 mL Depo-Medrol (40 mg/mL)    Disposition: The patient tolerated the procedure well and there were no apparent complications  The patient was given written discharge instructions for the procedure

## 2021-08-06 NOTE — PSYCH
Virtual Regular Visit    Verification of patient location:    Patient is located in the following state in which I hold an active license [place]    Assessment/Plan:    Problem List Items Addressed This Visit        Other    Post traumatic stress disorder (PTSD) (Chronic)    Major depressive disorder, recurrent severe without psychotic features ([facility] [address] ) - Primary (Chronic)    Generalized anxiety disorder (Chronic)        Goals addressed in session: Goal 1      Reason for visit is No chief complaint on file  Encounter provider LALY Hurd    Provider located at [address] [place] [id number] [phone]    Recent Visits  No visits were found meeting these conditions  Showing recent visits within past 7 days and meeting all other requirements  Future Appointments  No visits were found meeting these conditions  Showing future appointments within next 150 days and meeting all other requirements     The patient was identified by name and date of birth  Tao Hale was informed that this is a telemedicine visit and that the visit is being conducted through[employer] and patient was informed that this is a secure, HIPAA-compliant platform  She agrees to proceed     My office door was closed  No one else was in the room  She acknowledged consent and understanding of privacy and security of the video platform  The patient has agreed to participate and understands they can discontinue the visit at any time  Patient is aware this is a billable service  Subjective  Tao Hale is a 45 y o  female  DATA: Met with Yessy for scheduled individual session  Topics of discussion included relationships with family, work-related stress and education-related stress  "We're becoming a level 2 trauma unit  We've been very busy " Adilia Marques states that the unit has been understaffed and very stressful   Adilia Marques 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)  She states that she is unsure if she made the right decision, because she is fearful of having to memorize things  We discussed about remaining in the present and reminding herself to wait until she has a problem  I reassured her that we can work on developing a plan if and when she develops a problem with her memorization  Jaylin Jackson states that she met with the psychiatrist-- who has increased her effexor  She states that she has been waking up early and feels that she is having some withdrawal symptoms (similar to "brain-zaps") and has to take the medications earlier than she plans to  She states, "I don't want to take the meds at 3:00 in the morning  We discussed potential ways to help her push her dosing time to a later time in the morning, rather than taking them earlier and earlier (but still ensuring that she takes her full dose of the medication every morning)  Client shows evidence of utilizing some distress tolerance skills to manage mental health symptoms  During this session, this clinician used the following therapeutic modalities: supportive psychotherapy, client-centered therapy, mindfulness-based strategies, DBT-informed skills, Motivational Interviewing and solution-focused therapy  ASSESSMENT: Jaylin Jackson presents with a normal mood  Her affect is normal range and intensity, appropriate  Jaylin Jackson exhibits good therapeutic rapport with this clinician  Jaylin Jackson continues to exhibit willingness to work on treatment goals and objectives  Jaylin Jackson presents with a minimal risk of suicide, minimal risk of self-harm, and minimal risk of harm to others  PLAN: Jaylin Jackson will return in one month (Friday, September 3, 2021 2 @ 3:00pm) for the next scheduled session  Between sessions, Jaylin Jackson will continue to focus on staying mindful to the present moment   She will continue to practice her mindful assertiveness and limit-setting and will report back during the next session re: successes and barriers  At the next session, this clinician will use supportive psychotherapy, client-centered therapy, mindfulness-based strategies, DBT-informed skills, Motivational Interviewing and solution-focused therapy to address her mood regulation and relationship skills, in an effort to assist Gomez Lynn with meeting treatment goals  HPI     Past Medical History:   Diagnosis Date    ADHD (attention deficit hyperactivity disorder)     Bulging lumbar disc     Carpal tunnel syndrome     RIGHT  LAST ASSESSED: 16    Chronic back pain     low    Chronic pain disorder     Colon polyps     Diabetes mellitus ([facility] [address] )     Resolved post weight loss    GERD (gastroesophageal reflux disease)     Gestational diabetes     Hearing loss     left ear    Hyperlipidemia     Resolved with weight loss    Hyponatremia 2020    IBS (irritable bowel syndrome)     Ileus ([facility] [address] )     LAST ASSESSED: 8/3/17    Labial cyst     LAST ASSESSED: 16    Myofascial pain     LAST ASSESSED: 16    Obesity     Ovarian cyst     LEFT   LAST ASSESSED: 16    Panic attack     Pap smear for cervical cancer screening     2018--pap wnl, HRHPV neg    Pneumonia     Seasonal allergies     Thoracic outlet syndrome     2010    Trochanteric bursitis of both hips     LAST ASSESSED: 3/18/16    Ulnar neuropathy at elbow     Varicella     Wears glasses        Past Surgical History:   Procedure Laterality Date    BILE DUCT EXPLORATION      ENDOSCOPIC REMOVAL OF STONES FROM BILIARY TRACT     SECTION      x3    CHOLECYSTECTOMY      COLONOSCOPY      -polyp, repeat     DILATION AND CURETTAGE OF UTERUS      ENDOMETRIAL ABLATION      ERCP W/ SPHICTEROTOMY      FIRST RIB REMOVAL      THORAX EXCISION OF FIRST RIB    HYSTEROSCOPY      NEUROPLASTY / TRANSPOSITION ULNAR NERVE AT ELBOW Right     IN COLONOSCOPY FLX DX W/COLLJ SPEC WHEN PFRMD N/A 2017    Procedure: COLONOSCOPY; Surgeon: Cheri Grey MD;  [facility]; Service: Gastroenterology    OH ESOPHAGOGASTRODUODENOSCOPY TRANSORAL DIAGNOSTIC N/A 9/14/2017    Procedure: ESOPHAGOGASTRODUODENOSCOPY (EGD); Surgeon: Yin Banks MD;  [facility]; Service: Gastroenterology    OH HYSTEROSCOPY,W/ENDO BX N/A 10/20/2017    Procedure: DILATATION AND CURETTAGE (D&C) WITH HYSTEROSCOPY  REMOVAL VULVAR RT  LESION;  Surgeon: Oriana Cullen MD;  [facility];  Service: Gynecology    OH LAP, ÁLVARO RESTRICT PROC, LONGITUDINAL GASTRECTOMY N/A 2/6/2018    Procedure: GASTRECTOMY SLEEVE LAPAROSCOPIC; INTRAOPERATIVE EGD ;  Surgeon: Alondra Mcmahon MD;  [facility];  Service: Vandana Barragan SURG IMPLNT [address] Left 1/29/2020    Procedure: Insertion of thoracic spinal cord stimulator electrode via laminotomy and placement of left buttock implantable pulse generator (NEUROMONITORING);   Surgeon: Glendy Villegas MD;  [facility];  Service: Neurosurgery    SPINAL CORD STIMULATOR TRIAL W/ LAMINOTOMY      TONSILLECTOMY AND ADENOIDECTOMY      TUBAL LIGATION      VEIN LIGATION AND STRIPPING Right     VULVA SURGERY  10/20/2017    BIOPSY    WISDOM TOOTH EXTRACTION         Current Outpatient Medications   Medication Sig Dispense Refill    atoMOXetine (STRATTERA) 40 mg capsule TAKE ONE CAPSULE BY MOUTH DAILY 60 capsule 0    atorvastatin (LIPITOR) 10 mg tablet Take 1 tablet (10 mg total) by mouth daily 90 tablet 3    Biotin 77119 MCG TABS Take by mouth      Calcium Carbonate-Vit D-Min (CALCIUM 1200 PO) Take 2,400 mg by mouth daily      dexamethasone (DECADRON) 2 mg tablet       dicyclomine (BENTYL) 20 mg tablet Take 1 tablet (20 mg total) by mouth every 6 (six) hours (Patient not taking: Reported on 6/28/2021) 90 tablet 0    drospirenone-ethinyl estradiol (LIEN) 3-0 02 MG per tablet Take 1 tablet by mouth daily 84 tablet 4    famotidine (PEPCID) 20 mg tablet TAKE ONE TABLET BY MOUTH 2 TIMES A DAY 60 tablet 0    lamoTRIgine (LaMICtal) 100 mg tablet Take 1 tablet (100 mg total) by mouth daily 90 tablet 0    lamoTRIgine (LaMICtal) 150 MG tablet Take 1 tablet (150 mg total) by mouth daily 90 tablet 0    montelukast (SINGULAIR) 10 mg tablet TAKE ONE TABLET BY MOUTH AT BEDTIME DAILY 90 tablet 3    Multiple Vitamins-Minerals (MULTI COMPLETE PO) Take by mouth      multivitamin-minerals (CENTRUM ADULTS) tablet Take 1 tablet by mouth daily 30 tablet 0    pantoprazole (PROTONIX) 40 mg tablet Take 1 tablet (40 mg total) by mouth daily 90 tablet 3    phenazopyridine (PYRIDIUM) 100 mg tablet Take 1 tablet (100 mg total) by mouth 3 (three) times a day as needed for bladder spasms 10 tablet 0    Polypodium Leucotomos (Heliocare) 240 MG CAPS Take two tablets once a day (Patient not taking: Reported on 6/28/2021) 60 capsule 3    Probiotic Product (PRO-BIOTIC BLEND PO) Take by mouth as needed  (Patient not taking: Reported on 6/28/2021)      propranolol (INDERAL LA) 60 mg 24 hr capsule Take 1 capsule (60 mg total) by mouth 2 (two) times a day (Patient not taking: Reported on 6/28/2021) 180 capsule 1    QUEtiapine (SEROquel) 50 mg tablet Take 1 5 tablets (75 mg total) by mouth daily at bedtime 135 tablet 1    traMADol (ULTRAM) 50 mg tablet Take 1 tablet (50 mg total) by mouth daily as needed for moderate pain or severe pain 30 tablet 0    venlafaxine (EFFEXOR-XR) 150 mg 24 hr capsule Take 1 capsule (150 mg total) by mouth daily 90 capsule 0    venlafaxine (EFFEXOR-XR) 37 5 mg 24 hr capsule Take 1 capsule (37 5 mg total) by mouth daily 90 capsule 0    venlafaxine (EFFEXOR-XR) 75 mg 24 hr capsule Take 1 capsule (75 mg total) by mouth daily 90 capsule 0     No current facility-administered medications for this visit          Allergies   Allergen Reactions    Ibuprofen Other (See Comments)     Due to gastric sleeve -can only take for 5 days, then needs to stop       Review of Systems    Video Exam    There were no vitals filed for this visit  Physical Exam     I spent 50 minutes directly with the patient during this visit    VIRTUAL VISIT [address] verbally agrees to participate in [employer]  Pt is aware that [employer] could be limited without vital signs or the ability to perform a full hands-on physical Marianna Joyce understands she or the provider may request at any time to terminate the video visit and request the patient to seek care or treatment in person

## 2021-08-11 ENCOUNTER — TELEPHONE (OUTPATIENT)
Dept: PAIN MEDICINE | Facility: CLINIC | Age: 51
End: 2021-08-11

## 2021-08-11 DIAGNOSIS — G89.4 CHRONIC PAIN SYNDROME: Primary | ICD-10-CM

## 2021-08-11 RX ORDER — TRAMADOL HYDROCHLORIDE 50 MG/1
50 TABLET ORAL DAILY PRN
Qty: 7 TABLET | Refills: 0 | Status: SHIPPED | OUTPATIENT
Start: 2021-08-11 | End: 2021-11-26

## 2021-08-18 NOTE — PROGRESS NOTES
[facility] [facility]        NAME: Kenneth Portillo is a 45 y o  female  : 1970    MRN: [id number]  DATE: 2021  TIME: 8:43 AM    Assessment and Plan   UTI symptoms [R39 9]  1  UTI symptoms  POCT urine dip    sulfamethoxazole-trimethoprim (BACTRIM DS) 800-160 mg per tablet    phenazopyridine (PYRIDIUM) 100 mg tablet    Urine culture         Patient Instructions       Follow up with PCP in 3-5 days  Proceed to  ER if symptoms worsen  Chief Complaint     Chief Complaint   Patient presents with    Possible UTI     frequent urination, pressure upon urination,painful urination started today          History of Present Illness       Urinary Tract Infection   This is a new problem  The current episode started today  The problem occurs every urination  The problem has been unchanged  The quality of the pain is described as burning  The pain is at a severity of 4/10  The pain is moderate  She is not sexually active  There is no history of pyelonephritis  Associated symptoms include frequency and urgency  She has tried nothing for the symptoms  The treatment provided no relief  Review of Systems   Review of Systems   Constitutional: Negative  Respiratory: Negative  Cardiovascular: Negative  Genitourinary: Positive for frequency and urgency  All other systems reviewed and are negative          Current Medications       Current Outpatient Medications:     Biotin 99940 MCG TABS, Take by mouth, Disp: , Rfl:     Calcium Carbonate-Vit D-Min (CALCIUM 1200 PO), Take 2,400 mg by mouth daily, Disp: , Rfl:     dexamethasone (DECADRON) 2 mg tablet, , Disp: , Rfl:     drospirenone-ethinyl estradiol (LIEN) 3-0 02 MG per tablet, Take 1 tablet by mouth daily, Disp: 84 tablet, Rfl: 4    montelukast (SINGULAIR) 10 mg tablet, TAKE ONE TABLET BY MOUTH AT BEDTIME DAILY, Disp: 90 tablet, Rfl: 3    Multiple Vitamins-Minerals (MULTI COMPLETE PO), Take by mouth, Disp: , Rfl:     multivitamin-minerals (CENTRUM ADULTS) tablet, Take 1 tablet by mouth daily, Disp: 30 tablet, Rfl: 0    pantoprazole (PROTONIX) 40 mg tablet, Take 1 tablet (40 mg total) by mouth daily, Disp: 90 tablet, Rfl: 3    Polypodium Leucotomos (Heliocare) 240 MG CAPS, Take two tablets once a day (Patient not taking: Reported on 6/28/2021), Disp: 60 capsule, Rfl: 3    Probiotic Product (PRO-BIOTIC BLEND PO), Take by mouth as needed  (Patient not taking: Reported on 6/28/2021), Disp: , Rfl:     QUEtiapine (SEROquel) 50 mg tablet, Take 1 5 tablets (75 mg total) by mouth daily at bedtime, Disp: 135 tablet, Rfl: 1    atoMOXetine (STRATTERA) 40 mg capsule, TAKE ONE CAPSULE BY MOUTH DAILY, Disp: 60 capsule, Rfl: 0    atorvastatin (LIPITOR) 10 mg tablet, Take 1 tablet (10 mg total) by mouth daily, Disp: 90 tablet, Rfl: 3    dicyclomine (BENTYL) 20 mg tablet, Take 1 tablet (20 mg total) by mouth every 6 (six) hours (Patient not taking: Reported on 6/28/2021), Disp: 90 tablet, Rfl: 0    famotidine (PEPCID) 20 mg tablet, TAKE ONE TABLET BY MOUTH 2 TIMES A DAY, Disp: 60 tablet, Rfl: 0    lamoTRIgine (LaMICtal) 100 mg tablet, Take 1 tablet (100 mg total) by mouth daily, Disp: 90 tablet, Rfl: 0    lamoTRIgine (LaMICtal) 150 MG tablet, Take 1 tablet (150 mg total) by mouth daily, Disp: 90 tablet, Rfl: 0    phenazopyridine (PYRIDIUM) 100 mg tablet, Take 1 tablet (100 mg total) by mouth 3 (three) times a day as needed for bladder spasms, Disp: 10 tablet, Rfl: 0    propranolol (INDERAL LA) 60 mg 24 hr capsule, Take 1 capsule (60 mg total) by mouth 2 (two) times a day (Patient not taking: Reported on 6/28/2021), Disp: 180 capsule, Rfl: 1    traMADol (ULTRAM) 50 mg tablet, Take 1 tablet (50 mg total) by mouth daily as needed for moderate pain or severe pain, Disp: 30 tablet, Rfl: 0    traMADol (ULTRAM) 50 mg tablet, Take 1 tablet (50 mg total) by mouth daily as needed for moderate pain, Disp: 7 tablet, Rfl: 0    venlafaxine (EFFEXOR-XR) 150 mg 24 hr capsule, Take 1 capsule (150 mg total) by mouth daily, Disp: 90 capsule, Rfl: 0    venlafaxine (EFFEXOR-XR) 37 5 mg 24 hr capsule, Take 1 capsule (37 5 mg total) by mouth daily, Disp: 90 capsule, Rfl: 0    venlafaxine (EFFEXOR-XR) 75 mg 24 hr capsule, Take 1 capsule (75 mg total) by mouth daily, Disp: 90 capsule, Rfl: 0    Current Allergies     Allergies as of 2021 - Reviewed 2021   Allergen Reaction Noted    Ibuprofen Other (See Comments) 2019            The following portions of the patient's history were reviewed and updated as appropriate: allergies, current medications, past family history, past medical history, past social history, past surgical history and problem list      Past Medical History:   Diagnosis Date    ADHD (attention deficit hyperactivity disorder)     Bulging lumbar disc     Carpal tunnel syndrome     RIGHT  LAST ASSESSED: 16    Chronic back pain     low    Chronic pain disorder     Colon polyps     Diabetes mellitus ([address] )     Resolved post weight loss    GERD (gastroesophageal reflux disease)     Gestational diabetes     Hearing loss     left ear    Hyperlipidemia     Resolved with weight loss    Hyponatremia 2020    IBS (irritable bowel syndrome)     Ileus ([address] )     LAST ASSESSED: 8/3/17    Labial cyst     LAST ASSESSED: 16    Myofascial pain     LAST ASSESSED: 16    Obesity     Ovarian cyst     LEFT   LAST ASSESSED: 16    Panic attack     Pap smear for cervical cancer screening     2018--pap wnl, HRHPV neg    Pneumonia     Seasonal allergies     Thoracic outlet syndrome     2010    Trochanteric bursitis of both hips     LAST ASSESSED: 3/18/16    Ulnar neuropathy at elbow     Varicella     Wears glasses        Past Surgical History:   Procedure Laterality Date    BILE DUCT EXPLORATION      ENDOSCOPIC REMOVAL OF STONES FROM BILIARY TRACT     SECTION      x3    CHOLECYSTECTOMY      COLONOSCOPY      2017-polyp, repeat 2022    DILATION AND CURETTAGE OF UTERUS      ENDOMETRIAL ABLATION      ERCP W/ SPHICTEROTOMY      FIRST RIB REMOVAL      THORAX EXCISION OF FIRST RIB    HYSTEROSCOPY      NEUROPLASTY / TRANSPOSITION ULNAR NERVE AT ELBOW Right 2011    MT COLONOSCOPY FLX DX W/COLLJ SPEC WHEN PFRMD N/A 5/30/2017    Procedure: COLONOSCOPY;  Surgeon: Jeana Karimi MD;  [facility]; Service: Gastroenterology    MT ESOPHAGOGASTRODUODENOSCOPY TRANSORAL DIAGNOSTIC N/A 9/14/2017    Procedure: ESOPHAGOGASTRODUODENOSCOPY (EGD); Surgeon: Kody Recinos MD;  [facility]; Service: Gastroenterology    MT HYSTEROSCOPY,W/ENDO BX N/A 10/20/2017    Procedure: DILATATION AND CURETTAGE (D&C) WITH HYSTEROSCOPY  REMOVAL VULVAR RT  LESION;  Surgeon: Stephanie Alatorre MD;  [facility];  Service: Gynecology    MT LAP, ÁLVARO RESTRICT PROC, LONGITUDINAL GASTRECTOMY N/A 2/6/2018    Procedure: GASTRECTOMY SLEEVE LAPAROSCOPIC; INTRAOPERATIVE EGD ;  Surgeon: Nunu Apodaca MD;  [facility];  Service: [place] Larger SURG IMPLNT [address] Left 1/29/2020    Procedure: Insertion of thoracic spinal cord stimulator electrode via laminotomy and placement of left buttock implantable pulse generator (NEUROMONITORING);   Surgeon: Mike Lopez MD;  [facility];  Service: Neurosurgery    SPINAL CORD STIMULATOR TRIAL W/ LAMINOTOMY      TONSILLECTOMY AND ADENOIDECTOMY      TUBAL LIGATION      VEIN LIGATION AND STRIPPING Right     VULVA SURGERY  10/20/2017    BIOPSY    WISDOM TOOTH EXTRACTION         Family History   Problem Relation Age of Onset    Diabetes Mother     Breast cancer Mother         >50    BRCA1 Negative Mother     BRCA2 Negative Mother     Hyperlipidemia Mother         HYPERCHOLESTEROLEMIA    Diabetes Father     Other Father         traumatic brain injury    Prostate cancer Father     Alcohol abuse Father         in remission    Heart disease Father     Neuropathy Father    Gavin Rosas Hyperlipidemia Father     Hypertension Brother     Diabetes Brother     Other Brother         HYPERCHOLESTEROLEMIA    Alcohol abuse Brother     Depression Brother         attempted suicide    Colon cancer Maternal Grandfather     Heart attack Maternal Grandmother     No Known Problems Paternal Grandmother         dad is adopted    No Known Problems Paternal Grandfather         dad is adopted    Diabetes Brother     Alcohol abuse Brother     Asthma Son     No Known Problems Daughter     Ovarian cancer Neg Hx     Uterine cancer Neg Hx          Medications have been verified  Objective   /69   Pulse 66   Temp 97 7 °F (36 5 °C)   Resp 18   Ht 5' 8" (1 727 m)   Wt 83 9 kg (185 lb)   LMP 01/07/2019 (Approximate)   SpO2 96%   BMI 28 13 kg/m²        Physical Exam     Physical Exam  Constitutional:       Appearance: Normal appearance  She is well-developed  HENT:      Head: Normocephalic  Eyes:      Pupils: Pupils are equal, round, and reactive to light  Cardiovascular:      Rate and Rhythm: Normal rate and regular rhythm  Heart sounds: Normal heart sounds  Pulmonary:      Effort: Pulmonary effort is normal       Breath sounds: Normal breath sounds  Abdominal:      General: Abdomen is flat  Bowel sounds are normal       Palpations: Abdomen is soft  Tenderness: There is no abdominal tenderness

## 2021-08-20 ENCOUNTER — OFFICE VISIT (OUTPATIENT)
Dept: PSYCHIATRY | Facility: CLINIC | Age: 51
End: 2021-08-20
Payer: COMMERCIAL

## 2021-08-20 DIAGNOSIS — F90.2 ADHD (ATTENTION DEFICIT HYPERACTIVITY DISORDER), COMBINED TYPE: Primary | ICD-10-CM

## 2021-08-20 DIAGNOSIS — F43.10 POST TRAUMATIC STRESS DISORDER (PTSD): Chronic | ICD-10-CM

## 2021-08-20 DIAGNOSIS — F41.1 GENERALIZED ANXIETY DISORDER: Chronic | ICD-10-CM

## 2021-08-20 DIAGNOSIS — F33.2 SEVERE EPISODE OF RECURRENT MAJOR DEPRESSIVE DISORDER, WITHOUT PSYCHOTIC FEATURES (HCC): ICD-10-CM

## 2021-08-20 DIAGNOSIS — F33.2 MAJOR DEPRESSIVE DISORDER, RECURRENT SEVERE WITHOUT PSYCHOTIC FEATURES (HCC): Chronic | ICD-10-CM

## 2021-08-20 PROCEDURE — 99213 OFFICE O/P EST LOW 20 MIN: CPT | Performed by: STUDENT IN AN ORGANIZED HEALTH CARE EDUCATION/TRAINING PROGRAM

## 2021-08-20 PROCEDURE — 90833 PSYTX W PT W E/M 30 MIN: CPT | Performed by: STUDENT IN AN ORGANIZED HEALTH CARE EDUCATION/TRAINING PROGRAM

## 2021-08-20 RX ORDER — VENLAFAXINE HYDROCHLORIDE 150 MG/1
150 CAPSULE, EXTENDED RELEASE ORAL DAILY
Qty: 90 CAPSULE | Refills: 0 | Status: SHIPPED | OUTPATIENT
Start: 2021-08-20 | End: 2021-11-14

## 2021-08-20 RX ORDER — VENLAFAXINE HYDROCHLORIDE 37.5 MG/1
37.5 CAPSULE, EXTENDED RELEASE ORAL DAILY
Qty: 90 CAPSULE | Refills: 0 | Status: SHIPPED | OUTPATIENT
Start: 2021-08-20 | End: 2021-11-14

## 2021-08-20 RX ORDER — ATOMOXETINE 60 MG/1
60 CAPSULE ORAL DAILY
Qty: 30 CAPSULE | Refills: 2 | Status: SHIPPED | OUTPATIENT
Start: 2021-08-20 | End: 2021-11-14

## 2021-08-20 RX ORDER — VENLAFAXINE HYDROCHLORIDE 75 MG/1
75 CAPSULE, EXTENDED RELEASE ORAL DAILY
Qty: 90 CAPSULE | Refills: 0 | Status: SHIPPED | OUTPATIENT
Start: 2021-08-20 | End: 2021-11-14
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
## 2021-08-27 ENCOUNTER — OFFICE VISIT (OUTPATIENT)
Dept: BARIATRICS | Facility: CLINIC | Age: 51
End: 2021-08-27
Payer: COMMERCIAL

## 2021-08-27 ENCOUNTER — TELEPHONE (OUTPATIENT)
Dept: PSYCHIATRY | Facility: CLINIC | Age: 51
End: 2021-08-27

## 2021-08-27 VITALS
HEIGHT: 66 IN | DIASTOLIC BLOOD PRESSURE: 78 MMHG | HEART RATE: 68 BPM | TEMPERATURE: 98.4 F | SYSTOLIC BLOOD PRESSURE: 110 MMHG | BODY MASS INDEX: 30.46 KG/M2 | WEIGHT: 189.5 LBS

## 2021-08-27 DIAGNOSIS — K21.9 GASTROESOPHAGEAL REFLUX DISEASE, UNSPECIFIED WHETHER ESOPHAGITIS PRESENT: ICD-10-CM

## 2021-08-27 DIAGNOSIS — F33.2 MAJOR DEPRESSIVE DISORDER, RECURRENT SEVERE WITHOUT PSYCHOTIC FEATURES (HCC): Chronic | ICD-10-CM

## 2021-08-27 DIAGNOSIS — I47.1 PAROXYSMAL SVT (SUPRAVENTRICULAR TACHYCARDIA) (HCC): ICD-10-CM

## 2021-08-27 DIAGNOSIS — Z98.84 BARIATRIC SURGERY STATUS: ICD-10-CM

## 2021-08-27 DIAGNOSIS — Z48.815 ENCOUNTER FOR SURGICAL AFTERCARE FOLLOWING SURGERY OF DIGESTIVE SYSTEM: Primary | ICD-10-CM

## 2021-08-27 DIAGNOSIS — E66.9 OBESITY, CLASS I, BMI 30-34.9: ICD-10-CM

## 2021-08-27 DIAGNOSIS — K91.2 POSTSURGICAL MALABSORPTION: ICD-10-CM

## 2021-08-27 PROCEDURE — 99214 OFFICE O/P EST MOD 30 MIN: CPT | Performed by: PHYSICIAN ASSISTANT

## 2021-08-27 NOTE — TELEPHONE ENCOUNTER
Patient called in regarding her prescription and doses of Atomoxetine (Strattera)   Transfer Patient called to Nurses station

## 2021-08-27 NOTE — PATIENT INSTRUCTIONS
· Follow-up in on 1 year and with MWM as scheduled  We kindly ask that your arrive 15 minutes before your scheduled appointment time with your provider to allow our staff to room you, get your vital signs and update your chart  · Get lab work done  Please call the office if you need a script  It is recommended to check with your insurance BEFORE getting labs done to make sure they are covered by your policy  · Call our office if you have any problems with abdominal pain especially associated with fever, chills, nausea, vomiting or any other concerns  · All  Post-bariatric surgery patients should be aware that very small quantities of any alcohol can cause impairment and it is very possible not to feel the effect  The effect can be in the system for several hours  It is also a stomach irritant  · It is advised to AVOID alcohol, Nonsteroidal antiinflammatory drugs (NSAIDS) and nicotine of all forms   Any of these can cause stomach irritation/pain  · Discussed the effects of alcohol on a bariatric patient and the increased impairment risk  · Keep up the good work!

## 2021-08-27 NOTE — TELEPHONE ENCOUNTER
Spoke with Emmett  She was questioning the dose Strattera  She noted the dose as 60 mg, but thought the dose was increasing  Reviewed office note from 8/20/21 and confirmed the dose of 60 mg was an increase from 40 mg  She was not home to check the bottle, but thought the dose was higher  She followed with, "maybe I was taking it wrong "     She was at a doctor appointment and needed to end the call  I told her I would review the previous note for dosing other than 40 mg and would call her if it was different  She did start the dose of 60 mg  Encouraged her to try the dose of 60 and can call with an update and would forward her message to Dr Juan James  She verbalized understanding

## 2021-08-27 NOTE — PROGRESS NOTES
Assessment/Plan:     Patient ID: Sandie Philip is a 45 y o  female  Bariatric Surgery Status  -s/p Vertical Sleeve Gastrectomy with Dr Yg Delarosa on 2/6/2018  Overall doing fair  She continues to have heartburn and despite taking both PPI and pepcid at time still has breakthrough symptoms  She is due for her screening EGD post sleeve  Of note, she is already scheduled for EGD/colonoscopy through GI  She is interested in seeing if she would qualify for a revision so she may want to schedule an appt with Dr Yg Delarosa after EGD is complete  C/o weight regain  States her exercise is limited due to hx of SVT  She states ovral still tries to watch her portions and overall diet  She is on several psych medications which help to stabilize her mood however she is aware they contribute to her weight gain  Patient is interested in MWM for now to start to help and get back on track  Will refer today  · Continued/Maintain healthy weight loss with good nutrition intakes  · Adequate hydration with at least 64oz  fluid intake  · Follow diet as discussed  · Follow vitamin and mineral recommendations as reviewed with you  · Exercise as tolerated  · Colonoscopy referral made: scheduled to see GI for EGD/ colonoscopy     · Follow-up in on 1 year and with MWM as scheduled  We kindly ask that your arrive 15 minutes before your scheduled appointment time with your provider to allow our staff to room you, get your vital signs and update your chart  · Get lab work done  Please call the office if you need a script  It is recommended to check with your insurance BEFORE getting labs done to make sure they are covered by your policy  · Call our office if you have any problems with abdominal pain especially associated with fever, chills, nausea, vomiting or any other concerns      · All  Post-bariatric surgery patients should be aware that very small quantities of any alcohol can cause impairment and it is very possible not to feel the effect  The effect can be in the system for several hours  It is also a stomach irritant  · It is advised to AVOID alcohol, Nonsteroidal antiinflammatory drugs (NSAIDS) and nicotine of all forms   Any of these can cause stomach irritation/pain  · Discussed the effects of alcohol on a bariatric patient and the increased impairment risk  · Keep up the good work! Postsurgical Malabsorption   -At risk for malabsorption of vitamins/minerals secondary to malabsorption and restriction of intake from bariatric surgery  -Currently taking adequate postop bariatric surgery vitamin supplementation  -Last set of bariatric labs completed per pcp 5/2021, wnl but vit A was elevated  Will repeat with next set of labs and add zinc and pth    -Patient received education about the importance of adhering to a lifelong supplementation regimen to avoid vitamin/mineral deficiencies      Diagnoses and all orders for this visit:    Encounter for surgical aftercare following surgery of digestive system  -     Zinc; Future  -     Vitamin A; Future  -     PTH, intact; Future    Bariatric surgery status  -     Zinc; Future  -     Vitamin A; Future  -     PTH, intact; Future    Postsurgical malabsorption  -     Zinc; Future  -     Vitamin A; Future  -     PTH, intact; Future    Gastroesophageal reflux disease, unspecified whether esophagitis present    Paroxysmal SVT (supraventricular tachycardia) (HCC)    Major depressive disorder, recurrent severe without psychotic features (HCC)    Obesity, Class I, BMI 30-34 9  -     Zinc; Future  -     Vitamin A; Future  -     PTH, intact; Future         Subjective:      Patient ID: Antonio Oseguera is a 45 y o  female  -s/p Vertical Sleeve Gastrectomy with Dr Alma Lr on 2/6/2018  Overall doing fair       Initial: 219 5  Current: 189  EWL: (Weight loss is ahead of schedule at this post surgical period )  Bautista: 166  Current BMI is Body mass index is 30 59 kg/m²     · Tolerating a regular diet-yes  · Eating at least 60 grams of protein per day-yes  · Following 30/60 minute rule with liquids-no  · Drinking at least 64 ounces of fluid per day-yes  · Drinking carbonated beverages-no  · Sufficient exercise-yes  · Using nicotine-no  · Using alcohol-social   · Supplements: mvi + calcium     · EWL is 47%     The following portions of the patient's history were reviewed and updated as appropriate: allergies, current medications, past family history, past medical history, past social history, past surgical history and problem list     Review of Systems   Constitutional: Negative  Respiratory: Negative  Cardiovascular: Negative  Gastrointestinal:        + acid reflux    Psychiatric/Behavioral:        + hx depression - on several psych medications          Objective:    /78 (BP Location: Left arm, Patient Position: Sitting, Cuff Size: Standard)   Pulse 68   Temp 98 4 °F (36 9 °C) (Tympanic)   Ht 5' 6" (1 676 m)   Wt 86 kg (189 lb 8 oz)   LMP 01/07/2019 (Approximate)   BMI 30 59 kg/m²      Physical Exam  Vitals and nursing note reviewed  Constitutional:       Appearance: Normal appearance  She is obese  HENT:      Head: Normocephalic and atraumatic  Eyes:      Extraocular Movements: Extraocular movements intact  Pupils: Pupils are equal, round, and reactive to light  Cardiovascular:      Rate and Rhythm: Normal rate and regular rhythm  Pulmonary:      Effort: Pulmonary effort is normal       Breath sounds: Normal breath sounds  Abdominal:      General: Bowel sounds are normal    Musculoskeletal:         General: Normal range of motion  Cervical back: Normal range of motion  Skin:     General: Skin is warm and dry  Neurological:      General: No focal deficit present  Mental Status: She is alert and oriented to person, place, and time     Psychiatric:         Mood and Affect: Mood normal

## 2021-09-02 ENCOUNTER — OFFICE VISIT (OUTPATIENT)
Dept: FAMILY MEDICINE CLINIC | Facility: CLINIC | Age: 51
End: 2021-09-02
Payer: COMMERCIAL

## 2021-09-02 VITALS
BODY MASS INDEX: 30.73 KG/M2 | HEIGHT: 66 IN | RESPIRATION RATE: 16 BRPM | TEMPERATURE: 96.2 F | SYSTOLIC BLOOD PRESSURE: 114 MMHG | OXYGEN SATURATION: 98 % | WEIGHT: 191.2 LBS | DIASTOLIC BLOOD PRESSURE: 74 MMHG | HEART RATE: 70 BPM

## 2021-09-02 DIAGNOSIS — R53.83 FATIGUE, UNSPECIFIED TYPE: ICD-10-CM

## 2021-09-02 DIAGNOSIS — R23.2 FLUSHING REACTION: Primary | ICD-10-CM

## 2021-09-02 PROCEDURE — 99214 OFFICE O/P EST MOD 30 MIN: CPT | Performed by: FAMILY MEDICINE

## 2021-09-02 NOTE — PROGRESS NOTES
Assessment/Plan:    No problem-specific Assessment & Plan notes found for this encounter  Diagnoses and all orders for this visit:    Flushing reaction  -     CBC and differential  -     Comprehensive metabolic panel  -     TSH, 3rd generation with Free T4 reflex; Future    Fatigue, unspecified type  -     CBC and differential  -     Comprehensive metabolic panel  -     TSH, 3rd generation with Free T4 reflex; Future      normal exam today along with normal heart rate     To get labs    Suspect due to new medication changes      Subjective:      Patient ID: Kyung Munoz is a 45 y o  female  HPI  Feeling weak, foggy and tired and sweating for the last 2 days   Her heart was up at 140's yesterday and she called cardiologist but no one called her back   Her HR has been up and down since then but today is back to normal   Feeling better today   Was increased on atomoxetine to 60 mg from 10 mg in the last couple weeks    The following portions of the patient's history were reviewed and updated as appropriate: allergies, current medications, past family history, past medical history, past social history, past surgical history and problem list     Review of Systems   Constitutional: Negative  HENT: Negative  Respiratory: Negative  Cardiovascular: Negative  Endocrine:        Fatigue   Genitourinary: Negative  Musculoskeletal: Negative  Allergic/Immunologic: Negative  Neurological: Negative  Objective:      /74 (BP Location: Left arm, Patient Position: Sitting, Cuff Size: Adult)   Pulse 70   Temp (!) 96 2 °F (35 7 °C) (Tympanic)   Resp 16   Ht 5' 6" (1 676 m)   Wt 86 7 kg (191 lb 3 2 oz)   LMP 01/07/2019 (Approximate)   SpO2 98%   BMI 30 86 kg/m²          Physical Exam  Constitutional:       Appearance: Normal appearance  Cardiovascular:      Rate and Rhythm: Normal rate and regular rhythm  Pulses: Normal pulses  Heart sounds: Normal heart sounds  Neurological:      General: No focal deficit present  Mental Status: She is alert and oriented to person, place, and time  Cranial Nerves: No cranial nerve deficit  Sensory: No sensory deficit     Psychiatric:         Mood and Affect: Mood normal          Behavior: Behavior normal

## 2021-09-14 ENCOUNTER — APPOINTMENT (OUTPATIENT)
Dept: LAB | Facility: CLINIC | Age: 51
End: 2021-09-14
Payer: COMMERCIAL

## 2021-09-14 DIAGNOSIS — Z48.815 ENCOUNTER FOR SURGICAL AFTERCARE FOLLOWING SURGERY OF DIGESTIVE SYSTEM: ICD-10-CM

## 2021-09-14 DIAGNOSIS — Z98.84 BARIATRIC SURGERY STATUS: ICD-10-CM

## 2021-09-14 DIAGNOSIS — E66.9 OBESITY, CLASS I, BMI 30-34.9: ICD-10-CM

## 2021-09-14 DIAGNOSIS — R53.83 FATIGUE, UNSPECIFIED TYPE: ICD-10-CM

## 2021-09-14 DIAGNOSIS — R23.2 FLUSHING REACTION: ICD-10-CM

## 2021-09-14 DIAGNOSIS — K91.2 POSTSURGICAL MALABSORPTION: ICD-10-CM

## 2021-09-14 LAB
ALBUMIN SERPL BCP-MCNC: 3.4 G/DL (ref 3.5–5)
ALP SERPL-CCNC: 78 U/L (ref 46–116)
ALT SERPL W P-5'-P-CCNC: 67 U/L (ref 12–78)
ANION GAP SERPL CALCULATED.3IONS-SCNC: 8 MMOL/L (ref 4–13)
AST SERPL W P-5'-P-CCNC: 31 U/L (ref 5–45)
BASOPHILS # BLD AUTO: 0.01 THOUSANDS/ΜL (ref 0–0.1)
BASOPHILS NFR BLD AUTO: 0 % (ref 0–1)
BILIRUB SERPL-MCNC: 0.23 MG/DL (ref 0.2–1)
BUN SERPL-MCNC: 17 MG/DL (ref 5–25)
CALCIUM ALBUM COR SERPL-MCNC: 10 MG/DL (ref 8.3–10.1)
CALCIUM SERPL-MCNC: 9.5 MG/DL (ref 8.3–10.1)
CHLORIDE SERPL-SCNC: 105 MMOL/L (ref 100–108)
CO2 SERPL-SCNC: 30 MMOL/L (ref 21–32)
CREAT SERPL-MCNC: 0.9 MG/DL (ref 0.6–1.3)
EOSINOPHIL # BLD AUTO: 0 THOUSAND/ΜL (ref 0–0.61)
EOSINOPHIL NFR BLD AUTO: 0 % (ref 0–6)
ERYTHROCYTE [DISTWIDTH] IN BLOOD BY AUTOMATED COUNT: 12.2 % (ref 11.6–15.1)
GFR SERPL CREATININE-BSD FRML MDRD: 74 ML/MIN/1.73SQ M
GLUCOSE SERPL-MCNC: 111 MG/DL (ref 65–140)
HCT VFR BLD AUTO: 39.5 % (ref 34.8–46.1)
HGB BLD-MCNC: 12.5 G/DL (ref 11.5–15.4)
IMM GRANULOCYTES # BLD AUTO: 0.04 THOUSAND/UL (ref 0–0.2)
IMM GRANULOCYTES NFR BLD AUTO: 1 % (ref 0–2)
LYMPHOCYTES # BLD AUTO: 1.86 THOUSANDS/ΜL (ref 0.6–4.47)
LYMPHOCYTES NFR BLD AUTO: 22 % (ref 14–44)
MCH RBC QN AUTO: 29.6 PG (ref 26.8–34.3)
MCHC RBC AUTO-ENTMCNC: 31.6 G/DL (ref 31.4–37.4)
MCV RBC AUTO: 93 FL (ref 82–98)
MONOCYTES # BLD AUTO: 0.58 THOUSAND/ΜL (ref 0.17–1.22)
MONOCYTES NFR BLD AUTO: 7 % (ref 4–12)
NEUTROPHILS # BLD AUTO: 5.94 THOUSANDS/ΜL (ref 1.85–7.62)
NEUTS SEG NFR BLD AUTO: 70 % (ref 43–75)
NRBC BLD AUTO-RTO: 0 /100 WBCS
PLATELET # BLD AUTO: 311 THOUSANDS/UL (ref 149–390)
PMV BLD AUTO: 9.4 FL (ref 8.9–12.7)
POTASSIUM SERPL-SCNC: 4.7 MMOL/L (ref 3.5–5.3)
PROT SERPL-MCNC: 7.5 G/DL (ref 6.4–8.2)
PTH-INTACT SERPL-MCNC: 66.8 PG/ML (ref 18.4–80.1)
RBC # BLD AUTO: 4.23 MILLION/UL (ref 3.81–5.12)
SODIUM SERPL-SCNC: 143 MMOL/L (ref 136–145)
TSH SERPL DL<=0.05 MIU/L-ACNC: 1.27 UIU/ML (ref 0.36–3.74)
WBC # BLD AUTO: 8.43 THOUSAND/UL (ref 4.31–10.16)

## 2021-09-14 PROCEDURE — 83970 ASSAY OF PARATHORMONE: CPT

## 2021-09-14 PROCEDURE — 84443 ASSAY THYROID STIM HORMONE: CPT

## 2021-09-14 PROCEDURE — 80053 COMPREHEN METABOLIC PANEL: CPT | Performed by: FAMILY MEDICINE

## 2021-09-14 PROCEDURE — 36415 COLL VENOUS BLD VENIPUNCTURE: CPT | Performed by: FAMILY MEDICINE

## 2021-09-14 PROCEDURE — 84590 ASSAY OF VITAMIN A: CPT

## 2021-09-14 PROCEDURE — 84630 ASSAY OF ZINC: CPT

## 2021-09-14 PROCEDURE — 85025 COMPLETE CBC W/AUTO DIFF WBC: CPT | Performed by: FAMILY MEDICINE

## 2021-09-14 PROCEDURE — 83036 HEMOGLOBIN GLYCOSYLATED A1C: CPT | Performed by: FAMILY MEDICINE

## 2021-09-15 LAB
EST. AVERAGE GLUCOSE BLD GHB EST-MCNC: 134 MG/DL
HBA1C MFR BLD: 6.3 %

## 2021-09-21 LAB — VIT A SERPL-MCNC: 68.1 UG/DL (ref 20.1–62)

## 2021-09-22 DIAGNOSIS — Z98.84 BARIATRIC SURGERY STATUS: Primary | ICD-10-CM

## 2021-09-22 DIAGNOSIS — E67.0 HIGH VITAMIN A LEVEL: ICD-10-CM

## 2021-09-22 DIAGNOSIS — K91.2 POSTSURGICAL MALABSORPTION: ICD-10-CM

## 2021-09-22 LAB — ZINC SERPL-MCNC: 76 UG/DL (ref 44–115)

## 2021-09-23 ENCOUNTER — TELEPHONE (OUTPATIENT)
Dept: CARDIOLOGY CLINIC | Facility: CLINIC | Age: 51
End: 2021-09-23

## 2021-09-27 DIAGNOSIS — K21.9 GASTROESOPHAGEAL REFLUX DISEASE: ICD-10-CM

## 2021-09-27 RX ORDER — FAMOTIDINE 20 MG/1
TABLET, FILM COATED ORAL
Qty: 60 TABLET | Refills: 0 | Status: SHIPPED | OUTPATIENT
Start: 2021-09-27 | End: 2022-03-07 | Stop reason: SDUPTHER

## 2021-10-01 ENCOUNTER — OFFICE VISIT (OUTPATIENT)
Dept: GASTROENTEROLOGY | Facility: AMBULARY SURGERY CENTER | Age: 51
End: 2021-10-01
Payer: COMMERCIAL

## 2021-10-01 VITALS
SYSTOLIC BLOOD PRESSURE: 112 MMHG | WEIGHT: 192 LBS | DIASTOLIC BLOOD PRESSURE: 74 MMHG | HEIGHT: 66 IN | BODY MASS INDEX: 30.86 KG/M2

## 2021-10-01 DIAGNOSIS — Z86.010 HISTORY OF COLON POLYPS: Primary | ICD-10-CM

## 2021-10-01 DIAGNOSIS — K21.9 GASTROESOPHAGEAL REFLUX DISEASE WITHOUT ESOPHAGITIS: ICD-10-CM

## 2021-10-01 DIAGNOSIS — K58.8 OTHER IRRITABLE BOWEL SYNDROME: ICD-10-CM

## 2021-10-01 DIAGNOSIS — Z12.11 COLON CANCER SCREENING: ICD-10-CM

## 2021-10-01 DIAGNOSIS — K59.00 CONSTIPATION, UNSPECIFIED CONSTIPATION TYPE: ICD-10-CM

## 2021-10-01 PROCEDURE — 99214 OFFICE O/P EST MOD 30 MIN: CPT | Performed by: PHYSICIAN ASSISTANT

## 2021-10-01 RX ORDER — PANTOPRAZOLE SODIUM 40 MG/1
40 TABLET, DELAYED RELEASE ORAL 2 TIMES DAILY
Qty: 90 TABLET | Refills: 3 | Status: SHIPPED | OUTPATIENT
Start: 2021-10-01 | End: 2022-06-03

## 2021-10-07 DIAGNOSIS — F41.1 GENERALIZED ANXIETY DISORDER: Chronic | ICD-10-CM

## 2021-10-07 DIAGNOSIS — F90.2 ADHD (ATTENTION DEFICIT HYPERACTIVITY DISORDER), COMBINED TYPE: ICD-10-CM

## 2021-10-07 DIAGNOSIS — F33.2 MAJOR DEPRESSIVE DISORDER, RECURRENT SEVERE WITHOUT PSYCHOTIC FEATURES (HCC): Chronic | ICD-10-CM

## 2021-10-07 DIAGNOSIS — F33.2 SEVERE EPISODE OF RECURRENT MAJOR DEPRESSIVE DISORDER, WITHOUT PSYCHOTIC FEATURES (HCC): ICD-10-CM

## 2021-10-07 DIAGNOSIS — F33.2 MDD (MAJOR DEPRESSIVE DISORDER), RECURRENT SEVERE, WITHOUT PSYCHOSIS (HCC): ICD-10-CM

## 2021-10-07 RX ORDER — QUETIAPINE FUMARATE 50 MG/1
75 TABLET, FILM COATED ORAL
Qty: 45 TABLET | Refills: 0 | Status: SHIPPED | OUTPATIENT
Start: 2021-10-07 | End: 2021-11-15 | Stop reason: SDUPTHER

## 2021-10-07 RX ORDER — LAMOTRIGINE 150 MG/1
150 TABLET ORAL DAILY
Qty: 30 TABLET | Refills: 0 | Status: SHIPPED | OUTPATIENT
Start: 2021-10-07 | End: 2021-11-15 | Stop reason: SDUPTHER

## 2021-10-07 RX ORDER — VENLAFAXINE HYDROCHLORIDE 37.5 MG/1
37.5 CAPSULE, EXTENDED RELEASE ORAL DAILY
Qty: 90 CAPSULE | Refills: 0 | Status: CANCELLED | OUTPATIENT
Start: 2021-10-07

## 2021-10-07 RX ORDER — LAMOTRIGINE 100 MG/1
100 TABLET ORAL DAILY
Qty: 30 TABLET | Refills: 0 | Status: SHIPPED | OUTPATIENT
Start: 2021-10-07 | End: 2021-11-15 | Stop reason: SDUPTHER

## 2021-10-07 RX ORDER — ATOMOXETINE 60 MG/1
60 CAPSULE ORAL DAILY
Qty: 30 CAPSULE | Refills: 2 | Status: CANCELLED | OUTPATIENT
Start: 2021-10-07

## 2021-10-07 RX ORDER — VENLAFAXINE HYDROCHLORIDE 150 MG/1
150 CAPSULE, EXTENDED RELEASE ORAL DAILY
Qty: 90 CAPSULE | Refills: 0 | Status: CANCELLED | OUTPATIENT
Start: 2021-10-07

## 2021-10-08 DIAGNOSIS — F90.2 ADHD (ATTENTION DEFICIT HYPERACTIVITY DISORDER), COMBINED TYPE: ICD-10-CM

## 2021-10-08 DIAGNOSIS — F33.2 SEVERE EPISODE OF RECURRENT MAJOR DEPRESSIVE DISORDER, WITHOUT PSYCHOTIC FEATURES (HCC): ICD-10-CM

## 2021-10-08 RX ORDER — QUETIAPINE FUMARATE 50 MG/1
75 TABLET, FILM COATED ORAL
Qty: 45 TABLET | Refills: 0 | Status: CANCELLED | OUTPATIENT
Start: 2021-10-08 | End: 2021-11-07

## 2021-10-08 RX ORDER — ATOMOXETINE 60 MG/1
60 CAPSULE ORAL DAILY
Qty: 30 CAPSULE | Refills: 2 | Status: CANCELLED | OUTPATIENT
Start: 2021-10-08

## 2021-10-08 RX ORDER — LAMOTRIGINE 100 MG/1
100 TABLET ORAL DAILY
Qty: 30 TABLET | Refills: 0 | Status: CANCELLED | OUTPATIENT
Start: 2021-10-08 | End: 2021-11-07

## 2021-10-26 ENCOUNTER — TELEPHONE (OUTPATIENT)
Dept: GASTROENTEROLOGY | Facility: CLINIC | Age: 51
End: 2021-10-26

## 2021-10-28 ENCOUNTER — OFFICE VISIT (OUTPATIENT)
Dept: BARIATRICS | Facility: CLINIC | Age: 51
End: 2021-10-28

## 2021-10-28 VITALS — WEIGHT: 192.6 LBS | BODY MASS INDEX: 31.09 KG/M2

## 2021-10-28 DIAGNOSIS — Z98.84 S/P LAPAROSCOPIC SLEEVE GASTRECTOMY: Chronic | ICD-10-CM

## 2021-10-28 DIAGNOSIS — K91.2 POSTSURGICAL MALABSORPTION: Primary | Chronic | ICD-10-CM

## 2021-10-28 PROCEDURE — RECHECK: Performed by: DIETITIAN, REGISTERED

## 2021-11-08 ENCOUNTER — TELEPHONE (OUTPATIENT)
Dept: BEHAVIORAL/MENTAL HEALTH CLINIC | Facility: CLINIC | Age: 51
End: 2021-11-08

## 2021-11-14 DIAGNOSIS — F33.2 SEVERE EPISODE OF RECURRENT MAJOR DEPRESSIVE DISORDER, WITHOUT PSYCHOTIC FEATURES (HCC): ICD-10-CM

## 2021-11-14 DIAGNOSIS — J30.2 SEASONAL ALLERGIES: ICD-10-CM

## 2021-11-14 DIAGNOSIS — F41.1 GENERALIZED ANXIETY DISORDER: Chronic | ICD-10-CM

## 2021-11-14 DIAGNOSIS — F90.2 ADHD (ATTENTION DEFICIT HYPERACTIVITY DISORDER), COMBINED TYPE: ICD-10-CM

## 2021-11-14 DIAGNOSIS — F33.2 MAJOR DEPRESSIVE DISORDER, RECURRENT SEVERE WITHOUT PSYCHOTIC FEATURES (HCC): Chronic | ICD-10-CM

## 2021-11-14 RX ORDER — VENLAFAXINE HYDROCHLORIDE 37.5 MG/1
CAPSULE, EXTENDED RELEASE ORAL
Qty: 90 CAPSULE | Refills: 0 | Status: SHIPPED | OUTPATIENT
Start: 2021-11-14 | End: 2021-12-16 | Stop reason: SDUPTHER

## 2021-11-14 RX ORDER — VENLAFAXINE HYDROCHLORIDE 150 MG/1
CAPSULE, EXTENDED RELEASE ORAL
Qty: 90 CAPSULE | Refills: 0 | Status: SHIPPED | OUTPATIENT
Start: 2021-11-14 | End: 2021-12-16 | Stop reason: SDUPTHER

## 2021-11-14 RX ORDER — ATOMOXETINE 60 MG/1
CAPSULE ORAL
Qty: 90 CAPSULE | Refills: 0 | Status: SHIPPED | OUTPATIENT
Start: 2021-11-14 | End: 2021-11-15 | Stop reason: SDUPTHER

## 2021-11-14 RX ORDER — VENLAFAXINE HYDROCHLORIDE 75 MG/1
CAPSULE, EXTENDED RELEASE ORAL
Qty: 90 CAPSULE | Refills: 0 | Status: SHIPPED | OUTPATIENT
Start: 2021-11-14 | End: 2021-11-26

## 2021-11-14 RX ORDER — MONTELUKAST SODIUM 10 MG/1
TABLET ORAL
Qty: 90 TABLET | Refills: 0 | Status: SHIPPED | OUTPATIENT
Start: 2021-11-14 | End: 2022-03-23 | Stop reason: SDUPTHER

## 2021-11-14 RX ORDER — ATOMOXETINE 40 MG/1
CAPSULE ORAL
Refills: 0 | OUTPATIENT
Start: 2021-11-14

## 2021-11-15 DIAGNOSIS — F90.2 ADHD (ATTENTION DEFICIT HYPERACTIVITY DISORDER), COMBINED TYPE: ICD-10-CM

## 2021-11-15 DIAGNOSIS — K21.9 GASTROESOPHAGEAL REFLUX DISEASE: ICD-10-CM

## 2021-11-15 DIAGNOSIS — F33.2 SEVERE EPISODE OF RECURRENT MAJOR DEPRESSIVE DISORDER, WITHOUT PSYCHOTIC FEATURES (HCC): ICD-10-CM

## 2021-11-15 DIAGNOSIS — F33.2 MDD (MAJOR DEPRESSIVE DISORDER), RECURRENT SEVERE, WITHOUT PSYCHOSIS (HCC): ICD-10-CM

## 2021-11-15 RX ORDER — LAMOTRIGINE 150 MG/1
150 TABLET ORAL DAILY
Qty: 30 TABLET | Refills: 0 | Status: SHIPPED | OUTPATIENT
Start: 2021-11-15 | End: 2021-12-16 | Stop reason: SDUPTHER

## 2021-11-15 RX ORDER — ATOMOXETINE 60 MG/1
60 CAPSULE ORAL DAILY
Qty: 90 CAPSULE | Refills: 0 | Status: SHIPPED | OUTPATIENT
Start: 2021-11-15 | End: 2021-12-16 | Stop reason: SDUPTHER

## 2021-11-15 RX ORDER — LAMOTRIGINE 100 MG/1
100 TABLET ORAL DAILY
Qty: 30 TABLET | Refills: 0 | Status: SHIPPED | OUTPATIENT
Start: 2021-11-15 | End: 2021-12-16 | Stop reason: SDUPTHER

## 2021-11-15 RX ORDER — QUETIAPINE FUMARATE 50 MG/1
75 TABLET, FILM COATED ORAL
Qty: 45 TABLET | Refills: 0 | Status: SHIPPED | OUTPATIENT
Start: 2021-11-15 | End: 2021-12-16 | Stop reason: SDUPTHER

## 2021-11-26 ENCOUNTER — ANNUAL EXAM (OUTPATIENT)
Dept: OBGYN CLINIC | Facility: CLINIC | Age: 51
End: 2021-11-26
Payer: COMMERCIAL

## 2021-11-26 VITALS
WEIGHT: 195.4 LBS | SYSTOLIC BLOOD PRESSURE: 122 MMHG | BODY MASS INDEX: 31.4 KG/M2 | HEIGHT: 66 IN | DIASTOLIC BLOOD PRESSURE: 70 MMHG

## 2021-11-26 DIAGNOSIS — Z12.31 VISIT FOR SCREENING MAMMOGRAM: ICD-10-CM

## 2021-11-26 DIAGNOSIS — Z72.3 INADEQUATE EXERCISE: ICD-10-CM

## 2021-11-26 DIAGNOSIS — Z01.419 ENCOUNTER FOR ANNUAL ROUTINE GYNECOLOGICAL EXAMINATION: Primary | ICD-10-CM

## 2021-11-26 DIAGNOSIS — Z12.4 PAP SMEAR FOR CERVICAL CANCER SCREENING: ICD-10-CM

## 2021-11-26 DIAGNOSIS — Z30.41 ORAL CONTRACEPTIVE PILL SURVEILLANCE: ICD-10-CM

## 2021-11-26 PROCEDURE — 99396 PREV VISIT EST AGE 40-64: CPT | Performed by: OBSTETRICS & GYNECOLOGY

## 2021-11-26 PROCEDURE — G0476 HPV COMBO ASSAY CA SCREEN: HCPCS | Performed by: OBSTETRICS & GYNECOLOGY

## 2021-11-26 PROCEDURE — G0124 SCREEN C/V THIN LAYER BY MD: HCPCS | Performed by: PATHOLOGY

## 2021-11-26 PROCEDURE — G0145 SCR C/V CYTO,THINLAYER,RESCR: HCPCS | Performed by: PATHOLOGY

## 2021-11-26 RX ORDER — DROSPIRENONE AND ETHINYL ESTRADIOL 0.02-3(28)
1 KIT ORAL DAILY
Qty: 84 TABLET | Refills: 4 | Status: SHIPPED | OUTPATIENT
Start: 2021-11-26 | End: 2022-08-09

## 2021-11-29 LAB
HPV HR 12 DNA CVX QL NAA+PROBE: NEGATIVE
HPV16 DNA CVX QL NAA+PROBE: NEGATIVE
HPV18 DNA CVX QL NAA+PROBE: NEGATIVE

## 2021-12-02 LAB
LAB AP GYN PRIMARY INTERPRETATION: NORMAL
Lab: NORMAL
PATH INTERP SPEC-IMP: NORMAL

## 2021-12-03 ENCOUNTER — SOCIAL WORK (OUTPATIENT)
Dept: BEHAVIORAL/MENTAL HEALTH CLINIC | Facility: CLINIC | Age: 51
End: 2021-12-03
Payer: COMMERCIAL

## 2021-12-03 DIAGNOSIS — F41.1 GENERALIZED ANXIETY DISORDER: Chronic | ICD-10-CM

## 2021-12-03 DIAGNOSIS — F33.2 MAJOR DEPRESSIVE DISORDER, RECURRENT SEVERE WITHOUT PSYCHOTIC FEATURES (HCC): Primary | Chronic | ICD-10-CM

## 2021-12-03 DIAGNOSIS — F90.2 ADHD (ATTENTION DEFICIT HYPERACTIVITY DISORDER), COMBINED TYPE: ICD-10-CM

## 2021-12-03 DIAGNOSIS — F43.10 POST TRAUMATIC STRESS DISORDER (PTSD): Chronic | ICD-10-CM

## 2021-12-03 PROCEDURE — 90834 PSYTX W PT 45 MINUTES: CPT | Performed by: SOCIAL WORKER

## 2021-12-11 ENCOUNTER — IMMUNIZATIONS (OUTPATIENT)
Dept: FAMILY MEDICINE CLINIC | Facility: HOSPITAL | Age: 51
End: 2021-12-11

## 2021-12-11 DIAGNOSIS — Z23 ENCOUNTER FOR IMMUNIZATION: Primary | ICD-10-CM

## 2021-12-11 PROCEDURE — 91300 COVID-19 PFIZER VACC 0.3 ML: CPT

## 2021-12-11 PROCEDURE — 0001A COVID-19 PFIZER VACC 0.3 ML: CPT

## 2021-12-16 ENCOUNTER — TELEMEDICINE (OUTPATIENT)
Dept: PSYCHIATRY | Facility: CLINIC | Age: 51
End: 2021-12-16
Payer: COMMERCIAL

## 2021-12-16 DIAGNOSIS — F33.2 MAJOR DEPRESSIVE DISORDER, RECURRENT SEVERE WITHOUT PSYCHOTIC FEATURES (HCC): Primary | Chronic | ICD-10-CM

## 2021-12-16 DIAGNOSIS — F43.10 POST TRAUMATIC STRESS DISORDER (PTSD): Chronic | ICD-10-CM

## 2021-12-16 DIAGNOSIS — F90.2 ADHD (ATTENTION DEFICIT HYPERACTIVITY DISORDER), COMBINED TYPE: ICD-10-CM

## 2021-12-16 DIAGNOSIS — F33.2 SEVERE EPISODE OF RECURRENT MAJOR DEPRESSIVE DISORDER, WITHOUT PSYCHOTIC FEATURES (HCC): ICD-10-CM

## 2021-12-16 DIAGNOSIS — F33.2 MDD (MAJOR DEPRESSIVE DISORDER), RECURRENT SEVERE, WITHOUT PSYCHOSIS (HCC): ICD-10-CM

## 2021-12-16 DIAGNOSIS — F41.1 GENERALIZED ANXIETY DISORDER: Chronic | ICD-10-CM

## 2021-12-16 PROCEDURE — 90833 PSYTX W PT W E/M 30 MIN: CPT | Performed by: STUDENT IN AN ORGANIZED HEALTH CARE EDUCATION/TRAINING PROGRAM

## 2021-12-16 PROCEDURE — 99214 OFFICE O/P EST MOD 30 MIN: CPT | Performed by: STUDENT IN AN ORGANIZED HEALTH CARE EDUCATION/TRAINING PROGRAM

## 2021-12-16 RX ORDER — QUETIAPINE FUMARATE 50 MG/1
75 TABLET, FILM COATED ORAL
Qty: 135 TABLET | Refills: 1 | Status: SHIPPED | OUTPATIENT
Start: 2021-12-16 | End: 2022-03-25 | Stop reason: SDUPTHER

## 2021-12-16 RX ORDER — ATOMOXETINE 60 MG/1
60 CAPSULE ORAL DAILY
Qty: 90 CAPSULE | Refills: 1 | Status: SHIPPED | OUTPATIENT
Start: 2021-12-16 | End: 2022-03-25 | Stop reason: SDUPTHER

## 2021-12-16 RX ORDER — LAMOTRIGINE 100 MG/1
100 TABLET ORAL DAILY
Qty: 90 TABLET | Refills: 1 | Status: SHIPPED | OUTPATIENT
Start: 2021-12-16 | End: 2022-03-25 | Stop reason: SDUPTHER

## 2021-12-16 RX ORDER — LAMOTRIGINE 150 MG/1
150 TABLET ORAL DAILY
Qty: 90 TABLET | Refills: 1 | Status: SHIPPED | OUTPATIENT
Start: 2021-12-16 | End: 2022-03-25 | Stop reason: SDUPTHER

## 2021-12-16 RX ORDER — VENLAFAXINE HYDROCHLORIDE 37.5 MG/1
37.5 CAPSULE, EXTENDED RELEASE ORAL DAILY
Qty: 90 CAPSULE | Refills: 1 | Status: SHIPPED | OUTPATIENT
Start: 2021-12-16 | End: 2022-03-25 | Stop reason: SDUPTHER

## 2021-12-16 RX ORDER — VENLAFAXINE HYDROCHLORIDE 150 MG/1
150 CAPSULE, EXTENDED RELEASE ORAL DAILY
Qty: 90 CAPSULE | Refills: 1 | Status: SHIPPED | OUTPATIENT
Start: 2021-12-16 | End: 2022-02-10 | Stop reason: SDUPTHER

## 2021-12-21 ENCOUNTER — TELEMEDICINE (OUTPATIENT)
Dept: FAMILY MEDICINE CLINIC | Facility: CLINIC | Age: 51
End: 2021-12-21
Payer: COMMERCIAL

## 2021-12-21 DIAGNOSIS — B34.9 VIRAL INFECTION, UNSPECIFIED: Primary | ICD-10-CM

## 2021-12-21 PROCEDURE — 99213 OFFICE O/P EST LOW 20 MIN: CPT | Performed by: FAMILY MEDICINE

## 2021-12-22 ENCOUNTER — CLINICAL SUPPORT (OUTPATIENT)
Dept: FAMILY MEDICINE CLINIC | Facility: CLINIC | Age: 51
End: 2021-12-22

## 2021-12-22 DIAGNOSIS — B34.9 VIRAL ILLNESS: Primary | ICD-10-CM

## 2021-12-22 PROCEDURE — 0241U HB NFCT DS VIR RESP RNA 4 TRGT: CPT | Performed by: FAMILY MEDICINE

## 2021-12-23 ENCOUNTER — OFFICE VISIT (OUTPATIENT)
Dept: PAIN MEDICINE | Facility: CLINIC | Age: 51
End: 2021-12-23
Payer: COMMERCIAL

## 2021-12-23 VITALS
SYSTOLIC BLOOD PRESSURE: 98 MMHG | WEIGHT: 197 LBS | BODY MASS INDEX: 31.66 KG/M2 | HEIGHT: 66 IN | HEART RATE: 65 BPM | DIASTOLIC BLOOD PRESSURE: 66 MMHG | RESPIRATION RATE: 18 BRPM

## 2021-12-23 DIAGNOSIS — M54.50 CHRONIC BILATERAL LOW BACK PAIN, UNSPECIFIED WHETHER SCIATICA PRESENT: ICD-10-CM

## 2021-12-23 DIAGNOSIS — M79.18 MYOFASCIAL PAIN SYNDROME: ICD-10-CM

## 2021-12-23 DIAGNOSIS — G89.29 CHRONIC BILATERAL LOW BACK PAIN, UNSPECIFIED WHETHER SCIATICA PRESENT: ICD-10-CM

## 2021-12-23 DIAGNOSIS — M51.16 INTERVERTEBRAL DISC DISORDER WITH RADICULOPATHY OF LUMBAR REGION: ICD-10-CM

## 2021-12-23 DIAGNOSIS — G89.4 CHRONIC PAIN SYNDROME: ICD-10-CM

## 2021-12-23 LAB
FLUAV RNA RESP QL NAA+PROBE: NEGATIVE
FLUBV RNA RESP QL NAA+PROBE: NEGATIVE
RSV RNA RESP QL NAA+PROBE: NEGATIVE
SARS-COV-2 RNA RESP QL NAA+PROBE: NEGATIVE

## 2021-12-23 PROCEDURE — 99214 OFFICE O/P EST MOD 30 MIN: CPT | Performed by: NURSE PRACTITIONER

## 2022-01-05 ENCOUNTER — TELEPHONE (OUTPATIENT)
Dept: GASTROENTEROLOGY | Facility: AMBULARY SURGERY CENTER | Age: 52
End: 2022-01-05

## 2022-01-06 ENCOUNTER — ANESTHESIA EVENT (OUTPATIENT)
Dept: GASTROENTEROLOGY | Facility: AMBULARY SURGERY CENTER | Age: 52
End: 2022-01-06

## 2022-01-06 ENCOUNTER — ANESTHESIA (OUTPATIENT)
Dept: GASTROENTEROLOGY | Facility: AMBULARY SURGERY CENTER | Age: 52
End: 2022-01-06

## 2022-01-06 ENCOUNTER — HOSPITAL ENCOUNTER (OUTPATIENT)
Dept: GASTROENTEROLOGY | Facility: AMBULARY SURGERY CENTER | Age: 52
Setting detail: OUTPATIENT SURGERY
Discharge: HOME | End: 2022-01-06
Attending: INTERNAL MEDICINE | Admitting: INTERNAL MEDICINE
Payer: COMMERCIAL

## 2022-01-06 VITALS
HEART RATE: 74 BPM | BODY MASS INDEX: 29.4 KG/M2 | TEMPERATURE: 97.1 F | SYSTOLIC BLOOD PRESSURE: 104 MMHG | WEIGHT: 194 LBS | OXYGEN SATURATION: 73 % | DIASTOLIC BLOOD PRESSURE: 67 MMHG | RESPIRATION RATE: 18 BRPM | HEIGHT: 68 IN

## 2022-01-06 DIAGNOSIS — K21.9 GASTROESOPHAGEAL REFLUX DISEASE WITHOUT ESOPHAGITIS: ICD-10-CM

## 2022-01-06 DIAGNOSIS — Z86.010 HISTORY OF COLON POLYPS: ICD-10-CM

## 2022-01-06 DIAGNOSIS — K58.0 IRRITABLE BOWEL SYNDROME WITH DIARRHEA: Primary | ICD-10-CM

## 2022-01-06 PROCEDURE — 45385 COLONOSCOPY W/LESION REMOVAL: CPT | Performed by: INTERNAL MEDICINE

## 2022-01-06 PROCEDURE — 43239 EGD BIOPSY SINGLE/MULTIPLE: CPT | Performed by: INTERNAL MEDICINE

## 2022-01-06 PROCEDURE — 88305 TISSUE EXAM BY PATHOLOGIST: CPT | Performed by: SPECIALIST

## 2022-01-06 RX ORDER — MIDAZOLAM HYDROCHLORIDE 2 MG/2ML
INJECTION, SOLUTION INTRAMUSCULAR; INTRAVENOUS AS NEEDED
Status: DISCONTINUED | OUTPATIENT
Start: 2022-01-06 | End: 2022-01-06

## 2022-01-06 RX ORDER — FERROUS SULFATE 325(65) MG
325 TABLET ORAL
COMMUNITY
End: 2022-06-28 | Stop reason: ALTCHOICE

## 2022-01-06 RX ORDER — CHOLESTYRAMINE 4 G/9G
1 POWDER, FOR SUSPENSION ORAL DAILY
Qty: 30 EACH | Refills: 3 | Status: SHIPPED | OUTPATIENT
Start: 2022-01-06

## 2022-01-06 RX ORDER — LIDOCAINE HYDROCHLORIDE 10 MG/ML
INJECTION, SOLUTION EPIDURAL; INFILTRATION; INTRACAUDAL; PERINEURAL AS NEEDED
Status: DISCONTINUED | OUTPATIENT
Start: 2022-01-06 | End: 2022-01-06

## 2022-01-06 RX ORDER — PROPOFOL 10 MG/ML
INJECTION, EMULSION INTRAVENOUS AS NEEDED
Status: DISCONTINUED | OUTPATIENT
Start: 2022-01-06 | End: 2022-01-06

## 2022-01-06 RX ORDER — SODIUM CHLORIDE 9 MG/ML
INJECTION, SOLUTION INTRAVENOUS CONTINUOUS PRN
Status: DISCONTINUED | OUTPATIENT
Start: 2022-01-06 | End: 2022-01-06

## 2022-01-06 RX ORDER — PROPOFOL 10 MG/ML
INJECTION, EMULSION INTRAVENOUS CONTINUOUS PRN
Status: DISCONTINUED | OUTPATIENT
Start: 2022-01-06 | End: 2022-01-06

## 2022-01-06 RX ADMIN — PROPOFOL 150 MCG/KG/MIN: 10 INJECTION, EMULSION INTRAVENOUS at 12:42

## 2022-01-06 RX ADMIN — MIDAZOLAM HYDROCHLORIDE 2 MG: 1 INJECTION, SOLUTION INTRAMUSCULAR; INTRAVENOUS at 12:37

## 2022-01-06 RX ADMIN — SODIUM CHLORIDE: 0.9 INJECTION, SOLUTION INTRAVENOUS at 12:37

## 2022-01-06 RX ADMIN — LIDOCAINE HYDROCHLORIDE 100 MG: 10 INJECTION, SOLUTION EPIDURAL; INFILTRATION; INTRACAUDAL at 12:42

## 2022-01-06 RX ADMIN — PROPOFOL 150 MG: 10 INJECTION, EMULSION INTRAVENOUS at 12:42

## 2022-01-06 NOTE — ANESTHESIA PREPROCEDURE EVALUATION
Procedure:  COLONOSCOPY  EGD    Relevant Problems   CARDIO   (+) Mixed hyperlipidemia   (+) Paroxysmal SVT (supraventricular tachycardia) (HCC)      GI/HEPATIC   (+) GERD (gastroesophageal reflux disease)   (+) Hepatic steatosis      MUSCULOSKELETAL   (+) Chronic back pain   (+) Sacroiliitis (HCC)      NEURO/PSYCH   (+) Chronic back pain   (+) Chronic pain syndrome   (+) Generalized anxiety disorder   (+) Major depressive disorder, recurrent severe without psychotic features (HCC)   (+) Other chronic pain   (+) Post traumatic stress disorder (PTSD)        Physical Exam    Airway    Mallampati score: II  TM Distance: >3 FB  Neck ROM: full     Dental       Cardiovascular      Pulmonary      Other Findings        Anesthesia Plan  ASA Score- 2     Anesthesia Type- IV sedation with anesthesia with ASA Monitors  Additional Monitors:   Airway Plan:     Comment: H/o intraprocedural recall   Plan Factors-Exercise tolerance (METS): >4 METS  Chart reviewed  Existing labs reviewed  Patient summary reviewed  Induction- intravenous  Postoperative Plan-     Informed Consent- Anesthetic plan and risks discussed with patient  I personally reviewed this patient with the CRNA  Discussed and agreed on the Anesthesia Plan with the CRNA  Jenny Mcdermott

## 2022-01-06 NOTE — H&P
History and Physical -  [facility]  Rodri Lomeli 45 y o  female MRN: [id number]    HPI: Rodri Lomeli is a 45y o  year old female who presents with hx of GERD and hx of colon polyps      Review of Systems    Historical Information   Past Medical History:   Diagnosis Date    ADHD (attention deficit hyperactivity disorder)     Bulging lumbar disc     Carpal tunnel syndrome     RIGHT  LAST ASSESSED: 16    Chronic back pain     low    Chronic pain disorder     Colon polyps     COVID-19     2021    Diabetes mellitus ([facility] [address] )     Resolved post weight loss    GERD (gastroesophageal reflux disease)     Gestational diabetes     Hearing loss     left ear    Hyperlipidemia     Resolved with weight loss    Hyponatremia 2020    IBS (irritable bowel syndrome)     Ileus ([facility] [address] )     LAST ASSESSED: 8/3/17    Labial cyst     LAST ASSESSED: 16    Myofascial pain     LAST ASSESSED: 16    Obesity     Ovarian cyst     LEFT  LAST ASSESSED: 16    Panic attack     Pneumonia     Seasonal allergies     SVT (supraventricular tachycardia) (HCC)     Thoracic outlet syndrome     2010    Trochanteric bursitis of both hips     LAST ASSESSED: 3/18/16    Ulnar neuropathy at elbow     Varicella     Wears glasses      Past Surgical History:   Procedure Laterality Date    BILE DUCT EXPLORATION      ENDOSCOPIC REMOVAL OF STONES FROM BILIARY TRACT     SECTION      x3    CHOLECYSTECTOMY      COLONOSCOPY      2017-polyp, repeat     DILATION AND CURETTAGE OF UTERUS      ENDOMETRIAL ABLATION      ERCP W/ SPHICTEROTOMY      FIRST RIB REMOVAL      THORAX EXCISION OF FIRST RIB    HYSTEROSCOPY      NEUROPLASTY / TRANSPOSITION ULNAR NERVE AT ELBOW Right     [place] COLONOSCOPY FLX DX W/COLLJ SPEC WHEN PFRMD N/A 2017    Procedure: COLONOSCOPY;  Surgeon: Cody Ng MD;  [facility];   Service: Gastroenterology    NH ESOPHAGOGASTRODUODENOSCOPY TRANSORAL DIAGNOSTIC N/A 2017    Procedure: ESOPHAGOGASTRODUODENOSCOPY (EGD); Surgeon: Rafy Schuler MD;  [facility]; Service: Gastroenterology    [place] HYSTEROSCOPY,W/ENDO BX N/A 10/20/2017    Procedure: DILATATION AND CURETTAGE (D&C) WITH HYSTEROSCOPY  REMOVAL VULVAR RT  LESION;  Surgeon: Anisa Davis MD;  [facility];  Service: Gynecology    [place] LAP, ÁLVARO RESTRICT PROC, LONGITUDINAL GASTRECTOMY N/A 2018    Procedure: GASTRECTOMY SLEEVE LAPAROSCOPIC; INTRAOPERATIVE EGD ;  Surgeon: Shyann Ford MD;  [facility];  Service: Myrene Rota SURG IMPLNT Jonathan Knuckles Left 2020    Procedure: Insertion of thoracic spinal cord stimulator electrode via laminotomy and placement of left buttock implantable pulse generator (NEUROMONITORING);   Surgeon: Mireille Garcia MD;  [facility];  Service: Neurosurgery    SPINAL CORD STIMULATOR TRIAL W/ LAMINOTOMY      TONSILLECTOMY AND ADENOIDECTOMY      TUBAL LIGATION      UPPER GASTROINTESTINAL ENDOSCOPY      VEIN LIGATION AND STRIPPING Right     VULVA SURGERY  10/20/2017    BIOPSY    WISDOM TOOTH EXTRACTION       Social History   Social History     Substance and Sexual Activity   Alcohol Use Yes    Alcohol/week: 0 0 - 2 0 standard drinks    Comment: Occs -twice monthly     Social History     Substance and Sexual Activity   Drug Use No     Social History     Tobacco Use   Smoking Status Former Smoker    Quit date: 2013    Years since quittin 6   Smokeless Tobacco Never Used     Family History   Problem Relation Age of Onset    Diabetes Mother    Mavis Cousin Breast cancer Mother         >50    BRCA1 Negative Mother     BRCA2 Negative Mother     Hyperlipidemia Mother         HYPERCHOLESTEROLEMIA    Diabetes Father     Other Father         traumatic brain injury    Prostate cancer Father     Alcohol abuse Father         in remission    Heart disease Father     Neuropathy Father     Hyperlipidemia Father    Mavis Cousin Hypertension Brother     Diabetes Brother     Other Brother         HYPERCHOLESTEROLEMIA    Alcohol abuse Brother     Depression Brother         attempted suicide    Colon cancer Maternal Grandfather     Heart attack Maternal Grandmother     No Known Problems Paternal Grandmother         dad is adopted    No Known Problems Paternal Grandfather         dad is adopted    Diabetes Brother     Alcohol abuse Brother     Asthma Son     No Known Problems Daughter     Ovarian cancer Neg Hx     Uterine cancer Neg Hx        Meds/Allergies     (Not in a hospital admission)      Allergies   Allergen Reactions    Ibuprofen Other (See Comments)     Due to gastric sleeve -can only take for 5 days, then needs to stop       Objective     /67   Pulse 71   Temp (!) 96 6 °F (35 9 °C) (Temporal)   Resp 18   Ht 5' 8" (1 727 m)   Wt 88 kg (194 lb)   LMP 01/07/2019 (Approximate)   SpO2 99%   BMI 29 50 kg/m²       PHYSICAL EXAM    Gen: NAD  CV: RRR  CHEST: Clear  ABD: soft, NT/ND  EXT: no edema  Neuro: AAO      ASSESSMENT/PLAN:  This is a 45y o  year old female here for  hx of GERD and hx of colon polyps      PLAN:   Procedure: egd/colonoscopy

## 2022-01-12 LAB
LEFT EYE DIABETIC RETINOPATHY: NORMAL
RIGHT EYE DIABETIC RETINOPATHY: NORMAL
SEVERITY (EYE EXAM): NORMAL

## 2022-01-18 ENCOUNTER — TELEPHONE (OUTPATIENT)
Dept: PAIN MEDICINE | Facility: CLINIC | Age: 52
End: 2022-01-18

## 2022-01-18 NOTE — TELEPHONE ENCOUNTER
Patient calling to schedule TPI, she is in a lot of pain       Please advise    Thank you     [phone]

## 2022-01-21 ENCOUNTER — OFFICE VISIT (OUTPATIENT)
Dept: BARIATRICS | Facility: CLINIC | Age: 52
End: 2022-01-21
Payer: COMMERCIAL

## 2022-01-21 ENCOUNTER — SOCIAL WORK (OUTPATIENT)
Dept: BEHAVIORAL/MENTAL HEALTH CLINIC | Facility: CLINIC | Age: 52
End: 2022-01-21
Payer: COMMERCIAL

## 2022-01-21 VITALS
BODY MASS INDEX: 31.5 KG/M2 | TEMPERATURE: 97.8 F | HEART RATE: 88 BPM | HEIGHT: 66 IN | WEIGHT: 196 LBS | DIASTOLIC BLOOD PRESSURE: 80 MMHG | SYSTOLIC BLOOD PRESSURE: 104 MMHG

## 2022-01-21 DIAGNOSIS — F43.10 POST TRAUMATIC STRESS DISORDER (PTSD): Chronic | ICD-10-CM

## 2022-01-21 DIAGNOSIS — F33.2 MAJOR DEPRESSIVE DISORDER, RECURRENT SEVERE WITHOUT PSYCHOTIC FEATURES (HCC): Primary | Chronic | ICD-10-CM

## 2022-01-21 DIAGNOSIS — Z98.84 BARIATRIC SURGERY STATUS: ICD-10-CM

## 2022-01-21 DIAGNOSIS — G89.4 CHRONIC PAIN SYNDROME: ICD-10-CM

## 2022-01-21 DIAGNOSIS — F41.1 GENERALIZED ANXIETY DISORDER: Chronic | ICD-10-CM

## 2022-01-21 DIAGNOSIS — K21.9 GASTROESOPHAGEAL REFLUX DISEASE, UNSPECIFIED WHETHER ESOPHAGITIS PRESENT: ICD-10-CM

## 2022-01-21 DIAGNOSIS — E66.9 OBESITY, CLASS I, BMI 30-34.9: Primary | ICD-10-CM

## 2022-01-21 DIAGNOSIS — I47.1 PAROXYSMAL SVT (SUPRAVENTRICULAR TACHYCARDIA) (HCC): ICD-10-CM

## 2022-01-21 PROCEDURE — 99214 OFFICE O/P EST MOD 30 MIN: CPT | Performed by: PHYSICIAN ASSISTANT

## 2022-01-21 PROCEDURE — 90834 PSYTX W PT 45 MINUTES: CPT | Performed by: SOCIAL WORKER

## 2022-01-21 RX ORDER — AMOXICILLIN 500 MG/1
TABLET, FILM COATED ORAL
COMMUNITY
Start: 2022-01-20 | End: 2022-03-17

## 2022-01-21 NOTE — PROGRESS NOTES
-s/p Vertical Param Tobin 2/6/2018  Discussed options of [facility]-Intensive Lifestyle Intervention Program, Very Low Calorie Diet-VLCD and Conservative Program and the role of weight loss medications   -Initial weight loss goal of 5-10% weight loss for improved health  -Screening labs UTD - high cholesterol   - NO phentermine - hx SVT and anxiety   - on Seroquel, effexor - avoid wellbutrin   - patient still interested in revision  Patient reports worsening symptoms of reflux since surgery  She reports symptoms almost daily  She is taking Protonix 40 mg BID and Pepcid 20 b i d  reports associated nausea/epigastric pain  She also adds tums occasion  She reports significant discomfort as a result of her reflux  She had EGD and colonoscopy with GI recently, showing moderate reflux  Will schedule her for follow up with Dr Veronica Pate to discuss possibility of this  Will see RD in the meantime to start working on diet  - has a few weight gaining medications in her list , can consider metformin  Moving forward if surgery is not option/     Assessment/Plan:    Follow up with Dr Veronica Pate   Goals:  Food log (ie ) www myfitnesspal com,sparkpeople  com,loseit com,calorieking  com,etc  baritastic  No sugary beverages  At least 64oz of water daily  Increase physical activity by 10 minutes daily   Gradually increase physical activity to a goal of 5 days per week for 30 minutes of MODERATE intensity PLUS 2 days per week of FULL BODY resistance training  5-10 servings of fruits and vegetables per day and 25-35 grams of dietary fiber per day, gradually increasing      Diagnoses and all orders for this visit:    Obesity, Class I, BMI 30-34 9    Bariatric surgery status    Gastroesophageal reflux disease, unspecified whether esophagitis present    Paroxysmal SVT (supraventricular tachycardia) (HCC)    Chronic pain syndrome    Other orders  -     amoxicillin (AMOXIL) 500 MG tablet        Subjective:   Chief Complaint   Patient presents with    Consult     MWM post op weight Gain consult     Patient ID: Beatriz Frazier  is a 45 y o  female with excess weight/obesity here to pursue weight management  Past Medical History:   Diagnosis Date    ADHD (attention deficit hyperactivity disorder)     Bulging lumbar disc     Carpal tunnel syndrome     RIGHT  LAST ASSESSED: 12/7/16    Chronic back pain     low    Chronic pain disorder     Colon polyps     COVID-19     12/2021    Diabetes mellitus ([facility] [address] )     Resolved post weight loss    GERD (gastroesophageal reflux disease)     Gestational diabetes     Hearing loss     left ear    Hyperlipidemia     Resolved with weight loss    Hyponatremia 12/12/2020    IBS (irritable bowel syndrome)     Ileus ([facility] [address] )     LAST ASSESSED: 8/3/17    Labial cyst     LAST ASSESSED: 4/21/16    Myofascial pain     LAST ASSESSED: 4/12/16    Obesity     Ovarian cyst     LEFT  LAST ASSESSED: 9/2/16    Panic attack     Pneumonia     Seasonal allergies     [facility]     Thoracic outlet syndrome     2010    Trochanteric bursitis of both hips     LAST ASSESSED: 3/18/16    Ulnar neuropathy at elbow     Varicella     Wears glasses      HPI:  -s/p Vertical Afshan Arias 2/6/2018 with weight regain     The following portions of the patient's history were reviewed and updated as appropriate: allergies, current medications, past family history, past medical history, past social history, past surgical history and problem list     Review of Systems   Constitutional: Negative  Respiratory: Negative  Cardiovascular: Negative  Gastrointestinal: Positive for diarrhea ( IBS)  + reflux    Neurological: Negative  Psychiatric/Behavioral: Negative          Objective:    /80 (BP Location: Left arm, Patient Position: Sitting, Cuff Size: Adult)   Pulse 88   Temp 97 8 °F (36 6 °C) (Tympanic)   Ht 5' 6" (1 676 m)   Wt 88 9 kg (196 lb)   LMP 01/07/2019 (Approximate)   BMI 31 64 kg/m²     Physical Exam  Vitals and nursing note reviewed  Constitutional:       Appearance: Normal appearance  She is obese  HENT:      Head: Normocephalic and atraumatic  Eyes:      Extraocular Movements: Extraocular movements intact  Pupils: Pupils are equal, round, and reactive to light  Cardiovascular:      Rate and Rhythm: Normal rate  Pulmonary:      Effort: Pulmonary effort is normal    Musculoskeletal:         General: Normal range of motion  Cervical back: Normal range of motion  Skin:     General: Skin is warm and dry  Neurological:      General: No focal deficit present  Mental Status: She is alert and oriented to person, place, and time     Psychiatric:         Mood and Affect: Mood normal

## 2022-01-21 NOTE — PSYCH
Psychotherapy Provided: Individual Psychotherapy 45 minutes     Length of time in session: 45 minutes, follow up in 1 month    Encounter Diagnosis     ICD-10-CM    1  Major depressive disorder, recurrent severe without psychotic features ([facility] [address] )  F33 2    2  Post traumatic stress disorder (PTSD)  F43 10    3  Generalized anxiety disorder  F41 1        Goals addressed in session: Goal 1     Pain:      moderate to severe-- abdominal pain (under medical care) which is impacting her quality of life  Current suicide risk : Low     DATA: Met with Yessy for scheduled individual session  Topics of discussion included relationships with family, work-related stress, education-related stress and physical health concerns  "I have been having a lot of abdominal pain " Carlee Contreras states that not feeling well has had a negative impact on her mood  She states that she has had an EGD and a consultation with the weight management doctor  She states she is unsure if she will need to have a revision of her gastric bypass surgery or if she will be able to control her acid reflux with dietary measures  Carlee Contreras discussed her work-related stress  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, with the increase in covid cases  Carlee Contreras discussed some of the ways that she is able to manage her moods when she is "in the bubble" with the psychiatric patients  She states that she has been turning on her music, 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  She states that this helps her to be distracted/occupied by something that brings her rajani  Carlee Contreras discussed her relationship with her ex (Carlitos's father)  She states that she has spoken with a  regarding her son's status as a dependent adult  She states that she believes that she will be able to continue to receive some sort of payment from his father, to assist with Carlitos's care  Carlee Contreras expressed her frustration with her son being unable to keep a job   Her son is connected with [facility] for job coaching  Emmett discussed her college courses  She is taking a psychology course and a biology course  She discussed her difficulty with memorizing the material in the biology class  For the psychology class, she states that the expectation is for the students to video record their introduction  She states that she does not feel comfortable with having to create the video recording for the class  Emmett discussed her history with a high school biology course, when her teacher failed her, and she dropped out of school  Client shows evidence of utilizing emotion regulation skills skills to manage mental health symptoms  During this session, this clinician used the following therapeutic modalities: supportive psychotherapy, client-centered therapy, mindfulness-based strategies, DBT-informed skills, Motivational Interviewing and solution-focused therapy  ASSESSMENT: Emmett presents with a normal mood  Her affect is normal range and intensity, appropriate  Emmett exhibits good therapeutic rapport with this clinician  Emmett continues to exhibit willingness to work on treatment goals and objectives  Emmett presents with a minimal risk of suicide, minimal risk of self-harm, and minimal risk of harm to others  PLAN: Emmett will return in one month for the next scheduled session  Between sessions, Emmett will continue to practice distraction and self-restraint in situations that are frustrating to her  She will report back during the next session re: successes and barriers  At the next session, this clinician will use supportive psychotherapy, client-centered therapy, mindfulness-based strategies, DBT-informed skills, Motivational Interviewing and solution-focused therapy to address her mood regulation and relationship/work-related concerns, in an effort to assist Emmett with meeting treatment goals         Behavioral Health Treatment Plan [facility]: Diagnosis and Treatment Plan explained to Thang Ramsay relates understanding diagnosis and is agreeable to Treatment Plan   Yes

## 2022-01-26 NOTE — TELEPHONE ENCOUNTER
Spoke to patient explaining that [employer] does not cover trigger point injections any longer  Pt does have [address] through her  as a secondary and does cover them  I explained that there is no guarantee that the secondary will  the full amount   Pt understanding and would still like to have procedure on 1/28/22

## 2022-01-28 ENCOUNTER — TELEPHONE (OUTPATIENT)
Dept: PAIN MEDICINE | Facility: MEDICAL CENTER | Age: 52
End: 2022-01-28

## 2022-01-28 ENCOUNTER — PROCEDURE VISIT (OUTPATIENT)
Dept: PAIN MEDICINE | Facility: CLINIC | Age: 52
End: 2022-01-28
Payer: COMMERCIAL

## 2022-01-28 VITALS
WEIGHT: 196 LBS | DIASTOLIC BLOOD PRESSURE: 63 MMHG | SYSTOLIC BLOOD PRESSURE: 106 MMHG | BODY MASS INDEX: 31.64 KG/M2 | HEART RATE: 65 BPM

## 2022-01-28 DIAGNOSIS — M79.18 MYOFASCIAL PAIN SYNDROME: ICD-10-CM

## 2022-01-28 DIAGNOSIS — M51.16 INTERVERTEBRAL DISC DISORDER WITH RADICULOPATHY OF LUMBAR REGION: ICD-10-CM

## 2022-01-28 DIAGNOSIS — G89.4 CHRONIC PAIN SYNDROME: Primary | ICD-10-CM

## 2022-01-28 DIAGNOSIS — M62.838 MUSCLE SPASM: ICD-10-CM

## 2022-01-28 PROCEDURE — 99214 OFFICE O/P EST MOD 30 MIN: CPT | Performed by: ANESTHESIOLOGY

## 2022-01-28 RX ORDER — BUPIVACAINE HYDROCHLORIDE 2.5 MG/ML
10 INJECTION, SOLUTION EPIDURAL; INFILTRATION; INTRACAUDAL ONCE
Status: DISCONTINUED | OUTPATIENT
Start: 2022-01-28 | End: 2022-01-28

## 2022-01-28 RX ORDER — METHOCARBAMOL 750 MG/1
750 TABLET, FILM COATED ORAL
Qty: 30 TABLET | Refills: 2 | Status: SHIPPED | OUTPATIENT
Start: 2022-01-28

## 2022-01-28 RX ORDER — TRAMADOL HYDROCHLORIDE 50 MG/1
50 TABLET ORAL DAILY PRN
Qty: 30 TABLET | Refills: 0 | Status: SHIPPED | OUTPATIENT
Start: 2022-01-28 | End: 2022-03-17

## 2022-01-28 NOTE — PROGRESS NOTES
Assessment:  1  Chronic pain syndrome    2  Intervertebral disc disorder with radiculopathy of lumbar region    3  Muscle spasm    4  Myofascial pain syndrome        Plan:  The patient is experiencing ongoing pain in her lower back related to her lumbar degenerative disease and muscle spasms  At this time, will perform trigger point injections into the bilateral sacrum for her  Addition, I will refill her tramadol and methocarbamol for which she uses very sparingly  She will follow back up with me in 4 months for re-evaluation  My impressions and treatment recommendations were discussed in detail with the patient who verbalized understanding and had no further questions  Discharge instructions were provided  I personally saw and examined the patient and I agree with the above discussed plan of care  No orders of the defined types were placed in this encounter  New Medications Ordered This Visit   Medications    traMADol (ULTRAM) 50 mg tablet     Sig: Take 1 tablet (50 mg total) by mouth daily as needed for moderate pain or severe pain     Dispense:  30 tablet     Refill:  0    methocarbamol (ROBAXIN) 750 mg tablet     Sig: Take 1 tablet (750 mg total) by mouth daily at bedtime as needed for muscle spasms     Dispense:  30 tablet     Refill:  2       History of Present Illness:  Naomy Monzon is a 45 y o  female who presents for a follow up office visit in regards to Back Pain  The patient has a history of chronic pain syndrome secondary to lumbar degenerative disease with radiculopathy who returns for follow-up  She was last seen in August at which time she underwent trigger point injections with relief  She reports worsening pain in the lower back that travels into the buttock and posterior thighs at times  She has an [employer] spinal cord stimulator which is helpful  She reports that her methocarbamol and her tramadol have  so she has not been using them and would like a refill      I have personally reviewed and/or updated the patient's past medical history, past surgical history, family history, social history, current medications, allergies, and vital signs today  Review of Systems   Respiratory: Negative for shortness of breath  Cardiovascular: Negative for chest pain  Gastrointestinal: Negative for constipation, diarrhea, nausea and vomiting  Musculoskeletal: Positive for back pain  Negative for arthralgias, gait problem, joint swelling and myalgias  Skin: Negative for rash  Neurological: Negative for dizziness, seizures and weakness  All other systems reviewed and are negative  Patient Active Problem List   Diagnosis    IBS (irritable bowel syndrome)    Mixed hyperlipidemia    GERD (gastroesophageal reflux disease)    Chronic back pain    Cervical radiculopathy    Hepatic steatosis    Pulmonary nodule seen on imaging study    S/P laparoscopic sleeve gastrectomy    BMI 28 0-28 9,adult    Postsurgical malabsorption    Lumbar radiculopathy    Intervertebral disc disorder with radiculopathy of lumbar region    Post traumatic stress disorder (PTSD)    Major depressive disorder, recurrent severe without psychotic features ([facility] [address] )    Generalized anxiety disorder    Sacroiliitis (HCC)    Chronic pain syndrome    Other chronic pain    Trochanteric bursitis, left hip    Paroxysmal SVT (supraventricular tachycardia) (HCC)    Prediabetes    ADHD (attention deficit hyperactivity disorder), combined type    Viral infection, unspecified       Past Medical History:   Diagnosis Date    ADHD (attention deficit hyperactivity disorder)     Bulging lumbar disc     Carpal tunnel syndrome     RIGHT    LAST ASSESSED: 12/7/16    Chronic back pain     low    Chronic pain disorder     Colon polyps     COVID-19     12/2021    Diabetes mellitus ([address] )     Resolved post weight loss    GERD (gastroesophageal reflux disease)     Gestational diabetes     Hearing loss left ear    Hyperlipidemia     Resolved with weight loss    Hyponatremia 2020    IBS (irritable bowel syndrome)     Ileus ([address] )     LAST ASSESSED: 8/3/17    Labial cyst     LAST ASSESSED: 16    Myofascial pain     LAST ASSESSED: 16    Obesity     Ovarian cyst     LEFT  LAST ASSESSED: 16    Panic attack     Pneumonia     Seasonal allergies     SVT (supraventricular tachycardia) (HCC)     Thoracic outlet syndrome         Trochanteric bursitis of both hips     LAST ASSESSED: 3/18/16    Ulnar neuropathy at elbow     Varicella     Wears glasses        Past Surgical History:   Procedure Laterality Date    BILE DUCT EXPLORATION      ENDOSCOPIC REMOVAL OF STONES FROM BILIARY TRACT     SECTION      x3    CHOLECYSTECTOMY      COLONOSCOPY      2017-polyp, repeat     DILATION AND CURETTAGE OF UTERUS      ENDOMETRIAL ABLATION      ERCP W/ SPHICTEROTOMY      FIRST RIB REMOVAL      THORAX EXCISION OF FIRST RIB    HYSTEROSCOPY      NEUROPLASTY / TRANSPOSITION ULNAR NERVE AT ELBOW Right 2011    HI COLONOSCOPY FLX DX W/COLLJ SPEC WHEN PFRMD N/A 2017    Procedure: COLONOSCOPY;  Surgeon: Harleen Galeano MD;  [facility]; Service: Gastroenterology    HI ESOPHAGOGASTRODUODENOSCOPY TRANSORAL DIAGNOSTIC N/A 2017    Procedure: ESOPHAGOGASTRODUODENOSCOPY (EGD); Surgeon: Chivo Beauchamp MD;  [facility]; Service: Gastroenterology    HI HYSTEROSCOPY,W/ENDO BX N/A 10/20/2017    Procedure: DILATATION AND CURETTAGE (D&C) WITH HYSTEROSCOPY  REMOVAL VULVAR RT  LESION;  Surgeon: Hunter Strickland MD;  [facility];  Service: Gynecology    HI LAP, ÁLVARO RESTRICT PROC, LONGITUDINAL GASTRECTOMY N/A 2018    Procedure: GASTRECTOMY SLEEVE LAPAROSCOPIC; INTRAOPERATIVE EGD ;  Surgeon: Elena Puckett MD;  [facility];  Service: Martinez Sinner SURG IMPLNT [address] Left 2020    Procedure:  Insertion of thoracic spinal cord stimulator electrode via laminotomy and placement of left buttock implantable pulse generator (NEUROMONITORING); Surgeon: Valeriano Case MD;  [facility];  Service: Neurosurgery    SPINAL CORD STIMULATOR TRIAL W/ LAMINOTOMY      TONSILLECTOMY AND ADENOIDECTOMY      TUBAL LIGATION      UPPER GASTROINTESTINAL ENDOSCOPY      VEIN LIGATION AND STRIPPING Right     VULVA SURGERY  10/20/2017    BIOPSY    WISDOM TOOTH EXTRACTION         Family History   Problem Relation Age of Onset    Diabetes Mother     Breast cancer Mother         >50    BRCA1 Negative Mother     BRCA2 Negative Mother     Hyperlipidemia Mother         HYPERCHOLESTEROLEMIA    Diabetes Father     Other Father         traumatic brain injury    Prostate cancer Father     Alcohol abuse Father         in remission    Heart disease Father     Neuropathy Father     Hyperlipidemia Father     Hypertension Brother     Diabetes Brother     Other Brother         HYPERCHOLESTEROLEMIA    Alcohol abuse Brother     Depression Brother         attempted suicide    Colon cancer Maternal Grandfather     Heart attack Maternal Grandmother     No Known Problems Paternal Grandmother         dad is adopted    No Known Problems Paternal Grandfather         dad is adopted    Diabetes Brother     Alcohol abuse Brother     Asthma Son     No Known Problems Daughter     Ovarian cancer Neg Hx     Uterine cancer Neg Hx        Social History     Occupational History    Occupation: ER TECH     Employer: [employer]   Tobacco Use    Smoking status: Former Smoker     Quit date: 2013     Years since quittin 7    Smokeless tobacco: Never Used   Vaping Use    Vaping Use: Never used   Substance and Sexual Activity    Alcohol use:  Yes     Alcohol/week: 0 0 - 2 0 standard drinks     Comment: Occs -twice monthly    Drug use: No    Sexual activity: Yes     Partners: Male     Birth control/protection: OCP, Female Sterilization     Comment: lifetime partners: 6; current partner 2013       Current Outpatient Medications on File Prior to Visit   Medication Sig    amoxicillin (AMOXIL) 500 MG tablet     atoMOXetine (STRATTERA) 60 mg capsule Take 1 capsule (60 mg total) by mouth daily    atorvastatin (LIPITOR) 10 mg tablet Take 1 tablet (10 mg total) by mouth daily    Calcium Carbonate-Vit D-Min (CALCIUM 1200 PO) Take 2,400 mg by mouth daily    cholestyramine (QUESTRAN) 4 g packet Take 1 packet (4 g total) by mouth daily    drospirenone-ethinyl estradiol (LIEN) 3-0 02 MG per tablet Take 1 tablet by mouth daily    famotidine (PEPCID) 20 mg tablet TAKE ONE TABLET BY MOUTH 2 TIMES A DAY    ferrous sulfate 325 (65 Fe) mg tablet Take 325 mg by mouth daily with breakfast    montelukast (SINGULAIR) 10 mg tablet TAKE ONE TABLET BY MOUTH AT BEDTIME DAILY    Multiple Vitamins-Minerals (MULTI COMPLETE PO) Take by mouth    pantoprazole (PROTONIX) 40 mg tablet Take 1 tablet (40 mg total) by mouth 2 (two) times a day    propranolol (INDERAL LA) 60 mg 24 hr capsule Take 1 capsule (60 mg total) by mouth 2 (two) times a day    venlafaxine (EFFEXOR-XR) 150 mg 24 hr capsule Take 1 capsule (150 mg total) by mouth daily    venlafaxine (EFFEXOR-XR) 37 5 mg 24 hr capsule Take 1 capsule (37 5 mg total) by mouth daily    [DISCONTINUED] traMADol (ULTRAM) 50 mg tablet Take 1 tablet (50 mg total) by mouth daily as needed for moderate pain or severe pain    lamoTRIgine (LaMICtal) 100 mg tablet Take 1 tablet (100 mg total) by mouth daily    lamoTRIgine (LaMICtal) 150 MG tablet Take 1 tablet (150 mg total) by mouth daily    QUEtiapine (SEROquel) 50 mg tablet Take 1 5 tablets (75 mg total) by mouth daily at bedtime     No current facility-administered medications on file prior to visit         Allergies   Allergen Reactions    Ibuprofen Other (See Comments)     Due to gastric sleeve -can only take for 5 days, then needs to stop       Physical Exam:    /63   Pulse 65   Wt 88 9 kg (196 lb)   LMP 01/07/2019 (Approximate)   BMI 31 64 kg/m²     Constitutional:normal, well developed, well nourished, alert, in no distress and non-toxic and no overt pain behavior  Eyes:anicteric  HEENT:grossly intact  Neck:supple, symmetric, trachea midline and no masses   Pulmonary:even and unlabored  Cardiovascular:No edema or pitting edema present  Skin:Normal without rashes or lesions and well hydrated  Psychiatric:Mood and affect appropriate  Neurologic:Cranial Nerves II-XII grossly intact  Musculoskeletal:normal     Lumbar Spine Exam  Appearance:  Normal lordosis  Palpation/Tenderness:  Bilateral sacral tenderness with trigger points palpable  Range of Motion:  Flexion: Moderately limited  with pain  Extension:  Moderately limited  with pain  Motor Strength:  Left hip flexion:  5/5  Left hip extension:  5/5  Right hip flexion:  5/5  Right hip extension:  5/5  Left knee flexion:  5/5  Left knee extension:  5/5  Right knee flexion:  5/5  Right knee extension:  5/5  Left foot dorsiflexion:  5/5  Left foot plantar flexion:  5/5  Right foot dorsiflexion:  5/5  Right foot plantar flexion:  5/5    Imaging    MRI LUMBAR SPINE WITHOUT CONTRAST (10/25/2019)     INDICATION: M51 16: Intervertebral disc disorders with radiculopathy, lumbar region      COMPARISON:  1/29/2019 MRI     TECHNIQUE:  Sagittal T1, sagittal T2, sagittal inversion recovery, axial T1 and axial T2, coronal T2     IMAGE QUALITY:  Diagnostic     FINDINGS:     VERTEBRAL BODIES:  Normal alignment of the lumbar spine  No spondylolysis or spondylolisthesis  No scoliosis  No compression fracture  Normal marrow signal is identified within the visualized bony structures  No discrete marrow lesion      SACRUM:  Normal signal within the sacrum   No evidence of insufficiency or stress fracture      DISTAL CORD AND CONUS:  Normal size and signal within the distal cord and conus        PARASPINAL SOFT TISSUES:  Paraspinal soft tissues are unremarkable      LOWER THORACIC DISC SPACES:  Normal disc height and signal   No disc herniation, canal stenosis or foraminal narrowing      LUMBAR DISC SPACES:     L1-L2:  Normal disc, mild degenerative facet arthrosis, no stenosis, stable     L2-L3:  Normal disc, mild degenerative facet arthrosis, no stenosis, stable     L3-L4:  Mild degenerative spondylosis and bulging annulus, no stenosis, stable     L4-L5:  Mild degenerative spondylosis, small left foraminal disc protrusion, possible left L4 nerve root encroachment, stable      L5-S1:  Mild degenerative spondylosis, small right foraminal disc protrusion, possible right L5 nerve root encroachment, stable

## 2022-01-28 NOTE — TELEPHONE ENCOUNTER
Pt called stating she has questions regarding her TPI and how it was billed     Pt can be reached at [phone]

## 2022-02-08 ENCOUNTER — OFFICE VISIT (OUTPATIENT)
Dept: URGENT CARE | Age: 52
End: 2022-02-08
Payer: COMMERCIAL

## 2022-02-08 VITALS
HEIGHT: 68 IN | HEART RATE: 79 BPM | OXYGEN SATURATION: 97 % | BODY MASS INDEX: 29.86 KG/M2 | WEIGHT: 197 LBS | TEMPERATURE: 96.3 F | RESPIRATION RATE: 20 BRPM

## 2022-02-08 DIAGNOSIS — R68.89 FLU-LIKE SYMPTOMS: ICD-10-CM

## 2022-02-08 DIAGNOSIS — R30.0 DYSURIA: Primary | ICD-10-CM

## 2022-02-08 LAB
FLUAV RNA RESP QL NAA+PROBE: NEGATIVE
FLUBV RNA RESP QL NAA+PROBE: NEGATIVE
SARS-COV-2 RNA RESP QL NAA+PROBE: NEGATIVE
SL AMB  POCT GLUCOSE, UA: ABNORMAL
SL AMB LEUKOCYTE ESTERASE,UA: ABNORMAL
SL AMB POCT BILIRUBIN,UA: ABNORMAL
SL AMB POCT BLOOD,UA: ABNORMAL
SL AMB POCT CLARITY,UA: ABNORMAL
SL AMB POCT COLOR,UA: ABNORMAL
SL AMB POCT KETONES,UA: ABNORMAL
SL AMB POCT NITRITE,UA: ABNORMAL
SL AMB POCT PH,UA: 7
SL AMB POCT SPECIFIC GRAVITY,UA: 1.01
SL AMB POCT URINE PROTEIN: 300
SL AMB POCT UROBILINOGEN: 0.2

## 2022-02-08 PROCEDURE — 87086 URINE CULTURE/COLONY COUNT: CPT | Performed by: NURSE PRACTITIONER

## 2022-02-08 PROCEDURE — S9083 URGENT CARE CENTER GLOBAL: HCPCS | Performed by: NURSE PRACTITIONER

## 2022-02-08 PROCEDURE — 87636 SARSCOV2 & INF A&B AMP PRB: CPT | Performed by: NURSE PRACTITIONER

## 2022-02-08 PROCEDURE — 81002 URINALYSIS NONAUTO W/O SCOPE: CPT | Performed by: NURSE PRACTITIONER

## 2022-02-08 PROCEDURE — G0382 LEV 3 HOSP TYPE B ED VISIT: HCPCS | Performed by: NURSE PRACTITIONER

## 2022-02-08 PROCEDURE — 87077 CULTURE AEROBIC IDENTIFY: CPT | Performed by: NURSE PRACTITIONER

## 2022-02-08 PROCEDURE — 87186 SC STD MICRODIL/AGAR DIL: CPT | Performed by: NURSE PRACTITIONER

## 2022-02-08 RX ORDER — SULFAMETHOXAZOLE AND TRIMETHOPRIM 800; 160 MG/1; MG/1
1 TABLET ORAL EVERY 12 HOURS SCHEDULED
Qty: 14 TABLET | Refills: 0 | Status: SHIPPED | OUTPATIENT
Start: 2022-02-08 | End: 2022-02-15

## 2022-02-08 RX ORDER — PHENAZOPYRIDINE HYDROCHLORIDE 200 MG/1
200 TABLET, FILM COATED ORAL
Qty: 10 TABLET | Refills: 0 | Status: SHIPPED | OUTPATIENT
Start: 2022-02-08 | End: 2022-07-25 | Stop reason: ALTCHOICE

## 2022-02-08 NOTE — PATIENT INSTRUCTIONS
Dysuria   WHAT YOU NEED TO KNOW:   Dysuria is difficulty urinating, or pain, burning, or discomfort with urination  Dysuria is usually a symptom of another problem  DISCHARGE INSTRUCTIONS:   Return to the emergency department if:   · You have severe back, side, or abdominal pain  · You have fever and shaking chills  · You vomit several times in a row  Contact your healthcare provider if:   · Your symptoms do not go away, even after treatment  · You have questions or concerns about your condition or care  Medicines:   · Medicines  may be given to help treat a bacterial infection or help decrease bladder spasms  · Take your medicine as directed  Contact your healthcare provider if you think your medicine is not helping or if you have side effects  Tell him of her if you are allergic to any medicine  Keep a list of the medicines, vitamins, and herbs you take  Include the amounts, and when and why you take them  Bring the list or the pill bottles to follow-up visits  Carry your medicine list with you in case of an emergency  Follow up with your healthcare provider as directed: Your healthcare provider may also refer you to a urologist or nephrologist to have additional testing  Write down your questions so you remember to ask them during your visits  Manage your dysuria:   · Drink more liquids  Liquids help flush out bacteria that may be causing an infection  Ask your healthcare provider how much liquid to drink each day and which liquids are best for you  · Take sitz baths as directed  Fill a bathtub with 4 to 6 inches of warm water  You may also use a sitz bath pan that fits over a toilet  Sit in the sitz bath for 20 minutes  Do this 2 to 3 times a day, or as directed  The warm water can help decrease pain and swelling  © Copyright [employer] 2021 Information is for End User's use only and may not be sold, redistributed or otherwise used for commercial purposes   All illustrations and images included in CareNotes® are the copyrighted property of A D A M [employer] Inc  or Chauncey Muñoz  The above information is an  only  It is not intended as medical advice for individual conditions or treatments  Talk to your doctor, nurse or pharmacist before following any medical regimen to see if it is safe and effective for you

## 2022-02-08 NOTE — PROGRESS NOTES
[address]        NAME: Melissa Louie is a 45 y o  female  : 1970    MRN: [id number]  DATE: 2022  TIME: 11:24 AM    Assessment and Plan   Dysuria [R30 0]  1  Dysuria  POCT urine dip    Urine culture    sulfamethoxazole-trimethoprim (BACTRIM DS) 800-160 mg per tablet    phenazopyridine (PYRIDIUM) 200 mg tablet   2  Flu-like symptoms  Covid19 and INFLUENZA A/B PCR         Patient Instructions     Possible UTI; will send for culture  In the meantime, start antibiotics  Covid/flu tested; results in 1-2 days   Follow up with PCP in 3-5 days  Proceed to  ER if symptoms worsen  Chief Complaint     Chief Complaint   Patient presents with    Possible UTI     x several days urgency  [place] Simple spasm pain    Headache    Cough     started yesterday    Rib Pain    Nasal Drainage     x several weeks    Generalized Body Aches         History of Present Illness       HPI   Reports HA, cough, nasal discharge, and bodyaches  Flank pain from coughing  Also reports urgency and pain  Works in a [employer]  Review of Systems   Review of Systems   Constitutional: Negative for chills and fever  HENT: Positive for congestion and rhinorrhea  Negative for postnasal drip, sore throat and trouble swallowing  Respiratory: Positive for cough  Negative for chest tightness, shortness of breath and wheezing  Cardiovascular: Negative for chest pain  Gastrointestinal: Negative for nausea and vomiting  Genitourinary: Positive for dysuria and frequency  Musculoskeletal: Positive for myalgias  Neurological: Positive for headaches           Current Medications       Current Outpatient Medications:     atoMOXetine (STRATTERA) 60 mg capsule, Take 1 capsule (60 mg total) by mouth daily, Disp: 90 capsule, Rfl: 1    atorvastatin (LIPITOR) 10 mg tablet, Take 1 tablet (10 mg total) by mouth daily, Disp: 90 tablet, Rfl: 3    Calcium Carbonate-Vit D-Min (CALCIUM 1200 PO), Take 2,400 mg by mouth daily, Disp: , Rfl:   cholestyramine (QUESTRAN) 4 g packet, Take 1 packet (4 g total) by mouth daily, Disp: 30 each, Rfl: 3    drospirenone-ethinyl estradiol (LIEN) 3-0 02 MG per tablet, Take 1 tablet by mouth daily, Disp: 84 tablet, Rfl: 4    famotidine (PEPCID) 20 mg tablet, TAKE ONE TABLET BY MOUTH 2 TIMES A DAY, Disp: 60 tablet, Rfl: 0    ferrous sulfate 325 (65 Fe) mg tablet, Take 325 mg by mouth daily with breakfast, Disp: , Rfl:     methocarbamol (ROBAXIN) 750 mg tablet, Take 1 tablet (750 mg total) by mouth daily at bedtime as needed for muscle spasms, Disp: 30 tablet, Rfl: 2    montelukast (SINGULAIR) 10 mg tablet, TAKE ONE TABLET BY MOUTH AT BEDTIME DAILY, Disp: 90 tablet, Rfl: 0    Multiple Vitamins-Minerals (MULTI COMPLETE PO), Take by mouth, Disp: , Rfl:     pantoprazole (PROTONIX) 40 mg tablet, Take 1 tablet (40 mg total) by mouth 2 (two) times a day, Disp: 90 tablet, Rfl: 3    propranolol (INDERAL LA) 60 mg 24 hr capsule, Take 1 capsule (60 mg total) by mouth 2 (two) times a day, Disp: 180 capsule, Rfl: 1    traMADol (ULTRAM) 50 mg tablet, Take 1 tablet (50 mg total) by mouth daily as needed for moderate pain or severe pain, Disp: 30 tablet, Rfl: 0    venlafaxine (EFFEXOR-XR) 150 mg 24 hr capsule, Take 1 capsule (150 mg total) by mouth daily, Disp: 90 capsule, Rfl: 1    venlafaxine (EFFEXOR-XR) 37 5 mg 24 hr capsule, Take 1 capsule (37 5 mg total) by mouth daily, Disp: 90 capsule, Rfl: 1    amoxicillin (AMOXIL) 500 MG tablet, , Disp: , Rfl:     lamoTRIgine (LaMICtal) 100 mg tablet, Take 1 tablet (100 mg total) by mouth daily, Disp: 90 tablet, Rfl: 1    lamoTRIgine (LaMICtal) 150 MG tablet, Take 1 tablet (150 mg total) by mouth daily, Disp: 90 tablet, Rfl: 1    phenazopyridine (PYRIDIUM) 200 mg tablet, Take 1 tablet (200 mg total) by mouth 3 (three) times a day with meals, Disp: 10 tablet, Rfl: 0    QUEtiapine (SEROquel) 50 mg tablet, Take 1 5 tablets (75 mg total) by mouth daily at bedtime, Disp: 135 tablet, Rfl: 1    sulfamethoxazole-trimethoprim (BACTRIM DS) 800-160 mg per tablet, Take 1 tablet by mouth every 12 (twelve) hours for 7 days, Disp: 14 tablet, Rfl: 0    Current Allergies     Allergies as of 2022 - Reviewed 2022   Allergen Reaction Noted    Ibuprofen Other (See Comments) 2019            The following portions of the patient's history were reviewed and updated as appropriate: allergies, current medications, past family history, past medical history, past social history, past surgical history and problem list      Past Medical History:   Diagnosis Date    ADHD (attention deficit hyperactivity disorder)     Bulging lumbar disc     Carpal tunnel syndrome     RIGHT  LAST ASSESSED: 16    Chronic back pain     low    Chronic pain disorder     Colon polyps     COVID-19     2021    Diabetes mellitus ([address] )     Resolved post weight loss    GERD (gastroesophageal reflux disease)     Gestational diabetes     Hearing loss     left ear    Hyperlipidemia     Resolved with weight loss    Hyponatremia 2020    IBS (irritable bowel syndrome)     Ileus ([address] )     LAST ASSESSED: 8/3/17    Labial cyst     LAST ASSESSED: 16    Myofascial pain     LAST ASSESSED: 16    Obesity     Ovarian cyst     LEFT   LAST ASSESSED: 16    Panic attack     Pneumonia     Seasonal allergies     SVT (supraventricular tachycardia) (HCC)     Thoracic outlet syndrome         Trochanteric bursitis of both hips     LAST ASSESSED: 3/18/16    Ulnar neuropathy at elbow     Varicella     Wears glasses        Past Surgical History:   Procedure Laterality Date    BILE DUCT EXPLORATION      ENDOSCOPIC REMOVAL OF STONES FROM BILIARY TRACT     SECTION      x3    CHOLECYSTECTOMY      COLONOSCOPY      2017-polyp, repeat     DILATION AND CURETTAGE OF UTERUS      ENDOMETRIAL ABLATION      ERCP W/ SPHICTEROTOMY      FIRST RIB REMOVAL      THORAX EXCISION OF FIRST RIB    HYSTEROSCOPY      NEUROPLASTY / TRANSPOSITION ULNAR NERVE AT ELBOW Right 2011    WY COLONOSCOPY FLX DX W/COLLJ SPEC WHEN PFRMD N/A 5/30/2017    Procedure: COLONOSCOPY;  Surgeon: Tesha Zamora MD;  [facility]; Service: Gastroenterology    WY ESOPHAGOGASTRODUODENOSCOPY TRANSORAL DIAGNOSTIC N/A 9/14/2017    Procedure: ESOPHAGOGASTRODUODENOSCOPY (EGD); Surgeon: Rodolfo Nieto MD;  [facility]; Service: Gastroenterology    WY HYSTEROSCOPY,W/ENDO BX N/A 10/20/2017    Procedure: DILATATION AND CURETTAGE (D&C) WITH HYSTEROSCOPY  REMOVAL VULVAR RT  LESION;  Surgeon: Khai Hilliard MD;  [facility];  Service: Gynecology    WY LAP, ÁLVARO RESTRICT PROC, LONGITUDINAL GASTRECTOMY N/A 2/6/2018    Procedure: GASTRECTOMY SLEEVE LAPAROSCOPIC; INTRAOPERATIVE EGD ;  Surgeon: Kerri Del Castillo MD;  [facility];  Service: [facility] SURG IMPLNT [address] Left 1/29/2020    Procedure: Insertion of thoracic spinal cord stimulator electrode via laminotomy and placement of left buttock implantable pulse generator (NEUROMONITORING);   Surgeon: Landy Valle MD;  [facility];  Service: Neurosurgery    SPINAL CORD STIMULATOR TRIAL W/ LAMINOTOMY      TONSILLECTOMY AND ADENOIDECTOMY      TUBAL LIGATION      UPPER GASTROINTESTINAL ENDOSCOPY      VEIN LIGATION AND STRIPPING Right     VULVA SURGERY  10/20/2017    BIOPSY    WISDOM TOOTH EXTRACTION         Family History   Problem Relation Age of Onset    Diabetes Mother     Breast cancer Mother         >50    BRCA1 Negative Mother     BRCA2 Negative Mother     Hyperlipidemia Mother         HYPERCHOLESTEROLEMIA    Diabetes Father     Other Father         traumatic brain injury    Prostate cancer Father     Alcohol abuse Father         in remission    Heart disease Father     Neuropathy Father     Hyperlipidemia Father     Hypertension Brother     Diabetes Brother     Other Brother         HYPERCHOLESTEROLEMIA    Alcohol abuse Brother     Depression Brother         attempted suicide    Colon cancer Maternal Grandfather     Heart attack Maternal Grandmother     No Known Problems Paternal Grandmother         dad is adopted    No Known Problems Paternal Grandfather         dad is adopted    Diabetes Brother     Alcohol abuse Brother     Asthma Son     No Known Problems Daughter     Ovarian cancer Neg Hx     Uterine cancer Neg Hx          Medications have been verified  Objective   Pulse 79   Temp (!) 96 3 °F (35 7 °C)   Resp 20   Ht 5' 8" (1 727 m)   Wt 89 4 kg (197 lb)   LMP 01/07/2019 (Approximate)   SpO2 97%   BMI 29 95 kg/m²   Patient's last menstrual period was 01/07/2019 (approximate)  Physical Exam     Physical Exam  Constitutional:       Appearance: She is ill-appearing  HENT:      Right Ear: Tympanic membrane and ear canal normal       Left Ear: Tympanic membrane and ear canal normal       Nose: Congestion and rhinorrhea present  Mouth/Throat:      Pharynx: No posterior oropharyngeal erythema  Comments: Post nasal drip  Cardiovascular:      Rate and Rhythm: Regular rhythm  Heart sounds: Normal heart sounds  Pulmonary:      Effort: Pulmonary effort is normal       Breath sounds: Normal breath sounds  No wheezing  Lymphadenopathy:      Cervical: No cervical adenopathy

## 2022-02-10 DIAGNOSIS — F33.2 SEVERE EPISODE OF RECURRENT MAJOR DEPRESSIVE DISORDER, WITHOUT PSYCHOTIC FEATURES (HCC): ICD-10-CM

## 2022-02-10 LAB — BACTERIA UR CULT: ABNORMAL

## 2022-02-10 RX ORDER — VENLAFAXINE HYDROCHLORIDE 150 MG/1
150 CAPSULE, EXTENDED RELEASE ORAL DAILY
Qty: 90 CAPSULE | Refills: 1 | Status: SHIPPED | OUTPATIENT
Start: 2022-02-10 | End: 2022-03-25 | Stop reason: SDUPTHER

## 2022-02-11 ENCOUNTER — OFFICE VISIT (OUTPATIENT)
Dept: BARIATRICS | Facility: CLINIC | Age: 52
End: 2022-02-11

## 2022-02-11 VITALS — BODY MASS INDEX: 29.98 KG/M2 | WEIGHT: 197.2 LBS

## 2022-02-11 DIAGNOSIS — Z98.84 S/P LAPAROSCOPIC SLEEVE GASTRECTOMY: Chronic | ICD-10-CM

## 2022-02-11 DIAGNOSIS — K91.2 POSTSURGICAL MALABSORPTION: Primary | Chronic | ICD-10-CM

## 2022-02-11 PROCEDURE — RECHECK: Performed by: DIETITIAN, REGISTERED

## 2022-02-11 NOTE — PROGRESS NOTES
Bariatric Nutrition Assessment Note    Type of surgery    Vertical sleeve gastrectomy  Surgery Date: 2/06/2018  3 75 years  post-op  Surgeon: Dr Evette Diallo  45 y o   female   Wt 89 4 kg (197 lb 3 2 oz)   LMP 01/07/2019 (Approximate)   BMI 29 98 kg/m²     Joni Duarte Equation:     Weight maintenance= 1800kcal/day  Estimated calories for weight loss 1300kcal/day (1# per wk wt loss - sedentary )  Estimated protein needs 70 7-84 9g/day (1 0-1 2 gms/kg IBW )   Estimated fluid needs 2121-2475ml/day (30-35 ml/kg IBW )      Weight History   Maximum Wt Lost:  10/31/2017 initial office sjiiqi=833 5lbs  Wt with BMI of 25: 155 6lbs  Pre-Op Excess Wt: 63 9lbs  Weight on day of argzgsk=289 3lbs  8/1/2019 Post-op [id number]    Review of History and Medications   Past Medical History:   Diagnosis Date    ADHD (attention deficit hyperactivity disorder)     Bulging lumbar disc     Carpal tunnel syndrome     RIGHT  LAST ASSESSED: 12/7/16    Chronic back pain     low    Chronic pain disorder     Colon polyps     COVID-19     12/2021    Diabetes mellitus ([address] )     Resolved post weight loss    GERD (gastroesophageal reflux disease)     Gestational diabetes     Hearing loss     left ear    Hyperlipidemia     Resolved with weight loss    Hyponatremia 12/12/2020    IBS (irritable bowel syndrome)     Ileus ([address] )     LAST ASSESSED: 8/3/17    Labial cyst     LAST ASSESSED: 4/21/16    Myofascial pain     LAST ASSESSED: 4/12/16    Obesity     Ovarian cyst     LEFT   LAST ASSESSED: 9/2/16    Panic attack     Pneumonia     Seasonal allergies     SVT [facility]supra[facility]ricular tachycardia) ([facility])     Thoracic outlet syndrome     2010    Trochanteric bursitis of both hips     LAST ASSESSED: 3/18/16    Ulnar neuropathy at elbow     Varicella     Wears glasses      Past Surgical History:   Procedure Laterality Date    BILE DUCT EXPLORATION      ENDOSCOPIC REMOVAL OF STONES FROM BILIARY TRACT     SECTION      x3    CHOLECYSTECTOMY      COLONOSCOPY      2017-polyp, repeat     DILATION AND CURETTAGE OF UTERUS      ENDOMETRIAL ABLATION      ERCP W/ SPHICTEROTOMY      FIRST RIB REMOVAL      THORAX EXCISION OF FIRST RIB    HYSTEROSCOPY      NEUROPLASTY / TRANSPOSITION ULNAR NERVE AT ELBOW Right     AK COLONOSCOPY FLX DX W/COLLJ SPEC WHEN PFRMD N/A 2017    Procedure: COLONOSCOPY;  Surgeon: Lucy Manzo MD;  [facility]; Service: Gastroenterology    AK ESOPHAGOGASTRODUODENOSCOPY TRANSORAL DIAGNOSTIC N/A 2017    Procedure: ESOPHAGOGASTRODUODENOSCOPY (EGD); Surgeon: Joan Fitzgerald MD;  [facility]; Service: Gastroenterology    [place] HYSTEROSCOPY,W/ENDO BX N/A 10/20/2017    Procedure: DILATATION AND CURETTAGE (D&C) WITH HYSTEROSCOPY  REMOVAL VULVAR RT  LESION;  Surgeon: Johnson Cloud MD;  [facility];  Service: Gynecology    [place] LAP, ÁLVARO RESTRICT PROC, LONGITUDINAL GASTRECTOMY N/A 2018    Procedure: GASTRECTOMY SLEEVE LAPAROSCOPIC; INTRAOPERATIVE EGD ;  Surgeon: Aisha Gipson MD;  [facility];  Service: [facility] SURG IMPLNT [address] Left 2020    Procedure: Insertion of thoracic spinal cord stimulator electrode via laminotomy and placement of left buttock implantable pulse generator (NEUROMONITORING);   Surgeon: Tatum Rincon MD;  [facility];  Service: Neurosurgery    SPINAL CORD STIMULATOR TRIAL W/ LAMINOTOMY      TONSILLECTOMY AND ADENOIDECTOMY      TUBAL LIGATION      UPPER GASTROINTESTINAL ENDOSCOPY      VEIN LIGATION AND STRIPPING Right     VULVA SURGERY  10/20/2017    BIOPSY    WISDOM TOOTH EXTRACTION       Social History     Socioeconomic History    Marital status: /Civil Union     Spouse name: Tierney Aragon Number of children: 3    Years of education: GED then 2 year college program    Highest education level: Not on file   Occupational History    Occupation: ER TECH     Employer: [employer]   Tobacco Use    Smoking status: Former Smoker     Quit date: 2013     Years since quittin 7    Smokeless tobacco: Never Used   Vaping Use    Vaping Use: Never used   Substance and Sexual Activity    Alcohol use:  Yes     Alcohol/week: 0 0 - 2 0 standard drinks     Comment: Occs -twice monthly    Drug use: No    Sexual activity: Yes     Partners: Male     Birth control/protection: OCP, Female Sterilization     Comment: lifetime partners: 6; current partner    Other Topics Concern    Not on file   Social History Narrative    Druze: no preference    Accepts blood products        Exercise: unable with back issues    Calcium: calcium supplement, multivitamin for women over 50, 1 yogurt daily     Social Determinants of Health     Financial Resource Strain: Not on file   Food Insecurity: Not on file   Transportation Needs: Not on file   Physical Activity: Not on file   Stress: Not on file   Social Connections: Not on file   Intimate Partner Violence: Not on file   Housing Stability: Not on file       Current Outpatient Medications:     amoxicillin (AMOXIL) 500 MG tablet, , Disp: , Rfl:     atoMOXetine (STRATTERA) 60 mg capsule, Take 1 capsule (60 mg total) by mouth daily, Disp: 90 capsule, Rfl: 1    atorvastatin (LIPITOR) 10 mg tablet, Take 1 tablet (10 mg total) by mouth daily, Disp: 90 tablet, Rfl: 3    Calcium Carbonate-Vit D-Min (CALCIUM 1200 PO), Take 2,400 mg by mouth daily, Disp: , Rfl:     cholestyramine (QUESTRAN) 4 g packet, Take 1 packet (4 g total) by mouth daily, Disp: 30 each, Rfl: 3    drospirenone-ethinyl estradiol (LIEN) 3-0 02 MG per tablet, Take 1 tablet by mouth daily, Disp: 84 tablet, Rfl: 4    famotidine (PEPCID) 20 mg tablet, TAKE ONE TABLET BY MOUTH 2 TIMES A DAY, Disp: 60 tablet, Rfl: 0    ferrous sulfate 325 (65 Fe) mg tablet, Take 325 mg by mouth daily with breakfast, Disp: , Rfl:     lamoTRIgine (LaMICtal) 100 mg tablet, Take 1 tablet (100 mg total) by mouth daily, Disp: 90 tablet, Rfl: 1    lamoTRIgine (LaMICtal) 150 MG tablet, Take 1 tablet (150 mg total) by mouth daily, Disp: 90 tablet, Rfl: 1    methocarbamol (ROBAXIN) 750 mg tablet, Take 1 tablet (750 mg total) by mouth daily at bedtime as needed for muscle spasms, Disp: 30 tablet, Rfl: 2    montelukast (SINGULAIR) 10 mg tablet, TAKE ONE TABLET BY MOUTH AT BEDTIME DAILY, Disp: 90 tablet, Rfl: 0    Multiple Vitamins-Minerals (MULTI COMPLETE PO), Take by mouth, Disp: , Rfl:     pantoprazole (PROTONIX) 40 mg tablet, Take 1 tablet (40 mg total) by mouth 2 (two) times a day, Disp: 90 tablet, Rfl: 3    phenazopyridine (PYRIDIUM) 200 mg tablet, Take 1 tablet (200 mg total) by mouth 3 (three) times a day with meals, Disp: 10 tablet, Rfl: 0    propranolol (INDERAL LA) 60 mg 24 hr capsule, Take 1 capsule (60 mg total) by mouth 2 (two) times a day, Disp: 180 capsule, Rfl: 1    QUEtiapine (SEROquel) 50 mg tablet, Take 1 5 tablets (75 mg total) by mouth daily at bedtime, Disp: 135 tablet, Rfl: 1    sulfamethoxazole-trimethoprim (BACTRIM DS) 800-160 mg per tablet, Take 1 tablet by mouth every 12 (twelve) hours for 7 days, Disp: 14 tablet, Rfl: 0    traMADol (ULTRAM) 50 mg tablet, Take 1 tablet (50 mg total) by mouth daily as needed for moderate pain or severe pain, Disp: 30 tablet, Rfl: 0    venlafaxine (EFFEXOR-XR) 150 mg 24 hr capsule, Take 1 capsule (150 mg total) by mouth daily, Disp: 90 capsule, Rfl: 1    venlafaxine (EFFEXOR-XR) 37 5 mg 24 hr capsule, Take 1 capsule (37 5 mg total) by mouth daily, Disp: 90 capsule, Rfl: 1     Food Intake and Lifestyle Assessment   Food Intake Assessment completed via usual diet recall  Works at 3:00am to 3pm twice a week, 3am to 11am 3 days per week  Wakes for work at [employer]:  [employer] chocolate with almond milk 1 heaping Tbsp PB and ice  6:30am:  eggwhite omelet with ham and cheese mozzarella  Skips lunch:  Not hungry  Sometimes apple or grapes or cucumbers  IF eats lunch:  Burger emma with no bun  yoplait yogurt  Skim milk string cheese x2  Medium caramel iced coffee from [employer]  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   Skips dinner  Dinner: mother-in-law cooks: rice, chicken, cucumber OR taco meat on lettuce with cheese  Beverage intake: water, iced coffee in the am  Protein supplement: not currently  Estimated protein intake per day: 30-60g  Estimated fluid intake per day: caramel iced coffee from [employer] medium size (drinks half)  Has 32 oz water bottle, finishes at least 6-8 per day  Meals eaten away from home: goes out to eat twice a week, usually breakfast: gets egg whites or oatmeal   Goes out once a month to watch a band play and has two glass of wine  Typical meal pattern: 2-3 meals per day and 0-3 snacks per day  Eating Behaviors: Frequent snacking/ grazing  Does not eat fried foods or fatty foods  Pt has been making healthier food choices, adding in more lean proteins, fruits, and vegetables  Food allergies or intolerances:   C/o IBS-D  C/o lactose intolerance  C/o gets hungry 1-2 hours after eating    Allergies   Allergen Reactions    Ibuprofen Other (See Comments)     Due to gastric sleeve -can only take for 5 days, then needs to stop     Cultural or Muslim considerations: none noted    Physical Assessment  Physical Activity  No structured physical activity  Has a fitness watch to track her steps: 25,000-30,000 steps per day during work shift  Types of exercise: takes dog for a walk once a day around the block 15 minutes:  Not in the cold weather  Current physical limitations: c/o fatigue, lack of time  Pt reports she very much enjoys spin classes  Pt is thinking about joining [employer]      Psychosocial Assessment   Support systems: lives with , autistic son, step-daughter, and mother-in-law at home  Socioeconomic factors: 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, in school for nursing part-time    Nutrition Diagnosis  Diagnosis: Overweight / Obesity (NC-3 3) and Altered GI function (NC-1 4)  Related to: Excessive energy intake and Altered GI function  As Evidenced by: BMI >25 and Unintentional weight gain     Nutrition Prescription: Recommend the following diet  Regular    Interventions and Teaching   Discussed pre-op and post-op nutrition guidelines  Patient educated and handouts provided  Capacity of post-surgery stomach  Adequate hydration  Exercise:  Discussed importance of regular exercise and maintenance of lean body mass for healthy metabolism  Nutrition considerations after surgery  Meal planning and preparation  Appropriate carbohydrate, protein, and fat intake, and food/fluid choices to maximize safe weight loss, nutrient intake, and tolerance   Possible problems with poor eating habits  Techniques for self monitoring and keeping daily food journal  Potential for food intolerance after surgery, and ways to deal with them including: lactose intolerance, nausea, reflux, vomiting, diarrhea, food intolerance, appetite changes, gas  Vitamin / Mineral supplementation  Pt is currently taking  OTC 50+ multivitamin once daily  600mg calcium BID  history of vitamin D deficiency    Education provided to: patient  Barriers to learning: No barriers identified  Readiness to change: action and relapsing  Prior research on procedure: discussed with provider and previous wt  loss surgery  Comprehension: verbalizes understanding   Expected Compliance: good    Recommendations  Pt is an appropriate candidate for surgery  Yes  Pt should make the following changes and then be reassessed for weight loss surgery:   Pt is referred to medical weight management    Again provided pt with flier with medical weight management program and pricing information      Evaluation / Monitoring  Dietitian to Monitor: Eating pattern as discussed Tolerance of nutrition prescription Body weight Lab values Physical activity    Goals  Eliminate sugar sweetened beverages, Food journal, Exercise 30 minutes 5 times per week, Complete lession plans 1-6, Eat 3 meals per day and Eliminate mindless snacking    Time Spent:   30 minutes

## 2022-02-18 ENCOUNTER — TELEPHONE (OUTPATIENT)
Dept: PSYCHIATRY | Facility: CLINIC | Age: 52
End: 2022-02-18

## 2022-02-18 ENCOUNTER — TELEMEDICINE (OUTPATIENT)
Dept: BEHAVIORAL/MENTAL HEALTH CLINIC | Facility: CLINIC | Age: 52
End: 2022-02-18
Payer: COMMERCIAL

## 2022-02-18 DIAGNOSIS — F41.1 GENERALIZED ANXIETY DISORDER: Chronic | ICD-10-CM

## 2022-02-18 DIAGNOSIS — F43.10 POST TRAUMATIC STRESS DISORDER (PTSD): Chronic | ICD-10-CM

## 2022-02-18 DIAGNOSIS — F33.2 MAJOR DEPRESSIVE DISORDER, RECURRENT SEVERE WITHOUT PSYCHOTIC FEATURES (HCC): Primary | Chronic | ICD-10-CM

## 2022-02-18 PROCEDURE — 90834 PSYTX W PT 45 MINUTES: CPT | Performed by: SOCIAL WORKER

## 2022-02-18 NOTE — TELEPHONE ENCOUNTER
----- Message from LALY Guzman sent at 2/18/2022  2:08 PM EST -----  Regarding: Medications (needs 90-day Rx)  Hello all  I am in session with this patient  She would like to have her scripts written for a 90-day supply  She is an [address] [place] [address] and would benefit from the cost-savings and convenience of 90-day supply of medications  She states that she received 90-day Rx in the past  Thanks  Ryan Millan

## 2022-02-21 NOTE — TELEPHONE ENCOUNTER
As per a message from Sanjay Mcadams, she would review the quantity sent to pharmacy with Jimmy Pearson

## 2022-02-23 NOTE — PSYCH
Virtual Regular Visit    Verification of patient location:    Patient is located in the following state in which I hold an active license [place]      Assessment/Plan:    Problem List Items Addressed This Visit        Other    Post traumatic stress disorder (PTSD) (Chronic)    Major depressive disorder, recurrent severe without psychotic features ([facility] [address] ) - Primary (Chronic)    Generalized anxiety disorder (Chronic)          Goals addressed in session: Goal 1          Reason for visit is No chief complaint on file  Encounter provider LALY Kyle    Provider located at [address] [place] [id number] [phone]      Recent Visits  No visits were found meeting these conditions  Showing recent visits within past 7 days and meeting all other requirements  Future Appointments  No visits were found meeting these conditions  Showing future appointments within next 150 days and meeting all other requirements       The patient was identified by name and date of birth  Daron Leyva was informed that this is a telemedicine visit and that the visit is being conducted throughEpic Embedded and patient was informed this is a secure, HIPAA-complaint platform  She agrees to proceed     My office door was closed  No one else was in the room  She acknowledged consent and understanding of privacy and security of the video platform  The patient has agreed to participate and understands they can discontinue the visit at any time  Patient is aware this is a billable service  Subjective  Daron Leyva is a 45 y o  female  DATA: Met with Yessy for scheduled individual session  Topics of discussion included work-related stress, education-related stress and parenting concerns  Coral Villagomez discussed her work-related stressors   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; however, she still feels that there is a lot of stress at work  She states that she continues to be frustrated with some of the requests made by patient's and patients' family members  Miracle Kelly states that she is struggling with one of her classes this semester  She states that she feels that this will be the determining factor regarding what her future education will be (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)  Miracle Kelly discussed her relationship with her son's father  She states that he has reached out to her again  She is working on setting limits with him  Miracle Kelly states that her son has been invited to join a social skills group  She is trying to work on finding a way for him to be able to attend the group  Client shows evidence of utilizing emotion regulation skills skills to manage mental health symptoms  During this session, this clinician used the following therapeutic modalities: supportive psychotherapy, client-centered therapy, CBT techniques, Motivational Interviewing and solution-focused therapy       ASSESSMENT: Miracle Kelly presents with a normal mood  Her affect is normal range and intensity, appropriate  Miracle Kelly exhibits good therapeutic rapport with this clinician  Miracle Kelly continues to exhibit willingness to work on treatment goals and objectives  Miracle Kelly presents with a minimal risk of suicide, minimal risk of self-harm, and minimal risk of harm to others  PLAN: Miracle Kelly will return in one month for the next scheduled session  Between sessions, Miracle Kelly will continue to monitor her moods and practice distraction and reframing skills  She will report back during the next session re: successes and barriers  At the next session, this clinician will use supportive psychotherapy, client-centered therapy, CBT techniques, Motivational Interviewing and solution-focused therapy to address her mood regulation and relationship concerns, in an effort to assist Miracle Kelly with meeting treatment goals       HPI     Past Medical History:   Diagnosis Date    ADHD (attention deficit hyperactivity disorder)     Bulging lumbar disc     Carpal tunnel syndrome     RIGHT  LAST ASSESSED: 16    Chronic back pain     low    Chronic pain disorder     Colon polyps     COVID-19     2021    Diabetes mellitus ([facility] [address] )     Resolved post weight loss    GERD (gastroesophageal reflux disease)     Gestational diabetes     Hearing loss     left ear    Hyperlipidemia     Resolved with weight loss    Hyponatremia 2020    IBS (irritable bowel syndrome)     Ileus ([facility] [address] )     LAST ASSESSED: 8/3/17    Labial cyst     LAST ASSESSED: 16    Myofascial pain     LAST ASSESSED: 16    Obesity     Ovarian cyst     LEFT  LAST ASSESSED: 16    Panic attack     Pneumonia     Seasonal allergies     SVT (supraventricular tachycardia) (HCC)     Thoracic outlet syndrome     2010    Trochanteric bursitis of both hips     LAST ASSESSED: 3/18/16    Ulnar neuropathy at elbow     Varicella     Wears glasses        Past Surgical History:   Procedure Laterality Date    BILE DUCT EXPLORATION      ENDOSCOPIC REMOVAL OF STONES FROM BILIARY TRACT     SECTION      x3    CHOLECYSTECTOMY      COLONOSCOPY      2017-polyp, repeat     DILATION AND CURETTAGE OF UTERUS      ENDOMETRIAL ABLATION      ERCP W/ SPHICTEROTOMY      FIRST RIB REMOVAL      THORAX EXCISION OF FIRST RIB    HYSTEROSCOPY      NEUROPLASTY / TRANSPOSITION ULNAR NERVE AT ELBOW Right 2011    OR COLONOSCOPY FLX DX W/COLLJ SPEC WHEN PFRMD N/A 2017    Procedure: COLONOSCOPY;  Surgeon: Harleen Galeano MD;  [facility]; Service: Gastroenterology    OR ESOPHAGOGASTRODUODENOSCOPY TRANSORAL DIAGNOSTIC N/A 2017    Procedure: ESOPHAGOGASTRODUODENOSCOPY (EGD); Surgeon: Chivo Beauchamp MD;  [facility];   Service: Gastroenterology    OR HYSTEROSCOPY,W/ENDO BX N/A 10/20/2017    Procedure: DILATATION AND CURETTAGE (D&C) WITH HYSTEROSCOPY  REMOVAL VULVAR RT  LESION; Surgeon: Hunter Strickland MD;  [facility];  Service: Gynecology    NC LAP, ÁLVARO RESTRICT PROC, LONGITUDINAL GASTRECTOMY N/A 2/6/2018    Procedure: GASTRECTOMY SLEEVE LAPAROSCOPIC; INTRAOPERATIVE EGD ;  Surgeon: Elena Puckett MD;  [facility];  Service: [facility] SURG IMPLNT [address] Left 1/29/2020    Procedure: Insertion of thoracic spinal cord stimulator electrode via laminotomy and placement of left buttock implantable pulse generator (NEUROMONITORING);   Surgeon: Barry Marquis MD;  [facility];  Service: Neurosurgery    SPINAL CORD STIMULATOR TRIAL W/ LAMINOTOMY      TONSILLECTOMY AND ADENOIDECTOMY      TUBAL LIGATION      UPPER GASTROINTESTINAL ENDOSCOPY      VEIN LIGATION AND STRIPPING Right     VULVA SURGERY  10/20/2017    BIOPSY    WISDOM TOOTH EXTRACTION         Current Outpatient Medications   Medication Sig Dispense Refill    amoxicillin (AMOXIL) 500 MG tablet  (Patient not taking: Reported on 2/8/2022 )      atoMOXetine (STRATTERA) 60 mg capsule Take 1 capsule (60 mg total) by mouth daily 90 capsule 1    atorvastatin (LIPITOR) 10 mg tablet Take 1 tablet (10 mg total) by mouth daily 90 tablet 3    Calcium Carbonate-Vit D-Min (CALCIUM 1200 PO) Take 2,400 mg by mouth daily      cholestyramine (QUESTRAN) 4 g packet Take 1 packet (4 g total) by mouth daily 30 each 3    drospirenone-ethinyl estradiol (LIEN) 3-0 02 MG per tablet Take 1 tablet by mouth daily 84 tablet 4    famotidine (PEPCID) 20 mg tablet TAKE ONE TABLET BY MOUTH 2 TIMES A DAY 60 tablet 0    ferrous sulfate 325 (65 Fe) mg tablet Take 325 mg by mouth daily with breakfast      lamoTRIgine (LaMICtal) 100 mg tablet Take 1 tablet (100 mg total) by mouth daily 90 tablet 1    lamoTRIgine (LaMICtal) 150 MG tablet Take 1 tablet (150 mg total) by mouth daily 90 tablet 1    methocarbamol (ROBAXIN) 750 mg tablet Take 1 tablet (750 mg total) by mouth daily at bedtime as needed for muscle spasms 30 tablet 2    montelukast (SINGULAIR) 10 mg tablet TAKE ONE TABLET BY MOUTH AT BEDTIME DAILY 90 tablet 0    Multiple Vitamins-Minerals (MULTI COMPLETE PO) Take by mouth      pantoprazole (PROTONIX) 40 mg tablet Take 1 tablet (40 mg total) by mouth 2 (two) times a day 90 tablet 3    phenazopyridine (PYRIDIUM) 200 mg tablet Take 1 tablet (200 mg total) by mouth 3 (three) times a day with meals 10 tablet 0    propranolol (INDERAL LA) 60 mg 24 hr capsule Take 1 capsule (60 mg total) by mouth 2 (two) times a day 180 capsule 1    QUEtiapine (SEROquel) 50 mg tablet Take 1 5 tablets (75 mg total) by mouth daily at bedtime 135 tablet 1    traMADol (ULTRAM) 50 mg tablet Take 1 tablet (50 mg total) by mouth daily as needed for moderate pain or severe pain 30 tablet 0    venlafaxine (EFFEXOR-XR) 150 mg 24 hr capsule Take 1 capsule (150 mg total) by mouth daily 90 capsule 1    venlafaxine (EFFEXOR-XR) 37 5 mg 24 hr capsule Take 1 capsule (37 5 mg total) by mouth daily 90 capsule 1     No current facility-administered medications for this visit  Allergies   Allergen Reactions    Ibuprofen Other (See Comments)     Due to gastric sleeve -can only take for 5 days, then needs to stop       Review of Systems    Video Exam    There were no vitals filed for this visit  Physical Exam     I spent 45 minutes directly with the patient during this visit    VIRTUAL VISIT [address] verbally agrees to participate in [employer]  Pt is aware that [employer] could be limited without vital signs or the ability to perform a full hands-on physical Morales Rodriguez understands she or the provider may request at any time to terminate the video visit and request the patient to seek care or treatment in person

## 2022-02-25 ENCOUNTER — OFFICE VISIT (OUTPATIENT)
Dept: BARIATRICS | Facility: CLINIC | Age: 52
End: 2022-02-25
Payer: COMMERCIAL

## 2022-02-25 VITALS
BODY MASS INDEX: 31.5 KG/M2 | TEMPERATURE: 98.9 F | HEIGHT: 66 IN | WEIGHT: 196 LBS | RESPIRATION RATE: 16 BRPM | SYSTOLIC BLOOD PRESSURE: 126 MMHG | DIASTOLIC BLOOD PRESSURE: 68 MMHG | HEART RATE: 72 BPM

## 2022-02-25 DIAGNOSIS — E66.3 OVERWEIGHT: ICD-10-CM

## 2022-02-25 DIAGNOSIS — K21.9 GASTROESOPHAGEAL REFLUX DISEASE, UNSPECIFIED WHETHER ESOPHAGITIS PRESENT: ICD-10-CM

## 2022-02-25 DIAGNOSIS — Z98.84 BARIATRIC SURGERY STATUS: Primary | ICD-10-CM

## 2022-02-25 PROCEDURE — 99213 OFFICE O/P EST LOW 20 MIN: CPT | Performed by: SURGERY

## 2022-02-25 NOTE — PROGRESS NOTES
Subjective:      45 y o  female with history of [address] Arben Jha 2018 presents to the office today for discussion of revision surgery for ongoing reflux symptoms  She continues to have heartburn and despite taking both PPI BID and pepcid BID at time still has breakthrough symptoms  She states chest burning and occasion food regurgitation  She does report associated nausea but no vomiting  Patient notes symptoms worse with food  She notes inability to drink coffee due to symptoms  Denies smoking, admits to occasion glass of wine, denies caffeine currently  Historical Information   Past Medical History:   Diagnosis Date    ADHD (attention deficit hyperactivity disorder)     Bulging lumbar disc     Carpal tunnel syndrome     RIGHT  LAST ASSESSED: 16    Chronic back pain     low    Chronic pain disorder     Colon polyps     COVID-19     2021    Diabetes mellitus ([address] )     Resolved post weight loss    GERD (gastroesophageal reflux disease)     Gestational diabetes     Hearing loss     left ear    Hyperlipidemia     Resolved with weight loss    Hyponatremia 2020    IBS (irritable bowel syndrome)     Ileus ([address] )     LAST ASSESSED: 8/3/17    Labial cyst     LAST ASSESSED: 16    Myofascial pain     LAST ASSESSED: 16    Obesity     Ovarian cyst     LEFT   LAST ASSESSED: 16    Panic attack     Pneumonia     Seasonal allergies     SVT (supraventricular tachycardia) (HCC)     Thoracic outlet syndrome         Trochanteric bursitis of both hips     LAST ASSESSED: 3/18/16    Ulnar neuropathy at elbow     Varicella     Wears glasses      Past Surgical History:   Procedure Laterality Date    BILE DUCT EXPLORATION      ENDOSCOPIC REMOVAL OF STONES FROM BILIARY TRACT     SECTION      x3    CHOLECYSTECTOMY      COLONOSCOPY      -polyp, repeat     DILATION AND CURETTAGE OF UTERUS      ENDOMETRIAL ABLATION      ERCP W/ SPHICTEROTOMY      FIRST RIB REMOVAL      THORAX EXCISION OF FIRST RIB    HYSTEROSCOPY      NEUROPLASTY / TRANSPOSITION ULNAR NERVE AT ELBOW Right 2011    NC COLONOSCOPY FLX DX W/COLLJ SPEC WHEN PFRMD N/A 2017    Procedure: COLONOSCOPY;  Surgeon: Magan Gaviria MD;  [facility]; Service: Gastroenterology    NC ESOPHAGOGASTRODUODENOSCOPY TRANSORAL DIAGNOSTIC N/A 2017    Procedure: ESOPHAGOGASTRODUODENOSCOPY (EGD); Surgeon: Agnes Li MD;  [facility]; Service: Gastroenterology    NC HYSTEROSCOPY,W/ENDO BX N/A 10/20/2017    Procedure: DILATATION AND CURETTAGE (D&C) WITH HYSTEROSCOPY  REMOVAL VULVAR RT  LESION;  Surgeon: Modesto Samano MD;  [facility];  Service: Gynecology    NC LAP, ÁLVARO RESTRICT PROC, LONGITUDINAL GASTRECTOMY N/A 2018    Procedure: GASTRECTOMY SLEEVE LAPAROSCOPIC; INTRAOPERATIVE EGD ;  Surgeon: Renetta Mendoza MD;  [facility];  Service: [facility] SURG IMPLNT [address] Left 2020    Procedure: Insertion of thoracic spinal cord stimulator electrode via laminotomy and placement of left buttock implantable pulse generator (NEUROMONITORING);   Surgeon: Esthela Bird MD;  [facility];  Service: Neurosurgery    SPINAL CORD STIMULATOR TRIAL W/ LAMINOTOMY      TONSILLECTOMY AND ADENOIDECTOMY      TUBAL LIGATION      UPPER GASTROINTESTINAL ENDOSCOPY      VEIN LIGATION AND STRIPPING Right     VULVA SURGERY  10/20/2017    BIOPSY    WISDOM TOOTH EXTRACTION       Social History   Social History     Substance and Sexual Activity   Alcohol Use Yes    Alcohol/week: 0 0 - 2 0 standard drinks    Comment: Occs -twice monthly     Social History     Substance and Sexual Activity   Drug Use No     Social History     Tobacco Use   Smoking Status Former Smoker    Quit date: 2013    Years since quittin 8   Smokeless Tobacco Never Used     Family History:   Family History   Problem Relation Age of Onset    Diabetes Mother     Breast cancer Mother         >50    BRCA1 Negative Mother     BRCA2 Negative Mother     Hyperlipidemia Mother         HYPERCHOLESTEROLEMIA    Diabetes Father     Other Father         traumatic brain injury    Prostate cancer Father     Alcohol abuse Father         in remission    Heart disease Father     Neuropathy Father     Hyperlipidemia Father     Hypertension Brother     Diabetes Brother     Other Brother         HYPERCHOLESTEROLEMIA    Alcohol abuse Brother     Depression Brother         attempted suicide    Colon cancer Maternal Grandfather     Heart attack Maternal Grandmother     No Known Problems Paternal Grandmother         dad is adopted    No Known Problems Paternal Grandfather         dad is adopted    Diabetes Brother     Alcohol abuse Brother     Asthma Son     No Known Problems Daughter     Ovarian cancer Neg Hx     Uterine cancer Neg Hx        Meds/Allergies   all medications and allergies reviewed  Allergies   Allergen Reactions    Ibuprofen Other (See Comments)     Due to gastric sleeve -can only take for 5 days, then needs to stop       Objective   Review of Systems   Constitutional: Negative for chills and fever  HENT: Negative for ear pain and sore throat  Eyes: Negative for pain and visual disturbance  Respiratory: Negative for cough and shortness of breath  Cardiovascular: Negative for chest pain and palpitations  Gastrointestinal: Positive for nausea  Negative for abdominal pain and vomiting  Heartburn   Genitourinary: Negative for dysuria and hematuria  Musculoskeletal: Negative for arthralgias and back pain  Skin: Negative for color change and rash  Neurological: Negative for seizures and syncope  All other systems reviewed and are negative          Objective:    /68 (BP Location: Left arm, Patient Position: Sitting, Cuff Size: Adult)   Pulse 72   Temp 98 9 °F (37 2 °C) (Tympanic)   Resp 16   Ht 5' 6" (1 676 m)   Wt 88 9 kg (196 lb)   LMP 01/07/2019 (Approximate)   BMI 31 64 kg/m²       Physical Exam  Constitutional:       Appearance: Normal appearance  HENT:      Head: Normocephalic and atraumatic  Nose: Nose normal       Mouth/Throat:      Mouth: Mucous membranes are moist    Eyes:      Extraocular Movements: Extraocular movements intact  Pupils: Pupils are equal, round, and reactive to light  Cardiovascular:      Rate and Rhythm: Normal rate and regular rhythm  Pulses: Normal pulses  Heart sounds: Normal heart sounds  Pulmonary:      Effort: Pulmonary effort is normal       Breath sounds: Normal breath sounds  Abdominal:      Palpations: Abdomen is soft  Comments: Well-healed scars   Musculoskeletal:      Cervical back: Normal range of motion  Skin:     General: Skin is warm  Neurological:      General: No focal deficit present  Mental Status: She is alert and oriented to person, place, and time  Psychiatric:         Mood and Affect: Mood normal          Behavior: Behavior normal            EGD (1/6/22): FINDINGS:  · The duodenum appeared normal   · Mild erythematous mucosa in the antrum; performed cold forceps biopsy  · Previous sleeve gastrectomy in the stomach  · The cardia appeared normal   · Irregular Z-line 40 cm from the incisors; performed cold forceps biopsy        SPECIMENS:  ID Type Source Tests Collected by Time Destination   1 : Gastric bx Tissue Stomach TISSUE EXAM Dragan Obrien MD 1/6/2022 12:44 PM            IMPRESSION:  Normal duodenum  Antral gastritis biopsy taken for H pylori  Moderate amount of retained bile  Prior sleeve gastrectomy anatomy, appears to be fairly patent  Normal retroflexion  Small tongue of columnar appearing mucosa at 40 cm, biopsies taken to evaluate for Beth's        Pathology:   Final Diagnosis   A  Stomach, Gastric bx:  -  Chronic inactive antral gastritis and reactive changes    -  Negative for Helicobacter pylori, by H&E stain   -  Negative for atrophy, intestinal metaplasia, dysplasia or carcinoma      B  Esophagogastric junction, GE junction bx:  - Squamocolumnar junction mucosa with reactive changes and pancreatic heterotopia  - Gastric cardia type mucosa with chronic inactive gastritis  - Negative for goblet cell intestinal metaplasia  - No epithelial dysplasia and no evidence of malignancy      C  Polyp, Colorectal, Descending polyp cold snare:  - Polypoid colonic mucosa with hyperplastic changes  - Negative for dysplasia     Assessment/Plan:       Cass Robertson is a 45 y o  female  S/p Vertical Danilo Marcello Dr Marylin Crocker 2/6/2018 with reflux refractory to medication  Diagnoses and all orders for this visit:    Bariatric surgery status  -     FL UPPER GI UGI; Future    Overweight  -     FL UPPER GI UGI; Future    Gastroesophageal reflux disease, unspecified whether esophagitis present  -     FL UPPER GI UGI; Future       · Continue PPI therapy  Will continue for now  · UGI ordered for further assess   · Continue vitamins as directed, with routine blood work follow up  · Will enroll patient in our revision pathway for 3 months with plan to Convert Sleeve to RNYGB  · We will have patient meet with our  today and submit for revision when able   · Follow up per bariatric protocol and with dieticians        Vee Salinas PA-C  Bariatric Surgery

## 2022-03-05 DIAGNOSIS — K21.9 GASTROESOPHAGEAL REFLUX DISEASE: ICD-10-CM

## 2022-03-07 DIAGNOSIS — K21.9 GASTROESOPHAGEAL REFLUX DISEASE: ICD-10-CM

## 2022-03-07 DIAGNOSIS — I47.1 PAROXYSMAL SVT (SUPRAVENTRICULAR TACHYCARDIA) (HCC): ICD-10-CM

## 2022-03-07 RX ORDER — FAMOTIDINE 20 MG/1
20 TABLET, FILM COATED ORAL 2 TIMES DAILY
Qty: 180 TABLET | Refills: 3 | Status: SHIPPED | OUTPATIENT
Start: 2022-03-07 | End: 2022-04-25 | Stop reason: ALTCHOICE

## 2022-03-07 RX ORDER — FAMOTIDINE 20 MG/1
TABLET, FILM COATED ORAL
Qty: 60 TABLET | Refills: 0 | Status: SHIPPED | OUTPATIENT
Start: 2022-03-07 | End: 2022-05-21

## 2022-03-07 RX ORDER — PROPRANOLOL HCL 60 MG
60 CAPSULE, EXTENDED RELEASE 24HR ORAL 2 TIMES DAILY
Qty: 180 CAPSULE | Refills: 2 | OUTPATIENT
Start: 2022-03-07 | End: 2022-04-28 | Stop reason: SDUPTHER

## 2022-03-17 ENCOUNTER — OFFICE VISIT (OUTPATIENT)
Dept: PAIN MEDICINE | Facility: CLINIC | Age: 52
End: 2022-03-17
Payer: COMMERCIAL

## 2022-03-17 VITALS
BODY MASS INDEX: 32.44 KG/M2 | DIASTOLIC BLOOD PRESSURE: 69 MMHG | SYSTOLIC BLOOD PRESSURE: 121 MMHG | WEIGHT: 201 LBS | HEART RATE: 65 BPM

## 2022-03-17 DIAGNOSIS — M54.50 CHRONIC BILATERAL LOW BACK PAIN, UNSPECIFIED WHETHER SCIATICA PRESENT: ICD-10-CM

## 2022-03-17 DIAGNOSIS — G89.4 CHRONIC PAIN SYNDROME: Primary | ICD-10-CM

## 2022-03-17 DIAGNOSIS — M79.18 MYOFASCIAL PAIN SYNDROME: ICD-10-CM

## 2022-03-17 DIAGNOSIS — M51.16 INTERVERTEBRAL DISC DISORDER WITH RADICULOPATHY OF LUMBAR REGION: ICD-10-CM

## 2022-03-17 DIAGNOSIS — F11.20 OPIOID TYPE DEPENDENCE, CONTINUOUS (HCC): ICD-10-CM

## 2022-03-17 DIAGNOSIS — F11.20 UNCOMPLICATED OPIOID DEPENDENCE (HCC): ICD-10-CM

## 2022-03-17 DIAGNOSIS — Z79.891 LONG-TERM CURRENT USE OF OPIATE ANALGESIC: ICD-10-CM

## 2022-03-17 DIAGNOSIS — G89.29 CHRONIC BILATERAL LOW BACK PAIN, UNSPECIFIED WHETHER SCIATICA PRESENT: ICD-10-CM

## 2022-03-17 PROCEDURE — 80305 DRUG TEST PRSMV DIR OPT OBS: CPT | Performed by: NURSE PRACTITIONER

## 2022-03-17 PROCEDURE — 99214 OFFICE O/P EST MOD 30 MIN: CPT | Performed by: NURSE PRACTITIONER

## 2022-03-17 RX ORDER — HYDROCODONE BITARTRATE AND ACETAMINOPHEN 5; 325 MG/1; MG/1
TABLET ORAL
Qty: 30 TABLET | Refills: 0 | Status: SHIPPED | OUTPATIENT
Start: 2022-03-17 | End: 2022-03-24 | Stop reason: SDUPTHER

## 2022-03-17 NOTE — PROGRESS NOTES
Assessment:  1  Chronic pain syndrome    2  Chronic bilateral low back pain, unspecified whether sciatica present    3  Intervertebral disc disorder with radiculopathy of lumbar region    4  Myofascial pain syndrome    5  Opioid type dependence, continuous ([address] )    6  Uncomplicated opioid dependence ([address] )    7  Long-term current use of opiate analgesic        Plan:  Laureen Robison is a 45 y o  female history of chronic pain syndrome secondary to low back pain, midback pain, lumbar intervertebral disc disorder with radiculopathy, lumbar spondylosis with Abbott spinal cord stimulator, trochanteric bursitis, and myofascial pain  patient presents today with ongoing low back pain  She had underwent bilateral sacral trigger point injections in January of 2022 which typically provide if you have months of pain relief  However her pain returned a few weeks after  Her pain is following an L4 distribution  She also has weakness with right hip flexion on examination today  Therefore, I will order an MRI of the lumbar spine  Last imaging was 2019  I will contact her with the results  She also has myofascial pain and palpable trigger points throughout the lumbar paraspinal musculature  I will schedule her for trigger point injections    Since the tramadol is providing no pain relief I will discontinue this medication  Instead I will place her on Norco 5/325 mg  She can take 1 tablet daily as needed for pain  Side effects include drowsiness, dizziness, and constipation  A 1 month prescription for Norco 5/325 mg was electronically sent to the pharmacy  An opioid contract was reviewed with the patient  The patient was made aware they are only to receive opioid medication from our office, and must take the medication as prescribed  If the medication is lost or stolen, it will not be replaced  We also do not condone the use of illegal substances or alcohol with opioid medication    Random urine drug screens and pill counts will also be performed at office visits  Lastly, the patient was informed that office visits are needed for refills  Patient was agreeable and signed the contract  The patient was also completed a BECKS depression inventory and SOAPP-R  today, as part of our yearly opioid management program      There are risks associated with opioid medications, including dependence, addiction and tolerance  The patient understands and agrees to use these medications only as prescribed  Potential side effects of the medications include, but are not limited to, constipation, drowsiness, addiction, impaired judgment and risk of fatal overdose if not taken as prescribed  The patient was warned against driving while taking sedation medications  Sharing medications is a felony  At this point in time, the patient is showing no signs of addiction, abuse, diversion or suicidal ideation  A urine drug screen was collected at today's office visit as part of our medication management protocol  The point of care testing results were appropriate for what was being prescribed  The specimen will be sent for confirmatory testing  The drug screen is medically necessary because the patient is either dependent on opioid medication or is being considered for opioid medication therapy and the results could impact ongoing or future treatment  The drug screen is to evaluate for the presences or absence of prescribed, non-prescribed, and/or illicit drugs/substances  [place] Prescription Drug Monitoring Program report was reviewed and was appropriate       My impressions and treatment recommendations were discussed in detail with the patient who verbalized understanding and had no further questions  Discharge instructions were provided  I personally saw and examined the patient and I agree with the above discussed plan of care      Orders Placed This Encounter   Procedures    Injection trigger point 1 or 2 muscles Standing Status:   Future     Standing Expiration Date:   3/17/2023     Scheduling Instructions:      Bilateral Lumbar paraspinal musculature and sacrum TPI with Dr Marielle Wolff MRI lumbar spine without contrast     Standing Status:   Future     Standing Expiration Date:   3/17/2026     Scheduling Instructions: There is no preparation for this test  Please leave your jewelry and valuables at home, wedding rings are the exception  Magnetic nail polish must be removed prior to arrival for your test  Please bring your insurance cards, a form of photo ID and a list of your medications with you  Arrive 15 minutes prior to your appointment time in order to register  Please bring any prior CT or MRI studies of this area that were not performed at a [facility]  To schedule this appointment, please contact Central Scheduling at [phone]  Prior to your appointment, please make sure you complete the MRI Screening Form when you e-Check in for your appointment  This will be available starting 7 days before your appointment in [address]  You may receive an e-mail with an activation code if you do not have a [employer] account  If you do not have access to a device, we will complete your screening at your appointment  Order Specific Question:   What is the patient's sedation requirement? Answer:   No Sedation     Order Specific Question:   Is the patient pregnant? Answer:   No     Order Specific Question:   Release to patient through [facility]     Answer:   Immediate     Order Specific Question:   Is order priority selected as STAT?      Answer:   No     Order Specific Question:   Reason for Exam (FREE TEXT)     Answer:   low back pain    MM ALL_Prescribed Meds and Special Instructions    MM DT_Alprazolam Definitive Test    MM DT_Amphetamine Definitive Test    MM DT_Buprenorphine Definitive Test    MM DT_Butalbital Definitive Test    MM DT_Clonazepam Definitive Test    MM DT_Cocaine Definitive Test    MM DT_Codeine Definitive Test    MM DT_Dextromethorphan Definitive Test    MM Diazepam Definitive Test    MM DT_Ethyl Glucuronide/Ethyl Sulfate Definitive Test    MM DT_Fentanyl Definitive Test    MM DT_Heroin Definitive Test    MM DT_Hydrocodone Definitive Test    MM DT_Hydromorphone Definitive Test    MM DT_Kratom Definitive Test    MM DT_Levorphanol Definitive Test    MM DT_MDMA Definitive Test    MM DT_Meperidine Definitive Test    MM DT_Methadone Definitive Test    MM DT_Methylphenidate Definitive Test    MM DT_Methamphetamine Definitive Test    MM DT_Morphine Definitive Test    MM Lorazepam Definitive Test    MM DT_Oxazepam Definitive Test    MM DT_Oxycodone Definitive Test    MM DT_Oxymorphone Definitive Test    MM DT_Phencyclidine Definitive Test    MM DT_Phenobarbital Definitive Test    MM DT_Phentermine Definitive Test    MM DT_Secobarbital Definitive Test    MM DT_Spice Definitive Test    MM DT_Tapentadol Definitive Test    MM DT_Temazapam Definitive Test    MM DT_THC Definitive Test    MM DT_Tramadol Definitive Test     New Medications Ordered This Visit   Medications    HYDROcodone-acetaminophen (NORCO) 5-325 mg per tablet     Sig: Take 1 tab PO daily as needed     Dispense:  30 tablet     Refill:  0       History of Present Illness:  Carri Anthony is a 45 y o  female history of chronic pain syndrome secondary to low back pain, midback pain, lumbar intervertebral disc disorder with radiculopathy, lumbar spondylosis with Abbott spinal cord stimulator, trochanteric bursitis, and myofascial pain  Her last office visit was January 28, 2022 in which she had trigger point injections into the bilateral sacral paraspinal musculature and prescribed Tramadol 50 mg  She presents today with ongoing low back pain  The pain radiates into the right hip and lateral thigh  She states the pain is constant worse in the morning and evening    She describes as throbbing and pressure-like  She is rating her pain 8/10 on the numeric rating scale    Is currently taking tramadol 50 mg  She takes 1 tablet as needed which she uses very sparingly  She states the medication provides no relief  She tried increasing it to 2 tablets but this also provided minimal pain relief  She denies side effects       I have personally reviewed and/or updated the patient's past medical history, past surgical history, family history, social history, current medications, allergies, and vital signs today  Review of Systems   Respiratory: Negative for shortness of breath  Cardiovascular: Negative for chest pain  Gastrointestinal: Negative for constipation, diarrhea, nausea and vomiting  Musculoskeletal: Negative for arthralgias, gait problem, joint swelling and myalgias  Skin: Negative for rash  Neurological: Negative for dizziness, seizures and weakness  All other systems reviewed and are negative        Patient Active Problem List   Diagnosis    IBS (irritable bowel syndrome)    Mixed hyperlipidemia    GERD (gastroesophageal reflux disease)    Chronic back pain    Cervical radiculopathy    Hepatic steatosis    Pulmonary nodule seen on imaging study    S/P laparoscopic sleeve gastrectomy    BMI 28 0-28 9,adult    Postsurgical malabsorption    Lumbar radiculopathy    Intervertebral disc disorder with radiculopathy of lumbar region    Post traumatic stress disorder (PTSD)    Major depressive disorder, recurrent severe without psychotic features ([facility] [address] [address] )    Generalized anxiety disorder    Sacroiliitis (HCC)    Chronic pain syndrome    Other chronic pain    Trochanteric bursitis, left hip    Paroxysmal SVT (supraventricular tachycardia) ([facility])    Prediabetes    ADHD (attention deficit hyperactivity disorder), combined type    Viral infection, unspecified       Past Medical History:   Diagnosis Date    ADHD (attention deficit hyperactivity disorder)     Bulging lumbar disc     Carpal tunnel syndrome     RIGHT  LAST ASSESSED: 16    Chronic back pain     low    Chronic pain disorder     Colon polyps     COVID-19     2021    Diabetes mellitus ([facility] [address] )     Resolved post weight loss    GERD (gastroesophageal reflux disease)     Gestational diabetes     Hearing loss     left ear    Hyperlipidemia     Resolved with weight loss    Hyponatremia 2020    IBS (irritable bowel syndrome)     Ileus ([facility] [address] )     LAST ASSESSED: 8/3/17    Labial cyst     LAST ASSESSED: 16    Myofascial pain     LAST ASSESSED: 16    Obesity     Ovarian cyst     LEFT  LAST ASSESSED: 16    Panic attack     Pneumonia     Seasonal allergies     SVT (supraventricular tachycardia) (HCC)     Thoracic outlet syndrome         Trochanteric bursitis of both hips     LAST ASSESSED: 3/18/16    Ulnar neuropathy at elbow     Varicella     Wears glasses        Past Surgical History:   Procedure Laterality Date    BILE DUCT EXPLORATION      ENDOSCOPIC REMOVAL OF STONES FROM BILIARY TRACT     SECTION      x3    CHOLECYSTECTOMY      COLONOSCOPY      2017-polyp, repeat     DILATION AND CURETTAGE OF UTERUS      ENDOMETRIAL ABLATION      ERCP W/ SPHICTEROTOMY      FIRST RIB REMOVAL      THORAX EXCISION OF FIRST RIB    HYSTEROSCOPY      NEUROPLASTY / TRANSPOSITION ULNAR NERVE AT ELBOW Right 2011    MN COLONOSCOPY FLX DX W/COLLJ SPEC WHEN PFRMD N/A 2017    Procedure: COLONOSCOPY;  Surgeon: Maryana Mckeon MD;  [facility]; Service: Gastroenterology    MN ESOPHAGOGASTRODUODENOSCOPY TRANSORAL DIAGNOSTIC N/A 2017    Procedure: ESOPHAGOGASTRODUODENOSCOPY (EGD); Surgeon: Gila Almeida MD;  [facility];   Service: Gastroenterology    MN HYSTEROSCOPY,W/ENDO BX N/A 10/20/2017    Procedure: DILATATION AND CURETTAGE (D&C) WITH HYSTEROSCOPY  REMOVAL VULVAR RT  LESION;  Surgeon: Bonnie Guaman MD;  [facility];  Service: Gynecology    PA LAP, ÁLVARO RESTRICT PROC, LONGITUDINAL GASTRECTOMY N/A 2018    Procedure: GASTRECTOMY SLEEVE LAPAROSCOPIC; INTRAOPERATIVE EGD ;  Surgeon: Amairani Gamez MD;  [facility];  Service: Andria Big SURG IMPLNT [place] Left 2020    Procedure: Insertion of thoracic spinal cord stimulator electrode via laminotomy and placement of left buttock implantable pulse generator (NEUROMONITORING);   Surgeon: Lay Carter MD;  [facility];  Service: Neurosurgery    SPINAL CORD STIMULATOR TRIAL W/ LAMINOTOMY      TONSILLECTOMY AND ADENOIDECTOMY      TUBAL LIGATION      UPPER GASTROINTESTINAL ENDOSCOPY      VEIN LIGATION AND STRIPPING Right     VULVA SURGERY  10/20/2017    BIOPSY    WISDOM TOOTH EXTRACTION         Family History   Problem Relation Age of Onset    Diabetes Mother     Breast cancer Mother         >50    BRCA1 Negative Mother     BRCA2 Negative Mother     Hyperlipidemia Mother         HYPERCHOLESTEROLEMIA    Diabetes Father     Other Father         traumatic brain injury    Prostate cancer Father     Alcohol abuse Father         in remission    Heart disease Father     Neuropathy Father     Hyperlipidemia Father     Hypertension Brother     Diabetes Brother     Other Brother         HYPERCHOLESTEROLEMIA    Alcohol abuse Brother     Depression Brother         attempted suicide    Colon cancer Maternal Grandfather     Heart attack Maternal Grandmother     No Known Problems Paternal Grandmother         dad is adopted    No Known Problems Paternal Grandfather         dad is adopted    Diabetes Brother     Alcohol abuse Brother     Asthma Son     No Known Problems Daughter     Ovarian cancer Neg Hx     Uterine cancer Neg Hx        Social History     Occupational History    Occupation: ER TECH     Employer: [employer]   Tobacco Use    Smoking status: Former Smoker     Quit date: 2013     Years since quittin 8    Smokeless tobacco: Never Used   Vaping Use    Vaping Use: Never used   Substance and Sexual Activity    Alcohol use:  Yes     Alcohol/week: 0 0 - 2 0 standard drinks     Comment: Occs -twice monthly    Drug use: No    Sexual activity: Yes     Partners: Male     Birth control/protection: OCP, Female Sterilization     Comment: lifetime partners: 6; current partner 2013       Current Outpatient Medications on File Prior to Visit   Medication Sig    atoMOXetine (STRATTERA) 60 mg capsule Take 1 capsule (60 mg total) by mouth daily    atorvastatin (LIPITOR) 10 mg tablet Take 1 tablet (10 mg total) by mouth daily    Calcium Carbonate-Vit D-Min (CALCIUM 1200 PO) Take 2,400 mg by mouth daily    cholestyramine (QUESTRAN) 4 g packet Take 1 packet (4 g total) by mouth daily    drospirenone-ethinyl estradiol (LIEN) 3-0 02 MG per tablet Take 1 tablet by mouth daily    famotidine (PEPCID) 20 mg tablet TAKE ONE TABLET BY MOUTH 2 TIMES A DAY    famotidine (PEPCID) 20 mg tablet Take 1 tablet (20 mg total) by mouth 2 (two) times a day    ferrous sulfate 325 (65 Fe) mg tablet Take 325 mg by mouth daily with breakfast    lamoTRIgine (LaMICtal) 100 mg tablet Take 1 tablet (100 mg total) by mouth daily    lamoTRIgine (LaMICtal) 150 MG tablet Take 1 tablet (150 mg total) by mouth daily    methocarbamol (ROBAXIN) 750 mg tablet Take 1 tablet (750 mg total) by mouth daily at bedtime as needed for muscle spasms    montelukast (SINGULAIR) 10 mg tablet TAKE ONE TABLET BY MOUTH AT BEDTIME DAILY    Multiple Vitamins-Minerals (MULTI COMPLETE PO) Take by mouth    pantoprazole (PROTONIX) 40 mg tablet Take 1 tablet (40 mg total) by mouth 2 (two) times a day    phenazopyridine (PYRIDIUM) 200 mg tablet Take 1 tablet (200 mg total) by mouth 3 (three) times a day with meals    propranolol (INDERAL LA) 60 mg 24 hr capsule Take 1 capsule (60 mg total) by mouth 2 (two) times a day    QUEtiapine (SEROquel) 50 mg tablet Take 1 5 tablets (75 mg total) by mouth daily at bedtime    venlafaxine (EFFEXOR-XR) 150 mg 24 hr capsule Take 1 capsule (150 mg total) by mouth daily    venlafaxine (EFFEXOR-XR) 37 5 mg 24 hr capsule Take 1 capsule (37 5 mg total) by mouth daily    [DISCONTINUED] amoxicillin (AMOXIL) 500 MG tablet  (Patient not taking: Reported on 2/8/2022 )    [DISCONTINUED] traMADol (ULTRAM) 50 mg tablet Take 1 tablet (50 mg total) by mouth daily as needed for moderate pain or severe pain     No current facility-administered medications on file prior to visit  Allergies   Allergen Reactions    Ibuprofen Other (See Comments)     Due to gastric sleeve -can only take for 5 days, then needs to stop       Physical Exam:    /69   Pulse 65   Wt 91 2 kg (201 lb)   LMP 01/07/2019 (Approximate)   BMI 32 44 kg/m²     Constitutional:normal, well developed, well nourished, alert, in no distress and non-toxic and no overt pain behavior    Eyes:anicteric  HEENT:grossly intact  Neck:supple, symmetric, trachea midline and no masses   Pulmonary:even and unlabored  Cardiovascular:No edema or pitting edema present  Skin:Normal without rashes or lesions and well hydrated  Psychiatric:Mood and affect appropriate  Neurologic:Cranial Nerves II-XII grossly intact  Musculoskeletal:normal    Lumbar Spine Exam    Appearance:  Normal lordosis  Palpation/Tenderness:  left lumbar paraspinal tenderness  right lumbar paraspinal tenderness  with muscle spasms throughout lumbar paraspinal musculature   Range of Motion:  Flexion:  Minimally limited  without pain  Extension:  Minimally limited  with pain  Motor Strength:  Left hip flexion:  5/5  Right hip flexion:  4/5  Left knee flexion:  5/5  Left knee extension:  5/5  Right knee flexion:  5/5  Right knee extension:  5/5  Left foot dorsiflexion:  5/5  Left foot plantar flexion:  5/5  Right foot dorsiflexion:  5/5  Right foot plantar flexion:  5/5      Imaging    MRI LUMBAR SPINE WITHOUT CONTRAST (10/25/2019)     INDICATION: M51 [id number]: Intervertebral disc disorders with radiculopathy, lumbar region      COMPARISON:  1/29/2019 MRI     TECHNIQUE:  Sagittal T1, sagittal T2, sagittal inversion recovery, axial T1 and axial T2, coronal T2     IMAGE QUALITY:  Diagnostic     FINDINGS:     VERTEBRAL BODIES:  Normal alignment of the lumbar spine   No spondylolysis or spondylolisthesis  No scoliosis   No compression fracture     Normal marrow signal is identified within the visualized bony structures   No discrete marrow lesion      SACRUM:  Normal signal within the sacrum  No evidence of insufficiency or stress fracture      DISTAL CORD AND CONUS:  Normal size and signal within the distal cord and conus        PARASPINAL SOFT TISSUES:  Paraspinal soft tissues are unremarkable      LOWER THORACIC DISC SPACES:  Normal disc height and signal   No disc herniation, canal stenosis or foraminal narrowing      LUMBAR DISC SPACES:     L1-L2:  Normal disc, mild degenerative facet arthrosis, no stenosis, stable     L2-L3:  Normal disc, mild degenerative facet arthrosis, no stenosis, stable     L3-L4:  Mild degenerative spondylosis and bulging annulus, no stenosis, stable     L4-L5:  Mild degenerative spondylosis, small left foraminal disc protrusion, possible left L4 nerve root encroachment, stable      L5-S1:  Mild degenerative spondylosis, small right foraminal disc protrusion, possible right L5 nerve root encroachment, stable

## 2022-03-17 NOTE — PATIENT INSTRUCTIONS
Opioid Safety   AMBULATORY CARE:   Opioid safety  includes the correct use, storage, and disposal of opioids  Examples of opioid medicines to treat pain include oxycodone, morphine, fentanyl, and codeine  Call your local emergency number (911 in the [address]), or have someone else call if:   · You have a seizure  · You cannot be woken  · You have trouble staying awake and your breathing is slow or shallow  · Your speech is slurred, or you are confused  · You are dizzy or stumble when you walk  Call your doctor, or have someone close to you call if:   · You are extremely drowsy, or you have trouble staying awake or speaking  · You have pale or clammy skin  · You have blue fingernails or lips  · Your heartbeat is slower than normal     · You cannot stop vomiting  · You have questions or concerns about your condition or care  Use opioids safely:   · Take prescribed opioids exactly as directed  Opioids come with directions based on the kind of opioid and how it is given  Do not take more than the recommended amount, or for longer than needed  · Do not give opioids to others or take opioids that belong to someone else  Misuse of opioids can lead to an addiction or overdose  · Do not mix opioids with other medicines or alcohol  The combination can cause an overdose, or lead to a coma  · Do not drive or operate heavy machinery after you take the opioid  Your provider or pharmacist can tell you how long to wait after a dose before you do these activities  · Talk to your healthcare provider if you have any side effects  He or she can help you prevent or relieve side effects  Side effects include nausea, sleepiness, itching, and trouble thinking clearly  Manage constipation:  Constipation is the most common side effect of opioid medicine  Constipation is when you have hard, dry bowel movements, or you go longer than usual between bowel movements   Tell your healthcare provider about all changes in your bowel movements while you are taking opioids  He or she may recommend laxative medicine to help you have a bowel movement  He or she may also change the kind of opioid you are taking, or change when you take it  The following are more ways you can prevent or relieve constipation:  · Drink liquids as directed  You may need to drink extra liquids to help soften and move your bowels  Ask how much liquid to drink each day and which liquids are best for you  · Eat high-fiber foods  This may help decrease constipation by adding bulk to your bowel movements  High-fiber foods include fruits, vegetables, whole-grain breads and cereals, and beans  Your healthcare provider or dietitian can help you create a high-fiber meal plan  Your provider may also recommend a fiber supplement if you cannot get enough fiber from food  · Exercise regularly  Regular physical activity can help stimulate your intestines  Walking is a good exercise to prevent or relieve constipation  Ask which exercises are best for you  · Schedule a time each day to have a bowel movement  This may help train your body to have regular bowel movements  Bend forward while you are on the toilet to help move the bowel movement out  Sit on the toilet for at least 10 minutes, even if you do not have a bowel movement  Store opioids safely:   · Store opioids where others cannot easily get them  Keep them in a locked cabinet or secure area  Do not  keep them in a purse or other bag you carry with you  A person may be looking for something else and find the opioids  · Make sure opioids are stored out of the reach of children  A child can easily overdose on opioids  Opioids may look like candy to a small child  The best way to dispose of opioids: The laws vary by country and area   In the [place], the best way is to return the opioids through a take-back program  This program is offered by the [employer] ([address] [facility])  The following are options for using the program:  · Take the opioids to a PAWAN collection site  The site is often a law enforcement center  Call your local law enforcement center for scheduled take-back days in your area  You will be given information on where to go if the collection site is in a different location  · Take the opioids to an approved pharmacy or hospital   A pharmacy or hospital may be set up as a collection site  You will need to ask if it is a PAWAN collection site if you were not directed there  A pharmacy or doctor's office may not be able to take back opioids unless it is a PAWAN site  · Use a mail-back system  This means you are given containers to put the opioids into  You will then mail them in the containers  · Use a take-back drop box  This is a place to leave the opioids at any time  People and animals will not be able to get into the box  Your local law enforcement agency can tell you where to find a drop box in your area  Other safe ways to dispose of opioids: The medicine may come with disposal instructions  The instructions may vary depending on the brand of medicine you are using  Instructions may come in a Medication Guide, but not every medicine has one  You may instead get instructions from your pharmacy or doctor  Follow instructions carefully  The following are general guidelines to follow:  · Find out if you can flush the opioid  Some opioids can be flushed down the toilet or poured into the sink  You will need to contact authorities in your area to see if this is an option for you  The FDA also offers a list of medicines that are safe to flush down the toilet  You can check the list if you cannot get the information for your local area  · Ask your waste management company about rules for putting opioids in the trash  The company will be able to give you specific directions   Scratch out personal information on the original medicine label so it cannot be read  Then put it in the trash  Do not label the trash or put any information on it about the opioids  It should look like regular household trash so no one is tempted to look for the opioids  Keep the trash out of the reach of children and animals  Always make sure trash is secure  · Talk to officials if you live in a facility  If you live in a nursing home or assisted living center, talk to an official  The person will know the rules for your area  Other ways to manage pain:   · Ask your healthcare provider about non-opioid medicines to control pain  Nonprescription medicines include NSAIDs (such as ibuprofen) and acetaminophen  Prescription medicines include muscle relaxers, antidepressants, and steroids  · Pain may be managed without any medicines  Some ways to relieve pain include massage, aromatherapy, or meditation  Physical or occupational therapy may also help  For more information:   · Drug Enforcement Administration  [facility] [address]  Phone: 6- 898 - [phone]  Web Address: [facility]/drug_disposal/    · [address]  [address] Drug Administration  [facility] , [address] [place] [address]  Phone: 4- 220 - [phone]  Web Address: http://[employer]/    Follow up with your doctor or pain specialist as directed: You may need to have your dose adjusted  Your doctor or pain specialist can also help you find ways to manage pain without opioids  Write down your questions so you remember to ask them during your visits  © Copyright [employer] 2022 Information is for End User's use only and may not be sold, redistributed or otherwise used for commercial purposes  All illustrations and images included in CareNotes® are the copyrighted property of A D A M , Inc  or Chauncey Muñoz  The above information is an  only  It is not intended as medical advice for individual conditions or treatments   Talk to your doctor, nurse or pharmacist before following any medical regimen to see if it is safe and effective for you

## 2022-03-22 LAB

## 2022-03-23 ENCOUNTER — HOSPITAL ENCOUNTER (OUTPATIENT)
Dept: RADIOLOGY | Facility: HOSPITAL | Age: 52
Discharge: HOME | End: 2022-03-23
Attending: FAMILY MEDICINE
Payer: COMMERCIAL

## 2022-03-23 ENCOUNTER — OFFICE VISIT (OUTPATIENT)
Dept: FAMILY MEDICINE CLINIC | Facility: CLINIC | Age: 52
End: 2022-03-23
Payer: COMMERCIAL

## 2022-03-23 VITALS
HEIGHT: 66 IN | BODY MASS INDEX: 32.21 KG/M2 | HEART RATE: 63 BPM | TEMPERATURE: 97.6 F | WEIGHT: 200.4 LBS | OXYGEN SATURATION: 99 % | SYSTOLIC BLOOD PRESSURE: 120 MMHG | DIASTOLIC BLOOD PRESSURE: 86 MMHG | RESPIRATION RATE: 14 BRPM

## 2022-03-23 DIAGNOSIS — J30.2 SEASONAL ALLERGIES: ICD-10-CM

## 2022-03-23 DIAGNOSIS — R07.81 RIB PAIN ON LEFT SIDE: Primary | ICD-10-CM

## 2022-03-23 DIAGNOSIS — R07.81 RIB PAIN ON LEFT SIDE: ICD-10-CM

## 2022-03-23 PROCEDURE — 99213 OFFICE O/P EST LOW 20 MIN: CPT | Performed by: FAMILY MEDICINE

## 2022-03-23 PROCEDURE — 71101 X-RAY EXAM UNILAT RIBS/CHEST: CPT

## 2022-03-23 RX ORDER — DIAZEPAM 10 MG/1
TABLET ORAL
COMMUNITY
Start: 2022-03-18 | End: 2022-03-25

## 2022-03-23 RX ORDER — MONTELUKAST SODIUM 10 MG/1
10 TABLET ORAL
Qty: 90 TABLET | Refills: 2 | Status: SHIPPED | OUTPATIENT
Start: 2022-03-23

## 2022-03-23 NOTE — ASSESSMENT & PLAN NOTE
Tenderness along the left rib cage with hypertonicity  Will get x ray to rule out fracture  History of thoracic surgery 15 years ago for thoracic outlet syndrome   Muscle relaxer ordered  Take robaxin 500 mg TID prn  Work note provided  Avoid NSAIDs given history of bariatric  Has some hydrocodone at home  May take one   Also recommend warm compress to the chest

## 2022-03-23 NOTE — LETTER
March 23, 2022     Patient: Светлана Ramires   YOB: 1970   Date of Visit: 3/23/2022       To Whom it May Concern:    Moreno Noble is under my professional care  She was seen in my office on 3/23/2022 with rib pain  Tanya ordered an xray and asked that she rest for a few days  She may return to work on 3/26/22  If you have any questions or concerns, please don't hesitate to call           Sincerely,          Divina Cohen MD        CC: No Recipients

## 2022-03-23 NOTE — PROGRESS NOTES
Assessment/Plan:       Problem List Items Addressed This Visit        Other    Rib pain on left side - Primary     Tenderness along the left rib cage with hypertonicity  Will get x ray to rule out fracture  History of thoracic surgery 15 years ago for thoracic outlet syndrome   Muscle relaxer ordered  Take robaxin 500 mg TID prn  Work note provided  Avoid NSAIDs given history of bariatric  Has some hydrocodone at home  May take one  Also recommend warm compress to the chest          Relevant Orders    XR ribs left w pa chest min 3 views    Seasonal allergies     Refill requested and sent to pharmacy          Relevant Medications    montelukast (SINGULAIR) 10 mg tablet            Subjective:      Patient ID: Lex Oliver is a 45 y o  female here with pain on the left side of her chest  Started on Sunday after her  tried helping her off a table  States she was trying to get down and her  wrapped his arms around her and lifting her up  She immediately hard a pop and felt intense pain  Pain is gradually getting worse  Worked a 12 hour shift today and had to take several doses of advil  Pain worse when she coughs, moves, bends, or lifts things  She denies shortness of breath but states she cannot take a deep breath in  History of thoracic surgery for thoracic outlet syndrome and states she had several ribs removed       The following portions of the patient's history were reviewed and updated as appropriate:   She  has a past medical history of ADHD (attention deficit hyperactivity disorder), Bulging lumbar disc, Carpal tunnel syndrome, Chronic back pain, Chronic pain disorder, Colon polyps, COVID-19, Diabetes mellitus ([address] ), GERD (gastroesophageal reflux disease), Gestational diabetes, Hearing loss, Hyperlipidemia, Hyponatremia (12/12/2020), IBS (irritable bowel syndrome), Ileus ([address] ), Labial cyst, Myofascial pain, Obesity, Ovarian cyst, Panic attack, Pneumonia, Seasonal allergies, SVT (supraventricular tachycardia) ([facility] ), Thoracic outlet syndrome, Trochanteric bursitis of both hips, Ulnar neuropathy at elbow, Varicella, and Wears glasses  She   Patient Active Problem List    Diagnosis Date Noted    Rib pain on left side 2022    Seasonal allergies 2022    Viral infection, unspecified 2021    ADHD (attention deficit hyperactivity disorder), combined type 2021    Prediabetes 2021    Paroxysmal SVT (supraventricular tachycardia) ([facility] ) 2020    Trochanteric bursitis, left hip     Other chronic pain     Chronic pain syndrome 2019    [facility]     Major depressive disorder, recurrent severe without psychotic features [facility] ) 05/10/2019    Generalized anxiety disorder 05/10/2019    Post traumatic stress disorder (PTSD) 2019    Intervertebral disc disorder with radiculopathy of lumbar region     Lumbar radiculopathy 2019    Postsurgical malabsorption 2018    BMI 28 0-28 9,adult 2018    S/P laparoscopic sleeve gastrectomy 02/15/2018    IBS (irritable bowel syndrome)     Mixed hyperlipidemia     GERD (gastroesophageal reflux disease)     Chronic back pain     Cervical radiculopathy 2016    Hepatic steatosis 2014    Pulmonary nodule seen on imaging study 2014     She  has a past surgical history that includes Cholecystectomy; Vein ligation and stripping (Right); Tonsillectomy and adenoidectomy; pr colonoscopy flx dx w/collj spec when pfrmd (N/A, 2017);  section; Geraldine tooth extraction; Tubal ligation; pr esophagogastroduodenoscopy transoral diagnostic (N/A, 2017); Endometrial ablation; Colonoscopy; pr hysteroscopy,w/endo bx (N/A, 10/20/2017); Dilation and curettage of uterus; Vulva surgery (10/20/2017); Bile duct exploration; ERCP w/ sphicterotomy; Hysteroscopy; First rib removal; pr lap, lilo restrict proc, longitudinal gastrectomy (N/A, 2018);  Spinal cord stimulator trial w/ laminotomy; pr surg implnt neuroelect,epidural (Left, 1/29/2020); Neuroplasty / transposition ulnar nerve at elbow (Right, 2011); and Upper gastrointestinal endoscopy  Her family history includes Alcohol abuse in her brother, brother, and father; Asthma in her son; BRCA1 Negative in her mother; BRCA2 Negative in her mother; Breast cancer in her mother; Colon cancer in her maternal grandfather; Depression in her brother; Diabetes in her brother, brother, father, and mother; Heart attack in her maternal grandmother; Heart disease in her father; Hyperlipidemia in her father and mother; Hypertension in her brother; Neuropathy in her father; No Known Problems in her daughter, paternal grandfather, and paternal grandmother; Other in her brother and father; Prostate cancer in her father  She  reports that she quit smoking about 8 years ago  She has never used smokeless tobacco  She reports current alcohol use  She reports that she does not use drugs    Current Outpatient Medications on File Prior to Visit   Medication Sig    atoMOXetine (STRATTERA) 60 mg capsule Take 1 capsule (60 mg total) by mouth daily    atorvastatin (LIPITOR) 10 mg tablet Take 1 tablet (10 mg total) by mouth daily    Calcium Carbonate-Vit D-Min (CALCIUM 1200 PO) Take 2,400 mg by mouth daily    cholestyramine (QUESTRAN) 4 g packet Take 1 packet (4 g total) by mouth daily    drospirenone-ethinyl estradiol (LIEN) 3-0 02 MG per tablet Take 1 tablet by mouth daily    famotidine (PEPCID) 20 mg tablet TAKE ONE TABLET BY MOUTH 2 TIMES A DAY    ferrous sulfate 325 (65 Fe) mg tablet Take 325 mg by mouth daily with breakfast    HYDROcodone-acetaminophen (NORCO) 5-325 mg per tablet Take 1 tab PO daily as needed    lamoTRIgine (LaMICtal) 100 mg tablet Take 1 tablet (100 mg total) by mouth daily    lamoTRIgine (LaMICtal) 150 MG tablet Take 1 tablet (150 mg total) by mouth daily    methocarbamol (ROBAXIN) 750 mg tablet Take 1 tablet (750 mg total) by mouth daily at bedtime as needed for muscle spasms    Multiple Vitamins-Minerals (MULTI COMPLETE PO) Take by mouth    pantoprazole (PROTONIX) 40 mg tablet Take 1 tablet (40 mg total) by mouth 2 (two) times a day    propranolol (INDERAL LA) 60 mg 24 hr capsule Take 1 capsule (60 mg total) by mouth 2 (two) times a day    QUEtiapine (SEROquel) 50 mg tablet Take 1 5 tablets (75 mg total) by mouth daily at bedtime    venlafaxine (EFFEXOR-XR) 150 mg 24 hr capsule Take 1 capsule (150 mg total) by mouth daily    venlafaxine (EFFEXOR-XR) 37 5 mg 24 hr capsule Take 1 capsule (37 5 mg total) by mouth daily    [DISCONTINUED] montelukast (SINGULAIR) 10 mg tablet TAKE ONE TABLET BY MOUTH AT BEDTIME DAILY    diazepam (VALIUM) 10 mg tablet  (Patient not taking: Reported on 3/23/2022 )    famotidine (PEPCID) 20 mg tablet Take 1 tablet (20 mg total) by mouth 2 (two) times a day (Patient not taking: Reported on 3/23/2022 )    phenazopyridine (PYRIDIUM) 200 mg tablet Take 1 tablet (200 mg total) by mouth 3 (three) times a day with meals (Patient not taking: Reported on 3/23/2022 )     No current facility-administered medications on file prior to visit  She is allergic to ibuprofen       Review of Systems   Cardiovascular: Positive for chest pain  Objective:      /86 (BP Location: Left arm, Patient Position: Sitting, Cuff Size: Large)   Pulse 63   Temp 97 6 °F (36 4 °C) (Tympanic)   Resp 14   Ht 5' 6" (1 676 m)   Wt 90 9 kg (200 lb 6 4 oz)   LMP 01/07/2019 (Approximate)   SpO2 99%   BMI 32 35 kg/m²          Physical Exam  Vitals reviewed  Constitutional:       General: She is in acute distress (pain )  HENT:      Head: Normocephalic and atraumatic  Pulmonary:      Effort: Pulmonary effort is normal           Comments: diminished but is unable to take a deep breath due to pain   Chest:      Chest wall: Tenderness present  No mass or crepitus  Neurological:      Mental Status: She is alert

## 2022-03-24 ENCOUNTER — TELEPHONE (OUTPATIENT)
Dept: PAIN MEDICINE | Facility: CLINIC | Age: 52
End: 2022-03-24

## 2022-03-24 ENCOUNTER — TELEPHONE (OUTPATIENT)
Dept: FAMILY MEDICINE CLINIC | Facility: CLINIC | Age: 52
End: 2022-03-24

## 2022-03-24 NOTE — TELEPHONE ENCOUNTER
Patient states that she was unable to get her whole prescription filled of 30 pills  Patient was able to get 7 pills  Prior Authorization is needed for the remainder  Patient comes to us for medication management  Recent urine and contract done  Patient is a [employer] employee so she has   [facility]

## 2022-03-24 NOTE — TELEPHONE ENCOUNTER
Duplicate task   Auth already approved, new script needs to be sent to [facility]

## 2022-03-24 NOTE — TELEPHONE ENCOUNTER
She  has to call her pain management for this   She cannot get pain meds from different providers  Dr Glenn Martinez

## 2022-03-24 NOTE — TELEPHONE ENCOUNTER
Please let me know when prior auth complete/approved and I will send the remainder of the script then

## 2022-03-24 NOTE — PSYCH
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 history of panic symptomatology but denies current symptoms  She does not engage in maladaptive behaviors to avoid panic  Christina vehemently denies any acute or chronic history suggestive of an underlying affective (bipolar) organization  Christina denies historical symptomatology suggestive of an underlying psychotic process  Christina denies historical symptomatology suggestive ETOH or substance  She does report a longstanding history of ADHD  She states that she was extremely hyperactive as a child and inattentive  She has been trialled on numerous psychotropic agents throughout her life  Acutely, she continues to endorse difficulty with focus, organizational skills, planning, and attentiveness  She is tolerating Atomoxetine well but would likely benefit from further optimization       Today's Plan:    Psychopharmacologically, Yessy reports psychiatric mood stability and appropriate control of depression, anxiety, ADHD symptomatology, and PTSD   As such, no acute intervention is warranted             DSM-V Diagnoses:      1 ) PTSD  2 ) Major Depressive Disorder, recurrent  3 ) Generalized Anxiety Disorder  4 ) ADHD, combined type        Treatment Recommendations/Precautions:     1 ) PTSD  - Continue Effexor 262 5mg daily (225mg + 37 5mg)              - Voiced understanding this is above FDA limit   - Continue Lamictal 250mg Daily  - Continue Seroquel 75mg QHS  - Continue engagement in psychotherapy with Alexia S   - Psychoeducation provided regarding the importance of exercise and healthy dietary choices and their impact on mood, energy, and motivation  - Counseled to avoid ETOH, illict substances, and nicotine secondary to the detrimental effects of these substances on mental and physical health  - Encouraged to engage in non-verbal forms of therapy such as art therapy, music therapy, and mindfulness        2 ) Major Depressive Disorder, recurrent  - Continue Effexor 262 5mg daily (225mg + 37 5mg)              - Voiced understanding this is above FDA limit   - Continue Lamictal 250mg Daily  - Continue Seroquel 75mg QHS  - Continue engagement in psychotherapy with Alexia S   - Psychoeducation provided regarding the importance of exercise and healthy dietary choices and their impact on mood, energy, and motivation  - Counseled to avoid ETOH, illict substances, and nicotine secondary to the detrimental effects of these substances on mental and physical health  - Encouraged to engage in non-verbal forms of therapy such as art therapy, music therapy, and mindfulness     3 ) Generalized Anxiety Disorder  - Continue Effexor 262 5mg daily (225mg + 37 5mg)              - Voiced understanding this is above FDA limit   - Continue Lamictal 250mg Daily  - Continue Seroquel 75mg QHS  - Continue engagement in psychotherapy with Alexia S   - Psychoeducation provided regarding the importance of exercise and healthy dietary choices and their impact on mood, energy, and motivation  - Counseled to avoid ETOH, illict substances, and nicotine secondary to the detrimental effects of these substances on mental and physical health  - Encouraged to engage in non-verbal forms of therapy such as art therapy, music therapy, and mindfulness     4 ) ADHD, combined type  - Continue Atomoxetine 60mg Daily          Medication management every 3 months  Continue psychotherapy with [facility] therapist [address] of need to follow up with family physician for medical issues  Aware of 24 hour and weekend coverage for urgent situations accessed by calling [facility] main practice number    Medications Risks/Benefits      Risks, Benefits And Possible Side Effects Of Medications:    Risks, benefits, and possible side effects of medications explained to BASIAjosselineligiajaclyn BASIA including risk of rash related to treatment with Lamictal, risk of parkinsonian symptoms, Tardive Dyskinesia and metabolic syndrome related to treatment with antipsychotic medications, risk of cardiovascular events in elderly related to treatment with antipsychotic medications and risk of suicidality and serotonin syndrome related to treatment with antidepressants  She verbalizes understanding and agreement for treatment  Controlled Medication Discussion:     No recent records found for controlled prescriptions according to [place] Prescription Drug Monitoring Program    Psychotherapy Provided:     Individual psychotherapy provided: No     Treatment Plan:    Completed and signed during the session: Not applicable - Treatment Plan to be completed by [facility] [address] therapist    Note Share Disclaimer:      This note was not shared with the patient due to reasonable likelihood of causing patient harm    Linda Vargas MD 03/25/22

## 2022-03-24 NOTE — TELEPHONE ENCOUNTER
Prior Alkiersten Bosworth was already approved and scanned into MEDIA  Approved through Sept    [facility] received a 7 day supply her prescription was voided   She needs a new script sent over  Thank you!

## 2022-03-24 NOTE — TELEPHONE ENCOUNTER
Anita Shipman, please advise  I see your note says medication is to be filled by pain management only  PDMP below  [place]        03/17/2022 03/17/2022 HYDROcodone BITARTRATE-ACETAMINOPHE (Tablet)  7 0 7 325 MG-5 MG 5 0 Veena Almanza

## 2022-03-24 NOTE — TELEPHONE ENCOUNTER
Please see below  Can you contact this patient  I am not sure why she is calling other offices for opioids?   We saw her on 3/17 and sent a 1 month script, which was filled

## 2022-03-24 NOTE — TELEPHONE ENCOUNTER
Pt requests a refill or a different pain medication       *Pt was seen by Dr Meme Valdez yesterday for rib pain    Medication: HYDROcodone-acetaminophen ([address])  Dosage:5-325 mg per tablet   How Often: Take 1 tab PO daily as needed  Quantity:  30  Last Office Visit: 3/23/2022  Next Office Visit: not scheduled (prn)  Last refilled: 3/17/2022 (Pt stated that she only received 7)  How many pills left: 3  Pharmacy:     [facility]/pharmacy [address] [place] [address]  Phone: [phone] Fax: [phone] prolene

## 2022-03-24 NOTE — TELEPHONE ENCOUNTER
S/w pt, states she only received a week supply of Norco and was advised by the pharmacy that prior auth is required and new script is needed for remainder of medication  Pt states she has back pain as well as significant rib pain (pt recently saw PCP and had imaging done)  Pt states she is taking Norco primarily for the rib pain at this point, but reports the pain does radiate through back as well  Royce Dela Cruz - please advise regarding script for remaining balance, states she has 3 pills left from the 7 day supply that she had received  Triage - please being prior auth and contact pt with any updates  Thank you  Pt was advised for future to contact [facility] as soon as she learns prior Abioduna is needed so that process can be started as soon as possible, pt understood

## 2022-03-25 ENCOUNTER — TELEMEDICINE (OUTPATIENT)
Dept: PSYCHIATRY | Facility: CLINIC | Age: 52
End: 2022-03-25
Payer: COMMERCIAL

## 2022-03-25 ENCOUNTER — TELEPHONE (OUTPATIENT)
Dept: FAMILY MEDICINE CLINIC | Facility: CLINIC | Age: 52
End: 2022-03-25

## 2022-03-25 ENCOUNTER — PROCEDURE VISIT (OUTPATIENT)
Dept: PAIN MEDICINE | Facility: CLINIC | Age: 52
End: 2022-03-25
Payer: COMMERCIAL

## 2022-03-25 ENCOUNTER — TELEMEDICINE (OUTPATIENT)
Dept: BEHAVIORAL/MENTAL HEALTH CLINIC | Facility: CLINIC | Age: 52
End: 2022-03-25
Payer: COMMERCIAL

## 2022-03-25 VITALS
DIASTOLIC BLOOD PRESSURE: 86 MMHG | BODY MASS INDEX: 32.28 KG/M2 | HEART RATE: 69 BPM | SYSTOLIC BLOOD PRESSURE: 131 MMHG | WEIGHT: 200 LBS

## 2022-03-25 DIAGNOSIS — F33.2 MAJOR DEPRESSIVE DISORDER, RECURRENT SEVERE WITHOUT PSYCHOTIC FEATURES (HCC): Chronic | ICD-10-CM

## 2022-03-25 DIAGNOSIS — F90.0 ADHD (ATTENTION DEFICIT HYPERACTIVITY DISORDER), INATTENTIVE TYPE: ICD-10-CM

## 2022-03-25 DIAGNOSIS — R30.0 DYSURIA: ICD-10-CM

## 2022-03-25 DIAGNOSIS — F43.10 POST TRAUMATIC STRESS DISORDER (PTSD): Chronic | ICD-10-CM

## 2022-03-25 DIAGNOSIS — F33.2 MDD (MAJOR DEPRESSIVE DISORDER), RECURRENT SEVERE, WITHOUT PSYCHOSIS (HCC): ICD-10-CM

## 2022-03-25 DIAGNOSIS — M79.18 MYOFASCIAL PAIN: ICD-10-CM

## 2022-03-25 DIAGNOSIS — F33.2 MAJOR DEPRESSIVE DISORDER, RECURRENT SEVERE WITHOUT PSYCHOTIC FEATURES (HCC): Primary | Chronic | ICD-10-CM

## 2022-03-25 DIAGNOSIS — M62.838 MUSCLE SPASM: Primary | ICD-10-CM

## 2022-03-25 DIAGNOSIS — F90.2 ADHD (ATTENTION DEFICIT HYPERACTIVITY DISORDER), COMBINED TYPE: ICD-10-CM

## 2022-03-25 DIAGNOSIS — F41.1 GENERALIZED ANXIETY DISORDER: Chronic | ICD-10-CM

## 2022-03-25 DIAGNOSIS — F41.1 GENERALIZED ANXIETY DISORDER: Primary | Chronic | ICD-10-CM

## 2022-03-25 DIAGNOSIS — F33.2 SEVERE EPISODE OF RECURRENT MAJOR DEPRESSIVE DISORDER, WITHOUT PSYCHOTIC FEATURES (HCC): ICD-10-CM

## 2022-03-25 PROCEDURE — 20553 NJX 1/MLT TRIGGER POINTS 3/>: CPT

## 2022-03-25 PROCEDURE — 90834 PSYTX W PT 45 MINUTES: CPT | Performed by: SOCIAL WORKER

## 2022-03-25 PROCEDURE — 99214 OFFICE O/P EST MOD 30 MIN: CPT | Performed by: STUDENT IN AN ORGANIZED HEALTH CARE EDUCATION/TRAINING PROGRAM

## 2022-03-25 RX ORDER — ATOMOXETINE 60 MG/1
60 CAPSULE ORAL DAILY
Qty: 90 CAPSULE | Refills: 1 | Status: SHIPPED | OUTPATIENT
Start: 2022-03-25 | End: 2022-06-16 | Stop reason: SDUPTHER

## 2022-03-25 RX ORDER — VENLAFAXINE HYDROCHLORIDE 150 MG/1
150 CAPSULE, EXTENDED RELEASE ORAL DAILY
Qty: 90 CAPSULE | Refills: 1 | Status: SHIPPED | OUTPATIENT
Start: 2022-03-25 | End: 2022-06-07 | Stop reason: SDUPTHER

## 2022-03-25 RX ORDER — VENLAFAXINE HYDROCHLORIDE 37.5 MG/1
37.5 CAPSULE, EXTENDED RELEASE ORAL DAILY
Qty: 90 CAPSULE | Refills: 1 | Status: SHIPPED | OUTPATIENT
Start: 2022-03-25 | End: 2022-06-07

## 2022-03-25 RX ORDER — QUETIAPINE FUMARATE 50 MG/1
75 TABLET, FILM COATED ORAL
Qty: 135 TABLET | Refills: 1 | Status: SHIPPED | OUTPATIENT
Start: 2022-03-25 | End: 2022-06-16 | Stop reason: SDUPTHER

## 2022-03-25 RX ORDER — LAMOTRIGINE 150 MG/1
150 TABLET ORAL DAILY
Qty: 90 TABLET | Refills: 1 | Status: SHIPPED | OUTPATIENT
Start: 2022-03-25 | End: 2022-06-16 | Stop reason: SDUPTHER

## 2022-03-25 RX ORDER — LAMOTRIGINE 100 MG/1
100 TABLET ORAL DAILY
Qty: 90 TABLET | Refills: 1 | Status: SHIPPED | OUTPATIENT
Start: 2022-03-25 | End: 2022-06-16 | Stop reason: SDUPTHER

## 2022-03-25 RX ORDER — BUPIVACAINE HYDROCHLORIDE 2.5 MG/ML
10 INJECTION, SOLUTION EPIDURAL; INFILTRATION; INTRACAUDAL ONCE
Status: COMPLETED | OUTPATIENT
Start: 2022-03-25 | End: 2022-03-25

## 2022-03-25 RX ADMIN — BUPIVACAINE HYDROCHLORIDE 10 ML: 2.5 INJECTION, SOLUTION EPIDURAL; INFILTRATION; INTRACAUDAL at 08:14

## 2022-03-25 NOTE — TELEPHONE ENCOUNTER
Patient called and said that she still has trouble lifting her arms and wanted to know if she can have a work note for sat and Sunday   Please advise

## 2022-03-25 NOTE — PROGRESS NOTES
Pre-procedure Diagnosis:   Myofascial pain  Post-procedure Diagnosis:   Myofascial pain  Operation Title(s):  Trigger point injections into right lumbar paraspinal x3  Attending Surgeon:   Loni Kerr MD  Anesthesia:   Local    Indications: The patient is a 45y o  year-old female with a diagnosis of myofascial pain  The patient's history and physical exam were reviewed  The risks, benefits and alternatives to the procedure were discussed, and all questions were answered to the patient's satisfaction  The patient agreed to proceed, and written informed consent was obtained  Procedure in Detail: The patient was brought into the exam room and placed in the sitting position on the exam room chair with the head flexed on a pillow  Trigger points were identified in the:  Right lumbar paraspinal x3    Areas were cleansed with alcohol  Then, using a dry needling technique, each trigger point was injected with a 1 5 inch 25 gauge needle and 1 mL 0 25% bupivacaine     Disposition: The patient tolerated the procedure well and there were no apparent complications  The patient was given written discharge instructions for the procedure

## 2022-03-25 NOTE — PSYCH
Virtual Regular Visit    Verification of patient location:    Patient is located in the following state in which I hold an active license [place]    Assessment/Plan:    Problem List Items Addressed This Visit        Other    Post traumatic stress disorder (PTSD) (Chronic)    Major depressive disorder, recurrent severe without psychotic features ([facility] [address] ) - Primary (Chronic)    Generalized anxiety disorder (Chronic)        Goals addressed in session: Goal 1      Reason for visit is No chief complaint on file  Encounter provider LALY Arreola    Provider located at [address] [place] [id number] [phone]    Recent Visits  No visits were found meeting these conditions  Showing recent visits within past 7 days and meeting all other requirements  Future Appointments  No visits were found meeting these conditions  Showing future appointments within next 150 days and meeting all other requirements     The patient was identified by name and date of birth  Yas Hooper was informed that this is a telemedicine visit and that the visit is being conducted throughEpic Embedded and patient was informed this is a secure, HIPAA-complaint platform  She agrees to proceed     My office door was closed  No one else was in the room  She acknowledged consent and understanding of privacy and security of the video platform  The patient has agreed to participate and understands they can discontinue the visit at any time  Patient is aware this is a billable service  Subjective  Yas Hooper is a 45 y o  female  DATA: Met with Yessy for scheduled individual session  Topics of discussion included relationships with family, work-related stress, education-related stress and physical health concerns  "I got hurt and I had to take the weekend off " Brooke Hurtado states that she was hurt and is in a lot of pain   She states that she is unable to go into work  Noemi Roldan discussed some of the conflicts she has had at work-- e g , with one of her supervisors  She states that she recently had a verbal conflict with a coworker regarding someone who identified as transgender  This clinician attempted to provide some psychoeducation and discussed a previous incident when Noemi Roldan was reprimanded at work for making a comment that someone considered to have racist undertones  Noemi Roldan states that it is not her intention to hurt anyone's feelings  Noemi Roldan states that she is getting along well with her  and feels that he is supportive of her  She discussed her continued efforts to set limits with her ex-, and is continuing to limit their discussions to parenting concerns  Client shows evidence of utilizing emotion regulation skills skills to manage mental health symptoms  During this session, this clinician used the following therapeutic modalities: supportive psychotherapy, client-centered therapy, mindfulness-based strategies, DBT-informed skills, Motivational Interviewing and solution-focused therapy  ASSESSMENT: Noemi Roldan presents with a somewhat dysthymic mood (primarily related to her physical concerns)  Her affect is normal range and intensity, appropriate  Noemi Roldan exhibits good therapeutic rapport with this clinician  Noemi Roldan continues to exhibit willingness to work on treatment goals and objectives  Noemi Roldan presents with a minimal risk of suicide, minimal risk of self-harm, and minimal risk of harm to others  PLAN: Noemi Roldan will return in one month for the next scheduled session  Between sessions, Noemi Roldan will continue to work on setting/maintaining limits and will report back during the next session re: successes and barriers   At the next session, this clinician will use supportive psychotherapy, client-centered therapy, mindfulness-based strategies, DBT-informed skills, Motivational Interviewing and solution-focused therapy to address her mood regulation and relationship concerns, in an effort to assist Michael Hahn with meeting treatment goals  HPI     Past Medical History:   Diagnosis Date    ADHD (attention deficit hyperactivity disorder)     Bulging lumbar disc     Carpal tunnel syndrome     RIGHT  LAST ASSESSED: 16    Chronic back pain     low    Chronic pain disorder     Colon polyps     COVID-19     2021    Diabetes mellitus ([facility] [address] )     Resolved post weight loss    GERD (gastroesophageal reflux disease)     Gestational diabetes     Hearing loss     left ear    Hyperlipidemia     Resolved with weight loss    Hyponatremia 2020    IBS (irritable bowel syndrome)     Ileus ([facility] [address] )     LAST ASSESSED: 8/3/17    Labial cyst     LAST ASSESSED: 16    Myofascial pain     LAST ASSESSED: 16    Obesity     Ovarian cyst     LEFT  LAST ASSESSED: 16    Panic attack     Pneumonia     Seasonal allergies     SVT (supraventricular tachycardia) (HCC)     Thoracic outlet syndrome         Trochanteric bursitis of both hips     LAST ASSESSED: 3/18/16    Ulnar neuropathy at elbow     Varicella     Wears glasses        Past Surgical History:   Procedure Laterality Date    BILE DUCT EXPLORATION      ENDOSCOPIC REMOVAL OF STONES FROM BILIARY TRACT     SECTION      x3    CHOLECYSTECTOMY      COLONOSCOPY      -polyp, repeat     DILATION AND CURETTAGE OF UTERUS      ENDOMETRIAL ABLATION      ERCP W/ SPHICTEROTOMY      FIRST RIB REMOVAL      THORAX EXCISION OF FIRST RIB    HYSTEROSCOPY      NEUROPLASTY / TRANSPOSITION ULNAR NERVE AT ELBOW Right     OR COLONOSCOPY FLX DX W/COLLJ SPEC WHEN PFRMD N/A 2017    Procedure: COLONOSCOPY;  Surgeon: Florencia Huffman MD;  [facility]; Service: Gastroenterology    OR ESOPHAGOGASTRODUODENOSCOPY TRANSORAL DIAGNOSTIC N/A 2017    Procedure: ESOPHAGOGASTRODUODENOSCOPY (EGD); Surgeon: Walker Rutherford MD;  [facility]: [facility];   Service: Gastroenterology  SD HYSTEROSCOPY,W/ENDO BX N/A 10/20/2017    Procedure: DILATATION AND CURETTAGE (D&C) WITH HYSTEROSCOPY  REMOVAL VULVAR RT  LESION;  Surgeon: Radha Peterson MD;  [facility];  Service: Gynecology    SD LAP, ÁLVARO RESTRICT PROC, LONGITUDINAL GASTRECTOMY N/A 2/6/2018    Procedure: GASTRECTOMY SLEEVE LAPAROSCOPIC; INTRAOPERATIVE EGD ;  Surgeon: Marlene Post MD;  [facility];  Service: Robyne Lunger SURG IMPLNT [address] Left 1/29/2020    Procedure: Insertion of thoracic spinal cord stimulator electrode via laminotomy and placement of left buttock implantable pulse generator (NEUROMONITORING);   Surgeon: Cathy Cedeño MD;  [facility];  Service: Neurosurgery    SPINAL CORD STIMULATOR TRIAL W/ LAMINOTOMY      TONSILLECTOMY AND ADENOIDECTOMY      TUBAL LIGATION      UPPER GASTROINTESTINAL ENDOSCOPY      VEIN LIGATION AND STRIPPING Right     VULVA SURGERY  10/20/2017    BIOPSY    WISDOM TOOTH EXTRACTION         Current Outpatient Medications   Medication Sig Dispense Refill    atoMOXetine (STRATTERA) 60 mg capsule Take 1 capsule (60 mg total) by mouth daily 90 capsule 1    atorvastatin (LIPITOR) 10 mg tablet Take 1 tablet (10 mg total) by mouth daily 90 tablet 3    Calcium Carbonate-Vit D-Min (CALCIUM 1200 PO) Take 2,400 mg by mouth daily      cholestyramine (QUESTRAN) 4 g packet Take 1 packet (4 g total) by mouth daily 30 each 3    drospirenone-ethinyl estradiol (LIEN) 3-0 02 MG per tablet Take 1 tablet by mouth daily 84 tablet 4    famotidine (PEPCID) 20 mg tablet TAKE ONE TABLET BY MOUTH 2 TIMES A DAY 60 tablet 0    famotidine (PEPCID) 20 mg tablet Take 1 tablet (20 mg total) by mouth 2 (two) times a day (Patient not taking: Reported on 3/23/2022 ) 180 tablet 3    ferrous sulfate 325 (65 Fe) mg tablet Take 325 mg by mouth daily with breakfast      HYDROcodone-acetaminophen (NORCO) 5-325 mg per tablet Take 1 tab PO daily as needed 23 tablet 0    lamoTRIgine (LaMICtal) 100 mg tablet Take 1 tablet (100 mg total) by mouth daily 90 tablet 1    lamoTRIgine (LaMICtal) 150 MG tablet Take 1 tablet (150 mg total) by mouth daily 90 tablet 1    methocarbamol (ROBAXIN) 750 mg tablet Take 1 tablet (750 mg total) by mouth daily at bedtime as needed for muscle spasms 30 tablet 2    montelukast (SINGULAIR) 10 mg tablet Take 1 tablet (10 mg total) by mouth daily at bedtime 90 tablet 2    Multiple Vitamins-Minerals (MULTI COMPLETE PO) Take by mouth      pantoprazole (PROTONIX) 40 mg tablet Take 1 tablet (40 mg total) by mouth 2 (two) times a day 90 tablet 3    phenazopyridine (PYRIDIUM) 200 mg tablet Take 1 tablet (200 mg total) by mouth 3 (three) times a day with meals (Patient not taking: Reported on 3/23/2022 ) 10 tablet 0    propranolol (INDERAL LA) 60 mg 24 hr capsule Take 1 capsule (60 mg total) by mouth 2 (two) times a day 180 capsule 2    QUEtiapine (SEROquel) 50 mg tablet Take 1 5 tablets (75 mg total) by mouth daily at bedtime 135 tablet 1    venlafaxine (EFFEXOR-XR) 150 mg 24 hr capsule Take 1 capsule (150 mg total) by mouth daily 90 capsule 1    venlafaxine (EFFEXOR-XR) 37 5 mg 24 hr capsule Take 1 capsule (37 5 mg total) by mouth daily 90 capsule 1     No current facility-administered medications for this visit  Allergies   Allergen Reactions    Ibuprofen Other (See Comments)     Due to gastric sleeve -can only take for 5 days, then needs to stop       Review of Systems    Video Exam    There were no vitals filed for this visit  Physical Exam     I spent 45 minutes directly with the patient during this visit    [address] verbally agrees to participate in [employer]   Pt is aware that [employer] could be limited without vital signs or the ability to perform a full hands-on physical Simran Adjutant understands she or the provider may request at any time to terminate the video visit and request the patient to seek care or treatment in person

## 2022-03-28 ENCOUNTER — TELEPHONE (OUTPATIENT)
Dept: PSYCHIATRY | Facility: CLINIC | Age: 52
End: 2022-03-28

## 2022-03-28 NOTE — PROGRESS NOTES
Bariatric Nutrition Assessment Note    Type of surgery    Vertical sleeve gastrectomy  Surgery Date: 2/06/2018  4 years post-op  Surgeon: Dr Jeannie Larson  45 y o   female   Wt 91 4 kg (201 lb 8 oz)   LMP 01/07/2019 (Approximate)   BMI 32 52 kg/m²     Joni Duarte Equation:     Weight maintenance= 1851kcal/day  Estimated calories for weight loss 1351kcal/day (1# per wk wt loss - sedentary )  Estimated protein needs 70 7-84 9g/day (1 0-1 2 gms/kg IBW )   Estimated fluid needs 2121-2475ml/day (30-35 ml/kg IBW )      Weight History   Maximum Wt Lost:  10/31/2017 initial office dllexh=045 5lbs  Wt with BMI of 25: 155 6lbs  Pre-Op Excess Wt: 63 9lbs  Weight on day of dwrldvo=577 3lbs  8/1/2019 Post-op asmlo=121ghu  32 5lb wt regain  Post-op weight loss= 18lb/28% EWL x 4 years    Review of History and Medications   Past Medical History:   Diagnosis Date    ADHD (attention deficit hyperactivity disorder)     Bulging lumbar disc     Carpal tunnel syndrome     RIGHT  LAST ASSESSED: 12/7/16    Chronic back pain     low    Chronic pain disorder     Colon polyps     COVID-19     12/2021    Diabetes mellitus ([address] )     Resolved post weight loss    GERD (gastroesophageal reflux disease)     Gestational diabetes     Hearing loss     left ear    Hyperlipidemia     Resolved with weight loss    Hyponatremia 12/12/2020    IBS (irritable bowel syndrome)     Ileus ([address] )     LAST ASSESSED: 8/3/17    Labial cyst     LAST ASSESSED: 4/21/16    Myofascial pain     LAST ASSESSED: 4/12/16    Obesity     Ovarian cyst     LEFT   LAST ASSESSED: 9/2/16    Panic attack     Pneumonia     Seasonal allergies     SVT (supraventricular tachycardia) (HCC)     Thoracic outlet syndrome     2010    Trochanteric bursitis of both hips     LAST ASSESSED: 3/18/16    Ulnar neuropathy at elbow     Varicella     Wears glasses      Past Surgical History:   Procedure Laterality Date    BILE DUCT EXPLORATION      ENDOSCOPIC REMOVAL OF STONES FROM BILIARY TRACT     SECTION      x3    CHOLECYSTECTOMY      COLONOSCOPY      2017-polyp, repeat     DILATION AND CURETTAGE OF UTERUS      ENDOMETRIAL ABLATION      ERCP W/ SPHICTEROTOMY      FIRST RIB REMOVAL      THORAX EXCISION OF FIRST RIB    HYSTEROSCOPY      NEUROPLASTY / TRANSPOSITION ULNAR NERVE AT ELBOW Right     TN COLONOSCOPY FLX DX W/COLLJ SPEC WHEN PFRMD N/A 2017    Procedure: COLONOSCOPY;  Surgeon: Dmitriy Alberto MD;  [facility]; Service: Gastroenterology    TN ESOPHAGOGASTRODUODENOSCOPY TRANSORAL DIAGNOSTIC N/A 2017    Procedure: ESOPHAGOGASTRODUODENOSCOPY (EGD); Surgeon: Dameon Katz MD;  [facility]; Service: Gastroenterology    TN HYSTEROSCOPY,W/ENDO BX N/A 10/20/2017    Procedure: DILATATION AND CURETTAGE (D&C) WITH HYSTEROSCOPY  REMOVAL VULVAR RT  LESION;  Surgeon: Rik Ponce MD;  [facility];  Service: Gynecology    TN LAP, ÁLVARO RESTRICT PROC, LONGITUDINAL GASTRECTOMY N/A 2018    Procedure: GASTRECTOMY SLEEVE LAPAROSCOPIC; INTRAOPERATIVE EGD ;  Surgeon: Cheko Anand MD;  [facility];  Service: Cookie Payor SURG IMPLNT [address] Left 2020    Procedure: Insertion of thoracic spinal cord stimulator electrode via laminotomy and placement of left buttock implantable pulse generator (NEUROMONITORING);   Surgeon: Dulce Marte MD;  [facility];  Service: Neurosurgery    SPINAL CORD STIMULATOR TRIAL W/ LAMINOTOMY      TONSILLECTOMY AND ADENOIDECTOMY      TUBAL LIGATION      UPPER GASTROINTESTINAL ENDOSCOPY      VEIN LIGATION AND STRIPPING Right     VULVA SURGERY  10/20/2017    BIOPSY    WISDOM TOOTH EXTRACTION       Social History     Socioeconomic History    Marital status: /Civil Union     Spouse name: Emily Loco Number of children: 3    Years of education: GED then 2 year college program   [employer] Security level: Not on file   Occupational History    Occupation: ER TECH     Employer: [employer]   Tobacco Use    Smoking status: Former Smoker     Quit date: 2013     Years since quittin 9    Smokeless tobacco: Never Used   Vaping Use    Vaping Use: Never used   Substance and Sexual Activity    Alcohol use:  Yes     Alcohol/week: 0 0 - 2 0 standard drinks     Comment: Occs -twice monthly    Drug use: No    Sexual activity: Yes     Partners: Male     Birth control/protection: OCP, Female Sterilization     Comment: lifetime partners: 6; current partner    Other Topics Concern    Not on file   Social History Narrative    Pentecostal: no preference    Accepts blood products        Exercise: unable with back issues    Calcium: calcium supplement, multivitamin for women over 50, 1 yogurt daily     Social Determinants of Health     Financial Resource Strain: Not on file   Food Insecurity: Not on file   Transportation Needs: Not on file   Physical Activity: Not on file   Stress: Not on file   Social Connections: Not on file   Intimate Partner Violence: Not on file   Housing Stability: Not on file       Current Outpatient Medications:     atoMOXetine (STRATTERA) 60 mg capsule, Take 1 capsule (60 mg total) by mouth daily, Disp: 90 capsule, Rfl: 1    atorvastatin (LIPITOR) 10 mg tablet, Take 1 tablet (10 mg total) by mouth daily, Disp: 90 tablet, Rfl: 3    Calcium Carbonate-Vit D-Min (CALCIUM 1200 PO), Take 2,400 mg by mouth daily, Disp: , Rfl:     cholestyramine (QUESTRAN) 4 g packet, Take 1 packet (4 g total) by mouth daily, Disp: 30 each, Rfl: 3    drospirenone-ethinyl estradiol (LIEN) 3-0 02 MG per tablet, Take 1 tablet by mouth daily, Disp: 84 tablet, Rfl: 4    famotidine (PEPCID) 20 mg tablet, TAKE ONE TABLET BY MOUTH 2 TIMES A DAY, Disp: 60 tablet, Rfl: 0    famotidine (PEPCID) 20 mg tablet, Take 1 tablet (20 mg total) by mouth 2 (two) times a day (Patient not taking: Reported on 3/23/2022 ), Disp: 180 tablet, Rfl: 3    ferrous sulfate 325 (65 Fe) mg tablet, Take 325 mg by mouth daily with breakfast, Disp: , Rfl:     HYDROcodone-acetaminophen (NORCO) 5-325 mg per tablet, Take 1 tab PO daily as needed, Disp: 23 tablet, Rfl: 0    lamoTRIgine (LaMICtal) 100 mg tablet, Take 1 tablet (100 mg total) by mouth daily, Disp: 90 tablet, Rfl: 1    lamoTRIgine (LaMICtal) 150 MG tablet, Take 1 tablet (150 mg total) by mouth daily, Disp: 90 tablet, Rfl: 1    methocarbamol (ROBAXIN) 750 mg tablet, Take 1 tablet (750 mg total) by mouth daily at bedtime as needed for muscle spasms, Disp: 30 tablet, Rfl: 2    montelukast (SINGULAIR) 10 mg tablet, Take 1 tablet (10 mg total) by mouth daily at bedtime, Disp: 90 tablet, Rfl: 2    Multiple Vitamins-Minerals (MULTI COMPLETE PO), Take by mouth, Disp: , Rfl:     pantoprazole (PROTONIX) 40 mg tablet, Take 1 tablet (40 mg total) by mouth 2 (two) times a day, Disp: 90 tablet, Rfl: 3    phenazopyridine (PYRIDIUM) 200 mg tablet, Take 1 tablet (200 mg total) by mouth 3 (three) times a day with meals (Patient not taking: Reported on 3/23/2022 ), Disp: 10 tablet, Rfl: 0    propranolol (INDERAL LA) 60 mg 24 hr capsule, Take 1 capsule (60 mg total) by mouth 2 (two) times a day, Disp: 180 capsule, Rfl: 2    QUEtiapine (SEROquel) 50 mg tablet, Take 1 5 tablets (75 mg total) by mouth daily at bedtime, Disp: 135 tablet, Rfl: 1    venlafaxine (EFFEXOR-XR) 150 mg 24 hr capsule, Take 1 capsule (150 mg total) by mouth daily, Disp: 90 capsule, Rfl: 1    venlafaxine (EFFEXOR-XR) 37 5 mg 24 hr capsule, Take 1 capsule (37 5 mg total) by mouth daily, Disp: 90 capsule, Rfl: 1     Food Intake and Lifestyle Assessment   Food Intake Assessment completed via usual diet recall  Works at 3:00am to 3pm twice a week, 3am to 11am 3 days per week  Wakes for work at 2am:  Shake: "[employer] lean shake powderchocolate with almond milk, PB2 powder, and ice  6:30am:  eggwhite omelet with ham and cheese mozzarella  Sometimes snacks on Hard Boiled eggs or low-fat string cheese  Skips lunch:  Not hungry  Sometimes apple or grapes or cucumbers  IF eats lunch:  Deedee emma with no bun  yoplait yogurt  Skim milk string cheese x2  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   Skips dinner  Dinner: mother-in-law cooks: rice, chicken, cucumber OR taco meat on lettuce with cheese  Beverage intake: water, iced coffee in the am  Protein supplement: [employer] Shake power in the am  Estimated protein intake per day: 60g  Estimated fluid intake per day:  Has 32 oz water bottle, finishes at least 6-8 per day  Meals eaten away from home: goes out to eat twice a week, usually breakfast: gets egg whites or oatmeal   Goes out once a month to watch a band play and has two glass of wine  Typical meal pattern: 2-3 meals per day and 0-3 snacks per day  Eating Behaviors: Frequent snacking/ grazing  Does not eat fried foods or fatty foods  Pt has been making healthier food choices, adding in more lean proteins, fruits, and vegetables  Does not like spinach, tomato, pepper, onion, pickles, zucchini    Food allergies or intolerances:   C/o IBS-D  C/o lactose intolerance  C/o gets hungry 1-2 hours after eating    Allergies   Allergen Reactions    Ibuprofen Other (See Comments)     Due to gastric sleeve -can only take for 5 days, then needs to stop     Cultural or Mormonism considerations: none noted    Physical Assessment  Physical Activity  No structured physical activity  Has a fitness watch to track her steps: 25,000-30,000 steps per day during work shift  Types of exercise: takes dog for a walk once a day around the block 15 minutes:  Not in the cold weather  Current physical limitations: c/o fatigue, lack of time  Also has spinal stimulator and gets regular back injections  Pt reports she very much enjoys spin classes  Pt is thinking about joining [employer]      Psychosocial Assessment   Support systems: lives with , autistic son, step-daughter, and mother-in-law at home  Socioeconomic factors: 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, in school for nursing part-time    Nutrition Diagnosis-continued  Diagnosis: Overweight / Obesity (NC-3 3) and Altered GI function (NC-1 4)  Related to: Excessive energy intake and Altered GI function  As Evidenced by: BMI >25 and Unintentional weight gain     Nutrition Prescription: Recommend the following diet  Regular    Interventions and Teaching   Discussed pre-op and post-op nutrition guidelines  Patient educated and handouts provided  Capacity of post-surgery stomach  Adequate hydration  Exercise  Nutrition considerations after surgery  Meal planning and preparation  Appropriate carbohydrate, protein, and fat intake, and food/fluid choices to maximize safe weight loss, nutrient intake, and tolerance   Possible problems with poor eating habits  Techniques for self monitoring and keeping daily food journal  Potential for food intolerance after surgery, and ways to deal with them including: lactose intolerance, nausea, reflux, vomiting, diarrhea, food intolerance, appetite changes, gas  Vitamin / Mineral supplementation  Pt is currently taking:  OTC 50+ multivitamin once daily  600mg calcium BID  Biotin  Vitamin B12  Iron 65mg    Education provided to: patient  Barriers to learning: Pt reports she has dyslexia and reading is difficult  Showed pt how to access podcast audio manual today  Readiness to change: action and relapsing  Prior research on procedure: discussed with provider and previous wt  loss surgery  Comprehension: verbalizes understanding   Expected Compliance: good    Recommendations  Pt is an appropriate candidate for surgery  Yes  Ordered pre-operative bloodwork today  Annual vitamin/mineral levels checked in September 2021      Evaluation / Monitoring  Dietitian to Monitor: Eating pattern as discussed Tolerance of nutrition prescription Body weight Lab values Physical activity    Goals  Eliminate sugar sweetened beverages, Food journal, Exercise 30 minutes 5 times per week, Complete lession plans 1-6, Eat 3 meals per day and Eliminate mindless snacking    Time Spent:   60 minutes
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
## 2022-03-28 NOTE — TELEPHONE ENCOUNTER
Pt was unable to make an appt at the time of the phone call  She will call back  Last seen 3/25/22  Pt needs a 3 month f/u with Dr Shahbaz Frey  Previous appt was virtual  Please schedule

## 2022-03-30 ENCOUNTER — OFFICE VISIT (OUTPATIENT)
Dept: BARIATRICS | Facility: CLINIC | Age: 52
End: 2022-03-30

## 2022-03-30 VITALS — WEIGHT: 201.5 LBS | BODY MASS INDEX: 32.52 KG/M2

## 2022-03-30 DIAGNOSIS — R63.5 ABNORMAL WEIGHT GAIN: ICD-10-CM

## 2022-03-30 DIAGNOSIS — Z01.818 PREOPERATIVE CLEARANCE: ICD-10-CM

## 2022-03-30 DIAGNOSIS — R73.03 PREDIABETES: ICD-10-CM

## 2022-03-30 DIAGNOSIS — K91.2 POSTSURGICAL MALABSORPTION: Chronic | ICD-10-CM

## 2022-03-30 DIAGNOSIS — E66.9 OBESITY, CLASS I, BMI 30-34.9: Primary | ICD-10-CM

## 2022-03-30 DIAGNOSIS — E78.2 MIXED HYPERLIPIDEMIA: ICD-10-CM

## 2022-03-30 DIAGNOSIS — Z98.84 S/P LAPAROSCOPIC SLEEVE GASTRECTOMY: Chronic | ICD-10-CM

## 2022-03-30 DIAGNOSIS — Z01.812 BLOOD TESTS PRIOR TO TREATMENT OR PROCEDURE: ICD-10-CM

## 2022-03-30 PROCEDURE — RECHECK: Performed by: DIETITIAN, REGISTERED

## 2022-04-01 ENCOUNTER — HOSPITAL ENCOUNTER (OUTPATIENT)
Dept: RADIOLOGY | Facility: HOSPITAL | Age: 52
Discharge: HOME | End: 2022-04-01
Payer: COMMERCIAL

## 2022-04-01 DIAGNOSIS — Z98.84 BARIATRIC SURGERY STATUS: ICD-10-CM

## 2022-04-01 DIAGNOSIS — K21.9 GASTROESOPHAGEAL REFLUX DISEASE, UNSPECIFIED WHETHER ESOPHAGITIS PRESENT: ICD-10-CM

## 2022-04-01 DIAGNOSIS — E66.3 OVERWEIGHT: ICD-10-CM

## 2022-04-01 PROCEDURE — 74240 X-RAY XM UPR GI TRC 1CNTRST: CPT

## 2022-04-05 ENCOUNTER — TELEPHONE (OUTPATIENT)
Dept: PAIN MEDICINE | Facility: CLINIC | Age: 52
End: 2022-04-05

## 2022-04-05 DIAGNOSIS — G89.4 CHRONIC PAIN SYNDROME: ICD-10-CM

## 2022-04-05 NOTE — TELEPHONE ENCOUNTER
S/w pt  Pt states that she did not get relief from the last TPI on 3/25  Pt is frustrated and wonders what else can be done  Pt prescribed Percocet at last [facility] with NM but states that she has been taking the tramadol instead  Pt is not taking methocarbamol as it makes her sleepy for work  Pt advised to take her Percocet and methocarbamol as prescribed around her work schedule to try to control her pain  Tylenol does not help and Nsaids upset her stomach  Pt is scheduled for MRI on 4/14  Advised will notify FQ for further recommendations and CB  Pt requests leave message if no answer, she will be at work

## 2022-04-05 NOTE — TELEPHONE ENCOUNTER
Patient would like another injection  Patient last TPI was 3/25  Patient states that she doesn't think that Dr Dania Bucio TPI injection work anymore  Patient states that the right side of back is killing her  Patient is prescribed Tramadol and Methocarbamol  Patient states that she can't take medication when she is working  Patient states that she feel tired from these medications and she works in the [place] [id number]  Patient would like to know what to do

## 2022-04-06 RX ORDER — HYDROCODONE BITARTRATE AND ACETAMINOPHEN 5; 325 MG/1; MG/1
TABLET ORAL
Qty: 30 TABLET | Refills: 0 | Status: SHIPPED | OUTPATIENT
Start: 2022-04-06 | End: 2022-08-09

## 2022-04-06 NOTE — TELEPHONE ENCOUNTER
Agree with recs and will have to see what MRI shows      She should contact [employer] for reprogramming too

## 2022-04-06 NOTE — TELEPHONE ENCOUNTER
S/w pt and advised of same  Pt verbalized understanding and appreciation  Pt will see [facility] rep on 4/14 with MRI and will discuss then  Pt questioned f/u appt after MRI  Ok to schedule with [facility]?  Pt will need Percocet refill  Please advise

## 2022-04-14 ENCOUNTER — HOSPITAL ENCOUNTER (OUTPATIENT)
Dept: RADIOLOGY | Facility: HOSPITAL | Age: 52
Discharge: HOME | End: 2022-04-14
Payer: COMMERCIAL

## 2022-04-14 ENCOUNTER — OFFICE VISIT (OUTPATIENT)
Dept: BARIATRICS | Facility: CLINIC | Age: 52
End: 2022-04-14
Payer: COMMERCIAL

## 2022-04-14 VITALS
SYSTOLIC BLOOD PRESSURE: 130 MMHG | TEMPERATURE: 97.4 F | BODY MASS INDEX: 31.98 KG/M2 | DIASTOLIC BLOOD PRESSURE: 68 MMHG | HEIGHT: 66 IN | HEART RATE: 67 BPM | WEIGHT: 199 LBS

## 2022-04-14 DIAGNOSIS — Z98.84 BARIATRIC SURGERY STATUS: Primary | ICD-10-CM

## 2022-04-14 DIAGNOSIS — K21.9 GASTROESOPHAGEAL REFLUX DISEASE WITHOUT ESOPHAGITIS: ICD-10-CM

## 2022-04-14 DIAGNOSIS — M51.16 INTERVERTEBRAL DISC DISORDER WITH RADICULOPATHY OF LUMBAR REGION: ICD-10-CM

## 2022-04-14 PROCEDURE — 99213 OFFICE O/P EST LOW 20 MIN: CPT | Performed by: SURGERY

## 2022-04-14 PROCEDURE — 72148 MRI LUMBAR SPINE W/O DYE: CPT

## 2022-04-14 NOTE — TELEPHONE ENCOUNTER
E-rx sent
Patient called and states that she is not able to  the 30 day supply that was sent into the pharmacy  Per the pharmacy that she needs to have a 7 day script first then she can  the 30 day  Pharmacy is able to hold the 30 day supply  Can you please send in a 7 day first? Correct pharmacy on file  Patient currently does not have any tramadol at this time 
Patient made aware that script was sent 
Include Z78.9 (Other Specified Conditions Influencing Health Status) As An Associated Diagnosis?: No
Detail Level: Detailed
Medical Necessity Clause: This procedure was medically necessary because the lesions that were treated were:
Anesthesia Volume In Cc: 0
Medical Necessity Information: It is in your best interest to select a reason for this procedure from the list below. All of these items fulfill various CMS LCD requirements except the new and changing color options.
Consent: Written consent obtained and the risks of skin tag removal was reviewed with the patient including but not limited to bleeding, pigmentary change, infection, pain, and remote possibility of scarring.
Add Associated Diagnoses If Applicable When Selecting Medical Necessity: Yes

## 2022-04-14 NOTE — PROGRESS NOTES
Subjective:      45 y o  female with history of Brayan [address] Sudhakar Aponte 2018, presents to the office today for follow up of UGI performed on 22 by Aleksandr Velasquez  Patient has had symptoms of heartburn, despite therapy with Pantoprazole BID, Pepcid BID  The symptoms are persistent throughout the day  She has associated regurgitation  She has tried to modify her diet  Historical Information   Past Medical History:   Diagnosis Date    ADHD (attention deficit hyperactivity disorder)     Bulging lumbar disc     Carpal tunnel syndrome     RIGHT  LAST ASSESSED: 16    Chronic back pain     low    Chronic pain disorder     Colon polyps     COVID-19     2021    Diabetes mellitus ([facility] )     Resolved post weight loss    GERD (gastroesophageal reflux disease)     Gestational diabetes     Hearing loss     left ear    Hyperlipidemia     Resolved with weight loss    Hyponatremia 2020    IBS (irritable bowel syndrome)     Ileus ([facility] )     LAST ASSESSED: 8/3/17    Labial cyst     LAST ASSESSED: 16    Myofascial pain     LAST ASSESSED: 16    Obesity     Ovarian cyst     LEFT   LAST ASSESSED: 16    Panic attack     Pneumonia     Seasonal allergies     SVT (supraventricular tachycardia) (HCC)     Thoracic outlet syndrome     2010    Trochanteric bursitis of both hips     LAST ASSESSED: 3/18/16    Ulnar neuropathy at elbow     Varicella     Wears glasses      Past Surgical History:   Procedure Laterality Date    BILE DUCT EXPLORATION      ENDOSCOPIC REMOVAL OF STONES FROM BILIARY TRACT     SECTION      x3    CHOLECYSTECTOMY      COLONOSCOPY      2017-polyp, repeat     DILATION AND CURETTAGE OF UTERUS      ENDOMETRIAL ABLATION      ERCP W/ SPHICTEROTOMY      FIRST RIB REMOVAL      THORAX EXCISION OF FIRST RIB    HYSTEROSCOPY      NEUROPLASTY / TRANSPOSITION ULNAR NERVE AT ELBOW Right     IL COLONOSCOPY FLX DX W/COLLJ SPEC WHEN PFRMD N/A 2017    Procedure: COLONOSCOPY;  Surgeon: Chato Guevara MD;  [facility]; Service: Gastroenterology    MA ESOPHAGOGASTRODUODENOSCOPY TRANSORAL DIAGNOSTIC N/A 2017    Procedure: ESOPHAGOGASTRODUODENOSCOPY (EGD); Surgeon: Jennie Cisse MD;  [facility]; Service: Gastroenterology    MA HYSTEROSCOPY,W/ENDO BX N/A 10/20/2017    Procedure: DILATATION AND CURETTAGE (D&C) WITH HYSTEROSCOPY  REMOVAL VULVAR RT  LESION;  Surgeon: Aman Echeverria MD;  [facility];  Service: Gynecology    MA LAP, ÁLVARO RESTRICT PROC, LONGITUDINAL GASTRECTOMY N/A 2018    Procedure: GASTRECTOMY SLEEVE LAPAROSCOPIC; INTRAOPERATIVE EGD ;  Surgeon: Mary Gamez MD;  [facility];  Service: Evie Norwood SURG IMPLNT [address] Left 2020    Procedure: Insertion of thoracic spinal cord stimulator electrode via laminotomy and placement of left buttock implantable pulse generator (NEUROMONITORING);   Surgeon: Feliciano Coleman MD;  [facility];  Service: Neurosurgery    SPINAL CORD STIMULATOR TRIAL W/ LAMINOTOMY      TONSILLECTOMY AND ADENOIDECTOMY      TUBAL LIGATION      UPPER GASTROINTESTINAL ENDOSCOPY      VEIN LIGATION AND STRIPPING Right     VULVA SURGERY  10/20/2017    BIOPSY    WISDOM TOOTH EXTRACTION       Social History   Social History     Substance and Sexual Activity   Alcohol Use Yes    Alcohol/week: 0 0 - 2 0 standard drinks    Comment: Occs -twice monthly     Social History     Substance and Sexual Activity   Drug Use No     Social History     Tobacco Use   Smoking Status Former Smoker    Quit date: 2013    Years since quittin 9   Smokeless Tobacco Never Used     Family History:   Family History   Problem Relation Age of Onset    Diabetes Mother     Breast cancer Mother         >50    BRCA1 Negative Mother     BRCA2 Negative Mother     Hyperlipidemia Mother         HYPERCHOLESTEROLEMIA    Diabetes Father     Other Father traumatic brain injury    Prostate cancer Father     Alcohol abuse Father         in remission    Heart disease Father     Neuropathy Father     Hyperlipidemia Father     Hypertension Brother     Diabetes Brother     Other Brother         HYPERCHOLESTEROLEMIA    Alcohol abuse Brother     Depression Brother         attempted suicide    Colon cancer Maternal Grandfather     Heart attack Maternal Grandmother     No Known Problems Paternal Grandmother         dad is adopted    No Known Problems Paternal Grandfather         dad is adopted    Diabetes Brother     Alcohol abuse Brother     Asthma Son     No Known Problems Daughter     Ovarian cancer Neg Hx     Uterine cancer Neg Hx        Meds/Allergies   all medications and allergies reviewed  Allergies   Allergen Reactions    Ibuprofen Other (See Comments)     Due to gastric sleeve -can only take for 5 days, then needs to stop       Objective   Review of Systems   Constitutional: Negative  HENT: Negative  Eyes: Negative  Respiratory: Negative  Cardiovascular: Negative  Gastrointestinal: Negative  Endocrine: Negative  Genitourinary: Negative  Musculoskeletal: Negative  Skin: Negative  Allergic/Immunologic: Negative  Neurological: Negative  Hematological: Negative  Psychiatric/Behavioral: Negative  All other systems reviewed and are negative  Objective:    LMP 01/07/2019 (Approximate)    Visit Vitals  /68 (BP Location: Left arm, Patient Position: Sitting, Cuff Size: Standard)   Pulse 67   Temp (!) 97 4 °F (36 3 °C) (Tympanic)   Ht 5' 6" (1 676 m)   Wt 90 3 kg (199 lb)   LMP 01/07/2019 (Approximate)   BMI 32 12 kg/m²   OB Status Postmenopausal   Smoking Status Former Smoker   BSA 2 m²        Physical Exam  Vitals and nursing note reviewed  Constitutional:       Appearance: Normal appearance  HENT:      Head: Normocephalic and atraumatic        Nose: Nose normal       Mouth/Throat:      Mouth: Mucous membranes are moist       Pharynx: Oropharynx is clear  Eyes:      Extraocular Movements: Extraocular movements intact  Pupils: Pupils are equal, round, and reactive to light  Cardiovascular:      Rate and Rhythm: Normal rate and regular rhythm  Pulses: Normal pulses  Pulmonary:      Effort: Pulmonary effort is normal    Abdominal:      General: Abdomen is flat  Bowel sounds are normal       Palpations: Abdomen is soft  Comments:     Musculoskeletal:         General: Normal range of motion  Cervical back: Normal range of motion and neck supple  Skin:     General: Skin is warm and dry  Neurological:      General: No focal deficit present  Mental Status: She is alert and oriented to person, place, and time  Mental status is at baseline  Psychiatric:         Mood and Affect: Mood normal          Behavior: Behavior normal          Thought Content: Thought content normal          Judgment: Judgment normal            Upper GI:   UPPER GI SERIES  DOUBLE CONTRAST     INDICATION:  Previous sleeve gastrectomy to 618; patient complains of reflux symptoms; preoperative planning for conversion to Rebecca-en-Y gastric bypass     COMPARISON:  CTA chest 3/3/2020     IMAGES:  34     FLUOROSCOPY TIME:   3 minutes     TECHNIQUE:  The patient was given barium  by mouth and images of the esophagus, stomach, and small bowel were obtained       FINDINGS:   radiograph is unremarkable      The esophagus is normal in caliber  Esophageal motility is normal and emptying of contrast from the esophagus is prompt  There is no mucosal mass, ulceration or fold thickening identified  Very small sliding type hiatal hernia is present      Postoperative change reflecting sleeve gastrectomy noted  Gastric lumen is patent  The gastric mucosa is normal   No penetrating ulcers or masses      Contrast empties promptly into the duodenum  The duodenum is normal in caliber    The ligament of Treitz/duodenojejunal junction lies in a normal position      Intermittent mild-moderate gastroesophageal reflux to the level the mid esophagus was observed without evidence for reflux esophagitis or stricture        IMPRESSION:        1  Postoperative change reflecting prior sleeve gastrectomy  2   Small hiatal hernia  3   Intermittent mild -moderate gastroesophageal reflux as noted without evidence for reflux esophagitis, ulcer, or stricture      EGD:    DATE OF SERVICE:  1/06/22     PHYSICIAN(S):  Mio Holloway MD - Attending Physician        INDICATION:  Gastroesophageal reflux disease without esophagitis     POST-OP DIAGNOSIS:  See the impression below      PREPROCEDURE:  Informed consent was obtained for the procedure, including sedation  Risks of perforation, hemorrhage, adverse drug reaction and aspiration were discussed  The patient was placed in the left lateral decubitus position      Patient was explained about the risks and benefits of the procedure  Risks including but not limited to bleeding, infection, and perforation were explained in detail  Also explained about less than 100% sensitivity with the exam and other alternatives      DETAILS OF PROCEDURE:  Patient was taken to the procedure room where a time out was performed to confirm correct patient and correct procedure  The patient underwent monitored anesthesia care, which was administered by an anesthesia professional  The patient's blood pressure, heart rate, level of consciousness, respirations and oxygen were monitored throughout the procedure  The scope was advanced to the second part of the duodenum  Retroflexion was performed in the fundus  The patient experienced no blood loss  The procedure was not difficult  The patient tolerated the procedure well   There were no apparent complications       ANESTHESIA INFORMATION:  ASA: II  Anesthesia Type: IV Sedation with Anesthesia     MEDICATIONS:  No administrations occurring from 1238 to 1246 on 01/06/22         FINDINGS:  · The duodenum appeared normal   · Mild erythematous mucosa in the antrum; performed cold forceps biopsy  · Previous sleeve gastrectomy in the stomach  · The cardia appeared normal   · Irregular Z-line 40 cm from the incisors; performed cold forceps biopsy        SPECIMENS:  ID Type Source Tests Collected by Time Destination   1 : Gastric bx Tissue Stomach TISSUE EXAM Ramses Quinn MD 1/6/2022 12:44 PM              IMPRESSION:  Normal duodenum  Antral gastritis biopsy taken for H pylori  Moderate amount of retained bile  Prior sleeve gastrectomy anatomy, appears to be fairly patent  Normal retroflexion  Small tongue of columnar appearing mucosa at 40 cm, biopsies taken to evaluate for Beth's    Pathology:   Final Diagnosis   A  Stomach, Gastric bx:  -  Chronic inactive antral gastritis and reactive changes  -  Negative for Helicobacter pylori, by H&E stain   -  Negative for atrophy, intestinal metaplasia, dysplasia or carcinoma      B  Esophagogastric junction, GE junction bx:  - Squamocolumnar junction mucosa with reactive changes and pancreatic heterotopia  - Gastric cardia type mucosa with chronic inactive gastritis  - Negative for goblet cell intestinal metaplasia  - No epithelial dysplasia and no evidence of malignancy      C  Polyp, Colorectal, Descending polyp cold snare:  - Polypoid colonic mucosa with hyperplastic changes  - Negative for dysplasia  Assessment/Plan:       [place] is a 45 y o  female  s/p history of Vertical Sleeve Gastrectomy with Dr Nicole White 2/6/2018 with UGI showing mild-moderate reflux and EGD showing moderate amount of retained bile  Patient continues to have symptoms that are not improving despite medication compliance and despite refularly meeting with dieticians  As a result of her heart burn, she has developed maladaptive eating patterns, causing her to gain weight   Patient will benefit from gastric bypass for treatment of her GERD      · Continue PPI therapy  · Continue vitamins as directed, with routine blood work follow up  · Will enroll in Level 3 Revision pathway for conversion of sleeve to gastric bypass  · Follow up per bariatric protocol and with dieticians

## 2022-04-15 ENCOUNTER — APPOINTMENT (OUTPATIENT)
Dept: LAB | Facility: AMBULARY SURGERY CENTER | Age: 52
End: 2022-04-15
Payer: COMMERCIAL

## 2022-04-15 DIAGNOSIS — Z98.84 S/P LAPAROSCOPIC SLEEVE GASTRECTOMY: Chronic | ICD-10-CM

## 2022-04-15 DIAGNOSIS — Z01.812 BLOOD TESTS PRIOR TO TREATMENT OR PROCEDURE: ICD-10-CM

## 2022-04-15 DIAGNOSIS — K91.2 POSTSURGICAL MALABSORPTION: ICD-10-CM

## 2022-04-15 DIAGNOSIS — E78.2 MIXED HYPERLIPIDEMIA: ICD-10-CM

## 2022-04-15 DIAGNOSIS — E67.0 HIGH VITAMIN A LEVEL: ICD-10-CM

## 2022-04-15 DIAGNOSIS — Z01.818 PREOPERATIVE CLEARANCE: ICD-10-CM

## 2022-04-15 DIAGNOSIS — R63.5 ABNORMAL WEIGHT GAIN: ICD-10-CM

## 2022-04-15 DIAGNOSIS — Z98.84 BARIATRIC SURGERY STATUS: ICD-10-CM

## 2022-04-15 DIAGNOSIS — R73.03 PREDIABETES: ICD-10-CM

## 2022-04-15 LAB
ALBUMIN SERPL BCP-MCNC: 3.6 G/DL (ref 3.5–5)
ALP SERPL-CCNC: 74 U/L (ref 46–116)
ALT SERPL W P-5'-P-CCNC: 59 U/L (ref 12–78)
ANION GAP SERPL CALCULATED.3IONS-SCNC: 5 MMOL/L (ref 4–13)
AST SERPL W P-5'-P-CCNC: 45 U/L (ref 5–45)
BILIRUB SERPL-MCNC: 0.41 MG/DL (ref 0.2–1)
BUN SERPL-MCNC: 9 MG/DL (ref 5–25)
CALCIUM SERPL-MCNC: 9.3 MG/DL (ref 8.3–10.1)
CHLORIDE SERPL-SCNC: 106 MMOL/L (ref 100–108)
CHOLEST SERPL-MCNC: 156 MG/DL
CO2 SERPL-SCNC: 28 MMOL/L (ref 21–32)
CREAT SERPL-MCNC: 0.69 MG/DL (ref 0.6–1.3)
ERYTHROCYTE [DISTWIDTH] IN BLOOD BY AUTOMATED COUNT: 12.5 % (ref 11.6–15.1)
EST. AVERAGE GLUCOSE BLD GHB EST-MCNC: 148 MG/DL
GFR SERPL CREATININE-BSD FRML MDRD: 100 ML/MIN/1.73SQ M
GLUCOSE P FAST SERPL-MCNC: 120 MG/DL (ref 65–99)
HBA1C MFR BLD: 6.8 %
HCT VFR BLD AUTO: 41.1 % (ref 34.8–46.1)
HDLC SERPL-MCNC: 61 MG/DL
HGB BLD-MCNC: 13.6 G/DL (ref 11.5–15.4)
LDLC SERPL CALC-MCNC: 38 MG/DL (ref 0–100)
MCH RBC QN AUTO: 29.6 PG (ref 26.8–34.3)
MCHC RBC AUTO-ENTMCNC: 33.1 G/DL (ref 31.4–37.4)
MCV RBC AUTO: 90 FL (ref 82–98)
PLATELET # BLD AUTO: 256 THOUSANDS/UL (ref 149–390)
PMV BLD AUTO: 10.3 FL (ref 8.9–12.7)
POTASSIUM SERPL-SCNC: 4.1 MMOL/L (ref 3.5–5.3)
PROT SERPL-MCNC: 7.5 G/DL (ref 6.4–8.2)
RBC # BLD AUTO: 4.59 MILLION/UL (ref 3.81–5.12)
SODIUM SERPL-SCNC: 139 MMOL/L (ref 136–145)
TRIGL SERPL-MCNC: 284 MG/DL
WBC # BLD AUTO: 6.72 THOUSAND/UL (ref 4.31–10.16)

## 2022-04-15 PROCEDURE — 83036 HEMOGLOBIN GLYCOSYLATED A1C: CPT

## 2022-04-15 PROCEDURE — 84590 ASSAY OF VITAMIN A: CPT

## 2022-04-15 PROCEDURE — 80061 LIPID PANEL: CPT

## 2022-04-15 PROCEDURE — 36415 COLL VENOUS BLD VENIPUNCTURE: CPT

## 2022-04-15 PROCEDURE — 85027 COMPLETE CBC AUTOMATED: CPT

## 2022-04-15 PROCEDURE — 80053 COMPREHEN METABOLIC PANEL: CPT

## 2022-04-20 ENCOUNTER — TELEPHONE (OUTPATIENT)
Dept: PAIN MEDICINE | Facility: CLINIC | Age: 52
End: 2022-04-20

## 2022-04-20 NOTE — TELEPHONE ENCOUNTER
----- Message from [address] sent at 4/18/2022  4:59 PM EDT -----  Order for Right L5-S1 TFESI placed

## 2022-04-24 LAB — VIT A SERPL-MCNC: 33.8 UG/DL (ref 20.1–62)

## 2022-04-25 ENCOUNTER — OFFICE VISIT (OUTPATIENT)
Dept: FAMILY MEDICINE CLINIC | Facility: CLINIC | Age: 52
End: 2022-04-25
Payer: COMMERCIAL

## 2022-04-25 VITALS
BODY MASS INDEX: 32.05 KG/M2 | TEMPERATURE: 97.8 F | DIASTOLIC BLOOD PRESSURE: 80 MMHG | WEIGHT: 199.4 LBS | RESPIRATION RATE: 16 BRPM | OXYGEN SATURATION: 98 % | HEART RATE: 72 BPM | HEIGHT: 66 IN | SYSTOLIC BLOOD PRESSURE: 130 MMHG

## 2022-04-25 DIAGNOSIS — Z98.84 S/P LAPAROSCOPIC SLEEVE GASTRECTOMY: Chronic | ICD-10-CM

## 2022-04-25 DIAGNOSIS — K21.9 GASTROESOPHAGEAL REFLUX DISEASE WITHOUT ESOPHAGITIS: ICD-10-CM

## 2022-04-25 DIAGNOSIS — J01.00 ACUTE NON-RECURRENT MAXILLARY SINUSITIS: Primary | ICD-10-CM

## 2022-04-25 DIAGNOSIS — M51.16 INTERVERTEBRAL DISC DISORDER WITH RADICULOPATHY OF LUMBAR REGION: ICD-10-CM

## 2022-04-25 DIAGNOSIS — F33.2 MAJOR DEPRESSIVE DISORDER, RECURRENT SEVERE WITHOUT PSYCHOTIC FEATURES (HCC): ICD-10-CM

## 2022-04-25 PROCEDURE — 99214 OFFICE O/P EST MOD 30 MIN: CPT | Performed by: FAMILY MEDICINE

## 2022-04-25 RX ORDER — AMOXICILLIN AND CLAVULANATE POTASSIUM 875; 125 MG/1; MG/1
1 TABLET, FILM COATED ORAL EVERY 12 HOURS SCHEDULED
Qty: 14 TABLET | Refills: 0 | Status: SHIPPED | OUTPATIENT
Start: 2022-04-25 | End: 2022-05-02

## 2022-04-25 NOTE — PROGRESS NOTES
Assessment/Plan:    Intervertebral disc disorder with radiculopathy of lumbar region  Stable   Care per Pain management     S/P laparoscopic sleeve gastrectomy  Getting revision in the summer  Seeing dr Heather Gruber    GERD (gastroesophageal reflux disease)  pepcid twice a day and protonix twice a day  Sees GI       Diagnoses and all orders for this visit:    Acute non-recurrent maxillary sinusitis  Comments:  flonase daily   saline sinus rinse   Orders:  -     amoxicillin-clavulanate (AUGMENTIN) 875-125 mg per tablet; Take 1 tablet by mouth every 12 (twelve) hours for 7 days    Major depressive disorder, recurrent severe without psychotic features [facility]    Intervertebral disc disorder with radiculopathy of lumbar region    S/P laparoscopic sleeve gastrectomy    Gastroesophageal reflux disease without esophagitis        Subjective:      Patient ID: Kerri Perez is a 45 y o  female  Sore Throat (Patient is here today with sore throat  x3 days)  Earache (Patient has been experiencing right ear pain for the past 3 days )  Nasal Congestion    Negative covid home test today     URI   This is a new problem  The current episode started 1 to 4 weeks ago  Associated symptoms include congestion, coughing, headaches, rhinorrhea and sinus pain  Pertinent negatives include no abdominal pain, chest pain, diarrhea, dysuria, ear pain, joint pain, joint swelling, nausea, neck pain, plugged ear sensation, rash, sneezing, sore throat, swollen glands or vomiting  Treatments tried: floanse, cepacol, singulair  The following portions of the patient's history were reviewed and updated as appropriate: allergies, current medications, past family history, past medical history, past social history, past surgical history and problem list     Review of Systems   HENT: Positive for congestion, rhinorrhea and sinus pain  Negative for ear pain, sneezing and sore throat  Respiratory: Positive for cough      Cardiovascular: Negative for chest pain    Gastrointestinal: Negative for abdominal pain, diarrhea, nausea and vomiting  Genitourinary: Negative for dysuria  Musculoskeletal: Negative for joint pain and neck pain  Skin: Negative for rash  Neurological: Positive for headaches  Objective:      /80 (BP Location: Left arm, Patient Position: Sitting, Cuff Size: Adult)   Pulse 72   Temp 97 8 °F (36 6 °C) (Esophageal)   Resp 16   Ht 5' 6" (1 676 m)   Wt 90 4 kg (199 lb 6 4 oz)   LMP 01/07/2019 (Approximate)   SpO2 98%   BMI 32 18 kg/m²          Physical Exam  Constitutional:       Appearance: She is well-developed  HENT:      Right Ear: Tympanic membrane normal  No tenderness  Left Ear: Tympanic membrane normal  No tenderness  Nose: Congestion and rhinorrhea present  Right Sinus: Maxillary sinus tenderness present  Left Sinus: Maxillary sinus tenderness present  Neck:      Thyroid: No thyromegaly  Cardiovascular:      Rate and Rhythm: Normal rate and regular rhythm  Pulmonary:      Effort: Pulmonary effort is normal    Lymphadenopathy:      Cervical: No cervical adenopathy  Neurological:      Mental Status: She is alert

## 2022-04-28 ENCOUNTER — OFFICE VISIT (OUTPATIENT)
Dept: CARDIOLOGY CLINIC | Facility: CLINIC | Age: 52
End: 2022-04-28
Payer: COMMERCIAL

## 2022-04-28 VITALS
OXYGEN SATURATION: 98 % | HEIGHT: 66 IN | WEIGHT: 201 LBS | DIASTOLIC BLOOD PRESSURE: 80 MMHG | BODY MASS INDEX: 32.3 KG/M2 | SYSTOLIC BLOOD PRESSURE: 124 MMHG | HEART RATE: 68 BPM

## 2022-04-28 DIAGNOSIS — E78.2 MIXED HYPERLIPIDEMIA: ICD-10-CM

## 2022-04-28 DIAGNOSIS — I47.1 PAROXYSMAL SVT (SUPRAVENTRICULAR TACHYCARDIA) (HCC): ICD-10-CM

## 2022-04-28 DIAGNOSIS — E66.9 OBESITY, CLASS I, BMI 30-34.9: ICD-10-CM

## 2022-04-28 DIAGNOSIS — Z01.810 PRE-OPERATIVE CARDIOVASCULAR EXAMINATION: Primary | ICD-10-CM

## 2022-04-28 PROCEDURE — 99214 OFFICE O/P EST MOD 30 MIN: CPT | Performed by: INTERNAL MEDICINE

## 2022-04-28 PROCEDURE — 93000 ELECTROCARDIOGRAM COMPLETE: CPT | Performed by: INTERNAL MEDICINE

## 2022-04-28 RX ORDER — PROPRANOLOL HCL 60 MG
60 CAPSULE, EXTENDED RELEASE 24HR ORAL 2 TIMES DAILY
Qty: 180 CAPSULE | Refills: 2 | OUTPATIENT
Start: 2022-04-28
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
## 2022-04-29 ENCOUNTER — TELEMEDICINE (OUTPATIENT)
Dept: BEHAVIORAL/MENTAL HEALTH CLINIC | Facility: CLINIC | Age: 52
End: 2022-04-29
Payer: COMMERCIAL

## 2022-04-29 DIAGNOSIS — F41.1 GENERALIZED ANXIETY DISORDER: Chronic | ICD-10-CM

## 2022-04-29 DIAGNOSIS — F43.10 POST TRAUMATIC STRESS DISORDER (PTSD): Chronic | ICD-10-CM

## 2022-04-29 DIAGNOSIS — F90.0 ADHD (ATTENTION DEFICIT HYPERACTIVITY DISORDER), INATTENTIVE TYPE: ICD-10-CM

## 2022-04-29 DIAGNOSIS — F33.2 MAJOR DEPRESSIVE DISORDER, RECURRENT SEVERE WITHOUT PSYCHOTIC FEATURES (HCC): Primary | Chronic | ICD-10-CM

## 2022-04-29 PROCEDURE — 90834 PSYTX W PT 45 MINUTES: CPT | Performed by: SOCIAL WORKER

## 2022-04-29 NOTE — PSYCH
Virtual Regular Visit    Verification of patient location:    Patient is located in the following state in which I hold an active license [place]    Assessment/Plan:    Problem List Items Addressed This Visit        Other    Post traumatic stress disorder (PTSD) (Chronic)    Major depressive disorder, recurrent severe without psychotic features ([facility] [address] ) - Primary (Chronic)    Generalized anxiety disorder (Chronic)    ADHD (attention deficit hyperactivity disorder), inattentive type        Goals addressed in session: Goal 1      Reason for visit is No chief complaint on file  Encounter provider LALY Doherty    Provider located at [address] [place] [id number] [phone]    Recent Visits  Date Type Provider Dept   04/25/22 Office Visit Brad Schmidt MD [address] [facility] recent visits within past 7 days and meeting all other requirements  Future Appointments  No visits were found meeting these conditions  Showing future appointments within next 150 days and meeting all other requirements     The patient was identified by name and date of birth  Kerri Perez was informed that this is a telemedicine visit and that the visit is being conducted throughic Embedded and patient was informed this is a secure, HIPAA-complaint platform  She agrees to proceed     My office door was closed  No one else was in the room  She acknowledged consent and understanding of privacy and security of the video platform  The patient has agreed to participate and understands they can discontinue the visit at any time  Patient is aware this is a billable service  Subjective  Kerri Perez is a 47 y o  female  DATA: Met with Liat Becerra for scheduled individual session  Topics of discussion included work-related stress, education-related stress and physical health concerns   "I'm going to be having bariatric surgery again " Bev Mckoy discussed her plans to have a revision of her original bariatric surgery and the implications of this new procedure  She states that she will have to speak with her psychiatrist to make sure that she is not taking any medications that are extended release, due to the absorption issues after her upcoming surgery  Bev Mckoy discussed her work-related stress and her 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, 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  She discussed her education and her continued struggles with her learning disability  She continues to move toward getting a degree in nursing; however, she often questions her ability to complete the coursework necessary to earn her degree  We spent some time reviewing and updating Yessy's treatment plan  She states that she wants to continue to work on finding a balance in her life and maintaining a positive mood  Client shows evidence of utilizing emotion regulation skills skills to manage mental health symptoms  During this session, this clinician used the following therapeutic modalities: supportive psychotherapy, client-centered therapy, mindfulness-based strategies, DBT-informed skills, Motivational Interviewing and solution-focused therapy  ASSESSMENT: Philippe Upton presents with a normal mood  Her affect is normal range and intensity, appropriate  Philippe Uptno exhibits good therapeutic rapport with this clinician  Philippe Upton continues to exhibit willingness to work on treatment goals and objectives  Philippe Upton presents with a minimal risk of suicide, minimal risk of self-harm, and minimal risk of harm to others  PLAN: Philippe Upton will return in 4-6 weeks for the next scheduled session  Between sessions, Philippe Upton will continue to work on monitoring her moods and increasing her self-care  She will report back during the next session re: successes and barriers   At the next session, this clinician will use supportive psychotherapy, client-centered therapy, mindfulness-based strategies, DBT-informed skills, Motivational Interviewing and solution-focused therapy to address her mood regulation, relationship issues, and frustration tolerance, in an effort to assist Adelaide FLOR with meeting treatment goals  HPI     Past Medical History:   Diagnosis Date    ADHD (attention deficit hyperactivity disorder)     Bulging lumbar disc     Carpal tunnel syndrome     RIGHT  LAST ASSESSED: 16    Chronic back pain     low    Chronic pain disorder     Colon polyps     COVID-19     2021    Diabetes mellitus ([facility] [address] )     Resolved post weight loss    GERD (gastroesophageal reflux disease)     Gestational diabetes     Hearing loss     left ear    Hyperlipidemia     Resolved with weight loss    Hyponatremia 2020    IBS (irritable bowel syndrome)     Ileus ([facility] [address] )     LAST ASSESSED: 8/3/17    Labial cyst     LAST ASSESSED: 16    Myofascial pain     LAST ASSESSED: 16    Obesity     Ovarian cyst     LEFT  LAST ASSESSED: 16    Panic attack     Pneumonia     Seasonal allergies     SVT (supraventricular tachycardia) (HCC)     Thoracic outlet syndrome     2010    Trochanteric bursitis of both hips     LAST ASSESSED: 3/18/16    Ulnar neuropathy at elbow     Varicella     Wears glasses        Past Surgical History:   Procedure Laterality Date    BILE DUCT EXPLORATION      ENDOSCOPIC REMOVAL OF STONES FROM BILIARY TRACT     SECTION      x3    CHOLECYSTECTOMY      COLONOSCOPY      2017-polyp, repeat     DILATION AND CURETTAGE OF UTERUS      ENDOMETRIAL ABLATION      ERCP W/ SPHICTEROTOMY      FIRST RIB REMOVAL      THORAX EXCISION OF FIRST RIB    HYSTEROSCOPY      NEUROPLASTY / TRANSPOSITION ULNAR NERVE AT ELBOW Right     WY COLONOSCOPY FLX DX W/COLLJ SPEC WHEN PFRMD N/A 2017    Procedure: COLONOSCOPY;  Surgeon: Josue Esposito MD;  [facility];   Service: Gastroenterology    WY ESOPHAGOGASTRODUODENOSCOPY TRANSORAL DIAGNOSTIC N/A 9/14/2017    Procedure: ESOPHAGOGASTRODUODENOSCOPY (EGD); Surgeon: Juan Antonio Evangelista MD;  [facility]; Service: Gastroenterology    ID HYSTEROSCOPY,W/ENDO BX N/A 10/20/2017    Procedure: DILATATION AND CURETTAGE (D&C) WITH HYSTEROSCOPY  REMOVAL VULVAR RT  LESION;  Surgeon: Isabell Montague MD;  [facility];  Service: Gynecology    ID LAP, ÁLVARO RESTRICT PROC, LONGITUDINAL GASTRECTOMY N/A 2/6/2018    Procedure: GASTRECTOMY SLEEVE LAPAROSCOPIC; INTRAOPERATIVE EGD ;  Surgeon: Devonte Estrada MD;  [facility];  Service: Keenan Ashutosh SURG IMPLNT [address] Left 1/29/2020    Procedure: Insertion of thoracic spinal cord stimulator electrode via laminotomy and placement of left buttock implantable pulse generator (NEUROMONITORING);   Surgeon: Merlyn Olszewski, MD;  [facility];  Service: Neurosurgery    SPINAL CORD STIMULATOR TRIAL W/ LAMINOTOMY      TONSILLECTOMY AND ADENOIDECTOMY      TUBAL LIGATION      UPPER GASTROINTESTINAL ENDOSCOPY      VEIN LIGATION AND STRIPPING Right     VULVA SURGERY  10/20/2017    BIOPSY    WISDOM TOOTH EXTRACTION         Current Outpatient Medications   Medication Sig Dispense Refill    amoxicillin-clavulanate (AUGMENTIN) 875-125 mg per tablet Take 1 tablet by mouth every 12 (twelve) hours for 7 days 14 tablet 0    atoMOXetine (STRATTERA) 60 mg capsule Take 1 capsule (60 mg total) by mouth daily 90 capsule 1    atorvastatin (LIPITOR) 10 mg tablet Take 1 tablet (10 mg total) by mouth daily 90 tablet 3    Calcium Carbonate-Vit D-Min (CALCIUM 1200 PO) Take 2,400 mg by mouth daily      cholestyramine (QUESTRAN) 4 g packet Take 1 packet (4 g total) by mouth daily 30 each 3    drospirenone-ethinyl estradiol (LIEN) 3-0 02 MG per tablet Take 1 tablet by mouth daily 84 tablet 4    famotidine (PEPCID) 20 mg tablet TAKE ONE TABLET BY MOUTH 2 TIMES A DAY 60 tablet 0    ferrous sulfate 325 (65 Fe) mg tablet Take 325 mg by mouth daily with breakfast      HYDROcodone-acetaminophen (NORCO) 5-325 mg per tablet Take 1 tab PO daily as needed 30 tablet 0    lamoTRIgine (LaMICtal) 100 mg tablet Take 1 tablet (100 mg total) by mouth daily 90 tablet 1    lamoTRIgine (LaMICtal) 150 MG tablet Take 1 tablet (150 mg total) by mouth daily 90 tablet 1    metFORMIN (GLUCOPHAGE-XR) 750 mg 24 hr tablet Take 1 tablet (750 mg total) by mouth every evening 90 tablet 0    methocarbamol (ROBAXIN) 750 mg tablet Take 1 tablet (750 mg total) by mouth daily at bedtime as needed for muscle spasms 30 tablet 2    montelukast (SINGULAIR) 10 mg tablet Take 1 tablet (10 mg total) by mouth daily at bedtime 90 tablet 2    Multiple Vitamins-Minerals (MULTI COMPLETE PO) Take by mouth      pantoprazole (PROTONIX) 40 mg tablet Take 1 tablet (40 mg total) by mouth 2 (two) times a day 90 tablet 3    phenazopyridine (PYRIDIUM) 200 mg tablet Take 1 tablet (200 mg total) by mouth 3 (three) times a day with meals 10 tablet 0    propranolol (INDERAL LA) 60 mg 24 hr capsule Take 1 capsule (60 mg total) by mouth 2 (two) times a day 180 capsule 2    QUEtiapine (SEROquel) 50 mg tablet Take 1 5 tablets (75 mg total) by mouth daily at bedtime 135 tablet 1    venlafaxine (EFFEXOR-XR) 150 mg 24 hr capsule Take 1 capsule (150 mg total) by mouth daily 90 capsule 1    venlafaxine (EFFEXOR-XR) 37 5 mg 24 hr capsule Take 1 capsule (37 5 mg total) by mouth daily 90 capsule 1     No current facility-administered medications for this visit  Allergies   Allergen Reactions    Ibuprofen Other (See Comments)     Due to gastric sleeve -can only take for 5 days, then needs to stop       Review of Systems    Video Exam    There were no vitals filed for this visit  Physical Exam     I spent 45 minutes directly with the patient during this visit    VIRTUAL VISIT [address] verbally agrees to participate in [employer]   Pt is aware that [employer] could be limited without vital signs or the ability to perform a full hands-on physical Shankar Willis understands she or the provider may request at any time to terminate the video visit and request the patient to seek care or treatment in person

## 2022-04-29 NOTE — BH TREATMENT PLAN
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     Diagnosis:   1  Major depressive disorder, recurrent severe without psychotic features ([address] )      2  Generalized anxiety disorder      3  Post traumatic stress disorder (PTSD)            Area of Needs: Mood regulation and PTSD     Long Term Goal 1: "I want the same thing as before  I'm not as angry though  I do get anxious and want to work on that "   * Mayela Kincaid will continue to work on mood regulation, finding balance in her life, and managing her frustration with her school and her work       Target Date:                                            August 27, 2022  Treatment Plan Expiration:          October 26, 2022  Completion Date: To be determined         Short Term Objectives for Goal 1:                  1  Christina will identify triggers and prompting events that increase symptoms of anger, depression, and anxiety  Update 04/29/2022: Mayela Kincaid continues to identify and discuss the triggers and prompting events that impact her mood regulation (anger, depression, and anxiety)  During the past six months, she has identified work-related stressors, physical health issues, education stress, and family relationships as the primary triggers and prompting events that impact her life  We will continue to identify and discuss new triggers/ prompting events that impact her life, and we will adjust treatment interventions, as needed, to address her symptoms                     2  Richard Odell learn and exhibit understanding of a minimum of three distress tolerance skills to assist with management of depression and anxiety symptoms        Update 04/29/2022: Jorge Ljason Mcdonaldlyn states that she has been spending time going out on the motorcycle with her   She states that she enjoys being outdoors and finds that it helps her to gain more mood regulation  Tra Nolen states that she is able to "keep on working" when she is feeling distress and anxiety  She states that finds ways to ignore the anxiety when she is unable to get out of a situation (e g , anxiety during a timed test)  Anthony Diallo learn and exhibit understanding of effective communication skills (using a DBT-informed perspective), so she can effectively discuss her emotions and her thoughts  Update 04/29/2022: Tra Nolen states that she feels that she does not want to "deal with people " She states, "No matter what I do, it's not recognized " Tra Nolen states that she feels that she does not get recognition for the hard work she does  She does continue to 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                    4  Yessy will maintain a "mild" level of anxiety and depression, as measured by the PHQ-9 and the CONNOR-7 screening tools  Update 04/29/2022: Yessy completed the PHQ-9 (score of 8, down from 16) and the CONNOR-7 (score of 9, down from 19)  These screenings will be attached to this service, for comparison with past and present screenings  Janna Kaylyn Kim will maintain medication adherence and follow up with her psychiatric provider  Update 04/29/2022: Tra Nolen states that she will be having a revision of her bariatric surgery, and she will need to change her medications to chewable and/or not time-released  She states that she continues to have sessions with Dr Yaakov Stuart, for medication management  She will call the nursing line prior to her bariatric surgery, to request the change in her medications   She reports that, during the past two weeks, she has missed a total of zero doses of psychiatric medications        GOAL 1: Modality: Individual 1-2 x per month   Completion Date to be determined, Medication Management and The person(s) responsible for carrying out the plan is  Yessy (client); Omid Valle (therapist); Dr Shavonne Lester (psychiatrist)      Clinician will use client-centered therapy, mindfulness-based strategies, DBT-informed skills, prolonged exposure, bilateral stimulation and solution-focused therapy to address Christina's symptoms of PTSD and emotion dysregulation  Christina will practice skills between sessions and will report back, during subsequent sessions regarding successes and barriers          [address] [place]: Diagnosis and Treatment Plan explained to Christina Christina relates understanding diagnosis and is agreeable to Treatment Plan        Client Comments : Please share your thoughts, feelings, need and/or experiences regarding your treatment plan: Not at this time  Gail Juan, 1970, actively participated in the review and update of this treatment plan during a virtual session, using the [address] [place] [address] Yessica  provided verbal consent on 4/29/2022 at 3:05 PM  The treatment plan was transcribed into the [employer] at a later time

## 2022-05-13 ENCOUNTER — HOSPITAL ENCOUNTER (OUTPATIENT)
Dept: RADIOLOGY | Facility: CLINIC | Age: 52
Discharge: HOME | End: 2022-05-13
Attending: ANESTHESIOLOGY
Payer: COMMERCIAL

## 2022-05-13 VITALS
HEART RATE: 65 BPM | SYSTOLIC BLOOD PRESSURE: 120 MMHG | TEMPERATURE: 98.6 F | RESPIRATION RATE: 18 BRPM | DIASTOLIC BLOOD PRESSURE: 76 MMHG | OXYGEN SATURATION: 94 %

## 2022-05-13 DIAGNOSIS — M51.16 INTERVERTEBRAL DISC DISORDER WITH RADICULOPATHY OF LUMBAR REGION: ICD-10-CM

## 2022-05-13 PROCEDURE — 64483 NJX AA&/STRD TFRM EPI L/S 1: CPT | Performed by: ANESTHESIOLOGY

## 2022-05-13 PROCEDURE — 64484 NJX AA&/STRD TFRM EPI L/S EA: CPT | Performed by: ANESTHESIOLOGY

## 2022-05-13 RX ORDER — BUPIVACAINE HCL/PF 2.5 MG/ML
2 VIAL (ML) INJECTION ONCE
Status: COMPLETED | OUTPATIENT
Start: 2022-05-13 | End: 2022-05-13

## 2022-05-13 RX ORDER — 0.9 % SODIUM CHLORIDE 0.9 %
4 VIAL (ML) INJECTION ONCE
Status: COMPLETED | OUTPATIENT
Start: 2022-05-13 | End: 2022-05-13

## 2022-05-13 RX ORDER — METHYLPREDNISOLONE ACETATE 80 MG/ML
80 INJECTION, SUSPENSION INTRA-ARTICULAR; INTRALESIONAL; INTRAMUSCULAR; PARENTERAL; SOFT TISSUE ONCE
Status: COMPLETED | OUTPATIENT
Start: 2022-05-13 | End: 2022-05-13

## 2022-05-13 RX ADMIN — BUPIVACAINE HYDROCHLORIDE 2 ML: 2.5 INJECTION, SOLUTION EPIDURAL; INFILTRATION; INTRACAUDAL at 10:08

## 2022-05-13 RX ADMIN — Medication 4 ML: at 10:08

## 2022-05-13 RX ADMIN — METHYLPREDNISOLONE ACETATE 80 MG: 80 INJECTION, SUSPENSION INTRA-ARTICULAR; INTRALESIONAL; INTRAMUSCULAR; PARENTERAL; SOFT TISSUE at 10:08

## 2022-05-13 RX ADMIN — IOHEXOL 1 ML: 300 INJECTION, SOLUTION INTRAVENOUS at 10:08

## 2022-05-13 NOTE — DISCHARGE INSTR - LAB
Epidural Steroid Injection   WHAT YOU NEED TO KNOW:   An epidural steroid injection (JESSICA) is a procedure to inject steroid medicine into the epidural space  The epidural space is between your spinal cord and vertebrae  Steroids reduce inflammation and fluid buildup in your spine that may be causing pain  You may be given pain medicine along with the steroids  ACTIVITY  Do not drive or operate machinery today  No strenuous activity today - bending, lifting, etc   You may resume normal activites starting tomorrow - start slowly and as tolerated  You may shower today, but no tub baths or hot tubs  You may have numbness for several hours from the local anesthetic  Please use caution and common sense, especially with weight-bearing activities  CARE OF THE INJECTION SITE  If you have soreness or pain, apply ice to the area today (20 minutes on/20 minutes off)  Starting tomorrow, you may use warm, moist heat or ice if needed  You may have an increase or change in your discomfort for 36-48 hours after your treatment  Apply ice and continue with any pain medication you have been prescribed  Notify the [facility] if you have any of the following: redness, drainage, swelling, headache, stiff neck or fever above 100°F     SPECIAL INSTRUCTIONS  Our office will contact you in approximately 7 days for a progress report  MEDICATIONS  Continue to take all routine medications  Our office may have instructed you to hold some medications  As no general anesthesia was used in today's procedure, you should not experience any side effects related to anesthesia  If you have a problem specifically related to your procedure, please call our office at [phone]  Problems not related to your procedure should be directed to your primary care physician

## 2022-05-13 NOTE — H&P
History of Present Illness: The patient is a 47 y o  female who presents with complaints of right lower back and leg pain is here today for right L5-S1 transforaminal epidural steroid injection  Patient Active Problem List   Diagnosis    IBS (irritable bowel syndrome)    Mixed hyperlipidemia    GERD (gastroesophageal reflux disease)    Chronic back pain    Cervical radiculopathy    Hepatic steatosis    Pulmonary nodule seen on imaging study    S/P laparoscopic sleeve gastrectomy    BMI 28 0-28 9,adult    Postsurgical malabsorption    Lumbar radiculopathy    Intervertebral disc disorder with radiculopathy of lumbar region    Post traumatic stress disorder (PTSD)    Major depressive disorder, recurrent severe without psychotic features ([address] )    Generalized anxiety disorder    Sacroiliitis (HCC)    Chronic pain syndrome    Other chronic pain    Trochanteric bursitis, left hip    Paroxysmal SVT (supraventricular tachycardia) (HCC)    Prediabetes    ADHD (attention deficit hyperactivity disorder), inattentive type    Viral infection, unspecified    Rib pain on left side    Seasonal allergies       Past Medical History:   Diagnosis Date    ADHD (attention deficit hyperactivity disorder)     Bulging lumbar disc     Carpal tunnel syndrome     RIGHT  LAST ASSESSED: 12/7/16    Chronic back pain     low    Chronic pain disorder     Colon polyps     COVID-19     12/2021    Diabetes mellitus ([address] )     Resolved post weight loss    GERD (gastroesophageal reflux disease)     Gestational diabetes     Hearing loss     left ear    Hyperlipidemia     Resolved with weight loss    Hyponatremia 12/12/2020    IBS (irritable bowel syndrome)     Ileus ([address] )     LAST ASSESSED: 8/3/17    Labial cyst     LAST ASSESSED: 4/21/16    Myofascial pain     LAST ASSESSED: 4/12/16    Obesity     Ovarian cyst     LEFT   LAST ASSESSED: 9/2/16    Panic attack     Pneumonia     Seasonal allergies     [facility]) [facility]     Thoracic outlet syndrome         Trochanteric bursitis of both hips     LAST ASSESSED: 3/18/16    Ulnar neuropathy at elbow     Varicella     Wears glasses        Past Surgical History:   Procedure Laterality Date    BILE DUCT EXPLORATION      ENDOSCOPIC REMOVAL OF STONES FROM BILIARY TRACT     SECTION      x3    CHOLECYSTECTOMY      COLONOSCOPY      -polyp, repeat     DILATION AND CURETTAGE OF UTERUS      ENDOMETRIAL ABLATION      ERCP W/ SPHICTEROTOMY      FIRST RIB REMOVAL      THORAX EXCISION OF FIRST RIB    HYSTEROSCOPY      NEUROPLASTY / TRANSPOSITION ULNAR NERVE AT ELBOW Right 2011    IA COLONOSCOPY FLX DX W/COLLJ SPEC WHEN PFRMD N/A 2017    Procedure: COLONOSCOPY;  Surgeon: Julia Hickey MD;  [facility]; Service: Gastroenterology    IA ESOPHAGOGASTRODUODENOSCOPY TRANSORAL DIAGNOSTIC N/A 2017    Procedure: ESOPHAGOGASTRODUODENOSCOPY (EGD); Surgeon: Rhonda Milner MD;  [facility]; Service: Gastroenterology    IA HYSTEROSCOPY,W/ENDO BX N/A 10/20/2017    Procedure: DILATATION AND CURETTAGE (D&C) WITH HYSTEROSCOPY  REMOVAL VULVAR RT  LESION;  Surgeon: Hector Eldridge MD;  [facility];  Service: Gynecology    IA LAP, ÁLVARO RESTRICT PROC, LONGITUDINAL GASTRECTOMY N/A 2018    Procedure: GASTRECTOMY SLEEVE LAPAROSCOPIC; INTRAOPERATIVE EGD ;  Surgeon: Tamara Quiroz MD;  [facility];  Service: Rosibel Núñez SURG IMPLNT [address] Left 2020    Procedure: Insertion of thoracic spinal cord stimulator electrode via laminotomy and placement of left buttock implantable pulse generator (NEUROMONITORING);   Surgeon: Camila Miller MD;  [facility];  Service: Neurosurgery    SPINAL CORD STIMULATOR TRIAL W/ LAMINOTOMY      TONSILLECTOMY AND ADENOIDECTOMY      TUBAL LIGATION      UPPER GASTROINTESTINAL ENDOSCOPY      VEIN LIGATION AND STRIPPING Right     VULVA SURGERY 10/20/2017    BIOPSY    WISDOM TOOTH EXTRACTION           Current Outpatient Medications:     atoMOXetine (STRATTERA) 60 mg capsule, Take 1 capsule (60 mg total) by mouth daily, Disp: 90 capsule, Rfl: 1    atorvastatin (LIPITOR) 10 mg tablet, Take 1 tablet (10 mg total) by mouth daily, Disp: 90 tablet, Rfl: 3    Calcium Carbonate-Vit D-Min (CALCIUM 1200 PO), Take 2,400 mg by mouth daily, Disp: , Rfl:     cholestyramine (QUESTRAN) 4 g packet, Take 1 packet (4 g total) by mouth daily, Disp: 30 each, Rfl: 3    Continuous Blood Gluc  (FreeStyle Pasha 14 Day Neotsu) JORDY, Use 1 Device every 14 (fourteen) days, Disp: 2 each, Rfl: 3    Continuous Blood Gluc Sensor (FreeStyle Pasha 14 Day Sensor) MISC, Use 1 applicator every 14 (fourteen) days, Disp: 2 each, Rfl: 3    drospirenone-ethinyl estradiol (LIEN) 3-0 02 MG per tablet, Take 1 tablet by mouth daily, Disp: 84 tablet, Rfl: 4    famotidine (PEPCID) 20 mg tablet, TAKE ONE TABLET BY MOUTH 2 TIMES A DAY, Disp: 60 tablet, Rfl: 0    ferrous sulfate 325 (65 Fe) mg tablet, Take 325 mg by mouth daily with breakfast, Disp: , Rfl:     HYDROcodone-acetaminophen (NORCO) 5-325 mg per tablet, Take 1 tab PO daily as needed, Disp: 30 tablet, Rfl: 0    lamoTRIgine (LaMICtal) 100 mg tablet, Take 1 tablet (100 mg total) by mouth daily, Disp: 90 tablet, Rfl: 1    lamoTRIgine (LaMICtal) 150 MG tablet, Take 1 tablet (150 mg total) by mouth daily, Disp: 90 tablet, Rfl: 1    metFORMIN (GLUCOPHAGE-XR) 750 mg 24 hr tablet, Take 1 tablet (750 mg total) by mouth every evening, Disp: 90 tablet, Rfl: 0    methocarbamol (ROBAXIN) 750 mg tablet, Take 1 tablet (750 mg total) by mouth daily at bedtime as needed for muscle spasms, Disp: 30 tablet, Rfl: 2    montelukast (SINGULAIR) 10 mg tablet, Take 1 tablet (10 mg total) by mouth daily at bedtime, Disp: 90 tablet, Rfl: 2    Multiple Vitamins-Minerals (MULTI COMPLETE PO), Take by mouth, Disp: , Rfl:     pantoprazole (PROTONIX) 40 mg tablet, Take 1 tablet (40 mg total) by mouth 2 (two) times a day, Disp: 90 tablet, Rfl: 3    phenazopyridine (PYRIDIUM) 200 mg tablet, Take 1 tablet (200 mg total) by mouth 3 (three) times a day with meals, Disp: 10 tablet, Rfl: 0    propranolol (INDERAL LA) 60 mg 24 hr capsule, Take 1 capsule (60 mg total) by mouth 2 (two) times a day, Disp: 180 capsule, Rfl: 2    QUEtiapine (SEROquel) 50 mg tablet, Take 1 5 tablets (75 mg total) by mouth daily at bedtime, Disp: 135 tablet, Rfl: 1    venlafaxine (EFFEXOR-XR) 150 mg 24 hr capsule, Take 1 capsule (150 mg total) by mouth daily, Disp: 90 capsule, Rfl: 1    venlafaxine (EFFEXOR-XR) 37 5 mg 24 hr capsule, Take 1 capsule (37 5 mg total) by mouth daily, Disp: 90 capsule, Rfl: 1    Current Facility-Administered Medications:     bupivacaine (PF) (MARCAINE) 0 25 % injection 2 mL, 2 mL, Epidural, Once, Kimmy Chang MD    iohexol (OMNIPAQUE) 300 mg/mL injection 1 mL, 1 mL, Epidural, Once, Kimmy Chang MD    lidocaine (PF) (XYLOCAINE-MPF) 2 % injection 4 mL, 4 mL, Infiltration, Once, Kimmy Chang MD    methylPREDNISolone acetate (DEPO-MEDROL) injection 80 mg, 80 mg, Epidural, Once, Kimmy Chang MD    sodium chloride (PF) 0 9 % injection 4 mL, 4 mL, Infiltration, Once, Kimym Chang MD    Allergies   Allergen Reactions    Ibuprofen Other (See Comments)     Due to gastric sleeve -can only take for 5 days, then needs to stop       Physical Exam:   Vitals:    05/13/22 0938   BP: 102/60   Pulse: 67   Resp: 18   Temp: 98 6 °F (37 °C)   SpO2: 96%     General: Awake, Alert, Oriented x 3, Mood and affect appropriate  Respiratory: Respirations even and unlabored  Cardiovascular: Peripheral pulses intact; no edema  Musculoskeletal Exam:  Right lower back and leg pain    ASA Score: 2    Patient/Chart Verification  Patient ID Verified: Verbal  ID Band Applied: No  Consents Confirmed: Procedural, To be obtained in the Pre-Procedure area  H&P( within 30 days) Verified: To be obtained in the Pre-Procedure area  Allergies Reviewed: Yes  Anticoag/NSAID held?: No  Currently on antibiotics?: No    Assessment:   1   Intervertebral disc disorder with radiculopathy of lumbar region        Plan: right L5-S1 TFESI

## 2022-05-17 ENCOUNTER — TELEPHONE (OUTPATIENT)
Dept: BARIATRICS | Facility: CLINIC | Age: 52
End: 2022-05-17

## 2022-05-17 NOTE — TELEPHONE ENCOUNTER
Received call from Pt re: revision surgery date  Explained to Pt that this would be a question for her   Offered to transfer her to her coordinator's voicemail as coordinator is off site this morning  Pt first requested to reschedule her missed weight check appointment for May  New appointment scheduled for 5/27/2022  Transferred Pt to 's voicemail Adore Bradford)

## 2022-05-19 DIAGNOSIS — E11.9 TYPE 2 DIABETES MELLITUS WITHOUT COMPLICATION, WITHOUT LONG-TERM CURRENT USE OF INSULIN (HCC): Primary | ICD-10-CM

## 2022-05-19 RX ORDER — BLOOD-GLUCOSE TRANSMITTER
EACH MISCELLANEOUS DAILY
Qty: 1 EACH | Refills: 3 | Status: SHIPPED | OUTPATIENT
Start: 2022-05-19 | End: 2022-05-23

## 2022-05-19 RX ORDER — BLOOD-GLUCOSE,RECEIVER,CONT
EACH MISCELLANEOUS
Qty: 1 EACH | Refills: 3 | Status: SHIPPED | OUTPATIENT
Start: 2022-05-19 | End: 2022-05-23

## 2022-05-20 ENCOUNTER — TELEPHONE (OUTPATIENT)
Dept: PAIN MEDICINE | Facility: CLINIC | Age: 52
End: 2022-05-20

## 2022-05-21 DIAGNOSIS — E78.2 MIXED HYPERLIPIDEMIA: ICD-10-CM

## 2022-05-21 DIAGNOSIS — K21.9 GASTROESOPHAGEAL REFLUX DISEASE: ICD-10-CM

## 2022-05-21 RX ORDER — FAMOTIDINE 20 MG/1
TABLET, FILM COATED ORAL
Qty: 180 TABLET | Refills: 0 | Status: SHIPPED | OUTPATIENT
Start: 2022-05-21 | End: 2022-08-09

## 2022-05-21 RX ORDER — ATORVASTATIN CALCIUM 10 MG/1
TABLET, FILM COATED ORAL
Qty: 90 TABLET | Refills: 0 | Status: SHIPPED | OUTPATIENT
Start: 2022-05-21 | End: 2022-08-02

## 2022-05-23 ENCOUNTER — CLINICAL SUPPORT (OUTPATIENT)
Dept: FAMILY MEDICINE CLINIC | Facility: CLINIC | Age: 52
End: 2022-05-23

## 2022-05-23 DIAGNOSIS — E11.9 TYPE 2 DIABETES MELLITUS WITHOUT COMPLICATION, WITHOUT LONG-TERM CURRENT USE OF INSULIN (HCC): Primary | ICD-10-CM

## 2022-05-23 NOTE — PROGRESS NOTES
[address] [place] [address]  Didi Moore is a 47 y o  female who presents via video for initial clinical pharmacist consult  Reason for visit: diabetes  Plan: The following actions were taken today by the Clinical Pharmacist:   1  Medications:    - DC Metformin  - Start Ozempic 0 25mg once weekly    Follow-up:   Follow-up with pharmacist in 2 weeks  Next PCP visit: not yet scheduled    - Home Monitoring Records: SMBGs  - Labs: up to date  - Referrals: none    The following items are to be considered at a future visit:   1  Syncing [employer]  2  Titrating Ozempic     Chronic Conditions Addressed at this Visit:       Diabetes: Goals - A1c: <7%; SMBGs: Preprandial: 80-130mg/dL per ADA Guidelines  - Most recent A1c: below goal but not reflective of current treatment    - SMBG: above goal - not monitoring regularly, but patient ordered Liv[employer] through [employer] employee health plan  Current DM Regimen:  Metformin XR 750mg once daily   MEDICATIONS: DC Metformin (GI side effects)  Start Ozempic as prescribed  HOME MONITORING: Check blood sugars daily and record  HTN: BP goal: <130/80 mmHg based on ADA Guidelines [proteinuria, existing ASCVD, or 10-year ASCVD risk =15%]  - In-office BP below goal, HR acceptable  MEDICATIONS: none (propranolol treating paroxsymal SVT)  Continue as prescribed  LABS: labs are reviewed, up to date and normal  Serum K+, Na+, SCr WNL  ASCVD primary prevention   2018 ACC/AHA blood cholesterol guidelines recommends moderate-intensity statin  Patient tolerating statin well without side effects  MEDICATIONS: atorvastatin 10mg daily  Continue  as prescribed  LABS: labs reviewed, I note that lipids triglycerides high  Medication Reconciliation: Medication list reviewed with patient at today's visit and updated to reflect medications patient is currently taking   - Medication adherence is good   Efforts to improve compliance will be directed at dietary modifications: lower carbs, more protein with meals and snacks, increased exercise and regular blood sugar monitoring: daily  Education:  - Discussed ABCs of diabetes management (A1c/BP/cholesterol) and goals with patient today    Patient-specific goals:  1  Start Ozempic  2  Start using [employer] [facility] - will work on syncing so we can receive SMBG once she gets monitor     Subjective:     DM History:  Microvascular complications: none  Cardiovascular complications: none  - Aspirin (Men>50, Women>60): Not Indicated  - Statin: Yes   - ACEi/ARB: No   Previous DM medications:   Metformin and Victoza prior to bariatric surgery    DM Self-Management:  - Self-monitoring: are not performed  She checks BG never as of now, including FBG  She did not provide blood glucose log today  - SMBG readings (no log, per memory): none  - Hypoglycemia: (+) awareness/unawareness/denies any episodes  Glucometer: Yes, Brand: [employer]  CGM: No, Brand: none    Patient reports being on metformin and victoza previously then underwentbariatric surgery  Since she is now gaining weight back, struggling with hunger cravings and A1C is on the incline  Was interested in CGM but does not qualify  Hooked up with [employer] program      Was started on metformin XR but had diarrhea  Will titrate ozempic so patient can get dual benefit of diabetes control as well as satiety, weight loss, and cardiovascular prevention  Medication Adherence: Responsible for medication management: patient  Medication adherence: taking as prescribed  Patient denies missed/extra doses       Medication Efficacy/Safety:  Clinically significant drug interactions identified:  None  Side effects from medication(s) reported:  metformin - diarrhea      Lifestyle:  Social History     Tobacco Use    Smoking status: Former Smoker     Quit date: 2013     Years since quittin 0    Smokeless tobacco: Never Used   Vaping Use    Vaping Use: Never used   Substance Use Topics    Alcohol use: Yes     Alcohol/week: 0 0 - 2 0 standard drinks     Comment: Occs -twice monthly    Drug use: No          Objective:   SMBG readings ( ):  none  Current Outpatient Medications   Medication Instructions    atoMOXetine (STRATTERA) 60 mg, Oral, Daily    atorvastatin (LIPITOR) 10 mg tablet TAKE ONE TABLET BY MOUTH DAILY    Calcium Carbonate-Vit D-Min (CALCIUM 1200 PO) 2,400 mg, Oral, Daily    cholestyramine (QUESTRAN) 4 g, Oral, Daily    drospirenone-ethinyl estradiol (LIEN) 3-0 02 MG per tablet 1 tablet, Oral, Daily    famotidine (PEPCID) 20 mg tablet TAKE ONE TABLET BY MOUTH 2 TIMES A DAY    ferrous sulfate 325 mg, Oral, Daily with breakfast    HYDROcodone-acetaminophen (NORCO) 5-325 mg per tablet Take 1 tab PO daily as needed    lamoTRIgine (LAMICTAL) 100 mg, Oral, Daily    lamoTRIgine (LAMICTAL) 150 mg, Oral, Daily    methocarbamol (ROBAXIN) 750 mg, Oral, Daily at bedtime PRN    montelukast (SINGULAIR) 10 mg, Oral, Daily at bedtime    Multiple Vitamins-Minerals (MULTI COMPLETE PO) Oral    pantoprazole (PROTONIX) 40 mg, Oral, 2 times daily    phenazopyridine (PYRIDIUM) 200 mg, Oral, 3 times daily with meals    propranolol (INDERAL LA) 60 mg, Oral, 2 times daily    QUEtiapine (SEROQUEL) 75 mg, Oral, Daily at bedtime    venlafaxine (EFFEXOR-XR) 150 mg, Oral, Daily    venlafaxine (EFFEXOR-XR) 37 5 mg, Oral, Daily       I have reviewed the patient's allergies, medications and history as noted in the electronic medical record and updated as necessary  Vital Signs:  BP Readings from Last 3 Encounters:   05/13/22 120/76   04/28/22 124/80   04/25/22 130/80     Pulse Readings from Last 3 Encounters:   05/13/22 65   04/28/22 68   04/25/22 72      Estimated body mass index is 32 44 kg/m² as calculated from the following:    Height as of 4/28/22: 5' 6" (1 676 m)  Weight as of 4/28/22: 91 2 kg (201 lb)       Pertinent Lab Data:  Lab Results   Component Value Date    SODIUM 139 04/15/2022    K 4 1 04/15/2022     04/15/2022    CO2 28 04/15/2022    BUN 9 04/15/2022    CREATININE 0 69 04/15/2022    GLUC 111 09/14/2021    CALCIUM 9 3 04/15/2022     Lab Results   Component Value Date    HGBA1C 6 8 (H) 04/15/2022    [id number] 509 05/20/2021        Preventative Care:  A1c measurement: Up to date  Dilated eye exam: Overdue  Foot exam: Overdue  Dental exam: Overdue  Urinary microalbumin measurement: Overdue  Health Maintenance Due   Topic Date Due    Pneumococcal Vaccine: Pediatrics (0 to 5 Years) and At-Risk Patients (6 to 59 Years) (1 - PCV) Never done    HIV Screening  Never done    DTaP,Tdap,and Td Vaccines (2 - Td or Tdap) 09/01/2021    COVID-19 Vaccine (4 - Booster for Pfizer series) 04/11/2022    Breast Cancer Screening: Mammogram  06/24/2022       Reason For Outreach  Embedded Pharmacist    Demographics  Interaction Method: Virtual  Type of Intervention: New    Topic(s) Addressed  Diabetes; Obesity; CGM    Intervention(s) Made    Pharmacologic:    -- Medication Initiation  -- Medication Discontinuation  -- Prevent or Manage Adverse Drug Reaction  -- Drug Interaction  -- Medication reconciliation    Non-Pharmacologic:    -- Adherence addressed  -- Care coordination  -- Chart update  -- Cost  -- Disease state education  -- Home monitoring discussed or provided  -- Preventive care gap closure recommendation    Tool(s) Used  Not Applicable    Time Spent:   Time Spent in Direct Patient Care: 20 minutes  Time Spent in Care Coordination: 45 minutes    Recommendations  Recipient: Patient/caregiver  Outcome:  All Accepted

## 2022-05-27 ENCOUNTER — OFFICE VISIT (OUTPATIENT)
Dept: BARIATRICS | Facility: CLINIC | Age: 52
End: 2022-05-27
Payer: COMMERCIAL

## 2022-05-27 VITALS
HEART RATE: 83 BPM | SYSTOLIC BLOOD PRESSURE: 112 MMHG | WEIGHT: 193 LBS | DIASTOLIC BLOOD PRESSURE: 70 MMHG | HEIGHT: 66 IN | TEMPERATURE: 97.5 F | BODY MASS INDEX: 31.02 KG/M2

## 2022-05-27 DIAGNOSIS — K21.9 GASTROESOPHAGEAL REFLUX DISEASE WITHOUT ESOPHAGITIS: ICD-10-CM

## 2022-05-27 DIAGNOSIS — I47.1 PAROXYSMAL SVT (SUPRAVENTRICULAR TACHYCARDIA) (HCC): ICD-10-CM

## 2022-05-27 DIAGNOSIS — E11.9 TYPE 2 DIABETES MELLITUS WITHOUT COMPLICATION, WITHOUT LONG-TERM CURRENT USE OF INSULIN (HCC): ICD-10-CM

## 2022-05-27 DIAGNOSIS — E66.9 OBESITY, CLASS I, BMI 30-34.9: Primary | ICD-10-CM

## 2022-05-27 DIAGNOSIS — Z98.84 BARIATRIC SURGERY STATUS: ICD-10-CM

## 2022-05-27 DIAGNOSIS — K58.8 OTHER IRRITABLE BOWEL SYNDROME: ICD-10-CM

## 2022-05-27 PROCEDURE — 99214 OFFICE O/P EST MOD 30 MIN: CPT | Performed by: PHYSICIAN ASSISTANT

## 2022-05-27 NOTE — PROGRESS NOTES
Assessment/Plan:    Diagnoses and all orders for this visit:    Obesity, Class I, BMI 30-34 9    Bariatric surgery status    Paroxysmal SVT (supraventricular tachycardia) (HCC)    Type 2 diabetes mellitus without complication, without long-term current use of insulin (HCC)    Gastroesophageal reflux disease without esophagitis    Other irritable bowel syndrome         -s/p Vertical [facility] [place] Dr Sharri Carty 2/6/2018    Interested in Revisional Surgery with Dr Manish Galloway  10% Weight loss-Not applicable   Screening labs-Completed  Support Group-Completed  Cardiac Risk Assessment-Completed  Upper Endoscopy-Completed  Sleep Medicine Assessment-Not applicable  Alcohol and nicotine use is not recommended following bariatric surgery  NSAIDs should be avoided following bariatric surgery  Advised that pregnancy should be avoided following bariatric surgery for 12-18 months and should not solely rely on OCP and consider additional/alternative sources for contraception due to potential absorption issues-Completed    Follow up in approximately 1 month  Goals:  Food log (ie ) [http://www  Glance,sparkpeople  com,loseit com,calorieking  com,etc]www  myfitnesspal com,sparkpeople  com,loseit com,calorieking  com,etc  baritastic  No sugary beverages  At least 64oz of water daily  Increase physical activity by 10 minutes daily   Gradually increase physical activity to a goal of 5 days per week for 30 minutes of MODERATE intensity PLUS 2 days per week of FULL BODY resistance training  Follow lessons 1-6 in the manual  Follow the 30/60 minute rule  Goal protein intake of 60-80 grams per day  Alcohol and nicotine use is not recommended following bariatric surgery  NSAIDs (Motrin, Advil, Aleve, Naproxen, ibuprofen, etc) should be avoided following bariatric surgery)    Subjective:   Chief Complaint   Patient presents with    Follow-up     Preop  support      Patient ID: Damon Wilson is a 47 y o  female with excess weight/obesity here to pursue weight management  Past Medical History:   Diagnosis Date    ADHD (attention deficit hyperactivity disorder)     Bulging lumbar disc     Carpal tunnel syndrome     RIGHT  LAST ASSESSED: 12/7/16    Chronic back pain     low    Chronic pain disorder     Colon polyps     COVID-19     12/2021    Diabetes mellitus ([facility] [address] )     Resolved post weight loss    GERD (gastroesophageal reflux disease)     Gestational diabetes     Hearing loss     left ear    Hyperlipidemia     Resolved with weight loss    Hyponatremia 12/12/2020    IBS (irritable bowel syndrome)     Ileus ([facility] [address] )     LAST ASSESSED: 8/3/17    Labial cyst     LAST ASSESSED: 4/21/16    Myofascial pain     LAST ASSESSED: 4/12/16    Obesity     Ovarian cyst     LEFT  LAST ASSESSED: 9/2/16    Panic attack     Pneumonia     Seasonal allergies     [facility]) [facility])     Thoracic outlet syndrome     2010    Trochanteric bursitis of both hips     LAST ASSESSED: 3/18/16    Ulnar neuropathy at elbow     Varicella     Wears glasses      HPI:   UGI showing mild-moderate reflux and EGD showing moderate amount of retained bile  Patient continues to have symptoms that are not improving despite medication compliance and despite refularly meeting with dieticians  As a result of her heart burn, she has developed maladaptive eating patterns, causing her to gain weight  Patient will benefit from gastric bypass for treatment of her GERD  The following portions of the patient's history were reviewed and updated as appropriate: allergies, current medications, past family history, past medical history, past social history, past surgical history and problem list     Review of Systems   Constitutional: Negative  Respiratory: Negative  Cardiovascular: Negative  Gastrointestinal: Positive for diarrhea (hx IBS)  Negative for nausea and vomiting         + reflux   Neurological: Negative  Psychiatric/Behavioral: Negative  Objective:  /70   Pulse 83   Temp 97 5 °F (36 4 °C) (Tympanic)   Ht 5' 6" (1 676 m)   Wt 87 5 kg (193 lb)   LMP 01/07/2019 (Approximate)   BMI 31 15 kg/m²   Physical Exam  Vitals and nursing note reviewed  Constitutional:       Appearance: Normal appearance  She is obese  HENT:      Head: Normocephalic and atraumatic  Eyes:      Extraocular Movements: Extraocular movements intact  Pupils: Pupils are equal, round, and reactive to light  Cardiovascular:      Rate and Rhythm: Normal rate and regular rhythm  Pulmonary:      Effort: Pulmonary effort is normal       Breath sounds: Normal breath sounds  Abdominal:      General: Bowel sounds are normal       Tenderness: There is no abdominal tenderness  Musculoskeletal:         General: Normal range of motion  Cervical back: Normal range of motion  Skin:     General: Skin is warm and dry  Neurological:      General: No focal deficit present  Mental Status: She is alert and oriented to person, place, and time     Psychiatric:         Mood and Affect: Mood normal

## 2022-06-03 ENCOUNTER — TELEPHONE (OUTPATIENT)
Dept: PSYCHIATRY | Facility: CLINIC | Age: 52
End: 2022-06-03

## 2022-06-03 ENCOUNTER — TELEMEDICINE (OUTPATIENT)
Dept: BEHAVIORAL/MENTAL HEALTH CLINIC | Facility: CLINIC | Age: 52
End: 2022-06-03
Payer: COMMERCIAL

## 2022-06-03 DIAGNOSIS — K21.9 GASTROESOPHAGEAL REFLUX DISEASE WITHOUT ESOPHAGITIS: ICD-10-CM

## 2022-06-03 DIAGNOSIS — F33.2 MAJOR DEPRESSIVE DISORDER, RECURRENT SEVERE WITHOUT PSYCHOTIC FEATURES (HCC): Primary | Chronic | ICD-10-CM

## 2022-06-03 DIAGNOSIS — F41.1 GENERALIZED ANXIETY DISORDER: Chronic | ICD-10-CM

## 2022-06-03 DIAGNOSIS — F43.10 POST TRAUMATIC STRESS DISORDER (PTSD): Chronic | ICD-10-CM

## 2022-06-03 PROCEDURE — 90834 PSYTX W PT 45 MINUTES: CPT | Performed by: SOCIAL WORKER

## 2022-06-03 RX ORDER — PANTOPRAZOLE SODIUM 40 MG/1
TABLET, DELAYED RELEASE ORAL
Qty: 90 TABLET | Refills: 0 | Status: SHIPPED | OUTPATIENT
Start: 2022-06-03 | End: 2022-08-09

## 2022-06-03 NOTE — TELEPHONE ENCOUNTER
She is having surgery in August 8th and she is on effexor she doesn't know what meds will need to be changed to have the surgery she would like a call back to discuss further  Thank you   Pt # [phone]

## 2022-06-07 ENCOUNTER — CLINICAL SUPPORT (OUTPATIENT)
Dept: FAMILY MEDICINE CLINIC | Facility: CLINIC | Age: 52
End: 2022-06-07

## 2022-06-07 DIAGNOSIS — E11.9 TYPE 2 DIABETES MELLITUS WITHOUT COMPLICATION, WITHOUT LONG-TERM CURRENT USE OF INSULIN (HCC): Primary | ICD-10-CM

## 2022-06-07 DIAGNOSIS — F41.1 GENERALIZED ANXIETY DISORDER: Chronic | ICD-10-CM

## 2022-06-07 DIAGNOSIS — F33.2 MAJOR DEPRESSIVE DISORDER, RECURRENT SEVERE WITHOUT PSYCHOTIC FEATURES (HCC): Chronic | ICD-10-CM

## 2022-06-07 DIAGNOSIS — F33.2 SEVERE EPISODE OF RECURRENT MAJOR DEPRESSIVE DISORDER, WITHOUT PSYCHOTIC FEATURES (HCC): ICD-10-CM

## 2022-06-07 RX ORDER — VENLAFAXINE HYDROCHLORIDE 150 MG/1
150 CAPSULE, EXTENDED RELEASE ORAL DAILY
Qty: 90 CAPSULE | Refills: 1 | Status: SHIPPED | OUTPATIENT
Start: 2022-06-07 | End: 2022-06-16

## 2022-06-07 RX ORDER — VENLAFAXINE HYDROCHLORIDE 37.5 MG/1
CAPSULE, EXTENDED RELEASE ORAL
Qty: 90 CAPSULE | Refills: 0 | Status: SHIPPED | OUTPATIENT
Start: 2022-06-07 | End: 2022-06-16

## 2022-06-07 NOTE — PSYCH
Virtual Regular Visit    Verification of patient location:    Patient is located in the following state in which I hold an active license [place]    Assessment/Plan:    Problem List Items Addressed This Visit        Other    Post traumatic stress disorder (PTSD) (Chronic)    Major depressive disorder, recurrent severe without psychotic features ([facility] [address] ) - Primary (Chronic)    Generalized anxiety disorder (Chronic)          Goals addressed in session: Goal 1          Reason for visit is No chief complaint on file  Encounter provider LALY Plummer    Provider located at [address] [place] [id number] [phone]      Recent Visits  No visits were found meeting these conditions  Showing recent visits within past 7 days and meeting all other requirements  Future Appointments  No visits were found meeting these conditions  Showing future appointments within next 150 days and meeting all other requirements       The patient was identified by name and date of birth  Malachi Nunez was informed that this is a telemedicine visit and that the visit is being conducted throughEpic Embedded and patient was informed this is a secure, HIPAA-complaint platform  She agrees to proceed     My office door was closed  No one else was in the room  She acknowledged consent and understanding of privacy and security of the video platform  The patient has agreed to participate and understands they can discontinue the visit at any time  Patient is aware this is a billable service  Subjective  Malachi Nunez is a 47 y o  female  DATA: Met with [facility] for scheduled individual session  Topics of discussion included relationships with family, work-related stress, education-related stress and physical health concerns  Kin Rho discussed her upcoming bariatric surgery procedure   She expressed some concerns about the potential health concerns following the surgery  She is working with her doctors to ensure the best possible outcome  Anh Carrington discussed the completion of her spring semester  She states that she continues to have some concerns 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  This clinician provided support and encouragement-- as she states that she got good grades this past semester  Anh Carrington discussed the stress related to continuing to work full-time while also attending school, caring for her son, and maintaining her relationships  During this session, this clinician used the following therapeutic modalities: supportive psychotherapy, client-centered therapy, mindfulness-based strategies, DBT-informed skills, Motivational Interviewing and solution-focused therapy  ASSESSMENT: Tiffanie Ye presents with a normal mood  Her affect is normal range and intensity, appropriate  Tiffanie Ye exhibits good therapeutic rapport with this clinician  Tiffanie Ye continues to exhibit willingness to work on treatment goals and objectives  Tiffanie Ye presents with a minimal risk of suicide, minimal risk of self-harm, and minimal risk of harm to others  PLAN: Tiffanie Ye will return in approximately one month for the next scheduled session  Between sessions, Tiffanie Ye will continue to move forward with her surgical procedure  She will be taking the summer off from her school courses to care for her physical health  Ivanbenny Carrington will report back during the next session re: successes and barriers  At the next session, this clinician will use supportive psychotherapy, client-centered therapy, mindfulness-based strategies, DBT-informed skills, Motivational Interviewing and solution-focused therapy to address her mood regulation and relationship concerns, in an effort to assist Tiffanie Ye with meeting treatment goals  HPI     Past Medical History:   Diagnosis Date    ADHD (attention deficit hyperactivity disorder)     Bulging lumbar disc     Carpal tunnel syndrome     RIGHT    LAST ASSESSED: 16    Chronic back pain     low    Chronic pain disorder     Colon polyps     COVID-19     2021    Diabetes mellitus ([facility] [address] )     Resolved post weight loss    GERD (gastroesophageal reflux disease)     Gestational diabetes     Hearing loss     left ear    Hyperlipidemia     Resolved with weight loss    Hyponatremia 2020    IBS (irritable bowel syndrome)     Ileus ([facility] [address] )     LAST ASSESSED: 8/3/17    Labial cyst     LAST ASSESSED: 16    Myofascial pain     LAST ASSESSED: 16    Obesity     Ovarian cyst     LEFT  LAST ASSESSED: 16    Panic attack     Pneumonia     Seasonal allergies     [facility]ra[facility]cardia) [facility]     Thoracic outlet syndrome         Trochanteric bursitis of both hips     LAST ASSESSED: 3/18/16    Ulnar neuropathy at elbow     Varicella     Wears glasses        Past Surgical History:   Procedure Laterality Date    BILE DUCT EXPLORATION      ENDOSCOPIC REMOVAL OF STONES FROM BILIARY TRACT     SECTION      x3    CHOLECYSTECTOMY      COLONOSCOPY      2017-polyp, repeat     DILATION AND CURETTAGE OF UTERUS      ENDOMETRIAL ABLATION      ERCP W/ SPHICTEROTOMY      FIRST RIB REMOVAL      THORAX EXCISION OF FIRST RIB    HYSTEROSCOPY      NEUROPLASTY / TRANSPOSITION ULNAR NERVE AT ELBOW Right 2011    OR COLONOSCOPY FLX DX W/COLLJ SPEC WHEN PFRMD N/A 2017    Procedure: COLONOSCOPY;  Surgeon: Lucian Yanez MD;  [facility]; Service: Gastroenterology    OR ESOPHAGOGASTRODUODENOSCOPY TRANSORAL DIAGNOSTIC N/A 2017    Procedure: ESOPHAGOGASTRODUODENOSCOPY (EGD); Surgeon: Sasha Nails MD;  [facility];   Service: Gastroenterology    OR HYSTEROSCOPY,W/ENDO BX N/A 10/20/2017    Procedure: DILATATION AND CURETTAGE (D&C) WITH HYSTEROSCOPY  REMOVAL VULVAR RT  LESION;  Surgeon: Cathi Foster MD;  [facility];  Service: Gynecology    OR LAP, ÁLVARO RESTRICT PROC, LONGITUDINAL GASTRECTOMY N/A 2/6/2018    Procedure: GASTRECTOMY SLEEVE LAPAROSCOPIC; INTRAOPERATIVE EGD ;  Surgeon: Lorenza Strickland MD;  [facility];  Service: Marissa Thermal SURG IMPLNT [address] Left 1/29/2020    Procedure: Insertion of thoracic spinal cord stimulator electrode via laminotomy and placement of left buttock implantable pulse generator (NEUROMONITORING);   Surgeon: Hay Lewis MD;  [facility];  Service: Neurosurgery    SPINAL CORD STIMULATOR TRIAL W/ LAMINOTOMY      TONSILLECTOMY AND ADENOIDECTOMY      TUBAL LIGATION      UPPER GASTROINTESTINAL ENDOSCOPY      VEIN LIGATION AND STRIPPING Right     VULVA SURGERY  10/20/2017    BIOPSY    WISDOM TOOTH EXTRACTION         Current Outpatient Medications   Medication Sig Dispense Refill    atoMOXetine (STRATTERA) 60 mg capsule Take 1 capsule (60 mg total) by mouth daily 90 capsule 1    atorvastatin (LIPITOR) 10 mg tablet TAKE ONE TABLET BY MOUTH DAILY 90 tablet 0    Calcium Carbonate-Vit D-Min (CALCIUM 1200 PO) Take 2,400 mg by mouth daily      cholestyramine (QUESTRAN) 4 g packet Take 1 packet (4 g total) by mouth daily 30 each 3    drospirenone-ethinyl estradiol (LIEN) 3-0 02 MG per tablet Take 1 tablet by mouth daily 84 tablet 4    famotidine (PEPCID) 20 mg tablet TAKE ONE TABLET BY MOUTH 2 TIMES A  tablet 0    ferrous sulfate 325 (65 Fe) mg tablet Take 325 mg by mouth daily with breakfast      HYDROcodone-acetaminophen (NORCO) 5-325 mg per tablet Take 1 tab PO daily as needed 30 tablet 0    lamoTRIgine (LaMICtal) 100 mg tablet Take 1 tablet (100 mg total) by mouth daily 90 tablet 1    lamoTRIgine (LaMICtal) 150 MG tablet Take 1 tablet (150 mg total) by mouth daily 90 tablet 1    methocarbamol (ROBAXIN) 750 mg tablet Take 1 tablet (750 mg total) by mouth daily at bedtime as needed for muscle spasms 30 tablet 2    montelukast (SINGULAIR) 10 mg tablet Take 1 tablet (10 mg total) by mouth daily at bedtime 90 tablet 2    Multiple Vitamins-Minerals (MULTI COMPLETE PO) Take by mouth      pantoprazole (PROTONIX) 40 mg tablet TAKE ONE TABLET BY MOUTH EVERY DAY 90 tablet 0    phenazopyridine (PYRIDIUM) 200 mg tablet Take 1 tablet (200 mg total) by mouth 3 (three) times a day with meals 10 tablet 0    propranolol (INDERAL LA) 60 mg 24 hr capsule Take 1 capsule (60 mg total) by mouth 2 (two) times a day 180 capsule 2    QUEtiapine (SEROquel) 50 mg tablet Take 1 5 tablets (75 mg total) by mouth daily at bedtime 135 tablet 1    Semaglutide,0 25 or 0 5MG/DOS, 2 MG/1 5ML SOPN Inject 0 25 mg under the skin once a week for 4 doses 1 5 mL 0    venlafaxine (EFFEXOR-XR) 150 mg 24 hr capsule Take 1 capsule (150 mg total) by mouth daily 90 capsule 1    venlafaxine (EFFEXOR-XR) 37 5 mg 24 hr capsule Take 1 capsule (37 5 mg total) by mouth daily 90 capsule 1     No current facility-administered medications for this visit  Allergies   Allergen Reactions    Ibuprofen Other (See Comments)     Due to gastric sleeve -can only take for 5 days, then needs to stop       Review of Systems    Video Exam    There were no vitals filed for this visit  Physical Exam     I spent 45 minutes directly with the patient during this visit  Session start time 2:10pm    Session end time: 2:55pm     VIRTUAL VISIT DISCLAIMER    Kaya Landeros verbally agrees to participate in [employer]  Pt is aware that [employer] could be limited without vital signs or the ability to perform a full hands-on physical Havesg Gilbert understands she or the provider may request at any time to terminate the video visit and request the patient to seek care or treatment in person

## 2022-06-07 NOTE — PROGRESS NOTES
[address] [place] [address]  Charan Méndez is a 47 y o  female who presents via telephone for follow-up  Reason for visit: diabetes  Plan: The following actions were taken today by the Clinical Pharmacist:   1  Medications:    - Continue Ozempic 0 25mg once weekly x 6 doses total (dose three tomorrow)    Follow-up:   Follow-up with pharmacist in 3 weeks  Next PCP visit: not yet scheduled    - Home Monitoring Records: SMBGs  - Labs: up to date  - Referrals: none    The following items are to be considered at a future visit:   1  Syncing [place]  2  Titrating Ozempic     Chronic Conditions Addressed at this Visit:       Diabetes: Goals - A1c: <7%; SMBGs: Preprandial: 80-130mg/dL per ADA Guidelines  - Most recent A1c: below goal but not reflective of current treatment    - SMBG: per patient memory, 114-120mg/dL  Current DM Regimen:  Metformin XR 750mg once daily   MEDICATIONS: DC Metformin (GI side effects)  Start Ozempic as prescribed  HOME MONITORING: Check blood sugars daily and record  HTN: BP goal: <130/80 mmHg based on ADA Guidelines [proteinuria, existing ASCVD, or 10-year ASCVD risk =15%]  - In-office BP below goal, HR acceptable  MEDICATIONS: none (propranolol treating paroxsymal SVT)  Continue as prescribed  LABS: labs are reviewed, up to date and normal  Serum K+, Na+, SCr WNL  ASCVD primary prevention   2018 ACC/AHA blood cholesterol guidelines recommends moderate-intensity statin  Patient tolerating statin well without side effects  MEDICATIONS: atorvastatin 10mg daily  Continue  as prescribed  LABS: labs reviewed, I note that lipids triglycerides high  Medication Reconciliation: Medication list reviewed with patient at today's visit and updated to reflect medications patient is currently taking   - Medication adherence is good   Efforts to improve compliance will be directed at dietary modifications: lower carbs, more protein with meals and snacks, increased exercise and regular blood sugar monitoring: daily  Education:  - Discussed ABCs of diabetes management (A1c/BP/cholesterol) and goals with patient today    Patient-specific goals:  1  Continue Ozempic  2  Start using [facility] - will work on syncing so we can receive SMBG once she gets monitor     Subjective:     DM History:  Microvascular complications: none  Cardiovascular complications: none  - Aspirin (Men>50, Women>60): Not Indicated  - Statin: Yes   - ACEi/ARB: No   Previous DM medications:   Metformin and Victoza prior to bariatric surgery    DM Self-Management:  - Self-monitoring: are not performed  She checks BG never as of now, including FBG  She did not provide blood glucose log today  - SMBG readings (no log, per memory): none  - Hypoglycemia: (+) awareness/unawareness/denies any episodes  Glucometer: Yes, Brand: [email or web address]  CGM: No, Brand: none    Patient reports being on metformin and victoza previously then underwentbariatric surgery  Since she is now gaining weight back, struggling with hunger cravings and A1C is on the incline  Was interested in CGM but does not qualify  Hooked up with [facility] program      Was started on metformin XR but had diarrhea  Will titrate ozempic so patient can get dual benefit of diabetes control as well as satiety, weight loss, and cardiovascular prevention  Ozempic overall going well  Had diarrhea upon initiation but has since subsided  Has ongoing nausea managed by crackers, small sips of water, worst in the morning and improves over the course of the day  Will conduct longer taper of 0 25mg x 6 doses and then assess ability to titrate  Diabetes      Medication Adherence: Responsible for medication management: patient  Medication adherence: taking as prescribed  Patient denies missed/extra doses       Medication Efficacy/Safety:  Clinically significant drug interactions identified:  None  Side effects from medication(s) reported:  metformin - diarrhea      Lifestyle:  Social History     Tobacco Use    Smoking status: Former Smoker     Quit date: 2013     Years since quittin 1    Smokeless tobacco: Never Used   Vaping Use    Vaping Use: Never used   Substance Use Topics    Alcohol use: Yes     Alcohol/week: 0 0 - 2 0 standard drinks     Comment: Occs -twice monthly    Drug use: No          Objective:   SMBG readings ( ):  none  Current Outpatient Medications   Medication Instructions    atoMOXetine (STRATTERA) 60 mg, Oral, Daily    atorvastatin (LIPITOR) 10 mg tablet TAKE ONE TABLET BY MOUTH DAILY    Calcium Carbonate-Vit D-Min (CALCIUM 1200 PO) 2,400 mg, Oral, Daily    cholestyramine (QUESTRAN) 4 g, Oral, Daily    drospirenone-ethinyl estradiol (LIEN) 3-0 02 MG per tablet 1 tablet, Oral, Daily    famotidine (PEPCID) 20 mg tablet TAKE ONE TABLET BY MOUTH 2 TIMES A DAY    ferrous sulfate 325 mg, Oral, Daily with breakfast    HYDROcodone-acetaminophen (NORCO) 5-325 mg per tablet Take 1 tab PO daily as needed    lamoTRIgine (LAMICTAL) 100 mg, Oral, Daily    lamoTRIgine (LAMICTAL) 150 mg, Oral, Daily    methocarbamol (ROBAXIN) 750 mg, Oral, Daily at bedtime PRN    montelukast (SINGULAIR) 10 mg, Oral, Daily at bedtime    Multiple Vitamins-Minerals (MULTI COMPLETE PO) Oral    pantoprazole (PROTONIX) 40 mg tablet TAKE ONE TABLET BY MOUTH EVERY DAY    phenazopyridine (PYRIDIUM) 200 mg, Oral, 3 times daily with meals    propranolol (INDERAL LA) 60 mg, Oral, 2 times daily    QUEtiapine (SEROQUEL) 75 mg, Oral, Daily at bedtime    Semaglutide(0 25 or 0 5MG/DOS) 0 25 mg, Subcutaneous, Weekly    venlafaxine (EFFEXOR-XR) 37 5 mg 24 hr capsule TAKE ONE CAPSULE BY MOUTH DAILY    venlafaxine (EFFEXOR-XR) 150 mg, Oral, Daily       I have reviewed the patient's allergies, medications and history as noted in the electronic medical record and updated as necessary      Vital Signs:  BP Readings from Last 3 Encounters: 05/27/22 112/70   05/13/22 120/76   04/28/22 124/80     Pulse Readings from Last 3 Encounters:   05/27/22 83   05/13/22 65   04/28/22 68      Estimated body mass index is 31 15 kg/m² as calculated from the following:    Height as of 5/27/22: 5' 6" (1 676 m)  Weight as of 5/27/22: 87 5 kg (193 lb)  Pertinent Lab Data:  Lab Results   Component Value Date    SODIUM 139 04/15/2022    K 4 1 04/15/2022     04/15/2022    CO2 28 04/15/2022    BUN 9 04/15/2022    CREATININE 0 69 04/15/2022    GLUC 111 09/14/2021    CALCIUM 9 3 04/15/2022     Lab Results   Component Value Date    HGBA1C 6 8 (H) 04/15/2022    [id number] 05/20/2021        Preventative Care:  A1c measurement: Up to date  Dilated eye exam: Overdue  Foot exam: Overdue  Dental exam: Overdue  Urinary microalbumin measurement: Overdue  Health Maintenance Due   Topic Date Due    Pneumococcal Vaccine: Pediatrics (0 to 5 Years) and At-Risk Patients (6 to 59 Years) (1 - PCV) Never done    HIV Screening  Never done    URINE MICROALBUMIN  10/28/2018    Diabetic Foot Exam  01/31/2020    DTaP,Tdap,and Td Vaccines (2 - Td or Tdap) 09/01/2021    COVID-19 Vaccine (4 - Booster for Pfizer series) 04/11/2022    Breast Cancer Screening: Mammogram  06/24/2022       Reason For Outreach  Embedded Pharmacist    Demographics  Interaction Method: Phone  Type of Intervention: Follow-Up    Topic(s) Addressed  Diabetes; Obesity    Intervention(s) Made    Pharmacologic:    -- Prevent or Manage Adverse Drug Reaction    Non-Pharmacologic:    -- Adherence addressed  -- Home monitoring discussed or provided    Tool(s) Used  Not Applicable    Time Spent:   Time Spent in Direct Patient Care: 20 minutes  Time Spent in Care Coordination: 20 minutes    Recommendations  Recipient: Patient/caregiver  Outcome:  All Accepted

## 2022-06-13 ENCOUNTER — OFFICE VISIT (OUTPATIENT)
Dept: OBGYN CLINIC | Facility: CLINIC | Age: 52
End: 2022-06-13
Payer: COMMERCIAL

## 2022-06-13 VITALS
BODY MASS INDEX: 30.95 KG/M2 | HEIGHT: 66 IN | SYSTOLIC BLOOD PRESSURE: 100 MMHG | WEIGHT: 192.6 LBS | DIASTOLIC BLOOD PRESSURE: 62 MMHG

## 2022-06-13 DIAGNOSIS — N95.1 VASOMOTOR SYMPTOMS DUE TO MENOPAUSE: Primary | ICD-10-CM

## 2022-06-13 PROCEDURE — 99213 OFFICE O/P EST LOW 20 MIN: CPT | Performed by: NURSE PRACTITIONER

## 2022-06-13 NOTE — PATIENT INSTRUCTIONS
Lifestyle recommendations:  Daily exercise/ cardiovascular, 30 minutes (building up a sweat!) at least 5 times per week  Take a Women's multivitamin/ vitamin B6 100 mg daily, Evening primrose oil supplement  Supplements to help with hot flashes: Remifemin, Estroven (over the counter), I-COOL  Wear layers of clothing  Drink plenty of water  Practice meditation/ breathing exercises    Avoid triggers:  caffeine, alcohol, spicy foods, stress

## 2022-06-13 NOTE — PROGRESS NOTES
Assessment/Plan:    1  Vasomotor symptoms due to menopause    In 48 yo female--Severe, sweating, significantly affecting her QOL  Questionable as to whether she is consuelo vs postmenopause since she takes [place]  Explained the nature of consuelo-/ menopause and its typical natural course when a woman is not taking any hormones  Explained that she is already on an OCP and already taking an SSRI both of which are treatment for perimenopausal hot flashes/ sweats  She also takes total daily dose of 250 mg lamotrigine which may be affecting metabolism of her OCP and has malabsorption associated with her gastric sleeve/ future bypass revision  Discussed consideration of stopping her OCP, checking her [address] in 1-2 months and changing HT to a patch  I will also discuss with Dr Lexus Beltran as well since she known Jax Shelby from the past         Subjective:      Patient ID: Charan Méndez is a 47 y o  female  HPI  PROBLEM VISIT  CC: hot flashes    48 yo  PMF presents with complaint of hot flashes and sweats-- "battling really bad for the past year" and "seems to be getting worse "  Unable to quantify number of hot flashes as she suffers all day long with profuse sweating  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 pouring into her eyes causing burning  She sleeps at night with air/fan on even when it's cold out, she is able to sleep though with her seroquel  She has a past hx of EMA for abn bleeding that was unsuccessful and subsequent treatment with OCP Merna which prevents her from bleeding at all  She was last seen by Dr Lexus Beltran 2021 for her yearly  Hx of gastric sleeve, will be having a revision to a bypass in 22 as she has been "sick a lot"-- GERD worse, nauseated  May need to have her meds changed to chewables due to greater malabsorption with a bypass  Bipolar disorder-- takes several medications for this including lamictal, effexor and seroquel     Diabetes- 2022 HgbA1c 6 8  Mother breast ca at age 47  5/26 TSH 1 27    The following portions of the patient's history were reviewed and updated as appropriate: allergies, current medications, past family history, past medical history, past social history, past surgical history and problem list     Review of Systems   Constitutional: Negative for chills and fever  Respiratory: Negative for cough and shortness of breath  Gastrointestinal: Positive for nausea  GERD   Endocrine: Positive for heat intolerance  Genitourinary: Negative  Objective:    /62 (BP Location: Right arm, Patient Position: Sitting, Cuff Size: Standard)   Ht 5' 6" (1 676 m)   Wt 87 4 kg (192 lb 9 6 oz)   LMP 01/07/2019 (Approximate)   BMI 31 09 kg/m²      Physical Exam  Constitutional:       General: She is not in acute distress  Appearance: Normal appearance  She is not ill-appearing or diaphoretic  Comments: BMI 31 1   HENT:      Head: Normocephalic and atraumatic  Eyes:      Pupils: Pupils are equal, round, and reactive to light  Pulmonary:      Effort: Pulmonary effort is normal    Skin:     General: Skin is warm and dry  Neurological:      General: No focal deficit present  Mental Status: She is alert and oriented to person, place, and time  Psychiatric:         Mood and Affect: Mood normal          Behavior: Behavior normal          Thought Content:  Thought content normal          Judgment: Judgment normal

## 2022-06-16 ENCOUNTER — TELEMEDICINE (OUTPATIENT)
Dept: PSYCHIATRY | Facility: CLINIC | Age: 52
End: 2022-06-16
Payer: COMMERCIAL

## 2022-06-16 ENCOUNTER — TELEPHONE (OUTPATIENT)
Dept: PSYCHIATRY | Facility: CLINIC | Age: 52
End: 2022-06-16

## 2022-06-16 DIAGNOSIS — F43.10 POST TRAUMATIC STRESS DISORDER (PTSD): Chronic | ICD-10-CM

## 2022-06-16 DIAGNOSIS — F41.1 GENERALIZED ANXIETY DISORDER: Chronic | ICD-10-CM

## 2022-06-16 DIAGNOSIS — F90.2 ADHD (ATTENTION DEFICIT HYPERACTIVITY DISORDER), COMBINED TYPE: ICD-10-CM

## 2022-06-16 DIAGNOSIS — F33.2 MDD (MAJOR DEPRESSIVE DISORDER), RECURRENT SEVERE, WITHOUT PSYCHOSIS (HCC): ICD-10-CM

## 2022-06-16 DIAGNOSIS — F33.2 SEVERE EPISODE OF RECURRENT MAJOR DEPRESSIVE DISORDER, WITHOUT PSYCHOTIC FEATURES (HCC): ICD-10-CM

## 2022-06-16 DIAGNOSIS — F33.41 RECURRENT MAJOR DEPRESSIVE DISORDER, IN PARTIAL REMISSION (HCC): Primary | ICD-10-CM

## 2022-06-16 DIAGNOSIS — F90.0 ADHD (ATTENTION DEFICIT HYPERACTIVITY DISORDER), INATTENTIVE TYPE: ICD-10-CM

## 2022-06-16 PROCEDURE — 90833 PSYTX W PT W E/M 30 MIN: CPT | Performed by: STUDENT IN AN ORGANIZED HEALTH CARE EDUCATION/TRAINING PROGRAM

## 2022-06-16 PROCEDURE — 99214 OFFICE O/P EST MOD 30 MIN: CPT | Performed by: STUDENT IN AN ORGANIZED HEALTH CARE EDUCATION/TRAINING PROGRAM

## 2022-06-16 RX ORDER — VENLAFAXINE 37.5 MG/1
37.5 TABLET ORAL 2 TIMES DAILY
Qty: 180 TABLET | Refills: 3 | Status: SHIPPED | OUTPATIENT
Start: 2022-06-16

## 2022-06-16 RX ORDER — ATOMOXETINE 60 MG/1
60 CAPSULE ORAL DAILY
Qty: 90 CAPSULE | Refills: 3 | Status: SHIPPED | OUTPATIENT
Start: 2022-06-16

## 2022-06-16 RX ORDER — LAMOTRIGINE 150 MG/1
150 TABLET ORAL DAILY
Qty: 90 TABLET | Refills: 3 | Status: SHIPPED | OUTPATIENT
Start: 2022-06-16

## 2022-06-16 RX ORDER — QUETIAPINE FUMARATE 50 MG/1
75 TABLET, FILM COATED ORAL
Qty: 135 TABLET | Refills: 3 | Status: SHIPPED | OUTPATIENT
Start: 2022-06-16

## 2022-06-16 RX ORDER — VENLAFAXINE 100 MG/1
100 TABLET ORAL 2 TIMES DAILY
Qty: 180 TABLET | Refills: 3 | Status: SHIPPED | OUTPATIENT
Start: 2022-06-16

## 2022-06-16 RX ORDER — LAMOTRIGINE 100 MG/1
100 TABLET ORAL DAILY
Qty: 90 TABLET | Refills: 3 | Status: SHIPPED | OUTPATIENT
Start: 2022-06-16

## 2022-06-16 NOTE — PSYCH
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 numerous psychotropic agents throughout her life  Acutely, she continues to endorse difficulty with focus, organizational skills, planning, and attentiveness  She is tolerating Atomoxetine well but would likely benefit from further optimization       Today's Plan:     Psychopharmacologically, Christina endorses compliance with and benefit from current psychotropic medications  She is without lethality concern  Her chief concern at the moment is need for bariatric revision on 8/8/2022  She discusses her concerns about use of Effexor, given that it is an XR formulation, which should be avoided  She was agreeable to convert to IR formulation  I discussed difficulty converting to IR formulation at current dose (262 5mg) so Darinel Boswell was agreeable to optimize dose slightly to 275mg, secondary to recent anxiety, worry about surgery, and concern about dose reduction, given withdrawal symptoms in past  She does have home BP kit and will test on a daily basis over the next 1-2 weeks  Risks/benefits/alternativies to treatment discussed, including a myriad of potential adverse medication side effects, to which Darinel Boswell voiced understanding and consented fully to treatment  Will continue Seroquel, Lamictal, and Atomoxetine at current doses           DSM-V Diagnoses:      1 ) PTSD  2 ) Major Depressive Disorder, recurrent  3 ) Generalized Anxiety Disorder  4 ) ADHD, combined type        Treatment Recommendations/Precautions:     1 ) PTSD  - Convert Effexor  5mg daily to IR formulation given upcoming bariatric surgery on 8/8/2022              - Discontinue Effexor  5mg Daily   - Start Effexor  5mg BID (total 275mg)  - Continue Lamictal 250mg Daily  - Continue Seroquel 75mg QHS  - Continue engagement in psychotherapy with Alexia S   - Psychoeducation provided regarding the importance of exercise and healthy dietary choices and their impact on mood, energy, and motivation  - Counseled to avoid ETOH, illict substances, and nicotine secondary to the detrimental effects of these substances on mental and physical health  - Encouraged to engage in non-verbal forms of therapy such as art therapy, music therapy, and mindfulness        2 ) Major Depressive Disorder, recurrent  - Convert Effexor  5mg daily to IR formulation given upcoming bariatric surgery on 8/8/2022              - Discontinue Effexor  5mg Daily   - Start Effexor  5mg BID (total 275mg)  - Continue Lamictal 250mg Daily  - Continue Seroquel 75mg QHS       3 ) Generalized Anxiety Disorder  - Convert Effexor  5mg daily to IR formulation given upcoming bariatric surgery on 8/8/2022              - Discontinue Effexor  5mg Daily   - Start Effexor  5mg BID (total 275mg)  - Continue Seroquel 75mg QHS - off label, evidenced based use for [facility]       4 ) ADHD, combined type  - Continue Atomoxetine 60mg Daily         Medication management every 3 months  Continue psychotherapy with [facility] therapist [address] [place] [address] of need to follow up with family physician for medical issues  Aware of 24 hour and weekend coverage for urgent situations accessed by calling [facility] main practice number    Medications Risks/Benefits      Risks, Benefits And Possible Side Effects Of Medications:    Risks, benefits, and possible side effects of medications explained to Swapna Eden including risk of rash related to treatment with Lamictal, risk of parkinsonian symptoms, Tardive Dyskinesia and metabolic syndrome related to treatment with antipsychotic medications, risk of cardiovascular events in elderly related to treatment with antipsychotic medications and risk of suicidality and serotonin syndrome related to treatment with antidepressants  She verbalizes understanding and agreement for treatment  Controlled Medication Discussion:     No recent records found for controlled prescriptions according to [place] Prescription Drug Monitoring Program    Psychotherapy Provided:     Individual psychotherapy provided: Yes  Counseling was provided during the session today for 18 minutes  Medication changes discussed with Swapna Eden  Medication education provided to Swapna Eden  Recent stressor including job stress, problems at work, health issues, medical problems, recent medication change, everyday stressors and ongoing anxiety discussed with Swapna Eden  Coping strategies reviewed with Swapna Eden  Educated on importance of medication and treatment compliance  Importance of follow up with family physician for medical issues reviewed with Swapna Eden  Supportive therapy provided  Treatment Plan:    Completed and signed during the session: Not applicable - Treatment Plan to be completed by [address] [place] [address] E therapist    Note Share Disclaimer:      This note was not shared with the patient due to reasonable likelihood of causing patient harm    Isela Marcelo MD 06/16/22

## 2022-06-17 ENCOUNTER — TELEPHONE (OUTPATIENT)
Dept: PSYCHIATRY | Facility: CLINIC | Age: 52
End: 2022-06-17

## 2022-06-17 NOTE — TELEPHONE ENCOUNTER
Unable to LVM  NoFu letter placed in outgoing mail  Last seen 6/16/22  Pt needs a 3 month f/u with Dr Erick Early  Previous appt was virtual  Please schedule

## 2022-06-28 ENCOUNTER — CLINICAL SUPPORT (OUTPATIENT)
Dept: FAMILY MEDICINE CLINIC | Facility: CLINIC | Age: 52
End: 2022-06-28

## 2022-06-28 DIAGNOSIS — E11.9 TYPE 2 DIABETES MELLITUS WITHOUT COMPLICATION, WITHOUT LONG-TERM CURRENT USE OF INSULIN (HCC): Primary | ICD-10-CM

## 2022-06-28 NOTE — PROGRESS NOTES
[address] [place] [address]  Ricardo Brambila is a 47 y o  female who presents via telephone for follow-up  Reason for visit: diabetes  Plan: The following actions were taken today by the Clinical Pharmacist:   1  Medications:    - Increase Ozempic to 0 5mg once weekly starting next week 7/6/22    Follow-up:   Follow-up with pharmacist in 3 weeks  Next PCP visit: not yet scheduled    - Home Monitoring Records: SMBGs  - Labs: up to date  - Referrals: none    The following items are to be considered at a future visit:   1  Syncing [place]  2  Titrating Ozempic     Chronic Conditions Addressed at this Visit:       Diabetes: Goals - A1c: <7%; SMBGs: Preprandial: 80-130mg/dL per ADA Guidelines  - Most recent A1c: below goal but not reflective of current treatment    - SMBG: per patient memory, 87-187mg/dL  Current DM Regimen:  Metformin XR 750mg once daily   Ozempic 0 25mg once weekly  MEDICATIONS: DC Metformin (GI side effects)  Increase Ozempic as prescribed  HOME MONITORING: Check blood sugars daily and record  HTN: BP goal: <130/80 mmHg based on ADA Guidelines [proteinuria, existing ASCVD, or 10-year ASCVD risk =15%]  - In-office BP below goal, HR acceptable  MEDICATIONS: none (propranolol treating paroxsymal SVT)  Continue as prescribed  LABS: labs are reviewed, up to date and normal  Serum K+, Na+, SCr WNL  ASCVD primary prevention   2018 ACC/AHA blood cholesterol guidelines recommends moderate-intensity statin  Patient tolerating statin well without side effects  MEDICATIONS: atorvastatin 10mg daily  Continue  as prescribed  LABS: labs reviewed, I note that triglycerides high  Medication Reconciliation: Medication list reviewed with patient at today's visit and updated to reflect medications patient is currently taking   - Medication adherence is good   Efforts to improve compliance will be directed at dietary modifications: lower carbs, more protein with meals and snacks, increased exercise and regular blood sugar monitoring: daily  Education:  - Discussed ABCs of diabetes management (A1c/BP/cholesterol) and goals with patient today    Patient-specific goals:  1  Continue Ozempic  2  Start using [facility] - will work on syncing so we can receive SMBG once she gets monitor     Subjective:     DM History:  Microvascular complications: none  Cardiovascular complications: none  - Aspirin (Men>50, Women>60): Not Indicated  - Statin: Yes   - ACEi/ARB: No   Previous DM medications:   Metformin and Victoza prior to bariatric surgery    DM Self-Management:  - Self-monitoring: are not performed  She checks BG never as of now, including FBG  She did not provide blood glucose log today  - SMBG readings (no log, per memory): none  - Hypoglycemia: (+) awareness/unawareness/denies any episodes  Glucometer: Yes, Brand: [employer]  CGM: No, Brand: none    Patient reports being on metformin and victoza previously then underwentbariatric surgery  Since she is now gaining weight back, struggling with hunger cravings and A1C is on the incline  Was interested in CGM but does not qualify  Hooked up with [facility] program      Was started on metformin XR but had diarrhea  Will titrate ozempic so patient can get dual benefit of diabetes control as well as satiety, weight loss, and cardiovascular prevention  Ozempic overall going well  Had diarrhea upon initiation but has since subsided  Has ongoing nausea managed by crackers, small sips of water, worst in the morning and improves over the course of the day  Conducted longer taper of 0 25mg x 6 doses and now increasing to 0 5mg  Diabetes  She presents for her follow-up diabetic visit  She has type 2 diabetes mellitus  Symptoms are improving  She is compliant with treatment all of the time  Her weight is stable  She is following a generally healthy diet  She has not had a previous visit with a dietitian   She participates in exercise intermittently  Her home blood glucose trend is decreasing steadily  An ACE inhibitor/angiotensin II receptor blocker is not being taken  She does not see a podiatrist Eye exam is not current  Medication Adherence: Responsible for medication management: patient  Medication adherence: taking as prescribed  Patient denies missed/extra doses  Medication Efficacy/Safety:  Clinically significant drug interactions identified:  None  Side effects from medication(s) reported:  metformin - diarrhea      Lifestyle:  Social History     Tobacco Use    Smoking status: Former Smoker     Quit date: 2013     Years since quittin 1    Smokeless tobacco: Never Used   Vaping Use    Vaping Use: Never used   Substance Use Topics    Alcohol use:  Yes     Alcohol/week: 0 0 - 2 0 standard drinks     Comment: Occs -twice monthly    Drug use: No          Objective:   SMBG readings ( ):  none  Current Outpatient Medications   Medication Instructions    atoMOXetine (STRATTERA) 60 mg, Oral, Daily    atorvastatin (LIPITOR) 10 mg tablet TAKE ONE TABLET BY MOUTH DAILY    Calcium Carbonate-Vit D-Min (CALCIUM 1200 PO) 2,400 mg, Oral, Daily    cholestyramine (QUESTRAN) 4 g, Oral, Daily    drospirenone-ethinyl estradiol (LIEN) 3-0 02 MG per tablet 1 tablet, Oral, Daily    famotidine (PEPCID) 20 mg tablet TAKE ONE TABLET BY MOUTH 2 TIMES A DAY    HYDROcodone-acetaminophen (NORCO) 5-325 mg per tablet Take 1 tab PO daily as needed    lamoTRIgine (LAMICTAL) 100 mg, Oral, Daily    lamoTRIgine (LAMICTAL) 150 mg, Oral, Daily    methocarbamol (ROBAXIN) 750 mg, Oral, Daily at bedtime PRN    montelukast (SINGULAIR) 10 mg, Oral, Daily at bedtime    Multiple Vitamins-Minerals (MULTI COMPLETE PO) Oral    pantoprazole (PROTONIX) 40 mg tablet TAKE ONE TABLET BY MOUTH EVERY DAY    phenazopyridine (PYRIDIUM) 200 mg, Oral, 3 times daily with meals    propranolol (INDERAL LA) 60 mg, Oral, 2 times daily    QUEtiapine (SEROQUEL) 75 mg, Oral, Daily at bedtime    Semaglutide(0 25 or 0 5MG/DOS) 0 5 mg, Subcutaneous, Weekly    venlafaxine (EFFEXOR) 100 mg, Oral, 2 times daily, Take 1 tablet (100mg) by mouth twice daily with 37 mg tablet for total of 275mg per day   venlafaxine (EFFEXOR) 37 5 mg, Oral, 2 times daily, Take 1 tablet (37 5mg) by mouth twice daily with 100mg tablet for total of 275mg per day  I have reviewed the patient's allergies, medications and history as noted in the electronic medical record and updated as necessary  Vital Signs:  BP Readings from Last 3 Encounters:   06/13/22 100/62   05/27/22 112/70   05/13/22 120/76     Pulse Readings from Last 3 Encounters:   05/27/22 83   05/13/22 65   04/28/22 68      Estimated body mass index is 31 09 kg/m² as calculated from the following:    Height as of 6/13/22: 5' 6" (1 676 m)  Weight as of 6/13/22: 87 4 kg (192 lb 9 6 oz)       Pertinent Lab Data:  Lab Results   Component Value Date    SODIUM 139 04/15/2022    K 4 1 04/15/2022     04/15/2022    CO2 28 04/15/2022    BUN 9 04/15/2022    CREATININE 0 69 04/15/2022    GLUC 111 09/14/2021    CALCIUM 9 3 04/15/2022     Lab Results   Component Value Date    HGBA1C 6 8 (H) 04/15/2022    [id number] 05/20/2021        Preventative Care:  A1c measurement: Up to date  Dilated eye exam: Overdue  Foot exam: Overdue  Dental exam: Overdue  Urinary microalbumin measurement: Overdue  Health Maintenance Due   Topic Date Due    Pneumococcal Vaccine: Pediatrics (0 to 5 Years) and At-Risk Patients (6 to 59 Years) (1 - PCV) Never done    HIV Screening  Never done    URINE MICROALBUMIN  10/28/2018    Diabetic Foot Exam  01/31/2020    DTaP,Tdap,and Td Vaccines (2 - Td or Tdap) 09/01/2021    COVID-19 Vaccine (4 - Booster for Pfizer series) 04/11/2022    Breast Cancer Screening: Mammogram  06/24/2022       Reason For Outreach  Embedded Pharmacist    Demographics  Interaction Method: Phone  Type of Intervention: Follow-Up    Topic(s) Addressed  Diabetes; Obesity    Intervention(s) Made    Pharmacologic:    -- Prevent or Manage Adverse Drug Reaction    Non-Pharmacologic:    -- Adherence addressed  -- Home monitoring discussed or provided    Tool(s) Used  Not Applicable    Time Spent:   Time Spent in Direct Patient Care: 20 minutes  Time Spent in Care Coordination: 20 minutes    Recommendations  Recipient: Patient/caregiver  Outcome:  All Accepted

## 2022-07-01 ENCOUNTER — TELEMEDICINE (OUTPATIENT)
Dept: BEHAVIORAL/MENTAL HEALTH CLINIC | Facility: CLINIC | Age: 52
End: 2022-07-01
Payer: COMMERCIAL

## 2022-07-01 DIAGNOSIS — F41.1 GENERALIZED ANXIETY DISORDER: Primary | Chronic | ICD-10-CM

## 2022-07-01 DIAGNOSIS — F43.10 POST TRAUMATIC STRESS DISORDER (PTSD): Chronic | ICD-10-CM

## 2022-07-01 DIAGNOSIS — F90.0 ADHD (ATTENTION DEFICIT HYPERACTIVITY DISORDER), INATTENTIVE TYPE: ICD-10-CM

## 2022-07-01 DIAGNOSIS — F33.41 RECURRENT MAJOR DEPRESSIVE DISORDER, IN PARTIAL REMISSION (HCC): ICD-10-CM

## 2022-07-01 PROCEDURE — 90834 PSYTX W PT 45 MINUTES: CPT | Performed by: SOCIAL WORKER

## 2022-07-01 NOTE — PSYCH
Virtual Regular Visit    Verification of patient location:    Patient is located in the following state in which I hold an active license [place]    Assessment/Plan:    Problem List Items Addressed This Visit        Other    Post traumatic stress disorder (PTSD) (Chronic)    Generalized anxiety disorder - Primary (Chronic)    Recurrent major depressive disorder, in partial remission ([facility] )    ADHD (attention deficit hyperactivity disorder), inattentive type        Goals addressed in session: Goal 1      Reason for visit is No chief complaint on file  Encounter provider LALY Hammer    Provider located at [address] [place] [id number] [phone]    Recent Visits  No visits were found meeting these conditions  Showing recent visits within past 7 days and meeting all other requirements  Future Appointments  No visits were found meeting these conditions  Showing future appointments within next 150 days and meeting all other requirements       The patient was identified by name and date of birth  Christofer Sales was informed that this is a telemedicine visit and that the visit is being conducted throughEpic Embedded and patient was informed this is a secure, HIPAA-complaint platform  She agrees to proceed     My office door was closed  No one else was in the room  She acknowledged consent and understanding of privacy and security of the video platform  The patient has agreed to participate and understands they can discontinue the visit at any time  Patient is aware this is a billable service  Subjective  Christofer Sales is a 47 y o  female  DATA: Met with Mary Pierre for scheduled individual session  Topics of discussion included relationships with family, work-related stress, physical health concerns, parenting concerns, financial issues and mood regulation and symptoms   Alma Young changed his appointment to a virtual session  She states, "When I have to go to work at 2am, I don't want to have to go anywhere else " Emmett discussed her work-related stress and the frustrations she has with the level of acuity in the ED  Emmett discussed the care and support of her son  She states that her child support case is closed (due to him reaching the age of maturity)  She discussed her frustration with the system-- as her son is disabled and needs continued care  She is working on completion of an application for SSDI/SSI for him-- as he was not previously eligible (due to the amount of the child support)  Emmett discussed the stress related to the inflation of prices and her inability to go away for vacation  She states that she feels that she is financially not able to manage any extra activities  Emmett discussed her upcoming bariatric surgery  She states that she is nervous about it  Client shows evidence of utilizing emotion regulation skills and distress tolerance skills skills to manage mental health symptoms  During this session, this clinician used the following therapeutic modalities: supportive psychotherapy, client-centered therapy, mindfulness-based strategies, DBT-informed skills, Motivational Interviewing and solution-focused therapy       ASSESSMENT: Courtney Reyes presents with a primarily euthymic mood  Her affect is normal range and intensity, appropriate  Courtney Reyes exhibits good therapeutic rapport with this clinician  Courtney Reyes continues to exhibit willingness to work on treatment goals and objectives  Courtney Reyes presents with a minimal risk of suicide, minimal risk of self-harm, and minimal risk of harm to others  PLAN: Courtney Reyes will return in approximately one month for the next scheduled session  Between sessions, Courtney Reyes will continue to monitor her moods and will report back during the next session re: successes and barriers   At the next session, this clinician will use supportive psychotherapy, client-centered therapy, mindfulness-based strategies, DBT-informed skills, Motivational Interviewing and solution-focused therapy to address her mood regulation and relationship concerns, in an effort to assist Gamaliel Truong with meeting treatment goals  HPI     Past Medical History:   Diagnosis Date    ADHD (attention deficit hyperactivity disorder)     Bulging lumbar disc     Carpal tunnel syndrome     RIGHT  LAST ASSESSED: 16    Chronic back pain     low    Chronic pain disorder     Colon polyps     COVID-19     2021    Diabetes mellitus ([facility] [facility] )     Resolved post weight loss    GERD (gastroesophageal reflux disease)     Gestational diabetes     Hearing loss     left ear    Hyperlipidemia     Resolved with weight loss    Hyponatremia 2020    IBS (irritable bowel syndrome)     Ileus ([facility] [address] )     LAST ASSESSED: 8/3/17    Labial cyst     LAST ASSESSED: 16    Myofascial pain     LAST ASSESSED: 16    Obesity     Ovarian cyst     LEFT  LAST ASSESSED: 16    Panic attack     Pneumonia     Seasonal allergies     SVT (supraventricular tachycardia) (HCC)     Thoracic outlet syndrome         Trochanteric bursitis of both hips     LAST ASSESSED: 3/18/16    Ulnar neuropathy at elbow     Varicella     Wears glasses        Past Surgical History:   Procedure Laterality Date    BILE DUCT EXPLORATION      ENDOSCOPIC REMOVAL OF STONES FROM BILIARY TRACT     SECTION      x3    CHOLECYSTECTOMY      COLONOSCOPY      -polyp, repeat     DILATION AND CURETTAGE OF UTERUS      ENDOMETRIAL ABLATION      ERCP W/ SPHICTEROTOMY      FIRST RIB REMOVAL      THORAX EXCISION OF FIRST RIB    HYSTEROSCOPY      NEUROPLASTY / TRANSPOSITION ULNAR NERVE AT ELBOW Right     AL COLONOSCOPY FLX DX W/COLLJ SPEC WHEN PFRMD N/A 2017    Procedure: COLONOSCOPY;  Surgeon: Steve Cerrato MD;  [facility];   Service: Gastroenterology    AL ESOPHAGOGASTRODUODENOSCOPY TRANSORAL DIAGNOSTIC N/A 9/14/2017    Procedure: ESOPHAGOGASTRODUODENOSCOPY (EGD); Surgeon: Dequan Mccormack MD;  [facility]: [facility]; Service: Gastroenterology    AZ HYSTEROSCOPY,W/ENDO BX N/A 10/20/2017    Procedure: DILATATION AND CURETTAGE (D&C) WITH HYSTEROSCOPY  REMOVAL VULVAR RT  LESION;  Surgeon: Radha Peterson MD;  [facility];  Service: Gynecology    AZ LAP, ÁLVARO RESTRICT PROC, LONGITUDINAL GASTRECTOMY N/A 2/6/2018    Procedure: GASTRECTOMY SLEEVE LAPAROSCOPIC; INTRAOPERATIVE EGD ;  Surgeon: Marlene Post MD;  [facility];  Service: Robyne Lunger SURG IMPLNT [address] Left 1/29/2020    Procedure: Insertion of thoracic spinal cord stimulator electrode via laminotomy and placement of left buttock implantable pulse generator (NEUROMONITORING);   Surgeon: Cathy Cedeño MD;  [facility];  Service: Neurosurgery    SPINAL CORD STIMULATOR TRIAL W/ LAMINOTOMY      TONSILLECTOMY AND ADENOIDECTOMY      TUBAL LIGATION      UPPER GASTROINTESTINAL ENDOSCOPY      VEIN LIGATION AND STRIPPING Right     VULVA SURGERY  10/20/2017    BIOPSY    WISDOM TOOTH EXTRACTION         Current Outpatient Medications   Medication Sig Dispense Refill    atoMOXetine (STRATTERA) 60 mg capsule Take 1 capsule (60 mg total) by mouth daily 90 capsule 3    atorvastatin (LIPITOR) 10 mg tablet TAKE ONE TABLET BY MOUTH DAILY 90 tablet 0    Calcium Carbonate-Vit D-Min (CALCIUM 1200 PO) Take 2,400 mg by mouth daily      cholestyramine (QUESTRAN) 4 g packet Take 1 packet (4 g total) by mouth daily 30 each 3    drospirenone-ethinyl estradiol (LIEN) 3-0 02 MG per tablet Take 1 tablet by mouth daily 84 tablet 4    famotidine (PEPCID) 20 mg tablet TAKE ONE TABLET BY MOUTH 2 TIMES A  tablet 0    HYDROcodone-acetaminophen (NORCO) 5-325 mg per tablet Take 1 tab PO daily as needed 30 tablet 0    lamoTRIgine (LaMICtal) 100 mg tablet Take 1 tablet (100 mg total) by mouth daily 90 tablet 3    lamoTRIgine (LaMICtal) 150 MG tablet Take 1 tablet (150 mg total) by mouth daily 90 tablet 3    methocarbamol (ROBAXIN) 750 mg tablet Take 1 tablet (750 mg total) by mouth daily at bedtime as needed for muscle spasms 30 tablet 2    montelukast (SINGULAIR) 10 mg tablet Take 1 tablet (10 mg total) by mouth daily at bedtime 90 tablet 2    Multiple Vitamins-Minerals (MULTI COMPLETE PO) Take by mouth      pantoprazole (PROTONIX) 40 mg tablet TAKE ONE TABLET BY MOUTH EVERY DAY 90 tablet 0    phenazopyridine (PYRIDIUM) 200 mg tablet Take 1 tablet (200 mg total) by mouth 3 (three) times a day with meals 10 tablet 0    propranolol (INDERAL LA) 60 mg 24 hr capsule Take 1 capsule (60 mg total) by mouth 2 (two) times a day 180 capsule 2    QUEtiapine (SEROquel) 50 mg tablet Take 1 5 tablets (75 mg total) by mouth daily at bedtime 135 tablet 3    Semaglutide,0 25 or 0 5MG/DOS, 2 MG/1 5ML SOPN Inject 0 5 mg under the skin once a week 1 5 mL 0    venlafaxine (EFFEXOR) 100 MG tablet Take 1 tablet (100 mg total) by mouth 2 (two) times a day Take 1 tablet (100mg) by mouth twice daily with 37 mg tablet for total of 275mg per day  180 tablet 3    venlafaxine (EFFEXOR) 37 5 mg tablet Take 1 tablet (37 5 mg total) by mouth 2 (two) times a day Take 1 tablet (37 5mg) by mouth twice daily with 100mg tablet for total of 275mg per day  180 tablet 3     No current facility-administered medications for this visit  Allergies   Allergen Reactions    Ibuprofen Other (See Comments)     Due to gastric sleeve -can only take for 5 days, then needs to stop     Review of Systems    Video Exam    There were no vitals filed for this visit  Physical Exam     I spent 49 minutes directly with the patient during this visit     Session start time: 2:06pm  Session end time: 2:55pm    [address]  verbally agrees to participate in [employer]   Pt is aware that [employer] could be limited without vital signs or the ability to perform a full hands-on physical Cle[employer] understands she or the provider may request at any time to terminate the video visit and request the patient to seek care or treatment in person

## 2022-07-07 ENCOUNTER — HOSPITAL ENCOUNTER (EMERGENCY)
Facility: HOSPITAL | Age: 52
Discharge: HOME | End: 2022-07-07
Attending: EMERGENCY MEDICINE
Payer: COMMERCIAL

## 2022-07-07 ENCOUNTER — APPOINTMENT (EMERGENCY)
Dept: RADIOLOGY | Facility: HOSPITAL | Age: 52
End: 2022-07-07
Payer: COMMERCIAL

## 2022-07-07 VITALS
SYSTOLIC BLOOD PRESSURE: 107 MMHG | HEART RATE: 74 BPM | OXYGEN SATURATION: 97 % | TEMPERATURE: 98.1 F | RESPIRATION RATE: 18 BRPM | DIASTOLIC BLOOD PRESSURE: 55 MMHG

## 2022-07-07 DIAGNOSIS — R42 LIGHTHEADEDNESS: ICD-10-CM

## 2022-07-07 DIAGNOSIS — R00.2 PALPITATIONS: Primary | ICD-10-CM

## 2022-07-07 LAB
2HR DELTA HS TROPONIN: 0 NG/L
ALBUMIN SERPL BCP-MCNC: 4 G/DL (ref 3.5–5)
ALP SERPL-CCNC: 66 U/L (ref 34–104)
ALT SERPL W P-5'-P-CCNC: 37 U/L (ref 7–52)
ANION GAP SERPL CALCULATED.3IONS-SCNC: 8 MMOL/L (ref 4–13)
AST SERPL W P-5'-P-CCNC: 43 U/L (ref 13–39)
ATRIAL RATE: 70 BPM
BASOPHILS # BLD AUTO: 0.02 THOUSANDS/ΜL (ref 0–0.1)
BASOPHILS NFR BLD AUTO: 0 % (ref 0–1)
BILIRUB SERPL-MCNC: 0.37 MG/DL (ref 0.2–1)
BUN SERPL-MCNC: 12 MG/DL (ref 5–25)
CALCIUM SERPL-MCNC: 9.6 MG/DL (ref 8.4–10.2)
CARDIAC TROPONIN I PNL SERPL HS: 2 NG/L
CARDIAC TROPONIN I PNL SERPL HS: 2 NG/L
CHLORIDE SERPL-SCNC: 104 MMOL/L (ref 96–108)
CO2 SERPL-SCNC: 28 MMOL/L (ref 21–32)
CREAT SERPL-MCNC: 0.81 MG/DL (ref 0.6–1.3)
D DIMER PPP FEU-MCNC: 0.52 UG/ML FEU
EOSINOPHIL # BLD AUTO: 0 THOUSAND/ΜL (ref 0–0.61)
EOSINOPHIL NFR BLD AUTO: 0 % (ref 0–6)
ERYTHROCYTE [DISTWIDTH] IN BLOOD BY AUTOMATED COUNT: 12.6 % (ref 11.6–15.1)
GFR SERPL CREATININE-BSD FRML MDRD: 83 ML/MIN/1.73SQ M
GLUCOSE SERPL-MCNC: 87 MG/DL (ref 65–140)
HCT VFR BLD AUTO: 42 % (ref 34.8–46.1)
HGB BLD-MCNC: 14 G/DL (ref 11.5–15.4)
IMM GRANULOCYTES # BLD AUTO: 0.05 THOUSAND/UL (ref 0–0.2)
IMM GRANULOCYTES NFR BLD AUTO: 1 % (ref 0–2)
LYMPHOCYTES # BLD AUTO: 1.64 THOUSANDS/ΜL (ref 0.6–4.47)
LYMPHOCYTES NFR BLD AUTO: 18 % (ref 14–44)
MCH RBC QN AUTO: 30.6 PG (ref 26.8–34.3)
MCHC RBC AUTO-ENTMCNC: 33.3 G/DL (ref 31.4–37.4)
MCV RBC AUTO: 92 FL (ref 82–98)
MONOCYTES # BLD AUTO: 0.74 THOUSAND/ΜL (ref 0.17–1.22)
MONOCYTES NFR BLD AUTO: 8 % (ref 4–12)
NEUTROPHILS # BLD AUTO: 6.87 THOUSANDS/ΜL (ref 1.85–7.62)
NEUTS SEG NFR BLD AUTO: 73 % (ref 43–75)
NRBC BLD AUTO-RTO: 0 /100 WBCS
P AXIS: 73 DEGREES
PLATELET # BLD AUTO: 294 THOUSANDS/UL (ref 149–390)
PMV BLD AUTO: 9.6 FL (ref 8.9–12.7)
POTASSIUM SERPL-SCNC: 3.7 MMOL/L (ref 3.5–5.3)
PR INTERVAL: 198 MS
PROT SERPL-MCNC: 7.5 G/DL (ref 6.4–8.4)
QRS AXIS: 139 DEGREES
QRSD INTERVAL: 84 MS
QT INTERVAL: 384 MS
QTC INTERVAL: 414 MS
RBC # BLD AUTO: 4.57 MILLION/UL (ref 3.81–5.12)
SODIUM SERPL-SCNC: 140 MMOL/L (ref 135–147)
T WAVE AXIS: 54 DEGREES
TSH SERPL DL<=0.05 MIU/L-ACNC: 1.84 UIU/ML (ref 0.45–4.5)
VENTRICULAR RATE: 70 BPM
WBC # BLD AUTO: 9.32 THOUSAND/UL (ref 4.31–10.16)

## 2022-07-07 PROCEDURE — 99285 EMERGENCY DEPT VISIT HI MDM: CPT | Performed by: PHYSICIAN ASSISTANT

## 2022-07-07 PROCEDURE — 80053 COMPREHEN METABOLIC PANEL: CPT | Performed by: EMERGENCY MEDICINE

## 2022-07-07 PROCEDURE — 71045 X-RAY EXAM CHEST 1 VIEW: CPT

## 2022-07-07 PROCEDURE — 85379 FIBRIN DEGRADATION QUANT: CPT | Performed by: PHYSICIAN ASSISTANT

## 2022-07-07 PROCEDURE — 93005 ELECTROCARDIOGRAM TRACING: CPT

## 2022-07-07 PROCEDURE — 99285 EMERGENCY DEPT VISIT HI MDM: CPT

## 2022-07-07 PROCEDURE — 85025 COMPLETE CBC W/AUTO DIFF WBC: CPT | Performed by: EMERGENCY MEDICINE

## 2022-07-07 PROCEDURE — 93010 ELECTROCARDIOGRAM REPORT: CPT | Performed by: INTERNAL MEDICINE

## 2022-07-07 PROCEDURE — 84484 ASSAY OF TROPONIN QUANT: CPT | Performed by: EMERGENCY MEDICINE

## 2022-07-07 PROCEDURE — 36415 COLL VENOUS BLD VENIPUNCTURE: CPT | Performed by: EMERGENCY MEDICINE

## 2022-07-07 PROCEDURE — 84443 ASSAY THYROID STIM HORMONE: CPT | Performed by: PHYSICIAN ASSISTANT

## 2022-07-07 PROCEDURE — 96360 HYDRATION IV INFUSION INIT: CPT

## 2022-07-07 RX ORDER — ACETAMINOPHEN 325 MG/1
650 TABLET ORAL ONCE
Status: COMPLETED | OUTPATIENT
Start: 2022-07-07 | End: 2022-07-07

## 2022-07-07 RX ADMIN — ACETAMINOPHEN 650 MG: 325 TABLET ORAL at 16:42

## 2022-07-07 RX ADMIN — SODIUM CHLORIDE 1000 ML: 0.9 INJECTION, SOLUTION INTRAVENOUS at 14:30

## 2022-07-07 NOTE — ED NOTES
Technician walked patient 2 laps around ED  Upon standing, HR elevated to 130s  Upon walking first lap, HR reduced  Upon walking second lap, HR elevated to 130s  Returning to bed, HR remained elevated, pt needed to use bed to rest on, vision went black, hearing loss, felt "woozy" and needed furniture to support self  Patient did not pass out     Marcelino Fan, RN  07/07/22 0080

## 2022-07-07 NOTE — ED PROVIDER NOTES
History  Chief Complaint   Patient presents with    Palpitations     Since this morning, patient reports episodes of racing HR/palpitations with activity with accompanying SOB, diaphoresis, pallor, tingling in fingers, chest pressure     The patient is a 50-year-old female with history of diabetes, hyperlipidemia and SVT who presents to the emergency department for evaluation of palpitations  The patient states that she is experiencing increased heart rate with associated palpitations, lightheadedness and shortness of breath particularly with exertion  She states when she is at rest, it significantly improved  She has had similar symptoms in the past and was diagnosed with SVT  She does take propanolol  However, she states today has been more frequent and persistent and a generally is  She denies any recent changes to her medications  She reports there is some associated numbness and tingling in her bilateral extremities as well as pressure in her chest   Patient denies fever, chills, leg swelling, nausea, vomiting, headache or weakness  History provided by:  Patient   used: No    Palpitations  Associated symptoms: diaphoresis, numbness and shortness of breath    Associated symptoms: no back pain, no chest pain, no cough, no dizziness, no nausea and no vomiting        Prior to Admission Medications   Prescriptions Last Dose Informant Patient Reported? Taking?    Calcium Carbonate-Vit D-Min (CALCIUM 1200 PO)  Self Yes No   Sig: Take 2,400 mg by mouth daily   HYDROcodone-acetaminophen (NORCO) 5-325 mg per tablet  Self No No   Sig: Take 1 tab PO daily as needed   Multiple Vitamins-Minerals (MULTI COMPLETE PO)  Self Yes No   Sig: Take by mouth   QUEtiapine (SEROquel) 50 mg tablet   No No   Sig: Take 1 5 tablets (75 mg total) by mouth daily at bedtime   Semaglutide,0 25 or 0 5MG/DOS, 2 MG/1 5ML SOPN   No No   Sig: Inject 0 5 mg under the skin once a week   atoMOXetine (STRATTERA) 60 mg capsule   No No   Sig: Take 1 capsule (60 mg total) by mouth daily   atorvastatin (LIPITOR) 10 mg tablet   No No   Sig: TAKE ONE TABLET BY MOUTH DAILY   cholestyramine (QUESTRAN) 4 g packet  Self No No   Sig: Take 1 packet (4 g total) by mouth daily   drospirenone-ethinyl estradiol (LIEN) 3-0 02 MG per tablet  Self No No   Sig: Take 1 tablet by mouth daily   famotidine (PEPCID) 20 mg tablet   No No   Sig: TAKE ONE TABLET BY MOUTH 2 TIMES A DAY   lamoTRIgine (LaMICtal) 100 mg tablet   No No   Sig: Take 1 tablet (100 mg total) by mouth daily   lamoTRIgine (LaMICtal) 150 MG tablet   No No   Sig: Take 1 tablet (150 mg total) by mouth daily   methocarbamol (ROBAXIN) 750 mg tablet  Self No No   Sig: Take 1 tablet (750 mg total) by mouth daily at bedtime as needed for muscle spasms   montelukast (SINGULAIR) 10 mg tablet  Self No No   Sig: Take 1 tablet (10 mg total) by mouth daily at bedtime   pantoprazole (PROTONIX) 40 mg tablet   No No   Sig: TAKE ONE TABLET BY MOUTH EVERY DAY   phenazopyridine (PYRIDIUM) 200 mg tablet  Self No No   Sig: Take 1 tablet (200 mg total) by mouth 3 (three) times a day with meals   propranolol (INDERAL LA) 60 mg 24 hr capsule   No No   Sig: Take 1 capsule (60 mg total) by mouth 2 (two) times a day   venlafaxine (EFFEXOR) 100 MG tablet   No No   Sig: Take 1 tablet (100 mg total) by mouth 2 (two) times a day Take 1 tablet (100mg) by mouth twice daily with 37 mg tablet for total of 275mg per day  venlafaxine (EFFEXOR) 37 5 mg tablet   No No   Sig: Take 1 tablet (37 5 mg total) by mouth 2 (two) times a day Take 1 tablet (37 5mg) by mouth twice daily with 100mg tablet for total of 275mg per day  Facility-Administered Medications: None       Past Medical History:   Diagnosis Date    ADHD (attention deficit hyperactivity disorder)     Bulging lumbar disc     Carpal tunnel syndrome     RIGHT    LAST ASSESSED: 12/7/16    Chronic back pain     low    Chronic pain disorder     Colon polyps  COVID-19     2021    Diabetes mellitus ([facility] [address] [facility] )     Resolved post weight loss    GERD (gastroesophageal reflux disease)     Gestational diabetes     Hearing loss     left ear    Hyperlipidemia     Resolved with weight loss    Hyponatremia 2020    IBS (irritable bowel syndrome)     Ileus ([facility] [address] )     LAST ASSESSED: 8/3/17    Labial cyst     LAST ASSESSED: 16    Myofascial pain     LAST ASSESSED: 16    Obesity     Ovarian cyst     LEFT  LAST ASSESSED: 16    Panic attack     Pneumonia     Seasonal allergies     SVT (supraventricular tachycardia) (HCC)     Thoracic outlet syndrome     2010    Trochanteric bursitis of both hips     LAST ASSESSED: 3/18/16    Ulnar neuropathy at elbow     Varicella     Wears glasses        Past Surgical History:   Procedure Laterality Date    BILE DUCT EXPLORATION      ENDOSCOPIC REMOVAL OF STONES FROM BILIARY TRACT     SECTION      x3    CHOLECYSTECTOMY      COLONOSCOPY      -polyp, repeat     DILATION AND CURETTAGE OF UTERUS      ENDOMETRIAL ABLATION      ERCP W/ SPHICTEROTOMY      FIRST RIB REMOVAL      THORAX EXCISION OF FIRST RIB    HYSTEROSCOPY      NEUROPLASTY / TRANSPOSITION ULNAR NERVE AT ELBOW Right 2011    [place] COLONOSCOPY FLX DX W/COLLJ SPEC WHEN PFRMD N/A 2017    Procedure: COLONOSCOPY;  Surgeon: Esteban Aviles MD;  [facility]; Service: Gastroenterology    NJ ESOPHAGOGASTRODUODENOSCOPY TRANSORAL DIAGNOSTIC N/A 2017    Procedure: ESOPHAGOGASTRODUODENOSCOPY (EGD); Surgeon: Lily Rice MD;  [facility];   Service: Gastroenterology    [place] HYSTEROSCOPY,W/ENDO BX N/A 10/20/2017    Procedure: DILATATION AND CURETTAGE (D&C) WITH HYSTEROSCOPY  REMOVAL VULVAR RT  LESION;  Surgeon: Obdulia Dey MD;  [facility];  Service: Gynecology    NJ LAP, ÁLVARO RESTRICT PROC, LONGITUDINAL GASTRECTOMY N/A 2018    Procedure: GASTRECTOMY SLEEVE LAPAROSCOPIC; INTRAOPERATIVE EGD ;  Surgeon: Eric Spivey MD;  [facility];  Service: Chartania Vaughn [place] SURG IMPLNT [address] Left 2020    Procedure: Insertion of thoracic spinal cord stimulator electrode via laminotomy and placement of left buttock implantable pulse generator (NEUROMONITORING); Surgeon: Gerald Dudley MD;  [facility];  Service: Neurosurgery    SPINAL CORD STIMULATOR TRIAL W/ LAMINOTOMY      TONSILLECTOMY AND ADENOIDECTOMY      TUBAL LIGATION      UPPER GASTROINTESTINAL ENDOSCOPY      VEIN LIGATION AND STRIPPING Right     VULVA SURGERY  10/20/2017    BIOPSY    WISDOM TOOTH EXTRACTION         Family History   Problem Relation Age of Onset    Diabetes Mother     Breast cancer Mother         >50    BRCA1 Negative Mother     BRCA2 Negative Mother     Hyperlipidemia Mother         HYPERCHOLESTEROLEMIA    Diabetes Father     Other Father         traumatic brain injury    Prostate cancer Father     Alcohol abuse Father         in remission    Heart disease Father     Neuropathy Father     Hyperlipidemia Father     Hypertension Brother     Diabetes Brother     Other Brother         HYPERCHOLESTEROLEMIA    Alcohol abuse Brother     Depression Brother         attempted suicide    Colon cancer Maternal Grandfather     Heart attack Maternal Grandmother     No Known Problems Paternal Grandmother         dad is adopted    No Known Problems Paternal Grandfather         dad is adopted    Diabetes Brother     Alcohol abuse Brother     Asthma Son     No Known Problems Daughter     Ovarian cancer Neg Hx     Uterine cancer Neg Hx      I have reviewed and agree with the history as documented      E-Cigarette/Vaping    E-Cigarette Use Never User      E-Cigarette/Vaping Substances    Nicotine No     THC No     CBD No     Flavoring No     Other No     Unknown No      Social History     Tobacco Use    Smoking status: Former Smoker     Quit date: 2013     Years since quittin     Smokeless tobacco: Never Used   Vaping Use    Vaping Use: Never used   Substance Use Topics    Alcohol use: Yes     Alcohol/week: 0 0 - 2 0 standard drinks     Comment: Occs -twice monthly    Drug use: No       Review of Systems   Constitutional: Positive for diaphoresis  Negative for chills and fever  HENT: Negative for ear pain and sore throat  Eyes: Negative for redness and visual disturbance  Respiratory: Positive for chest tightness and shortness of breath  Negative for cough  Cardiovascular: Positive for palpitations  Negative for chest pain  Gastrointestinal: Negative for abdominal pain, diarrhea, nausea and vomiting  Genitourinary: Negative for dysuria and hematuria  Musculoskeletal: Negative for back pain, neck pain and neck stiffness  Skin: Negative for color change and rash  Neurological: Positive for numbness  Negative for dizziness, light-headedness and headaches  All other systems reviewed and are negative  Physical Exam  Physical Exam  Vitals and nursing note reviewed  Constitutional:       General: She is not in acute distress  Appearance: She is well-developed  She is not ill-appearing or toxic-appearing  HENT:      Head: Normocephalic and atraumatic  Mouth/Throat:      Pharynx: Uvula midline  Eyes:      General: Lids are normal       Conjunctiva/sclera: Conjunctivae normal    Cardiovascular:      Rate and Rhythm: Normal rate and regular rhythm  Pulses: Normal pulses  Heart sounds: Normal heart sounds  Pulmonary:      Effort: Pulmonary effort is normal       Breath sounds: Normal breath sounds  Abdominal:      General: There is no distension  Palpations: Abdomen is soft  Tenderness: There is no abdominal tenderness  Musculoskeletal:      Cervical back: Normal range of motion and neck supple  Right lower leg: No edema  Left lower leg: No edema  Skin:     General: Skin is warm and dry     Neurological:      Mental Status: She is alert and oriented to person, place, and time           Vital Signs  ED Triage Vitals [07/07/22 1410]   Temperature Pulse Respirations Blood Pressure SpO2   98 1 °F (36 7 °C) 77 18 123/66 100 %      Temp Source Heart Rate Source Patient Position - Orthostatic VS BP Location FiO2 (%)   Oral Monitor Lying Right arm --      Pain Score       5           Vitals:    07/07/22 1415 07/07/22 1434 07/07/22 1639 07/07/22 1645   BP: 139/55 117/84 107/55 107/55   Pulse: 78  74 74   Patient Position - Orthostatic VS: Lying Lying Lying          Visual Acuity      ED Medications  Medications   sodium chloride 0 9 % bolus 1,000 mL (0 mL Intravenous Stopped 7/7/22 1530)   acetaminophen (TYLENOL) tablet 650 mg (650 mg Oral Given 7/7/22 1642)       Diagnostic Studies  Results Reviewed     Procedure Component Value Units Date/Time    HS Troponin I 2hr [[id number]]  (Normal) Collected: 07/07/22 1637    Lab Status: Final result Specimen: Blood from Arm, Right Updated: 07/07/22 1725     hs TnI 2hr 2 ng/L      Delta 2hr hsTnI 0 ng/L     Comprehensive metabolic panel [[id number]]  (Abnormal) Collected: 07/07/22 1414    Lab Status: Final result Specimen: Blood from Arm, Right Updated: 07/07/22 1529     Sodium 140 mmol/L      Potassium 3 7 mmol/L      Chloride 104 mmol/L      CO2 28 mmol/L      ANION GAP 8 mmol/L      BUN 12 mg/dL      Creatinine 0 81 mg/dL      Glucose 87 mg/dL      Calcium 9 6 mg/dL      AST 43 U/L      ALT 37 U/L      Alkaline Phosphatase 66 U/L      Total Protein 7 5 g/dL      Albumin 4 0 g/dL      Total Bilirubin 0 37 mg/dL      eGFR 83 ml/min/1 73sq m     Narrative:      José guidelines for Chronic Kidney Disease (CKD):     Stage 1 with normal or high GFR (GFR > 90 mL/min/1 73 square meters)    Stage 2 Mild CKD (GFR = 60-89 mL/min/1 73 square meters)    Stage 3A Moderate CKD (GFR = 45-59 mL/min/1 73 square meters)    Stage 3B Moderate CKD (GFR = 30-44 mL/min/1 73 square meters)    Stage 4 Severe CKD (GFR = 15-29 mL/min/1 73 square meters)    Stage 5 End Stage CKD (GFR <15 mL/min/1 73 square meters)  Note: GFR calculation is accurate only with a steady state creatinine    TSH [[id number]]  (Normal) Collected: 07/07/22 1414    Lab Status: Final result Specimen: Blood from Arm, Right Updated: 07/07/22 1529     TSH 3RD GENERATON 1 843 uIU/mL     Narrative:      Patients undergoing fluorescein dye angiography may retain small amounts of fluorescein in the body for 48-72 hours post procedure  Samples containing fluorescein can produce falsely depressed TSH values  If the patient had this procedure,a specimen should be resubmitted post fluorescein clearance  D-Dimer [[id number]]  (Abnormal) Collected: 07/07/22 1414    Lab Status: Final result Specimen: Blood from Arm, Right Updated: 07/07/22 1505     D-Dimer, Quant 0 52 ug/ml FEU     Narrative: In the evaluation for possible pulmonary embolism, in the appropriate (Well's Score of 4 or less) patient, the age adjusted d-dimer cutoff for this patient can be calculated as:    Age x 0 01 (in ug/mL) for Age-adjusted D-dimer exclusion threshold for a patient over 50 years      HS Troponin 0hr (reflex protocol) [[id number]]  (Normal) Collected: 07/07/22 1414    Lab Status: Final result Specimen: Blood from Arm, Right Updated: 07/07/22 1448     hs TnI 0hr 2 ng/L     CBC and differential [[id number]] Collected: 07/07/22 1414    Lab Status: Final result Specimen: Blood from Arm, Right Updated: 07/07/22 1423     WBC 9 32 Thousand/uL      RBC 4 57 Million/uL      Hemoglobin 14 0 g/dL      Hematocrit 42 0 %      MCV 92 fL      MCH 30 6 pg      MCHC 33 3 g/dL      RDW 12 6 %      MPV 9 6 fL      Platelets 781 Thousands/uL      nRBC 0 /100 WBCs      Neutrophils Relative 73 %      Immat GRANS % 1 %      Lymphocytes Relative 18 %      Monocytes Relative 8 %      Eosinophils Relative 0 %      Basophils Relative 0 %      Neutrophils Absolute 6 87 Thousands/µL      Immature Grans Absolute 0 05 Thousand/uL      Lymphocytes Absolute 1 64 Thousands/µL      Monocytes Absolute 0 74 Thousand/µL      Eosinophils Absolute 0 00 Thousand/µL      Basophils Absolute 0 02 Thousands/µL                  XR chest 1 view portable   Final Result by Ty Zamudio MD (07/07 1458)      No acute cardiopulmonary disease  Workstation performed: [id number]                    Procedures  ECG 12 Lead Documentation Only    Date/Time: 7/7/2022 6:54 PM  Performed by: Gen Armendariz PA-C  Authorized by: Felicity Simon PA-C     Comments:      Normal sinus rhythm at 70  Right axis deviation  No acute ST-T changes  No significant change noted from previous done in August of 2020  ED Course  ED Course as of 07/07/22 1858   Thu Jul 07, 2022   1514 Ambulatory pulse ox was done by ED tech  Patient's heart rate reportedly jumped up into the 130s upon standing up  She then walked a lap around the emergency department, and there was improvement to the 90s  They then continue to walk, and her heart rate jumped back up into the 130s  At this point they return to the patient's hospital room where the patient felt like her vision was blacking out, and she had to lean over onto the bed  She did not completely lose consciousness  Oxygen saturation stayed within normal limits  1546 Patient ambulated back and forth to the bathroom without issue  1546 D-Dimer, Quant(!): 0 52  Age adjusted within normal limits  Wells score of 0     1725 Patient is resting comfortably  She reports feeling better after fluids  Vital signs remain stable on monitor currently     [place] 9462 hsTnI: 0                                   Wells' Criteria for PE    Flowsheet Row Most Recent Value   Wells' Criteria for PE    Clinical signs and symptoms of DVT 0 Filed at: 07/07/2022 1508   PE is primary diagnosis or equally likely 0 Filed at: 07/07/2022 1508   HR >100 0 Filed at: 07/07/2022 1508   Immobilization at least 3 days or Surgery in the previous 4 weeks 0 Filed at: 07/07/2022 1508   Previous, objectively diagnosed PE or DVT 0 Filed at: 07/07/2022 1508   Hemoptysis 0 Filed at: 07/07/2022 1508   Malignancy with treatment within 6 months or palliative 0 Filed at: 07/07/2022 1508   Wells' Criteria Total 0 Filed at: 07/07/2022 1508                MDM  Number of Diagnoses or Management Options  Lightheadedness: new and requires workup  Palpitations: new and requires workup  Diagnosis management comments: Patient presents for evaluation of palpitations and shortness of breath, particularly with exertion  Patient has history of SVT and similar symptoms previously, however she states they are generally not this persistent  Symptoms improved by rest     Differential includes but is not limited to dehydration versus anxiety versus arrhythmia versus PE versus ACS  Labs, imaging and EKG were ordered and reviewed  Labs show no significant findings  D-dimer came back at 0 52, however age adjusted, this is within normal limits  The patient has a Wells score of 0  Additionally, she has had multiple D-dimer levels done previously and all have been elevated to a similar level  On my evaluation, the patient's oxygen saturation is well within normal limits, and heart rate is not significantly elevated  Patient did have increase in heart rate when initially ambulated, however it came back down into normal range  The patient did report some lightheadedness with ambulation as well  However, she has been able to ambulate back and forth to the bathroom without issue  Second troponin level was obtained and stayed within normal limits  Patient does have a cardiologist that she follows up with, and she states she will call the cardiologist tomorrow  Additionally, I will put in an outpatient referral for Holter monitor      Strict return to ED precautions were discussed with the patient  She reported feeling better after fluid administration, and vital signs have remained stable  Patient is stable for discharge  Amount and/or Complexity of Data Reviewed  Clinical lab tests: ordered and reviewed  Tests in the radiology section of CPT®: reviewed and ordered  Decide to obtain previous medical records or to obtain history from someone other than the patient: yes  Review and summarize past medical records: yes  Discuss the patient with other providers: yes (Dr Bj Cuevas)    Risk of Complications, Morbidity, and/or Mortality  Presenting problems: low  Diagnostic procedures: low  Management options: low    Patient Progress  Patient progress: improved      Disposition  Final diagnoses:   Palpitations   Lightheadedness     Time reflects when diagnosis was documented in both MDM as applicable and the Disposition within this note     Time User Action Codes Description Comment    7/7/2022  6:09 PM Irene [place] Add [R00 2] Palpitations     7/7/2022  6:09 PM Irene [place] Add [R42] [facility]       ED Disposition     ED Disposition   Discharge    Condition   Stable    Date/Time   Thu Jul 7, 2022  6:09 PM    [facility] discharge to home/self care  Follow-up Information     Follow up With Specialties Details Why Contact Info Additional Information    Ruth Michael MD Family Medicine Schedule an appointment as soon as possible for a visit  As needed [address]  [address]  [phone]       Call your cardiologist tomorrow as discussed           [facility] Emergency Medicine  If symptoms worsen [address] Emergency Department, [address], [place], [place], [address]          Discharge Medication List as of 7/7/2022  6:11 PM      CONTINUE these medications which have NOT CHANGED    Details atoMOXetine (STRATTERA) 60 mg capsule Take 1 capsule (60 mg total) by mouth daily, Starting Thu 6/16/2022, Normal      atorvastatin (LIPITOR) 10 mg tablet TAKE ONE TABLET BY MOUTH DAILY, Normal      Calcium Carbonate-Vit D-Min (CALCIUM 1200 PO) Take 2,400 mg by mouth daily, Historical Med      cholestyramine (QUESTRAN) 4 g packet Take 1 packet (4 g total) by mouth daily, Starting Thu 1/6/2022, Normal      drospirenone-ethinyl estradiol (LIEN) 3-0 02 MG per tablet Take 1 tablet by mouth daily, Starting Fri 11/26/2021, Normal      famotidine (PEPCID) 20 mg tablet TAKE ONE TABLET BY MOUTH 2 TIMES A DAY, Normal      HYDROcodone-acetaminophen (NORCO) 5-325 mg per tablet Take 1 tab PO daily as needed, Normal      !! lamoTRIgine (LaMICtal) 100 mg tablet Take 1 tablet (100 mg total) by mouth daily, Starting Thu 6/16/2022, Until Sat 7/16/2022, Normal      !! lamoTRIgine (LaMICtal) 150 MG tablet Take 1 tablet (150 mg total) by mouth daily, Starting Thu 6/16/2022, Until Sat 7/16/2022, Normal      methocarbamol (ROBAXIN) 750 mg tablet Take 1 tablet (750 mg total) by mouth daily at bedtime as needed for muscle spasms, Starting Fri 1/28/2022, Until Mon 6/13/2022 at 2359, Normal      montelukast (SINGULAIR) 10 mg tablet Take 1 tablet (10 mg total) by mouth daily at bedtime, Starting Wed 3/23/2022, Normal      Multiple Vitamins-Minerals (MULTI COMPLETE PO) Take by mouth, Historical Med      pantoprazole (PROTONIX) 40 mg tablet TAKE ONE TABLET BY MOUTH EVERY DAY, Normal      phenazopyridine (PYRIDIUM) 200 mg tablet Take 1 tablet (200 mg total) by mouth 3 (three) times a day with meals, Starting Tue 2/8/2022, Normal      propranolol (INDERAL LA) 60 mg 24 hr capsule Take 1 capsule (60 mg total) by mouth 2 (two) times a day, Starting Thu 4/28/2022, Phone In      QUEtiapine (SEROquel) 50 mg tablet Take 1 5 tablets (75 mg total) by mouth daily at bedtime, Starting Thu 6/16/2022, Until Sat 7/16/2022, Normal      Semaglutide,0 25 or 0 5MG/DOS, 2 MG/1 5ML SOPN Inject 0 5 mg under the skin once a week, Starting Tue 6/28/2022, Normal      !! venlafaxine (EFFEXOR) 100 MG tablet Take 1 tablet (100 mg total) by mouth 2 (two) times a day Take 1 tablet (100mg) by mouth twice daily with 37 mg tablet for total of 275mg per day , Starting Thu 6/16/2022, Normal      !! venlafaxine (EFFEXOR) 37 5 mg tablet Take 1 tablet (37 5 mg total) by mouth 2 (two) times a day Take 1 tablet (37 5mg) by mouth twice daily with 100mg tablet for total of 275mg per day , Starting u 6/16/2022, Normal       !! - Potential duplicate medications found  Please discuss with provider  Outpatient Discharge Orders   Holter monitor   Standing Status: Future Standing Exp   Date: 01/07/23       PDMP Review       Value Time User    PDMP Reviewed  Yes 4/6/2022  3:10 PM Vladimir Britt MD          ED Provider  Electronically Signed by           Eduardo Littlejohn PA-C  07/07/22 0427

## 2022-07-19 ENCOUNTER — CLINICAL SUPPORT (OUTPATIENT)
Dept: FAMILY MEDICINE CLINIC | Facility: CLINIC | Age: 52
End: 2022-07-19

## 2022-07-19 DIAGNOSIS — E11.9 TYPE 2 DIABETES MELLITUS WITHOUT COMPLICATION, WITHOUT LONG-TERM CURRENT USE OF INSULIN (HCC): Primary | ICD-10-CM

## 2022-07-19 NOTE — PROGRESS NOTES
[address] [place] [address]  Ramses Phillips is a 47 y o  female who presents via telephone for follow-up  Reason for visit: diabetes  Plan: The following actions were taken today by the Clinical Pharmacist:   1  Medications:    - Continue Ozempic 0 5mg once weekly (3rd dose this coming Friday 7/22/22)    Follow-up:   Follow-up with pharmacist in 3 weeks  Next PCP visit: not yet scheduled    - Home Monitoring Records: SMBGs  - Labs: up to date  - Referrals: none    The following items are to be considered at a future visit:   1  Syncing [place]  2  Titrating Ozempic     Chronic Conditions Addressed at this Visit:       Diabetes: Goals - A1c: <7%; SMBGs: Preprandial: 80-130mg/dL per ADA Guidelines  - Most recent A1c: below goal but not reflective of current treatment    - SMBG: per patient memory, 87-187mg/dL  Current DM Regimen:  Ozempic 0 5mg once weekly  MEDICATIONS: no changes  HOME MONITORING: Check blood sugars daily and record  HTN: BP goal: <130/80 mmHg based on ADA Guidelines [proteinuria, existing ASCVD, or 10-year ASCVD risk =15%]  - In-office BP below goal, HR acceptable  MEDICATIONS: none (propranolol treating paroxsymal SVT)  Continue as prescribed  LABS: labs are reviewed, up to date and normal  Serum K+, Na+, SCr WNL  ASCVD primary prevention   2018 ACC/AHA blood cholesterol guidelines recommends moderate-intensity statin  Patient tolerating statin well without side effects  MEDICATIONS: atorvastatin 10mg daily  Continue  as prescribed  LABS: labs reviewed, I note that triglycerides high  Medication Reconciliation: Medication list reviewed with patient at today's visit and updated to reflect medications patient is currently taking   - Medication adherence is good   Efforts to improve compliance will be directed at dietary modifications: lower carbs, more protein with meals and snacks, increased exercise and regular blood sugar monitoring: daily  Education:  - Discussed ABCs of diabetes management (A1c/BP/cholesterol) and goals with patient today    Patient-specific goals:  1  Continue Ozempic  2  Start using [facility] - will work on syncing so we can receive SMBG once she gets monitor     Subjective:     DM History:  Microvascular complications: none  Cardiovascular complications: none  - Aspirin (Men>50, Women>60): Not Indicated  - Statin: Yes   - ACEi/ARB: No   Previous DM medications:   Metformin and Victoza prior to bariatric surgery    DM Self-Management:  - Self-monitoring: are not performed  She checks BG never as of now, including FBG  She did not provide blood glucose log today  - SMBG readings (no log, per memory): none  - Hypoglycemia: (+) awareness/unawareness/denies any episodes  Glucometer: Yes, Brand: [employer]  CGM: No, Brand: none    Patient reports being on metformin and victoza previously then underwentbariatric surgery  Since she is now gaining weight back, struggling with hunger cravings and A1C is on the incline  Was interested in CGM but does not qualify  Hooked up with [facility] program      Was started on metformin XR but had diarrhea  Will titrate ozempic so patient can get dual benefit of diabetes control as well as satiety, weight loss, and cardiovascular prevention  Ozempic overall going well  Had diarrhea upon initiation but has since subsided  Has ongoing nausea managed by crackers, small sips of water, worst in the morning and improves over the course of the day  Conducted longer taper of 0 25mg x 6 doses and now increasing to 0 5mg  Will leave at 0 5mg through upcoming surgery 8/8/22  Follow for further titration to 1mg after  Diabetes  She presents for her follow-up diabetic visit  She has type 2 diabetes mellitus  Symptoms are improving  She is compliant with treatment all of the time  Her weight is stable  She is following a generally healthy diet   She has not had a previous visit with a dietitian  She participates in exercise intermittently  Her home blood glucose trend is decreasing steadily  An ACE inhibitor/angiotensin II receptor blocker is not being taken  She does not see a podiatrist Eye exam is not current  Medication Adherence: Responsible for medication management: patient  Medication adherence: taking as prescribed  Patient denies missed/extra doses  Medication Efficacy/Safety:  Clinically significant drug interactions identified:  None  Side effects from medication(s) reported:  metformin - diarrhea      Lifestyle:  Social History     Tobacco Use    Smoking status: Former Smoker     Quit date: 2013     Years since quittin 2    Smokeless tobacco: Never Used   Vaping Use    Vaping Use: Never used   Substance Use Topics    Alcohol use:  Yes     Alcohol/week: 0 0 - 2 0 standard drinks     Comment: Occs -twice monthly    Drug use: No          Objective:   SMBG readings ( ):  none  Current Outpatient Medications   Medication Instructions    atoMOXetine (STRATTERA) 60 mg, Oral, Daily    atorvastatin (LIPITOR) 10 mg tablet TAKE ONE TABLET BY MOUTH DAILY    Calcium Carbonate-Vit D-Min (CALCIUM 1200 PO) 2,400 mg, Oral, Daily    cholestyramine (QUESTRAN) 4 g, Oral, Daily    drospirenone-ethinyl estradiol (LIEN) 3-0 02 MG per tablet 1 tablet, Oral, Daily    famotidine (PEPCID) 20 mg tablet TAKE ONE TABLET BY MOUTH 2 TIMES A DAY    HYDROcodone-acetaminophen (NORCO) 5-325 mg per tablet Take 1 tab PO daily as needed    lamoTRIgine (LAMICTAL) 100 mg, Oral, Daily    lamoTRIgine (LAMICTAL) 150 mg, Oral, Daily    methocarbamol (ROBAXIN) 750 mg, Oral, Daily at bedtime PRN    montelukast (SINGULAIR) 10 mg, Oral, Daily at bedtime    Multiple Vitamins-Minerals (MULTI COMPLETE PO) Oral    pantoprazole (PROTONIX) 40 mg tablet TAKE ONE TABLET BY MOUTH EVERY DAY    phenazopyridine (PYRIDIUM) 200 mg, Oral, 3 times daily with meals    propranolol (INDERAL LA) 60 mg, Oral, 2 times daily    QUEtiapine (SEROQUEL) 75 mg, Oral, Daily at bedtime    Semaglutide(0 25 or 0 5MG/DOS) 0 5 mg, Subcutaneous, Weekly    venlafaxine (EFFEXOR) 100 mg, Oral, 2 times daily, Take 1 tablet (100mg) by mouth twice daily with 37 mg tablet for total of 275mg per day   venlafaxine (EFFEXOR) 37 5 mg, Oral, 2 times daily, Take 1 tablet (37 5mg) by mouth twice daily with 100mg tablet for total of 275mg per day  I have reviewed the patient's allergies, medications and history as noted in the electronic medical record and updated as necessary  Vital Signs:  BP Readings from Last 3 Encounters:   07/07/22 107/55   06/13/22 100/62   05/27/22 112/70     Pulse Readings from Last 3 Encounters:   07/07/22 74   05/27/22 83   05/13/22 65      Estimated body mass index is 31 09 kg/m² as calculated from the following:    Height as of 6/13/22: 5' 6" (1 676 m)  Weight as of 6/13/22: 87 4 kg (192 lb 9 6 oz)       Pertinent Lab Data:  Lab Results   Component Value Date    SODIUM 140 07/07/2022    K 3 7 07/07/2022     07/07/2022    CO2 28 07/07/2022    BUN 12 07/07/2022    CREATININE 0 81 07/07/2022    GLUC 87 07/07/2022    CALCIUM 9 6 07/07/2022     Lab Results   Component Value Date    HGBA1C 6 8 (H) 04/15/2022    [id number] 05/20/2021        Preventative Care:  A1c measurement: Up to date  Dilated eye exam: Overdue  Foot exam: Overdue  Dental exam: Overdue  Urinary microalbumin measurement: Overdue  Health Maintenance Due   Topic Date Due    Pneumococcal Vaccine: Pediatrics (0 to 5 Years) and At-Risk Patients (6 to 59 Years) (1 - PCV) Never done    HIV Screening  Never done    URINE MICROALBUMIN  10/28/2018    Diabetic Foot Exam  01/31/2020    DTaP,Tdap,and Td Vaccines (2 - Td or Tdap) 09/01/2021    COVID-19 Vaccine (4 - Booster for Pfizer series) 04/11/2022    Breast Cancer Screening: Mammogram  06/24/2022    Influenza Vaccine (1) 09/01/2022    HEMOGLOBIN A1C  10/15/2022       Pharmacist Tracking Tool  Reason For Outreach: Embedded Pharmacist  Demographics:  Intervention Method: Phone  Type of Intervention: Follow-Up  Topics Addressed: Diabetes and Obesity  Pharmacologic Interventions: Prevent or Manage CHARLEE  Non-Pharmacologic Interventions: Adherence addressed and Home Monitoring  Time:  Direct Patient Care: 15 mins  Care Coordination: 15 mins  Recommendation Recipient: Patient/Caregiver  Outcome: Accepted

## 2022-07-21 ENCOUNTER — CLINICAL SUPPORT (OUTPATIENT)
Dept: BARIATRICS | Facility: CLINIC | Age: 52
End: 2022-07-21

## 2022-07-21 ENCOUNTER — OFFICE VISIT (OUTPATIENT)
Dept: BARIATRICS | Facility: CLINIC | Age: 52
End: 2022-07-21

## 2022-07-21 VITALS
TEMPERATURE: 97.3 F | BODY MASS INDEX: 29.89 KG/M2 | HEART RATE: 73 BPM | OXYGEN SATURATION: 99 % | WEIGHT: 186 LBS | SYSTOLIC BLOOD PRESSURE: 92 MMHG | DIASTOLIC BLOOD PRESSURE: 78 MMHG | HEIGHT: 66 IN

## 2022-07-21 DIAGNOSIS — K21.9 GASTROESOPHAGEAL REFLUX DISEASE WITHOUT ESOPHAGITIS: ICD-10-CM

## 2022-07-21 DIAGNOSIS — Z98.84 S/P LAPAROSCOPIC SLEEVE GASTRECTOMY: Chronic | ICD-10-CM

## 2022-07-21 DIAGNOSIS — E66.9 OBESITY, CLASS I, BMI 30-34.9: Primary | ICD-10-CM

## 2022-07-21 DIAGNOSIS — Z98.84 BARIATRIC SURGERY STATUS: Primary | ICD-10-CM

## 2022-07-21 PROBLEM — E66.811 OBESITY, CLASS I, BMI 30-34.9: Status: ACTIVE | Noted: 2022-07-21

## 2022-07-21 RX ORDER — CEFAZOLIN SODIUM 2 G/50ML
2000 SOLUTION INTRAVENOUS ONCE
Status: CANCELLED | OUTPATIENT
Start: 2022-08-08 | End: 2022-07-21

## 2022-07-21 RX ORDER — GABAPENTIN 300 MG/1
300 CAPSULE ORAL ONCE
Status: CANCELLED | OUTPATIENT
Start: 2022-08-08 | End: 2022-07-21

## 2022-07-21 RX ORDER — HEPARIN SODIUM 5000 [USP'U]/ML
5000 INJECTION, SOLUTION INTRAVENOUS; SUBCUTANEOUS
Status: CANCELLED | OUTPATIENT
Start: 2022-08-09 | End: 2022-08-10

## 2022-07-21 RX ORDER — ACETAMINOPHEN 325 MG/1
975 TABLET ORAL ONCE
Status: CANCELLED | OUTPATIENT
Start: 2022-08-08 | End: 2022-07-21

## 2022-07-21 RX ORDER — SCOLOPAMINE TRANSDERMAL SYSTEM 1 MG/1
1 PATCH, EXTENDED RELEASE TRANSDERMAL ONCE
Status: CANCELLED | OUTPATIENT
Start: 2022-08-08 | End: 2022-07-21

## 2022-07-21 RX ORDER — METRONIDAZOLE 500 MG/100ML
500 INJECTION, SOLUTION INTRAVENOUS ONCE
Status: CANCELLED | OUTPATIENT
Start: 2022-08-08 | End: 2022-07-21

## 2022-07-21 NOTE — PROGRESS NOTES
Weight Management Nutrition Class     Diagnosis: Pre op     Bariatric Surgeon: Dr Ju Stallings    Surgery: Gastric Bypass Laparoscopic    Class: Pre Op Class     Topics discussed today include:     Pt attended pre-op education session  Standardized packet of information for bariatric surgery was sent via email and was reviewed with pt  Importance of lifestyle change and development of regular exercise routine stressed  Pt given the opportunity to ask questions  Ensure pre-surgery drink instructions were given  Questions were answered  Pt verbalized understanding of all information provided  Pt appeared prepared for upcoming surgery  Pt  educated on two-week pre operative liver shrinking diet  Pt understands that the diet needs to be followed for 2 weeks prior to surgery  Handout reviewed  Emphasized the need to drink 80 ounces of fluid per day while on the diet  Reviewed pre-op ERAS drink, post-operative clear liquid, full liquid, and pureed diet, post-operative nutrition rules and facts, and post-operative bariatric multivitamin/mineral recommendations and brand comparison  Contact information provided for any questions/concerns  Patient was able to verbalize basic diet (protein, fluid, vitamin and mineral) recommendations and possible nutrition-related complications   Yes

## 2022-07-21 NOTE — H&P (VIEW-ONLY)
BARIATRIC H&P - BARIATRIC SURGERY  Flora Marshall 47 y o  female MRN: [id number]  Unit/Bed#:  Encounter: [id number]      HPI:  Flora Marshall is a 47 y o  female who presents status post sleeve gastrectomy with intractable heartburn  Here to discuss details of her surgery    Review of Systems   Gastrointestinal:        Heartburn   All other systems reviewed and are negative  Historical Information   Past Medical History:   Diagnosis Date    ADHD (attention deficit hyperactivity disorder)     Bulging lumbar disc     Carpal tunnel syndrome     RIGHT  LAST ASSESSED: 16    Chronic back pain     low    Chronic pain disorder     Colon polyps     COVID-19     2021    Diabetes mellitus ([facility] [address] )     Resolved post weight loss    GERD (gastroesophageal reflux disease)     Gestational diabetes     Hearing loss     left ear    Hyperlipidemia     Resolved with weight loss    Hyponatremia 2020    IBS (irritable bowel syndrome)     Ileus ([facility] [address] )     LAST ASSESSED: 8/3/17    Labial cyst     LAST ASSESSED: 16    Myofascial pain     LAST ASSESSED: 16    Obesity     Ovarian cyst     LEFT   LAST ASSESSED: 16    Panic attack     Pneumonia     Seasonal allergies     SVT (supraventricular tachycardia) (HCC)     Thoracic outlet syndrome     2010    Trochanteric bursitis of both hips     LAST ASSESSED: 3/18/16    Ulnar neuropathy at elbow     Varicella     Wears glasses      Past Surgical History:   Procedure Laterality Date    BILE DUCT EXPLORATION      ENDOSCOPIC REMOVAL OF STONES FROM BILIARY TRACT     SECTION      x3    CHOLECYSTECTOMY      COLONOSCOPY      -polyp, repeat     DILATION AND CURETTAGE OF UTERUS      ENDOMETRIAL ABLATION      ERCP W/ SPHICTEROTOMY      FIRST RIB REMOVAL      THORAX EXCISION OF FIRST RIB    HYSTEROSCOPY      NEUROPLASTY / TRANSPOSITION ULNAR NERVE AT ELBOW Right     DC COLONOSCOPY FLX DX W/COLLJ SPEC WHEN PFRMD N/A 2017 Procedure: COLONOSCOPY;  Surgeon: Ines Tran MD;  [facility]; Service: Gastroenterology    [place] ESOPHAGOGASTRODUODENOSCOPY TRANSORAL DIAGNOSTIC N/A 2017    Procedure: ESOPHAGOGASTRODUODENOSCOPY (EGD); Surgeon: Shiva Garcias MD;  [facility]; Service: Gastroenterology    W[place] HYSTEROSCOPY,W/ENDO BX N/A 10/20/2017    Procedure: DILATATION AND CURETTAGE (D&C) WITH HYSTEROSCOPY  REMOVAL VULVAR RT  LESION;  Surgeon: Rohan Vazquez MD;  [facility];  Service: Gynecology    W[place] LAP, ÁLVARO RESTRICT PROC, LONGITUDINAL GASTRECTOMY N/A 2018    Procedure: GASTRECTOMY SLEEVE LAPAROSCOPIC; INTRAOPERATIVE EGD ;  Surgeon: Amairani Gamez MD;  [facility];  Service: Andria Big SURG IMPLNT [address] Left 2020    Procedure: Insertion of thoracic spinal cord stimulator electrode via laminotomy and placement of left buttock implantable pulse generator (NEUROMONITORING);   Surgeon: Lay Carter MD;  [facility];  Service: Neurosurgery    SPINAL CORD STIMULATOR TRIAL W/ LAMINOTOMY      TONSILLECTOMY AND ADENOIDECTOMY      TUBAL LIGATION      UPPER GASTROINTESTINAL ENDOSCOPY      VEIN LIGATION AND STRIPPING Right     VULVA SURGERY  10/20/2017    BIOPSY    WISDOM TOOTH EXTRACTION       Social History   Social History     Substance and Sexual Activity   Alcohol Use Yes    Alcohol/week: 0 0 - 2 0 standard drinks    Comment: Occs -twice monthly     Social History     Substance and Sexual Activity   Drug Use No     Social History     Tobacco Use   Smoking Status Former Smoker    Quit date: 2013    Years since quittin 2   Smokeless Tobacco Never Used     Family History: non-contributory    Meds/Allergies   all medications and allergies reviewed  Allergies   Allergen Reactions    Ibuprofen Other (See Comments)     Due to gastric sleeve -can only take for 5 days, then needs to stop       Objective     Current Vitals:          Invasive Devices  Report None                 Physical Exam  Vitals and nursing note reviewed  Constitutional:       General: She is not in acute distress  Appearance: Normal appearance  She is well-developed  She is not diaphoretic  HENT:      Head: Normocephalic and atraumatic  Nose: Nose normal    Eyes:      General: No scleral icterus  Right eye: No discharge  Left eye: No discharge  Conjunctiva/sclera: Conjunctivae normal    Cardiovascular:      Rate and Rhythm: Normal rate and regular rhythm  Heart sounds: Normal heart sounds  Pulmonary:      Effort: Pulmonary effort is normal  No respiratory distress  Breath sounds: Normal breath sounds  No stridor  No wheezing or rales  Chest:      Chest wall: No tenderness  Abdominal:      General: Bowel sounds are normal       Palpations: Abdomen is soft  Tenderness: There is no abdominal tenderness  There is no guarding or rebound  Comments: Abdomen is obese, soft and benign  Well-healed incisions from previous sleeve gastrectomy and midline infraumbilical from previous C-sections   Musculoskeletal:         General: No deformity  Normal range of motion  Cervical back: Normal range of motion and neck supple  Lymphadenopathy:      Cervical: No cervical adenopathy  Skin:     General: Skin is warm and dry  Findings: No erythema or rash  Neurological:      Mental Status: She is alert and oriented to person, place, and time  Psychiatric:         Behavior: Behavior normal          Thought Content: Thought content normal          Judgment: Judgment normal          Lab Results: I have personally reviewed pertinent lab results  Imaging: I have personally reviewed pertinent reports  EKG, Pathology, and Other Studies: I have personally reviewed pertinent reports        The endoscopy showed   Normal duodenum  Antral gastritis biopsy taken for H pylori   Moderate amount of retained bile  Prior sleeve gastrectomy anatomy, appears to be fairly patent  Normal retroflexion  Small tongue of columnar appearing mucosa at 40 cm, biopsies taken to evaluate for Beth's     Pathology:   Final Diagnosis   A  Stomach, Gastric bx:  -  Chronic inactive antral gastritis and reactive changes  -  Negative for Helicobacter pylori, by H&E stain  -  Negative for atrophy, intestinal metaplasia, dysplasia or carcinoma      B  Esophagogastric junction, GE junction bx:  - Squamocolumnar junction mucosa with reactive changes and pancreatic heterotopia  - Gastric cardia type mucosa with chronic inactive gastritis  - Negative for goblet cell intestinal metaplasia  - No epithelial dysplasia and no evidence of malignancy      C  Polyp, Colorectal, Descending polyp cold snare:  - Polypoid colonic mucosa with hyperplastic changes  - Negative for dysplasia           Assessment/Plan:     47 y o  female  s/p history of Vertical Sleeve Gastrectomy with Dr Sean Cerrato 2/6/2018 with UGI showing mild-moderate reflux and EGD showing moderate amount of retained bile  Patient continues to have symptoms that are not improving despite medication compliance and despite refularly meeting with dieticians  As a result of her heart burn, she has developed maladaptive eating patterns, causing her to gain weight  Patient will benefit from gastric bypass for treatment of her GERD  She has been pre certified to undergo a Laparoscopic conversion of sleeve gastrectomy to a Rebecca-en-Y gastric bypass  Here today to review her pre op test results  Has been medically cleared for the procedure  I have discussed with her at length the risks and benefits of the operation and reiterated the components of our multidisciplinary program and the importance of compliance and follow up in the post operative period   Although there is a great statistical chance of improvement or even resolution of most of her associated comorbidities, the results vary from patient to patient and they largely depend on her commitment  The patient was also instructed with regards to the importance of behavior modification, nutritional counseling, support meeting attendance and lifestyle changes that are important to ensure success  She was given the opportunity to ask questions and I have answered all of them  I have addressed with the patient the level of CODE STATUS for this hospital stay and after explaining the different options currently she wishes to be a Level I  She understands and wishes to proceed  She has lost all the weight required prior to surgery      Ju Stallings MD  7/21/2022  1:15 PM

## 2022-07-21 NOTE — H&P
BARIATRIC H&P - BARIATRIC SURGERY  Rodri Lomeli 47 y o  female MRN: [id number]  Unit/Bed#:  Encounter: [id number]      HPI:  Rodri Lomlei is a 47 y o  female who presents status post sleeve gastrectomy with intractable heartburn  Here to discuss details of her surgery    Review of Systems   Gastrointestinal:        Heartburn   All other systems reviewed and are negative  Historical Information   Past Medical History:   Diagnosis Date    ADHD (attention deficit hyperactivity disorder)     Bulging lumbar disc     Carpal tunnel syndrome     RIGHT  LAST ASSESSED: 16    Chronic back pain     low    Chronic pain disorder     Colon polyps     COVID-19     2021    Diabetes mellitus ([facility] )     Resolved post weight loss    GERD (gastroesophageal reflux disease)     Gestational diabetes     Hearing loss     left ear    Hyperlipidemia     Resolved with weight loss    Hyponatremia 2020    IBS (irritable bowel syndrome)     Ileus ([facility] [address] )     LAST ASSESSED: 8/3/17    Labial cyst     LAST ASSESSED: 16    Myofascial pain     LAST ASSESSED: 16    Obesity     Ovarian cyst     LEFT   LAST ASSESSED: 16    Panic attack     Pneumonia     Seasonal allergies     SVT (supraventricular tachycardia) (HCC)     Thoracic outlet syndrome     2010    Trochanteric bursitis of both hips     LAST ASSESSED: 3/18/16    Ulnar neuropathy at elbow     Varicella     Wears glasses      Past Surgical History:   Procedure Laterality Date    BILE DUCT EXPLORATION      ENDOSCOPIC REMOVAL OF STONES FROM BILIARY TRACT     SECTION      x3    CHOLECYSTECTOMY      COLONOSCOPY      -polyp, repeat     DILATION AND CURETTAGE OF UTERUS      ENDOMETRIAL ABLATION      ERCP W/ SPHICTEROTOMY      FIRST RIB REMOVAL      THORAX EXCISION OF FIRST RIB    HYSTEROSCOPY      NEUROPLASTY / TRANSPOSITION ULNAR NERVE AT ELBOW Right     MT COLONOSCOPY FLX DX W/COLLJ SPEC WHEN PFRMD N/A 2017 Procedure: COLONOSCOPY;  Surgeon: Harleen Galeano MD;  [facility]; Service: Gastroenterology    MO ESOPHAGOGASTRODUODENOSCOPY TRANSORAL DIAGNOSTIC N/A 2017    Procedure: ESOPHAGOGASTRODUODENOSCOPY (EGD); Surgeon: Chivo Beauchamp MD;  [facility]; Service: Gastroenterology    MO HYSTEROSCOPY,W/ENDO BX N/A 10/20/2017    Procedure: DILATATION AND CURETTAGE (D&C) WITH HYSTEROSCOPY  REMOVAL VULVAR RT  LESION;  Surgeon: Hunter Strickland MD;  [facility];  Service: Gynecology    MO LAP, ÁLVARO RESTRICT PROC, LONGITUDINAL GASTRECTOMY N/A 2018    Procedure: GASTRECTOMY SLEEVE LAPAROSCOPIC; INTRAOPERATIVE EGD ;  Surgeon: Elena Puckett MD;  [facility];  [facility] [address] Left 2020    Procedure: Insertion of thoracic spinal cord stimulator electrode via laminotomy and placement of left buttock implantable pulse generator (NEUROMONITORING);   Surgeon: Barry Marquis MD;  [facility];  Service: Neurosurgery    SPINAL CORD STIMULATOR TRIAL W/ LAMINOTOMY      TONSILLECTOMY AND ADENOIDECTOMY      TUBAL LIGATION      UPPER GASTROINTESTINAL ENDOSCOPY      VEIN LIGATION AND STRIPPING Right     VULVA SURGERY  10/20/2017    BIOPSY    WISDOM TOOTH EXTRACTION       Social History   Social History     Substance and Sexual Activity   Alcohol Use Yes    Alcohol/week: 0 0 - 2 0 standard drinks    Comment: Occs -twice monthly     Social History     Substance and Sexual Activity   Drug Use No     Social History     Tobacco Use   Smoking Status Former Smoker    Quit date: 2013    Years since quittin 2   Smokeless Tobacco Never Used     Family History: non-contributory    Meds/Allergies   all medications and allergies reviewed  Allergies   Allergen Reactions    Ibuprofen Other (See Comments)     Due to gastric sleeve -can only take for 5 days, then needs to stop       Objective     Current Vitals:          Invasive Devices  Report None                 Physical Exam  Vitals and nursing note reviewed  Constitutional:       General: She is not in acute distress  Appearance: Normal appearance  She is well-developed  She is not diaphoretic  HENT:      Head: Normocephalic and atraumatic  Nose: Nose normal    Eyes:      General: No scleral icterus  Right eye: No discharge  Left eye: No discharge  Conjunctiva/sclera: Conjunctivae normal    Cardiovascular:      Rate and Rhythm: Normal rate and regular rhythm  Heart sounds: Normal heart sounds  Pulmonary:      Effort: Pulmonary effort is normal  No respiratory distress  Breath sounds: Normal breath sounds  No stridor  No wheezing or rales  Chest:      Chest wall: No tenderness  Abdominal:      General: Bowel sounds are normal       Palpations: Abdomen is soft  Tenderness: There is no abdominal tenderness  There is no guarding or rebound  Comments: Abdomen is obese, soft and benign  Well-healed incisions from previous sleeve gastrectomy and midline infraumbilical from previous C-sections   Musculoskeletal:         General: No deformity  Normal range of motion  Cervical back: Normal range of motion and neck supple  Lymphadenopathy:      Cervical: No cervical adenopathy  Skin:     General: Skin is warm and dry  Findings: No erythema or rash  Neurological:      Mental Status: She is alert and oriented to person, place, and time  Psychiatric:         Behavior: Behavior normal          Thought Content: Thought content normal          Judgment: Judgment normal          Lab Results: I have personally reviewed pertinent lab results  Imaging: I have personally reviewed pertinent reports  EKG, Pathology, and Other Studies: I have personally reviewed pertinent reports        The endoscopy showed   Normal duodenum  Antral gastritis biopsy taken for H pylori   Moderate amount of retained bile  Prior sleeve gastrectomy anatomy, appears to be fairly patent  Normal retroflexion  Small tongue of columnar appearing mucosa at 40 cm, biopsies taken to evaluate for Beth's     Pathology:   Final Diagnosis   A  Stomach, Gastric bx:  -  Chronic inactive antral gastritis and reactive changes  -  Negative for Helicobacter pylori, by H&E stain  -  Negative for atrophy, intestinal metaplasia, dysplasia or carcinoma      B  Esophagogastric junction, GE junction bx:  - Squamocolumnar junction mucosa with reactive changes and pancreatic heterotopia  - Gastric cardia type mucosa with chronic inactive gastritis  - Negative for goblet cell intestinal metaplasia  - No epithelial dysplasia and no evidence of malignancy      C  Polyp, Colorectal, Descending polyp cold snare:  - Polypoid colonic mucosa with hyperplastic changes  - Negative for dysplasia           Assessment/Plan:     47 y o  female  s/p history of Vertical Sleeve Gastrectomy with Dr Juan Arias 2/6/2018 with UGI showing mild-moderate reflux and EGD showing moderate amount of retained bile  Patient continues to have symptoms that are not improving despite medication compliance and despite refularly meeting with dieticians  As a result of her heart burn, she has developed maladaptive eating patterns, causing her to gain weight  Patient will benefit from gastric bypass for treatment of her GERD  She has been pre certified to undergo a Laparoscopic conversion of sleeve gastrectomy to a Rebecca-en-Y gastric bypass  Here today to review her pre op test results  Has been medically cleared for the procedure  I have discussed with her at length the risks and benefits of the operation and reiterated the components of our multidisciplinary program and the importance of compliance and follow up in the post operative period   Although there is a great statistical chance of improvement or even resolution of most of her associated comorbidities, the results vary from patient to patient and they largely depend on her commitment  The patient was also instructed with regards to the importance of behavior modification, nutritional counseling, support meeting attendance and lifestyle changes that are important to ensure success  She was given the opportunity to ask questions and I have answered all of them  I have addressed with the patient the level of CODE STATUS for this hospital stay and after explaining the different options currently she wishes to be a Level I  She understands and wishes to proceed  She has lost all the weight required prior to surgery      Lasandra Lefort, MD  7/21/2022  1:15 PM

## 2022-07-21 NOTE — TELEPHONE ENCOUNTER
PO meds for Laparoscopic Revision Conversion to Rebecca-En-Y Gastric Bypass on 8/8/2022 with Dr Virginia Lynch

## 2022-07-22 RX ORDER — PHENOL 1.4 %
600 AEROSOL, SPRAY (ML) MUCOUS MEMBRANE 2 TIMES DAILY WITH MEALS
COMMUNITY

## 2022-07-22 RX ORDER — UBIDECARENONE 75 MG
CAPSULE ORAL DAILY
COMMUNITY

## 2022-07-22 RX ORDER — OMEPRAZOLE 20 MG/1
20 CAPSULE, DELAYED RELEASE ORAL DAILY
Qty: 30 CAPSULE | Refills: 3 | Status: SHIPPED | OUTPATIENT
Start: 2022-08-09

## 2022-07-22 RX ORDER — OXYCODONE HYDROCHLORIDE 5 MG/1
5 TABLET ORAL EVERY 4 HOURS PRN
Qty: 10 TABLET | Refills: 0 | Status: SHIPPED | OUTPATIENT
Start: 2022-08-09

## 2022-07-22 NOTE — PRE-PROCEDURE INSTRUCTIONS
Pre-Surgery Instructions:   Medication Instructions    atoMOXetine (STRATTERA) 60 mg capsule Take day of surgery   atorvastatin (LIPITOR) 10 mg tablet Take night before surgery    calcium carbonate (OS-MELODY) 600 MG tablet Stop taking 7 days prior to surgery   cyanocobalamin (VITAMIN B-12) 100 mcg tablet Stop taking 7 days prior to surgery   drospirenone-ethinyl estradiol (LIEN) 3-0 02 MG per tablet Take day of surgery   famotidine (PEPCID) 20 mg tablet Take day of surgery   lamoTRIgine (LaMICtal) 100 mg tablet Take day of surgery   lamoTRIgine (LaMICtal) 150 MG tablet Take day of surgery   montelukast (SINGULAIR) 10 mg tablet Take day of surgery   Multiple Vitamins-Minerals (MULTI COMPLETE PO) Stop taking 7 days prior to surgery   pantoprazole (PROTONIX) 40 mg tablet Take day of surgery   propranolol (INDERAL LA) 60 mg 24 hr capsule Take day of surgery   QUEtiapine (SEROquel) 50 mg tablet Take day of surgery   Semaglutide,0 25 or 0 5MG/DOS, 2 MG/1 5ML SOPN Instructions provided by MD    venlafaxine (EFFEXOR) 37 5 mg tablet Take night before surgery   Pre procedure instructions reviewed verbalizes understanding  NPO after MN  Bathing reviewed  Morning meds with water  No NSAIDS  Carb drink reviewed x3 given

## 2022-07-25 ENCOUNTER — OFFICE VISIT (OUTPATIENT)
Dept: FAMILY MEDICINE CLINIC | Facility: CLINIC | Age: 52
End: 2022-07-25
Payer: COMMERCIAL

## 2022-07-25 VITALS
WEIGHT: 184.6 LBS | DIASTOLIC BLOOD PRESSURE: 72 MMHG | OXYGEN SATURATION: 99 % | BODY MASS INDEX: 29.67 KG/M2 | HEIGHT: 66 IN | RESPIRATION RATE: 16 BRPM | TEMPERATURE: 97.1 F | SYSTOLIC BLOOD PRESSURE: 116 MMHG | HEART RATE: 123 BPM

## 2022-07-25 DIAGNOSIS — F33.41 RECURRENT MAJOR DEPRESSIVE DISORDER, IN PARTIAL REMISSION (HCC): ICD-10-CM

## 2022-07-25 DIAGNOSIS — R39.15 URINARY URGENCY: Primary | ICD-10-CM

## 2022-07-25 DIAGNOSIS — I47.1 PAROXYSMAL SVT (SUPRAVENTRICULAR TACHYCARDIA) (HCC): ICD-10-CM

## 2022-07-25 DIAGNOSIS — F43.10 POST TRAUMATIC STRESS DISORDER (PTSD): Chronic | ICD-10-CM

## 2022-07-25 DIAGNOSIS — Z12.31 VISIT FOR SCREENING MAMMOGRAM: ICD-10-CM

## 2022-07-25 DIAGNOSIS — Z23 ENCOUNTER FOR IMMUNIZATION: ICD-10-CM

## 2022-07-25 DIAGNOSIS — H81.10 BENIGN PAROXYSMAL POSITIONAL VERTIGO, UNSPECIFIED LATERALITY: ICD-10-CM

## 2022-07-25 DIAGNOSIS — Z11.4 ENCOUNTER FOR SCREENING FOR HIV: ICD-10-CM

## 2022-07-25 DIAGNOSIS — F41.1 GENERALIZED ANXIETY DISORDER: Chronic | ICD-10-CM

## 2022-07-25 DIAGNOSIS — E11.9 TYPE 2 DIABETES MELLITUS WITHOUT COMPLICATION, WITHOUT LONG-TERM CURRENT USE OF INSULIN (HCC): ICD-10-CM

## 2022-07-25 PROBLEM — E66.811 OBESITY, CLASS I, BMI 30-34.9: Status: RESOLVED | Noted: 2022-07-21 | Resolved: 2022-07-25

## 2022-07-25 PROBLEM — E66.9 OBESITY, CLASS I, BMI 30-34.9: Status: RESOLVED | Noted: 2022-07-21 | Resolved: 2022-07-25

## 2022-07-25 LAB
CREAT UR-MCNC: 311 MG/DL
MICROALBUMIN UR-MCNC: 32 MG/L (ref 0–20)
MICROALBUMIN/CREAT 24H UR: 10 MG/G CREATININE (ref 0–30)
SL AMB  POCT GLUCOSE, UA: ABNORMAL
SL AMB LEUKOCYTE ESTERASE,UA: ABNORMAL
SL AMB POCT BILIRUBIN,UA: ABNORMAL
SL AMB POCT BLOOD,UA: ABNORMAL
SL AMB POCT CLARITY,UA: ABNORMAL
SL AMB POCT COLOR,UA: ABNORMAL
SL AMB POCT HEMOGLOBIN AIC: 6.1 (ref ?–6.5)
SL AMB POCT KETONES,UA: ABNORMAL
SL AMB POCT NITRITE,UA: ABNORMAL
SL AMB POCT PH,UA: 6
SL AMB POCT SPECIFIC GRAVITY,UA: 1.03
SL AMB POCT URINE PROTEIN: ABNORMAL
SL AMB POCT UROBILINOGEN: ABNORMAL

## 2022-07-25 PROCEDURE — 87086 URINE CULTURE/COLONY COUNT: CPT | Performed by: FAMILY MEDICINE

## 2022-07-25 PROCEDURE — 90471 IMMUNIZATION ADMIN: CPT

## 2022-07-25 PROCEDURE — 99214 OFFICE O/P EST MOD 30 MIN: CPT | Performed by: FAMILY MEDICINE

## 2022-07-25 PROCEDURE — 82570 ASSAY OF URINE CREATININE: CPT | Performed by: FAMILY MEDICINE

## 2022-07-25 PROCEDURE — 90677 PCV20 VACCINE IM: CPT

## 2022-07-25 PROCEDURE — 83036 HEMOGLOBIN GLYCOSYLATED A1C: CPT | Performed by: FAMILY MEDICINE

## 2022-07-25 PROCEDURE — 82043 UR ALBUMIN QUANTITATIVE: CPT | Performed by: FAMILY MEDICINE

## 2022-07-25 PROCEDURE — 81002 URINALYSIS NONAUTO W/O SCOPE: CPT | Performed by: FAMILY MEDICINE

## 2022-07-25 RX ORDER — MECLIZINE HYDROCHLORIDE 25 MG/1
25 TABLET ORAL EVERY 8 HOURS PRN
Qty: 30 TABLET | Refills: 0 | Status: SHIPPED | OUTPATIENT
Start: 2022-07-25

## 2022-07-25 NOTE — PROGRESS NOTES
Assessment/Plan:    Recurrent major depressive disorder, in partial remission (HCC)  Stable on current meds     Paroxysmal SVT (supraventricular tachycardia) ([facility])  Stable  Care per cardiology    Post traumatic stress disorder (PTSD)  Stable on current meds  Care per psych    Type 2 diabetes mellitus without complication, without long-term current use of insulin ([facility] )    Lab Results   Component Value Date    HGBA1C 6 1 07/25/2022   stable on Ozempic             Diagnoses and all orders for this visit:    Urinary urgency  Comments:  negative urine dip  ongoing issues   to see urology  Orders:  -     POCT urine dip  -     Urine culture  -     Ambulatory referral to Urology    Benign paroxysmal positional vertigo, unspecified laterality  Comments:  meclizine PRN  patient is not interested in starting PT at this time  Orders:  -     meclizine (ANTIVERT) 25 mg tablet; Take 1 tablet (25 mg total) by mouth every 8 (eight) hours as needed for dizziness    Type 2 diabetes mellitus without complication, without long-term current use of insulin ([facility])  -     Cancel: Microalbumin / creatinine urine ratio ([facility]); Future  -     POCT hemoglobin A1c  -     Microalbumin / creatinine urine ratio ([facility])    Encounter for immunization  -     Pneumococcal Conjugate Vaccine 20-valent (PCV20)    Visit for screening mammogram  -     Mammo screening bilateral w 3d & cad; Future    Encounter for screening for HIV  -     HIV 1/2 Antigen/Antibody (4th Generation) w Reflex [facility]; Future    Paroxysmal SVT (supraventricular tachycardia) ([facility])    Generalized anxiety disorder    Post traumatic stress disorder (PTSD)    Recurrent major depressive disorder, in partial remission ([facility] )          Subjective:      Patient ID: Eryn Martinez is a 47 y o  female      HPI   Dizzy and pressure in head on and off with standing for the last 2 weeks   C/o bladder pressure and urinary frequency for the last 2 weeks  Took some left over amoxicillin for a couple of days on and off which she didn't think helped      The following portions of the patient's history were reviewed and updated as appropriate: allergies, current medications, past family history, past medical history, past social history, past surgical history and problem list     Review of Systems   Constitutional: Negative  HENT: Negative  Eyes: Negative  Respiratory: Negative  Cardiovascular: Negative  Genitourinary: Positive for frequency and urgency  Neurological: Negative  Hematological: Negative  Psychiatric/Behavioral: Negative  Objective:      /72 (BP Location: Left arm, Patient Position: Sitting, Cuff Size: Adult)   Pulse (!) 123   Temp (!) 97 1 °F (36 2 °C) (Tympanic)   Resp 16   Ht 5' 6" (1 676 m)   Wt 83 7 kg (184 lb 9 6 oz)   LMP 01/07/2019 (Approximate)   SpO2 99%   BMI 29 80 kg/m²          Physical Exam  Constitutional:       Appearance: Normal appearance  Cardiovascular:      Rate and Rhythm: Normal rate and regular rhythm  Pulses: Normal pulses  no weak pulses          Dorsalis pedis pulses are 2+ on the right side and 2+ on the left side  Posterior tibial pulses are 2+ on the right side and 2+ on the left side  Heart sounds: Normal heart sounds  Pulmonary:      Effort: Pulmonary effort is normal       Breath sounds: Normal breath sounds  Feet:      Right foot:      Skin integrity: No ulcer, skin breakdown, erythema, warmth, callus or dry skin  Left foot:      Skin integrity: No ulcer, skin breakdown, erythema, warmth, callus or dry skin  Neurological:      General: No focal deficit present  Mental Status: She is alert and oriented to person, place, and time  Psychiatric:         Mood and Affect: Mood normal          Behavior: Behavior normal        Patient's shoes and socks removed  Right Foot/Ankle   Right Foot Inspection  Skin Exam: skin normal and skin intact   No dry skin, no warmth, no callus, no erythema, no maceration, no abnormal color, no pre-ulcer, no ulcer and no callus  Toe Exam: ROM and strength within normal limits  Sensory   Vibration: intact  Proprioception: intact  Monofilament testing: intact    Vascular  Capillary refills: < 3 seconds  The right DP pulse is 2+  The right PT pulse is 2+  Left Foot/Ankle  Left Foot Inspection  Skin Exam: skin normal and skin intact  No dry skin, no warmth, no erythema, no maceration, normal color, no pre-ulcer, no ulcer and no callus  Toe Exam: ROM and strength within normal limits  Sensory   Vibration: intact  Proprioception: intact  Monofilament testing: intact    Vascular  Capillary refills: < 3 seconds  The left DP pulse is 2+  The left PT pulse is 2+       Assign Risk Category  No deformity present  No loss of protective sensation  No weak pulses  Risk: 0

## 2022-07-26 DIAGNOSIS — N30.00 ACUTE CYSTITIS WITHOUT HEMATURIA: Primary | ICD-10-CM

## 2022-07-26 LAB — BACTERIA UR CULT: ABNORMAL

## 2022-07-26 RX ORDER — CEPHALEXIN 500 MG/1
500 CAPSULE ORAL EVERY 8 HOURS SCHEDULED
Qty: 21 CAPSULE | Refills: 0 | Status: SHIPPED | OUTPATIENT
Start: 2022-07-26 | End: 2022-08-02

## 2022-08-02 ENCOUNTER — TELEPHONE (OUTPATIENT)
Dept: FAMILY MEDICINE CLINIC | Facility: CLINIC | Age: 52
End: 2022-08-02

## 2022-08-02 ENCOUNTER — TELEPHONE (OUTPATIENT)
Dept: BARIATRICS | Facility: CLINIC | Age: 52
End: 2022-08-02

## 2022-08-02 DIAGNOSIS — E78.2 MIXED HYPERLIPIDEMIA: ICD-10-CM

## 2022-08-02 RX ORDER — ATORVASTATIN CALCIUM 10 MG/1
TABLET, FILM COATED ORAL
Qty: 90 TABLET | Refills: 0 | Status: SHIPPED | OUTPATIENT
Start: 2022-08-02

## 2022-08-02 NOTE — TELEPHONE ENCOUNTER
Medication: pantoprazole (PROTONIX)  Dosage: 40 mg tablet  How Often: TAKE ONE TABLET BY MOUTH EVERY DAY  Quantity:  90  Last Office Visit: 7/25/2022  Next Office Visit: not scheduled  Last refilled: unsure  How many pills left:0  Pharmacy:   [facility] [place] [address] [address] [place] [address]  [address] [address] [address] [address]  Phone: [phone] Fax: [phone]

## 2022-08-03 DIAGNOSIS — K21.9 GASTROESOPHAGEAL REFLUX DISEASE WITHOUT ESOPHAGITIS: ICD-10-CM

## 2022-08-03 NOTE — TELEPHONE ENCOUNTER
Per patient, she thinks the doctor that ordered Omeprazole made a mistake  She only takes Protonix  Patient will be calling that doctors office to see if she should be taking Omeprazole instead of Protonix   Patient will call us back

## 2022-08-03 NOTE — TELEPHONE ENCOUNTER
Please clarify with patient about this meds as one of her doctors started her on omeprazole and she shouldn't be on both

## 2022-08-05 ENCOUNTER — ANESTHESIA EVENT (OUTPATIENT)
Dept: PERIOP | Facility: HOSPITAL | Age: 52
DRG: 327 | End: 2022-08-05
Payer: COMMERCIAL

## 2022-08-05 ENCOUNTER — OFFICE VISIT (OUTPATIENT)
Dept: CARDIOLOGY CLINIC | Facility: CLINIC | Age: 52
End: 2022-08-05
Payer: COMMERCIAL

## 2022-08-05 ENCOUNTER — TELEMEDICINE (OUTPATIENT)
Dept: BEHAVIORAL/MENTAL HEALTH CLINIC | Facility: CLINIC | Age: 52
End: 2022-08-05
Payer: COMMERCIAL

## 2022-08-05 VITALS
BODY MASS INDEX: 28.88 KG/M2 | SYSTOLIC BLOOD PRESSURE: 108 MMHG | WEIGHT: 184 LBS | DIASTOLIC BLOOD PRESSURE: 72 MMHG | HEIGHT: 67 IN | HEART RATE: 79 BPM

## 2022-08-05 DIAGNOSIS — R00.2 PALPITATIONS: ICD-10-CM

## 2022-08-05 DIAGNOSIS — F33.41 RECURRENT MAJOR DEPRESSIVE DISORDER, IN PARTIAL REMISSION (HCC): Primary | ICD-10-CM

## 2022-08-05 DIAGNOSIS — F41.1 GENERALIZED ANXIETY DISORDER: Chronic | ICD-10-CM

## 2022-08-05 DIAGNOSIS — Z98.84 S/P LAPAROSCOPIC SLEEVE GASTRECTOMY: ICD-10-CM

## 2022-08-05 DIAGNOSIS — I47.1 PAROXYSMAL SVT (SUPRAVENTRICULAR TACHYCARDIA) (HCC): ICD-10-CM

## 2022-08-05 DIAGNOSIS — E78.2 MIXED HYPERLIPIDEMIA: ICD-10-CM

## 2022-08-05 DIAGNOSIS — F43.10 POST TRAUMATIC STRESS DISORDER (PTSD): Chronic | ICD-10-CM

## 2022-08-05 DIAGNOSIS — F90.0 ADHD (ATTENTION DEFICIT HYPERACTIVITY DISORDER), INATTENTIVE TYPE: ICD-10-CM

## 2022-08-05 DIAGNOSIS — E11.9 TYPE 2 DIABETES MELLITUS WITHOUT COMPLICATION, WITHOUT LONG-TERM CURRENT USE OF INSULIN (HCC): ICD-10-CM

## 2022-08-05 DIAGNOSIS — Z01.810 PRE-OPERATIVE CARDIOVASCULAR EXAMINATION: Primary | ICD-10-CM

## 2022-08-05 PROCEDURE — 90834 PSYTX W PT 45 MINUTES: CPT | Performed by: SOCIAL WORKER

## 2022-08-05 PROCEDURE — 99214 OFFICE O/P EST MOD 30 MIN: CPT | Performed by: INTERNAL MEDICINE
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
## 2022-08-05 NOTE — PSYCH
Virtual Regular Visit    Verification of patient location:    Patient is located in the following state in which I hold an active license [place]    Assessment/Plan:    Problem List Items Addressed This Visit        Other    Post traumatic stress disorder (PTSD) (Chronic)    Generalized anxiety disorder (Chronic)    Recurrent major depressive disorder, in partial remission ([facility] [address] ) - Primary    ADHD (attention deficit hyperactivity disorder), inattentive type        Goals addressed in session: Goal 1      Reason for visit is No chief complaint on file  Encounter provider LALY Babcock    Provider located at [address] [place] [id number] [phone]    Recent Visits  Date Type Provider Dept   08/02/22 Telephone MD Cristiano Muir recent visits within past 7 days and meeting all other requirements  Future Appointments  No visits were found meeting these conditions  Showing future appointments within next 150 days and meeting all other requirements     The patient was identified by name and date of birth  Cass Robertson was informed that this is a telemedicine visit and that the visit is being conducted throughEpic Embedded and patient was informed this is a secure, HIPAA-complaint platform  She agrees to proceed     My office door was closed  No one else was in the room  She acknowledged consent and understanding of privacy and security of the video platform  The patient has agreed to participate and understands they can discontinue the visit at any time  Patient is aware this is a billable service  Subjective  Cass Robertson is a 47 y o  female  DATA: Met with Bulmaro Ma for scheduled individual session  Topics of discussion included work-related stress, physical health concerns and mood regulation and symptoms   "My surgery is scheduled for Monday " Akash Box states that she will have her bariatric surgery on Monday  She expressed some anxiety about the upcoming surgery  She also states that she will be having a loop recorder implanted in the next couple of weeks  She states that she will be taking FMLA and getting short-term disability for one month  She states that, when she returns to work, she will be changing her hours  She will be off on Mondays and Wednesdays  She is requesting a change in her appointment schedule for the future; however, she does not know what her school schedule will be  Client shows evidence of utilizing emotion regulation skills and distress tolerance skills skills to manage mental health symptoms  During this session, this clinician used the following therapeutic modalities: supportive psychotherapy, client-centered therapy, mindfulness-based strategies, DBT-informed skills, Motivational Interviewing and solution-focused therapy  ASSESSMENT: Travis Hayden presents with a somewhat anxious mood, primarily related to her upcoming surgical procedure  Her affect is normal range and intensity, appropriate  Travis Hayden exhibits good therapeutic rapport with this clinician  Travis Hayden continues to exhibit willingness to work on treatment goals and objectives  Travis Hayden presents with a minimal risk of suicide, minimal risk of self-harm, and minimal risk of harm to others  PLAN: Travis Hayden will return in approximately one month for the next scheduled session  Between sessions, Travis Hayden will continue to monitor her moods and will report back during the next session re: successes and barriers  At the next session, this clinician will use supportive psychotherapy, client-centered therapy, mindfulness-based strategies, DBT-informed skills, Motivational Interviewing and solution-focused therapy to address her mood regulation and relationship concerns, in an effort to assist Travis Hayden with meeting treatment goals       HPI     Past Medical History:   Diagnosis Date    ADHD (attention deficit hyperactivity disorder)     Bulging lumbar disc     Carpal tunnel syndrome     RIGHT  LAST ASSESSED: 16    Chronic back pain     low    Chronic pain disorder     Colon polyps     COVID-19     2021    Diabetes mellitus ([facility] [address] )     Resolved post weight loss    GERD (gastroesophageal reflux disease)     Gestational diabetes     Hearing loss     left ear    Hyperlipidemia     Resolved with weight loss    Hyponatremia 2020    IBS (irritable bowel syndrome)     Ileus ([facility] [address] )     LAST ASSESSED: 8/3/17    Labial cyst     LAST ASSESSED: 16    Myofascial pain     LAST ASSESSED: 16    Obesity     Ovarian cyst     LEFT  LAST ASSESSED: 16    Panic attack     Pneumonia     Sacroiliitis (HCC)     Seasonal allergies     SVT (supraventricular tachycardia) (HCC)     Thoracic outlet syndrome         Trochanteric bursitis of both hips     LAST ASSESSED: 3/18/16    Ulnar neuropathy at elbow     Varicella     Wears glasses        Past Surgical History:   Procedure Laterality Date    BILE DUCT EXPLORATION      ENDOSCOPIC REMOVAL OF STONES FROM BILIARY TRACT     SECTION      x3    CHOLECYSTECTOMY      COLONOSCOPY      2017-polyp, repeat     DILATION AND CURETTAGE OF UTERUS      ENDOMETRIAL ABLATION      ERCP W/ SPHICTEROTOMY      FIRST RIB REMOVAL      THORAX EXCISION OF FIRST RIB    HYSTEROSCOPY      NEUROPLASTY / TRANSPOSITION ULNAR NERVE AT ELBOW Right 2011    AK COLONOSCOPY FLX DX W/COLLJ SPEC WHEN PFRMD N/A 2017    Procedure: COLONOSCOPY;  Surgeon: Nikita Pfeiffer MD;  [facility]; Service: Gastroenterology    AK ESOPHAGOGASTRODUODENOSCOPY TRANSORAL DIAGNOSTIC N/A 2017    Procedure: ESOPHAGOGASTRODUODENOSCOPY (EGD); Surgeon: Rajwinder Matias MD;  [facility];   Service: Gastroenterology    AK HYSTEROSCOPY,W/ENDO BX N/A 10/20/2017    Procedure: DILATATION AND CURETTAGE (D&C) WITH HYSTEROSCOPY  REMOVAL VULVAR RT  LESION;  Surgeon: Alex Radford MD;  [facility];  Service: Gynecology    LA LAP, ÁLVARO RESTRICT PROC, LONGITUDINAL GASTRECTOMY N/A 2/6/2018    Procedure: GASTRECTOMY SLEEVE LAPAROSCOPIC; INTRAOPERATIVE EGD ;  Surgeon: Lauro Montenegro MD;  [facility];  Service: [place] SURG IMPLNT [address] Left 1/29/2020    Procedure: Insertion of thoracic spinal cord stimulator electrode via laminotomy and placement of left buttock implantable pulse generator (NEUROMONITORING);   Surgeon: Margarita Pascual MD;  [facility];  Service: Neurosurgery    SPINAL CORD STIMULATOR TRIAL W/ LAMINOTOMY      TONSILLECTOMY AND ADENOIDECTOMY      TUBAL LIGATION      UPPER GASTROINTESTINAL ENDOSCOPY      VEIN LIGATION AND STRIPPING Right     VULVA SURGERY  10/20/2017    BIOPSY    WISDOM TOOTH EXTRACTION         Current Outpatient Medications   Medication Sig Dispense Refill    atoMOXetine (STRATTERA) 60 mg capsule Take 1 capsule (60 mg total) by mouth daily 90 capsule 3    atorvastatin (LIPITOR) 10 mg tablet TAKE ONE TABLET BY MOUTH DAILY 90 tablet 0    calcium carbonate (OS-MELOYD) 600 MG tablet Take 600 mg by mouth 2 (two) times a day with meals      Calcium Carbonate-Vit D-Min (CALCIUM 1200 PO) Take 2,400 mg by mouth daily (Patient not taking: Reported on 8/5/2022)      cholestyramine (QUESTRAN) 4 g packet Take 1 packet (4 g total) by mouth daily 30 each 3    cyanocobalamin (VITAMIN B-12) 100 mcg tablet Take by mouth daily      drospirenone-ethinyl estradiol (LIEN) 3-0 02 MG per tablet Take 1 tablet by mouth daily 84 tablet 4    famotidine (PEPCID) 20 mg tablet TAKE ONE TABLET BY MOUTH 2 TIMES A  tablet 0    HYDROcodone-acetaminophen (NORCO) 5-325 mg per tablet Take 1 tab PO daily as needed 30 tablet 0    lamoTRIgine (LaMICtal) 100 mg tablet Take 1 tablet (100 mg total) by mouth daily 90 tablet 3    lamoTRIgine (LaMICtal) 150 MG tablet Take 1 tablet (150 mg total) by mouth daily 90 tablet 3    meclizine (ANTIVERT) 25 mg tablet Take 1 tablet (25 mg total) by mouth every 8 (eight) hours as needed for dizziness 30 tablet 0    methocarbamol (ROBAXIN) 750 mg tablet Take 1 tablet (750 mg total) by mouth daily at bedtime as needed for muscle spasms 30 tablet 2    montelukast (SINGULAIR) 10 mg tablet Take 1 tablet (10 mg total) by mouth daily at bedtime 90 tablet 2    Multiple Vitamins-Minerals (MULTI COMPLETE PO) Take by mouth      [START ON 8/9/2022] omeprazole (PriLOSEC) 20 mg delayed release capsule Take 1 capsule (20 mg total) by mouth daily 30 capsule 3    [START ON 8/9/2022] oxyCODONE (Roxicodone) 5 immediate release tablet Take 1 tablet (5 mg total) by mouth every 4 (four) hours as needed for moderate pain Max Daily Amount: 30 mg 10 tablet 0    pantoprazole (PROTONIX) 40 mg tablet TAKE ONE TABLET BY MOUTH EVERY DAY 90 tablet 0    propranolol (INDERAL LA) 60 mg 24 hr capsule Take 1 capsule (60 mg total) by mouth 2 (two) times a day 180 capsule 2    QUEtiapine (SEROquel) 50 mg tablet Take 1 5 tablets (75 mg total) by mouth daily at bedtime 135 tablet 3    Semaglutide,0 25 or 0 5MG/DOS, 2 MG/1 5ML SOPN Inject 0 5 mg under the skin once a week 1 5 mL 0    venlafaxine (EFFEXOR) 100 MG tablet Take 1 tablet (100 mg total) by mouth 2 (two) times a day Take 1 tablet (100mg) by mouth twice daily with 37 mg tablet for total of 275mg per day  180 tablet 3    venlafaxine (EFFEXOR) 37 5 mg tablet Take 1 tablet (37 5 mg total) by mouth 2 (two) times a day Take 1 tablet (37 5mg) by mouth twice daily with 100mg tablet for total of 275mg per day  180 tablet 3     No current facility-administered medications for this visit  Allergies   Allergen Reactions    Ibuprofen Other (See Comments)     Due to gastric sleeve -can only take for 5 days, then needs to stop       Review of Systems    Video Exam    There were no vitals filed for this visit      Physical Exam     I spent 46 minutes directly with the patient during this visit     Session start time: 2:03pm  Session end time: 2:49 pm    VIRTUAL VISIT DISCLAIMER    Salvador Smyth verbally agrees to participate in [employer]  Pt is aware that [employer] could be limited without vital signs or the ability to perform a full hands-on physical Domingo Wray understands she or the provider may request at any time to terminate the video visit and request the patient to seek care or treatment in person

## 2022-08-05 NOTE — TELEPHONE ENCOUNTER
Medication Refill Request     Name Semaglutide  Dose/Frequency Inject 0 5 mg under the skin once a week,  Quantity 1 5 mL  Verified pharmacy   [x]  Verified ordering Provider   [x]  Does patient have enough for the next 3 days?  Yes [] No [x]

## 2022-08-08 ENCOUNTER — HOSPITAL ENCOUNTER (INPATIENT)
Facility: HOSPITAL | Age: 52
LOS: 1 days | Discharge: HOME | DRG: 327 | End: 2022-08-09
Attending: SURGERY | Admitting: SURGERY
Payer: COMMERCIAL

## 2022-08-08 ENCOUNTER — ANESTHESIA (OUTPATIENT)
Dept: PERIOP | Facility: HOSPITAL | Age: 52
DRG: 327 | End: 2022-08-08
Payer: COMMERCIAL

## 2022-08-08 ENCOUNTER — TELEPHONE (OUTPATIENT)
Dept: FAMILY MEDICINE CLINIC | Facility: CLINIC | Age: 52
End: 2022-08-08

## 2022-08-08 DIAGNOSIS — E66.09 OBESITY DUE TO EXCESS CALORIES, UNSPECIFIED CLASSIFICATION, UNSPECIFIED WHETHER SERIOUS COMORBIDITY PRESENT: Primary | ICD-10-CM

## 2022-08-08 DIAGNOSIS — F41.1 GENERALIZED ANXIETY DISORDER: ICD-10-CM

## 2022-08-08 LAB
GLUCOSE SERPL-MCNC: 164 MG/DL (ref 65–140)
GLUCOSE SERPL-MCNC: 88 MG/DL (ref 65–140)

## 2022-08-08 PROCEDURE — 0D164ZA BYPASS STOMACH TO JEJUNUM, PERCUTANEOUS ENDOSCOPIC APPROACH: ICD-10-PCS | Performed by: SURGERY

## 2022-08-08 PROCEDURE — C1781 MESH (IMPLANTABLE): HCPCS | Performed by: SURGERY

## 2022-08-08 PROCEDURE — 0DN44ZZ RELEASE ESOPHAGOGASTRIC JUNCTION, PERCUTANEOUS ENDOSCOPIC APPROACH: ICD-10-PCS | Performed by: SURGERY

## 2022-08-08 PROCEDURE — 43659 UNLISTED LAPS PX STOMACH: CPT | Performed by: SURGERY

## 2022-08-08 PROCEDURE — 0DJ08ZZ INSPECTION OF UPPER INTESTINAL TRACT, VIA NATURAL OR ARTIFICIAL OPENING ENDOSCOPIC: ICD-10-PCS | Performed by: SURGERY

## 2022-08-08 PROCEDURE — 0DU747Z SUPPLEMENT STOMACH, PYLORUS WITH AUTOLOGOUS TISSUE SUBSTITUTE, PERCUTANEOUS ENDOSCOPIC APPROACH: ICD-10-PCS | Performed by: SURGERY

## 2022-08-08 PROCEDURE — C9290 INJ, BUPIVACAINE LIPOSOME: HCPCS | Performed by: SURGERY

## 2022-08-08 PROCEDURE — 82948 REAGENT STRIP/BLOOD GLUCOSE: CPT

## 2022-08-08 DEVICE — SEAMGUARD STPL REINF ENDO GIA ULTRA UNIV 60 PURPLE: Type: IMPLANTABLE DEVICE | Site: STOMACH | Status: FUNCTIONAL

## 2022-08-08 RX ORDER — ONDANSETRON 2 MG/ML
INJECTION INTRAMUSCULAR; INTRAVENOUS AS NEEDED
Status: DISCONTINUED | OUTPATIENT
Start: 2022-08-08 | End: 2022-08-08

## 2022-08-08 RX ORDER — NEOSTIGMINE METHYLSULFATE 1 MG/ML
INJECTION INTRAVENOUS AS NEEDED
Status: DISCONTINUED | OUTPATIENT
Start: 2022-08-08 | End: 2022-08-08

## 2022-08-08 RX ORDER — OXYCODONE HCL 5 MG/5 ML
10 SOLUTION, ORAL ORAL EVERY 4 HOURS PRN
Status: DISCONTINUED | OUTPATIENT
Start: 2022-08-08 | End: 2022-08-09 | Stop reason: HOSPADM

## 2022-08-08 RX ORDER — ROCURONIUM BROMIDE 10 MG/ML
INJECTION, SOLUTION INTRAVENOUS AS NEEDED
Status: DISCONTINUED | OUTPATIENT
Start: 2022-08-08 | End: 2022-08-08

## 2022-08-08 RX ORDER — MECLIZINE HYDROCHLORIDE 25 MG/1
25 TABLET ORAL EVERY 8 HOURS PRN
Status: DISCONTINUED | OUTPATIENT
Start: 2022-08-08 | End: 2022-08-09 | Stop reason: HOSPADM

## 2022-08-08 RX ORDER — DIPHENHYDRAMINE HCL 25 MG
25 TABLET ORAL EVERY 8 HOURS PRN
Status: DISCONTINUED | OUTPATIENT
Start: 2022-08-08 | End: 2022-08-09 | Stop reason: HOSPADM

## 2022-08-08 RX ORDER — SODIUM CHLORIDE, SODIUM LACTATE, POTASSIUM CHLORIDE, CALCIUM CHLORIDE 600; 310; 30; 20 MG/100ML; MG/100ML; MG/100ML; MG/100ML
100 INJECTION, SOLUTION INTRAVENOUS CONTINUOUS
Status: DISCONTINUED | OUTPATIENT
Start: 2022-08-08 | End: 2022-08-09 | Stop reason: HOSPADM

## 2022-08-08 RX ORDER — PROMETHAZINE HYDROCHLORIDE 25 MG/ML
25 INJECTION, SOLUTION INTRAMUSCULAR; INTRAVENOUS EVERY 6 HOURS PRN
Status: DISCONTINUED | OUTPATIENT
Start: 2022-08-08 | End: 2022-08-09 | Stop reason: HOSPADM

## 2022-08-08 RX ORDER — MIDAZOLAM HYDROCHLORIDE 2 MG/2ML
INJECTION, SOLUTION INTRAMUSCULAR; INTRAVENOUS AS NEEDED
Status: DISCONTINUED | OUTPATIENT
Start: 2022-08-08 | End: 2022-08-08

## 2022-08-08 RX ORDER — GLYCOPYRROLATE 0.2 MG/ML
INJECTION INTRAMUSCULAR; INTRAVENOUS AS NEEDED
Status: DISCONTINUED | OUTPATIENT
Start: 2022-08-08 | End: 2022-08-08

## 2022-08-08 RX ORDER — BUPIVACAINE HYDROCHLORIDE 5 MG/ML
INJECTION, SOLUTION EPIDURAL; INTRACAUDAL AS NEEDED
Status: DISCONTINUED | OUTPATIENT
Start: 2022-08-08 | End: 2022-08-08 | Stop reason: HOSPADM

## 2022-08-08 RX ORDER — ACETAMINOPHEN 325 MG/1
975 TABLET ORAL EVERY 8 HOURS
Status: DISCONTINUED | OUTPATIENT
Start: 2022-08-08 | End: 2022-08-09 | Stop reason: HOSPADM

## 2022-08-08 RX ORDER — ACETAMINOPHEN 160 MG/5ML
975 SUSPENSION, ORAL (FINAL DOSE FORM) ORAL EVERY 8 HOURS
Status: DISCONTINUED | OUTPATIENT
Start: 2022-08-08 | End: 2022-08-09 | Stop reason: HOSPADM

## 2022-08-08 RX ORDER — ONDANSETRON 2 MG/ML
4 INJECTION INTRAMUSCULAR; INTRAVENOUS EVERY 6 HOURS PRN
Status: DISCONTINUED | OUTPATIENT
Start: 2022-08-08 | End: 2022-08-09 | Stop reason: HOSPADM

## 2022-08-08 RX ORDER — SIMETHICONE 80 MG
80 TABLET,CHEWABLE ORAL 4 TIMES DAILY PRN
Status: DISCONTINUED | OUTPATIENT
Start: 2022-08-08 | End: 2022-08-09 | Stop reason: HOSPADM

## 2022-08-08 RX ORDER — ACETAMINOPHEN 325 MG/1
975 TABLET ORAL ONCE
Status: COMPLETED | OUTPATIENT
Start: 2022-08-08 | End: 2022-08-08

## 2022-08-08 RX ORDER — SODIUM CHLORIDE 9 MG/ML
125 INJECTION, SOLUTION INTRAVENOUS CONTINUOUS
Status: DISCONTINUED | OUTPATIENT
Start: 2022-08-08 | End: 2022-08-08

## 2022-08-08 RX ORDER — MORPHINE SULFATE 4 MG/ML
4 INJECTION, SOLUTION INTRAMUSCULAR; INTRAVENOUS EVERY 4 HOURS PRN
Status: DISCONTINUED | OUTPATIENT
Start: 2022-08-08 | End: 2022-08-09 | Stop reason: HOSPADM

## 2022-08-08 RX ORDER — LIDOCAINE HYDROCHLORIDE 20 MG/ML
INJECTION, SOLUTION EPIDURAL; INFILTRATION; INTRACAUDAL; PERINEURAL AS NEEDED
Status: DISCONTINUED | OUTPATIENT
Start: 2022-08-08 | End: 2022-08-08

## 2022-08-08 RX ORDER — HYDROMORPHONE HCL/PF 1 MG/ML
SYRINGE (ML) INJECTION AS NEEDED
Status: DISCONTINUED | OUTPATIENT
Start: 2022-08-08 | End: 2022-08-08

## 2022-08-08 RX ORDER — GABAPENTIN 300 MG/1
300 CAPSULE ORAL ONCE
Status: COMPLETED | OUTPATIENT
Start: 2022-08-08 | End: 2022-08-08

## 2022-08-08 RX ORDER — OXYCODONE HCL 5 MG/5 ML
5 SOLUTION, ORAL ORAL EVERY 4 HOURS PRN
Status: DISCONTINUED | OUTPATIENT
Start: 2022-08-08 | End: 2022-08-09 | Stop reason: HOSPADM

## 2022-08-08 RX ORDER — FAMOTIDINE 10 MG/ML
20 INJECTION, SOLUTION INTRAVENOUS 2 TIMES DAILY
Status: DISCONTINUED | OUTPATIENT
Start: 2022-08-08 | End: 2022-08-09 | Stop reason: HOSPADM

## 2022-08-08 RX ORDER — SCOLOPAMINE TRANSDERMAL SYSTEM 1 MG/1
1 PATCH, EXTENDED RELEASE TRANSDERMAL ONCE
Status: DISCONTINUED | OUTPATIENT
Start: 2022-08-08 | End: 2022-08-09 | Stop reason: HOSPADM

## 2022-08-08 RX ORDER — METHOCARBAMOL 750 MG/1
750 TABLET, FILM COATED ORAL
Status: DISCONTINUED | OUTPATIENT
Start: 2022-08-08 | End: 2022-08-09 | Stop reason: HOSPADM

## 2022-08-08 RX ORDER — ONDANSETRON 2 MG/ML
4 INJECTION INTRAMUSCULAR; INTRAVENOUS EVERY 6 HOURS PRN
Status: DISCONTINUED | OUTPATIENT
Start: 2022-08-08 | End: 2022-08-08 | Stop reason: HOSPADM

## 2022-08-08 RX ORDER — METRONIDAZOLE 500 MG/100ML
500 INJECTION, SOLUTION INTRAVENOUS EVERY 8 HOURS
Status: COMPLETED | OUTPATIENT
Start: 2022-08-08 | End: 2022-08-09

## 2022-08-08 RX ORDER — FENTANYL CITRATE 50 UG/ML
INJECTION, SOLUTION INTRAMUSCULAR; INTRAVENOUS AS NEEDED
Status: DISCONTINUED | OUTPATIENT
Start: 2022-08-08 | End: 2022-08-08

## 2022-08-08 RX ORDER — CEFAZOLIN SODIUM 2 G/50ML
2000 SOLUTION INTRAVENOUS ONCE
Status: COMPLETED | OUTPATIENT
Start: 2022-08-08 | End: 2022-08-08

## 2022-08-08 RX ORDER — METOCLOPRAMIDE HYDROCHLORIDE 5 MG/ML
10 INJECTION INTRAMUSCULAR; INTRAVENOUS EVERY 6 HOURS PRN
Status: DISCONTINUED | OUTPATIENT
Start: 2022-08-08 | End: 2022-08-09 | Stop reason: HOSPADM

## 2022-08-08 RX ORDER — PROMETHAZINE HYDROCHLORIDE 25 MG/ML
12.5 INJECTION, SOLUTION INTRAMUSCULAR; INTRAVENOUS EVERY 4 HOURS PRN
Status: DISCONTINUED | OUTPATIENT
Start: 2022-08-08 | End: 2022-08-08 | Stop reason: HOSPADM

## 2022-08-08 RX ORDER — HEPARIN SODIUM 5000 [USP'U]/ML
5000 INJECTION, SOLUTION INTRAVENOUS; SUBCUTANEOUS
Status: COMPLETED | OUTPATIENT
Start: 2022-08-08 | End: 2022-08-08

## 2022-08-08 RX ORDER — CEFAZOLIN SODIUM 2 G/50ML
2000 SOLUTION INTRAVENOUS EVERY 8 HOURS
Status: COMPLETED | OUTPATIENT
Start: 2022-08-08 | End: 2022-08-09

## 2022-08-08 RX ORDER — METRONIDAZOLE 500 MG/100ML
500 INJECTION, SOLUTION INTRAVENOUS ONCE
Status: COMPLETED | OUTPATIENT
Start: 2022-08-08 | End: 2022-08-08

## 2022-08-08 RX ORDER — HYDROMORPHONE HCL/PF 1 MG/ML
0.5 SYRINGE (ML) INJECTION
Status: DISCONTINUED | OUTPATIENT
Start: 2022-08-08 | End: 2022-08-08 | Stop reason: HOSPADM

## 2022-08-08 RX ORDER — ATOMOXETINE 60 MG/1
60 CAPSULE ORAL DAILY
Status: DISCONTINUED | OUTPATIENT
Start: 2022-08-09 | End: 2022-08-09 | Stop reason: HOSPADM

## 2022-08-08 RX ORDER — DEXAMETHASONE SODIUM PHOSPHATE 10 MG/ML
INJECTION, SOLUTION INTRAMUSCULAR; INTRAVENOUS AS NEEDED
Status: DISCONTINUED | OUTPATIENT
Start: 2022-08-08 | End: 2022-08-08

## 2022-08-08 RX ORDER — PROPRANOLOL HCL 60 MG
60 CAPSULE, EXTENDED RELEASE 24HR ORAL 2 TIMES DAILY
Status: DISCONTINUED | OUTPATIENT
Start: 2022-08-09 | End: 2022-08-09 | Stop reason: HOSPADM

## 2022-08-08 RX ORDER — FENTANYL CITRATE 50 UG/ML
50 INJECTION, SOLUTION INTRAMUSCULAR; INTRAVENOUS
Status: DISCONTINUED | OUTPATIENT
Start: 2022-08-08 | End: 2022-08-08 | Stop reason: HOSPADM

## 2022-08-08 RX ORDER — PROPOFOL 10 MG/ML
INJECTION, EMULSION INTRAVENOUS AS NEEDED
Status: DISCONTINUED | OUTPATIENT
Start: 2022-08-08 | End: 2022-08-08

## 2022-08-08 RX ADMIN — FAMOTIDINE 20 MG: 10 INJECTION INTRAVENOUS at 20:37

## 2022-08-08 RX ADMIN — SODIUM CHLORIDE 125 ML/HR: 9 INJECTION, SOLUTION INTRAVENOUS at 11:36

## 2022-08-08 RX ADMIN — FENTANYL CITRATE 50 MCG: 50 INJECTION, SOLUTION INTRAMUSCULAR; INTRAVENOUS at 17:11

## 2022-08-08 RX ADMIN — CEFAZOLIN SODIUM 2000 MG: 2 SOLUTION INTRAVENOUS at 14:01

## 2022-08-08 RX ADMIN — FENTANYL CITRATE 100 MCG: 50 INJECTION INTRAMUSCULAR; INTRAVENOUS at 14:04

## 2022-08-08 RX ADMIN — HYDROMORPHONE HYDROCHLORIDE 0.5 MG: 1 INJECTION, SOLUTION INTRAMUSCULAR; INTRAVENOUS; SUBCUTANEOUS at 17:59

## 2022-08-08 RX ADMIN — ONDANSETRON 4 MG: 2 INJECTION INTRAMUSCULAR; INTRAVENOUS at 14:16

## 2022-08-08 RX ADMIN — GABAPENTIN 300 MG: 300 CAPSULE ORAL at 11:36

## 2022-08-08 RX ADMIN — SODIUM CHLORIDE: 9 INJECTION, SOLUTION INTRAVENOUS at 14:16

## 2022-08-08 RX ADMIN — CEFAZOLIN SODIUM 2000 MG: 2 SOLUTION INTRAVENOUS at 21:07

## 2022-08-08 RX ADMIN — SODIUM CHLORIDE, POTASSIUM CHLORIDE, SODIUM LACTATE AND CALCIUM CHLORIDE 100 ML/HR: 600; 310; 30; 20 INJECTION, SOLUTION INTRAVENOUS at 20:32

## 2022-08-08 RX ADMIN — Medication 25 MG: at 14:06

## 2022-08-08 RX ADMIN — HYDROMORPHONE HYDROCHLORIDE 0.5 MG: 1 INJECTION, SOLUTION INTRAMUSCULAR; INTRAVENOUS; SUBCUTANEOUS at 15:56

## 2022-08-08 RX ADMIN — NEOSTIGMINE METHYLSULFATE 3 MG: 1 INJECTION INTRAVENOUS at 16:40

## 2022-08-08 RX ADMIN — PROPOFOL 200 MG: 10 INJECTION, EMULSION INTRAVENOUS at 14:07

## 2022-08-08 RX ADMIN — Medication 10 MG: at 16:31

## 2022-08-08 RX ADMIN — LIDOCAINE HYDROCHLORIDE 50 MG: 20 INJECTION, SOLUTION EPIDURAL; INFILTRATION; INTRACAUDAL; PERINEURAL at 14:07

## 2022-08-08 RX ADMIN — METRONIDAZOLE: 500 INJECTION, SOLUTION INTRAVENOUS at 14:10

## 2022-08-08 RX ADMIN — DEXAMETHASONE SODIUM PHOSPHATE 5 MG: 10 INJECTION INTRAMUSCULAR; INTRAVENOUS at 14:16

## 2022-08-08 RX ADMIN — ROCURONIUM BROMIDE 10 MG: 50 INJECTION, SOLUTION INTRAVENOUS at 15:56

## 2022-08-08 RX ADMIN — SCOPALAMINE 1 PATCH: 1 PATCH, EXTENDED RELEASE TRANSDERMAL at 11:36

## 2022-08-08 RX ADMIN — GLYCOPYRROLATE 0.4 MG: 0.2 INJECTION, SOLUTION INTRAMUSCULAR; INTRAVENOUS at 16:40

## 2022-08-08 RX ADMIN — ACETAMINOPHEN 325MG 975 MG: 325 TABLET ORAL at 11:36

## 2022-08-08 RX ADMIN — HEPARIN SODIUM 5000 UNITS: 5000 INJECTION INTRAVENOUS; SUBCUTANEOUS at 12:07

## 2022-08-08 RX ADMIN — METRONIDAZOLE 500 MG: 500 INJECTION, SOLUTION INTRAVENOUS at 22:32

## 2022-08-08 RX ADMIN — ROCURONIUM BROMIDE 70 MG: 50 INJECTION, SOLUTION INTRAVENOUS at 14:07

## 2022-08-08 RX ADMIN — FENTANYL CITRATE 50 MCG: 50 INJECTION, SOLUTION INTRAMUSCULAR; INTRAVENOUS at 17:21

## 2022-08-08 RX ADMIN — HYDROMORPHONE HYDROCHLORIDE 0.5 MG: 1 INJECTION, SOLUTION INTRAMUSCULAR; INTRAVENOUS; SUBCUTANEOUS at 17:45

## 2022-08-08 RX ADMIN — ACETAMINOPHEN 975 MG: 325 SUSPENSION ORAL at 21:07

## 2022-08-08 RX ADMIN — OXYCODONE HYDROCHLORIDE 10 MG: 5 SOLUTION ORAL at 20:32

## 2022-08-08 RX ADMIN — MORPHINE SULFATE 4 MG: 4 INJECTION INTRAVENOUS at 23:27

## 2022-08-08 RX ADMIN — SODIUM CHLORIDE: 9 INJECTION, SOLUTION INTRAVENOUS at 16:30

## 2022-08-08 RX ADMIN — MIDAZOLAM 2 MG: 1 INJECTION INTRAMUSCULAR; INTRAVENOUS at 14:01

## 2022-08-08 RX ADMIN — HYDROMORPHONE HYDROCHLORIDE 0.5 MG: 1 INJECTION, SOLUTION INTRAMUSCULAR; INTRAVENOUS; SUBCUTANEOUS at 17:33

## 2022-08-08 NOTE — TELEPHONE ENCOUNTER
Pt called stating that the pharmacy is on back order for the Ozempic  Requesting a sample to hold her over until they are able to fill it at the pharmacy  Pt is having a procedure today and unable to answer the phone  Ashlee Luke for [place]       Pharmacy:   [address] [place]) [place] [facility]  [address] [address]  Phone: [phone] Fax: [phone]

## 2022-08-08 NOTE — ANESTHESIA PREPROCEDURE EVALUATION
Procedure:  LAPAROSCOPIC REVISION CONVERSION TO NOE-EN-Y GASTRIC BYPASS AND INTRAOPERATIVE EGD (N/A Abdomen)    Relevant Problems   CARDIO   (+) Mixed hyperlipidemia   (+) Paroxysmal SVT (supraventricular tachycardia) (HCC)   (+) Rib pain on left side      ENDO   (+) Type 2 diabetes mellitus without complication, without long-term current use of insulin (HCC)      GI/HEPATIC   (+) GERD (gastroesophageal reflux disease)   (+) Hepatic steatosis      MUSCULOSKELETAL   (+) Chronic back pain      NEURO/PSYCH   (+) Chronic back pain   (+) Chronic pain syndrome   (+) Generalized anxiety disorder   (+) Other chronic pain   (+) Post traumatic stress disorder (PTSD)   (+) Recurrent major depressive disorder, in partial remission ([facility] [address] )        Physical Exam    Airway    Mallampati score: II  TM Distance: >3 FB  Neck ROM: full     Dental   Comment: Permanent bridges,     Cardiovascular  Rhythm: regular, Rate: normal, Cardiovascular exam normal    Pulmonary  Pulmonary exam normal Breath sounds clear to auscultation,     Other Findings        Anesthesia Plan  ASA Score- 2     Anesthesia Type- general with ASA Monitors  Additional Monitors:   Airway Plan:     Comment: H/o intraprocedural recall   Plan Factors-Exercise tolerance (METS): >4 METS  Chart reviewed  Existing labs reviewed  Patient summary reviewed  Patient is not a current smoker  Patient instructed to abstain from smoking on day of procedure  Patient did not smoke on day of surgery  Obstructive sleep apnea risk education given perioperatively  Induction- intravenous  Postoperative Plan- Plan for postoperative opioid use  Planned trial extubation    Informed Consent- Anesthetic plan and risks discussed with patient and spouse Deejay Atkinson

## 2022-08-08 NOTE — OP NOTE
[facility]   [address] [facility], [address]  [phone] (Fax)      Operative Report  LAPAROSCOPIC REVISION CONVERSION TO NOE-EN-Y GASTRIC BYPASS AND INTRAOPERATIVE EGD     Patient Name: Columba Danielle    :  1970  MRN: [id number]  [facility]  Surgery Date : 2022  Surgeons:  Surgeon(s) and Role:     * Galen Ramesh MD - Primary     * Renetta Enrique MD - [address], DO - Assisting    Diagnosis:    Pre-Op Diagnosis Codes:  GERD (gastroesophageal reflux disease) [K21 9]  Heartburn [R12]  Regurgitation of food [R11 10]  Status post bariatric surgery [Z98 84]    Post-Op Diagnosis Codes:     * GERD (gastroesophageal reflux disease) [K21 9]     * Heartburn [R12]     * Regurgitation of food [R11 10]     * Status post bariatric surgery [Z98 84]    Procedure  1  Diagnostic Laparoscopy  2  Extensive Lysis of Adhesions  3  Laparoscopic Revision of Sleeve Gastrectomy to a Noe-en-Y Gastric Bypass  4  Intraoperative Endoscopy      Specimen(s):  * No specimens in log *    Estimated Blood Loss:    50 mL    Anesthesia Type:     General    Operative Indications:    GERD (gastroesophageal reflux disease) [K21 9]  Heartburn [R12]  Regurgitation of food [R11 10]  Status post bariatric surgery [Z98 84]      Operative Findings:    Large amount of adhesions involving the sleeve to the undersurface of the liver and retroperitoneal structures as well as the lower abdomen with omentum to the anterior abdominal wall  Complications:     None    Procedure and Technique:    INDICATION:    Columba Danielle is a 47 y  o  female  s/p history of Vertical Sleeve Gastrectomy with me in 2018 with UGI showing mild-moderate reflux and EGD showing moderate amount of retained bile  Patient continues to have symptoms that are not improving despite medication compliance and despite refularly meeting with dieticians     As a result of her heart burn, she has developed maladaptive eating patterns, causing her to gain weight  Patient will benefit from gastric bypass for treatment of her GERD      She has been pre certified to undergo a Laparoscopic conversion of sleeve gastrectomy to a Rebecca-en-Y gastric bypass  OPERATIVE TECHNIQUE    The patient was taken to the operating room and placed in a supine position  A dose of IV antibiotic prophylaxis that consisted of Ancef 2g and Metronidazole 500mg was given  Also, 5000 units of subcutaneous unfractionated heparin to prevent DVT were administered  Sequential compression devices were placed on both lower extremities  After satisfactory general anesthesia induction and endotracheal intubation was achieved, the extremities were secured to prevent neurovascular and musculoskeletal injuries as best as possible  Subsequently, the abdominal wall was prepped and draped in a surgical standard sterile fashion  After a timeout was done and the patient was properly identified and the type of procedure was confirmed access to the peritoneal cavity was gained with standard Veress needle technique  Pneumoperitoneum was then established with CO2 insufflation  A four quadrant transversus abdominis plane block was performed under direct laparoscopic vision  After this was completed four additional trocars were placed: a 12 mm in the right upper quadrant subcostal position in the anterior axillary line, a 12-mm port was placed in the right flank midclavicular line, a 12-mm port was placed in the left upper quadrant subcostal position in the midclavicular line and another 12-mm port was placed in the left quadrant anterior axillary line lateral to the supraumbilical port  With the trocars in place, the dissection was begun      At this point we repositioned the patient into a reverse Trendelenburg and the [facility] liver retractor was placed in the subxiphoid position through the use of a 5-mm trocar incision  We elevated the liver and encounter multiple dense adhesions involving they anterior aspect of the sleeve pouch to the undersurface of the liver  These were taken down with a combination of sharp and energy dissection looking to identify the structures  This process was tedious and took about 30 minutes to accomplish  The pars flaccida was identified and incised  The lesser sac was entered below the lesser curve at the level just inferior to the take off of the left gastric artery  The left gastric artery and hepatic vagal branches were preserved  We then created aproximately a 30 cc gastric pouch  To accomplish this, serial firings of a laparoscopic stapler 60-mm cartridge were utilized  The staple lines were reinforced with a butressing material  We created a pouch based on the lesser curve and in vertical orientation  This was accomplished by a transverse firing of the stapler along the inferior edge of the pouch    This completely  the pouch from the gastric remnant  A 25 mm circular stapler anvil was passed through the mouth and into the esophagus and subsequently placed within the pouch  The inferior edge of the pouch was then opened with cautery and the anvil stem was brought through the anterior aspect of the pouch close to the staple line  We proceeded to divide the omentum all the way to the transverse colon and subsequently elevated, this allowed for the ligament of Treitz to be visualized  The small bowel was run about 60 cm to a point distal from the ligament of Treitz and was divided with a stapler and a 60 mm cartridge  The Rebecca limb was then measured at 100 cm, and the 100 cm debra was brought in side-to-side opposition to the biliopancreatic limb  A side-to-side jejunojejunostomy was then created  This was accomplished by first making an antimesenteric enterotomy with the cautery energy device   We then positioned the laparoscopic stapler with a 60-mm cartridge within the lumen of the bowel to create a stapled side-to-side anastomosis  The enterotomy was then approximated with a 2-0 silk suture, subsequently elevated and the stapler was then reloaded and positioned perpendicularly to the first staple lines, just below the margin of the enterotomy on the antimesenteric border of the bowel and closed transversely utilizing an additional 60-mm cartridge  The resulting mesenteric defect was then closed with a running nonabsorbable suture  A Brolin stitch was placed to prevent kinking  The end of the Rebecca limb was opened with the cautery and the circular stapling device was brought through the dilated left upper quadrant 12-mm port site, and passed through the open end of the Rebecca limb  The Rebecca limb was then passed in a antecolic and antegastric position to the pouch  This was accomplished without tension and without twist  The stem of the stapling device was then brought through the antimesenteric border of the Rebecca limb adjacent to the pouch  The stem and the anvil were united and the stapler was fired  This created an end-to-side gastrojejunostomy  The excess Rebecca limb proximal to the anastomosis was then resected with the cautery energy device and a laparoscopic stapler with a 60-mm cartridge  The anastomosis was reinforced with interrupted absorbable sutures at the intersection of the staple lines  The distal Rebecca limb was occluded and an EGD as well as an air insufflation test were performed  No intraoperative bleeding nor leaks were detected  The whole G-J anastomotic area was covered with at tongue of omentum in a Jax patch fashion  The sponge, needle and instrument count was reported complete  The 12-mm port site on the left flank that was dilated for the circular stapler as well as the Visiport trocar were then closed with the use of a suture closure device and a 0 absorbable suture   The liver retractor was removed under direct laparoscopic visualization, and no bleeding was noted  The remaining ports were then also removed under laparoscopic visualization  The skin incisions were all closed with 4-0 absorbable subcuticular suture  The patient tolerated the procedure well, was extubated uneventfully and was transferred to the recovery room in stable condition  I was present for the entire length of the procedure as the attending of record  No qualified resident was available to assist   The presence of an assistant was necessary for camera holding, traction and counter traction and for help with suturing and stapling in addition to performing the intraop-EGD        Patient Disposition:    PACU     Signature: Carola Cai MD  Date: August 8, 2022  Time: 4:37 PM

## 2022-08-08 NOTE — PLAN OF CARE
Problem: PAIN - ADULT  Goal: Verbalizes/displays adequate comfort level or baseline comfort level  Description: Interventions:  - Encourage patient to monitor pain and request assistance  - Assess pain using appropriate pain scale  - Administer analgesics based on type and severity of pain and evaluate response  - Implement non-pharmacological measures as appropriate and evaluate response  - Consider cultural and social influences on pain and pain management  - Notify physician/advanced practitioner if interventions unsuccessful or patient reports new pain  Outcome: Progressing     Problem: INFECTION - ADULT  Goal: Absence or prevention of progression during hospitalization  Description: INTERVENTIONS:  - Assess and monitor for signs and symptoms of infection  - Monitor lab/diagnostic results  - Monitor all insertion sites, i e  indwelling lines, tubes, and drains  - Monitor endotracheal if appropriate and nasal secretions for changes in amount and color  - [place] appropriate cooling/warming therapies per order  - Administer medications as ordered  - Instruct and encourage patient and family to use good hand hygiene technique  - Identify and instruct in appropriate isolation precautions for identified infection/condition  Outcome: Progressing  Goal: Absence of fever/infection during neutropenic period  Description: INTERVENTIONS:  - Monitor WBC    Outcome: Progressing     Problem: DISCHARGE PLANNING  Goal: Discharge to home or other facility with appropriate resources  Description: INTERVENTIONS:  - Identify barriers to discharge w/patient and caregiver  - Arrange for needed discharge resources and transportation as appropriate  - Identify discharge learning needs (meds, wound care, etc )  - Arrange for interpretive services to assist at discharge as needed  - Refer to Case Management Department for coordinating discharge planning if the patient needs post-hospital services based on physician/advanced practitioner order or complex needs related to functional status, cognitive ability, or social support system  Outcome: Progressing     Problem: GASTROINTESTINAL - ADULT  Goal: Minimal or absence of nausea and/or vomiting  Description: INTERVENTIONS:  - Administer IV fluids if ordered to ensure adequate hydration  - Maintain NPO status until nausea and vomiting are resolved  - Nasogastric tube if ordered  - Administer ordered antiemetic medications as needed  - Provide nonpharmacologic comfort measures as appropriate  - Advance diet as tolerated, if ordered  - Consider nutrition services referral to assist patient with adequate nutrition and appropriate food choices  Outcome: Progressing  Goal: Maintains or returns to baseline bowel function  Description: INTERVENTIONS:  - Assess bowel function  - Encourage oral fluids to ensure adequate hydration  - Administer IV fluids if ordered to ensure adequate hydration  - Administer ordered medications as needed  - Encourage mobilization and activity  - Consider nutritional services referral to assist patient with adequate nutrition and appropriate food choices  Outcome: Progressing     Problem: GENITOURINARY - ADULT  Goal: Maintains or returns to baseline urinary function  Description: INTERVENTIONS:  - Assess urinary function  - Encourage oral fluids to ensure adequate hydration if ordered  - Administer IV fluids as ordered to ensure adequate hydration  - Administer ordered medications as needed  - Offer frequent toileting  - Follow urinary retention protocol if ordered  Outcome: Progressing     Problem: METABOLIC, FLUID AND ELECTROLYTES - ADULT  Goal: Electrolytes maintained within normal limits  Description: INTERVENTIONS:  - Monitor labs and assess patient for signs and symptoms of electrolyte imbalances  - Administer electrolyte replacement as ordered  - Monitor response to electrolyte replacements, including repeat lab results as appropriate  - Instruct patient on fluid and nutrition as appropriate  Outcome: Progressing     Problem: SKIN/TISSUE INTEGRITY - ADULT  Goal: Skin Integrity remains intact(Skin Breakdown Prevention)  Description: Assess:  -Perform Morteza assessment every   -Clean and moisturize skin every   -Inspect skin when repositioning, toileting, and assisting with ADLS  -Assess under medical devices such as  every   -Assess extremities for adequate circulation and sensation     Bed Management:  -Have minimal linens on bed & keep smooth, unwrinkled  -Change linens as needed when moist or perspiring  -Avoid sitting or lying in one position for more than  hours while in bed  -Keep HOB at degrees     Toileting:  -Offer bedside commode  -Assess for incontinence every   -Use incontinent care products after each incontinent episode such as     Activity:  -Mobilize patient  times a day  -Encourage activity and walks on unit  -Encourage or provide ROM exercises   -Turn and reposition patient every  Hours  -Use appropriate equipment to lift or move patient in bed  -Instruct/ Assist with weight shifting every  when out of bed in chair  -Consider limitation of chair time  hour intervals    Skin Care:  -Avoid use of baby powder, tape, friction and shearing, hot water or constrictive clothing  -Relieve pressure over bony prominences using   -Do not massage red bony areas    Next Steps:  -Teach patient strategies to minimize risks such as    -Consider consults to  interdisciplinary teams such as   Outcome: Progressing  Goal: Incision(s), wounds(s) or drain site(s) healing without S/S of infection  Description: INTERVENTIONS  - Assess and document dressing, incision, wound bed, drain sites and surrounding tissue  - Provide patient and family education  - Perform skin care/dressing changes every  Outcome: Progressing     Problem: HEMATOLOGIC - ADULT  Goal: Maintains hematologic stability  Description: INTERVENTIONS  - Assess for signs and symptoms of bleeding or hemorrhage  - Monitor labs  - Administer supportive blood products/factors as ordered and appropriate  Outcome: Progressing

## 2022-08-08 NOTE — ANESTHESIA POSTPROCEDURE EVALUATION
Post-Op Assessment Note    CV Status:  Stable    Pain management: adequate     Mental Status:  Alert and awake   Hydration Status:  Euvolemic   PONV Controlled:  Controlled   Airway Patency:  Patent      Post Op Vitals Reviewed: Yes      Staff: Anesthesiologist         No complications documented      /58 (08/08/22 1713)    Temp     Pulse 64 (08/08/22 1713)   Resp 16 (08/08/22 1713)    SpO2 91 % (08/08/22 1713)

## 2022-08-08 NOTE — INTERVAL H&P NOTE
H&P reviewed  After examining the patient I find no changes in the patients condition since the H&P had been written      Vitals:    08/08/22 1123   BP: 117/69   Pulse: 70   Resp: 16   Temp: 98 2 °F (36 8 °C)   SpO2: 99%

## 2022-08-09 VITALS
TEMPERATURE: 97.4 F | WEIGHT: 183.42 LBS | OXYGEN SATURATION: 93 % | DIASTOLIC BLOOD PRESSURE: 71 MMHG | RESPIRATION RATE: 16 BRPM | HEIGHT: 67 IN | SYSTOLIC BLOOD PRESSURE: 122 MMHG | BODY MASS INDEX: 28.79 KG/M2 | HEART RATE: 62 BPM

## 2022-08-09 PROBLEM — E66.9 OBESITY: Status: ACTIVE | Noted: 2018-04-20

## 2022-08-09 LAB
ANION GAP SERPL CALCULATED.3IONS-SCNC: 12 MMOL/L (ref 4–13)
BUN SERPL-MCNC: 7 MG/DL (ref 5–25)
CALCIUM SERPL-MCNC: 8.1 MG/DL (ref 8.3–10.1)
CHLORIDE SERPL-SCNC: 105 MMOL/L (ref 96–108)
CO2 SERPL-SCNC: 21 MMOL/L (ref 21–32)
CREAT SERPL-MCNC: 0.8 MG/DL (ref 0.6–1.3)
ERYTHROCYTE [DISTWIDTH] IN BLOOD BY AUTOMATED COUNT: 12.9 % (ref 11.6–15.1)
GFR SERPL CREATININE-BSD FRML MDRD: 84 ML/MIN/1.73SQ M
GLUCOSE SERPL-MCNC: 155 MG/DL (ref 65–140)
HCT VFR BLD AUTO: 36.1 % (ref 34.8–46.1)
HGB BLD-MCNC: 11.6 G/DL (ref 11.5–15.4)
MCH RBC QN AUTO: 30.3 PG (ref 26.8–34.3)
MCHC RBC AUTO-ENTMCNC: 32.1 G/DL (ref 31.4–37.4)
MCV RBC AUTO: 94 FL (ref 82–98)
PLATELET # BLD AUTO: 236 THOUSANDS/UL (ref 149–390)
PMV BLD AUTO: 10.1 FL (ref 8.9–12.7)
POTASSIUM SERPL-SCNC: 3.9 MMOL/L (ref 3.5–5.3)
RBC # BLD AUTO: 3.83 MILLION/UL (ref 3.81–5.12)
SODIUM SERPL-SCNC: 138 MMOL/L (ref 135–147)
WBC # BLD AUTO: 15.74 THOUSAND/UL (ref 4.31–10.16)

## 2022-08-09 PROCEDURE — 99254 IP/OBS CNSLTJ NEW/EST MOD 60: CPT | Performed by: STUDENT IN AN ORGANIZED HEALTH CARE EDUCATION/TRAINING PROGRAM

## 2022-08-09 PROCEDURE — 80048 BASIC METABOLIC PNL TOTAL CA: CPT | Performed by: PHYSICIAN ASSISTANT

## 2022-08-09 PROCEDURE — NC001 PR NO CHARGE: Performed by: SURGERY

## 2022-08-09 PROCEDURE — 99024 POSTOP FOLLOW-UP VISIT: CPT | Performed by: SURGERY

## 2022-08-09 PROCEDURE — 85027 COMPLETE CBC AUTOMATED: CPT | Performed by: PHYSICIAN ASSISTANT

## 2022-08-09 RX ORDER — QUETIAPINE FUMARATE 25 MG/1
75 TABLET, FILM COATED ORAL
Status: DISCONTINUED | OUTPATIENT
Start: 2022-08-09 | End: 2022-08-09 | Stop reason: HOSPADM

## 2022-08-09 RX ORDER — LAMOTRIGINE 100 MG/1
100 TABLET ORAL DAILY
Status: DISCONTINUED | OUTPATIENT
Start: 2022-08-09 | End: 2022-08-09 | Stop reason: HOSPADM

## 2022-08-09 RX ORDER — ATORVASTATIN CALCIUM 10 MG/1
10 TABLET, FILM COATED ORAL
Status: DISCONTINUED | OUTPATIENT
Start: 2022-08-09 | End: 2022-08-09 | Stop reason: HOSPADM

## 2022-08-09 RX ORDER — MONTELUKAST SODIUM 10 MG/1
10 TABLET ORAL
Status: DISCONTINUED | OUTPATIENT
Start: 2022-08-09 | End: 2022-08-09 | Stop reason: HOSPADM

## 2022-08-09 RX ORDER — ACETAMINOPHEN 160 MG/5ML
975 SUSPENSION, ORAL (FINAL DOSE FORM) ORAL EVERY 8 HOURS
Qty: 638.4 ML | Refills: 0 | Status: SHIPPED | OUTPATIENT
Start: 2022-08-09 | End: 2022-08-17

## 2022-08-09 RX ADMIN — CEFAZOLIN SODIUM 2000 MG: 2 SOLUTION INTRAVENOUS at 05:37

## 2022-08-09 RX ADMIN — MORPHINE SULFATE 4 MG: 4 INJECTION INTRAVENOUS at 08:11

## 2022-08-09 RX ADMIN — ATOMOXETINE HYDROCHLORIDE 60 MG: 60 CAPSULE ORAL at 08:11

## 2022-08-09 RX ADMIN — PROPRANOLOL HYDROCHLORIDE 60 MG: 60 CAPSULE, EXTENDED RELEASE ORAL at 08:11

## 2022-08-09 RX ADMIN — FAMOTIDINE 20 MG: 10 INJECTION INTRAVENOUS at 08:11

## 2022-08-09 RX ADMIN — LAMOTRIGINE 150 MG: 25 TABLET ORAL at 08:11

## 2022-08-09 RX ADMIN — METRONIDAZOLE 500 MG: 500 INJECTION, SOLUTION INTRAVENOUS at 06:25

## 2022-08-09 RX ADMIN — ACETAMINOPHEN 975 MG: 325 SUSPENSION ORAL at 05:37

## 2022-08-09 RX ADMIN — OXYCODONE HYDROCHLORIDE 10 MG: 5 SOLUTION ORAL at 05:37

## 2022-08-09 RX ADMIN — OXYCODONE HYDROCHLORIDE 10 MG: 5 SOLUTION ORAL at 13:50

## 2022-08-09 RX ADMIN — ACETAMINOPHEN 975 MG: 325 SUSPENSION ORAL at 13:46

## 2022-08-09 RX ADMIN — MORPHINE SULFATE 4 MG: 4 INJECTION INTRAVENOUS at 03:39

## 2022-08-09 NOTE — CONSULTS
Adam 39 1970, 47 y o  female MRN: [id number]  Unit/Bed#: E5 -01 Encounter: [id number]  Primary Care Provider: Luis Crawford MD   Date and time admitted to hospital: 8/8/2022 11:09 AM    Inpatient consult to Internal Medicine  Consult performed by: Jennye Babinski, MD  Consult ordered by: Jorge Luis Yan PA-C          * Obesity  Assessment & Plan  47year old female was admitted under bariatric service for the management of her obesity  She is s/p elective diagnostic Laparoscopy, extensive Lysis of Adhesions, laparoscopic Revision of Sleeve Gastrectomy to a Rebecca-en-Y Gastric Bypass, Intraoperative Endoscopy  · Post-op management, DVT prophylaxis, and dispo per primary team  · Defer to [facility] whether she should continue with PPI and famotidine  ADHD (attention deficit hyperactivity disorder), inattentive type  Assessment & Plan  Continue Atomoxetine    Paroxysmal SVT (supraventricular tachycardia) ([facility])  Assessment & Plan  Continue propanolol 60mg BID  Outpatient cardiology follow-up    Generalized anxiety disorder  Assessment & Plan  Continue effexor, seroquel    Recurrent major depressive disorder, in partial remission ([facility] [address] )  Assessment & Plan  Continue home regimen of Venlafaxine / Seroquel    Type 2 diabetes mellitus without complication, without long-term current use of insulin [facility])  Assessment & Plan  Lab Results   Component Value Date    HGBA1C 6 1 07/25/2022       Recent Labs     08/08/22  1138 08/08/22  1706   POCGLU 88 164*       Blood Sugar Average: Last 72 hrs:  (P) 126     Takes weekly semaglutide  Management per outpatient provider  Recommendations for Discharge:  · No Metformin on discharge  · PCP followup within a week  · Continue rest of her home medications  Will defer to bariatrics whether or not she should continue her famotidine and PPI      Counseling / Coordination of Care Time: 45 mins Greater than 50% of total time spent on patient counseling and coordination of care  Collaboration of Care: Were Recommendations Directly Discussed with Primary Treatment Team? - No     History of Present Illness:    Canelo Grider is a 47 y o  female who is originally admitted to the bariatric service due to her obesity  We are consulted for the management of her medical comorbidities  Patient is a 21-year-old female with a history of paroxysmal SVT, GERD, depression, PTSD, generalized anxiety disorder  She was admitted under the bariatric service due to her obesity  She underwent diagnostic laparoscopy, extensive lysis of adhesions, laparoscopic revision of sleeve gastrectomy to Rebecca-en-Y gastric bypass, and intraoperative endoscopy  She seemed to have tolerated the procedure postop  Besides surgical site pain, she offers no new acute complaints overnight  Review of Systems:    Review of Systems   Constitutional: Positive for activity change  Negative for chills and fatigue  HENT: Negative for congestion  Eyes: Negative for visual disturbance  Respiratory: Negative for cough and wheezing  Cardiovascular: Negative for chest pain and palpitations  Gastrointestinal: Positive for abdominal pain and nausea  Negative for vomiting  Endocrine: Negative for polyuria  Genitourinary: Negative for dysuria  Musculoskeletal: Negative for gait problem  Skin: Negative for color change and pallor  Neurological: Negative for syncope  Psychiatric/Behavioral: Negative for agitation and confusion  Past Medical and Surgical History:     Past Medical History:   Diagnosis Date    ADHD (attention deficit hyperactivity disorder)     Bulging lumbar disc     Carpal tunnel syndrome     RIGHT    LAST ASSESSED: 12/7/16    Chronic back pain     low    Chronic pain disorder     Colon polyps     COVID-19     12/2021    Diabetes mellitus ([address] )     GERD (gastroesophageal reflux disease)     Gestational diabetes     Hearing loss left ear    Hyperlipidemia     Resolved with weight loss    Hyponatremia 2020    IBS (irritable bowel syndrome)     Ileus ([address] )     LAST ASSESSED: 8/3/17    Labial cyst     LAST ASSESSED: 16    Myofascial pain     LAST ASSESSED: 16    Obesity     Ovarian cyst     LEFT  LAST ASSESSED: 16    Panic attack     Pneumonia     Sacroiliitis (HCC)     Seasonal allergies     SVT (supraventricular tachycardia) (HCC)     Thoracic outlet syndrome     2010    Trochanteric bursitis of both hips     LAST ASSESSED: 3/18/16    Ulnar neuropathy at elbow     Varicella     Wears glasses        Past Surgical History:   Procedure Laterality Date    BILE DUCT EXPLORATION      ENDOSCOPIC REMOVAL OF STONES FROM BILIARY TRACT     SECTION      x3    CHOLECYSTECTOMY      COLONOSCOPY      2017-polyp, repeat     DILATION AND CURETTAGE OF UTERUS      ENDOMETRIAL ABLATION      ERCP W/ SPHICTEROTOMY      FIRST RIB REMOVAL      THORAX EXCISION OF FIRST RIB    HYSTEROSCOPY      NEUROPLASTY / TRANSPOSITION ULNAR NERVE AT ELBOW Right 2011    [place] COLONOSCOPY FLX DX W/COLLJ SPEC WHEN PFRMD N/A 2017    Procedure: COLONOSCOPY;  Surgeon: Cody Ng MD;  [facility]; Service: Gastroenterology    [place] ESOPHAGOGASTRODUODENOSCOPY TRANSORAL DIAGNOSTIC N/A 2017    Procedure: ESOPHAGOGASTRODUODENOSCOPY (EGD); Surgeon: Yara Ortiz MD;  [facility];   Service: Gastroenterology    [place] HYSTEROSCOPY,W/ENDO BX N/A 10/20/2017    Procedure: DILATATION AND CURETTAGE (D&C) WITH HYSTEROSCOPY  REMOVAL VULVAR RT  LESION;  Surgeon: Gemma Infante MD;  [facility];  Service: Gynecology    [place] LAP, ÁLVARO RESTRICT [address], LONGITUDINAL GASTRECTOMY N/A 2018    Procedure: GASTRECTOMY SLEEVE LAPAROSCOPIC; INTRAOPERATIVE EGD ;  Surgeon: Guru Bird MD;  [facility];  Service: Bariatrics    [place] LAP, [place] RESTRICT [address], LONGITUDINAL GASTRECTOMY N/A 2022    Procedure: Diagnostic Lap; Extensive Lysis of Adhesions; LAPAROSCOPIC REVISION CONVERSION TO NOE-EN-Y GASTRIC BYPASS AND INTRAOPERATIVE EGD;  Surgeon: Renetta Mendoza MD;  [facility];  [facility] [address] Left 2020    Procedure: Insertion of thoracic spinal cord stimulator electrode via laminotomy and placement of left buttock implantable pulse generator (NEUROMONITORING); Surgeon: Esthela Bird MD;  [facility];  Service: Neurosurgery    SPINAL CORD STIMULATOR TRIAL W/ LAMINOTOMY      TONSILLECTOMY AND ADENOIDECTOMY      TUBAL LIGATION      UPPER GASTROINTESTINAL ENDOSCOPY      VEIN LIGATION AND STRIPPING Right     VULVA SURGERY  10/20/2017    BIOPSY    WISDOM TOOTH EXTRACTION         Meds/Allergies:    all medications and allergies reviewed    Allergies:    Allergies   Allergen Reactions    Ibuprofen Other (See Comments)     Due to gastric sleeve -can only take for 5 days, then needs to stop       Social History:     Marital Status: /Civil Union    Substance Use History:   Social History     Substance and Sexual Activity   Alcohol Use Not Currently    Alcohol/week: 0 0 - 2 0 standard drinks    Comment: Occs -twice monthly     Social History     Tobacco Use   Smoking Status Former Smoker    Quit date: 2013    Years since quittin 2   Smokeless Tobacco Never Used     Social History     Substance and Sexual Activity   Drug Use No       Family History:    Family History   Problem Relation Age of Onset    Diabetes Mother     Breast cancer Mother         >50    BRCA1 Negative Mother     BRCA2 Negative Mother     Hyperlipidemia Mother         HYPERCHOLESTEROLEMIA    Diabetes Father     Other Father         traumatic brain injury    Prostate cancer Father     Alcohol abuse Father         in remission    Heart disease Father     Neuropathy Father     Hyperlipidemia Father     Hypertension Brother     Diabetes Brother     Other Brother         HYPERCHOLESTEROLEMIA    Alcohol abuse Brother     Depression Brother         attempted suicide    Colon cancer Maternal Grandfather     Heart attack Maternal Grandmother     No Known Problems Paternal Grandmother         dad is adopted    No Known Problems Paternal Grandfather         dad is adopted    Diabetes Brother     Alcohol abuse Brother     Asthma Son     No Known Problems Daughter     Ovarian cancer Neg Hx     Uterine cancer Neg Hx        Physical Exam:     Vitals:   Blood Pressure: 122/71 (08/09/22 1121)  Pulse: 62 (08/09/22 1121)  Temperature: (!) 97 4 °F (36 3 °C) (08/09/22 1121)  Temp Source: Temporal (08/08/22 1658)  Respirations: 16 (08/08/22 1813)  Height: 5' 7" (170 2 cm) (08/08/22 1130)  Weight - Scale: 83 2 kg (183 lb 6 8 oz) (08/08/22 1130)  SpO2: 93 % (08/09/22 1121)    Physical Exam  Vitals reviewed  Constitutional:       General: She is not in acute distress  HENT:      Head: Normocephalic  Nose: Nose normal       Mouth/Throat:      Mouth: Mucous membranes are moist    Eyes:      General: No scleral icterus  Cardiovascular:      Rate and Rhythm: Normal rate  Pulmonary:      Effort: Pulmonary effort is normal  No respiratory distress  Abdominal:      General: There is no distension  Palpations: Abdomen is soft  Tenderness: There is abdominal tenderness  Comments: Incisions dry   Musculoskeletal:      Right lower leg: No edema  Left lower leg: No edema  Skin:     General: Skin is warm  Neurological:      Mental Status: She is alert  Mental status is at baseline  Psychiatric:         Mood and Affect: Mood normal          Behavior: Behavior normal        Additional Data:     Lab Results: I have personally reviewed pertinent reports        Results from last 7 days   Lab Units 08/09/22  0433   WBC Thousand/uL 15 74*   HEMOGLOBIN g/dL 11 6   HEMATOCRIT % 36 1   PLATELETS Thousands/uL 236     Results from last 7 days   Lab Units 08/09/22  0433   SODIUM mmol/L 138   POTASSIUM mmol/L 3 9   CHLORIDE mmol/L 105   CO2 mmol/L 21   BUN mg/dL 7   CREATININE mg/dL 0 80   ANION GAP mmol/L 12   CALCIUM mg/dL 8 1*   GLUCOSE RANDOM mg/dL 155*             Lab Results   Component Value Date/Time    HGBA1C 6 1 07/25/2022 10:16 AM    HGBA1C 6 8 (H) 04/15/2022 09:24 AM    HGBA1C 6 3 (H) 09/14/2021 12:47 PM    HGBA1C 6 2 (H) 05/20/2021 08:22 AM    HGBA1C 5 9 (H) 07/17/2015 09:58 AM     Results from last 7 days   Lab Units 08/08/22  1706 08/08/22  1138   POC GLUCOSE mg/dl 164* 88           Imaging: I have personally reviewed pertinent reports  No orders to display       EKG, Pathology, and Other Studies Reviewed on Admission:   · EKG: none for this admission    ** Please Note: This note has been constructed using a voice recognition system   **

## 2022-08-09 NOTE — PLAN OF CARE
Problem: PAIN - ADULT  Goal: Verbalizes/displays adequate comfort level or baseline comfort level  Description: Interventions:  - Encourage patient to monitor pain and request assistance  - Assess pain using appropriate pain scale  - Administer analgesics based on type and severity of pain and evaluate response  - Implement non-pharmacological measures as appropriate and evaluate response  - Consider cultural and social influences on pain and pain management  - Notify physician/advanced practitioner if interventions unsuccessful or patient reports new pain  Outcome: Progressing     Problem: INFECTION - ADULT  Goal: Absence or prevention of progression during hospitalization  Description: INTERVENTIONS:  - Assess and monitor for signs and symptoms of infection  - Monitor lab/diagnostic results  - Monitor all insertion sites, i e  indwelling lines, tubes, and drains  - Monitor endotracheal if appropriate and nasal secretions for changes in amount and color  - [place] appropriate cooling/warming therapies per order  - Administer medications as ordered  - Instruct and encourage patient and family to use good hand hygiene technique  - Identify and instruct in appropriate isolation precautions for identified infection/condition  Outcome: Progressing  Goal: Absence of fever/infection during neutropenic period  Description: INTERVENTIONS:  - Monitor WBC    Outcome: Progressing     Problem: DISCHARGE PLANNING  Goal: Discharge to home or other facility with appropriate resources  Description: INTERVENTIONS:  - Identify barriers to discharge w/patient and caregiver  - Arrange for needed discharge resources and transportation as appropriate  - Identify discharge learning needs (meds, wound care, etc )  - Arrange for interpretive services to assist at discharge as needed  - Refer to Case Management Department for coordinating discharge planning if the patient needs post-hospital services based on physician/advanced practitioner order or complex needs related to functional status, cognitive ability, or social support system  Outcome: Progressing     Problem: GASTROINTESTINAL - ADULT  Goal: Minimal or absence of nausea and/or vomiting  Description: INTERVENTIONS:  - Administer IV fluids if ordered to ensure adequate hydration  - Maintain NPO status until nausea and vomiting are resolved  - Nasogastric tube if ordered  - Administer ordered antiemetic medications as needed  - Provide nonpharmacologic comfort measures as appropriate  - Advance diet as tolerated, if ordered  - Consider nutrition services referral to assist patient with adequate nutrition and appropriate food choices  Outcome: Progressing  Goal: Maintains or returns to baseline bowel function  Description: INTERVENTIONS:  - Assess bowel function  - Encourage oral fluids to ensure adequate hydration  - Administer IV fluids if ordered to ensure adequate hydration  - Administer ordered medications as needed  - Encourage mobilization and activity  - Consider nutritional services referral to assist patient with adequate nutrition and appropriate food choices  Outcome: Progressing     Problem: GENITOURINARY - ADULT  Goal: Maintains or returns to baseline urinary function  Description: INTERVENTIONS:  - Assess urinary function  - Encourage oral fluids to ensure adequate hydration if ordered  - Administer IV fluids as ordered to ensure adequate hydration  - Administer ordered medications as needed  - Offer frequent toileting  - Follow urinary retention protocol if ordered  Outcome: Progressing     Problem: METABOLIC, FLUID AND ELECTROLYTES - ADULT  Goal: Electrolytes maintained within normal limits  Description: INTERVENTIONS:  - Monitor labs and assess patient for signs and symptoms of electrolyte imbalances  - Administer electrolyte replacement as ordered  - Monitor response to electrolyte replacements, including repeat lab results as appropriate  - Instruct patient on fluid and nutrition as appropriate  Outcome: Progressing     Problem: SKIN/TISSUE INTEGRITY - ADULT  Goal: Skin Integrity remains intact(Skin Breakdown Prevention)  Description: Assess:  -Perform Morteza assessment every   -Clean and moisturize skin every   -Inspect skin when repositioning, toileting, and assisting with ADLS  -Assess under medical devices such as  every   -Assess extremities for adequate circulation and sensation     Bed Management:  -Have minimal linens on bed & keep smooth, unwrinkled  -Change linens as needed when moist or perspiring  -Avoid sitting or lying in one position for more than  hours while in bed  -Keep HOB at degrees     Toileting:  -Offer bedside commode  -Assess for incontinence every   -Use incontinent care products after each incontinent episode such as     Activity:  -Mobilize patient  times a day  -Encourage activity and walks on unit  -Encourage or provide ROM exercises   -Turn and reposition patient every  Hours  -Use appropriate equipment to lift or move patient in bed  -Instruct/ Assist with weight shifting every  when out of bed in chair  -Consider limitation of chair time  hour intervals    Skin Care:  -Avoid use of baby powder, tape, friction and shearing, hot water or constrictive clothing  -Relieve pressure over bony prominences using   -Do not massage red bony areas    Next Steps:  -Teach patient strategies to minimize risks such as    -Consider consults to  interdisciplinary teams such as   Outcome: Progressing  Goal: Incision(s), wounds(s) or drain site(s) healing without S/S of infection  Description: INTERVENTIONS  - Assess and document dressing, incision, wound bed, drain sites and surrounding tissue  - Provide patient and family education  - Perform skin care/dressing changes every   Outcome: Progressing     Problem: HEMATOLOGIC - ADULT  Goal: Maintains hematologic stability  Description: INTERVENTIONS  - Assess for signs and symptoms of bleeding or hemorrhage  - Monitor labs  - Administer supportive blood products/factors as ordered and appropriate  Outcome: Progressing     Problem: MOBILITY - ADULT  Goal: Maintain or return to baseline ADL function  Description: INTERVENTIONS:  -  Assess patient's ability to carry out ADLs; assess patient's baseline for ADL function and identify physical deficits which impact ability to perform ADLs (bathing, care of mouth/teeth, toileting, grooming, dressing, etc )  - Assess/evaluate cause of self-care deficits   - Assess range of motion  - Assess patient's mobility; develop plan if impaired  - Assess patient's need for assistive devices and provide as appropriate  - Encourage maximum independence but intervene and supervise when necessary  - Involve family in performance of ADLs  - Assess for home care needs following discharge   - Consider OT consult to assist with ADL evaluation and planning for discharge  - Provide patient education as appropriate  Outcome: Progressing  Goal: Maintains/Returns to pre admission functional level  Description: INTERVENTIONS:  - Perform BMAT or MOVE assessment daily    - Set and communicate daily mobility goal to care team and patient/family/caregiver  - Collaborate with rehabilitation services on mobility goals if consulted  - Perform Range of Motion  times a day  - Reposition patient every  hours    - Dangle patient  times a day  - Stand patient  times a day  - Ambulate patient  times a day  - Out of bed to chair  times a day   - Out of bed for meals times a day  - Out of bed for toileting  - Record patient progress and toleration of activity level   Outcome: Progressing

## 2022-08-09 NOTE — DISCHARGE SUMMARY
Discharge Summary - Canelo Grider 47 y o  female MRN: [id number]    Unit/Bed#: E5 -01 Encounter: [id number]      Pre-Operative Diagnosis: Pre-Op Diagnosis Codes:     * GERD (gastroesophageal reflux disease) [K21 9]     * Heartburn [R12]     * Regurgitation of food [R11 10]     * Status post bariatric surgery [Z98 84]    Post-Operative Diagnosis: Post-Op Diagnosis Codes:     * GERD (gastroesophageal reflux disease) [K21 9]     * Heartburn [R12]     * Regurgitation of food [R11 10]     * Status post bariatric surgery [Z98 84]    Procedures Performed:  Procedure(s):  Diagnostic Lap; Extensive Lysis of Adhesions; LAPAROSCOPIC REVISION CONVERSION TO NOE-EN-Y GASTRIC BYPASS AND INTRAOPERATIVE EGD    Surgeon: Carito Shaw MD    See H & P for full details of admission and Operative Note for full details of operations performed  Patient tolerated surgery well without complications  In the morning postoperative Day 1, the patient had mild nausea and abdominal pain  Tolerated a clear liquid diet without vomiting  Able to ambulate and voiding independently  Patient was deemed ready for discharge home  SLIM consulted for home medication management     Patient was seen and examined prior to discharge  Provisions for Follow-Up Care:  See After Visit Summary/Discharge Instructions for information related to follow-up care and home orders  Disposition: Home, in stable condition  Planned Readmission: No    Discharge Medications:  See After Visit Summary/Discharge Instructions for reconciled discharge medications provided to patient and family  Post Operative instructions: Reviewed with patient and/or family      Signature:   Joo Jamison PA-C  Date: 8/9/2022 Time: 1:49 PM

## 2022-08-09 NOTE — DISCHARGE INSTRUCTIONS
Bariatric/Weight Loss Surgery  Hospital Discharge Instructions  ACTIVITY:  Progress as feels comfortable - a good rule is:  if you are doing something and it begins to hurt, stop doing the activity  Walk every hour while at home  You may walk stairs if you do so slowly  You may shower 48 hours after surgery  Do not scrub incision sites  Blot gently with clean towel to dry incisions  (see #4 below)   Use your incentive spirometer 10 times per hour while awake for 1 week after surgery  Do NOT drive for 48 hours after surgery  No driving 24 hours after taking certain prescription pain medications  Examples of such medication are Percocet, Darvocet, Oxycodone, Tylenol #3, and Tylenol with Codeine  DIET  Stay on a liquid diet for 7 days after your surgery date, sipping slowly  Refer to your manual for examples of choices  Remember to keep your liquids sugar free or low calorie  You may have protein drinks  Make sure to drink 48 to 64 ounces per day of fluids  You may advance to a pureed diet one week after surgery as instructed by your diet progression pamphlet  Once you get approval from your surgeon at your first post operative visit, you may advance to the soft diet and remain on soft diet for 8 weeks unless otherwise instructed  MEDICATIONS:  The abdominal nerve block will wear off during the first 1-2 days that you are home, and you may become sore (especially over incision site/sites where abdominal wall is sutured)  This may create a pulling sensation, especially while moving around, and will fade over time  Continue to take your Tylenol and your pain medication as instructed  Start vitamins and minerals one week after surgery or when you start stage 3/puree diet  Anti-acid Medication as per prescription  Other medications as indicated on the Physician Patient Discharge Instructions form given to you at the time of discharge    Make sure that you are splitting your pill or tablet medications in halves or fourths or even crushing them before you take them  Capsules should be opened and mixed with water or jello  You need to do this for at least 4 weeks after surgery  Eventually you will be able to take your medications the regular way as they were prescribed  You will need to consult with your Family Doctor in regards to all your prescribed medication, particularly those for blood pressure and diabetes  As you lose weight, medical conditions may change, requiring an alteration or elimination of the drug dose  Monitor blood pressure closely and call PCP with any concerns  Sleeve Gastrectomy patients ONLY:  Complete full course of lovenox injections! DO NOT TAKE BIRTH CONTROL(BC) MEDICATIONS, INSERT BC VAGINAL RINGS, OR PLACE IUD OR ANY OTHER BC METHODS UNTIL 31 DAYS FROM DAY OF DISCHARGE FROM HOSPITAL  THIS PLACES YOU AT HIGH RISK FOR A POTENTIALLY LIFE THREATENING BLOOD CLOT  Remember to always use barrier methods for birth control and speak to your GYN about using two forms of birth control to start 31 days after surgery  It is very important to avoid pregnancy until at least 18-24 months after surgery  INCISION CARE  You may shower and get incisions wet 2 days after surgery  No soaking tub baths or swimming for 30 days after surgery  Keep abdominal area and incisions clean  Use soap and water to create a good lather and rinse off  Do not scrub incisions  If you have a drain, empty the drain as the nurses instructed  FOLLOW-UP APPOINTMENT should be made for one week after discharge  Call surgeons office at [phone] to schedule an appointment      CALL YOUR DOCTOR FOR:  pain not controlled by pain medications, a temperature greater than 101 5° F, any increase or change in drainage or redness from any incision, any vomiting or inability to keep liquids down, shortness of breath, shoulder pain, or bleeding

## 2022-08-09 NOTE — PROGRESS NOTES
Progress Note - Bariatric Surgery   Di Vargas 47 y o  female MRN: [id number]  Unit/Bed#: E5 -01 Encounter: [id number]      Subjective/Objective     Subjective:  Patient with Morbid obesity POD1 s/p Laparoscopic Revision of Sleeve to Rebecca-En-Y Gastric Bypass   Patient denies fevers, chills, sweats, SOB, CP, calf pain  Pain adequately controlled on oral pain medication  Ambulating without assistance, voiding well, and using incentive spirometer  Patient tolerating liquid diet without nausea or vomiting today  Vital signs stable  Objective:    /69   Pulse 66   Temp 97 8 °F (36 6 °C)   Resp 16   Ht 5' 7" (1 702 m)   Wt 83 2 kg (183 lb 6 8 oz)   LMP 01/07/2019 (Approximate)   SpO2 94%   BMI 28 73 kg/m²       Intake/Output Summary (Last 24 hours) at 8/9/2022 1050  Last data filed at 8/9/2022 0701  Gross per 24 hour   Intake 3000 ml   Output 950 ml   Net 2050 ml       Invasive Devices  Report    Peripheral Intravenous Line  Duration           Peripheral IV 08/08/22 Dorsal (posterior); Left Forearm <1 day                ROS: 10-point system completed  All negative except see HPI      Physical Exam    General Appearance:    Alert, cooperative, no distress, appears stated age   Head:    Normocephalic, without obvious abnormality, atraumatic   Lungs:     Respirations unlabored   Heart:    Regular rate and rhythm   Abdomen:     Soft, appropriate tenderness, no masses, no organomegaly, non-distended   Extremities:   Extremities normal, atraumatic, no cyanosis or edema   Neurologic:  Incision:  Psych:   Normal strength and sensation    Clean, dry, and intact, without bleeding, No drain    Normal mood and affect       Lab, Imaging and other studies:  CBC:   Lab Results   Component Value Date    WBC 15 74 (H) 08/09/2022    HGB 11 6 08/09/2022    HCT 36 1 08/09/2022    MCV 94 08/09/2022     08/09/2022    MCH 30 3 08/09/2022    MCHC 32 1 08/09/2022    RDW 12 9 08/09/2022    MPV 10 1 08/09/2022 , CMP:   Lab Results   Component Value Date    SODIUM 138 08/09/2022    K 3 9 08/09/2022     08/09/2022    CO2 21 08/09/2022    BUN 7 08/09/2022    CREATININE 0 80 08/09/2022    CALCIUM 8 1 (L) 08/09/2022    EGFR 84 08/09/2022        VTE Mechanical Prophylaxis: sequential compression device    Assessment/Plan  1)  Patient with Morbid Obesity s/p Laparoscopic Revision of Sleeve to Rebecca-En-Y Gastric Bypass with stable post op course  Patient afebrile and hemodynamically stable  - Encourage PO fluids   - Recommend ambulation, use of SCDs when not ambulating, and incentive spirometry  - Complete antibiotic course  - No need for repeat labs  - Plan to D/C patient home today pending anticipated progression    Plan of care was discussed with patient    Care plan discussed with Dr Sneha Michaud DO  Bariatric Surgery Fellow  8/9/2022  10:50 AM

## 2022-08-09 NOTE — UTILIZATION REVIEW
Inpatient Admission Authorization Request   NOTIFICATION OF INPATIENT ADMISSION/INPATIENT AUTHORIZATION REQUEST   SERVICING FACILITY:   [address] [place] [address], [address]   Tax ID: [id number]  NPI: [id number]  Place of Service: [facility]patient [facility]  [address]  Place of Service Code: 24     ATTENDING PROVIDER:  Attending Name and NPI#: Jayda Aldridge [[id number]]  Address: [address] [place] [address], [address] [facility]  Phone: [phone]     UTILIZATION REVIEW CONTACT:  Tsering Telles, Utilization   Network Utilization Review Department  Phone: [phone]  Fax: [phone]  Email: [email or web address]     PHYSICIAN ADVISORY SERVICES:  FOR KEUM-AN-BSKM REVIEW - MEDICAL NECESSITY DENIAL  Phone: [phone]  Fax: [phone]  Email: [email or web address]     TYPE OF REQUEST:  Inpatient Status     ADMISSION INFORMATION:  ADMISSION DATE/TIME: 8/8/22  2:11 PM  PATIENT DIAGNOSIS CODE/DESCRIPTION:  GERD (gastroesophageal reflux disease) [K21 9]  Heartburn [R12]  Regurgitation of food [R11 10]  Status post bariatric surgery [Z98 84]  DISCHARGE DATE/TIME: No discharge date for patient encounter  IMPORTANT INFORMATION:  Please contact Tsering Telles directly with any questions or concerns regarding this request  Department voicemails are confidential     Send requests for admission clinical reviews, concurrent reviews, approvals, and administrative denials due to lack of clinical to fax [phone]

## 2022-08-09 NOTE — ASSESSMENT & PLAN NOTE
47year old female was admitted under bariatric service for the management of her obesity  She is s/p elective diagnostic Laparoscopy, extensive Lysis of Adhesions, laparoscopic Revision of Sleeve Gastrectomy to a Rebecca-en-Y Gastric Bypass, Intraoperative Endoscopy  · Post-op management, DVT prophylaxis, and dispo per primary team  · Defer to [employer] whether she should continue with PPI and famotidine

## 2022-08-09 NOTE — UTILIZATION REVIEW
Initial Clinical Review    Elective    IP     surgical procedure    Age/Sex: 47 y o  female     Surgery Date:    8/8/22    1  Procedure: Diagnostic Laparoscopy  2  Extensive Lysis of Adhesions  3  Laparoscopic Revision of Sleeve Gastrectomy to a Rebecca-en-Y Gastric Bypass  4  Intraoperative Endoscopy      Anesthesia:    general    Operative Findings: Large amount of adhesions involving the sleeve to the undersurface of the liver and retroperitoneal structures as well as the lower abdomen with omentum to the anterior abdominal wall        POD#1 Progress Note:   8/9    Continue post op care  Continue pain control/antiemetics as needed  Encourage ambulation        Admission Orders: Date/Time/Statement:   Admission Orders (From admission, onward)     Ordered        08/08/22 1411  Inpatient Admission  Once                      Orders Placed This Encounter   Procedures    Inpatient Admission     Standing Status:   Standing     Number of Occurrences:   1     Order Specific Question:   Level of Care     Answer:   Med Surg [16]     Order Specific Question:   Estimated length of stay     Answer:   Inpatient Only Surgery     Vital Signs: /71   Pulse 62   Temp (!) 97 4 °F (36 3 °C)   Resp 16   Ht 5' 7" (1 702 m)   Wt 83 2 kg (183 lb 6 8 oz)   LMP 01/07/2019 (Approximate)   SpO2 93%   BMI 28 73 kg/m²     Pertinent Labs/Diagnostic Test Results:     Results from last 7 days   Lab Units 08/09/22  0433   WBC Thousand/uL 15 74*   HEMOGLOBIN g/dL 11 6   HEMATOCRIT % 36 1   PLATELETS Thousands/uL 236         Results from last 7 days   Lab Units 08/09/22  0433   SODIUM mmol/L 138   POTASSIUM mmol/L 3 9   CHLORIDE mmol/L 105   CO2 mmol/L 21   ANION GAP mmol/L 12   BUN mg/dL 7   CREATININE mg/dL 0 80   EGFR ml/min/1 73sq m 84   CALCIUM mg/dL 8 1*         Results from last 7 days   Lab Units 08/08/22  1706 08/08/22  1138   POC GLUCOSE mg/dl 164* 88     Results from last 7 days   Lab Units 08/09/22  0433   GLUCOSE RANDOM mg/dL 155*                 Diet:    Bariatric cl liq    Mobility:   OOB as  tolerated    DVT Prophylaxis:    SCD'S    Medications/Pain Control:   Scheduled Medications:  acetaminophen, 975 mg, Oral, Q8H   Or  acetaminophen, 975 mg, Oral, Q8H  atoMOXetine, 60 mg, Oral, Daily  atorvastatin, 10 mg, Oral, Daily With Dinner  famotidine, 20 mg, Intravenous, BID  lamoTRIgine, 100 mg, Oral, Daily  lamoTRIgine, 150 mg, Oral, Daily  montelukast, 10 mg, Oral, HS  propranolol, 60 mg, Oral, BID  QUEtiapine, 75 mg, Oral, HS  scopolamine, 1 patch, Transdermal, Once  venlafaxine, 137 5 mg, Oral, BID      Continuous IV Infusions:  lactated ringers, 100 mL/hr, Intravenous, Continuous      PRN Meds:  diphenhydrAMINE, 25 mg, Oral, Q8H PRN  meclizine, 25 mg, Oral, Q8H PRN  methocarbamol, 750 mg, Oral, HS PRN  metoclopramide, 10 mg, Intravenous, Q6H PRN  morphine injection, 4 mg, Intravenous, Q4H PRN   ( x1  8/8 and  X 2  8/9 thus far)  ondansetron, 4 mg, Intravenous, Q6H PRN  oxyCODONE, 10 mg, Oral, Q4H PRN  oxyCODONE, 5 mg, Oral, Q4H PRN  phenol, 2 spray, Mouth/Throat, Q2H PRN  promethazine, 25 mg, Intravenous, Q6H PRN  simethicone, 80 mg, Oral, 4x Daily PRN        Network Utilization Review Department  ATTENTION: Please call with any questions or concerns to [phone] and carefully listen to the prompts so that you are directed to the right person  All voicemails are confidential   Venetie Doing all requests for admission clinical reviews, approved or denied determinations and any other requests to dedicated fax number below belonging to the campus where the patient is receiving treatment   List of dedicated fax numbers for the Facilities:  [address] DENIALS (Administrative/Medical Necessity) [phone]   [address] [place] [address] (Maternity/NICU/Pediatrics) 261 [facility],[address] [address]  [address] [place] [address] [address] [place] [address] [phone]

## 2022-08-09 NOTE — ASSESSMENT & PLAN NOTE
Lab Results   Component Value Date    HGBA1C 6 1 07/25/2022       Recent Labs     08/08/22  1138 08/08/22  1706   POCGLU 88 164*       Blood Sugar Average: Last 72 hrs:  (P) 126     Takes weekly semaglutide  Management per outpatient provider

## 2022-08-09 NOTE — DISCHARGE INSTR - AVS FIRST PAGE
your medications from [address] in [facility] [address] or cut your pills and open capsules, mix with liquid to drink  Take Tylenol every 8 hours around the clock, unless instructed otherwise  Take your omeprazole daily  It is important to stay hydrated and follow your discharge diet progression   Mild nausea is ok as long as you can drink fluids, sip very slowly and get up and walk during any periods of nausea  You may shower normally after 48 hours, but do not scrub incision sites, blot gently with clean towel to dry incisions  Take home medications as usual unless instructed otherwise while in hospital  Continue propranolol  Follow up with Dr Darrell Albert and your PCP within the next week  Sleeve gastrectomy patients ONLY: Complete full course of lovenox injections!

## 2022-08-10 ENCOUNTER — TELEPHONE (OUTPATIENT)
Dept: MEDSURG UNIT | Facility: HOSPITAL | Age: 52
End: 2022-08-10

## 2022-08-10 NOTE — TELEPHONE ENCOUNTER
Post op follow up phone call completed  Pt is sipping liquids  Using IS as instructed, reinforced importance of using IS to help prevent pneumonia  Ambulating about home without difficulty  Minimal pain, not using Oxycodone  Passing gas  No BM as of yet  Starting miralax tonight  Reaffirmed examples of liquid diet over the next week  Pt stated understanding about discharge instructions and medication adjustments  Follow up appt with surgeon scheduled for next week  Instructed to call with any additional questions or concerns

## 2022-08-11 ENCOUNTER — TRANSITIONAL CARE MANAGEMENT (OUTPATIENT)
Dept: FAMILY MEDICINE CLINIC | Facility: CLINIC | Age: 52
End: 2022-08-11

## 2022-08-12 ENCOUNTER — OFFICE VISIT (OUTPATIENT)
Dept: FAMILY MEDICINE CLINIC | Facility: CLINIC | Age: 52
End: 2022-08-12
Payer: COMMERCIAL

## 2022-08-12 VITALS
HEIGHT: 66 IN | RESPIRATION RATE: 14 BRPM | BODY MASS INDEX: 29.7 KG/M2 | WEIGHT: 184.8 LBS | DIASTOLIC BLOOD PRESSURE: 70 MMHG | HEART RATE: 74 BPM | TEMPERATURE: 97.4 F | SYSTOLIC BLOOD PRESSURE: 116 MMHG | OXYGEN SATURATION: 97 %

## 2022-08-12 DIAGNOSIS — E11.9 TYPE 2 DIABETES MELLITUS WITHOUT COMPLICATION, WITHOUT LONG-TERM CURRENT USE OF INSULIN (HCC): ICD-10-CM

## 2022-08-12 DIAGNOSIS — F90.0 ADHD (ATTENTION DEFICIT HYPERACTIVITY DISORDER), INATTENTIVE TYPE: ICD-10-CM

## 2022-08-12 DIAGNOSIS — F33.41 RECURRENT MAJOR DEPRESSIVE DISORDER, IN PARTIAL REMISSION (HCC): ICD-10-CM

## 2022-08-12 DIAGNOSIS — K21.9 GASTROESOPHAGEAL REFLUX DISEASE WITHOUT ESOPHAGITIS: ICD-10-CM

## 2022-08-12 DIAGNOSIS — Z09 HOSPITAL DISCHARGE FOLLOW-UP: Primary | ICD-10-CM

## 2022-08-12 DIAGNOSIS — Z98.84 S/P LAPAROSCOPIC SLEEVE GASTRECTOMY: Chronic | ICD-10-CM

## 2022-08-12 DIAGNOSIS — Z23 NEED FOR TDAP VACCINATION: ICD-10-CM

## 2022-08-12 DIAGNOSIS — M51.16 INTERVERTEBRAL DISC DISORDER WITH RADICULOPATHY OF LUMBAR REGION: ICD-10-CM

## 2022-08-12 PROBLEM — R07.81 RIB PAIN ON LEFT SIDE: Status: RESOLVED | Noted: 2022-03-23 | Resolved: 2022-08-12

## 2022-08-12 PROBLEM — R73.03 PREDIABETES: Status: RESOLVED | Noted: 2021-05-20 | Resolved: 2022-08-12

## 2022-08-12 PROBLEM — B34.9 VIRAL INFECTION, UNSPECIFIED: Status: RESOLVED | Noted: 2021-12-21 | Resolved: 2022-08-12

## 2022-08-12 PROBLEM — R39.15 URINARY URGENCY: Status: RESOLVED | Noted: 2022-07-25 | Resolved: 2022-08-12

## 2022-08-12 PROCEDURE — 90715 TDAP VACCINE 7 YRS/> IM: CPT

## 2022-08-12 PROCEDURE — 99496 TRANSJ CARE MGMT HIGH F2F 7D: CPT | Performed by: FAMILY MEDICINE

## 2022-08-12 PROCEDURE — 90471 IMMUNIZATION ADMIN: CPT

## 2022-08-12 NOTE — ASSESSMENT & PLAN NOTE
LAPAROSCOPIC REVISION CONVERSION TO NOE-EN-Y GASTRIC BYPASS AND INTRAOPERATIVE EGD 8/8/2022  Doing well   Continue diet per weight loss team   Seeing surgeon on 8/17/2022

## 2022-08-12 NOTE — PROGRESS NOTES
Assessment/Plan:     Intervertebral disc disorder with radiculopathy of lumbar region  Stable   Muscle relaxer as needed     Recurrent major depressive disorder, in partial remission ([address] )  Stable on current meds     ADHD (attention deficit hyperactivity disorder), inattentive type  strattera is helping     Type 2 diabetes mellitus without complication, without long-term current use of insulin ([address] )    Lab Results   Component Value Date    HGBA1C 6 1 07/25/2022   stable on Ozempic    GERD (gastroesophageal reflux disease)  Omeprazole is helping     S/P laparoscopic sleeve gastrectomy  LAPAROSCOPIC REVISION CONVERSION TO NOE-EN-Y GASTRIC BYPASS AND INTRAOPERATIVE EGD 8/8/2022  Doing well   Continue diet per weight loss team   Seeing surgeon on 8/17/2022         Diagnoses and all orders for this visit:    Hospital discharge follow-up    Intervertebral disc disorder with radiculopathy of lumbar region    Recurrent major depressive disorder, in partial remission ([address] )    ADHD (attention deficit hyperactivity disorder), inattentive type    Type 2 diabetes mellitus without complication, without long-term current use of insulin ([address] )    Gastroesophageal reflux disease without esophagitis    S/P laparoscopic sleeve gastrectomy    Need for Tdap vaccination  -     TDAP VACCINE GREATER THAN OR EQUAL TO 6YO IM       Subjective:     Patient ID: Flora Marshall is a 47 y o  female  HPI  Here for hospital follow up s/p gastric bypass surgery on 8/8/2022  Had one episode of watery diarrhea yesterday- first BM since surgery on 8/8/2022  Passing gas normal now   No fever/chills/cough/leg swelling   Less shortness of breath since the surgery which she had prior to surgey   Feel sore on right side of her abdomen       Review of Systems   Constitutional: Negative  HENT: Negative  Respiratory: Negative  Cardiovascular: Negative      Gastrointestinal: Negative for abdominal distention, abdominal pain, anal bleeding and blood in stool         Stomach bloating    Genitourinary: Negative  Psychiatric/Behavioral: Negative  Objective:     Physical Exam  Vitals reviewed  Constitutional:       Appearance: Normal appearance  Cardiovascular:      Rate and Rhythm: Normal rate and regular rhythm  Pulses: Normal pulses  Heart sounds: Normal heart sounds  Abdominal:      Tenderness: There is abdominal tenderness  Hernia: No hernia is present  Comments: Right lower abdomen incision site  No discharge, not  warm to touch    Neurological:      General: No focal deficit present  Mental Status: She is alert and oriented to person, place, and time  Psychiatric:         Mood and Affect: Mood normal          Behavior: Behavior normal            Vitals:    08/12/22 0908   BP: 116/70   BP Location: Left arm   Patient Position: Sitting   Cuff Size: Standard   Pulse: 74   Resp: 14   Temp: (!) 97 4 °F (36 3 °C)   TempSrc: Tympanic   SpO2: 97%   Weight: 83 8 kg (184 lb 12 8 oz)   Height: 5' 6" (1 676 m)       Transitional Care Management Review:  René Mccord is a 47 y o  female here for TCM follow up  During the TCM phone call patient stated:    TCM Call     Date and time call was made  8/11/2022  9:07 AM    Hospital care reviewed  Records reviewed    Patient was hospitialized at  [facility]    Date of Admission  08/08/22    Date of discharge  08/09/22    Diagnosis  Obesity    Disposition  Home    Were the patients medications reviewed and updated  No    Current Symptoms  None      TCM Call     Post hospital issues  None    Should patient be enrolled in anticoag monitoring? No    Scheduled for follow up?   Yes    Did you obtain your prescribed medications  Yes    Do you need help managing your prescriptions or medications  No    Is transportation to your appointment needed  No    I have advised the patient to call PCP with any new or worsening symptoms  MR Komal    Living Arrangements  Family members    Are you recieving any outpatient services  No    Are you recieving home care services  No    Are you using any community resources  No    Current waiver services  No    Have you fallen in the last 12 months  No    Comments  scheduled with Dr Ree Michaud 6/4          Rachel Cano MD

## 2022-08-15 RX ORDER — PANTOPRAZOLE SODIUM 40 MG/1
40 TABLET, DELAYED RELEASE ORAL DAILY
Qty: 90 TABLET | Refills: 0 | OUTPATIENT
Start: 2022-08-15

## 2022-08-15 NOTE — PROGRESS NOTES
Weight Management Nutrition Class     Diagnosis:   GERD  Heartburn  Regurgitation of food  S/p bariatric surgery    Bariatric Surgeon:   Dr Leonard Failing    Surgery:  Lap Sleeve Gastrectomy on 2/06/2018  Lap revision to Rebecca-En Y on 8/08/2022    Class: first post op note    Topics discussed today include:     fluid goals post op, protein goals post op, constipation, chew food well, exercise, avoidance of alcohol, PPI use, diet progression, hypoglycemia, dumping syndrome, protein supplems, vitamin/mineral supplements and calcium supplements    Patient was able to verbalize basic diet (protein, fluid, vitamin and mineral) recommendations and possible nutrition-related complications   Yes

## 2022-08-16 ENCOUNTER — TELEPHONE (OUTPATIENT)
Dept: PSYCHIATRY | Facility: CLINIC | Age: 52
End: 2022-08-16

## 2022-08-16 NOTE — TELEPHONE ENCOUNTER
LM for Amalia Simons  Looks like she does not have a follow up appointment scheduled  Also we will need to know specific names of medications she will need refilled  Advised not all medications will come in tablet form  She will need to specify which ones she was referring to besides the Atomoxetine   Provided nursing number

## 2022-08-16 NOTE — TELEPHONE ENCOUNTER
Amalia Simons called and lm on nursing line   She reports she "needs refills on all her medications" She stated she also needs the Atomoxetine and 2 others changed from capsule to tablet    Amalia Simons- [phone]

## 2022-08-17 ENCOUNTER — OFFICE VISIT (OUTPATIENT)
Dept: BARIATRICS | Facility: CLINIC | Age: 52
End: 2022-08-17

## 2022-08-17 VITALS
SYSTOLIC BLOOD PRESSURE: 110 MMHG | WEIGHT: 180.5 LBS | TEMPERATURE: 98.3 F | HEIGHT: 66 IN | BODY MASS INDEX: 29.01 KG/M2 | DIASTOLIC BLOOD PRESSURE: 70 MMHG | HEART RATE: 70 BPM

## 2022-08-17 DIAGNOSIS — E66.09 OBESITY DUE TO EXCESS CALORIES, UNSPECIFIED CLASSIFICATION, UNSPECIFIED WHETHER SERIOUS COMORBIDITY PRESENT: ICD-10-CM

## 2022-08-17 DIAGNOSIS — Z48.815 ENCOUNTER FOR SURGICAL AFTERCARE FOLLOWING SURGERY OF DIGESTIVE SYSTEM: Primary | ICD-10-CM

## 2022-08-17 DIAGNOSIS — K91.2 POSTSURGICAL MALABSORPTION: Primary | Chronic | ICD-10-CM

## 2022-08-17 DIAGNOSIS — Z98.84 S/P LAPAROSCOPIC SLEEVE GASTRECTOMY: Chronic | ICD-10-CM

## 2022-08-17 PROCEDURE — RECHECK: Performed by: DIETITIAN, REGISTERED

## 2022-08-17 PROCEDURE — 99024 POSTOP FOLLOW-UP VISIT: CPT | Performed by: SURGERY

## 2022-08-17 NOTE — PROGRESS NOTES
POST OP UP VISIT - BARIATRIC SURGERY  Beatriz Frazier 47 y o  female MRN: [id number]  Unit/Bed#:  Encounter: [id number]      HPI:  Beatriz Frazier is a 47 y o  female status post Laparoscopc conversion of a sleeve to RNYGB performed by Dr Luz Pressley on  returning to office for first post op visit since surgery  Tolerating diet  Denies nausea and vomiting  Only complaint is of mild LLQ pain upon exertion  Taking multivitamins and PPI daily  Review of Systems   Constitutional: Negative  Respiratory: Negative  Cardiovascular: Negative  Gastrointestinal: Negative  Historical Information   Past Medical History:   Diagnosis Date    ADHD (attention deficit hyperactivity disorder)     Bulging lumbar disc     Carpal tunnel syndrome     RIGHT  LAST ASSESSED: 16    Chronic back pain     low    Chronic pain disorder     Colon polyps     COVID-19     2021    Diabetes mellitus [facility] )     GERD (gastroesophageal reflux disease)     Gestational diabetes     Hearing loss     left ear    Hyperlipidemia     Resolved with weight loss    Hyponatremia 2020    IBS (irritable bowel syndrome)     Ileus ([facility] )     LAST ASSESSED: 8/3/17    Labial cyst     LAST ASSESSED: 16    Myofascial pain     LAST ASSESSED: 16    Obesity     Ovarian cyst     LEFT   LAST ASSESSED: 16    Panic attack     Pneumonia     Sacroiliitis (HCC)     Seasonal allergies     SVT (supraventricular tachycardia) (HCC)     Thoracic outlet syndrome     2010    Trochanteric bursitis of both hips     LAST ASSESSED: 3/18/16    Ulnar neuropathy at elbow     Varicella     Wears glasses      Past Surgical History:   Procedure Laterality Date    BILE DUCT EXPLORATION      ENDOSCOPIC REMOVAL OF STONES FROM BILIARY TRACT     SECTION      x3    CHOLECYSTECTOMY      COLONOSCOPY      2017-polyp, repeat     DILATION AND CURETTAGE OF UTERUS      ENDOMETRIAL ABLATION      ERCP W/ SPHICTEROTOMY  FIRST RIB REMOVAL      THORAX EXCISION OF FIRST RIB    HYSTEROSCOPY      NEUROPLASTY / TRANSPOSITION ULNAR NERVE AT ELBOW Right 2011    VA COLONOSCOPY FLX DX W/COLLJ SPEC WHEN PFRMD N/A 5/30/2017    Procedure: COLONOSCOPY;  Surgeon: Zoraida Araujo MD;  [facility]; Service: Gastroenterology    VA ESOPHAGOGASTRODUODENOSCOPY TRANSORAL DIAGNOSTIC N/A 9/14/2017    Procedure: ESOPHAGOGASTRODUODENOSCOPY (EGD); Surgeon: Gerald Olivo MD;  [facility]; Service: Gastroenterology    VA HYSTEROSCOPY,W/ENDO BX N/A 10/20/2017    Procedure: DILATATION AND CURETTAGE (D&C) WITH HYSTEROSCOPY  REMOVAL VULVAR RT  LESION;  Surgeon: Faustina Tejada MD;  [facility];  Service: Gynecology    VA LAP, ÁLVARO RESTRICT [address], LONGITUDINAL GASTRECTOMY N/A 2/6/2018    Procedure: GASTRECTOMY SLEEVE LAPAROSCOPIC; INTRAOPERATIVE EGD ;  Surgeon: Tesha Gtz MD;  [facility];  Service: Bariatrics    VA LAP, ÁLVARO RESTRICT [address], LONGITUDINAL GASTRECTOMY N/A 8/8/2022    Procedure: Diagnostic Lap; Extensive Lysis of Adhesions; LAPAROSCOPIC REVISION CONVERSION TO NOE-EN-Y GASTRIC BYPASS AND INTRAOPERATIVE EGD;  Surgeon: Tesha Gtz MD;  [facility];  Service: Patrici Amada SURG IMPLNT Marie Been Left 1/29/2020    Procedure: Insertion of thoracic spinal cord stimulator electrode via laminotomy and placement of left buttock implantable pulse generator (NEUROMONITORING);   Surgeon: Jewell Knutson MD;  [facility];  Service: Neurosurgery    SPINAL CORD STIMULATOR TRIAL W/ LAMINOTOMY      TONSILLECTOMY AND ADENOIDECTOMY      TUBAL LIGATION      UPPER GASTROINTESTINAL ENDOSCOPY      VEIN LIGATION AND STRIPPING Right     VULVA SURGERY  10/20/2017    BIOPSY    WISDOM TOOTH EXTRACTION       Social History   Social History     Substance and Sexual Activity   Alcohol Use Not Currently    Alcohol/week: 0 0 - 2 0 standard drinks    Comment: Occs -twice monthly     Social History Substance and Sexual Activity   Drug Use No     Social History     Tobacco Use   Smoking Status Former Smoker    Quit date: 2013    Years since quittin 3   Smokeless Tobacco Never Used     Family History: non-contributory    Meds/Allergies   PTA meds:   Cannot display prior to admission medications because the patient has not been admitted in this contact  Allergies   Allergen Reactions    Ibuprofen Other (See Comments)     Due to gastric sleeve -can only take for 5 days, then needs to stop       Objective       Current Vitals:   Blood Pressure: 110/70 (22 142)  Pulse: 70 (22 142)  Temperature: 98 3 °F (36 8 °C) (22)  Temp Source: Tympanic (22)  Height: 5' 6" (167 6 cm) (22)  Weight - Scale: 81 9 kg (180 lb 8 oz) (22)      Invasive Devices  Report    None                 Physical Exam        Assessment/PLAN:    47 y o  female status post Laparoscopic conversion of Sleeve to RNYGB done on  by Dr Nadeem Cosme, doing well post op  No major issues and healing well  Increase physical activity slowly as tolerated and instructed  Advance diet as instructed by our dietitians today and as indicated in the binder  Continue PPI    Follow up in one month as scheduled            Chani Negrete, DO  Bariatric Surgery   2022  2:47 PM

## 2022-08-17 NOTE — TELEPHONE ENCOUNTER
Was able to speak with Mukul Dupont  She said after bariatric surgery she cannot take capsules  She has to open the atomoxetine cap and put the medication in applesauce  It tasted awful  She is asking Dr Marilee Paulson to review and is hoping he can prescribe something else  She is starting school at the end of the month and needs something  She is aware he is not in the office today

## 2022-08-18 NOTE — UTILIZATION REVIEW
Notification of Discharge   This is a Notification of Discharge from our facility [address]  Please be advised that this patient has been discharge from our facility  Below you will find the admission and discharge date and time including the patients disposition  UTILIZATION REVIEW CONTACT:  Gera Messina  Utilization   Network Utilization Review Department  Phone: [phone] x carefully listen to the prompts  All voicemails are confidential   Email: [email or web address]     PHYSICIAN ADVISORY SERVICES:  FOR [facility] REVIEW - MEDICAL NECESSITY DENIAL  Phone: [phone]  Fax: [phone]  Email: [email or web address]     PRESENTATION DATE: 8/8/2022 11:09 AM    INPATIENT ADMISSION DATE: 8/8/22  2:11 PM   DISCHARGE DATE: 8/9/2022  2:34 PM  DISPOSITION: Home/Self Care Home/Self Care      IMPORTANT INFORMATION:  Send all requests for admission clinical reviews, approved or denied determinations and any other requests to dedicated fax number below belonging to the campus where the patient is receiving treatment   List of dedicated fax numbers:  [address] DENIALS (Administrative/Medical Necessity) [phone]   [address]  (Maternity/NICU/Pediatrics) [phone]   [facility] [phone]   [address] [place] [address] [phone]   [address] [place] [address] [phone]   [address] [place], [place] [phone]   [address] [place] [address] [phone]   [facility]  [phone]   [address] [place] [address], [address]  [facility] [address], PA-C   Physician Assistant   Bariatrics   Discharge Summary      Attested   Date of Service:  8/9/2022  1:49 PM                 Attested Attestation signed by Bev Boucher MD at 8/9/2022  3:20 PM     Supervising Physician Statement       I have seen and examined the patient myself  I have discussed the patient with the PA  I agree with the PA's documented findings and plan of care  Clinically stable and improved  Tolerating oral intake without difficulty  Follow-up in the office in 1 week as scheduled    Barrett Kirk MD     8/9/2022     3:19 PM                          Discharge Summary - Flora Marshall 47 y o  female MRN: [id number]     Unit/Bed#: E5 -01 Encounter: [id number]        Pre-Operative Diagnosis: Pre-Op Diagnosis Codes:     * GERD (gastroesophageal reflux disease) [K21 9]     * Heartburn [R12]     * Regurgitation of food [R11 10]     * Status post bariatric surgery [Z98 84]     Post-Operative Diagnosis: Post-Op Diagnosis Codes:     * GERD (gastroesophageal reflux disease) [K21 9]     * Heartburn [R12]     * Regurgitation of food [R11 10]     * Status post bariatric surgery [Z98 84]     Procedures Performed:  Procedure(s):  Diagnostic Lap; Extensive Lysis of Adhesions; LAPAROSCOPIC REVISION CONVERSION TO NOE-EN-Y GASTRIC BYPASS AND INTRAOPERATIVE EGD     Surgeon: Bev Boucher MD     See H & P for full details of admission and Operative Note for full details of operations performed       Patient tolerated surgery well without complications  In the morning postoperative Day 1, the patient had mild nausea and abdominal pain  Tolerated a clear liquid diet without vomiting  Able to ambulate and voiding independently  Patient was deemed ready for discharge home   SLIM consulted for home medication management      Patient was seen and examined prior to discharge        Provisions for Follow-Up Care:  See After Visit Summary/Discharge Instructions for information related to follow-up care and home orders        Disposition: Home, in stable condition       Planned Readmission: No     Discharge Medications:  See After Visit Summary/Discharge Instructions for reconciled discharge medications provided to patient and family        Post Operative instructions: Reviewed with patient and/or family      Signature:   Tari Carroll PA-C  Date: 8/9/2022 Time: 1:49 PM                      Cosigned by: Lissett Abdullahi MD at 8/9/2022  3:20 PM       Revision History

## 2022-08-20 NOTE — TELEPHONE ENCOUNTER
Concerns reviewed  I would encourage Santos Da Silva to continue cutting the caps of Atomoxetine and consider other foods/liquids to mix this in  She has benefited greatly from this agent and thus, transitioning to another psychotropic agent is not recommended  Use of Wellbutrin and/or stimulants for ADHD will inevitably amplify her anxiety, which has been chronically problematic  Acute use of Clonidine will likely worsen hypotension associated with limited dietary/fluid intake via bariatric surgery  If she has any further concerns, she should schedule a follow-up visit (does not have one at present time) and we can discuss these concerns in office

## 2022-08-22 ENCOUNTER — HOSPITAL ENCOUNTER (OUTPATIENT)
Dept: SURGERY | Facility: HOSPITAL | Age: 52
Discharge: HOME | End: 2022-08-22
Payer: COMMERCIAL

## 2022-08-22 VITALS
TEMPERATURE: 98.2 F | SYSTOLIC BLOOD PRESSURE: 100 MMHG | HEIGHT: 66 IN | BODY MASS INDEX: 28.93 KG/M2 | HEART RATE: 66 BPM | DIASTOLIC BLOOD PRESSURE: 53 MMHG | WEIGHT: 180 LBS | OXYGEN SATURATION: 97 % | RESPIRATION RATE: 18 BRPM

## 2022-08-22 PROCEDURE — C1764 EVENT RECORDER, CARDIAC: HCPCS

## 2022-08-22 NOTE — TELEPHONE ENCOUNTER
Left a  for Marium Olson:  Calling with feedback from Dr Jaskaran Cardenas; he does not want to change to a stimulant - cll nursing to review more specifics; call nursing to review further; can discuss with him at an appointment, call to schedule and number given

## 2022-08-22 NOTE — PROCEDURES
INVASIVE CARDIOLOGY- LOOP RECORDER IMPLANTATION    PRE-OP DIAGNOSIS:  Paroxysmal atrial fibrillation    POST-OP DIAGNOSIS:  Same     :  Trishaon    Device Details: Medtronic [id number]  Serial [id number]      ANESTHESIA:  Local anesthesia with 1% lidocaine    COMPLICATIONS:  None  The nature of the procedure, risks and alternatives were discussed with the patient who gave informed consent  OPERATIVE TECHNIQUE:  The patient draped in a sterile fashion  The 4th intercostal space 2 cm from the left edge of the sternum was identified  Local anesthesia was performed with 1% Lidocaine   An incision was made with the push blade  The insertion tool was placed through the incision and rotated 180 degrees  The plunger was inserted and the device was deployed  The skin was closed with dermabond  Steri-strips were added  Follow up wound and device f/u arranged

## 2022-08-25 ENCOUNTER — HOSPITAL ENCOUNTER (OUTPATIENT)
Dept: MAMMOGRAPHY | Facility: MEDICAL CENTER | Age: 52
Discharge: HOME | End: 2022-08-25

## 2022-08-25 DIAGNOSIS — Z12.31 VISIT FOR SCREENING MAMMOGRAM: ICD-10-CM

## 2022-08-26 ENCOUNTER — TELEPHONE (OUTPATIENT)
Dept: BARIATRICS | Facility: CLINIC | Age: 52
End: 2022-08-26

## 2022-08-26 ENCOUNTER — HOSPITAL ENCOUNTER (EMERGENCY)
Facility: HOSPITAL | Age: 52
Discharge: HOME | End: 2022-08-26
Attending: EMERGENCY MEDICINE
Payer: COMMERCIAL

## 2022-08-26 ENCOUNTER — APPOINTMENT (EMERGENCY)
Dept: CT IMAGING | Facility: HOSPITAL | Age: 52
End: 2022-08-26
Payer: COMMERCIAL

## 2022-08-26 VITALS
RESPIRATION RATE: 18 BRPM | DIASTOLIC BLOOD PRESSURE: 70 MMHG | TEMPERATURE: 98.4 F | OXYGEN SATURATION: 100 % | HEART RATE: 65 BPM | SYSTOLIC BLOOD PRESSURE: 138 MMHG

## 2022-08-26 DIAGNOSIS — R10.9 ABDOMINAL PAIN: Primary | ICD-10-CM

## 2022-08-26 LAB
ALBUMIN SERPL BCP-MCNC: 4.2 G/DL (ref 3.5–5)
ALP SERPL-CCNC: 71 U/L (ref 34–104)
ALT SERPL W P-5'-P-CCNC: 57 U/L (ref 7–52)
ANION GAP SERPL CALCULATED.3IONS-SCNC: 10 MMOL/L (ref 4–13)
AST SERPL W P-5'-P-CCNC: 54 U/L (ref 13–39)
ATRIAL RATE: 65 BPM
BASOPHILS # BLD AUTO: 0.01 THOUSANDS/ΜL (ref 0–0.1)
BASOPHILS NFR BLD AUTO: 0 % (ref 0–1)
BILIRUB SERPL-MCNC: 0.41 MG/DL (ref 0.2–1)
BUN SERPL-MCNC: 10 MG/DL (ref 5–25)
CALCIUM SERPL-MCNC: 9.5 MG/DL (ref 8.4–10.2)
CHLORIDE SERPL-SCNC: 105 MMOL/L (ref 96–108)
CO2 SERPL-SCNC: 27 MMOL/L (ref 21–32)
CREAT SERPL-MCNC: 0.65 MG/DL (ref 0.6–1.3)
EOSINOPHIL # BLD AUTO: 0.03 THOUSAND/ΜL (ref 0–0.61)
EOSINOPHIL NFR BLD AUTO: 1 % (ref 0–6)
ERYTHROCYTE [DISTWIDTH] IN BLOOD BY AUTOMATED COUNT: 12.6 % (ref 11.6–15.1)
GFR SERPL CREATININE-BSD FRML MDRD: 101 ML/MIN/1.73SQ M
GLUCOSE SERPL-MCNC: 117 MG/DL (ref 65–140)
HCT VFR BLD AUTO: 38 % (ref 34.8–46.1)
HGB BLD-MCNC: 12.3 G/DL (ref 11.5–15.4)
IMM GRANULOCYTES # BLD AUTO: 0.03 THOUSAND/UL (ref 0–0.2)
IMM GRANULOCYTES NFR BLD AUTO: 1 % (ref 0–2)
LIPASE SERPL-CCNC: 78 U/L (ref 11–82)
LYMPHOCYTES # BLD AUTO: 1.69 THOUSANDS/ΜL (ref 0.6–4.47)
LYMPHOCYTES NFR BLD AUTO: 27 % (ref 14–44)
MCH RBC QN AUTO: 29.8 PG (ref 26.8–34.3)
MCHC RBC AUTO-ENTMCNC: 32.4 G/DL (ref 31.4–37.4)
MCV RBC AUTO: 92 FL (ref 82–98)
MONOCYTES # BLD AUTO: 0.47 THOUSAND/ΜL (ref 0.17–1.22)
MONOCYTES NFR BLD AUTO: 8 % (ref 4–12)
NEUTROPHILS # BLD AUTO: 3.99 THOUSANDS/ΜL (ref 1.85–7.62)
NEUTS SEG NFR BLD AUTO: 63 % (ref 43–75)
NRBC BLD AUTO-RTO: 0 /100 WBCS
P AXIS: 58 DEGREES
PLATELET # BLD AUTO: 307 THOUSANDS/UL (ref 149–390)
PMV BLD AUTO: 10.2 FL (ref 8.9–12.7)
POTASSIUM SERPL-SCNC: 4 MMOL/L (ref 3.5–5.3)
PR INTERVAL: 190 MS
PROT SERPL-MCNC: 7.5 G/DL (ref 6.4–8.4)
QRS AXIS: 209 DEGREES
QRSD INTERVAL: 76 MS
QT INTERVAL: 426 MS
QTC INTERVAL: 443 MS
RBC # BLD AUTO: 4.13 MILLION/UL (ref 3.81–5.12)
SODIUM SERPL-SCNC: 142 MMOL/L (ref 135–147)
T WAVE AXIS: 46 DEGREES
VENTRICULAR RATE: 65 BPM
WBC # BLD AUTO: 6.22 THOUSAND/UL (ref 4.31–10.16)

## 2022-08-26 PROCEDURE — 74177 CT ABD & PELVIS W/CONTRAST: CPT

## 2022-08-26 PROCEDURE — 85025 COMPLETE CBC W/AUTO DIFF WBC: CPT | Performed by: EMERGENCY MEDICINE

## 2022-08-26 PROCEDURE — 36415 COLL VENOUS BLD VENIPUNCTURE: CPT | Performed by: EMERGENCY MEDICINE

## 2022-08-26 PROCEDURE — 83690 ASSAY OF LIPASE: CPT | Performed by: EMERGENCY MEDICINE

## 2022-08-26 PROCEDURE — 93010 ELECTROCARDIOGRAM REPORT: CPT | Performed by: INTERNAL MEDICINE

## 2022-08-26 PROCEDURE — 80053 COMPREHEN METABOLIC PANEL: CPT | Performed by: EMERGENCY MEDICINE

## 2022-08-26 PROCEDURE — G1004 CDSM NDSC: HCPCS

## 2022-08-26 PROCEDURE — 74176 CT ABD & PELVIS W/O CONTRAST: CPT

## 2022-08-26 PROCEDURE — 96375 TX/PRO/DX INJ NEW DRUG ADDON: CPT

## 2022-08-26 PROCEDURE — 96361 HYDRATE IV INFUSION ADD-ON: CPT

## 2022-08-26 PROCEDURE — 71260 CT THORAX DX C+: CPT

## 2022-08-26 PROCEDURE — 71250 CT THORAX DX C-: CPT

## 2022-08-26 PROCEDURE — 96374 THER/PROPH/DIAG INJ IV PUSH: CPT

## 2022-08-26 PROCEDURE — 93005 ELECTROCARDIOGRAM TRACING: CPT

## 2022-08-26 PROCEDURE — 99285 EMERGENCY DEPT VISIT HI MDM: CPT | Performed by: EMERGENCY MEDICINE

## 2022-08-26 PROCEDURE — 96376 TX/PRO/DX INJ SAME DRUG ADON: CPT

## 2022-08-26 PROCEDURE — 99284 EMERGENCY DEPT VISIT MOD MDM: CPT

## 2022-08-26 RX ORDER — ONDANSETRON 2 MG/ML
4 INJECTION INTRAMUSCULAR; INTRAVENOUS
Status: COMPLETED | OUTPATIENT
Start: 2022-08-26 | End: 2022-08-26

## 2022-08-26 RX ORDER — SODIUM CHLORIDE 9 MG/ML
100 INJECTION, SOLUTION INTRAVENOUS CONTINUOUS
Status: DISPENSED | OUTPATIENT
Start: 2022-08-26 | End: 2022-08-26

## 2022-08-26 RX ADMIN — ONDANSETRON 4 MG: 2 INJECTION INTRAMUSCULAR; INTRAVENOUS at 03:36

## 2022-08-26 RX ADMIN — MORPHINE SULFATE 2 MG: 2 INJECTION, SOLUTION INTRAMUSCULAR; INTRAVENOUS at 01:20

## 2022-08-26 RX ADMIN — IOHEXOL 80 ML: 350 INJECTION, SOLUTION INTRAVENOUS at 02:10

## 2022-08-26 RX ADMIN — SODIUM CHLORIDE 100 ML/HR: 0.9 INJECTION, SOLUTION INTRAVENOUS at 01:36

## 2022-08-26 RX ADMIN — MORPHINE SULFATE 2 MG: 2 INJECTION, SOLUTION INTRAMUSCULAR; INTRAVENOUS at 03:42

## 2022-08-26 RX ADMIN — ONDANSETRON 4 MG: 2 INJECTION INTRAMUSCULAR; INTRAVENOUS at 01:20

## 2022-08-26 NOTE — TELEPHONE ENCOUNTER
Pt called and asked office to send RTW letter to [phone] and [employer]  RTW letter will be faxed to both and scanned into chart  Pt also noted they were in the ER last night b/c after eating they felt a stabbing pain  Pt stated they are okay now and do not need to be seen in the office

## 2022-08-26 NOTE — ED PROVIDER NOTES
History  Chief Complaint   Patient presents with    Foreign Body in Throat     Pt to ED with c/o pork chop stuck in throat, pt states she can not swallow without throwing up,      HPI     Patient is a 47 yof who presents with difficulty swallowing  Suspects a pork chop is stuck  On 8/08/22 patient with:   1  Diagnostic Laparoscopy  2  Extensive Lysis of Adhesions  3  Laparoscopic Revision of Sleeve Gastrectomy to a Rebecca-en-Y Gastric Bypass  4  Intraoperative Endoscopy    Dr Bharati Almazan is weight loss doctor  No chest pain  No sweats  MDM patient with gastric bypass and now likely esophogeal food impaction, will image to rule out dangerous surgical pathology, otherwise, discuss with surgery  REVIEW OF SYSTEMS  Positive for vomiting  All other systems reviewed and are negative unless noted in this section or otherwise in the chart  Physical Exam  Vitals and nursing note reviewed  Constitutional:    Appearance:  Patient is well-developed  No diaphoresis  HENT:   Head: Normocephalic and atraumatic  Right Ear: External ear normal    Left Ear: External ear normal    Nose: No congestion  Eyes:   Conjunctiva/sclera: Conjunctivae normal    Right eye: No discharge  Left eye: No discharge  Extraocular Movements: Extraocular movements intact  Neck:   Vascular: No JVD  Trachea: No tracheal deviation  Cardiovascular:   Rate and Rhythm: Normal rate and regular rhythm  Heart sounds: Normal heart sounds  Pulmonary:   Effort: Pulmonary effort is normal  No respiratory distress  Breath sounds: No wheezing or rales  Abdominal:   Palpations: Abdomen is soft  Tenderness: There is no abdominal tenderness  There is no guarding or rebound  Musculoskeletal:      General: No tenderness  Cervical back: Normal range of motion and neck supple  Skin:  General: Skin is warm and dry  Findings: No erythema or rash  Neurological:   General: No focal deficit present     Mental Status: Alert and oriented to person, place, and time  Motor: No weakness  Psychiatric:      Behavior: Behavior normal       Thought Content: Thought content normal                            Prior to Admission Medications   Prescriptions Last Dose Informant Patient Reported? Taking?    Multiple Vitamins-Minerals (MULTI COMPLETE PO)   Yes No   Sig: Take by mouth   Patient not taking: No sig reported   QUEtiapine (SEROquel) 50 mg tablet   No No   Sig: Take 1 5 tablets (75 mg total) by mouth daily at bedtime   Semaglutide,0 25 or 0 5MG/DOS, 2 MG/1 5ML SOPN   No No   Sig: Inject 0 5 mg under the skin once a week   atoMOXetine (STRATTERA) 60 mg capsule   No No   Sig: Take 1 capsule (60 mg total) by mouth daily   Patient not taking: Reported on 8/17/2022   atorvastatin (LIPITOR) 10 mg tablet   No No   Sig: TAKE ONE TABLET BY MOUTH DAILY   calcium carbonate (OS-MELODY) 600 MG tablet   Yes No   Sig: Take 600 mg by mouth 2 (two) times a day with meals   cholestyramine (QUESTRAN) 4 g packet   No No   Sig: Take 1 packet (4 g total) by mouth daily   Patient not taking: No sig reported   cyanocobalamin (VITAMIN B-12) 100 mcg tablet   Yes No   Sig: Take by mouth daily   Patient not taking: No sig reported   lamoTRIgine (LaMICtal) 100 mg tablet   No No   Sig: Take 1 tablet (100 mg total) by mouth daily   lamoTRIgine (LaMICtal) 150 MG tablet   No No   Sig: Take 1 tablet (150 mg total) by mouth daily   meclizine (ANTIVERT) 25 mg tablet   No No   Sig: Take 1 tablet (25 mg total) by mouth every 8 (eight) hours as needed for dizziness   methocarbamol (ROBAXIN) 750 mg tablet  Self No No   Sig: Take 1 tablet (750 mg total) by mouth daily at bedtime as needed for muscle spasms   montelukast (SINGULAIR) 10 mg tablet  Self No No   Sig: Take 1 tablet (10 mg total) by mouth daily at bedtime   omeprazole (PriLOSEC) 20 mg delayed release capsule   No No   Sig: Take 1 capsule (20 mg total) by mouth daily   oxyCODONE (Roxicodone) 5 immediate release tablet   No No   Sig: Take 1 tablet (5 mg total) by mouth every 4 (four) hours as needed for moderate pain Max Daily Amount: 30 mg   propranolol (INDERAL LA) 60 mg 24 hr capsule   No No   Sig: Take 1 capsule (60 mg total) by mouth 2 (two) times a day   venlafaxine (EFFEXOR) 100 MG tablet   No No   Sig: Take 1 tablet (100 mg total) by mouth 2 (two) times a day Take 1 tablet (100mg) by mouth twice daily with 37 mg tablet for total of 275mg per day  venlafaxine (EFFEXOR) 37 5 mg tablet   No No   Sig: Take 1 tablet (37 5 mg total) by mouth 2 (two) times a day Take 1 tablet (37 5mg) by mouth twice daily with 100mg tablet for total of 275mg per day  Facility-Administered Medications: None       Past Medical History:   Diagnosis Date    ADHD (attention deficit hyperactivity disorder)     Bulging lumbar disc     Carpal tunnel syndrome     RIGHT  LAST ASSESSED: 16    Chronic back pain     low    Chronic pain disorder     Colon polyps     COVID-19     2021    Diabetes mellitus ([facility] [address] [facility]     GERD (gastroesophageal reflux disease)     Gestational diabetes     Hearing loss     left ear    Hyperlipidemia     Resolved with weight loss    Hyponatremia 2020    IBS (irritable bowel syndrome)     Ileus ([facility] [address] )     LAST ASSESSED: 8/3/17    Labial cyst     LAST ASSESSED: 16    Myofascial pain     LAST ASSESSED: 16    Obesity     Ovarian cyst     LEFT   LAST ASSESSED: 16    Panic attack     Pneumonia     Sacroiliitis (HCC)     Seasonal allergies     [facility]     Thoracic outlet syndrome     2010    Trochanteric bursitis of both hips     LAST ASSESSED: 3/18/16    Ulnar neuropathy at elbow     Varicella     Wears glasses        Past Surgical History:   Procedure Laterality Date    BILE DUCT EXPLORATION      ENDOSCOPIC REMOVAL OF STONES FROM BILIARY TRACT     SECTION      x3    CHOLECYSTECTOMY      COLONOSCOPY      2017-polyp, repeat 2022    DILATION AND CURETTAGE OF UTERUS      ENDOMETRIAL ABLATION      ERCP W/ SPHICTEROTOMY      FIRST RIB REMOVAL      THORAX EXCISION OF FIRST RIB    HYSTEROSCOPY      NEUROPLASTY / TRANSPOSITION ULNAR NERVE AT ELBOW Right 2011    CA COLONOSCOPY FLX DX W/COLLJ SPEC WHEN PFRMD N/A 5/30/2017    Procedure: COLONOSCOPY;  Surgeon: Jimenez Hurd MD;  [facility]; Service: Gastroenterology    CA ESOPHAGOGASTRODUODENOSCOPY TRANSORAL DIAGNOSTIC N/A 9/14/2017    Procedure: ESOPHAGOGASTRODUODENOSCOPY (EGD); Surgeon: Hemal López MD;  [facility]; Service: Gastroenterology    CA HYSTEROSCOPY,W/ENDO BX N/A 10/20/2017    Procedure: DILATATION AND CURETTAGE (D&C) WITH HYSTEROSCOPY  REMOVAL VULVAR RT  LESION;  Surgeon: Sarai Naidu MD;  [facility];  Service: Gynecology    CA LAP, ÁLVARO RESTRICT [address], LONGITUDINAL GASTRECTOMY N/A 2/6/2018    Procedure: GASTRECTOMY SLEEVE LAPAROSCOPIC; INTRAOPERATIVE EGD ;  Surgeon: Carito Shaw MD;  [facility];  Service: Bariatrics    CA LAP, ÁLV[address] RESTRICT [address], LONGITUDINAL GASTRECTOMY N/A 8/8/2022    Procedure: Diagnostic Lap; Extensive Lysis of Adhesions; LAPAROSCOPIC REVISION CONVERSION TO NOE-EN-Y GASTRIC BYPASS AND INTRAOPERATIVE EGD;  Surgeon: Carito Shaw MD;  [facility];  Service: Vic Garcia SURG IMPLNT [address] Left 1/29/2020    Procedure: Insertion of thoracic spinal cord stimulator electrode via laminotomy and placement of left buttock implantable pulse generator (NEUROMONITORING);   Surgeon: Troy White MD;  [facility];  Service: Neurosurgery    SPINAL CORD STIMULATOR TRIAL W/ LAMINOTOMY      TONSILLECTOMY AND ADENOIDECTOMY      TUBAL LIGATION      UPPER GASTROINTESTINAL ENDOSCOPY      VEIN LIGATION AND STRIPPING Right     VULVA SURGERY  10/20/2017    BIOPSY    WISDOM TOOTH EXTRACTION         Family History   Problem Relation Age of Onset    Diabetes Mother     Breast cancer Mother         >50    BRCA1 Negative Mother     BRCA2 Negative Mother     Hyperlipidemia Mother         HYPERCHOLESTEROLEMIA    Diabetes Father     Other Father         traumatic brain injury    Prostate cancer Father     Alcohol abuse Father         in remission    Heart disease Father     Neuropathy Father     Hyperlipidemia Father     Hypertension Brother     Diabetes Brother     Other Brother         HYPERCHOLESTEROLEMIA    Alcohol abuse Brother     Depression Brother         attempted suicide    Colon cancer Maternal Grandfather     Heart attack Maternal Grandmother     No Known Problems Paternal Grandmother         dad is adopted    No Known Problems Paternal Grandfather         dad is adopted    Diabetes Brother     Alcohol abuse Brother     Asthma Son     No Known Problems Daughter     Ovarian cancer Neg Hx     Uterine cancer Neg Hx      I have reviewed and agree with the history as documented      E-Cigarette/Vaping    E-Cigarette Use Never User      E-Cigarette/Vaping Substances    Nicotine No     THC No     CBD No     Flavoring No     Other No     Unknown No      Social History     Tobacco Use    Smoking status: Former Smoker     Quit date: 2013     Years since quittin 3    Smokeless tobacco: Never Used   Vaping Use    Vaping Use: Never used   Substance Use Topics    Alcohol use: Not Currently     Alcohol/week: 0 0 - 2 0 standard drinks     Comment: Occs -twice monthly    Drug use: No       Review of Systems    Physical Exam  Physical Exam    Vital Signs  ED Triage Vitals   Temperature Pulse Respirations Blood Pressure SpO2   22 0023 22 0023 22 0023 22 0023 22 0023   98 4 °F (36 9 °C) 65 18 139/67 100 %      Temp src Heart Rate Source Patient Position - Orthostatic VS BP Location FiO2 (%)   -- -- 22 0333 22 0333 --     Sitting Right arm       Pain Score       22 0333       6           Vitals:    22 0023 08/26/22 0333   BP: 139/67 138/70   Pulse: 65    Patient Position - Orthostatic VS:  Sitting         Visual Acuity      ED Medications  Medications   morphine injection 2 mg (2 mg Intravenous Given 8/26/22 0342)   sodium chloride 0 9 % infusion (0 mL/hr Intravenous Stopped 8/26/22 0336)   ondansetron (ZOFRAN) injection 4 mg (4 mg Intravenous Given 8/26/22 0336)   iohexol (OMNIPAQUE) 350 MG/ML injection (SINGLE-DOSE) 80 mL (80 mL Intravenous Given 8/26/22 0210)   iohexol (OMNIPAQUE) 240 MG/ML solution 50 mL (50 mL Oral Given 8/26/22 0438)       Diagnostic Studies  Results Reviewed     Procedure Component Value Units Date/Time    Lipase [[id number]]  (Normal) Collected: 08/26/22 0103    Lab Status: Final result Specimen: Blood from Arm, Right Updated: 08/26/22 0147     Lipase 78 u/L     Comprehensive metabolic panel [[id number]]  (Abnormal) Collected: 08/26/22 0103    Lab Status: Final result Specimen: Blood from Arm, Right Updated: 08/26/22 0147     Sodium 142 mmol/L      Potassium 4 0 mmol/L      Chloride 105 mmol/L      CO2 27 mmol/L      ANION GAP 10 mmol/L      BUN 10 mg/dL      Creatinine 0 65 mg/dL      Glucose 117 mg/dL      Calcium 9 5 mg/dL      AST 54 U/L      ALT 57 U/L      Alkaline Phosphatase 71 U/L      Total Protein 7 5 g/dL      Albumin 4 2 g/dL      Total Bilirubin 0 41 mg/dL      eGFR 101 ml/min/1 73sq m     Narrative:      José guidelines for Chronic Kidney Disease (CKD):     Stage 1 with normal or high GFR (GFR > 90 mL/min/1 73 square meters)    Stage 2 Mild CKD (GFR = 60-89 mL/min/1 73 square meters)    Stage 3A Moderate CKD (GFR = 45-59 mL/min/1 73 square meters)    Stage 3B Moderate CKD (GFR = 30-44 mL/min/1 73 square meters)    Stage 4 Severe CKD (GFR = 15-29 mL/min/1 73 square meters)    Stage 5 End Stage CKD (GFR <15 mL/min/1 73 square meters)  Note: GFR calculation is accurate only with a steady state creatinine    CBC and differential [[id number]] Collected: 08/26/22 0103    Lab Status: Final result Specimen: Blood from Arm, Right Updated: 08/26/22 0115     WBC 6 22 Thousand/uL      RBC 4 13 Million/uL      Hemoglobin 12 3 g/dL      Hematocrit 38 0 %      MCV 92 fL      MCH 29 8 pg      MCHC 32 4 g/dL      RDW 12 6 %      MPV 10 2 fL      Platelets 021 Thousands/uL      nRBC 0 /100 WBCs      Neutrophils Relative 63 %      Immat GRANS % 1 %      Lymphocytes Relative 27 %      Monocytes Relative 8 %      Eosinophils Relative 1 %      Basophils Relative 0 %      Neutrophils Absolute 3 99 Thousands/µL      Immature Grans Absolute 0 03 Thousand/uL      Lymphocytes Absolute 1 69 Thousands/µL      Monocytes Absolute 0 47 Thousand/µL      Eosinophils Absolute 0 03 Thousand/µL      Basophils Absolute 0 01 Thousands/µL                  CT chest abdomen pelvis wo contrast   Final Result by Regis Márquez MD (08/26 2397)      No significant abnormality  No evidence of impacted food contents  If symptoms persist consider upper GI or endoscopy  Workstation performed: [id number]         CT chest abdomen pelvis w contrast   Final Result by Eitan Andrews MD (08/26 0229)      No acute abnormality within the thorax, abdomen, or pelvis  Postsurgical changes of recent Rebecca-en-Y gastric bypass surgery without gross evidence of complication  Hepatic steatosis  Workstation performed: [id number]                    Procedures  Procedures         ED Course  ED Course as of 08/26/22 0606   Fri Aug 26, 2022   0542 Patient feeling better, spoke with dr Ricky Harmon from bariatrics, dann greenwood, followup asap  SBIRT 20yo+    Flowsheet Row Most Recent Value   SBIRT (25 yo +)    In order to provide better care to our patients, we are screening all of our patients for alcohol and drug use  Would it be okay to ask you these screening questions? Yes Filed at: 08/26/2022 0555   Initial Alcohol Screen: US AUDIT-C     1   How often do you have a drink containing alcohol? 1 Filed at: 08/26/2022 0026   2  How many drinks containing alcohol do you have on a typical day you are drinking? 0 Filed at: 08/26/2022 0026   3b  FEMALE Any Age, or MALE 65+: How often do you have 4 or more drinks on one occassion? 0 Filed at: 08/26/2022 0026   Audit-C Score 1 Filed at: 08/26/2022 0975   KODY: How many times in the past year have you    Used an illegal drug or used a prescription medication for non-medical reasons? Never Filed at: 08/26/2022 0026                    MDM    Disposition  Final diagnoses:   Abdominal pain     Time reflects when diagnosis was documented in both MDM as applicable and the Disposition within this note     Time User Action Codes Description Comment    8/26/2022  5:43 AM Kristy VILLANUEVA Add [R10 9] Abdominal pain       ED Disposition     ED Disposition   Discharge    Condition   Stable    Date/Time   Fri Aug 26, 2022  5:43 AM    Comment   Moise Albright discharge to home/self care                 Follow-up Information     Follow up With Specialties Details Why Contact Info    Bariatrics  In 1 day  Call bariatrics and followup asap          Discharge Medication List as of 8/26/2022  5:44 AM      CONTINUE these medications which have NOT CHANGED    Details   atoMOXetine (STRATTERA) 60 mg capsule Take 1 capsule (60 mg total) by mouth daily, Starting Th 6/16/2022, Normal      atorvastatin (LIPITOR) 10 mg tablet TAKE ONE TABLET BY MOUTH DAILY, Normal      calcium carbonate (OS-MELODY) 600 MG tablet Take 600 mg by mouth 2 (two) times a day with meals, Historical Med      cholestyramine (QUESTRAN) 4 g packet Take 1 packet (4 g total) by mouth daily, Starting Th 1/6/2022, Normal      cyanocobalamin (VITAMIN B-12) 100 mcg tablet Take by mouth daily, Historical Med      !! lamoTRIgine (LaMICtal) 100 mg tablet Take 1 tablet (100 mg total) by mouth daily, Starting Th 6/16/2022, Normal      !! lamoTRIgine (LaMICtal) 150 MG tablet Take 1 tablet (150 mg total) by mouth daily, Starting Thu 6/16/2022, Normal      meclizine (ANTIVERT) 25 mg tablet Take 1 tablet (25 mg total) by mouth every 8 (eight) hours as needed for dizziness, Starting Mon 7/25/2022, Normal      methocarbamol (ROBAXIN) 750 mg tablet Take 1 tablet (750 mg total) by mouth daily at bedtime as needed for muscle spasms, Starting Fri 1/28/2022, Until 2359, Normal      montelukast (SINGULAIR) 10 mg tablet Take 1 tablet (10 mg total) by mouth daily at bedtime, Starting Wed 3/23/2022, Normal      Multiple Vitamins-Minerals (MULTI COMPLETE PO) Take by mouth, Historical Med      omeprazole (PriLOSEC) 20 mg delayed release capsule Take 1 capsule (20 mg total) by mouth daily, Starting Tue 8/9/2022, Normal      oxyCODONE (Roxicodone) 5 immediate release tablet Take 1 tablet (5 mg total) by mouth every 4 (four) hours as needed for moderate pain Max Daily Amount: 30 mg, Starting Tue 8/9/2022, Normal      propranolol (INDERAL LA) 60 mg 24 hr capsule Take 1 capsule (60 mg total) by mouth 2 (two) times a day, Starting Thu 4/28/2022, Phone In      QUEtiapine (SEROquel) 50 mg tablet Take 1 5 tablets (75 mg total) by mouth daily at bedtime, Starting Thu 6/16/2022, Normal      Semaglutide,0 25 or 0 5MG/DOS, 2 MG/1 5ML SOPN Inject 0 5 mg under the skin once a week, Starting Fri 8/5/2022, Normal      !! venlafaxine (EFFEXOR) 100 MG tablet Take 1 tablet (100 mg total) by mouth 2 (two) times a day Take 1 tablet (100mg) by mouth twice daily with 37 mg tablet for total of 275mg per day , Starting Thu 6/16/2022, Normal      !! venlafaxine (EFFEXOR) 37 5 mg tablet Take 1 tablet (37 5 mg total) by mouth 2 (two) times a day Take 1 tablet (37 5mg) by mouth twice daily with 100mg tablet for total of 275mg per day , Starting Thu 6/16/2022, Normal       !! - Potential duplicate medications found  Please discuss with provider  No discharge procedures on file      PDMP Review       Value Time User    PDMP Reviewed  Yes 4/6/2022  3:10 PM Sophy Celestin MD          ED Provider  Electronically Signed by           Rajani Molina MD  08/26/22 2056

## 2022-08-29 ENCOUNTER — TELEPHONE (OUTPATIENT)
Dept: PSYCHIATRY | Facility: CLINIC | Age: 52
End: 2022-08-29

## 2022-08-29 NOTE — TELEPHONE ENCOUNTER
Writer called to inform pt appt needed to be reschedule on 9/2/22 due to provider Lori Alcantar will not be in office, pt was doing ok and is ok with waiting until her follow-up 10/7/22   Thank you

## 2022-08-30 ENCOUNTER — CLINICAL SUPPORT (OUTPATIENT)
Dept: FAMILY MEDICINE CLINIC | Facility: CLINIC | Age: 52
End: 2022-08-30

## 2022-08-30 DIAGNOSIS — E11.9 TYPE 2 DIABETES MELLITUS WITHOUT COMPLICATION, WITHOUT LONG-TERM CURRENT USE OF INSULIN (HCC): Primary | ICD-10-CM

## 2022-08-30 NOTE — PROGRESS NOTES
[address] [place] [address]  Blanka Monterroso is a 47 y o  female who presents via telephone for follow-up  Reason for visit: diabetes  Plan: The following actions were taken today by the Clinical Pharmacist:   1  Medications:    - Re-start Ozempic at 0 25mg once weekly    Follow-up:   Follow-up with pharmacist in 3 weeks  Next PCP visit: not yet scheduled    - Home Monitoring Records: food and nutrition intake  - Labs: up to date  - Referrals: none    The following items are to be considered at a future visit:   1  Syncing [place]  2  Titrating Ozempic     Chronic Conditions Addressed at this Visit:       Diabetes: Goals - A1c: <7%; SMBGs: Preprandial: 80-130mg/dL per ADA Guidelines  - Most recent A1c: below goal but not reflective of current treatment    - SMBG: per patient memory, 87-187mg/dL  Current DM Regimen:  None - patient was unable to obtain Ozempic from pharmacy x 1 month approx  MEDICATIONS: plan to resume at lower dose  HOME MONITORING: Check blood sugars daily and record  HTN: BP goal: <130/80 mmHg based on ADA Guidelines [proteinuria, existing ASCVD, or 10-year ASCVD risk =15%]  - In-office BP below goal, HR acceptable  MEDICATIONS: none (propranolol treating paroxsymal SVT)  Continue as prescribed  LABS: labs are reviewed, up to date and normal  Serum K+, Na+, SCr WNL  ASCVD primary prevention   2018 ACC/AHA blood cholesterol guidelines recommends moderate-intensity statin  Patient tolerating statin well without side effects  MEDICATIONS: atorvastatin 10mg daily  Continue  as prescribed  LABS: labs reviewed, I note that triglycerides high  Medication Reconciliation: Medication list reviewed with patient at today's visit and updated to reflect medications patient is currently taking   - Medication adherence is good   Efforts to improve compliance will be directed at dietary modifications: lower carbs, more protein with meals and snacks, increased exercise and regular blood sugar monitoring: daily  Education:  - Discussed ABCs of diabetes management (A1c/BP/cholesterol) and goals with patient today    Patient-specific goals:  1  Continue Ozempic  2  Start using Sue Merrick - will work on syncing so we can receive SMBG once she gets monitor     Subjective:     DM History:  Microvascular complications: none  Cardiovascular complications: none  - Aspirin (Men>50, Women>60): Not Indicated  - Statin: Yes   - ACEi/ARB: No   Previous DM medications:   Metformin and Victoza prior to bariatric surgery    DM Self-Management:  - Self-monitoring: are not performed  She checks BG never as of now, including FBG  She did not provide blood glucose log today  - SMBG readings (no log, per memory): none  - Hypoglycemia: (+) awareness/unawareness/denies any episodes  Glucometer: Yes, Brand: [employer]  CGM: No, Brand: none    Patient reports being on metformin and victoza previously then underwentbariatric surgery  Since she is now gaining weight back, struggling with hunger cravings and A1C is on the incline  Was interested in CGM but does not qualify  Hooked up with [facility] program      Was started on metformin XR but had diarrhea  Will titrate ozempic so patient can get dual benefit of diabetes control as well as satiety, weight loss, and cardiovascular prevention  Ozempic overall going well  Had diarrhea upon initiation but has since subsided  Has ongoing nausea managed by crackers, small sips of water, worst in the morning and improves over the course of the day  Diabetes  She presents for her follow-up diabetic visit  She has type 2 diabetes mellitus  Symptoms are improving  She is compliant with treatment all of the time  Her weight is stable  She is following a generally healthy diet  She has not had a previous visit with a dietitian  She participates in exercise intermittently  Her home blood glucose trend is decreasing steadily   An ACE inhibitor/angiotensin II receptor blocker is not being taken  She does not see a podiatrist Eye exam is not current  Medication Adherence: Responsible for medication management: patient  Medication adherence: taking as prescribed  Patient denies missed/extra doses  Medication Efficacy/Safety:  Clinically significant drug interactions identified:  None  Side effects from medication(s) reported:  metformin - diarrhea      Lifestyle:  Social History     Tobacco Use    Smoking status: Former Smoker     Quit date: 2013     Years since quittin 3    Smokeless tobacco: Never Used   Vaping Use    Vaping Use: Never used   Substance Use Topics    Alcohol use: Not Currently     Alcohol/week: 0 0 - 2 0 standard drinks     Comment: Occs -twice monthly    Drug use: No          Objective:   SMBG readings ( ):  none  Current Outpatient Medications   Medication Instructions    atoMOXetine (STRATTERA) 60 mg, Oral, Daily    atorvastatin (LIPITOR) 10 mg tablet TAKE ONE TABLET BY MOUTH DAILY    calcium carbonate (OS-MELODY) 600 mg, Oral, 2 times daily with meals    cholestyramine (QUESTRAN) 4 g, Oral, Daily    cyanocobalamin (VITAMIN B-12) 100 mcg tablet Daily    lamoTRIgine (LAMICTAL) 100 mg, Oral, Daily    lamoTRIgine (LAMICTAL) 150 mg, Oral, Daily    meclizine (ANTIVERT) 25 mg, Oral, Every 8 hours PRN    methocarbamol (ROBAXIN) 750 mg, Oral, Daily at bedtime PRN    montelukast (SINGULAIR) 10 mg, Oral, Daily at bedtime    Multiple Vitamins-Minerals (MULTI COMPLETE PO) Take by mouth    omeprazole (PRILOSEC) 20 mg, Oral, Daily    oxyCODONE (ROXICODONE) 5 mg, Oral, Every 4 hours PRN    propranolol (INDERAL LA) 60 mg, Oral, 2 times daily    QUEtiapine (SEROQUEL) 75 mg, Oral, Daily at bedtime    Semaglutide(0 25 or 0 5MG/DOS) 0 5 mg, Subcutaneous, Weekly    venlafaxine (EFFEXOR) 100 mg, Oral, 2 times daily, Take 1 tablet (100mg) by mouth twice daily with 37 mg tablet for total of 275mg per day      venlafaxine (EFFEXOR) 37 5 mg, Oral, 2 times daily, Take 1 tablet (37 5mg) by mouth twice daily with 100mg tablet for total of 275mg per day  I have reviewed the patient's allergies, medications and history as noted in the electronic medical record and updated as necessary  Vital Signs:  BP Readings from Last 3 Encounters:   08/26/22 138/70   08/22/22 100/53   08/17/22 110/70     Pulse Readings from Last 3 Encounters:   08/26/22 65   08/22/22 66   08/17/22 70      Estimated body mass index is 29 05 kg/m² as calculated from the following:    Height as of 8/22/22: 5' 6" (1 676 m)  Weight as of 8/22/22: 81 6 kg (180 lb)       Pertinent Lab Data:  Lab Results   Component Value Date    SODIUM 142 08/26/2022    K 4 0 08/26/2022     08/26/2022    CO2 27 08/26/2022    BUN 10 08/26/2022    CREATININE 0 65 08/26/2022    GLUC 117 08/26/2022    CALCIUM 9 5 08/26/2022     Lab Results   Component Value Date    HGBA1C 6 1 07/25/2022    [id number] 509 05/20/2021        Preventative Care:  A1c measurement: Up to date  Dilated eye exam: Overdue  Foot exam: Overdue  Dental exam: Overdue  Urinary microalbumin measurement: Overdue  Health Maintenance Due   Topic Date Due    HIV Screening  Never done    COVID-19 Vaccine (4 - Booster for [employer] series) 04/11/2022    Breast Cancer Screening: Mammogram  06/24/2022    Influenza Vaccine (1) 09/01/2022    Annual Physical  11/26/2022       Pharmacist Tracking Tool  Reason For Outreach: Embedded Pharmacist  Demographics:  Intervention Method: Phone  Type of Intervention: Follow-Up  Topics Addressed: Diabetes and Obesity  Pharmacologic Interventions: Medication Initiation and Prevent or Manage CHARLEE  Non-Pharmacologic Interventions: Adherence addressed and Home Monitoring  Time:  Direct Patient Care: 15 mins  Care Coordination: 15 mins  Recommendation Recipient: Patient/Caregiver  Outcome: Accepted

## 2022-09-07 ENCOUNTER — OFFICE VISIT (OUTPATIENT)
Dept: FAMILY MEDICINE CLINIC | Facility: CLINIC | Age: 52
End: 2022-09-07
Payer: COMMERCIAL

## 2022-09-07 VITALS
DIASTOLIC BLOOD PRESSURE: 70 MMHG | RESPIRATION RATE: 16 BRPM | OXYGEN SATURATION: 99 % | HEIGHT: 66 IN | HEART RATE: 66 BPM | WEIGHT: 171 LBS | TEMPERATURE: 96.3 F | SYSTOLIC BLOOD PRESSURE: 102 MMHG | BODY MASS INDEX: 27.48 KG/M2

## 2022-09-07 DIAGNOSIS — N30.00 ACUTE CYSTITIS WITHOUT HEMATURIA: ICD-10-CM

## 2022-09-07 DIAGNOSIS — E11.9 TYPE 2 DIABETES MELLITUS WITHOUT COMPLICATION, WITHOUT LONG-TERM CURRENT USE OF INSULIN (HCC): ICD-10-CM

## 2022-09-07 DIAGNOSIS — Z23 ENCOUNTER FOR IMMUNIZATION: Primary | ICD-10-CM

## 2022-09-07 DIAGNOSIS — F33.41 RECURRENT MAJOR DEPRESSIVE DISORDER, IN PARTIAL REMISSION (HCC): ICD-10-CM

## 2022-09-07 LAB
SL AMB  POCT GLUCOSE, UA: ABNORMAL
SL AMB LEUKOCYTE ESTERASE,UA: ABNORMAL
SL AMB POCT BILIRUBIN,UA: ABNORMAL
SL AMB POCT BLOOD,UA: ABNORMAL
SL AMB POCT CLARITY,UA: ABNORMAL
SL AMB POCT COLOR,UA: ABNORMAL
SL AMB POCT KETONES,UA: ABNORMAL
SL AMB POCT NITRITE,UA: ABNORMAL
SL AMB POCT PH,UA: 6
SL AMB POCT SPECIFIC GRAVITY,UA: 1.03
SL AMB POCT URINE PROTEIN: ABNORMAL
SL AMB POCT UROBILINOGEN: ABNORMAL

## 2022-09-07 PROCEDURE — 87186 SC STD MICRODIL/AGAR DIL: CPT | Performed by: FAMILY MEDICINE

## 2022-09-07 PROCEDURE — 99214 OFFICE O/P EST MOD 30 MIN: CPT | Performed by: FAMILY MEDICINE

## 2022-09-07 PROCEDURE — 87077 CULTURE AEROBIC IDENTIFY: CPT | Performed by: FAMILY MEDICINE

## 2022-09-07 PROCEDURE — 90682 RIV4 VACC RECOMBINANT DNA IM: CPT

## 2022-09-07 PROCEDURE — 81002 URINALYSIS NONAUTO W/O SCOPE: CPT | Performed by: FAMILY MEDICINE

## 2022-09-07 PROCEDURE — 87086 URINE CULTURE/COLONY COUNT: CPT | Performed by: FAMILY MEDICINE

## 2022-09-07 PROCEDURE — 90471 IMMUNIZATION ADMIN: CPT

## 2022-09-07 RX ORDER — CEPHALEXIN 500 MG/1
500 CAPSULE ORAL EVERY 8 HOURS SCHEDULED
Qty: 21 CAPSULE | Refills: 0 | Status: SHIPPED | OUTPATIENT
Start: 2022-09-07 | End: 2022-09-07

## 2022-09-07 RX ORDER — DROSPIRENONE AND ETHINYL ESTRADIOL 0.02-3(28)
1 KIT ORAL DAILY
COMMUNITY

## 2022-09-07 RX ORDER — CIPROFLOXACIN 500 MG/1
500 TABLET, FILM COATED ORAL EVERY 12 HOURS SCHEDULED
Qty: 14 TABLET | Refills: 0 | Status: SHIPPED | OUTPATIENT
Start: 2022-09-07 | End: 2022-09-14

## 2022-09-07 NOTE — PROGRESS NOTES
Assessment/Plan:    Type 2 diabetes mellitus without complication, without long-term current use of insulin ([facility])    Lab Results   Component Value Date    HGBA1C 6 1 07/25/2022   stable on current meds     Recurrent major depressive disorder, in partial remission ([place] [id number] )  Stable  Care per psych         Diagnoses and all orders for this visit:    Encounter for immunization  -     influenza vaccine, quadrivalent, recombinant, PF, 0 5 mL, for patients 18 yr+ ([facility])    Type 2 diabetes mellitus without complication, without long-term current use of insulin ([place] [address] )    Acute cystitis without hematuria  -     Discontinue: cephalexin (KEFLEX) 500 mg capsule; Take 1 capsule (500 mg total) by mouth every 8 (eight) hours for 7 days  -     ciprofloxacin (CIPRO) 500 mg tablet; Take 1 tablet (500 mg total) by mouth every 12 (twelve) hours for 7 days  -     POCT urine dip  -     Urine culture    Recurrent major depressive disorder, in partial remission ([place] [address] )    Other orders  -     drospirenone-ethinyl estradiol (LIEN) 3-0 02 MG per tablet;  Take 1 tablet by mouth daily      Recent Results (from the past 336 hour(s))   Comprehensive metabolic panel    Collection Time: 08/26/22  1:03 AM   Result Value Ref Range    Sodium 142 135 - 147 mmol/L    Potassium 4 0 3 5 - 5 3 mmol/L    Chloride 105 96 - 108 mmol/L    CO2 27 21 - 32 mmol/L    ANION GAP 10 4 - 13 mmol/L    BUN 10 5 - 25 mg/dL    Creatinine 0 65 0 60 - 1 30 mg/dL    Glucose 117 65 - 140 mg/dL    Calcium 9 5 8 4 - 10 2 mg/dL    AST 54 (H) 13 - 39 U/L    ALT 57 (H) 7 - 52 U/L    Alkaline Phosphatase 71 34 - 104 U/L    Total Protein 7 5 6 4 - 8 4 g/dL    Albumin 4 2 3 5 - 5 0 g/dL    Total Bilirubin 0 41 0 20 - 1 00 mg/dL    eGFR 101 ml/min/1 73sq m   CBC and differential    Collection Time: 08/26/22  1:03 AM   Result Value Ref Range    WBC 6 22 4 31 - 10 16 Thousand/uL    RBC 4 13 3 81 - 5 12 Million/uL    Hemoglobin 12 3 11 5 - 15 4 g/dL    Hematocrit 38 0 34 8 - 46 1 %    MCV 92 82 - 98 fL    MCH 29 8 26 8 - 34 3 pg    MCHC 32 4 31 4 - 37 4 g/dL    RDW 12 6 11 6 - 15 1 %    MPV 10 2 8 9 - 12 7 fL    Platelets 796 675 - 546 Thousands/uL    nRBC 0 /100 WBCs    Neutrophils Relative 63 43 - 75 %    Immat GRANS % 1 0 - 2 %    Lymphocytes Relative 27 14 - 44 %    Monocytes Relative 8 4 - 12 %    Eosinophils Relative 1 0 - 6 %    Basophils Relative 0 0 - 1 %    Neutrophils Absolute 3 99 1 85 - 7 62 Thousands/µL    Immature Grans Absolute 0 03 0 00 - 0 20 Thousand/uL    Lymphocytes Absolute 1 69 0 60 - 4 47 Thousands/µL    Monocytes Absolute 0 47 0 17 - 1 22 Thousand/µL    Eosinophils Absolute 0 03 0 00 - 0 61 Thousand/µL    Basophils Absolute 0 01 0 00 - 0 10 Thousands/µL   Lipase    Collection Time: 08/26/22  1:03 AM   Result Value Ref Range    Lipase 78 11 - 82 u/L   ECG 12 lead    Collection Time: 08/26/22  1:11 AM   Result Value Ref Range    Ventricular Rate 65 BPM    Atrial Rate 65 BPM    MI Interval 190 ms    QRSD Interval 76 ms    QT Interval 426 ms    QTC Interval 443 ms    P [place] 58 degrees    QRS Axis 209 degrees    T Wave Axis 46 degrees   POCT urine dip    Collection Time: 09/07/22  5:08 PM   Result Value Ref Range    LEUKOCYTE ESTERASE,UA 2+     NITRITE,UA -     SL AMB POCT UROBILINOGEN -     POCT URINE PROTEIN 2+      PH,UA 6 0     BLOOD,UA 3+     SPECIFIC GRAVITY,UA 1 030     KETONES,UA +-     BILIRUBIN,UA 1+     GLUCOSE, UA -      COLOR,UA dark yellow     CLARITY,UA cloudy          Subjective:      Patient ID: Canelo Grider is a 47 y o  female  Urinary Frequency   This is a new problem  The current episode started in the past 7 days  The problem has been waxing and waning  The patient is experiencing no pain  There has been no fever  She is not sexually active  There is no history of pyelonephritis  Associated symptoms include frequency and urgency  Pertinent negatives include no chills, discharge, flank pain, hematuria, hesitancy, nausea, possible pregnancy, sweats or vomiting  The treatment provided mild relief  There is no history of catheterization, kidney stones, recurrent UTIs, a single kidney, urinary stasis or a urological procedure  The following portions of the patient's history were reviewed and updated as appropriate: allergies, current medications, past family history, past medical history, past social history, past surgical history and problem list     Review of Systems   Constitutional: Negative for chills  Gastrointestinal: Negative for nausea and vomiting  Genitourinary: Positive for frequency and urgency  Negative for flank pain, hematuria and hesitancy  Objective:      /70 (BP Location: Left arm, Patient Position: Sitting, Cuff Size: Adult)   Pulse 66   Temp (!) 96 3 °F (35 7 °C) (Skin)   Resp 16   Ht 5' 6" (1 676 m)   Wt 77 6 kg (171 lb)   LMP 01/07/2019 (Approximate)   SpO2 99%   BMI 27 60 kg/m²          Physical Exam  Constitutional:       Appearance: Normal appearance  Cardiovascular:      Rate and Rhythm: Normal rate and regular rhythm  Pulses: Normal pulses  Heart sounds: Normal heart sounds  Pulmonary:      Effort: Pulmonary effort is normal       Breath sounds: Normal breath sounds  Neurological:      General: No focal deficit present  Mental Status: She is alert and oriented to person, place, and time

## 2022-09-09 LAB
BACTERIA UR CULT: ABNORMAL
BACTERIA UR CULT: ABNORMAL

## 2022-09-29 ENCOUNTER — TELEPHONE (OUTPATIENT)
Dept: BARIATRICS | Facility: CLINIC | Age: 52
End: 2022-09-29

## 2022-09-29 ENCOUNTER — TELEPHONE (OUTPATIENT)
Dept: FAMILY MEDICINE CLINIC | Facility: CLINIC | Age: 52
End: 2022-09-29

## 2022-09-29 NOTE — TELEPHONE ENCOUNTER
Patient had weight loss surgery 8/8/2022  She is not sure if to call her PCP or Wt loss specialist   However she is experiencing very low appetite, rapid heart rate, fatigue,  pressure in head      Please advise

## 2022-09-29 NOTE — TELEPHONE ENCOUNTER
Pt called stated feeling fatigued, no desire to eat, drinking water and eating at least a protein shake  Headaches, rabid heart rate  Spoke to provider and she said make sure that she is staying hydrated, sounds like she might be getting sick  Contact PCP office to put in a covid test to rule out

## 2022-10-10 ENCOUNTER — TELEPHONE (OUTPATIENT)
Dept: PSYCHIATRY | Facility: CLINIC | Age: 52
End: 2022-10-10

## 2022-10-21 ENCOUNTER — OFFICE VISIT (OUTPATIENT)
Dept: FAMILY MEDICINE CLINIC | Facility: CLINIC | Age: 52
End: 2022-10-21
Payer: COMMERCIAL

## 2022-10-21 VITALS
BODY MASS INDEX: 25.13 KG/M2 | RESPIRATION RATE: 16 BRPM | TEMPERATURE: 97.2 F | HEART RATE: 74 BPM | HEIGHT: 66 IN | WEIGHT: 156.4 LBS | DIASTOLIC BLOOD PRESSURE: 72 MMHG | OXYGEN SATURATION: 100 % | SYSTOLIC BLOOD PRESSURE: 102 MMHG

## 2022-10-21 DIAGNOSIS — J02.9 SORE THROAT: ICD-10-CM

## 2022-10-21 DIAGNOSIS — J01.00 ACUTE NON-RECURRENT MAXILLARY SINUSITIS: Primary | ICD-10-CM

## 2022-10-21 LAB — S PYO AG THROAT QL: NEGATIVE

## 2022-10-21 PROCEDURE — 99213 OFFICE O/P EST LOW 20 MIN: CPT | Performed by: FAMILY MEDICINE

## 2022-10-21 PROCEDURE — 87880 STREP A ASSAY W/OPTIC: CPT | Performed by: FAMILY MEDICINE

## 2022-10-21 RX ORDER — AMOXICILLIN AND CLAVULANATE POTASSIUM 875; 125 MG/1; MG/1
1 TABLET, FILM COATED ORAL EVERY 12 HOURS SCHEDULED
Qty: 20 TABLET | Refills: 0 | Status: SHIPPED | OUTPATIENT
Start: 2022-10-21 | End: 2022-10-31

## 2022-10-21 NOTE — PROGRESS NOTES
Name: Christian Van      : 1970      MRN: [id number]  Encounter Provider: Janna Cruz MD  Encounter Date: 10/21/2022   Encounter department: [place] [address]  Acute non-recurrent maxillary sinusitis  -     amoxicillin-clavulanate (Augmentin) 875-125 mg per tablet; Take 1 tablet by mouth every 12 (twelve) hours for 10 days    2  Sore throat  -     POCT rapid strepA       Negative rapid strep A test      Subjective      Sore Throat   This is a new problem  The current episode started 1 to 4 weeks ago (2 weeks ago)  There has been no fever  The patient is experiencing no pain  Associated symptoms include congestion, coughing, swollen glands and trouble swallowing  Pertinent negatives include no abdominal pain, diarrhea, drooling, ear discharge, ear pain, headaches, hoarse voice, plugged ear sensation, neck pain, shortness of breath, stridor or vomiting  She has had no exposure to strep or mono  Cough  Associated symptoms include a sore throat  Pertinent negatives include no ear pain, headaches or shortness of breath  Review of Systems   HENT: Positive for congestion, sore throat and trouble swallowing  Negative for drooling, ear discharge, ear pain and hoarse voice  Respiratory: Positive for cough  Negative for shortness of breath and stridor  Gastrointestinal: Negative for abdominal pain, diarrhea and vomiting  Musculoskeletal: Negative for neck pain  Neurological: Negative for headaches         Current Outpatient Medications on File Prior to Visit   Medication Sig   • atoMOXetine (STRATTERA) 60 mg capsule Take 1 capsule (60 mg total) by mouth daily   • atorvastatin (LIPITOR) 10 mg tablet TAKE ONE TABLET BY MOUTH DAILY   • calcium carbonate (OS-MELODY) 600 MG tablet Take 600 mg by mouth 2 (two) times a day with meals   • cyanocobalamin (VITAMIN B-12) 100 mcg tablet Take by mouth daily   • drospirenone-ethinyl estradiol (LIEN) 3-0 02 MG per tablet Take 1 tablet by mouth daily   • lamoTRIgine (LaMICtal) 100 mg tablet Take 1 tablet (100 mg total) by mouth daily   • lamoTRIgine (LaMICtal) 150 MG tablet Take 1 tablet (150 mg total) by mouth daily   • meclizine (ANTIVERT) 25 mg tablet Take 1 tablet (25 mg total) by mouth every 8 (eight) hours as needed for dizziness   • methocarbamol (ROBAXIN) 750 mg tablet Take 1 tablet (750 mg total) by mouth daily at bedtime as needed for muscle spasms   • montelukast (SINGULAIR) 10 mg tablet Take 1 tablet (10 mg total) by mouth daily at bedtime   • Multiple Vitamins-Minerals (MULTI COMPLETE PO) Take by mouth   • omeprazole (PriLOSEC) 20 mg delayed release capsule Take 1 capsule (20 mg total) by mouth daily   • QUEtiapine (SEROquel) 50 mg tablet Take 1 5 tablets (75 mg total) by mouth daily at bedtime   • Semaglutide,0 25 or 0 5MG/DOS, 2 MG/1 5ML SOPN Inject 0 5 mg under the skin once a week   • venlafaxine (EFFEXOR) 100 MG tablet Take 1 tablet (100 mg total) by mouth 2 (two) times a day Take 1 tablet (100mg) by mouth twice daily with 37 mg tablet for total of 275mg per day  • venlafaxine (EFFEXOR) 37 5 mg tablet Take 1 tablet (37 5 mg total) by mouth 2 (two) times a day Take 1 tablet (37 5mg) by mouth twice daily with 100mg tablet for total of 275mg per day     • cholestyramine (QUESTRAN) 4 g packet Take 1 packet (4 g total) by mouth daily (Patient not taking: No sig reported)   • oxyCODONE (Roxicodone) 5 immediate release tablet Take 1 tablet (5 mg total) by mouth every 4 (four) hours as needed for moderate pain Max Daily Amount: 30 mg (Patient not taking: Reported on 10/21/2022)   • propranolol (INDERAL LA) 60 mg 24 hr capsule Take 1 capsule (60 mg total) by mouth 2 (two) times a day (Patient not taking: Reported on 10/21/2022)       Objective     /72 (BP Location: Left arm, Patient Position: Sitting, Cuff Size: Adult)   Pulse 74   Temp (!) 97 2 °F (36 2 °C) (Tympanic)   Resp 16   Ht 5' 6" (1 676 m)   Wt 70 9 kg (156 lb 6 4 oz)   LMP 01/07/2019 (Approximate)   SpO2 100%   BMI 25 24 kg/m²     Physical Exam  Vitals reviewed  Constitutional:       Appearance: She is well-developed  HENT:      Right Ear: Tympanic membrane normal       Left Ear: Tympanic membrane normal       Nose: Congestion and rhinorrhea present  Eyes:      Conjunctiva/sclera: Conjunctivae normal    Cardiovascular:      Rate and Rhythm: Normal rate and regular rhythm  Neurological:      Mental Status: She is alert         Mike Almodovar MD

## 2022-10-28 ENCOUNTER — OFFICE VISIT (OUTPATIENT)
Dept: UROLOGY | Facility: CLINIC | Age: 52
End: 2022-10-28

## 2022-10-28 VITALS
WEIGHT: 152 LBS | OXYGEN SATURATION: 98 % | HEIGHT: 66 IN | SYSTOLIC BLOOD PRESSURE: 102 MMHG | HEART RATE: 70 BPM | BODY MASS INDEX: 24.43 KG/M2 | DIASTOLIC BLOOD PRESSURE: 66 MMHG

## 2022-10-28 DIAGNOSIS — N39.0 RECURRENT UTI: ICD-10-CM

## 2022-10-28 DIAGNOSIS — R39.15 URINARY URGENCY: Primary | ICD-10-CM

## 2022-10-28 LAB
POST-VOID RESIDUAL VOLUME, ML POC: 62 ML
SL AMB  POCT GLUCOSE, UA: NORMAL
SL AMB LEUKOCYTE ESTERASE,UA: NORMAL
SL AMB POCT BILIRUBIN,UA: NORMAL
SL AMB POCT BLOOD,UA: NORMAL
SL AMB POCT CLARITY,UA: CLEAR
SL AMB POCT COLOR,UA: YELLOW
SL AMB POCT KETONES,UA: NORMAL
SL AMB POCT NITRITE,UA: NORMAL
SL AMB POCT PH,UA: 5
SL AMB POCT SPECIFIC GRAVITY,UA: 1.03
SL AMB POCT URINE PROTEIN: NORMAL
SL AMB POCT UROBILINOGEN: 0.2

## 2022-10-28 RX ORDER — NITROFURANTOIN 25; 75 MG/1; MG/1
CAPSULE ORAL
Qty: 30 CAPSULE | Refills: 1 | Status: SHIPPED | OUTPATIENT
Start: 2022-10-28

## 2022-10-28 NOTE — PROGRESS NOTES
1  Urinary urgency  POCT urine dip    POCT Measure PVR   2  Recurrent UTI  Urine culture    nitrofurantoin (MACROBID) 100 mg capsule         Assessment and plan:       1  Recurrent UTIs  - proper hydration, probiotics, bowel management, cranberry supplementation  - standing urine culture  - normal upper tract imaging  - f/u 4-6 months reassessment      [facility] [address]      Chief Complaint     Chief Complaint   Patient presents with   • Urinary Urgency         History of Present Illness     Ramses Phillips is a 47 y o  female presenting today for consultation  Having recurrent UTIs  Not sexually active  No hx of pyelonephritis  Symptoms typically include frequency, urgency  CT 8/26/22 negative for urologic abnormalities  Medical comorbidities include IBS, GERD, diabetes, SVT, lumbar radiculopathy, vertigo, previous Rebecca-en-Y bypass  Urine dip leukocyte, nitrite, blood negative  PVR 62mL    Laboratory     Lab Results   Component Value Date    CREATININE 0 83 11/08/2022         Review of Systems     Review of Systems   Constitutional: Negative for activity change, appetite change, chills, diaphoresis, fatigue, fever and unexpected weight change  Respiratory: Negative for chest tightness and shortness of breath  Cardiovascular: Negative for chest pain, palpitations and leg swelling  Gastrointestinal: Negative for abdominal distention, abdominal pain, constipation, diarrhea, nausea and vomiting  Genitourinary: Negative for decreased urine volume, difficulty urinating, dysuria, enuresis, flank pain, frequency, genital sores, hematuria and urgency  Musculoskeletal: Negative for back pain, gait problem and myalgias  Skin: Negative for color change, pallor, rash and wound  Psychiatric/Behavioral: Negative for behavioral problems  The patient is not nervous/anxious                Allergies     Allergies   Allergen Reactions   • Ibuprofen Other (See Comments)     Due to gastric sleeve -can only take for 5 days, then needs to stop       Physical Exam     Physical Exam  Constitutional:       General: She is not in acute distress  Appearance: Normal appearance  She is normal weight  She is not ill-appearing, toxic-appearing or diaphoretic  HENT:      Head: Normocephalic and atraumatic  Eyes:      General:         Right eye: No discharge  Left eye: No discharge  Conjunctiva/sclera: Conjunctivae normal    Pulmonary:      Effort: Pulmonary effort is normal  No respiratory distress  Musculoskeletal:         General: No swelling or tenderness  Normal range of motion  Skin:     General: Skin is warm and dry  Coloration: Skin is not jaundiced or pale  Neurological:      General: No focal deficit present  Mental Status: She is alert and oriented to person, place, and time  Psychiatric:         Mood and Affect: Mood normal          Behavior: Behavior normal          Thought Content:  Thought content normal            Vital Signs     Vitals:    10/28/22 1002   BP: 102/66   Pulse: 70   SpO2: 98%   Weight: 68 9 kg (152 lb)   Height: 5' 6" (1 676 m)         Current Medications       Current Outpatient Medications:   •  atoMOXetine (STRATTERA) 60 mg capsule, Take 1 capsule (60 mg total) by mouth daily, Disp: 90 capsule, Rfl: 3  •  atorvastatin (LIPITOR) 10 mg tablet, TAKE ONE TABLET BY MOUTH DAILY, Disp: 90 tablet, Rfl: 0  •  calcium carbonate (OS-MELODY) 600 MG tablet, Take 600 mg by mouth 2 (two) times a day with meals, Disp: , Rfl:   •  cyanocobalamin (VITAMIN B-12) 100 mcg tablet, Take by mouth daily, Disp: , Rfl:   •  lamoTRIgine (LaMICtal) 100 mg tablet, Take 1 tablet (100 mg total) by mouth daily, Disp: 90 tablet, Rfl: 3  •  lamoTRIgine (LaMICtal) 150 MG tablet, Take 1 tablet (150 mg total) by mouth daily, Disp: 90 tablet, Rfl: 3  •  meclizine (ANTIVERT) 25 mg tablet, Take 1 tablet (25 mg total) by mouth every 8 (eight) hours as needed for dizziness, Disp: 30 tablet, Rfl: 0  • methocarbamol (ROBAXIN) 750 mg tablet, Take 1 tablet (750 mg total) by mouth daily at bedtime as needed for muscle spasms, Disp: 30 tablet, Rfl: 2  •  montelukast (SINGULAIR) 10 mg tablet, Take 1 tablet (10 mg total) by mouth daily at bedtime, Disp: 90 tablet, Rfl: 2  •  Multiple Vitamins-Minerals (MULTI COMPLETE PO), Take by mouth, Disp: , Rfl:   •  nitrofurantoin (MACROBID) 100 mg capsule, Take 1 tablet PO PRN after sexual intercourse, Disp: 30 capsule, Rfl: 1  •  omeprazole (PriLOSEC) 20 mg delayed release capsule, Take 1 capsule (20 mg total) by mouth daily, Disp: 30 capsule, Rfl: 3  •  QUEtiapine (SEROquel) 50 mg tablet, Take 1 5 tablets (75 mg total) by mouth daily at bedtime, Disp: 135 tablet, Rfl: 3  •  venlafaxine (EFFEXOR) 100 MG tablet, Take 1 tablet (100 mg total) by mouth 2 (two) times a day Take 1 tablet (100mg) by mouth twice daily with 37 mg tablet for total of 275mg per day , Disp: 180 tablet, Rfl: 3  •  venlafaxine (EFFEXOR) 37 5 mg tablet, Take 1 tablet (37 5 mg total) by mouth 2 (two) times a day Take 1 tablet (37 5mg) by mouth twice daily with 100mg tablet for total of 275mg per day , Disp: 180 tablet, Rfl: 3  •  cefuroxime (CEFTIN) 500 mg tablet, Take 1 tablet (500 mg total) by mouth every 12 (twelve) hours for 7 days, Disp: 14 tablet, Rfl: 0  •  cholestyramine (QUESTRAN) 4 g packet, Take 1 packet (4 g total) by mouth daily (Patient not taking: No sig reported), Disp: 30 each, Rfl: 3  •  drospirenone-ethinyl estradiol (LIEN) 3-0 02 MG per tablet, Take 1 tablet by mouth daily, Disp: 28 tablet, Rfl: 0  •  oxyCODONE (Roxicodone) 5 immediate release tablet, Take 1 tablet (5 mg total) by mouth every 4 (four) hours as needed for moderate pain Max Daily Amount: 30 mg (Patient not taking: No sig reported), Disp: 10 tablet, Rfl: 0  •  propranolol (INDERAL LA) 60 mg 24 hr capsule, Take 1 capsule (60 mg total) by mouth 2 (two) times a day (Patient not taking: No sig reported), Disp: 180 capsule, Rfl: 2  • Semaglutide,0 25 or 0 5MG/DOS, 2 MG/1 5ML SOPN, Inject 0 5 mg under the skin once a week, Disp: 1 5 mL, Rfl: 3      Active Problems     Patient Active Problem List   Diagnosis   • IBS (irritable bowel syndrome)   • Mixed hyperlipidemia   • GERD (gastroesophageal reflux disease)   • Chronic back pain   • Cervical radiculopathy   • Hepatic steatosis   • Pulmonary nodule seen on imaging study   • Type 2 diabetes mellitus without complication, without long-term current use of insulin (HCC)   • S/P laparoscopic sleeve gastrectomy   • Obesity   • Postsurgical malabsorption   • Lumbar radiculopathy   • Intervertebral disc disorder with radiculopathy of lumbar region   • Post traumatic stress disorder (PTSD)   • Recurrent major depressive disorder, in partial remission (HCC)   • Generalized anxiety disorder   • Chronic pain syndrome   • Other chronic pain   • Trochanteric bursitis, left hip   • Paroxysmal SVT (supraventricular tachycardia) ([facility])   • ADHD (attention deficit hyperactivity disorder), inattentive type   • Seasonal allergies   • Benign paroxysmal positional vertigo         Past Medical History     Past Medical History:   Diagnosis Date   • ADHD (attention deficit hyperactivity disorder)    • Bulging lumbar disc    • Carpal tunnel syndrome     RIGHT  LAST ASSESSED: 12/7/16   • Chronic back pain     low   • Chronic pain disorder    • Colon polyps    • COVID-19     12/2021   • Diabetes mellitus ([address] )    • GERD (gastroesophageal reflux disease)    • Gestational diabetes    • Hearing loss     left ear   • Hyperlipidemia     Resolved with weight loss   • Hyponatremia 12/12/2020   • IBS (irritable bowel syndrome)    • Ileus ([address] )     LAST ASSESSED: 8/3/17   • Labial cyst     LAST ASSESSED: 4/21/16   • Myofascial pain     LAST ASSESSED: 4/12/16   • Obesity    • Ovarian cyst     LEFT   LAST ASSESSED: 9/2/16   • Panic attack    • Pneumonia    • Sacroiliitis (HCC)    • Seasonal allergies    • SVT (supraventricular tachycardia) ([facility] [address] )    • Thoracic outlet syndrome        • Trochanteric bursitis of both hips     LAST ASSESSED: 3/18/16   • Ulnar neuropathy at elbow    • Varicella    • Wears glasses          Surgical History     Past Surgical History:   Procedure Laterality Date   • BILE DUCT EXPLORATION      ENDOSCOPIC REMOVAL OF STONES FROM BILIARY TRACT   •  SECTION      x3   • CHOLECYSTECTOMY     • COLONOSCOPY      2017-polyp, repeat    • DILATION AND CURETTAGE OF UTERUS     • ENDOMETRIAL ABLATION     • ERCP W/ SPHICTEROTOMY     • FIRST RIB REMOVAL      THORAX EXCISION OF FIRST RIB   • HYSTEROSCOPY     • NEUROPLASTY / TRANSPOSITION ULNAR NERVE AT ELBOW Right 2011   • AL COLONOSCOPY FLX DX W/COLLJ SPEC WHEN PFRMD N/A 2017    Procedure: COLONOSCOPY;  Surgeon: Zoraida Araujo MD;  [facility]; Service: Gastroenterology   • AL ESOPHAGOGASTRODUODENOSCOPY TRANSORAL DIAGNOSTIC N/A 2017    Procedure: ESOPHAGOGASTRODUODENOSCOPY (EGD); Surgeon: Gerald Olivo MD;  [facility]: [facility]; Service: Gastroenterology   • AL HYSTEROSCOPY,W/ENDO BX N/A 10/20/2017    Procedure: DILATATION AND CURETTAGE (D&C) WITH HYSTEROSCOPY  REMOVAL VULVAR RT  LESION;  Surgeon: Faustina Tejada MD;  [facility];  Service: Gynecology   • AL LAP, ÁLVARO RESTRICT PROC, LONGITUDINAL GASTRECTOMY N/A 2018    Procedure: GASTRECTOMY SLEEVE LAPAROSCOPIC; INTRAOPERATIVE EGD ;  Surgeon: Tesha Gtz MD;  [facility];  Service: Bariatrics   • AL LAP, ÁLVARO RESTRICT [address], LONGITUDINAL GASTRECTOMY N/A 2022    Procedure: Diagnostic Lap; Extensive Lysis of Adhesions; LAPAROSCOPIC REVISION CONVERSION TO NOE-EN-Y GASTRIC BYPASS AND INTRAOPERATIVE EGD;  Surgeon: Tesha Gtz MD;  [facility];  Service: Bariatrics   • AL SURG IMPLNT Marie Been Left 2020    Procedure:  Insertion of thoracic spinal cord stimulator electrode via laminotomy and placement of left buttock implantable pulse generator (NEUROMONITORING);   Surgeon: Zackary Meng MD;  [facility];  Service: Neurosurgery   • SPINAL CORD STIMULATOR TRIAL W/ LAMINOTOMY     • TONSILLECTOMY AND ADENOIDECTOMY     • TUBAL LIGATION     • UPPER GASTROINTESTINAL ENDOSCOPY     • VEIN LIGATION AND STRIPPING Right    • VULVA SURGERY  10/20/2017    BIOPSY   • WISDOM TOOTH EXTRACTION           Family History     Family History   Problem Relation Age of Onset   • Diabetes Mother    • Breast cancer Mother         >50   • BRCA1 Negative Mother    • BRCA2 Negative Mother    • Hyperlipidemia Mother         HYPERCHOLESTEROLEMIA   • Diabetes Father    • Other Father         traumatic brain injury   • Prostate cancer Father    • Alcohol abuse Father         in remission   • Heart disease Father    • Neuropathy Father    • Hyperlipidemia Father    • Hypertension Brother    • Diabetes Brother    • Other Brother         HYPERCHOLESTEROLEMIA   • Alcohol abuse Brother    • Depression Brother         attempted suicide   • Colon cancer Maternal Grandfather    • Heart attack Maternal Grandmother    • No Known Problems Paternal Grandmother         dad is adopted   • No Known Problems Paternal Grandfather         dad is adopted   • Diabetes Brother    • Alcohol abuse Brother    • Asthma Son    • No Known Problems Daughter    • Ovarian cancer Neg Hx    • Uterine cancer Neg Hx          Social History     Social History       Radiology

## 2022-11-01 DIAGNOSIS — E11.9 TYPE 2 DIABETES MELLITUS WITHOUT COMPLICATION, WITHOUT LONG-TERM CURRENT USE OF INSULIN (HCC): ICD-10-CM

## 2022-11-02 NOTE — TELEPHONE ENCOUNTER
Patient called and stated her medication was not at the pharmacy  Writer saw in records that all medications prescribed by provider were sent with refills  Writer called pharmacy and spoke to pharmacist who confirmed there were fills and stated they will be ready on 11/24/22  Called and left vm for patient to inform

## 2022-11-04 ENCOUNTER — TELEMEDICINE (OUTPATIENT)
Dept: BEHAVIORAL/MENTAL HEALTH CLINIC | Facility: CLINIC | Age: 52
End: 2022-11-04

## 2022-11-04 DIAGNOSIS — F33.41 RECURRENT MAJOR DEPRESSIVE DISORDER, IN PARTIAL REMISSION (HCC): Primary | ICD-10-CM

## 2022-11-04 DIAGNOSIS — F90.0 ADHD (ATTENTION DEFICIT HYPERACTIVITY DISORDER), INATTENTIVE TYPE: ICD-10-CM

## 2022-11-04 DIAGNOSIS — F41.1 GENERALIZED ANXIETY DISORDER: Chronic | ICD-10-CM

## 2022-11-04 DIAGNOSIS — F43.10 POST TRAUMATIC STRESS DISORDER (PTSD): Chronic | ICD-10-CM

## 2022-11-04 NOTE — BH TREATMENT PLAN
Sterling Walters  1970      Date of Initial Treatment Plan: April 5, 2020   Date of Current Treatment Plan: November 4, 2022     Treatment Plan Number 9     Strengths/Personal Resources for Self Care: Good relationship with spouse; Good mother; Hard worker     Diagnosis:   1  Major depressive disorder, recurrent severe without psychotic features ([facility] [address] )      2  Generalized anxiety disorder      3  Post traumatic stress disorder (PTSD)            Area of Needs: Mood regulation and PTSD     Long Term Goal 1: "I want to figure out how to not be so angry at work  I want to figure out how to not let it bother me when I feel left out "      Target Date:                                            March 4, 2023  Treatment Plan Expiration:          May 3, 2023  Completion Date: To be determined         Short Term Objectives for Goal 1:                  1  Christina will identify triggers and prompting events that increase symptoms of anger, depression, and anxiety  Update 11/04/2022: Sagrario Quick continues to identify and discuss the triggers and prompting events that impact her mood regulation (anger, depression, and anxiety)  During the past six months, she has identified work-related stressors, physical health issues, education stress, and family relationships as the primary triggers and prompting events that impact her life  We will continue to identify and discuss new triggers/ prompting events that impact her life, and we will adjust treatment interventions, as needed, to address her symptoms                 2  Zackary Lee learn and exhibit understanding of a minimum of three distress tolerance skills to assist with management of depression and anxiety symptoms        Update 11/04/2022: Sagrario Quick states that she has been using distraction as her primary coping skill recently  She acknowledges that her mood has been negatively impacted by work-related stressors   We will continue to build upon distress tolerance skills that she can use while on the job  She identifies that her work is demanding, and it is difficult for her to remove herself from the situation  We will work on tools that she can use while actively on the job in the ED  Billy Ramirez learn and exhibit understanding of effective communication skills (using a DBT-informed perspective), so she can effectively discuss her emotions and her thoughts  Update 11/04/2022: [place] continues to endorse feelings of being undervalued at work  She endorses good communication with her  and other family members  She identifies that her work relationships are tenuous  [place] will work on developing her self-advocacy skills, so she is able to appropriately address coworkers who are pushing her limits                 Kike Krishnamurthy will maintain a "mild" level of anxiety and depression, as measured by the PHQ-9 and the CONNOR-7 screening tools  Update 04/29/2022: Due to the level of stress that [place] was experiencing during this tx plan update (as well as some technical issues-- significant delay in the audio feed), we did not complete an updated PHQ-9  [place] denies suicidal ideation  She endorses an increase in depressive symptoms  She is not at goal for this objective  We will complete a PHQ-9 at a future session  83 Kaylyn Kim will maintain medication adherence and follow up with her psychiatric provider  Update 11/04/2022: [place] has changed her medications, as previously discussed, due to her bariatric surgery  She states that she is adherent with her medication   Due to her mood changes, this clinician has strongly recommended that [place] speak with her provider regarding any potential medication changes that might be beneficial  We will continue to monitor medication adherence and effectiveness      GOAL 1: Modality: Individual 1-2 x per month   Completion Date to be determined, Medication Management and The person(s) responsible for carrying out the plan is  Yessy (client); Ynes Gipson (therapist); Dr Caterina Barrett (psychiatrist)      Clinician will use client-centered therapy, mindfulness-based strategies, DBT-informed skills, prolonged exposure, bilateral stimulation and solution-focused therapy to address Christina's symptoms of PTSD and emotion dysregulation  Christina will practice skills between sessions and will report back, during subsequent sessions regarding successes and barriers          [address] [place] [address]: Diagnosis and Treatment Plan explained to Elly Vasquezra relates understanding diagnosis and is agreeable to Treatment Plan        Client Comments : Please share your thoughts, feelings, need and/or experiences regarding your treatment plan: Not at this time  Gentry Nieves, 1970, actively participated in the review and update of this treatment plan during a virtual session, using the [employer] platform     Gentry Nieves  provided verbal consent on 11/4/2022 at 2:47 PM  The treatment plan was transcribed into the [employer] at a later time

## 2022-11-04 NOTE — PSYCH
Virtual Regular Visit    Verification of patient location:    Patient is located in the following state in which I hold an active license [place]    Assessment/Plan:    Problem List Items Addressed This Visit        Other    Post traumatic stress disorder (PTSD) (Chronic)    Generalized anxiety disorder (Chronic)    Recurrent major depressive disorder, in partial remission ([facility] [address] ) - Primary    ADHD (attention deficit hyperactivity disorder), inattentive type        Goals addressed in session: Goal 1      Reason for visit is No chief complaint on file  Encounter provider LALY Au    Provider located at [address] [place] [id number] [phone]    Recent Visits  Date Type Provider Dept   11/04/22 [address], [address] [facility]   Showing recent visits within past 7 days and meeting all other requirements  Future Appointments  No visits were found meeting these conditions  Showing future appointments within next 150 days and meeting all other requirements    The patient was identified by name and date of birth  West Seip was informed that this is a telemedicine visit and that [facility]  She agrees to proceed     My office door was closed  No one else was in the room  She acknowledged consent and understanding of privacy and security of the video platform  The patient has agreed to participate and understands they can discontinue the visit at any time  Patient is aware this is a billable service  Subjective  West Seip is a 47 y o  female  DATA: Met with James Salazar for scheduled individual session  Topics of discussion included relationships with family, work-related stress, education-related stress, physical health concerns, parenting concerns and mood regulation and symptoms   "I dropped out of my class " Keri Abdullahi 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  She states that she was having a difficult time keeping up with the subject  We discussed her work-related stress, recent surgery (bariatric surgery revision), and her future plans  Keri Abdullahi states that her mood has been "in the dumps" lately  She denies suicidal ideation  Keri Abdullahi states that her primary stressor is work  She states that she feels undervalued and feels that she does not have anyone with whom she can confide at work  Keri Abdullahi states that she has been looking for other jobs, within [employer], and 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  We discussed her recent surgical procedure  She states that it went well, and she acknowledges having already lost additional weight  We discussed her mood regulation and her medication adherence  Keri Abdullahi states that she remains adherent with her medications  She does acknowledge that she needed to make some changes to her medication regimen, to prepare for her surgery  I encouraged her to follow up with her psychiatrist regarding her change in mood  We spent some time reviewing and updating her treatment plan  Keri Abdullahi states that she wants to be able to manage her emotions and work and not take things personally  Client shows evidence of utilizing distress tolerance skills skills to manage mental health symptoms  During this session, this clinician used the following therapeutic modalities: supportive psychotherapy, client-centered therapy, mindfulness-based strategies, DBT-informed skills, Motivational Interviewing and solution-focused therapy  ASSESSMENT: Lavell Azul presents with a somewhat dysthymic mood  Her affect is   Lavell Azul exhibits good therapeutic rapport with this clinician  Lavell Azul continues to exhibit willingness to work on treatment goals and objectives  Lavell Azul presents with a minimal risk of suicide, minimal risk of self-harm, and minimal risk of harm to others       PLAN: James Salazar will return in approximately three weeks for the next scheduled session  Between sessions, James Salazar will continue to work on developing her coping skills to deal with interpersonal conflict and work-related stress  She will report back during the next session re: successes and barriers  At the next session, this clinician will use supportive psychotherapy, client-centered therapy, mindfulness-based strategies, DBT-informed skills, Motivational Interviewing and solution-focused therapy to address her mood regulation and relationship concerns, in an effort to assist James Salazar with meeting treatment goals  HPI     Past Medical History:   Diagnosis Date   • ADHD (attention deficit hyperactivity disorder)    • Bulging lumbar disc    • Carpal tunnel syndrome     RIGHT  LAST ASSESSED: 16   • Chronic back pain     low   • Chronic pain disorder    • Colon polyps    • COVID-19     2021   • Diabetes mellitus ([facility] [address] )    • GERD (gastroesophageal reflux disease)    • Gestational diabetes    • Hearing loss     left ear   • Hyperlipidemia     Resolved with weight loss   • Hyponatremia 2020   • IBS (irritable bowel syndrome)    • Ileus ([address] )     LAST ASSESSED: 8/3/17   • Labial cyst     LAST ASSESSED: 16   • Myofascial pain     LAST ASSESSED: 16   • Obesity    • Ovarian cyst     LEFT   LAST ASSESSED: 16   • Panic attack    • Pneumonia    • Sacroiliitis (HCC)    • Seasonal allergies    • SVT (supraventricular tachycardia) (HCC)    • Thoracic outlet syndrome        • Trochanteric bursitis of both hips     LAST ASSESSED: 3/18/16   • Ulnar neuropathy at elbow    • Varicella    • Wears glasses        Past Surgical History:   Procedure Laterality Date   • BILE DUCT EXPLORATION      ENDOSCOPIC REMOVAL OF STONES FROM BILIARY TRACT   •  SECTION      x3   • CHOLECYSTECTOMY     • COLONOSCOPY      2017-polyp, repeat    • DILATION AND CURETTAGE OF UTERUS     • ENDOMETRIAL ABLATION     • ERCP W/ SPHICTEROTOMY     • FIRST RIB REMOVAL      THORAX EXCISION OF FIRST RIB   • HYSTEROSCOPY     • NEUROPLASTY / TRANSPOSITION ULNAR NERVE AT ELBOW Right 2011   • MN COLONOSCOPY FLX DX W/COLLJ SPEC WHEN PFRMD N/A 5/30/2017    Procedure: COLONOSCOPY;  Surgeon: Yoan Bañuelos MD;  [facility]; Service: Gastroenterology   • MN ESOPHAGOGASTRODUODENOSCOPY TRANSORAL DIAGNOSTIC N/A 9/14/2017    Procedure: ESOPHAGOGASTRODUODENOSCOPY (EGD); Surgeon: Juan Antonio Evangelista MD;  [facility]; Service: Gastroenterology   • MN HYSTEROSCOPY,W/ENDO BX N/A 10/20/2017    Procedure: DILATATION AND CURETTAGE (D&C) WITH HYSTEROSCOPY  REMOVAL VULVAR RT  LESION;  Surgeon: Isabell Montague MD;  [facility];  Service: Gynecology   • MN LAP, ÁLVARO RESTRICT PROC, LONGITUDINAL GASTRECTOMY N/A 2/6/2018    Procedure: GASTRECTOMY SLEEVE LAPAROSCOPIC; INTRAOPERATIVE EGD ;  Surgeon: Devonte Estrada MD;  [facility];  Service: Bariatrics   • MN LAP, ÁLVARO RESTRICT [address], LONGITUDINAL GASTRECTOMY N/A 8/8/2022    Procedure: Diagnostic Lap; Extensive Lysis of Adhesions; LAPAROSCOPIC REVISION CONVERSION TO NOE-EN-Y GASTRIC BYPASS AND INTRAOPERATIVE EGD;  Surgeon: Devonte Estrada MD;  [facility];  Service: Bariatrics   • MN SURG IMPLNT Beverly Martins Left 1/29/2020    Procedure: Insertion of thoracic spinal cord stimulator electrode via laminotomy and placement of left buttock implantable pulse generator (NEUROMONITORING);   Surgeon: Merlyn Olszewski, MD;  [facility];  Service: Neurosurgery   • SPINAL CORD STIMULATOR TRIAL W/ LAMINOTOMY     • TONSILLECTOMY AND ADENOIDECTOMY     • TUBAL LIGATION     • UPPER GASTROINTESTINAL ENDOSCOPY     • VEIN LIGATION AND STRIPPING Right    • VULVA SURGERY  10/20/2017    BIOPSY   • WISDOM TOOTH EXTRACTION         Current Outpatient Medications   Medication Sig Dispense Refill   • atoMOXetine (STRATTERA) 60 mg capsule Take 1 capsule (60 mg total) by mouth daily 90 capsule 3   • atorvastatin (LIPITOR) 10 mg tablet TAKE ONE TABLET BY MOUTH DAILY 90 tablet 0   • calcium carbonate (OS-MELODY) 600 MG tablet Take 600 mg by mouth 2 (two) times a day with meals     • cholestyramine (QUESTRAN) 4 g packet Take 1 packet (4 g total) by mouth daily (Patient not taking: No sig reported) 30 each 3   • cyanocobalamin (VITAMIN B-12) 100 mcg tablet Take by mouth daily     • drospirenone-ethinyl estradiol (LIEN) 3-0 02 MG per tablet Take 1 tablet by mouth daily     • lamoTRIgine (LaMICtal) 100 mg tablet Take 1 tablet (100 mg total) by mouth daily 90 tablet 3   • lamoTRIgine (LaMICtal) 150 MG tablet Take 1 tablet (150 mg total) by mouth daily 90 tablet 3   • meclizine (ANTIVERT) 25 mg tablet Take 1 tablet (25 mg total) by mouth every 8 (eight) hours as needed for dizziness 30 tablet 0   • methocarbamol (ROBAXIN) 750 mg tablet Take 1 tablet (750 mg total) by mouth daily at bedtime as needed for muscle spasms 30 tablet 2   • montelukast (SINGULAIR) 10 mg tablet Take 1 tablet (10 mg total) by mouth daily at bedtime 90 tablet 2   • Multiple Vitamins-Minerals (MULTI COMPLETE PO) Take by mouth     • nitrofurantoin (MACROBID) 100 mg capsule Take 1 tablet PO PRN after sexual intercourse 30 capsule 1   • omeprazole (PriLOSEC) 20 mg delayed release capsule Take 1 capsule (20 mg total) by mouth daily 30 capsule 3   • oxyCODONE (Roxicodone) 5 immediate release tablet Take 1 tablet (5 mg total) by mouth every 4 (four) hours as needed for moderate pain Max Daily Amount: 30 mg (Patient not taking: No sig reported) 10 tablet 0   • propranolol (INDERAL LA) 60 mg 24 hr capsule Take 1 capsule (60 mg total) by mouth 2 (two) times a day (Patient not taking: No sig reported) 180 capsule 2   • QUEtiapine (SEROquel) 50 mg tablet Take 1 5 tablets (75 mg total) by mouth daily at bedtime 135 tablet 3   • Semaglutide,0 25 or 0 5MG/DOS, 2 MG/1 5ML SOPN Inject 0 5 mg under the skin once a week 1 5 mL 3   • venlafaxine (EFFEXOR) 100 MG tablet Take 1 tablet (100 mg total) by mouth 2 (two) times a day Take 1 tablet (100mg) by mouth twice daily with 37 mg tablet for total of 275mg per day  180 tablet 3   • venlafaxine (EFFEXOR) 37 5 mg tablet Take 1 tablet (37 5 mg total) by mouth 2 (two) times a day Take 1 tablet (37 5mg) by mouth twice daily with 100mg tablet for total of 275mg per day  180 tablet 3     No current facility-administered medications for this visit  Allergies   Allergen Reactions   • Ibuprofen Other (See Comments)     Due to gastric sleeve -can only take for 5 days, then needs to stop       Review of Systems    Video Exam    There were no vitals filed for this visit      Physical Exam     Visit Time    11/04/22  Start Time: 2874  Stop Time: 9137  Total Visit Time: 48 minutes

## 2022-11-07 ENCOUNTER — TELEPHONE (OUTPATIENT)
Dept: FAMILY MEDICINE CLINIC | Facility: CLINIC | Age: 52
End: 2022-11-07

## 2022-11-07 NOTE — TELEPHONE ENCOUNTER
Patient called and was asking if she could get samples of ozempric until she get the prior authorization form her insurance  Please advise

## 2022-11-08 ENCOUNTER — APPOINTMENT (EMERGENCY)
Dept: RADIOLOGY | Facility: HOSPITAL | Age: 52
End: 2022-11-08

## 2022-11-08 ENCOUNTER — HOSPITAL ENCOUNTER (EMERGENCY)
Facility: HOSPITAL | Age: 52
Discharge: HOME | End: 2022-11-08
Attending: EMERGENCY MEDICINE

## 2022-11-08 VITALS
SYSTOLIC BLOOD PRESSURE: 112 MMHG | WEIGHT: 152.12 LBS | TEMPERATURE: 97.7 F | HEART RATE: 66 BPM | RESPIRATION RATE: 16 BRPM | DIASTOLIC BLOOD PRESSURE: 57 MMHG | OXYGEN SATURATION: 99 % | BODY MASS INDEX: 24.55 KG/M2

## 2022-11-08 DIAGNOSIS — N39.0 UTI (URINARY TRACT INFECTION): ICD-10-CM

## 2022-11-08 DIAGNOSIS — Z30.41 ORAL CONTRACEPTIVE PILL SURVEILLANCE: Primary | ICD-10-CM

## 2022-11-08 DIAGNOSIS — I95.9 HYPOTENSION: Primary | ICD-10-CM

## 2022-11-08 LAB
ALBUMIN SERPL BCP-MCNC: 4.1 G/DL (ref 3.5–5)
ALP SERPL-CCNC: 66 U/L (ref 34–104)
ALT SERPL W P-5'-P-CCNC: 32 U/L (ref 7–52)
ANION GAP SERPL CALCULATED.3IONS-SCNC: 9 MMOL/L (ref 4–13)
AST SERPL W P-5'-P-CCNC: 31 U/L (ref 13–39)
BACTERIA UR QL AUTO: ABNORMAL /HPF
BASOPHILS # BLD AUTO: 0.01 THOUSANDS/ÂΜL (ref 0–0.1)
BASOPHILS NFR BLD AUTO: 0 % (ref 0–1)
BILIRUB SERPL-MCNC: 0.32 MG/DL (ref 0.2–1)
BILIRUB UR QL STRIP: NEGATIVE
BUN SERPL-MCNC: 16 MG/DL (ref 5–25)
CALCIUM SERPL-MCNC: 9.7 MG/DL (ref 8.4–10.2)
CAOX CRY URNS QL MICRO: ABNORMAL /HPF
CARDIAC TROPONIN I PNL SERPL HS: <2 NG/L
CHLORIDE SERPL-SCNC: 106 MMOL/L (ref 96–108)
CLARITY UR: CLEAR
CO2 SERPL-SCNC: 27 MMOL/L (ref 21–32)
COLOR UR: YELLOW
CREAT SERPL-MCNC: 0.83 MG/DL (ref 0.6–1.3)
EOSINOPHIL # BLD AUTO: 0.02 THOUSAND/ÂΜL (ref 0–0.61)
EOSINOPHIL NFR BLD AUTO: 0 % (ref 0–6)
ERYTHROCYTE [DISTWIDTH] IN BLOOD BY AUTOMATED COUNT: 13.3 % (ref 11.6–15.1)
FLUAV RNA RESP QL NAA+PROBE: NEGATIVE
FLUBV RNA RESP QL NAA+PROBE: NEGATIVE
GFR SERPL CREATININE-BSD FRML MDRD: 80 ML/MIN/1.73SQ M
GLUCOSE SERPL-MCNC: 82 MG/DL (ref 65–140)
GLUCOSE UR STRIP-MCNC: NEGATIVE MG/DL
HCT VFR BLD AUTO: 41.2 % (ref 34.8–46.1)
HGB BLD-MCNC: 13.3 G/DL (ref 11.5–15.4)
HGB UR QL STRIP.AUTO: NEGATIVE
HYALINE CASTS #/AREA URNS LPF: ABNORMAL /LPF
IMM GRANULOCYTES # BLD AUTO: 0.02 THOUSAND/UL (ref 0–0.2)
IMM GRANULOCYTES NFR BLD AUTO: 0 % (ref 0–2)
KETONES UR STRIP-MCNC: ABNORMAL MG/DL
LEUKOCYTE ESTERASE UR QL STRIP: ABNORMAL
LYMPHOCYTES # BLD AUTO: 2.16 THOUSANDS/ÂΜL (ref 0.6–4.47)
LYMPHOCYTES NFR BLD AUTO: 33 % (ref 14–44)
MCH RBC QN AUTO: 29.2 PG (ref 26.8–34.3)
MCHC RBC AUTO-ENTMCNC: 32.3 G/DL (ref 31.4–37.4)
MCV RBC AUTO: 90 FL (ref 82–98)
MONOCYTES # BLD AUTO: 0.49 THOUSAND/ÂΜL (ref 0.17–1.22)
MONOCYTES NFR BLD AUTO: 7 % (ref 4–12)
MUCOUS THREADS UR QL AUTO: ABNORMAL
NEUTROPHILS # BLD AUTO: 3.88 THOUSANDS/ÂΜL (ref 1.85–7.62)
NEUTS SEG NFR BLD AUTO: 60 % (ref 43–75)
NITRITE UR QL STRIP: POSITIVE
NON-SQ EPI CELLS URNS QL MICRO: ABNORMAL /HPF
NRBC BLD AUTO-RTO: 0 /100 WBCS
PH UR STRIP.AUTO: 6 [PH]
PLATELET # BLD AUTO: 266 THOUSANDS/UL (ref 149–390)
PMV BLD AUTO: 10.6 FL (ref 8.9–12.7)
POTASSIUM SERPL-SCNC: 3.6 MMOL/L (ref 3.5–5.3)
PROT SERPL-MCNC: 7.3 G/DL (ref 6.4–8.4)
PROT UR STRIP-MCNC: ABNORMAL MG/DL
RBC # BLD AUTO: 4.56 MILLION/UL (ref 3.81–5.12)
RBC #/AREA URNS AUTO: ABNORMAL /HPF
RSV RNA RESP QL NAA+PROBE: NEGATIVE
SARS-COV-2 RNA RESP QL NAA+PROBE: NEGATIVE
SODIUM SERPL-SCNC: 142 MMOL/L (ref 135–147)
SP GR UR STRIP.AUTO: 1.03 (ref 1–1.03)
UROBILINOGEN UR STRIP-ACNC: <2 MG/DL
WBC # BLD AUTO: 6.58 THOUSAND/UL (ref 4.31–10.16)
WBC #/AREA URNS AUTO: ABNORMAL /HPF

## 2022-11-08 RX ORDER — CEFTRIAXONE 1 G/50ML
1000 INJECTION, SOLUTION INTRAVENOUS ONCE
Status: COMPLETED | OUTPATIENT
Start: 2022-11-08 | End: 2022-11-08

## 2022-11-08 RX ORDER — DROSPIRENONE AND ETHINYL ESTRADIOL 0.02-3(28)
1 KIT ORAL DAILY
Qty: 28 TABLET | Refills: 0 | Status: SHIPPED | OUTPATIENT
Start: 2022-11-08

## 2022-11-08 RX ORDER — CEFUROXIME AXETIL 500 MG/1
500 TABLET ORAL EVERY 12 HOURS SCHEDULED
Qty: 14 TABLET | Refills: 0 | Status: SHIPPED | OUTPATIENT
Start: 2022-11-08 | End: 2022-11-15

## 2022-11-08 RX ADMIN — SODIUM CHLORIDE 1000 ML: 0.9 INJECTION, SOLUTION INTRAVENOUS at 17:49

## 2022-11-08 RX ADMIN — CEFTRIAXONE 1000 MG: 1 INJECTION, SOLUTION INTRAVENOUS at 18:07

## 2022-11-08 RX ADMIN — SODIUM CHLORIDE, SODIUM LACTATE, POTASSIUM CHLORIDE, AND CALCIUM CHLORIDE 1000 ML: .6; .31; .03; .02 INJECTION, SOLUTION INTRAVENOUS at 14:35

## 2022-11-08 NOTE — ED PROVIDER NOTES
History  Chief Complaint   Patient presents with   • Hypotension     Pt has been having episodes where she feels weak and vision goes "black" took bp and was 73 systolic     This is a [id number] female with a relevant past medical history of hypertension, hyperlipidemia, PSVT, status post Rebecca-en-Y gastric bypass, presenting to the ED today for complaint of hypotension  Patient states that she took her blood pressure in the setting of changing positions, and her systolic blood pressure was in the 70s  Patient has been feeling weak over the past 24 hours, has felt unsteady when walking, especially when she 1st starts to walk  She also has been complaining of some tunnel vision when she gets these dizzy spells  She denies any chest pain pressure discomfort, nausea, vomiting, abdominal pain, diarrhea, has been constipated recently  She complains of a cough, as well as some congestion  Prior to Admission Medications   Prescriptions Last Dose Informant Patient Reported? Taking?    Multiple Vitamins-Minerals (MULTI COMPLETE PO)  Self Yes No   Sig: Take by mouth   QUEtiapine (SEROquel) 50 mg tablet  Self No No   Sig: Take 1 5 tablets (75 mg total) by mouth daily at bedtime   Semaglutide,0 25 or 0 5MG/DOS, 2 MG/1 5ML SOPN   No No   Sig: Inject 0 5 mg under the skin once a week   atoMOXetine (STRATTERA) 60 mg capsule  Self No No   Sig: Take 1 capsule (60 mg total) by mouth daily   atorvastatin (LIPITOR) 10 mg tablet  Self No No   Sig: TAKE ONE TABLET BY MOUTH DAILY   calcium carbonate (OS-MELODY) 600 MG tablet  Self Yes No   Sig: Take 600 mg by mouth 2 (two) times a day with meals   cholestyramine (QUESTRAN) 4 g packet  Self No No   Sig: Take 1 packet (4 g total) by mouth daily   Patient not taking: No sig reported   cyanocobalamin (VITAMIN B-12) 100 mcg tablet  Self Yes No   Sig: Take by mouth daily   drospirenone-ethinyl estradiol (LIEN) 3-0 02 MG per tablet  Self Yes No   Sig: Take 1 tablet by mouth daily lamoTRIgine (LaMICtal) 100 mg tablet  Self No No   Sig: Take 1 tablet (100 mg total) by mouth daily   lamoTRIgine (LaMICtal) 150 MG tablet  Self No No   Sig: Take 1 tablet (150 mg total) by mouth daily   meclizine (ANTIVERT) 25 mg tablet  Self No No   Sig: Take 1 tablet (25 mg total) by mouth every 8 (eight) hours as needed for dizziness   methocarbamol (ROBAXIN) 750 mg tablet  Self No No   Sig: Take 1 tablet (750 mg total) by mouth daily at bedtime as needed for muscle spasms   montelukast (SINGULAIR) 10 mg tablet  Self No No   Sig: Take 1 tablet (10 mg total) by mouth daily at bedtime   nitrofurantoin (MACROBID) 100 mg capsule   No No   Sig: Take 1 tablet PO PRN after sexual intercourse   omeprazole (PriLOSEC) 20 mg delayed release capsule  Self No No   Sig: Take 1 capsule (20 mg total) by mouth daily   oxyCODONE (Roxicodone) 5 immediate release tablet  Self No No   Sig: Take 1 tablet (5 mg total) by mouth every 4 (four) hours as needed for moderate pain Max Daily Amount: 30 mg   Patient not taking: No sig reported   propranolol (INDERAL LA) 60 mg 24 hr capsule  Self No No   Sig: Take 1 capsule (60 mg total) by mouth 2 (two) times a day   Patient not taking: No sig reported   venlafaxine (EFFEXOR) 100 MG tablet  Self No No   Sig: Take 1 tablet (100 mg total) by mouth 2 (two) times a day Take 1 tablet (100mg) by mouth twice daily with 37 mg tablet for total of 275mg per day  venlafaxine (EFFEXOR) 37 5 mg tablet  Self No No   Sig: Take 1 tablet (37 5 mg total) by mouth 2 (two) times a day Take 1 tablet (37 5mg) by mouth twice daily with 100mg tablet for total of 275mg per day  Facility-Administered Medications: None       Past Medical History:   Diagnosis Date   • ADHD (attention deficit hyperactivity disorder)    • Bulging lumbar disc    • Carpal tunnel syndrome     RIGHT    LAST ASSESSED: 12/7/16   • Chronic back pain     low   • Chronic pain disorder    • Colon polyps    • COVID-19     12/2021   • Diabetes mellitus ([facility] )    • GERD (gastroesophageal reflux disease)    • Gestational diabetes    • Hearing loss     left ear   • Hyperlipidemia     Resolved with weight loss   • Hyponatremia 2020   • IBS (irritable bowel syndrome)    • Ileus ([facility] [address] )     LAST ASSESSED: 8/3/17   • Labial cyst     LAST ASSESSED: 16   • Myofascial pain     LAST ASSESSED: 16   • Obesity    • Ovarian cyst     LEFT  LAST ASSESSED: 16   • Panic attack    • Pneumonia    • Sacroiliitis ([facility])    • Seasonal allergies    • SVT (supraventricular tachycardia) ([facility])    • Thoracic outlet syndrome        • Trochanteric bursitis of both hips     LAST ASSESSED: 3/18/16   • Ulnar neuropathy at elbow    • Varicella    • Wears glasses        Past Surgical History:   Procedure Laterality Date   • BILE DUCT EXPLORATION      ENDOSCOPIC REMOVAL OF STONES FROM BILIARY TRACT   •  SECTION      x3   • CHOLECYSTECTOMY     • COLONOSCOPY      2017-polyp, repeat    • DILATION AND CURETTAGE OF UTERUS     • ENDOMETRIAL ABLATION     • ERCP W/ SPHICTEROTOMY     • FIRST RIB REMOVAL      THORAX EXCISION OF FIRST RIB   • HYSTEROSCOPY     • NEUROPLASTY / TRANSPOSITION ULNAR NERVE AT ELBOW Right 2011   • [place] COLONOSCOPY FLX DX W/COLLJ SPEC WHEN PFRMD N/A 2017    Procedure: COLONOSCOPY;  Surgeon: Thomas Garcia MD;  [facility]; Service: Gastroenterology   • AZ ESOPHAGOGASTRODUODENOSCOPY TRANSORAL DIAGNOSTIC N/A 2017    Procedure: ESOPHAGOGASTRODUODENOSCOPY (EGD); Surgeon: Di Peacock MD;  [facility];   Service: Gastroenterology   • AZ HYSTEROSCOPY,W/ENDO BX N/A 10/20/2017    Procedure: DILATATION AND CURETTAGE (D&C) WITH HYSTEROSCOPY  REMOVAL VULVAR RT  LESION;  Surgeon: Estee Mcmullen MD;  [facility];  Service: Gynecology   • AZ LAP, ÁLVARO RESTRICT PROC, LONGITUDINAL GASTRECTOMY N/A 2018    Procedure: GASTRECTOMY SLEEVE LAPAROSCOPIC; INTRAOPERATIVE EGD ;  Surgeon: Tye Anderson MD;  [facility];  Service: Bariatrics   • [place] LAP, ÁLVARO RESTRICT PROC, LONGITUDINAL GASTRECTOMY N/A 8/8/2022    Procedure: Diagnostic Lap; Extensive Lysis of Adhesions; LAPAROSCOPIC REVISION CONVERSION TO NOE-EN-Y GASTRIC BYPASS AND INTRAOPERATIVE EGD;  Surgeon: Char Gallagher MD;  [facility];  Service: Bariatrics   • [place] SURG ELISA Estrada Left 1/29/2020    Procedure: Insertion of thoracic spinal cord stimulator electrode via laminotomy and placement of left buttock implantable pulse generator (NEUROMONITORING); Surgeon: Anabela Francois MD;  [facility];  Service: Neurosurgery   • SPINAL CORD STIMULATOR TRIAL W/ LAMINOTOMY     • TONSILLECTOMY AND ADENOIDECTOMY     • TUBAL LIGATION     • UPPER GASTROINTESTINAL ENDOSCOPY     • VEIN LIGATION AND STRIPPING Right    • VULVA SURGERY  10/20/2017    BIOPSY   • WISDOM TOOTH EXTRACTION         Family History   Problem Relation Age of Onset   • Diabetes Mother    • Breast cancer Mother         >50   • BRCA1 Negative Mother    • BRCA2 Negative Mother    • Hyperlipidemia Mother         HYPERCHOLESTEROLEMIA   • Diabetes Father    • Other Father         traumatic brain injury   • Prostate cancer Father    • Alcohol abuse Father         in remission   • Heart disease Father    • Neuropathy Father    • Hyperlipidemia Father    • Hypertension Brother    • Diabetes Brother    • Other Brother         HYPERCHOLESTEROLEMIA   • Alcohol abuse Brother    • Depression Brother         attempted suicide   • Colon cancer Maternal Grandfather    • Heart attack Maternal Grandmother    • No Known Problems Paternal Grandmother         dad is adopted   • No Known Problems Paternal Grandfather         dad is adopted   • Diabetes Brother    • Alcohol abuse Brother    • Asthma Son    • No Known Problems Daughter    • Ovarian cancer Neg Hx    • Uterine cancer Neg Hx      I have reviewed and agree with the history as documented      E-Cigarette/Vaping   • E-Cigarette Use Never User E-Cigarette/Vaping Substances   • Nicotine No    • THC No    • CBD No    • Flavoring No    • Other No    • Unknown No      Social History     Tobacco Use   • Smoking status: Former Smoker     Quit date: 2013     Years since quittin 5   • Smokeless tobacco: Never Used   Vaping Use   • Vaping Use: Never used   Substance Use Topics   • Alcohol use: Not Currently     Alcohol/week: 0 0 - 2 0 standard drinks     Comment: Occs -twice monthly   • Drug use: No       Review of Systems   Constitutional: Negative for activity change, chills and fever  HENT: Positive for congestion and sore throat  Negative for rhinorrhea  Eyes: Negative for photophobia and visual disturbance  Respiratory: Positive for cough  Negative for chest tightness and shortness of breath  Cardiovascular: Negative for chest pain and leg swelling  Gastrointestinal: Negative for abdominal distention, nausea and vomiting  Genitourinary: Negative for dysuria and frequency  Musculoskeletal: Negative for back pain and neck stiffness  Skin: Negative for rash and wound  Neurological: Positive for light-headedness  Negative for dizziness, weakness and numbness  Psychiatric/Behavioral: Negative for agitation and suicidal ideas  Physical Exam  Physical Exam  Vitals and nursing note reviewed  Constitutional:       General: She is not in acute distress  Appearance: Normal appearance  She is normal weight  She is not ill-appearing  HENT:      Head: Normocephalic and atraumatic  Right Ear: External ear normal       Left Ear: External ear normal       Nose: Nose normal  No congestion or rhinorrhea  Mouth/Throat:      Mouth: Mucous membranes are moist       Pharynx: Oropharynx is clear  No oropharyngeal exudate  Eyes:      General: No scleral icterus  Right eye: No discharge  Left eye: No discharge  Extraocular Movements: Extraocular movements intact        Conjunctiva/sclera: Conjunctivae normal       Pupils: Pupils are equal, round, and reactive to light  Comments: Patient wearing glasses   Cardiovascular:      Rate and Rhythm: Normal rate and regular rhythm  Pulses: Normal pulses  Heart sounds: Normal heart sounds  No murmur heard  No gallop  Pulmonary:      Effort: Pulmonary effort is normal  No respiratory distress  Breath sounds: Normal breath sounds  No stridor  No wheezing, rhonchi or rales  Abdominal:      General: Abdomen is flat  Bowel sounds are normal  There is no distension  Palpations: Abdomen is soft  There is no mass  Tenderness: There is no abdominal tenderness  There is left CVA tenderness  There is no right CVA tenderness  Musculoskeletal:         General: No swelling or tenderness  Normal range of motion  Cervical back: Normal range of motion and neck supple  No rigidity or tenderness  Right lower leg: No edema  Left lower leg: No edema  Skin:     General: Skin is warm and dry  Capillary Refill: Capillary refill takes less than 2 seconds  Coloration: Skin is not jaundiced  Findings: No bruising  Neurological:      General: No focal deficit present  Mental Status: She is alert and oriented to person, place, and time  Mental status is at baseline  Sensory: No sensory deficit  Motor: No weakness  Psychiatric:         Mood and Affect: Mood normal          Behavior: Behavior normal          Thought Content:  Thought content normal          Judgment: Judgment normal          Vital Signs  ED Triage Vitals   Temperature Pulse Respirations Blood Pressure SpO2   11/08/22 1414 11/08/22 1411 11/08/22 1411 11/08/22 1411 11/08/22 1411   97 7 °F (36 5 °C) 69 20 118/64 100 %      Temp Source Heart Rate Source Patient Position - Orthostatic VS BP Location FiO2 (%)   11/08/22 1414 11/08/22 1411 11/08/22 1411 11/08/22 1411 --   Oral Monitor Sitting Right arm       Pain Score       --                  Vitals: 11/08/22 1411   BP: 118/64   Pulse: 69   Patient Position - Orthostatic VS: Sitting         Visual Acuity      ED Medications  Medications - No data to display    Diagnostic Studies  Results Reviewed     None                 No orders to display              Procedures  Procedures         ED Course                                             MDM  Number of Diagnoses or Management Options  Hypotension  UTI (urinary tract infection)  Diagnosis management comments: This is a 28-year-old female presenting to the ED today for complaint of hypertension  Patient's hypotension was in the setting of standing up from a seated position  She denies any other significantly related symptoms  On exam she does have CVA tenderness  Her differential diagnosis includes:  Urinary tract infection versus pneumonia versus volume depletion versus other  Patient had blood work which for the most part was unremarkable  She was hypotensive here in the ED, and significantly orthostatic, she received 2 L of IV fluids, and was found to have a urinary tract infection on analysis of her urine  She received Rocephin while here in the ED, and was discharged home in stable condition with strict return precautions with which she agreed to comply  Disposition  Final diagnoses:   None     ED Disposition     None      Follow-up Information    None         Patient's Medications   Discharge Prescriptions    No medications on file       No discharge procedures on file      PDMP Review       Value Time User    PDMP Reviewed  Yes 4/6/2022  3:10 PM Mauricio Noble MD          ED Provider  Electronically Signed by           Sarah Palmer MD  11/11/22 0875

## 2022-11-08 NOTE — DISCHARGE INSTRUCTIONS
You were seen in the ED for low blood pressure  You had blood work showing no significant concerning signs, urine studies showing evidence for a urinary tract infection  You were diagnosed with a urinary tract infection  You have been discharged with Ceftin to be taken twice daily for the next 7 days  Please follow up with your primary care physician within the next 1 week for continued management of your conditions  Please come back to the ED if you develop loss of consciousness, lightheadedness, chest pain shortness of breath, uncontrollable pain nausea or vomiting  Thank you very much for utilizing the ED this evening

## 2022-11-09 LAB
ATRIAL RATE: 71 BPM
P AXIS: 73 DEGREES
PR INTERVAL: 188 MS
QRS AXIS: 137 DEGREES
QRSD INTERVAL: 84 MS
QT INTERVAL: 390 MS
QTC INTERVAL: 423 MS
T WAVE AXIS: 58 DEGREES
VENTRICULAR RATE: 71 BPM

## 2022-11-10 LAB — BACTERIA UR CULT: ABNORMAL

## 2022-11-14 ENCOUNTER — TELEPHONE (OUTPATIENT)
Dept: UROLOGY | Facility: CLINIC | Age: 52
End: 2022-11-14

## 2022-11-14 ENCOUNTER — TELEPHONE (OUTPATIENT)
Dept: BARIATRICS | Facility: CLINIC | Age: 52
End: 2022-11-14

## 2022-11-14 NOTE — TELEPHONE ENCOUNTER
I called and spoke to patient  She will call back after work to schedule an  hospital follow up with us  Please see below conversation

## 2022-11-14 NOTE — TELEPHONE ENCOUNTER
Received call from Pt re: low blood pressure  Pt stated that she has been having episodes of extremely low blood pressure and fainting  Pt reported reaching out to PCP and was told to go to the ED  The ED was unable to develop a diagnosis  Pt requested appt with DELANONP as she has lost 50 pounds since August and is wondering if this is a contributing factor  Scheduled appt with CRNP on 11/16  Pt verbalized understanding and was in agreement with plan

## 2022-11-14 NOTE — TELEPHONE ENCOUNTER
----- Message from Niki Eden sent at 11/11/2022  5:21 PM EST -----  Regarding: FW: Hospital visit   Please assist in scheduling hospital f/u per dylan  ----- Message -----  From: Alfonso Richey PA-C  Sent: 11/11/2022   2:20 PM EST  To: [facility]  Subject: FW: Hospital visit                               She can do a hospital follow-up in the office with 1 of us to check her urine and PVR at that time  ----- Message -----  From: Niki Eden  Sent: 11/11/2022   1:23 PM EST  To: [facility] Provider  Subject: FW: Hospital visit                               Saw Bulmaro Ofelia on 10/28/22  Does patient need to come in for PVR to see if shes emptying completely? Unsure what she asking, or repeat urine?  ----- Message -----  From: Ines Segundo  Sent: 11/9/2022  10:59 AM EST  To: [facility]  Subject: Hospital visit                                   [address]    I was seen in the ED last night for low blood pressure  Come to find out I have a UTI again which I had no symptoms of just dark urine  They gave me Iv antibiotics  They told me to follow up with you  They said to check to see if my bladder isn’t flowing right (I can’t remember the name that’s called) I’m not sure if I need more antibiotics or not

## 2022-11-16 ENCOUNTER — OFFICE VISIT (OUTPATIENT)
Dept: BARIATRICS | Facility: CLINIC | Age: 52
End: 2022-11-16

## 2022-11-16 VITALS
BODY MASS INDEX: 23.63 KG/M2 | SYSTOLIC BLOOD PRESSURE: 90 MMHG | HEART RATE: 92 BPM | TEMPERATURE: 97.5 F | DIASTOLIC BLOOD PRESSURE: 60 MMHG | WEIGHT: 147 LBS | HEIGHT: 66 IN

## 2022-11-16 DIAGNOSIS — Z98.84 BARIATRIC SURGERY STATUS: ICD-10-CM

## 2022-11-16 DIAGNOSIS — E66.9 OBESITY, CLASS I, BMI 30-34.9: ICD-10-CM

## 2022-11-16 DIAGNOSIS — I95.9 HYPOTENSION: ICD-10-CM

## 2022-11-16 DIAGNOSIS — K91.2 POSTGASTRECTOMY MALABSORPTION: ICD-10-CM

## 2022-11-16 DIAGNOSIS — R42 DIZZINESS: ICD-10-CM

## 2022-11-16 DIAGNOSIS — Z90.3 POSTGASTRECTOMY MALABSORPTION: ICD-10-CM

## 2022-11-16 DIAGNOSIS — K91.2 POSTSURGICAL MALABSORPTION: ICD-10-CM

## 2022-11-16 DIAGNOSIS — Z48.815 ENCOUNTER FOR SURGICAL AFTERCARE FOLLOWING SURGERY OF DIGESTIVE SYSTEM: Primary | ICD-10-CM

## 2022-11-16 RX ORDER — OMEPRAZOLE 20 MG/1
20 CAPSULE, DELAYED RELEASE ORAL DAILY
Qty: 90 CAPSULE | Refills: 1 | Status: SHIPPED | OUTPATIENT
Start: 2022-11-16

## 2022-11-16 NOTE — PROGRESS NOTES
Assessment/Plan:     Patient ID: Cindy Isidro is a 47 y o  female  Bariatric Surgery Status    -s/p Rebecca-En-Y Gastric Bypass with Dr Susan Soliz on 08/08/2022  Presents to the office today for 2nd post op visit with complaints of dizziness, hypotension with syncopal episodes  She was recently in the ER on 11/08/2022 for low BP - systolic 48E  She has seen her PCP after her surgery however she reports there has not been any medication adjustment  She has been on propanolol 60 mg PO BID and ozempic injection  She recently stopped her injections 2 weeks ago  She is down 72 5 lbs since starting our program  Does report drinking approximately 51 oz of fluids per day  Does eat her meals and mainly focuses on proteins first  She has not checked her blood sugar after surgery  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  Overall tolerating a regular diet  Denies any abdominal pain, N/V/C/D, regurgitation, reflux or dysphagia  Taking her multivitamins and PPI daily  PLAN:     - Recommended patient to f/u with her PCP asap as she may potentially need medication adjustments to propanolol  Recommend to also follow up with PCP as she should also stop her ozempic  With further weight loss, there is anticipation of better blood sugar control  She denies having any current heart palpitations  Has associated symptoms of low blood pressure such as dizziness and generalized weakness  - In the meantime, monitor BP and blood sugar at home  Wear compression leggings at work, add small amount of salt to diet to help with blood pressure  Will schedule patient for IV hydration at infusion center in the meantime, once a week to help with hydration status  - Routine follow up in 2 weeks to reevaluate her symptoms  Follow up in 3 months for her 3rd post op visit  - continue with PPI for now     - Continue with healthy lifestyle, adequate protein intake of 60 gm, fluid intake of at least 64 oz  - Continue with MVI daily  - Activity as tolerated  - Labs ordered and will adjust accordingly if any deficiency  - Follow up with RD and SW as needed  · Continued/Maintain healthy weight loss with good nutrition intakes  · Adequate hydration with at least 64oz  fluid intake  · Follow diet as discussed  · Follow vitamin and mineral recommendations as reviewed with you  · Exercise as tolerated  · Colonoscopy referral made: UTD  · Mammogram - UTD    · Follow-up in 2 weeks for monitor symptoms  Follow up in 3 months for 3rd post op visit  We kindly ask that your arrive 15 minutes before your scheduled appointment time with your provider to allow our staff to room you, get your vital signs and update your chart  · Get lab work done  Please call the office if you need a script  It is recommended to check with your insurance BEFORE getting labs done to make sure they are covered by your policy  · Call our office if you have any problems with abdominal pain especially associated with fever, chills, nausea, vomiting or any other concerns  · All  Post-bariatric surgery patients should be aware that very small quantities of any alcohol can cause impairment and it is very possible not to feel the effect  The effect can be in the system for several hours  It is also a stomach irritant  · It is advised to AVOID alcohol, Nonsteroidal antiinflammatory drugs (NSAIDS) and nicotine of all forms   Any of these can cause stomach irritation/pain  · Discussed the effects of alcohol on a bariatric patient and the increased impairment risk  · Keep up the good work!      Postsurgical Malabsorption   -At risk for malabsorption of vitamins/minerals secondary to malabsorption and restriction of intake from bariatric surgery  -Currently taking adequate postop bariatric surgery vitamin supplementation  -Next set of bariatric labs ordered for approximately 2 weeks  -Patient received education about the importance of adhering to a lifelong supplementation regimen to avoid vitamin/mineral deficiencies      Diagnoses and all orders for this visit:    Encounter for surgical aftercare following surgery of digestive system  -     Zinc; Future  -     Vitamin D 25 hydroxy; Future  -     Vitamin B12; Future  -     Vitamin B1, whole blood; Future  -     Vitamin A; Future  -     PTH, intact; Future  -     CBC and Platelet; Future  -     Comprehensive metabolic panel; Future  -     Folate; Future  -     Iron Saturation %; Future  -     omeprazole (PriLOSEC) 20 mg delayed release capsule; Take 1 capsule (20 mg total) by mouth daily    Bariatric surgery status  -     Zinc; Future  -     Vitamin D 25 hydroxy; Future  -     Vitamin B12; Future  -     Vitamin B1, whole blood; Future  -     Vitamin A; Future  -     PTH, intact; Future  -     CBC and Platelet; Future  -     Comprehensive metabolic panel; Future  -     Folate; Future  -     Iron Saturation %; Future  -     omeprazole (PriLOSEC) 20 mg delayed release capsule; Take 1 capsule (20 mg total) by mouth daily    Postgastrectomy malabsorption    BMI 23 0-23 9, adult  -     Zinc; Future  -     Vitamin D 25 hydroxy; Future  -     Vitamin B12; Future  -     Vitamin B1, whole blood; Future  -     Vitamin A; Future  -     PTH, intact; Future  -     CBC and Platelet; Future  -     Comprehensive metabolic panel; Future  -     Folate; Future  -     Iron Saturation %; Future    Dizziness  -     Zinc; Future  -     Vitamin D 25 hydroxy; Future  -     Vitamin B12; Future  -     Vitamin B1, whole blood; Future  -     Vitamin A; Future  -     PTH, intact; Future  -     CBC and Platelet; Future  -     Comprehensive metabolic panel; Future  -     Folate; Future  -     Iron Saturation %; Future    Postsurgical malabsorption  -     Zinc; Future  -     Vitamin D 25 hydroxy; Future  -     Vitamin B12; Future  -     Vitamin B1, whole blood;  Future  -     Vitamin A; Future  - PTH, intact; Future  -     CBC and Platelet; Future  -     Comprehensive metabolic panel; Future  -     Folate; Future  -     Iron Saturation %; Future    Obesity, Class I, BMI 30-34 9    Hypotension    Other orders  -     alteplase (CATHFLO) injection 2 mg  -     thiamine 100 mg in sodium chloride 0 9 % 1,000 mL IV         Subjective:      Patient ID: Daron Leyva is a 47 y o  female  -s/p Rebecca-En-Y Gastric Bypass with Dr Sebastián Goncalves on 08/08/2022  Presents to the office today for 2nd post op visit with complaints of dizziness, hypotension with syncopal episodes  She was recently in the ER on 11/08/2022 for low BP - systolic 24Z  She has seen her PCP after her surgery however she reports there has not been any medication adjustment  She has been on propanolol 60 mg PO BID and ozempic injection  She recently stopped her injections 2 weeks ago  She is down 72 5 lbs since starting our program  Does report drinking approximately 51 oz of fluids per day  Does eat her meals and mainly focuses on proteins first  She has not checked her blood sugar after surgery  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[employer], 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  Overall tolerating a regular diet  Denies any abdominal pain, N/V/C/D, regurgitation, reflux or dysphagia  Taking her multivitamins and PPI daily  Initial: 219 lbs   Current: 147 lbs  EWL: (Weight loss is ahead of schedule at this post surgical period )  Bautista: current  Current BMI is Body mass index is 23 73 kg/m²  · Tolerating a regular diet-yes  · Eating at least 60 grams of protein per day-yes  · Following 30/60 minute rule with liquids-yes  · Drinking at least 64 ounces of fluid per day-no - drinking approximately 3 bottles of water  Advised to try 30/30 or 30/45 to help increase her fluid intake to help alleviate her symptoms  · Drinking carbonated beverages- no  · Sufficient exercise-no - walking and active at work     · Using NSAIDs regularly-no  · Using nicotine-no  · Using alcohol-no  · Supplements: Multivitamins and Calcium  - chewable iron and b12  · EWL is 113%, which places the patient ahead of schedule for expected post surgical weight loss at this time  The following portions of the patient's history were reviewed and updated as appropriate: allergies, current medications, past family history, past medical history, past social history, past surgical history and problem list     Review of Systems   Constitutional: Positive for fatigue  Respiratory: Positive for shortness of breath  Cardiovascular: Negative  Gastrointestinal:        Denies heartburn     Musculoskeletal: Positive for back pain (CHRONIC )  Neurological: Positive for dizziness, weakness and light-headedness  Psychiatric/Behavioral: Negative  Objective:    BP 90/60   Pulse 92   Temp 97 5 °F (36 4 °C) (Tympanic)   Ht 5' 6" (1 676 m)   Wt 66 7 kg (147 lb)   LMP 01/07/2019 (Approximate)   BMI 23 73 kg/m²      Physical Exam  Vitals and nursing note reviewed  Constitutional:       Appearance: Normal appearance  She is normal weight  Cardiovascular:      Rate and Rhythm: Normal rate and regular rhythm  Pulses: Normal pulses  Heart sounds: Normal heart sounds  Pulmonary:      Effort: Pulmonary effort is normal       Breath sounds: Normal breath sounds  Abdominal:      General: Bowel sounds are normal       Palpations: Abdomen is soft  Tenderness: There is no abdominal tenderness  Musculoskeletal:         General: Normal range of motion  Skin:     General: Skin is warm and dry  Neurological:      General: No focal deficit present  Mental Status: She is alert and oriented to person, place, and time  Psychiatric:         Mood and Affect: Mood normal          Behavior: Behavior normal          Thought Content:  Thought content normal          Judgment: Judgment normal

## 2022-11-19 ENCOUNTER — HOSPITAL ENCOUNTER (OUTPATIENT)
Dept: INFUSION CENTER | Facility: CLINIC | Age: 52
Discharge: HOME | End: 2022-11-19

## 2022-11-19 ENCOUNTER — APPOINTMENT (OUTPATIENT)
Dept: LAB | Facility: CLINIC | Age: 52
End: 2022-11-19

## 2022-11-19 VITALS
OXYGEN SATURATION: 100 % | SYSTOLIC BLOOD PRESSURE: 106 MMHG | DIASTOLIC BLOOD PRESSURE: 71 MMHG | RESPIRATION RATE: 16 BRPM | HEART RATE: 70 BPM | TEMPERATURE: 97.1 F

## 2022-11-19 DIAGNOSIS — Z48.815 ENCOUNTER FOR SURGICAL AFTERCARE FOLLOWING SURGERY OF DIGESTIVE SYSTEM: ICD-10-CM

## 2022-11-19 DIAGNOSIS — Z98.84 BARIATRIC SURGERY STATUS: ICD-10-CM

## 2022-11-19 DIAGNOSIS — R42 DIZZINESS: ICD-10-CM

## 2022-11-19 DIAGNOSIS — R42 DIZZINESS: Primary | ICD-10-CM

## 2022-11-19 DIAGNOSIS — I95.9 HYPOTENSION: ICD-10-CM

## 2022-11-19 DIAGNOSIS — K91.2 POSTSURGICAL MALABSORPTION: ICD-10-CM

## 2022-11-19 LAB
25(OH)D3 SERPL-MCNC: 39.5 NG/ML (ref 30–100)
ALBUMIN SERPL BCP-MCNC: 4.4 G/DL (ref 3.5–5)
ALP SERPL-CCNC: 72 U/L (ref 34–104)
ALT SERPL W P-5'-P-CCNC: 42 U/L (ref 7–52)
ANION GAP SERPL CALCULATED.3IONS-SCNC: 7 MMOL/L (ref 4–13)
AST SERPL W P-5'-P-CCNC: 36 U/L (ref 13–39)
BILIRUB SERPL-MCNC: 0.28 MG/DL (ref 0.2–1)
BUN SERPL-MCNC: 20 MG/DL (ref 5–25)
CALCIUM SERPL-MCNC: 9.3 MG/DL (ref 8.4–10.2)
CHLORIDE SERPL-SCNC: 102 MMOL/L (ref 96–108)
CO2 SERPL-SCNC: 28 MMOL/L (ref 21–32)
CREAT SERPL-MCNC: 0.74 MG/DL (ref 0.6–1.3)
ERYTHROCYTE [DISTWIDTH] IN BLOOD BY AUTOMATED COUNT: 13.3 % (ref 11.6–15.1)
FOLATE SERPL-MCNC: 14.2 NG/ML (ref 3.1–17.5)
GFR SERPL CREATININE-BSD FRML MDRD: 92 ML/MIN/1.73SQ M
GLUCOSE SERPL-MCNC: 102 MG/DL (ref 65–140)
HCT VFR BLD AUTO: 39.9 % (ref 34.8–46.1)
HGB BLD-MCNC: 12.5 G/DL (ref 11.5–15.4)
IRON SATN MFR SERPL: 18 % (ref 15–50)
IRON SERPL-MCNC: 70 UG/DL (ref 50–170)
MCH RBC QN AUTO: 29.1 PG (ref 26.8–34.3)
MCHC RBC AUTO-ENTMCNC: 31.3 G/DL (ref 31.4–37.4)
MCV RBC AUTO: 93 FL (ref 82–98)
PLATELET # BLD AUTO: 261 THOUSANDS/UL (ref 149–390)
PMV BLD AUTO: 10.6 FL (ref 8.9–12.7)
POTASSIUM SERPL-SCNC: 3.9 MMOL/L (ref 3.5–5.3)
PROT SERPL-MCNC: 7.7 G/DL (ref 6.4–8.4)
PTH-INTACT SERPL-MCNC: 44 PG/ML (ref 18.4–80.1)
RBC # BLD AUTO: 4.3 MILLION/UL (ref 3.81–5.12)
SODIUM SERPL-SCNC: 137 MMOL/L (ref 135–147)
TIBC SERPL-MCNC: 392 UG/DL (ref 250–450)
VIT B12 SERPL-MCNC: 3722 PG/ML (ref 100–900)
WBC # BLD AUTO: 6.89 THOUSAND/UL (ref 4.31–10.16)

## 2022-11-19 RX ADMIN — THIAMINE HYDROCHLORIDE: 100 INJECTION, SOLUTION INTRAMUSCULAR; INTRAVENOUS at 11:43

## 2022-11-19 NOTE — PROGRESS NOTES
Patient tolerated hydration without incident  Peripheral IV removed  Next appointment confirmed  AVS declined

## 2022-11-19 NOTE — PROGRESS NOTES
Patient presents today for hydration  Patient offers no complaints  VSS  Peripheral IV inserted without incident

## 2022-11-21 ENCOUNTER — REMOTE DEVICE CLINIC VISIT (OUTPATIENT)
Dept: CARDIOLOGY CLINIC | Facility: CLINIC | Age: 52
End: 2022-11-21

## 2022-11-21 DIAGNOSIS — I47.1 SVT (SUPRAVENTRICULAR TACHYCARDIA) (HCC): Primary | ICD-10-CM

## 2022-11-21 DIAGNOSIS — Z95.818 STATUS POST PLACEMENT OF IMPLANTABLE LOOP RECORDER: ICD-10-CM

## 2022-11-22 ENCOUNTER — TELEPHONE (OUTPATIENT)
Dept: CARDIOLOGY CLINIC | Facility: CLINIC | Age: 52
End: 2022-11-22

## 2022-11-22 LAB
VIT B1 BLD-SCNC: 142.7 NMOL/L (ref 66.5–200)
ZINC SERPL-MCNC: 69 UG/DL (ref 44–115)

## 2022-11-22 NOTE — TELEPHONE ENCOUNTER
Patient has lost 74lbs since august when she had bariatric surgery  Lately her bp is low at 74/50 for the past week she has been feeling dizzy and lightheaded  She's having episodes where she has been passing out twice  She started taking propranolol 60mg once a day as of today down from twice a day  Heart rate is a lot better since she lost weight  She is only drinking 2-3 bottles of water a day  Her weight management doctor has ordered saline infusions which she just started  highest heart rate today is 118 lowest 65  She is currently working in the ER  Please advise

## 2022-11-22 NOTE — TELEPHONE ENCOUNTER
Patient returned my call verbalized understanding of instructions and was scheduled to see you on 12/14 in the [facility]

## 2022-11-26 ENCOUNTER — HOSPITAL ENCOUNTER (OUTPATIENT)
Dept: INFUSION CENTER | Facility: CLINIC | Age: 52
Discharge: HOME | End: 2022-11-26

## 2022-11-26 VITALS
SYSTOLIC BLOOD PRESSURE: 118 MMHG | TEMPERATURE: 97.9 F | OXYGEN SATURATION: 99 % | RESPIRATION RATE: 16 BRPM | DIASTOLIC BLOOD PRESSURE: 79 MMHG | HEART RATE: 92 BPM

## 2022-11-26 DIAGNOSIS — E86.1 HYPOTENSION DUE TO HYPOVOLEMIA: ICD-10-CM

## 2022-11-26 DIAGNOSIS — R42 DIZZINESS: Primary | ICD-10-CM

## 2022-11-26 DIAGNOSIS — I95.9 HYPOTENSION: ICD-10-CM

## 2022-11-26 DIAGNOSIS — I95.89 HYPOTENSION DUE TO HYPOVOLEMIA: ICD-10-CM

## 2022-11-26 DIAGNOSIS — Z98.84 BARIATRIC SURGERY STATUS: ICD-10-CM

## 2022-11-26 RX ADMIN — THIAMINE HYDROCHLORIDE: 100 INJECTION, SOLUTION INTRAMUSCULAR; INTRAVENOUS at 13:35

## 2022-11-26 NOTE — PROGRESS NOTES
Pt here for hydration  IV started with out issue  Pt resting comfortably with no further questions or concerns  Call bell with in reach

## 2022-11-27 LAB — VIT A SERPL-MCNC: 45.9 UG/DL (ref 20.1–62)

## 2022-11-29 ENCOUNTER — TELEMEDICINE (OUTPATIENT)
Dept: BEHAVIORAL/MENTAL HEALTH CLINIC | Facility: CLINIC | Age: 52
End: 2022-11-29

## 2022-11-29 DIAGNOSIS — F41.1 GENERALIZED ANXIETY DISORDER: Chronic | ICD-10-CM

## 2022-11-29 DIAGNOSIS — F33.41 RECURRENT MAJOR DEPRESSIVE DISORDER, IN PARTIAL REMISSION (HCC): Primary | ICD-10-CM

## 2022-11-29 DIAGNOSIS — F43.10 POST TRAUMATIC STRESS DISORDER (PTSD): Chronic | ICD-10-CM

## 2022-12-02 NOTE — PSYCH
Virtual Regular Visit    Verification of patient location:    Patient is located in the following state in which I hold an active license [place]    Assessment/Plan:    Problem List Items Addressed This Visit        Other    Post traumatic stress disorder (PTSD) (Chronic)    Generalized anxiety disorder (Chronic)    Recurrent major depressive disorder, in partial remission ([facility] [address] ) - Primary     Goals addressed in session: Goal 1      Reason for visit is No chief complaint on file  Encounter provider LALY Hawk    Provider located at [address] [place] [id number] [phone]    Recent Visits  No visits were found meeting these conditions  Showing recent visits within past 7 days and meeting all other requirements  Future Appointments  No visits were found meeting these conditions  Showing future appointments within next 150 days and meeting all other requirements     The patient was identified by name and date of birth  Rosie José was informed that this is a telemedicine visit and that the visit is being conducted through[address]  She agrees to proceed     My office door was closed  No one else was in the room  She acknowledged consent and understanding of privacy and security of the video platform  The patient has agreed to participate and understands they can discontinue the visit at any time  Patient is aware this is a billable service  Subjective  Rosie José is a 47 y o  female  DATA: Met with Arron Mckeon for scheduled individual session  Topics of discussion included work-related stress, physical health concerns and mood regulation and symptoms  Catalino Salestravis states that she has lost significant weight since her most recent bariatric surgery  She states that she was having some side effects but has met with her doctors and worked out the issues   Catalino Suzan states that her primary stressor in her life is work  She expressed feeling unappreciated and feels that she is often targeted for discipline re: her behaviors  She discussed some conflicts that she has had with coworkers  We discussed the practice of DBT effective communication skills and focusing on addressing conflicts that impact one's life directly and letting go of other issues that do not have the same impact  Leroy Ortega states that she feels that others in her department do not respect her experience and capabilities  She is agreeable to continuing with learning how to better use the DBT-informed skills during future sessions  Client shows evidence of utilizing emotion regulation skills skills to manage mental health symptoms  During this session, this clinician used the following therapeutic modalities: supportive psychotherapy, client-centered therapy, mindfulness-based strategies, DBT-informed skills, Motivational Interviewing and solution-focused therapy  ASSESSMENT: Liat Becerra presents with a normal mood  Her affect is normal range and intensity, appropriate  Liat Becerra exhibits good therapeutic rapport with this clinician  Liat Becerra continues to exhibit willingness to work on treatment goals and objectives  Liat Becerra presents with a minimal risk of suicide, minimal risk of self-harm, and minimal risk of harm to others  PLAN: Liat Becerra will return in approximately one month for the next scheduled session  Between sessions, Liat Becerra will continue to monitor her moods  She will practice effective communication skills and letting go of things that do not have a direct impact on her life or her work  She will report back during the next session re: successes and barriers   At the next session, this clinician will use supportive psychotherapy, client-centered therapy, mindfulness-based strategies, DBT-informed skills, Motivational Interviewing and solution-focused therapy to address her mood regulation and relationship concerns, in an effort to assist Leia Castano with meeting treatment goals  HPI     Past Medical History:   Diagnosis Date   • ADHD (attention deficit hyperactivity disorder)    • Bulging lumbar disc    • Carpal tunnel syndrome     RIGHT  LAST ASSESSED: 16   • Chronic back pain     low   • Chronic pain disorder    • Colon polyps    • COVID-19     2021   • Diabetes mellitus ([facility] [address] )    • GERD (gastroesophageal reflux disease)    • Gestational diabetes    • Hearing loss     left ear   • Hyperlipidemia     Resolved with weight loss   • Hyponatremia 2020   • IBS (irritable bowel syndrome)    • Ileus ([facility] [address] )     LAST ASSESSED: 8/3/17   • Labial cyst     LAST ASSESSED: 16   • Myofascial pain     LAST ASSESSED: 16   • Obesity    • Ovarian cyst     LEFT  LAST ASSESSED: 16   • Panic attack    • Pneumonia    • Sacroiliitis (HCC)    • Seasonal allergies    • SVT (supraventricular tachycardia) (HCC)    • Thoracic outlet syndrome        • Trochanteric bursitis of both hips     LAST ASSESSED: 3/18/16   • Ulnar neuropathy at elbow    • Varicella    • Wears glasses        Past Surgical History:   Procedure Laterality Date   • BILE DUCT EXPLORATION      ENDOSCOPIC REMOVAL OF STONES FROM BILIARY TRACT   •  SECTION      x3   • CHOLECYSTECTOMY     • COLONOSCOPY      2017-polyp, repeat    • DILATION AND CURETTAGE OF UTERUS     • ENDOMETRIAL ABLATION     • ERCP W/ SPHICTEROTOMY     • FIRST RIB REMOVAL      THORAX EXCISION OF FIRST RIB   • HYSTEROSCOPY     • NEUROPLASTY / TRANSPOSITION ULNAR NERVE AT ELBOW Right    • AL COLONOSCOPY FLX DX W/COLLJ SPEC WHEN PFRMD N/A 2017    Procedure: COLONOSCOPY;  Surgeon: Mani Gomez MD;  [facility]: [facility]; Service: Gastroenterology   • AL ESOPHAGOGASTRODUODENOSCOPY TRANSORAL DIAGNOSTIC N/A 2017    Procedure: ESOPHAGOGASTRODUODENOSCOPY (EGD); Surgeon: Miquel Rivera MD;  Location: [facility];   Service: Gastroenterology   • AL HYSTEROSCOPY,W/ENDO BX N/A 10/20/2017    Procedure: DILATATION AND CURETTAGE (D&C) WITH HYSTEROSCOPY  REMOVAL VULVAR RT  LESION;  Surgeon: Alicia Martin MD;  [facility];  Service: Gynecology   • NC LAP, ÁLVARO RESTRICT PROC, LONGITUDINAL GASTRECTOMY N/A 2/6/2018    Procedure: GASTRECTOMY SLEEVE LAPAROSCOPIC; INTRAOPERATIVE EGD ;  Surgeon: Joesph Marquez MD;  [facility];  Service: Bariatrics   • NC LAP, ÁLVARO RESTRICT [address], LONGITUDINAL GASTRECTOMY N/A 8/8/2022    Procedure: Diagnostic Lap; Extensive Lysis of Adhesions; LAPAROSCOPIC REVISION CONVERSION TO NOE-EN-Y GASTRIC BYPASS AND INTRAOPERATIVE EGD;  Surgeon: Joesph Marquez MD;  [facility];  Service: Bariatrics   • NC SURG IMPLNT Lendylan Roberts Left 1/29/2020    Procedure: Insertion of thoracic spinal cord stimulator electrode via laminotomy and placement of left buttock implantable pulse generator (NEUROMONITORING);   Surgeon: Lino Melgar MD;  [facility];  Service: Neurosurgery   • SPINAL CORD STIMULATOR TRIAL W/ LAMINOTOMY     • TONSILLECTOMY AND ADENOIDECTOMY     • TUBAL LIGATION     • UPPER GASTROINTESTINAL ENDOSCOPY     • VEIN LIGATION AND STRIPPING Right    • VULVA SURGERY  10/20/2017    BIOPSY   • WISDOM TOOTH EXTRACTION         Current Outpatient Medications   Medication Sig Dispense Refill   • atoMOXetine (STRATTERA) 60 mg capsule Take 1 capsule (60 mg total) by mouth daily 90 capsule 3   • atorvastatin (LIPITOR) 10 mg tablet TAKE ONE TABLET BY MOUTH DAILY 90 tablet 0   • calcium carbonate (OS-MELODY) 600 MG tablet Take 600 mg by mouth 2 (two) times a day with meals     • cholestyramine (QUESTRAN) 4 g packet Take 1 packet (4 g total) by mouth daily 30 each 3   • cyanocobalamin (VITAMIN B-12) 100 mcg tablet Take by mouth daily     • drospirenone-ethinyl estradiol (LIEN) 3-0 02 MG per tablet Take 1 tablet by mouth daily 28 tablet 0   • lamoTRIgine (LaMICtal) 100 mg tablet Take 1 tablet (100 mg total) by mouth daily 90 tablet 3   • lamoTRIgine (LaMICtal) 150 MG tablet Take 1 tablet (150 mg total) by mouth daily 90 tablet 3   • meclizine (ANTIVERT) 25 mg tablet Take 1 tablet (25 mg total) by mouth every 8 (eight) hours as needed for dizziness 30 tablet 0   • methocarbamol (ROBAXIN) 750 mg tablet Take 1 tablet (750 mg total) by mouth daily at bedtime as needed for muscle spasms 30 tablet 2   • montelukast (SINGULAIR) 10 mg tablet Take 1 tablet (10 mg total) by mouth daily at bedtime 90 tablet 2   • Multiple Vitamins-Minerals (MULTI COMPLETE PO) Take by mouth     • nitrofurantoin (MACROBID) 100 mg capsule Take 1 tablet PO PRN after sexual intercourse 30 capsule 1   • omeprazole (PriLOSEC) 20 mg delayed release capsule Take 1 capsule (20 mg total) by mouth daily 90 capsule 1   • oxyCODONE (Roxicodone) 5 immediate release tablet Take 1 tablet (5 mg total) by mouth every 4 (four) hours as needed for moderate pain Max Daily Amount: 30 mg (Patient not taking: Reported on 10/21/2022) 10 tablet 0   • propranolol (INDERAL LA) 60 mg 24 hr capsule Take 1 capsule (60 mg total) by mouth 2 (two) times a day 180 capsule 2   • QUEtiapine (SEROquel) 50 mg tablet Take 1 5 tablets (75 mg total) by mouth daily at bedtime 135 tablet 3   • Semaglutide,0 25 or 0 5MG/DOS, 2 MG/1 5ML SOPN Inject 0 5 mg under the skin once a week 1 5 mL 3   • venlafaxine (EFFEXOR) 100 MG tablet Take 1 tablet (100 mg total) by mouth 2 (two) times a day Take 1 tablet (100mg) by mouth twice daily with 37 mg tablet for total of 275mg per day  180 tablet 3   • venlafaxine (EFFEXOR) 37 5 mg tablet Take 1 tablet (37 5 mg total) by mouth 2 (two) times a day Take 1 tablet (37 5mg) by mouth twice daily with 100mg tablet for total of 275mg per day  180 tablet 3     No current facility-administered medications for this visit          Allergies   Allergen Reactions   • Ibuprofen Other (See Comments)     Due to gastric sleeve -can only take for 5 days, then needs to stop       Review of Systems    Video Exam    There were no vitals filed for this visit      Physical Exam     Visit Time    11/28/22  Start Time: 0718  Stop Time: 0895  Total Visit Time: 46 minutes

## 2022-12-09 ENCOUNTER — TELEPHONE (OUTPATIENT)
Dept: FAMILY MEDICINE CLINIC | Facility: CLINIC | Age: 52
End: 2022-12-09

## 2022-12-09 NOTE — TELEPHONE ENCOUNTER
Called patient to notify her that the appeal I did for Ozempic was approved  Patient wanted me to inform you that Cardiology took her off Propranolol due to her feeling dizzy and passing out  She feels much better ever since  There is a task in Encounters from Cardiology 11/22/22

## 2022-12-14 ENCOUNTER — ANNUAL EXAM (OUTPATIENT)
Dept: OBGYN CLINIC | Facility: CLINIC | Age: 52
End: 2022-12-14

## 2022-12-14 ENCOUNTER — OFFICE VISIT (OUTPATIENT)
Dept: CARDIOLOGY CLINIC | Facility: CLINIC | Age: 52
End: 2022-12-14

## 2022-12-14 ENCOUNTER — HOSPITAL ENCOUNTER (OUTPATIENT)
Dept: MAMMOGRAPHY | Facility: MEDICAL CENTER | Age: 52
Discharge: HOME | End: 2022-12-14

## 2022-12-14 VITALS
HEIGHT: 66 IN | TEMPERATURE: 98 F | SYSTOLIC BLOOD PRESSURE: 98 MMHG | WEIGHT: 142.6 LBS | DIASTOLIC BLOOD PRESSURE: 70 MMHG | BODY MASS INDEX: 22.92 KG/M2 | HEART RATE: 87 BPM | OXYGEN SATURATION: 98 %

## 2022-12-14 VITALS — BODY MASS INDEX: 22.82 KG/M2 | HEIGHT: 66 IN | WEIGHT: 142 LBS

## 2022-12-14 VITALS
HEIGHT: 66 IN | WEIGHT: 143 LBS | SYSTOLIC BLOOD PRESSURE: 110 MMHG | DIASTOLIC BLOOD PRESSURE: 64 MMHG | BODY MASS INDEX: 22.98 KG/M2

## 2022-12-14 DIAGNOSIS — I47.1 PAROXYSMAL ATRIAL TACHYCARDIA (HCC): ICD-10-CM

## 2022-12-14 DIAGNOSIS — E78.2 MIXED HYPERLIPIDEMIA: ICD-10-CM

## 2022-12-14 DIAGNOSIS — Z30.41 ORAL CONTRACEPTIVE PILL SURVEILLANCE: ICD-10-CM

## 2022-12-14 DIAGNOSIS — I47.1 PAROXYSMAL SVT (SUPRAVENTRICULAR TACHYCARDIA) (HCC): Primary | ICD-10-CM

## 2022-12-14 DIAGNOSIS — N95.2 POSTMENOPAUSAL ATROPHIC VAGINITIS: ICD-10-CM

## 2022-12-14 DIAGNOSIS — Z01.419 ENCOUNTER FOR GYNECOLOGICAL EXAMINATION WITHOUT ABNORMAL FINDING: Primary | ICD-10-CM

## 2022-12-14 DIAGNOSIS — Z12.31 ENCOUNTER FOR SCREENING MAMMOGRAM FOR BREAST CANCER: ICD-10-CM

## 2022-12-14 RX ORDER — DROSPIRENONE AND ETHINYL ESTRADIOL 0.02-3(28)
1 KIT ORAL DAILY
Qty: 84 TABLET | Refills: 4 | Status: SHIPPED | OUTPATIENT
Start: 2022-12-14

## 2022-12-14 RX ORDER — ESTRADIOL 0.1 MG/G
CREAM VAGINAL
Qty: 42.5 G | Refills: 2 | Status: SHIPPED | OUTPATIENT
Start: 2022-12-14

## 2022-12-14 NOTE — PROGRESS NOTES
Assessment / Plan    1  Encounter for gynecological examination without abnormal finding  Normal well woman exam  2021 pap/HPV negative  Repeat   Up to date on colonoscopy    2  Encounter for screening mammogram for breast cancer  Has appt today    3  Oral contraceptive pill surveillance  Okay until 54 yo barring any medical contraindications  Rx sent  4  Postmenopausal atrophic vaginitis  Discussed r/b vaginal estrogen  Rx sent    - estradiol (ESTRACE VAGINAL) 0 1 mg/g vaginal cream; One gram vaginally at night x 14 days then twice weekly for ongoing maintenance  Dispense: 42 5 g; Refill: 2       Subjective      Link Rivera is a 47 y o  female who presents for her annual gynecologic exam     48 yo  PMF  2021 pap/HPV negative  2021 mammogram NL  Mammogram scheduled for today  2022 Colonoscopy, rpt 5 yrs    She is still taking OCP which was originally for cycle management post failed EMA  She would like to continue  Advised her that I will verify with Dr Lori Rizzo  Post gastric bypass 2022, has lost 70 lbs  Reports vaginal dryness/ pain with sex  Interested in [id number] with vaginal estrogen, as she has recurrent UTIs  Periods are absent  Current contraception: OCP (estrogen/progesterone)  History of abnormal Pap smear: no  Family history of breast,uterine, ovarian or colon cancer: yes - mother breast ca (BRCA negative), MGF colon ca    Menstrual History:  OB History        3    Para   3    Term   3            AB        Living   3       SAB        IAB        Ectopic        Multiple        Live Births               Obstetric Comments   C/S x 3; BTL at time of third  Menarche 11    Menses: last menses 2019 (had ablation and on OCPS)              Patient's last menstrual period was 2019 (approximate)         The following portions of the patient's history were reviewed and updated as appropriate: allergies, current medications, past family history, past medical history, past social history, past surgical history and problem list     Review of Systems      Review of Systems   Constitutional: Negative for chills and fever  Respiratory: Negative for cough and shortness of breath  Gastrointestinal: Negative for abdominal distention, abdominal pain, blood in stool, constipation, diarrhea, nausea and vomiting  Genitourinary: Negative for difficulty urinating, dysuria, frequency, genital sores, hematuria, menstrual problem, pelvic pain, urgency, vaginal bleeding and vaginal discharge  Musculoskeletal: Negative for arthralgias and myalgias  Breasts:  Negative for skin changes, dimpling, asymmetry, nipple discharge, redness, tenderness or palpable masses      Objective      /64 (BP Location: Right arm, Patient Position: Sitting, Cuff Size: Standard)   Ht 5' 5 5" (1 664 m)   Wt 64 9 kg (143 lb)   LMP 01/07/2019 (Approximate)   BMI 23 43 kg/m²   Physical Exam  Constitutional:       General: She is not in acute distress  Appearance: Normal appearance  She is well-developed and normal weight  She is not ill-appearing or diaphoretic  HENT:      Head: Normocephalic and atraumatic  Eyes:      Pupils: Pupils are equal, round, and reactive to light  Neck:      Thyroid: No thyromegaly  Pulmonary:      Effort: Pulmonary effort is normal    Chest:   Breasts:     Breasts are symmetrical       Right: No inverted nipple, mass, nipple discharge, skin change or tenderness  Left: No inverted nipple, mass, nipple discharge, skin change or tenderness  Abdominal:      General: There is no distension  Palpations: Abdomen is soft  There is no mass  Tenderness: There is no abdominal tenderness  There is no guarding or rebound  Genitourinary:     General: Normal vulva  Exam position: Lithotomy position  Labia:         Right: No rash, tenderness, lesion or injury  Left: No rash, tenderness, lesion or injury         Vagina: No signs of injury and foreign body  No vaginal discharge, erythema, tenderness or bleeding  Cervix: No cervical motion tenderness, discharge or friability  Uterus: Not enlarged and not tender  Adnexa:         Right: No mass or tenderness  Left: No mass or tenderness  Rectum: Normal        Musculoskeletal:      Cervical back: Neck supple  Lymphadenopathy:      Cervical: No cervical adenopathy  Upper Body:      Right upper body: No supraclavicular adenopathy  Left upper body: No supraclavicular adenopathy  Skin:     General: Skin is warm and dry  Neurological:      General: No focal deficit present  Mental Status: She is alert and oriented to person, place, and time  Psychiatric:         Mood and Affect: Mood normal          Behavior: Behavior normal          Thought Content:  Thought content normal          Judgment: Judgment normal
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
## 2022-12-16 ENCOUNTER — TELEPHONE (OUTPATIENT)
Dept: PSYCHIATRY | Facility: CLINIC | Age: 52
End: 2022-12-16

## 2022-12-16 ENCOUNTER — OFFICE VISIT (OUTPATIENT)
Dept: PSYCHIATRY | Facility: CLINIC | Age: 52
End: 2022-12-16

## 2022-12-16 ENCOUNTER — TELEPHONE (OUTPATIENT)
Dept: UROLOGY | Facility: MEDICAL CENTER | Age: 52
End: 2022-12-16

## 2022-12-16 DIAGNOSIS — R41.3 MEMORY DEFICIT: ICD-10-CM

## 2022-12-16 DIAGNOSIS — F41.1 GENERALIZED ANXIETY DISORDER: Chronic | ICD-10-CM

## 2022-12-16 DIAGNOSIS — F33.1 MODERATE EPISODE OF RECURRENT MAJOR DEPRESSIVE DISORDER (HCC): Primary | ICD-10-CM

## 2022-12-16 DIAGNOSIS — F90.0 ADHD (ATTENTION DEFICIT HYPERACTIVITY DISORDER), INATTENTIVE TYPE: ICD-10-CM

## 2022-12-16 DIAGNOSIS — F43.10 POST TRAUMATIC STRESS DISORDER (PTSD): Chronic | ICD-10-CM

## 2022-12-16 DIAGNOSIS — F33.2 MDD (MAJOR DEPRESSIVE DISORDER), RECURRENT SEVERE, WITHOUT PSYCHOSIS (HCC): ICD-10-CM

## 2022-12-16 PROBLEM — F33.9 RECURRENT MAJOR DEPRESSIVE DISORDER (HCC): Status: ACTIVE | Noted: 2019-05-10

## 2022-12-16 RX ORDER — LAMOTRIGINE 150 MG/1
150 TABLET ORAL 2 TIMES DAILY
Qty: 180 TABLET | Refills: 3 | Status: SHIPPED | OUTPATIENT
Start: 2022-12-16 | End: 2023-01-15

## 2022-12-16 NOTE — PSYCH
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 limited support, everyday stressors and ongoing anxiety  Coping strategies reviewed with Tyson Tellez  Educated on importance of medication and treatment compliance  Importance of follow up with family physician for medical issues reviewed with Tyson Tellez  Supportive therapy provided  Cognitive therapy was utilized during the session  Treatment Plan:    Completed and signed during the session: Not applicable - Treatment Plan to be completed by [address] [place] [address] E therapist      Visit Time    Visit Start Time: 11:03 AM   Visit Stop Time: 11:31 AM  Total Visit Duration: 28 minutes     The total visit duration detailed above includes: patient engagement, medication management, psychotherapy/counseling, discussion regarding treatment goals, and coordination of care  Note Share Disclaimer:      This note was not shared with the patient due to reasonable likelihood of causing patient harm      Bossman García MD  Board Certified Diplomate of the [address] [place] [address] Atilio Wilson [address] of Psychiatry and Neurology  12/16/22

## 2022-12-16 NOTE — TELEPHONE ENCOUNTER
Called patient to set up a 3 months follow up ( around 3/16/23 per provider) from today's appt 12/16/22  No follow up was made as the patient wasn't sure about her schedule  She states she will call office back

## 2022-12-16 NOTE — PROGRESS NOTES
[facility] remote check loop recorder  No events  Normal battery function        Current Outpatient Medications:   •  atoMOXetine (STRATTERA) 60 mg capsule, Take 1 capsule (60 mg total) by mouth daily, Disp: 90 capsule, Rfl: 3  •  atorvastatin (LIPITOR) 10 mg tablet, TAKE ONE TABLET BY MOUTH DAILY, Disp: 90 tablet, Rfl: 0  •  calcium carbonate (OS-MELODY) 600 MG tablet, Take 600 mg by mouth 2 (two) times a day with meals, Disp: , Rfl:   •  cholestyramine (QUESTRAN) 4 g packet, Take 1 packet (4 g total) by mouth daily, Disp: 30 each, Rfl: 3  •  cyanocobalamin (VITAMIN B-12) 100 mcg tablet, Take by mouth daily, Disp: , Rfl:   •  drospirenone-ethinyl estradiol (LIEN) 3-0 02 MG per tablet, Take 1 tablet by mouth daily, Disp: 84 tablet, Rfl: 4  •  estradiol (ESTRACE VAGINAL) 0 1 mg/g vaginal cream, One gram vaginally at night x 14 days then twice weekly for ongoing maintenance , Disp: 42 5 g, Rfl: 2  •  lamoTRIgine (LaMICtal) 150 MG tablet, Take 1 tablet (150 mg total) by mouth 2 (two) times a day, Disp: 180 tablet, Rfl: 3  •  meclizine (ANTIVERT) 25 mg tablet, Take 1 tablet (25 mg total) by mouth every 8 (eight) hours as needed for dizziness, Disp: 30 tablet, Rfl: 0  •  methocarbamol (ROBAXIN) 750 mg tablet, Take 1 tablet (750 mg total) by mouth daily at bedtime as needed for muscle spasms, Disp: 30 tablet, Rfl: 2  •  montelukast (SINGULAIR) 10 mg tablet, Take 1 tablet (10 mg total) by mouth daily at bedtime, Disp: 90 tablet, Rfl: 2  •  Multiple Vitamins-Minerals (MULTI COMPLETE PO), Take by mouth, Disp: , Rfl:   •  nitrofurantoin (MACROBID) 100 mg capsule, Take 1 tablet PO PRN after sexual intercourse, Disp: 30 capsule, Rfl: 1  •  omeprazole (PriLOSEC) 20 mg delayed release capsule, Take 1 capsule (20 mg total) by mouth daily, Disp: 90 capsule, Rfl: 1  •  oxyCODONE (Roxicodone) 5 immediate release tablet, Take 1 tablet (5 mg total) by mouth every 4 (four) hours as needed for moderate pain Max Daily Amount: 30 mg, Disp: 10 tablet, Rfl: 0  •  QUEtiapine (SEROquel) 50 mg tablet, Take 1 5 tablets (75 mg total) by mouth daily at bedtime, Disp: 135 tablet, Rfl: 3  •  venlafaxine (EFFEXOR) 100 MG tablet, Take 1 tablet (100 mg total) by mouth 2 (two) times a day Take 1 tablet (100mg) by mouth twice daily with 37 mg tablet for total of 275mg per day , Disp: 180 tablet, Rfl: 3  •  venlafaxine (EFFEXOR) 37 5 mg tablet, Take 1 tablet (37 5 mg total) by mouth 2 (two) times a day Take 1 tablet (37 5mg) by mouth twice daily with 100mg tablet for total of 275mg per day , Disp: 180 tablet, Rfl: 3

## 2022-12-20 ENCOUNTER — TELEPHONE (OUTPATIENT)
Dept: PSYCHIATRY | Facility: CLINIC | Age: 52
End: 2022-12-20

## 2022-12-30 ENCOUNTER — TELEPHONE (OUTPATIENT)
Dept: PSYCHIATRY | Facility: CLINIC | Age: 52
End: 2022-12-30

## 2022-12-30 ENCOUNTER — TELEPHONE (OUTPATIENT)
Dept: ADMINISTRATIVE | Facility: OTHER | Age: 52
End: 2022-12-30

## 2022-12-30 NOTE — LETTER
Diabetic Eye Exam Form    Date Requested: 23  Patient: Ramses Phillips  Patient : 1970   Referring Provider: Johnson Wilkerson MD      DIABETIC Eye Exam Date _______________________________      Type of Exam MUST be documented for Diabetic Eye Exams  Please CHECK ONE  Retinal Exam       Dilated Retinal Exam       OCT       Optomap-Iris Exam      Fundus Photography       Left Eye - Please check Retinopathy or No Retinopathy        Exam did show retinopathy    Exam did not show retinopathy       Right Eye - Please check Retinopathy or No Retinopathy       Exam did show retinopathy    Exam did not show retinopathy       Comments __________________________________________________________    Practice Providing Exam ______________________________________________    Exam Performed By (print name) _______________________________________      Provider Signature ___________________________________________________      These reports are needed for  compliance  Please fax this completed form and a copy of the Diabetic Eye Exam report to our office located at [place] [address] as soon as possible via [phone] attention [place]yd[place]: Phone [phone]  We thank you for your assistance in treating our mutual patient

## 2022-12-30 NOTE — TELEPHONE ENCOUNTER
Patient is calling regarding cancelling an appointment      Date/Time: 12/30 at [facility]    Reason: stuck at work     Patient was rescheduled: YES [] NO [x]  If yes, when was Patient reschedule for: pt had f/u set already    Patient requesting call back to reschedule: YES [] NO [x]

## 2022-12-30 NOTE — TELEPHONE ENCOUNTER
----- Message from Margy Archuleta sent at 12/30/2022  9:57 AM EST -----  Regarding: care gap request  12/30/22 9:57 AM    Hello, our patient attached above has had Diabetic Eye Exam completed/performed  Please assist in updating the patient chart by making an External outreach to Dr Jere Rivera facility located in [address] [place] [address]  The date of service is 2022      Thank you,  Margy [facility] Sender MIKAYLA LU

## 2023-01-03 NOTE — TELEPHONE ENCOUNTER
Upon review of the In Basket request and the patient's chart, initial outreach has been made via fax to facility  Please see Contacts section for details       Thank you  Miko Hopson MA

## 2023-01-05 ENCOUNTER — OFFICE VISIT (OUTPATIENT)
Dept: FAMILY MEDICINE CLINIC | Facility: CLINIC | Age: 53
End: 2023-01-05

## 2023-01-05 VITALS
SYSTOLIC BLOOD PRESSURE: 110 MMHG | HEIGHT: 66 IN | DIASTOLIC BLOOD PRESSURE: 70 MMHG | TEMPERATURE: 98 F | WEIGHT: 140.6 LBS | BODY MASS INDEX: 22.6 KG/M2 | OXYGEN SATURATION: 98 % | HEART RATE: 74 BPM | RESPIRATION RATE: 16 BRPM

## 2023-01-05 DIAGNOSIS — I47.1 PAROXYSMAL ATRIAL TACHYCARDIA (HCC): ICD-10-CM

## 2023-01-05 DIAGNOSIS — F33.2 MDD (MAJOR DEPRESSIVE DISORDER), RECURRENT SEVERE, WITHOUT PSYCHOSIS (HCC): ICD-10-CM

## 2023-01-05 DIAGNOSIS — I47.1 PAROXYSMAL SVT (SUPRAVENTRICULAR TACHYCARDIA) (HCC): ICD-10-CM

## 2023-01-05 DIAGNOSIS — J30.2 SEASONAL ALLERGIES: ICD-10-CM

## 2023-01-05 DIAGNOSIS — H69.83 DYSFUNCTION OF BOTH EUSTACHIAN TUBES: ICD-10-CM

## 2023-01-05 DIAGNOSIS — F33.1 MODERATE EPISODE OF RECURRENT MAJOR DEPRESSIVE DISORDER (HCC): ICD-10-CM

## 2023-01-05 DIAGNOSIS — K21.9 GASTROESOPHAGEAL REFLUX DISEASE WITHOUT ESOPHAGITIS: ICD-10-CM

## 2023-01-05 DIAGNOSIS — E11.9 TYPE 2 DIABETES MELLITUS WITHOUT COMPLICATION, WITHOUT LONG-TERM CURRENT USE OF INSULIN (HCC): Primary | ICD-10-CM

## 2023-01-05 PROBLEM — F11.20 OPIOID TYPE DEPENDENCE, CONTINUOUS (HCC): Status: ACTIVE | Noted: 2023-01-05

## 2023-01-05 LAB — SL AMB POCT HEMOGLOBIN AIC: 5.8 (ref ?–6.5)

## 2023-01-05 RX ORDER — MONTELUKAST SODIUM 10 MG/1
10 TABLET ORAL
Qty: 90 TABLET | Refills: 2 | Status: SHIPPED | OUTPATIENT
Start: 2023-01-05

## 2023-01-05 RX ORDER — PREDNISONE 10 MG/1
TABLET ORAL
Qty: 20 TABLET | Refills: 0 | Status: SHIPPED | OUTPATIENT
Start: 2023-01-05

## 2023-01-05 NOTE — TELEPHONE ENCOUNTER
Upon review of the In Basket request we were able to locate, review, and update the patient chart as requested for Diabetic Eye Exam     Any additional questions or concerns should be emailed to the Practice Liaisons via the appropriate education email address, please do not reply via In Basket      Thank you  Antonio Teixeira MA

## 2023-01-05 NOTE — ASSESSMENT & PLAN NOTE
Lab Results   Component Value Date    HGBA1C 5 8 01/05/2023   stable off of all meds since she lost weight

## 2023-01-05 NOTE — PROGRESS NOTES
Name: Eryn Martinez      : 1970      MRN: [id number]  Encounter Provider: Ekaterina Ortiz MD  Encounter Date: 2023   Encounter department: [place] [address]  Type 2 diabetes mellitus without complication, without long-term current use of insulin ([place] [address] )  Assessment & Plan:    Lab Results   Component Value Date    HGBA1C 5 8 2023   stable off of all meds since she lost weight     Orders:  -     POCT hemoglobin A1c    2  Dysfunction of both eustachian tubes  -     predniSONE 10 mg tablet; 4 tabs x 2 days, 3 tabs x 2 days, 2 tabs x 2 days, 1 tab x 2 days    3  MDD (major depressive disorder), recurrent severe, without psychosis ([place] [address] )  Comments:  stable  sees psych    4  Paroxysmal atrial tachycardia ([place] [address] )    5  Seasonal allergies  -     montelukast (SINGULAIR) 10 mg tablet; Take 1 tablet (10 mg total) by mouth daily at bedtime    6  Paroxysmal SVT (supraventricular tachycardia) ([facility])  Assessment & Plan:  Stable  Care per cardiologist      7  Moderate episode of recurrent major depressive disorder ([place] [address] )  Assessment & Plan:  Stable  Sees psych      8  Gastroesophageal reflux disease without esophagitis  Assessment & Plan:  Omeprazole as directed             Subjective      Earache   There is pain in both ears  This is a new problem  The current episode started in the past 7 days  The problem occurs constantly  The problem has been waxing and waning  There has been no fever  Pertinent negatives include no abdominal pain, coughing, diarrhea, ear discharge, headaches, hearing loss, neck pain, rash, rhinorrhea, sore throat or vomiting  Review of Systems   HENT: Positive for ear pain  Negative for ear discharge, hearing loss, rhinorrhea and sore throat  Respiratory: Negative for cough  Gastrointestinal: Negative for abdominal pain, diarrhea and vomiting  Musculoskeletal: Negative for neck pain  Skin: Negative for rash  Neurological: Negative for headaches  Current Outpatient Medications on File Prior to Visit   Medication Sig   • atoMOXetine (STRATTERA) 60 mg capsule Take 1 capsule (60 mg total) by mouth daily   • atorvastatin (LIPITOR) 10 mg tablet TAKE ONE TABLET BY MOUTH DAILY   • calcium carbonate (OS-MELODY) 600 MG tablet Take 600 mg by mouth 2 (two) times a day with meals   • cholestyramine (QUESTRAN) 4 g packet Take 1 packet (4 g total) by mouth daily   • cyanocobalamin (VITAMIN B-12) 100 mcg tablet Take by mouth daily   • drospirenone-ethinyl estradiol (LIEN) 3-0 02 MG per tablet Take 1 tablet by mouth daily   • estradiol (ESTRACE VAGINAL) 0 1 mg/g vaginal cream One gram vaginally at night x 14 days then twice weekly for ongoing maintenance  • lamoTRIgine (LaMICtal) 150 MG tablet Take 1 tablet (150 mg total) by mouth 2 (two) times a day   • meclizine (ANTIVERT) 25 mg tablet Take 1 tablet (25 mg total) by mouth every 8 (eight) hours as needed for dizziness   • methocarbamol (ROBAXIN) 750 mg tablet Take 1 tablet (750 mg total) by mouth daily at bedtime as needed for muscle spasms   • Multiple Vitamins-Minerals (MULTI COMPLETE PO) Take by mouth   • nitrofurantoin (MACROBID) 100 mg capsule Take 1 tablet PO PRN after sexual intercourse   • omeprazole (PriLOSEC) 20 mg delayed release capsule Take 1 capsule (20 mg total) by mouth daily   • QUEtiapine (SEROquel) 50 mg tablet Take 1 5 tablets (75 mg total) by mouth daily at bedtime   • venlafaxine (EFFEXOR) 100 MG tablet Take 1 tablet (100 mg total) by mouth 2 (two) times a day Take 1 tablet (100mg) by mouth twice daily with 37 mg tablet for total of 275mg per day  • venlafaxine (EFFEXOR) 37 5 mg tablet Take 1 tablet (37 5 mg total) by mouth 2 (two) times a day Take 1 tablet (37 5mg) by mouth twice daily with 100mg tablet for total of 275mg per day     • [DISCONTINUED] montelukast (SINGULAIR) 10 mg tablet Take 1 tablet (10 mg total) by mouth daily at bedtime   • [DISCONTINUED] oxyCODONE (Roxicodone) 5 immediate release tablet Take 1 tablet (5 mg total) by mouth every 4 (four) hours as needed for moderate pain Max Daily Amount: 30 mg (Patient not taking: Reported on 1/5/2023)       Objective     /70 (BP Location: Left arm, Patient Position: Sitting, Cuff Size: Adult)   Pulse 74   Temp 98 °F (36 7 °C) (Tympanic)   Resp 16   Ht 5' 5 5" (1 664 m)   Wt 63 8 kg (140 lb 9 6 oz)   LMP 01/07/2019 (Approximate)   SpO2 98%   BMI 23 04 kg/m²     Physical Exam  Constitutional:       Appearance: Normal appearance  HENT:      Right Ear: A middle ear effusion is present  There is no impacted cerumen  Left Ear: A middle ear effusion is present  There is no impacted cerumen  Cardiovascular:      Rate and Rhythm: Normal rate and regular rhythm  Pulses: Normal pulses  Heart sounds: Normal heart sounds  Pulmonary:      Effort: Pulmonary effort is normal       Breath sounds: Normal breath sounds  Neurological:      Mental Status: She is alert         Keeley Funk MD

## 2023-01-12 ENCOUNTER — OFFICE VISIT (OUTPATIENT)
Dept: UROLOGY | Facility: CLINIC | Age: 53
End: 2023-01-12

## 2023-01-12 VITALS
OXYGEN SATURATION: 97 % | WEIGHT: 140 LBS | SYSTOLIC BLOOD PRESSURE: 120 MMHG | HEIGHT: 66 IN | BODY MASS INDEX: 22.5 KG/M2 | HEART RATE: 85 BPM | DIASTOLIC BLOOD PRESSURE: 90 MMHG

## 2023-01-12 DIAGNOSIS — N95.2 POSTMENOPAUSAL ATROPHIC VAGINITIS: ICD-10-CM

## 2023-01-12 DIAGNOSIS — R39.15 URINARY URGENCY: Primary | ICD-10-CM

## 2023-01-12 DIAGNOSIS — N39.0 RECURRENT UTI: ICD-10-CM

## 2023-01-12 LAB
BACTERIA UR QL AUTO: ABNORMAL /HPF
BILIRUB UR QL STRIP: NEGATIVE
CAOX CRY URNS QL MICRO: ABNORMAL /HPF
CLARITY UR: ABNORMAL
COLOR UR: YELLOW
GLUCOSE UR STRIP-MCNC: NEGATIVE MG/DL
HGB UR QL STRIP.AUTO: NEGATIVE
HYALINE CASTS #/AREA URNS LPF: ABNORMAL /LPF
KETONES UR STRIP-MCNC: NEGATIVE MG/DL
LEUKOCYTE ESTERASE UR QL STRIP: NEGATIVE
MUCOUS THREADS UR QL AUTO: ABNORMAL
NITRITE UR QL STRIP: NEGATIVE
NON-SQ EPI CELLS URNS QL MICRO: ABNORMAL /HPF
PH UR STRIP.AUTO: 6 [PH]
POST-VOID RESIDUAL VOLUME, ML POC: 26 ML
PROT UR STRIP-MCNC: ABNORMAL MG/DL
RBC #/AREA URNS AUTO: ABNORMAL /HPF
SL AMB  POCT GLUCOSE, UA: NORMAL
SL AMB LEUKOCYTE ESTERASE,UA: NORMAL
SL AMB POCT BILIRUBIN,UA: NORMAL
SL AMB POCT BLOOD,UA: NORMAL
SL AMB POCT CLARITY,UA: CLEAR
SL AMB POCT COLOR,UA: NORMAL
SL AMB POCT KETONES,UA: NORMAL
SL AMB POCT NITRITE,UA: NORMAL
SL AMB POCT PH,UA: 6.5
SL AMB POCT SPECIFIC GRAVITY,UA: 1.02
SL AMB POCT URINE PROTEIN: NORMAL
SL AMB POCT UROBILINOGEN: 0.2
SP GR UR STRIP.AUTO: 1.03 (ref 1–1.03)
UROBILINOGEN UR STRIP-ACNC: <2 MG/DL
WBC #/AREA URNS AUTO: ABNORMAL /HPF

## 2023-01-12 RX ORDER — ESTRADIOL 0.1 MG/G
CREAM VAGINAL
Qty: 42.5 G | Refills: 2 | Status: SHIPPED | OUTPATIENT
Start: 2023-01-12

## 2023-01-12 NOTE — PROGRESS NOTES
1  Urinary urgency  POCT Measure PVR    POCT urine dip      2  Recurrent UTI  POCT Measure PVR    POCT urine dip        Assessment and plan:       1  Recurrent UTIs  2  Atrophic vaginitis   - proper hydration, probiotics, bowel management, cranberry supplementation  - standing urine culture  - normal upper tract imaging  - continue estrace  - RTC 6 months    [facility] [address]      Chief Complaint     Chief Complaint   Patient presents with   • Follow-up         History of Present Illness     René Mccord is a 47 y o  female presenting today for follow up  Having recurrent UTIs  Not sexually active  No hx of pyelonephritis  Symptoms typically include frequency, urgency  CT 8/26/22 negative for urologic abnormalities  Recently started on estrace cream by GYN for atrophic vaginitis  Medical comorbidities include IBS, GERD, diabetes, SVT, lumbar radiculopathy, vertigo, previous Rebecca-en-Y bypass  Urine dip leukocyte, nitrite, blood negative  PVR 26mL    Laboratory     Lab Results   Component Value Date    CREATININE 0 74 11/19/2022         Review of Systems     Review of Systems   Constitutional: Negative for activity change, appetite change, chills, diaphoresis, fatigue, fever and unexpected weight change  Respiratory: Negative for chest tightness and shortness of breath  Cardiovascular: Negative for chest pain, palpitations and leg swelling  Gastrointestinal: Negative for abdominal distention, abdominal pain, constipation, diarrhea, nausea and vomiting  Genitourinary: Negative for decreased urine volume, difficulty urinating, dysuria, enuresis, flank pain, frequency, genital sores, hematuria and urgency  Musculoskeletal: Negative for back pain, gait problem and myalgias  Skin: Negative for color change, pallor, rash and wound  Psychiatric/Behavioral: Negative for behavioral problems  The patient is not nervous/anxious                Allergies     Allergies   Allergen Reactions • Ibuprofen Other (See Comments)     Due to gastric sleeve -can only take for 5 days, then needs to stop       Physical Exam     Physical Exam  Constitutional:       General: She is not in acute distress  Appearance: Normal appearance  She is normal weight  She is not ill-appearing, toxic-appearing or diaphoretic  HENT:      Head: Normocephalic and atraumatic  Eyes:      General:         Right eye: No discharge  Left eye: No discharge  Conjunctiva/sclera: Conjunctivae normal    Pulmonary:      Effort: Pulmonary effort is normal  No respiratory distress  Musculoskeletal:         General: No swelling or tenderness  Normal range of motion  Skin:     General: Skin is warm and dry  Coloration: Skin is not jaundiced or pale  Neurological:      General: No focal deficit present  Mental Status: She is alert and oriented to person, place, and time  Psychiatric:         Mood and Affect: Mood normal          Behavior: Behavior normal          Thought Content:  Thought content normal            Vital Signs     Vitals:    01/12/23 1431   BP: 120/90   BP Location: Left arm   Patient Position: Sitting   Cuff Size: Adult   Pulse: 85   SpO2: 97%   Weight: 63 5 kg (140 lb)   Height: 5' 5 5" (1 664 m)         Current Medications       Current Outpatient Medications:   •  atoMOXetine (STRATTERA) 60 mg capsule, Take 1 capsule (60 mg total) by mouth daily, Disp: 90 capsule, Rfl: 3  •  atorvastatin (LIPITOR) 10 mg tablet, TAKE ONE TABLET BY MOUTH DAILY, Disp: 90 tablet, Rfl: 0  •  calcium carbonate (OS-MELODY) 600 MG tablet, Take 600 mg by mouth 2 (two) times a day with meals, Disp: , Rfl:   •  cholestyramine (QUESTRAN) 4 g packet, Take 1 packet (4 g total) by mouth daily, Disp: 30 each, Rfl: 3  •  cyanocobalamin (VITAMIN B-12) 100 mcg tablet, Take by mouth daily, Disp: , Rfl:   •  drospirenone-ethinyl estradiol (LIEN) 3-0 02 MG per tablet, Take 1 tablet by mouth daily, Disp: 84 tablet, Rfl: 4  • estradiol (ESTRACE VAGINAL) 0 1 mg/g vaginal cream, One gram vaginally at night x 14 days then twice weekly for ongoing maintenance , Disp: 42 5 g, Rfl: 2  •  lamoTRIgine (LaMICtal) 150 MG tablet, Take 1 tablet (150 mg total) by mouth 2 (two) times a day, Disp: 180 tablet, Rfl: 3  •  meclizine (ANTIVERT) 25 mg tablet, Take 1 tablet (25 mg total) by mouth every 8 (eight) hours as needed for dizziness, Disp: 30 tablet, Rfl: 0  •  methocarbamol (ROBAXIN) 750 mg tablet, Take 1 tablet (750 mg total) by mouth daily at bedtime as needed for muscle spasms, Disp: 30 tablet, Rfl: 2  •  montelukast (SINGULAIR) 10 mg tablet, Take 1 tablet (10 mg total) by mouth daily at bedtime, Disp: 90 tablet, Rfl: 2  •  Multiple Vitamins-Minerals (MULTI COMPLETE PO), Take by mouth, Disp: , Rfl:   •  nitrofurantoin (MACROBID) 100 mg capsule, Take 1 tablet PO PRN after sexual intercourse, Disp: 30 capsule, Rfl: 1  •  omeprazole (PriLOSEC) 20 mg delayed release capsule, Take 1 capsule (20 mg total) by mouth daily, Disp: 90 capsule, Rfl: 1  •  predniSONE 10 mg tablet, 4 tabs x 2 days, 3 tabs x 2 days, 2 tabs x 2 days, 1 tab x 2 days, Disp: 20 tablet, Rfl: 0  •  QUEtiapine (SEROquel) 50 mg tablet, Take 1 5 tablets (75 mg total) by mouth daily at bedtime, Disp: 135 tablet, Rfl: 3  •  venlafaxine (EFFEXOR) 100 MG tablet, Take 1 tablet (100 mg total) by mouth 2 (two) times a day Take 1 tablet (100mg) by mouth twice daily with 37 mg tablet for total of 275mg per day , Disp: 180 tablet, Rfl: 3  •  venlafaxine (EFFEXOR) 37 5 mg tablet, Take 1 tablet (37 5 mg total) by mouth 2 (two) times a day Take 1 tablet (37 5mg) by mouth twice daily with 100mg tablet for total of 275mg per day , Disp: 180 tablet, Rfl: 3      Active Problems     Patient Active Problem List   Diagnosis   • IBS (irritable bowel syndrome)   • Mixed hyperlipidemia   • GERD (gastroesophageal reflux disease)   • Chronic back pain   • Cervical radiculopathy   • Hepatic steatosis   • Pulmonary nodule seen on imaging study   • Type 2 diabetes mellitus without complication, without long-term current use of insulin ([facility])   • S/P laparoscopic sleeve gastrectomy   • Obesity   • Postsurgical malabsorption   • Lumbar radiculopathy   • Intervertebral disc disorder with radiculopathy of lumbar region   • Post traumatic stress disorder (PTSD)   • Recurrent major depressive disorder (HCC)   • Generalized anxiety disorder   • Chronic pain syndrome   • Other chronic pain   • Trochanteric bursitis, left hip   • Paroxysmal SVT (supraventricular tachycardia) ([facility])   • ADHD (attention deficit hyperactivity disorder), inattentive type   • Seasonal allergies   • Benign paroxysmal positional vertigo   • Bariatric surgery status   • Dizziness   • Hypotension   • MDD (major depressive disorder), recurrent severe, without psychosis ([address] )   • Opioid type dependence, continuous ([address] )         Past Medical History     Past Medical History:   Diagnosis Date   • ADHD (attention deficit hyperactivity disorder)    • Bulging lumbar disc    • Carpal tunnel syndrome     RIGHT  LAST ASSESSED: 12/7/16   • Chronic back pain     low   • Chronic pain disorder    • Colon polyps    • COVID-19     12/2021   • Depression    • Diabetes mellitus ([address] )    • GERD (gastroesophageal reflux disease)    • Gestational diabetes    • Hearing loss     left ear   • Heart disease     SVT   • History of pre-eclampsia    • Hyperlipidemia     Resolved with weight loss   • Hyponatremia 12/12/2020   • IBS (irritable bowel syndrome)    • Ileus ([address] )     LAST ASSESSED: 8/3/17   • Labial cyst     LAST ASSESSED: 4/21/16   • Myofascial pain     LAST ASSESSED: 4/12/16   • Obesity    • Ovarian cyst     LEFT   LAST ASSESSED: 9/2/16   • Panic attack    • Pneumonia    • Sacroiliitis ([facility])    • Seasonal allergies    • SVT (supraventricular tachycardia) ([facility])    • Thoracic outlet syndrome     2010   • Trochanteric bursitis of both hips     LAST ASSESSED: 3/18/16   • Ulnar neuropathy at elbow    • Varicella    • Wears glasses          Surgical History     Past Surgical History:   Procedure Laterality Date   • BARIATRIC SURGERY  2022   • BILE DUCT EXPLORATION      ENDOSCOPIC REMOVAL OF STONES FROM BILIARY TRACT   •  SECTION      x3   • CHOLECYSTECTOMY     • COLONOSCOPY      2017-polyp, repeat    • DILATION AND CURETTAGE OF UTERUS     • ENDOMETRIAL ABLATION     • ERCP W/ SPHICTEROTOMY     • FIRST RIB REMOVAL      THORAX EXCISION OF FIRST RIB   • GASTRIC BYPASS  2022   • HYSTEROSCOPY     • NEUROPLASTY / TRANSPOSITION ULNAR NERVE AT ELBOW Right    • AK COLONOSCOPY FLX DX W/COLLJ SPEC WHEN PFRMD N/A 2017    Procedure: COLONOSCOPY;  Surgeon: Tri Keller MD;  [facility]: [facility]; Service: Gastroenterology   • AK ESOPHAGOGASTRODUODENOSCOPY TRANSORAL DIAGNOSTIC N/A 2017    Procedure: ESOPHAGOGASTRODUODENOSCOPY (EGD); Surgeon: Mary Griggs MD;  Location: [facility]; Service: Gastroenterology   • AK HYSTEROSCOPY BX ENDOMETRIUM&/POLYPC W/WO D&C N/A 10/20/2017    Procedure: DILATATION AND CURETTAGE (D&C) WITH HYSTEROSCOPY  REMOVAL VULVAR RT  LESION;  Surgeon: Lindsey Yancey MD;  [facility];  Service: Gynecology   • AK ALDANA [address] NSTIM ELTRDS PLATE/PADDLE EDRL Left 2020    Procedure: Insertion of thoracic spinal cord stimulator electrode via laminotomy and placement of left buttock implantable pulse generator (NEUROMONITORING); Surgeon: Yonas Early MD;  Location[facility];  Service: Neurosurgery   • AK LAPS [address] LONGITUDINAL GASTRECTOMY N/A 2018    Procedure: GASTRECTOMY SLEEVE LAPAROSCOPIC; INTRAOPERATIVE EGD ;  Surgeon: Linus Voss MD;  [facility];  Service: Bariatrics   • AK LAPS [address] [facility] LONGITUDINAL GASTRECTOMY N/A 2022    Procedure: Diagnostic Lap;  Extensive Lysis of Adhesions; LAPAROSCOPIC REVISION CONVERSION TO NOE-EN-Y GASTRIC BYPASS AND INTRAOPERATIVE EGD;  Surgeon: Efrain Mcintosh Kaiser Marino MD;  [facility];  Service: Bariatrics   • SPINAL CORD STIMULATOR TRIAL W/ LAMINOTOMY     • TONSILLECTOMY AND ADENOIDECTOMY     • TUBAL LIGATION     • UPPER GASTROINTESTINAL ENDOSCOPY     • VEIN LIGATION AND STRIPPING Right    • VULVA SURGERY  10/20/2017    BIOPSY   • WISDOM TOOTH EXTRACTION           Family History     Family History   Problem Relation Age of Onset   • Diabetes Mother    • Breast cancer Mother         >50   • BRCA1 Negative Mother    • BRCA2 Negative Mother    • Hyperlipidemia Mother         HYPERCHOLESTEROLEMIA   • Cancer Mother         Breast   • Diabetes Father    • Other Father         traumatic brain injury   • Prostate cancer Father    • Alcohol abuse Father         in remission   • Heart disease Father    • Neuropathy Father    • Hyperlipidemia Father    • Cancer Father         Prostate   • Hypertension Brother    • Diabetes Brother    • Other Brother         HYPERCHOLESTEROLEMIA   • Alcohol abuse Brother    • Depression Brother         attempted suicide   • Colon cancer Maternal Grandfather    • Heart attack Maternal Grandmother    • No Known Problems Paternal Grandmother         dad is adopted   • No Known Problems Paternal Grandfather         dad is adopted   • Diabetes Brother    • Alcohol abuse Brother    • Asthma Son    • No Known Problems Daughter    • Ovarian cancer Neg Hx    • Uterine cancer Neg Hx          Social History     Social History       Radiology

## 2023-01-13 ENCOUNTER — SOCIAL WORK (OUTPATIENT)
Dept: BEHAVIORAL/MENTAL HEALTH CLINIC | Facility: CLINIC | Age: 53
End: 2023-01-13

## 2023-01-13 DIAGNOSIS — F33.2 MDD (MAJOR DEPRESSIVE DISORDER), RECURRENT SEVERE, WITHOUT PSYCHOSIS (HCC): Primary | ICD-10-CM

## 2023-01-13 LAB — BACTERIA UR CULT: NORMAL

## 2023-01-14 NOTE — PSYCH
No Call  No Show  No Charge    Kahlilmadhavi Mancinigua no showed 01/13/23 appointment , staff called and left message to reschedule appointment     Treatment Plan not due at this session

## 2023-01-26 ENCOUNTER — TELEPHONE (OUTPATIENT)
Dept: BARIATRICS | Facility: CLINIC | Age: 53
End: 2023-01-26

## 2023-01-26 NOTE — TELEPHONE ENCOUNTER
Received call from Pt re: symptom  Pt reported feeling a "lump" slightly below one of her incisions  Pt stated the incision itself is healed but she is concerned by lump  Pt is post-op rev/ conv to [facility] with Dr Kiley Hua on 8/8/2022  Scheduled Pt for an appointment for an early 2rd post-op/ address concerns re: lump with PURNIMA for 1/27/2023  Pt verbalized understanding and was agreeable to plan

## 2023-01-27 ENCOUNTER — APPOINTMENT (OUTPATIENT)
Dept: LAB | Facility: AMBULARY SURGERY CENTER | Age: 53
End: 2023-01-27

## 2023-01-27 ENCOUNTER — TELEPHONE (OUTPATIENT)
Dept: CARDIOLOGY CLINIC | Facility: CLINIC | Age: 53
End: 2023-01-27

## 2023-01-27 ENCOUNTER — OFFICE VISIT (OUTPATIENT)
Dept: BARIATRICS | Facility: CLINIC | Age: 53
End: 2023-01-27

## 2023-01-27 ENCOUNTER — TELEMEDICINE (OUTPATIENT)
Dept: BEHAVIORAL/MENTAL HEALTH CLINIC | Facility: CLINIC | Age: 53
End: 2023-01-27

## 2023-01-27 VITALS
HEART RATE: 85 BPM | TEMPERATURE: 97 F | BODY MASS INDEX: 22.26 KG/M2 | SYSTOLIC BLOOD PRESSURE: 110 MMHG | DIASTOLIC BLOOD PRESSURE: 64 MMHG | HEIGHT: 66 IN | WEIGHT: 138.5 LBS

## 2023-01-27 DIAGNOSIS — Z98.84 BARIATRIC SURGERY STATUS: ICD-10-CM

## 2023-01-27 DIAGNOSIS — F43.10 POST TRAUMATIC STRESS DISORDER (PTSD): Chronic | ICD-10-CM

## 2023-01-27 DIAGNOSIS — F41.1 GENERALIZED ANXIETY DISORDER: Chronic | ICD-10-CM

## 2023-01-27 DIAGNOSIS — Z11.4 ENCOUNTER FOR SCREENING FOR HIV: ICD-10-CM

## 2023-01-27 DIAGNOSIS — E78.2 MIXED HYPERLIPIDEMIA: ICD-10-CM

## 2023-01-27 DIAGNOSIS — Z48.815 ENCOUNTER FOR SURGICAL AFTERCARE FOLLOWING SURGERY OF DIGESTIVE SYSTEM: Primary | ICD-10-CM

## 2023-01-27 DIAGNOSIS — I47.1 PAROXYSMAL SVT (SUPRAVENTRICULAR TACHYCARDIA) (HCC): ICD-10-CM

## 2023-01-27 DIAGNOSIS — F33.1 MODERATE EPISODE OF RECURRENT MAJOR DEPRESSIVE DISORDER (HCC): Primary | ICD-10-CM

## 2023-01-27 DIAGNOSIS — R10.9 ABDOMINAL PAIN: ICD-10-CM

## 2023-01-27 DIAGNOSIS — K91.2 POSTSURGICAL MALABSORPTION: ICD-10-CM

## 2023-01-27 DIAGNOSIS — E78.2 MIXED HYPERLIPIDEMIA: Primary | ICD-10-CM

## 2023-01-27 LAB
CHOLEST SERPL-MCNC: 207 MG/DL
HDLC SERPL-MCNC: 77 MG/DL
HIV 1+2 AB+HIV1 P24 AG SERPL QL IA: NORMAL
HIV 2 AB SERPL QL IA: NORMAL
HIV1 AB SERPL QL IA: NORMAL
HIV1 P24 AG SERPL QL IA: NORMAL
LDLC SERPL CALC-MCNC: 78 MG/DL (ref 0–100)
TRIGL SERPL-MCNC: 258 MG/DL

## 2023-01-27 RX ORDER — ATORVASTATIN CALCIUM 20 MG/1
20 TABLET, FILM COATED ORAL DAILY
COMMUNITY
End: 2023-01-27 | Stop reason: SDUPTHER

## 2023-01-27 RX ORDER — ATORVASTATIN CALCIUM 20 MG/1
20 TABLET, FILM COATED ORAL DAILY
Qty: 90 TABLET | Refills: 3 | Status: SHIPPED | OUTPATIENT
Start: 2023-01-27

## 2023-01-27 NOTE — PSYCH
Virtual Regular Visit    Verification of patient location:    Patient is located in the following state in which I hold an active license [place]    Assessment/Plan:    Problem List Items Addressed This Visit        Other    Post traumatic stress disorder (PTSD) (Chronic)    Generalized anxiety disorder (Chronic)    Recurrent major depressive disorder ([facility] [address] ) - Primary     Goals addressed in session: Goal 1      Reason for visit is   Chief Complaint   Patient presents with   • Virtual Regular Visit      Encounter provider LALY Sam    Provider located at [address] [place] [id number] [phone]    Recent Visits  No visits were found meeting these conditions  Showing recent visits within past 7 days and meeting all other requirements  Today's Visits  Date Type Provider Dept   01/27/23 [address], [address] today's visits and meeting all other requirements  Future Appointments  No visits were found meeting these conditions  Showing future appointments within next 150 days and meeting all other requirements     The patient was identified by name and date of birth  Yara Arenas was informed that this is a telemedicine visit and that [facility]  She agrees to proceed     My office door was closed  No one else was in the room  She acknowledged consent and understanding of privacy and security of the video platform  The patient has agreed to participate and understands they can discontinue the visit at any time  Patient is aware this is a billable service  Subjective  Yara Arenas is a 47 y o  female  HPI     Past Medical History:   Diagnosis Date   • ADHD (attention deficit hyperactivity disorder)    • Bulging lumbar disc    • Carpal tunnel syndrome     RIGHT    LAST ASSESSED: 12/7/16   • Chronic back pain     low   • Chronic pain disorder    • Colon polyps    • COVID-19     2021   • Depression    • Diabetes mellitus ([address] )    • GERD (gastroesophageal reflux disease)    • Gestational diabetes    • Hearing loss     left ear   • Heart disease     SVT   • History of pre-eclampsia    • Hyperlipidemia     Resolved with weight loss   • Hyponatremia 2020   • IBS (irritable bowel syndrome)    • Ileus ([address] )     LAST ASSESSED: 8/3/17   • Labial cyst     LAST ASSESSED: 16   • Myofascial pain     LAST ASSESSED: 16   • Obesity    • Ovarian cyst     LEFT  LAST ASSESSED: 16   • Panic attack    • Pneumonia    • Sacroiliitis (HCC)    • Seasonal allergies    • SVT (supraventricular tachycardia) (HCC)    • Thoracic outlet syndrome        • Trochanteric bursitis of both hips     LAST ASSESSED: 3/18/16   • Ulnar neuropathy at elbow    • Varicella    • Wears glasses        Past Surgical History:   Procedure Laterality Date   • BARIATRIC SURGERY  2022   • BILE DUCT EXPLORATION      ENDOSCOPIC REMOVAL OF STONES FROM BILIARY TRACT   •  SECTION      x3   • CHOLECYSTECTOMY     • COLONOSCOPY      2017-polyp, repeat    • DILATION AND CURETTAGE OF UTERUS     • ENDOMETRIAL ABLATION     • ERCP W/ SPHICTEROTOMY     • FIRST RIB REMOVAL      THORAX EXCISION OF FIRST RIB   • GASTRIC BYPASS  2022   • HYSTEROSCOPY     • NEUROPLASTY / TRANSPOSITION ULNAR NERVE AT ELBOW Right    • WY COLONOSCOPY FLX DX W/COLLJ SPEC WHEN PFRMD N/A 2017    Procedure: COLONOSCOPY;  Surgeon: Gustavo Marroquin MD;  [facility]: [facility]; Service: Gastroenterology   • WY ESOPHAGOGASTRODUODENOSCOPY TRANSORAL DIAGNOSTIC N/A 2017    Procedure: ESOPHAGOGASTRODUODENOSCOPY (EGD); Surgeon: Jessica Dhillon MD;  Location: [facility];   Service: Gastroenterology   • WY HYSTEROSCOPY BX ENDOMETRIUM&/POLYPC W/WO D&C N/A 10/20/2017    Procedure: DILATATION AND CURETTAGE (D&C) WITH HYSTEROSCOPY  REMOVAL VULVAR RT  LESION; Surgeon: Akhil Blevins MD;  [facility]: [facility];  Service: Gynecology   • ME ALDANA IMPLTJ NSTIM ELTRDS PLATE/PADDLE EDRL Left 01/29/2020    Procedure: Insertion of thoracic spinal cord stimulator electrode via laminotomy and placement of left buttock implantable pulse generator (NEUROMONITORING); Surgeon: Xander Jones MD;  [facility];  Service: Neurosurgery   • ME LAPS [address] LONGITUDINAL GASTRECTOMY N/A 02/06/2018    Procedure: GASTRECTOMY SLEEVE LAPAROSCOPIC; INTRAOPERATIVE EGD ;  Surgeon: Obdulia Ace MD;  [facility];  Service: Bariatrics   • ME LAPS [address] [facility] LONGITUDINAL GASTRECTOMY N/A 08/08/2022    Procedure: Diagnostic Lap; Extensive Lysis of Adhesions; LAPAROSCOPIC REVISION CONVERSION TO NOE-EN-Y GASTRIC BYPASS AND INTRAOPERATIVE EGD;  Surgeon: Obdulia Ace MD;  [facility];  Service: Bariatrics   • SPINAL CORD STIMULATOR TRIAL W/ LAMINOTOMY     • TONSILLECTOMY AND ADENOIDECTOMY     • TUBAL LIGATION     • UPPER GASTROINTESTINAL ENDOSCOPY     • VEIN LIGATION AND STRIPPING Right    • VULVA SURGERY  10/20/2017    BIOPSY   • WISDOM TOOTH EXTRACTION         Current Outpatient Medications   Medication Sig Dispense Refill   • atoMOXetine (STRATTERA) 60 mg capsule Take 1 capsule (60 mg total) by mouth daily 90 capsule 3   • atorvastatin (LIPITOR) 10 mg tablet TAKE ONE TABLET BY MOUTH DAILY 90 tablet 0   • calcium carbonate (OS-MELODY) 600 MG tablet Take 600 mg by mouth 2 (two) times a day with meals (Patient not taking: Reported on 1/27/2023)     • cholestyramine (QUESTRAN) 4 g packet Take 1 packet (4 g total) by mouth daily 30 each 3   • cyanocobalamin (VITAMIN B-12) 100 mcg tablet Take by mouth daily     • drospirenone-ethinyl estradiol (LIEN) 3-0 02 MG per tablet Take 1 tablet by mouth daily 84 tablet 4   • estradiol (ESTRACE VAGINAL) 0 1 mg/g vaginal cream One gram vaginally at night x 14 days then twice weekly for ongoing maintenance   42 5 g 2   • lamoTRIgine (LaMICtal) 150 MG tablet Take 1 tablet (150 mg total) by mouth 2 (two) times a day 180 tablet 3   • meclizine (ANTIVERT) 25 mg tablet Take 1 tablet (25 mg total) by mouth every 8 (eight) hours as needed for dizziness 30 tablet 0   • methocarbamol (ROBAXIN) 750 mg tablet Take 1 tablet (750 mg total) by mouth daily at bedtime as needed for muscle spasms 30 tablet 2   • montelukast (SINGULAIR) 10 mg tablet Take 1 tablet (10 mg total) by mouth daily at bedtime 90 tablet 2   • Multiple Vitamins-Minerals (MULTI COMPLETE PO) Take by mouth (Patient not taking: Reported on 1/27/2023)     • nitrofurantoin (MACROBID) 100 mg capsule Take 1 tablet PO PRN after sexual intercourse (Patient not taking: Reported on 1/27/2023) 30 capsule 1   • omeprazole (PriLOSEC) 20 mg delayed release capsule Take 1 capsule (20 mg total) by mouth daily 90 capsule 1   • predniSONE 10 mg tablet 4 tabs x 2 days, 3 tabs x 2 days, 2 tabs x 2 days, 1 tab x 2 days (Patient not taking: Reported on 1/27/2023) 20 tablet 0   • QUEtiapine (SEROquel) 50 mg tablet Take 1 5 tablets (75 mg total) by mouth daily at bedtime 135 tablet 3   • venlafaxine (EFFEXOR) 100 MG tablet Take 1 tablet (100 mg total) by mouth 2 (two) times a day Take 1 tablet (100mg) by mouth twice daily with 37 mg tablet for total of 275mg per day  180 tablet 3   • venlafaxine (EFFEXOR) 37 5 mg tablet Take 1 tablet (37 5 mg total) by mouth 2 (two) times a day Take 1 tablet (37 5mg) by mouth twice daily with 100mg tablet for total of 275mg per day  180 tablet 3     No current facility-administered medications for this visit  Allergies   Allergen Reactions   • Ibuprofen Other (See Comments)     Due to gastric sleeve -can only take for 5 days, then needs to stop       Review of Systems    Video Exam    There were no vitals filed for this visit  Physical Exam     Behavioral Health Psychotherapy Progress Note    Psychotherapy Provided: Individual Psychotherapy     1   Moderate episode of recurrent major depressive disorder ([facility] [address] )        2  Generalized anxiety disorder        3  Post traumatic stress disorder (PTSD)          Goals addressed in session: Goal 1     DATA: Melida Marte states that she had a medical appointment this morning  She states that she has "something in her side " She reports that she has to get an ultrasound to find out what is going on  Melida Marte states that, since her revision surgery, she has lost approximately 100 pounds  She states that, at work, she has been getting angry easily and has a low frustration tolerance  She states that she "got told about it" by her supervisor  She states that she feels that her role is disrespected by the nurses-- "They want to make me their bitch " Melida Marte states that she has also gotten talked to about verbalizing her "opinions" when she is in the break room  She states that her opinion was offensive to others  Melida Marte states that she has been under a great deal of stress lately  Melida Marte states that she has financial stress, related to credit card debt  She states that she has less money coming into the house-- child support stopped and there is no replacement of finances  She states that she plans to apply for disability for her son, but she recognizes that this process will take a long time  She has information about EAP benefits for debt relief and legal assistance  She states that her supervisor is aware of her mental health treatment  Melida Marte states that she missed her last appointment due to "a family emergency " She states that her father fell, and he was taken to the emergency room  She states that he refused to stay in the hospital  This situation adds additional stress to her situation  Melida Marte states that she has an upcoming neurology appointment (June)  She states that she feels that she has "brain fog" which impacts her ability to think clearly  She states that she also has some issues with her short-term memory   She states that these symptoms have been present since she had covid  She states that she wants to be able to return to school; however, she does not think that she will be able to pass her A&P course with her memory concerns  We discussed her social supports  She states that she has a support at work [employer]  We're like sisters  I can tell her anything ") She states that she does not talk about her issues when she is at home, because her  Is working 6-7 days per week  She states that she is asking for her  to support her with paying the bills  She states that this does not always work, and she tries not to talk about the stress with him  She states that they do not argue about the financial  We discussed the coping skills that she is using (even when she is at work)  She states that she has a coloring israel on her phone, which helps her to reduce her stress  We spent some time discussing Yessy's nutritional intake  She states that she does not want to lose any additional weight  She states that she has not been able to take vitamins, because they were making her sick  She states that she noted an increase in fatigue and irritability since she stopped taking the vitamins  We discussed how vulnerability factors can increase reactivity to external stressors  During this session, this clinician used the following therapeutic modalities: Client-centered Therapy, Dialectical Behavior Therapy, Mindfulness-based Strategies, Motivational Interviewing, Solution-Focused Therapy and Supportive Psychotherapy    Substance Abuse was not addressed during this session  If the client is diagnosed with a co-occurring substance use disorder, please indicate any changes in the frequency or amount of use: N/A  Stage of change for addressing substance use diagnoses: No substance use/Not applicable    ASSESSMENT:  Sherrie Prescott presents with a Euthymic/ normal and Dysthymic mood       her affect is Normal range and intensity, which is congruent, with her mood and the content of the session  The client has made progress on their goals  Cristy Metcalf presents with a minimal risk of suicide, minimal risk of self-harm, and minimal risk of harm to others  For any risk assessment that surpasses a "low" rating, a safety plan must be developed  A safety plan was indicated: no  If yes, describe in detail N/A    PLAN: Between sessions, Cristy Metcalf will follow up with her medical providers re: her nutrition and GI issues  She will continue to practice distraction skills to manage her irritability and anxiety  At the next session, the therapist will use Client-centered Therapy, Dialectical Behavior Therapy, Mindfulness-based Strategies, Motivational Interviewing, Solution-Focused Therapy and Supportive Psychotherapy to address her mood regulation, anxiety management, and relationship concerns  Behavioral Health Treatment Plan and Discharge Planning: Cristy Metcalf is aware of and agrees to continue to work on their treatment plan  They have identified and are working toward their discharge goals   yes    Visit start and stop times:    01/27/23  Start Time: 1218  Stop Time: 1258  Total Visit Time: 40 minutes

## 2023-01-27 NOTE — PROGRESS NOTES
Assessment/Plan:     Patient ID: Fredy Molina is a 47 y o  female  Bariatric Surgery Status    -s/p Rebecca-En-Y Gastric Bypass with Dr Brandie Hernandez on 08/08/2022 here for 3rd postop overall doing well  Her sxs of dizziness and syncope have now resolved that her BP med was removed- was having hypotension  Today she complains of a "lump" around her left sided mid abd incision  Not directly at the incision but around it  Pain almost feels like a "pulling" and she can feel it with pushing down on area  Sometimes worse with movement  On palpation today I could feel a small rounded area in her left abdomen which was tender to touch but otherwise no signs of infection around the area and no other associated symptoms  Discussed possible hernia vs scar tissue? Will order US abdomen to initial evaluation and pending results may need CT scan  Will call with results  SVT  - follows with cardiology, has loop recorder     · Continued/Maintain healthy weight loss with good nutrition intakes  · Adequate hydration with at least 64oz  fluid intake  · Follow diet as discussed  · Follow vitamin and mineral recommendations as reviewed with you  · Exercise as tolerated  · Colonoscopy referral made: utd  · Mammo: utd     · Follow-up in 6 months   We kindly ask that your arrive 15 minutes before your scheduled appointment time with your provider to allow our staff to room you, get your vital signs and update your chart  · Get lab work done   Please call the office if you need a script  It is recommended to check with your insurance BEFORE getting labs done to make sure they are covered by your policy  · Call our office if you have any problems with abdominal pain especially associated with fever, chills, nausea, vomiting or any other concerns  · All  Post-bariatric surgery patients should be aware that very small quantities of any alcohol can cause impairment and it is very possible not to feel the effect   The effect can be in the system for several hours  It is also a stomach irritant  · It is advised to AVOID alcohol, Nonsteroidal antiinflammatory drugs (NSAIDS) and nicotine of all forms   Any of these can cause stomach irritation/pain  · Discussed the effects of alcohol on a bariatric patient and the increased impairment risk  · Keep up the good work! Postsurgical Malabsorption   -At risk for malabsorption of vitamins/minerals secondary to malabsorption and restriction of intake from bariatric surgery  -Currently taking adequate postop bariatric surgery vitamin supplementation however states the roberta vitamins are making her nauseous  She wants to switch to regular vitamins so was given handout for regular vitamins   -Last set of bariatric labs completed on 11/2022 and showed wnl  -Patient received education about the importance of adhering to a lifelong supplementation regimen to avoid vitamin/mineral deficiencies      Diagnoses and all orders for this visit:    Encounter for surgical aftercare following surgery of digestive system    Bariatric surgery status  -     US abdomen complete; Future    Postsurgical malabsorption    Abdominal pain  -     US abdomen complete; Future    BMI 22 0-22 9, adult    Paroxysmal SVT (supraventricular tachycardia) (HCC)    Generalized anxiety disorder         Subjective:      Patient ID: Ximena Griffin is a 47 y o  female  -s/p Rebecca-En-Y Gastric Bypass with Dr Encarnacion Score on 08/08/2022 here for 3rd postop overall doing well     Initial:225  Current:138  EWL: (Weight loss is ahead of schedule at this post surgical period )  Bautista: current   Current BMI is Body mass index is 22 35 kg/m²      · Tolerating a regular diet-yes  · Eating at least 60 grams of protein per day-yes  · Following 30/60 minute rule with liquids- 30/30  · Drinking at least 64 ounces of fluid per day-yes  · Drinking carbonated beverages-no  · Sufficient exercise-just joined gym starting next week  · Using NSAIDs regularly-no  · Using nicotine-no  · Using alcohol-occasional /rare  · Supplements: Multivitamins and Calcium  - chewable iron and b12  · EWL is 127%, which places the patient ahead of schedule for expected post surgical weight loss at this time  The following portions of the patient's history were reviewed and updated as appropriate: allergies, current medications, past family history, past medical history, past social history, past surgical history and problem list     Review of Systems   Constitutional: Negative  Respiratory: Positive for shortness of breath (occasional at rest - feels may be anxiety related - continue cardiology follow up as well )  Gastrointestinal: Positive for abdominal pain and nausea (occasionally after taking hermedications )  Negative for vomiting  Neurological: Negative  Psychiatric/Behavioral: Negative  Objective:    /64   Pulse 85   Temp (!) 97 °F (36 1 °C) (Tympanic)   Ht 5' 6" (1 676 m)   Wt 62 8 kg (138 lb 8 oz)   LMP 01/07/2019 (Approximate)   BMI 22 35 kg/m²      Physical Exam  Vitals and nursing note reviewed  Constitutional:       Appearance: Normal appearance  She is obese  HENT:      Head: Normocephalic and atraumatic  Eyes:      Extraocular Movements: Extraocular movements intact  Pupils: Pupils are equal, round, and reactive to light  Cardiovascular:      Rate and Rhythm: Normal rate and regular rhythm  Pulmonary:      Effort: Pulmonary effort is normal       Breath sounds: Normal breath sounds  Abdominal:      General: Bowel sounds are normal       Tenderness: There is abdominal tenderness (light TTP left side abdomen, no specific mass appreciated just a "hard" area on the left side )  Musculoskeletal:         General: Normal range of motion  Cervical back: Normal range of motion  Skin:     General: Skin is warm and dry  Neurological:      General: No focal deficit present        Mental Status: She is alert and oriented to person, place, and time     Psychiatric:         Mood and Affect: Mood normal

## 2023-01-27 NOTE — TELEPHONE ENCOUNTER
----- Message from Elissa Nunez MD sent at 1/27/2023  1:30 PM EST -----  Your cholesterol levels are unfortunately still high after the weight loss, and you need to continue the statin therapy        If you are taking atorvastatin 10 mg currently, then I recommend increasing to 20 mg daily

## 2023-01-27 NOTE — TELEPHONE ENCOUNTER
I called the patient with lab results  She agreed to increase Atorvastatin to 20 mg daily  Will send updated script to her pharmacy

## 2023-02-06 ENCOUNTER — APPOINTMENT (EMERGENCY)
Dept: RADIOLOGY | Facility: HOSPITAL | Age: 53
End: 2023-02-06

## 2023-02-06 ENCOUNTER — HOSPITAL ENCOUNTER (EMERGENCY)
Facility: HOSPITAL | Age: 53
Discharge: HOME | End: 2023-02-06
Attending: EMERGENCY MEDICINE | Admitting: EMERGENCY MEDICINE

## 2023-02-06 VITALS
RESPIRATION RATE: 20 BRPM | DIASTOLIC BLOOD PRESSURE: 80 MMHG | BODY MASS INDEX: 21.79 KG/M2 | WEIGHT: 135 LBS | SYSTOLIC BLOOD PRESSURE: 116 MMHG | TEMPERATURE: 98.3 F | OXYGEN SATURATION: 100 % | HEART RATE: 123 BPM

## 2023-02-06 DIAGNOSIS — S93.401A RIGHT ANKLE SPRAIN: Primary | ICD-10-CM

## 2023-02-06 RX ORDER — FENTANYL CITRATE 50 UG/ML
50 INJECTION, SOLUTION INTRAMUSCULAR; INTRAVENOUS ONCE
Status: COMPLETED | OUTPATIENT
Start: 2023-02-06 | End: 2023-02-06

## 2023-02-06 RX ORDER — ACETAMINOPHEN 325 MG/1
975 TABLET ORAL ONCE
Status: COMPLETED | OUTPATIENT
Start: 2023-02-06 | End: 2023-02-06

## 2023-02-06 RX ORDER — TRAMADOL HYDROCHLORIDE 50 MG/1
50 TABLET ORAL EVERY 6 HOURS PRN
Qty: 20 TABLET | Refills: 0 | Status: SHIPPED | OUTPATIENT
Start: 2023-02-06 | End: 2023-02-16

## 2023-02-06 RX ORDER — FENTANYL CITRATE 50 UG/ML
75 INJECTION, SOLUTION INTRAMUSCULAR; INTRAVENOUS ONCE
Status: COMPLETED | OUTPATIENT
Start: 2023-02-06 | End: 2023-02-06

## 2023-02-06 RX ADMIN — FENTANYL CITRATE 75 MCG: 50 INJECTION INTRAMUSCULAR; INTRAVENOUS at 11:46

## 2023-02-06 RX ADMIN — ACETAMINOPHEN 975 MG: 325 TABLET ORAL at 12:57

## 2023-02-06 RX ADMIN — FENTANYL CITRATE 50 MCG: 50 INJECTION INTRAMUSCULAR; INTRAVENOUS at 12:58

## 2023-02-06 NOTE — DISCHARGE INSTRUCTIONS
Keep splint in place and use crutches for ambulation  Apply ice pack for 15 to 20-minute intervals over the next couple of days to minimize swelling and pain  Elevate leg when able above heart level  You may take acetaminophen 975 or 1000 mg orally every 6 hours to help with pain  You may additionally take tramadol 50 mg every 6 hours if needed for moderate to severe pain  Schedule follow-up appointment with either orthopedics or podiatry for reevaluation/further treatment

## 2023-02-06 NOTE — ED PROVIDER NOTES
History  Chief Complaint   Patient presents with   • Ankle Injury     Patient fell down 2 steps and rolled right ankle feeling crack  States "My whole foot hurts" Deneis head strike/thinners/asa     69-year-old female presents to the emergency department for evaluation with right foot and ankle pain  She was descending steps in her home and missed stepped thinking she was at the bottom  She fell the last 2 and appreciated a popping/cracking in the right ankle  She experienced immediate discomfort but was able to briefly bear weight on the extremity  Pain has greatly intensified despite application of ice to the area  She describes tingling in the entire right lower leg/foot  No history of prior injury to this area  She denies having injured anything else in the fall  No head strike/LOC  Unable to take NSAIDs secondary to history of gastric bypass  Prior to Admission Medications   Prescriptions Last Dose Informant Patient Reported? Taking?    Multiple Vitamins-Minerals (MULTI COMPLETE PO)   Yes No   Sig: Take by mouth   Patient not taking: Reported on 1/27/2023   QUEtiapine (SEROquel) 50 mg tablet   No No   Sig: Take 1 5 tablets (75 mg total) by mouth daily at bedtime   atoMOXetine (STRATTERA) 60 mg capsule   No No   Sig: Take 1 capsule (60 mg total) by mouth daily   atorvastatin (LIPITOR) 20 mg tablet   No No   Sig: Take 1 tablet (20 mg total) by mouth daily   calcium carbonate (OS-MELODY) 600 MG tablet   Yes No   Sig: Take 600 mg by mouth 2 (two) times a day with meals   Patient not taking: Reported on 1/27/2023   cholestyramine (QUESTRAN) 4 g packet   No No   Sig: Take 1 packet (4 g total) by mouth daily   cyanocobalamin (VITAMIN B-12) 100 mcg tablet   Yes No   Sig: Take by mouth daily   drospirenone-ethinyl estradiol (LIEN) 3-0 02 MG per tablet   No No   Sig: Take 1 tablet by mouth daily   estradiol (ESTRACE VAGINAL) 0 1 mg/g vaginal cream   No No   Sig: One gram vaginally at night x 14 days then twice weekly for ongoing maintenance  lamoTRIgine (LaMICtal) 150 MG tablet   No No   Sig: Take 1 tablet (150 mg total) by mouth 2 (two) times a day   meclizine (ANTIVERT) 25 mg tablet   No No   Sig: Take 1 tablet (25 mg total) by mouth every 8 (eight) hours as needed for dizziness   methocarbamol (ROBAXIN) 750 mg tablet   No No   Sig: Take 1 tablet (750 mg total) by mouth daily at bedtime as needed for muscle spasms   montelukast (SINGULAIR) 10 mg tablet   No No   Sig: Take 1 tablet (10 mg total) by mouth daily at bedtime   nitrofurantoin (MACROBID) 100 mg capsule   No No   Sig: Take 1 tablet PO PRN after sexual intercourse   Patient not taking: Reported on 2023   omeprazole (PriLOSEC) 20 mg delayed release capsule   No No   Sig: Take 1 capsule (20 mg total) by mouth daily   predniSONE 10 mg tablet   No No   Si tabs x 2 days, 3 tabs x 2 days, 2 tabs x 2 days, 1 tab x 2 days   Patient not taking: Reported on 2023   venlafaxine (EFFEXOR) 100 MG tablet   No No   Sig: Take 1 tablet (100 mg total) by mouth 2 (two) times a day Take 1 tablet (100mg) by mouth twice daily with 37 mg tablet for total of 275mg per day  venlafaxine (EFFEXOR) 37 5 mg tablet   No No   Sig: Take 1 tablet (37 5 mg total) by mouth 2 (two) times a day Take 1 tablet (37 5mg) by mouth twice daily with 100mg tablet for total of 275mg per day  Facility-Administered Medications: None       Past Medical History:   Diagnosis Date   • ADHD (attention deficit hyperactivity disorder)    • Bulging lumbar disc    • Carpal tunnel syndrome     RIGHT    LAST ASSESSED: 16   • Chronic back pain     low   • Chronic pain disorder    • Colon polyps    • COVID-19     2021   • Depression    • Diabetes mellitus ([address] )    • GERD (gastroesophageal reflux disease)    • Gestational diabetes    • Hearing loss     left ear   • Heart disease     SVT   • History of pre-eclampsia    • Hyperlipidemia     Resolved with weight loss   • Hyponatremia 2020   • IBS (irritable bowel syndrome)    • Ileus ([address] )     LAST ASSESSED: 8/3/17   • Labial cyst     LAST ASSESSED: 16   • Myofascial pain     LAST ASSESSED: 16   • Obesity    • Ovarian cyst     LEFT  LAST ASSESSED: 16   • Panic attack    • Pneumonia    • Sacroiliitis (HCC)    • Seasonal allergies    • SVT (supraventricular tachycardia) (HCC)    • Thoracic outlet syndrome        • Trochanteric bursitis of both hips     LAST ASSESSED: 3/18/16   • Ulnar neuropathy at elbow    • Varicella    • Wears glasses        Past Surgical History:   Procedure Laterality Date   • BARIATRIC SURGERY  2022   • BILE DUCT EXPLORATION      ENDOSCOPIC REMOVAL OF STONES FROM BILIARY TRACT   •  SECTION      x3   • CHOLECYSTECTOMY     • COLONOSCOPY      2017-polyp, repeat    • DILATION AND CURETTAGE OF UTERUS     • ENDOMETRIAL ABLATION     • ERCP W/ SPHICTEROTOMY     • FIRST RIB REMOVAL      THORAX EXCISION OF FIRST RIB   • GASTRIC BYPASS  2022   • HYSTEROSCOPY     • NEUROPLASTY / TRANSPOSITION ULNAR NERVE AT ELBOW Right    • NC COLONOSCOPY FLX DX W/COLLJ SPEC WHEN PFRMD N/A 2017    Procedure: COLONOSCOPY;  Surgeon: Jacqueline Alan MD;  [facility]; Service: Gastroenterology   • NC ESOPHAGOGASTRODUODENOSCOPY TRANSORAL DIAGNOSTIC N/A 2017    Procedure: ESOPHAGOGASTRODUODENOSCOPY (EGD); Surgeon: Keyon Kaye MD;  [facility]; Service: Gastroenterology   • NC HYSTEROSCOPY BX ENDOMETRIUM&/POLYPC W/WO D&C N/A 10/20/2017    Procedure: DILATATION AND CURETTAGE (D&C) WITH HYSTEROSCOPY  REMOVAL VULVAR RT  LESION;  Surgeon: Precious Esquivel MD;  [facility];  Service: Gynecology   • NC ALDANA Jeana Devine NSTIM ELTRDS PLATE/PADDLE EDRL Left 2020    Procedure: Insertion of thoracic spinal cord stimulator electrode via laminotomy and placement of left buttock implantable pulse generator (NEUROMONITORING);   Surgeon: Massimo Reeves MD;  [facility];  Service: Neurosurgery   • NE LAPS [address] LONGITUDINAL GASTRECTOMY N/A 02/06/2018    Procedure: GASTRECTOMY SLEEVE LAPAROSCOPIC; INTRAOPERATIVE EGD ;  Surgeon: Vandana Khan MD;  [facility];  Service: Bariatrics   • NE LAPS [address] [facility] LONGITUDINAL GASTRECTOMY N/A 08/08/2022    Procedure: Diagnostic Lap; Extensive Lysis of Adhesions; LAPAROSCOPIC REVISION CONVERSION TO NOE-EN-Y GASTRIC BYPASS AND INTRAOPERATIVE EGD;  Surgeon: Vandana Khan MD;  [facility];  Service: Bariatrics   • SPINAL CORD STIMULATOR TRIAL W/ LAMINOTOMY     • TONSILLECTOMY AND ADENOIDECTOMY     • TUBAL LIGATION     • UPPER GASTROINTESTINAL ENDOSCOPY     • VEIN LIGATION AND STRIPPING Right    • VULVA SURGERY  10/20/2017    BIOPSY   • WISDOM TOOTH EXTRACTION         Family History   Problem Relation Age of Onset   • Diabetes Mother    • Breast cancer Mother         >50   • BRCA1 Negative Mother    • BRCA2 Negative Mother    • Hyperlipidemia Mother         HYPERCHOLESTEROLEMIA   • Cancer Mother         Breast   • Diabetes Father    • Other Father         traumatic brain injury   • Prostate cancer Father    • Alcohol abuse Father         in remission   • Heart disease Father    • Neuropathy Father    • Hyperlipidemia Father    • Cancer Father         Prostate   • Hypertension Brother    • Diabetes Brother    • Other Brother         HYPERCHOLESTEROLEMIA   • Alcohol abuse Brother    • Depression Brother         attempted suicide   • Colon cancer Maternal Grandfather    • Heart attack Maternal Grandmother    • No Known Problems Paternal Grandmother         dad is adopted   • No Known Problems Paternal Grandfather         dad is adopted   • Diabetes Brother    • Alcohol abuse Brother    • Asthma Son    • No Known Problems Daughter    • Ovarian cancer Neg Hx    • Uterine cancer Neg Hx      I have reviewed and agree with the history as documented      E-Cigarette/Vaping   • E-Cigarette Use Never User E-Cigarette/Vaping Substances   • Nicotine No    • THC No    • CBD No    • Flavoring No    • Other No    • Unknown No      Social History     Tobacco Use   • Smoking status: Former     Packs/day: 1 00     Years: 15      Pack years: 15      Types: Cigarettes     Quit date: 2013     Years since quittin 7   • Smokeless tobacco: Never   Vaping Use   • Vaping Use: Never used   Substance Use Topics   • Alcohol use: Not Currently     Alcohol/week: 1 0 standard drink     Types: 1 Glasses of wine per week     Comment: Occs -twice monthly   • Drug use: No       Review of Systems   Musculoskeletal: Negative for back pain  No pain in the right hip or knee  Neurological: Negative for headaches  Physical Exam  Physical Exam  Vitals and nursing note reviewed  Constitutional:       General: She is in acute distress ( Very uncomfortable appearing)  HENT:      Mouth/Throat:      Mouth: Mucous membranes are moist    Eyes:      Extraocular Movements: Extraocular movements intact  Conjunctiva/sclera: Conjunctivae normal    Cardiovascular:      Rate and Rhythm: Tachycardia present  Pulmonary:      Effort: Pulmonary effort is normal  No respiratory distress  Musculoskeletal:      Comments: No right hip, thigh or knee tenderness  Discomfort is present circumferentially on the right ankle through midfoot  Distal foot is nontender  Full range of motion of toes  Sensation grossly intact  +2 right PT and DP pulses  Tenderness is maximal over the lateral malleolus and just inferior to this  There is mild tenderness anteriorly, posteriorly and moderate tenderness medially  Mild swelling appreciated  Neurological:      Mental Status: She is alert and oriented to person, place, and time     Psychiatric:         Mood and Affect: Mood normal          Behavior: Behavior normal          Vital Signs  ED Triage Vitals   Temperature Pulse Respirations Blood Pressure SpO2   23 1109 23 1109 02/06/23 1109 02/06/23 1109 02/06/23 1109   98 3 °F (36 8 °C) (!) 123 20 116/80 100 %      Temp Source Heart Rate Source Patient Position - Orthostatic VS BP Location FiO2 (%)   02/06/23 1109 02/06/23 1109 02/06/23 1109 02/06/23 1109 --   Oral Monitor Sitting Right arm       Pain Score       02/06/23 1146       10 - Worst Possible Pain           Vitals:    02/06/23 1109   BP: 116/80   Pulse: (!) 123   Patient Position - Orthostatic VS: Sitting         Visual Acuity      ED Medications  Medications   fentanyl citrate (PF) 100 MCG/2ML 75 mcg (75 mcg Intravenous Given 2/6/23 1146)   acetaminophen (TYLENOL) tablet 975 mg (975 mg Oral Given 2/6/23 1257)   fentanyl citrate (PF) 100 MCG/2ML 50 mcg (50 mcg Intravenous Given 2/6/23 1258)       Diagnostic Studies  Results Reviewed     None                 XR ankle 3+ views RIGHT   ED Interpretation by Staci Sylvester MD (02/06 1146)   No fracture  Normal alignment  Soft tissue swelling  Final Result by Vicky Joe MD (02/06 1507)      No acute osseous abnormality  Workstation performed: [id number]         XR foot 3+ views RIGHT   ED Interpretation by Staci Sylvester MD (02/06 1149)   No fracture  Final Result by Vicky Joe MD (02/06 1507)      No acute osseous abnormality  Workstation performed: [id number]         XR tibia fibula 2 views RIGHT   ED Interpretation by Staci Sylvester MD (02/06 1145)   No fracture  Normal alignment      Final Result by Vicky Joe MD (02/06 1507)      No acute osseous abnormality  Workstation performed: [id number]                    Procedures  Procedures         ED Course  ED Course as of 02/06/23 1853   Mon Feb 06, 2023   1131 Patient with severe pain In the right ankle following fall with impact onto this  She describes feeling cracking/popping sensation at time of injury as well as with subsequent brief weightbearing    She is exceptionally uncomfortable  Concern for fracture  reviewed options for analgesic  IV fentanyl ordered  X-ray technician at bedside performing imaging   Rocco Murphy extremely uncomfortable with exquisite lateralAnkle tenderness following fentanyl  Additional dose ordered  Reviewed x-ray results  No fracture identified  She is uncomfortable though able to fully range ankle  Achilles tendon discretely palpated -not consistent with full tear  1246 Splint has been applied  Reviewed supportive care at home and follow-up necessary  MDM    Disposition  Final diagnoses:   Right ankle sprain     Time reflects when diagnosis was documented in both MDM as applicable and the Disposition within this note     Time User Action Codes Description Comment    2/6/2023 11:50 AM Almer Payment A Add [S93 401A] Right ankle sprain       ED Disposition     ED Disposition   Discharge    Condition   Stable    Date/Time   Mon Feb 6, 2023 12:36 PM    Comment   Clearence Zeus discharge to home/self care                 Follow-up Information     Follow up With Specialties Details Why Contact Info Additional [address] [facility] Schedule an appointment as soon as possible for a visit   [address] [id number] [phone] [address] [facility] [address], [address] [place], [place], [id number] [phone]    [facility] Schedule an appointment as soon as possible for a visit   [address] [address]  [address] [facility], [facility] [place], [place], [address] [facility]          Discharge Medication List as of 2/6/2023 12:48 PM      START taking these medications    Details   traMADol (ULTRAM) 50 mg tablet Take 1 tablet (50 mg total) by mouth every 6 (six) hours as needed for moderate pain for up to 10 days, Starting Mon 2/6/2023, Until Thu 2/16/2023 at 2359, Normal         CONTINUE these medications which have NOT CHANGED    Details   atoMOXetine (STRATTERA) 60 mg capsule Take 1 capsule (60 mg total) by mouth daily, Starting Thu 6/16/2022, Normal      atorvastatin (LIPITOR) 20 mg tablet Take 1 tablet (20 mg total) by mouth daily, Starting Fri 1/27/2023, Normal      calcium carbonate (OS-MELODY) 600 MG tablet Take 600 mg by mouth 2 (two) times a day with meals, Historical Med      cholestyramine (QUESTRAN) 4 g packet Take 1 packet (4 g total) by mouth daily, Starting Thu 1/6/2022, Normal      cyanocobalamin (VITAMIN B-12) 100 mcg tablet Take by mouth daily, Historical Med      drospirenone-ethinyl estradiol (LIEN) 3-0 02 MG per tablet Take 1 tablet by mouth daily, Starting Wed 12/14/2022, Normal      estradiol (ESTRACE VAGINAL) 0 1 mg/g vaginal cream One gram vaginally at night x 14 days then twice weekly for ongoing maintenance , Normal      lamoTRIgine (LaMICtal) 150 MG tablet Take 1 tablet (150 mg total) by mouth 2 (two) times a day, Starting Fri 12/16/2022, Until Sun 1/15/2023, Normal      meclizine (ANTIVERT) 25 mg tablet Take 1 tablet (25 mg total) by mouth every 8 (eight) hours as needed for dizziness, Starting Mon 7/25/2022, Normal      methocarbamol (ROBAXIN) 750 mg tablet Take 1 tablet (750 mg total) by mouth daily at bedtime as needed for muscle spasms, Starting Fri 1/28/2022, Until 2359, Normal      montelukast (SINGULAIR) 10 mg tablet Take 1 tablet (10 mg total) by mouth daily at bedtime, Starting Thu 1/5/2023, Normal      Multiple Vitamins-Minerals (MULTI COMPLETE PO) Take by mouth, Historical Med      nitrofurantoin (MACROBID) 100 mg capsule Take 1 tablet PO PRN after sexual intercourse, Normal      omeprazole (PriLOSEC) 20 mg delayed release capsule Take 1 capsule (20 mg total) by mouth daily, Starting Wed 11/16/2022, Normal      predniSONE 10 mg tablet 4 tabs x 2 days, 3 tabs x 2 days, 2 tabs x 2 days, 1 tab x 2 days, Normal      QUEtiapine (SEROquel) 50 mg tablet Take 1 5 tablets (75 mg total) by mouth daily at bedtime, Starting Thu 6/16/2022, Normal      !! venlafaxine (EFFEXOR) 100 MG tablet Take 1 tablet (100 mg total) by mouth 2 (two) times a day Take 1 tablet (100mg) by mouth twice daily with 37 mg tablet for total of 275mg per day , Starting Thu 6/16/2022, Normal      !! venlafaxine (EFFEXOR) 37 5 mg tablet Take 1 tablet (37 5 mg total) by mouth 2 (two) times a day Take 1 tablet (37 5mg) by mouth twice daily with 100mg tablet for total of 275mg per day , Starting Thu 6/16/2022, Normal       !! - Potential duplicate medications found  Please discuss with provider  No discharge procedures on file      PDMP Review       Value Time User    PDMP Reviewed  Yes 2/6/2023 11:50 AM Buck Palacios MD          ED Provider  Electronically Signed by           Buck Palacios MD  02/06/23 0497

## 2023-02-06 NOTE — Clinical Note
Da Chung was seen and treated in our emergency department on 2/6/2023  No work until cleared by Family Doctor/Orthopedics        Diagnosis:     Reyna Whitlock    She may return on this date: If you have any questions or concerns, please don't hesitate to call        Mark Carrion MD    ______________________________           _______________          _______________  Hospital Representative                              Date                                Time

## 2023-02-07 ENCOUNTER — TELEPHONE (OUTPATIENT)
Dept: OBGYN CLINIC | Facility: CLINIC | Age: 53
End: 2023-02-07

## 2023-02-07 ENCOUNTER — OFFICE VISIT (OUTPATIENT)
Dept: OBGYN CLINIC | Facility: CLINIC | Age: 53
End: 2023-02-07

## 2023-02-07 VITALS
SYSTOLIC BLOOD PRESSURE: 159 MMHG | HEART RATE: 91 BPM | HEIGHT: 66 IN | DIASTOLIC BLOOD PRESSURE: 94 MMHG | BODY MASS INDEX: 21.69 KG/M2 | WEIGHT: 135 LBS

## 2023-02-07 DIAGNOSIS — M25.571 ACUTE RIGHT ANKLE PAIN: ICD-10-CM

## 2023-02-07 DIAGNOSIS — S93.491A SPRAIN OF ANTERIOR TALOFIBULAR LIGAMENT OF RIGHT ANKLE, INITIAL ENCOUNTER: Primary | ICD-10-CM

## 2023-02-07 NOTE — PATIENT INSTRUCTIONS
Over the next few days, you should do the following: Ice the area for 15 minutes and then remove ice for at least 15 minutes  Try to do this as much as you can throughout the day (at least 4-5 x per day)  Ice serves as an anti-inflammatory and will help with recovery by reducing pain and swelling  Elevate the area above the level of your heart as much as you can  This will help decrease swelling and pain  Relative rest- avoid activities that cause discomfort/pain in that area  You can use compression or ACE wrap to help with swelling

## 2023-02-07 NOTE — PROGRESS NOTES
1  Sprain of anterior talofibular ligament of right ankle, initial encounter  Cam Boot      2  Acute right ankle pain          Orders Placed This Encounter   Procedures   • Cam Boot        Impression:  Right ankle pain likely secondary to lower ankle ligament complex sprain with medial bone contusions and date of injury was 2/6/2023  We will try to promote early weightbearing with a controlled ankle motion boot  She can progress to full weightbearing as tolerated  She can continue to ice and elevate along with Tylenol  She should continue to avoid anti-inflammatories due to previous gastric surgery  I will see her back in 10 days to reassess  The patient works as an ED tech at [employer]  Imaging Studies (I personally reviewed images in PACS and report):  Right ankle, foot and tibia/fibula x-rays most recent to this encounter reviewed  These images show no acute osseous abnormalities  There is a tiny plantar calcaneal spur  The patient's pertinent emergency department/urgent care/referring physician's notes were reviewed  CPT 48618  A splint/brace/DME from another clinician was removed today  Return in about 10 days (around 2/17/2023)  Patient is in agreement with the above plan  HPI:  Rajani Fernando is a 47 y o  female  who presents for evaluation of   Chief Complaint   Patient presents with   • Right Ankle - Pain     Pt reports she rolled her right ankle coming down the stairs and heard a snap  Pt reports pain at worst 7/10  She is taking Tramadol prescribed by ED for pain  Onset/Mechanism: 2/6/2023-the patient rolled her ankle on the last 2 steps  It was an inversion ankle injury  Location: Lateral ankle  Radiation: Top of the foot and up the front of the leg  Provocative: It stays painful  Better when propped up  Severity: Severe when foot is down  Associated Symptoms: As above    Treatment so far: Seen in the ED and needed pain control with fentanyl and was prescribed tramadol  Following history reviewed and updated:  Past Medical History:   Diagnosis Date   • ADHD (attention deficit hyperactivity disorder)    • Bulging lumbar disc    • Carpal tunnel syndrome     RIGHT  LAST ASSESSED: 16   • Chronic back pain     low   • Chronic pain disorder    • Colon polyps    • COVID-19     2021   • Depression    • Diabetes mellitus ([facility] [address] )    • GERD (gastroesophageal reflux disease)    • Gestational diabetes    • Hearing loss     left ear   • Heart disease     SVT   • History of pre-eclampsia    • Hyperlipidemia     Resolved with weight loss   • Hyponatremia 2020   • IBS (irritable bowel syndrome)    • Ileus ([facility] [address] )     LAST ASSESSED: 8/3/17   • Labial cyst     LAST ASSESSED: 16   • Myofascial pain     LAST ASSESSED: 16   • Obesity    • Ovarian cyst     LEFT  LAST ASSESSED: 16   • Panic attack    • Pneumonia    • Sacroiliitis (HCC)    • Seasonal allergies    • SVT (supraventricular tachycardia) (HCC)    • Thoracic outlet syndrome        • Trochanteric bursitis of both hips     LAST ASSESSED: 3/18/16   • Ulnar neuropathy at elbow    • Varicella    • Wears glasses      Past Surgical History:   Procedure Laterality Date   • BARIATRIC SURGERY  2022   • BILE DUCT EXPLORATION      ENDOSCOPIC REMOVAL OF STONES FROM BILIARY TRACT   •  SECTION      x3   • CHOLECYSTECTOMY     • COLONOSCOPY      2017-polyp, repeat    • DILATION AND CURETTAGE OF UTERUS     • ENDOMETRIAL ABLATION     • ERCP W/ SPHICTEROTOMY     • FIRST RIB REMOVAL      THORAX EXCISION OF FIRST RIB   • GASTRIC BYPASS  2022   • HYSTEROSCOPY     • NEUROPLASTY / TRANSPOSITION ULNAR NERVE AT ELBOW Right 2011   • ND COLONOSCOPY FLX DX W/COLLJ SPEC WHEN PFRMD N/A 2017    Procedure: COLONOSCOPY;  Surgeon: Juno Diallo MD;  [facility];   Service: Gastroenterology   • ND ESOPHAGOGASTRODUODENOSCOPY TRANSORAL DIAGNOSTIC N/A 2017    Procedure: ESOPHAGOGASTRODUODENOSCOPY (EGD); Surgeon: Kathy Gomez MD;  [facility]; Service: Gastroenterology   • AK HYSTEROSCOPY BX ENDOMETRIUM&/POLYPC W/WO D&C N/A 10/20/2017    Procedure: DILATATION AND CURETTAGE (D&C) WITH HYSTEROSCOPY  REMOVAL VULVAR RT  LESION;  Surgeon: Jazmin Beltran MD;  [facility];  Service: Gynecology   • AK ALDANA Colie Harder NSTIM ELTRDS PLATE/PADDLE EDRL Left 2020    Procedure: Insertion of thoracic spinal cord stimulator electrode via laminotomy and placement of left buttock implantable pulse generator (NEUROMONITORING); Surgeon: Noe Osman MD;  [facility];  Service: Neurosurgery   • AK LAPS [address] LONGITUDINAL GASTRECTOMY N/A 2018    Procedure: GASTRECTOMY SLEEVE LAPAROSCOPIC; INTRAOPERATIVE EGD ;  Surgeon: Juan Rust MD;  [facility];  Service: Bariatrics   • AK LAPS [address] [facility] LONGITUDINAL GASTRECTOMY N/A 2022    Procedure: Diagnostic Lap;  Extensive Lysis of Adhesions; LAPAROSCOPIC REVISION CONVERSION TO NOE-EN-Y GASTRIC BYPASS AND INTRAOPERATIVE EGD;  Surgeon: Juan Rust MD;  [facility];  Service: Bariatrics   • SPINAL CORD STIMULATOR TRIAL W/ LAMINOTOMY     • TONSILLECTOMY AND ADENOIDECTOMY     • TUBAL LIGATION     • UPPER GASTROINTESTINAL ENDOSCOPY     • VEIN LIGATION AND STRIPPING Right    • VULVA SURGERY  10/20/2017    BIOPSY   • WISDOM TOOTH EXTRACTION       Social History   Social History     Substance and Sexual Activity   Alcohol Use Not Currently   • Alcohol/week: 1 0 standard drink   • Types: 1 Glasses of wine per week    Comment: Occs -twice monthly     Social History     Substance and Sexual Activity   Drug Use No     Social History     Tobacco Use   Smoking Status Former   • Packs/day: 1 00   • Years: 15 00   • Pack years: 15    • Types: Cigarettes   • Quit date: 2013   • Years since quittin 7   Smokeless Tobacco Never     Family History   Problem Relation Age of Onset   • Diabetes Mother    • Breast cancer Mother         >50   • BRCA1 Negative Mother    • BRCA2 Negative Mother    • Hyperlipidemia Mother         HYPERCHOLESTEROLEMIA   • Cancer Mother         Breast   • Diabetes Father    • Other Father         traumatic brain injury   • Prostate cancer Father    • Alcohol abuse Father         in remission   • Heart disease Father    • Neuropathy Father    • Hyperlipidemia Father    • Cancer Father         Prostate   • Hypertension Brother    • Diabetes Brother    • Other Brother         HYPERCHOLESTEROLEMIA   • Alcohol abuse Brother    • Depression Brother         attempted suicide   • Colon cancer Maternal Grandfather    • Heart attack Maternal Grandmother    • No Known Problems Paternal Grandmother         dad is adopted   • No Known Problems Paternal Grandfather         dad is adopted   • Diabetes Brother    • Alcohol abuse Brother    • Asthma Son    • No Known Problems Daughter    • Ovarian cancer Neg Hx    • Uterine cancer Neg Hx      Allergies   Allergen Reactions   • Ibuprofen Other (See Comments)     Due to gastric sleeve -can only take for 5 days, then needs to stop        Constitutional:  /94   Pulse 91   Ht 5' 6" (1 676 m)   Wt 61 2 kg (135 lb)   LMP 01/07/2019 (Approximate)   BMI 21 79 kg/m²    General: NAD  Eyes: Anicteric sclerae  Neck: Supple  Lungs: Unlabored breathing  Cardiovascular: No lower extremity edema  Skin: Intact without erythema  Neurologic: Sensation intact to light touch  Psychiatric: Mood and affect are appropriate  Right Ankle Exam     Tenderness   The patient is experiencing tenderness in the ATF, lateral malleolus and deltoid  Swelling: mild    Range of Motion   Dorsiflexion: abnormal   Plantar flexion: normal   Eversion: abnormal   Inversion: normal     Other   Erythema: absent  Scars: absent  Sensation: normal  Pulse: present     Comments:  Normal Hopkin's and Kleiger's test   No plantar bruising/swelling and no pain with midfoot compression    Sensory-motor testing is JOAQUIN PATEL                 Procedures

## 2023-02-07 NOTE — LETTER
To Whom It May Concern,    Steve Wilkins is under my professional care  She was seen in my office on February 7, 2023  She is out of work until 2/17/2023  She will be reassessed on or just prior to that date  If you have any questions or concerns, please do not hesitate to call          Sincerely,          Ginny Marie, DO

## 2023-02-10 ENCOUNTER — HOSPITAL ENCOUNTER (OUTPATIENT)
Dept: ULTRASOUND IMAGING | Facility: HOSPITAL | Age: 53
Discharge: HOME | End: 2023-02-10

## 2023-02-10 DIAGNOSIS — Z98.84 BARIATRIC SURGERY STATUS: ICD-10-CM

## 2023-02-10 DIAGNOSIS — R10.9 ABDOMINAL PAIN: ICD-10-CM

## 2023-02-20 ENCOUNTER — TELEPHONE (OUTPATIENT)
Dept: BARIATRICS | Facility: CLINIC | Age: 53
End: 2023-02-20

## 2023-02-20 ENCOUNTER — OFFICE VISIT (OUTPATIENT)
Dept: OBGYN CLINIC | Facility: CLINIC | Age: 53
End: 2023-02-20

## 2023-02-20 VITALS
BODY MASS INDEX: 21.69 KG/M2 | HEIGHT: 66 IN | SYSTOLIC BLOOD PRESSURE: 119 MMHG | HEART RATE: 80 BPM | DIASTOLIC BLOOD PRESSURE: 84 MMHG | WEIGHT: 135 LBS

## 2023-02-20 DIAGNOSIS — M25.571 ACUTE RIGHT ANKLE PAIN: Primary | ICD-10-CM

## 2023-02-20 NOTE — PROGRESS NOTES
1  Acute right ankle pain          No orders of the defined types were placed in this encounter  Impression:  Patient is here in follow up of right ankle pain likely secondary to lower ankle ligament complex sprain with medial bone contusions and date of injury was 2/6/2023  The patient is doing very well  She presents in normal footwear today  She will start a home exercise program   She will continue to wear supportive footwear  She is cleared for all work  I will see her back if needed      Imaging Studies (I personally reviewed images in PACS and report):  Right ankle, foot and tibia/fibula x-rays most recent to this encounter reviewed  These images show no acute osseous abnormalities  There is a tiny plantar calcaneal spur  Return if symptoms worsen or fail to improve  Patient is in agreement with the above plan  HPI:  Cindy Isidro is a 47 y o  female  who presents in follow up  Here for   Chief Complaint   Patient presents with   • Right Ankle - Follow-up       Since last visit: See above  Following history reviewed and updated:  Past Medical History:   Diagnosis Date   • ADHD (attention deficit hyperactivity disorder)    • Bulging lumbar disc    • Carpal tunnel syndrome     RIGHT  LAST ASSESSED: 12/7/16   • Chronic back pain     low   • Chronic pain disorder    • Colon polyps    • COVID-19     12/2021   • Depression    • Diabetes mellitus ([address] )    • GERD (gastroesophageal reflux disease)    • Gestational diabetes    • Hearing loss     left ear   • Heart disease     SVT   • History of pre-eclampsia    • Hyperlipidemia     Resolved with weight loss   • Hyponatremia 12/12/2020   • IBS (irritable bowel syndrome)    • Ileus ([address] )     LAST ASSESSED: 8/3/17   • Labial cyst     LAST ASSESSED: 4/21/16   • Myofascial pain     LAST ASSESSED: 4/12/16   • Obesity    • Ovarian cyst     LEFT   LAST ASSESSED: 9/2/16   • Panic attack    • Pneumonia    • Sacroiliitis (HCC)    • Seasonal allergies    • SVT (supraventricular tachycardia) (HCC)    • Thoracic outlet syndrome        • Trochanteric bursitis of both hips     LAST ASSESSED: 3/18/16   • Ulnar neuropathy at elbow    • Varicella    • Wears glasses      Past Surgical History:   Procedure Laterality Date   • BARIATRIC SURGERY  2022   • BILE DUCT EXPLORATION      ENDOSCOPIC REMOVAL OF STONES FROM BILIARY TRACT   •  SECTION      x3   • CHOLECYSTECTOMY     • COLONOSCOPY      2017-polyp, repeat    • DILATION AND CURETTAGE OF UTERUS     • ENDOMETRIAL ABLATION     • ERCP W/ SPHICTEROTOMY     • FIRST RIB REMOVAL      THORAX EXCISION OF FIRST RIB   • GASTRIC BYPASS  2022   • HYSTEROSCOPY     • NEUROPLASTY / TRANSPOSITION ULNAR NERVE AT ELBOW Right    • SC COLONOSCOPY FLX DX W/COLLJ SPEC WHEN PFRMD N/A 2017    Procedure: COLONOSCOPY;  Surgeon: Florencia Huffman MD;  [facility]; Service: Gastroenterology   • SC ESOPHAGOGASTRODUODENOSCOPY TRANSORAL DIAGNOSTIC N/A 2017    Procedure: ESOPHAGOGASTRODUODENOSCOPY (EGD); Surgeon: Walker Rutherford MD;  [facility]; Service: Gastroenterology   • SC HYSTEROSCOPY BX ENDOMETRIUM&/POLYPC W/WO D&C N/A 10/20/2017    Procedure: DILATATION AND CURETTAGE (D&C) WITH HYSTEROSCOPY  REMOVAL VULVAR RT  LESION;  Surgeon: Vu Guillory MD;  [facility];  Service: Gynecology   • SC ALDANA Patricio Jaimes NSTIM ELTRDS PLATE/PADDLE EDRL Left 2020    Procedure: Insertion of thoracic spinal cord stimulator electrode via laminotomy and placement of left buttock implantable pulse generator (NEUROMONITORING);   Surgeon: Edmond Wolfe MD;  [facility];  Service: Neurosurgery   • SC LAPS [address] LONGITUDINAL GASTRECTOMY N/A 2018    Procedure: GASTRECTOMY SLEEVE LAPAROSCOPIC; INTRAOPERATIVE EGD ;  Surgeon: Jun Jordan MD;  [facility];  Service: Bariatrics   • SC LAPS [address] [facility] LONGITUDINAL GASTRECTOMY N/A 2022    Procedure: Diagnostic Lap; Extensive Lysis of Adhesions; LAPAROSCOPIC REVISION CONVERSION TO NOE-EN-Y GASTRIC BYPASS AND INTRAOPERATIVE EGD;  Surgeon: Lauro Montenegro MD;  [facility];  Service: Bariatrics   • SPINAL CORD STIMULATOR TRIAL W/ LAMINOTOMY     • TONSILLECTOMY AND ADENOIDECTOMY     • TUBAL LIGATION     • UPPER GASTROINTESTINAL ENDOSCOPY     • VEIN LIGATION AND STRIPPING Right    • VULVA SURGERY  10/20/2017    BIOPSY   • WISDOM TOOTH EXTRACTION       Social History   Social History     Substance and Sexual Activity   Alcohol Use Not Currently   • Alcohol/week: 1 0 standard drink   • Types: 1 Glasses of wine per week    Comment: Occs -twice monthly     Social History     Substance and Sexual Activity   Drug Use No     Social History     Tobacco Use   Smoking Status Former   • Packs/day: 1 00   • Years: 15    • Pack years: 15 00   • Types: Cigarettes   • Quit date: 2013   • Years since quittin 8   Smokeless Tobacco Never     Family History   Problem Relation Age of Onset   • Diabetes Mother    • Breast cancer Mother         >50   • BRCA1 Negative Mother    • BRCA2 Negative Mother    • Hyperlipidemia Mother         HYPERCHOLESTEROLEMIA   • Cancer Mother         Breast   • Diabetes Father    • Other Father         traumatic brain injury   • Prostate cancer Father    • Alcohol abuse Father         in remission   • Heart disease Father    • Neuropathy Father    • Hyperlipidemia Father    • Cancer Father         Prostate   • Hypertension Brother    • Diabetes Brother    • Other Brother         HYPERCHOLESTEROLEMIA   • Alcohol abuse Brother    • Depression Brother         attempted suicide   • Colon cancer Maternal Grandfather    • Heart attack Maternal Grandmother    • No Known Problems Paternal Grandmother         dad is adopted   • No Known Problems Paternal Grandfather         dad is adopted   • Diabetes Brother    • Alcohol abuse Brother    • Asthma Son    • No Known Problems Daughter    • Ovarian cancer Neg Hx • Uterine cancer Neg Hx      Allergies   Allergen Reactions   • Ibuprofen Other (See Comments)     Due to gastric sleeve -can only take for 5 days, then needs to stop        Constitutional:  /84 (BP Location: Right arm, Patient Position: Sitting, Cuff Size: Adult)   Pulse 80   Ht 5' 6" (1 676 m)   Wt 61 2 kg (135 lb)   LMP 01/07/2019 (Approximate)   BMI 21 79 kg/m²    General: NAD  Eyes: Clear sclerae  ENT: No inflammation, lesion, or mass of lips  No tracheal deviation  Musculoskeletal: As mentioned below  Integumentary: No visible rashes or skin lesions  Pulmonary/Chest: Effort normal  No respiratory distress  Neuro: CN's grossly intact, LAMA  Psych: Normal affect and judgement  Vascular: WWP  Right Ankle Exam     Tenderness   The patient is experiencing tenderness in the ATF  Swelling: mild    Range of Motion   The patient has normal right ankle ROM  Other   Erythema: absent  Scars: absent  Sensation: normal  Pulse: present     Comments:  Normal Hopkin's and Kleiger's test   No plantar bruising/swelling and no pain with midfoot compression  Sensory-motor testing is WNL  WWP                 Procedures

## 2023-02-20 NOTE — TELEPHONE ENCOUNTER
I called patient to let her know her US abdomen was normal  US was orginally ordered to evaluate for pt c/o of lump and hardness in her LLQ  Of note, her most recent CT scan was 8/2022 showing no significant abnormality  Pt feels her sxs are made worse by being constipated  She states yesterday she was able to move her bowel 2-3 times and did not experience any discomfort  She feels this may be the cause  We discussed possibility of repeating her CT scan vs trialing improved bowel habits to start and then re-evaluating her sxs  I have instructed pt to use stool softeners as needed, increase fiber intake for now to improve her bowel habits and to notify me in about 1-2 weeks of how she is feeling   She verbalizes understanding and states she ilan let me know

## 2023-02-20 NOTE — LETTER
To Whom It May Concern,    Elisabeth Gilman is under my professional care  She was seen in my office on February 20, 2023  She is cleared for all work  Please excuse Elisabethmadhavi Gilman from work missed on this appointment date  If you have any questions or concerns, please do not hesitate to call          Sincerely,          Ginny Marie, DO

## 2023-02-22 ENCOUNTER — TELEPHONE (OUTPATIENT)
Dept: PAIN MEDICINE | Facility: CLINIC | Age: 53
End: 2023-02-22

## 2023-02-22 NOTE — TELEPHONE ENCOUNTER
Caller: Virginia Segundo    Doctor: Lucero Trent    Reason for call: patient is ready to schedule next procedure     Call back#: [phone]

## 2023-02-22 NOTE — TELEPHONE ENCOUNTER
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, she was pushing and another staff member was pulling, she pulled her Rt LB/sacral area  She said she can feel it swelling up, it josselin tight and she feels a lump there  She denies any leg pain  When she sits it is throbbing  She has tylenol, (1) oxycodone from the past and a lot of tramadol on hand  She asked if she could take the oxycodone pill today and use the tramadol starting tomorrow for her pain  Told pt yes to take her last oxycodone today and use the tramadol BID tomorrow if needed and may supplement with tylenol as needed  Told to use ice/heat as needed  Pt said she can't take NSIDS due to previous gastric sx  Pt asking about getting an injection for this pain  Told pt I would forward her request to   Pt said when calling back to pls call after 11:15 am 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

## 2023-02-23 ENCOUNTER — TELEPHONE (OUTPATIENT)
Dept: PSYCHIATRY | Facility: CLINIC | Age: 53
End: 2023-02-23

## 2023-02-23 NOTE — TELEPHONE ENCOUNTER
Please have her scheduled for reevaluation with Precious Contreras determine if it is her SI or coming from the lumbar spine

## 2023-02-23 NOTE — TELEPHONE ENCOUNTER
DARYL for [facility] following up from her message  Encouraged her to call nursing line and leave specific information that we can pass on to Dr Nina Vick   Provided nursing number

## 2023-02-23 NOTE — TELEPHONE ENCOUNTER
Ricky Rivera requested a call back to discuss medication  They can be reached at P# [phone]  Thank you

## 2023-02-24 ENCOUNTER — TELEPHONE (OUTPATIENT)
Dept: BEHAVIORAL/MENTAL HEALTH CLINIC | Facility: CLINIC | Age: 53
End: 2023-02-24

## 2023-02-24 ENCOUNTER — OFFICE VISIT (OUTPATIENT)
Dept: PAIN MEDICINE | Facility: CLINIC | Age: 53
End: 2023-02-24

## 2023-02-24 VITALS
SYSTOLIC BLOOD PRESSURE: 130 MMHG | BODY MASS INDEX: 22.18 KG/M2 | WEIGHT: 138 LBS | RESPIRATION RATE: 18 BRPM | HEIGHT: 66 IN | DIASTOLIC BLOOD PRESSURE: 85 MMHG | HEART RATE: 82 BPM

## 2023-02-24 DIAGNOSIS — M54.16 LUMBAR RADICULOPATHY: ICD-10-CM

## 2023-02-24 DIAGNOSIS — M79.18 MYOFASCIAL PAIN SYNDROME: ICD-10-CM

## 2023-02-24 DIAGNOSIS — M51.16 INTERVERTEBRAL DISC DISORDER WITH RADICULOPATHY OF LUMBAR REGION: ICD-10-CM

## 2023-02-24 DIAGNOSIS — G89.4 CHRONIC PAIN SYNDROME: Primary | ICD-10-CM

## 2023-02-24 RX ORDER — METHOCARBAMOL 750 MG/1
750 TABLET, FILM COATED ORAL
Qty: 30 TABLET | Refills: 2 | Status: SHIPPED | OUTPATIENT
Start: 2023-02-24 | End: 2023-05-25

## 2023-02-24 RX ORDER — METHYLPREDNISOLONE 4 MG/1
TABLET ORAL
Qty: 1 EACH | Refills: 0 | Status: SHIPPED | OUTPATIENT
Start: 2023-02-24 | End: 2023-03-02

## 2023-02-24 NOTE — TELEPHONE ENCOUNTER
Patient called in requesting refill for effexor 100mg   Writer advised patient refill was dispensed to pharmacy on 2/22/23

## 2023-02-24 NOTE — TELEPHONE ENCOUNTER
Patient is calling regarding cancelling an appointment      Date/Time: 2/24/23 2pm    Reason: unable    Patient was rescheduled: YES [] NO [x]  If yes, when was Patient reschedule for:     Patient requesting call back to reschedule: YES [] NO [x]

## 2023-02-24 NOTE — PROGRESS NOTES
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 reposition them and noticed worsening bilateral low back pain, right worse than left  At this time, the patient is experiencing an acute flareup of chronic lumbar radiculopathy  I will provide her a Medrol Dosepak to complete  I instructed patient to follow directions on the package  If patient continues to experience back pain following completion of tapering steroids, can consider repeating right L5-S1 TFESI  Patient will call with an update  My impressions and treatment recommendations were discussed in detail with the patient who verbalized understanding and had no further questions  Discharge instructions were provided  I personally saw and examined the patient and I agree with the above discussed plan of care  No orders of the defined types were placed in this encounter      New Medications Ordered This Visit   Medications   • methylPREDNISolone 4 MG tablet therapy pack     Sig: Use as directed on package     Dispense:  1 each     Refill:  0   • methocarbamol (ROBAXIN) 750 mg tablet     Sig: Take 1 tablet (750 mg total) by mouth daily at bedtime as needed for muscle spasms     Dispense:  30 tablet     Refill:  2       History of Present Illness:  Ricky Rivera is a 47 y o  female with a history of chronic pain syndrome secondary to low back pain, midback pain, lumbar intervertebral disc disorder with radiculopathy, lumbar spondylosis with Abbott spinal cord stimulator, trochanteric bursitis, and myofascial pain  She was last seen on 5/13/2022 where she underwent a right L5-S1 TFESI which provided her greater than 50% relief of her pain  She presents to the office with worsening bilateral low back pain, right worse than left that started 2 days ago where 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 and has been experiencing back pain since  She states her pain is intermittent  She rates the quality of her pain as throbbing and is currently rating her pain a 7/10 on a numeric scale  Current pain medications include Tylenol which is providing little relief  She is unable to take NSAIDs due to history of weight loss surgery  I have personally reviewed and/or updated the patient's past medical history, past surgical history, family history, social history, current medications, allergies, and vital signs today  Review of Systems   Respiratory: Negative for shortness of breath  Cardiovascular: Negative for chest pain  Gastrointestinal: Negative for constipation, diarrhea, nausea and vomiting  Musculoskeletal: Positive for back pain, gait problem and joint swelling  Negative for arthralgias and myalgias  Skin: Negative for rash  Neurological: Negative for dizziness, seizures and weakness  All other systems reviewed and are negative        Patient Active Problem List   Diagnosis   • IBS (irritable bowel syndrome)   • Mixed hyperlipidemia   • GERD (gastroesophageal reflux disease)   • Chronic back pain   • Cervical radiculopathy   • Hepatic steatosis   • Pulmonary nodule seen on imaging study   • Type 2 diabetes mellitus without complication, without long-term current use of insulin (HCC)   • S/P laparoscopic sleeve gastrectomy   • Obesity   • Postsurgical malabsorption   • Lumbar radiculopathy   • Intervertebral disc disorder with radiculopathy of lumbar region   • Post traumatic stress disorder (PTSD) • Recurrent major depressive disorder ([facility])   • Generalized anxiety disorder   • Chronic pain syndrome   • Other chronic pain   • Trochanteric bursitis, left hip   • Paroxysmal SVT (supraventricular tachycardia) ([facility])   • ADHD (attention deficit hyperactivity disorder), inattentive type   • Seasonal allergies   • Benign paroxysmal positional vertigo   • Bariatric surgery status   • Dizziness   • Hypotension   • MDD (major depressive disorder), recurrent severe, without psychosis ([facility] [address] )   • Opioid type dependence, continuous ([facility])   • Acute right ankle pain   • Sprain of anterior talofibular ligament of right ankle       Past Medical History:   Diagnosis Date   • ADHD (attention deficit hyperactivity disorder)    • Bulging lumbar disc    • Carpal tunnel syndrome     RIGHT  LAST ASSESSED: 16   • Chronic back pain     low   • Chronic pain disorder    • Colon polyps    • COVID-19     2021   • Depression    • Diabetes mellitus ([facility] [address] )    • GERD (gastroesophageal reflux disease)    • Gestational diabetes    • Hearing loss     left ear   • Heart disease     SVT   • History of pre-eclampsia    • Hyperlipidemia     Resolved with weight loss   • Hyponatremia 2020   • IBS (irritable bowel syndrome)    • Ileus ([facility] )     LAST ASSESSED: 8/3/17   • Labial cyst     LAST ASSESSED: 16   • Myofascial pain     LAST ASSESSED: 16   • Obesity    • Ovarian cyst     LEFT   LAST ASSESSED: 16   • Panic attack    • Pneumonia    • Sacroiliitis ([facility])    • Seasonal allergies    • SVT (supraventricular tachycardia) ([facility])    • Thoracic outlet syndrome        • Trochanteric bursitis of both hips     LAST ASSESSED: 3/18/16   • Ulnar neuropathy at elbow    • Varicella    • Wears glasses        Past Surgical History:   Procedure Laterality Date   • BARIATRIC SURGERY  2022   • BILE DUCT EXPLORATION      ENDOSCOPIC REMOVAL OF STONES FROM BILIARY TRACT   •  SECTION      x3   • CHOLECYSTECTOMY     • COLONOSCOPY 2017-polyp, repeat 2022   • DILATION AND CURETTAGE OF UTERUS     • ENDOMETRIAL ABLATION     • ERCP W/ SPHICTEROTOMY     • FIRST RIB REMOVAL      THORAX EXCISION OF FIRST RIB   • GASTRIC BYPASS  8/08/2022   • HYSTEROSCOPY     • NEUROPLASTY / TRANSPOSITION ULNAR NERVE AT ELBOW Right 2011   • OK COLONOSCOPY FLX DX W/COLLJ SPEC WHEN PFRMD N/A 05/30/2017    Procedure: COLONOSCOPY;  Surgeon: Zoraida Araujo MD;  [facility]: [facility]; Service: Gastroenterology   • OK ESOPHAGOGASTRODUODENOSCOPY TRANSORAL DIAGNOSTIC N/A 09/14/2017    Procedure: ESOPHAGOGASTRODUODENOSCOPY (EGD); Surgeon: Gerald Olivo MD;  Location: [facility]; Service: Gastroenterology   • OK HYSTEROSCOPY BX ENDOMETRIUM&/POLYPC W/WO D&C N/A 10/20/2017    Procedure: DILATATION AND CURETTAGE (D&C) WITH HYSTEROSCOPY  REMOVAL VULVAR RT  LESION;  Surgeon: Faustina Tejada MD;  [facility];  Service: Gynecology   • OK [place]DANA [address] NSTIM ELTRDS PLATE/PADDLE EDRL Left 01/29/2020    Procedure: Insertion of thoracic spinal cord stimulator electrode via laminotomy and placement of left buttock implantable pulse generator (NEUROMONITORING); Surgeon: Jewell Knutson MD;  [facility];  Service: Neurosurgery   • OK LAPS [address] LONGITUDINAL GASTRECTOMY N/A 02/06/2018    Procedure: GASTRECTOMY SLEEVE LAPAROSCOPIC; INTRAOPERATIVE EGD ;  Surgeon: Tesha Gtz MD;  [facility]: [facility];  Service: Bariatrics   • OK LAPS [address] [facility] LONGITUDINAL GASTRECTOMY N/A 08/08/2022    Procedure: Diagnostic Lap;  Extensive Lysis of Adhesions; LAPAROSCOPIC REVISION CONVERSION TO NOE-EN-Y GASTRIC BYPASS AND INTRAOPERATIVE EGD;  Surgeon: Tesha Gtz MD;  [facility];  Service: Bariatrics   • SPINAL CORD STIMULATOR TRIAL W/ LAMINOTOMY     • TONSILLECTOMY AND ADENOIDECTOMY     • TUBAL LIGATION     • UPPER GASTROINTESTINAL ENDOSCOPY     • VEIN LIGATION AND STRIPPING Right    • VULVA SURGERY  10/20/2017    BIOPSY   • WISDOM TOOTH EXTRACTION Family History   Problem Relation Age of Onset   • Diabetes Mother    • Breast cancer Mother         >50   • BRCA1 Negative Mother    • BRCA2 Negative Mother    • Hyperlipidemia Mother         HYPERCHOLESTEROLEMIA   • Cancer Mother         Breast   • Diabetes Father    • Other Father         traumatic brain injury   • Prostate cancer Father    • Alcohol abuse Father         in remission   • Heart disease Father    • Neuropathy Father    • Hyperlipidemia Father    • Cancer Father         Prostate   • Hypertension Brother    • Diabetes Brother    • Other Brother         HYPERCHOLESTEROLEMIA   • Alcohol abuse Brother    • Depression Brother         attempted suicide   • Colon cancer Maternal Grandfather    • Heart attack Maternal Grandmother    • No Known Problems Paternal Grandmother         dad is adopted   • No Known Problems Paternal Grandfather         dad is adopted   • Diabetes Brother    • Alcohol abuse Brother    • Asthma Son    • No Known Problems Daughter    • Ovarian cancer Neg Hx    • Uterine cancer Neg Hx        Social History     Occupational History   • Occupation: ER TECH     Employer: [employer]   Tobacco Use   • Smoking status: Former     Packs/day: 1 00     Years: 15      Pack years: 15      Types: Cigarettes     Quit date: 2013     Years since quittin 8   • Smokeless tobacco: Never   Vaping Use   • Vaping Use: Never used   Substance and Sexual Activity   • Alcohol use: Not Currently     Alcohol/week: 1 0 standard drink     Types: 1 Glasses of wine per week     Comment: Occs -twice monthly   • Drug use: No   • Sexual activity: Yes     Partners: Male     Birth control/protection: OCP, Post-menopausal     Comment: lifetime partners: 6; current partner        Current Outpatient Medications on File Prior to Visit   Medication Sig   • atoMOXetine (STRATTERA) 60 mg capsule Take 1 capsule (60 mg total) by mouth daily   • atorvastatin (LIPITOR) 20 mg tablet Take 1 tablet (20 mg total) by mouth daily   • cholestyramine (QUESTRAN) 4 g packet Take 1 packet (4 g total) by mouth daily   • cyanocobalamin (VITAMIN B-12) 100 mcg tablet Take by mouth daily   • drospirenone-ethinyl estradiol (LIEN) 3-0 02 MG per tablet Take 1 tablet by mouth daily   • estradiol (ESTRACE VAGINAL) 0 1 mg/g vaginal cream One gram vaginally at night x 14 days then twice weekly for ongoing maintenance  • lamoTRIgine (LaMICtal) 150 MG tablet Take 1 tablet (150 mg total) by mouth 2 (two) times a day   • meclizine (ANTIVERT) 25 mg tablet Take 1 tablet (25 mg total) by mouth every 8 (eight) hours as needed for dizziness   • montelukast (SINGULAIR) 10 mg tablet Take 1 tablet (10 mg total) by mouth daily at bedtime   • omeprazole (PriLOSEC) 20 mg delayed release capsule Take 1 capsule (20 mg total) by mouth daily   • QUEtiapine (SEROquel) 50 mg tablet Take 1 5 tablets (75 mg total) by mouth daily at bedtime   • venlafaxine (EFFEXOR) 100 MG tablet Take 1 tablet (100 mg total) by mouth 2 (two) times a day Take 1 tablet (100mg) by mouth twice daily with 37 mg tablet for total of 275mg per day  • venlafaxine (EFFEXOR) 37 5 mg tablet Take 1 tablet (37 5 mg total) by mouth 2 (two) times a day Take 1 tablet (37 5mg) by mouth twice daily with 100mg tablet for total of 275mg per day     • [DISCONTINUED] methocarbamol (ROBAXIN) 750 mg tablet Take 1 tablet (750 mg total) by mouth daily at bedtime as needed for muscle spasms   • calcium carbonate (OS-MELODY) 600 MG tablet Take 600 mg by mouth 2 (two) times a day with meals (Patient not taking: Reported on 1/27/2023)   • Multiple Vitamins-Minerals (MULTI COMPLETE PO) Take by mouth (Patient not taking: Reported on 1/27/2023)   • nitrofurantoin (MACROBID) 100 mg capsule Take 1 tablet PO PRN after sexual intercourse (Patient not taking: Reported on 1/27/2023)   • predniSONE 10 mg tablet 4 tabs x 2 days, 3 tabs x 2 days, 2 tabs x 2 days, 1 tab x 2 days (Patient not taking: Reported on 1/27/2023)     No current facility-administered medications on file prior to visit  Allergies   Allergen Reactions   • Ibuprofen Other (See Comments)     Due to gastric sleeve -can only take for 5 days, then needs to stop       Physical Exam:    /85   Pulse 82   Resp 18   Ht 5' 6" (1 676 m)   Wt 62 6 kg (138 lb)   LMP 01/07/2019 (Approximate)   BMI 22 27 kg/m²     Constitutional:normal, well developed, well nourished, alert, in no distress and non-toxic and no overt pain behavior    Eyes:anicteric  HEENT:grossly intact  Neck:supple, symmetric, trachea midline and no masses   Pulmonary:even and unlabored  Cardiovascular:No edema or pitting edema present  Skin:Normal without rashes or lesions and well hydrated  Psychiatric:Mood and affect appropriate  Neurologic:Cranial Nerves II-XII grossly intact  Musculoskeletal:normal     Lumbar Spine Exam    Appearance:  Normal lordosis  Palpation/Tenderness:  left lumbar paraspinal tenderness  right lumbar paraspinal tenderness  Sensory:  no sensory deficits noted  Range of Motion:  Flexion:  Minimally limited  with pain  Extension:  Minimally limited  with pain  Motor Strength:  Left hip flexion:  5/5  Right hip flexion:  5/5  Left knee flexion:  5/5  Left knee extension:  5/5  Right knee flexion:  5/5  Right knee extension:  5/5  Left foot dorsiflexion:  5/5  Left foot plantar flexion:  5/5  Right foot dorsiflexion:  5/5  Right foot plantar flexion:  5/5      Imaging

## 2023-02-25 ENCOUNTER — HOSPITAL ENCOUNTER (EMERGENCY)
Facility: HOSPITAL | Age: 53
Discharge: HOME | End: 2023-02-25
Attending: EMERGENCY MEDICINE

## 2023-02-25 ENCOUNTER — APPOINTMENT (EMERGENCY)
Dept: RADIOLOGY | Facility: HOSPITAL | Age: 53
End: 2023-02-25

## 2023-02-25 VITALS
OXYGEN SATURATION: 100 % | WEIGHT: 138 LBS | RESPIRATION RATE: 18 BRPM | SYSTOLIC BLOOD PRESSURE: 135 MMHG | TEMPERATURE: 98.7 F | DIASTOLIC BLOOD PRESSURE: 75 MMHG | HEART RATE: 81 BPM | BODY MASS INDEX: 22.27 KG/M2

## 2023-02-25 DIAGNOSIS — R00.0 SINUS TACHYCARDIA: ICD-10-CM

## 2023-02-25 DIAGNOSIS — R00.2 PALPITATIONS: Primary | ICD-10-CM

## 2023-02-25 LAB
ALBUMIN SERPL BCP-MCNC: 4.2 G/DL (ref 3.5–5)
ALP SERPL-CCNC: 68 U/L (ref 34–104)
ALT SERPL W P-5'-P-CCNC: 33 U/L (ref 7–52)
ANION GAP SERPL CALCULATED.3IONS-SCNC: 11 MMOL/L (ref 4–13)
AST SERPL W P-5'-P-CCNC: 32 U/L (ref 13–39)
ATRIAL RATE: 158 BPM
BASOPHILS # BLD AUTO: 0.01 THOUSANDS/ÂΜL (ref 0–0.1)
BASOPHILS NFR BLD AUTO: 0 % (ref 0–1)
BILIRUB SERPL-MCNC: 0.26 MG/DL (ref 0.2–1)
BNP SERPL-MCNC: 40 PG/ML (ref 0–100)
BUN SERPL-MCNC: 15 MG/DL (ref 5–25)
CALCIUM SERPL-MCNC: 9.5 MG/DL (ref 8.4–10.2)
CARDIAC TROPONIN I PNL SERPL HS: 3 NG/L
CHLORIDE SERPL-SCNC: 103 MMOL/L (ref 96–108)
CO2 SERPL-SCNC: 25 MMOL/L (ref 21–32)
CREAT SERPL-MCNC: 0.75 MG/DL (ref 0.6–1.3)
EOSINOPHIL # BLD AUTO: 0.01 THOUSAND/ÂΜL (ref 0–0.61)
EOSINOPHIL NFR BLD AUTO: 0 % (ref 0–6)
ERYTHROCYTE [DISTWIDTH] IN BLOOD BY AUTOMATED COUNT: 12.6 % (ref 11.6–15.1)
GFR SERPL CREATININE-BSD FRML MDRD: 91 ML/MIN/1.73SQ M
GLUCOSE SERPL-MCNC: 128 MG/DL (ref 65–140)
HCT VFR BLD AUTO: 44.1 % (ref 34.8–46.1)
HGB BLD-MCNC: 13.9 G/DL (ref 11.5–15.4)
IMM GRANULOCYTES # BLD AUTO: 0.02 THOUSAND/UL (ref 0–0.2)
IMM GRANULOCYTES NFR BLD AUTO: 0 % (ref 0–2)
LYMPHOCYTES # BLD AUTO: 1.75 THOUSANDS/ÂΜL (ref 0.6–4.47)
LYMPHOCYTES NFR BLD AUTO: 23 % (ref 14–44)
MAGNESIUM SERPL-MCNC: 2.3 MG/DL (ref 1.9–2.7)
MCH RBC QN AUTO: 29.5 PG (ref 26.8–34.3)
MCHC RBC AUTO-ENTMCNC: 31.5 G/DL (ref 31.4–37.4)
MCV RBC AUTO: 94 FL (ref 82–98)
MONOCYTES # BLD AUTO: 0.5 THOUSAND/ÂΜL (ref 0.17–1.22)
MONOCYTES NFR BLD AUTO: 7 % (ref 4–12)
NEUTROPHILS # BLD AUTO: 5.44 THOUSANDS/ÂΜL (ref 1.85–7.62)
NEUTS SEG NFR BLD AUTO: 70 % (ref 43–75)
NRBC BLD AUTO-RTO: 0 /100 WBCS
P AXIS: 78 DEGREES
PLATELET # BLD AUTO: 284 THOUSANDS/UL (ref 149–390)
PMV BLD AUTO: 10.2 FL (ref 8.9–12.7)
POTASSIUM SERPL-SCNC: 4.5 MMOL/L (ref 3.5–5.3)
PR INTERVAL: 184 MS
PROT SERPL-MCNC: 7.6 G/DL (ref 6.4–8.4)
QRS AXIS: 197 DEGREES
QRSD INTERVAL: 82 MS
QT INTERVAL: 188 MS
QTC INTERVAL: 304 MS
RBC # BLD AUTO: 4.71 MILLION/UL (ref 3.81–5.12)
SODIUM SERPL-SCNC: 139 MMOL/L (ref 135–147)
T WAVE AXIS: 82 DEGREES
TSH SERPL DL<=0.05 MIU/L-ACNC: 1.45 UIU/ML (ref 0.45–4.5)
VENTRICULAR RATE: 158 BPM
WBC # BLD AUTO: 7.73 THOUSAND/UL (ref 4.31–10.16)

## 2023-02-25 RX ORDER — METOPROLOL TARTRATE 5 MG/5ML
2.5 INJECTION INTRAVENOUS ONCE
Status: COMPLETED | OUTPATIENT
Start: 2023-02-25 | End: 2023-02-25

## 2023-02-25 RX ADMIN — SODIUM CHLORIDE 1000 ML: 0.9 INJECTION, SOLUTION INTRAVENOUS at 14:33

## 2023-02-25 RX ADMIN — SODIUM CHLORIDE 1000 ML: 0.9 INJECTION, SOLUTION INTRAVENOUS at 12:27

## 2023-02-25 RX ADMIN — METOROPROLOL TARTRATE 2.5 MG: 5 INJECTION, SOLUTION INTRAVENOUS at 14:29

## 2023-02-25 NOTE — ED PROVIDER NOTES
n  History  Chief Complaint   Patient presents with   • Rapid Heart Rate     Pt c/o rapid heart rate     79-year-old female comes in for evaluation of heart palpitations  Patient states she has a known history of SVT  She states when she ambulates she feels like her heart is beginning to race  She became concerned today when she felt like she was going to pass out with an episode  Patient denies any true chest pain nausea vomiting or diarrhea  She states symptoms resolve when she sits and rests  Had been on propranolol in the past but after having gastric bypass and losing weight he was dropping her blood pressure too low so the medication was stopped  Patient does not think she has been drinking fluid is concerned about dehydration      History provided by:  Patient   used: No    Rapid Heart Rate  Palpitations quality:  Fast  Onset quality:  Sudden  Timing:  Constant  Progression:  Worsening  Chronicity:  Recurrent  Context: dehydration    Ineffective treatments:  Valsalva, deep relaxation and breathing exercises  Associated symptoms: chest pressure and near-syncope    Associated symptoms: no back pain, no chest pain, no cough, no leg pain, no numbness and no shortness of breath    Risk factors: diabetes mellitus, heart disease and stress (Patient is a tech in the emergency department)    Palpitations  Associated symptoms: chest pressure and near-syncope    Associated symptoms: no back pain, no chest pain, no cough, no leg pain, no numbness and no shortness of breath        Prior to Admission Medications   Prescriptions Last Dose Informant Patient Reported? Taking?    Multiple Vitamins-Minerals (MULTI COMPLETE PO) Not Taking  Yes No   Sig: Take by mouth   Patient not taking: Reported on 1/27/2023   QUEtiapine (SEROquel) 50 mg tablet 2/25/2023  No Yes   Sig: Take 1 5 tablets (75 mg total) by mouth daily at bedtime   atoMOXetine (STRATTERA) 60 mg capsule 2/25/2023  No Yes   Sig: Take 1 capsule (60 mg total) by mouth daily   atorvastatin (LIPITOR) 20 mg tablet 2023  No Yes   Sig: Take 1 tablet (20 mg total) by mouth daily   calcium carbonate (OS-MELODY) 600 MG tablet Not Taking  Yes No   Sig: Take 600 mg by mouth 2 (two) times a day with meals   Patient not taking: Reported on 2023   cholestyramine (QUESTRAN) 4 g packet 2023  No Yes   Sig: Take 1 packet (4 g total) by mouth daily   cyanocobalamin (VITAMIN B-12) 100 mcg tablet 2023  Yes Yes   Sig: Take by mouth daily   drospirenone-ethinyl estradiol (LIEN) 3-0 02 MG per tablet 2023  No Yes   Sig: Take 1 tablet by mouth daily   estradiol (ESTRACE VAGINAL) 0 1 mg/g vaginal cream 2023  No Yes   Sig: One gram vaginally at night x 14 days then twice weekly for ongoing maintenance     lamoTRIgine (LaMICtal) 150 MG tablet   No No   Sig: Take 1 tablet (150 mg total) by mouth 2 (two) times a day   meclizine (ANTIVERT) 25 mg tablet 2023  No Yes   Sig: Take 1 tablet (25 mg total) by mouth every 8 (eight) hours as needed for dizziness   methocarbamol (ROBAXIN) 750 mg tablet 2023  No Yes   Sig: Take 1 tablet (750 mg total) by mouth daily at bedtime as needed for muscle spasms   methylPREDNISolone 4 MG tablet therapy pack 2023  No Yes   Sig: Use as directed on package   montelukast (SINGULAIR) 10 mg tablet 2023  No Yes   Sig: Take 1 tablet (10 mg total) by mouth daily at bedtime   nitrofurantoin (MACROBID) 100 mg capsule Not Taking  No No   Sig: Take 1 tablet PO PRN after sexual intercourse   Patient not taking: Reported on 2023   omeprazole (PriLOSEC) 20 mg delayed release capsule 2023  No Yes   Sig: Take 1 capsule (20 mg total) by mouth daily   predniSONE 10 mg tablet Not Taking  No No   Si tabs x 2 days, 3 tabs x 2 days, 2 tabs x 2 days, 1 tab x 2 days   Patient not taking: Reported on 2023   venlafaxine (EFFEXOR) 100 MG tablet 2023  No Yes   Sig: Take 1 tablet (100 mg total) by mouth 2 (two) times a day Take 1 tablet (100mg) by mouth twice daily with 37 mg tablet for total of 275mg per day  venlafaxine (EFFEXOR) 37 5 mg tablet 2023  No Yes   Sig: Take 1 tablet (37 5 mg total) by mouth 2 (two) times a day Take 1 tablet (37 5mg) by mouth twice daily with 100mg tablet for total of 275mg per day  Facility-Administered Medications: None       Past Medical History:   Diagnosis Date   • ADHD (attention deficit hyperactivity disorder)    • Bulging lumbar disc    • Carpal tunnel syndrome     RIGHT  LAST ASSESSED: 16   • Chronic back pain     low   • Chronic pain disorder    • Colon polyps    • COVID-19     2021   • Depression    • Diabetes mellitus ([facility] [address] )    • GERD (gastroesophageal reflux disease)    • Gestational diabetes    • Hearing loss     left ear   • Heart disease     SVT   • History of pre-eclampsia    • Hyperlipidemia     Resolved with weight loss   • Hyponatremia 2020   • IBS (irritable bowel syndrome)    • Ileus ([facility] [address] )     LAST ASSESSED: 8/3/17   • Labial cyst     LAST ASSESSED: 16   • Myofascial pain     LAST ASSESSED: 16   • Obesity    • Ovarian cyst     LEFT   LAST ASSESSED: 16   • Panic attack    • Pneumonia    • Sacroiliitis (HCC)    • Seasonal allergies    • SVT (supraventricular tachycardia) (HCC)    • Thoracic outlet syndrome        • Trochanteric bursitis of both hips     LAST ASSESSED: 3/18/16   • Ulnar neuropathy at elbow    • Varicella    • Wears glasses        Past Surgical History:   Procedure Laterality Date   • BARIATRIC SURGERY  2022   • BILE DUCT EXPLORATION      ENDOSCOPIC REMOVAL OF STONES FROM BILIARY TRACT   •  SECTION      x3   • CHOLECYSTECTOMY     • COLONOSCOPY      2017-polyp, repeat    • DILATION AND CURETTAGE OF UTERUS     • ENDOMETRIAL ABLATION     • ERCP W/ SPHICTEROTOMY     • FIRST RIB REMOVAL      THORAX EXCISION OF FIRST RIB   • GASTRIC BYPASS  2022   • HYSTEROSCOPY     • NEUROPLASTY / TRANSPOSITION ULNAR NERVE AT ELBOW Right 2011   • UT COLONOSCOPY FLX DX W/COLLJ SPEC WHEN PFRMD N/A 05/30/2017    Procedure: COLONOSCOPY;  Surgeon: Otto Lynch MD;  [facility]: [facility]; Service: Gastroenterology   • UT ESOPHAGOGASTRODUODENOSCOPY TRANSORAL DIAGNOSTIC N/A 09/14/2017    Procedure: ESOPHAGOGASTRODUODENOSCOPY (EGD); Surgeon: Dannielle Rosenberg MD;  Location: [facility]; Service: Gastroenterology   • UT HYSTEROSCOPY BX ENDOMETRIUM&/POLYPC W/WO D&C N/A 10/20/2017    Procedure: DILATATION AND CURETTAGE (D&C) WITH HYSTEROSCOPY  REMOVAL VULVAR RT  LESION;  Surgeon: Amanda Castillo MD;  [facility];  Service: Gynecology   • UT ALDANA Dina Lather NSTIM ELTRDS PLATE/PADDLE EDRL Left 01/29/2020    Procedure: Insertion of thoracic spinal cord stimulator electrode via laminotomy and placement of left buttock implantable pulse generator (NEUROMONITORING); Surgeon: Pete Diggs MD;  [facility];  Service: Neurosurgery   • UT LAPS [address] LONGITUDINAL GASTRECTOMY N/A 02/06/2018    Procedure: GASTRECTOMY SLEEVE LAPAROSCOPIC; INTRAOPERATIVE EGD ;  Surgeon: Erna Valentin MD;  [facility];  Service: Bariatrics   • UT LAPS [address] [facility] LONGITUDINAL GASTRECTOMY N/A 08/08/2022    Procedure: Diagnostic Lap;  Extensive Lysis of Adhesions; LAPAROSCOPIC REVISION CONVERSION TO NOE-EN-Y GASTRIC BYPASS AND INTRAOPERATIVE EGD;  Surgeon: Erna Valentin MD;  [facility];  Service: Bariatrics   • SPINAL CORD STIMULATOR TRIAL W/ LAMINOTOMY     • TONSILLECTOMY AND ADENOIDECTOMY     • TUBAL LIGATION     • UPPER GASTROINTESTINAL ENDOSCOPY     • VEIN LIGATION AND STRIPPING Right    • VULVA SURGERY  10/20/2017    BIOPSY   • WISDOM TOOTH EXTRACTION         Family History   Problem Relation Age of Onset   • Diabetes Mother    • Breast cancer Mother         >50   • BRCA1 Negative Mother    • BRCA2 Negative Mother    • Hyperlipidemia Mother         HYPERCHOLESTEROLEMIA   • Cancer Mother         Breast   • Diabetes Father    • Other Father         traumatic brain injury   • Prostate cancer Father    • Alcohol abuse Father         in remission   • Heart disease Father    • Neuropathy Father    • Hyperlipidemia Father    • Cancer Father         Prostate   • Hypertension Brother    • Diabetes Brother    • Other Brother         HYPERCHOLESTEROLEMIA   • Alcohol abuse Brother    • Depression Brother         attempted suicide   • Colon cancer Maternal Grandfather    • Heart attack Maternal Grandmother    • No Known Problems Paternal Grandmother         dad is adopted   • No Known Problems Paternal Grandfather         dad is adopted   • Diabetes Brother    • Alcohol abuse Brother    • Asthma Son    • No Known Problems Daughter    • Ovarian cancer Neg Hx    • Uterine cancer Neg Hx      I have reviewed and agree with the history as documented  E-Cigarette/Vaping   • E-Cigarette Use Never User      E-Cigarette/Vaping Substances   • Nicotine No    • THC No    • CBD No    • Flavoring No    • Other No    • Unknown No      Social History     Tobacco Use   • Smoking status: Former     Packs/day:      Years: 15 00     Pack years: 15 00     Types: Cigarettes     Quit date: 2013     Years since quittin 8   • Smokeless tobacco: Never   Vaping Use   • Vaping Use: Never used   Substance Use Topics   • Alcohol use: Not Currently     Alcohol/week: 1 0 standard drink     Types: 1 Glasses of wine per week     Comment: Occs -twice monthly   • Drug use: No       Review of Systems   Constitutional: Negative for fatigue and fever  HENT: Negative for congestion and ear pain  Eyes: Negative for discharge and redness  Respiratory: Positive for chest tightness  Negative for apnea, cough, shortness of breath and wheezing  Cardiovascular: Positive for palpitations and near-syncope  Negative for chest pain  Gastrointestinal: Negative for abdominal pain and diarrhea  Endocrine: Negative for cold intolerance and polydipsia  Genitourinary: Negative for difficulty urinating and hematuria  Musculoskeletal: Negative for arthralgias and back pain  Skin: Negative for color change and rash  Allergic/Immunologic: Negative for environmental allergies and immunocompromised state  Neurological: Negative for numbness and headaches  Hematological: Negative for adenopathy  Does not bruise/bleed easily  Psychiatric/Behavioral: Negative for agitation and behavioral problems  Physical Exam  Physical Exam  Vitals and nursing note reviewed  Constitutional:       Appearance: Normal appearance  She is well-developed  She is not toxic-appearing  HENT:      Head: Normocephalic and atraumatic  Right Ear: Tympanic membrane and external ear normal       Left Ear: Tympanic membrane and external ear normal       Nose: Nose normal  No nasal deformity or rhinorrhea  Mouth/Throat:      Dentition: Normal dentition  Pharynx: Uvula midline  Eyes:      General: Lids are normal          Right eye: No discharge  Left eye: No discharge  Conjunctiva/sclera: Conjunctivae normal       Pupils: Pupils are equal, round, and reactive to light  Neck:      Vascular: No carotid bruit or JVD  Trachea: Trachea normal    Cardiovascular:      Rate and Rhythm: Normal rate and regular rhythm  No extrasystoles are present  Chest Wall: PMI is not displaced  Pulses: Normal pulses  Pulmonary:      Effort: Pulmonary effort is normal  No accessory muscle usage or respiratory distress  Breath sounds: Normal breath sounds  No wheezing, rhonchi or rales  Abdominal:      General: Bowel sounds are normal       Palpations: Abdomen is soft  Abdomen is not rigid  There is no mass  Tenderness: There is no abdominal tenderness  There is no guarding or rebound  Musculoskeletal:      Right shoulder: No swelling, deformity or bony tenderness  Normal range of motion        Cervical back: Normal range of motion and neck supple  No deformity, tenderness or bony tenderness  Lymphadenopathy:      Cervical: No cervical adenopathy  Skin:     General: Skin is warm and dry  Findings: No rash  Neurological:      Mental Status: She is alert and oriented to person, place, and time  GCS: GCS eye subscore is 4  GCS verbal subscore is 5  GCS motor subscore is 6  Cranial Nerves: No cranial nerve deficit  Sensory: No sensory deficit  Deep Tendon Reflexes: Reflexes are normal and symmetric  Psychiatric:         Speech: Speech normal          Behavior: Behavior normal          Vital Signs  ED Triage Vitals   Temperature Pulse Respirations Blood Pressure SpO2   02/25/23 1215 02/25/23 1215 02/25/23 1215 02/25/23 1229 02/25/23 1215   97 9 °F (36 6 °C) (!) 166 20 152/86 98 %      Temp Source Heart Rate Source Patient Position - Orthostatic VS BP Location FiO2 (%)   02/25/23 1215 02/25/23 1215 -- 02/25/23 1229 --   Oral Monitor  Right arm       Pain Score       02/25/23 1215       No Pain           Vitals:    02/25/23 1409 02/25/23 1415 02/25/23 1445 02/25/23 1545   BP:  150/85 145/85 135/75   Pulse: (!) 118 74 77 81         Visual Acuity      ED Medications  Medications   sodium chloride 0 9 % bolus 1,000 mL (0 mL Intravenous Stopped 2/25/23 1330)   sodium chloride 0 9 % bolus 1,000 mL (0 mL Intravenous Stopped 2/25/23 1550)   metoprolol (LOPRESSOR) injection 2 5 mg (2 5 mg Intravenous Given 2/25/23 1429)       Diagnostic Studies  Results Reviewed     Procedure Component Value Units Date/Time    TSH [[id number]]  (Normal) Collected: 02/25/23 1225    Lab Status: Final result Specimen: Blood from Arm, Left Updated: 02/25/23 1332     TSH 3RD GENERATON 1 451 uIU/mL     Narrative:      Patients undergoing fluorescein dye angiography may retain small amounts of fluorescein in the body for 48-72 hours post procedure  Samples containing fluorescein can produce falsely depressed TSH values   If the patient had this procedure,a specimen should be resubmitted post fluorescein clearance        B-Type Natriuretic Peptide(BNP) [[id number]]  (Normal) Collected: 02/25/23 1225    Lab Status: Final result Specimen: Blood from Arm, Left Updated: 02/25/23 1322     BNP 40 pg/mL     HS Troponin 0hr (reflex protocol) [[id number]]  (Normal) Collected: 02/25/23 1225    Lab Status: Final result Specimen: Blood from Arm, Left Updated: 02/25/23 1307     hs TnI 0hr 3 ng/L     Comprehensive metabolic panel [[id number]] Collected: 02/25/23 1225    Lab Status: Final result Specimen: Blood from Arm, Left Updated: 02/25/23 1300     Sodium 139 mmol/L      Potassium 4 5 mmol/L      Chloride 103 mmol/L      CO2 25 mmol/L      ANION GAP 11 mmol/L      BUN 15 mg/dL      Creatinine 0 75 mg/dL      Glucose 128 mg/dL      Calcium 9 5 mg/dL      AST 32 U/L      ALT 33 U/L      Alkaline Phosphatase 68 U/L      Total Protein 7 6 g/dL      Albumin 4 2 g/dL      Total Bilirubin 0 26 mg/dL      eGFR 91 ml/min/1 73sq m     Narrative:      [facility] guidelines for Chronic Kidney Disease (CKD):   •  Stage 1 with normal or high GFR (GFR > 90 mL/min/1 73 square meters)  •  Stage 2 Mild CKD (GFR = 60-89 mL/min/1 73 square meters)  •  Stage 3A Moderate CKD (GFR = 45-59 mL/min/1 73 square meters)  •  Stage 3B Moderate CKD (GFR = 30-44 mL/min/1 73 square meters)  •  Stage 4 Severe CKD (GFR = 15-29 mL/min/1 73 square meters)  •  Stage 5 End Stage CKD (GFR <15 mL/min/1 73 square meters)  Note: GFR calculation is accurate only with a steady state creatinine    Magnesium [[id number]]  (Normal) Collected: 02/25/23 1225    Lab Status: Final result Specimen: Blood from Arm, Left Updated: 02/25/23 1300     Magnesium 2 3 mg/dL     CBC and differential [[id number]] Collected: 02/25/23 1225    Lab Status: Final result Specimen: Blood from Arm, Left Updated: 02/25/23 1243     WBC 7 73 Thousand/uL      RBC 4 71 Million/uL      Hemoglobin 13 9 g/dL      Hematocrit 44 1 %      MCV 94 fL      MCH 29 5 pg      MCHC 31 5 g/dL      RDW 12 6 %      MPV 10 2 fL      Platelets 889 Thousands/uL      nRBC 0 /100 WBCs      Neutrophils Relative 70 %      Immat GRANS % 0 %      Lymphocytes Relative 23 %      Monocytes Relative 7 %      Eosinophils Relative 0 %      Basophils Relative 0 %      Neutrophils Absolute 5 44 Thousands/µL      Immature Grans Absolute 0 02 Thousand/uL      Lymphocytes Absolute 1 75 Thousands/µL      Monocytes Absolute 0 50 Thousand/µL      Eosinophils Absolute 0 01 Thousand/µL      Basophils Absolute 0 01 Thousands/µL                  XR chest 1 view portable    (Results Pending)              Procedures  ECG 12 Lead Documentation Only    Date/Time: 2/25/2023 12:11 PM  Performed by: Pravin Campbell DO  Authorized by: Jennifer Person DO     Rate:     ECG rate:  158  Rhythm:     Rhythm: sinus tachycardia    QRS:     QRS axis:  Normal             ED Course  ED Course as of 02/25/23 1729   Sat Feb 25, 2023   1533 Patient is almost done with her second liter of fluid and received IV Lopressor  Patient not able to ambulate to the bathroom with a heart rate in the 80s  Discussed with patient she is comfortable going home  I will write her a prescription for metoprolol to tab at home to take if she starts to have symptoms again  Going to call her cardiologist on Monday  Knows to return to the emergency department with any concerns  SBIRT 22yo+    Flowsheet Row Most Recent Value   SBIRT (23 yo +)    In order to provide better care to our patients, we are screening all of our patients for alcohol and drug use  Would it be okay to ask you these screening questions?  No Filed at: 02/25/2023 1237                    Medical Decision Making  Differential diagnosis includes but is not limited to cardiac arrhythmia, SVT, anxiety, ACS, pneumothorax, thyroid disease, dehydration    Patient has known history of SVT    Cardiac monitor revealed normal sinus rhythm as interpreted by me the cardiac monitor was ordered secondary to SVT history, palpitations and previous tachycardia and to monitor the patient for dysrhythmia  This was done at 1533 after she had returned from the bathroom ambulating unassisted    Palpitations: acute illness or injury  Sinus tachycardia: acute illness or injury  Amount and/or Complexity of Data Reviewed  External Data Reviewed: ECG and notes  Details: Reviewed old EKGs patient does have a history of SVT  Today she just appeared to be like sinus tachycardia she broke on her own into sinus rhythm  Her tachycardia seem to be provoked by any sort of exertion  Patient was given 2 L of fluid and a small amount of Lopressor  She was never hypotensive  This seemed to occur patient of her dizziness and any more events of tachycardia  Labs: ordered  Radiology: ordered and independent interpretation performed  Details: Chest x-ray within normal limits  No cardiomegaly no pneumothorax  ECG/medicine tests: ordered and independent interpretation performed  Decision-making details documented in ED Course  Risk  Prescription drug management  Decision regarding hospitalization  Risk Details: Patient was given IV Lopressor and 2 L of IV fluid here in the emergency department  She was written a prescription for metoprolol patient was instructed to take it if she began to have palpitations again  I strongly encouraged her to call her cardiologist on Monday since tachycardia and symptoms have now resolved I do not think patient needs to be admitted to the hospital at this time          Disposition  Final diagnoses:   Palpitations   Sinus tachycardia     Time reflects when diagnosis was documented in both MDM as applicable and the Disposition within this note     Time User Action Codes Description Comment    2/25/2023  3:34 PM Kira Kline Add [R00 2] Palpitations     2/25/2023  3:34 PM Jerel Person Add [R00 0] Sinus tachycardia       ED Disposition     ED Disposition   Discharge    Condition   Stable    Date/Time   Sat Feb 25, 2023  3:34 PM    Comment   Zaid Duboisus discharge to home/self care                 Follow-up Information     Follow up With Specialties Details Why Contact Info Additional Information    [facility] Schedule an appointment as soon as possible for a visit   [address] [id number] [facility] [address] [facility], [address] [place], [place], [address] [place] [address]          Discharge Medication List as of 2/25/2023  3:48 PM      START taking these medications    Details   metoprolol tartrate (LOPRESSOR) 25 mg tablet Take 1 tablet (25 mg total) by mouth every 12 (twelve) hours as needed (Palpitations), Starting Sat 2/25/2023, Normal         CONTINUE these medications which have NOT CHANGED    Details   atoMOXetine (STRATTERA) 60 mg capsule Take 1 capsule (60 mg total) by mouth daily, Starting Thu 6/16/2022, Normal      atorvastatin (LIPITOR) 20 mg tablet Take 1 tablet (20 mg total) by mouth daily, Starting Fri 1/27/2023, Normal      cholestyramine (QUESTRAN) 4 g packet Take 1 packet (4 g total) by mouth daily, Starting Thu 1/6/2022, Normal      cyanocobalamin (VITAMIN B-12) 100 mcg tablet Take by mouth daily, Historical Med      drospirenone-ethinyl estradiol (LIEN) 3-0 02 MG per tablet Take 1 tablet by mouth daily, Starting Wed 12/14/2022, Normal      estradiol (ESTRACE VAGINAL) 0 1 mg/g vaginal cream One gram vaginally at night x 14 days then twice weekly for ongoing maintenance , Normal      meclizine (ANTIVERT) 25 mg tablet Take 1 tablet (25 mg total) by mouth every 8 (eight) hours as needed for dizziness, Starting Mon 7/25/2022, Normal      methocarbamol (ROBAXIN) 750 mg tablet Take 1 tablet (750 mg total) by mouth daily at bedtime as needed for muscle spasms, Starting Fri 2/24/2023, Until Thu 5/25/2023 at 2359, Normal      methylPREDNISolone 4 MG tablet therapy pack Use as directed on package, Normal      montelukast (SINGULAIR) 10 mg tablet Take 1 tablet (10 mg total) by mouth daily at bedtime, Starting Thu 1/5/2023, Normal      omeprazole (PriLOSEC) 20 mg delayed release capsule Take 1 capsule (20 mg total) by mouth daily, Starting Wed 11/16/2022, Normal      QUEtiapine (SEROquel) 50 mg tablet Take 1 5 tablets (75 mg total) by mouth daily at bedtime, Starting Thu 6/16/2022, Normal      !! venlafaxine (EFFEXOR) 100 MG tablet Take 1 tablet (100 mg total) by mouth 2 (two) times a day Take 1 tablet (100mg) by mouth twice daily with 37 mg tablet for total of 275mg per day , Starting Thu 6/16/2022, Normal      !! venlafaxine (EFFEXOR) 37 5 mg tablet Take 1 tablet (37 5 mg total) by mouth 2 (two) times a day Take 1 tablet (37 5mg) by mouth twice daily with 100mg tablet for total of 275mg per day , Starting Thu 6/16/2022, Normal      calcium carbonate (OS-MELODY) 600 MG tablet Take 600 mg by mouth 2 (two) times a day with meals, Historical Med      lamoTRIgine (LaMICtal) 150 MG tablet Take 1 tablet (150 mg total) by mouth 2 (two) times a day, Starting Fri 12/16/2022, Until Fri 2/24/2023, Normal      Multiple Vitamins-Minerals (MULTI COMPLETE PO) Take by mouth, Historical Med      nitrofurantoin (MACROBID) 100 mg capsule Take 1 tablet PO PRN after sexual intercourse, Normal      predniSONE 10 mg tablet 4 tabs x 2 days, 3 tabs x 2 days, 2 tabs x 2 days, 1 tab x 2 days, Normal       !! - Potential duplicate medications found  Please discuss with provider  No discharge procedures on file      PDMP Review       Value Time User    PDMP Reviewed  Yes 2/24/2023  8:00 AM OSMANI Hutchinson          ED Provider  Electronically Signed by           Marcela Quinn DO  02/25/23 7748

## 2023-02-25 NOTE — Clinical Note
Cr William was seen and treated in our emergency department on 2/25/2023  Diagnosis:     Melida Fagan  may return to work on return date  She may return on this date: 02/28/2023         If you have any questions or concerns, please don't hesitate to call        Andrew Roy, DO    ______________________________           _______________          _______________  Hospital Representative                              Date                                Time

## 2023-02-25 NOTE — Clinical Note
Kanu Elizabeth was seen and treated in our emergency department on 2/25/2023  Diagnosis:     Cristhian Doyle  may return to work on return date  She may return on this date: 02/28/2023         If you have any questions or concerns, please don't hesitate to call        Jena Brush DO    ______________________________           _______________          _______________  Hospital Representative                              Date                                Time

## 2023-02-25 NOTE — ED NOTES
Ambulated pt and pt becomes slightly light headed and tachycardia at a max of 118  Dr Able Pulido made aware        Verónica Blanco, RN  02/25/23 [id number]

## 2023-02-27 DIAGNOSIS — F33.2 MDD (MAJOR DEPRESSIVE DISORDER), RECURRENT SEVERE, WITHOUT PSYCHOSIS (HCC): ICD-10-CM

## 2023-02-27 RX ORDER — LAMOTRIGINE 150 MG/1
150 TABLET ORAL 2 TIMES DAILY
Qty: 180 TABLET | Refills: 3 | Status: SHIPPED | OUTPATIENT
Start: 2023-02-27 | End: 2023-03-29

## 2023-02-27 NOTE — TELEPHONE ENCOUNTER
Follow up call made to Livan De Anda  She reports she went to  her increase in Venlafaxine and the pharmacy stated they do not have the increase on file  She ended up refilling her old prescription of Venlafaxine 100 mg and 37 5 mg and she takes this BID  This is how she has been taking it  She wants to know if this is correct?      Please review

## 2023-02-27 NOTE — TELEPHONE ENCOUNTER
Yes, that dosage is correct  I think she is confusing increase of Lamictal that was made last visit with Effexor, as this medication was NOT changed in December   Her current psychotropic medication regimen should be as follows:    1 ) Effexor  5mg BID (total 275mg)  2 ) Lamictal 150mg BID (300mg total)  3 ) Seroquel 75mg QHS  4 ) Atomoxetine 60mg Daily

## 2023-02-27 NOTE — TELEPHONE ENCOUNTER
Spoke with Cristhian Doyle and clarified how all medications should be taken  She did confuse the Effexor with the Lamictal but is now clear what dosages should be taken  She did just  the Lamictal prescription but will need a refill for on file       FYI

## 2023-03-01 ENCOUNTER — TELEPHONE (OUTPATIENT)
Dept: PSYCHIATRY | Facility: CLINIC | Age: 53
End: 2023-03-01

## 2023-03-01 NOTE — TELEPHONE ENCOUNTER
Patient confirmed she does have 150mg but states she also states she takes 100mg and was asking about that script  I did not see a script for that previously done by provider

## 2023-03-01 NOTE — PROGRESS NOTES
Cardiology Follow Up    Lily Barrera  1970  [id number]  [address] [facility] [address] [phone]    1  Paroxysmal SVT (supraventricular tachycardia) ([facility] [address] )        2  Orthostatic hypotension  metoprolol tartrate (LOPRESSOR) 25 mg tablet    DISCONTINUED: atenolol (TENORMIN) 25 mg tablet      3  Dyslipidemia            Interval History:   On 2/25/23 Ms Gabo Almonte was work when she experienced lightheadedness, dizziness and her heart racing  EKG  BPM  She was treated with IV Lopresor 2 5mg and IV fluids  HR improved  She was started on Metoprolol tartrate 12 5mg Q12 hours  Gabo Almonte presents to our office for a recent ED follow up visit  She denies CP, palpitations lightheadedness or dizziness  She complains of a headache in right parietal region since starting on metoprolol  Medical History   Primary Cardiologist Dr Tosha Mariscal   PSVT sp loop in plant   Hypertension  Dyslipidemia  GERD  PTSD  Sp laparoscopic revision and conversion to gastric bypass hx of sleeve gastrectomy weight loss 201- 147 pounds        Patient Active Problem List   Diagnosis   • IBS (irritable bowel syndrome)   • Mixed hyperlipidemia   • GERD (gastroesophageal reflux disease)   • Chronic back pain   • Cervical radiculopathy   • Hepatic steatosis   • Pulmonary nodule seen on imaging study   • Type 2 diabetes mellitus without complication, without long-term current use of insulin (HCC)   • S/P laparoscopic sleeve gastrectomy   • Obesity   • Postsurgical malabsorption   • Lumbar radiculopathy   • Intervertebral disc disorder with radiculopathy of lumbar region   • Post traumatic stress disorder (PTSD)   • Recurrent major depressive disorder (HCC)   • Generalized anxiety disorder   • Chronic pain syndrome   • Other chronic pain   • Trochanteric bursitis, left hip   • Paroxysmal SVT (supraventricular tachycardia) ([facility] [address] ) • ADHD (attention deficit hyperactivity disorder), inattentive type   • Seasonal allergies   • Benign paroxysmal positional vertigo   • Bariatric surgery status   • Dizziness   • Hypotension   • MDD (major depressive disorder), recurrent severe, without psychosis ([facility] )   • Opioid type dependence, continuous ([facility])   • Acute right ankle pain   • Sprain of anterior talofibular ligament of right ankle     Past Medical History:   Diagnosis Date   • ADHD (attention deficit hyperactivity disorder)    • Bulging lumbar disc    • Carpal tunnel syndrome     RIGHT  LAST ASSESSED: 12/7/16   • Chronic back pain     low   • Chronic pain disorder    • Colon polyps    • COVID-19     12/2021   • Depression    • Diabetes mellitus ([facility] )    • GERD (gastroesophageal reflux disease)    • Gestational diabetes    • Hearing loss     left ear   • Heart disease     SVT   • History of pre-eclampsia    • Hyperlipidemia     Resolved with weight loss   • Hyponatremia 12/12/2020   • IBS (irritable bowel syndrome)    • Ileus ([facility] )     LAST ASSESSED: 8/3/17   • Labial cyst     LAST ASSESSED: 4/21/16   • Myofascial pain     LAST ASSESSED: 4/12/16   • Obesity    • Ovarian cyst     LEFT   LAST ASSESSED: 9/2/16   • Panic attack    • Pneumonia    • Sacroiliitis ([facility])    • Seasonal allergies    • SVT (supraventricular tachycardia) ([facility])    • Thoracic outlet syndrome     2010   • Trochanteric bursitis of both hips     LAST ASSESSED: 3/18/16   • Ulnar neuropathy at elbow    • Varicella    • Wears glasses      Social History     Socioeconomic History   • Marital status: /Civil Union     Spouse name: Lucia Fernandez   • Number of children: 3   • Years of education: GED then 2 year college program   • Highest education level: Not on file   Occupational History   • Occupation: ER TECH     Employer: [employer]   Tobacco Use   • Smoking status: Former     Packs/day: 1 00     Years: 15 00     Pack years: 15 00     Types: Cigarettes     Quit date: 2013     Years since quittin 8   • Smokeless tobacco: Never   Vaping Use   • Vaping Use: Never used   Substance and Sexual Activity   • Alcohol use: Not Currently     Alcohol/week: 1 0 standard drink     Types: 1 Glasses of wine per week     Comment: Occs -twice monthly   • Drug use: No   • Sexual activity: Yes     Partners: Male     Birth control/protection: OCP, Post-menopausal     Comment: lifetime partners: 6; current partner 2013   Other Topics Concern   • Not on file   Social History Narrative    Adventism: no preference    Accepts blood products        Exercise: unable with back issues    Calcium: calcium supplement, multivitamin for women over 50, 1 yogurt daily     Social Determinants of Health     Financial Resource Strain: Not on file   Food Insecurity: Not on file   Transportation Needs: Not on file   Physical Activity: Not on file   Stress: Not on file   Social Connections: Not on file   Intimate Partner Violence: Not on file   Housing Stability: Not on file      Family History   Problem Relation Age of Onset   • Diabetes Mother    • Breast cancer Mother         >50   • BRCA1 Negative Mother    • BRCA2 Negative Mother    • Hyperlipidemia Mother         HYPERCHOLESTEROLEMIA   • Cancer Mother         Breast   • Diabetes Father    • Other Father         traumatic brain injury   • Prostate cancer Father    • Alcohol abuse Father         in remission   • Heart disease Father    • Neuropathy Father    • Hyperlipidemia Father    • Cancer Father         Prostate   • Hypertension Brother    • Diabetes Brother    • Other Brother         HYPERCHOLESTEROLEMIA   • Alcohol abuse Brother    • Depression Brother         attempted suicide   • Colon cancer Maternal Grandfather    • Heart attack Maternal Grandmother    • No Known Problems Paternal Grandmother         dad is adopted   • No Known Problems Paternal Grandfather         dad is adopted   • Diabetes Brother    • Alcohol abuse Brother    • Asthma Son    • No Known Problems Daughter    • Ovarian cancer Neg Hx    • Uterine cancer Neg Hx      Past Surgical History:   Procedure Laterality Date   • BARIATRIC SURGERY  2022   • BILE DUCT EXPLORATION      ENDOSCOPIC REMOVAL OF STONES FROM BILIARY TRACT   •  SECTION      x3   • CHOLECYSTECTOMY     • COLONOSCOPY      2017-polyp, repeat    • DILATION AND CURETTAGE OF UTERUS     • ENDOMETRIAL ABLATION     • ERCP W/ SPHICTEROTOMY     • FIRST RIB REMOVAL      THORAX EXCISION OF FIRST RIB   • GASTRIC BYPASS  2022   • HYSTEROSCOPY     • NEUROPLASTY / TRANSPOSITION ULNAR NERVE AT ELBOW Right    • WA COLONOSCOPY FLX DX W/COLLJ SPEC WHEN PFRMD N/A 2017    Procedure: COLONOSCOPY;  Surgeon: Eda Ravi MD;  [facility]: [facility]; Service: Gastroenterology   • WA ESOPHAGOGASTRODUODENOSCOPY TRANSORAL DIAGNOSTIC N/A 2017    Procedure: ESOPHAGOGASTRODUODENOSCOPY (EGD); Surgeon: Selma Velazco MD;  Location: [facility]; Service: Gastroenterology   • WA HYSTEROSCOPY BX ENDOMETRIUM&/POLYPC W/WO D&C N/A 10/20/2017    Procedure: DILATATION AND CURETTAGE (D&C) WITH HYSTEROSCOPY  REMOVAL VULVAR RT  LESION;  Surgeon: Destiney Tijerina MD;  [facility];  Service: Gynecology   • WA ALDANA Therisa Pages NSTIM ELTRDS PLATE/PADDLE EDRL Left 2020    Procedure: Insertion of thoracic spinal cord stimulator electrode via laminotomy and placement of left buttock implantable pulse generator (NEUROMONITORING); Surgeon: Tristan Blue MD;  Location: [facility];  Service: Neurosurgery   • WA LAPS [address] LONGITUDINAL GASTRECTOMY N/A 2018    Procedure: GASTRECTOMY SLEEVE LAPAROSCOPIC; INTRAOPERATIVE EGD ;  Surgeon: Jaylyn Perry MD;  [facility];  Service: Bariatrics   • WA LAPS [address] [facility] LONGITUDINAL GASTRECTOMY N/A 2022    Procedure: Diagnostic Lap;  Extensive Lysis of Adhesions; LAPAROSCOPIC REVISION CONVERSION TO NOE-EN-Y GASTRIC BYPASS AND INTRAOPERATIVE EGD;  Surgeon: Deshawn Ortiz Otf Dudley MD;  [facility];  Service: Bariatrics   • SPINAL CORD STIMULATOR TRIAL W/ LAMINOTOMY     • TONSILLECTOMY AND ADENOIDECTOMY     • TUBAL LIGATION     • UPPER GASTROINTESTINAL ENDOSCOPY     • VEIN LIGATION AND STRIPPING Right    • VULVA SURGERY  10/20/2017    BIOPSY   • WISDOM TOOTH EXTRACTION         Current Outpatient Medications:   •  atoMOXetine (STRATTERA) 60 mg capsule, Take 1 capsule (60 mg total) by mouth daily, Disp: 90 capsule, Rfl: 3  •  atorvastatin (LIPITOR) 20 mg tablet, Take 1 tablet (20 mg total) by mouth daily, Disp: 90 tablet, Rfl: 3  •  calcium carbonate (OS-MELODY) 600 MG tablet, Take 600 mg by mouth 2 (two) times a day with meals (Patient not taking: Reported on 1/27/2023), Disp: , Rfl:   •  cholestyramine (QUESTRAN) 4 g packet, Take 1 packet (4 g total) by mouth daily, Disp: 30 each, Rfl: 3  •  cyanocobalamin (VITAMIN B-12) 100 mcg tablet, Take by mouth daily, Disp: , Rfl:   •  drospirenone-ethinyl estradiol (LIEN) 3-0 02 MG per tablet, Take 1 tablet by mouth daily, Disp: 84 tablet, Rfl: 4  •  estradiol (ESTRACE VAGINAL) 0 1 mg/g vaginal cream, One gram vaginally at night x 14 days then twice weekly for ongoing maintenance , Disp: 42 5 g, Rfl: 2  •  lamoTRIgine (LaMICtal) 150 MG tablet, Take 1 tablet (150 mg total) by mouth 2 (two) times a day, Disp: 180 tablet, Rfl: 3  •  meclizine (ANTIVERT) 25 mg tablet, Take 1 tablet (25 mg total) by mouth every 8 (eight) hours as needed for dizziness, Disp: 30 tablet, Rfl: 0  •  methocarbamol (ROBAXIN) 750 mg tablet, Take 1 tablet (750 mg total) by mouth daily at bedtime as needed for muscle spasms, Disp: 30 tablet, Rfl: 2  •  methylPREDNISolone 4 MG tablet therapy pack, Use as directed on package, Disp: 1 each, Rfl: 0  •  metoprolol tartrate (LOPRESSOR) 25 mg tablet, Take 1 tablet (25 mg total) by mouth every 12 (twelve) hours as needed (Palpitations), Disp: 10 tablet, Rfl: 0  •  montelukast (SINGULAIR) 10 mg tablet, Take 1 tablet (10 mg total) by mouth daily at bedtime, Disp: 90 tablet, Rfl: 2  •  Multiple Vitamins-Minerals (MULTI COMPLETE PO), Take by mouth (Patient not taking: Reported on 1/27/2023), Disp: , Rfl:   •  nitrofurantoin (MACROBID) 100 mg capsule, Take 1 tablet PO PRN after sexual intercourse (Patient not taking: Reported on 1/27/2023), Disp: 30 capsule, Rfl: 1  •  omeprazole (PriLOSEC) 20 mg delayed release capsule, Take 1 capsule (20 mg total) by mouth daily, Disp: 90 capsule, Rfl: 1  •  predniSONE 10 mg tablet, 4 tabs x 2 days, 3 tabs x 2 days, 2 tabs x 2 days, 1 tab x 2 days (Patient not taking: Reported on 1/27/2023), Disp: 20 tablet, Rfl: 0  •  QUEtiapine (SEROquel) 50 mg tablet, Take 1 5 tablets (75 mg total) by mouth daily at bedtime, Disp: 135 tablet, Rfl: 3  •  venlafaxine (EFFEXOR) 100 MG tablet, Take 1 tablet (100 mg total) by mouth 2 (two) times a day Take 1 tablet (100mg) by mouth twice daily with 37 mg tablet for total of 275mg per day , Disp: 180 tablet, Rfl: 3  •  venlafaxine (EFFEXOR) 37 5 mg tablet, Take 1 tablet (37 5 mg total) by mouth 2 (two) times a day Take 1 tablet (37 5mg) by mouth twice daily with 100mg tablet for total of 275mg per day , Disp: 180 tablet, Rfl: 3  Allergies   Allergen Reactions   • Ibuprofen Other (See Comments)     Due to gastric sleeve -can only take for 5 days, then needs to stop       Labs:  Admission on 02/25/2023, Discharged on 02/25/2023   Component Date Value   • WBC 02/25/2023 7 73    • RBC 02/25/2023 4 71    • Hemoglobin 02/25/2023 13 9    • Hematocrit 02/25/2023 44 1    • MCV 02/25/2023 94    • MCH 02/25/2023 29 5    • MCHC 02/25/2023 31 5    • RDW 02/25/2023 12 6    • MPV 02/25/2023 10 2    • Platelets 89/49/2187 284    • nRBC 02/25/2023 0    • Neutrophils Relative 02/25/2023 70    • Immat GRANS % 02/25/2023 0    • Lymphocytes Relative 02/25/2023 23    • Monocytes Relative 02/25/2023 7    • Eosinophils Relative 02/25/2023 0    • Basophils Relative 02/25/2023 0    • Neutrophils Absolute 02/25/2023 5 44    • Immature Grans Absolute 02/25/2023 0 02    • Lymphocytes Absolute 02/25/2023 1 75    • Monocytes Absolute 02/25/2023 0 50    • Eosinophils Absolute 02/25/2023 0 01    • Basophils Absolute 02/25/2023 0 01    • Sodium 02/25/2023 139    • Potassium 02/25/2023 4 5    • Chloride 02/25/2023 103    • CO2 02/25/2023 25    • ANION GAP 02/25/2023 11    • BUN 02/25/2023 15    • Creatinine 02/25/2023 0 75    • Glucose 02/25/2023 128    • Calcium 02/25/2023 9 5    • AST 02/25/2023 32    • ALT 02/25/2023 33    • Alkaline Phosphatase 02/25/2023 68    • Total Protein 02/25/2023 7 6    • Albumin 02/25/2023 4 2    • Total Bilirubin 02/25/2023 0 26    • eGFR 02/25/2023 91    • BNP 02/25/2023 40    • hs TnI 0hr 02/25/2023 3    • TSH 3RD GENERATON 02/25/2023 1 451    • Magnesium 02/25/2023 2 3    • Ventricular Rate 02/25/2023 158    • Atrial Rate 02/25/2023 158    • WA Interval 02/25/2023 184    • QRSD Interval 02/25/2023 82    • QT Interval 02/25/2023 188    • QTC Interval 02/25/2023 304    • P Axis 02/25/2023 78    • QRS Axis 02/25/2023 197    • T Wave [place] 02/25/2023 82    Appointment on 01/27/2023   Component Date Value   • Cholesterol 01/27/2023 207 (H)    • Triglycerides 01/27/2023 258 (H)    • HDL, Direct 01/27/2023 77    • LDL Calculated 01/27/2023 78    • HIV-1 p24 Antigen 01/27/2023 Non-Reactive    • HIV-1 Antibody 01/27/2023 Non-Reactive    • HIV-2 Antibody 01/27/2023 Non-Reactive    • HIV Ag-Ab 5th Gen 01/27/2023 Non-Reactive    Office Visit on 01/12/2023   Component Date Value   • POST-VOID RESIDUAL VOLUM* 01/12/2023 26    • LEUKOCYTE ESTERASE,UA 01/12/2023 -    • Marko Elizabeth 01/12/2023 -    • SL AMB POCT UROBILINOGEN 01/12/2023 0 2    • POCT URINE PROTEIN 01/12/2023 -    •  PH,UA 01/12/2023 6 5    • BLOOD,UA 01/12/2023 -    • SPECIFIC GRAVITY,UA 01/12/2023 1 025    • KETONES,UA 01/12/2023 -    • BILIRUBIN,UA 01/12/2023 -    • GLUCOSE, UA 01/12/2023 -    •  COLOR,UA 01/12/2023 SLOANE    • CLARITY,UA 01/12/2023 CLEAR    • Color, UA 01/12/2023 Yellow    • Clarity, UA 01/12/2023 Turbid    • Specific Gravity, UA 01/12/2023 1 028    • pH, UA 01/12/2023 6 0    • Leukocytes, UA 01/12/2023 Negative    • Nitrite, UA 01/12/2023 Negative    • Protein, UA 01/12/2023 Trace (A)    • Glucose, UA 01/12/2023 Negative    • Ketones, UA 01/12/2023 Negative    • Urobilinogen, UA 01/12/2023 <2 0    • Bilirubin, UA 01/12/2023 Negative    • Occult Blood, UA 01/12/2023 Negative    • RBC, UA 01/12/2023 None Seen    • WBC, UA 01/12/2023 None Seen    • Epithelial Cells 01/12/2023 Occasional    • Bacteria, UA 01/12/2023 Occasional    • MUCUS THREADS 01/12/2023 Moderate (A)    • Hyaline Casts, UA 01/12/2023 5-10 (A)    • Ca Oxalate [place], UA 01/12/2023 Innumerable (A)    • Urine Culture 01/12/2023 No Growth <1000 cfu/mL    Office Visit on 01/05/2023   Component Date Value   • Hemoglobin A1C 01/05/2023 5 8      Imaging: XR chest 1 view portable    Result Date: 2/26/2023  Narrative: CHEST INDICATION:   svt  COMPARISON:  11/8/2022 EXAM PERFORMED/VIEWS:  XR CHEST PORTABLE FINDINGS: Cardiomediastinal silhouette appears unremarkable  The lungs are clear  No pneumothorax or pleural effusion  Osseous structures appear within normal limits for patient age  Impression: No acute cardiopulmonary disease  Workstation performed: [id number]     XR tibia fibula 2 views RIGHT    Result Date: 2/6/2023  Narrative: RIGHT ANKLE INDICATION:   fall  COMPARISON:  Right foot 9/24/2020 VIEWS:  XR ANKLE 3+ VW RIGHT, XR TIBIA FIBULA 2 VW RIGHT, XR FOOT 3+ VW RIGHT FINDINGS: There is no acute fracture or dislocation  Calcaneal small spur  2nd through 4th hammertoes  No lytic or blastic osseous lesion  Soft tissues are unremarkable  Impression: No acute osseous abnormality  Workstation performed: [id number]     XR ankle 3+ views RIGHT    Result Date: 2/6/2023  Narrative: RIGHT ANKLE INDICATION:   fall   COMPARISON:  Right foot 9/24/2020 VIEWS:  XR ANKLE 3+ VW RIGHT, XR TIBIA FIBULA 2 VW RIGHT, XR FOOT 3+ VW RIGHT FINDINGS: There is no acute fracture or dislocation  Calcaneal small spur  2nd through 4th hammertoes  No lytic or blastic osseous lesion  Soft tissues are unremarkable  Impression: No acute osseous abnormality  Workstation performed: [id number]     XR foot 3+ views RIGHT    Result Date: 2/6/2023  Narrative: RIGHT ANKLE INDICATION:   fall  COMPARISON:  Right foot 9/24/2020 VIEWS:  XR ANKLE 3+ VW RIGHT, XR TIBIA FIBULA 2 VW RIGHT, XR FOOT 3+ VW RIGHT FINDINGS: There is no acute fracture or dislocation  Calcaneal small spur  2nd through 4th hammertoes  No lytic or blastic osseous lesion  Soft tissues are unremarkable  Impression: No acute osseous abnormality  Workstation performed: Amelia Bennett abdominal wall    Result Date: 2/20/2023  Narrative: ABDOMINAL WALL ULTRASOUND INDICATION:   Z98 [id number]: Bariatric surgery status R10 9: Unspecified abdominal pain  Abdominal surgery in August 2022  COMPARISON:  CT performed August 26, 2022 TECHNIQUE:   Real-time ultrasound of the abdomen was performed with a linear transducer with both volumetric sweeps and still imaging techniques  FINDINGS:  No abnormality identified throughout the imaged region of concern  Specifically, no sonographic evidence of focal abdominal wall mass or body wall hernia  Impression: No abnormality identified throughout the imaged region of concern  Specifically, no sonographic evidence of focal abdominal wall mass or body wall hernia  Workstation performed: [id number]       Review of Systems:  Review of Systems   Neurological: Positive for headaches  All other systems reviewed and are negative  Physical Exam:  Physical Exam  Vitals reviewed  Constitutional:       Appearance: Normal appearance  Cardiovascular:      Rate and Rhythm: Normal rate and regular rhythm  Pulses: Normal pulses  Heart sounds: Normal heart sounds     Pulmonary:      Effort: Pulmonary effort is normal       Breath sounds: Normal breath sounds  Abdominal:      General: Bowel sounds are normal       Palpations: Abdomen is soft  Musculoskeletal:         General: Normal range of motion  Cervical back: Normal range of motion and neck supple  Right lower leg: No edema  Left lower leg: No edema  Skin:     General: Skin is warm and dry  Capillary Refill: Capillary refill takes less than 2 seconds  Neurological:      General: No focal deficit present  Mental Status: She is alert and oriented to person, place, and time  Psychiatric:         Mood and Affect: Mood normal          Behavior: Behavior normal          Discussion/Summary:  1  PSVT no further episodes since staring Metoprolol tartrate 25mg Q12 hours  Complaining of a HA possibly due to low BP  Decrease Metoprolol tartrate to 12 5mg Q12 hours  If HA persists can stop and start Atenolol  This will be determined next week  If BP continues to be low on BB can usel on a PRN basis for increased HR     2  Hypotension  RUE sitting 80/50 , decrease Metoprolol tartrate from 25mg Q12 hours to 12 5mg Q12 hours instructed on home BP monitoring, contact me next week in regards to BP and HA      3  Dyslipidemia 1/27/23 , , HDL 77, LDL 78 continue on Lipitor 20mg daily, DASH diet

## 2023-03-01 NOTE — TELEPHONE ENCOUNTER
I sent scripts for Lamictal 150mg twice daily on 2/27/23 with 3 additional refills  She should contact the pharmacy regarding this because the scripts have been received and processed

## 2023-03-01 NOTE — TELEPHONE ENCOUNTER
Patient called the office stating the pharmacy does not have Lamictal 100mg 1 daily  Please review  Patient states she has one week left  Writer will call patient when response is received

## 2023-03-02 ENCOUNTER — OFFICE VISIT (OUTPATIENT)
Dept: CARDIOLOGY CLINIC | Facility: CLINIC | Age: 53
End: 2023-03-02

## 2023-03-02 VITALS
DIASTOLIC BLOOD PRESSURE: 64 MMHG | HEART RATE: 77 BPM | WEIGHT: 138.6 LBS | BODY MASS INDEX: 22.28 KG/M2 | SYSTOLIC BLOOD PRESSURE: 104 MMHG | HEIGHT: 66 IN | OXYGEN SATURATION: 99 %

## 2023-03-02 DIAGNOSIS — E78.5 DYSLIPIDEMIA: ICD-10-CM

## 2023-03-02 DIAGNOSIS — I47.1 PAROXYSMAL SVT (SUPRAVENTRICULAR TACHYCARDIA) (HCC): Primary | ICD-10-CM

## 2023-03-02 DIAGNOSIS — I95.1 ORTHOSTATIC HYPOTENSION: ICD-10-CM

## 2023-03-02 RX ORDER — ATENOLOL 25 MG/1
25 TABLET ORAL DAILY
Qty: 30 TABLET | Refills: 3 | Status: SHIPPED | OUTPATIENT
Start: 2023-03-02 | End: 2023-03-02 | Stop reason: CLARIF

## 2023-03-02 NOTE — TELEPHONE ENCOUNTER
We increased her dose last visit to 150mg twice per day  She should not be taking 100mg tablets, only 150mg tablets twice daily  As such, no new scripts are needed  Thank you

## 2023-03-02 NOTE — TELEPHONE ENCOUNTER
Called patient and informed  She was confused about what she should be taking  All cleared up, thank you

## 2023-03-14 DIAGNOSIS — I95.1 ORTHOSTATIC HYPOTENSION: ICD-10-CM

## 2023-03-14 DIAGNOSIS — N39.0 RECURRENT UTI: ICD-10-CM

## 2023-03-14 RX ORDER — NITROFURANTOIN 25; 75 MG/1; MG/1
CAPSULE ORAL
Qty: 30 CAPSULE | Refills: 0 | Status: SHIPPED | OUTPATIENT
Start: 2023-03-14

## 2023-03-14 NOTE — TELEPHONE ENCOUNTER
Patient called requesting med refill   Per patient she is having UTI symptoms cream seems to be helping but experiencing  UTI symptoms at this time Medication Refill Request     Name  nitrofurantoin (MACROBID) 100 mg capsule  Dose/Frequency 100 mg as needed   Quantity 30  Verified pharmacy   [x]  Verified ordering Provider   [x]  Does patient have enough for the next 3 days?  Yes [] No [x]

## 2023-03-24 ENCOUNTER — TELEMEDICINE (OUTPATIENT)
Dept: BEHAVIORAL/MENTAL HEALTH CLINIC | Facility: CLINIC | Age: 53
End: 2023-03-24

## 2023-03-24 DIAGNOSIS — F43.10 POST TRAUMATIC STRESS DISORDER (PTSD): Chronic | ICD-10-CM

## 2023-03-24 DIAGNOSIS — F41.1 GENERALIZED ANXIETY DISORDER: Chronic | ICD-10-CM

## 2023-03-24 DIAGNOSIS — F33.2 MDD (MAJOR DEPRESSIVE DISORDER), RECURRENT SEVERE, WITHOUT PSYCHOSIS (HCC): Primary | ICD-10-CM

## 2023-03-28 ENCOUNTER — TELEPHONE (OUTPATIENT)
Dept: PSYCHIATRY | Facility: CLINIC | Age: 53
End: 2023-03-28

## 2023-03-28 NOTE — TELEPHONE ENCOUNTER
Case Management received a referral from Pennie Atkins to assist Suma Oliva with applying for Disability for her son who has Autism  Writer is going to send an e-mail to patient with all the information to set her son up with the Autism Waiver program and assistance obtaining an ID ICM

## 2023-04-02 NOTE — PSYCH
Virtual Regular Visit    Verification of patient location:    Patient is located in the following state in which I hold an active license [place]    Assessment/Plan:    Problem List Items Addressed This Visit        Other    Post traumatic stress disorder (PTSD) (Chronic)    Generalized anxiety disorder (Chronic)    MDD (major depressive disorder), recurrent severe, without psychosis ([facility] ) - Primary       Goals addressed in session: Goal 1          Reason for visit is   Chief Complaint   Patient presents with   • Virtual Regular Visit        Encounter provider LALY Cooper    Provider located at [address] [place] [id number] [phone]      Recent Visits  No visits were found meeting these conditions  Showing recent visits within past 7 days and meeting all other requirements  Future Appointments  No visits were found meeting these conditions  Showing future appointments within next 150 days and meeting all other requirements       The patient was identified by name and date of birth  Bairon Quick was informed that this is a telemedicine visit and that the visit is being conducted throughthe [employer] platform  She agrees to proceed     My office door was closed  No one else was in the room  She acknowledged consent and understanding of privacy and security of the video platform  The patient has agreed to participate and understands they can discontinue the visit at any time  Patient is aware this is a billable service  Subjective  Bairon Quick is a 47 y o  female  HPI     Past Medical History:   Diagnosis Date   • ADHD (attention deficit hyperactivity disorder)    • Bulging lumbar disc    • Carpal tunnel syndrome     RIGHT    LAST ASSESSED: 12/7/16   • Chronic back pain     low   • Chronic pain disorder    • Colon polyps    • COVID-19     12/2021   • Depression    • Diabetes mellitus ([facility] ) • GERD (gastroesophageal reflux disease)    • Gestational diabetes    • Hearing loss     left ear   • Heart disease     SVT   • History of pre-eclampsia    • Hyperlipidemia     Resolved with weight loss   • Hyponatremia 2020   • IBS (irritable bowel syndrome)    • Ileus ([address] )     LAST ASSESSED: 8/3/17   • Labial cyst     LAST ASSESSED: 16   • Myofascial pain     LAST ASSESSED: 16   • Obesity    • Ovarian cyst     LEFT  LAST ASSESSED: 16   • Panic attack    • Pneumonia    • Sacroiliitis (HCC)    • Seasonal allergies    • SVT (supraventricular tachycardia) (HCC)    • Thoracic outlet syndrome        • Trochanteric bursitis of both hips     LAST ASSESSED: 3/18/16   • Ulnar neuropathy at elbow    • Varicella    • Wears glasses        Past Surgical History:   Procedure Laterality Date   • BARIATRIC SURGERY  2022   • BILE DUCT EXPLORATION      ENDOSCOPIC REMOVAL OF STONES FROM BILIARY TRACT   •  SECTION      x3   • CHOLECYSTECTOMY     • COLONOSCOPY      2017-polyp, repeat    • DILATION AND CURETTAGE OF UTERUS     • ENDOMETRIAL ABLATION     • ERCP W/ SPHICTEROTOMY     • FIRST RIB REMOVAL      THORAX EXCISION OF FIRST RIB   • GASTRIC BYPASS  2022   • HYSTEROSCOPY     • NEUROPLASTY / TRANSPOSITION ULNAR NERVE AT ELBOW Right    • ID COLONOSCOPY FLX DX W/COLLJ SPEC WHEN PFRMD N/A 2017    Procedure: COLONOSCOPY;  Surgeon: Swati Pritchard MD;  [facility]; Service: Gastroenterology   • ID ESOPHAGOGASTRODUODENOSCOPY TRANSORAL DIAGNOSTIC N/A 2017    Procedure: ESOPHAGOGASTRODUODENOSCOPY (EGD); Surgeon: Saul Perez MD;  [facility]: [facility];   Service: Gastroenterology   • ID HYSTEROSCOPY BX ENDOMETRIUM&/POLYPC W/WO D&C N/A 10/20/2017    Procedure: DILATATION AND CURETTAGE (D&C) WITH HYSTEROSCOPY  REMOVAL VULVAR RT  LESION;  Surgeon: Marin Gamez MD;  [facility];  Service: Gynecology   • ID [facility] [address] NSTIM ELTRDS PLATE/PADDLE EDRL Left 2020 Procedure: Insertion of thoracic spinal cord stimulator electrode via laminotomy and placement of left buttock implantable pulse generator (NEUROMONITORING); Surgeon: Krish Guzmán MD;  [facility];  Service: Neurosurgery   • NH LAPS [address] LONGITUDINAL GASTRECTOMY N/A 02/06/2018    Procedure: GASTRECTOMY SLEEVE LAPAROSCOPIC; INTRAOPERATIVE EGD ;  Surgeon: Marisabel May MD;  [facility];  Service: Bariatrics   • NH LAPS [address] [facility] LONGITUDINAL GASTRECTOMY N/A 08/08/2022    Procedure: Diagnostic Lap; Extensive Lysis of Adhesions; LAPAROSCOPIC REVISION CONVERSION TO NOE-EN-Y GASTRIC BYPASS AND INTRAOPERATIVE EGD;  Surgeon: Marisabel May MD;  [facility];  Service: Bariatrics   • SPINAL CORD STIMULATOR TRIAL W/ LAMINOTOMY     • TONSILLECTOMY AND ADENOIDECTOMY     • TUBAL LIGATION     • UPPER GASTROINTESTINAL ENDOSCOPY     • VEIN LIGATION AND STRIPPING Right    • VULVA SURGERY  10/20/2017    BIOPSY   • WISDOM TOOTH EXTRACTION         Current Outpatient Medications   Medication Sig Dispense Refill   • atoMOXetine (STRATTERA) 60 mg capsule Take 1 capsule (60 mg total) by mouth daily 90 capsule 3   • atorvastatin (LIPITOR) 20 mg tablet Take 1 tablet (20 mg total) by mouth daily 90 tablet 3   • calcium carbonate (OS-MELODY) 600 MG tablet Take 600 mg by mouth 2 (two) times a day with meals     • cyanocobalamin (VITAMIN B-12) 100 mcg tablet Take by mouth daily     • drospirenone-ethinyl estradiol (LIEN) 3-0 02 MG per tablet Take 1 tablet by mouth daily 84 tablet 4   • estradiol (ESTRACE VAGINAL) 0 1 mg/g vaginal cream One gram vaginally at night x 14 days then twice weekly for ongoing maintenance   (Patient taking differently: if needed One gram vaginally at night x 14 days then twice weekly for ongoing maintenance ) 42 5 g 2   • lamoTRIgine (LaMICtal) 150 MG tablet Take 1 tablet (150 mg total) by mouth 2 (two) times a day 180 tablet 3   • meclizine (ANTIVERT) 25 mg tablet Take 1 tablet (25 mg total) by mouth every 8 (eight) hours as needed for dizziness 30 tablet 0   • methocarbamol (ROBAXIN) 750 mg tablet Take 1 tablet (750 mg total) by mouth daily at bedtime as needed for muscle spasms 30 tablet 2   • metoprolol tartrate (LOPRESSOR) 25 mg tablet Take 1 tablet (25 mg total) by mouth every 12 (twelve) hours 1/2 tab every 12 hours 30 tablet 0   • montelukast (SINGULAIR) 10 mg tablet Take 1 tablet (10 mg total) by mouth daily at bedtime 90 tablet 2   • Multiple Vitamins-Minerals (MULTI COMPLETE PO) Take by mouth     • nitrofurantoin (MACROBID) 100 mg capsule Take 1 tablet PO PRN after sexual intercourse 30 capsule 0   • omeprazole (PriLOSEC) 20 mg delayed release capsule Take 1 capsule (20 mg total) by mouth daily 90 capsule 1   • QUEtiapine (SEROquel) 50 mg tablet Take 1 5 tablets (75 mg total) by mouth daily at bedtime 135 tablet 3   • venlafaxine (EFFEXOR) 100 MG tablet Take 1 tablet (100 mg total) by mouth 2 (two) times a day Take 1 tablet (100mg) by mouth twice daily with 37 mg tablet for total of 275mg per day  180 tablet 3   • venlafaxine (EFFEXOR) 37 5 mg tablet Take 1 tablet (37 5 mg total) by mouth 2 (two) times a day Take 1 tablet (37 5mg) by mouth twice daily with 100mg tablet for total of 275mg per day  180 tablet 3     No current facility-administered medications for this visit  Allergies   Allergen Reactions   • Ibuprofen Other (See Comments)     Due to gastric sleeve -can only take for 5 days, then needs to stop       Review of Systems    Video Exam    There were no vitals filed for this visit  Physical Exam     Behavioral Health Psychotherapy Progress Note    Psychotherapy Provided: Individual Psychotherapy     1  MDD (major depressive disorder), recurrent severe, without psychosis ([facility] )        2  Generalized anxiety disorder        3   Post traumatic stress disorder (PTSD)            Goals addressed in session: Goal 1     DATA: Met with Yessy for scheduled individual session  Brook Juan discussed issues related to her education and her work  She states that she has made the 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  She states that the classes were too hard for her  She is considering the possibility of returning to school for social work  She has some anxiety about the statistics course, but she states that she wants to finish the coursework  Brook Juan discussed her sadness and frustration regarding her inability to do well in nursing school  She states that she is frustrated about being good at the actual work but not being able to complete the coursework  We discussed my change in role and change in availability  I spoke with Brook Juan about transferring to a different therapist  She states that she does not want to transfer, she became tearful, and she stated that she would quit therapy if she has to transfer  We discussed the possibility of her staying with me, but being flexible with scheduling and seeing me virtually when possible  She is agreeable to the changes in scheduling  She states that she does not find it easy to connect to new people and does not want to start over  We discussed our schedules, and I will reach out to her with appointment dates/times  During this session, this clinician used the following therapeutic modalities: Client-centered Therapy, Dialectical Behavior Therapy, Mindfulness-based Strategies, Motivational Interviewing, Solution-Focused Therapy and Supportive Psychotherapy    Substance Abuse was not addressed during this session  If the client is diagnosed with a co-occurring substance use disorder, please indicate any changes in the frequency or amount of use: n/a  Stage of change for addressing substance use diagnoses: No substance use/Not applicable    ASSESSMENT:  Deisy Campos presents with a somewhat dysthymic and anxious mood       her affect is Normal range and intensity, which is congruent, with her mood and the content of "the session  The client has made progress on their goals  Rubina Beltran presents with a minimal risk of suicide, minimal risk of self-harm, and minimal risk of harm to others  For any risk assessment that surpasses a \"low\" rating, a safety plan must be developed  A safety plan was indicated: no  If yes, describe in detail n/a    PLAN: Between sessions, Rubina Beltran will continue to weigh her options for ongoing school  At the next session, the therapist will use Client-centered Therapy, Dialectical Behavior Therapy, Mindfulness-based Strategies, Motivational Interviewing, Solution-Focused Therapy and Supportive Psychotherapy to address her mood regulation, vocational/educational goals, and her relationship issues  Behavioral Health Treatment Plan and Discharge Planning: Rubina Beltran is aware of and agrees to continue to work on their treatment plan  They have identified and are working toward their discharge goals   yes    Visit start and stop times:    03/24/23  Start Time: 2923  Stop Time: 2152  Total Visit Time: 47 minutes    "

## 2023-04-21 ENCOUNTER — TELEMEDICINE (OUTPATIENT)
Dept: BEHAVIORAL/MENTAL HEALTH CLINIC | Facility: CLINIC | Age: 53
End: 2023-04-21

## 2023-04-21 DIAGNOSIS — F33.1 MODERATE EPISODE OF RECURRENT MAJOR DEPRESSIVE DISORDER (HCC): Primary | ICD-10-CM

## 2023-04-21 DIAGNOSIS — F43.10 POST TRAUMATIC STRESS DISORDER (PTSD): Chronic | ICD-10-CM

## 2023-04-21 DIAGNOSIS — F41.1 GENERALIZED ANXIETY DISORDER: Chronic | ICD-10-CM

## 2023-04-21 DIAGNOSIS — F90.0 ADHD (ATTENTION DEFICIT HYPERACTIVITY DISORDER), INATTENTIVE TYPE: ICD-10-CM

## 2023-04-21 NOTE — BH CRISIS PLAN
Client Name: Deisy Campos       Client YOB: 1970  : 1970    Treatment Team (include name and contact information):     Psychotherapist: Radha Pathak LCSW    Psychiatrist: Gogo Laws MD   Release of information completed: not applicable (employee of [address] [place] [address])    Healthcare Provider  Florencio Espinoza MD  [address] [address]  [phone]    Type of Plan   * Child plans (children 15 yo and younger) must be completed and signed by the child's legal guardian   * Plans for all individuals 15 yo and above must be signed by the client  Plan Type: adolescent/adult (14 and over) Initial    My Personal Strengths are (in the client's own words):  * ability to use coping skills  * good relationship with supportive   * good friendships    The stressors and triggers that may put me at risk are:  * things that remind me of my past trauma  * people walking behind me  * work can be a stressor    Coping skills I can use to keep myself calm and safe:  *Physical activity and Call a friend or family member   * Buddy color-- israel on my phone -- it relaxes me  Coping skills/supports I can use to maintain abstinence from substance use:   * Not applicable    The people that provide me with help and support: (Include name, contact, and how they can help)   Support person #1: Justine Lynchdock (work friend)    * Phone number: I text her-- number is in my phone    * How can they help me? Listen to me, give me advice     Support person #2: Lukasz Jett (work friend)    * Phone number: I don't text her-- I talk to her at work    * How can they help me? She is an older nurse, and she is able to give good advice     Support person #3: Katie Davidson (my )    * Phone number: in my cell phone    * How can they help me? He lets me be me  He does not give advice   He just lets me get things off my chest      In the past, the following has helped me in times of crisis:    Being in a quiet space, Being with other people, Calling a friend, Taking a walk or exercising, Breathing exercises (or other mindfulness-based activities), Using de-escalation tools that I have learned, Listening to music and Watching television or a movie       Evans Casillas, 1970, actively participated in the creation of this crisis plan during a virtual session, using the [employer]  Evans Casillas  provided verbal consent on 4/21/2023 at 3:45 PM  The crisis plan was transcribed into the [employer] Record at a later time  The pan will be sent to [facility], via the [employer] israel, for her signature  This clinician will check to ensure that she was able to sign it during our next session  If it is an emergency and you need immediate help, call 9-1-1    If there is a possibility of danger to yourself or others, call the following crisis hotline resources:     Adult Crisis Numbers  Suicide Prevention Hotline - Dial 9-8-8  [facility]: [address]: [address]: [address]: [phone]  [facility] ([facility]): [phone]  [facility]: [address] [place] [address] Avenue: [address] [place] [address] St: [phone] (daytime)  [phone] (after hours, weekends, holidays)     Child/Adolescent Crisis Numbers   [facility] \[facility]": [address]: [phone]   Verito Eyad: [phone]   [address] ([facility]): [phone]    Please note: Some [facility] do not have a separate number for Child/Adolescent specific crisis  If your county is not listed under Child/Adolescent, please call the adult number for your county     National Talk to Text Line   All Ages - [phone]    In the event your feelings become unmanageable, and you cannot reach your support system, you will call 911 immediately or go to the nearest hospital emergency room

## 2023-04-21 NOTE — BH TREATMENT PLAN
[address] [facility]  1970     Date of Initial Psychotherapy Assessment: 04/08/19   Date of Current Treatment Plan: 04/21/23  Treatment Plan Target Date: 08/19/23   Treatment Plan Expiration Date: 10/18/23    Diagnosis:   1  Moderate episode of recurrent major depressive disorder ([facility] )        2  Generalized anxiety disorder        3  Post traumatic stress disorder (PTSD)        4  ADHD (attention deficit hyperactivity disorder), inattentive type            Area(s) of Need: Mood regulation, relationship concerns, educational/vocational issues    Long Term Goal 1 (in the client's own words): I want to complete my associate's degree and move on to get my bachelor's degree  Stage of Change: Action    Target Date for completion: 04/21/2024     Anticipated therapeutic modalities: client-centered; motivational interviewing; solution focused; DBT-informed; supportive psychotherapy     People identified to complete this goal: Nasim Stewart (client); Thanh Petit (psychotherapist); Ramón Oviedo (psychiatrist)      Objective 1: (identify the means of measuring success in meeting the objective): Nasim Stewart will maintain regularly scheduled medication management sessions with her psychiatrist  She will take her medications, as prescribed, and will discuss any barriers to her medication regimen (including side effects or other problems) with both the psychiatric provider and this clinician  Objective 2: (identify the means of measuring success in meeting the objective): Nasim Stewart will practice mindfulness-based strategies, at a minimum 1x per week, to help her improve her overall mood and anxiety management  Objective 3: (identify the means of measuring success in meeting the objective): Nasim Stewart will increase her physical activity by going to the gym at least 1-2 times per week         Objective 4: (identify the means of measuring success in meeting the objective): Nasim Stewart "will work with her medical team to see how her recent bariatric revision may impact her medication regimen and her mood regulation  Objective 5: (identify the means of measuring success in meeting the objective): Jessica Amato will increase her social activities, by seeing friends or going out with her , a minimum of one time per month  I am currently under the care of a [facility] psychiatric provider: yes    My [facility]'s psychiatric provider is: Ariana Arana am currently taking psychiatric medications: Yes, as prescribed    I feel that I will be ready for discharge from mental health care when I reach the following (measurable goal/objective): I've never thought that I'd be able to graduate, because the feelings don't go away  * We discussed the possibility of her managing her symptoms, and she states that she feels \"like it's never going to happen  \"     For children and adults who have a legal guardian:   Has there been any change to custody orders and/or guardianship status? NA  If yes, attach updated documentation  I have created my Crisis Plan and have been offered a copy of this plan    [address]: Diagnosis and Treatment Plan explained to Jeanne [place] acknowledges an understanding of their diagnosis  Dema Leventhal agrees to this treatment plan  I have been offered a copy of this Treatment Plan  yes    Dema Leventhal, 1970, actively participated in the review and update of this treatment plan during a virtual session, using the [employer]  Dema Leventhal  provided verbal consent on 4/21/2023 at 3:45 PM  The treatment plan was transcribed into the [employer] Record at a later time  The pan will be sent to Jessica Amato, via the [employer] israel, for her signature  This clinician will check to ensure that she was able to sign it during our next session                                                              "

## 2023-04-21 NOTE — PSYCH
Virtual Regular Visit    Verification of patient location:    Patient is located at Home in the following state in which I hold an active license [place]    Assessment/Plan:    Problem List Items Addressed This Visit        Other    Post traumatic stress disorder (PTSD) (Chronic)    Generalized anxiety disorder (Chronic)    Recurrent major depressive disorder (HCC) - Primary    ADHD (attention deficit hyperactivity disorder), inattentive type     Goals addressed in session: Goal 1      Reason for visit is No chief complaint on file  Encounter provider LALY Menendez    Provider located at [address] [place] [id number] [phone]    Recent Visits  No visits were found meeting these conditions  Showing recent visits within past 7 days and meeting all other requirements  Future Appointments  No visits were found meeting these conditions  Showing future appointments within next 150 days and meeting all other requirements     The patient was identified by name and date of birth  Diana Miramontes was informed that this is a telemedicine visit and [facility]  She agrees to proceed     My office door was closed  No one else was in the room  She acknowledged consent and understanding of privacy and security of the video platform  The patient has agreed to participate and understands they can discontinue the visit at any time  Patient is aware this is a billable service  Subjective  Diana Miramontes is a 47 y o  female  HPI     Past Medical History:   Diagnosis Date   • ADHD (attention deficit hyperactivity disorder)    • Bulging lumbar disc    • Carpal tunnel syndrome     RIGHT    LAST ASSESSED: 12/7/16   • Chronic back pain     low   • Chronic pain disorder    • Colon polyps    • COVID-19     12/2021   • Depression    • Diabetes mellitus ([facility] [address] )    • GERD (gastroesophageal reflux disease)    • Gestational diabetes    • Hearing loss     left ear   • Heart disease     SVT   • History of pre-eclampsia    • Hyperlipidemia     Resolved with weight loss   • Hyponatremia 2020   • IBS (irritable bowel syndrome)    • Ileus ([address] )     LAST ASSESSED: 8/3/17   • Labial cyst     LAST ASSESSED: 16   • Myofascial pain     LAST ASSESSED: 16   • Obesity    • Ovarian cyst     LEFT  LAST ASSESSED: 16   • Panic attack    • Pneumonia    • Sacroiliitis (HCC)    • Seasonal allergies    • SVT (supraventricular tachycardia) (HCC)    • Thoracic outlet syndrome        • Trochanteric bursitis of both hips     LAST ASSESSED: 3/18/16   • Ulnar neuropathy at elbow    • Varicella    • Wears glasses        Past Surgical History:   Procedure Laterality Date   • BARIATRIC SURGERY  2022   • BILE DUCT EXPLORATION      ENDOSCOPIC REMOVAL OF STONES FROM BILIARY TRACT   •  SECTION      x3   • CHOLECYSTECTOMY     • COLONOSCOPY      2017-polyp, repeat    • DILATION AND CURETTAGE OF UTERUS     • ENDOMETRIAL ABLATION     • ERCP W/ SPHICTEROTOMY     • FIRST RIB REMOVAL      THORAX EXCISION OF FIRST RIB   • GASTRIC BYPASS  2022   • HYSTEROSCOPY     • NEUROPLASTY / TRANSPOSITION ULNAR NERVE AT ELBOW Right    • WV COLONOSCOPY FLX DX W/COLLJ SPEC WHEN PFRMD N/A 2017    Procedure: COLONOSCOPY;  Surgeon: La Nena Mead MD;  [facility]: [facility]; Service: Gastroenterology   • WV ESOPHAGOGASTRODUODENOSCOPY TRANSORAL DIAGNOSTIC N/A 2017    Procedure: ESOPHAGOGASTRODUODENOSCOPY (EGD); Surgeon: Guillermo Amaya MD;  Location: [facility];   Service: Gastroenterology   • WV HYSTEROSCOPY BX ENDOMETRIUM&/POLYPC W/WO D&C N/A 10/20/2017    Procedure: DILATATION AND CURETTAGE (D&C) WITH HYSTEROSCOPY  REMOVAL VULVAR RT  LESION;  Surgeon: Ken Trevino MD;  [facility];  Service: Gynecology   • WV KATYA Cowart NSTIM ELTRDS PLATE/PADDLE EDRL Left 2020 Procedure: Insertion of thoracic spinal cord stimulator electrode via laminotomy and placement of left buttock implantable pulse generator (NEUROMONITORING); Surgeon: Tessie Morrow MD;  Location[facility];  Service: Neurosurgery   • IA LAPS [address] LONGITUDINAL GASTRECTOMY N/A 02/06/2018    Procedure: GASTRECTOMY SLEEVE LAPAROSCOPIC; INTRAOPERATIVE EGD ;  Surgeon: Paulette Paniagua MD;  [facility];  Service: Bariatrics   • IA LAPS [address] [facility] LONGITUDINAL GASTRECTOMY N/A 08/08/2022    Procedure: Diagnostic Lap; Extensive Lysis of Adhesions; LAPAROSCOPIC REVISION CONVERSION TO NOE-EN-Y GASTRIC BYPASS AND INTRAOPERATIVE EGD;  Surgeon: Paulette Paniagua MD;  [facility];  Service: Bariatrics   • SPINAL CORD STIMULATOR TRIAL W/ LAMINOTOMY     • TONSILLECTOMY AND ADENOIDECTOMY     • TUBAL LIGATION     • UPPER GASTROINTESTINAL ENDOSCOPY     • VEIN LIGATION AND STRIPPING Right    • VULVA SURGERY  10/20/2017    BIOPSY   • WISDOM TOOTH EXTRACTION         Current Outpatient Medications   Medication Sig Dispense Refill   • atoMOXetine (STRATTERA) 60 mg capsule Take 1 capsule (60 mg total) by mouth daily 90 capsule 3   • atorvastatin (LIPITOR) 20 mg tablet Take 1 tablet (20 mg total) by mouth daily 90 tablet 3   • calcium carbonate (OS-MELODY) 600 MG tablet Take 600 mg by mouth 2 (two) times a day with meals     • cyanocobalamin (VITAMIN B-12) 100 mcg tablet Take by mouth daily     • drospirenone-ethinyl estradiol (LIEN) 3-0 02 MG per tablet Take 1 tablet by mouth daily 84 tablet 4   • estradiol (ESTRACE VAGINAL) 0 1 mg/g vaginal cream One gram vaginally at night x 14 days then twice weekly for ongoing maintenance   (Patient taking differently: if needed One gram vaginally at night x 14 days then twice weekly for ongoing maintenance ) 42 5 g 2   • lamoTRIgine (LaMICtal) 150 MG tablet Take 1 tablet (150 mg total) by mouth 2 (two) times a day 180 tablet 3   • meclizine (ANTIVERT) 25 mg tablet Take 1 tablet (25 mg total) by mouth every 8 (eight) hours as needed for dizziness 30 tablet 0   • methocarbamol (ROBAXIN) 750 mg tablet Take 1 tablet (750 mg total) by mouth daily at bedtime as needed for muscle spasms 30 tablet 2   • metoprolol tartrate (LOPRESSOR) 25 mg tablet Take 1 tablet (25 mg total) by mouth every 12 (twelve) hours 1/2 tab every 12 hours 90 tablet 3   • montelukast (SINGULAIR) 10 mg tablet Take 1 tablet (10 mg total) by mouth daily at bedtime 90 tablet 2   • Multiple Vitamins-Minerals (MULTI COMPLETE PO) Take by mouth     • nitrofurantoin (MACROBID) 100 mg capsule Take 1 tablet PO PRN after sexual intercourse 30 capsule 0   • omeprazole (PriLOSEC) 20 mg delayed release capsule Take 1 capsule (20 mg total) by mouth daily 90 capsule 3   • QUEtiapine (SEROquel) 50 mg tablet Take 1 5 tablets (75 mg total) by mouth daily at bedtime 135 tablet 3   • venlafaxine (EFFEXOR) 100 MG tablet Take 1 tablet (100 mg total) by mouth 2 (two) times a day Take 1 tablet (100mg) by mouth twice daily with 37 mg tablet for total of 275mg per day  180 tablet 3   • venlafaxine (EFFEXOR) 37 5 mg tablet Take 1 tablet (37 5 mg total) by mouth 2 (two) times a day Take 1 tablet (37 5mg) by mouth twice daily with 100mg tablet for total of 275mg per day  180 tablet 3     No current facility-administered medications for this visit  Allergies   Allergen Reactions   • Ibuprofen Other (See Comments)     Due to gastric sleeve -can only take for 5 days, then needs to stop       Review of Systems    Video Exam    There were no vitals filed for this visit  Physical Exam     Behavioral Health Psychotherapy Progress Note    Psychotherapy Provided: Individual Psychotherapy     1  Moderate episode of recurrent major depressive disorder ([facility] [address] )        2  Generalized anxiety disorder        3  Post traumatic stress disorder (PTSD)        4   ADHD (attention deficit hyperactivity disorder), inattentive type            Goals addressed in session: Goal 1     DATA: Met with Yessy for scheduled individual session  She discussed her plans for school, her stress at work, her family relationships during this session  In addition, we updated her treatment plan and created her crisis plan  [facility] discussed her frustration about some of the classes that she has to take, in order to get her Associates Degree in Social Work  This clinician offered some explanation of why it is important to learn statistics (e g , to read peer reviewed articles)  She states that she fears that she will not be able to complete her coursework and that she'll be stuck in the job she currently has  She states that she has not had any recent reprimands or problems with coworkers  She states that she keeps to herself and tries not to get into verbal disagreements with other  [facility] discussed a recent incident with a psychiatric patient-- which was very frightening to her  She expresses a desire to continue to work on identifying and combating triggers and prompting events that impact her mood regulation  During this session, this clinician used the following therapeutic modalities: Client-centered Therapy, Dialectical Behavior Therapy, Mindfulness-based Strategies, Motivational Interviewing, Solution-Focused Therapy and Supportive Psychotherapy    Substance Abuse was not addressed during this session  If the client is diagnosed with a co-occurring substance use disorder, please indicate any changes in the frequency or amount of use: n/a  Stage of change for addressing substance use diagnoses: No substance use/Not applicable    ASSESSMENT:  Cathryn Morales presents with a somewhat anxious mood-- especially when discussing her work and school-related concerns  her affect is Normal range and intensity, which is congruent, with her mood and the content of the session  The client has made progress on their goals      Cathryn Morales presents with a minimal risk of suicide, minimal risk of "self-harm, and minimal risk of harm to others  For any risk assessment that surpasses a \"low\" rating, a safety plan must be developed  A safety plan was indicated: no  If yes, describe in detail n/a    PLAN: Between sessions, Rocky Aguirre will continue to move forward with completing her education  She will monitor her moods and her interaction with others while at work  She will report back about any significant conflicts during our next session  At the next session, the therapist will use Client-centered Therapy, Dialectical Behavior Therapy, Mindfulness-based Strategies, Motivational Interviewing, Solution-Focused Therapy and Supportive Psychotherapy to address her mood regulation and relationship concerns  Behavioral Health Treatment Plan and Discharge Planning: Rocky Aguirre is aware of and agrees to continue to work on their treatment plan  They have identified and are working toward their discharge goals   yes    Visit start and stop times:    04/21/23  Start Time: 1505  Stop Time: 1553  Total Visit Time: 48 minutes      "

## 2023-04-24 ENCOUNTER — TELEPHONE (OUTPATIENT)
Dept: PSYCHIATRY | Facility: CLINIC | Age: 53
End: 2023-04-24

## 2023-04-24 DIAGNOSIS — F33.41 RECURRENT MAJOR DEPRESSIVE DISORDER, IN PARTIAL REMISSION (HCC): ICD-10-CM

## 2023-04-24 DIAGNOSIS — F33.2 MDD (MAJOR DEPRESSIVE DISORDER), RECURRENT SEVERE, WITHOUT PSYCHOSIS (HCC): Primary | ICD-10-CM

## 2023-04-24 DIAGNOSIS — F41.1 GENERALIZED ANXIETY DISORDER: Chronic | ICD-10-CM

## 2023-04-24 RX ORDER — VENLAFAXINE 75 MG/1
75 TABLET ORAL
Qty: 90 TABLET | Refills: 2 | Status: SHIPPED | OUTPATIENT
Start: 2023-04-24

## 2023-04-24 RX ORDER — VENLAFAXINE 100 MG/1
TABLET ORAL
Qty: 180 TABLET | Refills: 3 | Status: SHIPPED | OUTPATIENT
Start: 2023-04-24

## 2023-04-24 NOTE — TELEPHONE ENCOUNTER
Spoke with Cindy Amato and calling regarding the message Luz Maria Reyes had given Dr Moe Lawson  Reviewed changes and prescriptions sent to pharmacy  She was given the nursing number to call with concerns of this nature in the future or if she needs assistance with medications  She appreciated the call

## 2023-04-24 NOTE — PROGRESS NOTES
"As per message from Christina's therapist:   Gil Gamez said that she is taking her dosage of Effexor at 5:30am and 5:00pm  She has noticed that, with the short-acting Effexor, she is feeling withdrawal symptoms (e g , \"brain zaps\", difficulty concentrating, increase in anxiety)  She states that it is impacting her ability to do her work and complete tasks  She had bariatric surgery revision August 8, 2022  She is wondering if she can either go back on either long-acting or short-acting 3x per day\"  Chart and concerns reviewed  It appears that Rosanne Otero is clearing Effexor at an accelerated rate and thus, is experiencing withdrawal symptomatology  As such, will change dosing of immediate release Effexor from BID to TID  New scripts for 75mg tablet and 100mg tablets sent to pharmacy       New script will be as follows:     1 ) Effexor 100mg AM            75mg each afternoon            100mg PM  "

## 2023-04-24 NOTE — TELEPHONE ENCOUNTER
"----- Message from Kimmy Cotton MD sent at 4/24/2023  2:59 PM EDT -----  Regarding: Changing of Effexor dose  Hi Lior Chow,    Can you please contact Vergie Fothergill and relay the following message? Thank you  As per message from Christina's therapist:   Mikael Yanez said that she is taking her dosage of Effexor at 5:30am and 5:00pm  She has noticed that, with the short-acting Effexor, she is feeling withdrawal symptoms (e g , \"brain zaps\", difficulty concentrating, increase in anxiety)  She states that it is impacting her ability to do her work and complete tasks  She had bariatric surgery revision August 8, 2022  She is wondering if she can either go back on either long-acting or short-acting 3x per day\"  Chart and concerns reviewed  It appears that Vergie Fothergill is clearing Effexor at an accelerated rate and thus, is experiencing withdrawal symptomatology  As such, will change dosing of immediate release Effexor from BID to TID  New scripts for 75mg tablet and 100mg tablets sent to pharmacy       New script will be as follows:     1 ) Effexor 100mg AM            75mg each afternoon            100mg PM    "

## 2023-05-10 DIAGNOSIS — J30.2 SEASONAL ALLERGIES: ICD-10-CM

## 2023-05-10 RX ORDER — MONTELUKAST SODIUM 10 MG/1
10 TABLET ORAL
Qty: 90 TABLET | Refills: 2 | Status: SHIPPED | OUTPATIENT
Start: 2023-05-10

## 2023-05-10 NOTE — TELEPHONE ENCOUNTER
Medication:Montelukast  Dosage:10mg  How Often:Daily @   Quantity:90  Last Office Visit:1/5/2023  Next Office Visit:5/11/2023  Last refilled:1/5/2023  How many pills left: 2  Pharmacy:[address]

## 2023-05-11 ENCOUNTER — OFFICE VISIT (OUTPATIENT)
Dept: FAMILY MEDICINE CLINIC | Facility: CLINIC | Age: 53
End: 2023-05-11

## 2023-05-11 VITALS
BODY MASS INDEX: 21.79 KG/M2 | DIASTOLIC BLOOD PRESSURE: 64 MMHG | RESPIRATION RATE: 16 BRPM | HEIGHT: 66 IN | OXYGEN SATURATION: 100 % | TEMPERATURE: 97.7 F | HEART RATE: 74 BPM | WEIGHT: 135.6 LBS | SYSTOLIC BLOOD PRESSURE: 104 MMHG

## 2023-05-11 DIAGNOSIS — R22.1 NECK MASS: Primary | ICD-10-CM

## 2023-05-11 NOTE — ASSESSMENT & PLAN NOTE
Growing L neck mass that is firm, but mobile; most likely lymph node  Tender to palpation  No other palpable lymph nodes  No evidence of infection in the throat, mouth, ears, nose, eyes  Would like further characterization of mass with US

## 2023-05-11 NOTE — PROGRESS NOTES
Name: Judith Brantley      : 1970      MRN: [id number]  Encounter Provider: OSMANI Harris  Encounter Date: 2023   Encounter department: [place] [address]  Neck mass  Assessment & Plan:  Growing L neck mass that is firm, but mobile; most likely lymph node  Tender to palpation  No other palpable lymph nodes  No evidence of infection in the throat, mouth, ears, nose, eyes  Would like further characterization of mass with US  Orders:  -     US head neck soft tissue; Future; Expected date: 2023           Subjective      Pt is a 46 y o  y/o female who is seen today for evaluation of a lump on her neck  PMH of gastrectomy sleeve, GERD, hepatic steatosis, IBS, DM, pulmonary nodule, hypotension, paroxysmal SVT, BPPV, cervical radiculopathy, lumbar radiculopathy, CONNOR, PTSD, ADHD, chronic pain syndrome, MDD, hyperlipidemia, obesity, opioid dependence  She has a tender lump on the left neck, it is tender to palpation  This has been increasing in size since she noticed it  She denies sore throat, congestion, headache, ear pain  Denies fever, chills  Appetite is normal, she denies n/v/d  No unintentional weight loss  She tried ibuprofen but that did not do anything  Review of Systems   Constitutional: Negative for appetite change, chills, fatigue and fever  HENT: Negative for congestion, dental problem, ear pain, postnasal drip, sinus pressure and sore throat  Respiratory: Negative for cough, chest tightness and shortness of breath  Cardiovascular: Negative for chest pain, palpitations and leg swelling  Gastrointestinal: Negative for diarrhea, nausea and vomiting  Musculoskeletal: Negative for myalgias  Neurological: Negative for headaches  Hematological: Negative for adenopathy  Psychiatric/Behavioral: Negative for sleep disturbance         Current Outpatient Medications on File Prior to Visit   Medication Sig   • atoMOXetine (STRATTERA) 60 mg capsule Take 1 capsule (60 mg total) by mouth daily   • atorvastatin (LIPITOR) 20 mg tablet Take 1 tablet (20 mg total) by mouth daily   • calcium carbonate (OS-MELODY) 600 MG tablet Take 600 mg by mouth 2 (two) times a day with meals   • cyanocobalamin (VITAMIN B-12) 100 mcg tablet Take by mouth daily   • drospirenone-ethinyl estradiol (LIEN) 3-0 02 MG per tablet Take 1 tablet by mouth daily   • estradiol (ESTRACE VAGINAL) 0 1 mg/g vaginal cream One gram vaginally at night x 14 days then twice weekly for ongoing maintenance  (Patient taking differently: if needed One gram vaginally at night x 14 days then twice weekly for ongoing maintenance )   • lamoTRIgine (LaMICtal) 150 MG tablet Take 1 tablet (150 mg total) by mouth 2 (two) times a day   • meclizine (ANTIVERT) 25 mg tablet Take 1 tablet (25 mg total) by mouth every 8 (eight) hours as needed for dizziness   • methocarbamol (ROBAXIN) 750 mg tablet Take 1 tablet (750 mg total) by mouth daily at bedtime as needed for muscle spasms   • metoprolol tartrate (LOPRESSOR) 25 mg tablet Take 1 tablet (25 mg total) by mouth every 12 (twelve) hours 1/2 tab every 12 hours   • montelukast (SINGULAIR) 10 mg tablet Take 1 tablet (10 mg total) by mouth daily at bedtime   • Multiple Vitamins-Minerals (MULTI COMPLETE PO) Take by mouth   • nitrofurantoin (MACROBID) 100 mg capsule Take 1 tablet PO PRN after sexual intercourse   • omeprazole (PriLOSEC) 20 mg delayed release capsule Take 1 capsule (20 mg total) by mouth daily   • QUEtiapine (SEROquel) 50 mg tablet Take 1 5 tablets (75 mg total) by mouth daily at bedtime   • venlafaxine (EFFEXOR) 100 MG tablet Take 1 tablet (100mg) by mouth twice daily (AM and PM) with 75mg tablet (each afternoon) for total of 275mg per day     • venlafaxine (EFFEXOR) 75 mg tablet Take 1 tablet (75 mg total) by mouth daily after lunch       Objective     /64 (BP Location: Left arm, Patient Position: Sitting, Cuff Size: "Adult)   Pulse 74   Temp 97 7 °F (36 5 °C) (Tympanic)   Resp 16   Ht 5' 6\" (1 676 m)   Wt 61 5 kg (135 lb 9 6 oz)   LMP 01/07/2019 (Approximate)   SpO2 100%   BMI 21 89 kg/m²     Physical Exam  Vitals reviewed  Constitutional:       General: She is awake  She is not in acute distress  Appearance: Normal appearance  She is well-developed and well-groomed  She is not ill-appearing  HENT:      Head: Normocephalic  Right Ear: Hearing, tympanic membrane, ear canal and external ear normal  No middle ear effusion  Tympanic membrane is not bulging  Left Ear: Hearing, tympanic membrane, ear canal and external ear normal   No middle ear effusion  Tympanic membrane is not bulging  Nose: No mucosal edema or rhinorrhea  Mouth/Throat:      Lips: Pink  Mouth: Mucous membranes are moist  Mucous membranes are not dry  Dentition: Abnormal dentition  No dental tenderness, gingival swelling or gum lesions  Pharynx: No oropharyngeal exudate or posterior oropharyngeal erythema  Tonsils: No tonsillar abscesses  Eyes:      Conjunctiva/sclera: Conjunctivae normal    Neck:      Thyroid: No thyroid mass or thyromegaly  Cardiovascular:      Rate and Rhythm: Normal rate and regular rhythm  Heart sounds: Murmur heard  Pulmonary:      Effort: Pulmonary effort is normal  No accessory muscle usage or respiratory distress  Breath sounds: Normal breath sounds  No decreased breath sounds, wheezing, rhonchi or rales  Lymphadenopathy:      Head:      Right side of head: No submental, submandibular, tonsillar, preauricular, posterior auricular or occipital adenopathy  Left side of head: No submental, submandibular, tonsillar, preauricular, posterior auricular or occipital adenopathy  Skin:     General: Skin is warm and dry  Neurological:      Mental Status: She is alert and oriented to person, place, and time     Psychiatric:         Attention and Perception: Attention " normal          Mood and Affect: Mood normal          Speech: Speech normal          Behavior: Behavior normal  Behavior is cooperative  Thought Content:  Thought content normal          Cognition and Memory: Cognition normal          Judgment: Judgment normal        OSMANI Hernandez

## 2023-05-12 ENCOUNTER — OFFICE VISIT (OUTPATIENT)
Dept: CARDIOLOGY CLINIC | Facility: CLINIC | Age: 53
End: 2023-05-12

## 2023-05-12 VITALS
HEIGHT: 66 IN | DIASTOLIC BLOOD PRESSURE: 64 MMHG | HEART RATE: 67 BPM | SYSTOLIC BLOOD PRESSURE: 118 MMHG | WEIGHT: 137 LBS | BODY MASS INDEX: 22.02 KG/M2

## 2023-05-12 DIAGNOSIS — R00.0 INAPPROPRIATE SINUS NODE TACHYCARDIA: ICD-10-CM

## 2023-05-12 DIAGNOSIS — I47.1 SVT (SUPRAVENTRICULAR TACHYCARDIA) (HCC): Primary | ICD-10-CM

## 2023-05-12 RX ORDER — IVABRADINE 5 MG/1
5 TABLET, FILM COATED ORAL EVERY 12 HOURS
Qty: 60 TABLET | Refills: 1 | Status: SHIPPED | OUTPATIENT
Start: 2023-05-12

## 2023-05-12 NOTE — PROGRESS NOTES
"[facility]    OutpatientFollow-up  Today's Date: 05/12/23        Patient name: Malathi De La Cruz  YOB: 1970  Sex: female         Chief Complaint: f/u SVT    ASSESSMENT:  Problem List Items Addressed This Visit    None  Visit Diagnoses     SVT (supraventricular tachycardia) ([address] )    -  Primary    Relevant Medications    ivabradine HCl (Corlanor) 5 MG tablet    Other Relevant Orders    POCT ECG    Inappropriate sinus node tachycardia        Relevant Medications    ivabradine HCl (Corlanor) 5 MG tablet        1  SVT- Inappropriate sinus tach vs Atrial tachycardia seen on Zio patch  She has loop showing episode up to 188bpm but graual onset/offset by symptoms and on recordings  She has never had SVT recorded on 12 lead we can review  She had to stop propranolol after Gastric bypass lost weight and BP runs soft and gets presyncopal  Now on just metop 12 5mg bid  2  ANxiety on mutlple meds  3  ON Stattera ADHD med  4  Chronic pain and spinal stimulator    PLAN:  1  He SVT is not likely amendable to ablation, suspect more short runs of PAT and inappropriate sinus tach   We can continue to follow loop and she can use symptom indicator see if can see any transition to start/stop but seems to be more slow ramp up/ramp down , not \"reentry\" that would be amendable to ablation  2> Can trial corlanor 5mg bid since won't lower BP like the betablockers see if that helps  Orders Placed This Encounter   Procedures   • POCT ECG     There are no discontinued medications    HPI/Subjective:  1  SVT- Inappropriate sinus tach vs Atrial tachycardia seen on Zio patch  She has loop showing episode up to 188bpm but graual onset/offset by symptoms and on recordings  She has never had SVT recorded on 12 lead we can review     She had to stop " propranolol after Gastric bypass lost weight and BP runs soft and gets presyncopal  Now on just metop 12 5mg bid  2  ANxiety on mutlple meds  3  ON Stattera ADHD med  4  Chronic pain and spinal stimulator      Shey Diallo gets SVT with exertion , 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 and stays us gradually returns to baseline  BUt she feels lightheaded and sweaty when HR is up she has checked herself into ER due tot his but but only able to capture sinus tachycardia  Loop does show HR up to 188bpm during episode but no start/stop suggesting gradual increase/decrease could be sinus tachycardia       Please note HPI is listed by problem with with update following it, it is copied again in the assessment above and reflects medical decision making as well  Complete 12 point ROS reviewed and otherwise non pertinent or negative except as per HPI pertinent positives in Cardiovascular and Respiratory emphasized  Please see paper chart for outpatient clinic patients where the patient completed the 12 point ROS survey  Past Medical History:   Diagnosis Date   • ADHD (attention deficit hyperactivity disorder)    • Bulging lumbar disc    • Carpal tunnel syndrome     RIGHT  LAST ASSESSED: 12/7/16   • Chronic back pain     low   • Chronic pain disorder    • Colon polyps    • COVID-19     12/2021   • Depression    • Diabetes mellitus ([address] )    • GERD (gastroesophageal reflux disease)    • Gestational diabetes    • Hearing loss     left ear   • Heart disease     SVT   • History of pre-eclampsia    • Hyperlipidemia     Resolved with weight loss   • Hyponatremia 12/12/2020   • IBS (irritable bowel syndrome)    • Ileus ([address] )     LAST ASSESSED: 8/3/17   • Labial cyst     LAST ASSESSED: 4/21/16   • Myofascial pain     LAST ASSESSED: 4/12/16   • Obesity    • Ovarian cyst     LEFT   LAST ASSESSED: 9/2/16   • Panic attack    • Pneumonia    • Sacroiliitis (HCC)    • Seasonal allergies    • SVT (supraventricular tachycardia) ([facility] )    • Thoracic outlet syndrome     2010   • Trochanteric bursitis of both hips     LAST ASSESSED: 3/18/16   • Ulnar neuropathy at elbow    • Varicella    • Wears glasses        Allergies   Allergen Reactions   • Ibuprofen Other (See Comments)     Due to gastric sleeve -can only take for 5 days, then needs to stop     I reviewed the Home Medication list and Allergies in the chart  Scheduled Meds:  Current Outpatient Medications   Medication Sig Dispense Refill   • atoMOXetine (STRATTERA) 60 mg capsule Take 1 capsule (60 mg total) by mouth daily 90 capsule 3   • atorvastatin (LIPITOR) 20 mg tablet Take 1 tablet (20 mg total) by mouth daily 90 tablet 3   • calcium carbonate (OS-MELODY) 600 MG tablet Take 600 mg by mouth 2 (two) times a day with meals     • cyanocobalamin (VITAMIN B-12) 100 mcg tablet Take by mouth daily     • drospirenone-ethinyl estradiol (LIEN) 3-0 02 MG per tablet Take 1 tablet by mouth daily 84 tablet 4   • estradiol (ESTRACE VAGINAL) 0 1 mg/g vaginal cream One gram vaginally at night x 14 days then twice weekly for ongoing maintenance   (Patient taking differently: if needed One gram vaginally at night x 14 days then twice weekly for ongoing maintenance ) 42 5 g 2   • ivabradine HCl (Corlanor) 5 MG tablet Take 1 tablet (5 mg total) by mouth every 12 (twelve) hours 60 tablet 1   • lamoTRIgine (LaMICtal) 150 MG tablet Take 1 tablet (150 mg total) by mouth 2 (two) times a day 180 tablet 3   • meclizine (ANTIVERT) 25 mg tablet Take 1 tablet (25 mg total) by mouth every 8 (eight) hours as needed for dizziness 30 tablet 0   • methocarbamol (ROBAXIN) 750 mg tablet Take 1 tablet (750 mg total) by mouth daily at bedtime as needed for muscle spasms 30 tablet 2   • metoprolol tartrate (LOPRESSOR) 25 mg tablet Take 1 tablet (25 mg total) by mouth every 12 (twelve) hours 1/2 tab every 12 hours 90 tablet 3   • montelukast (SINGULAIR) 10 mg tablet Take 1 tablet (10 mg total) by mouth daily at bedtime 90 tablet 2   • Multiple Vitamins-Minerals (MULTI COMPLETE PO) Take by mouth     • nitrofurantoin (MACROBID) 100 mg capsule Take 1 tablet PO PRN after sexual intercourse 30 capsule 0   • omeprazole (PriLOSEC) 20 mg delayed release capsule Take 1 capsule (20 mg total) by mouth daily 90 capsule 3   • QUEtiapine (SEROquel) 50 mg tablet Take 1 5 tablets (75 mg total) by mouth daily at bedtime 135 tablet 3   • venlafaxine (EFFEXOR) 100 MG tablet Take 1 tablet (100mg) by mouth twice daily (AM and PM) with 75mg tablet (each afternoon) for total of 275mg per day  180 tablet 3   • venlafaxine (EFFEXOR) 75 mg tablet Take 1 tablet (75 mg total) by mouth daily after lunch 90 tablet 2     No current facility-administered medications for this visit       PRN Meds:         Family History   Problem Relation Age of Onset   • Diabetes Mother    • Breast cancer Mother         >50   • BRCA1 Negative Mother    • BRCA2 Negative Mother    • Hyperlipidemia Mother         HYPERCHOLESTEROLEMIA   • Cancer Mother         Breast   • Diabetes Father    • Other Father         traumatic brain injury   • Prostate cancer Father    • Alcohol abuse Father         in remission   • Heart disease Father    • Neuropathy Father    • Hyperlipidemia Father    • Cancer Father         Prostate   • Hypertension Brother    • Diabetes Brother    • Other Brother         HYPERCHOLESTEROLEMIA   • Alcohol abuse Brother    • Depression Brother         attempted suicide   • Colon cancer Maternal Grandfather    • Heart attack Maternal Grandmother    • No Known Problems Paternal Grandmother         dad is adopted   • No Known Problems Paternal Grandfather         dad is adopted   • Diabetes Brother    • Alcohol abuse Brother    • Asthma Son    • No Known Problems Daughter    • Ovarian cancer Neg Hx    • Uterine cancer Neg Hx        Social History     Socioeconomic History   • Marital status: /Civil Union     Spouse name: Syed Pollack   • "Number of children: 3   • Years of education: GED then 2 year college program   • Highest education level: Not on file   Occupational History   • Occupation: ER TECH     Employer: [employer]   Tobacco Use   • Smoking status: Former     Packs/day: 1 00     Years: 15 00     Pack years: 15 00     Types: Cigarettes     Quit date: 4/30/2013     Years since quitting: 10 0     Passive exposure: Never   • Smokeless tobacco: Never   Vaping Use   • Vaping Use: Never used   Substance and Sexual Activity   • Alcohol use: Not Currently     Alcohol/week: 1 0 standard drink     Types: 1 Glasses of wine per week     Comment: Occs -twice monthly   • Drug use: No   • Sexual activity: Yes     Partners: Male     Birth control/protection: OCP, Post-menopausal     Comment: lifetime partners: 6; current partner 2013   Other Topics Concern   • Not on file   Social History Narrative    Voodoo: no preference    Accepts blood products        Exercise: unable with back issues    Calcium: calcium supplement, multivitamin for women over 50, 1 yogurt daily     Social Determinants of Health     Financial Resource Strain: Not on file   Food Insecurity: Not on file   Transportation Needs: Not on file   Physical Activity: Not on file   Stress: Not on file   Social Connections: Not on file   Intimate Partner Violence: Not on file   Housing Stability: Not on file         OBJECTIVE:    /64   Pulse 67   Ht 5' 6\" (1 676 m)   Wt 62 1 kg (137 lb)   LMP 01/07/2019 (Approximate)   BMI 22 11 kg/m²   Vitals:    05/12/23 1208   Weight: 62 1 kg (137 lb)     GEN: No acute distress, Alert and oriented, well appearing  HEENT: wearing a mask External ears normal  EYES: Pupils equal, sclera anicteric, midline, normal conjuctiva  NECK: No JVD, supple, no obvious masses or thryomegaly or goiter  CARDIOVASCULAR: RRR, No murmur, rub, gallops S1,S2  LUNGS: Clear To auscultation bilaterally, normal effort, no rales, rhonchi, crackles  ABDOMEN: " nondistended,  without obvious organomegaly or ascites  EXTREMITIES/VASCULAR:  No edema  Radial pulses intact, pedal pulses difficult to palpate, warm an well perfused  PSYCH: Normal Affect, no overt suicidal ideation, linear speech pattern without evidence of psychosis  NEURO: Grossly intact, moving all extremiteis equal, face symmetric, alert and responsive, no obvious focal defecits  GAIT:  Ambulates normally without difficulty  HEME: No bleeding, bruising, petechia, purpura  SKIN: No significant rashes, warm, no diaphoresis or pallor       Lab Results:       LABS:      Chemistry        Component Value Date/Time     (L) 08/13/2015 0556    K 4 5 02/25/2023 1225    K 3 6 08/13/2015 0556     02/25/2023 1225     08/13/2015 0556    CO2 25 02/25/2023 1225    CO2 22 3 08/13/2015 0556    BUN 15 02/25/2023 1225    BUN 6 08/13/2015 0556    CREATININE 0 75 02/25/2023 1225    CREATININE 0 55 (L) 08/13/2015 0556        Component Value Date/Time    CALCIUM 9 5 02/25/2023 1225    CALCIUM 7 3 (L) 08/13/2015 0556    ALKPHOS 68 02/25/2023 1225    ALKPHOS 64 04/16/2014 1241    AST 32 02/25/2023 1225    AST 24 04/16/2014 1241    ALT 33 02/25/2023 1225    ALT 46 04/16/2014 1241    BILITOT 0 39 04/16/2014 1241            Lab Results   Component Value Date    CHOL 219 07/17/2015    CHOL 224 06/10/2014     Lab Results   Component Value Date    HDL 77 01/27/2023    HDL 61 04/15/2022    HDL 66 05/20/2021     Lab Results   Component Value Date    LDLCALC 78 01/27/2023    LDLCALC 38 04/15/2022    LDLCALC 135 (H) 05/20/2021     Lab Results   Component Value Date    TRIG 258 (H) 01/27/2023    TRIG 284 (H) 04/15/2022    TRIG 386 (H) 05/20/2021     No results found for: CHOLHDL    IMAGING: Mri Ankle/heel Right  Wo Contrast    Result Date: 11/5/2020  Narrative: MRI RIGHT ANKLE - WITHOUT CONTRAST INDICATION:   S93 401D: Sprain of unspecified ligament of right ankle, subsequent encounter S86 311D: Strain of muscle(s) and tendon(s) of peroneal muscle group at lower leg level, right leg, subsequent encounter  COMPARISON:  X-ray right ankle dated September 24, 2020  TECHNIQUE:   MR sequences were obtained of the right ankle including: Localizers, coronal STIR, coronal T1, axial T2 fat sat, axial PD, sagittal T2 fat sat, sagittal T1 sequences were obtained  Imaging performed on 1 5T MRI Gadolinium was not used  FINDINGS: SUBCUTANEOUS TISSUES: Normal JOINT EFFUSION: None  TENDONS: Achilles tendon: Normal  Peroneus brevis and longus: Normal  Posterior tibialis, flexor digitorum longus, flexor hallucis longus: Normal  Anterior tibialis, extensor digitorum longus, extensor hallucis longus:  Normal  LIGAMENTS: Lateral collateral ligament complex:  Lateral ankle sprain evidenced by diffuse thickening and edema of the ATFL (series 2 image 14)  The CFL and PTFL are intact  Distal tibiofibular syndesmosis:  Intact  Medial collateral ligament complex:  Intact  PLANTAR FASCIA:  Normal  ARTICULAR SURFACES:  Normal  SINUS TARSI:  Normal  Tarsal Tunnel: Unremarkable  BONES:  Normal  MUSCULATURE:  Normal      Impression: Lateral ankle sprain evidenced by diffuse thickening and edema of the ATFL  The CFL and PTFL are intact  Otherwise, unremarkable MRI of right ankle  Workstation performed: [id number]     Xr Follow Up    Result Date: 11/9/2020  Narrative: ABDOMEN INDICATION:   Pre-MRI evaluation of spinal stimulator leads  COMPARISON:  None VIEWS:  AP supine FINDINGS: Spinal stimulator generator pack in the left gluteal soft tissues  Wires show no discontinuity  Leads terminate at the T9 level  There is a nonobstructive bowel gas pattern  No discernible free air on this supine study  Upright or left lateral decubitus imaging is more sensitive to detect subtle free air in the appropriate setting  Prior cholecystectomy  No radiographically evident urolithiasis  Visualized lung bases are clear   Visualized osseous structures are unremarkable for the patient's age  Impression: Spinal stimulator device with leads terminating at the T9 level  No acute abdominal findings  Workstation performed: [id number]        Cardiac testing:   Results for orders placed during the hospital encounter of 10/29/20   Echo complete with contrast if indicated    Narrative [address] [facility]  [phone]    Transthoracic Echocardiogram  2D, M-mode, Doppler, and Color Doppler    Study date:  29-Oct-2020    Patient: Sterling Dey  MR number: [id number]  Account number: [de-identified]  : 1970  Age: 45 years  Gender: Female  Status: Outpatient  Location: Echo lab  Height: 68 in  Weight: 173 6 lb  BP: 110/ 80 mmHg    Indications: SVT    Diagnoses: I47 2 - Ventricular tachycardia    Sonographer:  LUPE Hays  Primary Physician:  Daisy Hess MD  Referring Physician:  Jonh Harp MD  Group:  [facility]  Interpreting Physician:  Thom Post DO    SUMMARY    LEFT VENTRICLE:  Systolic function was normal  Ejection fraction was estimated to be 60 %  There were no regional wall motion abnormalities  Doppler parameters were consistent with abnormal left ventricular relaxation (grade 1 diastolic dysfunction)  RIGHT VENTRICLE:  The size was normal   Systolic function was normal     MITRAL VALVE:  The anterior leaflet was elongated  HISTORY: PRIOR HISTORY: DM, tachycardia    PROCEDURE: The procedure was performed in the echo lab  This was a routine study  The transthoracic approach was used  The study included complete 2D imaging, M-mode, complete spectral Doppler, and color Doppler  The heart rate was 83 bpm,  at the start of the study  Images were obtained from the parasternal, apical, subcostal, and suprasternal notch acoustic windows  Image quality was adequate  LEFT VENTRICLE: Size was normal  Systolic function was normal  Ejection fraction was estimated to be 60 %   There were no regional wall motion abnormalities  Wall thickness was normal  DOPPLER: Doppler parameters were consistent with  abnormal left ventricular relaxation (grade 1 diastolic dysfunction)  There was no evidence of elevated ventricular filling pressure by Doppler parameters  RIGHT VENTRICLE: The size was normal  Systolic function was normal  Wall thickness was normal     LEFT ATRIUM: Size was normal     RIGHT ATRIUM: Size was normal     MITRAL VALVE: There was normal leaflet separation  The anterior leaflet was elongated  DOPPLER: The transmitral velocity was within the normal range  There was no evidence for stenosis  There was trace regurgitation  AORTIC VALVE: The valve was trileaflet  Leaflets exhibited normal thickness and normal cuspal separation  DOPPLER: Transaortic velocity was within the normal range  There was no evidence for stenosis  There was no regurgitation  TRICUSPID VALVE: The valve structure was normal  There was normal leaflet separation  DOPPLER: The transtricuspid velocity was within the normal range  There was no evidence for stenosis  There was trace regurgitation  The tricuspid jet  envelope definition was inadequate for estimation of RV systolic pressure  There are no indirect findings (abnormal RV volume or geometry, altered pulmonary flow velocity profile, or leftward septal displacement) which would suggest  moderate or severe pulmonary hypertension  PULMONIC VALVE: Leaflets exhibited normal thickness, no calcification, and normal cuspal separation  DOPPLER: The transpulmonic velocity was within the normal range  There was trace regurgitation  PERICARDIUM: There was no pericardial effusion  The pericardium was normal in appearance  AORTA: The root exhibited normal size  SYSTEMIC VEINS: IVC: The inferior vena cava was normal in size and course   Respirophasic changes were normal     SYSTEM MEASUREMENT TABLES    2D  %FS: 35 27 %  Ao Diam: 3 14 cm  Ao asc: 2 82 cm  EDV(Teich): 77 95 ml  EF(Teich): 65 04 %  ESV(Teich): 27 25 ml  IVSd: 0 81 cm  LA Area: 13 62 cm2  LA Diam: 3 07 cm  LVEDV MOD A4C: 59 56 ml  LVEF MOD A4C: 69 97 %  LVESV MOD A4C: 17 89 ml  LVIDd: 4 19 cm  LVIDs: 2 71 cm  LVLd A4C: 7 33 cm  LVLs A4C: 5 85 cm  LVPWd: 0 81 cm  RA Area: 11 11 cm2  RVIDd: 4 04 cm  SV MOD A4C: 41 67 ml  SV(Teich): 50 7 ml    PW  E' Sept: 0 09 m/s  E/E' Sept: 8 96  MV A Praneeth: 1 11 m/s  MV Dec [place]: 3 29 m/s2  MV DecT: 259 08 ms  MV E Praneeth: 0 85 m/s  MV E/A Ratio: 0 77  MV PHT: 75 13 ms  MVA By PHT: 2 93 cm2    [facility] Accredited Echocardiography [facility]    Prepared and electronically signed by    Pieter Mendes DO  Signed 29-Oct-2020 15:23:53       No results found for this or any previous visit  No results found for this or any previous visit  Results for orders placed during the hospital encounter of 19   NM myocardial perfusion spect (stress and/or rest)    Narrative [address] [place] [address]  [phone]    Rest/Stress Gated SPECT Myocardial Perfusion Imaging After Exercise    Patient: Monserrat Page  MR number: [id number]  Account number: [de-identified]  : 1970  Age: 47 years  Gender: Female  Status: Outpatient  Location: Stress lab  Height: 67 in  Weight: 178 lb  BP: 98/ 70 mmHg    Allergies: IBUPROFEN    Diagnosis: R07 9 - Chest pain, unspecified    Primary Physician:  Braden Wilkins MD  RN:  Maida Genao RN  Technician:  Cecilia Cheung  Group:  [facility]  Report Prepared By[de-identified]  Maida Genao RN  Interpreting Physician:  Shruthi Ramirez MD    INDICATIONS: Detection of Coronary artery disease  HISTORY: The patient is a 47year old  female  Chest pain status: chest pain  Other symptoms: dyspnea  Coronary artery disease risk factors: dyslipidemia, hypertension, smoking, family history of premature coronary artery disease,  and diabetes mellitus  Cardiovascular history: none significant   Co-morbidity: obesity  Medications: no cardiac drugs  PHYSICAL EXAM: Baseline physical exam screening: no wheezes audible  REST ECG: Normal sinus rhythm  74 beats per minute  PROCEDURE: The study was performed in the the Stress lab  Treadmill exercise testing was performed, using the [place] protocol  Gated SPECT myocardial perfusion imaging was performed after stress and at rest  Systolic blood pressure was 98  mmHg, at the start of the study  Diastolic blood pressure was 70 mmHg, at the start of the study  The heart rate was 74 bpm, at the start of the study  IV double checked  [place] PROTOCOL:  HR bpm SBP mmHg DBP mmHg Symptoms  Baseline 74 98 70 none  Stage 1 108 110 58 none  Stage 2 136 122 60 mild chest discomfort, mild dyspnea, mild fatigue  Stage 3 176 -- -- mild chest discomfort, mild dyspnea, moderate fatigue  Immediate 176 110 60 same as above  Recovery 1 81 132 78 subsiding  Recovery 2 76 120 80 none  No medications or fluids given  STRESS SUMMARY: Duration of exercise was 7 min and 31 sec  The patient exercised to protocol stage 3  Maximal work rate was 9 3 METs  Maximal heart rate during stress was 179 bpm ( 105 % of maximal predicted heart rate)  The heart rate  response to stress was normal  There was normal resting blood pressure with an appropriate response to stress  The rate-pressure product for the peak heart rate and blood pressure was [id number]  The patient experienced chest pain during  stress; pain resolved spontaneously  The stress test was terminated due to moderate fatigue  The stress test was terminated due to moderate fatigue  Pre oxygen saturation: 98 %  Peak oxygen saturation: 98 %  The stress ECG was negative for  ischemia and normal  There were no stress arrhythmias or conduction abnormalities      ISOTOPE ADMINISTRATION:  Resting isotope administration Stress isotope administration  Agent Tetrofosmin Tetrofosmin  Dose 10 97 mCi 33 mCi  Date 05/28/2019 05/28/2019  Injection time 08:12 09: 50  Injection-image interval 69 min 58 min    The radiopharmaceutical was injected one minute before the end of exercise  The radiopharmaceutical was injected one minute before the end of exercise  MYOCARDIAL PERFUSION IMAGING:  The image quality was good  Left ventricular size was normal  The TID ratio was 1 32  Visually, there was no TID present  PERFUSION DEFECTS:  -  There were no perfusion defects  GATED SPECT:  The calculated left ventricular ejection fraction was 58 %  Left ventricular ejection fraction was within normal limits by visual estimate  There was no left ventricular regional abnormality  SUMMARY:  -  Stress results: Duration of exercise was 7 min and 31 sec  Target heart rate was achieved  The patient experienced chest pain during stress; pain resolved spontaneously  -  ECG conclusions: The stress ECG was negative for ischemia and normal   -  Perfusion imaging: There were no perfusion defects   -  Gated SPECT: The calculated left ventricular ejection fraction was 58 %  Left ventricular ejection fraction was within normal limits by visual estimate  There was no left ventricular regional abnormality  IMPRESSIONS: Normal study after maximal exercise without reproduction of symptoms  Myocardial perfusion imaging was normal at rest and with stress  Left ventricular systolic function was normal     Prepared and signed by    Evaristo Devine MD  Signed 05/28/2019 13:50:55             I reviewed and interpreted the following LABS/EKG/TELE/IMAGING and below is summary of my interpretation (if data available):        Past EKGs and RHYTHM strip: Reviewed she has incomplete RBBB  Sinus rhythm and sinus tachycaria       HOLTER/EVENT Monitor:Reviewed zio strips, reviewed loop strips see discussion

## 2023-05-19 ENCOUNTER — HOSPITAL ENCOUNTER (OUTPATIENT)
Dept: ULTRASOUND IMAGING | Facility: HOSPITAL | Age: 53
Discharge: HOME | End: 2023-05-19

## 2023-05-19 ENCOUNTER — OFFICE VISIT (OUTPATIENT)
Dept: FAMILY MEDICINE CLINIC | Facility: CLINIC | Age: 53
End: 2023-05-19

## 2023-05-19 ENCOUNTER — TELEPHONE (OUTPATIENT)
Dept: FAMILY MEDICINE CLINIC | Facility: CLINIC | Age: 53
End: 2023-05-19

## 2023-05-19 VITALS
WEIGHT: 138 LBS | RESPIRATION RATE: 16 BRPM | SYSTOLIC BLOOD PRESSURE: 118 MMHG | HEART RATE: 82 BPM | TEMPERATURE: 97.9 F | OXYGEN SATURATION: 98 % | HEIGHT: 66 IN | BODY MASS INDEX: 22.18 KG/M2 | DIASTOLIC BLOOD PRESSURE: 74 MMHG

## 2023-05-19 DIAGNOSIS — M25.552 LEFT HIP PAIN: Primary | ICD-10-CM

## 2023-05-19 DIAGNOSIS — M79.18 MYOFASCIAL PAIN SYNDROME: ICD-10-CM

## 2023-05-19 DIAGNOSIS — R59.9 ENLARGED LYMPH NODE: ICD-10-CM

## 2023-05-19 DIAGNOSIS — R22.1 NECK MASS: ICD-10-CM

## 2023-05-19 DIAGNOSIS — F33.2 MDD (MAJOR DEPRESSIVE DISORDER), RECURRENT SEVERE, WITHOUT PSYCHOSIS (HCC): ICD-10-CM

## 2023-05-19 PROBLEM — F11.20 OPIOID TYPE DEPENDENCE, CONTINUOUS (HCC): Status: RESOLVED | Noted: 2023-01-05 | Resolved: 2023-05-19

## 2023-05-19 RX ORDER — METHOCARBAMOL 750 MG/1
750 TABLET, FILM COATED ORAL EVERY 8 HOURS SCHEDULED
Qty: 270 TABLET | Refills: 0 | Status: SHIPPED | OUTPATIENT
Start: 2023-05-19 | End: 2023-08-17

## 2023-05-19 RX ORDER — TRAMADOL HYDROCHLORIDE 50 MG/1
50 TABLET ORAL EVERY 6 HOURS PRN
Qty: 30 TABLET | Refills: 0 | Status: SHIPPED | OUTPATIENT
Start: 2023-05-19

## 2023-05-19 NOTE — PROGRESS NOTES
Name: Reji Little      : 1970      MRN: [id number]  Encounter Provider: Marko Allen MD  Encounter Date: 2023   Encounter department: [facility]     1  Left hip pain  -     traMADol (Ultram) 50 mg tablet; Take 1 tablet (50 mg total) by mouth every 6 (six) hours as needed for moderate pain    2  MDD (major depressive disorder), recurrent severe, without psychosis ([facility] )  Assessment & Plan:  Stable on current meds      3  Enlarged lymph node  Comments:  found on recent exam  Ultrasound showed abnormal Lymph node  sent for biopsy today   referral ENT   Orders:  -     US guided lymph node biopsy left; Future; Expected date: 2023  -     Ambulatory Referral to Otolaryngology; Future    4  Myofascial pain syndrome  -     traMADol (Ultram) 50 mg tablet; Take 1 tablet (50 mg total) by mouth every 6 (six) hours as needed for moderate pain  -     methocarbamol (ROBAXIN) 750 mg tablet; Take 1 tablet (750 mg total) by mouth every 8 (eight) hours           Subjective    started 2 days ago with left hip pain goes around to the left lower back  throbbing in her left thigh  No injury or trauma that she could recall   advil doesn't help   She has been active with walking , pulls patients at work and gardening     Hip Pain   There was no injury mechanism  The pain is present in the left hip  Pertinent negatives include no inability to bear weight, loss of motion, loss of sensation, muscle weakness, numbness or tingling  She reports no foreign bodies present  The treatment provided mild relief  Review of Systems   Constitutional: Negative  HENT: Negative  Respiratory: Negative  Gastrointestinal: Negative  Musculoskeletal: Positive for arthralgias  Neurological: Negative for tingling and numbness  Hematological: Negative  Psychiatric/Behavioral: Negative          Current Outpatient Medications on File Prior to Visit   Medication Sig   • atoMOXetine (STRATTERA) 60 mg capsule Take 1 capsule (60 mg total) by mouth daily   • atorvastatin (LIPITOR) 20 mg tablet Take 1 tablet (20 mg total) by mouth daily   • calcium carbonate (OS-MELODY) 600 MG tablet Take 600 mg by mouth 2 (two) times a day with meals   • cyanocobalamin (VITAMIN B-12) 100 mcg tablet Take by mouth daily   • drospirenone-ethinyl estradiol (LIEN) 3-0 02 MG per tablet Take 1 tablet by mouth daily   • estradiol (ESTRACE VAGINAL) 0 1 mg/g vaginal cream One gram vaginally at night x 14 days then twice weekly for ongoing maintenance  (Patient taking differently: if needed One gram vaginally at night x 14 days then twice weekly for ongoing maintenance )   • ivabradine HCl (Corlanor) 5 MG tablet Take 1 tablet (5 mg total) by mouth every 12 (twelve) hours   • lamoTRIgine (LaMICtal) 150 MG tablet Take 1 tablet (150 mg total) by mouth 2 (two) times a day   • meclizine (ANTIVERT) 25 mg tablet Take 1 tablet (25 mg total) by mouth every 8 (eight) hours as needed for dizziness   • metoprolol tartrate (LOPRESSOR) 25 mg tablet Take 1 tablet (25 mg total) by mouth every 12 (twelve) hours 1/2 tab every 12 hours   • montelukast (SINGULAIR) 10 mg tablet Take 1 tablet (10 mg total) by mouth daily at bedtime   • Multiple Vitamins-Minerals (MULTI COMPLETE PO) Take by mouth   • nitrofurantoin (MACROBID) 100 mg capsule Take 1 tablet PO PRN after sexual intercourse   • omeprazole (PriLOSEC) 20 mg delayed release capsule Take 1 capsule (20 mg total) by mouth daily   • QUEtiapine (SEROquel) 50 mg tablet Take 1 5 tablets (75 mg total) by mouth daily at bedtime   • venlafaxine (EFFEXOR) 100 MG tablet Take 1 tablet (100mg) by mouth twice daily (AM and PM) with 75mg tablet (each afternoon) for total of 275mg per day     • venlafaxine (EFFEXOR) 75 mg tablet Take 1 tablet (75 mg total) by mouth daily after lunch   • [DISCONTINUED] methocarbamol (ROBAXIN) 750 mg tablet Take 1 tablet (750 mg total) by mouth daily at bedtime as needed for "muscle spasms       Objective     /74 (BP Location: Left arm, Patient Position: Sitting, Cuff Size: Adult)   Pulse 82   Temp 97 9 °F (36 6 °C) (Tympanic)   Resp 16   Ht 5' 6\" (1 676 m)   Wt 62 6 kg (138 lb)   LMP 01/07/2019 (Approximate)   SpO2 98%   BMI 22 27 kg/m²     Physical Exam  Vitals and nursing note reviewed  Constitutional:       Appearance: Normal appearance  HENT:      Mouth/Throat:      Mouth: Mucous membranes are moist    Eyes:      Extraocular Movements: Extraocular movements intact  Pupils: Pupils are equal, round, and reactive to light  Cardiovascular:      Rate and Rhythm: Normal rate and regular rhythm  Pulses: Normal pulses  Heart sounds: Normal heart sounds  Musculoskeletal:         General: Tenderness present  Comments: Left lower hip   Skin:     Capillary Refill: Capillary refill takes less than 2 seconds  Neurological:      General: No focal deficit present  Mental Status: She is alert and oriented to person, place, and time     Psychiatric:         Mood and Affect: Mood normal          Behavior: Behavior normal        Nestor Chua MD  "

## 2023-05-21 DIAGNOSIS — F41.1 GENERALIZED ANXIETY DISORDER: Primary | Chronic | ICD-10-CM

## 2023-05-21 RX ORDER — DIAZEPAM 10 MG/1
TABLET ORAL
Qty: 1 TABLET | Refills: 0 | Status: SHIPPED | OUTPATIENT
Start: 2023-05-21 | End: 2023-08-03

## 2023-05-22 ENCOUNTER — TELEMEDICINE (OUTPATIENT)
Dept: BEHAVIORAL/MENTAL HEALTH CLINIC | Facility: CLINIC | Age: 53
End: 2023-05-22

## 2023-05-22 ENCOUNTER — HOSPITAL ENCOUNTER (OUTPATIENT)
Dept: RADIOLOGY | Facility: HOSPITAL | Age: 53
Discharge: HOME | End: 2023-05-22

## 2023-05-22 DIAGNOSIS — F33.1 MODERATE EPISODE OF RECURRENT MAJOR DEPRESSIVE DISORDER (HCC): Primary | ICD-10-CM

## 2023-05-22 DIAGNOSIS — R59.9 ENLARGED LYMPH NODE: ICD-10-CM

## 2023-05-22 DIAGNOSIS — F41.1 GENERALIZED ANXIETY DISORDER: Chronic | ICD-10-CM

## 2023-05-22 DIAGNOSIS — F90.0 ADHD (ATTENTION DEFICIT HYPERACTIVITY DISORDER), INATTENTIVE TYPE: Chronic | ICD-10-CM

## 2023-05-22 DIAGNOSIS — F43.10 POST TRAUMATIC STRESS DISORDER (PTSD): Chronic | ICD-10-CM

## 2023-05-22 PROBLEM — F33.9 RECURRENT MAJOR DEPRESSIVE DISORDER (HCC): Chronic | Status: ACTIVE | Noted: 2019-05-10

## 2023-05-22 RX ORDER — LIDOCAINE HYDROCHLORIDE 10 MG/ML
2 INJECTION, SOLUTION EPIDURAL; INFILTRATION; INTRACAUDAL; PERINEURAL ONCE
Status: COMPLETED | OUTPATIENT
Start: 2023-05-22 | End: 2023-05-22

## 2023-05-22 RX ADMIN — LIDOCAINE HYDROCHLORIDE 2 ML: 10 INJECTION, SOLUTION EPIDURAL; INFILTRATION; INTRACAUDAL; PERINEURAL at 09:49

## 2023-05-22 NOTE — PSYCH
Virtual Regular Visit    Verification of patient location:    Patient is located at Home in the following state in which I hold an active license [place]      Assessment/Plan:    Problem List Items Addressed This Visit        Other    Post traumatic stress disorder (PTSD) (Chronic)    Recurrent major depressive disorder ([facility] ) - Primary (Chronic)    Generalized anxiety disorder (Chronic)    ADHD (attention deficit hyperactivity disorder), inattentive type (Chronic)       Goals addressed in session: Goal 1          Reason for visit is   Chief Complaint   Patient presents with   • Virtual Regular Visit        Encounter provider LALY Whittington    Provider located at [address] [place] [address] [address] [place] [id number] [phone]      Recent Visits  Date Type Provider Dept   05/19/23 Telephone Kentrell Mcgregor MD [address]   05/19/23 Office Visit Kentrell Mcgregor MD [address] [facility] recent visits within past 7 days and meeting all other requirements  Today's Visits  Date Type Provider Dept   05/22/23 [address] today's visits and meeting all other requirements  Future Appointments  No visits were found meeting these conditions  Showing future appointments within next 150 days and meeting all other requirements       The patient was identified by name and date of birth  Lito Batsita was informed that this is a telemedicine visit and that [facility]  She agrees to proceed     My office door was closed  No one else was in the room  She acknowledged consent and understanding of privacy and security of the video platform  The patient has agreed to participate and understands they can discontinue the visit at any time  Patient is aware this is a billable service       Subjective  Lito Batista is a 46 y o  female  HPI     Past Medical History:   Diagnosis Date   • ADHD (attention deficit hyperactivity disorder)    • Bulging lumbar disc    • Carpal tunnel syndrome     RIGHT  LAST ASSESSED: 16   • Chronic back pain     low   • Chronic pain disorder    • Colon polyps    • COVID-19     2021   • Depression    • Diabetes mellitus ([facility] )    • GERD (gastroesophageal reflux disease)    • Gestational diabetes    • Hearing loss     left ear   • Heart disease     SVT   • History of pre-eclampsia    • Hyperlipidemia     Resolved with weight loss   • Hyponatremia 2020   • IBS (irritable bowel syndrome)    • Ileus ([facility] )     LAST ASSESSED: 8/3/17   • Labial cyst     LAST ASSESSED: 16   • Myofascial pain     LAST ASSESSED: 16   • Obesity    • Ovarian cyst     LEFT  LAST ASSESSED: 16   • Panic attack    • Pneumonia    • Sacroiliitis (HCC)    • Seasonal allergies    • SVT (supraventricular tachycardia) (HCC)    • Thoracic outlet syndrome        • Trochanteric bursitis of both hips     LAST ASSESSED: 3/18/16   • Ulnar neuropathy at elbow    • Varicella    • Wears glasses        Past Surgical History:   Procedure Laterality Date   • BARIATRIC SURGERY  2022   • BILE DUCT EXPLORATION      ENDOSCOPIC REMOVAL OF STONES FROM BILIARY TRACT   •  SECTION      x3   • CHOLECYSTECTOMY     • COLONOSCOPY      2017-polyp, repeat    • DILATION AND CURETTAGE OF UTERUS     • ENDOMETRIAL ABLATION     • ERCP W/ SPHICTEROTOMY     • FIRST RIB REMOVAL      THORAX EXCISION OF FIRST RIB   • GASTRIC BYPASS  2022   • HYSTEROSCOPY     • NEUROPLASTY / TRANSPOSITION ULNAR NERVE AT ELBOW Right    • MS COLONOSCOPY FLX DX W/COLLJ SPEC WHEN PFRMD N/A 2017    Procedure: COLONOSCOPY;  Surgeon: Anthony Hankins MD;  [facility]: [facility]; Service: Gastroenterology   • MS ESOPHAGOGASTRODUODENOSCOPY TRANSORAL DIAGNOSTIC N/A 2017    Procedure: ESOPHAGOGASTRODUODENOSCOPY (EGD);   Surgeon: Alejo Martinez MD; Location: [facility]; Service: Gastroenterology   • AR HYSTEROSCOPY BX ENDOMETRIUM&/POLYPC W/WO D&C N/A 10/20/2017    Procedure: DILATATION AND CURETTAGE (D&C) WITH HYSTEROSCOPY  REMOVAL VULVAR RT  LESION;  Surgeon: Poncho Hines MD;  [facility]: [facility];  Service: Gynecology   • AR KATYA Rodgers NSTIM ELTRDS PLATE/PADDLE EDRL Left 01/29/2020    Procedure: Insertion of thoracic spinal cord stimulator electrode via laminotomy and placement of left buttock implantable pulse generator (NEUROMONITORING); Surgeon: Holden Cosby MD;  Location[facility];  Service: Neurosurgery   • AR LAPS [address] LONGITUDINAL GASTRECTOMY N/A 02/06/2018    Procedure: GASTRECTOMY SLEEVE LAPAROSCOPIC; INTRAOPERATIVE EGD ;  Surgeon: Elizabeth Brantley MD;  [facility]: [facility];  Service: Bariatrics   • AR LAPS [address] [facility] LONGITUDINAL GASTRECTOMY N/A 08/08/2022    Procedure: Diagnostic Lap;  Extensive Lysis of Adhesions; LAPAROSCOPIC REVISION CONVERSION TO NOE-EN-Y GASTRIC BYPASS AND INTRAOPERATIVE EGD;  Surgeon: Elizabeth Brantley MD;  [facility];  Service: Bariatrics   • SPINAL CORD STIMULATOR TRIAL W/ LAMINOTOMY     • TONSILLECTOMY AND ADENOIDECTOMY     • TUBAL LIGATION     • UPPER GASTROINTESTINAL ENDOSCOPY     • US GUIDED LYMPH NODE BIOPSY LEFT  5/22/2023   • VEIN LIGATION AND STRIPPING Right    • VULVA SURGERY  10/20/2017    BIOPSY   • WISDOM TOOTH EXTRACTION         Current Outpatient Medications   Medication Sig Dispense Refill   • atoMOXetine (STRATTERA) 60 mg capsule Take 1 capsule (60 mg total) by mouth daily 90 capsule 3   • atorvastatin (LIPITOR) 20 mg tablet Take 1 tablet (20 mg total) by mouth daily 90 tablet 3   • calcium carbonate (OS-MELODY) 600 MG tablet Take 600 mg by mouth 2 (two) times a day with meals     • cyanocobalamin (VITAMIN B-12) 100 mcg tablet Take by mouth daily     • diazepam (VALIUM) 10 mg tablet Take 1 tab 1 hour prior to procedure 1 tablet 0   • drospirenone-ethinyl estradiol (LIEN) 3-0 02 MG per tablet Take 1 tablet by mouth daily 84 tablet 4   • estradiol (ESTRACE VAGINAL) 0 1 mg/g vaginal cream One gram vaginally at night x 14 days then twice weekly for ongoing maintenance  (Patient taking differently: if needed One gram vaginally at night x 14 days then twice weekly for ongoing maintenance ) 42 5 g 2   • ivabradine HCl (Corlanor) 5 MG tablet Take 1 tablet (5 mg total) by mouth every 12 (twelve) hours 60 tablet 1   • lamoTRIgine (LaMICtal) 150 MG tablet Take 1 tablet (150 mg total) by mouth 2 (two) times a day 180 tablet 3   • meclizine (ANTIVERT) 25 mg tablet Take 1 tablet (25 mg total) by mouth every 8 (eight) hours as needed for dizziness 30 tablet 0   • methocarbamol (ROBAXIN) 750 mg tablet Take 1 tablet (750 mg total) by mouth every 8 (eight) hours 270 tablet 0   • metoprolol tartrate (LOPRESSOR) 25 mg tablet Take 1 tablet (25 mg total) by mouth every 12 (twelve) hours 1/2 tab every 12 hours 90 tablet 3   • montelukast (SINGULAIR) 10 mg tablet Take 1 tablet (10 mg total) by mouth daily at bedtime 90 tablet 2   • Multiple Vitamins-Minerals (MULTI COMPLETE PO) Take by mouth     • nitrofurantoin (MACROBID) 100 mg capsule Take 1 tablet PO PRN after sexual intercourse 30 capsule 0   • omeprazole (PriLOSEC) 20 mg delayed release capsule Take 1 capsule (20 mg total) by mouth daily 90 capsule 3   • QUEtiapine (SEROquel) 50 mg tablet Take 1 5 tablets (75 mg total) by mouth daily at bedtime 135 tablet 3   • traMADol (Ultram) 50 mg tablet Take 1 tablet (50 mg total) by mouth every 6 (six) hours as needed for moderate pain 30 tablet 0   • venlafaxine (EFFEXOR) 100 MG tablet Take 1 tablet (100mg) by mouth twice daily (AM and PM) with 75mg tablet (each afternoon) for total of 275mg per day  180 tablet 3   • venlafaxine (EFFEXOR) 75 mg tablet Take 1 tablet (75 mg total) by mouth daily after lunch 90 tablet 2     No current facility-administered medications for this visit          Allergies   Allergen "Reactions   • Ibuprofen Other (See Comments)     Due to gastric sleeve -can only take for 5 days, then needs to stop       Review of Systems    Video Exam    There were no vitals filed for this visit  Physical Exam     Behavioral Health Psychotherapy Progress Note    Psychotherapy Provided: Individual Psychotherapy     1  Moderate episode of recurrent major depressive disorder ([address] )        2  Generalized anxiety disorder        3  Post traumatic stress disorder (PTSD)        4  ADHD (attention deficit hyperactivity disorder), inattentive type            Goals addressed in session: Goal 1     DATA: Met with Yessy for scheduled individual session  She discussed her relationship with her brother  She states that her brother made a very serious suicide attempt  He was unresponsive and needed to be intubated for two days  She discussed her anger toward him for making this suicide attempt  She states that she expressed her anger toward him for not reaching out for help  She states that his wife had told him that she wanted a divorce  She states that his wife has agreed to continue to work on their relationship, so he is not currently expressing any suicidal thoughts/ideation  She states that she \"did not know how to feel\" regarding her brother's actions  As she thinks back to his behaviors before the suicide attempt, she can see some evidence (e g , he paid for the entire meal and gave the  a $100 tip)  She states that, at that time, she did not have any idea how much he was struggling  She states that she has spoken with him, and he has agreed to reach out for assistance  She states that she used her own thought process (when she has had \"dark thoughts\") to inform him of the implications of his own suicide attempt  This clinician offered support and encouragement to her, as she works on offering support to her brother       During this session, this clinician used the following therapeutic modalities: " "Client-centered Therapy, Dialectical Behavior Therapy, Mindfulness-based Strategies, Motivational Interviewing, Solution-Focused Therapy and Supportive Psychotherapy    Substance Abuse was not addressed during this session  If the client is diagnosed with a co-occurring substance use disorder, please indicate any changes in the frequency or amount of use: n/a  Stage of change for addressing substance use diagnoses: No substance use/Not applicable    ASSESSMENT:  Melissa Solomon presents with an appropriately anxious and dysthymic mood-- primarily due to her concerns for her brother's well-being  her affect is Normal range and intensity, which is congruent, with her mood and the content of the session  The client has made progress on their goals  Melissa Solomon presents with a minimal risk of suicide, minimal risk of self-harm, and minimal risk of harm to others  For any risk assessment that surpasses a \"low\" rating, a safety plan must be developed  A safety plan was indicated: no  If yes, describe in detail n/a    PLAN: Between sessions, Melissa Solomon will continue to monitor her moods  She will continue to use her DBT skills to manage her moods and her relationships with others  At the next session, the therapist will use Client-centered Therapy, Dialectical Behavior Therapy, Mindfulness-based Strategies, Motivational Interviewing, Solution-Focused Therapy and Supportive Psychotherapy to address her mood regulation and relationship concerns  Behavioral Health Treatment Plan and Discharge Planning: Melissa Solomon is aware of and agrees to continue to work on their treatment plan  They have identified and are working toward their discharge goals   yes    Visit start and stop times:    05/22/23  Start Time: 2330  Stop Time: 3321  Total Visit Time: 52 minutes      "

## 2023-05-23 LAB — SCAN RESULT: NORMAL

## 2023-05-24 ENCOUNTER — TELEPHONE (OUTPATIENT)
Dept: PSYCHIATRY | Facility: CLINIC | Age: 53
End: 2023-05-24

## 2023-05-31 ENCOUNTER — TELEPHONE (OUTPATIENT)
Dept: PAIN MEDICINE | Facility: CLINIC | Age: 53
End: 2023-05-31

## 2023-05-31 NOTE — TELEPHONE ENCOUNTER
S/w pt who is having a flare up of left lower back, sacrum hip and leg pain to her knee  Pain is throbbing knots and #10/10  SCS not helping the leg pain  Pt is requesting injection  Pt cannot wait for OVS in July if needs to be seen  Pt works in [employer] and can come up for appt if necessary and available  Pt s/w PCP and prescribed tramadol and Robaxin today  Pt dennis taking tylenol and Advil and heat does help some  Please call pt after [phone] tomorrow, she will be done work

## 2023-05-31 NOTE — TELEPHONE ENCOUNTER
Caller: Melia Rosario    Doctor: Cindy Ramachandran    Reason for call: patient is in extreme pain 8/10 she works in [place] [address] on feet all day calling for sooner appt or if she could get an injection last seen 2/2023 last procedure 5/2022    Call back#: [phone]

## 2023-06-01 ENCOUNTER — TELEPHONE (OUTPATIENT)
Dept: CARDIOLOGY CLINIC | Facility: CLINIC | Age: 53
End: 2023-06-01

## 2023-06-01 DIAGNOSIS — M51.16 INTERVERTEBRAL DISC DISORDER WITH RADICULOPATHY OF LUMBAR REGION: Primary | ICD-10-CM

## 2023-06-01 NOTE — TELEPHONE ENCOUNTER
No need for OV, I wrote in my last note that we can repeat TFESI if necessary  Injection ordered  Ben Pollack - please schedule, thank you!

## 2023-06-01 NOTE — TELEPHONE ENCOUNTER
My name is Ranjith Dia  Birth date of birth [birth year] Phone number [phone]  I was able to get my new script for corlanor and I'm not sure if I'm supposed to take that with the metoprolol  If you could please return my call, I would appreciate it  Thank you

## 2023-06-02 ENCOUNTER — TELEPHONE (OUTPATIENT)
Dept: CARDIOLOGY CLINIC | Facility: CLINIC | Age: 53
End: 2023-06-02

## 2023-06-02 NOTE — TELEPHONE ENCOUNTER
Dr Mccullough Laughter,   This pt was started on corlanor  She hasn't started yet  Would you like her to take this in addition to the metoprolol 12 5mg BID?

## 2023-06-02 NOTE — TELEPHONE ENCOUNTER
I informed pt she can take corlanor only for a week and if not helping to add back metoprolol per Dr Antwon Ash  Pt verbalized understanding

## 2023-06-15 ENCOUNTER — TELEPHONE (OUTPATIENT)
Dept: PSYCHIATRY | Facility: CLINIC | Age: 53
End: 2023-06-15

## 2023-06-15 NOTE — TELEPHONE ENCOUNTER
Patient called to cancel 5/16 3pm appt for therapy due to her father being in the hospital  She would like a call back to reschedule

## 2023-06-15 NOTE — PSYCH
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 but would likely benefit from further optimization  Psychopharmacologically, I spoke at length with Emmett about medication options available to manage PTSD, generalized anxiety, and difficulty with concentration/focus  In the past, Emmett has attempted to wean off Effexor but found this to be quite difficult, even at higher doses  As such, given her profound anxiety that is debilitating and impairing functionality, she was agreeable to trial supra-therapeutic doses of Effexor  She voiced understanding that this is above the FDA limit yet wants to try given the severity of her symptoms and inability to taper off Effexor  Will continue with Seroquel and Lamictal for sleep assistance and mood stability  Lastly, will increase Atomoxetine to 40mg Daily to assist with ADHD symptomatology and likely improve occupational functionality  Risks/benefits/alternativies to treatment discussed, including a myriad of potential adverse medication side effects, to which Luzmaria Mckee voiced understanding and consented fully to treatment           DSM-V Diagnoses:     1 ) PTSD  2 ) Major Depressive Disorder, recurrent, in full remission  3 ) Generalized Anxiety Disorder  4 ) ADHD, combined type      Treatment Recommendations/Precautions:    1 ) PTSD  - Increase Effexor 225mg Daily to 262 5mg daily (225mg + 37 5mg)   - Voiced understanding this is above FDA limit   - Continue Lamictal 250mg Daily  - Continue Seroquel 75mg QHS  - Continue engagement in psychotherapy with [facility]   - Psychoeducation provided regarding the importance of exercise and healthy dietary choices and their impact on mood, energy, and motivation  - Counseled to avoid ETOH, illict substances, and nicotine secondary to the detrimental effects of these substances on mental and physical health  - Encouraged to engage in non-verbal forms of therapy such as art therapy, music therapy, and mindfulness      2 ) Major Depressive Disorder, recurrent, in full remission  - Increase Effexor 225mg Daily to 262 5mg daily (225mg + 37 5mg)   - Voiced understanding this is above FDA limit   - Continue Lamictal 250mg Daily  - Continue Seroquel 75mg QHS  - Continue engagement in psychotherapy with [facility]   - Psychoeducation provided regarding the importance of exercise and healthy dietary choices and their impact on mood, energy, and motivation  - Counseled to avoid ETOH, illict substances, and nicotine secondary to the detrimental effects of these substances on mental and physical health  - Encouraged to engage in non-verbal forms of therapy such as art therapy, music therapy, and mindfulness    3 ) Generalized Anxiety Disorder  - Increase Effexor 225mg Daily to 262 5mg daily (225mg + 37 5mg)   - Voiced understanding this is above FDA limit   - Continue Lamictal 250mg Daily  - Continue Seroquel 75mg QHS  - Continue engagement in psychotherapy with [facility]   - Psychoeducation provided regarding the importance of exercise and healthy dietary choices and their impact on mood, energy, and motivation  - Counseled to avoid ETOH, illict substances, and nicotine secondary to the detrimental effects of these substances on mental and physical health  - Encouraged to engage in non-verbal forms of therapy such as art therapy, music therapy, and mindfulness    4 ) ADHD, combined type  - Titrate Atomoxetine 25mg Daily to 40mg Daily        Medication management every 2 months  Continue psychotherapy with [facility] therapist Delmi Montes of need to follow up with family physician for medical issues  Aware of 24 hour and weekend coverage for urgent situations accessed by calling [facility] main practice number  Aware of above the FDA-recommended dosing of Effexor XR - benefits outweigh risks at present    Medications Risks/Benefits      Risks, Benefits And Possible Side Effects Of Medications:    Risks, benefits, and possible side effects of medications explained to [facility] including risk of rash related to treatment with Lamictal, risk of parkinsonian symptoms, Tardive Dyskinesia and metabolic syndrome related to treatment with antipsychotic medications, risk of cardiovascular events in elderly related to treatment with antipsychotic medications and risk of suicidality and serotonin syndrome related to treatment with antidepressants  She verbalizes understanding and agreement for treatment  Controlled Medication Discussion:     No recent records found for controlled prescriptions according to [place] Prescription Drug Monitoring Program    Psychotherapy Provided:     Individual psychotherapy provided: Yes  Counseling was provided during the session today for 20 minutes  Medication changes discussed with [facility]  Medication education provided to [facility]  Goals discussed during in session: alleviate anxiety, maintain control of depression, maintain control of sleep and alleviate PTSD symptoms  Recent stressor including COVID-19 issues, family conflict, divorce, medical illness in family, job stress, school stress, medical problems, recent medication change, social difficulties, everyday stressors and ongoing anxiety discussed with [facility]     Coping strategies including abstaining from substance use, compliance with medications, getting into a good routine, increasing social contact, maintain healthy diet, maintain heathy sleeping hygiene, stress reduction and spending time with family reviewed with Mukul Dupont  Educated on importance of medication and treatment compliance  Importance of follow up with family physician for medical issues reviewed with Mukul Dupont  Supportive therapy provided  Cognitive therapy was utilized during the session  Treatment Plan:    Completed and signed during the session: Not applicable - Treatment Plan to be completed by [address] [place] [address] therapist    Note Share Disclaimer:      This note was not shared with the patient due to reasonable likelihood of causing patient harm    Benjamin Naylor MD 06/11/21

## 2023-06-16 ENCOUNTER — APPOINTMENT (OUTPATIENT)
Dept: LAB | Facility: AMBULARY SURGERY CENTER | Age: 53
End: 2023-06-16
Payer: COMMERCIAL

## 2023-06-16 ENCOUNTER — REMOTE DEVICE CLINIC VISIT (OUTPATIENT)
Dept: CARDIOLOGY CLINIC | Facility: CLINIC | Age: 53
End: 2023-06-16
Payer: COMMERCIAL

## 2023-06-16 ENCOUNTER — CONSULT (OUTPATIENT)
Dept: NEUROLOGY | Facility: CLINIC | Age: 53
End: 2023-06-16
Payer: COMMERCIAL

## 2023-06-16 VITALS
BODY MASS INDEX: 21.38 KG/M2 | WEIGHT: 133 LBS | DIASTOLIC BLOOD PRESSURE: 80 MMHG | HEIGHT: 66 IN | HEART RATE: 75 BPM | SYSTOLIC BLOOD PRESSURE: 102 MMHG

## 2023-06-16 DIAGNOSIS — R41.3 MEMORY LOSS: Primary | ICD-10-CM

## 2023-06-16 DIAGNOSIS — F90.9 ADHD: ICD-10-CM

## 2023-06-16 DIAGNOSIS — F32.A DEPRESSION: ICD-10-CM

## 2023-06-16 DIAGNOSIS — Z95.818 PRESENCE OF OTHER CARDIAC IMPLANTS AND GRAFTS: Primary | ICD-10-CM

## 2023-06-16 DIAGNOSIS — F41.9 ANXIETY DISORDER: ICD-10-CM

## 2023-06-16 DIAGNOSIS — R41.3 MEMORY DEFICIT: ICD-10-CM

## 2023-06-16 DIAGNOSIS — G31.84 MCI (MILD COGNITIVE IMPAIRMENT): Primary | ICD-10-CM

## 2023-06-16 DIAGNOSIS — G25.0 TREMOR, ESSENTIAL: ICD-10-CM

## 2023-06-16 LAB
FOLATE SERPL-MCNC: >22.3 NG/ML
VIT B12 SERPL-MCNC: 1508 PG/ML (ref 180–914)

## 2023-06-16 PROCEDURE — 36415 COLL VENOUS BLD VENIPUNCTURE: CPT

## 2023-06-16 PROCEDURE — 93298 REM INTERROG DEV EVAL SCRMS: CPT | Performed by: INTERNAL MEDICINE

## 2023-06-16 PROCEDURE — G2066 INTER DEVC REMOTE 30D: HCPCS | Performed by: INTERNAL MEDICINE

## 2023-06-16 PROCEDURE — 99205 OFFICE O/P NEW HI 60 MIN: CPT | Performed by: STUDENT IN AN ORGANIZED HEALTH CARE EDUCATION/TRAINING PROGRAM

## 2023-06-16 PROCEDURE — 82746 ASSAY OF FOLIC ACID SERUM: CPT

## 2023-06-16 PROCEDURE — 82607 VITAMIN B-12: CPT

## 2023-06-16 NOTE — PROGRESS NOTES
"MDT LNQ22/ ACTIVE SYSTEM IS MRI CONDITIONAL   CARELINK TRANSMISSION: LOOP RECORDER  PRESENTING RHYTHM SB W/ PAC @ 54 BPM  BATTERY STATUS \"OK  \" 1 PT ACTIVATED AND 1 TACHY EPISODE BOTH PREVIOUSLY ADDRESSED IN ALERTS  NO NEW PATIENT OR DEVICE ACTIVATED EPISODES  NORMAL DEVICE FUNCTION   DL   "

## 2023-06-16 NOTE — PROGRESS NOTES
"  [address] Neurology Consult  PATIENT:  Ladan Arreola  MRN:  [id number]  :  1970  DATE OF SERVICE:  2023  REFERRED BY: Char Mercado MD  PMD: Mary Ellen Simon MD    Assessment/Plan:     Ladan Arreola is a very pleasant 46 y o  female with a past medical history that includes T2DM, GERD, cervical and lumbar radiculopathy, CONNOR, depression, ADHD, HLD referred here for evaluation of cognitive changes  Mild cognitive impairment  -MOCA 19/30 today  Remains independent of ADLs  Overall picture c/w MCI  -MRI brain w/wo neuroquant to assess for evidence of neurodegenerative changes or other structural causes  -TSH previously normal  Will check B12 levels   -referral placed for neuropsych testing given cognitive impairment in setting of hx depression, CONNOR, ADHD    Essential tremor  -action tremor noted on exam today and consistent with essential tremor per history  -declined treatment with medication at this time    CC:   Cognitive impairment    History of Present Illness:     46 y o  female with a past medical history that includes T2DM, GERD, cervical and lumbar radiculopathy, CONNOR, depression, HLD referred here for evaluation of cognitive changes  She endorses word-finding difficulties  Started back when she had COVID 2021  Memory issues  Currently in school - is having difficulty with memorization of anatomy  Her symptoms make her feel more depressed because she feels that she's \"losing her mind  \" Lives with her , son, mother in law  Independent of adls  Sometimes forgets how to get to places that she has been to many times before  Tremors - worsened over the last few months  Only occurs with action, worsens with fine motor movements  Never at rest  Brother has similar symptoms  Worsens sometimes with stress  effexor - 100 - 75 - 100 mg   Dose increased a few months ago  seroquel - 75 mg for many years  lamictal - 150 mg twice daily several years  strattera - 60 mg several " "years     Takes robaxin infrequently  Sleep - seroquel \"knocks her out  \" Sleeps 7-8 hrs at night    Feels that depression/anxiety is well-controlled at this time  Grandmother - dementia  Unsure how old she was at that time  Past Medical History:     Past Medical History:   Diagnosis Date   • ADHD (attention deficit hyperactivity disorder)    • Bulging lumbar disc    • Carpal tunnel syndrome     RIGHT  LAST ASSESSED: 12/7/16   • Chronic back pain     low   • Chronic pain disorder    • Colon polyps    • COVID-19     12/2021   • Depression    • Diabetes mellitus ([address] )    • GERD (gastroesophageal reflux disease)    • Gestational diabetes    • Hearing loss     left ear   • Heart disease     SVT   • History of pre-eclampsia    • Hyperlipidemia     Resolved with weight loss   • Hyponatremia 12/12/2020   • IBS (irritable bowel syndrome)    • Ileus ([address] )     LAST ASSESSED: 8/3/17   • Labial cyst     LAST ASSESSED: 4/21/16   • Myofascial pain     LAST ASSESSED: 4/12/16   • Obesity    • Ovarian cyst     LEFT   LAST ASSESSED: 9/2/16   • Panic attack    • Pneumonia    • Sacroiliitis ([facility])    • Seasonal allergies    • SVT (supraventricular tachycardia) ([facility])    • Thoracic outlet syndrome     2010   • Trochanteric bursitis of both hips     LAST ASSESSED: 3/18/16   • Ulnar neuropathy at elbow    • Varicella    • Wears glasses        Patient Active Problem List   Diagnosis   • IBS (irritable bowel syndrome)   • Mixed hyperlipidemia   • GERD (gastroesophageal reflux disease)   • Chronic back pain   • Cervical radiculopathy   • Hepatic steatosis   • Pulmonary nodule seen on imaging study   • Type 2 diabetes mellitus without complication, without long-term current use of insulin (HCC)   • S/P laparoscopic sleeve gastrectomy   • Obesity   • Postsurgical malabsorption   • Lumbar radiculopathy   • Intervertebral disc disorder with radiculopathy of lumbar region   • Post traumatic stress disorder (PTSD)   • Recurrent major " depressive disorder ([address] )   • Generalized anxiety disorder   • Chronic pain syndrome   • Other chronic pain   • Trochanteric bursitis, left hip   • Paroxysmal SVT (supraventricular tachycardia) ([facility])   • ADHD (attention deficit hyperactivity disorder), inattentive type   • Seasonal allergies   • Benign paroxysmal positional vertigo   • Bariatric surgery status   • Dizziness   • Hypotension   • MDD (major depressive disorder), recurrent severe, without psychosis ([address] )   • Acute right ankle pain   • Sprain of anterior talofibular ligament of right ankle   • Neck mass       Medications:      Current Outpatient Medications   Medication Sig Dispense Refill   • atoMOXetine (STRATTERA) 60 mg capsule Take 1 capsule (60 mg total) by mouth daily 90 capsule 3   • atorvastatin (LIPITOR) 20 mg tablet Take 1 tablet (20 mg total) by mouth daily 90 tablet 3   • calcium carbonate (OS-MELODY) 600 MG tablet Take 600 mg by mouth 2 (two) times a day with meals     • cyanocobalamin (VITAMIN B-12) 100 mcg tablet Take by mouth daily     • diazepam (VALIUM) 10 mg tablet Take 1 tab 1 hour prior to procedure 1 tablet 0   • drospirenone-ethinyl estradiol (LIEN) 3-0 02 MG per tablet Take 1 tablet by mouth daily 84 tablet 4   • estradiol (ESTRACE VAGINAL) 0 1 mg/g vaginal cream One gram vaginally at night x 14 days then twice weekly for ongoing maintenance   (Patient taking differently: if needed One gram vaginally at night x 14 days then twice weekly for ongoing maintenance ) 42 5 g 2   • ivabradine HCl (Corlanor) 5 MG tablet Take 1 tablet (5 mg total) by mouth every 12 (twelve) hours 60 tablet 1   • lamoTRIgine (LaMICtal) 150 MG tablet Take 1 tablet (150 mg total) by mouth 2 (two) times a day 180 tablet 3   • meclizine (ANTIVERT) 25 mg tablet Take 1 tablet (25 mg total) by mouth every 8 (eight) hours as needed for dizziness 30 tablet 0   • methocarbamol (ROBAXIN) 750 mg tablet Take 1 tablet (750 mg total) by mouth every 8 (eight) hours 270 tablet 0 • montelukast (SINGULAIR) 10 mg tablet Take 1 tablet (10 mg total) by mouth daily at bedtime 90 tablet 2   • Multiple Vitamins-Minerals (MULTI COMPLETE PO) Take by mouth     • nitrofurantoin (MACROBID) 100 mg capsule Take 1 tablet PO PRN after sexual intercourse 30 capsule 0   • omeprazole (PriLOSEC) 20 mg delayed release capsule Take 1 capsule (20 mg total) by mouth daily 90 capsule 3   • QUEtiapine (SEROquel) 50 mg tablet Take 1 5 tablets (75 mg total) by mouth daily at bedtime 135 tablet 3   • traMADol (Ultram) 50 mg tablet Take 1 tablet (50 mg total) by mouth every 6 (six) hours as needed for moderate pain 30 tablet 0   • venlafaxine (EFFEXOR) 100 MG tablet Take 1 tablet (100mg) by mouth twice daily (AM and PM) with 75mg tablet (each afternoon) for total of 275mg per day  180 tablet 3   • venlafaxine (EFFEXOR) 75 mg tablet Take 1 tablet (75 mg total) by mouth daily after lunch 90 tablet 2   • metoprolol tartrate (LOPRESSOR) 25 mg tablet Take 1 tablet (25 mg total) by mouth every 12 (twelve) hours 1/2 tab every 12 hours (Patient not taking: Reported on 6/16/2023) 90 tablet 3     No current facility-administered medications for this visit  Allergies:       Allergies   Allergen Reactions   • Ibuprofen Other (See Comments)     Due to gastric sleeve -can only take for 5 days, then needs to stop       Family History:     Family History   Problem Relation Age of Onset   • Diabetes Mother    • Breast cancer Mother         >50   • BRCA1 Negative Mother    • BRCA2 Negative Mother    • Hyperlipidemia Mother         HYPERCHOLESTEROLEMIA   • Cancer Mother         Breast   • Diabetes Father    • Other Father         traumatic brain injury   • Prostate cancer Father    • Alcohol abuse Father         in remission   • Heart disease Father    • Neuropathy Father    • Hyperlipidemia Father    • Cancer Father         Prostate   • Hypertension Brother    • Diabetes Brother    • Other Brother         HYPERCHOLESTEROLEMIA   • Alcohol abuse Brother    • Depression Brother         attempted suicide   • Colon cancer Maternal Grandfather    • Heart attack Maternal Grandmother    • No Known Problems Paternal Grandmother         dad is adopted   • No Known Problems Paternal Grandfather         dad is adopted   • Diabetes Brother    • Alcohol abuse Brother    • Asthma Son    • No Known Problems Daughter    • Ovarian cancer Neg Hx    • Uterine cancer Neg Hx        Social History:       Social History     Socioeconomic History   • Marital status: /Civil Union     Spouse name: Erika Huffman   • Number of children: 3   • Years of education: GED then 2 year college program   • Highest education level: Not on file   Occupational History   • Occupation: ER TECH     Employer: [employer]   Tobacco Use   • Smoking status: Former     Packs/day: 1 00     Years: 15 00     Total pack years: 15 00     Types: Cigarettes     Quit date: 4/30/2013     Years since quitting: 10 1     Passive exposure: Never   • Smokeless tobacco: Never   Vaping Use   • Vaping Use: Never used   Substance and Sexual Activity   • Alcohol use: Not Currently     Alcohol/week: 1 0 standard drink of alcohol     Types: 1 Glasses of wine per week     Comment: Occs -twice monthly   • Drug use: No   • Sexual activity: Yes     Partners: Male     Birth control/protection: OCP, Post-menopausal     Comment: lifetime partners: 6; current partner 2013   Other Topics Concern   • Not on file   Social History Narrative    Adventist: no preference    Accepts blood products        Exercise: unable with back issues    Calcium: calcium supplement, multivitamin for women over 50, 1 yogurt daily     Social Determinants of Health     Financial Resource Strain: Medium Risk (12/31/2020)    Overall Financial Resource Strain (CARDIA)    • Difficulty of Paying Living Expenses: Somewhat hard   Food Insecurity: No Food Insecurity (12/31/2020)    Hunger Vital Sign    • Worried About Running Out of "Food in the Last Year: Never true    • Ran Out of Food in the Last Year: Never true   Transportation Needs: No Transportation Needs (12/31/2020)    PRAPARE - Transportation    • Lack of Transportation (Medical): No    • Lack of Transportation (Non-Medical): No   Physical Activity: Unknown (5/9/2019)    Exercise Vital Sign    • Days of Exercise per Week: 0 days    • Minutes of Exercise per Session: Not on file   Stress: Stress Concern Present (5/9/2019)    [address]    • Feeling of Stress : Very much   Social Connections: Socially Isolated (5/9/2019)    Social Connection and Isolation Panel [NHANES]    • Frequency of Communication with Friends and Family: Once a week    • Frequency of Social Gatherings with Friends and Family: Once a week    • Attends Mor[employer] Services: Never    • Active Member of Clubs or Organizations: No    • Attends Club or Organization Meetings: Never    • Marital Status:    Intimate Partner Violence: Not At Risk (5/9/2019)    Humiliation, Afraid, Rape, and Kick questionnaire    • Fear of Current or Ex-Partner: No    • Emotionally Abused: No    • Physically Abused: No    • Sexually Abused: No   Housing Stability: Not on file         Objective:   /80 (BP Location: Left arm, Patient Position: Sitting, Cuff Size: Standard)   Pulse 75   Ht 5' 6\" (1 676 m)   Wt 60 3 kg (133 lb)   LMP 01/07/2019 (Approximate)   BMI 21 47 kg/m²     General: Patient is not in any acute/apparent distress, well nourished, well developed and cooperative     HEENT: normocephalic, atraumatic, moist membranes  Neck: supple   Extremities: no edema noted   Skin: no lesions or rash  Musculosketal: no bony abnormalities    Neurologic Examination:   Mental status:  [facility] 19/30 today, see document for full details    Speech/Language: Speech is fluent without any dysarthria, no aphasia noted, comprehension intact    Cranial Nerves:   CN I: smell not " tested  CN II: Visual fields full to confrontation  CN III, IV, VI: Extraocular movements intact bilaterally  Pupils equal round and reactive to light bilaterally  CN V: Facial sensation is normal   CN VII: Full and symmetric facial movement  CN VIII: Hearing is normal   CN IX, X: Palate elevates symmetrically  CN XI: Shoulder shrug strength is normal   CN XII: Tongue midline without atrophy or fasciculations  Motor:   Strength 5/5 in all 4 extremities  Bulk/tone - normal   Fasiculations - none    Sensory:   Sensation intact to soft touch in all 4 extremities  Cerebellar:   Finger-to-nose intact, normal heel to shin  Reflexes: 2+ in all 4 extremities  Pathologic reflexes - babinski reflex negative, Hoffmans reflex - negative    Gait:   Normal gait, Rhomberg sign negative    Review of Systems:     ROS:  Review of Systems   Constitutional: Negative  Negative for appetite change and fever  HENT: Negative  Negative for hearing loss, tinnitus, trouble swallowing and voice change  Eyes: Negative  Negative for photophobia, pain and visual disturbance  Respiratory: Negative  Negative for shortness of breath  Cardiovascular: Positive for palpitations  Gastrointestinal: Negative  Negative for nausea and vomiting  Endocrine: Negative  Negative for cold intolerance  Genitourinary: Negative  Negative for dysuria, frequency and urgency  Musculoskeletal: Negative  Negative for gait problem, myalgias and neck pain  Skin: Negative  Negative for rash  Allergic/Immunologic: Negative  Neurological: Positive for tremors (Hands), speech difficulty (Finding words) and light-headedness (sometimes)  Negative for dizziness, seizures, syncope, facial asymmetry, weakness, numbness and headaches  Hematological: Bruises/bleeds easily (Bruises easily)  Psychiatric/Behavioral: Positive for confusion  Negative for hallucinations and sleep disturbance       I have spent 50 minutes with Patient today in which greater than 50% of this time was spent in counseling/coordination of care regarding Risks and benefits of tx options, Instructions for management, Patient and family education, Risk factor reductions and Impressions  I also spent 15 minutes non face to face for this patient the same day

## 2023-06-16 NOTE — PATIENT INSTRUCTIONS
Patient Instructions:  -please schedule you MRI brain neuroquant with and without   -please obtain a repeat B12 level  -please schedule your neuropsych testing  -follow up after all testing complete

## 2023-06-22 ENCOUNTER — TELEPHONE (OUTPATIENT)
Dept: PAIN MEDICINE | Facility: CLINIC | Age: 53
End: 2023-06-22

## 2023-06-22 NOTE — TELEPHONE ENCOUNTER
Left vmom for pt to c/b  C/B # and OH provided  *pt is scheduled for Rt L5-S1 on 7/6/23  *Pt's PCP had prescribed tramadol and robaxin on 5/19/23

## 2023-06-22 NOTE — TELEPHONE ENCOUNTER
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

## 2023-06-23 ENCOUNTER — OFFICE VISIT (OUTPATIENT)
Dept: CARDIOLOGY CLINIC | Facility: CLINIC | Age: 53
End: 2023-06-23
Payer: COMMERCIAL

## 2023-06-23 VITALS
HEART RATE: 74 BPM | DIASTOLIC BLOOD PRESSURE: 76 MMHG | HEIGHT: 66 IN | BODY MASS INDEX: 22.21 KG/M2 | SYSTOLIC BLOOD PRESSURE: 100 MMHG | WEIGHT: 138.2 LBS | OXYGEN SATURATION: 98 %

## 2023-06-23 DIAGNOSIS — Z95.818 STATUS POST PLACEMENT OF IMPLANTABLE LOOP RECORDER: ICD-10-CM

## 2023-06-23 DIAGNOSIS — R00.0 INAPPROPRIATE SINUS NODE TACHYCARDIA: Primary | ICD-10-CM

## 2023-06-23 DIAGNOSIS — I47.1 PAROXYSMAL SVT (SUPRAVENTRICULAR TACHYCARDIA) (HCC): ICD-10-CM

## 2023-06-23 PROCEDURE — 99213 OFFICE O/P EST LOW 20 MIN: CPT | Performed by: INTERNAL MEDICINE

## 2023-06-23 RX ORDER — IVABRADINE 5 MG/1
5 TABLET, FILM COATED ORAL EVERY 12 HOURS
Qty: 180 TABLET | Refills: 1 | Status: SHIPPED | OUTPATIENT
Start: 2023-06-23

## 2023-06-23 NOTE — PROGRESS NOTES
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 ejection fraction was 58 %  Left ventricular ejection fraction was within normal limits by visual estimate  There was no left ventricular regional abnormality  IMPRESSIONS: Normal study after maximal exercise without reproduction of symptoms  Myocardial perfusion imaging was normal at rest and with stress   Left ventricular systolic function was normal     Prepared and signed by    Tara Sun MD  Signed 05/28/2019 13:50:55

## 2023-06-26 NOTE — TELEPHONE ENCOUNTER
S/w pt  Pt is currently taking Robaxin when she is not at work and using heat with relief  Pt is taking Tramadol if she does not have relief from heat and Robaxin  Pt takes Tylenol sometimes and it helps  Pt advised that at long as there are no contraindications she can take 500-1000 mg of ES Tylenol every 8 hours not to exceed 3000 mg in 24 hours  Pt is not able to take NSAIDS  Pt verbalized understanding and states she will be ok until she has her TFESI on 7/6

## 2023-06-29 ENCOUNTER — TELEPHONE (OUTPATIENT)
Dept: PSYCHIATRY | Facility: CLINIC | Age: 53
End: 2023-06-29

## 2023-07-06 ENCOUNTER — HOSPITAL ENCOUNTER (OUTPATIENT)
Dept: RADIOLOGY | Facility: CLINIC | Age: 53
Discharge: HOME | End: 2023-07-06
Payer: COMMERCIAL

## 2023-07-06 VITALS
TEMPERATURE: 98.6 F | RESPIRATION RATE: 20 BRPM | DIASTOLIC BLOOD PRESSURE: 74 MMHG | OXYGEN SATURATION: 96 % | HEART RATE: 66 BPM | SYSTOLIC BLOOD PRESSURE: 116 MMHG

## 2023-07-06 DIAGNOSIS — M51.16 INTERVERTEBRAL DISC DISORDER WITH RADICULOPATHY OF LUMBAR REGION: ICD-10-CM

## 2023-07-06 PROCEDURE — 64483 NJX AA&/STRD TFRM EPI L/S 1: CPT | Performed by: ANESTHESIOLOGY

## 2023-07-06 RX ORDER — METHYLPREDNISOLONE ACETATE 80 MG/ML
80 INJECTION, SUSPENSION INTRA-ARTICULAR; INTRALESIONAL; INTRAMUSCULAR; PARENTERAL; SOFT TISSUE ONCE
Status: COMPLETED | OUTPATIENT
Start: 2023-07-06 | End: 2023-07-06

## 2023-07-06 RX ORDER — 0.9 % SODIUM CHLORIDE 0.9 %
4 VIAL (ML) INJECTION ONCE
Status: COMPLETED | OUTPATIENT
Start: 2023-07-06 | End: 2023-07-06

## 2023-07-06 RX ORDER — OXYCODONE HYDROCHLORIDE 5 MG/1
5 TABLET ORAL 2 TIMES DAILY PRN
Qty: 15 TABLET | Refills: 0 | Status: SHIPPED | OUTPATIENT
Start: 2023-07-06 | End: 2023-07-16

## 2023-07-06 RX ORDER — BUPIVACAINE HCL/PF 2.5 MG/ML
2 VIAL (ML) INJECTION ONCE
Status: COMPLETED | OUTPATIENT
Start: 2023-07-06 | End: 2023-07-06

## 2023-07-06 RX ADMIN — Medication 4 ML: at 14:54

## 2023-07-06 RX ADMIN — IOHEXOL 1 ML: 300 INJECTION, SOLUTION INTRAVENOUS at 14:55

## 2023-07-06 RX ADMIN — METHYLPREDNISOLONE ACETATE 80 MG: 80 INJECTION, SUSPENSION INTRA-ARTICULAR; INTRALESIONAL; INTRAMUSCULAR; PARENTERAL; SOFT TISSUE at 14:55

## 2023-07-06 RX ADMIN — BUPIVACAINE HYDROCHLORIDE 2 ML: 2.5 INJECTION, SOLUTION EPIDURAL; INFILTRATION; INTRACAUDAL at 14:55

## 2023-07-06 NOTE — H&P
History of Present Illness: The patient is a 46 y.o. female who presents with complaints of right lower back and leg pain is here today for right L5-S1 transforaminal epidural steroid injection    Past Medical History:   Diagnosis Date   • ADHD (attention deficit hyperactivity disorder)    • Bulging lumbar disc    • Carpal tunnel syndrome     RIGHT. LAST ASSESSED: 16   • Chronic back pain     low   • Chronic pain disorder    • Colon polyps    • COVID-19     2021   • Depression    • Diabetes mellitus ([address])    • GERD (gastroesophageal reflux disease)    • Gestational diabetes    • Hearing loss     left ear   • Heart disease     SVT   • History of pre-eclampsia    • Hyperlipidemia     Resolved with weight loss   • Hyponatremia 2020   • IBS (irritable bowel syndrome)    • Ileus ([address])     LAST ASSESSED: 8/3/17   • Labial cyst     LAST ASSESSED: 16   • Myofascial pain     LAST ASSESSED: 16   • Obesity    • Ovarian cyst     LEFT.  LAST ASSESSED: 16   • Panic attack    • Pneumonia    • Sacroiliitis (HCC)    • Seasonal allergies    • SVT (supraventricular tachycardia) (HCC)    • Thoracic outlet syndrome     2010   • Trochanteric bursitis of both hips     LAST ASSESSED: 3/18/16   • Ulnar neuropathy at elbow    • Varicella    • Wears glasses        Past Surgical History:   Procedure Laterality Date   • BARIATRIC SURGERY  2022   • BILE DUCT EXPLORATION      ENDOSCOPIC REMOVAL OF STONES FROM BILIARY TRACT   •  SECTION      x3   • CHOLECYSTECTOMY     • COLONOSCOPY      2017-polyp, repeat    • DILATION AND CURETTAGE OF UTERUS     • ENDOMETRIAL ABLATION     • ERCP W/ SPHICTEROTOMY     • FIRST RIB REMOVAL      THORAX EXCISION OF FIRST RIB   • GASTRIC BYPASS  2022   • HYSTEROSCOPY     • NEUROPLASTY / TRANSPOSITION ULNAR NERVE AT ELBOW Right    • IN COLONOSCOPY FLX DX W/COLLJ SPEC WHEN PFRMD N/A 2017    Procedure: COLONOSCOPY;  Surgeon: Rasheed Zavala MD;  [facility]: [facility]; Service: Gastroenterology   • W[place] ESOPHAGOGASTRODUODENOSCOPY TRANSORAL DIAGNOSTIC N/A 09/14/2017    Procedure: ESOPHAGOGASTRODUODENOSCOPY (EGD); Surgeon: Ruddy Mitchell MD;  [facility]: [facility]; Service: Gastroenterology   • W[place] HYSTEROSCOPY BX ENDOMETRIUM&/POLYPC W/WO D&C N/A 10/20/2017    Procedure: DILATATION AND CURETTAGE (D&C) WITH HYSTEROSCOPY  REMOVAL VULVAR RT. LESION;  Surgeon: Tresa Del Toro MD;  [facility];  Service: Gynecology   • W[place] ALDANA Earlyne Highman NSTIM ELTRDS PLATE/PADDLE EDRL Left 01/29/2020    Procedure: Insertion of thoracic spinal cord stimulator electrode via laminotomy and placement of left buttock implantable pulse generator (NEUROMONITORING); Surgeon: Abbey Mayberry MD;  [facility];  Service: Neurosurgery   • W[place] LAPS [address] [facility] LONGITUDINAL GASTRECTOMY N/A 02/06/2018    Procedure: GASTRECTOMY SLEEVE LAPAROSCOPIC; INTRAOPERATIVE EGD ;  Surgeon: Yuniel Gallegos MD;  [facility];  Service: Bariatrics   • W[place] LAPS [address] [place] [address] LONGITUDINAL GASTRECTOMY N/A 08/08/2022    Procedure: Diagnostic Lap;  Extensive Lysis of Adhesions; LAPAROSCOPIC REVISION CONVERSION TO NOE-EN-Y GASTRIC BYPASS AND INTRAOPERATIVE EGD;  Surgeon: Yuniel Gallegos MD;  [facility];  Service: Bariatrics   • SPINAL CORD STIMULATOR TRIAL W/ LAMINOTOMY     • TONSILLECTOMY AND ADENOIDECTOMY     • TUBAL LIGATION     • UPPER GASTROINTESTINAL ENDOSCOPY     • US GUIDED LYMPH NODE BIOPSY LEFT  5/22/2023   • VEIN LIGATION AND STRIPPING Right    • VULVA SURGERY  10/20/2017    BIOPSY   • WISDOM TOOTH EXTRACTION           Current Outpatient Medications:   •  atoMOXetine (STRATTERA) 60 mg capsule, Take 1 capsule (60 mg total) by mouth daily, Disp: 90 capsule, Rfl: 3  •  atorvastatin (LIPITOR) 20 mg tablet, Take 1 tablet (20 mg total) by mouth daily, Disp: 90 tablet, Rfl: 3  •  calcium carbonate (OS-MELODY) 600 MG tablet, Take 600 mg by mouth 2 (two) times a day with meals, Disp: , Rfl:   • cyanocobalamin (VITAMIN B-12) 100 mcg tablet, Take by mouth daily (Patient not taking: Reported on 6/23/2023), Disp: , Rfl:   •  diazepam (VALIUM) 10 mg tablet, Take 1 tab 1 hour prior to procedure, Disp: 1 tablet, Rfl: 0  •  drospirenone-ethinyl estradiol (LIEN) 3-0.02 MG per tablet, Take 1 tablet by mouth daily, Disp: 84 tablet, Rfl: 4  •  estradiol (ESTRACE VAGINAL) 0.1 mg/g vaginal cream, One gram vaginally at night x 14 days then twice weekly for ongoing maintenance.  (Patient taking differently: if needed One gram vaginally at night x 14 days then twice weekly for ongoing maintenance.), Disp: 42.5 g, Rfl: 2  •  ivabradine HCl (Corlanor) 5 MG tablet, Take 1 tablet (5 mg total) by mouth every 12 (twelve) hours, Disp: 180 tablet, Rfl: 1  •  lamoTRIgine (LaMICtal) 150 MG tablet, Take 1 tablet (150 mg total) by mouth 2 (two) times a day, Disp: 180 tablet, Rfl: 3  •  meclizine (ANTIVERT) 25 mg tablet, Take 1 tablet (25 mg total) by mouth every 8 (eight) hours as needed for dizziness, Disp: 30 tablet, Rfl: 0  •  methocarbamol (ROBAXIN) 750 mg tablet, Take 1 tablet (750 mg total) by mouth every 8 (eight) hours, Disp: 270 tablet, Rfl: 0  •  montelukast (SINGULAIR) 10 mg tablet, Take 1 tablet (10 mg total) by mouth daily at bedtime, Disp: 90 tablet, Rfl: 2  •  Multiple Vitamins-Minerals (MULTI COMPLETE PO), Take by mouth, Disp: , Rfl:   •  nitrofurantoin (MACROBID) 100 mg capsule, Take 1 tablet PO PRN after sexual intercourse, Disp: 30 capsule, Rfl: 0  •  omeprazole (PriLOSEC) 20 mg delayed release capsule, Take 1 capsule (20 mg total) by mouth daily, Disp: 90 capsule, Rfl: 3  •  QUEtiapine (SEROquel) 50 mg tablet, Take 1.5 tablets (75 mg total) by mouth daily at bedtime, Disp: 135 tablet, Rfl: 3  •  traMADol (Ultram) 50 mg tablet, Take 1 tablet (50 mg total) by mouth every 6 (six) hours as needed for moderate pain, Disp: 30 tablet, Rfl: 0  •  venlafaxine (EFFEXOR) 100 MG tablet, Take 1 tablet (100mg) by mouth twice daily (AM and PM) with 75mg tablet (each afternoon) for total of 275mg per day., Disp: 180 tablet, Rfl: 3  •  venlafaxine (EFFEXOR) 75 mg tablet, Take 1 tablet (75 mg total) by mouth daily after lunch, Disp: 90 tablet, Rfl: 2    Current Facility-Administered Medications:   •  bupivacaine (PF) (MARCAINE) 0.25 % injection 2 mL, 2 mL, Epidural, Once, Ethan Bell MD  •  iohexol (OMNIPAQUE) 300 mg/mL injection 1 mL, 1 mL, Epidural, Once, Ethan Bell MD  •  lidocaine (PF) (XYLOCAINE-MPF) 2 % injection 4 mL, 4 mL, Infiltration, Once, Ethan Bell MD  •  methylPREDNISolone acetate (DEPO-MEDROL) injection 80 mg, 80 mg, Epidural, Once, Ethan Bell MD  •  sodium chloride (PF) 0.9 % injection 4 mL, 4 mL, Infiltration, Once, Ethan Bell MD    Allergies   Allergen Reactions   • Ibuprofen Other (See Comments)     Due to gastric sleeve -can only take for 5 days, then needs to stop       Physical Exam:   Vitals:    07/06/23 1428   BP: 113/71   Pulse: 65   Resp: 20   Temp: 98.6 °F (37 °C)   SpO2: 96%     General: Awake, Alert, Oriented x 3, Mood and affect appropriate  Respiratory: Respirations even and unlabored  Cardiovascular: Peripheral pulses intact; no edema  Musculoskeletal Exam: Right lower back and leg pain    ASA Score: 2    Patient/Chart Verification  Patient ID Verified: Verbal  Consents Confirmed: To be obtained in the Pre-Procedure area  Interval H&P(within 24 hr) Complete (required for Outpatients and Surgery Admit only): To be obtained in the Pre-Procedure area  Allergies Reviewed: Yes  Anticoag/NSAID held?: NA  Currently on antibiotics?: No    Assessment:   1.  Intervertebral disc disorder with radiculopathy of lumbar region        Plan: Right L5-S1 TFESI

## 2023-07-06 NOTE — DISCHARGE INSTR - LAB
Epidural Steroid Injection   WHAT YOU NEED TO KNOW:   An epidural steroid injection (JESSICA) is a procedure to inject steroid medicine into the epidural space. The epidural space is between your spinal cord and vertebrae. Steroids reduce inflammation and fluid buildup in your spine that may be causing pain. You may be given pain medicine along with the steroids. ACTIVITY  Do not drive or operate machinery today. No strenuous activity today - bending, lifting, etc.  You may resume normal activites starting tomorrow - start slowly and as tolerated. You may shower today, but no tub baths or hot tubs. You may have numbness for several hours from the local anesthetic. Please use caution and common sense, especially with weight-bearing activities. CARE OF THE INJECTION SITE  If you have soreness or pain, apply ice to the area today (20 minutes on/20 minutes off). Starting tomorrow, you may use warm, moist heat or ice if needed. You may have an increase or change in your discomfort for 36-48 hours after your treatment. Apply ice and continue with any pain medication you have been prescribed. Notify the [facility] if you have any of the following: redness, drainage, swelling, headache, stiff neck or fever above 100°F.    SPECIAL INSTRUCTIONS  Our office will contact you in approximately 7 days for a progress report. MEDICATIONS  Continue to take all routine medications. Our office may have instructed you to hold some medications. As no general anesthesia was used in today's procedure, you should not experience any side effects related to anesthesia. If you are diabetic, the steroids used in today's injection may temporarily increase your blood sugar levels after the first few days after your injection. Please keep a close eye on your sugars and alert the doctor who manages your diabetes if your sugars are significantly high from your baseline or you are symptomatic.      If you have a problem specifically related to your procedure, please call our office at [phone]. Problems not related to your procedure should be directed to your primary care physician.

## 2023-07-07 ENCOUNTER — TELEPHONE (OUTPATIENT)
Dept: PAIN MEDICINE | Facility: CLINIC | Age: 53
End: 2023-07-07

## 2023-07-07 NOTE — TELEPHONE ENCOUNTER
Procedure 7/6/23 was changed from Rt L5-S1 to B/L L5 per Dr. Gagan Tello. Note entered on order as well.

## 2023-07-10 ENCOUNTER — APPOINTMENT (OUTPATIENT)
Dept: LAB | Facility: AMBULARY SURGERY CENTER | Age: 53
End: 2023-07-10
Payer: COMMERCIAL

## 2023-07-10 ENCOUNTER — TELEPHONE (OUTPATIENT)
Dept: NEUROLOGY | Facility: CLINIC | Age: 53
End: 2023-07-10

## 2023-07-10 DIAGNOSIS — R41.3 MEMORY DEFICIT: ICD-10-CM

## 2023-07-10 DIAGNOSIS — G31.84 MCI (MILD COGNITIVE IMPAIRMENT): Primary | ICD-10-CM

## 2023-07-10 DIAGNOSIS — G31.84 MCI (MILD COGNITIVE IMPAIRMENT): ICD-10-CM

## 2023-07-10 LAB
BUN SERPL-MCNC: 16 MG/DL (ref 5–25)
CREAT SERPL-MCNC: 0.72 MG/DL (ref 0.6–1.3)
GFR SERPL CREATININE-BSD FRML MDRD: 95 ML/MIN/1.73SQ M

## 2023-07-10 PROCEDURE — 84520 ASSAY OF UREA NITROGEN: CPT

## 2023-07-10 PROCEDURE — 36415 COLL VENOUS BLD VENIPUNCTURE: CPT

## 2023-07-10 PROCEDURE — 82565 ASSAY OF CREATININE: CPT

## 2023-07-10 NOTE — TELEPHONE ENCOUNTER
Labs entered in for BUN and Creatinine for patient's upcoming MRI with Contrast as last CMP was back in 2/25/23 and is not within 6 weeks of upcoming MRI exam.    Called patient and left detailed message notifying her of labs and advised to go to any [facility]'s lab to have drawn. No paper script is required for [facility].

## 2023-07-10 NOTE — TELEPHONE ENCOUNTER
Silvia  7/7 taken off 7/10  Hello, my name is shanice Guevara as in boy, Dean Ram birthday [birth year]. I have an M R I. On the 13th of July, coming up. And they say, I need to have blood work done. But I never got a script for the blood work. So if you could please return my call at [phone]. I would appreciate it.  Thank if.

## 2023-07-13 ENCOUNTER — TELEPHONE (OUTPATIENT)
Dept: PAIN MEDICINE | Facility: CLINIC | Age: 53
End: 2023-07-13

## 2023-07-13 ENCOUNTER — HOSPITAL ENCOUNTER (OUTPATIENT)
Dept: RADIOLOGY | Facility: HOSPITAL | Age: 53
Discharge: HOME | End: 2023-07-13
Payer: COMMERCIAL

## 2023-07-13 DIAGNOSIS — R41.3 MEMORY DEFICIT: ICD-10-CM

## 2023-07-13 PROCEDURE — 70553 MRI BRAIN STEM W/O & W/DYE: CPT

## 2023-07-13 PROCEDURE — G1004 CDSM NDSC: HCPCS

## 2023-07-13 PROCEDURE — A9585 GADOBUTROL INJECTION: HCPCS | Performed by: STUDENT IN AN ORGANIZED HEALTH CARE EDUCATION/TRAINING PROGRAM

## 2023-07-13 RX ADMIN — GADOBUTROL 6 ML: 604.72 INJECTION INTRAVENOUS at 14:01

## 2023-07-13 NOTE — ASSESSMENT & PLAN NOTE
End of Shift Note: Medical    What matters most to the patient this shift: DANIEL    Significant Events: patient had episodes of agitation over night. Patient received CHG bath.     Pain Management: Last Pain Score: Numeric Rating Scale 0-10: 0 (07/12/23 0615)                                      Pain medication:  none.  Last given:  n/a   Diet: Liquid Clear Diet      Bowel Function:  Constipation  LBM:      Activity:  Activity: Resting in bed;Sleeping/Appeared to be (07/13/23 0549)  Mobility Assistive Device:  Mobility Assistive Device: Friction reducing device/Slide sheet (07/13/23 0549)  Level of Assistance:  Level of Assistance: Moderate assist (07/12/23 2100)  Positioning:    LDAs: peripheral IV, ugalde, CVC   Patient's Anticipated Discharge Needs: Anticipated discharge needs eval completed (07/12/23 0656)  Expected Discharge Date:   Expected Discharge Time:        Stable  Care per cardiology

## 2023-07-20 NOTE — TELEPHONE ENCOUNTER
Patient Reports        50%     improvement post injection 2 week    Pain Level   4  /10   Still having pain on right side  Left hip still

## 2023-07-22 ENCOUNTER — TELEPHONE (OUTPATIENT)
Dept: NEUROLOGY | Facility: CLINIC | Age: 53
End: 2023-07-22

## 2023-07-22 NOTE — TELEPHONE ENCOUNTER
Received VM Transcription: 7-21-23 1:45  Taken off 7-22-23 8:29    Hello, my name is Gretta Diego, birthday [birth year]. I am calling in reference to my MRI that came back a few days ago. I have not received a call on what the next step will be or an explanation of the AM I. I saw it on my chart, but I would have liked, at least been called. My phone number is [phone]. Thank you.  ----------------------------------------------------------    Returned pt's call. Advised MRI was normal.  Advised pt to schedule an appt with [facility]. Gave pt the number to call: [phone]. Advised to schedule a F/U appt 2 weeks after neuropsych appt. Pt appreciated the call. All questions answered and pt voiced understanding.

## 2023-07-28 ENCOUNTER — OFFICE VISIT (OUTPATIENT)
Dept: BARIATRICS | Facility: CLINIC | Age: 53
End: 2023-07-28

## 2023-07-28 VITALS
DIASTOLIC BLOOD PRESSURE: 68 MMHG | OXYGEN SATURATION: 99 % | TEMPERATURE: 97.1 F | HEART RATE: 83 BPM | HEIGHT: 66 IN | BODY MASS INDEX: 22.1 KG/M2 | WEIGHT: 137.5 LBS | SYSTOLIC BLOOD PRESSURE: 96 MMHG

## 2023-07-28 DIAGNOSIS — K91.2 POSTSURGICAL MALABSORPTION: ICD-10-CM

## 2023-07-28 DIAGNOSIS — K59.00 CONSTIPATION: ICD-10-CM

## 2023-07-28 DIAGNOSIS — F33.1 MODERATE EPISODE OF RECURRENT MAJOR DEPRESSIVE DISORDER (HCC): Chronic | ICD-10-CM

## 2023-07-28 DIAGNOSIS — Z98.84 BARIATRIC SURGERY STATUS: ICD-10-CM

## 2023-07-28 DIAGNOSIS — F90.0 ADHD (ATTENTION DEFICIT HYPERACTIVITY DISORDER), INATTENTIVE TYPE: Chronic | ICD-10-CM

## 2023-07-28 DIAGNOSIS — Z48.815 ENCOUNTER FOR SURGICAL AFTERCARE FOLLOWING SURGERY OF DIGESTIVE SYSTEM: Primary | ICD-10-CM

## 2023-07-28 DIAGNOSIS — I47.1 PAROXYSMAL SVT (SUPRAVENTRICULAR TACHYCARDIA) (HCC): ICD-10-CM

## 2023-07-28 DIAGNOSIS — K21.9 ACID REFLUX: ICD-10-CM

## 2023-07-28 RX ORDER — OXYCODONE HYDROCHLORIDE AND ACETAMINOPHEN 5; 325 MG/1; MG/1
1 TABLET ORAL EVERY 4 HOURS PRN
COMMUNITY

## 2023-07-28 NOTE — PROGRESS NOTES
Assessment/Plan:     Patient ID: Alexia Christopher is a 46 y.o. female. Bariatric Surgery Status    s/p Rebecca-En-Y Gastric Bypass with Dr. Gema Cho 08/08/2022 . Presents to the office today for annual overall doing well. Reports occasional SOB , at times feels she cannot catch a full breath. States this does not happen daily, told her PCP who advised her to ask us. She does report increased anxiety and has reflux but mostly with certain meals,. She does have cardiologist that follows with her for SVT. She has had extensive cardiac workup in the past.     We can check UGI to eval patient's bypass anatomy. If unremarkable advised her to follow up with her PCP and cardiologist on this SOB issue. Will have her continue on her daily PPI for now until next visit. SVT  - follows with cardiology, has loop recorder     Constipation   - still struggling with constipation, taking daily probiotic which helps   - advised trying stool softeners   - will refer to GI    · Continued/Maintain healthy weight loss with good nutrition intakes. · Adequate hydration with at least 64oz. fluid intake. · Follow diet as discussed. · Follow vitamin and mineral recommendations as reviewed with you. · Exercise as tolerated. · Colonoscopy referral made: utd 2022  · Mammo: utd    · Follow-up in 6 months. We kindly ask that your arrive 15 minutes before your scheduled appointment time with your provider to allow our staff to room you, get your vital signs and update your chart. · Get lab work done . Please call the office if you need a script. It is recommended to check with your insurance BEFORE getting labs done to make sure they are covered by your policy. · Call our office if you have any problems with abdominal pain especially associated with fever, chills, nausea, vomiting or any other concerns.     · All  Post-bariatric surgery patients should be aware that very small quantities of any alcohol can cause impairment and it is very possible not to feel the effect. The effect can be in the system for several hours. It is also a stomach irritant. · It is advised to AVOID alcohol, Nonsteroidal antiinflammatory drugs (NSAIDS) and nicotine of all forms . Any of these can cause stomach irritation/pain. · Discussed the effects of alcohol on a bariatric patient and the increased impairment risk. · Keep up the good work! Postsurgical Malabsorption   -At risk for malabsorption of vitamins/minerals secondary to malabsorption and restriction of intake from bariatric surgery  -Currently taking adequate postop bariatric surgery vitamin supplementation  -Next set of bariatric labs ordered for approximately 2 weeks  -Patient received education about the importance of adhering to a lifelong supplementation regimen to avoid vitamin/mineral deficiencies      Diagnoses and all orders for this visit:    Encounter for surgical aftercare following surgery of digestive system  -     Zinc; Future  -     Vitamin D 25 hydroxy; Future  -     Vitamin B1, whole blood; Future  -     Vitamin A; Future  -     PTH, intact; Future  -     CBC and Platelet; Future  -     Comprehensive metabolic panel; Future  -     Ferritin; Future  -     Iron Saturation %; Future    Bariatric surgery status  -     Zinc; Future  -     Vitamin D 25 hydroxy; Future  -     Vitamin B1, whole blood; Future  -     Vitamin A; Future  -     PTH, intact; Future  -     CBC and Platelet; Future  -     Comprehensive metabolic panel; Future  -     Ferritin; Future  -     Iron Saturation %; Future  -     Ambulatory referral to Gastroenterology; Future  -     FL UPPER GI UGI; Future    Postsurgical malabsorption  -     Zinc; Future  -     Vitamin D 25 hydroxy; Future  -     Vitamin B1, whole blood; Future  -     Vitamin A; Future  -     PTH, intact; Future  -     CBC and Platelet; Future  -     Comprehensive metabolic panel; Future  -     Ferritin;  Future  -     Iron Saturation %; Future    BMI 22.0-22.9, adult  -     Zinc; Future  -     Vitamin D 25 hydroxy; Future  -     Vitamin B1, whole blood; Future  -     Vitamin A; Future  -     PTH, intact; Future  -     CBC and Platelet; Future  -     Comprehensive metabolic panel; Future  -     Ferritin; Future  -     Iron Saturation %; Future    Paroxysmal SVT (supraventricular tachycardia) (HCC)  -     Zinc; Future  -     Vitamin D 25 hydroxy; Future  -     Vitamin B1, whole blood; Future  -     Vitamin A; Future  -     PTH, intact; Future  -     CBC and Platelet; Future  -     Comprehensive metabolic panel; Future  -     Ferritin; Future  -     Iron Saturation %; Future    Moderate episode of recurrent major depressive disorder (HCC)  -     Zinc; Future  -     Vitamin D 25 hydroxy; Future  -     Vitamin B1, whole blood; Future  -     Vitamin A; Future  -     PTH, intact; Future  -     CBC and Platelet; Future  -     Comprehensive metabolic panel; Future  -     Ferritin; Future  -     Iron Saturation %; Future    ADHD (attention deficit hyperactivity disorder), inattentive type  -     Zinc; Future  -     Vitamin D 25 hydroxy; Future  -     Vitamin B1, whole blood; Future  -     Vitamin A; Future  -     PTH, intact; Future  -     CBC and Platelet; Future  -     Comprehensive metabolic panel; Future  -     Ferritin; Future  -     Iron Saturation %; Future    Constipation  -     Ambulatory referral to Gastroenterology; Future  -     FL UPPER GI UGI; Future    Acid reflux  -     FL UPPER GI UGI; Future    Other orders  -     oxyCODONE-acetaminophen (PERCOCET) 5-325 mg per tablet; Take 1 tablet by mouth every 4 (four) hours as needed for moderate pain         Subjective:      Patient ID: Faith Reddy is a 46 y.o. female. s/p Rebecca-En-Y Sabas Jamison 08/08/2022 . Presents to the office today for annual overall doing well. Tolerating diet without issues; denies N/V, dysphagia, reflux.      Initial:225  Current:137  EWL: (Weight loss is ahead of schedule at this post surgical period.)  Bautista: current   Current BMI is Body mass index is 22.19 kg/m². · Tolerating a regular diet-yes  · Eating at least 60 grams of protein per day-yes  · Following 30/60 minute rule with liquids-30/30  · Drinking at least 64 ounces of fluid per day-yes  · Drinking carbonated beverages-no  · Sufficient exercise-yes  · Using NSAIDs regularly-no  · Using nicotine-no  · Using alcohol-occasional   · Supplements: reg Multivitamins and Calcium  . · EWL is 128%, which places the patient ahead of schedule for expected post surgical weight loss at this time. The following portions of the patient's history were reviewed and updated as appropriate: allergies, current medications, past family history, past medical history, past social history, past surgical history and problem list.    Review of Systems   Constitutional: Negative. Respiratory: Positive for shortness of breath. Cardiovascular: Negative. Gastrointestinal: Positive for constipation. Negative for abdominal pain, blood in stool, diarrhea, nausea and vomiting. Musculoskeletal: Negative. Skin: Negative. Neurological: Negative. Psychiatric/Behavioral: Negative. Has been having some memory issues but has seen neurology   Mood fluctuates but Overall  is okay         Objective:    BP 96/68 (BP Location: Left arm, Patient Position: Sitting, Cuff Size: Large)   Pulse 83   Temp (!) 97.1 °F (36.2 °C) (Tympanic)   Ht 5' 6" (1.676 m)   Wt 62.4 kg (137 lb 8 oz)   LMP 01/07/2019 (Approximate)   SpO2 99%   BMI 22.19 kg/m²      Physical Exam  Vitals and nursing note reviewed. Constitutional:       Appearance: Normal appearance. HENT:      Head: Normocephalic and atraumatic. Eyes:      Extraocular Movements: Extraocular movements intact. Pupils: Pupils are equal, round, and reactive to light. Cardiovascular:      Rate and Rhythm: Normal rate and regular rhythm. Pulmonary:      Effort: Pulmonary effort is normal.      Breath sounds: Normal breath sounds. Abdominal:      General: Bowel sounds are normal.      Tenderness: There is no abdominal tenderness. Musculoskeletal:         General: Normal range of motion. Cervical back: Normal range of motion. Skin:     General: Skin is warm and dry. Neurological:      General: No focal deficit present. Mental Status: She is alert and oriented to person, place, and time.    Psychiatric:         Mood and Affect: Mood normal.
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
## 2023-08-03 ENCOUNTER — TELEPHONE (OUTPATIENT)
Dept: PSYCHIATRY | Facility: CLINIC | Age: 53
End: 2023-08-03

## 2023-08-03 ENCOUNTER — TELEMEDICINE (OUTPATIENT)
Dept: PSYCHIATRY | Facility: CLINIC | Age: 53
End: 2023-08-03

## 2023-08-03 DIAGNOSIS — F33.1 MODERATE EPISODE OF RECURRENT MAJOR DEPRESSIVE DISORDER (HCC): Chronic | ICD-10-CM

## 2023-08-03 DIAGNOSIS — F43.10 POST TRAUMATIC STRESS DISORDER (PTSD): Primary | Chronic | ICD-10-CM

## 2023-08-03 DIAGNOSIS — F41.1 GENERALIZED ANXIETY DISORDER: Chronic | ICD-10-CM

## 2023-08-03 DIAGNOSIS — F51.04 PSYCHOPHYSIOLOGICAL INSOMNIA: ICD-10-CM

## 2023-08-03 DIAGNOSIS — F90.0 ADHD (ATTENTION DEFICIT HYPERACTIVITY DISORDER), INATTENTIVE TYPE: Chronic | ICD-10-CM

## 2023-08-03 RX ORDER — ATOMOXETINE 60 MG/1
60 CAPSULE ORAL DAILY
Qty: 90 CAPSULE | Refills: 3 | Status: SHIPPED | OUTPATIENT
Start: 2023-08-03

## 2023-08-03 RX ORDER — VENLAFAXINE 100 MG/1
100 TABLET ORAL 3 TIMES DAILY
Qty: 270 TABLET | Refills: 2 | Status: SHIPPED | OUTPATIENT
Start: 2023-08-03

## 2023-08-03 RX ORDER — LAMOTRIGINE 150 MG/1
150 TABLET ORAL 2 TIMES DAILY
Qty: 180 TABLET | Refills: 3 | Status: SHIPPED | OUTPATIENT
Start: 2023-08-03 | End: 2023-09-02

## 2023-08-03 RX ORDER — QUETIAPINE FUMARATE 100 MG/1
100 TABLET, FILM COATED ORAL
Qty: 90 TABLET | Refills: 0 | Status: SHIPPED | OUTPATIENT
Start: 2023-08-03

## 2023-08-03 NOTE — BH TREATMENT PLAN
TREATMENT PLAN (Medication Management Only)        [address] [facility]    Name and Date of Birth:  Charmayne Joe 46 y.o. 1970  Date of Treatment Plan: August 3, 2023  Diagnosis/Diagnoses:    1. Post traumatic stress disorder (PTSD)    2. Moderate episode of recurrent major depressive disorder ([address])    3. Generalized anxiety disorder    4. ADHD (attention deficit hyperactivity disorder), inattentive type    5. Psychophysiological insomnia      Strengths/Personal Resources for Self-Care: supportive family, supportive friends, taking medications as prescribed, ability to adapt to life changes, ability to communicate needs, ability to communicate well, good understanding of illness, independence, motivation for treatment, ability to negotiate basic needs, being resoureceful. Area/Areas of need (in own words): anxiety symptoms  1. Long Term Goal: improve control of anxiety. Target Date:6 months - 2/3/2024  Person/Persons responsible for completion of goal: Chucky Mendez  2. Short Term Objective (s) - How will we reach this goal?:   A. Provider new recommended medication/dosage changes and/or continue medication(s): continue current medications as prescribed. B. Attend medication management appointments regularly. C. Take psychiatric medications responsibly. Brianne tolentino new therapist  E. Start exercising again   Target Date:6 months - 2/3/2024  Person/Persons Responsible for Completion of Goal: Chucky Mendez  Progress Towards Goals: continuing treatment  Treatment Modality: medication management every 3 months  Review due 180 days from date of this plan: 6 months - 2/3/2024  Expected length of service: ongoing treatment  My Physician/PA/NP and I have developed this plan together and I agree to work on the goals and objectives. I understand the treatment goals that were developed for my treatment. Treatment Plan completed with assistance and input from patient and verbal consent provided. Treatment plan was not signed at time of office visit secondary to COVID-19 social distancing guidelines.

## 2023-08-03 NOTE — TELEPHONE ENCOUNTER
Writer spoke with patient to switch how they will receive a link to their virtual appointment due to technical difficulties with [employer].  Patient will now connect to the appointment via text [phone]

## 2023-08-04 ENCOUNTER — TELEPHONE (OUTPATIENT)
Dept: PSYCHIATRY | Facility: CLINIC | Age: 53
End: 2023-08-04

## 2023-08-07 NOTE — TELEPHONE ENCOUNTER
Reached out to pt regarding Neuropsychology referral, completed questionnaire with pt and forwarded to provider for review.

## 2023-08-11 ENCOUNTER — HOSPITAL ENCOUNTER (EMERGENCY)
Facility: HOSPITAL | Age: 53
Discharge: HOME | End: 2023-08-11
Attending: EMERGENCY MEDICINE | Admitting: EMERGENCY MEDICINE
Payer: OTHER MISCELLANEOUS

## 2023-08-11 VITALS
DIASTOLIC BLOOD PRESSURE: 66 MMHG | HEART RATE: 74 BPM | TEMPERATURE: 98.5 F | OXYGEN SATURATION: 99 % | SYSTOLIC BLOOD PRESSURE: 131 MMHG | RESPIRATION RATE: 18 BRPM

## 2023-08-11 DIAGNOSIS — S39.012A LOW BACK STRAIN, INITIAL ENCOUNTER: Primary | ICD-10-CM

## 2023-08-11 DIAGNOSIS — S20.221A BACK CONTUSION, RIGHT, INITIAL ENCOUNTER: ICD-10-CM

## 2023-08-11 PROCEDURE — NC001 PR NO CHARGE: Performed by: EMERGENCY MEDICINE

## 2023-08-11 PROCEDURE — 99283 EMERGENCY DEPT VISIT LOW MDM: CPT

## 2023-08-11 PROCEDURE — 99284 EMERGENCY DEPT VISIT MOD MDM: CPT | Performed by: EMERGENCY MEDICINE

## 2023-08-11 RX ORDER — METHOCARBAMOL 500 MG/1
500 TABLET, FILM COATED ORAL ONCE
Status: COMPLETED | OUTPATIENT
Start: 2023-08-11 | End: 2023-08-11

## 2023-08-11 RX ORDER — LIDOCAINE 50 MG/G
2 PATCH TOPICAL ONCE
Status: DISCONTINUED | OUTPATIENT
Start: 2023-08-11 | End: 2023-08-11 | Stop reason: HOSPADM

## 2023-08-11 RX ORDER — ACETAMINOPHEN 325 MG/1
975 TABLET ORAL ONCE
Status: COMPLETED | OUTPATIENT
Start: 2023-08-11 | End: 2023-08-11

## 2023-08-11 RX ADMIN — ACETAMINOPHEN 975 MG: 325 TABLET, FILM COATED ORAL at 10:41

## 2023-08-11 RX ADMIN — METHOCARBAMOL 500 MG: 500 TABLET ORAL at 10:41

## 2023-08-11 RX ADMIN — LIDOCAINE 5% 2 PATCH: 700 PATCH TOPICAL at 10:41

## 2023-08-11 NOTE — DISCHARGE INSTRUCTIONS
Take acetaminophen 975 or 1000 mg orally every 6 hours for the next few days. You may take Robaxin as previously prescribed for back discomfort a few times daily. Use lidocaine patches as obtained over-the-counter to the lower back for 12 hours daily. Apply ice frequently to the lower back today and then use ice and/or heat over the next couple of days for pain relief. Schedule follow-up appointment with occupational medicine for reevaluation.

## 2023-08-11 NOTE — Clinical Note
Appointment made   Last OV: 6/21/2019  Last filled: 6/19/2019 #180 no RF Lynnette Christopher was seen and treated in our emergency department on 8/11/2023. Diagnosis:     Boneta Area  is off the rest of the shift today. She may return on this date:     Patient is to be reevaluated by occupational medicine for clearance to return to work. If you have any questions or concerns, please don't hesitate to call.       Radha José MD    ______________________________           _______________          _______________  Hospital Representative                              Date                                Time
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
## 2023-08-16 ENCOUNTER — APPOINTMENT (OUTPATIENT)
Dept: URGENT CARE | Age: 53
End: 2023-08-16
Payer: OTHER MISCELLANEOUS

## 2023-08-16 PROCEDURE — 99214 OFFICE O/P EST MOD 30 MIN: CPT | Performed by: PHYSICIAN ASSISTANT

## 2023-08-17 ENCOUNTER — TELEPHONE (OUTPATIENT)
Age: 53
End: 2023-08-17

## 2023-08-17 DIAGNOSIS — M54.50 ACUTE BILATERAL LOW BACK PAIN WITHOUT SCIATICA: Primary | ICD-10-CM

## 2023-08-17 RX ORDER — LIDOCAINE 50 MG/G
1 PATCH TOPICAL DAILY
Qty: 30 PATCH | Refills: 1 | Status: SHIPPED | OUTPATIENT
Start: 2023-08-17
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
## 2023-08-17 NOTE — TELEPHONE ENCOUNTER
I sent lidocaine patches to the patient's pharmacy that she can , however there is a chance they may not be covered by insurance. If they are not covered, she can  lidocaine patches 4% over-the-counter.   Any additional treatment, she would likely need an OV

## 2023-08-18 ENCOUNTER — TELEPHONE (OUTPATIENT)
Age: 53
End: 2023-08-18

## 2023-08-18 NOTE — TELEPHONE ENCOUNTER
PA REQUEST FOR LIDOCAINE 5% PATCHED WAS SUBMITTED TO PT PRIMARY PLAN FOR DETERMINATION    CMM KEY [id number]    WILL AWAIT PLAN RESPONSE      THIS CANNOT BE SUBMITTED UNDER CURRENT [facility] CLAIM AS THERE IS NO REF FROM ED OR OCC MED TO PAIN MGMT. PER HER

## 2023-08-21 ENCOUNTER — TELEPHONE (OUTPATIENT)
Dept: RADIOLOGY | Facility: CLINIC | Age: 53
End: 2023-08-21

## 2023-08-21 NOTE — TELEPHONE ENCOUNTER
RN s/w pt and made her aware what Rosalva Bergeron From W/C said about the Lidocaine patches. Pt advised to call Shireen from [facility] to discuss further. Told pt since it also wasn't covered by her regular Ins she could get the OTC 4% strength lido patches, pt said then she would have to pay for them and she thinks W/C should. Pt will discuss further w/ [facility].

## 2023-08-21 NOTE — TELEPHONE ENCOUNTER
Caller: pt    Doctor: Sana Pineda    Reason for call: pt states that needs to go under WC. She has a claim # now. # [id number]    Pt would also like to talk to the rn , pt wants to know if she should have an appt or not?     Call back#: [phone]

## 2023-08-21 NOTE — TELEPHONE ENCOUNTER
----- Message from Evangelist Eldridge sent at 8/21/2023  8:12 AM EDT -----  Regarding: DENIAL LIDOCAINE PATCHES  LIDOCAINE PATCHED 5% HAVE BEEN DENIED BY PT'S PLAN.

## 2023-08-21 NOTE — TELEPHONE ENCOUNTER
I spoke with [facility]  Enid Galarza. Pt was not ref to [facility] from this claim therefore the Lidpcaine patches cannot be submitted to this claim.  For further info on claim call [facility] 17 [id number]

## 2023-08-23 ENCOUNTER — OCCMED (OUTPATIENT)
Dept: URGENT CARE | Age: 53
End: 2023-08-23
Payer: OTHER MISCELLANEOUS

## 2023-08-23 ENCOUNTER — APPOINTMENT (OUTPATIENT)
Dept: RADIOLOGY | Age: 53
End: 2023-08-23
Attending: PHYSICIAN ASSISTANT
Payer: OTHER MISCELLANEOUS

## 2023-08-23 DIAGNOSIS — M54.9 BACK PAIN, UNSPECIFIED BACK LOCATION, UNSPECIFIED BACK PAIN LATERALITY, UNSPECIFIED CHRONICITY: Primary | ICD-10-CM

## 2023-08-23 DIAGNOSIS — M54.9 BACK PAIN, UNSPECIFIED BACK LOCATION, UNSPECIFIED BACK PAIN LATERALITY, UNSPECIFIED CHRONICITY: ICD-10-CM

## 2023-08-23 PROCEDURE — 72100 X-RAY EXAM L-S SPINE 2/3 VWS: CPT

## 2023-08-23 PROCEDURE — 99213 OFFICE O/P EST LOW 20 MIN: CPT | Performed by: PHYSICIAN ASSISTANT

## 2023-09-03 ENCOUNTER — OFFICE VISIT (OUTPATIENT)
Dept: URGENT CARE | Age: 53
End: 2023-09-03
Payer: COMMERCIAL

## 2023-09-03 ENCOUNTER — HOSPITAL ENCOUNTER (EMERGENCY)
Facility: HOSPITAL | Age: 53
Discharge: HOME | End: 2023-09-03
Attending: EMERGENCY MEDICINE
Payer: COMMERCIAL

## 2023-09-03 ENCOUNTER — APPOINTMENT (EMERGENCY)
Dept: CT IMAGING | Facility: HOSPITAL | Age: 53
End: 2023-09-03
Payer: COMMERCIAL

## 2023-09-03 VITALS
TEMPERATURE: 98 F | OXYGEN SATURATION: 98 % | SYSTOLIC BLOOD PRESSURE: 110 MMHG | DIASTOLIC BLOOD PRESSURE: 60 MMHG | HEART RATE: 75 BPM

## 2023-09-03 VITALS
OXYGEN SATURATION: 100 % | SYSTOLIC BLOOD PRESSURE: 136 MMHG | DIASTOLIC BLOOD PRESSURE: 65 MMHG | TEMPERATURE: 98 F | HEART RATE: 80 BPM | WEIGHT: 144.62 LBS | RESPIRATION RATE: 16 BRPM | BODY MASS INDEX: 23.34 KG/M2

## 2023-09-03 DIAGNOSIS — S00.83XA FACIAL CONTUSION, INITIAL ENCOUNTER: Primary | ICD-10-CM

## 2023-09-03 DIAGNOSIS — H92.02 EARACHE ON LEFT: ICD-10-CM
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
## 2023-09-03 DIAGNOSIS — S05.12XA ECCHYMOSIS OF LEFT EYE, INITIAL ENCOUNTER: ICD-10-CM

## 2023-09-03 DIAGNOSIS — H57.12 ACUTE LEFT EYE PAIN: ICD-10-CM

## 2023-09-03 DIAGNOSIS — R22.0 LEFT FACIAL SWELLING: ICD-10-CM

## 2023-09-03 DIAGNOSIS — S02.401A CLOSED FRACTURE OF MAXILLARY SINUS, INITIAL ENCOUNTER (HCC): Primary | ICD-10-CM

## 2023-09-03 PROCEDURE — 96372 THER/PROPH/DIAG INJ SC/IM: CPT

## 2023-09-03 PROCEDURE — G0384 LEV 5 HOSP TYPE B ED VISIT: HCPCS | Performed by: NURSE PRACTITIONER

## 2023-09-03 PROCEDURE — 99284 EMERGENCY DEPT VISIT MOD MDM: CPT

## 2023-09-03 PROCEDURE — 70450 CT HEAD/BRAIN W/O DYE: CPT

## 2023-09-03 PROCEDURE — 70486 CT MAXILLOFACIAL W/O DYE: CPT

## 2023-09-03 PROCEDURE — G1004 CDSM NDSC: HCPCS

## 2023-09-03 PROCEDURE — 99284 EMERGENCY DEPT VISIT MOD MDM: CPT | Performed by: EMERGENCY MEDICINE

## 2023-09-03 RX ORDER — AMOXICILLIN AND CLAVULANATE POTASSIUM 875; 125 MG/1; MG/1
1 TABLET, FILM COATED ORAL EVERY 12 HOURS
Qty: 14 TABLET | Refills: 0 | Status: SHIPPED | OUTPATIENT
Start: 2023-09-03 | End: 2023-09-10

## 2023-09-03 RX ORDER — KETOROLAC TROMETHAMINE 30 MG/ML
15 INJECTION, SOLUTION INTRAMUSCULAR; INTRAVENOUS ONCE
Status: COMPLETED | OUTPATIENT
Start: 2023-09-03 | End: 2023-09-03

## 2023-09-03 RX ORDER — AMOXICILLIN AND CLAVULANATE POTASSIUM 875; 125 MG/1; MG/1
1 TABLET, FILM COATED ORAL ONCE
Status: COMPLETED | OUTPATIENT
Start: 2023-09-03 | End: 2023-09-03

## 2023-09-03 RX ADMIN — AMOXICILLIN AND CLAVULANATE POTASSIUM 1 TABLET: 875; 125 TABLET, FILM COATED ORAL at 14:42

## 2023-09-03 RX ADMIN — KETOROLAC TROMETHAMINE 15 MG: 30 INJECTION, SOLUTION INTRAMUSCULAR; INTRAVENOUS at 14:42
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
## 2023-09-03 NOTE — PATIENT INSTRUCTIONS
Pt sent directly to [facility] ED for higher level of care and evaluation. Pt requested to drive herself there.

## 2023-09-05 ENCOUNTER — TELEPHONE (OUTPATIENT)
Dept: PSYCHIATRY | Facility: CLINIC | Age: 53
End: 2023-09-05

## 2023-09-05 NOTE — TELEPHONE ENCOUNTER
Left very detailed message for patient. Tentatively put her in for 9/12 @ 4pm. Notified her I would call back tomorrow to confirm this day & time works for her.

## 2023-09-05 NOTE — TELEPHONE ENCOUNTER
Patient called regarding therapy. She would like to schedule with provider as soon as possible. She stated if MR calls her and must leave a message to please leave a direct contact for her. Patient works nights and sleeps during the day.

## 2023-09-08 ENCOUNTER — APPOINTMENT (OUTPATIENT)
Dept: LAB | Facility: AMBULARY SURGERY CENTER | Age: 53
End: 2023-09-08
Payer: COMMERCIAL

## 2023-09-08 DIAGNOSIS — F33.1 MODERATE EPISODE OF RECURRENT MAJOR DEPRESSIVE DISORDER (HCC): Chronic | ICD-10-CM

## 2023-09-08 DIAGNOSIS — F90.0 ADHD (ATTENTION DEFICIT HYPERACTIVITY DISORDER), INATTENTIVE TYPE: Chronic | ICD-10-CM

## 2023-09-08 DIAGNOSIS — K91.2 POSTSURGICAL MALABSORPTION: ICD-10-CM

## 2023-09-08 DIAGNOSIS — Z48.815 ENCOUNTER FOR SURGICAL AFTERCARE FOLLOWING SURGERY OF DIGESTIVE SYSTEM: ICD-10-CM

## 2023-09-08 DIAGNOSIS — I47.1 PAROXYSMAL SVT (SUPRAVENTRICULAR TACHYCARDIA) (HCC): ICD-10-CM

## 2023-09-08 DIAGNOSIS — Z98.84 BARIATRIC SURGERY STATUS: ICD-10-CM

## 2023-09-08 LAB
25(OH)D3 SERPL-MCNC: 35.5 NG/ML (ref 30–100)
ALBUMIN SERPL BCP-MCNC: 3.7 G/DL (ref 3.5–5)
ALP SERPL-CCNC: 55 U/L (ref 34–104)
ALT SERPL W P-5'-P-CCNC: 30 U/L (ref 7–52)
ANION GAP SERPL CALCULATED.3IONS-SCNC: 9 MMOL/L
AST SERPL W P-5'-P-CCNC: 30 U/L (ref 13–39)
BILIRUB SERPL-MCNC: 0.34 MG/DL (ref 0.2–1)
BUN SERPL-MCNC: 14 MG/DL (ref 5–25)
CALCIUM SERPL-MCNC: 9.2 MG/DL (ref 8.4–10.2)
CHLORIDE SERPL-SCNC: 105 MMOL/L (ref 96–108)
CO2 SERPL-SCNC: 28 MMOL/L (ref 21–32)
CREAT SERPL-MCNC: 0.64 MG/DL (ref 0.6–1.3)
ERYTHROCYTE [DISTWIDTH] IN BLOOD BY AUTOMATED COUNT: 12.7 % (ref 11.6–15.1)
FERRITIN SERPL-MCNC: 12 NG/ML (ref 11–307)
GFR SERPL CREATININE-BSD FRML MDRD: 101 ML/MIN/1.73SQ M
GLUCOSE P FAST SERPL-MCNC: 88 MG/DL (ref 65–99)
HCT VFR BLD AUTO: 42.4 % (ref 34.8–46.1)
HGB BLD-MCNC: 13.6 G/DL (ref 11.5–15.4)
IRON SATN MFR SERPL: 17 % (ref 15–50)
IRON SERPL-MCNC: 89 UG/DL (ref 50–212)
MCH RBC QN AUTO: 30.2 PG (ref 26.8–34.3)
MCHC RBC AUTO-ENTMCNC: 32.1 G/DL (ref 31.4–37.4)
MCV RBC AUTO: 94 FL (ref 82–98)
PLATELET # BLD AUTO: 279 THOUSANDS/UL (ref 149–390)
PMV BLD AUTO: 10.6 FL (ref 8.9–12.7)
POTASSIUM SERPL-SCNC: 4.1 MMOL/L (ref 3.5–5.3)
PROT SERPL-MCNC: 6.8 G/DL (ref 6.4–8.4)
PTH-INTACT SERPL-MCNC: 56.5 PG/ML (ref 12–88)
RBC # BLD AUTO: 4.51 MILLION/UL (ref 3.81–5.12)
SODIUM SERPL-SCNC: 142 MMOL/L (ref 135–147)
TIBC SERPL-MCNC: 525 UG/DL (ref 250–450)
UIBC SERPL-MCNC: 436 UG/DL (ref 155–355)
WBC # BLD AUTO: 5.96 THOUSAND/UL (ref 4.31–10.16)

## 2023-09-08 PROCEDURE — 84425 ASSAY OF VITAMIN B-1: CPT

## 2023-09-08 PROCEDURE — 85027 COMPLETE CBC AUTOMATED: CPT

## 2023-09-08 PROCEDURE — 83970 ASSAY OF PARATHORMONE: CPT

## 2023-09-08 PROCEDURE — 84630 ASSAY OF ZINC: CPT

## 2023-09-08 PROCEDURE — 36415 COLL VENOUS BLD VENIPUNCTURE: CPT

## 2023-09-08 PROCEDURE — 83550 IRON BINDING TEST: CPT

## 2023-09-08 PROCEDURE — 84590 ASSAY OF VITAMIN A: CPT

## 2023-09-08 PROCEDURE — 82306 VITAMIN D 25 HYDROXY: CPT

## 2023-09-08 PROCEDURE — 82728 ASSAY OF FERRITIN: CPT

## 2023-09-08 PROCEDURE — 80053 COMPREHEN METABOLIC PANEL: CPT

## 2023-09-08 PROCEDURE — 83540 ASSAY OF IRON: CPT

## 2023-09-12 ENCOUNTER — TELEMEDICINE (OUTPATIENT)
Dept: BEHAVIORAL/MENTAL HEALTH CLINIC | Facility: CLINIC | Age: 53
End: 2023-09-12
Payer: COMMERCIAL

## 2023-09-12 DIAGNOSIS — F90.0 ADHD (ATTENTION DEFICIT HYPERACTIVITY DISORDER), INATTENTIVE TYPE: Chronic | ICD-10-CM

## 2023-09-12 DIAGNOSIS — F33.2 MDD (MAJOR DEPRESSIVE DISORDER), RECURRENT SEVERE, WITHOUT PSYCHOSIS (HCC): Primary | ICD-10-CM

## 2023-09-12 DIAGNOSIS — F43.10 POST TRAUMATIC STRESS DISORDER (PTSD): Chronic | ICD-10-CM

## 2023-09-12 DIAGNOSIS — F41.1 GENERALIZED ANXIETY DISORDER: Chronic | ICD-10-CM

## 2023-09-12 LAB
VIT A SERPL-MCNC: 59 UG/DL (ref 20.1–62)
VIT B1 BLD-SCNC: 135.6 NMOL/L (ref 66.5–200)

## 2023-09-12 PROCEDURE — 90834 PSYTX W PT 45 MINUTES: CPT | Performed by: SOCIAL WORKER

## 2023-09-12 NOTE — PSYCH
Virtual Regular Visit    Verification of patient location:    Patient is located at Home in the following state in which I hold an active license [place]    Assessment/Plan:    Problem List Items Addressed This Visit        Other    Post traumatic stress disorder (PTSD) (Chronic)    Generalized anxiety disorder (Chronic)    ADHD (attention deficit hyperactivity disorder), inattentive type (Chronic)    MDD (major depressive disorder), recurrent severe, without psychosis ([address]) - Primary       Goals addressed in session: Goal 1          Reason for visit is   Chief Complaint   Patient presents with   • Virtual Regular Visit        Encounter provider LALY Griggs    Provider located at [address] [place] [id number] [phone]      Recent Visits  Date Type Provider Dept   09/05/23 Telephone Rajwinder Bernal, [facility] recent visits within past 7 days and meeting all other requirements  Today's Visits  Date Type Provider Dept   09/12/23 [address] [place] [address]   Showing today's visits and meeting all other requirements  Future Appointments  No visits were found meeting these conditions. Showing future appointments within next 150 days and meeting all other requirements       The patient was identified by name and date of birth. Patti Maza was informed that this is a telemedicine visit and [facility] She agrees to proceed. .  My office door was closed. No one else was in the room. She acknowledged consent and understanding of privacy and security of the video platform. The patient has agreed to participate and understands they can discontinue the visit at any time. Patient is aware this is a billable service. Subjective  Patti Maza is a 46 y.o. female.     HPI Past Medical History:   Diagnosis Date   • ADHD (attention deficit hyperactivity disorder)    • Anxiety    • Bulging lumbar disc    • Carpal tunnel syndrome     RIGHT. LAST ASSESSED: 16   • Chronic back pain     low   • Chronic pain disorder    • Colon polyps    • COVID-19     2021   • Depression    • Diabetes mellitus ([address])    • GERD (gastroesophageal reflux disease)    • Gestational diabetes    • Hearing loss     left ear   • Heart disease     SVT   • History of pre-eclampsia    • Hyperlipidemia     Resolved with weight loss   • Hyponatremia 2020   • IBS (irritable bowel syndrome)    • Ileus ([address])     LAST ASSESSED: 8/3/17   • Labial cyst     LAST ASSESSED: 16   • Myofascial pain     LAST ASSESSED: 16   • Obesity    • Ovarian cyst     LEFT. LAST ASSESSED: 16   • Panic attack    • Pneumonia    • Sacroiliitis (HCC)    • Seasonal allergies    • SVT (supraventricular tachycardia) (HCC)    • Thoracic outlet syndrome        • Trochanteric bursitis of both hips     LAST ASSESSED: 3/18/16   • Ulnar neuropathy at elbow    • Varicella    • Wears glasses        Past Surgical History:   Procedure Laterality Date   • BARIATRIC SURGERY  2022   • BILE DUCT EXPLORATION      ENDOSCOPIC REMOVAL OF STONES FROM BILIARY TRACT   •  SECTION      x3   • CHOLECYSTECTOMY     • COLONOSCOPY      2017-polyp, repeat    • DILATION AND CURETTAGE OF UTERUS     • ENDOMETRIAL ABLATION     • ERCP W/ SPHICTEROTOMY     • FIRST RIB REMOVAL      THORAX EXCISION OF FIRST RIB   • GASTRIC BYPASS  2022   • HYSTEROSCOPY     • NEUROPLASTY / TRANSPOSITION ULNAR NERVE AT ELBOW Right    • CO COLONOSCOPY FLX DX W/COLLJ SPEC WHEN PFRMD N/A 2017    Procedure: COLONOSCOPY;  Surgeon: Kody Abdullahi MD;  [facility]: [facility]; Service: Gastroenterology   • CO ESOPHAGOGASTRODUODENOSCOPY TRANSORAL DIAGNOSTIC N/A 2017    Procedure: ESOPHAGOGASTRODUODENOSCOPY (EGD);   Surgeon: Halle Sandoval MD;  Location: [facility]; Service: Gastroenterology   • NH HYSTEROSCOPY BX ENDOMETRIUM&/POLYPC W/WO D&C N/A 10/20/2017    Procedure: DILATATION AND CURETTAGE (D&C) WITH HYSTEROSCOPY  REMOVAL VULVAR RT. LESION;  Surgeon: Massiel Bacon MD;  [facility];  Service: Gynecology   • NH ALDANA Lucian Colby NSTIM ELTRDS PLATE/PADDLE EDRL Left 01/29/2020    Procedure: Insertion of thoracic spinal cord stimulator electrode via laminotomy and placement of left buttock implantable pulse generator (NEUROMONITORING); Surgeon: Chris Ruth MD;  Location: [facility];  Service: Neurosurgery   • NH LAPS [facility] LONGITUDINAL GASTRECTOMY N/A 02/06/2018    Procedure: GASTRECTOMY SLEEVE LAPAROSCOPIC; INTRAOPERATIVE EGD ;  Surgeon: Merline Brilliant, MD;  [facility];  Service: Bariatrics   • NH LAPS [address] [place] [address] LONGITUDINAL GASTRECTOMY N/A 08/08/2022    Procedure: Diagnostic Lap;  Extensive Lysis of Adhesions; LAPAROSCOPIC REVISION CONVERSION TO NOE-EN-Y GASTRIC BYPASS AND INTRAOPERATIVE EGD;  Surgeon: Merline Brilliant, MD;  [facility];  Service: Bariatrics   • SLEEVE GASTROPLASTY     • SPINAL CORD STIMULATOR TRIAL W/ LAMINOTOMY     • TONSILLECTOMY AND ADENOIDECTOMY     • TUBAL LIGATION     • UPPER GASTROINTESTINAL ENDOSCOPY     • US GUIDED LYMPH NODE BIOPSY LEFT  05/22/2023   • VEIN LIGATION AND STRIPPING Right    • VULVA SURGERY  10/20/2017    BIOPSY   • WISDOM TOOTH EXTRACTION         Current Outpatient Medications   Medication Sig Dispense Refill   • atoMOXetine (STRATTERA) 60 mg capsule Take 1 capsule (60 mg total) by mouth daily 90 capsule 3   • atorvastatin (LIPITOR) 20 mg tablet Take 1 tablet (20 mg total) by mouth daily 90 tablet 3   • calcium carbonate (OS-MELODY) 600 MG tablet Take 600 mg by mouth 2 (two) times a day with meals     • cyanocobalamin (VITAMIN B-12) 100 mcg tablet Take by mouth daily (Patient not taking: Reported on 6/23/2023)     • drospirenone-ethinyl estradiol (LIEN) 3-0.02 MG per tablet Take 1 tablet by mouth daily 84 tablet 4   • estradiol (ESTRACE VAGINAL) 0.1 mg/g vaginal cream One gram vaginally at night x 14 days then twice weekly for ongoing maintenance. (Patient taking differently: if needed One gram vaginally at night x 14 days then twice weekly for ongoing maintenance.) 42.5 g 2   • ivabradine HCl (Corlanor) 5 MG tablet Take 1 tablet (5 mg total) by mouth every 12 (twelve) hours 180 tablet 1   • lamoTRIgine (LaMICtal) 150 MG tablet Take 1 tablet (150 mg total) by mouth 2 (two) times a day 180 tablet 3   • lidocaine (Lidoderm) 5 % Apply 1 patch topically over 12 hours daily Remove & Discard patch within 12 hours or as directed by MD 30 patch 1   • meclizine (ANTIVERT) 25 mg tablet Take 1 tablet (25 mg total) by mouth every 8 (eight) hours as needed for dizziness 30 tablet 0   • methocarbamol (ROBAXIN) 750 mg tablet Take 1 tablet (750 mg total) by mouth every 8 (eight) hours 270 tablet 0   • montelukast (SINGULAIR) 10 mg tablet Take 1 tablet (10 mg total) by mouth daily at bedtime 90 tablet 2   • Multiple Vitamins-Minerals (MULTI COMPLETE PO) Take by mouth     • nitrofurantoin (MACROBID) 100 mg capsule Take 1 tablet PO PRN after sexual intercourse (Patient not taking: Reported on 7/28/2023) 30 capsule 0   • omeprazole (PriLOSEC) 20 mg delayed release capsule Take 1 capsule (20 mg total) by mouth daily 90 capsule 3   • QUEtiapine (SEROquel) 100 mg tablet Take 1 tablet (100 mg total) by mouth daily at bedtime 90 tablet 0   • venlafaxine (EFFEXOR) 100 MG tablet Take 1 tablet (100 mg total) by mouth 3 (three) times a day 270 tablet 2     No current facility-administered medications for this visit. Allergies   Allergen Reactions   • Ibuprofen Other (See Comments)     Due to gastric sleeve -can only take for 5 days, then needs to stop       Review of Systems    Video Exam    There were no vitals filed for this visit.     Physical Exam     Behavioral Health Psychotherapy Progress Note    Psychotherapy Provided: Individual Psychotherapy     1. MDD (major depressive disorder), recurrent severe, without psychosis ([address])        2. Generalized anxiety disorder        3. ADHD (attention deficit hyperactivity disorder), inattentive type        4. Post traumatic stress disorder (PTSD)            Goals addressed in session: Goal 1     DATA: Met with Yessy for scheduled individual session. Georgiana Taylor discussed recent events. She states that she was drinking to excess and recently fell and broke her left cheekbone. She states that her  told her that she needs to stop drinking, or he will divorce her. She states that she feels that she will be able to stop drinking without much difficulty. She reports that she is willing to quit, because "my marriage is more important." She states that she was not drinking very much; however, due to her bariatric surgery, she had significant responses to small amounts of alcohol. She states that she was not drinking often enough to experience any detox symptoms. She reports that her last drink was about one week ago. Georgiana Talyor states that she continues to take her psychiatric medications. She states that she is now taking regular effexor (and not time-released) and is taking the medication three times a day. She states that she is not having the same level of withdrawal symptoms. We spoke about the difficulty of connecting to schedule sessions, due to her work schedule (she works in the evenings and sleeps all day) and her college courses. This clinician provided Georgiana Taylor with my email and office phone number, so she can more easily get in touch with me. Georgiana Taylor discussed various stressors in her life-- e.g. work-stress; school-stress; and financial stress. She discussed her frustration with watching people around her achieve success in their careers, while she continues to stay in her current role and is not making the money she feels that she should be getting.  She discussed her resiliency and her ability to be self-sufficient if she needs to do so. During this session, this clinician used the following therapeutic modalities: Client-centered Therapy, Mindfulness-based Strategies, Motivational Interviewing, Solution-Focused Therapy and Supportive Psychotherapy    Substance Abuse was addressed during this session. If the client is diagnosed with a co-occurring substance use disorder, please indicate any changes in the frequency or amount of use: increase in use of alcohol over past few months-- followed by one week of sobriety. Stage of change for addressing substance use diagnoses: Action    ASSESSMENT:  Cari Francis presents with a Anxious and Dysthymic mood. her affect is Normal range and intensity, which is congruent, with her mood and the content of the session. The client has made progress on their goals, as evidenced by decisions she is making to achieve overall health and mental health wellness. Cari Francis presents with a minimal risk of suicide, minimal risk of self-harm, and minimal risk of harm to others. For any risk assessment that surpasses a "low" rating, a safety plan must be developed. A safety plan was indicated: no  If yes, describe in detail n/a    PLAN: Between sessions, Cari Francis will continue to maintain abstinence from alcohol. She will continue to monitor her moods. She will utilize her natural supports (e.g., relationship with  and with other friends) to manage her stressors. At the next session, the therapist will use Client-centered Therapy, Dialectical Behavior Therapy, Mindfulness-based Strategies, Motivational Interviewing, Solution-Focused Therapy and Supportive Psychotherapy to address her mood regulation and relationship concerns. We will also continue to address any use of alcohol and move toward 100% abstinence from alcohol use.     Behavioral Health Treatment Plan and Discharge Planning: Cari Francis is aware of and agrees to continue to work on their treatment plan. They have identified and are working toward their discharge goals.  yes    Visit start and stop times:    09/12/23  Start Time: 8680  Stop Time: 3895  Total Visit Time: 50 minutes

## 2023-09-13 LAB — ZINC SERPL-MCNC: 75 UG/DL (ref 44–115)

## 2023-09-14 ENCOUNTER — OFFICE VISIT (OUTPATIENT)
Dept: PAIN MEDICINE | Facility: CLINIC | Age: 53
End: 2023-09-14
Payer: OTHER MISCELLANEOUS

## 2023-09-14 VITALS
BODY MASS INDEX: 22.76 KG/M2 | SYSTOLIC BLOOD PRESSURE: 122 MMHG | DIASTOLIC BLOOD PRESSURE: 60 MMHG | RESPIRATION RATE: 16 BRPM | HEART RATE: 65 BPM | WEIGHT: 141 LBS

## 2023-09-14 DIAGNOSIS — M51.16 INTERVERTEBRAL DISC DISORDER WITH RADICULOPATHY OF LUMBAR REGION: ICD-10-CM

## 2023-09-14 DIAGNOSIS — G89.4 CHRONIC PAIN SYNDROME: Primary | ICD-10-CM

## 2023-09-14 DIAGNOSIS — M79.18 MYOFASCIAL PAIN SYNDROME: ICD-10-CM

## 2023-09-14 PROCEDURE — 99214 OFFICE O/P EST MOD 30 MIN: CPT
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
## 2023-09-15 ENCOUNTER — REMOTE DEVICE CLINIC VISIT (OUTPATIENT)
Dept: CARDIOLOGY CLINIC | Facility: CLINIC | Age: 53
End: 2023-09-15
Payer: COMMERCIAL

## 2023-09-15 DIAGNOSIS — Z95.818 PRESENCE OF OTHER CARDIAC IMPLANTS AND GRAFTS: Primary | ICD-10-CM

## 2023-09-15 PROCEDURE — 93298 REM INTERROG DEV EVAL SCRMS: CPT | Performed by: INTERNAL MEDICINE

## 2023-09-15 PROCEDURE — G2066 INTER DEVC REMOTE 30D: HCPCS | Performed by: INTERNAL MEDICINE

## 2023-09-15 NOTE — PROGRESS NOTES
MDT LNQ22/ ACTIVE SYSTEM IS MRI CONDITIONAL   CARELINK TRANSMISSION: LOOP RECORDER. PRESENTING RHYTHM NSR @ 60 BPM. BATTERY STATUS "OK." NO PATIENT OR DEVICE ACTIVATED EPISODES. NORMAL DEVICE FUNCTION.  DL

## 2023-09-27 ENCOUNTER — TELEPHONE (OUTPATIENT)
Dept: BARIATRICS | Facility: CLINIC | Age: 53
End: 2023-09-27

## 2023-09-27 NOTE — TELEPHONE ENCOUNTER
Spoke with patient about her blood work - advised to add daily iron with vitamin C daily and will recheck at next visit

## 2023-09-29 ENCOUNTER — OFFICE VISIT (OUTPATIENT)
Dept: FAMILY MEDICINE CLINIC | Facility: CLINIC | Age: 53
End: 2023-09-29
Payer: COMMERCIAL

## 2023-09-29 VITALS
HEIGHT: 66 IN | TEMPERATURE: 98.1 F | DIASTOLIC BLOOD PRESSURE: 80 MMHG | RESPIRATION RATE: 16 BRPM | HEART RATE: 63 BPM | OXYGEN SATURATION: 100 % | SYSTOLIC BLOOD PRESSURE: 120 MMHG | WEIGHT: 141.4 LBS | BODY MASS INDEX: 22.73 KG/M2

## 2023-09-29 DIAGNOSIS — Z23 FLU VACCINE NEED: ICD-10-CM

## 2023-09-29 DIAGNOSIS — F43.10 POST TRAUMATIC STRESS DISORDER (PTSD): Chronic | ICD-10-CM

## 2023-09-29 DIAGNOSIS — Z98.84 BARIATRIC SURGERY STATUS: ICD-10-CM

## 2023-09-29 DIAGNOSIS — R73.03 PREDIABETES: Primary | ICD-10-CM

## 2023-09-29 DIAGNOSIS — Z48.815 ENCOUNTER FOR SURGICAL AFTERCARE FOLLOWING SURGERY OF DIGESTIVE SYSTEM: ICD-10-CM

## 2023-09-29 DIAGNOSIS — K21.9 GASTROESOPHAGEAL REFLUX DISEASE WITHOUT ESOPHAGITIS: ICD-10-CM

## 2023-09-29 DIAGNOSIS — F41.1 GENERALIZED ANXIETY DISORDER: Chronic | ICD-10-CM

## 2023-09-29 DIAGNOSIS — Z12.31 SCREENING MAMMOGRAM FOR BREAST CANCER: ICD-10-CM

## 2023-09-29 DIAGNOSIS — J30.2 SEASONAL ALLERGIES: ICD-10-CM

## 2023-09-29 DIAGNOSIS — F33.2 MDD (MAJOR DEPRESSIVE DISORDER), RECURRENT SEVERE, WITHOUT PSYCHOSIS (HCC): ICD-10-CM

## 2023-09-29 DIAGNOSIS — F90.0 ADHD (ATTENTION DEFICIT HYPERACTIVITY DISORDER), INATTENTIVE TYPE: Chronic | ICD-10-CM

## 2023-09-29 PROBLEM — S93.491A SPRAIN OF ANTERIOR TALOFIBULAR LIGAMENT OF RIGHT ANKLE: Status: RESOLVED | Noted: 2023-02-07 | Resolved: 2023-09-29

## 2023-09-29 PROBLEM — I95.9 HYPOTENSION: Status: RESOLVED | Noted: 2022-11-16 | Resolved: 2023-09-29

## 2023-09-29 PROBLEM — E66.9 OBESITY: Status: RESOLVED | Noted: 2018-04-20 | Resolved: 2023-09-29

## 2023-09-29 PROBLEM — M25.571 ACUTE RIGHT ANKLE PAIN: Status: RESOLVED | Noted: 2023-02-07 | Resolved: 2023-09-29

## 2023-09-29 PROBLEM — R42 DIZZINESS: Status: RESOLVED | Noted: 2022-11-16 | Resolved: 2023-09-29

## 2023-09-29 PROBLEM — R22.1 NECK MASS: Status: RESOLVED | Noted: 2023-05-11 | Resolved: 2023-09-29

## 2023-09-29 PROBLEM — E11.9 TYPE 2 DIABETES MELLITUS WITHOUT COMPLICATION, WITHOUT LONG-TERM CURRENT USE OF INSULIN (HCC): Status: RESOLVED | Noted: 2018-02-08 | Resolved: 2023-09-29

## 2023-09-29 PROCEDURE — 90686 IIV4 VACC NO PRSV 0.5 ML IM: CPT

## 2023-09-29 PROCEDURE — 90471 IMMUNIZATION ADMIN: CPT

## 2023-09-29 PROCEDURE — 99214 OFFICE O/P EST MOD 30 MIN: CPT | Performed by: FAMILY MEDICINE

## 2023-09-29 RX ORDER — OMEPRAZOLE 20 MG/1
20 CAPSULE, DELAYED RELEASE ORAL DAILY
Qty: 90 CAPSULE | Refills: 3 | Status: SHIPPED | OUTPATIENT
Start: 2023-09-29 | End: 2023-12-28

## 2023-09-29 RX ORDER — MONTELUKAST SODIUM 10 MG/1
10 TABLET ORAL
Qty: 90 TABLET | Refills: 2 | Status: SHIPPED | OUTPATIENT
Start: 2023-09-29

## 2023-09-29 NOTE — PROGRESS NOTES
Name: Dominique Johnson      : 1970      MRN: [id number]  Encounter Provider: Jeanne Samano MD  Encounter Date: 2023   Encounter department: [facility]     1. Prediabetes  Assessment & Plan:  Stable   Continue to monitor       2. MDD (major depressive disorder), recurrent severe, without psychosis ([address])  Assessment & Plan:  Stable on effexor      3. Generalized anxiety disorder  Assessment & Plan:  Stable on current meds      4. ADHD (attention deficit hyperactivity disorder), inattentive type  Assessment & Plan: On strattera      5. Post traumatic stress disorder (PTSD)  Assessment & Plan:  Sees psych      6. Gastroesophageal reflux disease without esophagitis  Assessment & Plan:  prilosec as directed       7. Encounter for surgical aftercare following surgery of digestive system  -     omeprazole (PriLOSEC) 20 mg delayed release capsule; Take 1 capsule (20 mg total) by mouth daily    8. Bariatric surgery status  -     omeprazole (PriLOSEC) 20 mg delayed release capsule; Take 1 capsule (20 mg total) by mouth daily    9. Seasonal allergies  Assessment & Plan:  Allegra in AM and singluair at bedtime     Orders:  -     montelukast (SINGULAIR) 10 mg tablet; Take 1 tablet (10 mg total) by mouth daily at bedtime    10. Screening mammogram for breast cancer  -     Mammo screening bilateral w 3d & cad; Future; Expected date: 2023    11. Flu vaccine need  -     influenza vaccine, quadrivalent, 0.5 mL, preservative-free, for adult and pediatric patients 6 mos+ (AFLURIA, FLUARIX, FLULAVAL, FLUZONE)    BMI Counseling: Body mass index is 22.92 kg/m². The BMI is above normal. Nutrition recommendations include decreasing portion sizes and encouraging healthy choices of fruits and vegetables. Exercise recommendations include moderate physical activity 150 minutes/week. No pharmacotherapy was ordered.  Rationale for BMI follow-up plan is due to patient being overweight or obese.         Subjective      HPI   Here for follow up   Feels shortness of breath at times with specific reasons   Tj Fernandez 1 month ago and and had Mildly displaced acute fractures involving the anterior inferior walls of the left maxillary sinus with mild mucosal thickening noted in the sinus. Seen OMS 1 week after the injury and no need for surgery at this time   Still having pain on and off since then       Review of Systems   Constitutional: Negative. HENT: Negative. Respiratory: Negative. Cardiovascular: Negative. Endocrine: Negative. Genitourinary: Negative. Musculoskeletal: Negative. Neurological: Negative. Hematological: Negative. Psychiatric/Behavioral: Negative. Current Outpatient Medications on File Prior to Visit   Medication Sig   • atoMOXetine (STRATTERA) 60 mg capsule Take 1 capsule (60 mg total) by mouth daily   • atorvastatin (LIPITOR) 20 mg tablet Take 1 tablet (20 mg total) by mouth daily   • calcium carbonate (OS-MELODY) 600 MG tablet Take 600 mg by mouth 2 (two) times a day with meals   • drospirenone-ethinyl estradiol (LIEN) 3-0.02 MG per tablet Take 1 tablet by mouth daily   • estradiol (ESTRACE VAGINAL) 0.1 mg/g vaginal cream One gram vaginally at night x 14 days then twice weekly for ongoing maintenance.  (Patient taking differently: if needed One gram vaginally at night x 14 days then twice weekly for ongoing maintenance.)   • ivabradine HCl (Corlanor) 5 MG tablet Take 1 tablet (5 mg total) by mouth every 12 (twelve) hours   • lamoTRIgine (LaMICtal) 150 MG tablet Take 1 tablet (150 mg total) by mouth 2 (two) times a day   • lidocaine (Lidoderm) 5 % Apply 1 patch topically over 12 hours daily Remove & Discard patch within 12 hours or as directed by MD   • meclizine (ANTIVERT) 25 mg tablet Take 1 tablet (25 mg total) by mouth every 8 (eight) hours as needed for dizziness   • methocarbamol (ROBAXIN) 750 mg tablet Take 1 tablet (750 mg total) by mouth every 8 (eight) hours   • Multiple Vitamins-Minerals (MULTI COMPLETE PO) Take by mouth   • QUEtiapine (SEROquel) 100 mg tablet Take 1 tablet (100 mg total) by mouth daily at bedtime   • venlafaxine (EFFEXOR) 100 MG tablet Take 1 tablet (100 mg total) by mouth 3 (three) times a day   • [DISCONTINUED] montelukast (SINGULAIR) 10 mg tablet Take 1 tablet (10 mg total) by mouth daily at bedtime   • [DISCONTINUED] omeprazole (PriLOSEC) 20 mg delayed release capsule Take 1 capsule (20 mg total) by mouth daily   • cyanocobalamin (VITAMIN B-12) 100 mcg tablet Take by mouth daily (Patient not taking: Reported on 6/23/2023)   • nitrofurantoin (MACROBID) 100 mg capsule Take 1 tablet PO PRN after sexual intercourse (Patient not taking: Reported on 7/28/2023)       Objective     /80 (BP Location: Left arm, Patient Position: Sitting, Cuff Size: Standard)   Pulse 63   Temp 98.1 °F (36.7 °C) (Tympanic)   Resp 16   Ht 5' 5.87" (1.673 m)   Wt 64.1 kg (141 lb 6.4 oz)   LMP 01/07/2019 (Approximate)   SpO2 100%   BMI 22.92 kg/m²     Physical Exam  Vitals and nursing note reviewed. HENT:      Right Ear: Tympanic membrane normal.      Left Ear: Tympanic membrane normal.      Mouth/Throat:      Mouth: Mucous membranes are moist.   Eyes:      Extraocular Movements: Extraocular movements intact. Pupils: Pupils are equal, round, and reactive to light. Cardiovascular:      Rate and Rhythm: Normal rate and regular rhythm. Pulses: Normal pulses. Heart sounds: Normal heart sounds. Pulmonary:      Effort: Pulmonary effort is normal.      Breath sounds: Normal breath sounds. Neurological:      General: No focal deficit present. Mental Status: She is oriented to person, place, and time.    Psychiatric:         Mood and Affect: Mood normal.         Behavior: Behavior normal.       Zach Michael MD

## 2023-10-03 ENCOUNTER — TELEMEDICINE (OUTPATIENT)
Dept: BEHAVIORAL/MENTAL HEALTH CLINIC | Facility: CLINIC | Age: 53
End: 2023-10-03
Payer: COMMERCIAL

## 2023-10-03 DIAGNOSIS — F33.2 MDD (MAJOR DEPRESSIVE DISORDER), RECURRENT SEVERE, WITHOUT PSYCHOSIS (HCC): Primary | ICD-10-CM

## 2023-10-03 DIAGNOSIS — F43.10 POST TRAUMATIC STRESS DISORDER (PTSD): Chronic | ICD-10-CM

## 2023-10-03 DIAGNOSIS — F90.0 ADHD (ATTENTION DEFICIT HYPERACTIVITY DISORDER), INATTENTIVE TYPE: Chronic | ICD-10-CM

## 2023-10-03 DIAGNOSIS — F41.1 GENERALIZED ANXIETY DISORDER: Chronic | ICD-10-CM

## 2023-10-03 PROCEDURE — 90834 PSYTX W PT 45 MINUTES: CPT | Performed by: SOCIAL WORKER

## 2023-10-03 NOTE — BH TREATMENT PLAN
[address]  1970     Date of Initial Psychotherapy Assessment: 04/08/19   Date of Current Treatment Plan: 10/3/2023  Treatment Plan Target Date: 01/31/2024   Treatment Plan Expiration Date: 03/31/2024    Diagnosis:   1. MDD (major depressive disorder), recurrent severe, without psychosis ([address])        2. Generalized anxiety disorder        3. Post traumatic stress disorder (PTSD)        4. ADHD (attention deficit hyperactivity disorder), inattentive type            Area(s) of Need: Mood regulation, relationship concerns, educational/vocational issues    Long Term Goal 1 (in the client's own words): I want to complete my associate's degree and move on to get my bachelor's degree. Stage of Change: Action    Target Date for completion: 10/03/2024     Anticipated therapeutic modalities: client-centered; motivational interviewing; solution focused; DBT-informed; supportive psychotherapy     People identified to complete this goal: Thompson Osei (client); Jesse Holder (psychotherapist); Lakshmi Wright (psychiatrist)      Objective 1: (identify the means of measuring success in meeting the objective): Thompson Osei will maintain regularly scheduled medication management sessions with her psychiatrist. She will take her medications, as prescribed, and will discuss any barriers to her medication regimen (including side effects or other problems) with both the psychiatric provider and this clinician. Update 10/03/2023: Thompson Osei will continue to take her medications as prescribed. She reports that she continues to see Dr. Gayatri Mccord for medication management. She endorses adherence with her medications. Objective 2: (identify the means of measuring success in meeting the objective): Thompson Osei will practice mindfulness-based strategies, at a minimum 1x per week, to help her improve her overall mood and anxiety management.     Update 10/03/2023: Thompson Osei states that she has attempted to use deep breathing when she is feeling anxious; however, she has not practiced when not in an anxious state. She is agreeable to practicing mindfulness-based strategies to help her decrease her anxiety and emotion dysregulation. Objective 3: (identify the means of measuring success in meeting the objective): Tresa Cruz will increase her physical activity by going to the gym at least 1-2 times per week (to increase overall health and to decrease anxiety). Update 10/03/2023: Tresa Cruz states, "I haven't done it." She states that she wants to continue to keep this objective on her treatment plan. She states that her back pain has been the biggest barrier for her. She states that she will be having injections in her back, which should help to increase her ability to exercise. Objective 4: (identify the means of measuring success in meeting the objective): Tresa Cruz will work with her medical team to see how her recent bariatric revision may impact her medication regimen and her mood regulation. Update 10/03/2023: Tresa Cruz has worked with her psychiatrist to change her medication regimen. She identified that her medications were wearing off and creating side effects, due to her bariatric surgery revision. She states that this has resolved the problem. This objective will be end-dated. Objective 5: (identify the means of measuring success in meeting the objective): Tresa Cruz will increase her social activities, by seeing friends or going out with her , a minimum of one time per month. Tresa Cruz would like to be able to enjoy herself when she is out-- even though she is not drinking. Update 10/03/2023: Tresa Cruz states that she socializes when she goes out. She states that, since she stopped drinking, she is "not as outgoing" as she used to be. She states that, at this time, she wants to maintain sobriety-- to help her protect her marriage.  She states that she feels that she is not able to have as much fun as she used to, since she is no longer drinking. She will discuss issues related to ambivalence regarding her decision to stop drinking. Objective 6: (identify the means of measuring success in meeting the objective): Megha Millan will use her therapy sessions to discuss her self-respect and her self-esteem related to her learning disability. She will discuss her decision to use or decline use of learning supports. At this time, Megha Millan states that she does not want to use learning supports, as she wants to be able to manage her learning on her own. I am currently under the care of a [facility] psychiatric provider: yes    My [facility] psychiatric provider is: Bettye Up am currently taking psychiatric medications: Yes, as prescribed    I feel that I will be ready for discharge from mental health care when I reach the following (measurable goal/objective): I've never thought that I'd be able to graduate, because the feelings don't go away. * We discussed the possibility of her managing her symptoms, and she states that she feels "like it's never going to happen."     For children and adults who have a legal guardian:   Has there been any change to custody orders and/or guardianship status? NA. If yes, attach updated documentation. I have created my Crisis Plan and have been offered a copy of this plan    [address]: Diagnosis and Treatment Plan explained to Mik Clubs acknowledges an understanding of their diagnosis. Ingrid Dunbar agrees to this treatment plan. I have been offered a copy of this Treatment Plan. yes    Ingrid Dunbar, 1970, actively participated in the review and update of this treatment plan during a virtual session, using the [employer]. Ingrid Dunbar  provided verbal consent on 10/03/2023 at 3:52pm. The treatment plan was transcribed into the [employer] Record at a later time.  The plan will be sent to Roxana, via the [employer], for her signature. This clinician will check to ensure that she was able to sign it during our next session.

## 2023-10-03 NOTE — PSYCH
Virtual Regular Visit    Verification of patient location:    Patient is located at Home in the following state in which I hold an active license [place]      Assessment/Plan:    Problem List Items Addressed This Visit        Other    Post traumatic stress disorder (PTSD) (Chronic)    Generalized anxiety disorder (Chronic)    ADHD (attention deficit hyperactivity disorder), inattentive type (Chronic)    MDD (major depressive disorder), recurrent severe, without psychosis ([address]) - Primary       Goals addressed in session: Goal 1          Reason for visit is   Chief Complaint   Patient presents with   • Virtual Regular Visit        Encounter provider LALY Griggs    Provider located at [address] [place] [address] Road [id number] [phone]      Recent Visits  Date Type Provider Dept   09/29/23 Office Visit Afshan Espana MD [address] [facility] recent visits within past 7 days and meeting all other requirements  Today's Visits  Date Type Provider Dept   10/03/23 [address] [place] [address]   Showing today's visits and meeting all other requirements  Future Appointments  No visits were found meeting these conditions. Showing future appointments within next 150 days and meeting all other requirements       The patient was identified by name and date of birth. Patti Maza was informed that this is a telemedicine visit and [facility] She agrees to proceed. .  My office door was closed. No one else was in the room. She acknowledged consent and understanding of privacy and security of the video platform. The patient has agreed to participate and understands they can discontinue the visit at any time. Patient is aware this is a billable service. Subjective  Patti Maza is a 46 y.o. female.       HPI     Past Medical History:   Diagnosis Date   • Acute right ankle pain 2023   • ADHD (attention deficit hyperactivity disorder)    • Anxiety    • Bulging lumbar disc    • Carpal tunnel syndrome     RIGHT. LAST ASSESSED: 16   • Chronic back pain     low   • Chronic pain disorder    • Colon polyps    • COVID-19     2021   • Depression    • Diabetes mellitus ([address])    • GERD (gastroesophageal reflux disease)    • Gestational diabetes    • Hearing loss     left ear   • Heart disease     SVT   • History of pre-eclampsia    • Hyperlipidemia     Resolved with weight loss   • Hyponatremia 2020   • IBS (irritable bowel syndrome)    • Ileus ([address])     LAST ASSESSED: 8/3/17   • Labial cyst     LAST ASSESSED: 16   • Myofascial pain     LAST ASSESSED: 16   • Neck mass 2023   • Obesity    • Ovarian cyst     LEFT. LAST ASSESSED: 16   • Panic attack    • Pneumonia    • Sacroiliitis (HCC)    • Seasonal allergies    • Sprain of anterior talofibular ligament of right ankle 2023   • SVT (supraventricular tachycardia) (HCC)    • Thoracic outlet syndrome     2010   • Trochanteric bursitis of both hips     LAST ASSESSED: 3/18/16   • Ulnar neuropathy at elbow    • Varicella    • Wears glasses        Past Surgical History:   Procedure Laterality Date   • BARIATRIC SURGERY  2022   • BILE DUCT EXPLORATION      ENDOSCOPIC REMOVAL OF STONES FROM BILIARY TRACT   •  SECTION      x3   • CHOLECYSTECTOMY     • COLONOSCOPY      2017-polyp, repeat    • DILATION AND CURETTAGE OF UTERUS     • ENDOMETRIAL ABLATION     • ERCP W/ SPHICTEROTOMY     • FIRST RIB REMOVAL      THORAX EXCISION OF FIRST RIB   • GASTRIC BYPASS  2022   • HYSTEROSCOPY     • NEUROPLASTY / TRANSPOSITION ULNAR NERVE AT ELBOW Right    • CO COLONOSCOPY FLX DX W/COLLJ SPEC WHEN PFRMD N/A 2017    Procedure: COLONOSCOPY;  Surgeon: Morteza Edgar MD;  [facility]: [facility];   Service: Gastroenterology   • CO ESOPHAGOGASTRODUODENOSCOPY TRANSORAL DIAGNOSTIC N/A 09/14/2017    Procedure: ESOPHAGOGASTRODUODENOSCOPY (EGD); Surgeon: Lenny Brady MD;  Location: [facility]; Service: Gastroenterology   • [place] HYSTEROSCOPY BX ENDOMETRIUM&/POLYPC W/WO D&C N/A 10/20/2017    Procedure: DILATATION AND CURETTAGE (D&C) WITH HYSTEROSCOPY  REMOVAL VULVAR RT. LESION;  Surgeon: Jorge Teresa MD;  [facility];  Service: Gynecology   • [place] ALDANA Mariuszjaiden Ramirez NSTIM ELTRDS PLATE/PADDLE EDRL Left 01/29/2020    Procedure: Insertion of thoracic spinal cord stimulator electrode via laminotomy and placement of left buttock implantable pulse generator (NEUROMONITORING); Surgeon: Jimi Hunter MD;  [facility]: [facility];  Service: Neurosurgery   • [place] LAPS [facility] LONGITUDINAL GASTRECTOMY N/A 02/06/2018    Procedure: GASTRECTOMY SLEEVE LAPAROSCOPIC; INTRAOPERATIVE EGD ;  Surgeon: Waldo Ramos MD;  [facility];  Service: Bariatrics   • [place] LAPS [address] [place] [address] LONGITUDINAL GASTRECTOMY N/A 08/08/2022    Procedure: Diagnostic Lap;  Extensive Lysis of Adhesions; LAPAROSCOPIC REVISION CONVERSION TO NOE-EN-Y GASTRIC BYPASS AND INTRAOPERATIVE EGD;  Surgeon: Waldo Ramos MD;  [facility];  Service: Bariatrics   • SLEEVE GASTROPLASTY     • SPINAL CORD STIMULATOR TRIAL W/ LAMINOTOMY     • TONSILLECTOMY AND ADENOIDECTOMY     • TUBAL LIGATION     • UPPER GASTROINTESTINAL ENDOSCOPY     • US GUIDED LYMPH NODE BIOPSY LEFT  05/22/2023   • VEIN LIGATION AND STRIPPING Right    • VULVA SURGERY  10/20/2017    BIOPSY   • WISDOM TOOTH EXTRACTION         Current Outpatient Medications   Medication Sig Dispense Refill   • atoMOXetine (STRATTERA) 60 mg capsule Take 1 capsule (60 mg total) by mouth daily 90 capsule 3   • atorvastatin (LIPITOR) 20 mg tablet Take 1 tablet (20 mg total) by mouth daily 90 tablet 3   • calcium carbonate (OS-MELODY) 600 MG tablet Take 600 mg by mouth 2 (two) times a day with meals     • cyanocobalamin (VITAMIN B-12) 100 mcg tablet Take by mouth daily (Patient not taking: Reported on 6/23/2023)     • drospirenone-ethinyl estradiol (LIEN) 3-0.02 MG per tablet Take 1 tablet by mouth daily 84 tablet 4   • estradiol (ESTRACE VAGINAL) 0.1 mg/g vaginal cream One gram vaginally at night x 14 days then twice weekly for ongoing maintenance. (Patient taking differently: if needed One gram vaginally at night x 14 days then twice weekly for ongoing maintenance.) 42.5 g 2   • ivabradine HCl (Corlanor) 5 MG tablet Take 1 tablet (5 mg total) by mouth every 12 (twelve) hours 180 tablet 1   • lamoTRIgine (LaMICtal) 150 MG tablet Take 1 tablet (150 mg total) by mouth 2 (two) times a day 180 tablet 3   • lidocaine (Lidoderm) 5 % Apply 1 patch topically over 12 hours daily Remove & Discard patch within 12 hours or as directed by MD 30 patch 1   • meclizine (ANTIVERT) 25 mg tablet Take 1 tablet (25 mg total) by mouth every 8 (eight) hours as needed for dizziness 30 tablet 0   • methocarbamol (ROBAXIN) 750 mg tablet Take 1 tablet (750 mg total) by mouth every 8 (eight) hours 270 tablet 0   • montelukast (SINGULAIR) 10 mg tablet Take 1 tablet (10 mg total) by mouth daily at bedtime 90 tablet 2   • Multiple Vitamins-Minerals (MULTI COMPLETE PO) Take by mouth     • nitrofurantoin (MACROBID) 100 mg capsule Take 1 tablet PO PRN after sexual intercourse (Patient not taking: Reported on 7/28/2023) 30 capsule 0   • omeprazole (PriLOSEC) 20 mg delayed release capsule Take 1 capsule (20 mg total) by mouth daily 90 capsule 3   • QUEtiapine (SEROquel) 100 mg tablet Take 1 tablet (100 mg total) by mouth daily at bedtime 90 tablet 0   • venlafaxine (EFFEXOR) 100 MG tablet Take 1 tablet (100 mg total) by mouth 3 (three) times a day 270 tablet 2     No current facility-administered medications for this visit.         Allergies   Allergen Reactions   • Ibuprofen Other (See Comments)     Due to gastric sleeve -can only take for 5 days, then needs to stop       Review of Systems    Video Exam    There were no vitals filed for this visit. Physical Exam     Behavioral Health Psychotherapy Progress Note    Psychotherapy Provided: Individual Psychotherapy     1. MDD (major depressive disorder), recurrent severe, without psychosis ([address])        2. Generalized anxiety disorder        3. Post traumatic stress disorder (PTSD)        4. ADHD (attention deficit hyperactivity disorder), inattentive type            Goals addressed in session: Goal 1     DATA: Met with Yessy for scheduled individual session. She states that she continues to struggle with balancing her work, home life, and school. She discussed her use of alcohol. She is maintaining her sobriety; however, she states that she feels that she cannot have as much fun going out with friends when she is not using alcohol. She states that she is willing to continue to maintain her abstinence, as this is important to her . She reports that he has been sober for several years. Tobias Dobbinslintock discussed her ongoing difficulty with her education. She states that she feels that she might not be successful with her goals; however, she wants to keep moving forward with her plans to get a bachelor's degree. We discussed her ambivalence and reluctance related to getting support for her learning disabilities, which might help her with some of these struggles. We spent some time reviewing and revising her treatment plan. She wants to continue to work on her goal of completing her education. We will also continue to discuss relationship issues, work/life balance, and overall mental health wellness. During this session, this clinician used the following therapeutic modalities: Client-centered Therapy, Dialectical Behavior Therapy, Mindfulness-based Strategies, Motivational Interviewing, Solution-Focused Therapy and Supportive Psychotherapy    Substance Abuse was addressed during this session.  If the client is diagnosed with a co-occurring substance use disorder, please indicate any changes in the frequency or amount of use: not currently using. Stage of change for addressing substance use diagnoses: Action    ASSESSMENT:  Constance Hyman presents with a Dysthymic mood. her affect is Normal range and intensity, which is congruent, with her mood and the content of the session. The client has made progress on their goals. Constance Hyman presents with a minimal risk of suicide, minimal risk of self-harm, and minimal risk of harm to others. For any risk assessment that surpasses a "low" rating, a safety plan must be developed. A safety plan was indicated: no  If yes, describe in detail n/a    PLAN: Between sessions, Constance Hyman will continue to monitor her moods. She will maintain abstinence from use of alcohol. She will continue to attend college classes and maintain a work/life balance. At the next session, the therapist will use Client-centered Therapy, Dialectical Behavior Therapy, Mindfulness-based Strategies, Motivational Interviewing, Solution-Focused Therapy and Supportive Psychotherapy to address her mood regulation and relationship concerns. Behavioral Health Treatment Plan and Discharge Planning: Constance Hyman is aware of and agrees to continue to work on their treatment plan. They have identified and are working toward their discharge goals.  yes    Visit start and stop times:    10/03/23  Start Time: 3883  Stop Time: 1559  Total Visit Time: 51 minutes

## 2023-10-05 ENCOUNTER — HOSPITAL ENCOUNTER (OUTPATIENT)
Dept: RADIOLOGY | Facility: CLINIC | Age: 53
Discharge: HOME | End: 2023-10-05
Admitting: ANESTHESIOLOGY
Payer: OTHER MISCELLANEOUS

## 2023-10-05 VITALS
OXYGEN SATURATION: 96 % | HEART RATE: 87 BPM | RESPIRATION RATE: 16 BRPM | TEMPERATURE: 99.6 F | DIASTOLIC BLOOD PRESSURE: 78 MMHG | SYSTOLIC BLOOD PRESSURE: 133 MMHG

## 2023-10-05 DIAGNOSIS — M51.16 INTERVERTEBRAL DISC DISORDER WITH RADICULOPATHY OF LUMBAR REGION: ICD-10-CM

## 2023-10-05 PROCEDURE — 64483 NJX AA&/STRD TFRM EPI L/S 1: CPT | Performed by: ANESTHESIOLOGY

## 2023-10-05 RX ORDER — 0.9 % SODIUM CHLORIDE 0.9 %
4 VIAL (ML) INJECTION ONCE
Status: COMPLETED | OUTPATIENT
Start: 2023-10-05 | End: 2023-10-05

## 2023-10-05 RX ORDER — TRAMADOL HYDROCHLORIDE 50 MG/1
50 TABLET ORAL 2 TIMES DAILY PRN
Qty: 30 TABLET | Refills: 0 | Status: SHIPPED | OUTPATIENT
Start: 2023-10-05 | End: 2023-10-20

## 2023-10-05 RX ORDER — METHYLPREDNISOLONE ACETATE 80 MG/ML
80 INJECTION, SUSPENSION INTRA-ARTICULAR; INTRALESIONAL; INTRAMUSCULAR; PARENTERAL; SOFT TISSUE ONCE
Status: COMPLETED | OUTPATIENT
Start: 2023-10-05 | End: 2023-10-05

## 2023-10-05 RX ORDER — BUPIVACAINE HCL/PF 2.5 MG/ML
2 VIAL (ML) INJECTION ONCE
Status: COMPLETED | OUTPATIENT
Start: 2023-10-05 | End: 2023-10-05

## 2023-10-05 RX ADMIN — METHYLPREDNISOLONE ACETATE 80 MG: 80 INJECTION, SUSPENSION INTRA-ARTICULAR; INTRALESIONAL; INTRAMUSCULAR; PARENTERAL; SOFT TISSUE at 15:19

## 2023-10-05 RX ADMIN — Medication 4 ML: at 15:19

## 2023-10-05 RX ADMIN — IOHEXOL 1 ML: 300 INJECTION, SOLUTION INTRAVENOUS at 15:19

## 2023-10-05 RX ADMIN — BUPIVACAINE HYDROCHLORIDE 2 ML: 2.5 INJECTION, SOLUTION EPIDURAL; INFILTRATION; INTRACAUDAL at 15:18

## 2023-10-05 NOTE — DISCHARGE INSTR - LAB
Epidural Steroid Injection   WHAT YOU NEED TO KNOW:   An epidural steroid injection (JESSICA) is a procedure to inject steroid medicine into the epidural space. The epidural space is between your spinal cord and vertebrae. Steroids reduce inflammation and fluid buildup in your spine that may be causing pain. You may be given pain medicine along with the steroids. ACTIVITY  Do not drive or operate machinery today. No strenuous activity today - bending, lifting, etc.  You may resume normal activites starting tomorrow - start slowly and as tolerated. You may shower today, but no tub baths or hot tubs. You may have numbness for several hours from the local anesthetic. Please use caution and common sense, especially with weight-bearing activities. CARE OF THE INJECTION SITE  If you have soreness or pain, apply ice to the area today (20 minutes on/20 minutes off). Starting tomorrow, you may use warm, moist heat or ice if needed. You may have an increase or change in your discomfort for 36-48 hours after your treatment. Apply ice and continue with any pain medication you have been prescribed. Notify the [facility] if you have any of the following: redness, drainage, swelling, headache, stiff neck or fever above 100°F.    SPECIAL INSTRUCTIONS  Our office will contact you in approximately 7 days for a progress report. MEDICATIONS  Continue to take all routine medications. Our office may have instructed you to hold some medications. As no general anesthesia was used in today's procedure, you should not experience any side effects related to anesthesia. If you are diabetic, the steroids used in today's injection may temporarily increase your blood sugar levels after the first few days after your injection. Please keep a close eye on your sugars and alert the doctor who manages your diabetes if your sugars are significantly high from your baseline or you are symptomatic.      If you have a problem specifically related to your procedure, please call our office at [phone]. Problems not related to your procedure should be directed to your primary care physician.

## 2023-10-05 NOTE — H&P
History of Present Illness: The patient is a 46 y.o. female who presents with complaints of lower back and leg pain and is here today for bilateral L5 transforaminal epidural steroid injection    Past Medical History:   Diagnosis Date   • Acute right ankle pain 2023   • ADHD (attention deficit hyperactivity disorder)    • Anxiety    • Bulging lumbar disc    • Carpal tunnel syndrome     RIGHT. LAST ASSESSED: 16   • Chronic back pain     low   • Chronic pain disorder    • Colon polyps    • COVID-19     2021   • Depression    • Diabetes mellitus ([address])    • GERD (gastroesophageal reflux disease)    • Gestational diabetes    • Hearing loss     left ear   • Heart disease     SVT   • History of pre-eclampsia    • Hyperlipidemia     Resolved with weight loss   • Hyponatremia 2020   • IBS (irritable bowel syndrome)    • Ileus ([address])     LAST ASSESSED: 8/3/17   • Labial cyst     LAST ASSESSED: 16   • Myofascial pain     LAST ASSESSED: 16   • Neck mass 2023   • Obesity    • Ovarian cyst     LEFT.  LAST ASSESSED: 16   • Panic attack    • Pneumonia    • Sacroiliitis (HCC)    • Seasonal allergies    • Sprain of anterior talofibular ligament of right ankle 2023   • SVT (supraventricular tachycardia) (HCC)    • Thoracic outlet syndrome        • Trochanteric bursitis of both hips     LAST ASSESSED: 3/18/16   • Ulnar neuropathy at elbow    • Varicella    • Wears glasses        Past Surgical History:   Procedure Laterality Date   • BARIATRIC SURGERY  2022   • BILE DUCT EXPLORATION      ENDOSCOPIC REMOVAL OF STONES FROM BILIARY TRACT   •  SECTION      x3   • CHOLECYSTECTOMY     • COLONOSCOPY      2017-polyp, repeat    • DILATION AND CURETTAGE OF UTERUS     • ENDOMETRIAL ABLATION     • ERCP W/ SPHICTEROTOMY     • FIRST RIB REMOVAL      THORAX EXCISION OF FIRST RIB   • GASTRIC BYPASS  2022   • HYSTEROSCOPY     • NEUROPLASTY / TRANSPOSITION ULNAR NERVE AT ELBOW Right    • [place] COLONOSCOPY FLX DX W/COLLJ SPEC WHEN PFRMD N/A 05/30/2017    Procedure: COLONOSCOPY;  Surgeon: Rajat Metcalf MD;  Location: [facility]; Service: Gastroenterology   • NJ ESOPHAGOGASTRODUODENOSCOPY TRANSORAL DIAGNOSTIC N/A 09/14/2017    Procedure: ESOPHAGOGASTRODUODENOSCOPY (EGD); Surgeon: Heidy Pak MD;  Location: [facility]; Service: Gastroenterology   • NJ HYSTEROSCOPY BX ENDOMETRIUM&/POLYPC W/WO D&C N/A 10/20/2017    Procedure: DILATATION AND CURETTAGE (D&C) WITH HYSTEROSCOPY  REMOVAL VULVAR RT. LESION;  Surgeon: Feliciano Courtney MD;  Location: [facility];  Service: Gynecology   • NJ ALDANA Meng Laws NSTIM ELTRDS PLATE/PADDLE EDRL Left 01/29/2020    Procedure: Insertion of thoracic spinal cord stimulator electrode via laminotomy and placement of left buttock implantable pulse generator (NEUROMONITORING); Surgeon: Yoandy Verma MD;  [facility]: [facility];  Service: Neurosurgery   • NJ LAPS [facility] LONGITUDINAL GASTRECTOMY N/A 02/06/2018    Procedure: GASTRECTOMY SLEEVE LAPAROSCOPIC; INTRAOPERATIVE EGD ;  Surgeon: Yady Cruz MD;  [facility]: [facility];  Service: Bariatrics   • NJ LAPS [address] [place] [address] LONGITUDINAL GASTRECTOMY N/A 08/08/2022    Procedure: Diagnostic Lap;  Extensive Lysis of Adhesions; LAPAROSCOPIC REVISION CONVERSION TO NOE-EN-Y GASTRIC BYPASS AND INTRAOPERATIVE EGD;  Surgeon: Yady Cruz MD;  [facility];  Service: Bariatrics   • SLEEVE GASTROPLASTY     • SPINAL CORD STIMULATOR TRIAL W/ LAMINOTOMY     • TONSILLECTOMY AND ADENOIDECTOMY     • TUBAL LIGATION     • UPPER GASTROINTESTINAL ENDOSCOPY     • US GUIDED LYMPH NODE BIOPSY LEFT  05/22/2023   • VEIN LIGATION AND STRIPPING Right    • VULVA SURGERY  10/20/2017    BIOPSY   • WISDOM TOOTH EXTRACTION           Current Outpatient Medications:   •  atoMOXetine (STRATTERA) 60 mg capsule, Take 1 capsule (60 mg total) by mouth daily, Disp: 90 capsule, Rfl: 3  •  atorvastatin (LIPITOR) 20 mg tablet, Take 1 tablet (20 mg total) by mouth daily, Disp: 90 tablet, Rfl: 3  •  calcium carbonate (OS-MELODY) 600 MG tablet, Take 600 mg by mouth 2 (two) times a day with meals, Disp: , Rfl:   •  cyanocobalamin (VITAMIN B-12) 100 mcg tablet, Take by mouth daily (Patient not taking: Reported on 6/23/2023), Disp: , Rfl:   •  drospirenone-ethinyl estradiol (LIEN) 3-0.02 MG per tablet, Take 1 tablet by mouth daily, Disp: 84 tablet, Rfl: 4  •  estradiol (ESTRACE VAGINAL) 0.1 mg/g vaginal cream, One gram vaginally at night x 14 days then twice weekly for ongoing maintenance.  (Patient taking differently: if needed One gram vaginally at night x 14 days then twice weekly for ongoing maintenance.), Disp: 42.5 g, Rfl: 2  •  ivabradine HCl (Corlanor) 5 MG tablet, Take 1 tablet (5 mg total) by mouth every 12 (twelve) hours, Disp: 180 tablet, Rfl: 1  •  lamoTRIgine (LaMICtal) 150 MG tablet, Take 1 tablet (150 mg total) by mouth 2 (two) times a day, Disp: 180 tablet, Rfl: 3  •  lidocaine (Lidoderm) 5 %, Apply 1 patch topically over 12 hours daily Remove & Discard patch within 12 hours or as directed by MD, Disp: 30 patch, Rfl: 1  •  meclizine (ANTIVERT) 25 mg tablet, Take 1 tablet (25 mg total) by mouth every 8 (eight) hours as needed for dizziness, Disp: 30 tablet, Rfl: 0  •  methocarbamol (ROBAXIN) 750 mg tablet, Take 1 tablet (750 mg total) by mouth every 8 (eight) hours, Disp: 270 tablet, Rfl: 0  •  montelukast (SINGULAIR) 10 mg tablet, Take 1 tablet (10 mg total) by mouth daily at bedtime, Disp: 90 tablet, Rfl: 2  •  Multiple Vitamins-Minerals (MULTI COMPLETE PO), Take by mouth, Disp: , Rfl:   •  nitrofurantoin (MACROBID) 100 mg capsule, Take 1 tablet PO PRN after sexual intercourse (Patient not taking: Reported on 7/28/2023), Disp: 30 capsule, Rfl: 0  •  omeprazole (PriLOSEC) 20 mg delayed release capsule, Take 1 capsule (20 mg total) by mouth daily, Disp: 90 capsule, Rfl: 3  •  QUEtiapine (SEROquel) 100 mg tablet, Take 1 tablet (100 mg total) by mouth daily at bedtime, Disp: 90 tablet, Rfl: 0  •  venlafaxine (EFFEXOR) 100 MG tablet, Take 1 tablet (100 mg total) by mouth 3 (three) times a day, Disp: 270 tablet, Rfl: 2    Current Facility-Administered Medications:   •  bupivacaine (PF) (MARCAINE) 0.25 % injection 2 mL, 2 mL, Epidural, Once, Ricardo Tejada MD  •  iohexol (OMNIPAQUE) 300 mg/mL injection 1 mL, 1 mL, Epidural, Once, Ricardo Tejada MD  •  lidocaine (PF) (XYLOCAINE-MPF) 2 % injection 4 mL, 4 mL, Infiltration, Once, Ricardo Tejada MD  •  methylPREDNISolone acetate (DEPO-MEDROL) injection 80 mg, 80 mg, Epidural, Once, Ricardo Tejada MD  •  sodium chloride (PF) 0.9 % injection 4 mL, 4 mL, Infiltration, Once, Ricardo Tejada MD    Allergies   Allergen Reactions   • Ibuprofen Other (See Comments)     Due to gastric sleeve -can only take for 5 days, then needs to stop       Physical Exam:   Vitals:    10/05/23 1457   BP: 102/60   Pulse: 88   Resp: 16   Temp: 99.6 °F (37.6 °C)   SpO2: 98%     General: Awake, Alert, Oriented x 3, Mood and affect appropriate  Respiratory: Respirations even and unlabored  Cardiovascular: Peripheral pulses intact; no edema  Musculoskeletal Exam: Lower back and leg pain    ASA Score: 3    Patient/Chart Verification  Patient ID Verified: Verbal  ID Band Applied: No  Consents Confirmed: Procedural, To be obtained in the Pre-Procedure area  H&P( within 30 days) Verified: To be obtained in the Pre-Procedure area  Allergies Reviewed: Yes  Anticoag/NSAID held?: No  Currently on antibiotics?: No    Assessment:   1.  Intervertebral disc disorder with radiculopathy of lumbar region        Plan: Bilateral L5 TFESI

## 2023-10-06 ENCOUNTER — HOSPITAL ENCOUNTER (OUTPATIENT)
Dept: RADIOLOGY | Facility: HOSPITAL | Age: 53
Discharge: HOME | End: 2023-10-06
Payer: COMMERCIAL

## 2023-10-06 DIAGNOSIS — K21.9 ACID REFLUX: ICD-10-CM

## 2023-10-06 DIAGNOSIS — K59.00 CONSTIPATION: ICD-10-CM

## 2023-10-06 DIAGNOSIS — Z98.84 BARIATRIC SURGERY STATUS: ICD-10-CM

## 2023-10-06 PROCEDURE — 74240 X-RAY XM UPR GI TRC 1CNTRST: CPT

## 2023-10-12 ENCOUNTER — TELEPHONE (OUTPATIENT)
Dept: RADIOLOGY | Facility: MEDICAL CENTER | Age: 53
End: 2023-10-12

## 2023-10-12 NOTE — TELEPHONE ENCOUNTER
1st attempt  Un able to lm to cb with % improvement and pain level. Call cannot be completed at this time.

## 2023-10-25 ENCOUNTER — TELEPHONE (OUTPATIENT)
Age: 53
End: 2023-10-25

## 2023-10-25 ENCOUNTER — APPOINTMENT (EMERGENCY)
Dept: RADIOLOGY | Facility: HOSPITAL | Age: 53
End: 2023-10-25
Payer: COMMERCIAL

## 2023-10-25 ENCOUNTER — HOSPITAL ENCOUNTER (EMERGENCY)
Facility: HOSPITAL | Age: 53
Discharge: HOME | End: 2023-10-25
Attending: EMERGENCY MEDICINE
Payer: COMMERCIAL

## 2023-10-25 ENCOUNTER — TELEPHONE (OUTPATIENT)
Dept: PAIN MEDICINE | Facility: CLINIC | Age: 53
End: 2023-10-25

## 2023-10-25 VITALS
TEMPERATURE: 98 F | OXYGEN SATURATION: 99 % | RESPIRATION RATE: 18 BRPM | HEART RATE: 74 BPM | SYSTOLIC BLOOD PRESSURE: 121 MMHG | DIASTOLIC BLOOD PRESSURE: 71 MMHG

## 2023-10-25 DIAGNOSIS — M54.9 BACK PAIN: Primary | ICD-10-CM

## 2023-10-25 PROCEDURE — 96374 THER/PROPH/DIAG INJ IV PUSH: CPT

## 2023-10-25 PROCEDURE — 72100 X-RAY EXAM L-S SPINE 2/3 VWS: CPT

## 2023-10-25 PROCEDURE — 99283 EMERGENCY DEPT VISIT LOW MDM: CPT

## 2023-10-25 PROCEDURE — 96375 TX/PRO/DX INJ NEW DRUG ADDON: CPT

## 2023-10-25 PROCEDURE — 96376 TX/PRO/DX INJ SAME DRUG ADON: CPT

## 2023-10-25 PROCEDURE — 99285 EMERGENCY DEPT VISIT HI MDM: CPT | Performed by: EMERGENCY MEDICINE

## 2023-10-25 RX ORDER — HYDROMORPHONE HCL/PF 1 MG/ML
1 SYRINGE (ML) INJECTION ONCE
Status: COMPLETED | OUTPATIENT
Start: 2023-10-25 | End: 2023-10-25

## 2023-10-25 RX ORDER — CYCLOBENZAPRINE HCL 10 MG
10 TABLET ORAL 2 TIMES DAILY PRN
Qty: 14 TABLET | Refills: 0 | Status: SHIPPED | OUTPATIENT
Start: 2023-10-25 | End: 2023-11-01

## 2023-10-25 RX ORDER — LIDOCAINE 50 MG/G
1 PATCH TOPICAL ONCE
Status: DISCONTINUED | OUTPATIENT
Start: 2023-10-25 | End: 2023-10-25 | Stop reason: HOSPADM

## 2023-10-25 RX ORDER — ONDANSETRON 2 MG/ML
4 INJECTION INTRAMUSCULAR; INTRAVENOUS ONCE
Status: COMPLETED | OUTPATIENT
Start: 2023-10-25 | End: 2023-10-25

## 2023-10-25 RX ORDER — KETOROLAC TROMETHAMINE 30 MG/ML
15 INJECTION, SOLUTION INTRAMUSCULAR; INTRAVENOUS ONCE
Status: COMPLETED | OUTPATIENT
Start: 2023-10-25 | End: 2023-10-25

## 2023-10-25 RX ORDER — LIDOCAINE 50 MG/G
1 PATCH TOPICAL EVERY 24 HOURS
Qty: 14 PATCH | Refills: 0 | Status: SHIPPED | OUTPATIENT
Start: 2023-10-25 | End: 2023-11-08

## 2023-10-25 RX ORDER — CYCLOBENZAPRINE HCL 10 MG
10 TABLET ORAL ONCE
Status: COMPLETED | OUTPATIENT
Start: 2023-10-25 | End: 2023-10-25

## 2023-10-25 RX ORDER — HYDROMORPHONE HCL/PF 1 MG/ML
0.5 SYRINGE (ML) INJECTION ONCE
Status: COMPLETED | OUTPATIENT
Start: 2023-10-25 | End: 2023-10-25

## 2023-10-25 RX ADMIN — CYCLOBENZAPRINE 10 MG: 10 TABLET, FILM COATED ORAL at 14:20

## 2023-10-25 RX ADMIN — KETOROLAC TROMETHAMINE 15 MG: 30 INJECTION, SOLUTION INTRAMUSCULAR; INTRAVENOUS at 12:10

## 2023-10-25 RX ADMIN — HYDROMORPHONE HYDROCHLORIDE 1 MG: 1 INJECTION, SOLUTION INTRAMUSCULAR; INTRAVENOUS; SUBCUTANEOUS at 12:12

## 2023-10-25 RX ADMIN — ONDANSETRON 4 MG: 2 INJECTION INTRAMUSCULAR; INTRAVENOUS at 14:20

## 2023-10-25 RX ADMIN — LIDOCAINE 1 PATCH: 700 PATCH TOPICAL at 14:20

## 2023-10-25 RX ADMIN — HYDROMORPHONE HYDROCHLORIDE 0.5 MG: 1 INJECTION, SOLUTION INTRAMUSCULAR; INTRAVENOUS; SUBCUTANEOUS at 14:19

## 2023-10-25 NOTE — Clinical Note
Nuha Araiza was seen and treated in our emergency department on 10/25/2023. No restrictions            Diagnosis:     Tanya Copelandp  . She may return on this date: 10/30/2023         If you have any questions or concerns, please don't hesitate to call.       Prabhu Julio MD    ______________________________           _______________          _______________  Hospital Representative                              Date                                Time

## 2023-10-25 NOTE — TELEPHONE ENCOUNTER
Caller: Gina Martin    Doctor: Aylin Okeefe    Reason for call: Pt is having a lot of pain and she states she's feeling a lot of tightness and pain in her hips and states she is going to the ED but would like to know what can be done because she can't take the pain.      Please advise    Call back#: [phone]
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
## 2023-11-01 ENCOUNTER — TELEPHONE (OUTPATIENT)
Dept: PSYCHIATRY | Facility: CLINIC | Age: 53
End: 2023-11-01

## 2023-11-01 DIAGNOSIS — F33.1 MODERATE EPISODE OF RECURRENT MAJOR DEPRESSIVE DISORDER (HCC): Chronic | ICD-10-CM

## 2023-11-01 DIAGNOSIS — F51.04 PSYCHOPHYSIOLOGICAL INSOMNIA: ICD-10-CM

## 2023-11-01 RX ORDER — QUETIAPINE FUMARATE 100 MG/1
100 TABLET, FILM COATED ORAL
Qty: 90 TABLET | Refills: 2 | Status: SHIPPED | OUTPATIENT
Start: 2023-11-01

## 2023-11-01 NOTE — TELEPHONE ENCOUNTER
Patient contacted the office to schedule a follow up visit with provider. Patient is now scheduled for 3/14/2024  at 1:30pm virtually.

## 2023-11-01 NOTE — TELEPHONE ENCOUNTER
Called and left message for patient to return call to [phone] and schedule follow up with provider. Please schedule upon return call and place on cancellation list for sooner appt.

## 2023-11-01 NOTE — TELEPHONE ENCOUNTER
Renae Anderson requesting a refill of her Seroquel. Appointment needed before review of refill.          [facility]-

## 2023-11-14 ENCOUNTER — TELEMEDICINE (OUTPATIENT)
Dept: BEHAVIORAL/MENTAL HEALTH CLINIC | Facility: CLINIC | Age: 53
End: 2023-11-14
Payer: COMMERCIAL

## 2023-11-14 DIAGNOSIS — F41.1 GENERALIZED ANXIETY DISORDER: Chronic | ICD-10-CM

## 2023-11-14 DIAGNOSIS — F33.2 MDD (MAJOR DEPRESSIVE DISORDER), RECURRENT SEVERE, WITHOUT PSYCHOSIS (HCC): Primary | ICD-10-CM

## 2023-11-14 DIAGNOSIS — F43.10 POST TRAUMATIC STRESS DISORDER (PTSD): Chronic | ICD-10-CM

## 2023-11-14 PROCEDURE — 90837 PSYTX W PT 60 MINUTES: CPT | Performed by: SOCIAL WORKER

## 2023-11-14 NOTE — PSYCH
Virtual Regular Visit    Verification of patient location:    Patient is located at Home in the following state in which I hold an active license [place]    Assessment/Plan:    Problem List Items Addressed This Visit          Other    Post traumatic stress disorder (PTSD) (Chronic)    Generalized anxiety disorder (Chronic)    MDD (major depressive disorder), recurrent severe, without psychosis ([address] ) - Primary     Goals addressed in session: Goal 1        Reason for visit is   Chief Complaint   Patient presents with    Virtual Regular Visit        Encounter provider LALY Hadley    Provider located at [address] [place] [address]  [address] [facility] [address] [phone]    Recent Visits  No visits were found meeting these conditions. Showing recent visits within past 7 days and meeting all other requirements  Today's Visits  Date Type Provider Dept   11/14/23 [address] [facility], [facility] [address] today's visits and meeting all other requirements  Future Appointments  No visits were found meeting these conditions. Showing future appointments within next 150 days and meeting all other requirements     The patient was identified by name and date of birth. Selinda Najjar was informed that this is a telemedicine visit and [facility] [employer]. She agrees to proceed. .  My office door was closed. No one else was in the room. She acknowledged consent and understanding of privacy and security of the video platform. The patient has agreed to participate and understands they can discontinue the visit at any time. Patient is aware this is a billable service. Subjective  Selinda Najjar is a 46 y.o. female.     HPI     Past Medical History:   Diagnosis Date    Acute right ankle pain 2/7/2023    ADHD (attention deficit hyperactivity disorder)     Anxiety Bulging lumbar disc     Carpal tunnel syndrome     RIGHT. LAST ASSESSED: 16    Chronic back pain     low    Chronic pain disorder     Colon polyps     COVID-19     2021    Depression     Diabetes mellitus ([address])     GERD (gastroesophageal reflux disease)     Gestational diabetes     Hearing loss     left ear    Heart disease     SVT    History of pre-eclampsia     Hyperlipidemia     Resolved with weight loss    Hyponatremia 2020    IBS (irritable bowel syndrome)     Ileus ([address])     LAST ASSESSED: 8/3/17    Labial cyst     LAST ASSESSED: 16    Myofascial pain     LAST ASSESSED: 16    Neck mass 2023    Obesity     Ovarian cyst     LEFT. LAST ASSESSED: 16    Panic attack     Pneumonia     Sacroiliitis (HCC)     Seasonal allergies     Sprain of anterior talofibular ligament of right ankle 2023    SVT (supraventricular tachycardia)     Thoracic outlet syndrome     2010    Trochanteric bursitis of both hips     LAST ASSESSED: 3/18/16    Ulnar neuropathy at elbow     Varicella     Wears glasses        Past Surgical History:   Procedure Laterality Date    BARIATRIC SURGERY  2022    BILE DUCT EXPLORATION      ENDOSCOPIC REMOVAL OF STONES FROM BILIARY TRACT     SECTION      x3    CHOLECYSTECTOMY      COLONOSCOPY      -polyp, repeat     DILATION AND CURETTAGE OF UTERUS      ENDOMETRIAL ABLATION      ERCP W/ SPHICTEROTOMY      FIRST RIB REMOVAL      THORAX EXCISION OF FIRST RIB    GASTRIC BYPASS  2022    HYSTEROSCOPY      NEUROPLASTY / TRANSPOSITION ULNAR NERVE AT ELBOW Right     DC COLONOSCOPY FLX DX W/COLLJ SPEC WHEN PFRMD N/A 2017    Procedure: COLONOSCOPY;  Surgeon: Alex Morris MD;  [facility]: [facility]; Service: Gastroenterology    DC ESOPHAGOGASTRODUODENOSCOPY TRANSORAL DIAGNOSTIC N/A 2017    Procedure: ESOPHAGOGASTRODUODENOSCOPY (EGD); Surgeon: Sofi Watt MD;  Location: [facility];   Service: Gastroenterology    DC HYSTEROSCOPY BX ENDOMETRIUM&/POLYPC W/WO D&C N/A 10/20/2017    Procedure: DILATATION AND CURETTAGE (D&C) WITH HYSTEROSCOPY  REMOVAL VULVAR RT. LESION;  Surgeon: Delroy Padgett MD;  [facility];  Service: Gynecology    ME ALDANA [place] NSTIM ELTRDS PLATE/PADDLE EDRL Left 01/29/2020    Procedure: Insertion of thoracic spinal cord stimulator electrode via laminotomy and placement of left buttock implantable pulse generator (NEUROMONITORING); Surgeon: Yung Newell MD;  Location[facility];  Service: Neurosurgery    ME LAPS [facility] LONGITUDINAL GASTRECTOMY N/A 02/06/2018    Procedure: GASTRECTOMY SLEEVE LAPAROSCOPIC; INTRAOPERATIVE EGD ;  Surgeon: Harry Price MD;  [facility];  Service: Bariatrics    ME LAPS [address] [facility] [address] [place] [address] LONGITUDINAL GASTRECTOMY N/A 08/08/2022    Procedure: Diagnostic Lap;  Extensive Lysis of Adhesions; LAPAROSCOPIC REVISION CONVERSION TO NOE-EN-Y GASTRIC BYPASS AND INTRAOPERATIVE EGD;  Surgeon: Harry Price MD;  [facility];  Service: Bariatrics    SLEEVE GASTROPLASTY      SPINAL CORD STIMULATOR TRIAL W/ LAMINOTOMY      TONSILLECTOMY AND ADENOIDECTOMY      TUBAL LIGATION      UPPER GASTROINTESTINAL ENDOSCOPY      US GUIDED LYMPH NODE BIOPSY LEFT  05/22/2023    VEIN LIGATION AND STRIPPING Right     VULVA SURGERY  10/20/2017    BIOPSY    WISDOM TOOTH EXTRACTION         Current Outpatient Medications   Medication Sig Dispense Refill    QUEtiapine (SEROquel) 100 mg tablet Take 1 tablet (100 mg total) by mouth daily at bedtime 90 tablet 2    atoMOXetine (STRATTERA) 60 mg capsule Take 1 capsule (60 mg total) by mouth daily 90 capsule 3    atorvastatin (LIPITOR) 20 mg tablet Take 1 tablet (20 mg total) by mouth daily 90 tablet 3    calcium carbonate (OS-MELODY) 600 MG tablet Take 600 mg by mouth 2 (two) times a day with meals      cyanocobalamin (VITAMIN B-12) 100 mcg tablet Take by mouth daily (Patient not taking: Reported on 6/23/2023)      cyclobenzaprine (FLEXERIL) 10 mg tablet Take 1 tablet (10 mg total) by mouth 2 (two) times a day as needed for muscle spasms for up to 7 days 14 tablet 0    drospirenone-ethinyl estradiol (LIEN) 3-0.02 MG per tablet Take 1 tablet by mouth daily 84 tablet 4    estradiol (ESTRACE VAGINAL) 0.1 mg/g vaginal cream One gram vaginally at night x 14 days then twice weekly for ongoing maintenance. (Patient taking differently: if needed One gram vaginally at night x 14 days then twice weekly for ongoing maintenance.) 42.5 g 2    ivabradine HCl (Corlanor) 5 MG tablet Take 1 tablet (5 mg total) by mouth every 12 (twelve) hours 180 tablet 1    lamoTRIgine (LaMICtal) 150 MG tablet Take 1 tablet (150 mg total) by mouth 2 (two) times a day 180 tablet 3    lidocaine (Lidoderm) 5 % Apply 1 patch topically over 12 hours daily Remove & Discard patch within 12 hours or as directed by MD 30 patch 1    lidocaine (LIDODERM) 5 % Apply 1 patch topically over 12 hours every 24 hours for 14 days Remove & Discard patch within 12 hours or as directed by MD 14 patch 0    meclizine (ANTIVERT) 25 mg tablet Take 1 tablet (25 mg total) by mouth every 8 (eight) hours as needed for dizziness 30 tablet 0    methocarbamol (ROBAXIN) 750 mg tablet Take 1 tablet (750 mg total) by mouth every 8 (eight) hours 270 tablet 0    montelukast (SINGULAIR) 10 mg tablet Take 1 tablet (10 mg total) by mouth daily at bedtime 90 tablet 2    Multiple Vitamins-Minerals (MULTI COMPLETE PO) Take by mouth      nitrofurantoin (MACROBID) 100 mg capsule Take 1 tablet PO PRN after sexual intercourse (Patient not taking: Reported on 7/28/2023) 30 capsule 0    omeprazole (PriLOSEC) 20 mg delayed release capsule Take 1 capsule (20 mg total) by mouth daily 90 capsule 3    venlafaxine (EFFEXOR) 100 MG tablet Take 1 tablet (100 mg total) by mouth 3 (three) times a day 270 tablet 2     No current facility-administered medications for this visit.         Allergies   Allergen Reactions    Ibuprofen Other (See Comments) Due to gastric sleeve -can only take for 5 days, then needs to stop       Review of Systems    Video Exam    There were no vitals filed for this visit. Physical Exam     Behavioral Health Psychotherapy Progress Note    Psychotherapy Provided: Individual Psychotherapy     1. MDD (major depressive disorder), recurrent severe, without psychosis ([address])        2. Generalized anxiety disorder        3. Post traumatic stress disorder (PTSD)            Goals addressed in session: Goal 1     DATA: Met with Yessy for scheduled individual session. She spent some time talking about school and her frustration with the difficulty she has with her math class. She states that she is frustrated with her learning difficulties. She states that she hopes that she will get a "C" in her math class, so she can move into the next semester. Gila Brown discussed her difficulty with her writing assignments. She states that she struggles with her grammar and her overall writing ability. We discussed her long-time goals. 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 Gila Brown discussed her level of fatigue. She states that she is overwhelmed by having to do school and work at the same time. She states, "If I'm not at work, I'm doing school work. It's all that I do." She states that she has not been able to engage in other activities as often as she would like, because she has to work her regular shifts and also do her school work. Gila Brown states that her son got a job at [employer]. She states that he seems to be happy at his new job, and he is able to make some money. Gila rBown describes her mood as frustrated. She attributes this to her frustration with her math class. We spent some time talking about the things that lead to her feeling more competent and confident. She states that she feels that she is good at drawing blood.  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 During this session, this clinician used the following therapeutic modalities: Client-centered Therapy, Dialectical Behavior Therapy, Mindfulness-based Strategies, Motivational Interviewing, Solution-Focused Therapy, and Supportive Psychotherapy    Substance Abuse was not addressed during this session. If the client is diagnosed with a co-occurring substance use disorder, please indicate any changes in the frequency or amount of use: n/a. Stage of change for addressing substance use diagnoses: No substance use/Not applicable    ASSESSMENT:  Anna Ramirez presents with a Euthymic/ normal mood. her affect is Normal range and intensity, which is congruent, with her mood and the content of the session. The client has not made progress on their goals since her last session. She continues to express significant frustration regarding her work/school/life balance. Anna Ramirez presents with a minimal risk of suicide, minimal risk of self-harm, and minimal risk of harm to others. For any risk assessment that surpasses a "low" rating, a safety plan must be developed. A safety plan was indicated: no  If yes, describe in detail n/a    PLAN: Between sessions, Anna Ramirez will continue to monitor her moods. She will continue to practice self-soothing skills to help manage her anxiety and depression. She will continue to consider her options for her future courses and her future career. . At the next session, the therapist will use Client-centered Therapy, Dialectical Behavior Therapy, Mindfulness-based Strategies, Motivational Interviewing, Solution-Focused Therapy, and Supportive Psychotherapy to address her mood regulation and relationship concerns.     Behavioral Health Treatment Plan and Discharge Planning: Anna Ramirez is aware of and agrees to continue to work on their treatment plan. They have identified and are working toward their discharge goals.  yes    Visit start and stop times:    11/14/23  Start Time: 1505  Stop Time: 1600  Total Visit Time: 55 minutes

## 2023-11-21 NOTE — PATIENT INSTRUCTIONS
Follow-up in 6 months. We kindly ask that your arrive 15 minutes before your scheduled appointment time with your provider to allow our staff to room you, get your vital signs and update your chart. Get lab work done . Please call the office if you need a script. It is recommended to check with your insurance BEFORE getting labs done to make sure they are covered by your policy. Call our office if you have any problems with abdominal pain especially associated with fever, chills, nausea, vomiting or any other concerns. All  Post-bariatric surgery patients should be aware that very small quantities of any alcohol can cause impairment and it is very possible not to feel the effect. The effect can be in the system for several hours. It is also a stomach irritant. It is advised to AVOID alcohol, Nonsteroidal antiinflammatory drugs (NSAIDS) and nicotine of all forms . Any of these can cause stomach irritation/pain. Discussed the effects of alcohol on a bariatric patient and the increased impairment risk. Keep up the good work! Render Risk Assessment In Note?: yes Comment: Pt has been using topical steroids regularly for her eczema treatment until she noticed it causing hypopigmentation.\\nNo evidence of true vitiligo with wood's lamp exam.\\nDiscussed NB-UVB trial. Pt unable to do so at this time since she goes to school outside this area.\\nCan also try daily sunscreen us and daily  sun exposure 10-15min a day. \\nWill d/c topical steroid use.\\nPt will contact us if eczema is not well controlled. Detail Level: Detailed

## 2023-12-13 ENCOUNTER — HOSPITAL ENCOUNTER (EMERGENCY)
Facility: HOSPITAL | Age: 53
Discharge: HOME | End: 2023-12-13
Attending: EMERGENCY MEDICINE
Payer: COMMERCIAL

## 2023-12-13 ENCOUNTER — APPOINTMENT (EMERGENCY)
Dept: RADIOLOGY | Facility: HOSPITAL | Age: 53
End: 2023-12-13
Payer: COMMERCIAL

## 2023-12-13 VITALS
RESPIRATION RATE: 18 BRPM | OXYGEN SATURATION: 98 % | HEART RATE: 77 BPM | TEMPERATURE: 98.2 F | DIASTOLIC BLOOD PRESSURE: 62 MMHG | SYSTOLIC BLOOD PRESSURE: 124 MMHG

## 2023-12-13 DIAGNOSIS — R00.0 TACHYCARDIA: Primary | ICD-10-CM

## 2023-12-13 DIAGNOSIS — R00.2 PALPITATIONS: ICD-10-CM

## 2023-12-13 LAB
ALBUMIN SERPL BCP-MCNC: 3.9 G/DL (ref 3.5–5)
ALP SERPL-CCNC: 56 U/L (ref 34–104)
ALT SERPL W P-5'-P-CCNC: 25 U/L (ref 7–52)
ANION GAP SERPL CALCULATED.3IONS-SCNC: 7 MMOL/L
AST SERPL W P-5'-P-CCNC: 22 U/L (ref 13–39)
BASOPHILS # BLD AUTO: 0.01 THOUSANDS/ÂΜL (ref 0–0.1)
BASOPHILS NFR BLD AUTO: 0 % (ref 0–1)
BILIRUB SERPL-MCNC: 0.31 MG/DL (ref 0.2–1)
BUN SERPL-MCNC: 13 MG/DL (ref 5–25)
CALCIUM SERPL-MCNC: 8.8 MG/DL (ref 8.4–10.2)
CARDIAC TROPONIN I PNL SERPL HS: <2 NG/L
CARDIAC TROPONIN I PNL SERPL HS: <2 NG/L
CHLORIDE SERPL-SCNC: 102 MMOL/L (ref 96–108)
CO2 SERPL-SCNC: 28 MMOL/L (ref 21–32)
CREAT SERPL-MCNC: 0.62 MG/DL (ref 0.6–1.3)
EOSINOPHIL # BLD AUTO: 0.07 THOUSAND/ÂΜL (ref 0–0.61)
EOSINOPHIL NFR BLD AUTO: 1 % (ref 0–6)
ERYTHROCYTE [DISTWIDTH] IN BLOOD BY AUTOMATED COUNT: 12.9 % (ref 11.6–15.1)
FLUAV RNA RESP QL NAA+PROBE: NEGATIVE
FLUBV RNA RESP QL NAA+PROBE: NEGATIVE
GFR SERPL CREATININE-BSD FRML MDRD: 102 ML/MIN/1.73SQ M
GLUCOSE SERPL-MCNC: 135 MG/DL (ref 65–140)
HCT VFR BLD AUTO: 36.9 % (ref 34.8–46.1)
HGB BLD-MCNC: 11.8 G/DL (ref 11.5–15.4)
IMM GRANULOCYTES # BLD AUTO: 0.06 THOUSAND/UL (ref 0–0.2)
IMM GRANULOCYTES NFR BLD AUTO: 1 % (ref 0–2)
LYMPHOCYTES # BLD AUTO: 1.47 THOUSANDS/ÂΜL (ref 0.6–4.47)
LYMPHOCYTES NFR BLD AUTO: 15 % (ref 14–44)
MCH RBC QN AUTO: 30.2 PG (ref 26.8–34.3)
MCHC RBC AUTO-ENTMCNC: 32 G/DL (ref 31.4–37.4)
MCV RBC AUTO: 94 FL (ref 82–98)
MONOCYTES # BLD AUTO: 0.71 THOUSAND/ÂΜL (ref 0.17–1.22)
MONOCYTES NFR BLD AUTO: 7 % (ref 4–12)
NEUTROPHILS # BLD AUTO: 7.32 THOUSANDS/ÂΜL (ref 1.85–7.62)
NEUTS SEG NFR BLD AUTO: 76 % (ref 43–75)
NRBC BLD AUTO-RTO: 0 /100 WBCS
PLATELET # BLD AUTO: 260 THOUSANDS/UL (ref 149–390)
PMV BLD AUTO: 9.8 FL (ref 8.9–12.7)
POTASSIUM SERPL-SCNC: 3.5 MMOL/L (ref 3.5–5.3)
PROT SERPL-MCNC: 7 G/DL (ref 6.4–8.4)
RBC # BLD AUTO: 3.91 MILLION/UL (ref 3.81–5.12)
RSV RNA RESP QL NAA+PROBE: NEGATIVE
SARS-COV-2 RNA RESP QL NAA+PROBE: NEGATIVE
SODIUM SERPL-SCNC: 137 MMOL/L (ref 135–147)
TSH SERPL DL<=0.05 MIU/L-ACNC: 1.82 UIU/ML (ref 0.45–4.5)
WBC # BLD AUTO: 9.64 THOUSAND/UL (ref 4.31–10.16)

## 2023-12-13 PROCEDURE — 96361 HYDRATE IV INFUSION ADD-ON: CPT

## 2023-12-13 PROCEDURE — 84443 ASSAY THYROID STIM HORMONE: CPT | Performed by: EMERGENCY MEDICINE

## 2023-12-13 PROCEDURE — 96360 HYDRATION IV INFUSION INIT: CPT

## 2023-12-13 PROCEDURE — 0241U HB NFCT DS VIR RESP RNA 4 TRGT: CPT | Performed by: EMERGENCY MEDICINE

## 2023-12-13 PROCEDURE — 71046 X-RAY EXAM CHEST 2 VIEWS: CPT

## 2023-12-13 PROCEDURE — 99285 EMERGENCY DEPT VISIT HI MDM: CPT | Performed by: EMERGENCY MEDICINE

## 2023-12-13 PROCEDURE — 99285 EMERGENCY DEPT VISIT HI MDM: CPT

## 2023-12-13 PROCEDURE — 36415 COLL VENOUS BLD VENIPUNCTURE: CPT | Performed by: EMERGENCY MEDICINE

## 2023-12-13 PROCEDURE — 85025 COMPLETE CBC W/AUTO DIFF WBC: CPT | Performed by: EMERGENCY MEDICINE

## 2023-12-13 PROCEDURE — 93005 ELECTROCARDIOGRAM TRACING: CPT

## 2023-12-13 PROCEDURE — 80053 COMPREHEN METABOLIC PANEL: CPT | Performed by: EMERGENCY MEDICINE

## 2023-12-13 PROCEDURE — 84484 ASSAY OF TROPONIN QUANT: CPT | Performed by: EMERGENCY MEDICINE

## 2023-12-13 RX ORDER — ACETAMINOPHEN 325 MG/1
650 TABLET ORAL ONCE
Status: COMPLETED | OUTPATIENT
Start: 2023-12-13 | End: 2023-12-13

## 2023-12-13 RX ADMIN — SODIUM CHLORIDE 1000 ML: 0.9 INJECTION, SOLUTION INTRAVENOUS at 11:53

## 2023-12-13 RX ADMIN — ACETAMINOPHEN 650 MG: 325 TABLET, FILM COATED ORAL at 12:47

## 2023-12-13 NOTE — ED ATTENDING ATTESTATION
12/13/2023  ILaz MD, saw and evaluated the patient. I have discussed the patient with the resident/non-physician practitioner and agree with the resident's/non-physician practitioner's findings, Plan of Care, and MDM as documented in the resident's/non-physician practitioner's note, except where noted. All available labs and Radiology studies were reviewed. I was present for key portions of any procedure(s) performed by the resident/non-physician practitioner and I was immediately available to provide assistance. At this point I agree with the current assessment done in the Emergency Department. I have conducted an independent evaluation of this patient a history and physical is as follows:    58-year-old female with history of SVT and inappropriate sinus tachycardia on Corlanor presenting for evaluation of elevated heart rate with dizziness. 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 While at work, she felt like her heart was racing and became dizzy. Her heart rate at the time was 145 to 150 bpm on her Apple Watch. I confirmed via pulse check and auscultation that her heart rate was indeed in the 140s. At this point the patient was checked into be evaluated medically. By the time that she was hooked up to the heart monitor, her heart rate had returned to the 80s she no longer felt like her heart was pounding. Did report a moderate in intensity headache that was gradual in onset, no nausea or vomiting. No further chest discomfort or shortness of breath after her heart rate returned to normal.  This is consistent with episodes that she has had in the past.    Physical Exam  Constitutional:       General: She is not in acute distress. Appearance: She is well-developed. She is not diaphoretic. HENT:      Head: Normocephalic and atraumatic.       Right Ear: External ear normal.      Left Ear: External ear normal.      Nose: Nose normal.   Eyes: Conjunctiva/sclera: Conjunctivae normal.   Cardiovascular:      Rate and Rhythm: Regular rhythm. Tachycardia present. Pulses: Normal pulses. Heart sounds: Normal heart sounds. No murmur heard. No friction rub. No gallop. Pulmonary:      Effort: Pulmonary effort is normal. No respiratory distress. Breath sounds: Normal breath sounds. No wheezing or rales. Abdominal:      General: Bowel sounds are normal. There is no distension. Palpations: Abdomen is soft. Tenderness: There is no abdominal tenderness. There is no guarding. Musculoskeletal:         General: No deformity. Normal range of motion. Cervical back: Normal range of motion and neck supple. Right lower leg: No edema. Left lower leg: No edema. Comments:  Calf swelling or tenderness   Skin:     General: Skin is warm and dry. Neurological:      Mental Status: She is alert and oriented to person, place, and time. Motor: No abnormal muscle tone. ED Course  ED Course as of 12/13/23 1634   Wed Dec 13, 2023   1203 I personally interpreted the patient's EKG which reveals normal rate, normal sinus rhythm, right superior axis deviation similar to prior, normal intervals, no ST segment deviation, pathologic T wave inversions, or pathologic Q waves. Patient is in normal sinus rhythm with heart rate of 80 bpm currently. During triage, however, patient's heart rate was reading in the 140s on her Apple Watch. I confirmed via auscultation of her heart and via pulse check that her heart rate was indeed in the 140s. Patient reported feeling dizzy and like her head was pounding when her heart rate was elevated. When her heart rate went back down to the 80s reports improvement in the dizziness. [phone] Patient remains in sinus rhythm. States that she is feeling much better. No chest discomfort or difficulty breathing. No calf swelling or tenderness. Doubt PE as a cause of her symptoms.    5353 Repeat troponin undetectable. Patient denies CP.    8600 Patient has prior history of similar episodes of tachycardia. As she is currently asymptomatic with the exception of fatigue, stable for discharge home with follow-up with her cardiologist.  Return precautions discussed.          Critical Care Time  Procedures

## 2023-12-13 NOTE — ED PROVIDER NOTES
History  Chief Complaint   Patient presents with    Rapid Heart Rate     Pt. Reports not feeling well today, c/o headache. Pt. Reports dizziness approx. 10 mins ago. Pt. Reports heart rate 150. The patient is a 46year old female who presents with pounding heart rate and dizziness. Hx atrial tachycardia on Corlanor, implantable loop recorder. PT was working in the ED today when she had sudden onset of racing heart rate, sensation of pounding in her chest, SOB, dizziness so she sat down in a chair at the nurses station. I approached her as she appeared pale and in distress. She explained her symptoms and I encouraged her to check in for evaluation. Elevated heart rate of 150's noted on her apple watch and confirmed by pulse check and auscultation of heart. Pt reports the sensation of heart racing continued for about 10 minutes before spontaneously resolving. Headache, dizziness, and SOB resolved with resolution of tachycardia. She notes she wasn't feeling well this morning and has had increased stress recently. Denies fever, chills, syncope, cough, abdominal pain, dysuria        Prior to Admission Medications   Prescriptions Last Dose Informant Patient Reported? Taking?    Multiple Vitamins-Minerals (MULTI COMPLETE PO)  Self Yes No   Sig: Take by mouth   QUEtiapine (SEROquel) 100 mg tablet   No No   Sig: Take 1 tablet (100 mg total) by mouth daily at bedtime   atoMOXetine (STRATTERA) 60 mg capsule  Self No No   Sig: Take 1 capsule (60 mg total) by mouth daily   atorvastatin (LIPITOR) 20 mg tablet  Self No No   Sig: Take 1 tablet (20 mg total) by mouth daily   calcium carbonate (OS-MELODY) 600 MG tablet  Self Yes No   Sig: Take 600 mg by mouth 2 (two) times a day with meals   cyclobenzaprine (FLEXERIL) 10 mg tablet   No No   Sig: Take 1 tablet (10 mg total) by mouth 2 (two) times a day as needed for muscle spasms for up to 7 days   estradiol (ESTRACE VAGINAL) 0.1 mg/g vaginal cream  Self No No   Sig: One gram vaginally at night x 14 days then twice weekly for ongoing maintenance. Patient taking differently: if needed One gram vaginally at night x 14 days then twice weekly for ongoing maintenance. ivabradine HCl (Corlanor) 5 MG tablet  Self No No   Sig: Take 1 tablet (5 mg total) by mouth every 12 (twelve) hours   lamoTRIgine (LaMICtal) 150 MG tablet  Self No No   Sig: Take 1 tablet (150 mg total) by mouth 2 (two) times a day   lidocaine (LIDODERM) 5 %   No No   Sig: Apply 1 patch topically over 12 hours every 24 hours for 14 days Remove & Discard patch within 12 hours or as directed by MD   lidocaine (Lidoderm) 5 %  Self No No   Sig: Apply 1 patch topically over 12 hours daily Remove & Discard patch within 12 hours or as directed by MD   meclizine (ANTIVERT) 25 mg tablet  Self No No   Sig: Take 1 tablet (25 mg total) by mouth every 8 (eight) hours as needed for dizziness   methocarbamol (ROBAXIN) 750 mg tablet  Self No No   Sig: Take 1 tablet (750 mg total) by mouth every 8 (eight) hours   montelukast (SINGULAIR) 10 mg tablet   No No   Sig: Take 1 tablet (10 mg total) by mouth daily at bedtime   nitrofurantoin (MACROBID) 100 mg capsule  Self No No   Sig: Take 1 tablet PO PRN after sexual intercourse   Patient not taking: Reported on 7/28/2023   omeprazole (PriLOSEC) 20 mg delayed release capsule   No No   Sig: Take 1 capsule (20 mg total) by mouth daily   venlafaxine (EFFEXOR) 100 MG tablet  Self No No   Sig: Take 1 tablet (100 mg total) by mouth 3 (three) times a day      Facility-Administered Medications: None       Past Medical History:   Diagnosis Date    Acute right ankle pain 2/7/2023    ADHD (attention deficit hyperactivity disorder)     Anxiety     Bulging lumbar disc     Carpal tunnel syndrome     RIGHT.   LAST ASSESSED: 12/7/16    Chronic back pain     low    Chronic pain disorder     Colon polyps     COVID-19     12/2021    Depression     Diabetes mellitus (HCC)     GERD (gastroesophageal reflux disease) Gestational diabetes     Hearing loss     left ear    Heart disease     SVT    History of pre-eclampsia     Hyperlipidemia     Resolved with weight loss    Hyponatremia 2020    IBS (irritable bowel syndrome)     Ileus ([address])     LAST ASSESSED: 8/3/17    Labial cyst     LAST ASSESSED: 16    Myofascial pain     LAST ASSESSED: 16    Neck mass 2023    Obesity     Ovarian cyst     LEFT. LAST ASSESSED: 16    Panic attack     Pneumonia     Sacroiliitis (HCC)     Seasonal allergies     Sprain of anterior talofibular ligament of right ankle 2023    SVT (supraventricular tachycardia)     Thoracic outlet syndrome         Trochanteric bursitis of both hips     LAST ASSESSED: 3/18/16    Ulnar neuropathy at elbow     Varicella     Wears glasses        Past Surgical History:   Procedure Laterality Date    BARIATRIC SURGERY  2022    BILE DUCT EXPLORATION      ENDOSCOPIC REMOVAL OF STONES FROM BILIARY TRACT     SECTION      x3    CHOLECYSTECTOMY      COLONOSCOPY      -polyp, -wnl, repeat5 years    DILATION AND CURETTAGE OF UTERUS      ENDOMETRIAL ABLATION      ERCP W/ SPHICTEROTOMY      FIRST RIB REMOVAL      THORAX EXCISION OF FIRST RIB    GASTRIC BYPASS  2022    HYSTEROSCOPY      NEUROPLASTY / TRANSPOSITION ULNAR NERVE AT ELBOW Right     NV COLONOSCOPY FLX DX W/COLLJ SPEC WHEN PFRMD N/A 2017    Procedure: COLONOSCOPY;  Surgeon: Valentin Mueller MD;  Location: [facility]; Service: Gastroenterology    NV ESOPHAGOGASTRODUODENOSCOPY TRANSORAL DIAGNOSTIC N/A 2017    Procedure: ESOPHAGOGASTRODUODENOSCOPY (EGD); Surgeon: Blanca Zendejas MD;  Location: [facility];   Service: Gastroenterology    NV HYSTEROSCOPY BX ENDOMETRIUM&/POLYPC W/WO D&C N/A 10/20/2017    Procedure: DILATATION AND CURETTAGE (D&C) WITH HYSTEROSCOPY  REMOVAL VULVAR RT. LESION;  Surgeon: Ricky Joseph MD;  [facility];  Service: Gynecology    NV ALDANA IMPLTJ NSTIM ELTRDS PLATE/PADDLE EDRL Left 01/29/2020    Procedure: Insertion of thoracic spinal cord stimulator electrode via laminotomy and placement of left buttock implantable pulse generator (NEUROMONITORING); Surgeon: Cuong Hay MD;  Location: [facility];  Service: Neurosurgery    MA LAPS 24 [facility] LONGITUDINAL GASTRECTOMY N/A 02/06/2018    Procedure: GASTRECTOMY SLEEVE LAPAROSCOPIC; INTRAOPERATIVE EGD ;  Surgeon: Inocencio Thompson MD;  [facility];  Service: Bariatrics    MA LAPS [address] RSTRICTIV [address] [place] [address] LONGITUDINAL GASTRECTOMY N/A 08/08/2022    Procedure: Diagnostic Lap;  Extensive Lysis of Adhesions; LAPAROSCOPIC REVISION CONVERSION TO NOE-EN-Y GASTRIC BYPASS AND INTRAOPERATIVE EGD;  Surgeon: Inocencio Thompson MD;  [facility];  Service: Loral Grams GASTROPLASTY      SPINAL CORD STIMULATOR TRIAL W/ LAMINOTOMY      TONSILLECTOMY AND ADENOIDECTOMY      TUBAL LIGATION      UPPER GASTROINTESTINAL ENDOSCOPY      US GUIDED LYMPH NODE BIOPSY LEFT  05/22/2023    VEIN LIGATION AND STRIPPING Right     VULVA SURGERY  10/20/2017    BIOPSY    WISDOM TOOTH EXTRACTION         Family History   Problem Relation Age of Onset    Diabetes Mother     Breast cancer Mother         >50    BRCA1 Negative Mother     BRCA2 Negative Mother     Diabetes Father     Other Father         traumatic brain injury    Prostate cancer Father     Alcohol abuse Father         in remission    Heart disease Father     Neuropathy Father     Hyperlipidemia Father     Cancer Father         Prostate    Hypertension Brother     Diabetes Brother     Other Brother         HYPERCHOLESTEROLEMIA    Alcohol abuse Brother     Depression Brother         attempted suicide    Diabetes Brother     Alcohol abuse Brother     Heart attack Maternal Grandmother     Colon cancer Maternal Grandfather     No Known Problems Paternal Grandmother         dad is adopted    No Known Problems Paternal Grandfather         dad is adopted    No Known Problems Daughter     Asthma Son Ovarian cancer Neg Hx     Uterine cancer Neg Hx      I have reviewed and agree with the history as documented. E-Cigarette/Vaping    E-Cigarette Use Never User      E-Cigarette/Vaping Substances    Nicotine No     THC No     CBD No     Flavoring No     Other No     Unknown No      Social History     Tobacco Use    Smoking status: Former     Current packs/day: 0.00     Average packs/day: 1 pack/day for 15.0 years (15.0 ttl pk-yrs)     Types: Cigarettes     Start date: 4/30/1998     Quit date: 4/30/2013     Years since quitting: 10.6     Passive exposure: Never    Smokeless tobacco: Never   Vaping Use    Vaping status: Never Used   Substance Use Topics    Alcohol use: Not Currently     Alcohol/week: 0.0 - 1.0 standard drinks of alcohol     Comment: once weekly    Drug use: No        Review of Systems   Constitutional:  Positive for activity change. HENT:  Negative for congestion and sore throat. Eyes:  Negative for visual disturbance. Respiratory:  Positive for shortness of breath. Cardiovascular:  Positive for chest pain and palpitations. Gastrointestinal:  Negative for abdominal pain, diarrhea and vomiting. Genitourinary:  Negative for difficulty urinating and dysuria. Musculoskeletal:  Negative for back pain and neck pain. Skin:  Positive for pallor. Negative for rash and wound. Neurological:  Positive for dizziness and headaches. Negative for syncope.        Physical Exam  ED Triage Vitals [12/13/23 1145]   Temperature Pulse Respirations Blood Pressure SpO2   98.2 °F (36.8 °C) 81 18 133/60 100 %      Temp Source Heart Rate Source Patient Position - Orthostatic VS BP Location FiO2 (%)   Oral Monitor Sitting Right arm --      Pain Score       No Pain             Orthostatic Vital Signs  Vitals:    12/13/23 1145 12/13/23 1230 12/13/23 1330 12/13/23 1430   BP: 133/60 118/62 120/58 124/62   Pulse: 81 69 77 77   Patient Position - Orthostatic VS: Sitting          Physical Exam  Vitals and nursing note reviewed. Constitutional:       Appearance: She is ill-appearing. HENT:      Head: Normocephalic and atraumatic. Mouth/Throat:      Mouth: Mucous membranes are moist.      Pharynx: Oropharynx is clear. Eyes:      Conjunctiva/sclera: Conjunctivae normal.      Pupils: Pupils are equal, round, and reactive to light. Cardiovascular:      Rate and Rhythm: Regular rhythm. Tachycardia present. Pulses: Normal pulses. Heart sounds: Normal heart sounds. Pulmonary:      Effort: Pulmonary effort is normal. No respiratory distress. Breath sounds: Normal breath sounds. No stridor. No wheezing, rhonchi or rales. Chest:      Chest wall: No tenderness. Abdominal:      General: Abdomen is flat. Bowel sounds are normal.      Palpations: Abdomen is soft. Tenderness: There is no abdominal tenderness. There is no right CVA tenderness, left CVA tenderness or guarding. Musculoskeletal:         General: No swelling, tenderness, deformity or signs of injury. Right lower leg: No edema. Left lower leg: No edema. Skin:     General: Skin is warm and dry. Neurological:      General: No focal deficit present. Mental Status: She is alert and oriented to person, place, and time. Psychiatric:         Mood and Affect: Mood normal.         Behavior: Behavior normal.         Thought Content:  Thought content normal.         Judgment: Judgment normal.         ED Medications  Medications   sodium chloride 0.9 % bolus 1,000 mL (0 mL Intravenous Stopped 12/13/23 1431)   acetaminophen (TYLENOL) tablet 650 mg (650 mg Oral Given 12/13/23 1247)       Diagnostic Studies  Results Reviewed       Procedure Component Value Units Date/Time    HS Troponin I 2hr [[id number]] Collected: 12/13/23 1358    Lab Status: Final result Specimen: Blood from Arm, Right Updated: 12/13/23 1426     hs TnI 2hr <2 ng/L      Delta 2hr hsTnI --    FLU/RSV/COVID - if FLU/RSV clinically relevant [[id number]]  (Normal) Collected: 12/13/23 1152    Lab Status: Final result Specimen: Nares from Nose Updated: 12/13/23 1245     SARS-CoV-2 Negative     INFLUENZA A PCR Negative     INFLUENZA B PCR Negative     RSV PCR Negative    Narrative:      FOR PEDIATRIC PATIENTS - copy/paste COVID Guidelines URL to browser: [email or web address]    SARS-CoV-2 assay is a Nucleic Acid Amplification assay intended for the  qualitative detection of nucleic acid from SARS-CoV-2 in nasopharyngeal  swabs. Results are for the presumptive identification of SARS-CoV-2 RNA. Positive results are indicative of infection with SARS-CoV-2, the virus  causing COVID-19, but do not rule out bacterial infection or co-infection  with other viruses. Laboratories within the [facility] and its  territories are required to report all positive results to the appropriate  public health authorities. Negative results do not preclude SARS-CoV-2  infection and should not be used as the sole basis for treatment or other  patient management decisions. Negative results must be combined with  clinical observations, patient history, and epidemiological information. This test has not been FDA cleared or approved. This test has been authorized by FDA under an Emergency Use Authorization  (EUA). This test is only authorized for the duration of time the  declaration that circumstances exist justifying the authorization of the  emergency use of an in vitro diagnostic tests for detection of SARS-CoV-2  virus and/or diagnosis of COVID-19 infection under section 564(b)(1) of  the Act, 21 U. S.C. 374IUR-3(B)(6), unless the authorization is terminated  or revoked sooner. The test has been validated but independent review by FDA  and CLIA is pending. Test performed using [employer]: This RT-PCR assay targets N2,  a region unique to SARS-CoV-2.  A conserved region in the E-gene was chosen  for pan-Sarbecovirus detection which includes SARS-CoV-2. According to CMS-2020-01-R, this platform meets the definition of high-throughput technology.     TSH [[id number]]  (Normal) Collected: 12/13/23 1152    Lab Status: Final result Specimen: Blood from Arm, Right Updated: 12/13/23 1236     TSH 3RD GENERATON 1.824 uIU/mL     Comprehensive metabolic panel [[id number]] Collected: 12/13/23 1152    Lab Status: Final result Specimen: Blood from Arm, Right Updated: 12/13/23 1227     Sodium 137 mmol/L      Potassium 3.5 mmol/L      Chloride 102 mmol/L      CO2 28 mmol/L      ANION GAP 7 mmol/L      BUN 13 mg/dL      Creatinine 0.62 mg/dL      Glucose 135 mg/dL      Calcium 8.8 mg/dL      AST 22 U/L      ALT 25 U/L      Alkaline Phosphatase 56 U/L      Total Protein 7.0 g/dL      Albumin 3.9 g/dL      Total Bilirubin 0.31 mg/dL      eGFR 102 ml/min/1.73sq m     Narrative:      [place] guidelines for Chronic Kidney Disease (CKD):     Stage 1 with normal or high GFR (GFR > 90 mL/min/1.73 square meters)    Stage 2 Mild CKD (GFR = 60-89 mL/min/1.73 square meters)    Stage 3A Moderate CKD (GFR = 45-59 mL/min/1.73 square meters)    Stage 3B Moderate CKD (GFR = 30-44 mL/min/1.73 square meters)    Stage 4 Severe CKD (GFR = 15-29 mL/min/1.73 square meters)    Stage 5 End Stage CKD (GFR <15 mL/min/1.73 square meters)  Note: GFR calculation is accurate only with a steady state creatinine    HS Troponin 0hr (reflex protocol) [[id number]]  (Normal) Collected: 12/13/23 1152    Lab Status: Final result Specimen: Blood from Arm, Right Updated: 12/13/23 1227     hs TnI 0hr <2 ng/L     CBC and differential [[id number]]  (Abnormal) Collected: 12/13/23 1152    Lab Status: Final result Specimen: Blood from Arm, Right Updated: 12/13/23 1206     WBC 9.64 Thousand/uL      RBC 3.91 Million/uL      Hemoglobin 11.8 g/dL      Hematocrit 36.9 %      MCV 94 fL      MCH 30.2 pg      MCHC 32.0 g/dL      RDW 12.9 %      MPV 9.8 fL      Platelets 513 Thousands/uL nRBC 0 /100 WBCs      Neutrophils Relative 76 %      Immat GRANS % 1 %      Lymphocytes Relative 15 %      Monocytes Relative 7 %      Eosinophils Relative 1 %      Basophils Relative 0 %      Neutrophils Absolute 7.32 Thousands/µL      Immature Grans Absolute 0.06 Thousand/uL      Lymphocytes Absolute 1.47 Thousands/µL      Monocytes Absolute 0.71 Thousand/µL      Eosinophils Absolute 0.07 Thousand/µL      Basophils Absolute 0.01 Thousands/µL                    XR chest 2 views   ED Interpretation by Prabhu Julio MD (12/13 1218)   No acute cardiopulmonary abnormalities. Final Result by Nicolas Smith MD (89/42 3658)      No acute cardiopulmonary disease. Workstation performed: [id number]               Procedures  ECG 12 Lead Documentation Only    Date/Time: 12/13/2023 11:53 AM    Performed by: Lowell Petit MD  Authorized by: Lowell Petit MD    Patient location:  ED  Interpretation:     Interpretation: normal    Rate:     ECG rate:  80    ECG rate assessment: normal    Rhythm:     Rhythm: sinus rhythm    Ectopy:     Ectopy: none    QRS:     QRS axis:  Normal    QRS intervals:  Normal  Conduction:     Conduction: normal    ST segments:     ST segments:  Normal  T waves:     T waves: normal          ED Course  ED Course as of 12/15/23 1142   Wed Dec 13, 2023   1249 hs TnI 0hr: <2   1250 FLU/RSV/COVID - if FLU/RSV clinically relevant  All neg   1250 TSH 3RD GENERATON: 1.824  WNL   1250 Comprehensive metabolic panel  All WNL   1318 Pt is resting on the stretcher in exam room. She states she feels better than she was, just has a mild headache at this time. NO CP or SOB. HR in the 60's. Discussed labs and plan to repeat troponin. Pt understands.     1437 hs TnI 2hr: <2                                       Medical Decision Making  Ddx: SVT, Atrial tachycardia, afib, ACS, MI, PNA, viral illness    Symptoms resolved spontaneously prior to being able to get the pt hooked up to a cardiac monitor. Tachycardia confirmed via pulse check and auscultation by myself and Dr. Baron Amaya. Other symptoms resolved with resolution of tachycardia. Normal troponin and 2 hr repeat. Pt has loop recorder and attempted to catch episode with her remote. Normal CXR. Neg flu covid, rsv,  TSH, CMP, CBC generally unremarkable  Encouraged to follow up with her cardiologist and PCP. Stable vital signs at discharge. Amount and/or Complexity of Data Reviewed  External Data Reviewed: labs, ECG and notes. Labs: ordered. Decision-making details documented in ED Course. Radiology: ordered and independent interpretation performed. Risk  OTC drugs. Disposition  Final diagnoses:   Tachycardia   Palpitations     Time reflects when diagnosis was documented in both MDM as applicable and the Disposition within this note       Time User Action Codes Description Comment    12/13/2023  2:38 PM Fernanda Oquendo [R00.0] Tachycardia     12/13/2023  2:38 PM Pat Dawn [R00.2] Palpitations           ED Disposition       ED Disposition   Discharge    Condition   Stable    Date/Time   Wed Dec 13, 2023  2:37 PM    [address] discharge to home/self care.                    Follow-up Information       Follow up With Specialties Details Why Contact Info    Varsha Moreno MD Family Medicine Schedule an appointment as soon as possible for a visit   [address] Alex Kong MD Cardiology, Multidisciplinary Schedule an appointment as soon as possible for a visit   [address] [place] [address],[address] [facility]  [phone]              Discharge Medication List as of 12/13/2023  2:42 PM        CONTINUE these medications which have NOT CHANGED    Details   atoMOXetine (STRATTERA) 60 mg capsule Take 1 capsule (60 mg total) by mouth daily, Starting Thu 8/3/2023, Normal      atorvastatin (LIPITOR) 20 mg tablet Take 1 tablet (20 mg total) by mouth daily, Starting Fri 1/27/2023, Normal      calcium carbonate (OS-MELODY) 600 MG tablet Take 600 mg by mouth 2 (two) times a day with meals, Historical Med      cyclobenzaprine (FLEXERIL) 10 mg tablet Take 1 tablet (10 mg total) by mouth 2 (two) times a day as needed for muscle spasms for up to 7 days, Starting Wed 10/25/2023, Until Wed 11/1/2023 at 2359, Normal      estradiol (ESTRACE VAGINAL) 0.1 mg/g vaginal cream One gram vaginally at night x 14 days then twice weekly for ongoing maintenance., Normal      ivabradine HCl (Corlanor) 5 MG tablet Take 1 tablet (5 mg total) by mouth every 12 (twelve) hours, Starting Fri 6/23/2023, Normal      lamoTRIgine (LaMICtal) 150 MG tablet Take 1 tablet (150 mg total) by mouth 2 (two) times a day, Starting Thu 8/3/2023, Until Fri 9/29/2023, Normal      lidocaine (Lidoderm) 5 % Apply 1 patch topically over 12 hours daily Remove & Discard patch within 12 hours or as directed by MD, Starting Thu 8/17/2023, Normal      meclizine (ANTIVERT) 25 mg tablet Take 1 tablet (25 mg total) by mouth every 8 (eight) hours as needed for dizziness, Starting Mon 7/25/2022, Normal      methocarbamol (ROBAXIN) 750 mg tablet Take 1 tablet (750 mg total) by mouth every 8 (eight) hours, Starting Fri 5/19/2023, Until Fri 9/29/2023, Normal      montelukast (SINGULAIR) 10 mg tablet Take 1 tablet (10 mg total) by mouth daily at bedtime, Starting Fri 9/29/2023, Normal      Multiple Vitamins-Minerals (MULTI COMPLETE PO) Take by mouth, Historical Med      nitrofurantoin (MACROBID) 100 mg capsule Take 1 tablet PO PRN after sexual intercourse, Normal      omeprazole (PriLOSEC) 20 mg delayed release capsule Take 1 capsule (20 mg total) by mouth daily, Starting Fri 9/29/2023, Until Thu 12/28/2023, Normal      QUEtiapine (SEROquel) 100 mg tablet Take 1 tablet (100 mg total) by mouth daily at bedtime, Starting Wed 11/1/2023, Normal      venlafaxine (EFFEXOR) 100 MG tablet Take 1 tablet (100 mg total) by mouth 3 (three) times a day, Starting Thu 8/3/2023, Normal      cyanocobalamin (VITAMIN B-12) 100 mcg tablet Take by mouth daily, Historical Med      drospirenone-ethinyl estradiol (LIEN) 3-0.02 MG per tablet Take 1 tablet by mouth daily, Starting Wed 12/14/2022, Normal           No discharge procedures on file. PDMP Review         Value Time User    PDMP Reviewed  Yes 9/14/2023  1:25 PM [facility], [address] [place] [address]             ED Provider  Attending physically available and evaluated Nitin Oquendo. I managed the patient along with the ED Attending.     Electronically Signed by           Brianne Washington MD  12/15/23 4789

## 2023-12-13 NOTE — Clinical Note
Reny Jerez was seen and treated in our emergency department on 12/13/2023. Diagnosis:     Patricio Yancey  is off the rest of the shift today, may return to work on return date. She may return on this date: 12/15/2023         If you have any questions or concerns, please don't hesitate to call.       Brianne Washington MD    ______________________________           _______________          _______________  [facility] Representative                              Date                                Time

## 2023-12-14 ENCOUNTER — PATIENT MESSAGE (OUTPATIENT)
Dept: CARDIOLOGY CLINIC | Facility: CLINIC | Age: 53
End: 2023-12-14

## 2023-12-14 ENCOUNTER — TELEPHONE (OUTPATIENT)
Dept: CARDIOLOGY CLINIC | Facility: CLINIC | Age: 53
End: 2023-12-14

## 2023-12-14 ENCOUNTER — PREP FOR PROCEDURE (OUTPATIENT)
Dept: CARDIOLOGY CLINIC | Facility: CLINIC | Age: 53
End: 2023-12-14

## 2023-12-14 DIAGNOSIS — I47.10 SVT (SUPRAVENTRICULAR TACHYCARDIA): Primary | ICD-10-CM

## 2023-12-14 NOTE — TELEPHONE ENCOUNTER
Patient scheduled for SVT Ablation on 2/8/24 at [facility] with Dr. Araseli Ruth. Instructions sent to patient through [address] [place] [address]. Patient aware of all general instructions. Medication holds: Ivabradine (Corlanor) - Do not take for (2) two days before and morning of procedure. Labs to be done on 1/25/24:  CMP / CBC (FASTING 8 HOURS)    Insurance: [facility] - same ins 2024 per patient      Please obtain auth.        Thank you,  Bobbi "Coral Owens" Satiety

## 2023-12-14 NOTE — TELEPHONE ENCOUNTER
----- Message from Marylin Abraham MD sent at 12/14/2023  1:56 PM EST -----  Please schedule SVT ablation, [facility]. Hold Ivabradine/Corlanor 2 day before.

## 2023-12-15 ENCOUNTER — ANNUAL EXAM (OUTPATIENT)
Dept: OBGYN CLINIC | Facility: CLINIC | Age: 53
End: 2023-12-15
Payer: COMMERCIAL

## 2023-12-15 VITALS
DIASTOLIC BLOOD PRESSURE: 66 MMHG | BODY MASS INDEX: 23.14 KG/M2 | HEIGHT: 66 IN | WEIGHT: 144 LBS | SYSTOLIC BLOOD PRESSURE: 108 MMHG

## 2023-12-15 DIAGNOSIS — Z01.419 ENCOUNTER FOR GYNECOLOGICAL EXAMINATION WITHOUT ABNORMAL FINDING: Primary | ICD-10-CM

## 2023-12-15 DIAGNOSIS — Z30.41 ORAL CONTRACEPTIVE PILL SURVEILLANCE: ICD-10-CM

## 2023-12-15 DIAGNOSIS — Z72.3 INADEQUATE EXERCISE: ICD-10-CM

## 2023-12-15 DIAGNOSIS — Z12.31 ENCOUNTER FOR SCREENING MAMMOGRAM FOR BREAST CANCER: ICD-10-CM

## 2023-12-15 PROCEDURE — S0612 ANNUAL GYNECOLOGICAL EXAMINA: HCPCS | Performed by: OBSTETRICS & GYNECOLOGY

## 2023-12-15 RX ORDER — DROSPIRENONE AND ETHINYL ESTRADIOL 0.02-3(28)
1 KIT ORAL DAILY
Qty: 84 TABLET | Refills: 4 | Status: SHIPPED | OUTPATIENT
Start: 2023-12-15

## 2023-12-15 NOTE — PROGRESS NOTES
Pt is a 46 y.o. [id number] with Patient's last menstrual period was 2019 (approximate). using [facility] for [facility] presents for preventive care. She notes the same partner since her last STI evaluation. In her lifetime she has been involved with >5 partners . Safe sexual practices (monogomy) are followed consistently. She does  feel safe in the relationship. She does feel safe in her home. Her calcium intake encompasses  calcium supplement, multivitamin and yogurt for a total of 5-6 servings daily on average. She does take additional Vitamin D (MVI or supplement). She exercises  0  times per week. Her menses have ceased on OCPs and s/p endometrial ablation  Menstrual History:  OB History          3    Para   3    Term   3            AB        Living   3         SAB        IAB        Ectopic        Multiple        Live Births               Obstetric Comments   C/S x 3; BTL at time of third  Menarche 11    Menses: last menses 2019 (had ablation and on OCPS)              Menarche age: 14  Patient's last menstrual period was 2019 (approximate). She has never recieved an HPV vaccine. tobacco use : does not use tobacco              Colonoscopy: 2022-wnl, repeat 5 years due to h.o polyps  Mammogram: 2022-wnl, repeat rx given  Pap: 2021-wnl, HRHPV neg, repeat   Pt desires to continue OCPs until 53 as she hasn't had a menses since ablation to ensure most likelihood of being in menopause when she stops. Past Medical History:   Diagnosis Date    Acute right ankle pain 2023    ADHD (attention deficit hyperactivity disorder)     Anxiety     Bulging lumbar disc     Carpal tunnel syndrome     RIGHT.   LAST ASSESSED: 16    Chronic back pain     low    Chronic pain disorder     Colon polyps     COVID-19     2021    Depression     Diabetes mellitus (HCC)     GERD (gastroesophageal reflux disease)     Gestational diabetes     Hearing loss     left ear    Heart disease     SVT    History of pre-eclampsia     Hyperlipidemia     Resolved with weight loss    Hyponatremia 2020    IBS (irritable bowel syndrome)     Ileus ([address])     LAST ASSESSED: 8/3/17    Labial cyst     LAST ASSESSED: 16    Myofascial pain     LAST ASSESSED: 16    Neck mass 2023    Obesity     Ovarian cyst     LEFT. LAST ASSESSED: 16    Panic attack     Pneumonia     Sacroiliitis (HCC)     Seasonal allergies     Sprain of anterior talofibular ligament of right ankle 2023    SVT (supraventricular tachycardia)     Thoracic outlet syndrome         Trochanteric bursitis of both hips     LAST ASSESSED: 3/18/16    Ulnar neuropathy at elbow     Varicella     Wears glasses        Past Surgical History:   Procedure Laterality Date    BARIATRIC SURGERY  2022    BILE DUCT EXPLORATION      ENDOSCOPIC REMOVAL OF STONES FROM BILIARY TRACT     SECTION      x3    CHOLECYSTECTOMY      COLONOSCOPY      -polyp, -wnl, repeat5 years    DILATION AND CURETTAGE OF UTERUS      ENDOMETRIAL ABLATION      ERCP W/ SPHICTEROTOMY      FIRST RIB REMOVAL      THORAX EXCISION OF FIRST RIB    GASTRIC BYPASS  2022    HYSTEROSCOPY      NEUROPLASTY / TRANSPOSITION ULNAR NERVE AT ELBOW Right     AZ COLONOSCOPY FLX DX W/COLLJ SPEC WHEN PFRMD N/A 2017    Procedure: COLONOSCOPY;  Surgeon: Jennifer Vogel MD;  [facility]: [facility]; Service: Gastroenterology    AZ ESOPHAGOGASTRODUODENOSCOPY TRANSORAL DIAGNOSTIC N/A 2017    Procedure: ESOPHAGOGASTRODUODENOSCOPY (EGD); Surgeon: Loraine Maldonado MD;  [facility]: [facility]; Service: Gastroenterology    AZ HYSTEROSCOPY BX ENDOMETRIUM&/POLYPC W/WO D&C N/A 10/20/2017    Procedure: DILATATION AND CURETTAGE (D&C) WITH HYSTEROSCOPY  REMOVAL VULVAR RT. LESION;  Surgeon: Kentrell Pedroza MD;  [facility];  Service: Gynecology    AZ ALDANA Kendrick  NSTIM ELTRDS PLATE/PADDLE EDRL Left 2020    Procedure:  Insertion of thoracic spinal cord stimulator electrode via laminotomy and placement of left buttock implantable pulse generator (NEUROMONITORING); Surgeon: Ladonna Jackson MD;  Location: [facility];  Service: Neurosurgery    PA LAPS 24 [facility] LONGITUDINAL GASTRECTOMY N/A 2018    Procedure: GASTRECTOMY SLEEVE LAPAROSCOPIC; INTRAOPERATIVE EGD ;  Surgeon: Manolo Guzman MD;  [facility];  Service: Bariatrics    PA LAPS 175 [address] RSTRICTIV [address] [facility] LONGITUDINAL GASTRECTOMY N/A 2022    Procedure: Diagnostic Lap;  Extensive Lysis of Adhesions; LAPAROSCOPIC REVISION CONVERSION TO NOE-EN-Y GASTRIC BYPASS AND INTRAOPERATIVE EGD;  Surgeon: Manolo Guzman MD;  [facility];  Service: Bariatrics    SLEEVE GASTROPLASTY      SPINAL CORD STIMULATOR TRIAL W/ LAMINOTOMY      TONSILLECTOMY AND ADENOIDECTOMY      TUBAL LIGATION      UPPER GASTROINTESTINAL ENDOSCOPY      US GUIDED LYMPH NODE BIOPSY LEFT  2023    VEIN LIGATION AND STRIPPING Right     VULVA SURGERY  10/20/2017    BIOPSY    WISDOM TOOTH EXTRACTION         OB History    Para Term  AB Living   3 3 3     3   SAB IAB Ectopic Multiple Live Births                  # Outcome Date GA Lbr Manuel/2nd Weight Sex Delivery Anes PTL Lv   3 Term            2 Term            1 Term               Obstetric Comments   C/S x 3; BTL at time of third   Menarche 14      Menses: last menses 2019 (had ablation and on OCPS)            Current Outpatient Medications:     atoMOXetine (STRATTERA) 60 mg capsule, Take 1 capsule (60 mg total) by mouth daily, Disp: 90 capsule, Rfl: 3    atorvastatin (LIPITOR) 20 mg tablet, Take 1 tablet (20 mg total) by mouth daily, Disp: 90 tablet, Rfl: 3    calcium carbonate (OS-MELODY) 600 MG tablet, Take 600 mg by mouth 2 (two) times a day with meals, Disp: , Rfl:     cyclobenzaprine (FLEXERIL) 10 mg tablet, Take 1 tablet (10 mg total) by mouth 2 (two) times a day as needed for muscle spasms for up to 7 days, Disp: 14 tablet, Rfl: 0 drospirenone-ethinyl estradiol (LIEN) 3-0.02 MG per tablet, Take 1 tablet by mouth daily, Disp: 84 tablet, Rfl: 4    estradiol (ESTRACE VAGINAL) 0.1 mg/g vaginal cream, One gram vaginally at night x 14 days then twice weekly for ongoing maintenance.  (Patient taking differently: if needed One gram vaginally at night x 14 days then twice weekly for ongoing maintenance.), Disp: 42.5 g, Rfl: 2    ivabradine HCl (Corlanor) 5 MG tablet, Take 1 tablet (5 mg total) by mouth every 12 (twelve) hours, Disp: 180 tablet, Rfl: 1    lamoTRIgine (LaMICtal) 150 MG tablet, Take 1 tablet (150 mg total) by mouth 2 (two) times a day, Disp: 180 tablet, Rfl: 3    lidocaine (Lidoderm) 5 %, Apply 1 patch topically over 12 hours daily Remove & Discard patch within 12 hours or as directed by MD, Disp: 30 patch, Rfl: 1    lidocaine (LIDODERM) 5 %, Apply 1 patch topically over 12 hours every 24 hours for 14 days Remove & Discard patch within 12 hours or as directed by MD, Disp: 14 patch, Rfl: 0    meclizine (ANTIVERT) 25 mg tablet, Take 1 tablet (25 mg total) by mouth every 8 (eight) hours as needed for dizziness, Disp: 30 tablet, Rfl: 0    methocarbamol (ROBAXIN) 750 mg tablet, Take 1 tablet (750 mg total) by mouth every 8 (eight) hours, Disp: 270 tablet, Rfl: 0    montelukast (SINGULAIR) 10 mg tablet, Take 1 tablet (10 mg total) by mouth daily at bedtime, Disp: 90 tablet, Rfl: 2    Multiple Vitamins-Minerals (MULTI COMPLETE PO), Take by mouth, Disp: , Rfl:     omeprazole (PriLOSEC) 20 mg delayed release capsule, Take 1 capsule (20 mg total) by mouth daily, Disp: 90 capsule, Rfl: 3    QUEtiapine (SEROquel) 100 mg tablet, Take 1 tablet (100 mg total) by mouth daily at bedtime, Disp: 90 tablet, Rfl: 2    venlafaxine (EFFEXOR) 100 MG tablet, Take 1 tablet (100 mg total) by mouth 3 (three) times a day, Disp: 270 tablet, Rfl: 2    nitrofurantoin (MACROBID) 100 mg capsule, Take 1 tablet PO PRN after sexual intercourse (Patient not taking: Reported on 7/28/2023), Disp: 30 capsule, Rfl: 0    Allergies   Allergen Reactions    Ibuprofen Other (See Comments)     Due to gastric sleeve -can only take for 5 days, then needs to stop       Social History     Socioeconomic History    Marital status: /Civil Union     Spouse name: Liliana Lai    Number of children: 3    Years of education: GED then 2 year college program    Highest education level: None   Occupational History    Occupation: ER TECH     Employer: [employer]   Tobacco Use    Smoking status: Former     Current packs/day: 0.00     Average packs/day: 1 pack/day for 15.0 years (15.0 ttl pk-yrs)     Types: Cigarettes     Start date: 4/30/1998     Quit date: 4/30/2013     Years since quitting: 10.6     Passive exposure: Never    Smokeless tobacco: Never   Vaping Use    Vaping status: Never Used   Substance and Sexual Activity    Alcohol use: Not Currently     Alcohol/week: 0.0 - 1.0 standard drinks of alcohol     Comment: once weekly    Drug use: No    Sexual activity: Yes     Partners: Male     Birth control/protection: OCP     Comment: lifetime partners: 6; current partner 2013   Other Topics Concern    None   Social History Narrative    Mormonism: no preference    Accepts blood products        Exercise: unable with back issues and SVT    Calcium: calcium supplement, multivitamin for women over 50, 1 yogurt daily     Social Determinants of Health     Financial Resource Strain: Medium Risk (12/31/2020)    Overall Financial Resource Strain (CARDIA)     Difficulty of Paying Living Expenses: Somewhat hard   Food Insecurity: No Food Insecurity (12/31/2020)    Hunger Vital Sign     Worried About Running Out of Food in the Last Year: Never true     Ran Out of Food in the Last Year: Never true   Transportation Needs: No Transportation Needs (12/31/2020)    PRAPARE - Transportation     Lack of Transportation (Medical): No     Lack of Transportation (Non-Medical):  No   Physical Activity: Unknown (5/9/2019)    Exercise Vital Sign     Days of Exercise per Week: 0 days     Minutes of Exercise per Session: Not on file   Stress: Stress Concern Present (5/9/2019)    [address]     Feeling of Stress : Very much   Social Connections: Socially Isolated (5/9/2019)    Social Connection and Isolation Panel [NHANES]     Frequency of Communication with Friends and Family: Once a week     Frequency of Social Gatherings with Friends and Family: Once a week     Attends [employer] Services: Never     Active Member of Clubs or Organizations: No     Attends Club or Organization Meetings: Never     Marital Status:    Intimate Partner Violence: Not At Risk (5/9/2019)    Humiliation, Afraid, Rape, and Kick questionnaire     Fear of Current or Ex-Partner: No     Emotionally Abused: No     Physically Abused: No     Sexually Abused: No   Housing Stability: Not on file       Family History   Problem Relation Age of Onset    Diabetes Mother     Breast cancer Mother         >50    BRCA1 Negative Mother     BRCA2 Negative Mother     Diabetes Father     Other Father         traumatic brain injury    Prostate cancer Father     Alcohol abuse Father         in remission    Heart disease Father     Neuropathy Father     Hyperlipidemia Father     Cancer Father         Prostate    Hypertension Brother     Diabetes Brother     Other Brother         HYPERCHOLESTEROLEMIA    Alcohol abuse Brother     Depression Brother         attempted suicide    Diabetes Brother     Alcohol abuse Brother     Heart attack Maternal Grandmother     Colon cancer Maternal Grandfather     No Known Problems Paternal Grandmother         dad is adopted    No Known Problems Paternal Grandfather         dad is adopted    No Known Problems Daughter     Asthma Son     Ovarian cancer Neg Hx     Uterine cancer Neg Hx        Blood pressure 108/66, height 5' 5.8" (1.671 m), weight 65.3 kg (144 lb), last menstrual period 01/07/2019, not currently breastfeeding. and Body mass index is 23.38 kg/m². Physical Exam  Constitutional:       General: She is not in acute distress. Appearance: She is well-developed. She is not ill-appearing. HENT:      Head: Normocephalic and atraumatic. Eyes:      Extraocular Movements: Extraocular movements intact. Conjunctiva/sclera: Conjunctivae normal.   Neck:      Thyroid: No thyromegaly. Trachea: No tracheal deviation. Cardiovascular:      Rate and Rhythm: Normal rate and regular rhythm. Heart sounds: Normal heart sounds. Pulmonary:      Effort: Pulmonary effort is normal. No respiratory distress. Breath sounds: Normal breath sounds. No stridor. No wheezing or rales. Abdominal:      General: Bowel sounds are normal. There is no distension. Palpations: Abdomen is soft. There is no mass. Tenderness: There is no abdominal tenderness. There is no guarding or rebound. Hernia: No hernia is present. Musculoskeletal:         General: No tenderness. Normal range of motion. Cervical back: Normal range of motion and neck supple. Lymphadenopathy:      Cervical: No cervical adenopathy. Skin:     General: Skin is warm. Findings: No erythema or rash. Neurological:      Mental Status: She is alert and oriented to person, place, and time. Psychiatric:         Mood and Affect: Mood normal.         Behavior: Behavior normal.         Thought Content: Thought content normal.         Judgment: Judgment normal.         Breasts: breasts appear normal, no suspicious masses, no skin or nipple changes or axillary nodes, symmetric fibrous changes in both upper outer quadrants.     vulva: normal external genitalia for age and no lesions, masses, epithelial changes, or exudate  vagina: color pink and rugae  flattening of rugae  cervix: parous and no lesions   uterus: NSSC, AF, NT, mobile  adnexa: no masses or tenderness  rectum: no masses or nodularity      A/P:  Pt is a 46 y.o. [id number] with      Patricio Yancey was seen today for gynecologic exam.    Diagnoses and all orders for this visit:    Encounter for gynecological examination without abnormal finding  -stable examination  -pap and colonoscopy up to date    Encounter for screening mammogram for breast cancer  -     Mammo screening bilateral w 3d & cad; Future    Oral contraceptive pill surveillance  -     drospirenone-ethinyl estradiol (LIEN) 3-0.02 MG per tablet;  Take 1 tablet by mouth daily    Inadequate exercise  Advised to restart when able after her cardiac ablation

## 2023-12-17 ENCOUNTER — APPOINTMENT (OUTPATIENT)
Dept: RADIOLOGY | Age: 53
End: 2023-12-17
Attending: NURSE PRACTITIONER
Payer: COMMERCIAL

## 2023-12-17 ENCOUNTER — OFFICE VISIT (OUTPATIENT)
Dept: URGENT CARE | Age: 53
End: 2023-12-17
Payer: COMMERCIAL

## 2023-12-17 VITALS
OXYGEN SATURATION: 99 % | SYSTOLIC BLOOD PRESSURE: 100 MMHG | DIASTOLIC BLOOD PRESSURE: 60 MMHG | HEART RATE: 91 BPM | RESPIRATION RATE: 18 BRPM

## 2023-12-17 DIAGNOSIS — Z87.81 HISTORY OF FACIAL FRACTURE: ICD-10-CM

## 2023-12-17 DIAGNOSIS — M26.622 ARTHRALGIA OF LEFT TEMPOROMANDIBULAR JOINT: Primary | ICD-10-CM

## 2023-12-17 DIAGNOSIS — M26.622 ARTHRALGIA OF LEFT TEMPOROMANDIBULAR JOINT: ICD-10-CM

## 2023-12-17 LAB
ATRIAL RATE: 80 BPM
P AXIS: 76 DEGREES
PR INTERVAL: 184 MS
QRS AXIS: 134 DEGREES
QRSD INTERVAL: 78 MS
QT INTERVAL: 388 MS
QTC INTERVAL: 447 MS
T WAVE AXIS: 61 DEGREES
VENTRICULAR RATE: 80 BPM

## 2023-12-17 PROCEDURE — 70330 X-RAY EXAM OF JAW JOINTS: CPT

## 2023-12-17 PROCEDURE — 99213 OFFICE O/P EST LOW 20 MIN: CPT | Performed by: NURSE PRACTITIONER

## 2023-12-17 RX ORDER — PREDNISONE 10 MG/1
TABLET ORAL
Qty: 24 TABLET | Refills: 0 | Status: SHIPPED | OUTPATIENT
Start: 2023-12-17
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
## 2023-12-17 NOTE — PATIENT INSTRUCTIONS
Your xray was preliminarily read by your provider.  A radiologist will read the xray and you will be notified if it is abnormal.    You are to take the tylenol with the prednisone as prescribed.  Apply warm compresses to the jaw.  You may see your PCP in 3-5 days and physical therapy may benefit you.  You may also return to oral surgery for evaluation  Go to the ED if symptoms worsen

## 2023-12-26 ENCOUNTER — TELEPHONE (OUTPATIENT)
Dept: CARDIOLOGY CLINIC | Facility: CLINIC | Age: 53
End: 2023-12-26

## 2023-12-26 DIAGNOSIS — I47.10 PAROXYSMAL SVT (SUPRAVENTRICULAR TACHYCARDIA): Primary | ICD-10-CM

## 2023-12-26 RX ORDER — FLECAINIDE ACETATE 50 MG/1
50 TABLET ORAL 2 TIMES DAILY
Qty: 60 TABLET | Refills: 3 | Status: SHIPPED | OUTPATIENT
Start: 2023-12-26

## 2023-12-26 NOTE — PROGRESS NOTES
Called patient to review recent symptoms - she reports worsening palpitations over the recent past, at times associated with dizziness and lightheadedness.  Ivabradine worked well for several months, however it seems to no longer be effective.    After discussing with Dr. Florentino, at this time we recommend discontinuing ivabradine.  Instead, she will be started on low-dose flecainide 50 mg twice daily.  I instructed her to stop ivabradine today, and start flecainide tomorrow morning.  She is going to contact our [place] office so that she can have an EKG performed next week for ongoing monitoring.    I will contact our schedulers to see if it is possible to move up her EPS/SVT ablation.  She will contact us at the meantime she has any issues with this medication change.

## 2023-12-26 NOTE — TELEPHONE ENCOUNTER
Pt called because she is having more severe palpitations than during her ER visit that have been constant all day today. The symptom started a week ago and have gotten progressively more noticeable. She states that this also causes fatigue and makes her feel dizzy. Today she does not feel fast HR but the past week she states that she did feel certain episodes where her heart was beating faster. For the past 2 days she has gotten off of work and simply wanted to fall asleep right away. 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 Her ablation is in February but would like to know if there is anything about this that can be done prior or if it is possible to get her ablation done sooner.  Please advise.

## 2023-12-29 NOTE — TELEPHONE ENCOUNTER
"Left detailed voicemail informing patient that I can move up her ablation procedure per  with another provider to sooner date if she can't wait and to call me back.      Thanks,  Bobbi \"Yessica\" Yair  "

## 2023-12-29 NOTE — TELEPHONE ENCOUNTER
"CX 2/8/24 SVT Abl w/ due to move up to sooner appt.    Rescheduled SVT Abl to 1/4/24 w/ @ [facility].    Informed to hold Flecainide for 3 days before and morning of procedure.     Patient will do labs CMP/CBC (fasting 8 hrs) tomorrow 12/30/23.    Tammy: changed DOS/Provider (see original tel enc from 12/14/23)    Thanks,  Bobbi \"Yessica\" Yair  "

## 2024-01-03 ENCOUNTER — TELEPHONE (OUTPATIENT)
Age: 54
End: 2024-01-03

## 2024-01-03 ENCOUNTER — TELEPHONE (OUTPATIENT)
Dept: CARDIOLOGY CLINIC | Facility: CLINIC | Age: 54
End: 2024-01-03

## 2024-01-03 NOTE — TELEPHONE ENCOUNTER
Lm for pt to cb. Does she want to repeat TFESI? Is pain the same?    Pt is s/p BL TFESI 10/5  Pt had reported no improvement and was seen in ED since. Lumbar MRI was ordered by [facility]. Did pt have that done somewhere?

## 2024-01-03 NOTE — TELEPHONE ENCOUNTER
Delfina, my name is Christina Lara. My birthday is [birth year]. My callback number is [phone]. My question is, I'm supposed to have an ablation done tomorrow in [facility], but I don't know if they're keeping me overnight. I was told that by the office that you that I would be but when they called to give me my appointment time at the hospital they said they didn't have me down as overnight. So I'm confused. So if you could please return my call I would appreciate it. Thank you.

## 2024-01-03 NOTE — TELEPHONE ENCOUNTER
Caller: Patient     Doctor: Guru    Reason for call: Requesting another set of injections into her back     Call back#: [phone]

## 2024-01-04 ENCOUNTER — ANESTHESIA EVENT (OUTPATIENT)
Dept: NON INVASIVE DIAGNOSTICS | Facility: HOSPITAL | Age: 54
End: 2024-01-04
Payer: COMMERCIAL

## 2024-01-04 ENCOUNTER — ANESTHESIA (OUTPATIENT)
Dept: NON INVASIVE DIAGNOSTICS | Facility: HOSPITAL | Age: 54
End: 2024-01-04
Payer: COMMERCIAL

## 2024-01-04 ENCOUNTER — HOSPITAL ENCOUNTER (OUTPATIENT)
Facility: HOSPITAL | Age: 54
Setting detail: OUTPATIENT SURGERY
Discharge: HOME | End: 2024-01-04
Attending: STUDENT IN AN ORGANIZED HEALTH CARE EDUCATION/TRAINING PROGRAM | Admitting: STUDENT IN AN ORGANIZED HEALTH CARE EDUCATION/TRAINING PROGRAM
Payer: COMMERCIAL

## 2024-01-04 VITALS
BODY MASS INDEX: 22.95 KG/M2 | TEMPERATURE: 98.6 F | SYSTOLIC BLOOD PRESSURE: 117 MMHG | HEART RATE: 87 BPM | DIASTOLIC BLOOD PRESSURE: 67 MMHG | HEIGHT: 67 IN | OXYGEN SATURATION: 97 % | RESPIRATION RATE: 18 BRPM | WEIGHT: 146.2 LBS

## 2024-01-04 DIAGNOSIS — I47.10 SVT (SUPRAVENTRICULAR TACHYCARDIA): ICD-10-CM

## 2024-01-04 DIAGNOSIS — I48.92 ATRIAL FLUTTER (HCC): Primary | ICD-10-CM

## 2024-01-04 LAB
ANION GAP SERPL CALCULATED.3IONS-SCNC: 6 MMOL/L
ATRIAL RATE: 74 BPM
ATRIAL RATE: 87 BPM
BUN SERPL-MCNC: 10 MG/DL (ref 5–25)
CALCIUM SERPL-MCNC: 8.8 MG/DL (ref 8.4–10.2)
CHLORIDE SERPL-SCNC: 106 MMOL/L (ref 96–108)
CO2 SERPL-SCNC: 29 MMOL/L (ref 21–32)
CREAT SERPL-MCNC: 0.54 MG/DL (ref 0.6–1.3)
ERYTHROCYTE [DISTWIDTH] IN BLOOD BY AUTOMATED COUNT: 12.5 % (ref 11.6–15.1)
GFR SERPL CREATININE-BSD FRML MDRD: 107 ML/MIN/1.73SQ M
GLUCOSE P FAST SERPL-MCNC: 87 MG/DL (ref 65–99)
GLUCOSE SERPL-MCNC: 87 MG/DL (ref 65–140)
HCT VFR BLD AUTO: 35.8 % (ref 34.8–46.1)
HGB BLD-MCNC: 11.4 G/DL (ref 11.5–15.4)
INR PPP: 1 (ref 0.84–1.19)
MCH RBC QN AUTO: 29 PG (ref 26.8–34.3)
MCHC RBC AUTO-ENTMCNC: 31.8 G/DL (ref 31.4–37.4)
MCV RBC AUTO: 91 FL (ref 82–98)
P AXIS: 77 DEGREES
PLATELET # BLD AUTO: 243 THOUSANDS/UL (ref 149–390)
PMV BLD AUTO: 9.2 FL (ref 8.9–12.7)
POTASSIUM SERPL-SCNC: 3.8 MMOL/L (ref 3.5–5.3)
PR INTERVAL: 172 MS
PR INTERVAL: 186 MS
PROTHROMBIN TIME: 13.1 SECONDS (ref 11.6–14.5)
QRS AXIS: 138 DEGREES
QRS AXIS: 150 DEGREES
QRSD INTERVAL: 84 MS
QRSD INTERVAL: 84 MS
QT INTERVAL: 382 MS
QT INTERVAL: 392 MS
QTC INTERVAL: 435 MS
QTC INTERVAL: 459 MS
RBC # BLD AUTO: 3.93 MILLION/UL (ref 3.81–5.12)
SODIUM SERPL-SCNC: 141 MMOL/L (ref 135–147)
T WAVE AXIS: 49 DEGREES
T WAVE AXIS: 63 DEGREES
VENTRICULAR RATE: 74 BPM
VENTRICULAR RATE: 87 BPM
WBC # BLD AUTO: 6.18 THOUSAND/UL (ref 4.31–10.16)

## 2024-01-04 PROCEDURE — 93653 COMPRE EP EVAL TX SVT: CPT | Performed by: STUDENT IN AN ORGANIZED HEALTH CARE EDUCATION/TRAINING PROGRAM

## 2024-01-04 PROCEDURE — C1894 INTRO/SHEATH, NON-LASER: HCPCS | Performed by: STUDENT IN AN ORGANIZED HEALTH CARE EDUCATION/TRAINING PROGRAM

## 2024-01-04 PROCEDURE — C1732 CATH, EP, DIAG/ABL, 3D/VECT: HCPCS | Performed by: STUDENT IN AN ORGANIZED HEALTH CARE EDUCATION/TRAINING PROGRAM

## 2024-01-04 PROCEDURE — C1760 CLOSURE DEV, VASC: HCPCS | Performed by: STUDENT IN AN ORGANIZED HEALTH CARE EDUCATION/TRAINING PROGRAM

## 2024-01-04 PROCEDURE — C1759 CATH, INTRA ECHOCARDIOGRAPHY: HCPCS | Performed by: STUDENT IN AN ORGANIZED HEALTH CARE EDUCATION/TRAINING PROGRAM

## 2024-01-04 PROCEDURE — C1730 CATH, EP, 19 OR FEW ELECT: HCPCS | Performed by: STUDENT IN AN ORGANIZED HEALTH CARE EDUCATION/TRAINING PROGRAM

## 2024-01-04 PROCEDURE — 85027 COMPLETE CBC AUTOMATED: CPT | Performed by: PHYSICIAN ASSISTANT

## 2024-01-04 PROCEDURE — 85610 PROTHROMBIN TIME: CPT | Performed by: PHYSICIAN ASSISTANT

## 2024-01-04 PROCEDURE — NC001 PR NO CHARGE: Performed by: PHYSICIAN ASSISTANT

## 2024-01-04 PROCEDURE — 80048 BASIC METABOLIC PNL TOTAL CA: CPT | Performed by: PHYSICIAN ASSISTANT

## 2024-01-04 PROCEDURE — 76937 US GUIDE VASCULAR ACCESS: CPT | Performed by: STUDENT IN AN ORGANIZED HEALTH CARE EDUCATION/TRAINING PROGRAM

## 2024-01-04 PROCEDURE — 93010 ELECTROCARDIOGRAM REPORT: CPT | Performed by: INTERNAL MEDICINE

## 2024-01-04 PROCEDURE — 93005 ELECTROCARDIOGRAM TRACING: CPT

## 2024-01-04 DEVICE — DVC VASC CLSR VASCADE MVP 6-12FR: Type: IMPLANTABLE DEVICE | Site: GROIN | Status: FUNCTIONAL

## 2024-01-04 RX ORDER — MIDAZOLAM HYDROCHLORIDE 2 MG/2ML
INJECTION, SOLUTION INTRAMUSCULAR; INTRAVENOUS AS NEEDED
Status: DISCONTINUED | OUTPATIENT
Start: 2024-01-04 | End: 2024-01-04

## 2024-01-04 RX ORDER — SODIUM CHLORIDE 9 MG/ML
20 INJECTION, SOLUTION INTRAVENOUS CONTINUOUS
Status: DISCONTINUED | OUTPATIENT
Start: 2024-01-04 | End: 2024-01-04 | Stop reason: HOSPADM

## 2024-01-04 RX ORDER — FENTANYL CITRATE 50 UG/ML
INJECTION, SOLUTION INTRAMUSCULAR; INTRAVENOUS AS NEEDED
Status: DISCONTINUED | OUTPATIENT
Start: 2024-01-04 | End: 2024-01-04

## 2024-01-04 RX ORDER — OXYCODONE HYDROCHLORIDE AND ACETAMINOPHEN 5; 325 MG/1; MG/1
1 TABLET ORAL EVERY 6 HOURS PRN
Status: DISCONTINUED | OUTPATIENT
Start: 2024-01-04 | End: 2024-01-04 | Stop reason: HOSPADM

## 2024-01-04 RX ORDER — CEFAZOLIN SODIUM 1 G/50ML
SOLUTION INTRAVENOUS AS NEEDED
Status: DISCONTINUED | OUTPATIENT
Start: 2024-01-04 | End: 2024-01-04

## 2024-01-04 RX ORDER — SODIUM CHLORIDE 9 MG/ML
20 INJECTION, SOLUTION INTRAVENOUS ONCE
Status: DISCONTINUED | OUTPATIENT
Start: 2024-01-04 | End: 2024-01-04 | Stop reason: HOSPADM

## 2024-01-04 RX ORDER — LIDOCAINE HYDROCHLORIDE 10 MG/ML
INJECTION, SOLUTION EPIDURAL; INFILTRATION; INTRACAUDAL; PERINEURAL CODE/TRAUMA/SEDATION MEDICATION
Status: DISCONTINUED | OUTPATIENT
Start: 2024-01-04 | End: 2024-01-04 | Stop reason: HOSPADM

## 2024-01-04 RX ORDER — PROPOFOL 10 MG/ML
INJECTION, EMULSION INTRAVENOUS AS NEEDED
Status: DISCONTINUED | OUTPATIENT
Start: 2024-01-04 | End: 2024-01-04

## 2024-01-04 RX ORDER — ACETAMINOPHEN 325 MG/1
650 TABLET ORAL EVERY 4 HOURS PRN
Status: DISCONTINUED | OUTPATIENT
Start: 2024-01-04 | End: 2024-01-04

## 2024-01-04 RX ADMIN — FENTANYL CITRATE 25 MCG: 50 INJECTION INTRAMUSCULAR; INTRAVENOUS at 12:47

## 2024-01-04 RX ADMIN — MIDAZOLAM 2 MG: 1 INJECTION INTRAMUSCULAR; INTRAVENOUS at 12:57

## 2024-01-04 RX ADMIN — FENTANYL CITRATE 25 MCG: 50 INJECTION INTRAMUSCULAR; INTRAVENOUS at 12:26

## 2024-01-04 RX ADMIN — PROPOFOL 20 MG: 10 INJECTION, EMULSION INTRAVENOUS at 13:48

## 2024-01-04 RX ADMIN — FENTANYL CITRATE 25 MCG: 50 INJECTION INTRAMUSCULAR; INTRAVENOUS at 12:50

## 2024-01-04 RX ADMIN — FENTANYL CITRATE 50 MCG: 50 INJECTION INTRAMUSCULAR; INTRAVENOUS at 13:20

## 2024-01-04 RX ADMIN — CEFAZOLIN SODIUM 1000 MG: 1 SOLUTION INTRAVENOUS at 11:26

## 2024-01-04 RX ADMIN — PROPOFOL 50 MG: 10 INJECTION, EMULSION INTRAVENOUS at 11:42

## 2024-01-04 RX ADMIN — SODIUM CHLORIDE 20 ML/HR: 0.9 INJECTION, SOLUTION INTRAVENOUS at 08:42

## 2024-01-04 RX ADMIN — FENTANYL CITRATE 50 MCG: 50 INJECTION INTRAMUSCULAR; INTRAVENOUS at 12:15

## 2024-01-04 RX ADMIN — MIDAZOLAM 1 MG: 1 INJECTION INTRAMUSCULAR; INTRAVENOUS at 11:13

## 2024-01-04 RX ADMIN — FENTANYL CITRATE 25 MCG: 50 INJECTION INTRAMUSCULAR; INTRAVENOUS at 12:45

## 2024-01-04 RX ADMIN — OXYCODONE HYDROCHLORIDE AND ACETAMINOPHEN 1 TABLET: 5; 325 TABLET ORAL at 15:35

## 2024-01-04 RX ADMIN — MIDAZOLAM 1 MG: 1 INJECTION INTRAMUSCULAR; INTRAVENOUS at 11:18

## 2024-01-04 RX ADMIN — PROPOFOL 50 MCG/KG/MIN: 10 INJECTION, EMULSION INTRAVENOUS at 12:59

## 2024-01-04 NOTE — DISCHARGE INSTR - AVS FIRST PAGE
PLEASE NOTE THE FOLLOWING MEDICATION RECOMMENDATIONS:  Start blood thinner with eliquis 5 mg twice a day for the next 30 days. This is to prevent stroke. Do not stop this medication unless directed to by physician.   Continue other medications as previously taking.       RESTRICTIONS:  No heavy lifting or strenuous activity for one week.    No soaking in a bath tub/hot tub/swimming pool for one week or until groin heals. You may shower - please let soap and water run over the groins, no scrubbing, and pat the area dry. Please place band-aid on groins daily for up to five days, but you may remove sooner if no issues are noted.     If you notice ongoing bleeding, swelling, or firm lumps in groin near ablation incision, please contact Dr. Nguyễn' office - [phone].

## 2024-01-04 NOTE — ANESTHESIA POSTPROCEDURE EVALUATION
Post-Op Assessment Note    CV Status:  Stable    Pain management: adequate       Mental Status:  Alert and awake   Hydration Status:  Euvolemic   PONV Controlled:  Controlled   Airway Patency:  Patent     Post Op Vitals Reviewed: Yes      Staff: CRNA   Comments: pt states being stiff  from laying              BP   119/70   Temp      Pulse  92   Resp   16   SpO2   95 ra

## 2024-01-04 NOTE — H&P
H&P Exam - Electrophysiology - Cardiology   Christina Lara 53 y.o. female MRN: [id number]  Unit/Bed#: [facility] CATH LAB ROOM Encounter: [id number]    Assessment/Plan     Assessment:  1. SVT- Inappropriate sinus tach vs Atrial tachycardia seen on Zio patch. She has loop showing episode up to 188 bpm but graual onset/offset by symptoms and on recordings.   She had to stop propranolol after Gastric bypass lost weight and BP runs soft and gets presyncopal. 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 Anxiety on mutlple meds  3. ON Stattera ADHD med  4. Chronic pain and spinal stimulator  5. Medtronic loop recorder implanted 8/22/2022    Plan:  -- NPO for SVT ablation today  -- last dose of flecainide was 3 days ago  -- bedrest post procedure  -- possible D/C home after procedure versus admit      History of Present Illness   HPI:  Christina Lara is a 53 y.o. female with a history of palpitations, anxiety, ADHA, chronic pain and spinal stimulator, Medtronic loop recorder, and SVT who presents for EP study and SVT ablation.     She follows with Dr. Nguyễn for SVT. SVT as been recorded on loop recorder with Hrs up to 188 bpm. At first, there was no clear onset/offset and it was unclear if SVT versus IST. More recently, she has had more frequent episodes with abrupt onset/offset on her loop recorder. She is symptomatic with these episodes. One occurred while she was at work and she required treatment in the ED. She was started on flecainide 50 mg bid.     Given her frequent palpitations and SVT episodes, SVT ablation was recommended. Today, she is feeling well. She denies chest pain, SOB, lightheadedness, dizziness, orthopnea, or PND. She is NPO for the procedure today.     EKG 1/4/2023: normal sinus rhythm HR 75 bpm    Review of Systems   Constitutional: Negative.    HENT: Negative.     Eyes:  Negative.    Respiratory: Negative.     Cardiovascular:  Positive for palpitations. Negative for chest pain.   Gastrointestinal:  Negative for abdominal pain, nausea and vomiting.   Endocrine: Negative.    Genitourinary: Negative.    Musculoskeletal: Negative.    Allergic/Immunologic: Negative.    Neurological:  Negative for dizziness.   Hematological: Negative.    Psychiatric/Behavioral: Negative.       ROS as noted above, otherwise 12 point review of systems was performed and is negative.     Historical Information   Past Medical History:   Diagnosis Date    Acute right ankle pain 2023    ADHD (attention deficit hyperactivity disorder)     Anxiety     Bulging lumbar disc     Carpal tunnel syndrome     RIGHT.  LAST ASSESSED: 16    Chronic back pain     low    Chronic pain disorder     Colon polyps     COVID-19     2021    Depression     Diabetes mellitus (HCC)     GERD (gastroesophageal reflux disease)     Gestational diabetes     Hearing loss     left ear    Heart disease     SVT    History of pre-eclampsia     Hyperlipidemia     Resolved with weight loss    Hyponatremia 2020    IBS (irritable bowel syndrome)     Ileus ([facility])     LAST ASSESSED: 8/3/17    Labial cyst     LAST ASSESSED: 16    Myofascial pain     LAST ASSESSED: 16    Neck mass 2023    Obesity     Ovarian cyst     LEFT. LAST ASSESSED: 16    Panic attack     Pneumonia     Sacroiliitis ([facility])     Seasonal allergies     Sprain of anterior talofibular ligament of right ankle 2023    SVT (supraventricular tachycardia)     Thoracic outlet syndrome     2010    Trochanteric bursitis of both hips     LAST ASSESSED: 3/18/16    Ulnar neuropathy at elbow     Varicella     Wears glasses      Past Surgical History:   Procedure Laterality Date    BARIATRIC SURGERY  2022    BILE DUCT EXPLORATION      ENDOSCOPIC REMOVAL OF STONES FROM BILIARY TRACT     SECTION      x3    CHOLECYSTECTOMY      COLONOSCOPY       2017-polyp, 2022-wnl, repeat5 years    DILATION AND CURETTAGE OF UTERUS      ENDOMETRIAL ABLATION      ERCP W/ SPHICTEROTOMY      FIRST RIB REMOVAL      THORAX EXCISION OF FIRST RIB    GASTRIC BYPASS  8/08/2022    HYSTEROSCOPY      NEUROPLASTY / TRANSPOSITION ULNAR NERVE AT ELBOW Right 2011    LA COLONOSCOPY FLX DX W/COLLJ SPEC WHEN PFRMD N/A 05/30/2017    Procedure: COLONOSCOPY;  Surgeon: Oscar Velázquez MD;  Location: [facility] GI LAB;  Service: Gastroenterology    LA ESOPHAGOGASTRODUODENOSCOPY TRANSORAL DIAGNOSTIC N/A 09/14/2017    Procedure: ESOPHAGOGASTRODUODENOSCOPY (EGD);  Surgeon: Sadiq Car MD;  Location: [facility] GI LAB;  Service: Gastroenterology    LA HYSTEROSCOPY BX ENDOMETRIUM&/POLYPC W/WO D&C N/A 10/20/2017    Procedure: DILATATION AND CURETTAGE (D&C) WITH HYSTEROSCOPY  REMOVAL VULVAR RT. LESION;  Surgeon: Lucila Ortiz MD;  Location: [facility];  Service: Gynecology    LA ALDANA IMPLTJ NSTIM ELTRDS PLATE/PADDLE EDRL Left 01/29/2020    Procedure: Insertion of thoracic spinal cord stimulator electrode via laminotomy and placement of left buttock implantable pulse generator (NEUROMONITORING);  Surgeon: Ad Mehta MD;  Location: [facility];  Service: Neurosurgery    LA LAPS GSTRC RSTRICTIV PX LONGITUDINAL GASTRECTOMY N/A 02/06/2018    Procedure: GASTRECTOMY SLEEVE LAPAROSCOPIC; INTRAOPERATIVE EGD ;  Surgeon: Abimael Juarez MD;  Location: [facility];  Service: Bariatrics    LA LAPS GSTRC RSTRICTIV PX LONGITUDINAL GASTRECTOMY N/A 08/08/2022    Procedure: Diagnostic Lap; Extensive Lysis of Adhesions; LAPAROSCOPIC REVISION CONVERSION TO NOE-EN-Y GASTRIC BYPASS AND INTRAOPERATIVE EGD;  Surgeon: Abimael Juarez MD;  Location[facility];  Service: Bariatrics    SLEEVE GASTROPLASTY      SPINAL CORD STIMULATOR TRIAL W/ LAMINOTOMY      TONSILLECTOMY AND ADENOIDECTOMY      TUBAL LIGATION      UPPER GASTROINTESTINAL ENDOSCOPY      US GUIDED LYMPH NODE BIOPSY LEFT  05/22/2023    VEIN LIGATION AND STRIPPING Right     VULVA  SURGERY  10/20/2017    BIOPSY    WISDOM TOOTH EXTRACTION       Family History:   Family History   Problem Relation Age of Onset    Diabetes Mother     Breast cancer Mother         >50    BRCA1 Negative Mother     BRCA2 Negative Mother     Diabetes Father     Other Father         traumatic brain injury    Prostate cancer Father     Alcohol abuse Father         in remission    Heart disease Father     Neuropathy Father     Hyperlipidemia Father     Cancer Father         Prostate    Hypertension Brother     Diabetes Brother     Other Brother         HYPERCHOLESTEROLEMIA    Alcohol abuse Brother     Depression Brother         attempted suicide    Diabetes Brother     Alcohol abuse Brother     Heart attack Maternal Grandmother     Colon cancer Maternal Grandfather     No Known Problems Paternal Grandmother         dad is adopted    No Known Problems Paternal Grandfather         dad is adopted    No Known Problems Daughter     Asthma Son     Ovarian cancer Neg Hx     Uterine cancer Neg Hx        Social History   Social History     Substance and Sexual Activity   Alcohol Use Not Currently    Alcohol/week: 0.0 - 1.0 standard drinks of alcohol    Comment: once weekly     Social History     Substance and Sexual Activity   Drug Use No     Social History     Tobacco Use   Smoking Status Former    Current packs/day: 0.00    Average packs/day: 1 pack/day for 15.0 years (15.0 ttl pk-yrs)    Types: Cigarettes    Start date: 4/30/1998    Quit date: 4/30/2013    Years since quitting: 10.6    Passive exposure: Never   Smokeless Tobacco Never       Meds/Allergies   all medications and allergies reviewed  Home Medications:   Medications Prior to Admission   Medication    atoMOXetine (STRATTERA) 60 mg capsule    atorvastatin (LIPITOR) 20 mg tablet    calcium carbonate (OS-MELODY) 600 MG tablet    cyclobenzaprine (FLEXERIL) 10 mg tablet    drospirenone-ethinyl estradiol (LIEN) 3-0.02 MG per tablet    lamoTRIgine (LaMICtal) 150 MG tablet     "montelukast (SINGULAIR) 10 mg tablet    Multiple Vitamins-Minerals (MULTI COMPLETE PO)    omeprazole (PriLOSEC) 20 mg delayed release capsule    QUEtiapine (SEROquel) 100 mg tablet    venlafaxine (EFFEXOR) 100 MG tablet    estradiol (ESTRACE VAGINAL) 0.1 mg/g vaginal cream    flecainide (TAMBOCOR) 50 mg tablet    lidocaine (Lidoderm) 5 %    meclizine (ANTIVERT) 25 mg tablet    methocarbamol (ROBAXIN) 750 mg tablet    nitrofurantoin (MACROBID) 100 mg capsule       Allergies   Allergen Reactions    Ibuprofen Other (See Comments)     Due to gastric sleeve -can only take for 5 days, then needs to stop       Objective   Vitals: Blood pressure 131/61, pulse 76, temperature 99.2 °F (37.3 °C), temperature source Temporal, resp. rate 18, height 5' 7\" (1.702 m), weight 66.3 kg (146 lb 3.2 oz), last menstrual period 01/07/2019, SpO2 96%, not currently breastfeeding.  Orthostatic Blood Pressures      Flowsheet Row Most Recent Value   Blood Pressure 131/61 filed at 01/04/2024 0829            No intake or output data in the 24 hours ending 01/04/24 1107    Invasive Devices       Peripheral Intravenous Line  Duration             Peripheral IV 01/04/24 Left;Proximal;Ventral (anterior) Forearm <1 day    Peripheral IV 01/04/24 Right;Ventral (anterior) Forearm <1 day                    Physical Exam   GEN: NAD, alert and oriented, well appearing  SKIN: dry without significant lesions or rashes  HEENT: NCAT  NECK: No JVD or carotid bruits appreciated  CARDIOVASCULAR: RRR, normal S1, S2 without murmurs, rubs, or gallops appreciated  LUNGS: Clear to auscultation bilaterally without wheezes, rhonchi, or rales  ABDOMEN: Soft, nontender, nondistended  EXTREMITIES/VASCULAR: perfused without clubbing, cyanosis, or edema b/l  PSYCH: Normal mood and affect  NEURO: CN ll-Xll grossly intact    Lab Results: I have personally reviewed pertinent lab results.    Results from last 7 days   Lab Units 01/04/24  0836   WBC Thousand/uL 6.18   HEMOGLOBIN " g/dL 11.4*   HEMATOCRIT % 35.8   PLATELETS Thousands/uL 243     Results from last 7 days   Lab Units 24  0836   POTASSIUM mmol/L 3.8   CHLORIDE mmol/L 106   CO2 mmol/L 29   BUN mg/dL 10   CREATININE mg/dL 0.54*   CALCIUM mg/dL 8.8     Results from last 7 days   Lab Units 24  0836   INR  1.00             Imaging: I have personally reviewed pertinent reports.    Results for orders placed during the hospital encounter of 10/29/20    Echo complete with contrast if indicated    [facility]  [address] [place] [address]  [phone]    Transthoracic Echocardiogram  2D, M-mode, Doppler, and Color Doppler    Study date:  29-Oct-2020    Patient: SHARON BELL  MR number: [id number]  Account number: [id number]  : 1970  Age: 50 years  Gender: Female  Status: Outpatient  Location: Echo lab  Height: 68 in  Weight: 173.6 lb  BP: 110/ 80 mmHg    Indications: SVT    Diagnoses: I47.2 - Ventricular tachycardia    Sonographer:  LUPE Spring  Primary Physician:  Jacki Ansari MD  Referring Physician:  Micky Dunn MD  Group:  [facility]  Interpreting Physician:  DO DEBORAH Aragon    LEFT VENTRICLE:  Systolic function was normal. Ejection fraction was estimated to be 60 %.  There were no regional wall motion abnormalities.  Doppler parameters were consistent with abnormal left ventricular relaxation (grade 1 diastolic dysfunction).    RIGHT VENTRICLE:  The size was normal.  Systolic function was normal.    MITRAL VALVE:  The anterior leaflet was elongated.    HISTORY: PRIOR HISTORY: DM, tachycardia    PROCEDURE: The procedure was performed in the echo lab. This was a routine study. The transthoracic approach was used. The study included complete 2D imaging, M-mode, complete spectral Doppler, and color Doppler. The heart rate was 83 bpm,  at the start of the study. Images were obtained from the parasternal, apical, subcostal, and  suprasternal notch acoustic windows. Image quality was adequate.    LEFT VENTRICLE: Size was normal. Systolic function was normal. Ejection fraction was estimated to be 60 %. There were no regional wall motion abnormalities. Wall thickness was normal. DOPPLER: Doppler parameters were consistent with  abnormal left ventricular relaxation (grade 1 diastolic dysfunction). There was no evidence of elevated ventricular filling pressure by Doppler parameters.    RIGHT VENTRICLE: The size was normal. Systolic function was normal. Wall thickness was normal.    LEFT ATRIUM: Size was normal.    RIGHT ATRIUM: Size was normal.    MITRAL VALVE: There was normal leaflet separation. The anterior leaflet was elongated. DOPPLER: The transmitral velocity was within the normal range. There was no evidence for stenosis. There was trace regurgitation.    AORTIC VALVE: The valve was trileaflet. Leaflets exhibited normal thickness and normal cuspal separation. DOPPLER: Transaortic velocity was within the normal range. There was no evidence for stenosis. There was no regurgitation.    TRICUSPID VALVE: The valve structure was normal. There was normal leaflet separation. DOPPLER: The transtricuspid velocity was within the normal range. There was no evidence for stenosis. There was trace regurgitation. The tricuspid jet  envelope definition was inadequate for estimation of RV systolic pressure. There are no indirect findings (abnormal RV volume or geometry, altered pulmonary flow velocity profile, or leftward septal displacement) which would suggest  moderate or severe pulmonary hypertension.    PULMONIC VALVE: Leaflets exhibited normal thickness, no calcification, and normal cuspal separation. DOPPLER: The transpulmonic velocity was within the normal range. There was trace regurgitation.    PERICARDIUM: There was no pericardial effusion. The pericardium was normal in appearance.    AORTA: The root exhibited normal size.    SYSTEMIC VEINS:  IVC: The inferior vena cava was normal in size and course. Respirophasic changes were normal.    SYSTEM MEASUREMENT TABLES    2D  %FS: 35.27 %  Ao Diam: 3.14 cm  Ao asc: 2.82 cm  EDV(Teich): 77.95 ml  EF(Teich): 65.04 %  ESV(Teich): 27.25 ml  IVSd: 0.81 cm  LA Area: 13.62 cm2  LA Diam: 3.07 cm  LVEDV MOD A4C: 59.56 ml  LVEF MOD A4C: 69.97 %  LVESV MOD A4C: 17.89 ml  LVIDd: 4.19 cm  LVIDs: 2.71 cm  LVLd A4C: 7.33 cm  LVLs A4C: 5.85 cm  LVPWd: 0.81 cm  RA Area: 11.11 cm2  RVIDd: 4.04 cm  SV MOD A4C: 41.67 ml  SV(Teich): 50.7 ml    PW  E' Sept: 0.09 m/s  E/E' Sept: 8.96  MV A [place]: 1.11 m/s  MV Dec [place]: 3.29 m/s2  MV DecT: 259.08 ms  MV E [place]: 0.85 m/s  MV E/A Ratio: 0.77  MV PHT: 75.13 ms  MVA By PHT: 2.93 cm2    [employer] Commission Accredited Echocardiography Laboratory    Prepared and electronically signed by    Mary Morgan DO  Signed 29-Oct-2020 15:23:53      Code Status: Level 1 - Full Code    ** Please Note: Dictation voice to text software may have been used in the creation of this document. **

## 2024-01-04 NOTE — ANESTHESIA PREPROCEDURE EVALUATION
"Procedure:  Cardiac eps/svt ablation (Chest)    Relevant Problems   CARDIO   (+) Mixed hyperlipidemia   (+) Paroxysmal SVT (supraventricular tachycardia)      GI/HEPATIC   (+) GERD (gastroesophageal reflux disease)   (+) Hepatic steatosis      MUSCULOSKELETAL   (+) Chronic back pain      NEURO/PSYCH   (+) Chronic back pain   (+) Chronic pain syndrome   (+) Generalized anxiety disorder   (+) MDD (major depressive disorder), recurrent severe, without psychosis (HCC)   (+) Other chronic pain   (+) Post traumatic stress disorder (PTSD)        Physical Exam    Airway    Mallampati score: II  TM Distance: >3 FB  Neck ROM: full     Dental   No notable dental hx     Cardiovascular  Cardiovascular exam normal    Pulmonary  Pulmonary exam normal     Other Findings  post-pubertal.      Anesthesia Plan  ASA Score- 3     Anesthesia Type- IV sedation with anesthesia with ASA Monitors.         Additional Monitors:     Airway Plan:     Comment: No issues with prev anesthesia. \"Woke up\" during upper endoscopy- very anxious not to \"wake up\" again.   No current cardiac or respiratory complaints.       Plan Factors-Exercise tolerance (METS): >4 METS.    Chart reviewed. EKG reviewed. Imaging results reviewed. Existing labs reviewed. Patient summary reviewed.    Patient is not a current smoker.              Induction- intravenous.    Postoperative Plan-     Informed Consent- Anesthetic plan and risks discussed with patient.  I personally reviewed this patient with the CRNA. Discussed and agreed on the Anesthesia Plan with the CRNA..            "

## 2024-01-04 NOTE — LETTER
[facility] CARDIAC CATH LAB  [address]  [place] [address]  Dept: [phone]    January 4, 2024     Patient: Christina Lara   YOB: 1970   Date of Visit: 1/4/2024       To Whom it May Concern:    Christina Lara is under my professional care. She was seen in the hospital from 1/4/2024 to 01/04/24. She may return to work on 1/15/24 without limitations.    If you have any questions or concerns, please don't hesitate to call.         Sincerely,          Luis Ta PA-C

## 2024-01-04 NOTE — TELEPHONE ENCOUNTER
"Called patient and informed that ablation procedures are overnight stays. Patient verbalized understanding.       Thanks,  Bobbi \"Yessica\" Yair     "

## 2024-01-05 DIAGNOSIS — M51.16 INTERVERTEBRAL DISC DISORDER WITH RADICULOPATHY OF LUMBAR REGION: ICD-10-CM

## 2024-01-05 RX ORDER — TRAMADOL HYDROCHLORIDE 50 MG/1
50 TABLET ORAL 2 TIMES DAILY PRN
Qty: 30 TABLET | Refills: 0 | Status: SHIPPED | OUTPATIENT
Start: 2024-01-05 | End: 2024-01-20

## 2024-01-05 NOTE — TELEPHONE ENCOUNTER
Pt informed that Gissell ordered a repeated inj but we will first need to get permission from cardiologist, Dr. Heath if she can hold the Eliquis. Told pt if he approves then the  will call her to set up inj and provide instructions on holding Eliquis.   If he does not give approval then we will have to get other options from Gissell. RN izzy gave pt ovs w/ Gissell for 2/9/24.    Pt informed a short Rx for tramadol was sent to her pharmacy to use until we know if she able to repeat the inj.   Pt appreciative and will wait to hear from our office once we had heard from her cardiologist.

## 2024-01-05 NOTE — TELEPHONE ENCOUNTER
Rodolfo -okay to repeat bilateral L5 TFESI, please schedule.  Patient was recently placed on Eliquis, so unsure if she would be able to stop it,     Please check with cardiology.     Clinical -I sent a short prescription of tramadol to her pharmacy that she can take until we know if she is able to repeat the injection.  She can  today

## 2024-01-05 NOTE — TELEPHONE ENCOUNTER
RN s/w pt who was asking for another inj for her LB pain. She stated her last inj in oct took a couple of weeks but did provide relief. The LB pain is returning and is now 8-9/10. Denies leg pain.   She apologized for not calling back yest but she had a cardiac ablation done, she was started on Eliquis yest by Dr. Daquan Heath,  cardiology.  Told pt some of our inj require Eliquis to be held for 3 days and we would have to get permission from Dr. Heath if and when her Eliquis could be held.   Pt said Tylenol didn't touch her, the cyclobenzaprine made her too drowsy so her PCP prescribed Robaxin which she takes 1-2 times a day PRN and she uses a tramadol when the pain is really bad like now. LBP hurts even more today b/c of lying on the table so long for her cardiac ablation. Pt still has a few tramadol left from the Rx from Oct.  Pt asking for another script to be sent to [facility]?  Pt never had the lumbar MRI done that was ordered at [facility], she had a lot going on but will call and schedule.  Told pt I will forward update to Gissell, unsure if same inj can be ordered and if not what next step is? Decided to also give pt an ovs for 2/9/24 w/ Gissell.

## 2024-01-08 ENCOUNTER — TELEPHONE (OUTPATIENT)
Dept: PAIN MEDICINE | Facility: CLINIC | Age: 54
End: 2024-01-08

## 2024-01-08 DIAGNOSIS — I48.92 ATRIAL FLUTTER (HCC): ICD-10-CM

## 2024-01-08 NOTE — TELEPHONE ENCOUNTER
This patient is being considered for a neuraxial injection for the management of their pain. However, the patient is currently on an anticoagulant per your orders. The American Society of Regional Anesthesia Guideline on neuraxial procedures and anti-coagulation indicates that medication should be held as follows: Eliquis 3 days prior to injection.   Please advise if you ok the hold of this medication.    Thank you,    Rodolfo Townsend  Procedure   [facility]

## 2024-01-10 ENCOUNTER — REMOTE DEVICE CLINIC VISIT (OUTPATIENT)
Dept: CARDIOLOGY CLINIC | Facility: CLINIC | Age: 54
End: 2024-01-10
Payer: COMMERCIAL

## 2024-01-10 DIAGNOSIS — Z95.818 PRESENCE OF OTHER CARDIAC IMPLANTS AND GRAFTS: Primary | ICD-10-CM

## 2024-01-10 PROCEDURE — 93298 REM INTERROG DEV EVAL SCRMS: CPT | Performed by: STUDENT IN AN ORGANIZED HEALTH CARE EDUCATION/TRAINING PROGRAM

## 2024-01-10 NOTE — PROGRESS NOTES
"MDT LNQ22/ ACTIVE SYSTEM IS MRI CONDITIONAL   CARELINK TRANSMISSION: LOOP RECORDER. PRESENTING RHYTHM VS @ 72 BPM. BATTERY STATUS \"OK.\" NO PATIENT OR DEVICE ACTIVATED EPISODES. HOWEVER THERE IS AN EGRAM PREVIOUSLY ADDRESSED IN ALERT. NORMAL DEVICE FUNCTION. DL   "

## 2024-01-11 ENCOUNTER — OFFICE VISIT (OUTPATIENT)
Dept: FAMILY MEDICINE CLINIC | Facility: CLINIC | Age: 54
End: 2024-01-11
Payer: COMMERCIAL

## 2024-01-11 VITALS
BODY MASS INDEX: 22.76 KG/M2 | DIASTOLIC BLOOD PRESSURE: 57 MMHG | HEART RATE: 78 BPM | TEMPERATURE: 97.5 F | HEIGHT: 67 IN | WEIGHT: 145 LBS | RESPIRATION RATE: 16 BRPM | OXYGEN SATURATION: 97 % | SYSTOLIC BLOOD PRESSURE: 100 MMHG

## 2024-01-11 DIAGNOSIS — J06.9 UPPER RESPIRATORY TRACT INFECTION, UNSPECIFIED TYPE: Primary | ICD-10-CM

## 2024-01-11 PROCEDURE — 99213 OFFICE O/P EST LOW 20 MIN: CPT | Performed by: STUDENT IN AN ORGANIZED HEALTH CARE EDUCATION/TRAINING PROGRAM

## 2024-01-11 RX ORDER — FLUTICASONE PROPIONATE 50 MCG
1 SPRAY, SUSPENSION (ML) NASAL DAILY
Qty: 15.8 ML | Refills: 0 | Status: SHIPPED | OUTPATIENT
Start: 2024-01-11

## 2024-01-11 RX ORDER — AZITHROMYCIN 250 MG/1
TABLET, FILM COATED ORAL DAILY
Qty: 6 TABLET | Refills: 0 | Status: SHIPPED | OUTPATIENT
Start: 2024-01-11 | End: 2024-01-16

## 2024-01-11 NOTE — ASSESSMENT & PLAN NOTE
Onset ~ 10 days ago, patient reports worsening mucus production.   Have advised conservative therapy with flonase saline rinse, and claritin.   Patient requesting z-pack as it has helped her in the past, given time course will prescribe azithromycin

## 2024-01-11 NOTE — PROGRESS NOTES
Name: Christina Lara      : 1970      MRN: [id number]  Encounter Provider: Ivanna Em MD  Encounter Date: 2024   Encounter department: [facility]    Assessment & Plan     1. Upper respiratory tract infection, unspecified type  Assessment & Plan:  Onset ~ 10 days ago, patient reports worsening mucus production.   Have advised conservative therapy with flonase saline rinse, and claritin.   Patient requesting z-pack as it has helped her in the past, given time course will prescribe azithromycin     Orders:  -     fluticasone (FLONASE) 50 mcg/act nasal spray; 1 spray into each nostril daily  -     azithromycin (Zithromax) 250 mg tablet; Take 2 tablets (500 mg total) by mouth daily for 1 day, THEN 1 tablet (250 mg total) daily for 4 days.           Subjective     HPI  This is a very pleasant 53 y.o. female who presents to the clinic for URI symptoms. Patient reports sinus pain and pressure, congestion, ear pain and dullness, lightheadedness, productive cough of yellow tinged mucus. Symptom onset approximately 10 days ago. Denies fever, n/v/d, rash. Denies sick contact.   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   Has been taking nyquil/dayquil with limited relief.    Review of Systems   Constitutional:  Negative for chills and fever.   HENT:  Positive for congestion, ear pain, rhinorrhea and sinus pain.    Eyes:  Negative for visual disturbance.   Respiratory:  Positive for cough. Negative for chest tightness, shortness of breath and wheezing.    Cardiovascular:  Negative for chest pain and palpitations.   Gastrointestinal:  Negative for abdominal pain, constipation, diarrhea and vomiting.   Endocrine: Negative for polyuria.   Genitourinary:  Negative for dysuria.   Musculoskeletal:  Negative for arthralgias and myalgias.   Neurological:  Negative for dizziness, syncope and light-headedness.       Past Medical History:    Diagnosis Date    Acute right ankle pain 2023    ADHD (attention deficit hyperactivity disorder)     Anxiety     Bulging lumbar disc     Carpal tunnel syndrome     RIGHT.  LAST ASSESSED: 16    Chronic back pain     low    Chronic pain disorder     Colon polyps     COVID-19     2021    Depression     Diabetes mellitus (HCC)     GERD (gastroesophageal reflux disease)     Gestational diabetes     Hearing loss     left ear    Heart disease     SVT    History of pre-eclampsia     Hyperlipidemia     Resolved with weight loss    Hyponatremia 2020    IBS (irritable bowel syndrome)     Ileus (HCC)     LAST ASSESSED: 8/3/17    Labial cyst     LAST ASSESSED: 16    Myofascial pain     LAST ASSESSED: 16    Neck mass 2023    Obesity     Ovarian cyst     LEFT. LAST ASSESSED: 16    Panic attack     Pneumonia     Sacroiliitis (HCC)     Seasonal allergies     Sprain of anterior talofibular ligament of right ankle 2023    SVT (supraventricular tachycardia)     Thoracic outlet syndrome     2010    Trochanteric bursitis of both hips     LAST ASSESSED: 3/18/16    Ulnar neuropathy at elbow     Varicella     Wears glasses      Past Surgical History:   Procedure Laterality Date    BARIATRIC SURGERY  2022    BILE DUCT EXPLORATION      ENDOSCOPIC REMOVAL OF STONES FROM BILIARY TRACT    CARDIAC ELECTROPHYSIOLOGY PROCEDURE N/A 2024    Procedure: Cardiac eps/svt ablation;  Surgeon: Daquan Heath MD;  Location: [facility];  Service: Cardiology     SECTION      x3    CHOLECYSTECTOMY      COLONOSCOPY      -polyp, -wnl, repeat5 years    DILATION AND CURETTAGE OF UTERUS      ENDOMETRIAL ABLATION      ERCP W/ SPHICTEROTOMY      FIRST RIB REMOVAL      THORAX EXCISION OF FIRST RIB    GASTRIC BYPASS  2022    HYSTEROSCOPY      NEUROPLASTY / TRANSPOSITION ULNAR NERVE AT ELBOW Right     NE COLONOSCOPY FLX DX W/COLLJ SPEC WHEN PFRMD N/A 2017    Procedure: COLONOSCOPY;   Surgeon: Oscar Velázquez MD;  Location: [facility];  Service: Gastroenterology    MN ESOPHAGOGASTRODUODENOSCOPY TRANSORAL DIAGNOSTIC N/A 09/14/2017    Procedure: ESOPHAGOGASTRODUODENOSCOPY (EGD);  Surgeon: Sadiq Car MD;  Location: [facility];  Service: Gastroenterology    MN HYSTEROSCOPY BX ENDOMETRIUM&/POLYPC W/WO D&C N/A 10/20/2017    Procedure: DILATATION AND CURETTAGE (D&C) WITH HYSTEROSCOPY  REMOVAL VULVAR RT. LESION;  Surgeon: Lucila Ortiz MD;  Location[facility];  Service: Gynecology    MN ALDANA IMPLTJ NSTIM ELTRDS PLATE/PADDLE EDRL Left 01/29/2020    Procedure: Insertion of thoracic spinal cord stimulator electrode via laminotomy and placement of left buttock implantable pulse generator (NEUROMONITORING);  Surgeon: Ad Mehta MD;  [facility];  Service: Neurosurgery    MN LAPS GSTRC RSTRICTIV PX LONGITUDINAL GASTRECTOMY N/A 02/06/2018    Procedure: GASTRECTOMY SLEEVE LAPAROSCOPIC; INTRAOPERATIVE EGD ;  Surgeon: Abimael Juarez MD;  [facility];  Service: Bariatrics    MN LAPS GSTRC RSTRICTIV PX LONGITUDINAL GASTRECTOMY N/A 08/08/2022    Procedure: Diagnostic Lap; Extensive Lysis of Adhesions; LAPAROSCOPIC REVISION CONVERSION TO NOE-EN-Y GASTRIC BYPASS AND INTRAOPERATIVE EGD;  Surgeon: Abimael Juarez MD;  [facility];  Service: Bariatrics    SLEEVE GASTROPLASTY      SPINAL CORD STIMULATOR TRIAL W/ LAMINOTOMY      TONSILLECTOMY AND ADENOIDECTOMY      TUBAL LIGATION      UPPER GASTROINTESTINAL ENDOSCOPY      US GUIDED LYMPH NODE BIOPSY LEFT  05/22/2023    VEIN LIGATION AND STRIPPING Right     VULVA SURGERY  10/20/2017    BIOPSY    WISDOM TOOTH EXTRACTION       Family History   Problem Relation Age of Onset    Diabetes Mother     Breast cancer Mother         >50    BRCA1 Negative Mother     BRCA2 Negative Mother     Diabetes Father     Other Father         traumatic brain injury    Prostate cancer Father     Alcohol abuse Father         in remission    Heart disease Father      Neuropathy Father     Hyperlipidemia Father     Cancer Father         Prostate    Hypertension Brother     Diabetes Brother     Other Brother         HYPERCHOLESTEROLEMIA    Alcohol abuse Brother     Depression Brother         attempted suicide    Diabetes Brother     Alcohol abuse Brother     Heart attack Maternal Grandmother     Colon cancer Maternal Grandfather     No Known Problems Paternal Grandmother         dad is adopted    No Known Problems Paternal Grandfather         dad is adopted    No Known Problems Daughter     Asthma Son     Ovarian cancer Neg Hx     Uterine cancer Neg Hx      Social History     Socioeconomic History    Marital status: /Civil Union     Spouse name: Abel    Number of children: 3    Years of education: GED then 2 year college program    Highest education level: None   Occupational History    Occupation: ER TECH     Employer: [employer]   Tobacco Use    Smoking status: Former     Current packs/day: 0.00     Average packs/day: 1 pack/day for 15.0 years (15.0 ttl pk-yrs)     Types: Cigarettes     Start date: 4/30/1998     Quit date: 4/30/2013     Years since quitting: 10.7     Passive exposure: Never    Smokeless tobacco: Never   Vaping Use    Vaping status: Never Used   Substance and Sexual Activity    Alcohol use: Not Currently     Alcohol/week: 0.0 - 1.0 standard drinks of alcohol     Comment: once weekly    Drug use: No    Sexual activity: Yes     Partners: Male     Birth control/protection: OCP     Comment: lifetime partners: 6; current partner 2013   Other Topics Concern    None   Social History Narrative    Yazidi: no preference    Accepts blood products        Exercise: unable with back issues and SVT    Calcium: calcium supplement, multivitamin for women over 50, 1 yogurt daily     Social Determinants of Health     Financial Resource Strain: Medium Risk (12/31/2020)    Overall Financial Resource Strain (CARDIA)     Difficulty of Paying Living  Expenses: Somewhat hard   Food Insecurity: No Food Insecurity (12/31/2020)    Hunger Vital Sign     Worried About Running Out of Food in the Last Year: Never true     Ran Out of Food in the Last Year: Never true   Transportation Needs: No Transportation Needs (12/31/2020)    PRAPARE - Transportation     Lack of Transportation (Medical): No     Lack of Transportation (Non-Medical): No   Physical Activity: Unknown (5/9/2019)    Exercise Vital Sign     Days of Exercise per Week: 0 days     Minutes of Exercise per Session: Not on file   Stress: Stress Concern Present (5/9/2019)    [employer] [employer] - Occupational Stress Questionnaire     Feeling of Stress : Very much   Social Connections: Socially Isolated (5/9/2019)    Social Connection and Isolation Panel [NHANES]     Frequency of Communication with Friends and Family: Once a week     Frequency of Social Gatherings with Friends and Family: Once a week     Attends Nondenominational Services: Never     Active Member of Clubs or Organizations: No     Attends Club or Organization Meetings: Never     Marital Status:    Intimate Partner Violence: Not At Risk (5/9/2019)    Humiliation, Afraid, Rape, and Kick questionnaire     Fear of Current or Ex-Partner: No     Emotionally Abused: No     Physically Abused: No     Sexually Abused: No   Housing Stability: Not on file     Current Outpatient Medications on File Prior to Visit   Medication Sig    apixaban (Eliquis) 5 mg Take 1 tablet (5 mg total) by mouth 2 (two) times a day    atoMOXetine (STRATTERA) 60 mg capsule Take 1 capsule (60 mg total) by mouth daily    atorvastatin (LIPITOR) 20 mg tablet Take 1 tablet (20 mg total) by mouth daily    calcium carbonate (OS-MELODY) 600 MG tablet Take 600 mg by mouth 2 (two) times a day with meals    drospirenone-ethinyl estradiol (LIEN) 3-0.02 MG per tablet Take 1 tablet by mouth daily    estradiol (ESTRACE VAGINAL) 0.1 mg/g vaginal cream One gram vaginally at night x  14 days then twice weekly for ongoing maintenance. (Patient taking differently: if needed One gram vaginally at night x 14 days then twice weekly for ongoing maintenance.)    flecainide (TAMBOCOR) 50 mg tablet Take 1 tablet (50 mg total) by mouth 2 (two) times a day    lidocaine (Lidoderm) 5 % Apply 1 patch topically over 12 hours daily Remove & Discard patch within 12 hours or as directed by MD    meclizine (ANTIVERT) 25 mg tablet Take 1 tablet (25 mg total) by mouth every 8 (eight) hours as needed for dizziness    montelukast (SINGULAIR) 10 mg tablet Take 1 tablet (10 mg total) by mouth daily at bedtime    Multiple Vitamins-Minerals (MULTI COMPLETE PO) Take by mouth    QUEtiapine (SEROquel) 100 mg tablet Take 1 tablet (100 mg total) by mouth daily at bedtime    traMADol (Ultram) 50 mg tablet Take 1 tablet (50 mg total) by mouth 2 (two) times a day as needed for moderate pain for up to 15 days    venlafaxine (EFFEXOR) 100 MG tablet Take 1 tablet (100 mg total) by mouth 3 (three) times a day    cyclobenzaprine (FLEXERIL) 10 mg tablet Take 1 tablet (10 mg total) by mouth 2 (two) times a day as needed for muscle spasms for up to 7 days    lamoTRIgine (LaMICtal) 150 MG tablet Take 1 tablet (150 mg total) by mouth 2 (two) times a day    methocarbamol (ROBAXIN) 750 mg tablet Take 1 tablet (750 mg total) by mouth every 8 (eight) hours    nitrofurantoin (MACROBID) 100 mg capsule Take 1 tablet PO PRN after sexual intercourse (Patient not taking: Reported on 1/11/2024)    omeprazole (PriLOSEC) 20 mg delayed release capsule Take 1 capsule (20 mg total) by mouth daily     Allergies   Allergen Reactions    Ibuprofen Other (See Comments)     Due to gastric sleeve -can only take for 5 days, then needs to stop     Immunization History   Administered Date(s) Administered    COVID-19 PFIZER VACCINE 0.3 ML IM 03/06/2021, 03/25/2021, 12/11/2021    INFLUENZA 11/27/2015, 10/01/2016, 10/06/2017, 12/12/2018, 10/19/2021, 09/29/2023     "Influenza Quadrivalent Preservative Free 3 years and older IM 10/01/2016    Influenza Quadrivalent, 6-35 Months IM 11/27/2015    Influenza, injectable, quadrivalent, preservative free 0.5 mL 09/29/2023    Influenza, recombinant, quadrivalent,injectable, preservative free 10/17/2019, 10/09/2020, 09/07/2022    Influenza, seasonal, injectable 09/01/2011, 10/20/2013, 10/06/2017    Pneumococcal Conjugate Vaccine 20-valent (Pcv20), Polysace 07/25/2022    Tdap 09/01/2011, 08/12/2022       Objective     /57 (BP Location: Right arm)   Pulse 78   Temp 97.5 °F (36.4 °C) (Tympanic)   Resp 16   Ht 5' 7\" (1.702 m)   Wt 65.8 kg (145 lb)   LMP 01/07/2019 (Approximate)   SpO2 97%   BMI 22.71 kg/m²     Physical Exam  Constitutional:       Appearance: Normal appearance.   HENT:      Head: Normocephalic and atraumatic.      Nose: Nose normal.   Eyes:      Conjunctiva/sclera: Conjunctivae normal.   Cardiovascular:      Rate and Rhythm: Normal rate and regular rhythm.      Heart sounds: Normal heart sounds.   Pulmonary:      Effort: Pulmonary effort is normal.      Breath sounds: Normal breath sounds.   Musculoskeletal:         General: Normal range of motion.      Cervical back: Normal range of motion.   Skin:     General: Skin is warm and dry.   Neurological:      Mental Status: She is alert and oriented to person, place, and time.   Psychiatric:         Behavior: Behavior normal.       Ivanna Em MD    "

## 2024-01-16 ENCOUNTER — TELEPHONE (OUTPATIENT)
Dept: PSYCHIATRY | Facility: CLINIC | Age: 54
End: 2024-01-16

## 2024-01-16 NOTE — TELEPHONE ENCOUNTER
Reached out to pt regarding Neuropsychology referral and scheduling evaluation.   Spoke with pt, scheduled pt to see  on 2/23/24 at 9:00 am.

## 2024-01-22 DIAGNOSIS — E78.2 MIXED HYPERLIPIDEMIA: ICD-10-CM

## 2024-01-22 RX ORDER — ATORVASTATIN CALCIUM 20 MG/1
20 TABLET, FILM COATED ORAL DAILY
Qty: 90 TABLET | Refills: 0 | Status: SHIPPED | OUTPATIENT
Start: 2024-01-22

## 2024-01-22 NOTE — TELEPHONE ENCOUNTER
Reason for call:   [x] Refill   [] Prior Auth  [] Other:     Office:   [x] PCP/Provider - Debo Gee MD    [] Specialty/Provider -     Medication: atorvastatin (LIPITOR) 20 mg tablet     Dose/Frequency: 20 mg, Oral, Daily     Quantity: 90    Pharmacy: [facility] - [place] - [address]     Does the patient have enough for 3 days?   [] Yes   [x] No - Send as HP to POD

## 2024-01-23 ENCOUNTER — TELEMEDICINE (OUTPATIENT)
Dept: BEHAVIORAL/MENTAL HEALTH CLINIC | Facility: CLINIC | Age: 54
End: 2024-01-23
Payer: COMMERCIAL

## 2024-01-23 DIAGNOSIS — F90.0 ADHD (ATTENTION DEFICIT HYPERACTIVITY DISORDER), INATTENTIVE TYPE: Chronic | ICD-10-CM

## 2024-01-23 DIAGNOSIS — F41.1 GENERALIZED ANXIETY DISORDER: Chronic | ICD-10-CM

## 2024-01-23 DIAGNOSIS — F43.10 POST TRAUMATIC STRESS DISORDER (PTSD): Chronic | ICD-10-CM

## 2024-01-23 DIAGNOSIS — F33.2 MDD (MAJOR DEPRESSIVE DISORDER), RECURRENT SEVERE, WITHOUT PSYCHOSIS (HCC): Primary | ICD-10-CM

## 2024-01-23 PROCEDURE — 90834 PSYTX W PT 45 MINUTES: CPT | Performed by: SOCIAL WORKER
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
## 2024-01-26 ENCOUNTER — TELEPHONE (OUTPATIENT)
Dept: CARDIOLOGY CLINIC | Facility: CLINIC | Age: 54
End: 2024-01-26

## 2024-01-26 NOTE — TELEPHONE ENCOUNTER
Spoke to Ms. Lara.   Let her know that she hasn't had any arrhythmias on her loop recorder.  I performed a CTI ablation and was not near any structures whose injury could explain her symptoms. Advised her to report to the ER if they get worse and to see her PCP next week so she can be examined.  She is in agreement and comfortable with the plan.

## 2024-01-26 NOTE — TELEPHONE ENCOUNTER
Spoke with pt. First few days after her 01/04 SVT ablation, she felt great but starting on 01/22, she noticed she was taking very deep breaths. She also c/o dizziness, lightheadedness, occasional chest tightness, and fatigue. Device report from her loop will be uploaded shortly.    Please advise. Thank you!

## 2024-01-26 NOTE — TELEPHONE ENCOUNTER
Pt. Called stating she recently had an ablation and that she knows her oxygen is okay but she feels she isn't taking a full breath in. I forwarded her call to the MA's and advised to leave a vm if noone answers. Went to the back and gave Meredith pt's information along with a breif explanation.

## 2024-01-29 ENCOUNTER — TELEPHONE (OUTPATIENT)
Age: 54
End: 2024-01-29

## 2024-01-29 NOTE — TELEPHONE ENCOUNTER
The patient is having issues after ablation, shortness of breath and pressure behind the eyes and eyes blacked out.  I tried to schedule at 3:15 on 1/30/24 but it was SAME DAY and I could not.  I spoke to the office and they said OK but they need to put patient in the SAME DAY spot.  [phone] please call patient

## 2024-01-30 ENCOUNTER — OFFICE VISIT (OUTPATIENT)
Dept: FAMILY MEDICINE CLINIC | Facility: CLINIC | Age: 54
End: 2024-01-30
Payer: COMMERCIAL

## 2024-01-30 VITALS
TEMPERATURE: 97.4 F | RESPIRATION RATE: 17 BRPM | OXYGEN SATURATION: 98 % | BODY MASS INDEX: 23.17 KG/M2 | DIASTOLIC BLOOD PRESSURE: 60 MMHG | SYSTOLIC BLOOD PRESSURE: 100 MMHG | HEART RATE: 70 BPM | HEIGHT: 67 IN | WEIGHT: 147.6 LBS

## 2024-01-30 DIAGNOSIS — F33.2 MDD (MAJOR DEPRESSIVE DISORDER), RECURRENT SEVERE, WITHOUT PSYCHOSIS (HCC): ICD-10-CM

## 2024-01-30 DIAGNOSIS — H53.9 VISION CHANGES: ICD-10-CM

## 2024-01-30 DIAGNOSIS — R06.02 SHORTNESS OF BREATH: Primary | ICD-10-CM

## 2024-01-30 DIAGNOSIS — Z13.820 OSTEOPOROSIS SCREENING: ICD-10-CM

## 2024-01-30 PROCEDURE — 99214 OFFICE O/P EST MOD 30 MIN: CPT | Performed by: FAMILY MEDICINE

## 2024-01-30 NOTE — PROGRESS NOTES
Name: Christina Lara      : 1970      MRN: [id number]  Encounter Provider: Debo Gee MD  Encounter Date: 2024   Encounter department: [facility]    Assessment & Plan     1. Shortness of breath  Comments:  normal exam   to get chest X ray   ? anxiety related  continue to monitor  Orders:  -     CBC and differential  -     Comprehensive metabolic panel  -     XR chest pa & lateral; Future; Expected date: 2024    2. Osteoporosis screening  -     DXA bone density spine hip and pelvis; Future; Expected date: 2024    3. MDD (major depressive disorder), recurrent severe, without psychosis (HCC)  Assessment & Plan:  On effexor      4. Vision changes  Comments:  stable exam  to f/u with ophthalmologist  Orders:  -     Ambulatory Referral to Ophthalmology; Future           Subjective     HPI  First few days after her  SVT ablation, she felt great but starting on , she noticed she was taking very deep breaths and c/o dizziness, lightheadedness, occasional chest tightness, and fatigue   Chest tightness is better but she still finds herself taking more deep breaths  Pressure and Loss of vision on both eyes lasting less than 1 minute and got better on her own 2024. No more episode since then . She ate half of bagel and cream cheese with caramel ice coffee 5 minutes prior to the vision loss   Feels well today overall        Review of Systems   Constitutional: Negative.    HENT: Negative.     Eyes: Negative.    Respiratory:  Positive for shortness of breath. Negative for chest tightness, wheezing and stridor.    Cardiovascular: Negative.    Musculoskeletal: Negative.    Skin: Negative.    Hematological: Negative.    Psychiatric/Behavioral:  The patient is nervous/anxious.        Past Medical History:   Diagnosis Date   • Acute right ankle pain 2023   • ADHD (attention deficit hyperactivity disorder)    • Allergic    • Anxiety    • Bulging lumbar disc    • Carpal  tunnel syndrome     RIGHT.  LAST ASSESSED: 16   • Chronic back pain     low   • Chronic pain disorder    • Colon polyps    • COVID-19     2021   • Depression    • Diabetes mellitus (HCC)    • GERD (gastroesophageal reflux disease)    • Gestational diabetes    • Hearing loss     left ear   • Heart disease     SVT   • History of pre-eclampsia    • Hyperlipidemia     Resolved with weight loss   • Hyponatremia 2020   • IBS (irritable bowel syndrome)    • Ileus (HCC)     LAST ASSESSED: 8/3/17   • Labial cyst     LAST ASSESSED: 16   • Memory loss    • Myofascial pain     LAST ASSESSED: 16   • Neck mass 2023   • Obesity    • Ovarian cyst     LEFT. LAST ASSESSED: 16   • Panic attack    • Pneumonia    • Sacroiliitis (HCC)    • Seasonal allergies    • Sprain of anterior talofibular ligament of right ankle 2023   • SVT (supraventricular tachycardia)    • Thoracic outlet syndrome        • Trochanteric bursitis of both hips     LAST ASSESSED: 3/18/16   • Ulnar neuropathy at elbow    • Varicella    • Wears glasses      Past Surgical History:   Procedure Laterality Date   • BARIATRIC SURGERY  2022   • BILE DUCT EXPLORATION      ENDOSCOPIC REMOVAL OF STONES FROM BILIARY TRACT   • CARDIAC ELECTROPHYSIOLOGY PROCEDURE N/A 2024    Procedure: Cardiac eps/svt ablation;  Surgeon: Daquan Heath MD;  [facility];  Service: Cardiology   •  SECTION      x3   • CHOLECYSTECTOMY     • COLONOSCOPY      2017-polyp, -wnl, repeat5 years   • DILATION AND CURETTAGE OF UTERUS     • ENDOMETRIAL ABLATION     • ERCP W/ SPHICTEROTOMY     • FIRST RIB REMOVAL      THORAX EXCISION OF FIRST RIB   • GASTRIC BYPASS  2022   • HYSTEROSCOPY     • NEUROPLASTY / TRANSPOSITION ULNAR NERVE AT ELBOW Right    • NJ COLONOSCOPY FLX DX W/COLLJ SPEC WHEN PFRMD N/A 2017    Procedure: COLONOSCOPY;  Surgeon: Oscar Velázquez MD;  [facility]: [facility];  Service: Gastroenterology   • NJ  ESOPHAGOGASTRODUODENOSCOPY TRANSORAL DIAGNOSTIC N/A 09/14/2017    Procedure: ESOPHAGOGASTRODUODENOSCOPY (EGD);  Surgeon: Sadiq Car MD;  Location: [facility];  Service: Gastroenterology   • CO HYSTEROSCOPY BX ENDOMETRIUM&/POLYPC W/WO D&C N/A 10/20/2017    Procedure: DILATATION AND CURETTAGE (D&C) WITH HYSTEROSCOPY  REMOVAL VULVAR RT. LESION;  Surgeon: Lucila Ortiz MD;  [facility];  Service: Gynecology   • CO ALDANA IMPLTJ NSTIM ELTRDS PLATE/PADDLE EDRL Left 01/29/2020    Procedure: Insertion of thoracic spinal cord stimulator electrode via laminotomy and placement of left buttock implantable pulse generator (NEUROMONITORING);  Surgeon: Ad Mehta MD;  [facility];  Service: Neurosurgery   • CO LAPS GSTRC RSTRICTIV PX LONGITUDINAL GASTRECTOMY N/A 02/06/2018    Procedure: GASTRECTOMY SLEEVE LAPAROSCOPIC; INTRAOPERATIVE EGD ;  Surgeon: Abimael Juarez MD;  [facility];  Service: Bariatrics   • CO LAPS GSTRC RSTRICTIV PX LONGITUDINAL GASTRECTOMY N/A 08/08/2022    Procedure: Diagnostic Lap; Extensive Lysis of Adhesions; LAPAROSCOPIC REVISION CONVERSION TO NOE-EN-Y GASTRIC BYPASS AND INTRAOPERATIVE EGD;  Surgeon: Abimael Juarez MD;  [facility];  Service: Bariatrics   • SLEEVE GASTROPLASTY     • SPINAL CORD STIMULATOR TRIAL W/ LAMINOTOMY     • TONSILLECTOMY AND ADENOIDECTOMY     • TUBAL LIGATION     • UPPER GASTROINTESTINAL ENDOSCOPY     • US GUIDED LYMPH NODE BIOPSY LEFT  05/22/2023   • VEIN LIGATION AND STRIPPING Right    • VULVA SURGERY  10/20/2017    BIOPSY   • WISDOM TOOTH EXTRACTION       Family History   Problem Relation Age of Onset   • Diabetes Mother    • Breast cancer Mother         >50   • BRCA1 Negative Mother    • BRCA2 Negative Mother    • Hyperlipidemia Mother    • Cancer Mother         Breast   • Diabetes Father    • Other Father         traumatic brain injury   • Prostate cancer Father    • Alcohol abuse Father         in remission   • Heart disease Father    •  Neuropathy Father    • Hyperlipidemia Father    • Cancer Father         Prostate   • Hypertension Brother    • Diabetes Brother    • Other Brother         HYPERCHOLESTEROLEMIA   • Alcohol abuse Brother    • Depression Brother         attempted suicide   • Anxiety disorder Brother    • Suicide Attempts Brother    • Diabetes Brother    • Alcohol abuse Brother    • Heart attack Maternal Grandmother    • Colon cancer Maternal Grandfather    • No Known Problems Paternal Grandmother         dad is adopted   • No Known Problems Paternal Grandfather         dad is adopted   • No Known Problems Daughter    • Asthma Son    • Ovarian cancer Neg Hx    • Uterine cancer Neg Hx      Social History     Socioeconomic History   • Marital status: /Civil Union     Spouse name: Abel   • Number of children: 3   • Years of education: GED then 2 year college program   • Highest education level: None   Occupational History   • Occupation: ER TECH     Employer: [employer]   Tobacco Use   • Smoking status: Former     Current packs/day: 0.00     Average packs/day: 1 pack/day for 15.0 years (15.0 ttl pk-yrs)     Types: Cigarettes     Start date: 4/30/1998     Quit date: 4/30/2013     Years since quitting: 10.7     Passive exposure: Never   • Smokeless tobacco: Never   Vaping Use   • Vaping status: Never Used   Substance and Sexual Activity   • Alcohol use: Yes     Alcohol/week: 4.0 standard drinks of alcohol     Types: 2 Shots of liquor, 2 Standard drinks or equivalent per week     Comment: On weekends that i go out   • Drug use: No   • Sexual activity: Yes     Partners: Male     Birth control/protection: OCP, Post-menopausal     Comment: lifetime partners: 6; current partner 2013   Other Topics Concern   • None   Social History Narrative    Christianity: no preference    Accepts blood products        Exercise: unable with back issues and SVT    Calcium: calcium supplement, multivitamin for women over 50, 1 yogurt daily      Social Determinants of Health     Financial Resource Strain: Medium Risk (12/31/2020)    Overall Financial Resource Strain (CARDIA)    • Difficulty of Paying Living Expenses: Somewhat hard   Food Insecurity: No Food Insecurity (12/31/2020)    Hunger Vital Sign    • Worried About Running Out of Food in the Last Year: Never true    • Ran Out of Food in the Last Year: Never true   Transportation Needs: No Transportation Needs (12/31/2020)    PRAPARE - Transportation    • Lack of Transportation (Medical): No    • Lack of Transportation (Non-Medical): No   Physical Activity: Unknown (5/9/2019)    Exercise Vital Sign    • Days of Exercise per Week: 0 days    • Minutes of Exercise per Session: Not on file   Stress: Stress Concern Present (5/9/2019)    [employer] - Occupational Stress Questionnaire    • Feeling of Stress : Very much   Social Connections: Socially Isolated (5/9/2019)    Social Connection and Isolation Panel [NHANES]    • Frequency of Communication with Friends and Family: Once a week    • Frequency of Social Gatherings with Friends and Family: Once a week    • Attends Mormon Services: Never    • Active Member of Clubs or Organizations: No    • Attends Club or Organization Meetings: Never    • Marital Status:    Intimate Partner Violence: Not At Risk (5/9/2019)    Humiliation, Afraid, Rape, and Kick questionnaire    • Fear of Current or Ex-Partner: No    • Emotionally Abused: No    • Physically Abused: No    • Sexually Abused: No   Housing Stability: Not on file     Current Outpatient Medications on File Prior to Visit   Medication Sig   • apixaban (Eliquis) 5 mg Take 1 tablet (5 mg total) by mouth 2 (two) times a day   • atoMOXetine (STRATTERA) 60 mg capsule Take 1 capsule (60 mg total) by mouth daily   • atorvastatin (LIPITOR) 20 mg tablet Take 1 tablet (20 mg total) by mouth daily   • calcium carbonate (OS-MELODY) 600 MG tablet Take 600 mg by mouth 2 (two) times a  day with meals   • cyclobenzaprine (FLEXERIL) 10 mg tablet Take 1 tablet (10 mg total) by mouth 2 (two) times a day as needed for muscle spasms for up to 7 days   • drospirenone-ethinyl estradiol (LIEN) 3-0.02 MG per tablet Take 1 tablet by mouth daily   • estradiol (ESTRACE VAGINAL) 0.1 mg/g vaginal cream One gram vaginally at night x 14 days then twice weekly for ongoing maintenance. (Patient taking differently: if needed One gram vaginally at night x 14 days then twice weekly for ongoing maintenance.)   • flecainide (TAMBOCOR) 50 mg tablet Take 1 tablet (50 mg total) by mouth 2 (two) times a day   • fluticasone (FLONASE) 50 mcg/act nasal spray 1 spray into each nostril daily   • lamoTRIgine (LaMICtal) 150 MG tablet Take 1 tablet (150 mg total) by mouth 2 (two) times a day   • lidocaine (Lidoderm) 5 % Apply 1 patch topically over 12 hours daily Remove & Discard patch within 12 hours or as directed by MD   • meclizine (ANTIVERT) 25 mg tablet Take 1 tablet (25 mg total) by mouth every 8 (eight) hours as needed for dizziness   • methocarbamol (ROBAXIN) 750 mg tablet Take 1 tablet (750 mg total) by mouth every 8 (eight) hours   • montelukast (SINGULAIR) 10 mg tablet Take 1 tablet (10 mg total) by mouth daily at bedtime   • Multiple Vitamins-Minerals (MULTI COMPLETE PO) Take by mouth   • omeprazole (PriLOSEC) 20 mg delayed release capsule Take 1 capsule (20 mg total) by mouth daily   • QUEtiapine (SEROquel) 100 mg tablet Take 1 tablet (100 mg total) by mouth daily at bedtime   • venlafaxine (EFFEXOR) 100 MG tablet Take 1 tablet (100 mg total) by mouth 3 (three) times a day   • nitrofurantoin (MACROBID) 100 mg capsule Take 1 tablet PO PRN after sexual intercourse (Patient not taking: Reported on 1/11/2024)     Allergies   Allergen Reactions   • Ibuprofen Other (See Comments)     Due to gastric sleeve -can only take for 5 days, then needs to stop     Immunization History   Administered Date(s) Administered   • COVID-19  "PFIZER VACCINE 0.3 ML IM 03/06/2021, 03/25/2021, 12/11/2021   • INFLUENZA 11/27/2015, 10/01/2016, 10/06/2017, 12/12/2018, 10/17/2019, 10/09/2020, 10/19/2021, 09/07/2022, 09/29/2023   • Influenza Quadrivalent Preservative Free 3 years and older IM 10/01/2016   • Influenza Quadrivalent, 6-35 Months IM 11/27/2015   • Influenza, injectable, quadrivalent, preservative free 0.5 mL 09/29/2023   • Influenza, recombinant, quadrivalent,injectable, preservative free 10/17/2019, 10/09/2020, 09/07/2022   • Influenza, seasonal, injectable 09/01/2011, 10/20/2013, 10/06/2017   • Pneumococcal Conjugate Vaccine 20-valent (Pcv20), Polysace 07/25/2022   • Tdap 09/01/2011, 08/12/2022       Objective     /60 (BP Location: Left arm, Patient Position: Sitting, Cuff Size: Standard)   Pulse 70   Temp (!) 97.4 °F (36.3 °C) (Tympanic)   Resp 17   Ht 5' 7\" (1.702 m)   Wt 67 kg (147 lb 9.6 oz)   LMP 01/07/2019 (Approximate)   SpO2 98%   BMI 23.12 kg/m²     Physical Exam  Vitals and nursing note reviewed.   Constitutional:       Appearance: She is well-developed.   HENT:      Head: Normocephalic and atraumatic.   Eyes:      Extraocular Movements: Extraocular movements intact.      Pupils: Pupils are equal, round, and reactive to light.   Cardiovascular:      Rate and Rhythm: Normal rate and regular rhythm.   Musculoskeletal:      Cervical back: Normal range of motion and neck supple.   Neurological:      General: No focal deficit present.      Mental Status: She is alert.   Psychiatric:         Mood and Affect: Mood normal.         Behavior: Behavior normal.       Debo Gee MD    "

## 2024-01-31 ENCOUNTER — HOSPITAL ENCOUNTER (OUTPATIENT)
Dept: RADIOLOGY | Facility: HOSPITAL | Age: 54
Discharge: HOME | End: 2024-01-31

## 2024-01-31 DIAGNOSIS — Z96.89 S/P INSERTION OF SPINAL CORD STIMULATOR: ICD-10-CM

## 2024-02-01 ENCOUNTER — HOSPITAL ENCOUNTER (OUTPATIENT)
Dept: MRI IMAGING | Facility: HOSPITAL | Age: 54
Discharge: HOME | End: 2024-02-01
Payer: OTHER MISCELLANEOUS

## 2024-02-01 DIAGNOSIS — M51.16 INTERVERTEBRAL DISC DISORDER WITH RADICULOPATHY OF LUMBAR REGION: ICD-10-CM

## 2024-02-01 PROCEDURE — 72148 MRI LUMBAR SPINE W/O DYE: CPT

## 2024-02-01 PROCEDURE — G1004 CDSM NDSC: HCPCS

## 2024-02-01 NOTE — QUICK NOTE
I was onsite for this exam. Lexus and Melida watched this patient put her stimulator into MRI mode.

## 2024-02-09 ENCOUNTER — OFFICE VISIT (OUTPATIENT)
Dept: PAIN MEDICINE | Facility: CLINIC | Age: 54
End: 2024-02-09
Payer: OTHER MISCELLANEOUS

## 2024-02-09 VITALS
HEIGHT: 67 IN | BODY MASS INDEX: 22.91 KG/M2 | WEIGHT: 146 LBS | RESPIRATION RATE: 16 BRPM | DIASTOLIC BLOOD PRESSURE: 74 MMHG | HEART RATE: 89 BPM | SYSTOLIC BLOOD PRESSURE: 117 MMHG

## 2024-02-09 DIAGNOSIS — M70.61 GREATER TROCHANTERIC BURSITIS OF RIGHT HIP: ICD-10-CM

## 2024-02-09 DIAGNOSIS — G89.4 CHRONIC PAIN SYNDROME: Primary | ICD-10-CM

## 2024-02-09 DIAGNOSIS — M51.16 INTERVERTEBRAL DISC DISORDER WITH RADICULOPATHY OF LUMBAR REGION: ICD-10-CM

## 2024-02-09 PROCEDURE — 99214 OFFICE O/P EST MOD 30 MIN: CPT

## 2024-02-09 NOTE — PROGRESS NOTES
Assessment:  1. Chronic pain syndrome    2. Intervertebral disc disorder with radiculopathy of lumbar region    3. Greater trochanteric bursitis of right hip        Plan:  The patient is a 53-year-old female with a history of chronic pain secondary to low back pain, mid back pain, lumbar intervertebral disc disorder with radiculopathy, lumbar spondylosis with Abbott spinal cord stimulator, trochanteric bursitis and myofascial pain who presents to the office with ongoing bilateral low back pain, right worse than left and right hip pain.    Instructed patient to proceed with bilateral L5 TFESI, which is scheduled for 2/16/2024.  This injection will help decrease inflammation and provide her relief of her low back pain.    Can also consider right trochanteric bursa injection to help with her worsening right hip pain, however patient would like to hold off at this time and will call our office if she would like to proceed.    My impressions and treatment recommendations were discussed in detail with the patient who verbalized understanding and had no further questions.  Discharge instructions were provided. I personally saw and examined the patient and I agree with the above discussed plan of care.    No orders of the defined types were placed in this encounter.    No orders of the defined types were placed in this encounter.      History of Present Illness:  Christina Lara is a 53 y.o. female with a history of chronic pain secondary to low back pain, midback pain, lumbar intervertebral disc disorder with radiculopathy, lumbar spondylosis with Abbott spinal cord stimulator, trochanteric bursitis, and myofascial pain.  She was last seen on 10/5/2023 where she underwent a bilateral L5 TFESI which did provide her greater than 50% relief of her pain.  She presents to the office with ongoing bilateral low back pain, right worse than left and right hip pain.  She states that she is unable to lie on her right hip due to pain.   Patient is currently scheduled for a bilateral L5 TFESI on 2/16/2024.    She states her pain is the same since the last office visit and intermittent but worse in the evening.  She rates quality of her pain as dull/aching, throbbing and is currently rating her pain a 6/10 on a numeric scale.    Current pain medications include methocarbamol 750 mg as needed for muscle spasms which is providing mild to moderate relief.  She also uses Tylenol as needed.  She was also prescribed a short course of tramadol by our office which she utilizes sparingly and is helpful.    I have personally reviewed and/or updated the patient's past medical history, past surgical history, family history, social history, current medications, allergies, and vital signs today.     Review of Systems   Respiratory:  Negative for shortness of breath.    Cardiovascular:  Negative for chest pain.   Gastrointestinal:  Negative for constipation, diarrhea, nausea and vomiting.   Musculoskeletal:  Positive for back pain and joint swelling. Negative for arthralgias, gait problem and myalgias.   Skin:  Negative for rash.   Neurological:  Negative for dizziness, seizures and weakness.   All other systems reviewed and are negative.      Patient Active Problem List   Diagnosis    IBS (irritable bowel syndrome)    Mixed hyperlipidemia    GERD (gastroesophageal reflux disease)    Chronic back pain    Cervical radiculopathy    Hepatic steatosis    Pulmonary nodule seen on imaging study    S/P laparoscopic sleeve gastrectomy    Postsurgical malabsorption    Lumbar radiculopathy    Intervertebral disc disorder with radiculopathy of lumbar region    Post traumatic stress disorder (PTSD)    Generalized anxiety disorder    Chronic pain syndrome    Other chronic pain    Paroxysmal SVT (supraventricular tachycardia)    Prediabetes    ADHD (attention deficit hyperactivity disorder), inattentive type    Seasonal allergies    Benign paroxysmal positional vertigo     Bariatric surgery status    MDD (major depressive disorder), recurrent severe, without psychosis (HCC)    Encounter for gynecological examination without abnormal finding    Encounter for screening mammogram for breast cancer    Oral contraceptive pill surveillance    URI (upper respiratory infection)       Past Medical History:   Diagnosis Date    Acute right ankle pain 2023    ADHD (attention deficit hyperactivity disorder)     Allergic     Anxiety     Bulging lumbar disc     Carpal tunnel syndrome     RIGHT.  LAST ASSESSED: 16    Chronic back pain     low    Chronic pain disorder     Colon polyps     COVID-19     2021    Depression     Diabetes mellitus (HCC)     GERD (gastroesophageal reflux disease)     Gestational diabetes     Hearing loss     left ear    Heart disease     SVT    History of pre-eclampsia     Hyperlipidemia     Resolved with weight loss    Hyponatremia 2020    IBS (irritable bowel syndrome)     Ileus (HCC)     LAST ASSESSED: 8/3/17    Labial cyst     LAST ASSESSED: 16    Memory loss     Myofascial pain     LAST ASSESSED: 16    Neck mass 2023    Obesity     Ovarian cyst     LEFT. LAST ASSESSED: 16    Panic attack     Pneumonia     Sacroiliitis ([facility])     Seasonal allergies     Sprain of anterior talofibular ligament of right ankle 2023    SVT (supraventricular tachycardia)     Thoracic outlet syndrome     2010    Trochanteric bursitis of both hips     LAST ASSESSED: 3/18/16    Ulnar neuropathy at elbow     Varicella     Wears glasses        Past Surgical History:   Procedure Laterality Date    BARIATRIC SURGERY  2022    BILE DUCT EXPLORATION      ENDOSCOPIC REMOVAL OF STONES FROM BILIARY TRACT    CARDIAC ELECTROPHYSIOLOGY PROCEDURE N/A 2024    Procedure: Cardiac eps/svt ablation;  Surgeon: Daquan Heath MD;  Location: [facility];  Service: Cardiology     SECTION      x3    CHOLECYSTECTOMY      COLONOSCOPY      2017-polyp,  2022-wnl, repeat5 years    DILATION AND CURETTAGE OF UTERUS      ENDOMETRIAL ABLATION      ERCP W/ SPHICTEROTOMY      FIRST RIB REMOVAL      THORAX EXCISION OF FIRST RIB    GASTRIC BYPASS  8/08/2022    HYSTEROSCOPY      NEUROPLASTY / TRANSPOSITION ULNAR NERVE AT ELBOW Right 2011    AR COLONOSCOPY FLX DX W/COLLJ SPEC WHEN PFRMD N/A 05/30/2017    Procedure: COLONOSCOPY;  Surgeon: Oscar Velázquez MD;  Location: [facility];  Service: Gastroenterology    AR ESOPHAGOGASTRODUODENOSCOPY TRANSORAL DIAGNOSTIC N/A 09/14/2017    Procedure: ESOPHAGOGASTRODUODENOSCOPY (EGD);  Surgeon: Sadiq Car MD;  Location: [facility];  Service: Gastroenterology    AR HYSTEROSCOPY BX ENDOMETRIUM&/POLYPC W/WO D&C N/A 10/20/2017    Procedure: DILATATION AND CURETTAGE (D&C) WITH HYSTEROSCOPY  REMOVAL VULVAR RT. LESION;  Surgeon: Lucila Ortiz MD;  Location: [facility];  Service: Gynecology    AR ALDANA IMPLTJ NSTIM ELTRDS PLATE/PADDLE EDRL Left 01/29/2020    Procedure: Insertion of thoracic spinal cord stimulator electrode via laminotomy and placement of left buttock implantable pulse generator (NEUROMONITORING);  Surgeon: Ad Mehta MD;  [facility];  Service: Neurosurgery    AR LAPS GSTRC RSTRICTIV PX LONGITUDINAL GASTRECTOMY N/A 02/06/2018    Procedure: GASTRECTOMY SLEEVE LAPAROSCOPIC; INTRAOPERATIVE EGD ;  Surgeon: Abimael Juarez MD;  [facility];  Service: Bariatrics    AR LAPS GSTRC RSTRICTIV PX LONGITUDINAL GASTRECTOMY N/A 08/08/2022    Procedure: Diagnostic Lap; Extensive Lysis of Adhesions; LAPAROSCOPIC REVISION CONVERSION TO NOE-EN-Y GASTRIC BYPASS AND INTRAOPERATIVE EGD;  Surgeon: Abimael Juarez MD;  [facility];  Service: Bariatrics    SLEEVE GASTROPLASTY      SPINAL CORD STIMULATOR TRIAL W/ LAMINOTOMY      TONSILLECTOMY AND ADENOIDECTOMY      TUBAL LIGATION      UPPER GASTROINTESTINAL ENDOSCOPY      US GUIDED LYMPH NODE BIOPSY LEFT  05/22/2023    VEIN LIGATION AND STRIPPING Right     VULVA SURGERY   10/20/2017    BIOPSY    WISDOM TOOTH EXTRACTION         Family History   Problem Relation Age of Onset    Diabetes Mother     Breast cancer Mother         >50    BRCA1 Negative Mother     BRCA2 Negative Mother     Hyperlipidemia Mother     Cancer Mother         Breast    Diabetes Father     Other Father         traumatic brain injury    Prostate cancer Father     Alcohol abuse Father         in remission    Heart disease Father     Neuropathy Father     Hyperlipidemia Father     Cancer Father         Prostate    Hypertension Brother     Diabetes Brother     Other Brother         HYPERCHOLESTEROLEMIA    Alcohol abuse Brother     Depression Brother         attempted suicide    Anxiety disorder Brother     Suicide Attempts Brother     Diabetes Brother     Alcohol abuse Brother     Heart attack Maternal Grandmother     Colon cancer Maternal Grandfather     No Known Problems Paternal Grandmother         dad is adopted    No Known Problems Paternal Grandfather         dad is adopted    No Known Problems Daughter     Asthma Son     Ovarian cancer Neg Hx     Uterine cancer Neg Hx        Social History     Occupational History    Occupation: ER TECH     Employer: [employer]   Tobacco Use    Smoking status: Former     Current packs/day: 0.00     Average packs/day: 1 pack/day for 15.0 years (15.0 ttl pk-yrs)     Types: Cigarettes     Start date: 4/30/1998     Quit date: 4/30/2013     Years since quitting: 10.7     Passive exposure: Never    Smokeless tobacco: Never   Vaping Use    Vaping status: Never Used   Substance and Sexual Activity    Alcohol use: Yes     Alcohol/week: 4.0 standard drinks of alcohol     Types: 2 Shots of liquor, 2 Standard drinks or equivalent per week     Comment: On weekends that i go out    Drug use: No    Sexual activity: Yes     Partners: Male     Birth control/protection: OCP, Post-menopausal     Comment: lifetime partners: 6; current partner 2013       Current Outpatient Medications  on File Prior to Visit   Medication Sig    apixaban (Eliquis) 5 mg Take 1 tablet (5 mg total) by mouth 2 (two) times a day    atoMOXetine (STRATTERA) 60 mg capsule Take 1 capsule (60 mg total) by mouth daily    atorvastatin (LIPITOR) 20 mg tablet Take 1 tablet (20 mg total) by mouth daily    calcium carbonate (OS-MELODY) 600 MG tablet Take 600 mg by mouth 2 (two) times a day with meals    drospirenone-ethinyl estradiol (LIEN) 3-0.02 MG per tablet Take 1 tablet by mouth daily    estradiol (ESTRACE VAGINAL) 0.1 mg/g vaginal cream One gram vaginally at night x 14 days then twice weekly for ongoing maintenance. (Patient taking differently: if needed One gram vaginally at night x 14 days then twice weekly for ongoing maintenance.)    flecainide (TAMBOCOR) 50 mg tablet Take 1 tablet (50 mg total) by mouth 2 (two) times a day    fluticasone (FLONASE) 50 mcg/act nasal spray 1 spray into each nostril daily    lidocaine (Lidoderm) 5 % Apply 1 patch topically over 12 hours daily Remove & Discard patch within 12 hours or as directed by MD    meclizine (ANTIVERT) 25 mg tablet Take 1 tablet (25 mg total) by mouth every 8 (eight) hours as needed for dizziness    montelukast (SINGULAIR) 10 mg tablet Take 1 tablet (10 mg total) by mouth daily at bedtime    Multiple Vitamins-Minerals (MULTI COMPLETE PO) Take by mouth    QUEtiapine (SEROquel) 100 mg tablet Take 1 tablet (100 mg total) by mouth daily at bedtime    venlafaxine (EFFEXOR) 100 MG tablet Take 1 tablet (100 mg total) by mouth 3 (three) times a day    cyclobenzaprine (FLEXERIL) 10 mg tablet Take 1 tablet (10 mg total) by mouth 2 (two) times a day as needed for muscle spasms for up to 7 days    lamoTRIgine (LaMICtal) 150 MG tablet Take 1 tablet (150 mg total) by mouth 2 (two) times a day    methocarbamol (ROBAXIN) 750 mg tablet Take 1 tablet (750 mg total) by mouth every 8 (eight) hours    nitrofurantoin (MACROBID) 100 mg capsule Take 1 tablet PO PRN after sexual intercourse  "(Patient not taking: Reported on 1/11/2024)    omeprazole (PriLOSEC) 20 mg delayed release capsule Take 1 capsule (20 mg total) by mouth daily     No current facility-administered medications on file prior to visit.       Allergies   Allergen Reactions    Ibuprofen Other (See Comments)     Due to gastric sleeve -can only take for 5 days, then needs to stop       Physical Exam:    /74   Pulse 89   Resp 16   Ht 5' 7\" (1.702 m)   Wt 66.2 kg (146 lb)   LMP 01/07/2019 (Approximate)   BMI 22.87 kg/m²     Constitutional:normal, well developed, well nourished, alert, in no distress and non-toxic and no overt pain behavior.  Eyes:anicteric  HEENT:grossly intact  Neck:supple, symmetric, trachea midline and no masses   Pulmonary:even and unlabored  Cardiovascular:No edema or pitting edema present  Skin:Normal without rashes or lesions and well hydrated  Psychiatric:Mood and affect appropriate  Neurologic:Cranial Nerves II-XII grossly intact  Musculoskeletal:normal    Lumbar Spine Exam    Appearance:  Normal lordosis  Palpation/Tenderness:  left lumbar paraspinal tenderness  right lumbar paraspinal tenderness  Sensory:  no sensory deficits noted  Range of Motion:  Flexion:  Minimally limited  with pain  Extension:  Minimally limited  with pain  Motor Strength:  Left hip flexion:  5/5  Right hip flexion:  5/5  Left knee flexion:  5/5  Left knee extension:  5/5  Right knee flexion:  5/5  Right knee extension:  5/5  Left foot dorsiflexion:  5/5  Left foot plantar flexion:  5/5  Right foot dorsiflexion:  5/5  Right foot plantar flexion:  5/5      Imaging    "

## 2024-02-11 NOTE — PROGRESS NOTES
Electrophysiology Follow Up    [facility]   [facility]  [address]  [place] [address]      Christina Lara  : 1970  MRN: [id number]    Date of Visit: 2024       Assessment/Plan     1. SVT (supraventricular tachycardia)  POCT ECG    Echo complete w/ contrast if indicated      2. Hypotension, unspecified hypotension type        3. S/P typical CTI flutter ablation 2024        4. S/p Medtronic loop recorder implantation 2022            ASSESSMENT:  Symptomatic SVT, status post ablation of typical cavo-tricuspid isthmus dependent atrial flutter 2024  First noted palpitations/tachycardia 2019, with recurrence 2020 following spinal cord stimulator implantation - started on low dose Toprol XL at that time  Event monitor 2020 showed only brief runs of likely atrial tachycardia  Repeat event monitor 2020 due to ongoing episodes, suspected atrial tachycardia noted despite Toprol XL  Seen in EP consultation 10/2020 - diltiazem attempted but also without improvement; stress test showed atrial tachycardia versus inappropriate sinus tach with exertion  Propranolol started 2020 with initial improvement  Medtronic loop recorder implantation 2022 due to concerns for atrial fibrillation/other arrhythmias  Recurrent symptomatic SVT leading to ED visit 2023 restarted on metoprolol at that time   Seen by EP 2023 - trial of Corlanor recommended; initial improvement noted however discontinued 2023 due to recurrent tachycardia  Started on low dose flecainide 2023 as bridge to EPS/potential ablation, Corlanor discontinued  Continued on low dose flecainide in the post ablation setting, started on Eliquis x 30 days (CHADS2 Vasc = 0, only female without other risk factors)  Normal LV systolic function with LVEF 60% per echo 10/2020  Hyperlipidemia, maintained on statin therapy  Chronic back pain with [employer] spinal cord stimulator in situ  Prior sleeve  gastrectomy status post revision with conversion to gastric bypass  GERD  PTSD/anxiety      DISCUSSION/SUMMARY:  She has had issues with hypotension in the postablation setting, associated with dizziness and lightheadedness.  Fortunately she has not had recurrent syncope since the procedure.  We are thus recommending a stat echocardiogram to rule out effusion or other abnormalities in the postablation setting.  We will try to arrange this for later today if possible.    Will determine further workup based on these results.  Unclear whether she would benefit from midodrine, or whether she should be seen by her PCP for workup of possible adrenal insufficiency.  The patient is aware of the need for echocardiogram, and notes that further recommendations will be made based on these results.    She still reports the ongoing need to take a deep breath, but reports that she was unable to take this deep breath prior to the procedure.  Upon further questioning, it seems that her breathing has improved overall since the ablation.  There does not seem to be any cardiac reasons as to her current symptoms.      As she is doing well from an arrhythmia standpoint, she may discontinue flecainide.  She is still maintained on Eliquis anticoagulation, which at this time she would like to continue as she recently got a 3-month supply.  I informed her that it could likely be discontinued at this time, but that she can continue until she runs out of her current supply.  If she continues to remain free of arrhythmias, Eliquis can be discontinued moving forward.  I personally interrogated her loop recorder today, which showed no recurrent arrhythmias since her ablation on 1/4/2024.    She will return again in 4 months for ongoing follow-up in the postablation setting.  We will await the results of her echocardiogram, and contact her with further recommendations.  She knows to contact us otherwise with any questions, concerns, or changes in  symptoms.      History of Present Illness     CHIEF COMPLAINT: follow up recent ablation    HPI/INTERVAL HISTORY: Christina Lara is a 53 y.o. female with palpitations and symptomatic SVT, normal LV systolic function with LVEF 60% per echo 10/2020, hyperlipidemia maintained on statin therapy, chronic back pain with [place] [employer] spinal cord stimulator in situ, prior gastric sleeve with subsequent revision and conversion to gastric bypass, and anxiety.  She has followed with Dr. Nguyễn in the past. She first noted palpitations and tachycardia 9/2019, and close outpatient follow-up was recommended.  She then had recurrence 1/2020 following spinal cord stimulator implantation, and was started on low-dose Toprol-XL at that time.  She underwent an event monitor 2/2020 which showed only several brief runs of likely atrial tachycardia.  Later that year she reported worsening episodes and thus underwent a repeat event monitor 9/2020.  This showed episodes of SVT however there was no clear onset or termination and thus it was difficult to classify her SVT, however atrial tachycardia was suspected.  She was seen in EP consultation 10/2020, at which time Toprol-XL was discontinued given ongoing episodes despite this therapy, and she was tried on diltiazem.  She also underwent an exercise stress test, as exertion seemed to trigger her symptoms.  This showed tachycardia, suspected atrial tachycardia with an element of inappropriate sinus tachycardia.  As diltiazem did not help her symptoms, she was later transitioned to propranolol with initial improvement.  She eventually had a Medtronic loop recorder implanted 8/2022 due to concerns for other arrhythmias and to rule out atrial fibrillation.  She had had recurrent SVT leading to an ER visit 2/2023, at which time she was restarted on metoprolol.  Ongoing treatment options were discussed during a follow-up visit with EP 5/2023, however it was still unclear whether an ablation would  be beneficial to treat her arrhythmias and thus she was tried on Corlanor.  She initially did very well on this regimen, however she reported worsening symptoms 12/2023.  Ultimately, an EPS/ablation was recommended.  Her Corlanor was discontinued and she was instead started on low-dose flecainide as a bridge to her upcoming procedure.  On 1/4/2024 she underwent EP study, during which only typical atrial flutter was inducible.  She thus underwent typical CTI dependent atrial flutter ablation without issues.  She was discharged home the same day on her prior medications, including low-dose flecainide, along with Eliquis anticoagulation which was discontinued 30 days later.  She is being seen today in post ablation follow-up.    She reports doing well from an arrhythmia standpoint, and denies recurrent palpitations.  She still has questions regarding her breathing, as she feels the need to take frequent deep breaths.  She reports that she was unable to take a deep breath prior to the ablation, and now she is able to do so.  She has a little bit of shortness of breath with exertion, but reports that this is largely unchanged.  She otherwise denies chest pain or pressure.  She experiences dizziness and lightheadedness on a regular basis, but fortunately denies syncope.  She had 1 episode of presyncope while shopping, likely attributed to low blood pressure.  She reports that at home her blood pressure readings are typically 80s/60s.      Review of Systems   Constitutional:  Negative for fatigue and fever.   Respiratory:  Positive for shortness of breath. Negative for chest tightness and wheezing.    Cardiovascular:  Negative for chest pain, palpitations and leg swelling.   Neurological:  Positive for dizziness and light-headedness. Negative for syncope.   All other systems reviewed and are negative.    ROS as noted above, otherwise 12 point review of systems was performed and is negative.       Historical Information  -  I have personally reviewed and updated this information, including social history, medical history, and family history.  Social History     Socioeconomic History    Marital status: /Civil Union     Spouse name: Abel    Number of children: 3    Years of education: GED then 2 year college program    Highest education level: Not on file   Occupational History    Occupation: ER TECH     Employer: "[employer]" EMPLOYEES   Tobacco Use    Smoking status: Former     Current packs/day: 0.00     Average packs/day: 1 pack/day for 15.0 years (15.0 ttl pk-yrs)     Types: Cigarettes     Start date: 4/30/1998     Quit date: 4/30/2013     Years since quitting: 10.7     Passive exposure: Never    Smokeless tobacco: Never   Vaping Use    Vaping status: Never Used   Substance and Sexual Activity    Alcohol use: Yes     Alcohol/week: 4.0 standard drinks of alcohol     Types: 2 Shots of liquor, 2 Standard drinks or equivalent per week     Comment: On weekends that i go out    Drug use: No    Sexual activity: Yes     Partners: Male     Birth control/protection: OCP, Post-menopausal     Comment: lifetime partners: 6; current partner 2013   Other Topics Concern    Not on file   Social History Narrative    Confucianism: no preference    Accepts blood products        Exercise: unable with back issues and SVT    Calcium: calcium supplement, multivitamin for women over 50, 1 yogurt daily     Social Determinants of Health     Financial Resource Strain: Medium Risk (12/31/2020)    Overall Financial Resource Strain (CARDIA)     Difficulty of Paying Living Expenses: Somewhat hard   Food Insecurity: No Food Insecurity (12/31/2020)    Hunger Vital Sign     Worried About Running Out of Food in the Last Year: Never true     Ran Out of Food in the Last Year: Never true   Transportation Needs: No Transportation Needs (12/31/2020)    PRAPARE - Transportation     Lack of Transportation (Medical): No     Lack of Transportation (Non-Medical): No    Physical Activity: Unknown (5/9/2019)    Exercise Vital Sign     Days of Exercise per Week: 0 days     Minutes of Exercise per Session: Not on file   Stress: Stress Concern Present (5/9/2019)    Citizen of [place] of Occupational Health - Occupational Stress Questionnaire     Feeling of Stress : Very much   Social Connections: Socially Isolated (5/9/2019)    Social Connection and Isolation Panel [NHANES]     Frequency of Communication with Friends and Family: Once a week     Frequency of Social Gatherings with Friends and Family: Once a week     Attends Hoahaoism Services: Never     Active Member of Clubs or Organizations: No     Attends Club or Organization Meetings: Never     Marital Status:    Intimate Partner Violence: Not At Risk (5/9/2019)    Humiliation, Afraid, Rape, and Kick questionnaire     Fear of Current or Ex-Partner: No     Emotionally Abused: No     Physically Abused: No     Sexually Abused: No   Housing Stability: Not on file     Past Medical History:   Diagnosis Date    Acute right ankle pain 02/07/2023    ADHD (attention deficit hyperactivity disorder)     Allergic     Anxiety     Bulging lumbar disc     Carpal tunnel syndrome     RIGHT.  LAST ASSESSED: 12/7/16    Chronic back pain     low    Chronic pain disorder     Colon polyps     COVID-19     12/2021    Depression     Diabetes mellitus (HCC)     GERD (gastroesophageal reflux disease)     Gestational diabetes     Hearing loss     left ear    Heart disease     SVT    History of pre-eclampsia     Hyperlipidemia     Resolved with weight loss    Hyponatremia 12/12/2020    IBS (irritable bowel syndrome)     Ileus ([facility])     LAST ASSESSED: 8/3/17    Labial cyst     LAST ASSESSED: 4/21/16    Memory loss     Myofascial pain     LAST ASSESSED: 4/12/16    Neck mass 05/11/2023    Obesity     Ovarian cyst     LEFT. LAST ASSESSED: 9/2/16    Panic attack     Pneumonia     Sacroiliitis (HCC)     Seasonal allergies     Sprain of anterior talofibular  ligament of right ankle 2023    SVT (supraventricular tachycardia)     Thoracic outlet syndrome         Trochanteric bursitis of both hips     LAST ASSESSED: 3/18/16    Ulnar neuropathy at elbow     Varicella     Wears glasses      Past Surgical History:   Procedure Laterality Date    BARIATRIC SURGERY  2022    BILE DUCT EXPLORATION      ENDOSCOPIC REMOVAL OF STONES FROM BILIARY TRACT    CARDIAC ELECTROPHYSIOLOGY PROCEDURE N/A 2024    Procedure: Cardiac eps/svt ablation;  Surgeon: Daquan Heath MD;  [facility];  Service: Cardiology     SECTION      x3    CHOLECYSTECTOMY      COLONOSCOPY      -polyp, -wnl, repeat5 years    DILATION AND CURETTAGE OF UTERUS      ENDOMETRIAL ABLATION      ERCP W/ SPHICTEROTOMY      FIRST RIB REMOVAL      THORAX EXCISION OF FIRST RIB    GASTRIC BYPASS  2022    HYSTEROSCOPY      NEUROPLASTY / TRANSPOSITION ULNAR NERVE AT ELBOW Right     [place] COLONOSCOPY FLX DX W/COLLJ SPEC WHEN PFRMD N/A 2017    Procedure: COLONOSCOPY;  Surgeon: Oscar Velázquez MD;  Location: [facility];  Service: Gastroenterology    KY ESOPHAGOGASTRODUODENOSCOPY TRANSORAL DIAGNOSTIC N/A 2017    Procedure: ESOPHAGOGASTRODUODENOSCOPY (EGD);  Surgeon: Sadiq Car MD;  Location: [facility];  Service: Gastroenterology    KY HYSTEROSCOPY BX ENDOMETRIUM&/POLYPC W/WO D&C N/A 10/20/2017    Procedure: DILATATION AND CURETTAGE (D&C) WITH HYSTEROSCOPY  REMOVAL VULVAR RT. LESION;  Surgeon: Lucila Ortiz MD;  [facility];  Service: Gynecology    KY ALDANA IMPLTJ NSTIM ELTRDS PLATE/PADDLE EDRL Left 2020    Procedure: Insertion of thoracic spinal cord stimulator electrode via laminotomy and placement of left buttock implantable pulse generator (NEUROMONITORING);  Surgeon: Ad Mehta MD;  [facility];  Service: Neurosurgery    KY LAPS GSTRC RSTRICTIV PX LONGITUDINAL GASTRECTOMY N/A 2018    Procedure: GASTRECTOMY SLEEVE LAPAROSCOPIC;  INTRAOPERATIVE EGD ;  Surgeon: Abimael Juarez MD;  [facility];  Service: Bariatrics    [place] LAPS GSTRC RSTRICTIV PX LONGITUDINAL GASTRECTOMY N/A 08/08/2022    Procedure: Diagnostic Lap; Extensive Lysis of Adhesions; LAPAROSCOPIC REVISION CONVERSION TO NOE-EN-Y GASTRIC BYPASS AND INTRAOPERATIVE EGD;  Surgeon: Abimael Juarez MD;  [facility];  Service: Bariatrics    SLEEVE GASTROPLASTY      SPINAL CORD STIMULATOR TRIAL W/ LAMINOTOMY      TONSILLECTOMY AND ADENOIDECTOMY      TUBAL LIGATION      UPPER GASTROINTESTINAL ENDOSCOPY      US GUIDED LYMPH NODE BIOPSY LEFT  05/22/2023    VEIN LIGATION AND STRIPPING Right     VULVA SURGERY  10/20/2017    BIOPSY    WISDOM TOOTH EXTRACTION       Social History     Substance and Sexual Activity   Alcohol Use Yes    Alcohol/week: 4.0 standard drinks of alcohol    Types: 2 Shots of liquor, 2 Standard drinks or equivalent per week    Comment: On weekends that i go out     Social History     Substance and Sexual Activity   Drug Use No     Social History     Tobacco Use   Smoking Status Former    Current packs/day: 0.00    Average packs/day: 1 pack/day for 15.0 years (15.0 ttl pk-yrs)    Types: Cigarettes    Start date: 4/30/1998    Quit date: 4/30/2013    Years since quitting: 10.7    Passive exposure: Never   Smokeless Tobacco Never     Family History   Problem Relation Age of Onset    Diabetes Mother     Breast cancer Mother         >50    BRCA1 Negative Mother     BRCA2 Negative Mother     Hyperlipidemia Mother     Cancer Mother         Breast    Diabetes Father     Other Father         traumatic brain injury    Prostate cancer Father     Alcohol abuse Father         in remission    Heart disease Father     Neuropathy Father     Hyperlipidemia Father     Cancer Father         Prostate    Hypertension Brother     Diabetes Brother     Other Brother         HYPERCHOLESTEROLEMIA    Alcohol abuse Brother     Depression Brother         attempted suicide    Anxiety  disorder Brother     Suicide Attempts Brother     Diabetes Brother     Alcohol abuse Brother     Heart attack Maternal Grandmother     Colon cancer Maternal Grandfather     No Known Problems Paternal Grandmother         dad is adopted    No Known Problems Paternal Grandfather         dad is adopted    No Known Problems Daughter     Asthma Son     Ovarian cancer Neg Hx     Uterine cancer Neg Hx        Meds/Allergies       Current Outpatient Medications:     apixaban (Eliquis) 5 mg, Take 1 tablet (5 mg total) by mouth 2 (two) times a day, Disp: 180 tablet, Rfl: 3    atoMOXetine (STRATTERA) 60 mg capsule, Take 1 capsule (60 mg total) by mouth daily, Disp: 90 capsule, Rfl: 3    atorvastatin (LIPITOR) 20 mg tablet, Take 1 tablet (20 mg total) by mouth daily, Disp: 90 tablet, Rfl: 0    drospirenone-ethinyl estradiol (LIEN) 3-0.02 MG per tablet, Take 1 tablet by mouth daily, Disp: 84 tablet, Rfl: 4    estradiol (ESTRACE VAGINAL) 0.1 mg/g vaginal cream, One gram vaginally at night x 14 days then twice weekly for ongoing maintenance. (Patient taking differently: if needed One gram vaginally at night x 14 days then twice weekly for ongoing maintenance.), Disp: 42.5 g, Rfl: 2    fluticasone (FLONASE) 50 mcg/act nasal spray, 1 spray into each nostril daily, Disp: 15.8 mL, Rfl: 0    lamoTRIgine (LaMICtal) 150 MG tablet, Take 1 tablet (150 mg total) by mouth 2 (two) times a day, Disp: 180 tablet, Rfl: 3    lidocaine (Lidoderm) 5 %, Apply 1 patch topically over 12 hours daily Remove & Discard patch within 12 hours or as directed by MD, Disp: 30 patch, Rfl: 1    meclizine (ANTIVERT) 25 mg tablet, Take 1 tablet (25 mg total) by mouth every 8 (eight) hours as needed for dizziness, Disp: 30 tablet, Rfl: 0    methocarbamol (ROBAXIN) 750 mg tablet, Take 1 tablet (750 mg total) by mouth every 8 (eight) hours (Patient taking differently: Take 750 mg by mouth if needed), Disp: 270 tablet, Rfl: 0    montelukast (SINGULAIR) 10 mg tablet, Take  "1 tablet (10 mg total) by mouth daily at bedtime, Disp: 90 tablet, Rfl: 2    Multiple Vitamins-Minerals (MULTI COMPLETE PO), Take by mouth, Disp: , Rfl:     nitrofurantoin (MACROBID) 100 mg capsule, Take 1 tablet PO PRN after sexual intercourse, Disp: 30 capsule, Rfl: 0    omeprazole (PriLOSEC) 20 mg delayed release capsule, Take 1 capsule (20 mg total) by mouth daily, Disp: 90 capsule, Rfl: 3    QUEtiapine (SEROquel) 100 mg tablet, Take 1 tablet (100 mg total) by mouth daily at bedtime, Disp: 90 tablet, Rfl: 2    venlafaxine (EFFEXOR) 100 MG tablet, Take 1 tablet (100 mg total) by mouth 3 (three) times a day, Disp: 270 tablet, Rfl: 2    calcium carbonate (OS-MELODY) 600 MG tablet, Take 600 mg by mouth 2 (two) times a day with meals (Patient not taking: Reported on 2/12/2024), Disp: , Rfl:     cyclobenzaprine (FLEXERIL) 10 mg tablet, Take 1 tablet (10 mg total) by mouth 2 (two) times a day as needed for muscle spasms for up to 7 days, Disp: 14 tablet, Rfl: 0    Allergies   Allergen Reactions    Ibuprofen Other (See Comments)     Due to gastric sleeve -can only take for 5 days, then needs to stop       Objective   Vitals: Blood pressure (!) 84/60, pulse (!) 176, height 5' 7\" (1.702 m), weight 66.5 kg (146 lb 11.2 oz), last menstrual period 01/07/2019, not currently breastfeeding.        Physical Exam  GEN: NAD, alert and oriented x 3, well appearing  SKIN: dry without significant lesions or rashes  HEENT: NCAT, PERRL, EOMs intact  NECK: No JVD appreciated  CARDIOVASCULAR: RRR, normal S1, S2 without murmurs, rubs, or gallops appreciated  LUNGS: Clear to auscultation bilaterally without wheezes, rhonchi, or rales  ABDOMEN: Soft, nontender, nondistended  EXTREMITIES/VASCULAR: perfused without clubbing, cyanosis, or LE edema b/l  PSYCH: Normal mood and affect  NEURO: CN ll-Xll grossly intact      Labs:  Admission on 01/04/2024, Discharged on 01/04/2024   Component Date Value    Sodium 01/04/2024 141     Potassium 01/04/2024 " 3.8     Chloride 01/04/2024 106     CO2 01/04/2024 29     ANION GAP 01/04/2024 6     BUN 01/04/2024 10     Creatinine 01/04/2024 0.54 (L)     Glucose 01/04/2024 87     Glucose, Fasting 01/04/2024 87     Calcium 01/04/2024 8.8     eGFR 01/04/2024 107     WBC 01/04/2024 6.18     RBC 01/04/2024 3.93     Hemoglobin 01/04/2024 11.4 (L)     Hematocrit 01/04/2024 35.8     MCV 01/04/2024 91     MCH 01/04/2024 29.0     MCHC 01/04/2024 31.8     RDW 01/04/2024 12.5     Platelets 01/04/2024 243     MPV 01/04/2024 9.2     Protime 01/04/2024 13.1     INR 01/04/2024 1.00     Ventricular Rate 01/04/2024 74     Atrial Rate 01/04/2024 74     MT Interval 01/04/2024 172     QRSD Interval 01/04/2024 84     QT Interval 01/04/2024 392     QTC Interval 01/04/2024 435     QRS Axis 01/04/2024 150     T Wave [place] 01/04/2024 49     Ventricular Rate 01/04/2024 87     Atrial Rate 01/04/2024 87     MT Interval 01/04/2024 186     QRSD Interval 01/04/2024 84     QT Interval 01/04/2024 382     QTC Interval 01/04/2024 459     P Axis 01/04/2024 77     QRS [place] 01/04/2024 138     T Wave [place] 01/04/2024 63    Telephone on 12/14/2023   Component Date Value    WBC 02/12/2024 9.56     RBC 02/12/2024 4.19     Hemoglobin 02/12/2024 12.2     Hematocrit 02/12/2024 39.3     MCV 02/12/2024 94     MCH 02/12/2024 29.1     MCHC 02/12/2024 31.0 (L)     RDW 02/12/2024 13.3     MPV 02/12/2024 10.9     Platelets 02/12/2024 255     nRBC 02/12/2024 0     Neutrophils Relative 02/12/2024 73     Immat GRANS % 02/12/2024 0     Lymphocytes Relative 02/12/2024 20     Monocytes Relative 02/12/2024 6     Eosinophils Relative 02/12/2024 1     Basophils Relative 02/12/2024 0     Neutrophils Absolute 02/12/2024 6.89     Immature Grans Absolute 02/12/2024 0.03     Lymphocytes Absolute 02/12/2024 1.94     Monocytes Absolute 02/12/2024 0.55     Eosinophils Absolute 02/12/2024 0.13     Basophils Absolute 02/12/2024 0.02    Admission on 12/13/2023, Discharged on 12/13/2023   Component  Date Value    WBC 12/13/2023 9.64     RBC 12/13/2023 3.91     Hemoglobin 12/13/2023 11.8     Hematocrit 12/13/2023 36.9     MCV 12/13/2023 94     MCH 12/13/2023 30.2     MCHC 12/13/2023 32.0     RDW 12/13/2023 12.9     MPV 12/13/2023 9.8     Platelets 12/13/2023 260     nRBC 12/13/2023 0     Neutrophils Relative 12/13/2023 76 (H)     Immat GRANS % 12/13/2023 1     Lymphocytes Relative 12/13/2023 15     Monocytes Relative 12/13/2023 7     Eosinophils Relative 12/13/2023 1     Basophils Relative 12/13/2023 0     Neutrophils Absolute 12/13/2023 7.32     Immature Grans Absolute 12/13/2023 0.06     Lymphocytes Absolute 12/13/2023 1.47     Monocytes Absolute 12/13/2023 0.71     Eosinophils Absolute 12/13/2023 0.07     Basophils Absolute 12/13/2023 0.01     Sodium 12/13/2023 137     Potassium 12/13/2023 3.5     Chloride 12/13/2023 102     CO2 12/13/2023 28     ANION GAP 12/13/2023 7     BUN 12/13/2023 13     Creatinine 12/13/2023 0.62     Glucose 12/13/2023 135     Calcium 12/13/2023 8.8     AST 12/13/2023 22     ALT 12/13/2023 25     Alkaline Phosphatase 12/13/2023 56     Total Protein 12/13/2023 7.0     Albumin 12/13/2023 3.9     Total Bilirubin 12/13/2023 0.31     eGFR 12/13/2023 102     hs TnI 0hr 12/13/2023 <2     SARS-CoV-2 12/13/2023 Negative     INFLUENZA A PCR 12/13/2023 Negative     INFLUENZA B PCR 12/13/2023 Negative     RSV PCR 12/13/2023 Negative     TSH 3RD GENERATON 12/13/2023 1.824     Ventricular Rate 12/13/2023 80     Atrial Rate 12/13/2023 80     IN Interval 12/13/2023 184     QRSD Interval 12/13/2023 78     QT Interval 12/13/2023 388     QTC Interval 12/13/2023 447     P Axis 12/13/2023 76     QRS [place] 12/13/2023 134     T Wave [place] 12/13/2023 61     hs TnI 2hr 12/13/2023 <2     Delta 2hr hsTnI 12/13/2023     Appointment on 09/08/2023   Component Date Value    Zinc 09/08/2023 75     Vit D, 25-Hydroxy 09/08/2023 35.5     Vitamin B1, Whole Blood 09/08/2023 135.6     Vitamin A 09/08/2023 59.0     PTH  2023 56.5     WBC 2023 5.96     RBC 2023 4.51     Hemoglobin 2023 13.6     Hematocrit 2023 42.4     MCV 2023 94     MCH 2023 30.2     MCHC 2023 32.1     RDW 2023 12.7     Platelets 2023 279     MPV 2023 10.6     Sodium 2023 142     Potassium 2023 4.1     Chloride 2023 105     CO2 2023 28     ANION GAP 2023 9     BUN 2023 14     Creatinine 2023 0.64     Glucose, Fasting 2023 88     Calcium 2023 9.2     AST 2023 30     ALT 2023 30     Alkaline Phosphatase 2023 55     Total Protein 2023 6.8     Albumin 2023 3.7     Total Bilirubin 2023 0.34     eGFR 2023 101     Ferritin 2023 12     Iron Saturation 2023 17     TIBC 2023 525 (H)     Iron 2023 89     UIBC 2023 436 (H)          Imaging: I have personally reviewed pertinent reports.      ECHO: Results for orders placed during the hospital encounter of 10/29/20    Echo complete with contrast if indicated    [id number] [facility]  [place] [address] [phone]    Transthoracic Echocardiogram  2D, M-mode, Doppler, and Color Doppler    Study date:  29-Oct-2020    Patient: SHARON BELL  MR number: [id number]  Account number: [id number]  : 1970  Age: 50 years  Gender: Female  Status: Outpatient  Location: Echo lab  Height: 68 in  Weight: 173.6 lb  BP: 110/ 80 mmHg    Indications: SVT    Diagnoses: I47.2 - Ventricular tachycardia    Sonographer:  LUPE Spring  Primary Physician:  Jacki Ansari MD  Referring Physician:  Micky Dunn MD  Group:  [facility]  Interpreting Physician:  Mary Morgan DO    SUMMARY    LEFT VENTRICLE:  Systolic function was normal. Ejection fraction was estimated to be 60 %.  There were no regional wall motion abnormalities.  Doppler parameters were consistent with abnormal left  ventricular relaxation (grade 1 diastolic dysfunction).    RIGHT VENTRICLE:  The size was normal.  Systolic function was normal.    MITRAL VALVE:  The anterior leaflet was elongated.    HISTORY: PRIOR HISTORY: DM, tachycardia    PROCEDURE: The procedure was performed in the echo lab. This was a routine study. The transthoracic approach was used. The study included complete 2D imaging, M-mode, complete spectral Doppler, and color Doppler. The heart rate was 83 bpm,  at the start of the study. Images were obtained from the parasternal, apical, subcostal, and suprasternal notch acoustic windows. Image quality was adequate.    LEFT VENTRICLE: Size was normal. Systolic function was normal. Ejection fraction was estimated to be 60 %. There were no regional wall motion abnormalities. Wall thickness was normal. DOPPLER: Doppler parameters were consistent with  abnormal left ventricular relaxation (grade 1 diastolic dysfunction). There was no evidence of elevated ventricular filling pressure by Doppler parameters.    RIGHT VENTRICLE: The size was normal. Systolic function was normal. Wall thickness was normal.    LEFT ATRIUM: Size was normal.    RIGHT ATRIUM: Size was normal.    MITRAL VALVE: There was normal leaflet separation. The anterior leaflet was elongated. DOPPLER: The transmitral velocity was within the normal range. There was no evidence for stenosis. There was trace regurgitation.    AORTIC VALVE: The valve was trileaflet. Leaflets exhibited normal thickness and normal cuspal separation. DOPPLER: Transaortic velocity was within the normal range. There was no evidence for stenosis. There was no regurgitation.    TRICUSPID VALVE: The valve structure was normal. There was normal leaflet separation. DOPPLER: The transtricuspid velocity was within the normal range. There was no evidence for stenosis. There was trace regurgitation. The tricuspid jet  envelope definition was inadequate for estimation of RV systolic  pressure. There are no indirect findings (abnormal RV volume or geometry, altered pulmonary flow velocity profile, or leftward septal displacement) which would suggest  moderate or severe pulmonary hypertension.    PULMONIC VALVE: Leaflets exhibited normal thickness, no calcification, and normal cuspal separation. DOPPLER: The transpulmonic velocity was within the normal range. There was trace regurgitation.    PERICARDIUM: There was no pericardial effusion. The pericardium was normal in appearance.    AORTA: The root exhibited normal size.    SYSTEMIC VEINS: IVC: The inferior vena cava was normal in size and course. Respirophasic changes were normal.    SYSTEM MEASUREMENT TABLES    2D  %FS: 35.27 %  Ao Diam: 3.14 cm  Ao asc: 2.82 cm  EDV(Teich): 77.95 ml  EF(Teich): 65.04 %  ESV(Teich): 27.25 ml  IVSd: 0.81 cm  LA Area: 13.62 cm2  LA Diam: 3.07 cm  LVEDV MOD A4C: 59.56 ml  LVEF MOD A4C: 69.97 %  LVESV MOD A4C: 17.89 ml  LVIDd: 4.19 cm  LVIDs: 2.71 cm  LVLd A4C: 7.33 cm  LVLs A4C: 5.85 cm  LVPWd: 0.81 cm  RA Area: 11.11 cm2  RVIDd: 4.04 cm  SV MOD A4C: 41.67 ml  SV(Teich): 50.7 ml    PW  E' Sept: 0.09 m/s  E/E' Sept: 8.96  MV A Praneeth: 1.11 m/s  MV Dec Taliaferro: 3.29 m/s2  MV DecT: 259.08 ms  MV E Praneeth: 0.85 m/s  MV E/A Ratio: 0.77  MV PHT: 75.13 ms  MVA By PHT: 2.93 cm2    [facility]    Prepared and electronically signed by    Mary Morgan DO  Signed 29-Oct-2020 15:23:53      No results found for this or any previous visit.        EXERCISE STRESS ECHO 5/2021:   IMPRESSIONS:  Normal study after maximal exercise without reproduction of symptoms.     SUMMARY     STRESS RESULTS:  Duration of exercise was 6 min.  Maximal work rate was 7 METs.  Maximal heart rate during stress was 153 bpm ( 91 % of maximal predicted heart rate).  Target heart rate was achieved.  There was no chest pain during stress.     ECG CONCLUSIONS:  The stress ECG was negative for ischemia.      BASELINE:  There were no regional wall motion abnormalities.  Overall left ventricular systolic function was normal.     PEAK STRESS:  There were no regional wall motion abnormalities.  There was an appropriate augmentation in LV function.     ECHO CONCLUSIONS:  There was no echocardiographic evidence for stress-induced ischemia.      EKG: Normal sinus rhythm, 93 bpm, no significant changes compared to prior EKGs

## 2024-02-12 ENCOUNTER — APPOINTMENT (OUTPATIENT)
Dept: LAB | Facility: AMBULARY SURGERY CENTER | Age: 54
End: 2024-02-12
Payer: COMMERCIAL

## 2024-02-12 ENCOUNTER — OFFICE VISIT (OUTPATIENT)
Dept: CARDIOLOGY CLINIC | Facility: CLINIC | Age: 54
End: 2024-02-12
Payer: COMMERCIAL

## 2024-02-12 ENCOUNTER — HOSPITAL ENCOUNTER (OUTPATIENT)
Dept: NON INVASIVE DIAGNOSTICS | Facility: HOSPITAL | Age: 54
Discharge: HOME | End: 2024-02-12
Payer: COMMERCIAL

## 2024-02-12 VITALS
DIASTOLIC BLOOD PRESSURE: 60 MMHG | HEIGHT: 67 IN | HEART RATE: 176 BPM | BODY MASS INDEX: 23.02 KG/M2 | SYSTOLIC BLOOD PRESSURE: 84 MMHG | WEIGHT: 146.7 LBS

## 2024-02-12 VITALS
HEART RATE: 85 BPM | HEIGHT: 67 IN | BODY MASS INDEX: 22.91 KG/M2 | SYSTOLIC BLOOD PRESSURE: 84 MMHG | WEIGHT: 146 LBS | DIASTOLIC BLOOD PRESSURE: 60 MMHG

## 2024-02-12 DIAGNOSIS — I95.9 HYPOTENSION, UNSPECIFIED HYPOTENSION TYPE: ICD-10-CM

## 2024-02-12 DIAGNOSIS — Z98.890 S/P ABLATION OF ATRIAL FLUTTER: ICD-10-CM

## 2024-02-12 DIAGNOSIS — I47.10 SVT (SUPRAVENTRICULAR TACHYCARDIA): Primary | ICD-10-CM

## 2024-02-12 DIAGNOSIS — Z95.818 STATUS POST PLACEMENT OF IMPLANTABLE LOOP RECORDER: ICD-10-CM

## 2024-02-12 DIAGNOSIS — I47.10 SVT (SUPRAVENTRICULAR TACHYCARDIA): ICD-10-CM

## 2024-02-12 DIAGNOSIS — Z86.79 S/P ABLATION OF ATRIAL FLUTTER: ICD-10-CM

## 2024-02-12 LAB
ALBUMIN SERPL BCP-MCNC: 3.8 G/DL (ref 3.5–5)
ALP SERPL-CCNC: 53 U/L (ref 34–104)
ALT SERPL W P-5'-P-CCNC: 24 U/L (ref 7–52)
ANION GAP SERPL CALCULATED.3IONS-SCNC: 10 MMOL/L
AST SERPL W P-5'-P-CCNC: 23 U/L (ref 13–39)
BASOPHILS # BLD AUTO: 0.02 THOUSANDS/ÂΜL (ref 0–0.1)
BASOPHILS NFR BLD AUTO: 0 % (ref 0–1)
BILIRUB SERPL-MCNC: 0.31 MG/DL (ref 0.2–1)
BSA FOR ECHO PROCEDURE: 1.77 M2
BUN SERPL-MCNC: 15 MG/DL (ref 5–25)
CALCIUM SERPL-MCNC: 8.7 MG/DL (ref 8.4–10.2)
CHLORIDE SERPL-SCNC: 105 MMOL/L (ref 96–108)
CO2 SERPL-SCNC: 26 MMOL/L (ref 21–32)
CREAT SERPL-MCNC: 0.63 MG/DL (ref 0.6–1.3)
EOSINOPHIL # BLD AUTO: 0.13 THOUSAND/ÂΜL (ref 0–0.61)
EOSINOPHIL NFR BLD AUTO: 1 % (ref 0–6)
ERYTHROCYTE [DISTWIDTH] IN BLOOD BY AUTOMATED COUNT: 13.3 % (ref 11.6–15.1)
GFR SERPL CREATININE-BSD FRML MDRD: 102 ML/MIN/1.73SQ M
GLUCOSE P FAST SERPL-MCNC: 104 MG/DL (ref 65–99)
HCT VFR BLD AUTO: 39.3 % (ref 34.8–46.1)
HGB BLD-MCNC: 12.2 G/DL (ref 11.5–15.4)
IMM GRANULOCYTES # BLD AUTO: 0.03 THOUSAND/UL (ref 0–0.2)
IMM GRANULOCYTES NFR BLD AUTO: 0 % (ref 0–2)
LYMPHOCYTES # BLD AUTO: 1.94 THOUSANDS/ÂΜL (ref 0.6–4.47)
LYMPHOCYTES NFR BLD AUTO: 20 % (ref 14–44)
MCH RBC QN AUTO: 29.1 PG (ref 26.8–34.3)
MCHC RBC AUTO-ENTMCNC: 31 G/DL (ref 31.4–37.4)
MCV RBC AUTO: 94 FL (ref 82–98)
MONOCYTES # BLD AUTO: 0.55 THOUSAND/ÂΜL (ref 0.17–1.22)
MONOCYTES NFR BLD AUTO: 6 % (ref 4–12)
NEUTROPHILS # BLD AUTO: 6.89 THOUSANDS/ÂΜL (ref 1.85–7.62)
NEUTS SEG NFR BLD AUTO: 73 % (ref 43–75)
NRBC BLD AUTO-RTO: 0 /100 WBCS
PLATELET # BLD AUTO: 255 THOUSANDS/UL (ref 149–390)
PMV BLD AUTO: 10.9 FL (ref 8.9–12.7)
POTASSIUM SERPL-SCNC: 4.1 MMOL/L (ref 3.5–5.3)
PROT SERPL-MCNC: 6.9 G/DL (ref 6.4–8.4)
RBC # BLD AUTO: 4.19 MILLION/UL (ref 3.81–5.12)
SL CV LV EF: 60
SODIUM SERPL-SCNC: 141 MMOL/L (ref 135–147)
WBC # BLD AUTO: 9.56 THOUSAND/UL (ref 4.31–10.16)

## 2024-02-12 PROCEDURE — 99214 OFFICE O/P EST MOD 30 MIN: CPT | Performed by: STUDENT IN AN ORGANIZED HEALTH CARE EDUCATION/TRAINING PROGRAM

## 2024-02-12 PROCEDURE — 93306 TTE W/DOPPLER COMPLETE: CPT | Performed by: INTERNAL MEDICINE

## 2024-02-12 PROCEDURE — 93306 TTE W/DOPPLER COMPLETE: CPT

## 2024-02-12 PROCEDURE — 93000 ELECTROCARDIOGRAM COMPLETE: CPT | Performed by: STUDENT IN AN ORGANIZED HEALTH CARE EDUCATION/TRAINING PROGRAM

## 2024-02-13 ENCOUNTER — TELEPHONE (OUTPATIENT)
Dept: CARDIOLOGY CLINIC | Facility: CLINIC | Age: 54
End: 2024-02-13

## 2024-02-13 DIAGNOSIS — R73.09 LABILE BLOOD GLUCOSE: Primary | ICD-10-CM

## 2024-02-13 PROBLEM — Z01.419 ENCOUNTER FOR GYNECOLOGICAL EXAMINATION WITHOUT ABNORMAL FINDING: Status: RESOLVED | Noted: 2023-12-15 | Resolved: 2024-02-13

## 2024-02-13 NOTE — TELEPHONE ENCOUNTER
----- Message from Marisabel Barrios PA-C sent at 2/13/2024  8:45 AM EST -----  Please call patient to let her know that her echo is completely normal - her ejection fracture (pump function) is normal, and there is no evidence of bleeding around the heart. Her low blood pressure does not seem to be related to her recent procedure or due to a cardiac problem, so we recommend that she follow up with her PCP to discuss further work up. I will send her PCP a message to let her know what is going on. Thank you!

## 2024-02-14 ENCOUNTER — TELEPHONE (OUTPATIENT)
Dept: FAMILY MEDICINE CLINIC | Facility: CLINIC | Age: 54
End: 2024-02-14

## 2024-02-14 NOTE — TELEPHONE ENCOUNTER
----- Message from Debo Gee MD sent at 2/13/2024 12:49 PM EST -----  Regarding: endocrinology referral  Please let the patient know that her cardiologist is concerned about labile blood pressure and wants her to get checked to make sure her hormones are balanced such as stress hormones produced by adrenal glands. I put in referral for her to see an endocrinologist for this. Please give her number to call    Dr gee   ----- Message -----  From: Marisabel Barrios PA-C  Sent: 2/13/2024  12:37 PM EST  To: Debo Gee MD    If it's ok with you, I think I will leave the referral up to you if you think it's appropriate. My attending and I are far removed from general medicine, so we don't want to jump to that if it isn't appropriate. Thank you!    ----- Message -----  From: Debo Gee MD  Sent: 2/13/2024  12:33 PM EST  To: PURNIMA Monahan    Thanks for reaching out. Endocrinologist will be more appropriate for her to see for adrenal work up. You can put the referral in or I can     Let me know!  Dr gee  ----- Message -----  From: Marisabel Barrios PA-C  Sent: 2/13/2024   8:51 AM EST  To: Debo Gee MD    Good morning Dr. Gee,     I just wanted to fill you in on our mutual patient. She recently had an atrial flutter ablation, and I saw her in follow up yesterday. She is doing well from an arrhythmia standpoint, but she has had low blood pressure in the post procedure setting. We did a stat echo yesterday which showed normal LVEF, and there was no evidence of effusion. She also had blood work done yesterday which showed normal H/H.    I reviewed the patient with my attending (Dr. Heath) and he was thinking maybe she has some adrenal insufficiency causing her low blood pressure? We suggested that the patient follow up with you for potential further work up since that is outside of our area of expertise, so I wanted to let you know what was going on.     Please let us know if you have any  questions, thank you!  Marisabel Barrios PA-C

## 2024-02-15 ENCOUNTER — TELEPHONE (OUTPATIENT)
Dept: ENDOCRINOLOGY | Facility: CLINIC | Age: 54
End: 2024-02-15

## 2024-02-15 NOTE — TELEPHONE ENCOUNTER
Received referral for Christina Lara.     Left voicemail for patient to contact office to schedule Consult/New Patient Appointment.

## 2024-02-16 ENCOUNTER — HOSPITAL ENCOUNTER (OUTPATIENT)
Dept: RADIOLOGY | Facility: CLINIC | Age: 54
End: 2024-02-16
Payer: OTHER MISCELLANEOUS

## 2024-02-16 VITALS
RESPIRATION RATE: 18 BRPM | HEART RATE: 67 BPM | DIASTOLIC BLOOD PRESSURE: 76 MMHG | SYSTOLIC BLOOD PRESSURE: 113 MMHG | TEMPERATURE: 98.5 F | OXYGEN SATURATION: 100 %

## 2024-02-16 DIAGNOSIS — M51.16 INTERVERTEBRAL DISC DISORDER WITH RADICULOPATHY OF LUMBAR REGION: ICD-10-CM

## 2024-02-16 PROCEDURE — 64483 NJX AA&/STRD TFRM EPI L/S 1: CPT | Performed by: ANESTHESIOLOGY

## 2024-02-16 RX ORDER — 0.9 % SODIUM CHLORIDE 0.9 %
4 VIAL (ML) INJECTION ONCE
Status: COMPLETED | OUTPATIENT
Start: 2024-02-16 | End: 2024-02-16

## 2024-02-16 RX ORDER — METHYLPREDNISOLONE ACETATE 80 MG/ML
80 INJECTION, SUSPENSION INTRA-ARTICULAR; INTRALESIONAL; INTRAMUSCULAR; PARENTERAL; SOFT TISSUE ONCE
Status: COMPLETED | OUTPATIENT
Start: 2024-02-16 | End: 2024-02-16

## 2024-02-16 RX ORDER — BUPIVACAINE HCL/PF 2.5 MG/ML
2 VIAL (ML) INJECTION ONCE
Status: COMPLETED | OUTPATIENT
Start: 2024-02-16 | End: 2024-02-16

## 2024-02-16 RX ADMIN — Medication 4 ML: at 13:37

## 2024-02-16 RX ADMIN — METHYLPREDNISOLONE ACETATE 80 MG: 80 INJECTION, SUSPENSION INTRA-ARTICULAR; INTRALESIONAL; INTRAMUSCULAR; PARENTERAL; SOFT TISSUE at 13:38

## 2024-02-16 RX ADMIN — BUPIVACAINE HYDROCHLORIDE 2 ML: 2.5 INJECTION, SOLUTION EPIDURAL; INFILTRATION; INTRACAUDAL at 13:38

## 2024-02-16 RX ADMIN — IOHEXOL 1 ML: 300 INJECTION, SOLUTION INTRAVENOUS at 13:38

## 2024-02-16 NOTE — H&P
History of Present Illness: The patient is a 53 y.o. female who presents with complaints of lower back pain and is here today for bilateral L5 transforaminal epidural steroid injections    Past Medical History:   Diagnosis Date    Acute right ankle pain 2023    ADHD (attention deficit hyperactivity disorder)     Allergic     Anxiety     Bulging lumbar disc     Carpal tunnel syndrome     RIGHT.  LAST ASSESSED: 16    Chronic back pain     low    Chronic pain disorder     Colon polyps     COVID-19     2021    Depression     Diabetes mellitus (HCC)     GERD (gastroesophageal reflux disease)     Gestational diabetes     Hearing loss     left ear    Heart disease     SVT    History of pre-eclampsia     Hyperlipidemia     Resolved with weight loss    Hyponatremia 2020    IBS (irritable bowel syndrome)     Ileus (HCC)     LAST ASSESSED: 8/3/17    Labial cyst     LAST ASSESSED: 16    Memory loss     Myofascial pain     LAST ASSESSED: 16    Neck mass 2023    Obesity     Ovarian cyst     LEFT. LAST ASSESSED: 16    Panic attack     Pneumonia     Sacroiliitis (HCC)     Seasonal allergies     Sprain of anterior talofibular ligament of right ankle 2023    SVT (supraventricular tachycardia)     Thoracic outlet syndrome     2010    Trochanteric bursitis of both hips     LAST ASSESSED: 3/18/16    Ulnar neuropathy at elbow     Varicella     Wears glasses        Past Surgical History:   Procedure Laterality Date    BARIATRIC SURGERY  2022    BILE DUCT EXPLORATION      ENDOSCOPIC REMOVAL OF STONES FROM BILIARY TRACT    CARDIAC ELECTROPHYSIOLOGY PROCEDURE N/A 2024    Procedure: Cardiac eps/svt ablation;  Surgeon: Daquan Heath MD;  [facility]: [facility];  Service: Cardiology     SECTION      x3    CHOLECYSTECTOMY      COLONOSCOPY      -polyp, -wnl, repeat5 years    DILATION AND CURETTAGE OF UTERUS      ENDOMETRIAL ABLATION      ERCP W/ SPHICTEROTOMY      FIRST  RIB REMOVAL      THORAX EXCISION OF FIRST RIB    GASTRIC BYPASS  8/08/2022    HYSTEROSCOPY      NEUROPLASTY / TRANSPOSITION ULNAR NERVE AT ELBOW Right 2011    HI COLONOSCOPY FLX DX W/COLLJ SPEC WHEN PFRMD N/A 05/30/2017    Procedure: COLONOSCOPY;  Surgeon: Oscar Velázquez MD;  Location: [facility];  Service: Gastroenterology    HI ESOPHAGOGASTRODUODENOSCOPY TRANSORAL DIAGNOSTIC N/A 09/14/2017    Procedure: ESOPHAGOGASTRODUODENOSCOPY (EGD);  Surgeon: Sadiq Car MD;  Location: [facility];  Service: Gastroenterology    HI HYSTEROSCOPY BX ENDOMETRIUM&/POLYPC W/WO D&C N/A 10/20/2017    Procedure: DILATATION AND CURETTAGE (D&C) WITH HYSTEROSCOPY  REMOVAL VULVAR RT. LESION;  Surgeon: Lucila Ortiz MD;  Location[facility];  Service: Gynecology    HI ALDANA IMPLTJ NSTIM ELTRDS PLATE/PADDLE EDRL Left 01/29/2020    Procedure: Insertion of thoracic spinal cord stimulator electrode via laminotomy and placement of left buttock implantable pulse generator (NEUROMONITORING);  Surgeon: Ad Mehta MD;  [facility];  Service: Neurosurgery    HI LAPS GSTRC RSTRICTIV PX LONGITUDINAL GASTRECTOMY N/A 02/06/2018    Procedure: GASTRECTOMY SLEEVE LAPAROSCOPIC; INTRAOPERATIVE EGD ;  Surgeon: Abimael Juaerz MD;  [facility];  Service: Bariatrics    HI LAPS GSTRC RSTRICTIV PX LONGITUDINAL GASTRECTOMY N/A 08/08/2022    Procedure: Diagnostic Lap; Extensive Lysis of Adhesions; LAPAROSCOPIC REVISION CONVERSION TO NOE-EN-Y GASTRIC BYPASS AND INTRAOPERATIVE EGD;  Surgeon: Abimael Juarez MD;  [facility];  Service: Bariatrics    SLEEVE GASTROPLASTY      SPINAL CORD STIMULATOR TRIAL W/ LAMINOTOMY      TONSILLECTOMY AND ADENOIDECTOMY      TUBAL LIGATION      UPPER GASTROINTESTINAL ENDOSCOPY      US GUIDED LYMPH NODE BIOPSY LEFT  05/22/2023    VEIN LIGATION AND STRIPPING Right     VULVA SURGERY  10/20/2017    BIOPSY    WISDOM TOOTH EXTRACTION           Current Outpatient Medications:     apixaban (Eliquis) 5 mg, Take 1  tablet (5 mg total) by mouth 2 (two) times a day, Disp: 180 tablet, Rfl: 3    atoMOXetine (STRATTERA) 60 mg capsule, Take 1 capsule (60 mg total) by mouth daily, Disp: 90 capsule, Rfl: 3    atorvastatin (LIPITOR) 20 mg tablet, Take 1 tablet (20 mg total) by mouth daily, Disp: 90 tablet, Rfl: 0    calcium carbonate (OS-MELODY) 600 MG tablet, Take 600 mg by mouth 2 (two) times a day with meals (Patient not taking: Reported on 2/12/2024), Disp: , Rfl:     cyclobenzaprine (FLEXERIL) 10 mg tablet, Take 1 tablet (10 mg total) by mouth 2 (two) times a day as needed for muscle spasms for up to 7 days, Disp: 14 tablet, Rfl: 0    drospirenone-ethinyl estradiol (LIEN) 3-0.02 MG per tablet, Take 1 tablet by mouth daily, Disp: 84 tablet, Rfl: 4    estradiol (ESTRACE VAGINAL) 0.1 mg/g vaginal cream, One gram vaginally at night x 14 days then twice weekly for ongoing maintenance. (Patient taking differently: if needed One gram vaginally at night x 14 days then twice weekly for ongoing maintenance.), Disp: 42.5 g, Rfl: 2    fluticasone (FLONASE) 50 mcg/act nasal spray, 1 spray into each nostril daily, Disp: 15.8 mL, Rfl: 0    lamoTRIgine (LaMICtal) 150 MG tablet, Take 1 tablet (150 mg total) by mouth 2 (two) times a day, Disp: 180 tablet, Rfl: 3    lidocaine (Lidoderm) 5 %, Apply 1 patch topically over 12 hours daily Remove & Discard patch within 12 hours or as directed by MD, Disp: 30 patch, Rfl: 1    meclizine (ANTIVERT) 25 mg tablet, Take 1 tablet (25 mg total) by mouth every 8 (eight) hours as needed for dizziness, Disp: 30 tablet, Rfl: 0    methocarbamol (ROBAXIN) 750 mg tablet, Take 1 tablet (750 mg total) by mouth every 8 (eight) hours (Patient taking differently: Take 750 mg by mouth if needed), Disp: 270 tablet, Rfl: 0    montelukast (SINGULAIR) 10 mg tablet, Take 1 tablet (10 mg total) by mouth daily at bedtime, Disp: 90 tablet, Rfl: 2    Multiple Vitamins-Minerals (MULTI COMPLETE PO), Take by mouth, Disp: , Rfl:      nitrofurantoin (MACROBID) 100 mg capsule, Take 1 tablet PO PRN after sexual intercourse (Patient not taking: Reported on 2/16/2024), Disp: 30 capsule, Rfl: 0    omeprazole (PriLOSEC) 20 mg delayed release capsule, Take 1 capsule (20 mg total) by mouth daily, Disp: 90 capsule, Rfl: 3    QUEtiapine (SEROquel) 100 mg tablet, Take 1 tablet (100 mg total) by mouth daily at bedtime, Disp: 90 tablet, Rfl: 2    venlafaxine (EFFEXOR) 100 MG tablet, Take 1 tablet (100 mg total) by mouth 3 (three) times a day, Disp: 270 tablet, Rfl: 2    Current Facility-Administered Medications:     bupivacaine (PF) (MARCAINE) 0.25 % injection 2 mL, 2 mL, Epidural, Once, Nino Garvey MD    iohexol (OMNIPAQUE) 300 mg/mL injection 1 mL, 1 mL, Epidural, Once, Nino Garvey MD    lidocaine (PF) (XYLOCAINE-MPF) 2 % injection 4 mL, 4 mL, Infiltration, Once, Nino Garvey MD    methylPREDNISolone acetate (DEPO-MEDROL) injection 80 mg, 80 mg, Epidural, Once, Nino Garvey MD    sodium chloride (PF) 0.9 % injection 4 mL, 4 mL, Infiltration, Once, Nino Garvey MD    Allergies   Allergen Reactions    Ibuprofen Other (See Comments)     Due to gastric sleeve -can only take for 5 days, then needs to stop       Physical Exam:   Vitals:    02/16/24 1311   BP: 129/77   Pulse: 64   Resp: 18   Temp: 98.5 °F (36.9 °C)   SpO2: 100%     General: Awake, Alert, Oriented x 3, Mood and affect appropriate  Respiratory: Respirations even and unlabored  Cardiovascular: Peripheral pulses intact; no edema  Musculoskeletal Exam: Lower back pain    ASA Score: 3    Patient/Chart Verification  Patient ID Verified: Verbal  ID Band Applied: No  Consents Confirmed: Procedural, To be obtained in the Pre-Procedure area  H&P( within 30 days) Verified: To be obtained in the Pre-Procedure area  Allergies Reviewed: Yes  Anticoag/NSAID held?: Yes (eliquis last dose 2/11, denies other blood thinners)  Currently on antibiotics?: No    Assessment:   1. Intervertebral disc  disorder with radiculopathy of lumbar region        Plan: bilateral L5 TFESI

## 2024-02-16 NOTE — DISCHARGE INSTR - LAB
Epidural Steroid Injection   WHAT YOU NEED TO KNOW:   An epidural steroid injection (JESSICA) is a procedure to inject steroid medicine into the epidural space. The epidural space is between your spinal cord and vertebrae. Steroids reduce inflammation and fluid buildup in your spine that may be causing pain. You may be given pain medicine along with the steroids.          ACTIVITY  Do not drive or operate machinery today.  No strenuous activity today - bending, lifting, etc.  You may resume normal activites starting tomorrow - start slowly and as tolerated.  You may shower today, but no tub baths or hot tubs.  You may have numbness for several hours from the local anesthetic. Please use caution and common sense, especially with weight-bearing activities.    CARE OF THE INJECTION SITE  If you have soreness or pain, apply ice to the area today (20 minutes on/20 minutes off).  Starting tomorrow, you may use warm, moist heat or ice if needed.  You may have an increase or change in your discomfort for 36-48 hours after your treatment.  Apply ice and continue with any pain medication you have been prescribed.  Notify the [facility] if you have any of the following: redness, drainage, swelling, headache, stiff neck or fever above 100°F.    SPECIAL INSTRUCTIONS  Our office will contact you in approximately 7 days for a progress report.    MEDICATIONS  Continue to take all routine medications.  Our office may have instructed you to hold some medications.    As no general anesthesia was used in today's procedure, you should not experience any side effects related to anesthesia.     If you are diabetic, the steroids used in today's injection may temporarily increase your blood sugar levels after the first few days after your injection. Please keep a close eye on your sugars and alert the doctor who manages your diabetes if your sugars are significantly high from your baseline or you are symptomatic.     If you have a  problem specifically related to your procedure, please call our office at [phone].  Problems not related to your procedure should be directed to your primary care physician.

## 2024-02-23 ENCOUNTER — OFFICE VISIT (OUTPATIENT)
Dept: PSYCHIATRY | Facility: CLINIC | Age: 54
End: 2024-02-23

## 2024-02-23 ENCOUNTER — TELEPHONE (OUTPATIENT)
Dept: RADIOLOGY | Facility: MEDICAL CENTER | Age: 54
End: 2024-02-23

## 2024-02-23 DIAGNOSIS — R41.3 MEMORY DEFICIT: Primary | ICD-10-CM

## 2024-02-23 NOTE — PSYCH
Christina Lara was seen by vasquez Norton, neuropsychometrist, for a neuropsychological assessment on 02/23/24.

## 2024-02-27 ENCOUNTER — TELEMEDICINE (OUTPATIENT)
Dept: BEHAVIORAL/MENTAL HEALTH CLINIC | Facility: CLINIC | Age: 54
End: 2024-02-27
Payer: COMMERCIAL

## 2024-02-27 DIAGNOSIS — F33.2 MDD (MAJOR DEPRESSIVE DISORDER), RECURRENT SEVERE, WITHOUT PSYCHOSIS (HCC): Primary | ICD-10-CM

## 2024-02-27 DIAGNOSIS — F90.0 ADHD (ATTENTION DEFICIT HYPERACTIVITY DISORDER), INATTENTIVE TYPE: Chronic | ICD-10-CM

## 2024-02-27 DIAGNOSIS — F41.1 GENERALIZED ANXIETY DISORDER: Chronic | ICD-10-CM

## 2024-02-27 PROCEDURE — 90837 PSYTX W PT 60 MINUTES: CPT | Performed by: SOCIAL WORKER

## 2024-02-27 NOTE — PSYCH
Virtual Regular Visit    Verification of patient location:    Patient is located at Home in the following state in which I hold an active license [place]    Assessment/Plan:    Problem List Items Addressed This Visit          Other    Generalized anxiety disorder (Chronic)    ADHD (attention deficit hyperactivity disorder), inattentive type (Chronic)    MDD (major depressive disorder), recurrent severe, without psychosis (HCC) - Primary     Goals addressed in session: Goal 1        Reason for visit is   Chief Complaint   Patient presents with    Virtual Regular Visit     Encounter provider LALY Yung    Provider located at [facility]  [facility] [place] [id number] [phone]    Recent Visits  No visits were found meeting these conditions.  Showing recent visits within past 7 days and meeting all other requirements  Today's Visits  Date Type Provider Dept   02/27/24 Telemedicine LALY Yung  [facility]   Showing today's visits and meeting all other requirements  Future Appointments  No visits were found meeting these conditions.  Showing future appointments within next 150 days and meeting all other requirements       The patient was identified by name and date of birth. Christina Lara was informed that this is a telemedicine visit and that the visit is being conducted throughthe Epic Embedded platform. She agrees to proceed..  My office door was closed. No one else was in the room.  She acknowledged consent and understanding of privacy and security of the video platform. The patient has agreed to participate and understands they can discontinue the visit at any time.    Patient is aware this is a billable service.     Subjective  Christina Lara is a 53 y.o. female.    HPI     Past Medical History:   Diagnosis Date    Acute right ankle pain 02/07/2023    ADHD (attention deficit  hyperactivity disorder)     Allergic     Anxiety     Bulging lumbar disc     Carpal tunnel syndrome     RIGHT.  LAST ASSESSED: 16    Chronic back pain     low    Chronic pain disorder     Colon polyps     COVID-19     2021    Depression     Diabetes mellitus (HCC)     GERD (gastroesophageal reflux disease)     Gestational diabetes     Hearing loss     left ear    Heart disease     SVT    History of pre-eclampsia     Hyperlipidemia     Resolved with weight loss    Hyponatremia 2020    IBS (irritable bowel syndrome)     Ileus (HCC)     LAST ASSESSED: 8/3/17    Labial cyst     LAST ASSESSED: 16    Memory loss     Myofascial pain     LAST ASSESSED: 16    Neck mass 2023    Obesity     Ovarian cyst     LEFT. LAST ASSESSED: 16    Panic attack     Pneumonia     Sacroiliitis (HCC)     Seasonal allergies     Sprain of anterior talofibular ligament of right ankle 2023    SVT (supraventricular tachycardia)     Thoracic outlet syndrome     2010    Trochanteric bursitis of both hips     LAST ASSESSED: 3/18/16    Ulnar neuropathy at elbow     Varicella     Wears glasses        Past Surgical History:   Procedure Laterality Date    BARIATRIC SURGERY  2022    BILE DUCT EXPLORATION      ENDOSCOPIC REMOVAL OF STONES FROM BILIARY TRACT    CARDIAC ELECTROPHYSIOLOGY PROCEDURE N/A 2024    Procedure: Cardiac eps/svt ablation;  Surgeon: Daquan Heath MD;  [facility]: [facility];  Service: Cardiology     SECTION      x3    CHOLECYSTECTOMY      COLONOSCOPY      -polyp, -wnl, repeat5 years    DILATION AND CURETTAGE OF UTERUS      ENDOMETRIAL ABLATION      ERCP W/ SPHICTEROTOMY      FIRST RIB REMOVAL      THORAX EXCISION OF FIRST RIB    GASTRIC BYPASS  2022    HYSTEROSCOPY      NEUROPLASTY / TRANSPOSITION ULNAR NERVE AT ELBOW Right     MO COLONOSCOPY FLX DX W/COLLJ SPEC WHEN PFRMD N/A 2017    Procedure: COLONOSCOPY;  Surgeon: Oscar Velázquez MD;  Location: [facility];  Service: Gastroenterology    IL ESOPHAGOGASTRODUODENOSCOPY TRANSORAL DIAGNOSTIC N/A 09/14/2017    Procedure: ESOPHAGOGASTRODUODENOSCOPY (EGD);  Surgeon: Sadiq Car MD;  Location: [facility];  Service: Gastroenterology    IL HYSTEROSCOPY BX ENDOMETRIUM&/POLYPC W/WO D&C N/A 10/20/2017    Procedure: DILATATION AND CURETTAGE (D&C) WITH HYSTEROSCOPY  REMOVAL VULVAR RT. LESION;  Surgeon: Lucila Ortiz MD;  [facility];  Service: Gynecology    IL ALDANA IMPLTJ NSTIM ELTRDS PLATE/PADDLE EDRL Left 01/29/2020    Procedure: Insertion of thoracic spinal cord stimulator electrode via laminotomy and placement of left buttock implantable pulse generator (NEUROMONITORING);  Surgeon: Ad Mehta MD;  [facility];  Service: Neurosurgery    IL LAPS GST RSTRICTIV PX LONGITUDINAL GASTRECTOMY N/A 02/06/2018    Procedure: GASTRECTOMY SLEEVE LAPAROSCOPIC; INTRAOPERATIVE EGD ;  Surgeon: Abimael Juarez MD;  [facility];  Service: Bariatrics    IL LAPS GSTRC RSTRICTIV PX LONGITUDINAL GASTRECTOMY N/A 08/08/2022    Procedure: Diagnostic Lap; Extensive Lysis of Adhesions; LAPAROSCOPIC REVISION CONVERSION TO NOE-EN-Y GASTRIC BYPASS AND INTRAOPERATIVE EGD;  Surgeon: Abimael Juarez MD;  [facility];  Service: Bariatrics    SLEEVE GASTROPLASTY      SPINAL CORD STIMULATOR TRIAL W/ LAMINOTOMY      TONSILLECTOMY AND ADENOIDECTOMY      TUBAL LIGATION      UPPER GASTROINTESTINAL ENDOSCOPY      US GUIDED LYMPH NODE BIOPSY LEFT  05/22/2023    VEIN LIGATION AND STRIPPING Right     VULVA SURGERY  10/20/2017    BIOPSY    WISDOM TOOTH EXTRACTION         Current Outpatient Medications   Medication Sig Dispense Refill    apixaban (Eliquis) 5 mg Take 1 tablet (5 mg total) by mouth 2 (two) times a day 180 tablet 3    atoMOXetine (STRATTERA) 60 mg capsule Take 1 capsule (60 mg total) by mouth daily 90 capsule 3    atorvastatin (LIPITOR) 20 mg tablet Take 1 tablet (20 mg total) by mouth daily 90 tablet 0    calcium  carbonate (OS-MELODY) 600 MG tablet Take 600 mg by mouth 2 (two) times a day with meals (Patient not taking: Reported on 2/12/2024)      cyclobenzaprine (FLEXERIL) 10 mg tablet Take 1 tablet (10 mg total) by mouth 2 (two) times a day as needed for muscle spasms for up to 7 days 14 tablet 0    drospirenone-ethinyl estradiol (LEIN) 3-0.02 MG per tablet Take 1 tablet by mouth daily 84 tablet 4    estradiol (ESTRACE VAGINAL) 0.1 mg/g vaginal cream One gram vaginally at night x 14 days then twice weekly for ongoing maintenance. (Patient taking differently: if needed One gram vaginally at night x 14 days then twice weekly for ongoing maintenance.) 42.5 g 2    fluticasone (FLONASE) 50 mcg/act nasal spray 1 spray into each nostril daily 15.8 mL 0    lamoTRIgine (LaMICtal) 150 MG tablet Take 1 tablet (150 mg total) by mouth 2 (two) times a day 180 tablet 3    lidocaine (Lidoderm) 5 % Apply 1 patch topically over 12 hours daily Remove & Discard patch within 12 hours or as directed by MD 30 patch 1    meclizine (ANTIVERT) 25 mg tablet Take 1 tablet (25 mg total) by mouth every 8 (eight) hours as needed for dizziness 30 tablet 0    methocarbamol (ROBAXIN) 750 mg tablet Take 1 tablet (750 mg total) by mouth every 8 (eight) hours (Patient taking differently: Take 750 mg by mouth if needed) 270 tablet 0    montelukast (SINGULAIR) 10 mg tablet Take 1 tablet (10 mg total) by mouth daily at bedtime 90 tablet 2    Multiple Vitamins-Minerals (MULTI COMPLETE PO) Take by mouth      nitrofurantoin (MACROBID) 100 mg capsule Take 1 tablet PO PRN after sexual intercourse (Patient not taking: Reported on 2/16/2024) 30 capsule 0    omeprazole (PriLOSEC) 20 mg delayed release capsule Take 1 capsule (20 mg total) by mouth daily 90 capsule 3    QUEtiapine (SEROquel) 100 mg tablet Take 1 tablet (100 mg total) by mouth daily at bedtime 90 tablet 2    venlafaxine (EFFEXOR) 100 MG tablet Take 1 tablet (100 mg total) by mouth 3 (three) times a day 270  "tablet 2     No current facility-administered medications for this visit.        Allergies   Allergen Reactions    Ibuprofen Other (See Comments)     Due to gastric sleeve -can only take for 5 days, then needs to stop       Review of Systems    Video Exam    There were no vitals filed for this visit.    Physical Exam     Behavioral Health Psychotherapy Progress Note    Psychotherapy Provided: Individual Psychotherapy     1. MDD (major depressive disorder), recurrent severe, without psychosis (HCC)        2. Generalized anxiety disorder        3. ADHD (attention deficit hyperactivity disorder), inattentive type            Goals addressed in session: Goal 1     DATA: Met with Yessy for her scheduled individual session. She states that she completed her neuropsych testing. She will be meeting with the psychologist again, later this week, to review her results. She states that she did not feel like she did very well, and she states, \"I wonder what happens next, if I didn't do well.\" She states that she was tearful during the testing process, as she spoke with the psychologist. She states that she continues to attend virtual classes for school. She is currently taking American Literature and Psychopathology. She states that she didn't realize that these classes would be as hard as they are. We discussed the possibility of her requesting accommodations for her disabilities. She agreed to speak with the disability office, and I agreed to write a letter regarding accommodations, if she needs this. I believe that she could benefit from extra time on exams and assignments, as well as possibly having questions read to her and/or answered verbally, due to some processing difficulties. Once the results of her neuropsychological evaluation are back, we will have more information to use for an accommodations letter. Yessy discussed her work schedule and the pros/cons of her job. She states that she is feeling that she might need " "to take some time off-- and she and her  are planning a trip in September.     During this session, this clinician used the following therapeutic modalities: Client-centered Therapy, Dialectical Behavior Therapy, Mindfulness-based Strategies, Motivational Interviewing, Solution-Focused Therapy, and Supportive Psychotherapy    Substance Abuse was not addressed during this session. If the client is diagnosed with a co-occurring substance use disorder, please indicate any changes in the frequency or amount of use: n/a. Stage of change for addressing substance use diagnoses: No substance use/Not applicable    ASSESSMENT:  Christina Lara presents with a Euthymic/ normal mood.     her affect is Normal range and intensity, which is congruent, with her mood and the content of the session. The client has not made progress on their goals, since our last session. We will meet again in one month, and we will review the results of her neuropsych testing.     Christina Lara presents with a minimal risk of suicide, minimal risk of self-harm, and minimal risk of harm to others.    For any risk assessment that surpasses a \"low\" rating, a safety plan must be developed.    A safety plan was indicated: no  If yes, describe in detail n/a    PLAN: Between sessions, Christina Lara will continue to monitor her moods. She will work with her school to see if she can access the disability office and request accommodations for her disability. At her next appointment, we will review her results from her neuropsych evaluation, to see how we can adjust the therapeuitc sessions to support her more effectively. At the next session, the therapist will use Client-centered Therapy, Dialectical Behavior Therapy, Mindfulness-based Strategies, Motivational Interviewing, Solution-Focused Therapy, and Supportive Psychotherapy to address her mood regulation and relationship concerns.    Behavioral Health Treatment Plan and Discharge Planning: Christina MILIAN" Marco is aware of and agrees to continue to work on their treatment plan. They have identified and are working toward their discharge goals. yes    Visit start and stop times:    02/27/24  Start Time: 1404  Stop Time: 1457  Total Visit Time: 53 minutes

## 2024-03-01 ENCOUNTER — TELEMEDICINE (OUTPATIENT)
Dept: PSYCHIATRY | Facility: CLINIC | Age: 54
End: 2024-03-01

## 2024-03-01 DIAGNOSIS — R41.3 MEMORY DEFICIT: Primary | ICD-10-CM

## 2024-03-01 NOTE — PSYCH
Neuropsychological Evaluation  [facility]  [address] [place] [id number]  P: [phone] ? F: [phone]     Feedback from Neuropsychological Evaluation     Mrs. Lara returned for a feedback appointment to review the findings and recommendations from a neuropsychological evaluation conducted on 02/23/2024. The patient was identified by name and date of birth and was identified to be in [place], a state in which this provider holds a valid license. She was informed that this is a telemedicine visit and patient was informed that this is a secure, HIPAA-compliant platform. She agreed to proceed. My office door was closed. No one else was in the room. She acknowledged consent and understanding of privacy and security of the video platform. The patient has agreed to participate and understands they can discontinue the visit at any time. Findings from the evaluation were discussed and the patient expressed understanding. Recommendations were reviewed (see evaluation report from 02/23/2024 for full details). The patient was given the opportunity to ask questions and expressed satisfaction with answers provided. Contact information for this provider was given to the patient and she was encouraged to contact the office with any further questions or concerns. The neuropsychological evaluation report was routed to the referring provider, Isha Maldonado MD, for review and follow-up as needed.      Yen Collazo PsyD  Clinical Neuropsychologist  [place] Licensed Psychologist  Lic.#[id number]     Tasks Conducted Total Time Spent Associated CPT Codes   Clinical Interview   77 min.    96116 x 1 unit  96121 x 0 units       Neuropsychological Test Administration (PhD/PsyD) 0 min. 96136 x 1 unit  96137 x 0 units   Scoring (PhD/PsyD) 39 min.        Neuropsychological Test Administration (Tech.) 162 min. 96138 x 1 unit  96139 x 6 units   Scoring (Tech.) 60 min.        Chart Review 51 min. 96132 x 1 unit  96133 x 4  units   Interpretation of Test Data 34 min.    Report Writing 201 min.    Feedback to Patient/Family Members 21 min.
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
## 2024-03-05 ENCOUNTER — REMOTE DEVICE CLINIC VISIT (OUTPATIENT)
Dept: CARDIOLOGY CLINIC | Facility: CLINIC | Age: 54
End: 2024-03-05

## 2024-03-05 DIAGNOSIS — Z95.818 PRESENCE OF OTHER CARDIAC IMPLANTS AND GRAFTS: Primary | ICD-10-CM

## 2024-03-05 NOTE — PROGRESS NOTES
"MDT LNQ22/ ACTIVE SYSTEM IS MRI CONDITIONAL   CARELINK TRANSMISSION: LOOP RECORDER. PRESENTING RHYTHM NSR @ 60 BPM. BATTERY STATUS \"OK.\" NO PATIENT OR DEVICE ACTIVATED EPISODES. HOWEVER THERE IS AN EGRAM SHOWING AF. HX OF PAF. PT TAKES ELIQUIS. NORMAL DEVICE FUNCTION. DL   "

## 2024-03-08 ENCOUNTER — OFFICE VISIT (OUTPATIENT)
Dept: FAMILY MEDICINE CLINIC | Facility: CLINIC | Age: 54
End: 2024-03-08
Payer: COMMERCIAL

## 2024-03-08 ENCOUNTER — TELEPHONE (OUTPATIENT)
Age: 54
End: 2024-03-08

## 2024-03-08 ENCOUNTER — APPOINTMENT (OUTPATIENT)
Dept: LAB | Facility: AMBULARY SURGERY CENTER | Age: 54
End: 2024-03-08

## 2024-03-08 VITALS
TEMPERATURE: 97.1 F | SYSTOLIC BLOOD PRESSURE: 104 MMHG | HEART RATE: 87 BPM | RESPIRATION RATE: 16 BRPM | WEIGHT: 144.6 LBS | OXYGEN SATURATION: 98 % | DIASTOLIC BLOOD PRESSURE: 80 MMHG | HEIGHT: 67 IN | BODY MASS INDEX: 22.7 KG/M2

## 2024-03-08 DIAGNOSIS — M25.551 BILATERAL HIP PAIN: ICD-10-CM

## 2024-03-08 DIAGNOSIS — Z00.6 ENCOUNTER FOR EXAMINATION FOR NORMAL COMPARISON OR CONTROL IN CLINICAL RESEARCH PROGRAM: ICD-10-CM

## 2024-03-08 DIAGNOSIS — M25.552 BILATERAL HIP PAIN: ICD-10-CM

## 2024-03-08 DIAGNOSIS — M54.16 LUMBAR RADICULOPATHY: Primary | ICD-10-CM

## 2024-03-08 PROCEDURE — 99213 OFFICE O/P EST LOW 20 MIN: CPT | Performed by: NURSE PRACTITIONER

## 2024-03-08 PROCEDURE — 36415 COLL VENOUS BLD VENIPUNCTURE: CPT

## 2024-03-08 RX ORDER — PREDNISONE 10 MG/1
50 TABLET ORAL DAILY
Qty: 25 TABLET | Refills: 0 | Status: SHIPPED | OUTPATIENT
Start: 2024-03-08 | End: 2024-03-13

## 2024-03-08 NOTE — TELEPHONE ENCOUNTER
Caller: Patient    Doctor/Office:     Call regarding :  returning call    Call was transferred to: SPA

## 2024-03-08 NOTE — PROGRESS NOTES
FAMILY PRACTICE OFFICE VISIT       NAME: Christina Lara  AGE: 53 y.o. SEX: female       : 1970        MRN: [id number]    DATE: 3/8/2024  TIME: 9:16 AM    Assessment and Plan   1. Lumbar radiculopathy  -     predniSONE 10 mg tablet; Take 5 tablets (50 mg total) by mouth daily for 5 days    2. Bilateral hip pain  -     predniSONE 10 mg tablet; Take 5 tablets (50 mg total) by mouth daily for 5 days         There are no Patient Instructions on file for this visit.        Chief Complaint     Chief Complaint   Patient presents with    Hip Pain     Patient being seen for Bilateral hip pain x 2 days       History of Present Illness   Christina Lara is a 53 y.o.-year-old female who is here for bilat hip pain in the groins and now righ tlow back and right hip  Follows with pain management for her low back for SI joint injections  Has order for testing but havent been able to get in to get done  Having fatigue  Having thinning of hair  Was taking regular vitamins instead of bariatric vitamins but switched back to bariatic vitamins now  Dr blanton ordered dexa scan      Review of Systems   Review of Systems   Constitutional:  Positive for fatigue. Negative for fever.   HENT:  Negative for congestion and postnasal drip.    Musculoskeletal:  Positive for arthralgias, back pain, gait problem and myalgias.   Skin:  Negative for rash.   Neurological:  Negative for dizziness, light-headedness and headaches.   Hematological:  Negative for adenopathy.   Psychiatric/Behavioral:  Negative for dysphoric mood and sleep disturbance. The patient is not nervous/anxious.        Active Problem List     Patient Active Problem List   Diagnosis    IBS (irritable bowel syndrome)    Mixed hyperlipidemia    GERD (gastroesophageal reflux disease)    Chronic back pain    Cervical radiculopathy    Hepatic steatosis    Pulmonary nodule seen on imaging study    S/P laparoscopic sleeve gastrectomy    Postsurgical malabsorption    Lumbar radiculopathy     Intervertebral disc disorder with radiculopathy of lumbar region    Post traumatic stress disorder (PTSD)    Generalized anxiety disorder    Chronic pain syndrome    Other chronic pain    Paroxysmal SVT (supraventricular tachycardia)    Prediabetes    ADHD (attention deficit hyperactivity disorder), inattentive type    Seasonal allergies    Benign paroxysmal positional vertigo    Bariatric surgery status    MDD (major depressive disorder), recurrent severe, without psychosis (HCC)    Encounter for screening mammogram for breast cancer    Oral contraceptive pill surveillance    URI (upper respiratory infection)    S/P typical CTI flutter ablation 1/2024    S/p Medtr[employer] loop recorder implantation 8/2022         Past Medical History:  Past Medical History:   Diagnosis Date    Acute right ankle pain 02/07/2023    ADHD (attention deficit hyperactivity disorder)     Allergic     Anxiety     Bulging lumbar disc     Carpal tunnel syndrome     RIGHT.  LAST ASSESSED: 12/7/16    Chronic back pain     low    Chronic pain disorder     Colon polyps     COVID-19     12/2021    Depression     Diabetes mellitus (HCC)     GERD (gastroesophageal reflux disease)     Gestational diabetes     Hearing loss     left ear    Heart disease     SVT    History of pre-eclampsia     Hyperlipidemia     Resolved with weight loss    Hyponatremia 12/12/2020    IBS (irritable bowel syndrome)     Ileus ([facility])     LAST ASSESSED: 8/3/17    Labial cyst     LAST ASSESSED: 4/21/16    Memory loss     Myofascial pain     LAST ASSESSED: 4/12/16    Neck mass 05/11/2023    Obesity     Ovarian cyst     LEFT. LAST ASSESSED: 9/2/16    Panic attack     Pneumonia     Sacroiliitis (HCC)     Seasonal allergies     Sprain of anterior talofibular ligament of right ankle 02/07/2023    SVT (supraventricular tachycardia)     Thoracic outlet syndrome     2010    Trochanteric bursitis of both hips     LAST ASSESSED: 3/18/16    Ulnar neuropathy at elbow     Varicella     Wears  glasses        Past Surgical History:  Past Surgical History:   Procedure Laterality Date    BARIATRIC SURGERY  2022    BILE DUCT EXPLORATION      ENDOSCOPIC REMOVAL OF STONES FROM BILIARY TRACT    CARDIAC ELECTROPHYSIOLOGY PROCEDURE N/A 2024    Procedure: Cardiac eps/svt ablation;  Surgeon: Daquan Heath MD;  [facility];  Service: Cardiology     SECTION      x3    CHOLECYSTECTOMY      COLONOSCOPY      2017-polyp, -wnl, repeat5 years    DILATION AND CURETTAGE OF UTERUS      ENDOMETRIAL ABLATION      ERCP W/ SPHICTEROTOMY      FIRST RIB REMOVAL      THORAX EXCISION OF FIRST RIB    GASTRIC BYPASS  2022    HYSTEROSCOPY      NEUROPLASTY / TRANSPOSITION ULNAR NERVE AT ELBOW Right     DE COLONOSCOPY FLX DX W/COLLJ SPEC WHEN PFRMD N/A 2017    Procedure: COLONOSCOPY;  Surgeon: Oscar Velázquez MD;  Location: [facility];  Service: Gastroenterology    DE ESOPHAGOGASTRODUODENOSCOPY TRANSORAL DIAGNOSTIC N/A 2017    Procedure: ESOPHAGOGASTRODUODENOSCOPY (EGD);  Surgeon: Sadiq Car MD;  Location: [facility];  Service: Gastroenterology    DE HYSTEROSCOPY BX ENDOMETRIUM&/POLYPC W/WO D&C N/A 10/20/2017    Procedure: DILATATION AND CURETTAGE (D&C) WITH HYSTEROSCOPY  REMOVAL VULVAR RT. LESION;  Surgeon: Lucila Ortiz MD;  Location: [facility];  Service: Gynecology    DE ALDANA IMPLTJ NSTIM ELTRDS PLATE/PADDLE EDRL Left 2020    Procedure: Insertion of thoracic spinal cord stimulator electrode via laminotomy and placement of left buttock implantable pulse generator (NEUROMONITORING);  Surgeon: Ad Mehta MD;  [facility];  Service: Neurosurgery    DE LAPS GSTRC RSTRICTIV PX LONGITUDINAL GASTRECTOMY N/A 2018    Procedure: GASTRECTOMY SLEEVE LAPAROSCOPIC; INTRAOPERATIVE EGD ;  Surgeon: Abimael Juarez MD;  [facility];  Service: Bariatrics    DE LAPS GSTRC RSTRICTIV PX LONGITUDINAL GASTRECTOMY N/A 2022    Procedure: Diagnostic Lap; Extensive Lysis of  Adhesions; LAPAROSCOPIC REVISION CONVERSION TO NOE-EN-Y GASTRIC BYPASS AND INTRAOPERATIVE EGD;  Surgeon: Abimael Juarez MD;  [facility];  Service: Bariatrics    SLEEVE GASTROPLASTY      SPINAL CORD STIMULATOR TRIAL W/ LAMINOTOMY      TONSILLECTOMY AND ADENOIDECTOMY      TUBAL LIGATION      UPPER GASTROINTESTINAL ENDOSCOPY      US GUIDED LYMPH NODE BIOPSY LEFT  05/22/2023    VEIN LIGATION AND STRIPPING Right     VULVA SURGERY  10/20/2017    BIOPSY    WISDOM TOOTH EXTRACTION         Family History:  Family History   Problem Relation Age of Onset    Diabetes Mother     Breast cancer Mother         >50    BRCA1 Negative Mother     BRCA2 Negative Mother     Hyperlipidemia Mother     Cancer Mother         Breast    Diabetes Father     Other Father         traumatic brain injury    Prostate cancer Father     Alcohol abuse Father         in remission    Heart disease Father     Neuropathy Father     Hyperlipidemia Father     Cancer Father         Prostate    Hypertension Brother     Diabetes Brother     Other Brother         HYPERCHOLESTEROLEMIA    Alcohol abuse Brother     Depression Brother         attempted suicide    Anxiety disorder Brother     Suicide Attempts Brother     Diabetes Brother     Alcohol abuse Brother     Heart attack Maternal Grandmother     Colon cancer Maternal Grandfather     No Known Problems Paternal Grandmother         dad is adopted    No Known Problems Paternal Grandfather         dad is adopted    No Known Problems Daughter     Asthma Son     Ovarian cancer Neg Hx     Uterine cancer Neg Hx        Social History:  Social History     Socioeconomic History    Marital status: /Civil Union     Spouse name: Abel    Number of children: 3    Years of education: GED then 2 year college program    Highest education level: Not on file   Occupational History    Occupation: ER TECH     Employer: [employer]   Tobacco Use    Smoking status: Former     Current packs/day:  0.00     Average packs/day: 1 pack/day for 15.0 years (15.0 ttl pk-yrs)     Types: Cigarettes     Start date: 4/30/1998     Quit date: 4/30/2013     Years since quitting: 10.8     Passive exposure: Never    Smokeless tobacco: Never   Vaping Use    Vaping status: Never Used   Substance and Sexual Activity    Alcohol use: Yes     Alcohol/week: 4.0 standard drinks of alcohol     Types: 2 Shots of liquor, 2 Standard drinks or equivalent per week     Comment: On weekends that i go out    Drug use: No    Sexual activity: Yes     Partners: Male     Birth control/protection: OCP, Post-menopausal     Comment: lifetime partners: 6; current partner 2013   Other Topics Concern    Not on file   Social History Narrative    Yazidism: no preference    Accepts blood products        Exercise: unable with back issues and SVT    Calcium: calcium supplement, multivitamin for women over 50, 1 yogurt daily     Social Determinants of Health     Financial Resource Strain: Medium Risk (12/31/2020)    Overall Financial Resource Strain (CARDIA)     Difficulty of Paying Living Expenses: Somewhat hard   Food Insecurity: No Food Insecurity (12/31/2020)    Hunger Vital Sign     Worried About Running Out of Food in the Last Year: Never true     Ran Out of Food in the Last Year: Never true   Transportation Needs: No Transportation Needs (12/31/2020)    PRAPARE - Transportation     Lack of Transportation (Medical): No     Lack of Transportation (Non-Medical): No   Physical Activity: Unknown (5/9/2019)    Exercise Vital Sign     Days of Exercise per Week: 0 days     Minutes of Exercise per Session: Not on file   Stress: Stress Concern Present (5/9/2019)    [employer] - Occupational Stress Questionnaire     Feeling of Stress : Very much   Social Connections: Socially Isolated (5/9/2019)    Social Connection and Isolation Panel [NHANES]     Frequency of Communication with Friends and Family: Once a week     Frequency of  Social Gatherings with Friends and Family: Once a week     Attends Taoist Services: Never     Active Member of Clubs or Organizations: No     Attends Club or Organization Meetings: Never     Marital Status:    Intimate Partner Violence: Not At Risk (5/9/2019)    Humiliation, Afraid, Rape, and Kick questionnaire     Fear of Current or Ex-Partner: No     Emotionally Abused: No     Physically Abused: No     Sexually Abused: No   Housing Stability: Not on file       Objective     Vitals:    03/08/24 0856   BP: 104/80   Pulse: 87   Resp: 16   Temp: (!) 97.1 °F (36.2 °C)   SpO2: 98%     Wt Readings from Last 3 Encounters:   03/08/24 65.6 kg (144 lb 9.6 oz)   02/12/24 66.2 kg (146 lb)   02/12/24 66.5 kg (146 lb 11.2 oz)       Physical Exam  Vitals and nursing note reviewed.   Constitutional:       Appearance: Normal appearance.   HENT:      Head: Normocephalic and atraumatic.   Eyes:      Conjunctiva/sclera: Conjunctivae normal.   Cardiovascular:      Rate and Rhythm: Normal rate and regular rhythm.      Heart sounds: Normal heart sounds.   Pulmonary:      Effort: Pulmonary effort is normal.      Breath sounds: Normal breath sounds.   Musculoskeletal:      Cervical back: Normal range of motion and neck supple.      Lumbar back: Spasms and tenderness present. Decreased range of motion.        Back:       Right hip: Tenderness and bony tenderness present. Decreased range of motion.      Left hip: Tenderness and bony tenderness present. Decreased range of motion.        Legs:    Skin:     General: Skin is warm and dry.      Capillary Refill: Capillary refill takes less than 2 seconds.   Neurological:      Mental Status: She is alert and oriented to person, place, and time.   Psychiatric:         Mood and Affect: Mood normal.         Behavior: Behavior normal.         Thought Content: Thought content normal.         Pertinent Laboratory/Diagnostic Studies:  Lab Results   Component Value Date    GLUCOSE 109  "08/13/2015    BUN 15 02/12/2024    CREATININE 0.63 02/12/2024    CALCIUM 8.7 02/12/2024     (L) 08/13/2015    K 4.1 02/12/2024    CO2 26 02/12/2024     02/12/2024     Lab Results   Component Value Date    ALT 24 02/12/2024    AST 23 02/12/2024    ALKPHOS 53 02/12/2024    BILITOT 0.39 04/16/2014       Lab Results   Component Value Date    WBC 9.56 02/12/2024    HGB 12.2 02/12/2024    HCT 39.3 02/12/2024    MCV 94 02/12/2024     02/12/2024       No results found for: \"TSH\"    Lab Results   Component Value Date    CHOL 219 07/17/2015     Lab Results   Component Value Date    TRIG 258 (H) 01/27/2023     Lab Results   Component Value Date    HDL 77 01/27/2023     Lab Results   Component Value Date    LDLCALC 78 01/27/2023     Lab Results   Component Value Date    HGBA1C 5.8 01/05/2023       Results for orders placed or performed during the hospital encounter of 02/12/24   Echo complete w/ contrast if indicated   Result Value Ref Range    BSA 1.77 m2    LV EF 60      *Note: Due to a large number of results and/or encounters for the requested time period, some results have not been displayed. A complete set of results can be found in Results Review.       No orders of the defined types were placed in this encounter.      ALLERGIES:  Allergies   Allergen Reactions    Ibuprofen Other (See Comments)     Due to gastric sleeve -can only take for 5 days, then needs to stop       Current Medications     Current Outpatient Medications   Medication Sig Dispense Refill    apixaban (Eliquis) 5 mg Take 1 tablet (5 mg total) by mouth 2 (two) times a day 180 tablet 3    atoMOXetine (STRATTERA) 60 mg capsule Take 1 capsule (60 mg total) by mouth daily 90 capsule 3    atorvastatin (LIPITOR) 20 mg tablet Take 1 tablet (20 mg total) by mouth daily 90 tablet 0    cyclobenzaprine (FLEXERIL) 10 mg tablet Take 1 tablet (10 mg total) by mouth 2 (two) times a day as needed for muscle spasms for up to 7 days 14 tablet 0    " drospirenone-ethinyl estradiol (LIEN) 3-0.02 MG per tablet Take 1 tablet by mouth daily 84 tablet 4    estradiol (ESTRACE VAGINAL) 0.1 mg/g vaginal cream One gram vaginally at night x 14 days then twice weekly for ongoing maintenance. (Patient taking differently: if needed One gram vaginally at night x 14 days then twice weekly for ongoing maintenance.) 42.5 g 2    fluticasone (FLONASE) 50 mcg/act nasal spray 1 spray into each nostril daily 15.8 mL 0    lamoTRIgine (LaMICtal) 150 MG tablet Take 1 tablet (150 mg total) by mouth 2 (two) times a day 180 tablet 3    lidocaine (Lidoderm) 5 % Apply 1 patch topically over 12 hours daily Remove & Discard patch within 12 hours or as directed by MD 30 patch 1    meclizine (ANTIVERT) 25 mg tablet Take 1 tablet (25 mg total) by mouth every 8 (eight) hours as needed for dizziness 30 tablet 0    methocarbamol (ROBAXIN) 750 mg tablet Take 1 tablet (750 mg total) by mouth every 8 (eight) hours (Patient taking differently: Take 750 mg by mouth if needed) 270 tablet 0    montelukast (SINGULAIR) 10 mg tablet Take 1 tablet (10 mg total) by mouth daily at bedtime 90 tablet 2    Multiple Vitamins-Minerals (MULTI COMPLETE PO) Take by mouth      omeprazole (PriLOSEC) 20 mg delayed release capsule Take 1 capsule (20 mg total) by mouth daily 90 capsule 3    predniSONE 10 mg tablet Take 5 tablets (50 mg total) by mouth daily for 5 days 25 tablet 0    QUEtiapine (SEROquel) 100 mg tablet Take 1 tablet (100 mg total) by mouth daily at bedtime 90 tablet 2    venlafaxine (EFFEXOR) 100 MG tablet Take 1 tablet (100 mg total) by mouth 3 (three) times a day 270 tablet 2    calcium carbonate (OS-MELODY) 600 MG tablet Take 600 mg by mouth 2 (two) times a day with meals (Patient not taking: Reported on 2/12/2024)      nitrofurantoin (MACROBID) 100 mg capsule Take 1 tablet PO PRN after sexual intercourse (Patient not taking: Reported on 2/16/2024) 30 capsule 0     No current facility-administered medications  for this visit.         Health Maintenance     Health Maintenance   Topic Date Due    Osteoporosis Screening  Never done    Zoster Vaccine (1 of 2) Never done    COVID-19 Vaccine (4 - 2023-24 season) 09/01/2023    Annual Physical  12/14/2023    Breast Cancer Screening: Mammogram  12/14/2023    Depression Screening  09/29/2024    Cervical Cancer Screening  11/26/2026    Colorectal Cancer Screening  01/05/2027    DTaP,Tdap,and Td Vaccines (3 - Td or Tdap) 08/12/2032    HIV Screening  Completed    Hepatitis C Screening  Completed    Pneumococcal Vaccine: Pediatrics (0 to 5 Years) and At-Risk Patients (6 to 64 Years)  Completed    Influenza Vaccine  Completed    HIB Vaccine  Aged Out    IPV Vaccine  Aged Out    Hepatitis A Vaccine  Aged Out    Meningococcal ACWY Vaccine  Aged Out    HPV Vaccine  Aged Out     Immunization History   Administered Date(s) Administered    COVID-19 PFIZER VACCINE 0.3 ML IM 03/06/2021, 03/25/2021, 12/11/2021    INFLUENZA 11/27/2015, 10/01/2016, 10/06/2017, 12/12/2018, 10/17/2019, 10/09/2020, 10/19/2021, 09/07/2022, 09/29/2023    Influenza Quadrivalent Preservative Free 3 years and older IM 10/01/2016    Influenza Quadrivalent, 6-35 Months IM 11/27/2015    Influenza, injectable, quadrivalent, preservative free 0.5 mL 09/29/2023    Influenza, recombinant, quadrivalent,injectable, preservative free 10/17/2019, 10/09/2020, 09/07/2022    Influenza, seasonal, injectable 09/01/2011, 10/20/2013, 10/06/2017    Pneumococcal Conjugate Vaccine 20-valent (Pcv20), Polysace 07/25/2022    Tdap 09/01/2011, 08/12/2022          OSMANI Sánchez

## 2024-03-11 ENCOUNTER — NEW REFERRAL (OUTPATIENT)
Dept: URBAN - METROPOLITAN AREA CLINIC 6 | Facility: CLINIC | Age: 54
End: 2024-03-11

## 2024-03-11 DIAGNOSIS — R73.09: ICD-10-CM

## 2024-03-11 DIAGNOSIS — H52.13: ICD-10-CM

## 2024-03-11 DIAGNOSIS — H25.13: ICD-10-CM

## 2024-03-11 PROCEDURE — 92004 COMPRE OPH EXAM NEW PT 1/>: CPT

## 2024-03-11 ASSESSMENT — VISUAL ACUITY
OS_CC: 20/25
OD_CC: 20/30

## 2024-03-11 ASSESSMENT — TONOMETRY
OS_IOP_MMHG: 16
OD_IOP_MMHG: 15

## 2024-03-13 NOTE — PSYCH
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    Visit Start Time: 1:30 PM  Visit Stop Time: 2:00 PM  Total Visit Duration:  30 minutes     The total visit duration detailed above includes: patient engagement, medication management, psychotherapy/counseling, discussion regarding treatment goals, documentation, review of past medical records, and coordination of care.      Note Share Disclaimer:     This note was not shared with the patient due to reasonable likelihood of causing patient harm      Marc Vela MD  Board Certified Diplomate of the American Board of Psychiatry and Neurology  03/13/24

## 2024-03-14 ENCOUNTER — TELEMEDICINE (OUTPATIENT)
Dept: PSYCHIATRY | Facility: CLINIC | Age: 54
End: 2024-03-14

## 2024-03-14 DIAGNOSIS — G47.9 SLEEP DISORDER: ICD-10-CM

## 2024-03-14 DIAGNOSIS — F43.10 POST TRAUMATIC STRESS DISORDER (PTSD): Chronic | ICD-10-CM

## 2024-03-14 DIAGNOSIS — F90.0 ADHD (ATTENTION DEFICIT HYPERACTIVITY DISORDER), INATTENTIVE TYPE: Chronic | ICD-10-CM

## 2024-03-14 DIAGNOSIS — F33.1 MODERATE EPISODE OF RECURRENT MAJOR DEPRESSIVE DISORDER (HCC): Primary | Chronic | ICD-10-CM

## 2024-03-14 DIAGNOSIS — F41.1 GENERALIZED ANXIETY DISORDER: Chronic | ICD-10-CM

## 2024-03-14 RX ORDER — LAMOTRIGINE 150 MG/1
150 TABLET ORAL 2 TIMES DAILY
Qty: 180 TABLET | Refills: 3 | Status: SHIPPED | OUTPATIENT
Start: 2024-03-14 | End: 2025-03-09

## 2024-03-14 RX ORDER — MIRTAZAPINE 15 MG/1
15 TABLET, FILM COATED ORAL
Qty: 90 TABLET | Refills: 1 | Status: SHIPPED | OUTPATIENT
Start: 2024-03-14

## 2024-03-14 NOTE — BH TREATMENT PLAN
TREATMENT PLAN (Medication Management Only)        [facility]      Name and Date of Birth:  Christina Lara 53 y.o. 1970 MRN: [id number]    Date of Visit: March 14, 2024    Diagnosis/Diagnoses:    1. MDD (major depressive disorder), recurrent severe, without psychosis (HCC)        2. Post traumatic stress disorder (PTSD)        3. ADHD (attention deficit hyperactivity disorder), inattentive type        4. Generalized anxiety disorder            Strengths/Personal Resources for Self-Care: supportive family, taking medications as prescribed, ability to adapt to life changes, ability to communicate needs, ability to communicate well, ability to listen, ability to reason, independence, motivation for treatment, ability to negotiate basic needs, being resoureceful, stable employment, willingness to work on problems.    Area/Areas of need (in own words): anxiety symptoms, depressive symptoms, mood instability  1. Long Term Goal: improve control of mood.  Target Date:6 months - 9/14/2024  Person/Persons responsible for completion of goal: Christina  2.  Short Term Objective (s) - How will we reach this goal?:   A. Provider new recommended medication/dosage changes and/or continue medication(s): continue current medications as prescribed.  B. Keep regularly scheduled psychiatric appointments  C. Maintain adherence to psychotropic medication regimen   D. Continue therapy with DS  E. Maintain appropriate dietary intake  F. Practice adequate sleep hygiene    G. Refrain from alcohol consumption    Target Date:6 months - 9/14/2024  Person/Persons Responsible for Completion of Goal: Christina    Progress Towards Goals: continuing treatment    Treatment Modality: medication management every 3 months    Review due 180 days from date of this plan: 6 months - 9/14/2024  Expected length of service: ongoing treatment    My Physician/PA/NP and I have developed this plan together and I agree  to work on the goals and objectives. I understand the treatment goals that were developed for my treatment. Treatment Plan was completed with Christina's assistance and input throughout today's session. Christina consented to the information provided and documented above. Unfortunately, treatment plan was not signed at the time of this office visit secondary to issues related to signature keypad.

## 2024-03-15 ENCOUNTER — TELEPHONE (OUTPATIENT)
Dept: PSYCHIATRY | Facility: CLINIC | Age: 54
End: 2024-03-15

## 2024-03-15 NOTE — TELEPHONE ENCOUNTER
Called and left message for patient to return a call to [phone] and schedule 3 month follow up with provider (6/14/2024). Please schedule upon return call. Thank you.

## 2024-03-26 ENCOUNTER — TELEMEDICINE (OUTPATIENT)
Dept: BEHAVIORAL/MENTAL HEALTH CLINIC | Facility: CLINIC | Age: 54
End: 2024-03-26
Payer: COMMERCIAL

## 2024-03-26 DIAGNOSIS — F43.10 POST TRAUMATIC STRESS DISORDER (PTSD): Chronic | ICD-10-CM

## 2024-03-26 DIAGNOSIS — F41.1 GENERALIZED ANXIETY DISORDER: Chronic | ICD-10-CM

## 2024-03-26 DIAGNOSIS — F90.0 ADHD (ATTENTION DEFICIT HYPERACTIVITY DISORDER), INATTENTIVE TYPE: Chronic | ICD-10-CM

## 2024-03-26 DIAGNOSIS — F33.2 MDD (MAJOR DEPRESSIVE DISORDER), RECURRENT SEVERE, WITHOUT PSYCHOSIS (HCC): Primary | ICD-10-CM

## 2024-03-26 PROCEDURE — 90837 PSYTX W PT 60 MINUTES: CPT | Performed by: SOCIAL WORKER

## 2024-03-26 NOTE — BH CRISIS PLAN
Client Name: Christina Lara       Client YOB: 1970    [facility]      Creation Date: 3/26/24 Update Date: 3/26/25   Created By: LALY Yung Last Updated By: LALY Yung      Step 1: Warning Signs:   Warning Signs   I would not tell or show anyone. But you would never have to worry about that. I would never do it.            Step 2: Internal Coping Strategies:   Internal Coping Strategies   Color game on my phone.            Step 3: People and social settings that provide distraction:   Name Contact Information   Some of the girls that I hang out with (like Bertha, Mary, Lisa, eg.)    Fidelia at work            Step 4: People whom I can ask for help during a crisis:     Patient did not identify any contacts: Yes      Step 5: Professionals or agencies I can contact during a crisis:      Clinican/Agency Name Phone Emergency Contact    Margaret Gutierrez [phone]     Dr. Vela [phone]       Local Emergency Department Emergency Department Phone Emergency Department Address    [facility] Emergency Room.          Crisis Phone Numbers:   Suicide Prevention Lifeline: Call or Text  193 Crisis Text Line: Text HOME to 003-691   Please note: Some [facility] do not have a separate number for Child/Adolescent specific crisis. If your county is not listed under Child/Adolescent, please call the adult number for your county      Adult Crisis Numbers: Child/Adolescent Crisis Numbers   [facility]: [phone] [facility]: [phone]   [facility]: [phone] [facility]: [phone]   [facility]: [phone] [place]: [phone]   [facility]: [phone] [place]: [phone]   [place] [facility]: [phone]   [facility]: [phone]   [facility]: [phone]   [place] [place] [facility]: [phone] (daytime) [phone] (after hours, weekends, holidays)      Step 6: Making the environment safer (plan  "for lethal means safety):   Plan: Client reports that there are guns in the home. She states that the guns are locked.      Optional: What is most important to me and worth living for?   My son. \"I am who he depends on.\"      Erika Safety Plan. Chani Wooten and Roberto Booth. Used with permission of the authors.           "

## 2024-03-26 NOTE — PSYCH
Virtual Regular Visit    Verification of patient location:    Patient is located at Home in the following state in which I hold an active license [place]    Assessment/Plan:    Problem List Items Addressed This Visit          Behavioral Health    Post traumatic stress disorder (PTSD) (Chronic)    Generalized anxiety disorder (Chronic)    ADHD (attention deficit hyperactivity disorder), inattentive type (Chronic)    MDD (major depressive disorder), recurrent severe, without psychosis (HCC) - Primary       Goals addressed in session: Goal 1          Reason for visit is   Chief Complaint   Patient presents with    Virtual Regular Visit          Encounter provider LALY Yugn    Provider located at [facility]  [facility] [place]  [address] [address] [phone]      Recent Visits  No visits were found meeting these conditions.  Showing recent visits within past 7 days and meeting all other requirements  Today's Visits  Date Type Provider Dept   03/26/24 Telemedicine LALY Yung  [facility] [place]   Showing today's visits and meeting all other requirements  Future Appointments  No visits were found meeting these conditions.  Showing future appointments within next 150 days and meeting all other requirements       The patient was identified by name and date of birth. Christina Lara was informed that this is a telemedicine visit and that the visit is being conducted throughthe Epic Embedded platform. She agrees to proceed..  My office door was closed. No one else was in the room.  She acknowledged consent and understanding of privacy and security of the video platform. The patient has agreed to participate and understands they can discontinue the visit at any time.    Patient is aware this is a billable service.     Subjective  Christina Lara is a 53 y.o. female.      HPI     Past Medical History:   Diagnosis  Date    Acute right ankle pain 2023    ADHD (attention deficit hyperactivity disorder)     Allergic     Anxiety     Bulging lumbar disc     Carpal tunnel syndrome     RIGHT.  LAST ASSESSED: 16    Chronic back pain     low    Chronic pain disorder     Colon polyps     COVID-19     2021    Depression     Diabetes mellitus (HCC)     GERD (gastroesophageal reflux disease)     Gestational diabetes     Hearing loss     left ear    Heart disease     SVT    History of pre-eclampsia     Hyperlipidemia     Resolved with weight loss    Hyponatremia 2020    IBS (irritable bowel syndrome)     Ileus (HCC)     LAST ASSESSED: 8/3/17    Labial cyst     LAST ASSESSED: 16    Memory loss     Myofascial pain     LAST ASSESSED: 16    Neck mass 2023    Obesity     Ovarian cyst     LEFT. LAST ASSESSED: 16    Panic attack     Pneumonia     Sacroiliitis (HCC)     Seasonal allergies     Sprain of anterior talofibular ligament of right ankle 2023    SVT (supraventricular tachycardia)     Thoracic outlet syndrome     2010    Trochanteric bursitis of both hips     LAST ASSESSED: 3/18/16    Ulnar neuropathy at elbow     Varicella     Wears glasses        Past Surgical History:   Procedure Laterality Date    BARIATRIC SURGERY  2022    BILE DUCT EXPLORATION      ENDOSCOPIC REMOVAL OF STONES FROM BILIARY TRACT    CARDIAC ELECTROPHYSIOLOGY PROCEDURE N/A 2024    Procedure: Cardiac eps/svt ablation;  Surgeon: Daquan Heath MD;  [facility];  Service: Cardiology     SECTION      x3    CHOLECYSTECTOMY      COLONOSCOPY      -polyp, -wnl, repeat5 years    DILATION AND CURETTAGE OF UTERUS      ENDOMETRIAL ABLATION      ERCP W/ SPHICTEROTOMY      FIRST RIB REMOVAL      THORAX EXCISION OF FIRST RIB    GASTRIC BYPASS  2022    HYSTEROSCOPY      NEUROPLASTY / TRANSPOSITION ULNAR NERVE AT ELBOW Right     ME COLONOSCOPY FLX DX W/COLLJ SPEC WHEN PFRMD N/A 2017     Procedure: COLONOSCOPY;  Surgeon: Oscar Velázquez MD;  Location: [facility];  Service: Gastroenterology    SD ESOPHAGOGASTRODUODENOSCOPY TRANSORAL DIAGNOSTIC N/A 09/14/2017    Procedure: ESOPHAGOGASTRODUODENOSCOPY (EGD);  Surgeon: Sadiq Car MD;  Location: [facility];  Service: Gastroenterology    SD HYSTEROSCOPY BX ENDOMETRIUM&/POLYPC W/WO D&C N/A 10/20/2017    Procedure: DILATATION AND CURETTAGE (D&C) WITH HYSTEROSCOPY  REMOVAL VULVAR RT. LESION;  Surgeon: Lucila Ortiz MD;  Location: [facility];  Service: Gynecology    SD ALDANA IMPLTJ NSTIM ELTRDS PLATE/PADDLE EDRL Left 01/29/2020    Procedure: Insertion of thoracic spinal cord stimulator electrode via laminotomy and placement of left buttock implantable pulse generator (NEUROMONITORING);  Surgeon: Ad Mehta MD;  Location[facility];  Service: Neurosurgery    SD LAPS GSTRC RSTRICTIV PX LONGITUDINAL GASTRECTOMY N/A 02/06/2018    Procedure: GASTRECTOMY SLEEVE LAPAROSCOPIC; INTRAOPERATIVE EGD ;  Surgeon: Abimael Juarez MD;  [facility];  Service: Bariatrics    SD LAPS GSTRC RSTRICTIV PX LONGITUDINAL GASTRECTOMY N/A 08/08/2022    Procedure: Diagnostic Lap; Extensive Lysis of Adhesions; LAPAROSCOPIC REVISION CONVERSION TO NOE-EN-Y GASTRIC BYPASS AND INTRAOPERATIVE EGD;  Surgeon: Abimael Juarez MD;  [facility];  Service: Bariatrics    SLEEVE GASTROPLASTY      SPINAL CORD STIMULATOR TRIAL W/ LAMINOTOMY      TONSILLECTOMY AND ADENOIDECTOMY      TUBAL LIGATION      UPPER GASTROINTESTINAL ENDOSCOPY      US GUIDED LYMPH NODE BIOPSY LEFT  05/22/2023    VEIN LIGATION AND STRIPPING Right     VULVA SURGERY  10/20/2017    BIOPSY    WISDOM TOOTH EXTRACTION         Current Outpatient Medications   Medication Sig Dispense Refill    apixaban (Eliquis) 5 mg Take 1 tablet (5 mg total) by mouth 2 (two) times a day 180 tablet 3    atoMOXetine (STRATTERA) 60 mg capsule Take 1 capsule (60 mg total) by mouth daily 90 capsule 3    atorvastatin (LIPITOR) 20 mg tablet  Take 1 tablet (20 mg total) by mouth daily 90 tablet 0    calcium carbonate (OS-MELODY) 600 MG tablet Take 600 mg by mouth 2 (two) times a day with meals (Patient not taking: Reported on 2/12/2024)      cyclobenzaprine (FLEXERIL) 10 mg tablet Take 1 tablet (10 mg total) by mouth 2 (two) times a day as needed for muscle spasms for up to 7 days 14 tablet 0    drospirenone-ethinyl estradiol (LIEN) 3-0.02 MG per tablet Take 1 tablet by mouth daily 84 tablet 4    estradiol (ESTRACE VAGINAL) 0.1 mg/g vaginal cream One gram vaginally at night x 14 days then twice weekly for ongoing maintenance. (Patient taking differently: if needed One gram vaginally at night x 14 days then twice weekly for ongoing maintenance.) 42.5 g 2    fluticasone (FLONASE) 50 mcg/act nasal spray 1 spray into each nostril daily 15.8 mL 0    lamoTRIgine (LaMICtal) 150 MG tablet Take 1 tablet (150 mg total) by mouth 2 (two) times a day 180 tablet 3    lidocaine (Lidoderm) 5 % Apply 1 patch topically over 12 hours daily Remove & Discard patch within 12 hours or as directed by MD 30 patch 1    meclizine (ANTIVERT) 25 mg tablet Take 1 tablet (25 mg total) by mouth every 8 (eight) hours as needed for dizziness 30 tablet 0    methocarbamol (ROBAXIN) 750 mg tablet Take 1 tablet (750 mg total) by mouth every 8 (eight) hours (Patient taking differently: Take 750 mg by mouth if needed) 270 tablet 0    mirtazapine (REMERON) 15 mg tablet Take 1 tablet (15 mg total) by mouth daily at bedtime 90 tablet 1    montelukast (SINGULAIR) 10 mg tablet Take 1 tablet (10 mg total) by mouth daily at bedtime 90 tablet 2    Multiple Vitamins-Minerals (MULTI COMPLETE PO) Take by mouth      nitrofurantoin (MACROBID) 100 mg capsule Take 1 tablet PO PRN after sexual intercourse (Patient not taking: Reported on 2/16/2024) 30 capsule 0    omeprazole (PriLOSEC) 20 mg delayed release capsule Take 1 capsule (20 mg total) by mouth daily 90 capsule 3    venlafaxine (EFFEXOR) 100 MG tablet  Take 1 tablet (100 mg total) by mouth 3 (three) times a day 270 tablet 2     No current facility-administered medications for this visit.        Allergies   Allergen Reactions    Ibuprofen Other (See Comments)     Due to gastric sleeve -can only take for 5 days, then needs to stop       Review of Systems    Video Exam    There were no vitals filed for this visit.    Physical Exam     Behavioral Health Psychotherapy Progress Note    Psychotherapy Provided: Individual Psychotherapy     1. MDD (major depressive disorder), recurrent severe, without psychosis (HCC)        2. Generalized anxiety disorder        3. Post traumatic stress disorder (PTSD)        4. ADHD (attention deficit hyperactivity disorder), inattentive type            Goals addressed in session: Goal 1     DATA: Met with Yessy for her scheduled individual session. She talked about the stress related to her work and her school. She states that she feels unsure about how her grades will be during this semester. She states that she has been feeling a lot of stress about her classes and what she will do once she has gotten her degree. Her goal continues to be to go to get her bachelor's degree in social work, once she has finished her associates degree. Yessy states that she has been feeling a lot of stress at work and at home. She reports that she feels tired a lot of the time. She continues to follow up with her medical providers to discuss her medical concerns. We discussed her level of anxiety and her need to continue to use mindfulness-based strategies to manage her moods. She states that she continues to go out with friends. She states that she and her  have agreed that she can have a maximum of two drinks when she goes out. She states that this plan has been working well, and her  is not as concerned as he previously was with her use of alcohol. Yessy and URKHSANA discussed her treatment plan and crisis plan. She states that she wants to  "continue to work on building her confidence, so she can get her degree.   During this session, this clinician used the following therapeutic modalities: Client-centered Therapy, Cognitive Behavioral Therapy, Mindfulness-based Strategies, Motivational Interviewing, Solution-Focused Therapy, and Supportive Psychotherapy    Substance Abuse was addressed during this session. If the client is diagnosed with a co-occurring substance use disorder, please indicate any changes in the frequency or amount of use: maximum of two drinks when she goes out (1-2x per week). Stage of change for addressing substance use diagnoses: No substance use/Not applicable    ASSESSMENT:  Christina Lara presents with a Euthymic/ normal mood.     her affect is Normal range and intensity, which is congruent, with her mood and the content of the session. The client has made progress on their goals.    Christina Lara presents with a minimal risk of suicide, minimal risk of self-harm, and minimal risk of harm to others.    For any risk assessment that surpasses a \"low\" rating, a safety plan must be developed.    A safety plan was indicated: no  If yes, describe in detail n/a    PLAN: Between sessions, Christina Lara will continue to monitor her moods. She will continue to push herself to work on her school work. When she struggles with the content of the work, she will consider requesting assistance from a  or the disabilities office. At the next session, the therapist will use Client-centered Therapy, Dialectical Behavior Therapy, Mindfulness-based Strategies, Motivational Interviewing, Solution-Focused Therapy, and Supportive Psychotherapy to address her mood regulation, relationships, and her educational balance.    Behavioral Health Treatment Plan and Discharge Planning: Christina Lara is aware of and agrees to continue to work on their treatment plan. They have identified and are working toward their discharge goals. yes    Visit start and " stop times:    03/26/24  Start Time: 1603  Stop Time: 1700  Total Visit Time: 57 minutes

## 2024-03-26 NOTE — BH TREATMENT PLAN
Outpatient Behavioral Health Psychotherapy Treatment Plan    Christina Lara  1970     Date of Initial Psychotherapy Assessment: 04/08/19   Date of Current Treatment Plan: 3/26/2024  Treatment Plan Target Date: 07/24/2024   Treatment Plan Expiration Date: 09/22/2024    Diagnosis:   1. MDD (major depressive disorder), recurrent severe, without psychosis (HCC)        2. Generalized anxiety disorder        3. Post traumatic stress disorder (PTSD)        4. ADHD (attention deficit hyperactivity disorder), inattentive type            Area(s) of Need: Mood regulation, relationship concerns, educational/vocational issues    Long Term Goal 1 (in the client's own words):  I need to work on managing my stress level and anxiety, so I can balance my work, my education, and my family.     Stage of Change: Action    Target Date for completion: 103/26/2025     Anticipated therapeutic modalities: client-centered; motivational interviewing; solution focused; DBT-informed; supportive psychotherapy     People identified to complete this goal: Yessy (client); Margaret Gutierrez (psychotherapist); Marc Vela (psychiatrist)      Objective 1: (identify the means of measuring success in meeting the objective): Yessy will maintain regularly scheduled medication management sessions with her psychiatrist. She will take her medications, as prescribed, and will discuss any barriers to her medication regimen (including side effects or other problems) with both the psychiatric provider and this clinician.     Update 03/26/2024: Yessy continues to endorse adherence with her medications. She states that she is very consistent with her medication regimen. She states that she continues to have some side effects from when her effexor wears off. She will continue to work with her psychiatric provider to manage these side effects and maintain mood regulation.       Objective 2: (identify the means of measuring success in meeting the objective): Yessy  "will practice mindfulness-based strategies, at a minimum 1x per week, to help her improve her overall mood and anxiety management.    Update 03/26/2024: Yessy continues to endorse the use of breathing techniques. This clinician will continue to work with her to develop her ability to engage in mindful meditation.       Objective 3: (identify the means of measuring success in meeting the objective): Yessy will increase her physical activity by going to the gym at least 1-2 times per week (to increase overall health and to decrease anxiety).     Update 03/26/2024: Yessy states that she wants to go to the gym. \"I say that every time, but then I get too tired.\" She is considering options for self-motivation. She is considering asking her  to go with her, to help motivate her to attend more regularly.       Objective 4: (identify the means of measuring success in meeting the objective): Yessy will work with her medical team to see how her recent bariatric revision may impact her medication regimen and her mood regulation.     Update 03/26/2024: Yessy continues to work with her medical team for her weight management. She does identify some medication effects related to her recent bariatric surgery revision. She states, \"Everyone tells me that I look healthy.\" She states that she is doing pretty well with her nutritional intake.      Objective 5: (identify the means of measuring success in meeting the objective): Yessy will increase her social activities, by seeing friends or going out with her , a minimum of one time per month. Yessy would like to be able to enjoy herself when she is out.     Update 03/26/24: Yessy states that she is going out 2x per week for socialization. She states that she goes out once a week with her  and once a week with her friends. She states that, when she is out with her , she is drinking a maximum of two alcoholic beverages. She states that, when she is with her " "friends, she drinks only one-- because she has to drive home.      Objective 6: (identify the means of measuring success in meeting the objective): Yessy will use her therapy sessions to discuss her self-respect and her self-esteem related to her learning disability. She will discuss her decision to use or decline use of learning supports. At this time, Yessy states that she does not want to use learning supports, as she wants to be able to manage her learning on her own.     Update 03/26/24: Yessy has met with a neuropsychologist to review her evaluation. She states that she is unsure exactly what the test revealed. She states that she will continue to work on developing a better understanding of her learning disability.       I am currently under the care of a [facility] psychiatric provider: yes    My [facility] psychiatric provider is: Marc Vela     I am currently taking psychiatric medications: Yes, as prescribed    I feel that I will be ready for discharge from mental health care when I reach the following (measurable goal/objective):  NO CHANGE FROM LAST UPDATE: I've never thought that I'd be able to graduate, because the feelings don't go away.    * We discussed the possibility of her managing her symptoms, and she states that she feels \"like it's never going to happen.\"     For children and adults who have a legal guardian:   Has there been any change to custody orders and/or guardianship status? NA. If yes, attach updated documentation.    I have updated my Crisis Plan and have been offered a copy of this plan    Behavioral Health Treatment Plan [facility]: Diagnosis and Treatment Plan explained to Christina Lraa acknowledges an understanding of their diagnosis. Christina Lara agrees to this treatment plan.    I have been offered a copy of this Treatment Plan. yes    Christina Lara, 1970, actively participated in the review and update of this treatment plan during a virtual session, using " the Epic Embedded platform.   Christina Lara  provided verbal consent on 3/26/24 at 4:36pm. The treatment plan was transcribed into the Electronic Health Record at a later time. The plan will be sent to Yessy, via the [employer], for her signature. This clinician will check to ensure that she was able to sign it during our next session.

## 2024-04-01 ENCOUNTER — OFFICE VISIT (OUTPATIENT)
Dept: BARIATRICS | Facility: CLINIC | Age: 54
End: 2024-04-01
Payer: COMMERCIAL

## 2024-04-01 VITALS
HEIGHT: 66 IN | DIASTOLIC BLOOD PRESSURE: 70 MMHG | TEMPERATURE: 98.1 F | HEART RATE: 96 BPM | BODY MASS INDEX: 23.78 KG/M2 | SYSTOLIC BLOOD PRESSURE: 104 MMHG | WEIGHT: 148 LBS

## 2024-04-01 DIAGNOSIS — Z98.84 S/P GASTRIC BYPASS: Primary | ICD-10-CM

## 2024-04-01 DIAGNOSIS — K91.2 POSTSURGICAL MALABSORPTION: ICD-10-CM

## 2024-04-01 PROCEDURE — 99213 OFFICE O/P EST LOW 20 MIN: CPT | Performed by: SURGERY

## 2024-04-01 NOTE — PROGRESS NOTES
FOLLOW UP VISIT - BARIATRIC SURGERY  Christina Lara 53 y.o. female MRN: [id number]  Unit/Bed#:  Encounter: [id number]      HPI:  Christina Lara is a 53 y.o. female who presents status post conversion from sleeve gastrectomy to a Rebecca-en-Y gastric bypass for intractable heartburn.  Here for scheduled follow-up      Review of Systems   All other systems reviewed and are negative.      Historical Information   Past Medical History:   Diagnosis Date    Acute right ankle pain 02/07/2023    ADHD (attention deficit hyperactivity disorder)     Allergic     Anxiety     Bulging lumbar disc     Carpal tunnel syndrome     RIGHT.  LAST ASSESSED: 12/7/16    Chronic back pain     low    Chronic pain disorder     Colon polyps     COVID-19     12/2021    Depression     Diabetes mellitus (HCC)     GERD (gastroesophageal reflux disease)     Gestational diabetes     Hearing loss     left ear    Heart disease     SVT    History of pre-eclampsia     Hyperlipidemia     Resolved with weight loss    Hyponatremia 12/12/2020    IBS (irritable bowel syndrome)     Ileus (HCC)     LAST ASSESSED: 8/3/17    Labial cyst     LAST ASSESSED: 4/21/16    Memory loss     Myofascial pain     LAST ASSESSED: 4/12/16    Neck mass 05/11/2023    Obesity     Ovarian cyst     LEFT. LAST ASSESSED: 9/2/16    Panic attack     Pneumonia     Sacroiliitis (HCC)     Seasonal allergies     Sprain of anterior talofibular ligament of right ankle 02/07/2023    SVT (supraventricular tachycardia)     Thoracic outlet syndrome     2010    Trochanteric bursitis of both hips     LAST ASSESSED: 3/18/16    Ulnar neuropathy at elbow     Varicella     Wears glasses      Past Surgical History:   Procedure Laterality Date    BARIATRIC SURGERY  08/2022    BILE DUCT EXPLORATION      ENDOSCOPIC REMOVAL OF STONES FROM BILIARY TRACT    CARDIAC ELECTROPHYSIOLOGY PROCEDURE N/A 1/4/2024    Procedure: Cardiac eps/svt ablation;  Surgeon: Daquan Heath MD;  [facility];  Service:  Cardiology     SECTION      x3    CHOLECYSTECTOMY      COLONOSCOPY      2017-polyp, -wnl, repeat5 years    DILATION AND CURETTAGE OF UTERUS      ENDOMETRIAL ABLATION      ERCP W/ SPHICTEROTOMY      FIRST RIB REMOVAL      THORAX EXCISION OF FIRST RIB    GASTRIC BYPASS  2022    HYSTEROSCOPY      NEUROPLASTY / TRANSPOSITION ULNAR NERVE AT ELBOW Right     AK COLONOSCOPY FLX DX W/COLLJ SPEC WHEN PFRMD N/A 2017    Procedure: COLONOSCOPY;  Surgeon: Oscar Velázquez MD;  [facility];  Service: Gastroenterology    AK ESOPHAGOGASTRODUODENOSCOPY TRANSORAL DIAGNOSTIC N/A 2017    Procedure: ESOPHAGOGASTRODUODENOSCOPY (EGD);  Surgeon: Sadiq Car MD;  [facility]: [facility];  Service: Gastroenterology    AK HYSTEROSCOPY BX ENDOMETRIUM&/POLYPC W/WO D&C N/A 10/20/2017    Procedure: DILATATION AND CURETTAGE (D&C) WITH HYSTEROSCOPY  REMOVAL VULVAR RT. LESION;  Surgeon: Lucila Ortiz MD;  [facility];  Service: Gynecology    AK ALDANA IMPLTJ NSTIM ELTRDS PLATE/PADDLE EDRL Left 2020    Procedure: Insertion of thoracic spinal cord stimulator electrode via laminotomy and placement of left buttock implantable pulse generator (NEUROMONITORING);  Surgeon: Ad Mehta MD;  [facility];  Service: Neurosurgery    AK LAPS GSTRC RSTRICTIV PX LONGITUDINAL GASTRECTOMY N/A 2018    Procedure: GASTRECTOMY SLEEVE LAPAROSCOPIC; INTRAOPERATIVE EGD ;  Surgeon: Abimael Juarez MD;  [facility];  Service: Bariatrics    AK LAPS GSTRC RSTRICTIV PX LONGITUDINAL GASTRECTOMY N/A 2022    Procedure: Diagnostic Lap; Extensive Lysis of Adhesions; LAPAROSCOPIC REVISION CONVERSION TO NOE-EN-Y GASTRIC BYPASS AND INTRAOPERATIVE EGD;  Surgeon: Abimael Juarez MD;  [facility];  Service: Bariatrics    SLEEVE GASTROPLASTY      SPINAL CORD STIMULATOR TRIAL W/ LAMINOTOMY      TONSILLECTOMY AND ADENOIDECTOMY      TUBAL LIGATION      UPPER GASTROINTESTINAL ENDOSCOPY      US GUIDED LYMPH  "NODE BIOPSY LEFT  05/22/2023    VEIN LIGATION AND STRIPPING Right     VULVA SURGERY  10/20/2017    BIOPSY    WISDOM TOOTH EXTRACTION       Social History   Social History     Substance and Sexual Activity   Alcohol Use Yes    Alcohol/week: 4.0 standard drinks of alcohol    Types: 2 Shots of liquor, 2 Standard drinks or equivalent per week    Comment: On weekends that i go out     Social History     Substance and Sexual Activity   Drug Use No     Social History     Tobacco Use   Smoking Status Former    Current packs/day: 0.00    Average packs/day: 1 pack/day for 15.0 years (15.0 ttl pk-yrs)    Types: Cigarettes    Start date: 4/30/1998    Quit date: 4/30/2013    Years since quitting: 10.9    Passive exposure: Never   Smokeless Tobacco Never     Family History: non-contributory    Meds/Allergies   all medications and allergies reviewed  Allergies   Allergen Reactions    Ibuprofen Other (See Comments)     Due to gastric sleeve -can only take for 5 days, then needs to stop       Objective       Current Vitals:   Blood Pressure: 104/70 (04/01/24 0917)  Pulse: 96 (04/01/24 0917)  Temperature: 98.1 °F (36.7 °C) (04/01/24 0917)  Temp Source: Tympanic (04/01/24 0917)  Height: 5' 6\" (167.6 cm) (04/01/24 0917)  Weight - Scale: 67.1 kg (148 lb) (04/01/24 0917)        Invasive Devices       None                   Physical Exam  Vitals reviewed.   Constitutional:       General: She is not in acute distress.     Appearance: She is well-developed. She is not diaphoretic.   HENT:      Head: Normocephalic and atraumatic.      Right Ear: External ear normal.      Left Ear: External ear normal.   Eyes:      General: No scleral icterus.        Right eye: No discharge.         Left eye: No discharge.      Conjunctiva/sclera: Conjunctivae normal.   Abdominal:      General: Bowel sounds are normal. There is no distension.      Palpations: Abdomen is soft. There is no mass.      Tenderness: There is no abdominal tenderness. There is no " guarding or rebound.      Comments: Abdomen is soft and benign.  Well-healed surgical incisions.   Neurological:      Mental Status: She is alert and oriented to person, place, and time.   Psychiatric:         Behavior: Behavior normal.         Thought Content: Thought content normal.         Judgment: Judgment normal.         Lab Results: I have personally reviewed pertinent lab results.    Imaging: I have personally reviewed pertinent reports.    EKG, Pathology, and Other Studies: I have personally reviewed pertinent reports.        Assessment/PLAN:    53 y.o. female status post Vertical Sleeve Gastrectomy with me in 2/6/2018 that unfortunately developed reflux and required a conversion to a Rebecca-en-Y gastric bypass in August 2022.    She has lost 82 pounds total and she is down to a BMI of 22.2.  She remains very active walking and exercising.  Continues to take her supplements and vitamins.    I am ordering a formal set of nutritional labs and I am making an appointment for her to be seen with our dietitians to go over her dietary intake.  Further recommendations will be given upon getting those results back.    I had a detailed discussion with her stressing the fact that long-term success is largely dependent on compliance and abidance to the recommendations of the program as well as participation within the support groups.    She is committed to continue to observe her diet and to increase her physical activity.    She will follow up with us as scheduled.    Abimael Juarez MD  4/1/2024  9:21 AM      .

## 2024-04-03 ENCOUNTER — APPOINTMENT (OUTPATIENT)
Dept: LAB | Facility: CLINIC | Age: 54
End: 2024-04-03
Payer: COMMERCIAL

## 2024-04-03 DIAGNOSIS — Z98.84 S/P GASTRIC BYPASS: ICD-10-CM

## 2024-04-03 DIAGNOSIS — K91.2 POSTSURGICAL MALABSORPTION: ICD-10-CM

## 2024-04-03 LAB
25(OH)D3 SERPL-MCNC: 45 NG/ML (ref 30–100)
ALBUMIN SERPL BCP-MCNC: 3.8 G/DL (ref 3.5–5)
ALP SERPL-CCNC: 47 U/L (ref 34–104)
ALT SERPL W P-5'-P-CCNC: 27 U/L (ref 7–52)
ANION GAP SERPL CALCULATED.3IONS-SCNC: 7 MMOL/L (ref 4–13)
AST SERPL W P-5'-P-CCNC: 23 U/L (ref 13–39)
BASOPHILS # BLD AUTO: 0.02 THOUSANDS/ÂΜL (ref 0–0.1)
BASOPHILS NFR BLD AUTO: 0 % (ref 0–1)
BILIRUB SERPL-MCNC: 0.2 MG/DL (ref 0.2–1)
BUN SERPL-MCNC: 18 MG/DL (ref 5–25)
CALCIUM SERPL-MCNC: 8.9 MG/DL (ref 8.4–10.2)
CHLORIDE SERPL-SCNC: 105 MMOL/L (ref 96–108)
CHOLEST SERPL-MCNC: 147 MG/DL
CO2 SERPL-SCNC: 27 MMOL/L (ref 21–32)
CREAT SERPL-MCNC: 0.54 MG/DL (ref 0.6–1.3)
EOSINOPHIL # BLD AUTO: 0.07 THOUSAND/ÂΜL (ref 0–0.61)
EOSINOPHIL NFR BLD AUTO: 1 % (ref 0–6)
ERYTHROCYTE [DISTWIDTH] IN BLOOD BY AUTOMATED COUNT: 13.6 % (ref 11.6–15.1)
FERRITIN SERPL-MCNC: 7 NG/ML (ref 11–307)
GFR SERPL CREATININE-BSD FRML MDRD: 107 ML/MIN/1.73SQ M
GLUCOSE SERPL-MCNC: 98 MG/DL (ref 65–140)
HCT VFR BLD AUTO: 35.9 % (ref 34.8–46.1)
HDLC SERPL-MCNC: 86 MG/DL
HGB BLD-MCNC: 11.3 G/DL (ref 11.5–15.4)
IMM GRANULOCYTES # BLD AUTO: 0.03 THOUSAND/UL (ref 0–0.2)
IMM GRANULOCYTES NFR BLD AUTO: 0 % (ref 0–2)
LDLC SERPL CALC-MCNC: 17 MG/DL (ref 0–100)
LYMPHOCYTES # BLD AUTO: 1.29 THOUSANDS/ÂΜL (ref 0.6–4.47)
LYMPHOCYTES NFR BLD AUTO: 18 % (ref 14–44)
MCH RBC QN AUTO: 29.2 PG (ref 26.8–34.3)
MCHC RBC AUTO-ENTMCNC: 31.5 G/DL (ref 31.4–37.4)
MCV RBC AUTO: 93 FL (ref 82–98)
MONOCYTES # BLD AUTO: 0.43 THOUSAND/ÂΜL (ref 0.17–1.22)
MONOCYTES NFR BLD AUTO: 6 % (ref 4–12)
NEUTROPHILS # BLD AUTO: 5.5 THOUSANDS/ÂΜL (ref 1.85–7.62)
NEUTS SEG NFR BLD AUTO: 75 % (ref 43–75)
NONHDLC SERPL-MCNC: 61 MG/DL
NRBC BLD AUTO-RTO: 0 /100 WBCS
PLATELET # BLD AUTO: 274 THOUSANDS/UL (ref 149–390)
PMV BLD AUTO: 9.9 FL (ref 8.9–12.7)
POTASSIUM SERPL-SCNC: 4.2 MMOL/L (ref 3.5–5.3)
PROT SERPL-MCNC: 6.7 G/DL (ref 6.4–8.4)
PTH-INTACT SERPL-MCNC: 86.4 PG/ML (ref 12–88)
RBC # BLD AUTO: 3.87 MILLION/UL (ref 3.81–5.12)
SODIUM SERPL-SCNC: 139 MMOL/L (ref 135–147)
TRIGL SERPL-MCNC: 220 MG/DL
TSH SERPL DL<=0.05 MIU/L-ACNC: 1.09 UIU/ML (ref 0.45–4.5)
VIT B12 SERPL-MCNC: 852 PG/ML (ref 180–914)
WBC # BLD AUTO: 7.34 THOUSAND/UL (ref 4.31–10.16)

## 2024-04-03 PROCEDURE — 84425 ASSAY OF VITAMIN B-1: CPT

## 2024-04-03 PROCEDURE — 36415 COLL VENOUS BLD VENIPUNCTURE: CPT

## 2024-04-03 PROCEDURE — 83970 ASSAY OF PARATHORMONE: CPT

## 2024-04-03 PROCEDURE — 84443 ASSAY THYROID STIM HORMONE: CPT

## 2024-04-03 PROCEDURE — 84590 ASSAY OF VITAMIN A: CPT

## 2024-04-03 PROCEDURE — 82607 VITAMIN B-12: CPT

## 2024-04-03 PROCEDURE — 80061 LIPID PANEL: CPT

## 2024-04-03 PROCEDURE — 82728 ASSAY OF FERRITIN: CPT

## 2024-04-03 PROCEDURE — 82306 VITAMIN D 25 HYDROXY: CPT

## 2024-04-04 ENCOUNTER — TELEPHONE (OUTPATIENT)
Age: 54
End: 2024-04-04

## 2024-04-06 LAB
VIT A SERPL-MCNC: 88.2 UG/DL (ref 20.1–62)
VIT B1 BLD-SCNC: 135.1 NMOL/L (ref 66.5–200)

## 2024-04-10 ENCOUNTER — TELEPHONE (OUTPATIENT)
Age: 54
End: 2024-04-10

## 2024-04-10 NOTE — TELEPHONE ENCOUNTER
Writer is removing patient from the psychological testing wait list. Patient has completed testing with Yen Collazo. Writer is removing patient now.

## 2024-04-16 DIAGNOSIS — E78.2 MIXED HYPERLIPIDEMIA: ICD-10-CM

## 2024-04-16 RX ORDER — ATORVASTATIN CALCIUM 20 MG/1
20 TABLET, FILM COATED ORAL DAILY
Qty: 90 TABLET | Refills: 1 | Status: SHIPPED | OUTPATIENT
Start: 2024-04-16

## 2024-04-19 DIAGNOSIS — R79.0 LOW FERRITIN: ICD-10-CM

## 2024-04-19 DIAGNOSIS — Z98.84 S/P GASTRIC BYPASS: ICD-10-CM

## 2024-04-19 DIAGNOSIS — K91.2 POSTSURGICAL MALABSORPTION: Primary | ICD-10-CM

## 2024-04-19 DIAGNOSIS — D50.8 IRON DEFICIENCY ANEMIA AFTER GASTRECTOMY: ICD-10-CM

## 2024-04-19 DIAGNOSIS — R79.89 HIGH SERUM VITAMIN A: ICD-10-CM

## 2024-04-19 DIAGNOSIS — K91.89 IRON DEFICIENCY ANEMIA AFTER GASTRECTOMY: ICD-10-CM

## 2024-04-30 ENCOUNTER — TELEMEDICINE (OUTPATIENT)
Dept: BEHAVIORAL/MENTAL HEALTH CLINIC | Facility: CLINIC | Age: 54
End: 2024-04-30

## 2024-04-30 DIAGNOSIS — F41.1 GENERALIZED ANXIETY DISORDER: Chronic | ICD-10-CM

## 2024-04-30 DIAGNOSIS — F33.2 MDD (MAJOR DEPRESSIVE DISORDER), RECURRENT SEVERE, WITHOUT PSYCHOSIS (HCC): Primary | ICD-10-CM

## 2024-04-30 DIAGNOSIS — F90.0 ADHD (ATTENTION DEFICIT HYPERACTIVITY DISORDER), INATTENTIVE TYPE: Chronic | ICD-10-CM

## 2024-04-30 DIAGNOSIS — F43.10 POST TRAUMATIC STRESS DISORDER (PTSD): Chronic | ICD-10-CM

## 2024-04-30 NOTE — PSYCH
Behavior Health Virtual Regular Visit    Verification of patient location:    Patient is located at Home in the following state in which I hold an active license [place]    Assessment/Plan:    Problem List Items Addressed This Visit          Behavioral Health    Post traumatic stress disorder (PTSD) (Chronic)    Generalized anxiety disorder (Chronic)    ADHD (attention deficit hyperactivity disorder), inattentive type (Chronic)    MDD (major depressive disorder), recurrent severe, without psychosis (HCC) - Primary     Goals addressed in session: Goal 1        Reason for visit is   Chief Complaint   Patient presents with    Virtual Regular Visit        Encounter provider LALY Yung      Recent Visits  No visits were found meeting these conditions.  Showing recent visits within past 7 days and meeting all other requirements  Today's Visits  Date Type Provider Dept   04/30/24 Telemedicine [facility]ALY Yung [facility] Therapist [place]   Showing today's visits and meeting all other requirements  Future Appointments  No visits were found meeting these conditions.  Showing future appointments within next 150 days and meeting all other requirements       The patient was identified by name and date of birth. Christina Lara was informed that this is a telemedicine visit and that the visit is being conducted throughthe Epic Embedded platform. She agrees to proceed..  My office door was closed. No one else was in the room.  She acknowledged consent and understanding of privacy and security of the video platform. The patient has agreed to participate and understands they can discontinue the visit at any time.    Patient is aware this is a billable service.     Subjective  Christina Lara is a 54 y.o. female.      HPI     Past Medical History:   Diagnosis Date    Acute right ankle pain 02/07/2023    ADHD (attention deficit hyperactivity disorder)     Allergic     Anxiety     Bulging lumbar disc     Carpal tunnel  syndrome     RIGHT.  LAST ASSESSED: 16    Chronic back pain     low    Chronic pain disorder     Colon polyps     COVID-19     2021    Depression     Diabetes mellitus (HCC)     GERD (gastroesophageal reflux disease)     Gestational diabetes     Hearing loss     left ear    Heart disease     SVT    History of pre-eclampsia     Hyperlipidemia     Resolved with weight loss    Hyponatremia 2020    IBS (irritable bowel syndrome)     Ileus (HCC)     LAST ASSESSED: 8/3/17    Labial cyst     LAST ASSESSED: 16    Memory loss     Myofascial pain     LAST ASSESSED: 16    Neck mass 2023    Obesity     Ovarian cyst     LEFT. LAST ASSESSED: 16    Panic attack     Pneumonia     Sacroiliitis (HCC)     Seasonal allergies     Sprain of anterior talofibular ligament of right ankle 2023    SVT (supraventricular tachycardia)     Thoracic outlet syndrome     2010    Trochanteric bursitis of both hips     LAST ASSESSED: 3/18/16    Ulnar neuropathy at elbow     Varicella     Wears glasses        Past Surgical History:   Procedure Laterality Date    BARIATRIC SURGERY  2022    BILE DUCT EXPLORATION      ENDOSCOPIC REMOVAL OF STONES FROM BILIARY TRACT    CARDIAC ELECTROPHYSIOLOGY PROCEDURE N/A 2024    Procedure: Cardiac eps/svt ablation;  Surgeon: Daquan Heath MD;  [facility];  Service: Cardiology     SECTION      x3    CHOLECYSTECTOMY      COLONOSCOPY      2017-polyp, -wnl, repeat5 years    DILATION AND CURETTAGE OF UTERUS      ENDOMETRIAL ABLATION      ERCP W/ SPHICTEROTOMY      FIRST RIB REMOVAL      THORAX EXCISION OF FIRST RIB    GASTRIC BYPASS  2022    HYSTEROSCOPY      NEUROPLASTY / TRANSPOSITION ULNAR NERVE AT ELBOW Right     NE COLONOSCOPY FLX DX W/COLLJ SPEC WHEN PFRMD N/A 2017    Procedure: COLONOSCOPY;  Surgeon: Oscar Velázquez MD;  [facility];  Service: Gastroenterology    NE ESOPHAGOGASTRODUODENOSCOPY TRANSORAL DIAGNOSTIC N/A  09/14/2017    Procedure: ESOPHAGOGASTRODUODENOSCOPY (EGD);  Surgeon: Sadiq Car MD;  [facility];  Service: Gastroenterology    CT HYSTEROSCOPY BX ENDOMETRIUM&/POLYPC W/WO D&C N/A 10/20/2017    Procedure: DILATATION AND CURETTAGE (D&C) WITH HYSTEROSCOPY  REMOVAL VULVAR RT. LESION;  Surgeon: Lucila Ortiz MD;  [facility];  Service: Gynecology    CT ALDANA IMPLTJ NSTIM ELTRDS PLATE/PADDLE EDRL Left 01/29/2020    Procedure: Insertion of thoracic spinal cord stimulator electrode via laminotomy and placement of left buttock implantable pulse generator (NEUROMONITORING);  Surgeon: Ad Mehta MD;  [facility];  Service: Neurosurgery    CT LAPS GSTRC RSTRICTIV PX LONGITUDINAL GASTRECTOMY N/A 02/06/2018    Procedure: GASTRECTOMY SLEEVE LAPAROSCOPIC; INTRAOPERATIVE EGD ;  Surgeon: Abimael Juarez MD;  [facility];  Service: Bariatrics    CT LAPS GSTRC RSTRICTIV PX LONGITUDINAL GASTRECTOMY N/A 08/08/2022    Procedure: Diagnostic Lap; Extensive Lysis of Adhesions; LAPAROSCOPIC REVISION CONVERSION TO NOE-EN-Y GASTRIC BYPASS AND INTRAOPERATIVE EGD;  Surgeon: Abimael Juarez MD;  [facility];  Service: Bariatrics    SLEEVE GASTROPLASTY      SPINAL CORD STIMULATOR TRIAL W/ LAMINOTOMY      TONSILLECTOMY AND ADENOIDECTOMY      TUBAL LIGATION      UPPER GASTROINTESTINAL ENDOSCOPY      US GUIDED LYMPH NODE BIOPSY LEFT  05/22/2023    VEIN LIGATION AND STRIPPING Right     VULVA SURGERY  10/20/2017    BIOPSY    WISDOM TOOTH EXTRACTION         Current Outpatient Medications   Medication Sig Dispense Refill    apixaban (Eliquis) 5 mg Take 1 tablet (5 mg total) by mouth 2 (two) times a day 180 tablet 3    atoMOXetine (STRATTERA) 60 mg capsule Take 1 capsule (60 mg total) by mouth daily 90 capsule 3    atorvastatin (LIPITOR) 20 mg tablet Take 1 tablet (20 mg total) by mouth daily 90 tablet 1    calcium carbonate (OS-MELODY) 600 MG tablet Take 600 mg by mouth 2 (two) times a day with meals       cyclobenzaprine (FLEXERIL) 10 mg tablet Take 1 tablet (10 mg total) by mouth 2 (two) times a day as needed for muscle spasms for up to 7 days 14 tablet 0    drospirenone-ethinyl estradiol (LIEN) 3-0.02 MG per tablet Take 1 tablet by mouth daily 84 tablet 4    estradiol (ESTRACE VAGINAL) 0.1 mg/g vaginal cream One gram vaginally at night x 14 days then twice weekly for ongoing maintenance. (Patient taking differently: if needed One gram vaginally at night x 14 days then twice weekly for ongoing maintenance.) 42.5 g 2    fluticasone (FLONASE) 50 mcg/act nasal spray 1 spray into each nostril daily 15.8 mL 0    lamoTRIgine (LaMICtal) 150 MG tablet Take 1 tablet (150 mg total) by mouth 2 (two) times a day 180 tablet 3    lidocaine (Lidoderm) 5 % Apply 1 patch topically over 12 hours daily Remove & Discard patch within 12 hours or as directed by MD 30 patch 1    meclizine (ANTIVERT) 25 mg tablet Take 1 tablet (25 mg total) by mouth every 8 (eight) hours as needed for dizziness (Patient not taking: Reported on 4/1/2024) 30 tablet 0    methocarbamol (ROBAXIN) 750 mg tablet Take 1 tablet (750 mg total) by mouth every 8 (eight) hours (Patient taking differently: Take 750 mg by mouth if needed) 270 tablet 0    mirtazapine (REMERON) 15 mg tablet Take 1 tablet (15 mg total) by mouth daily at bedtime 90 tablet 1    montelukast (SINGULAIR) 10 mg tablet Take 1 tablet (10 mg total) by mouth daily at bedtime 90 tablet 2    Multiple Vitamins-Minerals (MULTI COMPLETE PO) Take by mouth      nitrofurantoin (MACROBID) 100 mg capsule Take 1 tablet PO PRN after sexual intercourse 30 capsule 0    omeprazole (PriLOSEC) 20 mg delayed release capsule Take 1 capsule (20 mg total) by mouth daily 90 capsule 3    venlafaxine (EFFEXOR) 100 MG tablet Take 1 tablet (100 mg total) by mouth 3 (three) times a day 270 tablet 2     No current facility-administered medications for this visit.        Allergies   Allergen Reactions    Ibuprofen Other (See  "Comments)     Due to gastric sleeve -can only take for 5 days, then needs to stop       Review of Systems    Video Exam    There were no vitals filed for this visit.    Physical Exam     Behavioral Health Psychotherapy Progress Note    Psychotherapy Provided: Individual Psychotherapy     1. MDD (major depressive disorder), recurrent severe, without psychosis (HCC)        2. Generalized anxiety disorder        3. ADHD (attention deficit hyperactivity disorder), inattentive type        4. Post traumatic stress disorder (PTSD)            Goals addressed in session: Goal 1     DATA: Met with Yessy for her scheduled individual session. 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 She states that she has a start date of May 13th. She talked about some interpersonal concerns that have come up from the person whose job she will be replacing. We discussed ways that she can practice going into the new role without focusing on the interpersonal relationships and rumors that her friend told her about. She will be working daytime hours and will be working per keyla in the ER. Yessy continued to discuss her fears that the people in the new office might already have a negative opinion of her. She stated repeatedly that she hopes that she did not make a mistake in her acceptance of this job. We discussed the positive aspects of the new position-- e.g., increase in pay, regular day-time hours, no holidays, no weekends, etc. This clinician emphasized the skills that she can practice to walk into the job with a \"blank slate\" and not assume that anyone has any negative thoughts about her. Yessy discussed her college courses. She continues to have anxiety about one of her courses. She is preparing for the end of the school year. She states that, worst case scenario (if she fails the " "class), she will retake that class by itself. This clinician continued to provide her with positive feedback and validation regarding her ability to continue to move forward with her education. This clinician also acknowledged her decision to make a career change-- as Yessy was clear that she \"doesn't like change.\"     During this session, this clinician used the following therapeutic modalities: Client-centered Therapy, Dialectical Behavior Therapy, Mindfulness-based Strategies, Motivational Interviewing, Solution-Focused Therapy, and Supportive Psychotherapy    Substance Abuse was not addressed during this session. If the client is diagnosed with a co-occurring substance use disorder, please indicate any changes in the frequency or amount of use: n/a. Stage of change for addressing substance use diagnoses: No substance use/Not applicable    ASSESSMENT:  Christina Lara presents with a Anxious mood.     her affect is Normal range and intensity, which is congruent, with her mood and the content of the session. The client has made progress on their goals, as evidenced by her decision to make a career change. Despite her level of anxiety about the change, she shows the ability to work though her ambivalence and make a change.    Christina Lara presents with a minimal risk of suicide, minimal risk of self-harm, and minimal risk of harm to others.    For any risk assessment that surpasses a \"low\" rating, a safety plan must be developed.    A safety plan was indicated: no  If yes, describe in detail n/a    PLAN: Between sessions, Christina Lara will continue to monitor her moods. She will work on managing her anxiety as she starts her new job. She will work on going into the new situation with a non-judgmental stance. At the next session, the therapist will use Client-centered Therapy, Dialectical Behavior Therapy, Mindfulness-based Strategies, Motivational Interviewing, Solution-Focused Therapy, and Supportive Psychotherapy " to address her mood regulation and relationship concerns.     Behavioral Health Treatment Plan and Discharge Planning: Christina RUKHSANA Lara is aware of and agrees to continue to work on their treatment plan. They have identified and are working toward their discharge goals. yes    Visit start and stop times:    04/30/24  Start Time: 1602  Stop Time: 1700  Total Visit Time: 58 minutes

## 2024-05-02 LAB
APOB+LDLR+PCSK9 GENE MUT ANL BLD/T: NOT DETECTED
BRCA1+BRCA2 DEL+DUP + FULL MUT ANL BLD/T: NOT DETECTED
MLH1+MSH2+MSH6+PMS2 GN DEL+DUP+FUL M: NOT DETECTED

## 2024-05-02 NOTE — TELEPHONE ENCOUNTER
Writer left a message for patient to scheduled their follow up around 11/13. Office number was left and patient was asked to call back. Please return any time between May 10-14 for sutures to be removed.     Return to the emergency department for any signs of infection- pain, pus, redness, swelling.

## 2024-05-06 ENCOUNTER — NURSE TRIAGE (OUTPATIENT)
Age: 54
End: 2024-05-06

## 2024-05-06 NOTE — TELEPHONE ENCOUNTER
Regarding: fingers numb, arm pain  ----- Message from Virginia Walton sent at 5/6/2024  3:36 PM EDT -----  Pt c/o numbness in pinky and ring finger, side of arm hurts, left arm, numbness started a few days ago. Please, triage.

## 2024-05-06 NOTE — TELEPHONE ENCOUNTER
"Patient called due to having numbness in left pinky and ring finger.  States it feels like her right side did with an ulnar nerve pinch.  Would like an appt to get a referral to either ortho or neuro.  Appt made for 5/8.    Reason for Disposition   Numbness or tingling in one or both hands is a chronic symptom (recurrent or ongoing problem lasting > 4 weeks)    Answer Assessment - Initial Assessment Questions  1. SYMPTOM: \"What is the main symptom you are concerned about?\" (e.g., weakness, numbness)      C/O left pinky and ring finger numbness.   2. ONSET: \"When did this start?\" (minutes, hours, days; while sleeping)      Numbness for the past few days.  3. LAST NORMAL: \"When was the last time you were normal (no symptoms)?\"      Unknown  4. PATTERN \"Does this come and go, or has it been constant since it started?\"  \"Is it present now?\"      Comes and goes.   5. CARDIAC SYMPTOMS: \"Have you had any of the following symptoms: chest pain, difficulty breathing, palpitations?\"      Ablation, A-Fib.   6. NEUROLOGIC SYMPTOMS: \"Have you had any of the following symptoms: headache, dizziness, vision loss, double vision, changes in speech, unsteady on your feet?\"      Denies  7. OTHER SYMPTOMS: \"Do you have any other symptoms?\"      Denies  8. PREGNANCY: \"Is there any chance you are pregnant?\" \"When was your last menstrual period?\"      No    Protocols used: Neurologic Deficit-ADULT-[place]    "

## 2024-05-07 NOTE — PROGRESS NOTES
Name: Christina Lara      : 1970      MRN: [id number]  Encounter Provider: OSMANI Boland  Encounter Date: 2024   Encounter department: [facility]    Assessment & Plan     1. Elbow pain, left  Assessment & Plan:  Referral to ortho.  Unable to take regular PO nsaids due to bariatric surgery, will try voltaren gel prn.      Orders:  -     Ambulatory Referral to Orthopedic Surgery; Future  -     Diclofenac Sodium (VOLTAREN) 1 %; Apply 2 g topically 4 (four) times a day           Subjective      Pt is a 54 y.o. y/o female who is seen today for evaluation of suspected pinched ulnar nerve.  Past medical history of paroxysmal svt, pulmonary nodule, bariatric surgery, GERD, hepatic steatosis, ibs, bppv, cervical radiculopathy, lumbar radiculopathy, ADHD, generalized anxiety disorder, PTSD, major depression, chronic pain syndrome, hyperlipidemia, prediabetes.  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  She gets medial elbow pain and swelling and numbness in the 4th and 5th fingers when elbow is bent.   The soreness in the LUE started several months ago, but it is worsening the last couple of weeks.  She wakes up in the middle of the night if she sleeps on it wrong.  She occasionally takes ibuprofen, acetaminophen but does not help.      Review of Systems   Respiratory:  Negative for shortness of breath.    Cardiovascular:  Negative for chest pain.   Musculoskeletal:  Positive for arthralgias, joint swelling and myalgias. Negative for back pain.   Skin:  Negative for color change, rash and wound.   Neurological:  Positive for numbness. Negative for weakness.   Hematological:  Negative for adenopathy.       Current Outpatient Medications on File Prior to Visit   Medication Sig    apixaban (Eliquis) 5 mg Take 1 tablet (5 mg total) by mouth 2 (two) times a day    atoMOXetine (STRATTERA) 60 mg capsule Take 1  capsule (60 mg total) by mouth daily    atorvastatin (LIPITOR) 20 mg tablet Take 1 tablet (20 mg total) by mouth daily    calcium carbonate (OS-MELODY) 600 MG tablet Take 600 mg by mouth 2 (two) times a day with meals    cyclobenzaprine (FLEXERIL) 10 mg tablet Take 1 tablet (10 mg total) by mouth 2 (two) times a day as needed for muscle spasms for up to 7 days    drospirenone-ethinyl estradiol (LIEN) 3-0.02 MG per tablet Take 1 tablet by mouth daily    estradiol (ESTRACE VAGINAL) 0.1 mg/g vaginal cream One gram vaginally at night x 14 days then twice weekly for ongoing maintenance. (Patient taking differently: if needed One gram vaginally at night x 14 days then twice weekly for ongoing maintenance.)    fluticasone (FLONASE) 50 mcg/act nasal spray 1 spray into each nostril daily    lamoTRIgine (LaMICtal) 150 MG tablet Take 1 tablet (150 mg total) by mouth 2 (two) times a day    lidocaine (Lidoderm) 5 % Apply 1 patch topically over 12 hours daily Remove & Discard patch within 12 hours or as directed by MD    methocarbamol (ROBAXIN) 750 mg tablet Take 1 tablet (750 mg total) by mouth every 8 (eight) hours (Patient taking differently: Take 750 mg by mouth if needed)    mirtazapine (REMERON) 15 mg tablet Take 1 tablet (15 mg total) by mouth daily at bedtime    montelukast (SINGULAIR) 10 mg tablet Take 1 tablet (10 mg total) by mouth daily at bedtime    Multiple Vitamins-Minerals (MULTI COMPLETE PO) Take by mouth    nitrofurantoin (MACROBID) 100 mg capsule Take 1 tablet PO PRN after sexual intercourse    omeprazole (PriLOSEC) 20 mg delayed release capsule Take 1 capsule (20 mg total) by mouth daily    venlafaxine (EFFEXOR) 100 MG tablet Take 1 tablet (100 mg total) by mouth 3 (three) times a day    meclizine (ANTIVERT) 25 mg tablet Take 1 tablet (25 mg total) by mouth every 8 (eight) hours as needed for dizziness (Patient not taking: Reported on 4/1/2024)       Objective     /72 (BP Location: Right arm, Patient  "Position: Sitting, Cuff Size: Standard)   Pulse 79   Temp (!) 97.3 °F (36.3 °C) (Tympanic)   Resp 16   Ht 5' 6\" (1.676 m)   Wt 70.5 kg (155 lb 6.4 oz)   LMP 01/07/2019 (Approximate)   SpO2 99%   BMI 25.08 kg/m²     Physical Exam  Vitals reviewed.   Constitutional:       General: She is awake. She is not in acute distress.     Appearance: Normal appearance. She is well-developed and well-groomed. She is not ill-appearing or diaphoretic.   Eyes:      General: Lids are normal.      Conjunctiva/sclera: Conjunctivae normal.   Cardiovascular:      Rate and Rhythm: Normal rate.      Pulses: Normal pulses.   Pulmonary:      Effort: Pulmonary effort is normal. No respiratory distress.   Musculoskeletal:      Right elbow: No swelling or effusion. Tenderness present in medial epicondyle.      Right forearm: Normal.      Right wrist: Normal.      Right hand: No tenderness. Normal strength. Decreased sensation of the ulnar distribution. Normal capillary refill. Normal pulse.   Skin:     General: Skin is warm and dry.      Findings: No bruising or erythema.   Neurological:      Mental Status: She is alert and oriented to person, place, and time.   Psychiatric:         Attention and Perception: Attention normal.         Mood and Affect: Mood normal.         Speech: Speech normal.         Behavior: Behavior normal. Behavior is cooperative.         Thought Content: Thought content normal.         Cognition and Memory: Cognition normal.         Judgment: Judgment normal.       OSMANI Boland    "

## 2024-05-08 ENCOUNTER — OFFICE VISIT (OUTPATIENT)
Dept: FAMILY MEDICINE CLINIC | Facility: CLINIC | Age: 54
End: 2024-05-08
Payer: COMMERCIAL

## 2024-05-08 VITALS
TEMPERATURE: 97.3 F | HEIGHT: 66 IN | DIASTOLIC BLOOD PRESSURE: 72 MMHG | HEART RATE: 79 BPM | BODY MASS INDEX: 24.98 KG/M2 | RESPIRATION RATE: 16 BRPM | WEIGHT: 155.4 LBS | OXYGEN SATURATION: 99 % | SYSTOLIC BLOOD PRESSURE: 108 MMHG

## 2024-05-08 DIAGNOSIS — M25.522 ELBOW PAIN, LEFT: Primary | ICD-10-CM

## 2024-05-08 PROCEDURE — 99213 OFFICE O/P EST LOW 20 MIN: CPT | Performed by: NURSE PRACTITIONER

## 2024-05-08 NOTE — ASSESSMENT & PLAN NOTE
Referral to ortho.  Unable to take regular PO nsaids due to bariatric surgery, will try voltaren gel prn.

## 2024-05-13 ENCOUNTER — TELEPHONE (OUTPATIENT)
Dept: PSYCHIATRY | Facility: CLINIC | Age: 54
End: 2024-05-13

## 2024-05-13 NOTE — TELEPHONE ENCOUNTER
Patient called and stated with her new position she does not get out until 4pm and would like to know if the appt could be around 4:20pm

## 2024-05-14 ENCOUNTER — TELEMEDICINE (OUTPATIENT)
Dept: BEHAVIORAL/MENTAL HEALTH CLINIC | Facility: CLINIC | Age: 54
End: 2024-05-14
Payer: COMMERCIAL

## 2024-05-14 DIAGNOSIS — F41.1 GENERALIZED ANXIETY DISORDER: Chronic | ICD-10-CM

## 2024-05-14 DIAGNOSIS — F33.2 MDD (MAJOR DEPRESSIVE DISORDER), RECURRENT SEVERE, WITHOUT PSYCHOSIS (HCC): Primary | ICD-10-CM

## 2024-05-14 DIAGNOSIS — F90.0 ADHD (ATTENTION DEFICIT HYPERACTIVITY DISORDER), INATTENTIVE TYPE: Chronic | ICD-10-CM

## 2024-05-14 DIAGNOSIS — F43.10 POST TRAUMATIC STRESS DISORDER (PTSD): Chronic | ICD-10-CM

## 2024-05-14 DIAGNOSIS — F33.1 MODERATE EPISODE OF RECURRENT MAJOR DEPRESSIVE DISORDER (HCC): Chronic | ICD-10-CM

## 2024-05-14 DIAGNOSIS — E78.2 MIXED HYPERLIPIDEMIA: ICD-10-CM

## 2024-05-14 DIAGNOSIS — J30.2 SEASONAL ALLERGIES: ICD-10-CM

## 2024-05-14 DIAGNOSIS — G47.9 SLEEP DISORDER: ICD-10-CM

## 2024-05-14 PROCEDURE — 90834 PSYTX W PT 45 MINUTES: CPT | Performed by: SOCIAL WORKER

## 2024-05-14 RX ORDER — MONTELUKAST SODIUM 10 MG/1
10 TABLET ORAL
Qty: 90 TABLET | Refills: 2 | Status: SHIPPED | OUTPATIENT
Start: 2024-05-14

## 2024-05-14 RX ORDER — VENLAFAXINE 100 MG/1
100 TABLET ORAL 3 TIMES DAILY
Qty: 270 TABLET | Refills: 2 | Status: SHIPPED | OUTPATIENT
Start: 2024-05-14

## 2024-05-14 RX ORDER — LAMOTRIGINE 150 MG/1
150 TABLET ORAL 2 TIMES DAILY
Qty: 180 TABLET | Refills: 3 | Status: SHIPPED | OUTPATIENT
Start: 2024-05-14 | End: 2025-05-09

## 2024-05-14 RX ORDER — ATORVASTATIN CALCIUM 20 MG/1
20 TABLET, FILM COATED ORAL DAILY
Qty: 90 TABLET | Refills: 1 | Status: SHIPPED | OUTPATIENT
Start: 2024-05-14

## 2024-05-14 RX ORDER — ATOMOXETINE 60 MG/1
60 CAPSULE ORAL DAILY
Qty: 90 CAPSULE | Refills: 3 | Status: SHIPPED | OUTPATIENT
Start: 2024-05-14

## 2024-05-14 RX ORDER — MIRTAZAPINE 15 MG/1
15 TABLET, FILM COATED ORAL
Qty: 90 TABLET | Refills: 1 | Status: SHIPPED | OUTPATIENT
Start: 2024-05-14

## 2024-05-14 NOTE — TELEPHONE ENCOUNTER
Medication Refill Request     Name of Medication Strattera  Dose/Frequency 60mg  Quantity 90  Verified pharmacy   [x]  Verified ordering Provider   [x]  Does patient have enough for the next 3 days? Yes [x] No []  Does patient have a follow-up appointment scheduled? Yes [x] No []   If so when is appointment: 8/26/24    Medication Refill Request     Name of Medication Singulair  Dose/Frequency 10mg  Quantity 90  Verified pharmacy   [x]  Verified ordering Provider   [x]  Does patient have enough for the next 3 days? Yes [x] No []    Medication Refill Request     Name of Medication Effexor  Dose/Frequency 100mg  Quantity 270  Verified pharmacy   [x]  Verified ordering Provider   [x]  Does patient have enough for the next 3 days? Yes [x] No []    Medication Refill Request     Name of Medication Remeron  Dose/Frequency 15mg  Quantity 90  Verified pharmacy   [x]  Verified ordering Provider   [x]  Does patient have enough for the next 3 days? Yes [x] No []    Medication Refill Request     Name of Medication Lamictal  Dose/Frequency 150mg  Quantity 180  Verified pharmacy   [x]  Verified ordering Provider   [x]  Does patient have enough for the next 3 days? Yes [x] No []    Medication Refill Request     Name of Medication Lipitor  Dose/Frequency 20mg  Quantity 90  Verified pharmacy   [x]  Verified ordering Provider   [x]  Does patient have enough for the next 3 days? Yes [x] No []

## 2024-05-28 ENCOUNTER — TELEMEDICINE (OUTPATIENT)
Dept: BEHAVIORAL/MENTAL HEALTH CLINIC | Facility: CLINIC | Age: 54
End: 2024-05-28

## 2024-05-28 DIAGNOSIS — F43.10 POST TRAUMATIC STRESS DISORDER (PTSD): Chronic | ICD-10-CM

## 2024-05-28 DIAGNOSIS — F41.1 GENERALIZED ANXIETY DISORDER: Chronic | ICD-10-CM

## 2024-05-28 DIAGNOSIS — F90.0 ADHD (ATTENTION DEFICIT HYPERACTIVITY DISORDER), INATTENTIVE TYPE: Chronic | ICD-10-CM

## 2024-05-28 DIAGNOSIS — Z30.41 ORAL CONTRACEPTIVE PILL SURVEILLANCE: ICD-10-CM

## 2024-05-28 DIAGNOSIS — F33.2 MDD (MAJOR DEPRESSIVE DISORDER), RECURRENT SEVERE, WITHOUT PSYCHOSIS (HCC): Primary | ICD-10-CM

## 2024-05-28 RX ORDER — DROSPIRENONE AND ETHINYL ESTRADIOL 0.02-3(28)
1 KIT ORAL DAILY
Qty: 84 TABLET | Refills: 0 | OUTPATIENT
Start: 2024-05-28

## 2024-05-29 NOTE — PSYCH
Virtual Regular Visit    Verification of patient location:    Patient is located at {[facility] Virtual Patient Location:[address]} in the following state in which I hold an active license {[facility] virtual patient location:[address]}      Assessment/Plan:    Problem List Items Addressed This Visit        Behavioral Health    Post traumatic stress disorder (PTSD) (Chronic)    Generalized anxiety disorder (Chronic)    ADHD (attention deficit hyperactivity disorder), inattentive type (Chronic)    MDD (major depressive disorder), recurrent severe, without psychosis (HCC) - Primary       Goals addressed in session: {GOALS:84083}          Reason for visit is   Chief Complaint   Patient presents with   • Virtual Regular Visit          Encounter provider LALY Yung      Recent Visits  Date Type Provider Dept   05/28/24 Telemedicine LALY Yung [facility] Therapist [place]   Showing recent visits within past 7 days and meeting all other requirements  Future Appointments  No visits were found meeting these conditions.  Showing future appointments within next 150 days and meeting all other requirements       The patient was identified by name and date of birth. Christina Lara was informed that this is a telemedicine visit and that the visit is being conducted through{[facility] VIRTUAL VISIT MEDIUM:44530}.  {Telemedicine confidentiality :49535} {Telemedicine participants:71826}  She acknowledged consent and understanding of privacy and security of the video platform. The patient has agreed to participate and understands they can discontinue the visit at any time.    Patient is aware this is a billable service.     Subjective  Christina Lara is a 54 y.o. female *** .      HPI     Past Medical History:   Diagnosis Date   • Acute right ankle pain 02/07/2023   • ADHD (attention deficit hyperactivity disorder)    • Allergic    • Anxiety    • Bulging lumbar disc    • Carpal tunnel syndrome     RIGHT.  LAST ASSESSED: 12/7/16    • Chronic back pain     low   • Chronic pain disorder    • Colon polyps    • COVID-19     2021   • Depression    • Diabetes mellitus (HCC)    • GERD (gastroesophageal reflux disease)    • Gestational diabetes    • Hearing loss     left ear   • Heart disease     SVT   • History of pre-eclampsia    • Hyperlipidemia     Resolved with weight loss   • Hyponatremia 2020   • IBS (irritable bowel syndrome)    • Ileus (HCC)     LAST ASSESSED: 8/3/17   • Labial cyst     LAST ASSESSED: 16   • Memory loss    • Myofascial pain     LAST ASSESSED: 16   • Neck mass 2023   • Obesity    • Ovarian cyst     LEFT. LAST ASSESSED: 16   • Panic attack    • Pneumonia    • Sacroiliitis (HCC)    • Seasonal allergies    • Sprain of anterior talofibular ligament of right ankle 2023   • SVT (supraventricular tachycardia)    • Thoracic outlet syndrome        • Trochanteric bursitis of both hips     LAST ASSESSED: 3/18/16   • Ulnar neuropathy at elbow    • Varicella    • Wears glasses        Past Surgical History:   Procedure Laterality Date   • BARIATRIC SURGERY  2022   • BILE DUCT EXPLORATION      ENDOSCOPIC REMOVAL OF STONES FROM BILIARY TRACT   • CARDIAC ELECTROPHYSIOLOGY PROCEDURE N/A 2024    Procedure: Cardiac eps/svt ablation;  Surgeon: Daquan Heath MD;  [facility];  Service: Cardiology   •  SECTION      x3   • CHOLECYSTECTOMY     • COLONOSCOPY      -polyp, -wnl, repeat5 years   • DILATION AND CURETTAGE OF UTERUS     • ENDOMETRIAL ABLATION     • ERCP W/ SPHICTEROTOMY     • FIRST RIB REMOVAL      THORAX EXCISION OF FIRST RIB   • GASTRIC BYPASS  2022   • HYSTEROSCOPY     • NEUROPLASTY / TRANSPOSITION ULNAR NERVE AT ELBOW Right    • WA COLONOSCOPY FLX DX W/COLLJ SPEC WHEN PFRMD N/A 2017    Procedure: COLONOSCOPY;  Surgeon: Oscar Velázquez MD;  Location: [facility];  Service: Gastroenterology   • WA ESOPHAGOGASTRODUODENOSCOPY TRANSORAL DIAGNOSTIC N/A  09/14/2017    Procedure: ESOPHAGOGASTRODUODENOSCOPY (EGD);  Surgeon: Sadiq Car MD;  Location: [facility];  Service: Gastroenterology   • MI HYSTEROSCOPY BX ENDOMETRIUM&/POLYPC W/WO D&C N/A 10/20/2017    Procedure: DILATATION AND CURETTAGE (D&C) WITH HYSTEROSCOPY  REMOVAL VULVAR RT. LESION;  Surgeon: Lucila Ortiz MD;  [facility]: [facility];  Service: Gynecology   • MI ALDANA IMPLTJ NSTIM ELTRDS PLATE/PADDLE EDRL Left 01/29/2020    Procedure: Insertion of thoracic spinal cord stimulator electrode via laminotomy and placement of left buttock implantable pulse generator (NEUROMONITORING);  Surgeon: Ad Mehta MD;  [facility];  Service: Neurosurgery   • MI LAPS GSTRC RSTRICTIV PX LONGITUDINAL GASTRECTOMY N/A 02/06/2018    Procedure: GASTRECTOMY SLEEVE LAPAROSCOPIC; INTRAOPERATIVE EGD ;  Surgeon: Abimael Juarez MD;  [facility];  Service: Bariatrics   • MI LAPS GSTRC RSTRICTIV PX LONGITUDINAL GASTRECTOMY N/A 08/08/2022    Procedure: Diagnostic Lap; Extensive Lysis of Adhesions; LAPAROSCOPIC REVISION CONVERSION TO NOE-EN-Y GASTRIC BYPASS AND INTRAOPERATIVE EGD;  Surgeon: Abimael Juarez MD;  [facility];  Service: Bariatrics   • SLEEVE GASTROPLASTY     • SPINAL CORD STIMULATOR TRIAL W/ LAMINOTOMY     • TONSILLECTOMY AND ADENOIDECTOMY     • TUBAL LIGATION     • UPPER GASTROINTESTINAL ENDOSCOPY     • US GUIDED LYMPH NODE BIOPSY LEFT  05/22/2023   • VEIN LIGATION AND STRIPPING Right    • VULVA SURGERY  10/20/2017    BIOPSY   • WISDOM TOOTH EXTRACTION         Current Outpatient Medications   Medication Sig Dispense Refill   • apixaban (Eliquis) 5 mg Take 1 tablet (5 mg total) by mouth 2 (two) times a day 180 tablet 3   • atoMOXetine (STRATTERA) 60 mg capsule Take 1 capsule (60 mg total) by mouth daily 90 capsule 3   • atorvastatin (LIPITOR) 20 mg tablet Take 1 tablet (20 mg total) by mouth daily 90 tablet 1   • calcium carbonate (OS-MELODY) 600 MG tablet Take 600 mg by mouth 2 (two) times a day  with meals     • cyclobenzaprine (FLEXERIL) 10 mg tablet Take 1 tablet (10 mg total) by mouth 2 (two) times a day as needed for muscle spasms for up to 7 days 14 tablet 0   • Diclofenac Sodium (VOLTAREN) 1 % Apply 2 g topically 4 (four) times a day 100 g 0   • drospirenone-ethinyl estradiol (LIEN) 3-0.02 MG per tablet Take 1 tablet by mouth daily 84 tablet 4   • estradiol (ESTRACE VAGINAL) 0.1 mg/g vaginal cream One gram vaginally at night x 14 days then twice weekly for ongoing maintenance. (Patient taking differently: if needed One gram vaginally at night x 14 days then twice weekly for ongoing maintenance.) 42.5 g 2   • fluticasone (FLONASE) 50 mcg/act nasal spray 1 spray into each nostril daily 15.8 mL 0   • lamoTRIgine (LaMICtal) 150 MG tablet Take 1 tablet (150 mg total) by mouth 2 (two) times a day 180 tablet 3   • lidocaine (Lidoderm) 5 % Apply 1 patch topically over 12 hours daily Remove & Discard patch within 12 hours or as directed by MD 30 patch 1   • meclizine (ANTIVERT) 25 mg tablet Take 1 tablet (25 mg total) by mouth every 8 (eight) hours as needed for dizziness (Patient not taking: Reported on 4/1/2024) 30 tablet 0   • methocarbamol (ROBAXIN) 750 mg tablet Take 1 tablet (750 mg total) by mouth every 8 (eight) hours (Patient taking differently: Take 750 mg by mouth if needed) 270 tablet 0   • mirtazapine (REMERON) 15 mg tablet Take 1 tablet (15 mg total) by mouth daily at bedtime 90 tablet 1   • montelukast (SINGULAIR) 10 mg tablet Take 1 tablet (10 mg total) by mouth daily at bedtime 90 tablet 2   • Multiple Vitamins-Minerals (MULTI COMPLETE PO) Take by mouth     • nitrofurantoin (MACROBID) 100 mg capsule Take 1 tablet PO PRN after sexual intercourse 30 capsule 0   • omeprazole (PriLOSEC) 20 mg delayed release capsule Take 1 capsule (20 mg total) by mouth daily 90 capsule 3   • venlafaxine (EFFEXOR) 100 MG tablet Take 1 tablet (100 mg total) by mouth 3 (three) times a day 270 tablet 2     No current  facility-administered medications for this visit.        Allergies   Allergen Reactions   • Ibuprofen Other (See Comments)     Due to gastric sleeve -can only take for 5 days, then needs to stop       Review of Systems    Video Exam    There were no vitals filed for this visit.    Physical Exam     Visit Time    Visit Start Time: ***  Visit Stop Time: ***  Total Visit Duration: {Psych Total Visit Time:17334}

## 2024-05-29 NOTE — PSYCH
Virtual Regular Visit    Verification of patient location:    Patient is located at Home in the following state in which I hold an active license [place]      Assessment/Plan:    Problem List Items Addressed This Visit          Behavioral Health    Post traumatic stress disorder (PTSD) (Chronic)    Generalized anxiety disorder (Chronic)    ADHD (attention deficit hyperactivity disorder), inattentive type (Chronic)    MDD (major depressive disorder), recurrent severe, without psychosis (HCC) - Primary       Goals addressed in session: Goal 1          Reason for visit is   Chief Complaint   Patient presents with    Virtual Regular Visit          Encounter provider LAYL Yung      Recent Visits  Date Type Provider Dept   05/28/24 Telemedicine LALY Yung [facility] Therapist [place]   Showing recent visits within past 7 days and meeting all other requirements  Future Appointments  No visits were found meeting these conditions.  Showing future appointments within next 150 days and meeting all other requirements       The patient was identified by name and date of birth. Christina Lara was informed that this is a telemedicine visit and that the visit is being conducted throughthe Epic Embedded platform. She agrees to proceed..  My office door was closed. No one else was in the room.  She acknowledged consent and understanding of privacy and security of the video platform. The patient has agreed to participate and understands they can discontinue the visit at any time.    Patient is aware this is a billable service.     Subjective  Christina Lara is a 54 y.o. female.      HPI     Past Medical History:   Diagnosis Date    Acute right ankle pain 02/07/2023    ADHD (attention deficit hyperactivity disorder)     Allergic     Anxiety     Bulging lumbar disc     Carpal tunnel syndrome     RIGHT.  LAST ASSESSED: 12/7/16    Chronic back pain     low    Chronic pain disorder     Colon polyps     COVID-19      2021    Depression     Diabetes mellitus (HCC)     GERD (gastroesophageal reflux disease)     Gestational diabetes     Hearing loss     left ear    Heart disease     SVT    History of pre-eclampsia     Hyperlipidemia     Resolved with weight loss    Hyponatremia 2020    IBS (irritable bowel syndrome)     Ileus (HCC)     LAST ASSESSED: 8/3/17    Labial cyst     LAST ASSESSED: 16    Memory loss     Myofascial pain     LAST ASSESSED: 16    Neck mass 2023    Obesity     Ovarian cyst     LEFT. LAST ASSESSED: 16    Panic attack     Pneumonia     Sacroiliitis (HCC)     Seasonal allergies     Sprain of anterior talofibular ligament of right ankle 2023    SVT (supraventricular tachycardia)     Thoracic outlet syndrome     2010    Trochanteric bursitis of both hips     LAST ASSESSED: 3/18/16    Ulnar neuropathy at elbow     Varicella     Wears glasses        Past Surgical History:   Procedure Laterality Date    BARIATRIC SURGERY  2022    BILE DUCT EXPLORATION      ENDOSCOPIC REMOVAL OF STONES FROM BILIARY TRACT    CARDIAC ELECTROPHYSIOLOGY PROCEDURE N/A 2024    Procedure: Cardiac eps/svt ablation;  Surgeon: Daquan Heath MD;  [facility];  Service: Cardiology     SECTION      x3    CHOLECYSTECTOMY      COLONOSCOPY      -polyp, -wnl, repeat5 years    DILATION AND CURETTAGE OF UTERUS      ENDOMETRIAL ABLATION      ERCP W/ SPHICTEROTOMY      FIRST RIB REMOVAL      THORAX EXCISION OF FIRST RIB    GASTRIC BYPASS  2022    HYSTEROSCOPY      NEUROPLASTY / TRANSPOSITION ULNAR NERVE AT ELBOW Right     TX COLONOSCOPY FLX DX W/COLLJ SPEC WHEN PFRMD N/A 2017    Procedure: COLONOSCOPY;  Surgeon: Oscar Velázquez MD;  [facility];  Service: Gastroenterology    TX ESOPHAGOGASTRODUODENOSCOPY TRANSORAL DIAGNOSTIC N/A 2017    Procedure: ESOPHAGOGASTRODUODENOSCOPY (EGD);  Surgeon: Sadiq Car MD;  [facility];  Service: Gastroenterology     AK HYSTEROSCOPY BX ENDOMETRIUM&/POLYPC W/WO D&C N/A 10/20/2017    Procedure: DILATATION AND CURETTAGE (D&C) WITH HYSTEROSCOPY  REMOVAL VULVAR RT. LESION;  Surgeon: Lucila Ortiz MD;  [facility];  Service: Gynecology    AK ALDANA IMPLTJ NSTIM ELTRDS PLATE/PADDLE EDRL Left 01/29/2020    Procedure: Insertion of thoracic spinal cord stimulator electrode via laminotomy and placement of left buttock implantable pulse generator (NEUROMONITORING);  Surgeon: Ad Mehta MD;  [facility];  Service: Neurosurgery    AK LAPS GST RSTRICTIV PX LONGITUDINAL GASTRECTOMY N/A 02/06/2018    Procedure: GASTRECTOMY SLEEVE LAPAROSCOPIC; INTRAOPERATIVE EGD ;  Surgeon: Abimael Juarez MD;  [facility];  Service: Bariatrics    AK LAPS GSTRC RSTRICTIV PX LONGITUDINAL GASTRECTOMY N/A 08/08/2022    Procedure: Diagnostic Lap; Extensive Lysis of Adhesions; LAPAROSCOPIC REVISION CONVERSION TO NOE-EN-Y GASTRIC BYPASS AND INTRAOPERATIVE EGD;  Surgeon: Abimael Juarez MD;  [facility];  Service: Bariatrics    SLEEVE GASTROPLASTY      SPINAL CORD STIMULATOR TRIAL W/ LAMINOTOMY      TONSILLECTOMY AND ADENOIDECTOMY      TUBAL LIGATION      UPPER GASTROINTESTINAL ENDOSCOPY      US GUIDED LYMPH NODE BIOPSY LEFT  05/22/2023    VEIN LIGATION AND STRIPPING Right     VULVA SURGERY  10/20/2017    BIOPSY    WISDOM TOOTH EXTRACTION         Current Outpatient Medications   Medication Sig Dispense Refill    apixaban (Eliquis) 5 mg Take 1 tablet (5 mg total) by mouth 2 (two) times a day 180 tablet 3    atoMOXetine (STRATTERA) 60 mg capsule Take 1 capsule (60 mg total) by mouth daily 90 capsule 3    atorvastatin (LIPITOR) 20 mg tablet Take 1 tablet (20 mg total) by mouth daily 90 tablet 1    calcium carbonate (OS-MELODY) 600 MG tablet Take 600 mg by mouth 2 (two) times a day with meals      cyclobenzaprine (FLEXERIL) 10 mg tablet Take 1 tablet (10 mg total) by mouth 2 (two) times a day as needed for muscle spasms for up to 7  days 14 tablet 0    Diclofenac Sodium (VOLTAREN) 1 % Apply 2 g topically 4 (four) times a day 100 g 0    drospirenone-ethinyl estradiol (LIEN) 3-0.02 MG per tablet Take 1 tablet by mouth daily 84 tablet 4    estradiol (ESTRACE VAGINAL) 0.1 mg/g vaginal cream One gram vaginally at night x 14 days then twice weekly for ongoing maintenance. (Patient taking differently: if needed One gram vaginally at night x 14 days then twice weekly for ongoing maintenance.) 42.5 g 2    fluticasone (FLONASE) 50 mcg/act nasal spray 1 spray into each nostril daily 15.8 mL 0    lamoTRIgine (LaMICtal) 150 MG tablet Take 1 tablet (150 mg total) by mouth 2 (two) times a day 180 tablet 3    lidocaine (Lidoderm) 5 % Apply 1 patch topically over 12 hours daily Remove & Discard patch within 12 hours or as directed by MD 30 patch 1    meclizine (ANTIVERT) 25 mg tablet Take 1 tablet (25 mg total) by mouth every 8 (eight) hours as needed for dizziness (Patient not taking: Reported on 4/1/2024) 30 tablet 0    methocarbamol (ROBAXIN) 750 mg tablet Take 1 tablet (750 mg total) by mouth every 8 (eight) hours (Patient taking differently: Take 750 mg by mouth if needed) 270 tablet 0    mirtazapine (REMERON) 15 mg tablet Take 1 tablet (15 mg total) by mouth daily at bedtime 90 tablet 1    montelukast (SINGULAIR) 10 mg tablet Take 1 tablet (10 mg total) by mouth daily at bedtime 90 tablet 2    Multiple Vitamins-Minerals (MULTI COMPLETE PO) Take by mouth      nitrofurantoin (MACROBID) 100 mg capsule Take 1 tablet PO PRN after sexual intercourse 30 capsule 0    omeprazole (PriLOSEC) 20 mg delayed release capsule Take 1 capsule (20 mg total) by mouth daily 90 capsule 3    venlafaxine (EFFEXOR) 100 MG tablet Take 1 tablet (100 mg total) by mouth 3 (three) times a day 270 tablet 2     No current facility-administered medications for this visit.        Allergies   Allergen Reactions    Ibuprofen Other (See Comments)     Due to gastric sleeve -can only take for 5  days, then needs to stop       Review of Systems    Video Exam    There were no vitals filed for this visit.    Physical Exam     Behavioral Health Psychotherapy Progress Note    Psychotherapy Provided: Individual Psychotherapy     1. MDD (major depressive disorder), recurrent severe, without psychosis (HCC)        2. Generalized anxiety disorder        3. Post traumatic stress disorder (PTSD)        4. ADHD (attention deficit hyperactivity disorder), inattentive type            Goals addressed in session: Goal 1     DATA: Met with Yessy for her scheduled individual session. Yessy has started her new job. She states that she is enjoying her orientation period and is excited to be learning new skills. 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 Yessy states that, despite her anxiety, there have been no issues with her new coworkers. She states that people in the new office have been very kind to her. She states that the environment is much different than what she is used to. She states that things are much quieter and the pace is much slower. We discussed the pros and cons of these changes for her. Yessy states that she completed her coursework for this semester. She will be taking one course during the fall semester. She continues to have some ambivalence regarding her plans for her future career. Yessy states that she and her  have been getting along well. She also reports that things are going well with her son. Her primary stressor, at this point in time, is adjusting her to her new responsibilities and also readjusting her sleep schedule to align with a daytime job. We discussed self-care and ways for her to help ease the transition to day-shift.     During this session, this clinician used the following therapeutic modalities: Client-centered Therapy, Dialectical Behavior Therapy, Mindfulness-based Strategies,  "Motivational Interviewing, Solution-Focused Therapy, and Supportive Psychotherapy    Substance Abuse was not addressed during this session. If the client is diagnosed with a co-occurring substance use disorder, please indicate any changes in the frequency or amount of use: n/a. Stage of change for addressing substance use diagnoses: No substance use/Not applicable    ASSESSMENT:  Christina Lara presents with a Euthymic/ normal mood.     her affect is Normal range and intensity, which is congruent, with her mood and the content of the session. The client has made progress on their goals.    Christina Lara presents with a minimal risk of suicide, minimal risk of self-harm, and minimal risk of harm to others.    For any risk assessment that surpasses a \"low\" rating, a safety plan must be developed.    A safety plan was indicated: no  If yes, describe in detail n/a    PLAN: Between sessions, Christina Lara will continue to work on adjusting her sleep schedule to align with her new role. At the next session, the therapist will use Client-centered Therapy, Dialectical Behavior Therapy, Mindfulness-based Strategies, Motivational Interviewing, Solution-Focused Therapy, and Supportive Psychotherapy to address her mood regulation and relationship concerns.    Behavioral Health Treatment Plan and Discharge Planning: Christina Lara is aware of and agrees to continue to work on their treatment plan. They have identified and are working toward their discharge goals. yes    Visit start and stop times:    05/14/24  Start Time: 1619  Stop Time: 1702  Total Visit Time: 43 minutes        "

## 2024-06-04 ENCOUNTER — REMOTE DEVICE CLINIC VISIT (OUTPATIENT)
Dept: CARDIOLOGY CLINIC | Facility: CLINIC | Age: 54
End: 2024-06-04
Payer: COMMERCIAL

## 2024-06-04 DIAGNOSIS — I47.10 PAROXYSMAL SVT (SUPRAVENTRICULAR TACHYCARDIA): Primary | ICD-10-CM

## 2024-06-04 PROCEDURE — 93298 REM INTERROG DEV EVAL SCRMS: CPT | Performed by: INTERNAL MEDICINE

## 2024-06-13 ENCOUNTER — HOSPITAL ENCOUNTER (OUTPATIENT)
Dept: RADIOLOGY | Facility: HOSPITAL | Age: 54
End: 2024-06-13
Attending: ORTHOPAEDIC SURGERY
Payer: COMMERCIAL

## 2024-06-13 ENCOUNTER — OFFICE VISIT (OUTPATIENT)
Dept: OBGYN CLINIC | Facility: CLINIC | Age: 54
End: 2024-06-13
Payer: COMMERCIAL

## 2024-06-13 VITALS
SYSTOLIC BLOOD PRESSURE: 110 MMHG | BODY MASS INDEX: 24.91 KG/M2 | DIASTOLIC BLOOD PRESSURE: 72 MMHG | WEIGHT: 155 LBS | HEART RATE: 86 BPM | HEIGHT: 66 IN

## 2024-06-13 DIAGNOSIS — M25.522 ELBOW PAIN, LEFT: ICD-10-CM

## 2024-06-13 DIAGNOSIS — G56.22 CUBITAL TUNNEL SYNDROME ON LEFT: ICD-10-CM

## 2024-06-13 DIAGNOSIS — M25.522 PAIN IN LEFT ELBOW: ICD-10-CM

## 2024-06-13 DIAGNOSIS — M25.522 PAIN IN LEFT ELBOW: Primary | ICD-10-CM

## 2024-06-13 PROCEDURE — 99244 OFF/OP CNSLTJ NEW/EST MOD 40: CPT | Performed by: ORTHOPAEDIC SURGERY

## 2024-06-13 PROCEDURE — 73080 X-RAY EXAM OF ELBOW: CPT

## 2024-06-13 NOTE — PROGRESS NOTES
[facility] [address] [address] [phone]       Christina MILIAN Lara  [id number]  1970    ORTHOPAEDIC SURGERY OUTPATIENT NOTE  6/13/2024      HISTORY:  54 y.o. female presents today consultation for left elbow pain.  Patient was for by her PCP Dr. Gee. Patient has been having swelling medial left elbow for the last few months but in the last month, the swelling and pain has been getting worse. She notes numbness and tingling down to the ring an small fingers. She feels weakness in her fingers. She notes numbness and burning pain in the left elbow as well. She also notes numbness in the first three fingers. She is RHD.     Past Medical History:   Diagnosis Date    Acute right ankle pain 02/07/2023    ADHD (attention deficit hyperactivity disorder)     Allergic     Anxiety     Bulging lumbar disc     Carpal tunnel syndrome     RIGHT.  LAST ASSESSED: 12/7/16    Chronic back pain     low    Chronic pain disorder     Colon polyps     COVID-19     12/2021    Depression     Diabetes mellitus (HCC)     GERD (gastroesophageal reflux disease)     Gestational diabetes     Hearing loss     left ear    Heart disease     SVT    History of pre-eclampsia     Hyperlipidemia     Resolved with weight loss    Hyponatremia 12/12/2020    IBS (irritable bowel syndrome)     Ileus (HCC)     LAST ASSESSED: 8/3/17    Labial cyst     LAST ASSESSED: 4/21/16    Memory loss     Myofascial pain     LAST ASSESSED: 4/12/16    Neck mass 05/11/2023    Obesity     Ovarian cyst     LEFT. LAST ASSESSED: 9/2/16    Panic attack     Pneumonia     Sacroiliitis (HCC)     Seasonal allergies     Sprain of anterior talofibular ligament of right ankle 02/07/2023    SVT (supraventricular tachycardia)     Thoracic outlet syndrome     2010    Trochanteric bursitis of both hips     LAST ASSESSED: 3/18/16    Ulnar neuropathy at elbow     Varicella     Wears glasses         Past Surgical History:   Procedure Laterality Date    BARIATRIC SURGERY  2022    BILE DUCT EXPLORATION      ENDOSCOPIC REMOVAL OF STONES FROM BILIARY TRACT    CARDIAC ELECTROPHYSIOLOGY PROCEDURE N/A 2024    Procedure: Cardiac eps/svt ablation;  Surgeon: Daquan Heath MD;  [facility];  Service: Cardiology     SECTION      x3    CHOLECYSTECTOMY      COLONOSCOPY      2017-polyp, -wnl, repeat5 years    DILATION AND CURETTAGE OF UTERUS      ENDOMETRIAL ABLATION      ERCP W/ SPHICTEROTOMY      FIRST RIB REMOVAL      THORAX EXCISION OF FIRST RIB    GASTRIC BYPASS  2022    HYSTEROSCOPY      NEUROPLASTY / TRANSPOSITION ULNAR NERVE AT ELBOW Right     UT COLONOSCOPY FLX DX W/COLLJ SPEC WHEN PFRMD N/A 2017    Procedure: COLONOSCOPY;  Surgeon: Oscar Velázquez MD;  [facility];  Service: Gastroenterology    UT ESOPHAGOGASTRODUODENOSCOPY TRANSORAL DIAGNOSTIC N/A 2017    Procedure: ESOPHAGOGASTRODUODENOSCOPY (EGD);  Surgeon: Sadiq Car MD;  [facility];  Service: Gastroenterology    UT HYSTEROSCOPY BX ENDOMETRIUM&/POLYPC W/WO D&C N/A 10/20/2017    Procedure: DILATATION AND CURETTAGE (D&C) WITH HYSTEROSCOPY  REMOVAL VULVAR RT. LESION;  Surgeon: Lucila Ortiz MD;  [facility];  Service: Gynecology    UT ALDANA IMPLTJ NSTIM ELTRDS PLATE/PADDLE EDRL Left 2020    Procedure: Insertion of thoracic spinal cord stimulator electrode via laminotomy and placement of left buttock implantable pulse generator (NEUROMONITORING);  Surgeon: Ad Mehta MD;  [facility];  Service: Neurosurgery    UT LAPS GSTRC RSTRICTIV PX LONGITUDINAL GASTRECTOMY N/A 2018    Procedure: GASTRECTOMY SLEEVE LAPAROSCOPIC; INTRAOPERATIVE EGD ;  Surgeon: Abimael Juarez MD;  [facility];  Service: Bariatrics    UT LAPS GSTRC RSTRICTIV PX LONGITUDINAL GASTRECTOMY N/A 2022    Procedure: Diagnostic Lap; Extensive Lysis of Adhesions; LAPAROSCOPIC REVISION  CONVERSION TO NOE-EN-Y GASTRIC BYPASS AND INTRAOPERATIVE EGD;  Surgeon: Abimael Juarez MD;  [facility];  Service: Bariatrics    SLEEVE GASTROPLASTY      SPINAL CORD STIMULATOR TRIAL W/ LAMINOTOMY      TONSILLECTOMY AND ADENOIDECTOMY      TUBAL LIGATION      UPPER GASTROINTESTINAL ENDOSCOPY      US GUIDED LYMPH NODE BIOPSY LEFT  2023    VEIN LIGATION AND STRIPPING Right     VULVA SURGERY  10/20/2017    BIOPSY    WISDOM TOOTH EXTRACTION         Social History     Socioeconomic History    Marital status: /Civil Union     Spouse name: Abel    Number of children: 3    Years of education: GED then 2 year college program    Highest education level: Not on file   Occupational History    Occupation: ER TECH     Employer: [employer]   Tobacco Use    Smoking status: Former     Current packs/day: 0.00     Average packs/day: 1 pack/day for 15.0 years (15.0 ttl pk-yrs)     Types: Cigarettes     Start date: 1998     Quit date: 2013     Years since quittin.1     Passive exposure: Never    Smokeless tobacco: Never   Vaping Use    Vaping status: Never Used   Substance and Sexual Activity    Alcohol use: Yes     Alcohol/week: 4.0 standard drinks of alcohol     Types: 2 Shots of liquor, 2 Standard drinks or equivalent per week     Comment: On weekends that i go out    Drug use: No    Sexual activity: Yes     Partners: Male     Birth control/protection: OCP, Post-menopausal     Comment: lifetime partners: 6; current partner    Other Topics Concern    Not on file   Social History Narrative    Scientologist: no preference    Accepts blood products        Exercise: unable with back issues and SVT    Calcium: calcium supplement, multivitamin for women over 50, 1 yogurt daily     Social Determinants of Health     Financial Resource Strain: Medium Risk (2020)    Overall Financial Resource Strain (CARDIA)     Difficulty of Paying Living Expenses: Somewhat hard   Food  Insecurity: No Food Insecurity (12/31/2020)    Hunger Vital Sign     Worried About Running Out of Food in the Last Year: Never true     Ran Out of Food in the Last Year: Never true   Transportation Needs: No Transportation Needs (12/31/2020)    PRAPARE - Transportation     Lack of Transportation (Medical): No     Lack of Transportation (Non-Medical): No   Physical Activity: Unknown (5/9/2019)    Exercise Vital Sign     Days of Exercise per Week: 0 days     Minutes of Exercise per Session: Not on file   Stress: Stress Concern Present (5/9/2019)    [employer] - Occupational Stress Questionnaire     Feeling of Stress : Very much   Social Connections: Socially Isolated (5/9/2019)    Social Connection and Isolation Panel [NHANES]     Frequency of Communication with Friends and Family: Once a week     Frequency of Social Gatherings with Friends and Family: Once a week     Attends Roman Catholic Services: Never     Active Member of Clubs or Organizations: No     Attends Club or Organization Meetings: Never     Marital Status:    Intimate Partner Violence: Not At Risk (5/9/2019)    Humiliation, Afraid, Rape, and Kick questionnaire     Fear of Current or Ex-Partner: No     Emotionally Abused: No     Physically Abused: No     Sexually Abused: No   Housing Stability: Not on file       Family History   Problem Relation Age of Onset    Diabetes Mother     Breast cancer Mother         >50    BRCA1 Negative Mother     BRCA2 Negative Mother     Hyperlipidemia Mother     Cancer Mother         Breast    Diabetes Father     Other Father         traumatic brain injury    Prostate cancer Father     Alcohol abuse Father         in remission    Heart disease Father     Neuropathy Father     Hyperlipidemia Father     Cancer Father         Prostate    Hypertension Brother     Diabetes Brother     Other Brother         HYPERCHOLESTEROLEMIA    Alcohol abuse Brother     Depression Brother         attempted  suicide    Anxiety disorder Brother     Suicide Attempts Brother     Diabetes Brother     Alcohol abuse Brother     Heart attack Maternal Grandmother     Colon cancer Maternal Grandfather     No Known Problems Paternal Grandmother         dad is adopted    No Known Problems Paternal Grandfather         dad is adopted    No Known Problems Daughter     Asthma Son     Ovarian cancer Neg Hx     Uterine cancer Neg Hx         Patient's Medications   New Prescriptions    No medications on file   Previous Medications    APIXABAN (ELIQUIS) 5 MG    Take 1 tablet (5 mg total) by mouth 2 (two) times a day    ATOMOXETINE (STRATTERA) 60 MG CAPSULE    Take 1 capsule (60 mg total) by mouth daily    ATORVASTATIN (LIPITOR) 20 MG TABLET    Take 1 tablet (20 mg total) by mouth daily    CALCIUM CARBONATE (OS-MELODY) 600 MG TABLET    Take 600 mg by mouth 2 (two) times a day with meals    CYCLOBENZAPRINE (FLEXERIL) 10 MG TABLET    Take 1 tablet (10 mg total) by mouth 2 (two) times a day as needed for muscle spasms for up to 7 days    DICLOFENAC SODIUM (VOLTAREN) 1 %    Apply 2 g topically 4 (four) times a day    DROSPIRENONE-ETHINYL ESTRADIOL (LIEN) 3-0.02 MG PER TABLET    Take 1 tablet by mouth daily    ESTRADIOL (ESTRACE VAGINAL) 0.1 MG/G VAGINAL CREAM    One gram vaginally at night x 14 days then twice weekly for ongoing maintenance.    FLUTICASONE (FLONASE) 50 MCG/ACT NASAL SPRAY    1 spray into each nostril daily    LAMOTRIGINE (LAMICTAL) 150 MG TABLET    Take 1 tablet (150 mg total) by mouth 2 (two) times a day    LIDOCAINE (LIDODERM) 5 %    Apply 1 patch topically over 12 hours daily Remove & Discard patch within 12 hours or as directed by MD    MECLIZINE (ANTIVERT) 25 MG TABLET    Take 1 tablet (25 mg total) by mouth every 8 (eight) hours as needed for dizziness    METHOCARBAMOL (ROBAXIN) 750 MG TABLET    Take 1 tablet (750 mg total) by mouth every 8 (eight) hours    MIRTAZAPINE (REMERON) 15 MG TABLET    Take 1 tablet (15 mg total) by  "mouth daily at bedtime    MONTELUKAST (SINGULAIR) 10 MG TABLET    Take 1 tablet (10 mg total) by mouth daily at bedtime    MULTIPLE VITAMINS-MINERALS (MULTI COMPLETE PO)    Take by mouth    NITROFURANTOIN (MACROBID) 100 MG CAPSULE    Take 1 tablet PO PRN after sexual intercourse    OMEPRAZOLE (PRILOSEC) 20 MG DELAYED RELEASE CAPSULE    Take 1 capsule (20 mg total) by mouth daily    VENLAFAXINE (EFFEXOR) 100 MG TABLET    Take 1 tablet (100 mg total) by mouth 3 (three) times a day   Modified Medications    No medications on file   Discontinued Medications    No medications on file       Allergies   Allergen Reactions    Ibuprofen Other (See Comments)     Due to gastric sleeve -can only take for 5 days, then needs to stop        /72 (BP Location: Left arm, Patient Position: Sitting, Cuff Size: Standard)   Pulse 86   Ht 5' 6\" (1.676 m)   Wt 70.3 kg (155 lb)   LMP 01/07/2019 (Approximate)   BMI 25.02 kg/m²      REVIEW OF SYSTEMS:  Constitutional: Negative.    HEENT: Negative.    Respiratory: Negative.    Skin: Negative.    Neurological: Negative.    Psychiatric/Behavioral: Negative.  Musculoskeletal: Negative except for that mentioned in the HPI.    PHYSICAL EXAM:    LEFT ELBOW:    Appearance: skin intact     Flexion: 140 degrees  Extension: 0 degrees  Pronation: 80 degrees  Supination: 80 degrees    TTP Lateral Epicondyle: negative  TTP Medial Epicondyle: negative  TTP Olecranon: negative  TTP Radial Head: negative  TTP Biceps Tendon: negative    Strength:  Flexion: 5/5  Extension: 5/5  Pronation: 5/5  Supination: 5/5    Pain with resisted wrist extension: negative  Pain with resisted 3rd finger extension: negative  Pain with resisted wrist flexion: +    Varus laxity: negative  Valgus laxity: negative  Milking maneuver: negative  Moving valgus stress test: negative    Cubital tunnel Tinel's: +  Sublux ulnar nerve     + Durkans Sign     Radial/median/ulnar nerve intact    <2 sec cap refill     Decrease grib " strength compared to contralateral side     IMAGING:  XR left elbow demonstrates no acute fractures or dislocations, no acute abnormality     ASSESSMENT AND PLAN:  54 y.o. female  Left cubital tunnel syndrome       XR left elbow was reviewed in the office today. Patient on exam has positive cubital tunnel tinel's, pain with resisted wrist flexion and subluxating ulnar nerve. Will be ordering [facility] US left elbow to r/o cubital tunnel and subluxating ulnar nerve. Recommended patient to do poor man splitting at night to help with the numbness ,tingling and pain at night. She is to follow up after US MSK to review results.       Scribe Attestation      I,:  Oren Vallecillo am acting as a scribe while in the presence of the attending physician.:       I,:  Maggy Leary DO personally performed the services described in this documentation    as scribed in my presence.:

## 2024-06-25 ENCOUNTER — TELEMEDICINE (OUTPATIENT)
Dept: BEHAVIORAL/MENTAL HEALTH CLINIC | Facility: CLINIC | Age: 54
End: 2024-06-25
Payer: COMMERCIAL

## 2024-06-25 DIAGNOSIS — F43.10 POST TRAUMATIC STRESS DISORDER (PTSD): Chronic | ICD-10-CM

## 2024-06-25 DIAGNOSIS — F90.0 ADHD (ATTENTION DEFICIT HYPERACTIVITY DISORDER), INATTENTIVE TYPE: Chronic | ICD-10-CM

## 2024-06-25 DIAGNOSIS — F41.1 GENERALIZED ANXIETY DISORDER: Primary | Chronic | ICD-10-CM

## 2024-06-25 DIAGNOSIS — F33.2 MDD (MAJOR DEPRESSIVE DISORDER), RECURRENT SEVERE, WITHOUT PSYCHOSIS (HCC): ICD-10-CM

## 2024-06-25 PROCEDURE — 90837 PSYTX W PT 60 MINUTES: CPT | Performed by: SOCIAL WORKER

## 2024-06-26 ENCOUNTER — TELEPHONE (OUTPATIENT)
Age: 54
End: 2024-06-26

## 2024-06-26 ENCOUNTER — HOSPITAL ENCOUNTER (OUTPATIENT)
Dept: ULTRASOUND IMAGING | Facility: HOSPITAL | Age: 54
Discharge: HOME | End: 2024-06-26
Attending: ORTHOPAEDIC SURGERY
Payer: COMMERCIAL

## 2024-06-26 DIAGNOSIS — M25.522 ELBOW PAIN, LEFT: ICD-10-CM

## 2024-06-26 DIAGNOSIS — G56.22 CUBITAL TUNNEL SYNDROME ON LEFT: ICD-10-CM

## 2024-06-26 PROCEDURE — 76882 US LMTD JT/FCL EVL NVASC XTR: CPT

## 2024-06-26 NOTE — TELEPHONE ENCOUNTER
Caller: Patient     Doctor: Gibran     Reason for call: Christina is asking for a callback with the results of her US. I did offer her a follow up, but she stated she can't get anymore time off from work and can you please call her.    Call back#: [phone]

## 2024-06-27 ENCOUNTER — TELEPHONE (OUTPATIENT)
Dept: OBGYN CLINIC | Facility: MEDICAL CENTER | Age: 54
End: 2024-06-27

## 2024-06-27 NOTE — TELEPHONE ENCOUNTER
----- Message from Maggy Leary DO sent at 6/27/2024 10:02 AM EDT -----  Regarding: set up appointment  Please set up patient to see me in office next week to discuss surgery for her elbow.

## 2024-06-28 ENCOUNTER — TELEPHONE (OUTPATIENT)
Dept: ENDOCRINOLOGY | Facility: CLINIC | Age: 54
End: 2024-06-28

## 2024-06-28 ENCOUNTER — APPOINTMENT (OUTPATIENT)
Dept: LAB | Facility: CLINIC | Age: 54
End: 2024-06-28

## 2024-06-28 ENCOUNTER — CONSULT (OUTPATIENT)
Dept: ENDOCRINOLOGY | Facility: CLINIC | Age: 54
End: 2024-06-28
Payer: COMMERCIAL

## 2024-06-28 VITALS
BODY MASS INDEX: 25.11 KG/M2 | SYSTOLIC BLOOD PRESSURE: 116 MMHG | WEIGHT: 160 LBS | HEIGHT: 67 IN | HEART RATE: 72 BPM | OXYGEN SATURATION: 98 % | DIASTOLIC BLOOD PRESSURE: 78 MMHG

## 2024-06-28 DIAGNOSIS — R63.5 WEIGHT GAIN: ICD-10-CM

## 2024-06-28 DIAGNOSIS — R73.09 LABILE BLOOD GLUCOSE: ICD-10-CM

## 2024-06-28 DIAGNOSIS — E11.9 TYPE 2 DIABETES MELLITUS WITHOUT COMPLICATION, WITHOUT LONG-TERM CURRENT USE OF INSULIN (HCC): Primary | ICD-10-CM

## 2024-06-28 DIAGNOSIS — L70.9 ACNE, UNSPECIFIED ACNE TYPE: ICD-10-CM

## 2024-06-28 DIAGNOSIS — Z00.8 HEALTH EXAMINATION IN POPULATION SURVEY: ICD-10-CM

## 2024-06-28 LAB
CHOLEST SERPL-MCNC: 150 MG/DL
EST. AVERAGE GLUCOSE BLD GHB EST-MCNC: 128 MG/DL
HBA1C MFR BLD: 6.1 %
HDLC SERPL-MCNC: 82 MG/DL
LDLC SERPL CALC-MCNC: 28 MG/DL (ref 0–100)
NONHDLC SERPL-MCNC: 68 MG/DL
TRIGL SERPL-MCNC: 198 MG/DL

## 2024-06-28 PROCEDURE — 99244 OFF/OP CNSLTJ NEW/EST MOD 40: CPT | Performed by: INTERNAL MEDICINE

## 2024-06-28 PROCEDURE — 80061 LIPID PANEL: CPT

## 2024-06-28 PROCEDURE — 36415 COLL VENOUS BLD VENIPUNCTURE: CPT

## 2024-06-28 PROCEDURE — 83036 HEMOGLOBIN GLYCOSYLATED A1C: CPT

## 2024-06-28 RX ORDER — BLOOD-GLUCOSE METER
KIT MISCELLANEOUS
Qty: 1 KIT | Refills: 0 | Status: SHIPPED | OUTPATIENT
Start: 2024-06-28

## 2024-06-28 RX ORDER — METFORMIN HYDROCHLORIDE 500 MG/1
500 TABLET, EXTENDED RELEASE ORAL 2 TIMES DAILY WITH MEALS
Qty: 180 TABLET | Refills: 1 | Status: SHIPPED | OUTPATIENT
Start: 2024-06-28

## 2024-06-28 RX ORDER — LANCETS 28 GAUGE
EACH MISCELLANEOUS
Qty: 100 EACH | Refills: 1 | Status: SHIPPED | OUTPATIENT
Start: 2024-06-28

## 2024-06-28 RX ORDER — BLOOD-GLUCOSE METER
KIT MISCELLANEOUS
Qty: 100 EACH | Refills: 1 | Status: SHIPPED | OUTPATIENT
Start: 2024-06-28

## 2024-06-28 NOTE — TELEPHONE ENCOUNTER
Delfina Moise  Can you please add patient to Monday Nov 18th 4 pm same day with Patricia Baker  Thank you

## 2024-06-28 NOTE — PROGRESS NOTES
Christina Lara 54 y.o. female MRN: [id number]    Encounter: [id number]      Assessment & Plan     Assessment:  This is a 54 y.o.-year-old female with diabetes with hyperglycemia.    Plan:  Diagnoses and all orders for this visit:    Type 2 diabetes mellitus without complication, without long-term current use of insulin ([facility])    Lab Results   Component Value Date    HGBA1C 6.1 (H) 06/28/2024   A1c 6.1%, discussed with patient to start checking blood sugar at least once daily and goal for blood sugar is 80 to 140 mg per DL range.  Discussed about starting metformin therapy, patient is agreeable.  She had taken metformin before and tolerated it well.  Will start metformin 500 mg daily for 1 week, increase the dose to 500 mg twice a day after 1 week.  Discussed about long-term complications of uncontrolled diabetes.  Discussed the importance of lifestyle modifications  Repeat basic metabolic profile in 4 months and follow-up  -     Blood Glucose Monitoring Suppl (FreeStyle Freedom Lite) w/Device KIT; Please dispense 1 kit  -     glucose blood (FREESTYLE LITE) test strip; Test once daily  -     Lancets (freestyle) lancets; Use as instructed, once daily  -     Hemoglobin A1C; Future  -     Albumin / creatinine urine ratio; Future  -     Basic metabolic panel; Future  -     metFORMIN (GLUCOPHAGE-XR) 500 mg 24 hr tablet; Take 1 tablet (500 mg total) by mouth 2 (two) times a day with meals    Labile blood glucose  -     Ambulatory Referral to Endocrinology    Weight gain  Will rule out Hashimoto thyroiditis by doing thyroid antibodies.  Previously thyroid blood work is within normal range  -     Thyroid Antibodies Panel; Future    Acne, unspecified acne type  Obtain testosterone and DHEA-sulfate  -     Testosterone, free, total; Future  -     DHEA-sulfate; Future       CC: Diabetes    History of Present Illness     HPI:    Christina Lara is 54-year-old woman with medical history of weight loss surgery, status post laparoscopic  sleeve gastrectomy, postsurgical malabsorption, vitamin D deficiency, type 2 diabetes is here for establishing visit for type 2 diabetes management..  Patient has history of type 2 diabetes for many years, recently underwent weight loss surgery, she has lost close to 70 to 80 pounds, and slowly regaining weight.  Her most recent BMI is 25.  She also gives history of gestational diabetes.  For type 2 diabetes, currently she is not taking any medications..  He does not check blood sugars at home.  She follows low carbohydrate diet, avoid free sugar drinks, he also tries to get enough exercise daily.    SHe denies history of thyroid problems in the past  Denies taking over-the-counter supplementation      Lab Results   Component Value Date    MKH8FCFTZLDN 1.090 04/03/2024      Latest Reference Range & Units 04/03/18 11:37 08/16/19 09:04 11/19/22 11:26 09/08/23 10:19 04/03/24 09:19   VITAMIN D, 25-HYDROXY 30.0 - 100.0 ng/mL 36.8 23.7 (L) 39.5 35.5 45.0       Lab Results   Component Value Date    HGBA1C 6.1 (H) 06/28/2024          Review of Systems   Constitutional:  Negative for activity change, diaphoresis, fatigue, fever and unexpected weight change.   HENT: Negative.     Eyes:  Negative for visual disturbance.   Respiratory:  Negative for cough, chest tightness and shortness of breath.    Cardiovascular:  Negative for chest pain, palpitations and leg swelling.   Gastrointestinal:  Negative for abdominal pain, blood in stool, constipation, diarrhea, nausea and vomiting.   Endocrine: Negative for cold intolerance, heat intolerance, polydipsia, polyphagia and polyuria.   Genitourinary:  Negative for dysuria, enuresis, frequency and urgency.   Musculoskeletal:  Negative for arthralgias and myalgias.   Skin:  Negative for pallor, rash and wound.   Allergic/Immunologic: Negative.    Neurological:  Negative for dizziness, tremors, weakness and numbness.   Hematological: Negative.    Psychiatric/Behavioral: Negative.          Historical Information   Past Medical History:   Diagnosis Date    Acute right ankle pain 2023    ADHD (attention deficit hyperactivity disorder)     Allergic     Anxiety     Bulging lumbar disc     Carpal tunnel syndrome     RIGHT.  LAST ASSESSED: 16    Chronic back pain     low    Chronic pain disorder     Colon polyps     COVID-19     2021    Depression     Diabetes mellitus (HCC)     GERD (gastroesophageal reflux disease)     Gestational diabetes     Hearing loss     left ear    Heart disease     SVT    History of pre-eclampsia     Hyperlipidemia     Resolved with weight loss    Hyponatremia 2020    IBS (irritable bowel syndrome)     Ileus ([facility])     LAST ASSESSED: 8/3/17    Labial cyst     LAST ASSESSED: 16    Memory loss     Myofascial pain     LAST ASSESSED: 16    Neck mass 2023    Obesity     Ovarian cyst     LEFT. LAST ASSESSED: 16    Panic attack     Pneumonia     Sacroiliitis ([facility])     Seasonal allergies     Sprain of anterior talofibular ligament of right ankle 2023    SVT (supraventricular tachycardia)     Thoracic outlet syndrome     2010    Trochanteric bursitis of both hips     LAST ASSESSED: 3/18/16    Ulnar neuropathy at elbow     Varicella     Wears glasses      Past Surgical History:   Procedure Laterality Date    BARIATRIC SURGERY  2022    BILE DUCT EXPLORATION      ENDOSCOPIC REMOVAL OF STONES FROM BILIARY TRACT    CARDIAC ELECTROPHYSIOLOGY PROCEDURE N/A 2024    Procedure: Cardiac eps/svt ablation;  Surgeon: Daquan Heath MD;  [facility];  Service: Cardiology     SECTION      x3    CHOLECYSTECTOMY      COLONOSCOPY      -polyp, -wnl, repeat5 years    DILATION AND CURETTAGE OF UTERUS      ENDOMETRIAL ABLATION      ERCP W/ SPHICTEROTOMY      FIRST RIB REMOVAL      THORAX EXCISION OF FIRST RIB    GASTRIC BYPASS  2022    HYSTEROSCOPY      NEUROPLASTY / TRANSPOSITION ULNAR NERVE AT ELBOW Right     ID  COLONOSCOPY FLX DX W/COLLJ SPEC WHEN PFRMD N/A 05/30/2017    Procedure: COLONOSCOPY;  Surgeon: Ocsar Velázquez MD;  Location: [facility];  Service: Gastroenterology    MD ESOPHAGOGASTRODUODENOSCOPY TRANSORAL DIAGNOSTIC N/A 09/14/2017    Procedure: ESOPHAGOGASTRODUODENOSCOPY (EGD);  Surgeon: Sadiq Car MD;  Location: [facility];  Service: Gastroenterology    MD HYSTEROSCOPY BX ENDOMETRIUM&/POLYPC W/WO D&C N/A 10/20/2017    Procedure: DILATATION AND CURETTAGE (D&C) WITH HYSTEROSCOPY  REMOVAL VULVAR RT. LESION;  Surgeon: Lucila Ortiz MD;  [facility];  Service: Gynecology    MD ALDANA IMPLTJ NSTIM ELTRDS PLATE/PADDLE EDRL Left 01/29/2020    Procedure: Insertion of thoracic spinal cord stimulator electrode via laminotomy and placement of left buttock implantable pulse generator (NEUROMONITORING);  Surgeon: Ad Mehta MD;  [facility];  Service: Neurosurgery    MD LAPS GSTRC RSTRICTIV PX LONGITUDINAL GASTRECTOMY N/A 02/06/2018    Procedure: GASTRECTOMY SLEEVE LAPAROSCOPIC; INTRAOPERATIVE EGD ;  Surgeon: Abimael Juarez MD;  [facility];  Service: Bariatrics    MD LAPS GSTRC RSTRICTIV PX LONGITUDINAL GASTRECTOMY N/A 08/08/2022    Procedure: Diagnostic Lap; Extensive Lysis of Adhesions; LAPAROSCOPIC REVISION CONVERSION TO NOE-EN-Y GASTRIC BYPASS AND INTRAOPERATIVE EGD;  Surgeon: Abimael Juarez MD;  [facility];  Service: Bariatrics    SLEEVE GASTROPLASTY      SPINAL CORD STIMULATOR TRIAL W/ LAMINOTOMY      TONSILLECTOMY AND ADENOIDECTOMY      TUBAL LIGATION      UPPER GASTROINTESTINAL ENDOSCOPY      US GUIDED LYMPH NODE BIOPSY LEFT  05/22/2023    VEIN LIGATION AND STRIPPING Right     VULVA SURGERY  10/20/2017    BIOPSY    WISDOM TOOTH EXTRACTION       Social History   Social History     Substance and Sexual Activity   Alcohol Use Yes    Alcohol/week: 4.0 standard drinks of alcohol    Types: 2 Shots of liquor, 2 Standard drinks or equivalent per week    Comment: On weekends that i go out      Social History     Substance and Sexual Activity   Drug Use No     Social History     Tobacco Use   Smoking Status Former    Current packs/day: 0.00    Average packs/day: 1 pack/day for 15.0 years (15.0 ttl pk-yrs)    Types: Cigarettes    Start date: 1998    Quit date: 2013    Years since quittin.1    Passive exposure: Never   Smokeless Tobacco Never     Family History:   Family History   Problem Relation Age of Onset    Diabetes Mother     Breast cancer Mother         >50    BRCA1 Negative Mother     BRCA2 Negative Mother     Hyperlipidemia Mother     Cancer Mother         Breast    Diabetes Father     Other Father         traumatic brain injury    Prostate cancer Father     Alcohol abuse Father         in remission    Heart disease Father     Neuropathy Father     Hyperlipidemia Father     Cancer Father         Prostate    Hypertension Brother     Diabetes Brother     Other Brother         HYPERCHOLESTEROLEMIA    Alcohol abuse Brother     Depression Brother         attempted suicide    Anxiety disorder Brother     Suicide Attempts Brother     Diabetes Brother     Alcohol abuse Brother     Heart attack Maternal Grandmother     Colon cancer Maternal Grandfather     No Known Problems Paternal Grandmother         dad is adopted    No Known Problems Paternal Grandfather         dad is adopted    No Known Problems Daughter     Asthma Son     Ovarian cancer Neg Hx     Uterine cancer Neg Hx        Meds/Allergies   Current Outpatient Medications   Medication Sig Dispense Refill    atoMOXetine (STRATTERA) 60 mg capsule Take 1 capsule (60 mg total) by mouth daily 90 capsule 3    atorvastatin (LIPITOR) 20 mg tablet Take 1 tablet (20 mg total) by mouth daily 90 tablet 1    Blood Glucose Monitoring Suppl (FreeStyle Freedom Lite) w/Device KIT Please dispense 1 kit 1 kit 0    calcium carbonate (OS-MELODY) 600 MG tablet Take 600 mg by mouth 2 (two) times a day with meals      Diclofenac Sodium (VOLTAREN) 1 % Apply  2 g topically 4 (four) times a day 100 g 0    drospirenone-ethinyl estradiol (LIEN) 3-0.02 MG per tablet Take 1 tablet by mouth daily 84 tablet 4    estradiol (ESTRACE VAGINAL) 0.1 mg/g vaginal cream One gram vaginally at night x 14 days then twice weekly for ongoing maintenance. (Patient taking differently: if needed One gram vaginally at night x 14 days then twice weekly for ongoing maintenance.) 42.5 g 2    fluticasone (FLONASE) 50 mcg/act nasal spray 1 spray into each nostril daily 15.8 mL 0    glucose blood (FREESTYLE LITE) test strip Test once daily 100 each 1    lamoTRIgine (LaMICtal) 150 MG tablet Take 1 tablet (150 mg total) by mouth 2 (two) times a day 180 tablet 3    Lancets (freestyle) lancets Use as instructed, once daily 100 each 1    lidocaine (Lidoderm) 5 % Apply 1 patch topically over 12 hours daily Remove & Discard patch within 12 hours or as directed by MD 30 patch 1    metFORMIN (GLUCOPHAGE-XR) 500 mg 24 hr tablet Take 1 tablet (500 mg total) by mouth 2 (two) times a day with meals 180 tablet 1    mirtazapine (REMERON) 15 mg tablet Take 1 tablet (15 mg total) by mouth daily at bedtime 90 tablet 1    montelukast (SINGULAIR) 10 mg tablet Take 1 tablet (10 mg total) by mouth daily at bedtime 90 tablet 2    Multiple Vitamins-Minerals (MULTI COMPLETE PO) Take by mouth      nitrofurantoin (MACROBID) 100 mg capsule Take 1 tablet PO PRN after sexual intercourse 30 capsule 0    venlafaxine (EFFEXOR) 100 MG tablet Take 1 tablet (100 mg total) by mouth 3 (three) times a day 270 tablet 2    apixaban (Eliquis) 5 mg Take 1 tablet (5 mg total) by mouth 2 (two) times a day 180 tablet 3    cyclobenzaprine (FLEXERIL) 10 mg tablet Take 1 tablet (10 mg total) by mouth 2 (two) times a day as needed for muscle spasms for up to 7 days 14 tablet 0    meclizine (ANTIVERT) 25 mg tablet Take 1 tablet (25 mg total) by mouth every 8 (eight) hours as needed for dizziness (Patient not taking: Reported on 4/1/2024) 30 tablet 0  "   methocarbamol (ROBAXIN) 750 mg tablet Take 1 tablet (750 mg total) by mouth every 8 (eight) hours (Patient taking differently: Take 750 mg by mouth if needed) 270 tablet 0    omeprazole (PriLOSEC) 20 mg delayed release capsule Take 1 capsule (20 mg total) by mouth daily 90 capsule 3     No current facility-administered medications for this visit.     Allergies   Allergen Reactions    Ibuprofen Other (See Comments)     Due to gastric sleeve -can only take for 5 days, then needs to stop       Objective   Vitals: Blood pressure 116/78, pulse 72, height 5' 7\" (1.702 m), weight 72.6 kg (160 lb), last menstrual period 01/07/2019, SpO2 98%, not currently breastfeeding.    Physical Exam  Vitals reviewed.   Constitutional:       General: She is not in acute distress.     Appearance: Normal appearance. She is not ill-appearing.   HENT:      Head: Normocephalic and atraumatic.      Nose: Nose normal.   Eyes:      Extraocular Movements: Extraocular movements intact.      Conjunctiva/sclera: Conjunctivae normal.   Pulmonary:      Effort: No respiratory distress.   Musculoskeletal:      Cervical back: Normal range of motion and neck supple.   Neurological:      General: No focal deficit present.      Mental Status: She is alert and oriented to person, place, and time.   Psychiatric:         Mood and Affect: Mood normal.         Behavior: Behavior normal.         The history was obtained from the review of the chart, patient.    Lab Results:   Lab Results   Component Value Date/Time    Hemoglobin A1C 6.1 (H) 06/28/2024 06:20 AM    WBC 7.34 04/03/2024 09:19 AM    WBC 9.56 02/12/2024 08:04 AM    WBC 6.18 01/04/2024 08:36 AM    Hemoglobin 11.3 (L) 04/03/2024 09:19 AM    Hemoglobin 12.2 02/12/2024 08:04 AM    Hemoglobin 11.4 (L) 01/04/2024 08:36 AM    Hematocrit 35.9 04/03/2024 09:19 AM    Hematocrit 39.3 02/12/2024 08:04 AM    Hematocrit 35.8 01/04/2024 08:36 AM    MCV 93 04/03/2024 09:19 AM    MCV 94 02/12/2024 08:04 AM    MCV 91 " "01/04/2024 08:36 AM    Platelets 274 04/03/2024 09:19 AM    Platelets 255 02/12/2024 08:04 AM    Platelets 243 01/04/2024 08:36 AM    BUN 18 04/03/2024 09:19 AM    BUN 15 02/12/2024 08:04 AM    BUN 10 01/04/2024 08:36 AM    Potassium 4.2 04/03/2024 09:19 AM    Potassium 4.1 02/12/2024 08:04 AM    Potassium 3.8 01/04/2024 08:36 AM    Chloride 105 04/03/2024 09:19 AM    Chloride 105 02/12/2024 08:04 AM    Chloride 106 01/04/2024 08:36 AM    CO2 27 04/03/2024 09:19 AM    CO2 26 02/12/2024 08:04 AM    CO2 29 01/04/2024 08:36 AM    Creatinine 0.54 (L) 04/03/2024 09:19 AM    Creatinine 0.63 02/12/2024 08:04 AM    Creatinine 0.54 (L) 01/04/2024 08:36 AM    AST 23 04/03/2024 09:19 AM    AST 23 02/12/2024 08:04 AM    AST 22 12/13/2023 11:52 AM    ALT 27 04/03/2024 09:19 AM    ALT 24 02/12/2024 08:04 AM    ALT 25 12/13/2023 11:52 AM    Total Protein 6.7 04/03/2024 09:19 AM    Total Protein 6.9 02/12/2024 08:04 AM    Total Protein 7.0 12/13/2023 11:52 AM    Albumin 3.8 04/03/2024 09:19 AM    Albumin 3.8 02/12/2024 08:04 AM    Albumin 3.9 12/13/2023 11:52 AM    HDL, Direct 82 06/28/2024 06:20 AM    HDL, Direct 86 04/03/2024 09:19 AM    Triglycerides 198 (H) 06/28/2024 06:20 AM    Triglycerides 220 (H) 04/03/2024 09:19 AM           Imaging Studies: I have personally reviewed pertinent reports.      Portions of the record may have been created with voice recognition software. Occasional wrong word or \"sound a like\" substitutions may have occurred due to the inherent limitations of voice recognition software. Read the chart carefully and recognize, using context, where substitutions have occurred.    "

## 2024-07-01 NOTE — PSYCH
Virtual Regular Visit    Verification of patient location:    Patient is located at Home in the following state in which I hold an active license [place]      Assessment/Plan:    Problem List Items Addressed This Visit          Behavioral Health    Post traumatic stress disorder (PTSD) (Chronic)    Generalized anxiety disorder - Primary (Chronic)    ADHD (attention deficit hyperactivity disorder), inattentive type (Chronic)    MDD (major depressive disorder), recurrent severe, without psychosis (HCC)       Goals addressed in session: Goal 1          Reason for visit is   Chief Complaint   Patient presents with    Virtual Regular Visit          Encounter provider LALY Yung      Recent Visits  Date Type Provider Dept   06/25/24 Telemedicine LALY Yung [facility] Therapist [place]   Showing recent visits within past 7 days and meeting all other requirements  Future Appointments  No visits were found meeting these conditions.  Showing future appointments within next 150 days and meeting all other requirements       The patient was identified by name and date of birth. Christina Lara was informed that this is a telemedicine visit and that the visit is being conducted throughthe Epic Embedded platform. She agrees to proceed..  My office door was closed. No one else was in the room.  She acknowledged consent and understanding of privacy and security of the video platform. The patient has agreed to participate and understands they can discontinue the visit at any time.    Patient is aware this is a billable service.     Subjective  Christina Lara is a 54 y.o. female.      HPI     Past Medical History:   Diagnosis Date    Acute right ankle pain 02/07/2023    ADHD (attention deficit hyperactivity disorder)     Allergic     Anxiety     Bulging lumbar disc     Carpal tunnel syndrome     RIGHT.  LAST ASSESSED: 12/7/16    Chronic back pain     low    Chronic pain disorder     Colon polyps     COVID-19      2021    Depression     Diabetes mellitus (HCC)     GERD (gastroesophageal reflux disease)     Gestational diabetes     Hearing loss     left ear    Heart disease     SVT    History of pre-eclampsia     Hyperlipidemia     Resolved with weight loss    Hyponatremia 2020    IBS (irritable bowel syndrome)     Ileus (HCC)     LAST ASSESSED: 8/3/17    Labial cyst     LAST ASSESSED: 16    Memory loss     Myofascial pain     LAST ASSESSED: 16    Neck mass 2023    Obesity     Ovarian cyst     LEFT. LAST ASSESSED: 16    Panic attack     Pneumonia     Sacroiliitis (HCC)     Seasonal allergies     Sprain of anterior talofibular ligament of right ankle 2023    SVT (supraventricular tachycardia)     Thoracic outlet syndrome     2010    Trochanteric bursitis of both hips     LAST ASSESSED: 3/18/16    Ulnar neuropathy at elbow     Varicella     Wears glasses        Past Surgical History:   Procedure Laterality Date    BARIATRIC SURGERY  2022    BILE DUCT EXPLORATION      ENDOSCOPIC REMOVAL OF STONES FROM BILIARY TRACT    CARDIAC ELECTROPHYSIOLOGY PROCEDURE N/A 2024    Procedure: Cardiac eps/svt ablation;  Surgeon: Daquan Heath MD;  [facility];  Service: Cardiology     SECTION      x3    CHOLECYSTECTOMY      COLONOSCOPY      -polyp, -wnl, repeat5 years    DILATION AND CURETTAGE OF UTERUS      ENDOMETRIAL ABLATION      ERCP W/ SPHICTEROTOMY      FIRST RIB REMOVAL      THORAX EXCISION OF FIRST RIB    GASTRIC BYPASS  2022    HYSTEROSCOPY      NEUROPLASTY / TRANSPOSITION ULNAR NERVE AT ELBOW Right     NM COLONOSCOPY FLX DX W/COLLJ SPEC WHEN PFRMD N/A 2017    Procedure: COLONOSCOPY;  Surgeon: Oscar Velázquez MD;  [facility];  Service: Gastroenterology    NM ESOPHAGOGASTRODUODENOSCOPY TRANSORAL DIAGNOSTIC N/A 2017    Procedure: ESOPHAGOGASTRODUODENOSCOPY (EGD);  Surgeon: Sadiq Car MD;  [facility];  Service: Gastroenterology     AR HYSTEROSCOPY BX ENDOMETRIUM&/POLYPC W/WO D&C N/A 10/20/2017    Procedure: DILATATION AND CURETTAGE (D&C) WITH HYSTEROSCOPY  REMOVAL VULVAR RT. LESION;  Surgeon: Lucila Ortiz MD;  [facility];  Service: Gynecology    AR ALDANA IMPLTJ NSTIM ELTRDS PLATE/PADDLE EDRL Left 01/29/2020    Procedure: Insertion of thoracic spinal cord stimulator electrode via laminotomy and placement of left buttock implantable pulse generator (NEUROMONITORING);  Surgeon: Ad Mehta MD;  [facility];  Service: Neurosurgery    AR LAPS GST RSTRICTIV PX LONGITUDINAL GASTRECTOMY N/A 02/06/2018    Procedure: GASTRECTOMY SLEEVE LAPAROSCOPIC; INTRAOPERATIVE EGD ;  Surgeon: Abimael Juarez MD;  [facility];  Service: Bariatrics    AR LAPS GST RSTRICTIV PX LONGITUDINAL GASTRECTOMY N/A 08/08/2022    Procedure: Diagnostic Lap; Extensive Lysis of Adhesions; LAPAROSCOPIC REVISION CONVERSION TO NOE-EN-Y GASTRIC BYPASS AND INTRAOPERATIVE EGD;  Surgeon: Abimael Juarez MD;  [facility];  Service: Bariatrics    SLEEVE GASTROPLASTY      SPINAL CORD STIMULATOR TRIAL W/ LAMINOTOMY      TONSILLECTOMY AND ADENOIDECTOMY      TUBAL LIGATION      UPPER GASTROINTESTINAL ENDOSCOPY      US GUIDED LYMPH NODE BIOPSY LEFT  05/22/2023    VEIN LIGATION AND STRIPPING Right     VULVA SURGERY  10/20/2017    BIOPSY    WISDOM TOOTH EXTRACTION         Current Outpatient Medications   Medication Sig Dispense Refill    apixaban (Eliquis) 5 mg Take 1 tablet (5 mg total) by mouth 2 (two) times a day 180 tablet 3    atoMOXetine (STRATTERA) 60 mg capsule Take 1 capsule (60 mg total) by mouth daily 90 capsule 3    atorvastatin (LIPITOR) 20 mg tablet Take 1 tablet (20 mg total) by mouth daily 90 tablet 1    Blood Glucose Monitoring Suppl (FreeStyle Freedom Lite) w/Device KIT Please dispense 1 kit 1 kit 0    calcium carbonate (OS-MELODY) 600 MG tablet Take 600 mg by mouth 2 (two) times a day with meals      cyclobenzaprine (FLEXERIL) 10 mg  tablet Take 1 tablet (10 mg total) by mouth 2 (two) times a day as needed for muscle spasms for up to 7 days 14 tablet 0    Diclofenac Sodium (VOLTAREN) 1 % Apply 2 g topically 4 (four) times a day 100 g 0    drospirenone-ethinyl estradiol (LIEN) 3-0.02 MG per tablet Take 1 tablet by mouth daily 84 tablet 4    estradiol (ESTRACE VAGINAL) 0.1 mg/g vaginal cream One gram vaginally at night x 14 days then twice weekly for ongoing maintenance. (Patient taking differently: if needed One gram vaginally at night x 14 days then twice weekly for ongoing maintenance.) 42.5 g 2    fluticasone (FLONASE) 50 mcg/act nasal spray 1 spray into each nostril daily 15.8 mL 0    glucose blood (FREESTYLE LITE) test strip Test once daily 100 each 1    lamoTRIgine (LaMICtal) 150 MG tablet Take 1 tablet (150 mg total) by mouth 2 (two) times a day 180 tablet 3    Lancets (freestyle) lancets Use as instructed, once daily 100 each 1    lidocaine (Lidoderm) 5 % Apply 1 patch topically over 12 hours daily Remove & Discard patch within 12 hours or as directed by MD 30 patch 1    meclizine (ANTIVERT) 25 mg tablet Take 1 tablet (25 mg total) by mouth every 8 (eight) hours as needed for dizziness (Patient not taking: Reported on 4/1/2024) 30 tablet 0    metFORMIN (GLUCOPHAGE-XR) 500 mg 24 hr tablet Take 1 tablet (500 mg total) by mouth 2 (two) times a day with meals 180 tablet 1    methocarbamol (ROBAXIN) 750 mg tablet Take 1 tablet (750 mg total) by mouth every 8 (eight) hours (Patient taking differently: Take 750 mg by mouth if needed) 270 tablet 0    mirtazapine (REMERON) 15 mg tablet Take 1 tablet (15 mg total) by mouth daily at bedtime 90 tablet 1    montelukast (SINGULAIR) 10 mg tablet Take 1 tablet (10 mg total) by mouth daily at bedtime 90 tablet 2    Multiple Vitamins-Minerals (MULTI COMPLETE PO) Take by mouth      nitrofurantoin (MACROBID) 100 mg capsule Take 1 tablet PO PRN after sexual intercourse 30 capsule 0    omeprazole (PriLOSEC) 20  mg delayed release capsule Take 1 capsule (20 mg total) by mouth daily 90 capsule 3    venlafaxine (EFFEXOR) 100 MG tablet Take 1 tablet (100 mg total) by mouth 3 (three) times a day 270 tablet 2     No current facility-administered medications for this visit.        Allergies   Allergen Reactions    Ibuprofen Other (See Comments)     Due to gastric sleeve -can only take for 5 days, then needs to stop       Review of Systems    Video Exam    There were no vitals filed for this visit.    Physical Exam     Behavioral Health Psychotherapy Progress Note    Psychotherapy Provided: Individual Psychotherapy     1. Generalized anxiety disorder        2. MDD (major depressive disorder), recurrent severe, without psychosis (HCC)        3. Post traumatic stress disorder (PTSD)        4. ADHD (attention deficit hyperactivity disorder), inattentive type            Goals addressed in session: Goal 1     DATA: Met with Yessy for her scheduled individual session. Yessy states that her job is going well, and she is feeling good about making the change. She states that she is beginning to get accustomed to the slower pace. She states that she likes her coworkers. Yessy states that she feels that this new role will help her to be able to focus on her education, when she returns to classes in the fall. Yessy discussed her relationship with her  and her relationship with her son. She discussed her frustration with her son's father-- who is not assisting her with financial support for their son. She states that her son will probably have to live with her for the rest of her life, and she would like to have his father's support to care for him. Overall, Yessy is reporting a euthymic mood.     During this session, this clinician used the following therapeutic modalities: Client-centered Therapy, Dialectical Behavior Therapy, Mindfulness-based Strategies, Motivational Interviewing, Solution-Focused Therapy, and Supportive  "Psychotherapy    Substance Abuse was not addressed during this session. If the client is diagnosed with a co-occurring substance use disorder, please indicate any changes in the frequency or amount of use: n/a. Stage of change for addressing substance use diagnoses: No substance use/Not applicable    ASSESSMENT:  Christina Lara presents with a Euthymic/ normal mood.     her affect is Normal range and intensity, which is congruent, with her mood and the content of the session. The client has made progress on their goals.    Christina Lara presents with a minimal risk of suicide, minimal risk of self-harm, and minimal risk of harm to others.    For any risk assessment that surpasses a \"low\" rating, a safety plan must be developed.    A safety plan was indicated: no  If yes, describe in detail n/a    PLAN: Between sessions, Christina Lara will continue to monitor her moods. She will continue to adjust to her new job. Yessy will continue to support her son, while also encouraging him to access resources for his job support. . At the next session, the therapist will use Client-centered Therapy, Dialectical Behavior Therapy, Mindfulness-based Strategies, Motivational Interviewing, Solution-Focused Therapy, and Supportive Psychotherapy to address her mood regulation and relationships.    Behavioral Health Treatment Plan and Discharge Planning: Christina Lara is aware of and agrees to continue to work on their treatment plan. They have identified and are working toward their discharge goals. yes    Visit start and stop times:    06/25/24  Start Time: 1701  Stop Time: 1755  Total Visit Time: 54 minutes        "

## 2024-07-03 ENCOUNTER — OFFICE VISIT (OUTPATIENT)
Dept: OBGYN CLINIC | Facility: CLINIC | Age: 54
End: 2024-07-03
Payer: COMMERCIAL

## 2024-07-03 ENCOUNTER — APPOINTMENT (OUTPATIENT)
Dept: LAB | Facility: HOSPITAL | Age: 54
End: 2024-07-03
Payer: COMMERCIAL

## 2024-07-03 VITALS
BODY MASS INDEX: 25.11 KG/M2 | DIASTOLIC BLOOD PRESSURE: 76 MMHG | HEIGHT: 67 IN | WEIGHT: 160 LBS | SYSTOLIC BLOOD PRESSURE: 112 MMHG | HEART RATE: 66 BPM

## 2024-07-03 DIAGNOSIS — G56.22 CUBITAL TUNNEL SYNDROME ON LEFT: ICD-10-CM

## 2024-07-03 DIAGNOSIS — G56.22 CUBITAL TUNNEL SYNDROME ON LEFT: Primary | ICD-10-CM

## 2024-07-03 DIAGNOSIS — D50.8 IRON DEFICIENCY ANEMIA AFTER GASTRECTOMY: ICD-10-CM

## 2024-07-03 DIAGNOSIS — R79.89 HIGH SERUM VITAMIN A: ICD-10-CM

## 2024-07-03 DIAGNOSIS — Z98.84 S/P GASTRIC BYPASS: ICD-10-CM

## 2024-07-03 DIAGNOSIS — R79.0 LOW FERRITIN: ICD-10-CM

## 2024-07-03 DIAGNOSIS — K91.89 IRON DEFICIENCY ANEMIA AFTER GASTRECTOMY: ICD-10-CM

## 2024-07-03 DIAGNOSIS — K91.2 POSTSURGICAL MALABSORPTION: ICD-10-CM

## 2024-07-03 LAB
ALBUMIN SERPL BCG-MCNC: 3.9 G/DL (ref 3.5–5)
ALP SERPL-CCNC: 59 U/L (ref 34–104)
ALT SERPL W P-5'-P-CCNC: 18 U/L (ref 7–52)
ANION GAP SERPL CALCULATED.3IONS-SCNC: 9 MMOL/L (ref 4–13)
AST SERPL W P-5'-P-CCNC: 19 U/L (ref 13–39)
BILIRUB SERPL-MCNC: 0.23 MG/DL (ref 0.2–1)
BUN SERPL-MCNC: 13 MG/DL (ref 5–25)
CALCIUM SERPL-MCNC: 8.8 MG/DL (ref 8.4–10.2)
CHLORIDE SERPL-SCNC: 103 MMOL/L (ref 96–108)
CO2 SERPL-SCNC: 28 MMOL/L (ref 21–32)
CREAT SERPL-MCNC: 0.59 MG/DL (ref 0.6–1.3)
ERYTHROCYTE [DISTWIDTH] IN BLOOD BY AUTOMATED COUNT: 12.5 % (ref 11.6–15.1)
FERRITIN SERPL-MCNC: 7 NG/ML (ref 11–307)
GFR SERPL CREATININE-BSD FRML MDRD: 103 ML/MIN/1.73SQ M
GLUCOSE SERPL-MCNC: 76 MG/DL (ref 65–140)
HCT VFR BLD AUTO: 35.8 % (ref 34.8–46.1)
HGB BLD-MCNC: 11 G/DL (ref 11.5–15.4)
IRON SATN MFR SERPL: 5 % (ref 15–50)
IRON SERPL-MCNC: 37 UG/DL (ref 50–212)
MCH RBC QN AUTO: 27.6 PG (ref 26.8–34.3)
MCHC RBC AUTO-ENTMCNC: 30.7 G/DL (ref 31.4–37.4)
MCV RBC AUTO: 90 FL (ref 82–98)
PLATELET # BLD AUTO: 297 THOUSANDS/UL (ref 149–390)
PMV BLD AUTO: 10.2 FL (ref 8.9–12.7)
POTASSIUM SERPL-SCNC: 3.8 MMOL/L (ref 3.5–5.3)
PROT SERPL-MCNC: 6.7 G/DL (ref 6.4–8.4)
RBC # BLD AUTO: 3.99 MILLION/UL (ref 3.81–5.12)
SODIUM SERPL-SCNC: 140 MMOL/L (ref 135–147)
TIBC SERPL-MCNC: 790 UG/DL (ref 250–450)
UIBC SERPL-MCNC: 753 UG/DL (ref 155–355)
WBC # BLD AUTO: 6.52 THOUSAND/UL (ref 4.31–10.16)

## 2024-07-03 PROCEDURE — 83550 IRON BINDING TEST: CPT

## 2024-07-03 PROCEDURE — 99214 OFFICE O/P EST MOD 30 MIN: CPT | Performed by: ORTHOPAEDIC SURGERY

## 2024-07-03 PROCEDURE — 80053 COMPREHEN METABOLIC PANEL: CPT

## 2024-07-03 PROCEDURE — 85027 COMPLETE CBC AUTOMATED: CPT

## 2024-07-03 PROCEDURE — 36415 COLL VENOUS BLD VENIPUNCTURE: CPT

## 2024-07-03 PROCEDURE — 83540 ASSAY OF IRON: CPT

## 2024-07-03 PROCEDURE — 82728 ASSAY OF FERRITIN: CPT

## 2024-07-03 RX ORDER — CHLORHEXIDINE GLUCONATE 40 MG/ML
SOLUTION TOPICAL DAILY PRN
OUTPATIENT
Start: 2024-07-03

## 2024-07-03 RX ORDER — CHLORHEXIDINE GLUCONATE ORAL RINSE 1.2 MG/ML
15 SOLUTION DENTAL ONCE
OUTPATIENT
Start: 2024-07-03 | End: 2024-07-03

## 2024-07-03 NOTE — PROGRESS NOTES
[facility] [address] [id number] [phone]       Christina MILIAN Lara  [id number]  1970    ORTHOPAEDIC SURGERY OUTPATIENT NOTE  7/3/2024      HISTORY:  54 y.o. female  ***    Past Medical History:   Diagnosis Date    Acute right ankle pain 2023    ADHD (attention deficit hyperactivity disorder)     Allergic     Anxiety     Bulging lumbar disc     Carpal tunnel syndrome     RIGHT.  LAST ASSESSED: 16    Chronic back pain     low    Chronic pain disorder     Colon polyps     COVID-19     2021    Depression     Diabetes mellitus (HCC)     GERD (gastroesophageal reflux disease)     Gestational diabetes     Hearing loss     left ear    Heart disease     SVT    History of pre-eclampsia     Hyperlipidemia     Resolved with weight loss    Hyponatremia 2020    IBS (irritable bowel syndrome)     Ileus ([facility])     LAST ASSESSED: 8/3/17    Labial cyst     LAST ASSESSED: 16    Memory loss     Myofascial pain     LAST ASSESSED: 16    Neck mass 2023    Obesity     Ovarian cyst     LEFT. LAST ASSESSED: 16    Panic attack     Pneumonia     Sacroiliitis ([facility])     Seasonal allergies     Sprain of anterior talofibular ligament of right ankle 2023    SVT (supraventricular tachycardia)     Thoracic outlet syndrome     2010    Trochanteric bursitis of both hips     LAST ASSESSED: 3/18/16    Ulnar neuropathy at elbow     Varicella     Wears glasses        Past Surgical History:   Procedure Laterality Date    BARIATRIC SURGERY  2022    BILE DUCT EXPLORATION      ENDOSCOPIC REMOVAL OF STONES FROM BILIARY TRACT    CARDIAC ELECTROPHYSIOLOGY PROCEDURE N/A 2024    Procedure: Cardiac eps/svt ablation;  Surgeon: Daquan Heath MD;  [facility];  Service: Cardiology     SECTION      x3    CHOLECYSTECTOMY      COLONOSCOPY      2017-polyp, -wnl, repeat5 years    DILATION AND  CURETTAGE OF UTERUS      ENDOMETRIAL ABLATION      ERCP W/ SPHICTEROTOMY      FIRST RIB REMOVAL      THORAX EXCISION OF FIRST RIB    GASTRIC BYPASS  8/08/2022    HYSTEROSCOPY      NEUROPLASTY / TRANSPOSITION ULNAR NERVE AT ELBOW Right 2011    IN COLONOSCOPY FLX DX W/COLLJ SPEC WHEN PFRMD N/A 05/30/2017    Procedure: COLONOSCOPY;  Surgeon: Oscar Velázquez MD;  [facility];  Service: Gastroenterology    IN ESOPHAGOGASTRODUODENOSCOPY TRANSORAL DIAGNOSTIC N/A 09/14/2017    Procedure: ESOPHAGOGASTRODUODENOSCOPY (EGD);  Surgeon: Sadiq Car MD;  [facility];  Service: Gastroenterology    IN HYSTEROSCOPY BX ENDOMETRIUM&/POLYPC W/WO D&C N/A 10/20/2017    Procedure: DILATATION AND CURETTAGE (D&C) WITH HYSTEROSCOPY  REMOVAL VULVAR RT. LESION;  Surgeon: Lucila Ortiz MD;  [facility];  Service: Gynecology    IN ALDANA IMPLTJ NSTIM ELTRDS PLATE/PADDLE EDRL Left 01/29/2020    Procedure: Insertion of thoracic spinal cord stimulator electrode via laminotomy and placement of left buttock implantable pulse generator (NEUROMONITORING);  Surgeon: Ad Mehta MD;  [facility];  Service: Neurosurgery    IN LAPS GSTRC RSTRICTIV PX LONGITUDINAL GASTRECTOMY N/A 02/06/2018    Procedure: GASTRECTOMY SLEEVE LAPAROSCOPIC; INTRAOPERATIVE EGD ;  Surgeon: Abimael Juarez MD;  [facility];  Service: Bariatrics    IN LAPS GSTRC RSTRICTIV PX LONGITUDINAL GASTRECTOMY N/A 08/08/2022    Procedure: Diagnostic Lap; Extensive Lysis of Adhesions; LAPAROSCOPIC REVISION CONVERSION TO NOE-EN-Y GASTRIC BYPASS AND INTRAOPERATIVE EGD;  Surgeon: Abimael Juarez MD;  [facility];  Service: Bariatrics    SLEEVE GASTROPLASTY      SPINAL CORD STIMULATOR TRIAL W/ LAMINOTOMY      TONSILLECTOMY AND ADENOIDECTOMY      TUBAL LIGATION      UPPER GASTROINTESTINAL ENDOSCOPY      US GUIDED LYMPH NODE BIOPSY LEFT  05/22/2023    VEIN LIGATION AND STRIPPING Right     VULVA SURGERY  10/20/2017    BIOPSY    WISDOM TOOTH EXTRACTION          Social History     Socioeconomic History    Marital status: /Civil Union     Spouse name: Abel    Number of children: 3    Years of education: GED then 2 year college program    Highest education level: Not on file   Occupational History    Occupation: ER TECH     Employer: [employer] EMPLOYEES   Tobacco Use    Smoking status: Former     Current packs/day: 0.00     Average packs/day: 1 pack/day for 15.0 years (15.0 ttl pk-yrs)     Types: Cigarettes     Start date: 1998     Quit date: 2013     Years since quittin.1     Passive exposure: Never    Smokeless tobacco: Never   Vaping Use    Vaping status: Never Used   Substance and Sexual Activity    Alcohol use: Yes     Alcohol/week: 4.0 standard drinks of alcohol     Types: 2 Shots of liquor, 2 Standard drinks or equivalent per week     Comment: On weekends that i go out    Drug use: No    Sexual activity: Yes     Partners: Male     Birth control/protection: OCP, Post-menopausal     Comment: lifetime partners: 6; current partner 2013   Other Topics Concern    Not on file   Social History Narrative    Denominational: no preference    Accepts blood products        Exercise: unable with back issues and SVT    Calcium: calcium supplement, multivitamin for women over 50, 1 yogurt daily     Social Determinants of Health     Financial Resource Strain: Medium Risk (2020)    Overall Financial Resource Strain (CARDIA)     Difficulty of Paying Living Expenses: Somewhat hard   Food Insecurity: No Food Insecurity (2020)    Hunger Vital Sign     Worried About Running Out of Food in the Last Year: Never true     Ran Out of Food in the Last Year: Never true   Transportation Needs: No Transportation Needs (2020)    PRAPARE - Transportation     Lack of Transportation (Medical): No     Lack of Transportation (Non-Medical): No   Physical Activity: Unknown (2019)    Exercise Vital Sign     Days of Exercise per Week: 0 days     Minutes  of Exercise per Session: Not on file   Stress: Stress Concern Present (5/9/2019)    [employer] - Occupational Stress Questionnaire     Feeling of Stress : Very much   Social Connections: Socially Isolated (5/9/2019)    Social Connection and Isolation Panel [NHANES]     Frequency of Communication with Friends and Family: Once a week     Frequency of Social Gatherings with Friends and Family: Once a week     Attends Jain Services: Never     Active Member of Clubs or Organizations: No     Attends Club or Organization Meetings: Never     Marital Status:    Intimate Partner Violence: Not At Risk (5/9/2019)    Humiliation, Afraid, Rape, and Kick questionnaire     Fear of Current or Ex-Partner: No     Emotionally Abused: No     Physically Abused: No     Sexually Abused: No   Housing Stability: Not on file       Family History   Problem Relation Age of Onset    Diabetes Mother     Breast cancer Mother         >50    BRCA1 Negative Mother     BRCA2 Negative Mother     Hyperlipidemia Mother     Cancer Mother         Breast    Diabetes Father     Other Father         traumatic brain injury    Prostate cancer Father     Alcohol abuse Father         in remission    Heart disease Father     Neuropathy Father     Hyperlipidemia Father     Cancer Father         Prostate    Hypertension Brother     Diabetes Brother     Other Brother         HYPERCHOLESTEROLEMIA    Alcohol abuse Brother     Depression Brother         attempted suicide    Anxiety disorder Brother     Suicide Attempts Brother     Diabetes Brother     Alcohol abuse Brother     Heart attack Maternal Grandmother     Colon cancer Maternal Grandfather     No Known Problems Paternal Grandmother         dad is adopted    No Known Problems Paternal Grandfather         dad is adopted    No Known Problems Daughter     Asthma Son     Ovarian cancer Neg Hx     Uterine cancer Neg Hx         Patient's Medications   New Prescriptions    No  medications on file   Previous Medications    APIXABAN (ELIQUIS) 5 MG    Take 1 tablet (5 mg total) by mouth 2 (two) times a day    ATOMOXETINE (STRATTERA) 60 MG CAPSULE    Take 1 capsule (60 mg total) by mouth daily    ATORVASTATIN (LIPITOR) 20 MG TABLET    Take 1 tablet (20 mg total) by mouth daily    BLOOD GLUCOSE MONITORING SUPPL (FREESTYLE FREEDOM LITE) W/DEVICE KIT    Please dispense 1 kit    CALCIUM CARBONATE (OS-MELODY) 600 MG TABLET    Take 600 mg by mouth 2 (two) times a day with meals    CYCLOBENZAPRINE (FLEXERIL) 10 MG TABLET    Take 1 tablet (10 mg total) by mouth 2 (two) times a day as needed for muscle spasms for up to 7 days    DICLOFENAC SODIUM (VOLTAREN) 1 %    Apply 2 g topically 4 (four) times a day    DROSPIRENONE-ETHINYL ESTRADIOL (LIEN) 3-0.02 MG PER TABLET    Take 1 tablet by mouth daily    ESTRADIOL (ESTRACE VAGINAL) 0.1 MG/G VAGINAL CREAM    One gram vaginally at night x 14 days then twice weekly for ongoing maintenance.    FLUTICASONE (FLONASE) 50 MCG/ACT NASAL SPRAY    1 spray into each nostril daily    GLUCOSE BLOOD (FREESTYLE LITE) TEST STRIP    Test once daily    LAMOTRIGINE (LAMICTAL) 150 MG TABLET    Take 1 tablet (150 mg total) by mouth 2 (two) times a day    LANCETS (FREESTYLE) LANCETS    Use as instructed, once daily    LIDOCAINE (LIDODERM) 5 %    Apply 1 patch topically over 12 hours daily Remove & Discard patch within 12 hours or as directed by MD    MECLIZINE (ANTIVERT) 25 MG TABLET    Take 1 tablet (25 mg total) by mouth every 8 (eight) hours as needed for dizziness    METFORMIN (GLUCOPHAGE-XR) 500 MG 24 HR TABLET    Take 1 tablet (500 mg total) by mouth 2 (two) times a day with meals    METHOCARBAMOL (ROBAXIN) 750 MG TABLET    Take 1 tablet (750 mg total) by mouth every 8 (eight) hours    MIRTAZAPINE (REMERON) 15 MG TABLET    Take 1 tablet (15 mg total) by mouth daily at bedtime    MONTELUKAST (SINGULAIR) 10 MG TABLET    Take 1 tablet (10 mg total) by mouth daily at bedtime     "MULTIPLE VITAMINS-MINERALS (MULTI COMPLETE PO)    Take by mouth    NITROFURANTOIN (MACROBID) 100 MG CAPSULE    Take 1 tablet PO PRN after sexual intercourse    OMEPRAZOLE (PRILOSEC) 20 MG DELAYED RELEASE CAPSULE    Take 1 capsule (20 mg total) by mouth daily    VENLAFAXINE (EFFEXOR) 100 MG TABLET    Take 1 tablet (100 mg total) by mouth 3 (three) times a day   Modified Medications    No medications on file   Discontinued Medications    No medications on file       Allergies   Allergen Reactions    Ibuprofen Other (See Comments)     Due to gastric sleeve -can only take for 5 days, then needs to stop        /76 (BP Location: Right arm, Patient Position: Sitting, Cuff Size: Standard)   Pulse 66   Ht 5' 7\" (1.702 m)   Wt 72.6 kg (160 lb)   LMP 01/07/2019 (Approximate)   BMI 25.06 kg/m²      REVIEW OF SYSTEMS:  Constitutional: Negative.    HEENT: Negative.    Respiratory: Negative.    Skin: Negative.    Neurological: Negative.    Psychiatric/Behavioral: Negative.  Musculoskeletal: Negative except for that mentioned in the HPI.    @[facility]@     PHYSICAL EXAM:  ***    IMAGING:  ***    ASSESSMENT AND PLAN:  54 y.o. female  ***  "

## 2024-07-03 NOTE — PROGRESS NOTES
[facility]TH[facility] [address] [address] [phone]       Christina Lara  [id number]  1970    ORTHOPAEDIC SURGERY OUTPATIENT NOTE  7/3/2024      HISTORY:  54 y.o. female presents today follow up for her left elbow pain . She is here today to discuss US left elbow.  She continues to have  swelling medial left elbow for the last few months but in the last month, the swelling and pain has been getting worse. She notes numbness and tingling down to the ring an small fingers. She feels weakness in her fingers. She notes numbness and burning pain in the left elbow as well. She also notes numbness in the first three fingers. She is RHD.     Past Medical History:   Diagnosis Date    Acute right ankle pain 02/07/2023    ADHD (attention deficit hyperactivity disorder)     Allergic     Anxiety     Bulging lumbar disc     Carpal tunnel syndrome     RIGHT.  LAST ASSESSED: 12/7/16    Chronic back pain     low    Chronic pain disorder     Colon polyps     COVID-19     12/2021    Depression     Diabetes mellitus (HCC)     GERD (gastroesophageal reflux disease)     Gestational diabetes     Hearing loss     left ear    Heart disease     SVT    History of pre-eclampsia     Hyperlipidemia     Resolved with weight loss    Hyponatremia 12/12/2020    IBS (irritable bowel syndrome)     Ileus (HCC)     LAST ASSESSED: 8/3/17    Labial cyst     LAST ASSESSED: 4/21/16    Memory loss     Myofascial pain     LAST ASSESSED: 4/12/16    Neck mass 05/11/2023    Obesity     Ovarian cyst     LEFT. LAST ASSESSED: 9/2/16    Panic attack     Pneumonia     Sacroiliitis (HCC)     Seasonal allergies     Sprain of anterior talofibular ligament of right ankle 02/07/2023    SVT (supraventricular tachycardia)     Thoracic outlet syndrome     2010    Trochanteric bursitis of both hips     LAST ASSESSED: 3/18/16    Ulnar neuropathy at elbow     Varicella     Wears glasses         Past Surgical History:   Procedure Laterality Date    BARIATRIC SURGERY  2022    BILE DUCT EXPLORATION      ENDOSCOPIC REMOVAL OF STONES FROM BILIARY TRACT    CARDIAC ELECTROPHYSIOLOGY PROCEDURE N/A 2024    Procedure: Cardiac eps/svt ablation;  Surgeon: Daquan Heath MD;  Location: [facility];  Service: Cardiology     SECTION      x3    CHOLECYSTECTOMY      COLONOSCOPY      2017-polyp, -wnl, repeat5 years    DILATION AND CURETTAGE OF UTERUS      ENDOMETRIAL ABLATION      ERCP W/ SPHICTEROTOMY      FIRST RIB REMOVAL      THORAX EXCISION OF FIRST RIB    GASTRIC BYPASS  2022    HYSTEROSCOPY      NEUROPLASTY / TRANSPOSITION ULNAR NERVE AT ELBOW Right     NE COLONOSCOPY FLX DX W/COLLJ SPEC WHEN PFRMD N/A 2017    Procedure: COLONOSCOPY;  Surgeon: Oscar Velázquez MD;  Location: [facility];  Service: Gastroenterology    NE ESOPHAGOGASTRODUODENOSCOPY TRANSORAL DIAGNOSTIC N/A 2017    Procedure: ESOPHAGOGASTRODUODENOSCOPY (EGD);  Surgeon: Sadiq Car MD;  Location: [facility];  Service: Gastroenterology    NE HYSTEROSCOPY BX ENDOMETRIUM&/POLYPC W/WO D&C N/A 10/20/2017    Procedure: DILATATION AND CURETTAGE (D&C) WITH HYSTEROSCOPY  REMOVAL VULVAR RT. LESION;  Surgeon: Lucila Ortiz MD;  Location: [facility];  Service: Gynecology    NE ALDANA IMPLTJ NSTIM ELTRDS PLATE/PADDLE EDRL Left 2020    Procedure: Insertion of thoracic spinal cord stimulator electrode via laminotomy and placement of left buttock implantable pulse generator (NEUROMONITORING);  Surgeon: Ad Mehta MD;  Location: [facility];  Service: Neurosurgery    NE LAPS GSTRC RSTRICTIV PX LONGITUDINAL GASTRECTOMY N/A 2018    Procedure: GASTRECTOMY SLEEVE LAPAROSCOPIC; INTRAOPERATIVE EGD ;  Surgeon: Abimael Juarez MD;  [facility];  Service: Bariatrics    NE LAPS GSTRC RSTRICTIV PX LONGITUDINAL GASTRECTOMY N/A 2022    Procedure: Diagnostic Lap; Extensive Lysis of Adhesions; LAPAROSCOPIC REVISION  CONVERSION TO NOE-EN-Y GASTRIC BYPASS AND INTRAOPERATIVE EGD;  Surgeon: Abimael Juarez MD;  [facility];  Service: Bariatrics    SLEEVE GASTROPLASTY      SPINAL CORD STIMULATOR TRIAL W/ LAMINOTOMY      TONSILLECTOMY AND ADENOIDECTOMY      TUBAL LIGATION      UPPER GASTROINTESTINAL ENDOSCOPY      US GUIDED LYMPH NODE BIOPSY LEFT  2023    VEIN LIGATION AND STRIPPING Right     VULVA SURGERY  10/20/2017    BIOPSY    WISDOM TOOTH EXTRACTION         Social History     Socioeconomic History    Marital status: /Civil Union     Spouse name: Abel    Number of children: 3    Years of education: GED then 2 year college program    Highest education level: Not on file   Occupational History    Occupation: ER TECH     Employer: [employer]   Tobacco Use    Smoking status: Former     Current packs/day: 0.00     Average packs/day: 1 pack/day for 15.0 years (15.0 ttl pk-yrs)     Types: Cigarettes     Start date: 1998     Quit date: 2013     Years since quittin.1     Passive exposure: Never    Smokeless tobacco: Never   Vaping Use    Vaping status: Never Used   Substance and Sexual Activity    Alcohol use: Yes     Alcohol/week: 4.0 standard drinks of alcohol     Types: 2 Shots of liquor, 2 Standard drinks or equivalent per week     Comment: On weekends that i go out    Drug use: No    Sexual activity: Yes     Partners: Male     Birth control/protection: OCP, Post-menopausal     Comment: lifetime partners: 6; current partner    Other Topics Concern    Not on file   Social History Narrative    Amish: no preference    Accepts blood products        Exercise: unable with back issues and SVT    Calcium: calcium supplement, multivitamin for women over 50, 1 yogurt daily     Social Determinants of Health     Financial Resource Strain: Medium Risk (2020)    Overall Financial Resource Strain (CARDIA)     Difficulty of Paying Living Expenses: Somewhat hard   Food  Insecurity: No Food Insecurity (12/31/2020)    Hunger Vital Sign     Worried About Running Out of Food in the Last Year: Never true     Ran Out of Food in the Last Year: Never true   Transportation Needs: No Transportation Needs (12/31/2020)    PRAPARE - Transportation     Lack of Transportation (Medical): No     Lack of Transportation (Non-Medical): No   Physical Activity: Unknown (5/9/2019)    Exercise Vital Sign     Days of Exercise per Week: 0 days     Minutes of Exercise per Session: Not on file   Stress: Stress Concern Present (5/9/2019)    [place] [employer] - Occupational Stress Questionnaire     Feeling of Stress : Very much   Social Connections: Socially Isolated (5/9/2019)    Social Connection and Isolation Panel [NHANES]     Frequency of Communication with Friends and Family: Once a week     Frequency of Social Gatherings with Friends and Family: Once a week     Attends Synagogue Services: Never     Active Member of Clubs or Organizations: No     Attends Club or Organization Meetings: Never     Marital Status:    Intimate Partner Violence: Not At Risk (5/9/2019)    Humiliation, Afraid, Rape, and Kick questionnaire     Fear of Current or Ex-Partner: No     Emotionally Abused: No     Physically Abused: No     Sexually Abused: No   Housing Stability: Not on file       Family History   Problem Relation Age of Onset    Diabetes Mother     Breast cancer Mother         >50    BRCA1 Negative Mother     BRCA2 Negative Mother     Hyperlipidemia Mother     Cancer Mother         Breast    Diabetes Father     Other Father         traumatic brain injury    Prostate cancer Father     Alcohol abuse Father         in remission    Heart disease Father     Neuropathy Father     Hyperlipidemia Father     Cancer Father         Prostate    Hypertension Brother     Diabetes Brother     Other Brother         HYPERCHOLESTEROLEMIA    Alcohol abuse Brother     Depression Brother         attempted  suicide    Anxiety disorder Brother     Suicide Attempts Brother     Diabetes Brother     Alcohol abuse Brother     Heart attack Maternal Grandmother     Colon cancer Maternal Grandfather     No Known Problems Paternal Grandmother         dad is adopted    No Known Problems Paternal Grandfather         dad is adopted    No Known Problems Daughter     Asthma Son     Ovarian cancer Neg Hx     Uterine cancer Neg Hx         Patient's Medications   New Prescriptions    No medications on file   Previous Medications    APIXABAN (ELIQUIS) 5 MG    Take 1 tablet (5 mg total) by mouth 2 (two) times a day    ATOMOXETINE (STRATTERA) 60 MG CAPSULE    Take 1 capsule (60 mg total) by mouth daily    ATORVASTATIN (LIPITOR) 20 MG TABLET    Take 1 tablet (20 mg total) by mouth daily    BLOOD GLUCOSE MONITORING SUPPL (FREESTYLE FREEDOM LITE) W/DEVICE KIT    Please dispense 1 kit    CALCIUM CARBONATE (OS-MELODY) 600 MG TABLET    Take 600 mg by mouth 2 (two) times a day with meals    CYCLOBENZAPRINE (FLEXERIL) 10 MG TABLET    Take 1 tablet (10 mg total) by mouth 2 (two) times a day as needed for muscle spasms for up to 7 days    DICLOFENAC SODIUM (VOLTAREN) 1 %    Apply 2 g topically 4 (four) times a day    DROSPIRENONE-ETHINYL ESTRADIOL (LIEN) 3-0.02 MG PER TABLET    Take 1 tablet by mouth daily    ESTRADIOL (ESTRACE VAGINAL) 0.1 MG/G VAGINAL CREAM    One gram vaginally at night x 14 days then twice weekly for ongoing maintenance.    FLUTICASONE (FLONASE) 50 MCG/ACT NASAL SPRAY    1 spray into each nostril daily    GLUCOSE BLOOD (FREESTYLE LITE) TEST STRIP    Test once daily    LAMOTRIGINE (LAMICTAL) 150 MG TABLET    Take 1 tablet (150 mg total) by mouth 2 (two) times a day    LANCETS (FREESTYLE) LANCETS    Use as instructed, once daily    LIDOCAINE (LIDODERM) 5 %    Apply 1 patch topically over 12 hours daily Remove & Discard patch within 12 hours or as directed by MD HUGGINSNE (ANTIVERT) 25 MG TABLET    Take 1 tablet (25 mg total) by  "mouth every 8 (eight) hours as needed for dizziness    METFORMIN (GLUCOPHAGE-XR) 500 MG 24 HR TABLET    Take 1 tablet (500 mg total) by mouth 2 (two) times a day with meals    METHOCARBAMOL (ROBAXIN) 750 MG TABLET    Take 1 tablet (750 mg total) by mouth every 8 (eight) hours    MIRTAZAPINE (REMERON) 15 MG TABLET    Take 1 tablet (15 mg total) by mouth daily at bedtime    MONTELUKAST (SINGULAIR) 10 MG TABLET    Take 1 tablet (10 mg total) by mouth daily at bedtime    MULTIPLE VITAMINS-MINERALS (MULTI COMPLETE PO)    Take by mouth    NITROFURANTOIN (MACROBID) 100 MG CAPSULE    Take 1 tablet PO PRN after sexual intercourse    OMEPRAZOLE (PRILOSEC) 20 MG DELAYED RELEASE CAPSULE    Take 1 capsule (20 mg total) by mouth daily    VENLAFAXINE (EFFEXOR) 100 MG TABLET    Take 1 tablet (100 mg total) by mouth 3 (three) times a day   Modified Medications    No medications on file   Discontinued Medications    No medications on file       Allergies   Allergen Reactions    Ibuprofen Other (See Comments)     Due to gastric sleeve -can only take for 5 days, then needs to stop        /76 (BP Location: Right arm, Patient Position: Sitting, Cuff Size: Standard)   Pulse 66   Ht 5' 7\" (1.702 m)   Wt 72.6 kg (160 lb)   LMP 01/07/2019 (Approximate)   BMI 25.06 kg/m²      REVIEW OF SYSTEMS:  Constitutional: Negative.    HEENT: Negative.    Respiratory: Negative.    Skin: Negative.    Neurological: Negative.    Psychiatric/Behavioral: Negative.  Musculoskeletal: Negative except for that mentioned in the HPI.    PHYSICAL EXAM:    LEFT ELBOW:    Appearance: skin intact     Flexion: 140 degrees  Extension: 0 degrees  Pronation: 80 degrees  Supination: 80 degrees    TTP Lateral Epicondyle: negative  TTP Medial Epicondyle: negative  TTP Olecranon: negative  TTP Radial Head: negative  TTP Biceps Tendon: negative    Strength:  Flexion: 5/5  Extension: 5/5  Pronation: 5/5  Supination: 5/5    Pain with resisted wrist extension: " negative  Pain with resisted 3rd finger extension: negative  Pain with resisted wrist flexion: +    Varus laxity: negative  Valgus laxity: negative  Milking maneuver: negative  Moving valgus stress test: negative    Cubital tunnel Tinel's: +  Sublux ulnar nerve     + Durkans Sign     Radial/median/ulnar nerve intact    <2 sec cap refill     Decrease grib strength compared to contralateral side     IMAGING:     ASSESSMENT AND PLAN:  54 y.o. female  Left cubital tunnel syndrome     US left elbow was reveiwed in the office today which showed dislocating ulnar nerve.  Discussed with patient surgery for left cubital tunnel release. Patient agrees and would like to proceed forward scheduling for surgery.       The patient understands the risks and benefits of the procedure with risks including pain, stiffness, infection, neurovascular injury, recurrence of symptoms, failure of surgical procedure, inadvertent intraoperative complications, blood loss, blood clots, allergic reaction to anesthesia, stroke, heart attack, all up to and including to death. The patient understood and did consent for surgery today.        Scribe Attestation      I,:  Oren Vallecillo am acting as a scribe while in the presence of the attending physician.:       I,:  Maggy Leary DO personally performed the services described in this documentation    as scribed in my presence.:

## 2024-07-05 ENCOUNTER — NURSE TRIAGE (OUTPATIENT)
Age: 54
End: 2024-07-05

## 2024-07-05 NOTE — TELEPHONE ENCOUNTER
"Patient of . Had recent labs done on 7/3/24. Saw results on MyChart. Concerned as her CBC-Iron panel levels are abnormal. States she has been feeling very tired for some time. Thought this was related to new job and hours. Also having some short of breath with activity. Please advise.    Answer Assessment - Initial Assessment Questions  1. DESCRIPTION: \"Describe how you are feeling.\"      TIRED, SHORT OF BREATH  2. SEVERITY: \"How bad is it?\"  \"Can you stand and walk?\"    - MILD - Feels weak or tired, but does not interfere with work, school or normal activities    - MODERATE - Able to stand and walk; weakness interferes with work, school, or normal activities    - SEVERE - Unable to stand or walk      MILD  3. ONSET:  \"When did the weakness begin?\"      \"A WHILE AGO\"  4. CAUSE: \"What do you think is causing the weakness?\"      LOW IRON COUNT  5. MEDICINES: \"Have you recently started a new medicine or had a change in the amount of a medicine?\"      NO  6. OTHER SYMPTOMS: \"Do you have any other symptoms?\" (e.g., chest pain, fever, cough, SOB, vomiting, diarrhea, bleeding, other areas of pain)     SHORT OF BREATH WITH EXERTION    Protocols used: Weakness (Generalized) and Fatigue-ADULT-[place]    "

## 2024-07-08 ENCOUNTER — TELEPHONE (OUTPATIENT)
Dept: BARIATRICS | Facility: CLINIC | Age: 54
End: 2024-07-08

## 2024-07-08 ENCOUNTER — ANESTHESIA EVENT (OUTPATIENT)
Dept: PERIOP | Facility: HOSPITAL | Age: 54
End: 2024-07-08
Payer: COMMERCIAL

## 2024-07-08 NOTE — TELEPHONE ENCOUNTER
Pt called again because is worried about lab result. Main practice was reached and they said some of the lab order are still active and we pt has to wait until all are completed. Then, the Dr/PA has to read it and advise. As soon as all the steps are completed the office will call the pt.

## 2024-07-10 DIAGNOSIS — K95.89 IRON DEFICIENCY ANEMIA FOLLOWING BARIATRIC SURGERY: ICD-10-CM

## 2024-07-10 DIAGNOSIS — Z98.84 BARIATRIC SURGERY STATUS: Primary | ICD-10-CM

## 2024-07-10 DIAGNOSIS — D50.8 IRON DEFICIENCY ANEMIA FOLLOWING BARIATRIC SURGERY: ICD-10-CM

## 2024-07-10 RX ORDER — SODIUM CHLORIDE 9 MG/ML
20 INJECTION, SOLUTION INTRAVENOUS ONCE
OUTPATIENT
Start: 2024-07-24

## 2024-07-10 NOTE — PROGRESS NOTES
Discussed with patient her severely low levels of iron as well as her symptoms of SOB and fatigue which are likely a result of her iron deficiency.  Patient will likely not do well with oral iron as she is a bariatric patient and has decreased intestinal absorption.    Discussed that she will need iron infusions to help restore her iron levels.    Asad Bynum MD

## 2024-07-11 NOTE — PRE-PROCEDURE INSTRUCTIONS
Pre-Surgery Instructions:   Medication Instructions    atoMOXetine (STRATTERA) 60 mg capsule Hold day of surgery.    atorvastatin (LIPITOR) 20 mg tablet Take day of surgery.    calcium carbonate (OS-MELODY) 600 MG tablet Hold day of surgery. Prescribed due to bariatric surgery    cyclobenzaprine (FLEXERIL) 10 mg tablet Uses PRN- OK to take day of surgery    Diclofenac Sodium (VOLTAREN) 1 % Hold day of surgery.    drospirenone-ethinyl estradiol (LIEN) 3-0.02 MG per tablet Take night before surgery    estradiol (ESTRACE VAGINAL) 0.1 mg/g vaginal cream Hold day of surgery.    fluticasone (FLONASE) 50 mcg/act nasal spray Take day of surgery.    lamoTRIgine (LaMICtal) 150 MG tablet Take day of surgery.    lidocaine (Lidoderm) 5 % Hold day of surgery.    metFORMIN (GLUCOPHAGE-XR) 500 mg 24 hr tablet Hold day of surgery.    methocarbamol (ROBAXIN) 750 mg tablet Uses PRN- OK to take day of surgery    mirtazapine (REMERON) 15 mg tablet Uses PRN- OK to take night before surgery    montelukast (SINGULAIR) 10 mg tablet Take day of surgery.    Multiple Vitamins-Minerals (MULTI COMPLETE PO) Hold day of surgery. Prescribed due to bariatric surgery    omeprazole (PriLOSEC) 20 mg delayed release capsule Take day of surgery.    venlafaxine (EFFEXOR) 100 MG tablet Take day of surgery.   Medication instructions for day surgery reviewed. Please use only a sip of water to take your instructed medications. Avoid all over the counter vitamins, supplements and NSAIDS for one week prior to surgery per anesthesia guidelines. Tylenol is ok to take as needed.     You will receive a call one business day prior to surgery with an arrival time and hospital directions. If your surgery is scheduled on a Monday, the hospital will be calling you on the Friday prior to your surgery. If you have not heard from anyone by 8pm, please call the hospital supervisor through the hospital  at [phone]. ([place] [phone] or [place]  [phone]).    Do not eat or drink anything after midnight the night before your surgery, including candy, mints, lifesavers, or chewing gum. Do not drink alcohol 24hrs before your surgery. Try not to smoke at least 24hrs before your surgery.       Follow the pre surgery showering instructions as listed in the “My Surgical Experience Booklet” or otherwise provided by your surgeon's office. Do not use a blade to shave the surgical area 1 week before surgery. It is okay to use a clean electric clippers up to 24 hours before surgery. Do not apply any lotions, creams, including makeup, cologne, deodorant, or perfumes after showering on the day of your surgery. Do not use dry shampoo, hair spray, hair gel, or any type of hair products.     No contact lenses, eye make-up, or artificial eyelashes. Remove nail polish, including gel polish, and any artificial, gel, or acrylic nails if possible. Remove all jewelry including rings and body piercing jewelry.     Wear causal clothing that is easy to take on and off. Consider your type of surgery.    Keep any valuables, jewelry, piercings at home. Please bring any specially ordered equipment (sling, braces) if indicated.    Arrange for a responsible person to drive you to and from the hospital on the day of your surgery. Please confirm the visitor policy for the day of your procedure when you receive your phone call with an arrival time.     Call the surgeon's office with any new illnesses, exposures, or additional questions prior to surgery.    Please reference your “My Surgical Experience Booklet” for additional information to prepare for your upcoming surgery.

## 2024-07-19 ENCOUNTER — HOSPITAL ENCOUNTER (OUTPATIENT)
Facility: HOSPITAL | Age: 54
Setting detail: OUTPATIENT SURGERY
Discharge: HOME | End: 2024-07-19
Attending: ORTHOPAEDIC SURGERY | Admitting: ORTHOPAEDIC SURGERY
Payer: COMMERCIAL

## 2024-07-19 ENCOUNTER — ANESTHESIA (OUTPATIENT)
Dept: PERIOP | Facility: HOSPITAL | Age: 54
End: 2024-07-19
Payer: COMMERCIAL

## 2024-07-19 VITALS
HEART RATE: 98 BPM | RESPIRATION RATE: 18 BRPM | WEIGHT: 157 LBS | TEMPERATURE: 98.1 F | SYSTOLIC BLOOD PRESSURE: 127 MMHG | DIASTOLIC BLOOD PRESSURE: 77 MMHG | HEIGHT: 67 IN | BODY MASS INDEX: 24.64 KG/M2 | OXYGEN SATURATION: 98 %

## 2024-07-19 DIAGNOSIS — G56.22 CUBITAL TUNNEL SYNDROME ON LEFT: Primary | ICD-10-CM

## 2024-07-19 LAB
EXT PREGNANCY TEST URINE: NEGATIVE
EXT. CONTROL: NORMAL
GLUCOSE SERPL-MCNC: 119 MG/DL (ref 65–140)
GLUCOSE SERPL-MCNC: 82 MG/DL (ref 65–140)

## 2024-07-19 PROCEDURE — 82948 REAGENT STRIP/BLOOD GLUCOSE: CPT

## 2024-07-19 PROCEDURE — 64718 REVISE ULNAR NERVE AT ELBOW: CPT | Performed by: ORTHOPAEDIC SURGERY

## 2024-07-19 PROCEDURE — 81025 URINE PREGNANCY TEST: CPT | Performed by: ORTHOPAEDIC SURGERY

## 2024-07-19 PROCEDURE — 64718 REVISE ULNAR NERVE AT ELBOW: CPT | Performed by: PHYSICIAN ASSISTANT

## 2024-07-19 RX ORDER — CHLORHEXIDINE GLUCONATE 40 MG/ML
SOLUTION TOPICAL DAILY PRN
Status: DISCONTINUED | OUTPATIENT
Start: 2024-07-19 | End: 2024-07-19 | Stop reason: HOSPADM

## 2024-07-19 RX ORDER — ONDANSETRON 2 MG/ML
INJECTION INTRAMUSCULAR; INTRAVENOUS AS NEEDED
Status: DISCONTINUED | OUTPATIENT
Start: 2024-07-19 | End: 2024-07-19

## 2024-07-19 RX ORDER — ONDANSETRON 2 MG/ML
4 INJECTION INTRAMUSCULAR; INTRAVENOUS ONCE AS NEEDED
Status: DISCONTINUED | OUTPATIENT
Start: 2024-07-19 | End: 2024-07-19 | Stop reason: HOSPADM

## 2024-07-19 RX ORDER — OXYCODONE HYDROCHLORIDE 5 MG/1
5 TABLET ORAL EVERY 4 HOURS PRN
Qty: 30 TABLET | Refills: 0 | Status: SHIPPED | OUTPATIENT
Start: 2024-07-19 | End: 2024-07-25

## 2024-07-19 RX ORDER — HYDROMORPHONE HCL IN WATER/PF 6 MG/30 ML
0.2 PATIENT CONTROLLED ANALGESIA SYRINGE INTRAVENOUS
Status: DISCONTINUED | OUTPATIENT
Start: 2024-07-19 | End: 2024-07-19 | Stop reason: HOSPADM

## 2024-07-19 RX ORDER — ROPIVACAINE HYDROCHLORIDE 5 MG/ML
INJECTION, SOLUTION EPIDURAL; INFILTRATION; PERINEURAL AS NEEDED
Status: DISCONTINUED | OUTPATIENT
Start: 2024-07-19 | End: 2024-07-19

## 2024-07-19 RX ORDER — PROPOFOL 10 MG/ML
INJECTION, EMULSION INTRAVENOUS CONTINUOUS PRN
Status: DISCONTINUED | OUTPATIENT
Start: 2024-07-19 | End: 2024-07-19

## 2024-07-19 RX ORDER — SODIUM CHLORIDE, SODIUM LACTATE, POTASSIUM CHLORIDE, CALCIUM CHLORIDE 600; 310; 30; 20 MG/100ML; MG/100ML; MG/100ML; MG/100ML
INJECTION, SOLUTION INTRAVENOUS CONTINUOUS PRN
Status: DISCONTINUED | OUTPATIENT
Start: 2024-07-19 | End: 2024-07-19

## 2024-07-19 RX ORDER — DEXAMETHASONE SODIUM PHOSPHATE 10 MG/ML
INJECTION, SOLUTION INTRAMUSCULAR; INTRAVENOUS AS NEEDED
Status: DISCONTINUED | OUTPATIENT
Start: 2024-07-19 | End: 2024-07-19

## 2024-07-19 RX ORDER — PROPOFOL 10 MG/ML
INJECTION, EMULSION INTRAVENOUS AS NEEDED
Status: DISCONTINUED | OUTPATIENT
Start: 2024-07-19 | End: 2024-07-19

## 2024-07-19 RX ORDER — CEFAZOLIN SODIUM 2 G/50ML
2000 SOLUTION INTRAVENOUS ONCE
Status: COMPLETED | OUTPATIENT
Start: 2024-07-19 | End: 2024-07-19

## 2024-07-19 RX ORDER — CEPHALEXIN 500 MG/1
500 CAPSULE ORAL EVERY 6 HOURS SCHEDULED
Qty: 8 CAPSULE | Refills: 0 | Status: SHIPPED | OUTPATIENT
Start: 2024-07-19 | End: 2024-07-21

## 2024-07-19 RX ORDER — FENTANYL CITRATE 50 UG/ML
INJECTION, SOLUTION INTRAMUSCULAR; INTRAVENOUS AS NEEDED
Status: DISCONTINUED | OUTPATIENT
Start: 2024-07-19 | End: 2024-07-19

## 2024-07-19 RX ORDER — SODIUM CHLORIDE, SODIUM LACTATE, POTASSIUM CHLORIDE, CALCIUM CHLORIDE 600; 310; 30; 20 MG/100ML; MG/100ML; MG/100ML; MG/100ML
100 INJECTION, SOLUTION INTRAVENOUS CONTINUOUS
Status: CANCELLED | OUTPATIENT
Start: 2024-07-19

## 2024-07-19 RX ORDER — MAGNESIUM HYDROXIDE 1200 MG/15ML
LIQUID ORAL AS NEEDED
Status: DISCONTINUED | OUTPATIENT
Start: 2024-07-19 | End: 2024-07-19 | Stop reason: HOSPADM

## 2024-07-19 RX ORDER — CHLORHEXIDINE GLUCONATE ORAL RINSE 1.2 MG/ML
15 SOLUTION DENTAL ONCE
Status: COMPLETED | OUTPATIENT
Start: 2024-07-19 | End: 2024-07-19

## 2024-07-19 RX ORDER — MIDAZOLAM HYDROCHLORIDE 2 MG/2ML
INJECTION, SOLUTION INTRAMUSCULAR; INTRAVENOUS AS NEEDED
Status: DISCONTINUED | OUTPATIENT
Start: 2024-07-19 | End: 2024-07-19

## 2024-07-19 RX ADMIN — HYDROMORPHONE HYDROCHLORIDE 0.2 MG: 0.2 INJECTION, SOLUTION INTRAMUSCULAR; INTRAVENOUS; SUBCUTANEOUS at 16:57

## 2024-07-19 RX ADMIN — HYDROMORPHONE HYDROCHLORIDE 0.2 MG: 0.2 INJECTION, SOLUTION INTRAMUSCULAR; INTRAVENOUS; SUBCUTANEOUS at 17:33

## 2024-07-19 RX ADMIN — FENTANYL CITRATE 50 MCG: 50 INJECTION, SOLUTION INTRAMUSCULAR; INTRAVENOUS at 15:52

## 2024-07-19 RX ADMIN — PROPOFOL 200 MG: 10 INJECTION, EMULSION INTRAVENOUS at 15:27

## 2024-07-19 RX ADMIN — FENTANYL CITRATE 50 MCG: 50 INJECTION, SOLUTION INTRAMUSCULAR; INTRAVENOUS at 15:07

## 2024-07-19 RX ADMIN — CHLORHEXIDINE GLUCONATE 0.12% ORAL RINSE 15 ML: 1.2 LIQUID ORAL at 10:51

## 2024-07-19 RX ADMIN — HYDROMORPHONE HYDROCHLORIDE 0.2 MG: 0.2 INJECTION, SOLUTION INTRAMUSCULAR; INTRAVENOUS; SUBCUTANEOUS at 17:07

## 2024-07-19 RX ADMIN — PROPOFOL 50 MCG/KG/MIN: 10 INJECTION, EMULSION INTRAVENOUS at 15:28

## 2024-07-19 RX ADMIN — DEXAMETHASONE SODIUM PHOSPHATE 10 MG: 10 INJECTION, SOLUTION INTRAMUSCULAR; INTRAVENOUS at 16:29

## 2024-07-19 RX ADMIN — CHLORHEXIDINE GLUCONATE 1 APPLICATION: 213 SOLUTION TOPICAL at 10:53

## 2024-07-19 RX ADMIN — SODIUM CHLORIDE, SODIUM LACTATE, POTASSIUM CHLORIDE, AND CALCIUM CHLORIDE: .6; .31; .03; .02 INJECTION, SOLUTION INTRAVENOUS at 15:10

## 2024-07-19 RX ADMIN — CEFAZOLIN SODIUM 2000 MG: 2 SOLUTION INTRAVENOUS at 15:29

## 2024-07-19 RX ADMIN — MIDAZOLAM HYDROCHLORIDE 2 MG: 1 INJECTION, SOLUTION INTRAMUSCULAR; INTRAVENOUS at 15:07

## 2024-07-19 RX ADMIN — CEFAZOLIN SODIUM 2000 MG: 2 SOLUTION INTRAVENOUS at 10:52

## 2024-07-19 RX ADMIN — ROPIVACAINE HYDROCHLORIDE 22 MG: 5 INJECTION, SOLUTION EPIDURAL; INFILTRATION; PERINEURAL at 15:11

## 2024-07-19 RX ADMIN — ONDANSETRON 4 MG: 2 INJECTION INTRAMUSCULAR; INTRAVENOUS at 16:29

## 2024-07-19 RX ADMIN — HYDROMORPHONE HYDROCHLORIDE 0.2 MG: 0.2 INJECTION, SOLUTION INTRAMUSCULAR; INTRAVENOUS; SUBCUTANEOUS at 17:18

## 2024-07-19 NOTE — OP NOTE
OPERATIVE REPORT  PATIENT NAME: Christina Lara    :  1970  MRN: [id number]  Pt Location: [facility] OR ROOM 01    SURGERY DATE: 2024    Surgeons and Role:     * Maggy Leary,  - Primary     * Rigo Kemp PA-C - Assisting    Preop Diagnosis:  Cubital tunnel syndrome on left [G56.22]    Post-Op Diagnosis Codes:     * Cubital tunnel syndrome on left [G56.22]    Procedure(s):  Left - RELEASE CUBITAL TUNNEL- left with ulnar nerve transposition    Specimen(s):  * No specimens in log *    Estimated Blood Loss:   Minimal    Drains:  * No LDAs found *    Anesthesia Type:   General w/ Regional    Operative Indications:  Cubital tunnel syndrome on left [G56.22]      Operative Findings:  Thickened Kelley ligament      Complications:   None    Procedure and Technique:  The patient was seen in the preoperative holding area where the operative extremity was marked.  She was taken to the operating room and placed in the supine position.  Her upper extremity was prepped and draped in usual sterile fashion.  A timeout was called and patient was administered IV antibiotics prior to incision.  Using a 15 blade knife incision was made over the medial epicondyle.  Subcutaneous dissection was taken down to the cubital tunnel.  The ulnar nerve was exposed proximally and decompressed by releasing the intermuscular septum.  Neurolysis and further decompression of the nerve was taken down to the Kelley ligament at the cubital tunnel where the Kelley ligament was found to be thickened.  This was released with tenotomy scissors.  Further decompression was performed distally at the 2 heads of the FCU where the fascia on top of the muscle belly was released as well as the nerve being decompressed between the 2 heads of the FCU.  Once the nerve was fully decompressed proximally to distally the elbow was placed through range of motion.  The ulnar nerve was unstable in the cubital tunnel.  A subcutaneous transposition was then performed  by exposing the common flexor muscle belly fascia.  The fascia of the common flexor muscle was released with a 15 blade knife and a trapdoor type fashion.  The lipo fascial tissue anterior to this was released from the subcutaneous skin to be used as to the second part of the ulnar nerve sling for transposition.  The ulnar nerve was then transposed anteriorly to the medial epicondyle.  The common flexor muscle belly fascia and the lipo fascial tissue was then closed on top of the nerve with 2-0 Vicryl to complete the sling to prevent posterior subluxation of the nerve.  Further evaluation of the nerve demonstrated no other compressive sites in the sling did not cause any further compression of the nerve.  The tourniquet was taken down.  Hemostasis was undertaken with bipolar cautery.  The wound was copiously irrigated.  The skin was closed with 2-0 and 3-0 Monocryl.  Dressings included Exofin, Mepilex dressing, the patient was placed in a regular sling.  There were no complications about the case.  The patient tolerated the procedure well.   I was present for the entire procedure., A qualified resident physician was not available., and A physician assistant was required during the procedure for retraction, tissue handling, dissection and suturing.    Patient Disposition:  PACU         SIGNATURE: Ismagerry LeebritanyDO russ  DATE: July 19, 2024  TIME: 4:38 PM

## 2024-07-19 NOTE — DISCHARGE INSTR - AVS FIRST PAGE
Maggy Leary DO    Orthopedic Surgery, Shoulder/Elbow and Sports Medicine  [address], [place] [address]  Phone: [phone]    General Post-op Surgical Instructions:    Date of Procedure - 07/19/24     Procedure - Left cubital tunnel release with ulnar nerve transposition    Weight Bearing Status - nonweightbearing left arm    DVT Prophylaxis and Duration - not required    PT/OT Instructions - not needed. Will start gentle ROM after first post-op visit    Stitches/Staples - Will be removed at 7-10 days postop; we will then place steristrips on incision site    Wound Care - maintain splint/dressing. Keep clean and dry    Xray follow up - to be done at or prior to office visit follow-up if indicated    Additional Info - Please complete your course of antibiotics     Any questions or concerns please call [phone] please!    none

## 2024-07-19 NOTE — INTERVAL H&P NOTE
H&P reviewed. After examining the patient I find no changes in the patients condition since the H&P had been written.    Vitals:    07/19/24 1046   BP: 129/66   Pulse: 76   Resp: 22   Temp: 99 °F (37.2 °C)   SpO2: 100%     Plan for left elbow cubital tunnel release today

## 2024-07-19 NOTE — ANESTHESIA PREPROCEDURE EVALUATION
Procedure:  RELEASE CUBITAL TUNNEL- left with ulnar nerve transposition and all necessary procedures (Left: Elbow)    Relevant Problems   CARDIO   (+) Mixed hyperlipidemia   (+) Paroxysmal SVT (supraventricular tachycardia)      GI/HEPATIC   (+) GERD (gastroesophageal reflux disease)   (+) Hepatic steatosis      HEMATOLOGY   (+) Iron deficiency anemia following bariatric surgery      MUSCULOSKELETAL   (+) Chronic back pain      NEURO/PSYCH   (+) Chronic back pain   (+) Chronic pain syndrome   (+) Generalized anxiety disorder   (+) MDD (major depressive disorder), recurrent severe, without psychosis (HCC)   (+) Other chronic pain   (+) Post traumatic stress disorder (PTSD)      PULMONARY   (+) URI (upper respiratory infection)     Plan for Supraclavicular Block followed by general anesthesia with ETT. Risks and benefits explained to patient. Patient and surgeon in agreement with the above plan. Patient does have h/o thoracic outlet syndrome s/p rib resections.     Physical Exam    Airway    Mallampati score: II  TM Distance: >3 FB  Neck ROM: full     Dental       Cardiovascular      Pulmonary      Other Findings  post-pubertal.      Anesthesia Plan  ASA Score- 2     Anesthesia Type- general with ASA Monitors.         Additional Monitors:     Airway Plan: LMA.           Plan Factors-Exercise tolerance (METS): >4 METS.    Chart reviewed.    Patient summary reviewed.    Patient is not a current smoker.  Patient did not smoke on day of surgery.            Induction- intravenous.    Postoperative Plan- Plan for postoperative opioid use.     Perioperative Resuscitation Plan - Level 1 - Full Code.       Informed Consent- Anesthetic plan and risks discussed with patient.  I personally reviewed this patient with the CRNA. Discussed and agreed on the Anesthesia Plan with the CRNA..

## 2024-07-19 NOTE — ANESTHESIA POSTPROCEDURE EVALUATION
Post-Op Assessment Note    CV Status:  Stable    Pain management: adequate       Mental Status:  Alert   Hydration Status:  Stable   PONV Controlled:  None   Airway Patency:  Patent and adequate     Post Op Vitals Reviewed: Yes    No anethesia notable event occurred.    Staff: CRNA               BP   138/76   Temp 98.3 F   Pulse 100   Resp 16   SpO2 99%

## 2024-07-19 NOTE — ANESTHESIA PROCEDURE NOTES
Peripheral Block    Patient location during procedure: holding area  Start time: 7/19/2024 3:11 PM  Reason for block: at surgeon's request and post-op pain management  Staffing  Performed by: Shantel Cornelius MD  Authorized by: Shantel Cornelius MD    Preanesthetic Checklist  Completed: patient identified, IV checked, site marked, risks and benefits discussed, surgical consent, monitors and equipment checked, pre-op evaluation and timeout performed  Peripheral Block  Patient position: supine  Prep: ChloraPrep  Patient monitoring: frequent blood pressure checks, continuous pulse oximetry and heart rate  Block type: Supraclavicular  Laterality: left  Injection technique: single-shot  Procedures: ultrasound guided, Ultrasound guidance required for the procedure to increase accuracy and safety of medication placement and decrease risk of complications.  Ultrasound permanent image saved    Needle  Needle type: Stimuplex   Needle gauge: 22 G  Needle length: 2 in  Needle localization: anatomical landmarks and ultrasound guidance  Needle insertion depth: 2 cm  Assessment  Injection assessment: incremental injection, frequent aspiration, injected with ease, negative aspiration, negative for heart rate change, no paresthesia on injection, no symptoms of intraneural/intravenous injection and needle tip visualized at all times  Paresthesia pain: none  Post-procedure:  site cleaned  patient tolerated the procedure well with no immediate complications

## 2024-07-24 ENCOUNTER — TELEPHONE (OUTPATIENT)
Age: 54
End: 2024-07-24

## 2024-07-24 NOTE — TELEPHONE ENCOUNTER
Caller: Patient    Doctor: Gibran    Reason for call: Swelling has gone down. Splint is rubbing. Very uncomfortable. Questioned if she could remove the splint?    Call back#: [phone]

## 2024-07-25 ENCOUNTER — OFFICE VISIT (OUTPATIENT)
Dept: FAMILY MEDICINE CLINIC | Facility: CLINIC | Age: 54
End: 2024-07-25
Payer: COMMERCIAL

## 2024-07-25 ENCOUNTER — OFFICE VISIT (OUTPATIENT)
Dept: OBGYN CLINIC | Facility: CLINIC | Age: 54
End: 2024-07-25
Payer: COMMERCIAL

## 2024-07-25 VITALS
WEIGHT: 162 LBS | BODY MASS INDEX: 25.43 KG/M2 | HEART RATE: 78 BPM | SYSTOLIC BLOOD PRESSURE: 120 MMHG | RESPIRATION RATE: 18 BRPM | TEMPERATURE: 98.6 F | HEIGHT: 67 IN | DIASTOLIC BLOOD PRESSURE: 80 MMHG | OXYGEN SATURATION: 95 %

## 2024-07-25 VITALS — HEART RATE: 81 BPM | HEIGHT: 67 IN | BODY MASS INDEX: 25.43 KG/M2 | OXYGEN SATURATION: 98 % | WEIGHT: 162 LBS

## 2024-07-25 DIAGNOSIS — Z96.622 AFTERCARE FOLLOWING LEFT ELBOW JOINT REPLACEMENT SURGERY: Primary | ICD-10-CM

## 2024-07-25 DIAGNOSIS — F33.2 MDD (MAJOR DEPRESSIVE DISORDER), RECURRENT SEVERE, WITHOUT PSYCHOSIS (HCC): ICD-10-CM

## 2024-07-25 DIAGNOSIS — E11.9 CONTROLLED TYPE 2 DIABETES MELLITUS WITHOUT COMPLICATION, WITHOUT LONG-TERM CURRENT USE OF INSULIN (HCC): Primary | ICD-10-CM

## 2024-07-25 DIAGNOSIS — E66.3 OVERWEIGHT (BMI 25.0-29.9): ICD-10-CM

## 2024-07-25 DIAGNOSIS — Z23 ENCOUNTER FOR IMMUNIZATION: ICD-10-CM

## 2024-07-25 DIAGNOSIS — Z47.1 AFTERCARE FOLLOWING LEFT ELBOW JOINT REPLACEMENT SURGERY: Primary | ICD-10-CM

## 2024-07-25 PROBLEM — J06.9 URI (UPPER RESPIRATORY INFECTION): Status: RESOLVED | Noted: 2024-01-11 | Resolved: 2024-07-25

## 2024-07-25 PROCEDURE — 90750 HZV VACC RECOMBINANT IM: CPT

## 2024-07-25 PROCEDURE — 29105 APPLICATION LONG ARM SPLINT: CPT | Performed by: PHYSICIAN ASSISTANT

## 2024-07-25 PROCEDURE — 99024 POSTOP FOLLOW-UP VISIT: CPT | Performed by: PHYSICIAN ASSISTANT

## 2024-07-25 PROCEDURE — 90471 IMMUNIZATION ADMIN: CPT

## 2024-07-25 PROCEDURE — 99214 OFFICE O/P EST MOD 30 MIN: CPT | Performed by: FAMILY MEDICINE

## 2024-07-25 NOTE — PROGRESS NOTES
"Assessment:   S/P RELEASE CUBITAL TUNNEL- left with ulnar nerve transposition - Left on 7/19/2024  Splint uncomfortable    Plan:   Splint replaced  No driving in splint - discussed    Follow Up:  Already scheduled for Wednesday with Dr. Leary             CHIEF COMPLAINT:  Chief Complaint   Patient presents with    Left Arm - Pain         SUBJECTIVE:  Christina Lara is a 54 y.o. female who presents for follow up after RELEASE CUBITAL TUNNEL- left with ulnar nerve transposition - Left on 7/19/2024.  Today patient has splint discomfort. She also wants new ace wraps so it looks nicer for her party this weekend.       PHYSICAL EXAMINATION:  Vital signs: Pulse 81   Ht 5' 7\" (1.702 m)   Wt 73.5 kg (162 lb)   LMP 01/07/2019 (Approximate)   SpO2 98%   BMI 25.37 kg/m²   General: well developed and well nourished, alert, oriented times 3, and appears comfortable  Psychiatric: Normal    MUSCULOSKELETAL EXAMINATION:  Incision:  Dressing left in place  Range of Motion: Not tested   Neurovascular status: Neuro intact, good cap refill - denies ulnar nerve distribution numbness    Done today: Splint replaced      STUDIES REVIEWED:  No Studies to review      PROCEDURES PERFORMED:  Cast application    Date/Time: 7/25/2024 4:00 PM    Performed by: River Jeter PA-C  Authorized by: River Jeter PA-C  Universal Protocol:  Consent: Verbal consent obtained.  Risks and benefits: risks, benefits and alternatives were discussed  Consent given by: patient  Timeout called at: 7/25/2024 4:12 PM.  Patient understanding: patient states understanding of the procedure being performed  Radiology Images displayed and confirmed. If images not available, report reviewed: imaging studies available  Patient identity confirmed: verbally with patient    Pre-procedure details:     Sensation:  Normal  Procedure details:     Laterality:  Left    Location:  Wrist    Wrist:  L wrist    Splint type:  Long arm    Supplies:  Ortho-Glass  Post-procedure " details:     Pain:  Improved    Sensation:  Normal    Patient tolerance of procedure:  Tolerated well, no immediate complications

## 2024-07-25 NOTE — PROGRESS NOTES
Ambulatory Visit  Name: Christina Lara      : 1970      MRN: [id number]  Encounter Provider: Debo Gee MD  Encounter Date: 2024   Encounter department:  [facility]    Assessment & Plan   1. Controlled type 2 diabetes mellitus without complication, without long-term current use of insulin (HCC)  Assessment & Plan:    Lab Results   Component Value Date    HGBA1C 6.1 (H) 2024   Added ozempic today for weight loss   Orders:  -     semaglutide, 0.25 or 0.5 mg/dose, (Ozempic, 0.25 or 0.5 MG/DOSE,) 2 mg/3 mL injection pen; 0.25 mg under the skin every 7 days for 4 doses (28 days), THEN 0.5 mg under the skin every 7 days  -     Albumin / creatinine urine ratio; Future; Expected date: 2024  -     Comprehensive metabolic panel; Future; Expected date: 2024  -     Hemoglobin A1C; Future; Expected date: 2024  -     Albumin / creatinine urine ratio; Future; Expected date: 2024  -     Lipid Panel with Direct LDL reflex; Future; Expected date: 2024  2. MDD (major depressive disorder), recurrent severe, without psychosis (HCC)  Assessment & Plan:  Stable   Sees psych  3. Overweight (BMI 25.0-29.9)  Assessment & Plan:  To start tracking calories and macro   To increase protein in her diet     4. Encounter for immunization  -     Zoster Vaccine Recombinant IM         History of Present Illness     HPI  Here for follow up  Had cubital tunnel syndrome 2024 and still wearing splint   Struggling with losing weight   She states that nutritionist is too expensive for her- around $400     Review of Systems   Constitutional: Negative.    HENT: Negative.     Respiratory: Negative.     Cardiovascular: Negative.    Musculoskeletal: Negative.    Hematological: Negative.    Psychiatric/Behavioral: Negative.       Past Medical History:   Diagnosis Date    Acute right ankle pain 2023    ADHD (attention deficit hyperactivity disorder)     Allergic     Anxiety      Bulging lumbar disc     Carpal tunnel syndrome     RIGHT.  LAST ASSESSED: 16    Chronic back pain     low    Chronic pain disorder     Colon polyps     COVID-19     2021    Depression     Diabetes mellitus (HCC)     GERD (gastroesophageal reflux disease)     Gestational diabetes     Hearing loss     left ear    Heart disease     SVT    History of pre-eclampsia     Hyperlipidemia     Resolved with weight loss    Hyponatremia 2020    IBS (irritable bowel syndrome)     Ileus (HCC)     LAST ASSESSED: 8/3/17    Labial cyst     LAST ASSESSED: 16    Memory loss     Myofascial pain     LAST ASSESSED: 16    Neck mass 2023    Obesity     Ovarian cyst     LEFT. LAST ASSESSED: 16    Panic attack     Pneumonia     Sacroiliitis (HCC)     Seasonal allergies     Sprain of anterior talofibular ligament of right ankle 2023    SVT (supraventricular tachycardia)     Thoracic outlet syndrome     2010    Trochanteric bursitis of both hips     LAST ASSESSED: 3/18/16    Ulnar neuropathy at elbow     Varicella     Wears glasses      Past Surgical History:   Procedure Laterality Date    BARIATRIC SURGERY  2022    BILE DUCT EXPLORATION      ENDOSCOPIC REMOVAL OF STONES FROM BILIARY TRACT    CARDIAC ELECTROPHYSIOLOGY PROCEDURE N/A 2024    Procedure: Cardiac eps/svt ablation;  Surgeon: Daquan Heath MD;  [facility];  Service: Cardiology     SECTION      x3    CHOLECYSTECTOMY      COLONOSCOPY      -polyp, -wnl, repeat5 years    DILATION AND CURETTAGE OF UTERUS      ENDOMETRIAL ABLATION      ERCP W/ SPHICTEROTOMY      FIRST RIB REMOVAL      THORAX EXCISION OF FIRST RIB    GASTRIC BYPASS  2022    HYSTEROSCOPY      NEUROPLASTY / TRANSPOSITION ULNAR NERVE AT ELBOW Right     SD COLONOSCOPY FLX DX W/COLLJ SPEC WHEN PFRMD N/A 2017    Procedure: COLONOSCOPY;  Surgeon: Oscar Velázquez MD;  Location: [facility];  Service: Gastroenterology    SD  ESOPHAGOGASTRODUODENOSCOPY TRANSORAL DIAGNOSTIC N/A 09/14/2017    Procedure: ESOPHAGOGASTRODUODENOSCOPY (EGD);  Surgeon: Sadiq Car MD;  Location: [facility];  Service: Gastroenterology    WY HYSTEROSCOPY BX ENDOMETRIUM&/POLYPC W/WO D&C N/A 10/20/2017    Procedure: DILATATION AND CURETTAGE (D&C) WITH HYSTEROSCOPY  REMOVAL VULVAR RT. LESION;  Surgeon: Lucila Ortiz MD;  [facility];  Service: Gynecology    WY ALDANA IMPLTJ NSTIM ELTRDS PLATE/PADDLE EDRL Left 01/29/2020    Procedure: Insertion of thoracic spinal cord stimulator electrode via laminotomy and placement of left buttock implantable pulse generator (NEUROMONITORING);  Surgeon: Ad Mehta MD;  Location[facility];  Service: Neurosurgery    WY LAPS GSTRC RSTRICTIV PX LONGITUDINAL GASTRECTOMY N/A 02/06/2018    Procedure: GASTRECTOMY SLEEVE LAPAROSCOPIC; INTRAOPERATIVE EGD ;  Surgeon: Abimael Juarez MD;  [facility];  Service: Bariatrics    WY LAPS GSTRC RSTRICTIV PX LONGITUDINAL GASTRECTOMY N/A 08/08/2022    Procedure: Diagnostic Lap; Extensive Lysis of Adhesions; LAPAROSCOPIC REVISION CONVERSION TO NOE-EN-Y GASTRIC BYPASS AND INTRAOPERATIVE EGD;  Surgeon: Abimael Juarez MD;  [facility];  Service: Bariatrics    WY NEUROPLASTY &/TRANSPOSITION ULNAR NERVE ELBOW Left 7/19/2024    Procedure: RELEASE CUBITAL TUNNEL- left with ulnar nerve transposition;  Surgeon: Maggy Leary DO;  Location: [facility];  Service: Orthopedics    SLEEVE GASTROPLASTY      SPINAL CORD STIMULATOR TRIAL W/ LAMINOTOMY      TONSILLECTOMY AND ADENOIDECTOMY      TUBAL LIGATION      UPPER GASTROINTESTINAL ENDOSCOPY      US GUIDED LYMPH NODE BIOPSY LEFT  05/22/2023    VEIN LIGATION AND STRIPPING Right     VULVA SURGERY  10/20/2017    BIOPSY    WISDOM TOOTH EXTRACTION       Family History   Problem Relation Age of Onset    Diabetes Mother     Breast cancer Mother         >50    BRCA1 Negative Mother     BRCA2 Negative Mother     Hyperlipidemia Mother     Cancer  Mother         Breast    Diabetes Father     Other Father         traumatic brain injury    Prostate cancer Father     Alcohol abuse Father         in remission    Heart disease Father     Neuropathy Father     Hyperlipidemia Father     Cancer Father         Prostate    Hypertension Brother     Diabetes Brother     Other Brother         HYPERCHOLESTEROLEMIA    Alcohol abuse Brother     Depression Brother         attempted suicide    Anxiety disorder Brother     Suicide Attempts Brother     Diabetes Brother     Alcohol abuse Brother     Heart attack Maternal Grandmother     Colon cancer Maternal Grandfather     No Known Problems Paternal Grandmother         dad is adopted    No Known Problems Paternal Grandfather         dad is adopted    No Known Problems Daughter     Asthma Son     Ovarian cancer Neg Hx     Uterine cancer Neg Hx      Social History     Tobacco Use    Smoking status: Former     Current packs/day: 0.00     Average packs/day: 1 pack/day for 15.0 years (15.0 ttl pk-yrs)     Types: Cigarettes     Start date: 1998     Quit date: 2013     Years since quittin.2     Passive exposure: Never    Smokeless tobacco: Never   Vaping Use    Vaping status: Never Used   Substance and Sexual Activity    Alcohol use: Yes     Alcohol/week: 4.0 standard drinks of alcohol     Types: 2 Shots of liquor, 2 Standard drinks or equivalent per week     Comment: On weekends that i go out    Drug use: No    Sexual activity: Yes     Partners: Male     Birth control/protection: OCP, Post-menopausal     Comment: lifetime partners: 6; current partner 2013     Current Outpatient Medications on File Prior to Visit   Medication Sig    atoMOXetine (STRATTERA) 60 mg capsule Take 1 capsule (60 mg total) by mouth daily    atorvastatin (LIPITOR) 20 mg tablet Take 1 tablet (20 mg total) by mouth daily    Blood Glucose Monitoring Suppl (FreeStyle Freedom Lite) w/Device KIT Please dispense 1 kit    calcium carbonate (OS-MELODY) 600  MG tablet Take 600 mg by mouth 2 (two) times a day with meals    cyclobenzaprine (FLEXERIL) 10 mg tablet Take 1 tablet (10 mg total) by mouth 2 (two) times a day as needed for muscle spasms for up to 7 days    drospirenone-ethinyl estradiol (LIEN) 3-0.02 MG per tablet Take 1 tablet by mouth daily    fluticasone (FLONASE) 50 mcg/act nasal spray 1 spray into each nostril daily    glucose blood (FREESTYLE LITE) test strip Test once daily    lamoTRIgine (LaMICtal) 150 MG tablet Take 1 tablet (150 mg total) by mouth 2 (two) times a day    Lancets (freestyle) lancets Use as instructed, once daily    lidocaine (Lidoderm) 5 % Apply 1 patch topically over 12 hours daily Remove & Discard patch within 12 hours or as directed by MD    metFORMIN (GLUCOPHAGE-XR) 500 mg 24 hr tablet Take 1 tablet (500 mg total) by mouth 2 (two) times a day with meals    methocarbamol (ROBAXIN) 750 mg tablet Take 1 tablet (750 mg total) by mouth every 8 (eight) hours (Patient taking differently: Take 750 mg by mouth if needed)    montelukast (SINGULAIR) 10 mg tablet Take 1 tablet (10 mg total) by mouth daily at bedtime    Multiple Vitamins-Minerals (MULTI COMPLETE PO) Take by mouth    omeprazole (PriLOSEC) 20 mg delayed release capsule Take 1 capsule (20 mg total) by mouth daily    venlafaxine (EFFEXOR) 100 MG tablet Take 1 tablet (100 mg total) by mouth 3 (three) times a day    Diclofenac Sodium (VOLTAREN) 1 % Apply 2 g topically 4 (four) times a day (Patient not taking: Reported on 7/25/2024)    estradiol (ESTRACE VAGINAL) 0.1 mg/g vaginal cream One gram vaginally at night x 14 days then twice weekly for ongoing maintenance. (Patient not taking: Reported on 7/25/2024)    mirtazapine (REMERON) 15 mg tablet Take 1 tablet (15 mg total) by mouth daily at bedtime (Patient not taking: Reported on 7/25/2024)    oxyCODONE (Roxicodone) 5 immediate release tablet Take 1 tablet (5 mg total) by mouth every 4 (four) hours as needed for moderate pain for up to  "5 days Max Daily Amount: 30 mg (Patient not taking: Reported on 7/25/2024)     Allergies   Allergen Reactions    Ibuprofen Other (See Comments)     Due to gastric sleeve -can only take for 5 days, then needs to stop     Immunization History   Administered Date(s) Administered    COVID-19 PFIZER VACCINE 0.3 ML IM 03/06/2021, 03/25/2021, 12/11/2021    INFLUENZA 11/27/2015, 10/01/2016, 10/06/2017, 12/12/2018, 10/17/2019, 10/09/2020, 10/19/2021, 09/07/2022, 09/29/2023    Influenza Quadrivalent Preservative Free 3 years and older IM 10/01/2016    Influenza Quadrivalent, 6-35 Months IM 11/27/2015    Influenza, injectable, quadrivalent, preservative free 0.5 mL 09/29/2023    Influenza, recombinant, quadrivalent,injectable, preservative free 10/17/2019, 10/09/2020, 09/07/2022    Influenza, seasonal, injectable 09/01/2011, 10/20/2013, 10/06/2017    Pneumococcal Conjugate Vaccine 20-valent (Pcv20), Polysace 07/25/2022    Tdap 09/01/2011, 08/12/2022     Objective     /80 (BP Location: Left arm, Patient Position: Sitting, Cuff Size: Standard)   Pulse 78   Temp 98.6 °F (37 °C) (Tympanic)   Resp 18   Ht 5' 7\" (1.702 m)   Wt 73.5 kg (162 lb)   LMP 01/07/2019 (Approximate)   SpO2 95%   BMI 25.37 kg/m²     Physical Exam  Constitutional:       Appearance: Normal appearance.   Cardiovascular:      Rate and Rhythm: Normal rate and regular rhythm.      Pulses: Normal pulses. no weak pulses.           Dorsalis pedis pulses are 2+ on the right side and 2+ on the left side.        Posterior tibial pulses are 2+ on the right side and 2+ on the left side.      Heart sounds: Normal heart sounds.   Pulmonary:      Effort: Pulmonary effort is normal.      Breath sounds: Normal breath sounds.   Feet:      Right foot:      Skin integrity: No ulcer, skin breakdown, erythema, warmth, callus or dry skin.      Left foot:      Skin integrity: No ulcer, skin breakdown, erythema, warmth, callus or dry skin.   Neurological:      Mental " Status: She is alert.       Patient's shoes and socks removed.    Right Foot/Ankle   Right Foot Inspection  Skin Exam: skin normal and skin intact. No dry skin, no warmth, no callus, no erythema, no maceration, no abnormal color, no pre-ulcer, no ulcer and no callus.     Toe Exam: ROM and strength within normal limits.     Sensory   Vibration: intact  Proprioception: intact  Monofilament testing: intact    Vascular  Capillary refills: < 3 seconds  The right DP pulse is 2+. The right PT pulse is 2+.     Left Foot/Ankle  Left Foot Inspection  Skin Exam: skin normal and skin intact. No dry skin, no warmth, no erythema, no maceration, normal color, no pre-ulcer, no ulcer and no callus.     Toe Exam: ROM and strength within normal limits.     Sensory   Vibration: intact  Proprioception: intact  Monofilament testing: intact    Vascular  Capillary refills: < 3 seconds  The left DP pulse is 2+. The left PT pulse is 2+.     Assign Risk Category  No deformity present  No loss of protective sensation  No weak pulses  Risk: 0

## 2024-07-25 NOTE — ASSESSMENT & PLAN NOTE
Lab Results   Component Value Date    HGBA1C 6.1 (H) 06/28/2024   Added ozempic today for weight loss

## 2024-07-30 ENCOUNTER — TELEPHONE (OUTPATIENT)
Age: 54
End: 2024-07-30

## 2024-07-30 NOTE — TELEPHONE ENCOUNTER
Patient is calling regarding cancelling an appointment.    Date/Time: 7/30/2024 5pm    Reason: patient not feeling well    Patient was rescheduled: YES [] NO [x]  If yes, when was Patient reschedule for:     Patient requesting call back to reschedule: YES [] NO [x]

## 2024-07-31 ENCOUNTER — OFFICE VISIT (OUTPATIENT)
Dept: OBGYN CLINIC | Facility: CLINIC | Age: 54
End: 2024-07-31

## 2024-07-31 VITALS
BODY MASS INDEX: 25.43 KG/M2 | SYSTOLIC BLOOD PRESSURE: 129 MMHG | DIASTOLIC BLOOD PRESSURE: 72 MMHG | WEIGHT: 162 LBS | HEIGHT: 67 IN | HEART RATE: 75 BPM

## 2024-07-31 DIAGNOSIS — G56.22 CUBITAL TUNNEL SYNDROME ON LEFT: Primary | ICD-10-CM

## 2024-07-31 PROCEDURE — 99024 POSTOP FOLLOW-UP VISIT: CPT | Performed by: PHYSICIAN ASSISTANT

## 2024-07-31 NOTE — LETTER
July 31, 2024     Patient: Christina Lara  YOB: 1970  Date of Visit: 7/31/2024      To Whom it May Concern:    Christina Lara is under my professional care. Christina was seen in my office on 7/31/2024. Christina remains out of work, she may return to work on 8/12/24.    If you have any questions or concerns, please don't hesitate to call.         Sincerely,          Rigo Kemp PA-C        CC: No Recipients

## 2024-07-31 NOTE — PROGRESS NOTES
ORTHO CARE [facility] [address] [address] [phone]       Christina MILIAN Marco  [id number]  1970    ORTHOPAEDIC SURGERY OUTPATIENT NOTE  7/31/2024      HISTORY:  54 y.o. female presents for postop visit status post left cubital tunnel release with ulnar nerve transposition performed on 7/19/2024.  She is doing well.  Her numbness and tingling in the hand have resolved.  She notes some soreness and stiffness about the elbow from being in the splint.  No fever or chills.    Past Medical History:   Diagnosis Date    Acute right ankle pain 02/07/2023    ADHD (attention deficit hyperactivity disorder)     Allergic     Anxiety     Bulging lumbar disc     Carpal tunnel syndrome     RIGHT.  LAST ASSESSED: 12/7/16    Chronic back pain     low    Chronic pain disorder     Colon polyps     COVID-19     12/2021    Depression     Diabetes mellitus (HCC)     GERD (gastroesophageal reflux disease)     Gestational diabetes     Hearing loss     left ear    Heart disease     SVT    History of pre-eclampsia     Hyperlipidemia     Resolved with weight loss    Hyponatremia 12/12/2020    IBS (irritable bowel syndrome)     Ileus ([facility])     LAST ASSESSED: 8/3/17    Labial cyst     LAST ASSESSED: 4/21/16    Memory loss     Myofascial pain     LAST ASSESSED: 4/12/16    Neck mass 05/11/2023    Obesity     Ovarian cyst     LEFT. LAST ASSESSED: 9/2/16    Panic attack     Pneumonia     Sacroiliitis ([facility])     Seasonal allergies     Sprain of anterior talofibular ligament of right ankle 02/07/2023    SVT (supraventricular tachycardia)     Thoracic outlet syndrome     2010    Trochanteric bursitis of both hips     LAST ASSESSED: 3/18/16    Ulnar neuropathy at elbow     Varicella     Wears glasses        Past Surgical History:   Procedure Laterality Date    BARIATRIC SURGERY  08/2022    BILE DUCT EXPLORATION      ENDOSCOPIC REMOVAL OF STONES FROM BILIARY TRACT     CARDIAC ELECTROPHYSIOLOGY PROCEDURE N/A 2024    Procedure: Cardiac eps/svt ablation;  Surgeon: Daquan Heath MD;  [facility]: [facility];  Service: Cardiology     SECTION      x3    CHOLECYSTECTOMY      COLONOSCOPY      2017-polyp, -wnl, repeat5 years    DILATION AND CURETTAGE OF UTERUS      ENDOMETRIAL ABLATION      ERCP W/ SPHICTEROTOMY      FIRST RIB REMOVAL      THORAX EXCISION OF FIRST RIB    GASTRIC BYPASS  2022    HYSTEROSCOPY      NEUROPLASTY / TRANSPOSITION ULNAR NERVE AT ELBOW Right     TN COLONOSCOPY FLX DX W/COLLJ SPEC WHEN PFRMD N/A 2017    Procedure: COLONOSCOPY;  Surgeon: Oscar Velázquez MD;  Location: [facility];  Service: Gastroenterology    TN ESOPHAGOGASTRODUODENOSCOPY TRANSORAL DIAGNOSTIC N/A 2017    Procedure: ESOPHAGOGASTRODUODENOSCOPY (EGD);  Surgeon: Sadiq Car MD;  Location: [facility];  Service: Gastroenterology    TN HYSTEROSCOPY BX ENDOMETRIUM&/POLYPC W/WO D&C N/A 10/20/2017    Procedure: DILATATION AND CURETTAGE (D&C) WITH HYSTEROSCOPY  REMOVAL VULVAR RT. LESION;  Surgeon: Lucila Ortiz MD;  Location: [facility];  Service: Gynecology    TN ALDANA IMPLTJ NSTIM ELTRDS PLATE/PADDLE EDRL Left 2020    Procedure: Insertion of thoracic spinal cord stimulator electrode via laminotomy and placement of left buttock implantable pulse generator (NEUROMONITORING);  Surgeon: Ad Mehta MD;  Location[facility];  Service: Neurosurgery    TN LAPS GSTRC RSTRICTIV PX LONGITUDINAL GASTRECTOMY N/A 2018    Procedure: GASTRECTOMY SLEEVE LAPAROSCOPIC; INTRAOPERATIVE EGD ;  Surgeon: Abimael Juarez MD;  [facility];  Service: Bariatrics    TN LAPS GSTRC RSTRICTIV PX LONGITUDINAL GASTRECTOMY N/A 2022    Procedure: Diagnostic Lap; Extensive Lysis of Adhesions; LAPAROSCOPIC REVISION CONVERSION TO NOE-EN-Y GASTRIC BYPASS AND INTRAOPERATIVE EGD;  Surgeon: Abimael Juarez MD;  [facility];  Service: Bariatrics    TN NEUROPLASTY  &/TRANSPOSITION ULNAR NERVE ELBOW Left 2024    Procedure: RELEASE CUBITAL TUNNEL- left with ulnar nerve transposition;  Surgeon: Maggy Leary DO;  Location:  MAIN OR;  Service: Orthopedics    SLEEVE GASTROPLASTY      SPINAL CORD STIMULATOR TRIAL W/ LAMINOTOMY      TONSILLECTOMY AND ADENOIDECTOMY      TUBAL LIGATION      UPPER GASTROINTESTINAL ENDOSCOPY      US GUIDED LYMPH NODE BIOPSY LEFT  2023    VEIN LIGATION AND STRIPPING Right     VULVA SURGERY  10/20/2017    BIOPSY    WISDOM TOOTH EXTRACTION         Social History     Socioeconomic History    Marital status: /Civil Union     Spouse name: Abel    Number of children: 3    Years of education: GED then 2 year college program    Highest education level: Not on file   Occupational History    Occupation: ER TECH     Employer: [employer]   Tobacco Use    Smoking status: Former     Current packs/day: 0.00     Average packs/day: 1 pack/day for 15.0 years (15.0 ttl pk-yrs)     Types: Cigarettes     Start date: 1998     Quit date: 2013     Years since quittin.2     Passive exposure: Never    Smokeless tobacco: Never   Vaping Use    Vaping status: Never Used   Substance and Sexual Activity    Alcohol use: Yes     Alcohol/week: 4.0 standard drinks of alcohol     Types: 2 Shots of liquor, 2 Standard drinks or equivalent per week     Comment: On weekends that i go out    Drug use: No    Sexual activity: Yes     Partners: Male     Birth control/protection: OCP, Post-menopausal     Comment: lifetime partners: 6; current partner    Other Topics Concern    Not on file   Social History Narrative    Moravian: no preference    Accepts blood products        Exercise: unable with back issues and SVT    Calcium: calcium supplement, multivitamin for women over 50, 1 yogurt daily     Social Determinants of Health     Financial Resource Strain: Medium Risk (2020)    Overall Financial Resource Strain (CARDIA)     Difficulty  of Paying Living Expenses: Somewhat hard   Food Insecurity: No Food Insecurity (12/31/2020)    Hunger Vital Sign     Worried About Running Out of Food in the Last Year: Never true     Ran Out of Food in the Last Year: Never true   Transportation Needs: No Transportation Needs (12/31/2020)    PRAPARE - Transportation     Lack of Transportation (Medical): No     Lack of Transportation (Non-Medical): No   Physical Activity: Unknown (5/9/2019)    Exercise Vital Sign     Days of Exercise per Week: 0 days     Minutes of Exercise per Session: Not on file   Stress: Stress Concern Present (5/9/2019)    [employer] [place] [employer] - Occupational Stress Questionnaire     Feeling of Stress : Very much   Social Connections: Socially Isolated (5/9/2019)    Social Connection and Isolation Panel [NHANES]     Frequency of Communication with Friends and Family: Once a week     Frequency of Social Gatherings with Friends and Family: Once a week     Attends Scientologist Services: Never     Active Member of Clubs or Organizations: No     Attends Club or Organization Meetings: Never     Marital Status:    Intimate Partner Violence: Not At Risk (5/9/2019)    Humiliation, Afraid, Rape, and Kick questionnaire     Fear of Current or Ex-Partner: No     Emotionally Abused: No     Physically Abused: No     Sexually Abused: No   Housing Stability: Not on file       Family History   Problem Relation Age of Onset    Diabetes Mother     Breast cancer Mother         >50    BRCA1 Negative Mother     BRCA2 Negative Mother     Hyperlipidemia Mother     Cancer Mother         Breast    Diabetes Father     Other Father         traumatic brain injury    Prostate cancer Father     Alcohol abuse Father         in remission    Heart disease Father     Neuropathy Father     Hyperlipidemia Father     Cancer Father         Prostate    Hypertension Brother     Diabetes Brother     Other Brother         HYPERCHOLESTEROLEMIA    Alcohol abuse  Brother     Depression Brother         attempted suicide    Anxiety disorder Brother     Suicide Attempts Brother     Diabetes Brother     Alcohol abuse Brother     Heart attack Maternal Grandmother     Colon cancer Maternal Grandfather     No Known Problems Paternal Grandmother         dad is adopted    No Known Problems Paternal Grandfather         dad is adopted    No Known Problems Daughter     Asthma Son     Ovarian cancer Neg Hx     Uterine cancer Neg Hx         Patient's Medications   New Prescriptions    No medications on file   Previous Medications    ATOMOXETINE (STRATTERA) 60 MG CAPSULE    Take 1 capsule (60 mg total) by mouth daily    ATORVASTATIN (LIPITOR) 20 MG TABLET    Take 1 tablet (20 mg total) by mouth daily    BLOOD GLUCOSE MONITORING SUPPL (FREESTYLE FREEDOM LITE) W/DEVICE KIT    Please dispense 1 kit    CALCIUM CARBONATE (OS-MELODY) 600 MG TABLET    Take 600 mg by mouth 2 (two) times a day with meals    CYCLOBENZAPRINE (FLEXERIL) 10 MG TABLET    Take 1 tablet (10 mg total) by mouth 2 (two) times a day as needed for muscle spasms for up to 7 days    DICLOFENAC SODIUM (VOLTAREN) 1 %    Apply 2 g topically 4 (four) times a day    DROSPIRENONE-ETHINYL ESTRADIOL (LIEN) 3-0.02 MG PER TABLET    Take 1 tablet by mouth daily    ESTRADIOL (ESTRACE VAGINAL) 0.1 MG/G VAGINAL CREAM    One gram vaginally at night x 14 days then twice weekly for ongoing maintenance.    FLUTICASONE (FLONASE) 50 MCG/ACT NASAL SPRAY    1 spray into each nostril daily    GLUCOSE BLOOD (FREESTYLE LITE) TEST STRIP    Test once daily    LAMOTRIGINE (LAMICTAL) 150 MG TABLET    Take 1 tablet (150 mg total) by mouth 2 (two) times a day    LANCETS (FREESTYLE) LANCETS    Use as instructed, once daily    LIDOCAINE (LIDODERM) 5 %    Apply 1 patch topically over 12 hours daily Remove & Discard patch within 12 hours or as directed by MD    METFORMIN (GLUCOPHAGE-XR) 500 MG 24 HR TABLET    Take 1 tablet (500 mg total) by mouth 2 (two) times a day  "with meals    METHOCARBAMOL (ROBAXIN) 750 MG TABLET    Take 1 tablet (750 mg total) by mouth every 8 (eight) hours    MIRTAZAPINE (REMERON) 15 MG TABLET    Take 1 tablet (15 mg total) by mouth daily at bedtime    MONTELUKAST (SINGULAIR) 10 MG TABLET    Take 1 tablet (10 mg total) by mouth daily at bedtime    MULTIPLE VITAMINS-MINERALS (MULTI COMPLETE PO)    Take by mouth    OMEPRAZOLE (PRILOSEC) 20 MG DELAYED RELEASE CAPSULE    Take 1 capsule (20 mg total) by mouth daily    SEMAGLUTIDE, 0.25 OR 0.5 MG/DOSE, (OZEMPIC, 0.25 OR 0.5 MG/DOSE,) 2 MG/3 ML INJECTION PEN    0.25 mg under the skin every 7 days for 4 doses (28 days), THEN 0.5 mg under the skin every 7 days    VENLAFAXINE (EFFEXOR) 100 MG TABLET    Take 1 tablet (100 mg total) by mouth 3 (three) times a day   Modified Medications    No medications on file   Discontinued Medications    No medications on file       Allergies   Allergen Reactions    Ibuprofen Other (See Comments)     Due to gastric sleeve -can only take for 5 days, then needs to stop        /72 (BP Location: Left arm, Patient Position: Sitting, Cuff Size: Standard)   Pulse 75   Ht 5' 7\" (1.702 m)   Wt 73.5 kg (162 lb)   LMP 01/07/2019 (Approximate)   BMI 25.37 kg/m²      REVIEW OF SYSTEMS:  Constitutional: Negative.    HEENT: Negative.    Respiratory: Negative.    Skin: Negative.    Neurological: Negative.    Psychiatric/Behavioral: Negative.  Musculoskeletal: Negative except for that mentioned in the HPI.    /72 (BP Location: Left arm, Patient Position: Sitting, Cuff Size: Standard)   Pulse 75   Ht 5' 7\" (1.702 m)   Wt 73.5 kg (162 lb)   LMP 01/07/2019 (Approximate)   BMI 25.37 kg/m²   Gen: No acute distress, resting comfortably in bed  HEENT: Eyes clear, moist mucus membranes, hearing intact  Respiratory: No audible wheezing or stridor  Cardiovascular: Well Perfused peripherally, 2+ distal pulse  Abdomen: nondistended, no peritoneal signs     PHYSICAL EXAM:    Left " elbow:  Incision is clean dry intact with glue  Sensation tact the median, radial, ulnar nerves  Slight stiffness of the elbow secondary to being in the splint  Motor intact to the AIN, PIN, IO nerves  Brisk cap refill    IMAGING: None    ASSESSMENT AND PLAN:  54 y.o. female status post left tunnel release with ulnar nerve transposition.  At this point she will work on range of motion at home.  She may shower but do not soak or scrub the incision.  We discussed protecting the incision.  She will return to work in a week and a half.  Will see her back in 3 weeks.

## 2024-08-02 ENCOUNTER — TELEPHONE (OUTPATIENT)
Dept: INFUSION CENTER | Facility: HOSPITAL | Age: 54
End: 2024-08-02

## 2024-08-02 ENCOUNTER — TELEPHONE (OUTPATIENT)
Age: 54
End: 2024-08-02

## 2024-08-02 NOTE — TELEPHONE ENCOUNTER
Patient calling about setting up [facility].  Patient states she discussed with Dr Bynum about starting infusions but has not heard anything else about it since 7/10.    I spoke with office and confirmed active treatment plan for infusions. Provided patient with telephone number to contact infusion center to set up care.    Patient stated she will contact infusion center Monday  CB#[phone]

## 2024-08-02 NOTE — TELEPHONE ENCOUNTER
Patient called to schedule [facility].  She works at the [employer] at [place] till 3pm M-W-F and 4pm T-TH.  Patient scheduled at [facility] starting August 23rd 3pm.  She may be a few minutes late coming from [employer].

## 2024-08-09 ENCOUNTER — TELEPHONE (OUTPATIENT)
Dept: PSYCHIATRY | Facility: CLINIC | Age: 54
End: 2024-08-09

## 2024-08-21 DIAGNOSIS — M46.1 SACROILIITIS (HCC): Primary | ICD-10-CM

## 2024-08-21 RX ORDER — METHYLPREDNISOLONE 4 MG
TABLET, DOSE PACK ORAL
Qty: 1 EACH | Refills: 0 | Status: SHIPPED | OUTPATIENT
Start: 2024-08-21 | End: 2024-08-28

## 2024-08-22 ENCOUNTER — OFFICE VISIT (OUTPATIENT)
Dept: OBGYN CLINIC | Facility: CLINIC | Age: 54
End: 2024-08-22

## 2024-08-22 VITALS
DIASTOLIC BLOOD PRESSURE: 72 MMHG | HEART RATE: 72 BPM | HEIGHT: 67 IN | WEIGHT: 162 LBS | BODY MASS INDEX: 25.43 KG/M2 | SYSTOLIC BLOOD PRESSURE: 129 MMHG

## 2024-08-22 DIAGNOSIS — Z47.1 AFTERCARE FOLLOWING LEFT ELBOW JOINT REPLACEMENT SURGERY: Primary | ICD-10-CM

## 2024-08-22 DIAGNOSIS — Z96.622 AFTERCARE FOLLOWING LEFT ELBOW JOINT REPLACEMENT SURGERY: Primary | ICD-10-CM

## 2024-08-22 PROCEDURE — 99024 POSTOP FOLLOW-UP VISIT: CPT | Performed by: ORTHOPAEDIC SURGERY

## 2024-08-22 NOTE — PROGRESS NOTES
ORTHO CARE [facility] [address] [address] [phone]       Christina MILIAN Marco  [id number]  1970    ORTHOPAEDIC SURGERY OUTPATIENT NOTE  8/22/2024      HISTORY:  54 y.o. female presents today status post left cubital tunnel with ulnar nerve transposition performed on 7/19/2024. She states the numbness in the left hand has subsided. She notes a lot of stiffness in the elbow in the morning. She is having pain and swelling over the medial aspect of the elbow ( incision site) by th end of the day.     Past Medical History:   Diagnosis Date    Acute right ankle pain 02/07/2023    ADHD (attention deficit hyperactivity disorder)     Allergic     Anxiety     Bulging lumbar disc     Carpal tunnel syndrome     RIGHT.  LAST ASSESSED: 12/7/16    Chronic back pain     low    Chronic pain disorder     Colon polyps     COVID-19     12/2021    Depression     Diabetes mellitus (HCC)     GERD (gastroesophageal reflux disease)     Gestational diabetes     Hearing loss     left ear    Heart disease     SVT    History of pre-eclampsia     Hyperlipidemia     Resolved with weight loss    Hyponatremia 12/12/2020    IBS (irritable bowel syndrome)     Ileus ([facility])     LAST ASSESSED: 8/3/17    Labial cyst     LAST ASSESSED: 4/21/16    Memory loss     Myofascial pain     LAST ASSESSED: 4/12/16    Neck mass 05/11/2023    Obesity     Ovarian cyst     LEFT. LAST ASSESSED: 9/2/16    Panic attack     Pneumonia     Sacroiliitis (HCC)     Seasonal allergies     Sprain of anterior talofibular ligament of right ankle 02/07/2023    SVT (supraventricular tachycardia)     Thoracic outlet syndrome     2010    Trochanteric bursitis of both hips     LAST ASSESSED: 3/18/16    Ulnar neuropathy at elbow     Varicella     Wears glasses        Past Surgical History:   Procedure Laterality Date    BARIATRIC SURGERY  08/2022    BILE DUCT EXPLORATION      ENDOSCOPIC REMOVAL OF STONES  FROM BILIARY TRACT    CARDIAC ELECTROPHYSIOLOGY PROCEDURE N/A 2024    Procedure: Cardiac eps/svt ablation;  Surgeon: Daquan Heath MD;  [facility]: [facility];  Service: Cardiology     SECTION      x3    CHOLECYSTECTOMY      COLONOSCOPY      2017-polyp, -wnl, repeat5 years    DILATION AND CURETTAGE OF UTERUS      ENDOMETRIAL ABLATION      ERCP W/ SPHICTEROTOMY      FIRST RIB REMOVAL      THORAX EXCISION OF FIRST RIB    GASTRIC BYPASS  2022    HYSTEROSCOPY      NEUROPLASTY / TRANSPOSITION ULNAR NERVE AT ELBOW Right     WV COLONOSCOPY FLX DX W/COLLJ SPEC WHEN PFRMD N/A 2017    Procedure: COLONOSCOPY;  Surgeon: Oscar Velázquez MD;  Location: [facility];  Service: Gastroenterology    WV ESOPHAGOGASTRODUODENOSCOPY TRANSORAL DIAGNOSTIC N/A 2017    Procedure: ESOPHAGOGASTRODUODENOSCOPY (EGD);  Surgeon: Sadiq Car MD;  Location: [facility];  Service: Gastroenterology    WV HYSTEROSCOPY BX ENDOMETRIUM&/POLYPC W/WO D&C N/A 10/20/2017    Procedure: DILATATION AND CURETTAGE (D&C) WITH HYSTEROSCOPY  REMOVAL VULVAR RT. LESION;  Surgeon: Lucila Ortiz MD;  Location: [facility];  Service: Gynecology    WV ALDANA IMPLTJ NSTIM ELTRDS PLATE/PADDLE EDRL Left 2020    Procedure: Insertion of thoracic spinal cord stimulator electrode via laminotomy and placement of left buttock implantable pulse generator (NEUROMONITORING);  Surgeon: Ad Mehta MD;  Location: [facility];  Service: Neurosurgery    WV LAPS GSTRC RSTRICTIV PX LONGITUDINAL GASTRECTOMY N/A 2018    Procedure: GASTRECTOMY SLEEVE LAPAROSCOPIC; INTRAOPERATIVE EGD ;  Surgeon: Abimael Juarez MD;  [facility];  Service: Bariatrics    WV LAPS GSTRC RSTRICTIV PX LONGITUDINAL GASTRECTOMY N/A 2022    Procedure: Diagnostic Lap; Extensive Lysis of Adhesions; LAPAROSCOPIC REVISION CONVERSION TO NOE-EN-Y GASTRIC BYPASS AND INTRAOPERATIVE EGD;  Surgeon: Abimael Juarez MD;  [facility];  Service: Bariatrics    WV  NEUROPLASTY &/TRANSPOSITION ULNAR NERVE ELBOW Left 2024    Procedure: RELEASE CUBITAL TUNNEL- left with ulnar nerve transposition;  Surgeon: Maggy Leary DO;  Location:  MAIN OR;  Service: Orthopedics    SLEEVE GASTROPLASTY      SPINAL CORD STIMULATOR TRIAL W/ LAMINOTOMY      TONSILLECTOMY AND ADENOIDECTOMY      TUBAL LIGATION      UPPER GASTROINTESTINAL ENDOSCOPY      US GUIDED LYMPH NODE BIOPSY LEFT  2023    VEIN LIGATION AND STRIPPING Right     VULVA SURGERY  10/20/2017    BIOPSY    WISDOM TOOTH EXTRACTION         Social History     Socioeconomic History    Marital status: /Civil Union     Spouse name: Abel    Number of children: 3    Years of education: GED then 2 year college program    Highest education level: Not on file   Occupational History    Occupation: ER TECH     Employer: [employer]   Tobacco Use    Smoking status: Former     Current packs/day: 0.00     Average packs/day: 1 pack/day for 15.0 years (15.0 ttl pk-yrs)     Types: Cigarettes     Start date: 1998     Quit date: 2013     Years since quittin.3     Passive exposure: Never    Smokeless tobacco: Never   Vaping Use    Vaping status: Never Used   Substance and Sexual Activity    Alcohol use: Yes     Alcohol/week: 4.0 standard drinks of alcohol     Types: 2 Shots of liquor, 2 Standard drinks or equivalent per week     Comment: On weekends that i go out    Drug use: No    Sexual activity: Yes     Partners: Male     Birth control/protection: OCP, Post-menopausal     Comment: lifetime partners: 6; current partner    Other Topics Concern    Not on file   Social History Narrative    Confucianist: no preference    Accepts blood products        Exercise: unable with back issues and SVT    Calcium: calcium supplement, multivitamin for women over 50, 1 yogurt daily     Social Determinants of Health     Financial Resource Strain: Medium Risk (2020)    Overall Financial Resource Strain (CARDIA)      Difficulty of Paying Living Expenses: Somewhat hard   Food Insecurity: No Food Insecurity (12/31/2020)    Hunger Vital Sign     Worried About Running Out of Food in the Last Year: Never true     Ran Out of Food in the Last Year: Never true   Transportation Needs: No Transportation Needs (12/31/2020)    PRAPARE - Transportation     Lack of Transportation (Medical): No     Lack of Transportation (Non-Medical): No   Physical Activity: Unknown (5/9/2019)    Exercise Vital Sign     Days of Exercise per Week: 0 days     Minutes of Exercise per Session: Not on file   Stress: Stress Concern Present (5/9/2019)    [employer] - Occupational Stress Questionnaire     Feeling of Stress : Very much   Social Connections: Socially Isolated (5/9/2019)    Social Connection and Isolation Panel [NHANES]     Frequency of Communication with Friends and Family: Once a week     Frequency of Social Gatherings with Friends and Family: Once a week     Attends Catholic Services: Never     Active Member of Clubs or Organizations: No     Attends Club or Organization Meetings: Never     Marital Status:    Intimate Partner Violence: Not At Risk (5/9/2019)    Humiliation, Afraid, Rape, and Kick questionnaire     Fear of Current or Ex-Partner: No     Emotionally Abused: No     Physically Abused: No     Sexually Abused: No   Housing Stability: Not on file       Family History   Problem Relation Age of Onset    Diabetes Mother     Breast cancer Mother         >50    BRCA1 Negative Mother     BRCA2 Negative Mother     Hyperlipidemia Mother     Cancer Mother         Breast    Diabetes Father     Other Father         traumatic brain injury    Prostate cancer Father     Alcohol abuse Father         in remission    Heart disease Father     Neuropathy Father     Hyperlipidemia Father     Cancer Father         Prostate    Hypertension Brother     Diabetes Brother     Other Brother         HYPERCHOLESTEROLEMIA    Alcohol  abuse Brother     Depression Brother         attempted suicide    Anxiety disorder Brother     Suicide Attempts Brother     Diabetes Brother     Alcohol abuse Brother     Heart attack Maternal Grandmother     Colon cancer Maternal Grandfather     No Known Problems Paternal Grandmother         dad is adopted    No Known Problems Paternal Grandfather         dad is adopted    No Known Problems Daughter     Asthma Son     Ovarian cancer Neg Hx     Uterine cancer Neg Hx         Patient's Medications   New Prescriptions    No medications on file   Previous Medications    ATOMOXETINE (STRATTERA) 60 MG CAPSULE    Take 1 capsule (60 mg total) by mouth daily    ATORVASTATIN (LIPITOR) 20 MG TABLET    Take 1 tablet (20 mg total) by mouth daily    BLOOD GLUCOSE MONITORING SUPPL (FREESTYLE FREEDOM LITE) W/DEVICE KIT    Please dispense 1 kit    CALCIUM CARBONATE (OS-MELODY) 600 MG TABLET    Take 600 mg by mouth 2 (two) times a day with meals    CYCLOBENZAPRINE (FLEXERIL) 10 MG TABLET    Take 1 tablet (10 mg total) by mouth 2 (two) times a day as needed for muscle spasms for up to 7 days    DICLOFENAC SODIUM (VOLTAREN) 1 %    Apply 2 g topically 4 (four) times a day    DROSPIRENONE-ETHINYL ESTRADIOL (LIEN) 3-0.02 MG PER TABLET    Take 1 tablet by mouth daily    ESTRADIOL (ESTRACE VAGINAL) 0.1 MG/G VAGINAL CREAM    One gram vaginally at night x 14 days then twice weekly for ongoing maintenance.    FLUTICASONE (FLONASE) 50 MCG/ACT NASAL SPRAY    1 spray into each nostril daily    GLUCOSE BLOOD (FREESTYLE LITE) TEST STRIP    Test once daily    LAMOTRIGINE (LAMICTAL) 150 MG TABLET    Take 1 tablet (150 mg total) by mouth 2 (two) times a day    LANCETS (FREESTYLE) LANCETS    Use as instructed, once daily    LIDOCAINE (LIDODERM) 5 %    Apply 1 patch topically over 12 hours daily Remove & Discard patch within 12 hours or as directed by MD    METFORMIN (GLUCOPHAGE-XR) 500 MG 24 HR TABLET    Take 1 tablet (500 mg total) by mouth 2 (two) times  a day with meals    METHOCARBAMOL (ROBAXIN) 750 MG TABLET    Take 1 tablet (750 mg total) by mouth every 8 (eight) hours    METHYLPREDNISOLONE 4 MG TABLET THERAPY PACK    Use as directed on package    MIRTAZAPINE (REMERON) 15 MG TABLET    Take 1 tablet (15 mg total) by mouth daily at bedtime    MONTELUKAST (SINGULAIR) 10 MG TABLET    Take 1 tablet (10 mg total) by mouth daily at bedtime    MULTIPLE VITAMINS-MINERALS (MULTI COMPLETE PO)    Take by mouth    OMEPRAZOLE (PRILOSEC) 20 MG DELAYED RELEASE CAPSULE    Take 1 capsule (20 mg total) by mouth daily    SEMAGLUTIDE, 0.25 OR 0.5 MG/DOSE, (OZEMPIC, 0.25 OR 0.5 MG/DOSE,) 2 MG/3 ML INJECTION PEN    0.25 mg under the skin every 7 days for 4 doses (28 days), THEN 0.5 mg under the skin every 7 days    VENLAFAXINE (EFFEXOR) 100 MG TABLET    Take 1 tablet (100 mg total) by mouth 3 (three) times a day   Modified Medications    No medications on file   Discontinued Medications    No medications on file       Allergies   Allergen Reactions    Ibuprofen Other (See Comments)     Due to gastric sleeve -can only take for 5 days, then needs to stop        LMP 01/07/2019 (Approximate)      REVIEW OF SYSTEMS:  Constitutional: Negative.    HEENT: Negative.    Respiratory: Negative.    Skin: Negative.    Neurological: Negative.    Psychiatric/Behavioral: Negative.  Musculoskeletal: Negative except for that mentioned in the HPI    PHYSICAL EXAM:    LEFT ELBOW:    Appearance: Surgical incision well healed     Flexion: 140 degrees  Extension: 0 degrees  Pronation: 80 degrees  Supination: 80 degrees    Cubital tunnel Tinel's: negative    Radial/median/ulnar nerve intact    <2 sec cap refill       IMAGING:  Not taken today     ASSESSMENT AND PLAN:  54 y.o. female  status post left cubital tunnel with ulnar nerve transposition performed on 7/19/2024.      Patient to continue with stretching exercises. Use heat to help to break up scar tissue. She may use ice as needed. She is to follow up  PRN.         Scribe Attestation      I,:  Oren Vallecillo MA am acting as a scribe while in the presence of the attending physician.:       I,:  Maggy Leary DO personally performed the services described in this documentation    as scribed in my presence.:

## 2024-08-27 ENCOUNTER — TELEMEDICINE (OUTPATIENT)
Dept: BEHAVIORAL/MENTAL HEALTH CLINIC | Facility: CLINIC | Age: 54
End: 2024-08-27
Payer: COMMERCIAL

## 2024-08-27 DIAGNOSIS — F41.1 GENERALIZED ANXIETY DISORDER: Chronic | ICD-10-CM

## 2024-08-27 DIAGNOSIS — F33.2 MDD (MAJOR DEPRESSIVE DISORDER), RECURRENT SEVERE, WITHOUT PSYCHOSIS (HCC): Primary | ICD-10-CM

## 2024-08-27 DIAGNOSIS — F43.10 POST TRAUMATIC STRESS DISORDER (PTSD): Chronic | ICD-10-CM

## 2024-08-27 DIAGNOSIS — F90.0 ADHD (ATTENTION DEFICIT HYPERACTIVITY DISORDER), INATTENTIVE TYPE: Chronic | ICD-10-CM

## 2024-08-27 PROCEDURE — 90837 PSYTX W PT 60 MINUTES: CPT | Performed by: SOCIAL WORKER

## 2024-08-27 NOTE — BH TREATMENT PLAN
Outpatient Behavioral Health Psychotherapy Treatment Plan    Christina Lara  1970     Date of Initial Psychotherapy Assessment: 04/08/19   Date of Current Treatment Plan: 08/27/2024  Treatment Plan Target Date: 12/25/24   Treatment Plan Expiration Date: 02/23/25    Diagnosis:   1. MDD (major depressive disorder), recurrent severe, without psychosis (HCC)        2. Generalized anxiety disorder        3. Post traumatic stress disorder (PTSD)        4. ADHD (attention deficit hyperactivity disorder), inattentive type              Area(s) of Need: Mood regulation, relationship concerns, educational/vocational issues    Long Term Goal 1 (in the client's own words):  I need to work on managing my stress level and anxiety, so I can balance my work, my education, and my family.     Stage of Change: Action    Target Date for completion: 10/26/2025     Anticipated therapeutic modalities: client-centered; motivational interviewing; solution focused; DBT-informed; supportive psychotherapy     People identified to complete this goal: Yessy (client); Margaret Gutierrez (psychotherapist); Marc Vela (psychiatrist)      Objective 1: (identify the means of measuring success in meeting the objective): Yessy will maintain regularly scheduled medication management sessions with her psychiatrist. She will take her medications, as prescribed, and will discuss any barriers to her medication regimen (including side effects or other problems) with both the psychiatric provider and this clinician.     Update 08/27/2024: Yessy continues to endorse a positive rapport with her psychiatric provider. She continues to work with him to address any side effects or concerns about her medication regimen. Yessy continues to endorse adherence with her psychiatric medications.       Objective 2: (identify the means of measuring success in meeting the objective): Yessy will practice mindfulness-based strategies, at a minimum 1x per week, to help her  improve her overall mood and anxiety management.    Update 08/27/24: Yessy states that she has been experiencing far less anxiety since transitioning to a new job. She states that she continues to use breathing techniques when she is feeling anxious. This objective has not yet been met. This clinician will continue to work with her to develop her ability to engage in mindful meditation.       Objective 3: (identify the means of measuring success in meeting the objective): Yessy will increase her physical activity by going to the gym at least 1-2 times per week (to increase overall health and to decrease anxiety).     Update 08/27/2024: Yessy states that she has a gym membership, but she has not yet made the time to go to the gym. She has been struggling with some physical health concerns; however, she states that she does want to increase her healthy lifestyle choices, so she plans to go to the gym and engage in more physical activity.       Objective 4: (identify the means of measuring success in meeting the objective): Yessy will work with her medical team to see how her recent bariatric revision may impact her medication regimen and her mood regulation.     Update 08/27/2024: Yessy reports that she has gained some weight. She identifies that she had initially lost too much weight-- immediately after her surgery revision. She states that now she feels that she has gained too much weight and is working with her bariatric team to develop a plan to maintain a healthy weight. She states that she would like to return to the nutritionist; however, she states that her insurance will not cover this service. She is exploring alternative options for gaining the support she needs to maintain a healthy diet.      Objective 5: (identify the means of measuring success in meeting the objective): Yessy will increase her social activities, by seeing friends or going out with her , a minimum of one time per month. Yessy  "would like to be able to enjoy herself when she is out.     Update 08/27/24: Yessy states that she is engaging in some social activities. She continues to adjust to her new job and her new schedule. She is spending time with old friends, as well as new friends.      Objective 6: (identify the means of measuring success in meeting the objective): Yessy will use her therapy sessions to discuss her self-respect and her self-esteem related to her learning disability. She will discuss her decision to use or decline use of learning supports. At this time, Yessy states that she does not want to use learning supports, as she wants to be able to manage her learning on her own.     Update 08/27/24: Yessy continues to address her concerns about her learning style during her sessions. She has not yet reached out to ask for additional supports --e.g., accommodations to assist her with her successful coursework. 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     I am currently under the care of a [facility] psychiatric provider: yes    My [facility] psychiatric provider is: Marc Vela MD    I am currently taking psychiatric medications: Yes, as prescribed    I feel that I will be ready for discharge from mental health care when I reach the following (measurable goal/objective):  NO CHANGE FROM LAST UPDATE: I've never thought that I'd be able to graduate, because the feelings don't go away.    * We discussed the possibility of her managing her symptoms, and she states that she feels \"like it's never going to happen.\"     For children and adults who have a legal guardian:   Has there been any change to custody orders and/or guardianship status? NA. If yes, attach updated documentation.    I have updated my Crisis Plan and have been offered a copy of this plan    Behavioral Health Treatment Plan  Luke: Diagnosis and Treatment Plan explained to Christina Vasquez I " Marco acknowledges an understanding of their diagnosis. Christina Lara agrees to this treatment plan.    I have been offered a copy of this Treatment Plan. yes    Christina MILIAN Lara, 1970, actively participated in the review and update of this treatment plan during a virtual session, using the Epic Embedded platform.   Christina Lara  provided verbal consent on 8/27/2024 @ 5:54pm. The treatment plan was transcribed into the Electronic Health Record at a later time. The plan will be sent to Yessy, via the [employer] israel, for her signature. This clinician will check to ensure that she was able to sign it during our next session.

## 2024-08-28 ENCOUNTER — HOSPITAL ENCOUNTER (OUTPATIENT)
Dept: INFUSION CENTER | Facility: CLINIC | Age: 54
Discharge: HOME | End: 2024-08-28
Payer: COMMERCIAL

## 2024-08-28 VITALS
DIASTOLIC BLOOD PRESSURE: 69 MMHG | TEMPERATURE: 97.8 F | HEART RATE: 77 BPM | SYSTOLIC BLOOD PRESSURE: 102 MMHG | OXYGEN SATURATION: 98 %

## 2024-08-28 DIAGNOSIS — D50.8 IRON DEFICIENCY ANEMIA FOLLOWING BARIATRIC SURGERY: ICD-10-CM

## 2024-08-28 DIAGNOSIS — K95.89 IRON DEFICIENCY ANEMIA FOLLOWING BARIATRIC SURGERY: ICD-10-CM

## 2024-08-28 DIAGNOSIS — E66.3 OVERWEIGHT (BMI 25.0-29.9): ICD-10-CM

## 2024-08-28 DIAGNOSIS — Z98.84 BARIATRIC SURGERY STATUS: Primary | ICD-10-CM

## 2024-08-28 PROCEDURE — 96365 THER/PROPH/DIAG IV INF INIT: CPT

## 2024-08-28 RX ORDER — SODIUM CHLORIDE 9 MG/ML
20 INJECTION, SOLUTION INTRAVENOUS ONCE
Status: COMPLETED | OUTPATIENT
Start: 2024-08-28 | End: 2024-08-28

## 2024-08-28 RX ORDER — SODIUM CHLORIDE 9 MG/ML
20 INJECTION, SOLUTION INTRAVENOUS ONCE
OUTPATIENT
Start: 2024-09-01

## 2024-08-28 RX ADMIN — SODIUM CHLORIDE 20 ML/HR: 0.9 INJECTION, SOLUTION INTRAVENOUS at 15:01

## 2024-08-28 RX ADMIN — IRON SUCROSE 200 MG: 20 INJECTION, SOLUTION INTRAVENOUS at 15:05

## 2024-08-28 NOTE — PROGRESS NOTES
Received for venofer. No complaints offered. Tolerated treatment without issue. Pt to return 9/6 at 4pm. AVS declined.

## 2024-09-03 ENCOUNTER — REMOTE DEVICE CLINIC VISIT (OUTPATIENT)
Dept: CARDIOLOGY CLINIC | Facility: CLINIC | Age: 54
End: 2024-09-03
Payer: COMMERCIAL

## 2024-09-03 DIAGNOSIS — I48.0 PAF (PAROXYSMAL ATRIAL FIBRILLATION) (HCC): Primary | ICD-10-CM

## 2024-09-03 PROCEDURE — 93298 REM INTERROG DEV EVAL SCRMS: CPT | Performed by: INTERNAL MEDICINE

## 2024-09-03 NOTE — PSYCH
Virtual Regular Visit    Verification of patient location:    Patient is located at Home in the following state in which I hold an active license [place]      Assessment/Plan:    Problem List Items Addressed This Visit          Behavioral Health    Post traumatic stress disorder (PTSD) (Chronic)    Generalized anxiety disorder (Chronic)    ADHD (attention deficit hyperactivity disorder), inattentive type (Chronic)    MDD (major depressive disorder), recurrent severe, without psychosis (HCC) - Primary       Goals addressed in session: Goal 1          Reason for visit is   Chief Complaint   Patient presents with    Virtual Regular Visit          Encounter provider LALY Yung      Recent Visits  Date Type Provider Dept   08/27/24 Telemedicine LALY Yung [facility] Therapist [place]   Showing recent visits within past 7 days and meeting all other requirements  Future Appointments  No visits were found meeting these conditions.  Showing future appointments within next 150 days and meeting all other requirements       The patient was identified by name and date of birth. Christina Lara was informed that this is a telemedicine visit and that the visit is being conducted throughthe Epic Embedded platform. She agrees to proceed..  My office door was closed. No one else was in the room.  She acknowledged consent and understanding of privacy and security of the video platform. The patient has agreed to participate and understands they can discontinue the visit at any time.    Patient is aware this is a billable service.     Subjective  Christina Lara is a 54 y.o. female.      HPI     Past Medical History:   Diagnosis Date    Acute right ankle pain 02/07/2023    ADHD (attention deficit hyperactivity disorder)     Allergic     Anxiety     Bulging lumbar disc     Carpal tunnel syndrome     RIGHT.  LAST ASSESSED: 12/7/16    Chronic back pain     low    Chronic pain disorder     Colon polyps     COVID-19      2021    Depression     Diabetes mellitus (HCC)     GERD (gastroesophageal reflux disease)     Gestational diabetes     Hearing loss     left ear    Heart disease     SVT    History of pre-eclampsia     Hyperlipidemia     Resolved with weight loss    Hyponatremia 2020    IBS (irritable bowel syndrome)     Ileus (HCC)     LAST ASSESSED: 8/3/17    Labial cyst     LAST ASSESSED: 16    Memory loss     Myofascial pain     LAST ASSESSED: 16    Neck mass 2023    Obesity     Ovarian cyst     LEFT. LAST ASSESSED: 16    Panic attack     Pneumonia     Sacroiliitis (HCC)     Seasonal allergies     Sprain of anterior talofibular ligament of right ankle 2023    SVT (supraventricular tachycardia)     Thoracic outlet syndrome     2010    Trochanteric bursitis of both hips     LAST ASSESSED: 3/18/16    Ulnar neuropathy at elbow     Varicella     Wears glasses        Past Surgical History:   Procedure Laterality Date    BARIATRIC SURGERY  2022    BILE DUCT EXPLORATION      ENDOSCOPIC REMOVAL OF STONES FROM BILIARY TRACT    CARDIAC ELECTROPHYSIOLOGY PROCEDURE N/A 2024    Procedure: Cardiac eps/svt ablation;  Surgeon: Daquan Heath MD;  [facility];  Service: Cardiology     SECTION      x3    CHOLECYSTECTOMY      COLONOSCOPY      -polyp, -wnl, repeat5 years    DILATION AND CURETTAGE OF UTERUS      ENDOMETRIAL ABLATION      ERCP W/ SPHICTEROTOMY      FIRST RIB REMOVAL      THORAX EXCISION OF FIRST RIB    GASTRIC BYPASS  2022    HYSTEROSCOPY      NEUROPLASTY / TRANSPOSITION ULNAR NERVE AT ELBOW Right     MD COLONOSCOPY FLX DX W/COLLJ SPEC WHEN PFRMD N/A 2017    Procedure: COLONOSCOPY;  Surgeon: Oscar Velázquez MD;  [facility];  Service: Gastroenterology    MD ESOPHAGOGASTRODUODENOSCOPY TRANSORAL DIAGNOSTIC N/A 2017    Procedure: ESOPHAGOGASTRODUODENOSCOPY (EGD);  Surgeon: Sadiq Car MD;  [facility];  Service: Gastroenterology     UT HYSTEROSCOPY BX ENDOMETRIUM&/POLYPC W/WO D&C N/A 10/20/2017    Procedure: DILATATION AND CURETTAGE (D&C) WITH HYSTEROSCOPY  REMOVAL VULVAR RT. LESION;  Surgeon: Lucila Ortiz MD;  [facility];  Service: Gynecology    UT ALDANA IMPLTJ NSTIM ELTRDS PLATE/PADDLE EDRL Left 01/29/2020    Procedure: Insertion of thoracic spinal cord stimulator electrode via laminotomy and placement of left buttock implantable pulse generator (NEUROMONITORING);  Surgeon: Ad Mehta MD;  [facility];  Service: Neurosurgery    UT LAPS GST RSTRICTIV PX LONGITUDINAL GASTRECTOMY N/A 02/06/2018    Procedure: GASTRECTOMY SLEEVE LAPAROSCOPIC; INTRAOPERATIVE EGD ;  Surgeon: Abimael Juarez MD;  [facility];  Service: Bariatrics    UT LAPS GSTRC RSTRICTIV PX LONGITUDINAL GASTRECTOMY N/A 08/08/2022    Procedure: Diagnostic Lap; Extensive Lysis of Adhesions; LAPAROSCOPIC REVISION CONVERSION TO NOE-EN-Y GASTRIC BYPASS AND INTRAOPERATIVE EGD;  Surgeon: Abimael Juarez MD;  [facility];  Service: Bariatrics    UT NEUROPLASTY &/TRANSPOSITION ULNAR NERVE ELBOW Left 7/19/2024    Procedure: RELEASE CUBITAL TUNNEL- left with ulnar nerve transposition;  Surgeon: Maggy Leary DO;  [facility];  Service: Orthopedics    SLEEVE GASTROPLASTY      SPINAL CORD STIMULATOR TRIAL W/ LAMINOTOMY      TONSILLECTOMY AND ADENOIDECTOMY      TUBAL LIGATION      UPPER GASTROINTESTINAL ENDOSCOPY      US GUIDED LYMPH NODE BIOPSY LEFT  05/22/2023    VEIN LIGATION AND STRIPPING Right     VULVA SURGERY  10/20/2017    BIOPSY    WISDOM TOOTH EXTRACTION         Current Outpatient Medications   Medication Sig Dispense Refill    atoMOXetine (STRATTERA) 60 mg capsule Take 1 capsule (60 mg total) by mouth daily 90 capsule 3    atorvastatin (LIPITOR) 20 mg tablet Take 1 tablet (20 mg total) by mouth daily 90 tablet 1    Blood Glucose Monitoring Suppl (FreeStyle Freedom Lite) w/Device KIT Please dispense 1 kit 1 kit 0    calcium carbonate  (OS-MELODY) 600 MG tablet Take 600 mg by mouth 2 (two) times a day with meals      cyclobenzaprine (FLEXERIL) 10 mg tablet Take 1 tablet (10 mg total) by mouth 2 (two) times a day as needed for muscle spasms for up to 7 days 14 tablet 0    Diclofenac Sodium (VOLTAREN) 1 % Apply 2 g topically 4 (four) times a day (Patient not taking: Reported on 7/25/2024) 100 g 0    drospirenone-ethinyl estradiol (LIEN) 3-0.02 MG per tablet Take 1 tablet by mouth daily 84 tablet 4    estradiol (ESTRACE VAGINAL) 0.1 mg/g vaginal cream One gram vaginally at night x 14 days then twice weekly for ongoing maintenance. (Patient not taking: Reported on 7/25/2024) 42.5 g 2    fluticasone (FLONASE) 50 mcg/act nasal spray 1 spray into each nostril daily 15.8 mL 0    glucose blood (FREESTYLE LITE) test strip Test once daily 100 each 1    lamoTRIgine (LaMICtal) 150 MG tablet Take 1 tablet (150 mg total) by mouth 2 (two) times a day 180 tablet 3    Lancets (freestyle) lancets Use as instructed, once daily 100 each 1    lidocaine (Lidoderm) 5 % Apply 1 patch topically over 12 hours daily Remove & Discard patch within 12 hours or as directed by MD 30 patch 1    metFORMIN (GLUCOPHAGE-XR) 500 mg 24 hr tablet Take 1 tablet (500 mg total) by mouth 2 (two) times a day with meals 180 tablet 1    methocarbamol (ROBAXIN) 750 mg tablet Take 1 tablet (750 mg total) by mouth every 8 (eight) hours (Patient taking differently: Take 750 mg by mouth if needed) 270 tablet 0    mirtazapine (REMERON) 15 mg tablet Take 1 tablet (15 mg total) by mouth daily at bedtime (Patient not taking: Reported on 7/25/2024) 90 tablet 1    montelukast (SINGULAIR) 10 mg tablet Take 1 tablet (10 mg total) by mouth daily at bedtime 90 tablet 2    Multiple Vitamins-Minerals (MULTI COMPLETE PO) Take by mouth      omeprazole (PriLOSEC) 20 mg delayed release capsule Take 1 capsule (20 mg total) by mouth daily 90 capsule 3    semaglutide, 0.25 or 0.5 mg/dose, (Ozempic, 0.25 or 0.5 MG/DOSE,) 2  mg/3 mL injection pen 0.25 mg under the skin every 7 days for 4 doses (28 days), THEN 0.5 mg under the skin every 7 days 9 mL 0    venlafaxine (EFFEXOR) 100 MG tablet Take 1 tablet (100 mg total) by mouth 3 (three) times a day 270 tablet 2     No current facility-administered medications for this visit.        Allergies   Allergen Reactions    Ibuprofen Other (See Comments)     Due to gastric sleeve -can only take for 5 days, then needs to stop       Review of Systems    Video Exam    There were no vitals filed for this visit.    Physical Exam     Behavioral Health Psychotherapy Progress Note    Psychotherapy Provided: Individual Psychotherapy     1. MDD (major depressive disorder), recurrent severe, without psychosis (HCC)        2. Generalized anxiety disorder        3. Post traumatic stress disorder (PTSD)        4. ADHD (attention deficit hyperactivity disorder), inattentive type            Goals addressed in session: Goal 1     DATA: Met with Yessy for her scheduled individual session. Yessy discussed her work and her education. She continues to balance her work schedule and her need to complete her schoolwork. Yessy states that she is very much enjoying her new role. She states that the hours are much better for her than her previous job. In addition, she is enjoying the people with whom she is working. She states that she has some feelings of sadness related to leaving the ED; however, she describes her situation as primarily very positive. Yessy discussed her engagement in her schooling. She states that she is very nervous about the upcoming term-- as she will be taking Statistics. She is planning to talk with the Disability office, to ask for some accommodations or for some extra support. We spent some time reviewing and updating Yessy's treatment plan. She wants to continue to maintain the same goals-- to manage her moods and to get through school and increase her job opportunities. This clinician will  "send the treatment plan to Yessy, for her signature, through [employer].     During this session, this clinician used the following therapeutic modalities: Client-centered Therapy, Cognitive Behavioral Therapy, Mindfulness-based Strategies, Motivational Interviewing, Solution-Focused Therapy, and Supportive Psychotherapy    Substance Abuse was not addressed during this session. If the client is diagnosed with a co-occurring substance use disorder, please indicate any changes in the frequency or amount of use: n/a. Stage of change for addressing substance use diagnoses: No substance use/Not applicable    ASSESSMENT:  Christina Lara presents with a Euthymic/ normal mood.     her affect is Normal range and intensity, which is congruent, with her mood and the content of the session. The client has made progress on their gioals, as evidenced by her adjustment to her new job and her continued successful engagement in her educational courses.    Christina Lara presents with a minimal risk of suicide, minimal risk of self-harm, and minimal risk of harm to others.    For any risk assessment that surpasses a \"low\" rating, a safety plan must be developed.    A safety plan was indicated: no  If yes, describe in detail n/a    PLAN: Between sessions, Christina Lara will continue to monitor her moods. She will continue to work on seeking services to assist her with maintaining her success in college. At the next session, the therapist will use Client-centered Therapy, Cognitive Behavioral Therapy, Mindfulness-based Strategies, Motivational Interviewing, Solution-Focused Therapy, and Supportive Psychotherapy to address her mood regulation and relationship concerns.    Behavioral Health Treatment Plan and Discharge Planning: Christina Lara is aware of and agrees to continue to work on their treatment plan. They have identified and are working toward their discharge goals. yes    Visit start and stop times:    08/27/24  Start Time: " 0036  Stop Time: 1800  Total Visit Time: 55 minutes

## 2024-09-03 NOTE — PROGRESS NOTES
"MDT LNQ22/ ACTIVE SYSTEM IS MRI CONDITIONAL   CARELINK TRANSMISSION: LOOP RECORDER. PRESERNTING RHYTHM ST @ 100 BPM. BATTERY STATUS \"OK.\" NO PATIENT OR DEVICE ACTIVATED EPISODES. NORMAL DEVICE FUNCTION. DL   "

## 2024-09-05 ENCOUNTER — HOSPITAL ENCOUNTER (OUTPATIENT)
Dept: INFUSION CENTER | Facility: CLINIC | Age: 54
Discharge: HOME | End: 2024-09-05
Payer: COMMERCIAL

## 2024-09-05 VITALS
HEART RATE: 75 BPM | SYSTOLIC BLOOD PRESSURE: 130 MMHG | DIASTOLIC BLOOD PRESSURE: 70 MMHG | TEMPERATURE: 98 F | RESPIRATION RATE: 16 BRPM | OXYGEN SATURATION: 99 %

## 2024-09-05 DIAGNOSIS — E66.3 OVERWEIGHT (BMI 25.0-29.9): ICD-10-CM

## 2024-09-05 DIAGNOSIS — D50.8 IRON DEFICIENCY ANEMIA FOLLOWING BARIATRIC SURGERY: ICD-10-CM

## 2024-09-05 DIAGNOSIS — K95.89 IRON DEFICIENCY ANEMIA FOLLOWING BARIATRIC SURGERY: ICD-10-CM

## 2024-09-05 DIAGNOSIS — Z98.84 BARIATRIC SURGERY STATUS: Primary | ICD-10-CM

## 2024-09-05 PROCEDURE — 96365 THER/PROPH/DIAG IV INF INIT: CPT

## 2024-09-05 RX ORDER — SODIUM CHLORIDE 9 MG/ML
20 INJECTION, SOLUTION INTRAVENOUS ONCE
Status: CANCELLED | OUTPATIENT
Start: 2024-09-13

## 2024-09-05 RX ORDER — SODIUM CHLORIDE 9 MG/ML
20 INJECTION, SOLUTION INTRAVENOUS ONCE
Status: COMPLETED | OUTPATIENT
Start: 2024-09-05 | End: 2024-09-05

## 2024-09-05 RX ADMIN — SODIUM CHLORIDE 20 ML/HR: 0.9 INJECTION, SOLUTION INTRAVENOUS at 16:16

## 2024-09-05 RX ADMIN — IRON SUCROSE 200 MG: 20 INJECTION, SOLUTION INTRAVENOUS at 16:17

## 2024-09-05 NOTE — PROGRESS NOTES
Pt here for venofer, tolerated tx well with no complaints at this time. Next appt confirmed for 9/13 at 1530 at [place]. Declined AVS.

## 2024-09-12 ENCOUNTER — HOSPITAL ENCOUNTER (OUTPATIENT)
Dept: RADIOLOGY | Facility: CLINIC | Age: 54
Discharge: HOME | End: 2024-09-12
Payer: COMMERCIAL

## 2024-09-12 VITALS
HEART RATE: 73 BPM | OXYGEN SATURATION: 96 % | SYSTOLIC BLOOD PRESSURE: 103 MMHG | RESPIRATION RATE: 18 BRPM | TEMPERATURE: 97.7 F | DIASTOLIC BLOOD PRESSURE: 66 MMHG

## 2024-09-12 DIAGNOSIS — M46.1 SACROILIITIS (HCC): ICD-10-CM

## 2024-09-12 PROCEDURE — 27096 INJECT SACROILIAC JOINT: CPT | Performed by: ANESTHESIOLOGY

## 2024-09-12 RX ORDER — METHYLPREDNISOLONE ACETATE 80 MG/ML
80 INJECTION, SUSPENSION INTRA-ARTICULAR; INTRALESIONAL; INTRAMUSCULAR; PARENTERAL; SOFT TISSUE ONCE
Status: COMPLETED | OUTPATIENT
Start: 2024-09-12 | End: 2024-09-12

## 2024-09-12 RX ORDER — ROPIVACAINE HYDROCHLORIDE 2 MG/ML
3 INJECTION, SOLUTION EPIDURAL; INFILTRATION; PERINEURAL ONCE
Status: COMPLETED | OUTPATIENT
Start: 2024-09-12 | End: 2024-09-12

## 2024-09-12 RX ORDER — 0.9 % SODIUM CHLORIDE 0.9 %
2 VIAL (ML) INJECTION ONCE
Status: COMPLETED | OUTPATIENT
Start: 2024-09-12 | End: 2024-09-12

## 2024-09-12 RX ADMIN — Medication 2 ML: at 16:28

## 2024-09-12 RX ADMIN — IOHEXOL 1 ML: 300 INJECTION, SOLUTION INTRAVENOUS at 16:30

## 2024-09-12 RX ADMIN — ROPIVACAINE HYDROCHLORIDE 3 ML: 2 INJECTION, SOLUTION EPIDURAL; INFILTRATION; PERINEURAL at 16:30

## 2024-09-12 RX ADMIN — METHYLPREDNISOLONE ACETATE 80 MG: 80 INJECTION, SUSPENSION INTRA-ARTICULAR; INTRALESIONAL; INTRAMUSCULAR; PARENTERAL; SOFT TISSUE at 16:30

## 2024-09-12 RX ADMIN — SODIUM CHLORIDE 2 ML: 9 INJECTION INTRAMUSCULAR; INTRAVENOUS; SUBCUTANEOUS at 16:28

## 2024-09-12 NOTE — DISCHARGE INSTR - LAB

## 2024-09-12 NOTE — H&P
History of Present Illness: The patient is a 54 y.o. female who presents with complaints of lower back pain is here today for bilateral sacroiliac injections    Past Medical History:   Diagnosis Date    Acute right ankle pain 2023    ADHD (attention deficit hyperactivity disorder)     Allergic     Anxiety     Bulging lumbar disc     Carpal tunnel syndrome     RIGHT.  LAST ASSESSED: 16    Chronic back pain     low    Chronic pain disorder     Colon polyps     COVID-19     2021    Depression     Diabetes mellitus (HCC)     GERD (gastroesophageal reflux disease)     Gestational diabetes     Hearing loss     left ear    Heart disease     SVT    History of pre-eclampsia     Hyperlipidemia     Resolved with weight loss    Hyponatremia 2020    IBS (irritable bowel syndrome)     Ileus (HCC)     LAST ASSESSED: 8/3/17    Labial cyst     LAST ASSESSED: 16    Memory loss     Myofascial pain     LAST ASSESSED: 16    Neck mass 2023    Obesity     Ovarian cyst     LEFT. LAST ASSESSED: 16    Panic attack     Pneumonia     Sacroiliitis (HCC)     Seasonal allergies     Sprain of anterior talofibular ligament of right ankle 2023    SVT (supraventricular tachycardia)     Thoracic outlet syndrome     2010    Trochanteric bursitis of both hips     LAST ASSESSED: 3/18/16    Ulnar neuropathy at elbow     Varicella     Wears glasses        Past Surgical History:   Procedure Laterality Date    BARIATRIC SURGERY  2022    BILE DUCT EXPLORATION      ENDOSCOPIC REMOVAL OF STONES FROM BILIARY TRACT    CARDIAC ELECTROPHYSIOLOGY PROCEDURE N/A 2024    Procedure: Cardiac eps/svt ablation;  Surgeon: Daquan Heath MD;  [facility];  Service: Cardiology     SECTION      x3    CHOLECYSTECTOMY      COLONOSCOPY      -polyp, -wnl, repeat5 years    DILATION AND CURETTAGE OF UTERUS      ENDOMETRIAL ABLATION      ERCP W/ SPHICTEROTOMY      FIRST RIB REMOVAL      THORAX  EXCISION OF FIRST RIB    GASTRIC BYPASS  8/08/2022    HYSTEROSCOPY      NEUROPLASTY / TRANSPOSITION ULNAR NERVE AT ELBOW Right 2011    VA COLONOSCOPY FLX DX W/COLLJ SPEC WHEN PFRMD N/A 05/30/2017    Procedure: COLONOSCOPY;  Surgeon: Oscar Velázquez MD;  [facility];  Service: Gastroenterology    VA ESOPHAGOGASTRODUODENOSCOPY TRANSORAL DIAGNOSTIC N/A 09/14/2017    Procedure: ESOPHAGOGASTRODUODENOSCOPY (EGD);  Surgeon: Sadiq Car MD;  [facility];  Service: Gastroenterology    VA HYSTEROSCOPY BX ENDOMETRIUM&/POLYPC W/WO D&C N/A 10/20/2017    Procedure: DILATATION AND CURETTAGE (D&C) WITH HYSTEROSCOPY  REMOVAL VULVAR RT. LESION;  Surgeon: Lucila Ortiz MD;  [facility];  Service: Gynecology    VA ALDANA IMPLTJ NSTIM ELTRDS PLATE/PADDLE EDRL Left 01/29/2020    Procedure: Insertion of thoracic spinal cord stimulator electrode via laminotomy and placement of left buttock implantable pulse generator (NEUROMONITORING);  Surgeon: Ad Mehta MD;  [facility];  Service: Neurosurgery    VA LAPS GSTRC RSTRICTIV PX LONGITUDINAL GASTRECTOMY N/A 02/06/2018    Procedure: GASTRECTOMY SLEEVE LAPAROSCOPIC; INTRAOPERATIVE EGD ;  Surgeon: Abimael Juarez MD;  [facility];  Service: Bariatrics    VA LAPS GSTRC RSTRICTIV PX LONGITUDINAL GASTRECTOMY N/A 08/08/2022    Procedure: Diagnostic Lap; Extensive Lysis of Adhesions; LAPAROSCOPIC REVISION CONVERSION TO NOE-EN-Y GASTRIC BYPASS AND INTRAOPERATIVE EGD;  Surgeon: Abimael Juarez MD;  [facility];  Service: Bariatrics    VA NEUROPLASTY &/TRANSPOSITION ULNAR NERVE ELBOW Left 7/19/2024    Procedure: RELEASE CUBITAL TUNNEL- left with ulnar nerve transposition;  Surgeon: Maggy Leary DO;  [facility];  Service: Orthopedics    SLEEVE GASTROPLASTY      SPINAL CORD STIMULATOR TRIAL W/ LAMINOTOMY      TONSILLECTOMY AND ADENOIDECTOMY      TUBAL LIGATION      UPPER GASTROINTESTINAL ENDOSCOPY      US GUIDED LYMPH NODE BIOPSY LEFT   05/22/2023    VEIN LIGATION AND STRIPPING Right     VULVA SURGERY  10/20/2017    BIOPSY    WISDOM TOOTH EXTRACTION           Current Outpatient Medications:     atoMOXetine (STRATTERA) 60 mg capsule, Take 1 capsule (60 mg total) by mouth daily, Disp: 90 capsule, Rfl: 3    atorvastatin (LIPITOR) 20 mg tablet, Take 1 tablet (20 mg total) by mouth daily, Disp: 90 tablet, Rfl: 1    Blood Glucose Monitoring Suppl (FreeStyle Freedom Lite) w/Device KIT, Please dispense 1 kit, Disp: 1 kit, Rfl: 0    calcium carbonate (OS-MELODY) 600 MG tablet, Take 600 mg by mouth 2 (two) times a day with meals, Disp: , Rfl:     cyclobenzaprine (FLEXERIL) 10 mg tablet, Take 1 tablet (10 mg total) by mouth 2 (two) times a day as needed for muscle spasms for up to 7 days, Disp: 14 tablet, Rfl: 0    Diclofenac Sodium (VOLTAREN) 1 %, Apply 2 g topically 4 (four) times a day (Patient not taking: Reported on 7/25/2024), Disp: 100 g, Rfl: 0    drospirenone-ethinyl estradiol (LIEN) 3-0.02 MG per tablet, Take 1 tablet by mouth daily, Disp: 84 tablet, Rfl: 4    estradiol (ESTRACE VAGINAL) 0.1 mg/g vaginal cream, One gram vaginally at night x 14 days then twice weekly for ongoing maintenance. (Patient not taking: Reported on 7/25/2024), Disp: 42.5 g, Rfl: 2    fluticasone (FLONASE) 50 mcg/act nasal spray, 1 spray into each nostril daily, Disp: 15.8 mL, Rfl: 0    glucose blood (FREESTYLE LITE) test strip, Test once daily, Disp: 100 each, Rfl: 1    lamoTRIgine (LaMICtal) 150 MG tablet, Take 1 tablet (150 mg total) by mouth 2 (two) times a day, Disp: 180 tablet, Rfl: 3    Lancets (freestyle) lancets, Use as instructed, once daily, Disp: 100 each, Rfl: 1    lidocaine (Lidoderm) 5 %, Apply 1 patch topically over 12 hours daily Remove & Discard patch within 12 hours or as directed by MD, Disp: 30 patch, Rfl: 1    metFORMIN (GLUCOPHAGE-XR) 500 mg 24 hr tablet, Take 1 tablet (500 mg total) by mouth 2 (two) times a day with meals, Disp: 180 tablet, Rfl: 1     methocarbamol (ROBAXIN) 750 mg tablet, Take 1 tablet (750 mg total) by mouth every 8 (eight) hours (Patient taking differently: Take 750 mg by mouth if needed), Disp: 270 tablet, Rfl: 0    mirtazapine (REMERON) 15 mg tablet, Take 1 tablet (15 mg total) by mouth daily at bedtime (Patient not taking: Reported on 7/25/2024), Disp: 90 tablet, Rfl: 1    montelukast (SINGULAIR) 10 mg tablet, Take 1 tablet (10 mg total) by mouth daily at bedtime, Disp: 90 tablet, Rfl: 2    Multiple Vitamins-Minerals (MULTI COMPLETE PO), Take by mouth, Disp: , Rfl:     omeprazole (PriLOSEC) 20 mg delayed release capsule, Take 1 capsule (20 mg total) by mouth daily, Disp: 90 capsule, Rfl: 3    semaglutide, 0.25 or 0.5 mg/dose, (Ozempic, 0.25 or 0.5 MG/DOSE,) 2 mg/3 mL injection pen, 0.25 mg under the skin every 7 days for 4 doses (28 days), THEN 0.5 mg under the skin every 7 days, Disp: 9 mL, Rfl: 0    venlafaxine (EFFEXOR) 100 MG tablet, Take 1 tablet (100 mg total) by mouth 3 (three) times a day, Disp: 270 tablet, Rfl: 2    Allergies   Allergen Reactions    Ibuprofen Other (See Comments)     Due to gastric sleeve -can only take for 5 days, then needs to stop       Physical Exam:   Vitals:    09/12/24 1614   BP: 107/68   Pulse: 77   Resp: 20   Temp: 97.7 °F (36.5 °C)   SpO2: 98%     General: Awake, Alert, Oriented x 3, Mood and affect appropriate  Respiratory: Respirations even and unlabored  Cardiovascular: Peripheral pulses intact; no edema  Musculoskeletal Exam: Lower back pain    ASA Score: 3    Patient/Chart Verification  Patient ID Verified: Verbal  ID Band Applied: No  Consents Confirmed: Procedural, To be obtained in the Pre-Procedure area  Interval H&P(within 24 hr) Complete (required for Outpatients and Surgery Admit only): To be obtained in the Procedural area  Allergies Reviewed: Yes  Anticoag/NSAID held?: NA  Currently on antibiotics?: No    Assessment:   1. Sacroiliitis (HCC)        Plan: B/L SIJ     yes

## 2024-09-13 ENCOUNTER — HOSPITAL ENCOUNTER (OUTPATIENT)
Dept: INFUSION CENTER | Facility: CLINIC | Age: 54
End: 2024-09-13
Payer: COMMERCIAL

## 2024-09-13 VITALS
OXYGEN SATURATION: 98 % | SYSTOLIC BLOOD PRESSURE: 109 MMHG | TEMPERATURE: 98.9 F | DIASTOLIC BLOOD PRESSURE: 68 MMHG | RESPIRATION RATE: 16 BRPM | HEART RATE: 70 BPM

## 2024-09-13 DIAGNOSIS — E66.3 OVERWEIGHT (BMI 25.0-29.9): ICD-10-CM

## 2024-09-13 DIAGNOSIS — Z98.84 BARIATRIC SURGERY STATUS: Primary | ICD-10-CM

## 2024-09-13 DIAGNOSIS — D50.8 IRON DEFICIENCY ANEMIA FOLLOWING BARIATRIC SURGERY: ICD-10-CM

## 2024-09-13 DIAGNOSIS — K95.89 IRON DEFICIENCY ANEMIA FOLLOWING BARIATRIC SURGERY: ICD-10-CM

## 2024-09-13 PROCEDURE — 96365 THER/PROPH/DIAG IV INF INIT: CPT

## 2024-09-13 RX ORDER — SODIUM CHLORIDE 9 MG/ML
20 INJECTION, SOLUTION INTRAVENOUS ONCE
Status: COMPLETED | OUTPATIENT
Start: 2024-09-13 | End: 2024-09-13

## 2024-09-13 RX ORDER — SODIUM CHLORIDE 9 MG/ML
20 INJECTION, SOLUTION INTRAVENOUS ONCE
OUTPATIENT
Start: 2024-09-19

## 2024-09-13 RX ADMIN — IRON SUCROSE 200 MG: 20 INJECTION, SOLUTION INTRAVENOUS at 15:33

## 2024-09-13 RX ADMIN — SODIUM CHLORIDE 20 ML/HR: 0.9 INJECTION, SOLUTION INTRAVENOUS at 15:30

## 2024-09-13 NOTE — PROGRESS NOTES
Patient tolerated her treatment without any adverse reactions. Next appointment confirmed 9/19/24 at 1600 at [place]. Patient declined avs

## 2024-09-24 ENCOUNTER — TELEPHONE (OUTPATIENT)
Age: 54
End: 2024-09-24

## 2024-09-24 NOTE — TELEPHONE ENCOUNTER
Patient is calling regarding cancelling an appointment.    Date/Time: 9/24 @ 5pm    Reason: pt sick    Patient was rescheduled: YES [] NO [x]  If yes, when was Patient reschedule for:     Patient requesting call back to reschedule: YES [] NO [x]    Pt will see provider at next appt

## 2024-09-25 ENCOUNTER — OFFICE VISIT (OUTPATIENT)
Dept: FAMILY MEDICINE CLINIC | Facility: CLINIC | Age: 54
End: 2024-09-25
Payer: COMMERCIAL

## 2024-09-25 VITALS
HEIGHT: 67 IN | TEMPERATURE: 97.8 F | BODY MASS INDEX: 24.48 KG/M2 | OXYGEN SATURATION: 98 % | DIASTOLIC BLOOD PRESSURE: 70 MMHG | RESPIRATION RATE: 16 BRPM | WEIGHT: 156 LBS | HEART RATE: 82 BPM | SYSTOLIC BLOOD PRESSURE: 110 MMHG

## 2024-09-25 DIAGNOSIS — J01.01 ACUTE RECURRENT MAXILLARY SINUSITIS: Primary | ICD-10-CM

## 2024-09-25 DIAGNOSIS — E11.9 CONTROLLED TYPE 2 DIABETES MELLITUS WITHOUT COMPLICATION, WITHOUT LONG-TERM CURRENT USE OF INSULIN (HCC): ICD-10-CM

## 2024-09-25 DIAGNOSIS — J34.89 STUFFY AND RUNNY NOSE: ICD-10-CM

## 2024-09-25 LAB
SARS-COV-2 AG UPPER RESP QL IA: NEGATIVE
VALID CONTROL: NORMAL

## 2024-09-25 PROCEDURE — 87811 SARS-COV-2 COVID19 W/OPTIC: CPT | Performed by: FAMILY MEDICINE

## 2024-09-25 PROCEDURE — 99213 OFFICE O/P EST LOW 20 MIN: CPT | Performed by: FAMILY MEDICINE

## 2024-09-25 NOTE — ASSESSMENT & PLAN NOTE
Lab Results   Component Value Date    HGBA1C 6.1 (H) 06/28/2024   Stable on current meds  Increased ozempic to 1 mg weekly for weight loss     Orders:    semaglutide, 1 mg/dose, (Ozempic) 4 mg/3 mL injection pen; Inject 0.75 mL (1 mg total) under the skin once a week

## 2024-09-25 NOTE — PROGRESS NOTES
Ambulatory Visit  Name: Christina Lara      : 1970      MRN: [id number]  Encounter Provider: Debo Gee MD  Encounter Date: 2024   Encounter department: [facility]    Assessment & Plan  Acute recurrent maxillary sinusitis    Orders:    amoxicillin-clavulanate (AUGMENTIN) 875-125 mg per tablet; Take 1 tablet by mouth every 12 (twelve) hours for 10 days  flonase daily  Stuffy and runny nose    Orders:    POCT Rapid Covid Ag    Controlled type 2 diabetes mellitus without complication, without long-term current use of insulin ([facility])    Lab Results   Component Value Date    HGBA1C 6.1 (H) 2024   Stable on current meds  Increased ozempic to 1 mg weekly for weight loss     Orders:    semaglutide, 1 mg/dose, (Ozempic) 4 mg/3 mL injection pen; Inject 0.75 mL (1 mg total) under the skin once a week         History of Present Illness     Sore Throat   This is a new problem. The current episode started in the past 7 days. The problem has been waxing and waning. The pain is worse on the left side. Associated symptoms include headaches. Pertinent negatives include no abdominal pain, congestion, coughing, diarrhea, drooling, ear discharge, ear pain, hoarse voice, plugged ear sensation, neck pain, shortness of breath, stridor, swollen glands, trouble swallowing or vomiting. She has had no exposure to strep or mono. She has tried gargles for the symptoms. The treatment provided mild relief.     Review of Systems   HENT:  Positive for sore throat. Negative for congestion, drooling, ear discharge, ear pain, hoarse voice and trouble swallowing.    Respiratory:  Negative for cough, shortness of breath and stridor.    Gastrointestinal:  Negative for abdominal pain, diarrhea and vomiting.   Musculoskeletal:  Negative for neck pain.   Neurological:  Positive for headaches.     Past Medical History:   Diagnosis Date    Acute right ankle pain 2023    ADHD (attention deficit hyperactivity  disorder)     Allergic     Anxiety     Bulging lumbar disc     Carpal tunnel syndrome     RIGHT.  LAST ASSESSED: 16    Chronic back pain     low    Chronic pain disorder     Colon polyps     COVID-19     2021    Depression     Diabetes mellitus (HCC)     GERD (gastroesophageal reflux disease)     Gestational diabetes     Hearing loss     left ear    Heart disease     SVT    History of pre-eclampsia     Hyperlipidemia     Resolved with weight loss    Hyponatremia 2020    IBS (irritable bowel syndrome)     Ileus (HCC)     LAST ASSESSED: 8/3/17    Labial cyst     LAST ASSESSED: 16    Memory loss     Myofascial pain     LAST ASSESSED: 16    Neck mass 2023    Obesity     Ovarian cyst     LEFT. LAST ASSESSED: 16    Panic attack     Pneumonia     Sacroiliitis (HCC)     Seasonal allergies     Sprain of anterior talofibular ligament of right ankle 2023    SVT (supraventricular tachycardia)     Thoracic outlet syndrome     2010    Trochanteric bursitis of both hips     LAST ASSESSED: 3/18/16    Ulnar neuropathy at elbow     Varicella     Wears glasses      Past Surgical History:   Procedure Laterality Date    BARIATRIC SURGERY  2022    BILE DUCT EXPLORATION      ENDOSCOPIC REMOVAL OF STONES FROM BILIARY TRACT    CARDIAC ELECTROPHYSIOLOGY PROCEDURE N/A 2024    Procedure: Cardiac eps/svt ablation;  Surgeon: Daquan Heath MD;  Location: [facility];  Service: Cardiology     SECTION      x3    CHOLECYSTECTOMY      COLONOSCOPY      -polyp, -wnl, repeat5 years    DILATION AND CURETTAGE OF UTERUS      ENDOMETRIAL ABLATION      ERCP W/ SPHICTEROTOMY      FIRST RIB REMOVAL      THORAX EXCISION OF FIRST RIB    GASTRIC BYPASS  2022    HYSTEROSCOPY      NEUROPLASTY / TRANSPOSITION ULNAR NERVE AT ELBOW Right     NV COLONOSCOPY FLX DX W/COLLJ SPEC WHEN PFRMD N/A 2017    Procedure: COLONOSCOPY;  Surgeon: Oscar Velázquez MD;  Location: [facility];  Service:  Gastroenterology    LA ESOPHAGOGASTRODUODENOSCOPY TRANSORAL DIAGNOSTIC N/A 09/14/2017    Procedure: ESOPHAGOGASTRODUODENOSCOPY (EGD);  Surgeon: Sadiq Car MD;  Location: [facility];  Service: Gastroenterology    LA HYSTEROSCOPY BX ENDOMETRIUM&/POLYPC W/WO D&C N/A 10/20/2017    Procedure: DILATATION AND CURETTAGE (D&C) WITH HYSTEROSCOPY  REMOVAL VULVAR RT. LESION;  Surgeon: Lucila Ortiz MD;  [facility];  Service: Gynecology    LA ALDANA IMPLTJ NSTIM ELTRDS PLATE/PADDLE EDRL Left 01/29/2020    Procedure: Insertion of thoracic spinal cord stimulator electrode via laminotomy and placement of left buttock implantable pulse generator (NEUROMONITORING);  Surgeon: Ad Mehta MD;  Location[facility];  Service: Neurosurgery    LA LAPS GSTRC RSTRICTIV PX LONGITUDINAL GASTRECTOMY N/A 02/06/2018    Procedure: GASTRECTOMY SLEEVE LAPAROSCOPIC; INTRAOPERATIVE EGD ;  Surgeon: Abimael Juarez MD;  [facility];  Service: Bariatrics    LA LAPS GSTRC RSTRICTIV PX LONGITUDINAL GASTRECTOMY N/A 08/08/2022    Procedure: Diagnostic Lap; Extensive Lysis of Adhesions; LAPAROSCOPIC REVISION CONVERSION TO NOE-EN-Y GASTRIC BYPASS AND INTRAOPERATIVE EGD;  Surgeon: Abimael Juarez MD;  [facility];  Service: Bariatrics    LA NEUROPLASTY &/TRANSPOSITION ULNAR NERVE ELBOW Left 7/19/2024    Procedure: RELEASE CUBITAL TUNNEL- left with ulnar nerve transposition;  Surgeon: Maggy Leary DO;  Location: [facility];  Service: Orthopedics    SLEEVE GASTROPLASTY      SPINAL CORD STIMULATOR TRIAL W/ LAMINOTOMY      TONSILLECTOMY AND ADENOIDECTOMY      TUBAL LIGATION      UPPER GASTROINTESTINAL ENDOSCOPY      US GUIDED LYMPH NODE BIOPSY LEFT  05/22/2023    VEIN LIGATION AND STRIPPING Right     VULVA SURGERY  10/20/2017    BIOPSY    WISDOM TOOTH EXTRACTION       Family History   Problem Relation Age of Onset    Diabetes Mother     Breast cancer Mother         >50    BRCA1 Negative Mother     BRCA2 Negative Mother      Hyperlipidemia Mother     Cancer Mother         Breast    Diabetes Father     Other Father         traumatic brain injury    Prostate cancer Father     Alcohol abuse Father         in remission    Heart disease Father     Neuropathy Father     Hyperlipidemia Father     Cancer Father         Prostate    Hypertension Brother     Diabetes Brother     Other Brother         HYPERCHOLESTEROLEMIA    Alcohol abuse Brother     Depression Brother         attempted suicide    Anxiety disorder Brother     Suicide Attempts Brother     Diabetes Brother     Alcohol abuse Brother     Heart attack Maternal Grandmother     Colon cancer Maternal Grandfather     No Known Problems Paternal Grandmother         dad is adopted    No Known Problems Paternal Grandfather         dad is adopted    No Known Problems Daughter     Asthma Son     Ovarian cancer Neg Hx     Uterine cancer Neg Hx      Social History     Tobacco Use    Smoking status: Former     Current packs/day: 0.00     Average packs/day: 1 pack/day for 15.0 years (15.0 ttl pk-yrs)     Types: Cigarettes     Start date: 1998     Quit date: 2013     Years since quittin.4     Passive exposure: Never    Smokeless tobacco: Never   Vaping Use    Vaping status: Never Used   Substance and Sexual Activity    Alcohol use: Yes     Alcohol/week: 4.0 standard drinks of alcohol     Types: 2 Shots of liquor, 2 Standard drinks or equivalent per week     Comment: On weekends that i go out    Drug use: No    Sexual activity: Yes     Partners: Male     Birth control/protection: OCP, Post-menopausal     Comment: lifetime partners: 6; current partner      Current Outpatient Medications on File Prior to Visit   Medication Sig    atoMOXetine (STRATTERA) 60 mg capsule Take 1 capsule (60 mg total) by mouth daily    atorvastatin (LIPITOR) 20 mg tablet Take 1 tablet (20 mg total) by mouth daily    Blood Glucose Monitoring Suppl (FreeStyle Freedom Lite) w/Device KIT Please dispense 1 kit     calcium carbonate (OS-MELODY) 600 MG tablet Take 600 mg by mouth 2 (two) times a day with meals    cyclobenzaprine (FLEXERIL) 10 mg tablet Take 1 tablet (10 mg total) by mouth 2 (two) times a day as needed for muscle spasms for up to 7 days    drospirenone-ethinyl estradiol (LIEN) 3-0.02 MG per tablet Take 1 tablet by mouth daily    fluticasone (FLONASE) 50 mcg/act nasal spray 1 spray into each nostril daily    glucose blood (FREESTYLE LITE) test strip Test once daily    lamoTRIgine (LaMICtal) 150 MG tablet Take 1 tablet (150 mg total) by mouth 2 (two) times a day    Lancets (freestyle) lancets Use as instructed, once daily    lidocaine (Lidoderm) 5 % Apply 1 patch topically over 12 hours daily Remove & Discard patch within 12 hours or as directed by MD    metFORMIN (GLUCOPHAGE-XR) 500 mg 24 hr tablet Take 1 tablet (500 mg total) by mouth 2 (two) times a day with meals    methocarbamol (ROBAXIN) 750 mg tablet Take 1 tablet (750 mg total) by mouth every 8 (eight) hours (Patient taking differently: Take 750 mg by mouth if needed)    montelukast (SINGULAIR) 10 mg tablet Take 1 tablet (10 mg total) by mouth daily at bedtime    Multiple Vitamins-Minerals (MULTI COMPLETE PO) Take by mouth    omeprazole (PriLOSEC) 20 mg delayed release capsule Take 1 capsule (20 mg total) by mouth daily    venlafaxine (EFFEXOR) 100 MG tablet Take 1 tablet (100 mg total) by mouth 3 (three) times a day    [DISCONTINUED] semaglutide, 0.25 or 0.5 mg/dose, (Ozempic, 0.25 or 0.5 MG/DOSE,) 2 mg/3 mL injection pen 0.25 mg under the skin every 7 days for 4 doses (28 days), THEN 0.5 mg under the skin every 7 days    Diclofenac Sodium (VOLTAREN) 1 % Apply 2 g topically 4 (four) times a day (Patient not taking: Reported on 7/25/2024)    estradiol (ESTRACE VAGINAL) 0.1 mg/g vaginal cream One gram vaginally at night x 14 days then twice weekly for ongoing maintenance. (Patient not taking: Reported on 7/25/2024)    mirtazapine (REMERON) 15 mg tablet Take 1  "tablet (15 mg total) by mouth daily at bedtime (Patient not taking: Reported on 7/25/2024)     Allergies   Allergen Reactions    Ibuprofen Other (See Comments)     Due to gastric sleeve -can only take for 5 days, then needs to stop     Immunization History   Administered Date(s) Administered    COVID-19 PFIZER VACCINE 0.3 ML IM 03/06/2021, 03/25/2021, 12/11/2021    INFLUENZA 11/27/2015, 10/01/2016, 10/06/2017, 12/12/2018, 10/17/2019, 10/09/2020, 10/19/2021, 09/07/2022, 09/29/2023    Influenza Quadrivalent Preservative Free 3 years and older IM 10/01/2016    Influenza Quadrivalent, 6-35 Months IM 11/27/2015    Influenza, injectable, quadrivalent, preservative free 0.5 mL 09/29/2023    Influenza, recombinant, quadrivalent,injectable, preservative free 10/17/2019, 10/09/2020, 09/07/2022    Influenza, seasonal, injectable 09/01/2011, 10/20/2013, 10/06/2017    Pneumococcal Conjugate Vaccine 20-valent (Pcv20), Polysace 07/25/2022    Tdap 09/01/2011, 08/12/2022    Zoster Vaccine Recombinant 07/25/2024     Objective     /70 (BP Location: Left arm, Patient Position: Sitting, Cuff Size: Standard)   Pulse 82   Temp 97.8 °F (36.6 °C) (Tympanic)   Resp 16   Ht 5' 7\" (1.702 m)   Wt 70.8 kg (156 lb)   LMP 01/07/2019 (Approximate)   SpO2 98%   BMI 24.43 kg/m²     Physical Exam  Constitutional:       Appearance: She is well-developed.   HENT:      Right Ear: Tympanic membrane normal.      Left Ear:  No middle ear effusion.      Nose: No congestion or rhinorrhea.      Right Sinus: Maxillary sinus tenderness present.      Left Sinus: No maxillary sinus tenderness.      Mouth/Throat:      Pharynx: Posterior oropharyngeal erythema present.   Cardiovascular:      Rate and Rhythm: Normal rate and regular rhythm.   Pulmonary:      Effort: Pulmonary effort is normal.      Breath sounds: Normal breath sounds.   Neurological:      Mental Status: She is alert.         "

## 2024-09-26 ENCOUNTER — HOSPITAL ENCOUNTER (OUTPATIENT)
Dept: INFUSION CENTER | Facility: CLINIC | Age: 54
Discharge: HOME | End: 2024-09-26

## 2024-09-26 ENCOUNTER — TELEPHONE (OUTPATIENT)
Dept: PAIN MEDICINE | Facility: CLINIC | Age: 54
End: 2024-09-26

## 2024-09-29 DIAGNOSIS — Z48.815 ENCOUNTER FOR SURGICAL AFTERCARE FOLLOWING SURGERY OF DIGESTIVE SYSTEM: ICD-10-CM

## 2024-09-29 DIAGNOSIS — Z98.84 BARIATRIC SURGERY STATUS: ICD-10-CM

## 2024-10-08 ENCOUNTER — LAB (OUTPATIENT)
Dept: LAB | Facility: CLINIC | Age: 54
End: 2024-10-08
Payer: COMMERCIAL

## 2024-10-08 DIAGNOSIS — E11.9 TYPE 2 DIABETES MELLITUS WITHOUT COMPLICATION, WITHOUT LONG-TERM CURRENT USE OF INSULIN (HCC): ICD-10-CM

## 2024-10-08 DIAGNOSIS — R79.89 HIGH SERUM VITAMIN A: ICD-10-CM

## 2024-10-08 DIAGNOSIS — R79.0 LOW FERRITIN: ICD-10-CM

## 2024-10-08 DIAGNOSIS — K91.89 IRON DEFICIENCY ANEMIA AFTER GASTRECTOMY: ICD-10-CM

## 2024-10-08 DIAGNOSIS — L70.9 ACNE, UNSPECIFIED ACNE TYPE: ICD-10-CM

## 2024-10-08 DIAGNOSIS — D50.8 IRON DEFICIENCY ANEMIA AFTER GASTRECTOMY: ICD-10-CM

## 2024-10-08 DIAGNOSIS — Z98.84 S/P GASTRIC BYPASS: ICD-10-CM

## 2024-10-08 DIAGNOSIS — R63.5 WEIGHT GAIN: ICD-10-CM

## 2024-10-08 DIAGNOSIS — K91.2 POSTSURGICAL MALABSORPTION: ICD-10-CM

## 2024-10-08 DIAGNOSIS — E11.9 CONTROLLED TYPE 2 DIABETES MELLITUS WITHOUT COMPLICATION, WITHOUT LONG-TERM CURRENT USE OF INSULIN (HCC): ICD-10-CM

## 2024-10-08 LAB
ALBUMIN SERPL BCG-MCNC: 4.1 G/DL (ref 3.5–5)
ALP SERPL-CCNC: 53 U/L (ref 34–104)
ALT SERPL W P-5'-P-CCNC: 29 U/L (ref 7–52)
ANION GAP SERPL CALCULATED.3IONS-SCNC: 8 MMOL/L (ref 4–13)
AST SERPL W P-5'-P-CCNC: 23 U/L (ref 13–39)
BILIRUB SERPL-MCNC: 0.3 MG/DL (ref 0.2–1)
BUN SERPL-MCNC: 11 MG/DL (ref 5–25)
CALCIUM SERPL-MCNC: 9.1 MG/DL (ref 8.4–10.2)
CHLORIDE SERPL-SCNC: 104 MMOL/L (ref 96–108)
CHOLEST SERPL-MCNC: 155 MG/DL
CO2 SERPL-SCNC: 27 MMOL/L (ref 21–32)
CREAT SERPL-MCNC: 0.66 MG/DL (ref 0.6–1.3)
CREAT UR-MCNC: 146.1 MG/DL
EST. AVERAGE GLUCOSE BLD GHB EST-MCNC: 117 MG/DL
GFR SERPL CREATININE-BSD FRML MDRD: 99 ML/MIN/1.73SQ M
GLUCOSE P FAST SERPL-MCNC: 83 MG/DL (ref 65–99)
HBA1C MFR BLD: 5.7 %
HDLC SERPL-MCNC: 95 MG/DL
LDLC SERPL CALC-MCNC: 20 MG/DL (ref 0–100)
MICROALBUMIN UR-MCNC: 30.1 MG/L
MICROALBUMIN/CREAT 24H UR: 21 MG/G CREATININE (ref 0–30)
POTASSIUM SERPL-SCNC: 3.9 MMOL/L (ref 3.5–5.3)
PROT SERPL-MCNC: 7.1 G/DL (ref 6.4–8.4)
SODIUM SERPL-SCNC: 139 MMOL/L (ref 135–147)
TRIGL SERPL-MCNC: 202 MG/DL

## 2024-10-08 PROCEDURE — 82570 ASSAY OF URINE CREATININE: CPT

## 2024-10-08 PROCEDURE — 82043 UR ALBUMIN QUANTITATIVE: CPT

## 2024-10-08 PROCEDURE — 84590 ASSAY OF VITAMIN A: CPT

## 2024-10-08 PROCEDURE — 80061 LIPID PANEL: CPT

## 2024-10-08 PROCEDURE — 84403 ASSAY OF TOTAL TESTOSTERONE: CPT

## 2024-10-08 PROCEDURE — 82627 DEHYDROEPIANDROSTERONE: CPT

## 2024-10-08 PROCEDURE — 86800 THYROGLOBULIN ANTIBODY: CPT

## 2024-10-08 PROCEDURE — 84402 ASSAY OF FREE TESTOSTERONE: CPT

## 2024-10-08 PROCEDURE — 86376 MICROSOMAL ANTIBODY EACH: CPT

## 2024-10-08 PROCEDURE — 83036 HEMOGLOBIN GLYCOSYLATED A1C: CPT

## 2024-10-08 PROCEDURE — 80053 COMPREHEN METABOLIC PANEL: CPT

## 2024-10-08 PROCEDURE — 36415 COLL VENOUS BLD VENIPUNCTURE: CPT

## 2024-10-09 LAB
DHEA-S SERPL-MCNC: 58.8 UG/DL (ref 29.4–220.5)
TESTOST FREE SERPL-MCNC: 0.6 PG/ML (ref 0–4.2)
TESTOST SERPL-MCNC: 45 NG/DL (ref 4–50)
THYROGLOB AB SERPL-ACNC: <1 IU/ML (ref 0–0.9)
THYROPEROXIDASE AB SERPL-ACNC: 18 IU/ML (ref 0–34)

## 2024-10-11 ENCOUNTER — OFFICE VISIT (OUTPATIENT)
Dept: BARIATRICS | Facility: CLINIC | Age: 54
End: 2024-10-11
Payer: COMMERCIAL

## 2024-10-11 VITALS
SYSTOLIC BLOOD PRESSURE: 104 MMHG | HEART RATE: 74 BPM | TEMPERATURE: 97.5 F | BODY MASS INDEX: 24.99 KG/M2 | OXYGEN SATURATION: 98 % | WEIGHT: 155.5 LBS | DIASTOLIC BLOOD PRESSURE: 78 MMHG | HEIGHT: 66 IN

## 2024-10-11 DIAGNOSIS — G89.4 CHRONIC PAIN SYNDROME: ICD-10-CM

## 2024-10-11 DIAGNOSIS — Z98.84 BARIATRIC SURGERY STATUS: ICD-10-CM

## 2024-10-11 DIAGNOSIS — K95.89 IRON DEFICIENCY ANEMIA FOLLOWING BARIATRIC SURGERY: ICD-10-CM

## 2024-10-11 DIAGNOSIS — E67.0 HIGH VITAMIN A LEVEL: ICD-10-CM

## 2024-10-11 DIAGNOSIS — K91.2 POSTSURGICAL MALABSORPTION: ICD-10-CM

## 2024-10-11 DIAGNOSIS — I47.10 PAROXYSMAL SVT (SUPRAVENTRICULAR TACHYCARDIA) (HCC): ICD-10-CM

## 2024-10-11 DIAGNOSIS — Z48.815 ENCOUNTER FOR SURGICAL AFTERCARE FOLLOWING SURGERY OF DIGESTIVE SYSTEM: Primary | ICD-10-CM

## 2024-10-11 DIAGNOSIS — E66.3 OVERWEIGHT: ICD-10-CM

## 2024-10-11 DIAGNOSIS — D50.8 IRON DEFICIENCY ANEMIA FOLLOWING BARIATRIC SURGERY: ICD-10-CM

## 2024-10-11 LAB — VIT A SERPL-MCNC: 84.5 UG/DL (ref 20.1–62)

## 2024-10-11 PROCEDURE — 99214 OFFICE O/P EST MOD 30 MIN: CPT | Performed by: PHYSICIAN ASSISTANT

## 2024-10-11 NOTE — PROGRESS NOTES
Date of surgery: 2/6/2018  /  8/8/2022  Procedure: sleeve  /  RNY  Performing surgeon: Dr. Juarez    Initial Weight - 219.5 lbs.  Current Weight - 155.5 lbs.  Bautista Weight - 137.5 lbs.  Total Body Weight Loss (EWL) - 63.9 lbs.  EWL% - 100%  TWB% - 29%

## 2024-10-11 NOTE — PROGRESS NOTES
Assessment/Plan:     Patient ID: Christina Lara is a 54 y.o. female.     Bariatric Surgery Status    status post Vertical Sleeve Gastrectomy with me in 2/6/2018 that unfortunately developed reflux and required a conversion to a Rebecca-en-Y gastric bypass in August 2022.   Doing well ; would like to follow up with RD to review diet and help with weight loss  Currently on ozempic per pcp     Iron def anemia  - getting iron infusions - 2 more sessions left   - will repeat labs in a few months     Continued/Maintain healthy weight loss with good nutrition intakes.  Adequate hydration with at least 64oz. fluid intake.  Follow diet as discussed.  Follow vitamin and mineral recommendations as reviewed with you.  Exercise as tolerated.    Colonoscopy referral made: utd  Mammo: has order, needs to schedule     Follow-up in 6 months . We kindly ask that your arrive 15 minutes before your scheduled appointment time with your provider to allow our staff to room you, get your vital signs and update your chart.    Get lab work done . Please call the office if you need a script.  It is recommended to check with your insurance BEFORE getting labs done to make sure they are covered by your policy.      Call our office if you have any problems with abdominal pain especially associated with fever, chills, nausea, vomiting or any other concerns.    All  Post-bariatric surgery patients should be aware that very small quantities of any alcohol can cause impairment and it is very possible not to feel the effect. The effect can be in the system for several hours.  It is also a stomach irritant.     It is advised to AVOID alcohol, Nonsteroidal antiinflammatory drugs (NSAIDS) and nicotine of all forms . Any of these can cause stomach irritation/pain.    Discussed the effects of alcohol on a bariatric patient and the increased impairment risk.     Keep up the good work!     Postsurgical Malabsorption   -At risk for malabsorption of  vitamins/minerals secondary to malabsorption and restriction of intake from bariatric surgery  -Currently taking adequate postop bariatric surgery vitamin supplementation  - complete iron infusions   -Next set of bariatric labs ordered for approximately 3 months  -Patient received education about the importance of adhering to a lifelong supplementation regimen to avoid vitamin/mineral deficiencies      Diagnoses and all orders for this visit:    Encounter for surgical aftercare following surgery of digestive system  -     Zinc; Future  -     Vitamin D 25 hydroxy; Future  -     Vitamin B12; Future  -     Vitamin B1, whole blood; Future  -     PTH, intact; Future  -     CBC and Platelet; Future  -     Comprehensive metabolic panel; Future  -     Ferritin; Future  -     Folate; Future  -     TIBC Panel (incl. Iron, TIBC, % Iron Saturation); Future  -     Vitamin A; Future    Bariatric surgery status  -     Zinc; Future  -     Vitamin D 25 hydroxy; Future  -     Vitamin B12; Future  -     Vitamin B1, whole blood; Future  -     PTH, intact; Future  -     CBC and Platelet; Future  -     Comprehensive metabolic panel; Future  -     Ferritin; Future  -     Folate; Future  -     TIBC Panel (incl. Iron, TIBC, % Iron Saturation); Future  -     Vitamin A; Future    Postsurgical malabsorption  -     Zinc; Future  -     Vitamin D 25 hydroxy; Future  -     Vitamin B12; Future  -     Vitamin B1, whole blood; Future  -     PTH, intact; Future  -     CBC and Platelet; Future  -     Comprehensive metabolic panel; Future  -     Ferritin; Future  -     Folate; Future  -     TIBC Panel (incl. Iron, TIBC, % Iron Saturation); Future  -     Vitamin A; Future    Iron deficiency anemia following bariatric surgery  -     Zinc; Future  -     Vitamin D 25 hydroxy; Future  -     Vitamin B12; Future  -     Vitamin B1, whole blood; Future  -     PTH, intact; Future  -     CBC and Platelet; Future  -     Comprehensive metabolic panel; Future  -      Ferritin; Future  -     Folate; Future  -     TIBC Panel (incl. Iron, TIBC, % Iron Saturation); Future  -     Vitamin A; Future    Chronic pain syndrome  -     Zinc; Future  -     Vitamin D 25 hydroxy; Future  -     Vitamin B12; Future  -     Vitamin B1, whole blood; Future  -     PTH, intact; Future  -     CBC and Platelet; Future  -     Comprehensive metabolic panel; Future  -     Ferritin; Future  -     Folate; Future  -     TIBC Panel (incl. Iron, TIBC, % Iron Saturation); Future  -     Vitamin A; Future    Paroxysmal SVT (supraventricular tachycardia) (HCC)  -     Zinc; Future  -     Vitamin D 25 hydroxy; Future  -     Vitamin B12; Future  -     Vitamin B1, whole blood; Future  -     PTH, intact; Future  -     CBC and Platelet; Future  -     Comprehensive metabolic panel; Future  -     Ferritin; Future  -     Folate; Future  -     TIBC Panel (incl. Iron, TIBC, % Iron Saturation); Future  -     Vitamin A; Future    High vitamin A level  -     Zinc; Future  -     Vitamin D 25 hydroxy; Future  -     Vitamin B12; Future  -     Vitamin B1, whole blood; Future  -     PTH, intact; Future  -     CBC and Platelet; Future  -     Comprehensive metabolic panel; Future  -     Ferritin; Future  -     Folate; Future  -     TIBC Panel (incl. Iron, TIBC, % Iron Saturation); Future  -     Vitamin A; Future    Overweight  -     Zinc; Future  -     Vitamin D 25 hydroxy; Future  -     Vitamin B12; Future  -     Vitamin B1, whole blood; Future  -     PTH, intact; Future  -     CBC and Platelet; Future  -     Comprehensive metabolic panel; Future  -     Ferritin; Future  -     Folate; Future  -     TIBC Panel (incl. Iron, TIBC, % Iron Saturation); Future  -     Vitamin A; Future         Subjective:      Patient ID: Christina Lara is a 54 y.o. female.    status post Vertical Sleeve Gastrectomy with me in 2/6/2018 that unfortunately developed reflux and required a conversion to a Rebecca-en-Y gastric bypass in August 2022.     Initial:  "225  Current: 155  EWL: (Weight loss is ahead of schedule at this post surgical period.)  Bautista: current   Current BMI is Body mass index is 25.1 kg/m².    Tolerating a regular diet-yes  Eating at least 60 grams of protein per day-yes  Drinking at least 64 ounces of fluid per day-yes  Sufficient exercise-riding bike   Using nicotine-no  Using alcohol-no  Supplements:  roberta MVI with iron + calcium with d       The following portions of the patient's history were reviewed and updated as appropriate: allergies, current medications, past family history, past medical history, past social history, past surgical history and problem list.    Review of Systems   Constitutional:  Positive for fatigue. Negative for chills and fever.   Respiratory: Negative.     Cardiovascular: Negative.    Gastrointestinal:  Positive for constipation. Negative for abdominal pain.   Neurological: Negative.    Psychiatric/Behavioral: Negative.           Objective:    /78 (BP Location: Left arm, Patient Position: Sitting, Cuff Size: Large)   Pulse 74   Temp 97.5 °F (36.4 °C) (Tympanic)   Ht 5' 6\" (1.676 m)   Wt 70.5 kg (155 lb 8 oz)   LMP 01/07/2019 (Approximate)   SpO2 98%   BMI 25.10 kg/m²      Physical Exam  Vitals and nursing note reviewed.   Constitutional:       Appearance: Normal appearance.   HENT:      Head: Normocephalic and atraumatic.   Eyes:      Extraocular Movements: Extraocular movements intact.   Cardiovascular:      Rate and Rhythm: Normal rate.   Pulmonary:      Effort: Pulmonary effort is normal.   Musculoskeletal:         General: Normal range of motion.      Cervical back: Normal range of motion.   Skin:     General: Skin is warm and dry.   Neurological:      General: No focal deficit present.      Mental Status: She is alert and oriented to person, place, and time.   Psychiatric:         Mood and Affect: Mood normal.             "

## 2024-10-14 DIAGNOSIS — Z98.84 S/P GASTRIC BYPASS: ICD-10-CM

## 2024-10-14 DIAGNOSIS — E67.0 HIGH VITAMIN A LEVEL: ICD-10-CM

## 2024-10-14 DIAGNOSIS — K91.2 POSTSURGICAL MALABSORPTION: ICD-10-CM

## 2024-10-14 DIAGNOSIS — R79.89 HIGH SERUM VITAMIN A: Primary | ICD-10-CM

## 2024-10-14 DIAGNOSIS — Z98.84 BARIATRIC SURGERY STATUS: ICD-10-CM

## 2024-10-15 ENCOUNTER — OFFICE VISIT (OUTPATIENT)
Dept: FAMILY MEDICINE CLINIC | Facility: CLINIC | Age: 54
End: 2024-10-15
Payer: COMMERCIAL

## 2024-10-15 ENCOUNTER — TELEPHONE (OUTPATIENT)
Age: 54
End: 2024-10-15

## 2024-10-15 VITALS
TEMPERATURE: 98.3 F | BODY MASS INDEX: 25.01 KG/M2 | RESPIRATION RATE: 17 BRPM | WEIGHT: 155.6 LBS | HEIGHT: 66 IN | OXYGEN SATURATION: 98 % | DIASTOLIC BLOOD PRESSURE: 60 MMHG | HEART RATE: 78 BPM | SYSTOLIC BLOOD PRESSURE: 110 MMHG

## 2024-10-15 DIAGNOSIS — R05.1 ACUTE COUGH: ICD-10-CM

## 2024-10-15 DIAGNOSIS — J01.00 ACUTE NON-RECURRENT MAXILLARY SINUSITIS: Primary | ICD-10-CM

## 2024-10-15 DIAGNOSIS — J40 BRONCHITIS: ICD-10-CM

## 2024-10-15 LAB
SARS-COV-2 AG UPPER RESP QL IA: NEGATIVE
SL AMB POCT RAPID FLU A: NORMAL
SL AMB POCT RAPID FLU B: NORMAL
VALID CONTROL: NORMAL

## 2024-10-15 PROCEDURE — 87804 INFLUENZA ASSAY W/OPTIC: CPT | Performed by: NURSE PRACTITIONER

## 2024-10-15 PROCEDURE — 87811 SARS-COV-2 COVID19 W/OPTIC: CPT | Performed by: NURSE PRACTITIONER

## 2024-10-15 PROCEDURE — 99213 OFFICE O/P EST LOW 20 MIN: CPT | Performed by: NURSE PRACTITIONER

## 2024-10-15 RX ORDER — ALBUTEROL SULFATE 90 UG/1
2 INHALANT RESPIRATORY (INHALATION) EVERY 6 HOURS PRN
Qty: 6.7 G | Refills: 1 | Status: SHIPPED | OUTPATIENT
Start: 2024-10-15

## 2024-10-15 RX ORDER — DOXYCYCLINE HYCLATE 100 MG
100 TABLET ORAL 2 TIMES DAILY
Qty: 14 TABLET | Refills: 0 | Status: SHIPPED | OUTPATIENT
Start: 2024-10-15 | End: 2024-10-22

## 2024-10-15 RX ORDER — BENZONATATE 200 MG/1
200 CAPSULE ORAL 3 TIMES DAILY PRN
Qty: 30 CAPSULE | Refills: 0 | Status: SHIPPED | OUTPATIENT
Start: 2024-10-15

## 2024-10-15 NOTE — TELEPHONE ENCOUNTER
Received call from patient post OV 9/25 for sinusitis; completed antibiotic (Augmentin) with some improvement and patient reports she still has sore throat, chest heaviness, ear pain; suspects possible bronchitis. Same Day OV scheduled with JOSH Ochoa for Wed 10/16 at 3:30 PM for evaluation.

## 2024-10-15 NOTE — PROGRESS NOTES
FAMILY PRACTICE OFFICE VISIT       NAME: Christina Lara  AGE: 54 y.o. SEX: female       : 1970        MRN: [id number]    DATE: 10/15/2024  TIME: 5:06 PM    Assessment and Plan   1. Acute non-recurrent maxillary sinusitis  -     doxycycline hyclate (VIBRA-TABS) 100 mg tablet; Take 1 tablet (100 mg total) by mouth 2 (two) times a day for 7 days  -     POCT Rapid Covid Ag  -     POCT rapid flu A and B  2. Bronchitis  -     doxycycline hyclate (VIBRA-TABS) 100 mg tablet; Take 1 tablet (100 mg total) by mouth 2 (two) times a day for 7 days  -     POCT Rapid Covid Ag  -     POCT rapid flu A and B  3. Acute cough  -     albuterol (Ventolin HFA) 90 mcg/act inhaler; Inhale 2 puffs every 6 (six) hours as needed for wheezing  -     benzonatate (TESSALON) 200 MG capsule; Take 1 capsule (200 mg total) by mouth 3 (three) times a day as needed for cough  -     POCT Rapid Covid Ag  -     POCT rapid flu A and B       There are no Patient Instructions on file for this visit.      Recent Results (from the past 168 hour(s))   POCT Rapid Covid Ag    Collection Time: 10/15/24  5:05 PM   Result Value Ref Range    POCT SARS-CoV-2 Ag Negative Negative    VALID CONTROL Valid    POCT rapid flu A and B    Collection Time: 10/15/24  5:05 PM   Result Value Ref Range    RAPID FLU A neg     RAPID FLU B neg        Chief Complaint     Chief Complaint   Patient presents with    Sore Throat     Pt being seen for sore throat, headache, earache and cough       History of Present Illness   Christina Lara is a 54 y.o.-year-old female who is here for c/o illness  Sore throat  Nasal congestion  Was sick and got slight improvement and now sick again  Same illness  Hx of bronchitis  Using mucinex  Using dayquil and nyquil      Review of Systems   Review of Systems   Constitutional:  Positive for activity change, appetite change and fatigue. Negative for fever.   HENT:  Positive for congestion, postnasal drip, rhinorrhea and sore throat.    Respiratory:   Positive for cough. Negative for chest tightness and wheezing.    Cardiovascular:  Negative for chest pain and palpitations.   Gastrointestinal:  Negative for constipation, diarrhea, nausea and vomiting.   Genitourinary:  Negative for dysuria and frequency.   Musculoskeletal:  Negative for arthralgias and myalgias.   Skin:  Negative for rash.   Neurological:  Positive for headaches. Negative for dizziness and light-headedness.   Hematological:  Negative for adenopathy.   Psychiatric/Behavioral:  Negative for dysphoric mood and sleep disturbance. The patient is not nervous/anxious.        Active Problem List     Patient Active Problem List   Diagnosis    IBS (irritable bowel syndrome)    Mixed hyperlipidemia    GERD (gastroesophageal reflux disease)    Chronic back pain    Cervical radiculopathy    Hepatic steatosis    Pulmonary nodule seen on imaging study    Controlled type 2 diabetes mellitus without complication, without long-term current use of insulin (HCC)    S/P laparoscopic sleeve gastrectomy    Postsurgical malabsorption    Lumbar radiculopathy    Intervertebral disc disorder with radiculopathy of lumbar region    Post traumatic stress disorder (PTSD)    Generalized anxiety disorder    Sacroiliitis (HCC)    Chronic pain syndrome    Other chronic pain    Paroxysmal SVT (supraventricular tachycardia) (HCC)    Prediabetes    ADHD (attention deficit hyperactivity disorder), inattentive type    Seasonal allergies    Benign paroxysmal positional vertigo    Bariatric surgery status    MDD (major depressive disorder), recurrent severe, without psychosis (HCC)    Encounter for screening mammogram for breast cancer    Oral contraceptive pill surveillance    S/P typical CTI flutter ablation 1/2024    S/p [employer] loop recorder implantation 8/2022    S/P gastric bypass    Elbow pain, left    Iron deficiency anemia following bariatric surgery    Cubital tunnel syndrome on left    Overweight (BMI 25.0-29.9)    Acute  non-recurrent maxillary sinusitis    Bronchitis         Past Medical History:  Past Medical History:   Diagnosis Date    Acute right ankle pain 2023    ADHD (attention deficit hyperactivity disorder)     Allergic     Anxiety     Bulging lumbar disc     Carpal tunnel syndrome     RIGHT.  LAST ASSESSED: 16    Chronic back pain     low    Chronic pain disorder     Colon polyps     COVID-19     2021    Depression     Diabetes mellitus (HCC)     GERD (gastroesophageal reflux disease)     Gestational diabetes     Hearing loss     left ear    Heart disease     SVT    History of pre-eclampsia     Hyperlipidemia     Resolved with weight loss    Hyponatremia 2020    IBS (irritable bowel syndrome)     Ileus ([facility])     LAST ASSESSED: 8/3/17    Labial cyst     LAST ASSESSED: 16    Memory loss     Myofascial pain     LAST ASSESSED: 16    Neck mass 2023    Obesity     Ovarian cyst     LEFT. LAST ASSESSED: 16    Panic attack     Pneumonia     Sacroiliitis ([facility])     Seasonal allergies     Sprain of anterior talofibular ligament of right ankle 2023    SVT (supraventricular tachycardia) ([facility])     Thoracic outlet syndrome     2010    Trochanteric bursitis of both hips     LAST ASSESSED: 3/18/16    Ulnar neuropathy at elbow     Varicella     Wears glasses        Past Surgical History:  Past Surgical History:   Procedure Laterality Date    BARIATRIC SURGERY  2022    BILE DUCT EXPLORATION      ENDOSCOPIC REMOVAL OF STONES FROM BILIARY TRACT    CARDIAC ELECTROPHYSIOLOGY PROCEDURE N/A 2024    Procedure: Cardiac eps/svt ablation;  Surgeon: Daquan Heath MD;  [facility];  Service: Cardiology     SECTION      x3    CHOLECYSTECTOMY      COLONOSCOPY      -polyp, -wnl, repeat5 years    DILATION AND CURETTAGE OF UTERUS      ENDOMETRIAL ABLATION      ERCP W/ SPHICTEROTOMY      FIRST RIB REMOVAL      THORAX EXCISION OF FIRST RIB    GASTRIC BYPASS  2022     HYSTEROSCOPY      NEUROPLASTY / TRANSPOSITION ULNAR NERVE AT ELBOW Right 2011    NM COLONOSCOPY FLX DX W/COLLJ SPEC WHEN PFRMD N/A 05/30/2017    Procedure: COLONOSCOPY;  Surgeon: Oscar Velázquez MD;  Location: [facility];  Service: Gastroenterology    NM ESOPHAGOGASTRODUODENOSCOPY TRANSORAL DIAGNOSTIC N/A 09/14/2017    Procedure: ESOPHAGOGASTRODUODENOSCOPY (EGD);  Surgeon: Sadiq Car MD;  Location: [facility];  Service: Gastroenterology    NM HYSTEROSCOPY BX ENDOMETRIUM&/POLYPC W/WO D&C N/A 10/20/2017    Procedure: DILATATION AND CURETTAGE (D&C) WITH HYSTEROSCOPY  REMOVAL VULVAR RT. LESION;  Surgeon: Lucila Ortiz MD;  [facility];  Service: Gynecology    NM ALDANA IMPLTJ NSTIM ELTRDS PLATE/PADDLE EDRL Left 01/29/2020    Procedure: Insertion of thoracic spinal cord stimulator electrode via laminotomy and placement of left buttock implantable pulse generator (NEUROMONITORING);  Surgeon: Ad Mehta MD;  Location[facility];  Service: Neurosurgery    NM LAPS GSTRC RSTRICTIV PX LONGITUDINAL GASTRECTOMY N/A 02/06/2018    Procedure: GASTRECTOMY SLEEVE LAPAROSCOPIC; INTRAOPERATIVE EGD ;  Surgeon: Abimael Juarez MD;  [facility];  Service: Bariatrics    NM LAPS GSTRC RSTRICTIV PX LONGITUDINAL GASTRECTOMY N/A 08/08/2022    Procedure: Diagnostic Lap; Extensive Lysis of Adhesions; LAPAROSCOPIC REVISION CONVERSION TO NOE-EN-Y GASTRIC BYPASS AND INTRAOPERATIVE EGD;  Surgeon: Abimael Juarez MD;  [facility];  Service: Bariatrics    NM NEUROPLASTY &/TRANSPOSITION ULNAR NERVE ELBOW Left 7/19/2024    Procedure: RELEASE CUBITAL TUNNEL- left with ulnar nerve transposition;  Surgeon: Maggy Leary DO;  Location: [facility];  Service: Orthopedics    SLEEVE GASTROPLASTY      SPINAL CORD STIMULATOR TRIAL W/ LAMINOTOMY      TONSILLECTOMY AND ADENOIDECTOMY      TUBAL LIGATION      UPPER GASTROINTESTINAL ENDOSCOPY      US GUIDED LYMPH NODE BIOPSY LEFT  05/22/2023    VEIN LIGATION AND STRIPPING Right     VULVA  SURGERY  10/20/2017    BIOPSY    WISDOM TOOTH EXTRACTION         Family History:  Family History   Problem Relation Age of Onset    Diabetes Mother     Breast cancer Mother         >50    BRCA1 Negative Mother     BRCA2 Negative Mother     Hyperlipidemia Mother     Cancer Mother         Breast    Diabetes Father     Other Father         traumatic brain injury    Prostate cancer Father     Alcohol abuse Father         in remission    Heart disease Father     Neuropathy Father     Hyperlipidemia Father     Cancer Father         Prostate    Hypertension Brother     Diabetes Brother     Other Brother         HYPERCHOLESTEROLEMIA    Alcohol abuse Brother     Depression Brother         attempted suicide    Anxiety disorder Brother     Suicide Attempts Brother     Diabetes Brother     Alcohol abuse Brother     Heart attack Maternal Grandmother     Colon cancer Maternal Grandfather     No Known Problems Paternal Grandmother         dad is adopted    No Known Problems Paternal Grandfather         dad is adopted    No Known Problems Daughter     Asthma Son     Ovarian cancer Neg Hx     Uterine cancer Neg Hx        Social History:  Social History     Socioeconomic History    Marital status: /Civil Union     Spouse name: Abel    Number of children: 3    Years of education: GED then 2 year college program    Highest education level: Not on file   Occupational History    Occupation: ER TECH     Employer: [employer]   Tobacco Use    Smoking status: Former     Current packs/day: 0.00     Average packs/day: 1 pack/day for 15.0 years (15.0 ttl pk-yrs)     Types: Cigarettes     Start date: 1998     Quit date: 2013     Years since quittin.4     Passive exposure: Never    Smokeless tobacco: Never   Vaping Use    Vaping status: Never Used   Substance and Sexual Activity    Alcohol use: Yes     Alcohol/week: 4.0 standard drinks of alcohol     Types: 4 Standard drinks or equivalent per week      Comment: On weekends that i go out    Drug use: No    Sexual activity: Yes     Partners: Male     Birth control/protection: OCP, Post-menopausal     Comment: lifetime partners: 6; current partner 2013   Other Topics Concern    Not on file   Social History Narrative    Zoroastrianism: no preference    Accepts blood products        Exercise: unable with back issues and SVT    Calcium: calcium supplement, multivitamin for women over 50, 1 yogurt daily     Social Determinants of Health     Financial Resource Strain: Medium Risk (12/31/2020)    Overall Financial Resource Strain (CARDIA)     Difficulty of Paying Living Expenses: Somewhat hard   Food Insecurity: No Food Insecurity (12/31/2020)    Hunger Vital Sign     Worried About Running Out of Food in the Last Year: Never true     Ran Out of Food in the Last Year: Never true   Transportation Needs: No Transportation Needs (12/31/2020)    PRAPARE - Transportation     Lack of Transportation (Medical): No     Lack of Transportation (Non-Medical): No   Physical Activity: Unknown (5/9/2019)    Exercise Vital Sign     Days of Exercise per Week: 0 days     Minutes of Exercise per Session: Not on file   Stress: Stress Concern Present (5/9/2019)    [employer] - Occupational Stress Questionnaire     Feeling of Stress : Very much   Social Connections: Socially Isolated (5/9/2019)    Social Connection and Isolation Panel [NHANES]     Frequency of Communication with Friends and Family: Once a week     Frequency of Social Gatherings with Friends and Family: Once a week     Attends Religion Services: Never     Active Member of Clubs or Organizations: No     Attends Club or Organization Meetings: Never     Marital Status:    Intimate Partner Violence: Not At Risk (5/9/2019)    Humiliation, Afraid, Rape, and Kick questionnaire     Fear of Current or Ex-Partner: No     Emotionally Abused: No     Physically Abused: No     Sexually Abused: No   Housing  Stability: Not on file       Objective     Vitals:    10/15/24 1640   BP: 110/60   Pulse: 78   Resp: 17   Temp: 98.3 °F (36.8 °C)   SpO2: 98%     Wt Readings from Last 3 Encounters:   10/15/24 70.6 kg (155 lb 9.6 oz)   10/11/24 70.5 kg (155 lb 8 oz)   09/25/24 70.8 kg (156 lb)       Physical Exam  Vitals and nursing note reviewed.   Constitutional:       Appearance: Normal appearance.   HENT:      Head: Normocephalic and atraumatic.      Right Ear: Tympanic membrane, ear canal and external ear normal.      Left Ear: Tympanic membrane, ear canal and external ear normal.      Nose: Congestion and rhinorrhea present.      Mouth/Throat:      Pharynx: Posterior oropharyngeal erythema present.   Eyes:      Conjunctiva/sclera: Conjunctivae normal.   Cardiovascular:      Rate and Rhythm: Normal rate and regular rhythm.      Heart sounds: Normal heart sounds.   Pulmonary:      Effort: Pulmonary effort is normal.      Breath sounds: Normal breath sounds.   Abdominal:      General: Bowel sounds are normal.   Musculoskeletal:         General: Normal range of motion.      Cervical back: Normal range of motion and neck supple.   Skin:     General: Skin is warm and dry.      Capillary Refill: Capillary refill takes less than 2 seconds.   Neurological:      General: No focal deficit present.      Mental Status: She is alert.   Psychiatric:         Mood and Affect: Mood normal.         Behavior: Behavior normal.         Thought Content: Thought content normal.         Judgment: Judgment normal.         Pertinent Laboratory/Diagnostic Studies:  Lab Results   Component Value Date    GLUCOSE 109 08/13/2015    BUN 11 10/08/2024    CREATININE 0.66 10/08/2024    CALCIUM 9.1 10/08/2024     (L) 08/13/2015    K 3.9 10/08/2024    CO2 27 10/08/2024     10/08/2024     Lab Results   Component Value Date    ALT 29 10/08/2024    AST 23 10/08/2024    ALKPHOS 53 10/08/2024    BILITOT 0.39 04/16/2014       Lab Results   Component Value  "Date    WBC 6.52 07/03/2024    HGB 11.0 (L) 07/03/2024    HCT 35.8 07/03/2024    MCV 90 07/03/2024     07/03/2024       No results found for: \"TSH\"    Lab Results   Component Value Date    CHOL 219 07/17/2015     Lab Results   Component Value Date    TRIG 202 (H) 10/08/2024     Lab Results   Component Value Date    HDL 95 10/08/2024     Lab Results   Component Value Date    LDLCALC 20 10/08/2024     Lab Results   Component Value Date    HGBA1C 5.7 (H) 10/08/2024       Results for orders placed or performed in visit on 10/15/24   POCT Rapid Covid Ag    Collection Time: 10/15/24  5:05 PM   Result Value Ref Range    POCT SARS-CoV-2 Ag Negative Negative    VALID CONTROL Valid    POCT rapid flu A and B    Collection Time: 10/15/24  5:05 PM   Result Value Ref Range    RAPID FLU A neg     RAPID FLU B neg      *Note: Due to a large number of results and/or encounters for the requested time period, some results have not been displayed. A complete set of results can be found in Results Review.       Orders Placed This Encounter   Procedures    POCT Rapid Covid Ag    POCT rapid flu A and B       ALLERGIES:  Allergies   Allergen Reactions    Ibuprofen Other (See Comments)     Due to gastric sleeve -can only take for 5 days, then needs to stop       Current Medications     Current Outpatient Medications   Medication Sig Dispense Refill    albuterol (Ventolin HFA) 90 mcg/act inhaler Inhale 2 puffs every 6 (six) hours as needed for wheezing 6.7 g 1    atoMOXetine (STRATTERA) 60 mg capsule Take 1 capsule (60 mg total) by mouth daily 90 capsule 3    atorvastatin (LIPITOR) 20 mg tablet Take 1 tablet (20 mg total) by mouth daily 90 tablet 1    benzonatate (TESSALON) 200 MG capsule Take 1 capsule (200 mg total) by mouth 3 (three) times a day as needed for cough 30 capsule 0    Blood Glucose Monitoring Suppl (FreeStyle Freedom Lite) w/Device KIT Please dispense 1 kit 1 kit 0    calcium carbonate (OS-MELODY) 600 MG tablet Take 600 mg " by mouth 2 (two) times a day with meals      Diclofenac Sodium (VOLTAREN) 1 % Apply 2 g topically 4 (four) times a day 100 g 0    doxycycline hyclate (VIBRA-TABS) 100 mg tablet Take 1 tablet (100 mg total) by mouth 2 (two) times a day for 7 days 14 tablet 0    drospirenone-ethinyl estradiol (LIEN) 3-0.02 MG per tablet Take 1 tablet by mouth daily 84 tablet 4    estradiol (ESTRACE VAGINAL) 0.1 mg/g vaginal cream One gram vaginally at night x 14 days then twice weekly for ongoing maintenance. 42.5 g 2    fluticasone (FLONASE) 50 mcg/act nasal spray 1 spray into each nostril daily 15.8 mL 0    glucose blood (FREESTYLE LITE) test strip Test once daily 100 each 1    lamoTRIgine (LaMICtal) 150 MG tablet Take 1 tablet (150 mg total) by mouth 2 (two) times a day 180 tablet 3    Lancets (freestyle) lancets Use as instructed, once daily 100 each 1    lidocaine (Lidoderm) 5 % Apply 1 patch topically over 12 hours daily Remove & Discard patch within 12 hours or as directed by MD 30 patch 1    mirtazapine (REMERON) 15 mg tablet Take 1 tablet (15 mg total) by mouth daily at bedtime 90 tablet 1    montelukast (SINGULAIR) 10 mg tablet Take 1 tablet (10 mg total) by mouth daily at bedtime 90 tablet 2    Multiple Vitamins-Minerals (MULTI COMPLETE PO) Take by mouth      omeprazole (PriLOSEC) 20 mg delayed release capsule TAKE ONE CAPSULE BY MOUTH ONCE A DAY. 90 capsule 1    semaglutide, 1 mg/dose, (Ozempic) 4 mg/3 mL injection pen Inject 0.75 mL (1 mg total) under the skin once a week 3 mL 0    venlafaxine (EFFEXOR) 100 MG tablet Take 1 tablet (100 mg total) by mouth 3 (three) times a day 270 tablet 2    cyclobenzaprine (FLEXERIL) 10 mg tablet Take 1 tablet (10 mg total) by mouth 2 (two) times a day as needed for muscle spasms for up to 7 days (Patient not taking: Reported on 10/11/2024) 14 tablet 0    metFORMIN (GLUCOPHAGE-XR) 500 mg 24 hr tablet Take 1 tablet (500 mg total) by mouth 2 (two) times a day with meals (Patient not taking:  Reported on 10/11/2024) 180 tablet 1    methocarbamol (ROBAXIN) 750 mg tablet Take 1 tablet (750 mg total) by mouth every 8 (eight) hours (Patient not taking: Reported on 10/11/2024) 270 tablet 0     No current facility-administered medications for this visit.         Health Maintenance     Health Maintenance   Topic Date Due    Annual Physical  Never done    Osteoporosis Screening  Never done    Breast Cancer Screening: Mammogram  12/14/2023    DM Eye Exam  01/12/2024    Influenza Vaccine (1) 09/01/2024    COVID-19 Vaccine (4 - 2023-24 season) 09/01/2024    Zoster Vaccine (2 of 2) 09/19/2024    HEMOGLOBIN A1C  04/08/2025    Depression Screening  07/25/2025    Diabetic Foot Exam  07/25/2025    Kidney Health Evaluation: GFR  10/08/2025    Kidney Health Evaluation: Albumin/Creatinine Ratio  10/08/2025    Cervical Cancer Screening  11/26/2026    Colorectal Cancer Screening  01/05/2027    RSV Vaccine Age 60+ Years (1 - 1-dose 60+ series) 04/28/2029    DTaP,Tdap,and Td Vaccines (3 - Td or Tdap) 08/12/2032    HIV Screening  Completed    Hepatitis C Screening  Completed    Pneumococcal Vaccine: Pediatrics (0 to 5 Years) and At-Risk Patients (6 to 64 Years)  Completed    RSV Vaccine age 0-20 Months  Aged Out    HIB Vaccine  Aged Out    IPV Vaccine  Aged Out    Hepatitis A Vaccine  Aged Out    Meningococcal ACWY Vaccine  Aged Out    HPV Vaccine  Aged Out     Immunization History   Administered Date(s) Administered    COVID-19 PFIZER VACCINE 0.3 ML IM 03/06/2021, 03/25/2021, 12/11/2021    INFLUENZA 11/27/2015, 10/01/2016, 10/06/2017, 12/12/2018, 10/17/2019, 10/09/2020, 10/19/2021, 09/07/2022, 09/29/2023    Influenza Quadrivalent Preservative Free 3 years and older IM 10/01/2016    Influenza Quadrivalent, 6-35 Months IM 11/27/2015    Influenza, injectable, quadrivalent, preservative free 0.5 mL 09/29/2023    Influenza, recombinant, quadrivalent,injectable, preservative free 10/17/2019, 10/09/2020, 09/07/2022    Influenza,  seasonal, injectable 09/01/2011, 10/20/2013, 10/06/2017    Pneumococcal Conjugate Vaccine 20-valent (Pcv20), Polysace 07/25/2022    Tdap 09/01/2011, 08/12/2022    Zoster Vaccine Recombinant 07/25/2024          OSMANI Sánchez

## 2024-10-22 ENCOUNTER — TELEPHONE (OUTPATIENT)
Age: 54
End: 2024-10-22

## 2024-10-22 ENCOUNTER — HOSPITAL ENCOUNTER (OUTPATIENT)
Dept: INFUSION CENTER | Facility: CLINIC | Age: 54
Discharge: HOME | End: 2024-10-22
Payer: COMMERCIAL

## 2024-10-22 DIAGNOSIS — E66.3 OVERWEIGHT (BMI 25.0-29.9): ICD-10-CM

## 2024-10-22 DIAGNOSIS — K95.89 IRON DEFICIENCY ANEMIA FOLLOWING BARIATRIC SURGERY: Primary | ICD-10-CM

## 2024-10-22 DIAGNOSIS — Z98.84 BARIATRIC SURGERY STATUS: ICD-10-CM

## 2024-10-22 DIAGNOSIS — D50.8 IRON DEFICIENCY ANEMIA FOLLOWING BARIATRIC SURGERY: Primary | ICD-10-CM

## 2024-10-22 PROCEDURE — 96365 THER/PROPH/DIAG IV INF INIT: CPT

## 2024-10-22 RX ORDER — SODIUM CHLORIDE 9 MG/ML
20 INJECTION, SOLUTION INTRAVENOUS ONCE
OUTPATIENT
Start: 2024-10-29

## 2024-10-22 RX ORDER — SODIUM CHLORIDE 9 MG/ML
20 INJECTION, SOLUTION INTRAVENOUS ONCE
Status: COMPLETED | OUTPATIENT
Start: 2024-10-22 | End: 2024-10-22

## 2024-10-22 RX ADMIN — SODIUM CHLORIDE 20 ML/HR: 0.9 INJECTION, SOLUTION INTRAVENOUS at 16:15

## 2024-10-22 RX ADMIN — IRON SUCROSE 200 MG: 20 INJECTION, SOLUTION INTRAVENOUS at 16:19

## 2024-10-22 NOTE — TELEPHONE ENCOUNTER
Patient is calling regarding cancelling an appointment.    Date/Time: 10/22/2024 5pm    Reason: patient has to get an infusion and is not sure she will be done in time    Patient was rescheduled: YES [] NO [x]  If yes, when was Patient reschedule for:     Patient requesting call back to reschedule: YES [] NO [x]

## 2024-10-22 NOTE — PROGRESS NOTES
Patient tolerated her treatment without any adverse reactions. Next appointment confirmed 10/29/24 at 1600 at [place]. Patient declined avs

## 2024-10-24 ENCOUNTER — TELEPHONE (OUTPATIENT)
Dept: BARIATRICS | Facility: CLINIC | Age: 54
End: 2024-10-24

## 2024-10-24 ENCOUNTER — TELEPHONE (OUTPATIENT)
Age: 54
End: 2024-10-24

## 2024-10-24 DIAGNOSIS — E11.9 CONTROLLED TYPE 2 DIABETES MELLITUS WITHOUT COMPLICATION, WITHOUT LONG-TERM CURRENT USE OF INSULIN (HCC): ICD-10-CM

## 2024-10-24 NOTE — TELEPHONE ENCOUNTER
She can have one box of sample   She has to find out which pharmacy has it in stock and call us back     Dr Sushila Olson details… strength 5/5 bilateral upper extremities/strength 5/5 bilateral lower extremities

## 2024-10-24 NOTE — TELEPHONE ENCOUNTER
Called pt to go over some lab and to also talk about some recommendation so the provider recommended to stop taking the multi vitamin and to also stop any supplements that contains extra vitamin A pt had no further questions and will follow up with her PCP.

## 2024-10-24 NOTE — TELEPHONE ENCOUNTER
Pt of Dr. Juarez s/p hyacinth-en-y 8/2022-  Pt states she was told to call if she developed symptoms such as headache related to high levels of Vitamin A.   Pt reports she has been having headaches daily x a few weeks. Pt states she takes tylenol and the headaches do away but come back after the tylenol wears off.   Pt also reports they have stopped nutrafol supplement.     Please advise.   Call back # [phone]

## 2024-10-24 NOTE — TELEPHONE ENCOUNTER
Please advise pt to continue holding her multivitamin and/or any supplements or creams that contain vitamin A . If her headache is related to her vitamin A then it should improve as her levels trend down. Otherwise I would advise follow up with her pcp as well to make sure her headache is not due another cause.     Her repeat vitamin A is in for Jan 1

## 2024-10-29 ENCOUNTER — TELEPHONE (OUTPATIENT)
Dept: OBGYN CLINIC | Facility: HOSPITAL | Age: 54
End: 2024-10-29

## 2024-10-29 ENCOUNTER — HOSPITAL ENCOUNTER (OUTPATIENT)
Dept: INFUSION CENTER | Facility: CLINIC | Age: 54
Discharge: HOME | End: 2024-10-29
Payer: COMMERCIAL

## 2024-10-29 VITALS
DIASTOLIC BLOOD PRESSURE: 65 MMHG | TEMPERATURE: 98.3 F | OXYGEN SATURATION: 98 % | RESPIRATION RATE: 18 BRPM | HEART RATE: 78 BPM | SYSTOLIC BLOOD PRESSURE: 111 MMHG

## 2024-10-29 DIAGNOSIS — M54.50 ACUTE BILATERAL LOW BACK PAIN WITHOUT SCIATICA: ICD-10-CM

## 2024-10-29 DIAGNOSIS — Z98.84 BARIATRIC SURGERY STATUS: ICD-10-CM

## 2024-10-29 DIAGNOSIS — K95.89 IRON DEFICIENCY ANEMIA FOLLOWING BARIATRIC SURGERY: Primary | ICD-10-CM

## 2024-10-29 DIAGNOSIS — E66.3 OVERWEIGHT (BMI 25.0-29.9): ICD-10-CM

## 2024-10-29 DIAGNOSIS — D50.8 IRON DEFICIENCY ANEMIA FOLLOWING BARIATRIC SURGERY: Primary | ICD-10-CM

## 2024-10-29 PROCEDURE — 96365 THER/PROPH/DIAG IV INF INIT: CPT

## 2024-10-29 RX ORDER — SODIUM CHLORIDE 9 MG/ML
20 INJECTION, SOLUTION INTRAVENOUS ONCE
Status: CANCELLED | OUTPATIENT
Start: 2024-11-05

## 2024-10-29 RX ORDER — SODIUM CHLORIDE 9 MG/ML
20 INJECTION, SOLUTION INTRAVENOUS ONCE
Status: COMPLETED | OUTPATIENT
Start: 2024-10-29 | End: 2024-10-29

## 2024-10-29 RX ADMIN — SODIUM CHLORIDE 20 ML/HR: 9 INJECTION, SOLUTION INTRAVENOUS at 16:35

## 2024-10-29 RX ADMIN — IRON SUCROSE 200 MG: 20 INJECTION, SOLUTION INTRAVENOUS at 16:42

## 2024-10-29 NOTE — TELEPHONE ENCOUNTER
Caller: Patient    Doctor: Gibran    Reason for call: Patient had surgery 7/3/24 and just recently she is having arm pain, swelling and a lump where she bends it. She did have an issue while out bike riding where she had to carry her bike back a distance and not sure if that did something. Please call patient or she said you can message her on teams as well - work at [employer]    Call back#: [phone]

## 2024-10-30 ENCOUNTER — OFFICE VISIT (OUTPATIENT)
Dept: FAMILY MEDICINE CLINIC | Facility: CLINIC | Age: 54
End: 2024-10-30
Payer: COMMERCIAL

## 2024-10-30 VITALS
HEART RATE: 78 BPM | OXYGEN SATURATION: 98 % | RESPIRATION RATE: 17 BRPM | WEIGHT: 157 LBS | TEMPERATURE: 98.4 F | DIASTOLIC BLOOD PRESSURE: 80 MMHG | BODY MASS INDEX: 26.16 KG/M2 | SYSTOLIC BLOOD PRESSURE: 120 MMHG | HEIGHT: 65 IN

## 2024-10-30 DIAGNOSIS — K21.9 GASTROESOPHAGEAL REFLUX DISEASE WITHOUT ESOPHAGITIS: ICD-10-CM

## 2024-10-30 DIAGNOSIS — Z00.00 ANNUAL PHYSICAL EXAM: Primary | ICD-10-CM

## 2024-10-30 DIAGNOSIS — E11.9 CONTROLLED TYPE 2 DIABETES MELLITUS WITHOUT COMPLICATION, WITHOUT LONG-TERM CURRENT USE OF INSULIN (HCC): ICD-10-CM

## 2024-10-30 DIAGNOSIS — M51.16 INTERVERTEBRAL DISC DISORDER WITH RADICULOPATHY OF LUMBAR REGION: ICD-10-CM

## 2024-10-30 DIAGNOSIS — Z23 ENCOUNTER FOR IMMUNIZATION: ICD-10-CM

## 2024-10-30 DIAGNOSIS — I47.10 PAROXYSMAL SVT (SUPRAVENTRICULAR TACHYCARDIA) (HCC): ICD-10-CM

## 2024-10-30 DIAGNOSIS — F33.2 MDD (MAJOR DEPRESSIVE DISORDER), RECURRENT SEVERE, WITHOUT PSYCHOSIS (HCC): ICD-10-CM

## 2024-10-30 DIAGNOSIS — E78.2 MIXED HYPERLIPIDEMIA: ICD-10-CM

## 2024-10-30 PROBLEM — J40 BRONCHITIS: Status: RESOLVED | Noted: 2024-10-15 | Resolved: 2024-10-30

## 2024-10-30 PROBLEM — G89.4 CHRONIC PAIN SYNDROME: Status: RESOLVED | Noted: 2019-12-11 | Resolved: 2024-10-30

## 2024-10-30 PROBLEM — M54.16 LUMBAR RADICULOPATHY: Status: RESOLVED | Noted: 2019-01-31 | Resolved: 2024-10-30

## 2024-10-30 PROBLEM — J01.00 ACUTE NON-RECURRENT MAXILLARY SINUSITIS: Status: RESOLVED | Noted: 2024-10-15 | Resolved: 2024-10-30

## 2024-10-30 PROBLEM — Z12.31 ENCOUNTER FOR SCREENING MAMMOGRAM FOR BREAST CANCER: Status: RESOLVED | Noted: 2023-12-15 | Resolved: 2024-10-30

## 2024-10-30 PROBLEM — Z30.41 ORAL CONTRACEPTIVE PILL SURVEILLANCE: Status: RESOLVED | Noted: 2023-12-15 | Resolved: 2024-10-30

## 2024-10-30 LAB
LEFT EYE DIABETIC RETINOPATHY: NORMAL
LEFT EYE IMAGE QUALITY: NORMAL
LEFT EYE MACULAR EDEMA: NORMAL
LEFT EYE OTHER RETINOPATHY: NORMAL
RIGHT EYE DIABETIC RETINOPATHY: NORMAL
RIGHT EYE IMAGE QUALITY: NORMAL
RIGHT EYE MACULAR EDEMA: NORMAL
RIGHT EYE OTHER RETINOPATHY: NORMAL
SEVERITY (EYE EXAM): NORMAL

## 2024-10-30 PROCEDURE — 99396 PREV VISIT EST AGE 40-64: CPT | Performed by: FAMILY MEDICINE

## 2024-10-30 PROCEDURE — 99214 OFFICE O/P EST MOD 30 MIN: CPT | Performed by: FAMILY MEDICINE

## 2024-10-30 PROCEDURE — 90673 RIV3 VACCINE NO PRESERV IM: CPT

## 2024-10-30 PROCEDURE — 92250 FUNDUS PHOTOGRAPHY W/I&R: CPT | Performed by: FAMILY MEDICINE

## 2024-10-30 PROCEDURE — 90471 IMMUNIZATION ADMIN: CPT

## 2024-10-30 RX ORDER — LIDOCAINE 50 MG/G
1 PATCH TOPICAL DAILY
Qty: 30 PATCH | Refills: 5 | Status: SHIPPED | OUTPATIENT
Start: 2024-10-30

## 2024-10-30 RX ORDER — ATORVASTATIN CALCIUM 20 MG/1
20 TABLET, FILM COATED ORAL DAILY
Qty: 100 TABLET | Refills: 1 | Status: SHIPPED | OUTPATIENT
Start: 2024-10-30

## 2024-10-30 NOTE — ASSESSMENT & PLAN NOTE
Orders:    atorvastatin (LIPITOR) 20 mg tablet; Take 1 tablet (20 mg total) by mouth daily    Comprehensive metabolic panel; Future    Lipid Panel with Direct LDL reflex; Future

## 2024-10-30 NOTE — PROGRESS NOTES
Adult Annual Physical  Name: Christina Lara      : 1970      MRN: [id number]  Encounter Provider: Debo Gee MD  Encounter Date: 10/30/2024   Encounter department: [facility]    Assessment & Plan  Annual physical exam           Controlled type 2 diabetes mellitus without complication, without long-term current use of insulin (HCC)    Lab Results   Component Value Date    HGBA1C 5.7 (H) 10/08/2024   Stable on current meds    Orders:    IR[facility] Diabetic eye exam    semaglutide, 1 mg/dose, (Ozempic) 4 mg/3 mL injection pen; Inject 0.75 mL (1 mg total) under the skin once a week    Albumin / creatinine urine ratio; Future    Comprehensive metabolic panel; Future    Hemoglobin A1C; Future    Lipid Panel with Direct LDL reflex; Future    TSH, 3rd generation with Free T4 reflex; Future    CBC and differential; Future    Gastroesophageal reflux disease without esophagitis  Omeprazole as directed          MDD (major depressive disorder), recurrent severe, without psychosis ([facility])  Stable on current meds         Intervertebral disc disorder with radiculopathy of lumbar region  S/p epidural injection 3 months ago  Lidocaine patch as needed          Paroxysmal SVT (supraventricular tachycardia) ([facility])  S/p ablation   No issues since then         Mixed hyperlipidemia    Orders:    atorvastatin (LIPITOR) 20 mg tablet; Take 1 tablet (20 mg total) by mouth daily    Comprehensive metabolic panel; Future    Lipid Panel with Direct LDL reflex; Future    Encounter for immunization    Orders:    influenza vaccine, recombinant, PF, 0.5 mL IM (Flublok)    Immunizations and preventive care screenings were discussed with patient today. Appropriate education was printed on patient's after visit summary.    Counseling:  Alcohol/drug use: discussed moderation in alcohol intake, the recommendations for healthy alcohol use, and avoidance of illicit drug use.  Dental Health: discussed importance of regular tooth  brushing, flossing, and dental visits.  Injury prevention: discussed safety/seat belts, safety helmets, smoke detectors, carbon monoxide detectors, and smoking near bedding or upholstery.  Sexual health: discussed sexually transmitted diseases, partner selection, use of condoms, avoidance of unintended pregnancy, and contraceptive alternatives.  Exercise: the importance of regular exercise/physical activity was discussed. Recommend exercise 3-5 times per week for at least 30 minutes.          History of Present Illness     Adult Annual Physical:  Patient presents for annual physical.     Diet and Physical Activity:  - Diet/Nutrition: well balanced diet and consuming 3-5 servings of fruits/vegetables daily.  - Exercise: moderate cardiovascular exercise.    General Health:  - Sleep: 7-8 hours of sleep on average and sleeps well.  - Hearing: normal hearing bilateral ears.  - Vision: wears glasses and vision problems.  - Dental: regular dental visits and brushes teeth twice daily.    /GYN Health:  - Follows with GYN: yes.   - Menopause: premenopausal.   - History of STDs: no    Advanced Care Planning:  - Has an advanced directive?: no    - Has a durable medical POA?: no    - ACP document given to patient?: no      Review of Systems   Constitutional: Negative.    HENT: Negative.     Eyes: Negative.    Respiratory: Negative.     Cardiovascular: Negative.    Genitourinary: Negative.    Musculoskeletal: Negative.    Neurological: Negative.    Hematological: Negative.    Psychiatric/Behavioral: Negative.       Medical History Reviewed by provider this encounter:  Tobacco  Allergies  Meds  Problems  Med Hx  Surg Hx  Fam Hx       Past Medical History   Past Medical History:   Diagnosis Date    Acute right ankle pain 02/07/2023    ADHD (attention deficit hyperactivity disorder)     Allergic     Anxiety     Bulging lumbar disc     Carpal tunnel syndrome     RIGHT.  LAST ASSESSED: 12/7/16    Chronic back pain     low     Chronic pain disorder     Colon polyps     COVID-19     2021    Depression     Diabetes mellitus (HCC)     GERD (gastroesophageal reflux disease)     Gestational diabetes     Hearing loss     left ear    Heart disease     SVT    History of pre-eclampsia     Hyperlipidemia     Resolved with weight loss    Hyponatremia 2020    IBS (irritable bowel syndrome)     Ileus ([facility])     LAST ASSESSED: 8/3/17    Labial cyst     LAST ASSESSED: 16    Memory loss     Myofascial pain     LAST ASSESSED: 16    Neck mass 2023    Obesity     Ovarian cyst     LEFT. LAST ASSESSED: 16    Panic attack     Pneumonia     Sacroiliitis ([facility])     Seasonal allergies     Sprain of anterior talofibular ligament of right ankle 2023    SVT (supraventricular tachycardia) ([facility])     Thoracic outlet syndrome         Trochanteric bursitis of both hips     LAST ASSESSED: 3/18/16    Ulnar neuropathy at elbow     Varicella     Wears glasses      Past Surgical History:   Procedure Laterality Date    BARIATRIC SURGERY  2022    BILE DUCT EXPLORATION      ENDOSCOPIC REMOVAL OF STONES FROM BILIARY TRACT    CARDIAC ELECTROPHYSIOLOGY PROCEDURE N/A 2024    Procedure: Cardiac eps/svt ablation;  Surgeon: Daquan Heath MD;  [facility]: [facility];  Service: Cardiology     SECTION      x3    CHOLECYSTECTOMY      COLONOSCOPY      -polyp, -wnl, repeat5 years    DILATION AND CURETTAGE OF UTERUS      ENDOMETRIAL ABLATION      ERCP W/ SPHICTEROTOMY      FIRST RIB REMOVAL      THORAX EXCISION OF FIRST RIB    GASTRIC BYPASS  2022    HYSTEROSCOPY      NEUROPLASTY / TRANSPOSITION ULNAR NERVE AT ELBOW Right     NE COLONOSCOPY FLX DX W/COLLJ SPEC WHEN PFRMD N/A 2017    Procedure: COLONOSCOPY;  Surgeon: Oscar Velázquez MD;  Location: [facility];  Service: Gastroenterology    NE ESOPHAGOGASTRODUODENOSCOPY TRANSORAL DIAGNOSTIC N/A 2017    Procedure: ESOPHAGOGASTRODUODENOSCOPY (EGD);  Surgeon: Sadiq  MD Josep;  Location: [facility];  Service: Gastroenterology    TX HYSTEROSCOPY BX ENDOMETRIUM&/POLYPC W/WO D&C N/A 10/20/2017    Procedure: DILATATION AND CURETTAGE (D&C) WITH HYSTEROSCOPY  REMOVAL VULVAR RT. LESION;  Surgeon: Lucila Ortzi MD;  Location: [facility];  Service: Gynecology    TX ALDANA IMPLTJ NSTIM ELTRDS PLATE/PADDLE EDRL Left 01/29/2020    Procedure: Insertion of thoracic spinal cord stimulator electrode via laminotomy and placement of left buttock implantable pulse generator (NEUROMONITORING);  Surgeon: Ad Mehta MD;  Location: [facility];  Service: Neurosurgery    TX LAPS GSTRC RSTRICTIV PX LONGITUDINAL GASTRECTOMY N/A 02/06/2018    Procedure: GASTRECTOMY SLEEVE LAPAROSCOPIC; INTRAOPERATIVE EGD ;  Surgeon: Abimael Juarez MD;  [facility];  Service: Bariatrics    TX LAPS GSTRC RSTRICTIV PX LONGITUDINAL GASTRECTOMY N/A 08/08/2022    Procedure: Diagnostic Lap; Extensive Lysis of Adhesions; LAPAROSCOPIC REVISION CONVERSION TO NOE-EN-Y GASTRIC BYPASS AND INTRAOPERATIVE EGD;  Surgeon: Abimael Juarez MD;  [facility];  Service: Bariatrics    TX NEUROPLASTY &/TRANSPOSITION ULNAR NERVE ELBOW Left 7/19/2024    Procedure: RELEASE CUBITAL TUNNEL- left with ulnar nerve transposition;  Surgeon: Maggy Leary DO;  Location: [facility];  Service: Orthopedics    SLEEVE GASTROPLASTY      SPINAL CORD STIMULATOR TRIAL W/ LAMINOTOMY      TONSILLECTOMY AND ADENOIDECTOMY      TUBAL LIGATION      UPPER GASTROINTESTINAL ENDOSCOPY      US GUIDED LYMPH NODE BIOPSY LEFT  05/22/2023    VEIN LIGATION AND STRIPPING Right     VULVA SURGERY  10/20/2017    BIOPSY    WISDOM TOOTH EXTRACTION       Family History   Problem Relation Age of Onset    Diabetes Mother     Breast cancer Mother         >50    BRCA1 Negative Mother     BRCA2 Negative Mother     Hyperlipidemia Mother     Cancer Mother         Breast    Diabetes Father     Other Father         traumatic brain injury    Prostate cancer Father     Alcohol  abuse Father         in remission    Heart disease Father     Neuropathy Father     Hyperlipidemia Father     Cancer Father         Prostate    Hypertension Brother     Diabetes Brother     Other Brother         HYPERCHOLESTEROLEMIA    Alcohol abuse Brother     Depression Brother         attempted suicide    Anxiety disorder Brother     Suicide Attempts Brother     Diabetes Brother     Alcohol abuse Brother     Heart attack Maternal Grandmother     Colon cancer Maternal Grandfather     No Known Problems Paternal Grandmother         dad is adopted    No Known Problems Paternal Grandfather         dad is adopted    No Known Problems Daughter     Asthma Son     Ovarian cancer Neg Hx     Uterine cancer Neg Hx      Current Outpatient Medications on File Prior to Visit   Medication Sig Dispense Refill    albuterol (Ventolin HFA) 90 mcg/act inhaler Inhale 2 puffs every 6 (six) hours as needed for wheezing 6.7 g 1    atoMOXetine (STRATTERA) 60 mg capsule Take 1 capsule (60 mg total) by mouth daily 90 capsule 3    benzonatate (TESSALON) 200 MG capsule Take 1 capsule (200 mg total) by mouth 3 (three) times a day as needed for cough 30 capsule 0    Blood Glucose Monitoring Suppl (FreeStyle Freedom Lite) w/Device KIT Please dispense 1 kit 1 kit 0    calcium carbonate (OS-MELODY) 600 MG tablet Take 600 mg by mouth 2 (two) times a day with meals      cyclobenzaprine (FLEXERIL) 10 mg tablet Take 1 tablet (10 mg total) by mouth 2 (two) times a day as needed for muscle spasms for up to 7 days 14 tablet 0    Diclofenac Sodium (VOLTAREN) 1 % Apply 2 g topically 4 (four) times a day 100 g 0    drospirenone-ethinyl estradiol (LIEN) 3-0.02 MG per tablet Take 1 tablet by mouth daily 84 tablet 4    estradiol (ESTRACE VAGINAL) 0.1 mg/g vaginal cream One gram vaginally at night x 14 days then twice weekly for ongoing maintenance. 42.5 g 2    fluticasone (FLONASE) 50 mcg/act nasal spray 1 spray into each nostril daily 15.8 mL 0    glucose blood  (FREESTYLE LITE) test strip Test once daily 100 each 1    lamoTRIgine (LaMICtal) 150 MG tablet Take 1 tablet (150 mg total) by mouth 2 (two) times a day 180 tablet 3    Lancets (freestyle) lancets Use as instructed, once daily 100 each 1    lidocaine (Lidoderm) 5 % Apply 1 patch topically over 12 hours daily Remove & Discard patch within 12 hours or as directed by MD 30 patch 5    mirtazapine (REMERON) 15 mg tablet Take 1 tablet (15 mg total) by mouth daily at bedtime 90 tablet 1    montelukast (SINGULAIR) 10 mg tablet Take 1 tablet (10 mg total) by mouth daily at bedtime 90 tablet 2    Multiple Vitamins-Minerals (MULTI COMPLETE PO) Take by mouth      omeprazole (PriLOSEC) 20 mg delayed release capsule TAKE ONE CAPSULE BY MOUTH ONCE A DAY. 90 capsule 1    venlafaxine (EFFEXOR) 100 MG tablet Take 1 tablet (100 mg total) by mouth 3 (three) times a day 270 tablet 2    [DISCONTINUED] atorvastatin (LIPITOR) 20 mg tablet Take 1 tablet (20 mg total) by mouth daily 90 tablet 1    [DISCONTINUED] semaglutide, 1 mg/dose, (Ozempic) 4 mg/3 mL injection pen Inject 0.75 mL (1 mg total) under the skin once a week 3 mL 0    methocarbamol (ROBAXIN) 750 mg tablet Take 1 tablet (750 mg total) by mouth every 8 (eight) hours (Patient not taking: Reported on 10/11/2024) 270 tablet 0    [DISCONTINUED] metFORMIN (GLUCOPHAGE-XR) 500 mg 24 hr tablet Take 1 tablet (500 mg total) by mouth 2 (two) times a day with meals (Patient not taking: Reported on 10/11/2024) 180 tablet 1     Current Facility-Administered Medications on File Prior to Visit   Medication Dose Route Frequency Provider Last Rate Last Admin    [COMPLETED] iron sucrose (VENOFER) 200 mg in sodium chloride 0.9% 100 ml IVPB 200 mg  200 mg Intravenous Once Abimael Juarez MD   Stopped at 10/29/24 4958    [COMPLETED] sodium chloride 0.9 % infusion  20 mL/hr Intravenous Once Abimael Juarez MD   Stopped at 10/29/24 1748     Allergies   Allergen Reactions    Ibuprofen Other (See  Comments)     Due to gastric sleeve -can only take for 5 days, then needs to stop      Current Outpatient Medications on File Prior to Visit   Medication Sig Dispense Refill    albuterol (Ventolin HFA) 90 mcg/act inhaler Inhale 2 puffs every 6 (six) hours as needed for wheezing 6.7 g 1    atoMOXetine (STRATTERA) 60 mg capsule Take 1 capsule (60 mg total) by mouth daily 90 capsule 3    benzonatate (TESSALON) 200 MG capsule Take 1 capsule (200 mg total) by mouth 3 (three) times a day as needed for cough 30 capsule 0    Blood Glucose Monitoring Suppl (FreeStyle Freedom Lite) w/Device KIT Please dispense 1 kit 1 kit 0    calcium carbonate (OS-MELODY) 600 MG tablet Take 600 mg by mouth 2 (two) times a day with meals      cyclobenzaprine (FLEXERIL) 10 mg tablet Take 1 tablet (10 mg total) by mouth 2 (two) times a day as needed for muscle spasms for up to 7 days 14 tablet 0    Diclofenac Sodium (VOLTAREN) 1 % Apply 2 g topically 4 (four) times a day 100 g 0    drospirenone-ethinyl estradiol (LIEN) 3-0.02 MG per tablet Take 1 tablet by mouth daily 84 tablet 4    estradiol (ESTRACE VAGINAL) 0.1 mg/g vaginal cream One gram vaginally at night x 14 days then twice weekly for ongoing maintenance. 42.5 g 2    fluticasone (FLONASE) 50 mcg/act nasal spray 1 spray into each nostril daily 15.8 mL 0    glucose blood (FREESTYLE LITE) test strip Test once daily 100 each 1    lamoTRIgine (LaMICtal) 150 MG tablet Take 1 tablet (150 mg total) by mouth 2 (two) times a day 180 tablet 3    Lancets (freestyle) lancets Use as instructed, once daily 100 each 1    lidocaine (Lidoderm) 5 % Apply 1 patch topically over 12 hours daily Remove & Discard patch within 12 hours or as directed by MD 30 patch 5    mirtazapine (REMERON) 15 mg tablet Take 1 tablet (15 mg total) by mouth daily at bedtime 90 tablet 1    montelukast (SINGULAIR) 10 mg tablet Take 1 tablet (10 mg total) by mouth daily at bedtime 90 tablet 2    Multiple Vitamins-Minerals (MULTI  COMPLETE PO) Take by mouth      omeprazole (PriLOSEC) 20 mg delayed release capsule TAKE ONE CAPSULE BY MOUTH ONCE A DAY. 90 capsule 1    venlafaxine (EFFEXOR) 100 MG tablet Take 1 tablet (100 mg total) by mouth 3 (three) times a day 270 tablet 2    [DISCONTINUED] atorvastatin (LIPITOR) 20 mg tablet Take 1 tablet (20 mg total) by mouth daily 90 tablet 1    [DISCONTINUED] semaglutide, 1 mg/dose, (Ozempic) 4 mg/3 mL injection pen Inject 0.75 mL (1 mg total) under the skin once a week 3 mL 0    methocarbamol (ROBAXIN) 750 mg tablet Take 1 tablet (750 mg total) by mouth every 8 (eight) hours (Patient not taking: Reported on 10/11/2024) 270 tablet 0    [DISCONTINUED] metFORMIN (GLUCOPHAGE-XR) 500 mg 24 hr tablet Take 1 tablet (500 mg total) by mouth 2 (two) times a day with meals (Patient not taking: Reported on 10/11/2024) 180 tablet 1     Current Facility-Administered Medications on File Prior to Visit   Medication Dose Route Frequency Provider Last Rate Last Admin    [COMPLETED] iron sucrose (VENOFER) 200 mg in sodium chloride 0.9% 100 ml IVPB 200 mg  200 mg Intravenous Once Abimael Juarez MD   Stopped at 10/29/24 1748    [COMPLETED] sodium chloride 0.9 % infusion  20 mL/hr Intravenous Once Abimael Juarez MD   Stopped at 10/29/24 1748      Social History     Tobacco Use    Smoking status: Former     Current packs/day: 0.00     Average packs/day: 1 pack/day for 15.0 years (15.0 ttl pk-yrs)     Types: Cigarettes     Start date: 1998     Quit date: 2013     Years since quittin.5     Passive exposure: Never    Smokeless tobacco: Never   Vaping Use    Vaping status: Never Used   Substance and Sexual Activity    Alcohol use: Yes     Alcohol/week: 4.0 standard drinks of alcohol     Types: 4 Standard drinks or equivalent per week     Comment: On weekends that i go out    Drug use: No    Sexual activity: Yes     Partners: Male     Birth control/protection: OCP, Post-menopausal     Comment: lifetime partners:  "6; current partner 2013       Objective     /80 (BP Location: Left arm, Patient Position: Sitting, Cuff Size: Standard)   Pulse 78   Temp 98.4 °F (36.9 °C) (Tympanic)   Resp 17   Ht 5' 5.47\" (1.663 m)   Wt 71.2 kg (157 lb)   LMP 01/07/2019 (Approximate)   SpO2 98%   BMI 25.75 kg/m²     Physical Exam  Vitals and nursing note reviewed.   Constitutional:       General: She is not in acute distress.     Appearance: Normal appearance. She is well-developed.   HENT:      Head: Normocephalic and atraumatic.      Right Ear: Tympanic membrane normal.      Left Ear: Tympanic membrane normal.      Nose: Nose normal.      Mouth/Throat:      Mouth: Mucous membranes are moist.   Eyes:      Conjunctiva/sclera: Conjunctivae normal.   Cardiovascular:      Rate and Rhythm: Normal rate and regular rhythm.      Heart sounds: No murmur heard.  Pulmonary:      Effort: Pulmonary effort is normal. No respiratory distress.      Breath sounds: Normal breath sounds.   Abdominal:      Palpations: Abdomen is soft.      Tenderness: There is no abdominal tenderness.   Musculoskeletal:         General: No swelling.      Cervical back: Normal range of motion and neck supple.   Skin:     General: Skin is warm and dry.      Capillary Refill: Capillary refill takes less than 2 seconds.   Neurological:      General: No focal deficit present.      Mental Status: She is alert and oriented to person, place, and time.   Psychiatric:         Mood and Affect: Mood normal.         "

## 2024-10-30 NOTE — ASSESSMENT & PLAN NOTE
Lab Results   Component Value Date    HGBA1C 5.7 (H) 10/08/2024   Stable on current meds    Orders:    [facility] Diabetic eye exam    semaglutide, 1 mg/dose, (Ozempic) 4 mg/3 mL injection pen; Inject 0.75 mL (1 mg total) under the skin once a week    Albumin / creatinine urine ratio; Future    Comprehensive metabolic panel; Future    Hemoglobin A1C; Future    Lipid Panel with Direct LDL reflex; Future    TSH, 3rd generation with Free T4 reflex; Future    CBC and differential; Future

## 2024-11-11 ENCOUNTER — CLINICAL SUPPORT (OUTPATIENT)
Dept: BARIATRICS | Facility: CLINIC | Age: 54
End: 2024-11-11
Payer: COMMERCIAL

## 2024-11-11 VITALS — BODY MASS INDEX: 25.09 KG/M2 | WEIGHT: 153 LBS

## 2024-11-11 DIAGNOSIS — E66.3 OVERWEIGHT (BMI 25.0-29.9): Primary | ICD-10-CM

## 2024-11-11 DIAGNOSIS — Z98.84 BARIATRIC SURGERY STATUS: ICD-10-CM

## 2024-11-11 DIAGNOSIS — Z98.84 S/P GASTRIC BYPASS: ICD-10-CM

## 2024-11-11 DIAGNOSIS — E63.8 INADEQUATE DIETARY INTAKE OF PROTEIN: ICD-10-CM

## 2024-11-11 DIAGNOSIS — R63.5 ABNORMAL WEIGHT GAIN: ICD-10-CM

## 2024-11-11 PROCEDURE — S9470 NUTRITIONAL COUNSELING, DIET: HCPCS | Performed by: DIETITIAN, REGISTERED

## 2024-11-11 PROCEDURE — RECHECK: Performed by: DIETITIAN, REGISTERED

## 2024-11-11 NOTE — PROGRESS NOTES
Bariatric Follow Up Nutrition Note EPIC Video Client Virtual Visit:   Patient's name and  were verified during visit. Pt present in a state that I hold active licensure.   Provider explained that [employer] is a HIPPA compliant platform and to further protect their confidentiality the provider was alone and the office door was closed. Patient consented to the virtual video visit Patient consented to the [employer] visit.  This visit is free.    Type of surgery  Vertical sleeve gastrectomy  Surgery Date: 2018  6 years 9 months post-op  Surgeon: Shantel Surgeons: Dr. Juarez     Type of surgery  Gastric bypass: laparoscopic  Surgery Date: 2022  2 years 3 months post-op  Surgeon: Shantel Surgeons: Dr. Juarez     Nutrition Assessment   Christina MILIAN Lara  54 y.o.  female  LMP 2019 (Approximate)   Wt Readings from Last 20 Encounters:   10/30/24 71.2 kg (157 lb)   10/15/24 70.6 kg (155 lb 9.6 oz)   10/11/24 70.5 kg (155 lb 8 oz)   24 70.8 kg (156 lb)   24 73.5 kg (162 lb)   24 73.5 kg (162 lb)   24 73.5 kg (162 lb)   24 73.5 kg (162 lb)   24 71.2 kg (157 lb)   24 72.6 kg (160 lb)   24 72.6 kg (160 lb)   24 70.3 kg (155 lb)   24 70.5 kg (155 lb 6.4 oz)   24 67.1 kg (148 lb)   24 65.6 kg (144 lb 9.6 oz)   24 66.2 kg (146 lb)   24 66.5 kg (146 lb 11.2 oz)   24 66.2 kg (146 lb)   24 67 kg (147 lb 9.6 oz)   24 65.8 kg (145 lb)       Joni Gonsalez Equation:    BMR: 1316kcal  Estimated calories for weight maintenance:  1579kcal  (sedentary)  Estimated calories for weight loss 1079kcal (1# per wk wt loss - sedentary)  Estimated protein needs 69.35-104g (1.0-1.5 gms/kg IBW)  Estimated fluid needs 2081-2427ml (30-35 ml/kg IBW)    10/31/2017 Initial Surgery Evaluation: 219.5lbs  2018 Weight on Day of Sleeve Surgery: 217.3lbs  2022 Weight on day or RNY Surgery: 183lbs  Weight in (lb) to have BMI = 25:  152.57lbs  Pre-Op Excess Wt: 66.93lbs  2023 Post-Op Bautista: 137.5lbs  133lbs per pt reportt- felt too thin  20lb weight regain x 15 months  Post-Op Wt Loss: 62.5#/ 93.4% EBWL/ 28.5 % TBWL  in 6.75 year(s)    Review of History and Medications   Past Medical History:   Diagnosis Date    Acute right ankle pain 2023    ADHD (attention deficit hyperactivity disorder)     Allergic     Anxiety     Bulging lumbar disc     Carpal tunnel syndrome     RIGHT.  LAST ASSESSED: 16    Chronic back pain     low    Chronic pain disorder     Colon polyps     COVID-19     2021    Depression     Diabetes mellitus (HCC)     GERD (gastroesophageal reflux disease)     Gestational diabetes     Hearing loss     left ear    Heart disease     SVT    History of pre-eclampsia     Hyperlipidemia     Resolved with weight loss    Hyponatremia 2020    IBS (irritable bowel syndrome)     Ileus ([facility])     LAST ASSESSED: 8/3/17    Labial cyst     LAST ASSESSED: 16    Memory loss     Myofascial pain     LAST ASSESSED: 16    Neck mass 2023    Obesity     Ovarian cyst     LEFT. LAST ASSESSED: 16    Panic attack     Pneumonia     Sacroiliitis ([facility])     Seasonal allergies     Sprain of anterior talofibular ligament of right ankle 2023    SVT (supraventricular tachycardia) ([facility])     Thoracic outlet syndrome     2010    Trochanteric bursitis of both hips     LAST ASSESSED: 3/18/16    Ulnar neuropathy at elbow     Varicella     Wears glasses      Past Surgical History:   Procedure Laterality Date    BARIATRIC SURGERY  2022    BILE DUCT EXPLORATION      ENDOSCOPIC REMOVAL OF STONES FROM BILIARY TRACT    CARDIAC ELECTROPHYSIOLOGY PROCEDURE N/A 2024    Procedure: Cardiac eps/svt ablation;  Surgeon: Daquan Heath MD;  [facility]: [facility];  Service: Cardiology     SECTION      x3    CHOLECYSTECTOMY      COLONOSCOPY      2017-polyp, -wnl, repeat5 years    DILATION AND CURETTAGE OF UTERUS       ENDOMETRIAL ABLATION      ERCP W/ SPHICTEROTOMY      FIRST RIB REMOVAL      THORAX EXCISION OF FIRST RIB    GASTRIC BYPASS  8/08/2022    HYSTEROSCOPY      NEUROPLASTY / TRANSPOSITION ULNAR NERVE AT ELBOW Right 2011    VT COLONOSCOPY FLX DX W/COLLJ SPEC WHEN PFRMD N/A 05/30/2017    Procedure: COLONOSCOPY;  Surgeon: Oscar Velázquez MD;  [facility]: [facility];  Service: Gastroenterology    VT ESOPHAGOGASTRODUODENOSCOPY TRANSORAL DIAGNOSTIC N/A 09/14/2017    Procedure: ESOPHAGOGASTRODUODENOSCOPY (EGD);  Surgeon: Sadiq Car MD;  Location: [facility];  Service: Gastroenterology    VT HYSTEROSCOPY BX ENDOMETRIUM&/POLYPC W/WO D&C N/A 10/20/2017    Procedure: DILATATION AND CURETTAGE (D&C) WITH HYSTEROSCOPY  REMOVAL VULVAR RT. LESION;  Surgeon: Lucila Ortiz MD;  [facility];  Service: Gynecology    VT ALDANA IMPLTJ NSTIM ELTRDS PLATE/PADDLE EDRL Left 01/29/2020    Procedure: Insertion of thoracic spinal cord stimulator electrode via laminotomy and placement of left buttock implantable pulse generator (NEUROMONITORING);  Surgeon: Ad Mehta MD;  [facility];  Service: Neurosurgery    VT LAPS GSTRC RSTRICTIV PX LONGITUDINAL GASTRECTOMY N/A 02/06/2018    Procedure: GASTRECTOMY SLEEVE LAPAROSCOPIC; INTRAOPERATIVE EGD ;  Surgeon: Abimael Juarez MD;  [facility];  Service: Bariatrics    VT LAPS GSTRC RSTRICTIV PX LONGITUDINAL GASTRECTOMY N/A 08/08/2022    Procedure: Diagnostic Lap; Extensive Lysis of Adhesions; LAPAROSCOPIC REVISION CONVERSION TO NOE-EN-Y GASTRIC BYPASS AND INTRAOPERATIVE EGD;  Surgeon: Abimael Juarez MD;  [facility];  Service: Bariatrics    VT NEUROPLASTY &/TRANSPOSITION ULNAR NERVE ELBOW Left 7/19/2024    Procedure: RELEASE CUBITAL TUNNEL- left with ulnar nerve transposition;  Surgeon: Maggy Leary DO;  Location: [facility];  Service: Orthopedics    SLEEVE GASTROPLASTY      SPINAL CORD STIMULATOR TRIAL W/ LAMINOTOMY      TONSILLECTOMY AND ADENOIDECTOMY      TUBAL  LIGATION      UPPER GASTROINTESTINAL ENDOSCOPY      US GUIDED LYMPH NODE BIOPSY LEFT  2023    VEIN LIGATION AND STRIPPING Right     VULVA SURGERY  10/20/2017    BIOPSY    WISDOM TOOTH EXTRACTION       Social History     Socioeconomic History    Marital status: /Civil Union     Spouse name: Abel    Number of children: 3    Years of education: GED then 2 year college program    Highest education level: Not on file   Occupational History    Occupation: ER TECH     Employer: [employer]   Tobacco Use    Smoking status: Former     Current packs/day: 0.00     Average packs/day: 1 pack/day for 15.0 years (15.0 ttl pk-yrs)     Types: Cigarettes     Start date: 1998     Quit date: 2013     Years since quittin.5     Passive exposure: Never    Smokeless tobacco: Never   Vaping Use    Vaping status: Never Used   Substance and Sexual Activity    Alcohol use: Yes     Alcohol/week: 4.0 standard drinks of alcohol     Types: 4 Standard drinks or equivalent per week     Comment: On weekends that i go out    Drug use: No    Sexual activity: Yes     Partners: Male     Birth control/protection: OCP, Post-menopausal     Comment: lifetime partners: 6; current partner 2013   Other Topics Concern    Not on file   Social History Narrative    Christian: no preference    Accepts blood products        Exercise: unable with back issues and SVT    Calcium: calcium supplement, multivitamin for women over 50, 1 yogurt daily     Social Determinants of Health     Financial Resource Strain: Medium Risk (2020)    Overall Financial Resource Strain (CARDIA)     Difficulty of Paying Living Expenses: Somewhat hard   Food Insecurity: No Food Insecurity (2020)    Hunger Vital Sign     Worried About Running Out of Food in the Last Year: Never true     Ran Out of Food in the Last Year: Never true   Transportation Needs: No Transportation Needs (2020)    PRAPARE - Transportation     Lack of  Transportation (Medical): No     Lack of Transportation (Non-Medical): No   Physical Activity: Unknown (5/9/2019)    Exercise Vital Sign     Days of Exercise per Week: 0 days     Minutes of Exercise per Session: Not on file   Stress: Stress Concern Present (5/9/2019)    [employer] [place] [employer] - Occupational Stress Questionnaire     Feeling of Stress : Very much   Social Connections: Socially Isolated (5/9/2019)    Social Connection and Isolation Panel [NHANES]     Frequency of Communication with Friends and Family: Once a week     Frequency of Social Gatherings with Friends and Family: Once a week     Attends Lutheran Services: Never     Active Member of Clubs or Organizations: No     Attends Club or Organization Meetings: Never     Marital Status:    Intimate Partner Violence: Not At Risk (5/9/2019)    Humiliation, Afraid, Rape, and Kick questionnaire     Fear of Current or Ex-Partner: No     Emotionally Abused: No     Physically Abused: No     Sexually Abused: No   Housing Stability: Not on file       Current Outpatient Medications:     albuterol (Ventolin HFA) 90 mcg/act inhaler, Inhale 2 puffs every 6 (six) hours as needed for wheezing, Disp: 6.7 g, Rfl: 1    atoMOXetine (STRATTERA) 60 mg capsule, Take 1 capsule (60 mg total) by mouth daily, Disp: 90 capsule, Rfl: 3    atorvastatin (LIPITOR) 20 mg tablet, Take 1 tablet (20 mg total) by mouth daily, Disp: 100 tablet, Rfl: 1    benzonatate (TESSALON) 200 MG capsule, Take 1 capsule (200 mg total) by mouth 3 (three) times a day as needed for cough, Disp: 30 capsule, Rfl: 0    Blood Glucose Monitoring Suppl (FreeStyle Freedom Lite) w/Device KIT, Please dispense 1 kit, Disp: 1 kit, Rfl: 0    calcium carbonate (OS-MELODY) 600 MG tablet, Take 600 mg by mouth 2 (two) times a day with meals, Disp: , Rfl:     cyclobenzaprine (FLEXERIL) 10 mg tablet, Take 1 tablet (10 mg total) by mouth 2 (two) times a day as needed for muscle spasms for up to 7  days, Disp: 14 tablet, Rfl: 0    Diclofenac Sodium (VOLTAREN) 1 %, Apply 2 g topically 4 (four) times a day, Disp: 100 g, Rfl: 0    drospirenone-ethinyl estradiol (LIEN) 3-0.02 MG per tablet, Take 1 tablet by mouth daily, Disp: 84 tablet, Rfl: 4    estradiol (ESTRACE VAGINAL) 0.1 mg/g vaginal cream, One gram vaginally at night x 14 days then twice weekly for ongoing maintenance., Disp: 42.5 g, Rfl: 2    fluticasone (FLONASE) 50 mcg/act nasal spray, 1 spray into each nostril daily, Disp: 15.8 mL, Rfl: 0    glucose blood (FREESTYLE LITE) test strip, Test once daily, Disp: 100 each, Rfl: 1    lamoTRIgine (LaMICtal) 150 MG tablet, Take 1 tablet (150 mg total) by mouth 2 (two) times a day, Disp: 180 tablet, Rfl: 3    Lancets (freestyle) lancets, Use as instructed, once daily, Disp: 100 each, Rfl: 1    lidocaine (Lidoderm) 5 %, Apply 1 patch topically over 12 hours daily Remove & Discard patch within 12 hours or as directed by MD, Disp: 30 patch, Rfl: 5    methocarbamol (ROBAXIN) 750 mg tablet, Take 1 tablet (750 mg total) by mouth every 8 (eight) hours (Patient not taking: Reported on 10/11/2024), Disp: 270 tablet, Rfl: 0    mirtazapine (REMERON) 15 mg tablet, Take 1 tablet (15 mg total) by mouth daily at bedtime, Disp: 90 tablet, Rfl: 1    montelukast (SINGULAIR) 10 mg tablet, Take 1 tablet (10 mg total) by mouth daily at bedtime, Disp: 90 tablet, Rfl: 2    Multiple Vitamins-Minerals (MULTI COMPLETE PO), Take by mouth, Disp: , Rfl:     omeprazole (PriLOSEC) 20 mg delayed release capsule, TAKE ONE CAPSULE BY MOUTH ONCE A DAY., Disp: 90 capsule, Rfl: 1    semaglutide, 1 mg/dose, (Ozempic) 4 mg/3 mL injection pen, Inject 0.75 mL (1 mg total) under the skin once a week, Disp: 9 mL, Rfl: 0    venlafaxine (EFFEXOR) 100 MG tablet, Take 1 tablet (100 mg total) by mouth 3 (three) times a day, Disp: 270 tablet, Rfl: 2    Food Intake and Lifestyle Assessment   Food Intake Assessment completed via usual diet recall  Breakfast: fruit  bar:  grapes, cantalope, melon, strawberries, whipped cream, granola, sometimes oatmeal  Snack: 0   Lunch: plain burger emma with cheese OR salad with grilled chicken  Snack: 0  Dinner: sometimes skips dinner if eats lunch late.  If eats dinner, has soup or grilled cheese sandwich or dry cereal  Snack: 0  Beverage intake: water  Was going out to drink once a week-reports has blacked out fallen down- stopped all together a month ago  Does not drink vitamin hassan  Diet texture/stage: regular  Protein supplement: none  Estimated protein intake per day: 30-60g  Estimated fluid intake per day: >64oz  Meals eaten away from home: rare  Typical meal pattern: 2-3 meals per day and 0-2 snacks per day  Eating Behaviors: Appropriate diet advancement, Appropriate portion sizes, and Does not drink with meals and waits 30-minutes after meal before resuming drinking  Can't eat longoria- tuna fish, chicken salad  If eats too much salad/leafy greens- feels like its going to go right through her  Food allergies or intolerances:   C/o IBS-D  C/o lactose intolerance  Cultural or Gnosticism considerations: N/A    Vitamin and Mineral regimen   Multivitamin-  UpcalD-stopped  Iron def anemia  - getting iron infusions - done with infusions-repeat labs in January    Physical Assessment  Nutrition Related Findings  Fatigue-energy improving since infusions  constipation  Thinning hair- started taking nutrafol, biotin, and collagen-stopped all these after told Vitamin A level was high    Physical Activity  Walks dog for 1 miles several days per week-Monday, Wednesday, Friday and weekends  Walks around [facility]- 5 laps is one mile  Has a fitness watch to track her steps: 25,000-30,000 steps per day during work shift  Current physical limitations: c/o fatigue, lack of time.  Also has spinal stimulator and gets regular back injections.  Pt reports she very much enjoys spin classes.  Pt is thinking about joining [facility]  "gym.    Psychosocial Assessment   Support systems: lives with spouse-works opposite shifts  Socioeconomic factors: works in [employer]    Nutrition Diagnosis  Diagnosis: Altered GI function (NC-1.4)  Related to: Altered GI function  As Evidenced by: s/p bariatric surgery     Nutrition Prescription: Recommend the following diet  Low fat, Low sugar, High protein, and Regular  1100kcal, 70-100g pro    Meal Plan ( Elder/Pro/Carb)   Breakfast: 200-300/20/30  Snack: 150/>5/20  Lunch: 300/30/30-45  Snack: 150/>5/20  Dinner: 300/30/30-45  Snack: 150/>5/20     Interventions and Teaching   Patient educated on post-op nutrition guidelines.       Patient educated and handouts provided.  Adequate hydration  Sugar and fat restriction to decrease \"dumping syndrome\"  Expected weight loss  Weight loss plateaus/ possibility of weight regain  Exercise  Nutrition considerations after surgery  Dietary and lifestyle changes  Potential for food intolerance after surgery, and ways to deal with them including: lactose intolerance, nausea, reflux, vomiting, diarrhea, food intolerance, appetite changes, gas  Vitamin / Mineral supplementation of pt stopped vitamin/mineral supplements one month ago due to high vitamin A level    Education provided to: patient  Barriers to learning: No barriers identified  Readiness to change: action  Comprehension: verbalizes understanding   Expected Compliance: good    Evaluation/Monitoring   Eating pattern as discussed Body weight Lab values Physical activity Bowel pattern    Goals  Exercise 30 minutes 5 times per week and Eat 3 meals per day     Time Spent:   30 Minutes   "

## 2024-11-12 DIAGNOSIS — G56.22 CUBITAL TUNNEL SYNDROME ON LEFT: Primary | ICD-10-CM

## 2024-11-13 ENCOUNTER — OFFICE VISIT (OUTPATIENT)
Dept: UROLOGY | Facility: CLINIC | Age: 54
End: 2024-11-13
Payer: COMMERCIAL

## 2024-11-13 VITALS
DIASTOLIC BLOOD PRESSURE: 74 MMHG | WEIGHT: 152 LBS | BODY MASS INDEX: 25.33 KG/M2 | SYSTOLIC BLOOD PRESSURE: 106 MMHG | HEIGHT: 65 IN

## 2024-11-13 DIAGNOSIS — N39.41 URGE INCONTINENCE: ICD-10-CM

## 2024-11-13 DIAGNOSIS — N39.0 RECURRENT UTI: Primary | ICD-10-CM

## 2024-11-13 LAB
POST-VOID RESIDUAL VOLUME, ML POC: 6 ML
SL AMB  POCT GLUCOSE, UA: NORMAL
SL AMB LEUKOCYTE ESTERASE,UA: NORMAL
SL AMB POCT BILIRUBIN,UA: NORMAL
SL AMB POCT BLOOD,UA: NORMAL
SL AMB POCT CLARITY,UA: CLEAR
SL AMB POCT COLOR,UA: NORMAL
SL AMB POCT KETONES,UA: NORMAL
SL AMB POCT NITRITE,UA: NORMAL
SL AMB POCT PH,UA: 5
SL AMB POCT SPECIFIC GRAVITY,UA: 1.01
SL AMB POCT URINE PROTEIN: NORMAL
SL AMB POCT UROBILINOGEN: 0.2

## 2024-11-13 PROCEDURE — 99213 OFFICE O/P EST LOW 20 MIN: CPT

## 2024-11-13 PROCEDURE — 51798 US URINE CAPACITY MEASURE: CPT

## 2024-11-13 PROCEDURE — 81002 URINALYSIS NONAUTO W/O SCOPE: CPT

## 2024-11-13 RX ORDER — MIRABEGRON 25 MG/1
25 TABLET, FILM COATED, EXTENDED RELEASE ORAL DAILY
Qty: 30 TABLET | Refills: 3 | Status: SHIPPED | OUTPATIENT
Start: 2024-11-13

## 2024-11-13 NOTE — PROGRESS NOTES
11/13/2024      Chief Complaint   Patient presents with    Urinary Urgency         Assessment and Plan    54 y.o. female      Urinary Urge Incontinence  -urine dip is negative for leukocytes, nitrites, and blood.   -PVR 6 mls.  -urinary urgency with incontinence in evening and morning.  -We discussed myrbetriq. Side effect profile discussed.  -Patient will return in 3 months for follow-up with PVR.     2. Recurrent UTIs  -previously on topical estrace. Patient discontinued d/t no longer getting UTIs.      History of Present Illness  Christina Lara is a 54 y.o. female here for evaluation of urinary urgency. She has pmhx of recurrent UTIs, IBS, GERD, SVT,  medtronic loop recorder implantation, prediabetes,lumbar radiculoapthy, vertigo, previous Rebecca-en-Y bypass.     Patient states that she has urinary urgency with incontinence in the evening and morning. She states she does not wear pads. She states this started one month ago. She denies dysuria, flank pain, and gross hematuria. Her urine dip is unremarkable today and PVR is 6 mls demonstrating good bladder emptying. She denies constipation. She has not had any recent urinary tract infections. She denies leakage with coughing, laughing, sneezing, and movement. She states she drinks a lot of water and tries to drink less water before bedtime.        Review of Systems   Constitutional:  Negative for chills and fever.   HENT:  Negative for ear pain and sore throat.    Eyes:  Negative for pain and visual disturbance.   Respiratory:  Negative for cough and shortness of breath.    Cardiovascular:  Negative for chest pain and palpitations.   Gastrointestinal:  Negative for abdominal pain, constipation, nausea and vomiting.   Genitourinary:  Positive for urgency. Negative for difficulty urinating, dysuria, flank pain, frequency and hematuria.   Musculoskeletal:  Negative for arthralgias and back pain.   Skin:  Negative for color change and rash.   Neurological:  Negative for  "seizures and syncope.   All other systems reviewed and are negative.               Vitals  Vitals:    11/13/24 0753   BP: 106/74   BP Location: Left arm   Patient Position: Sitting   Cuff Size: Adult   Weight: 68.9 kg (152 lb)   Height: 5' 5.47\" (1.663 m)       Physical Exam  Constitutional:       Appearance: Normal appearance.   HENT:      Head: Normocephalic.   Eyes:      Extraocular Movements: Extraocular movements intact.      Pupils: Pupils are equal, round, and reactive to light.   Pulmonary:      Effort: Pulmonary effort is normal. No respiratory distress.   Musculoskeletal:         General: Normal range of motion.      Cervical back: Normal range of motion.   Neurological:      Mental Status: She is alert and oriented to person, place, and time.   Psychiatric:         Mood and Affect: Mood normal.         Behavior: Behavior normal.         Thought Content: Thought content normal.         Judgment: Judgment normal.           Past History  Past Medical History:   Diagnosis Date    Acute right ankle pain 02/07/2023    ADHD (attention deficit hyperactivity disorder)     Allergic     Anxiety     Bulging lumbar disc     Carpal tunnel syndrome     RIGHT.  LAST ASSESSED: 12/7/16    Chronic back pain     low    Chronic pain disorder     Colon polyps     COVID-19     12/2021    Depression     Diabetes mellitus ([facility])     GERD (gastroesophageal reflux disease)     Gestational diabetes     Hearing loss     left ear    Heart disease     SVT    History of pre-eclampsia     Hyperlipidemia     Resolved with weight loss    Hyponatremia 12/12/2020    IBS (irritable bowel syndrome)     Ileus ([facility])     LAST ASSESSED: 8/3/17    Labial cyst     LAST ASSESSED: 4/21/16    Memory loss     Myofascial pain     LAST ASSESSED: 4/12/16    Neck mass 05/11/2023    Obesity     Ovarian cyst     LEFT. LAST ASSESSED: 9/2/16    Panic attack     Pneumonia     Sacroiliitis ([facility])     Seasonal allergies     Sprain of anterior talofibular ligament of " right ankle 2023    SVT (supraventricular tachycardia) ([facility])     Thoracic outlet syndrome         Trochanteric bursitis of both hips     LAST ASSESSED: 3/18/16    Ulnar neuropathy at elbow     Varicella     Wears glasses      Social History     Socioeconomic History    Marital status: /Civil Union     Spouse name: Abel    Number of children: 3    Years of education: GED then 2 year college program    Highest education level: None   Occupational History    Occupation: ER TECH     Employer: [employer]   Tobacco Use    Smoking status: Former     Current packs/day: 0.00     Average packs/day: 1 pack/day for 15.0 years (15.0 ttl pk-yrs)     Types: Cigarettes     Start date: 1998     Quit date: 2013     Years since quittin.5     Passive exposure: Never    Smokeless tobacco: Never   Vaping Use    Vaping status: Never Used   Substance and Sexual Activity    Alcohol use: Yes     Alcohol/week: 4.0 standard drinks of alcohol     Types: 4 Standard drinks or equivalent per week     Comment: On weekends that i go out    Drug use: No    Sexual activity: Yes     Partners: Male     Birth control/protection: OCP, Post-menopausal     Comment: lifetime partners: 6; current partner 2013   Other Topics Concern    None   Social History Narrative    [employer]: no preference    Accepts blood products        Exercise: unable with back issues and SVT    Calcium: calcium supplement, multivitamin for women over 50, 1 yogurt daily     Social Drivers of Health     Financial Resource Strain: Medium Risk (2020)    Overall Financial Resource Strain (CARDIA)     Difficulty of Paying Living Expenses: Somewhat hard   Food Insecurity: No Food Insecurity (2020)    Hunger Vital Sign     Worried About Running Out of Food in the Last Year: Never true     Ran Out of Food in the Last Year: Never true   Transportation Needs: No Transportation Needs (2020)    PRAPARE - Transportation     Lack  of Transportation (Medical): No     Lack of Transportation (Non-Medical): No   Physical Activity: Unknown (2019)    Exercise Vital Sign     Days of Exercise per Week: 0 days     Minutes of Exercise per Session: Not on file   Stress: Stress Concern Present (2019)    [employer] [place] [employer] - Occupational Stress Questionnaire     Feeling of Stress : Very much   Social Connections: Socially Isolated (2019)    Social Connection and Isolation Panel [NHANES]     Frequency of Communication with Friends and Family: Once a week     Frequency of Social Gatherings with Friends and Family: Once a week     Attends [employer] Services: Never     Active Member of Clubs or Organizations: No     Attends Club or Organization Meetings: Never     Marital Status:    Intimate Partner Violence: Not At Risk (2019)    Humiliation, Afraid, Rape, and Kick questionnaire     Fear of Current or Ex-Partner: No     Emotionally Abused: No     Physically Abused: No     Sexually Abused: No   Housing Stability: Not on file     Social History     Tobacco Use   Smoking Status Former    Current packs/day: 0.00    Average packs/day: 1 pack/day for 15.0 years (15.0 ttl pk-yrs)    Types: Cigarettes    Start date: 1998    Quit date: 2013    Years since quittin.5    Passive exposure: Never   Smokeless Tobacco Never     Family History   Problem Relation Age of Onset    Diabetes Mother     Breast cancer Mother         >50    BRCA1 Negative Mother     BRCA2 Negative Mother     Hyperlipidemia Mother     Cancer Mother         Breast    Diabetes Father     Other Father         traumatic brain injury    Prostate cancer Father     Alcohol abuse Father         in remission    Heart disease Father     Neuropathy Father     Hyperlipidemia Father     Cancer Father         Prostate    Hypertension Brother     Diabetes Brother     Other Brother         HYPERCHOLESTEROLEMIA    Alcohol abuse Brother     Depression  "Brother         attempted suicide    Anxiety disorder Brother     Suicide Attempts Brother     Diabetes Brother     Alcohol abuse Brother     Heart attack Maternal Grandmother     Colon cancer Maternal Grandfather     No Known Problems Paternal Grandmother         dad is adopted    No Known Problems Paternal Grandfather         dad is adopted    No Known Problems Daughter     Asthma Son     Ovarian cancer Neg Hx     Uterine cancer Neg Hx        The following portions of the patient's history were reviewed and updated as appropriate: allergies, current medications, past medical history, past social history, past surgical history and problem list.    Results  Recent Results (from the past hour)   POCT urine dip    Collection Time: 11/13/24  7:54 AM   Result Value Ref Range    LEUKOCYTE ESTERASE,UA -     NITRITE,UA -     SL AMB POCT UROBILINOGEN 0.2     POCT URINE PROTEIN trace      PH,UA 5     BLOOD,UA -     SPECIFIC GRAVITY,UA 1.010     KETONES,UA -     BILIRUBIN,UA -     GLUCOSE, UA -      COLOR,UA dark yellow     CLARITY,UA clear    POCT Measure PVR    Collection Time: 11/13/24  7:58 AM   Result Value Ref Range    POST-VOID RESIDUAL VOLUME, ML POC 6 mL   ]  No results found for: \"PSA\"  Lab Results   Component Value Date    GLUCOSE 109 08/13/2015    CALCIUM 9.1 10/08/2024     (L) 08/13/2015    K 3.9 10/08/2024    CO2 27 10/08/2024     10/08/2024    BUN 11 10/08/2024    CREATININE 0.66 10/08/2024     Lab Results   Component Value Date    WBC 6.52 07/03/2024    HGB 11.0 (L) 07/03/2024    HCT 35.8 07/03/2024    MCV 90 07/03/2024     07/03/2024       OSMANI Huang  "

## 2024-11-18 ENCOUNTER — TELEMEDICINE (OUTPATIENT)
Dept: PSYCHIATRY | Facility: CLINIC | Age: 54
End: 2024-11-18
Payer: COMMERCIAL

## 2024-11-18 DIAGNOSIS — F33.0 MAJOR DEPRESSIVE DISORDER, RECURRENT, MILD (HCC): ICD-10-CM

## 2024-11-18 DIAGNOSIS — F43.10 POST TRAUMATIC STRESS DISORDER (PTSD): Chronic | ICD-10-CM

## 2024-11-18 DIAGNOSIS — F41.1 GENERALIZED ANXIETY DISORDER: Primary | Chronic | ICD-10-CM

## 2024-11-18 DIAGNOSIS — F90.0 ADHD (ATTENTION DEFICIT HYPERACTIVITY DISORDER), INATTENTIVE TYPE: Chronic | ICD-10-CM

## 2024-11-18 PROCEDURE — 99214 OFFICE O/P EST MOD 30 MIN: CPT | Performed by: STUDENT IN AN ORGANIZED HEALTH CARE EDUCATION/TRAINING PROGRAM

## 2024-11-18 RX ORDER — MIRTAZAPINE 15 MG/1
15 TABLET, FILM COATED ORAL
Qty: 90 TABLET | Refills: 1 | Status: SHIPPED | OUTPATIENT
Start: 2024-11-18

## 2024-11-18 NOTE — PSYCH
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         ADHD (attention deficit hyperactivity disorder), inattentive type         Major depressive disorder, recurrent, mild (HCC)             Does not want any medication changes  Aware of need to follow up with family physician for medical issues  Aware of 24 hour and weekend coverage for urgent situations accessed by calling [facility] main practice number    Medications Risks/Benefits      Risks, Benefits And Possible Side Effects Of Medications:    Risks, benefits, and possible side effects of medications explained to Christina including risk of rash related to treatment with Lamictal and risk of suicidality and serotonin syndrome related to treatment with antidepressants. She verbalizes understanding and agreement for treatment.    Controlled Medication Discussion:     Not applicable    Psychotherapy Provided:     Individual psychotherapy provided: No     Treatment Plan:    Completed and signed during the session: Not applicable - Treatment Plan to be completed by [facility] therapist      Visit Time    Visit Start  Time: 3:40 PM  Visit Stop Time: 4:00 PM  Total Visit Duration:  20 minutes     The total visit duration detailed above includes: patient engagement, medication management, psychotherapy/counseling, discussion regarding treatment goals, documentation, review of past medical records, and coordination of care.      Note Share Disclaimer:     This note was not shared with the patient due to reasonable likelihood of causing patient harm      Marc Vela MD  Board Certified Diplomate of the American Board of Psychiatry and Neurology  11/18/24

## 2024-11-19 ENCOUNTER — TELEMEDICINE (OUTPATIENT)
Dept: BEHAVIORAL/MENTAL HEALTH CLINIC | Facility: CLINIC | Age: 54
End: 2024-11-19
Payer: COMMERCIAL

## 2024-11-19 ENCOUNTER — TELEPHONE (OUTPATIENT)
Dept: PSYCHIATRY | Facility: CLINIC | Age: 54
End: 2024-11-19

## 2024-11-19 DIAGNOSIS — F90.0 ADHD (ATTENTION DEFICIT HYPERACTIVITY DISORDER), INATTENTIVE TYPE: Chronic | ICD-10-CM

## 2024-11-19 DIAGNOSIS — F33.0 MAJOR DEPRESSIVE DISORDER, RECURRENT, MILD (HCC): Primary | ICD-10-CM

## 2024-11-19 DIAGNOSIS — F43.10 POST TRAUMATIC STRESS DISORDER (PTSD): Chronic | ICD-10-CM

## 2024-11-19 DIAGNOSIS — F41.1 GENERALIZED ANXIETY DISORDER: Chronic | ICD-10-CM

## 2024-11-19 PROCEDURE — 90834 PSYTX W PT 45 MINUTES: CPT | Performed by: SOCIAL WORKER

## 2024-11-19 NOTE — TELEPHONE ENCOUNTER
Spoke with patient but she was at work and will call back to schedule 4 month follow up for Dr Vela. Please schedule upon return call.

## 2024-11-27 NOTE — PSYCH
Virtual Regular Visit    Verification of patient location:    Patient is located at Home in the following state in which I hold an active license [place]      Assessment/Plan:    Problem List Items Addressed This Visit          Behavioral Health    Post traumatic stress disorder (PTSD) (Chronic)    Generalized anxiety disorder (Chronic)    ADHD (attention deficit hyperactivity disorder), inattentive type (Chronic)    Major depressive disorder, recurrent, mild (HCC) - Primary       Goals addressed in session: Goal 1          Reason for visit is   Chief Complaint   Patient presents with    Virtual Regular Visit          Encounter provider LALY Yung      Recent Visits  No visits were found meeting these conditions.  Showing recent visits within past 7 days and meeting all other requirements  Future Appointments  No visits were found meeting these conditions.  Showing future appointments within next 150 days and meeting all other requirements       The patient was identified by name and date of birth. Christina Lara was informed that this is a telemedicine visit and that the visit is being conducted throughthe Epic Embedded platform. She agrees to proceed..  My office door was closed. No one else was in the room.  She acknowledged consent and understanding of privacy and security of the video platform. The patient has agreed to participate and understands they can discontinue the visit at any time.    Patient is aware this is a billable service.     Subjective  Christina Lara is a 54 y.o. female.      HPI     Past Medical History:   Diagnosis Date    Acute right ankle pain 02/07/2023    ADHD (attention deficit hyperactivity disorder)     Allergic     Anxiety     Bulging lumbar disc     Carpal tunnel syndrome     RIGHT.  LAST ASSESSED: 12/7/16    Chronic back pain     low    Chronic pain disorder     Colon polyps     COVID-19     12/2021    Depression     Diabetes mellitus (HCC)     GERD (gastroesophageal reflux  disease)     Gestational diabetes     Hearing loss     left ear    Heart disease     SVT    History of pre-eclampsia     Hyperlipidemia     Resolved with weight loss    Hyponatremia 2020    IBS (irritable bowel syndrome)     Ileus ([facility])     LAST ASSESSED: 8/3/17    Labial cyst     LAST ASSESSED: 16    Memory loss     Myofascial pain     LAST ASSESSED: 16    Neck mass 2023    Obesity     Ovarian cyst     LEFT. LAST ASSESSED: 16    Panic attack     Pneumonia     Sacroiliitis (HCC)     Seasonal allergies     Sprain of anterior talofibular ligament of right ankle 2023    SVT (supraventricular tachycardia) ([facility])     Thoracic outlet syndrome     2010    Trochanteric bursitis of both hips     LAST ASSESSED: 3/18/16    Ulnar neuropathy at elbow     Varicella     Wears glasses        Past Surgical History:   Procedure Laterality Date    BARIATRIC SURGERY  2022    BILE DUCT EXPLORATION      ENDOSCOPIC REMOVAL OF STONES FROM BILIARY TRACT    CARDIAC ELECTROPHYSIOLOGY PROCEDURE N/A 2024    Procedure: Cardiac eps/svt ablation;  Surgeon: Daquan Heath MD;  [facility];  Service: Cardiology     SECTION      x3    CHOLECYSTECTOMY      COLONOSCOPY      -polyp, -wnl, repeat5 years    DILATION AND CURETTAGE OF UTERUS      ENDOMETRIAL ABLATION      ERCP W/ SPHICTEROTOMY      FIRST RIB REMOVAL      THORAX EXCISION OF FIRST RIB    GASTRIC BYPASS  2022    HYSTEROSCOPY      NEUROPLASTY / TRANSPOSITION ULNAR NERVE AT ELBOW Right     SD COLONOSCOPY FLX DX W/COLLJ SPEC WHEN PFRMD N/A 2017    Procedure: COLONOSCOPY;  Surgeon: Oscar Velázquez MD;  Location: [facility];  Service: Gastroenterology    SD ESOPHAGOGASTRODUODENOSCOPY TRANSORAL DIAGNOSTIC N/A 2017    Procedure: ESOPHAGOGASTRODUODENOSCOPY (EGD);  Surgeon: Sadiq Car MD;  Location: [facility];  Service: Gastroenterology    SD HYSTEROSCOPY BX ENDOMETRIUM&/POLYPC W/WO D&C N/A 10/20/2017    Procedure:  DILATATION AND CURETTAGE (D&C) WITH HYSTEROSCOPY  REMOVAL VULVAR RT. LESION;  Surgeon: Lucila Ortiz MD;  Location: [facility];  Service: Gynecology    MS ALDANA IMPLTJ NSTIM ELTRDS PLATE/PADDLE EDRL Left 01/29/2020    Procedure: Insertion of thoracic spinal cord stimulator electrode via laminotomy and placement of left buttock implantable pulse generator (NEUROMONITORING);  Surgeon: Ad Mehta MD;  Location: [facility];  Service: Neurosurgery    MS LAPS GSTRC RSTRICTIV PX LONGITUDINAL GASTRECTOMY N/A 02/06/2018    Procedure: GASTRECTOMY SLEEVE LAPAROSCOPIC; INTRAOPERATIVE EGD ;  Surgeon: Abimael Juarez MD;  [facility];  Service: Bariatrics    MS LAPS GSTRC RSTRICTIV PX LONGITUDINAL GASTRECTOMY N/A 08/08/2022    Procedure: Diagnostic Lap; Extensive Lysis of Adhesions; LAPAROSCOPIC REVISION CONVERSION TO NOE-EN-Y GASTRIC BYPASS AND INTRAOPERATIVE EGD;  Surgeon: Abimael Juarez MD;  [facility];  Service: Bariatrics    MS NEUROPLASTY &/TRANSPOSITION ULNAR NERVE ELBOW Left 7/19/2024    Procedure: RELEASE CUBITAL TUNNEL- left with ulnar nerve transposition;  Surgeon: Maggy Leary DO;  Location: [facility];  Service: Orthopedics    SLEEVE GASTROPLASTY      SPINAL CORD STIMULATOR TRIAL W/ LAMINOTOMY      TONSILLECTOMY AND ADENOIDECTOMY      TUBAL LIGATION      UPPER GASTROINTESTINAL ENDOSCOPY      US GUIDED LYMPH NODE BIOPSY LEFT  05/22/2023    VEIN LIGATION AND STRIPPING Right     VULVA SURGERY  10/20/2017    BIOPSY    WISDOM TOOTH EXTRACTION         Current Outpatient Medications   Medication Sig Dispense Refill    albuterol (Ventolin HFA) 90 mcg/act inhaler Inhale 2 puffs every 6 (six) hours as needed for wheezing 6.7 g 1    atoMOXetine (STRATTERA) 60 mg capsule Take 1 capsule (60 mg total) by mouth daily 90 capsule 3    atorvastatin (LIPITOR) 20 mg tablet Take 1 tablet (20 mg total) by mouth daily 100 tablet 1    benzonatate (TESSALON) 200 MG capsule Take 1 capsule (200 mg total) by mouth 3  (three) times a day as needed for cough 30 capsule 0    Blood Glucose Monitoring Suppl (FreeStyle Freedom Lite) w/Device KIT Please dispense 1 kit 1 kit 0    calcium carbonate (OS-MELODY) 600 MG tablet Take 600 mg by mouth 2 (two) times a day with meals      cyclobenzaprine (FLEXERIL) 10 mg tablet Take 1 tablet (10 mg total) by mouth 2 (two) times a day as needed for muscle spasms for up to 7 days 14 tablet 0    Diclofenac Sodium (VOLTAREN) 1 % Apply 2 g topically 4 (four) times a day 100 g 0    drospirenone-ethinyl estradiol (LIEN) 3-0.02 MG per tablet Take 1 tablet by mouth daily 84 tablet 4    estradiol (ESTRACE VAGINAL) 0.1 mg/g vaginal cream One gram vaginally at night x 14 days then twice weekly for ongoing maintenance. (Patient not taking: Reported on 11/13/2024) 42.5 g 2    fluticasone (FLONASE) 50 mcg/act nasal spray 1 spray into each nostril daily 15.8 mL 0    glucose blood (FREESTYLE LITE) test strip Test once daily 100 each 1    lamoTRIgine (LaMICtal) 150 MG tablet Take 1 tablet (150 mg total) by mouth 2 (two) times a day 180 tablet 3    Lancets (freestyle) lancets Use as instructed, once daily 100 each 1    lidocaine (Lidoderm) 5 % Apply 1 patch topically over 12 hours daily Remove & Discard patch within 12 hours or as directed by MD 30 patch 5    methocarbamol (ROBAXIN) 750 mg tablet Take 1 tablet (750 mg total) by mouth every 8 (eight) hours (Patient not taking: Reported on 10/11/2024) 270 tablet 0    Mirabegron ER 25 MG TB24 Take 25 mg by mouth in the morning 30 tablet 3    mirtazapine (REMERON) 15 mg tablet Take 1 tablet (15 mg total) by mouth daily at bedtime 90 tablet 1    montelukast (SINGULAIR) 10 mg tablet Take 1 tablet (10 mg total) by mouth daily at bedtime 90 tablet 2    Multiple Vitamins-Minerals (MULTI COMPLETE PO) Take by mouth      omeprazole (PriLOSEC) 20 mg delayed release capsule TAKE ONE CAPSULE BY MOUTH ONCE A DAY. 90 capsule 1    semaglutide, 1 mg/dose, (Ozempic) 4 mg/3 mL injection  pen Inject 0.75 mL (1 mg total) under the skin once a week 9 mL 0    venlafaxine (EFFEXOR) 100 MG tablet Take 1 tablet (100 mg total) by mouth 3 (three) times a day 270 tablet 2     No current facility-administered medications for this visit.        Allergies   Allergen Reactions    Ibuprofen Other (See Comments)     Due to gastric sleeve -can only take for 5 days, then needs to stop       Review of Systems    Video Exam    There were no vitals filed for this visit.    Physical Exam     Behavioral Health Psychotherapy Progress Note    Psychotherapy Provided: Individual Psychotherapy     1. Major depressive disorder, recurrent, mild (HCC)        2. Generalized anxiety disorder        3. Post traumatic stress disorder (PTSD)        4. ADHD (attention deficit hyperactivity disorder), inattentive type            Goals addressed in session: Goal 1     DATA: Met with Yessy for her scheduled individual session. Yessy states that things are going well for her at work. She reports that she is very happy that she [facility] the [facility] and to leave the emergency department. Yessy discussed her stressors, which are primarily related to her education. 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 We discussed the possibility of her filing for some sort of accommodation, to help her with the difficulties she has. She states that she has difficulty understanding things when she reads them, but she has some increased comprehension when someone verbalizes the concepts or shows her how to complete the work. She states that she is hopeful that if she takes the course by itself, she will be able to manage the work. Yessy states that she and her  are getting along well. She talked briefly about her son's work. He is doing well in his job.     During this session, this clinician used the following therapeutic modalities: Client-centered Therapy,  "Dialectical Behavior Therapy, Mindfulness-based Strategies, Motivational Interviewing, Solution-Focused Therapy, and Supportive Psychotherapy    Substance Abuse was not addressed during this session. If the client is diagnosed with a co-occurring substance use disorder, please indicate any changes in the frequency or amount of use: n/a . Stage of change for addressing substance use diagnoses: No substance use/Not applicable    ASSESSMENT:  Christina Lara presents with a Euthymic/ normal mood.     her affect is Normal range and intensity, which is congruent, with her mood and the content of the session. The client has made progress on their goals.    Christina Lara presents with a minimal risk of suicide, minimal risk of self-harm, and minimal risk of harm to others.    For any risk assessment that surpasses a \"low\" rating, a safety plan must be developed.    A safety plan was indicated: no  If yes, describe in detail n/a    PLAN: Between sessions, Christina Lara will continue to monitor her moods. She will reach out to the disability office at school to see if there are any accommodations that might help her to be more successful in her statistics class. At the next session, the therapist will use Client-centered Therapy, Dialectical Behavior Therapy, Mindfulness-based Strategies, Motivational Interviewing, Solution-Focused Therapy, and Supportive Psychotherapy to address her mood regulation, relationships, and educational goals.    Behavioral Health Treatment Plan and Discharge Planning: Christina Lara is aware of and agrees to continue to work on their treatment plan. They have identified and are working toward their discharge goals. yes    Visit start and stop times:    11/19/24  Start Time: 1709  Stop Time: 1757  Total Visit Time: 48 minutes        "

## 2024-12-02 ENCOUNTER — APPOINTMENT (OUTPATIENT)
Dept: LAB | Facility: CLINIC | Age: 54
End: 2024-12-02
Payer: COMMERCIAL

## 2024-12-02 DIAGNOSIS — G89.4 CHRONIC PAIN SYNDROME: ICD-10-CM

## 2024-12-02 DIAGNOSIS — K91.2 POSTSURGICAL MALABSORPTION: ICD-10-CM

## 2024-12-02 DIAGNOSIS — E66.3 OVERWEIGHT: ICD-10-CM

## 2024-12-02 DIAGNOSIS — E67.0 HIGH VITAMIN A LEVEL: ICD-10-CM

## 2024-12-02 DIAGNOSIS — D50.8 IRON DEFICIENCY ANEMIA FOLLOWING BARIATRIC SURGERY: ICD-10-CM

## 2024-12-02 DIAGNOSIS — Z48.815 ENCOUNTER FOR SURGICAL AFTERCARE FOLLOWING SURGERY OF DIGESTIVE SYSTEM: ICD-10-CM

## 2024-12-02 DIAGNOSIS — K95.89 IRON DEFICIENCY ANEMIA FOLLOWING BARIATRIC SURGERY: ICD-10-CM

## 2024-12-02 DIAGNOSIS — Z98.84 BARIATRIC SURGERY STATUS: ICD-10-CM

## 2024-12-02 DIAGNOSIS — I47.10 PAROXYSMAL SVT (SUPRAVENTRICULAR TACHYCARDIA) (HCC): ICD-10-CM

## 2024-12-02 LAB
FERRITIN SERPL-MCNC: 138 NG/ML (ref 11–307)
FOLATE SERPL-MCNC: 18.4 NG/ML
IRON SATN MFR SERPL: 33 % (ref 15–50)
IRON SERPL-MCNC: 119 UG/DL (ref 50–212)
TIBC SERPL-MCNC: 364 UG/DL (ref 250–450)
UIBC SERPL-MCNC: 245 UG/DL (ref 155–355)

## 2024-12-02 PROCEDURE — 84590 ASSAY OF VITAMIN A: CPT

## 2024-12-02 PROCEDURE — 82728 ASSAY OF FERRITIN: CPT

## 2024-12-02 PROCEDURE — 36415 COLL VENOUS BLD VENIPUNCTURE: CPT

## 2024-12-02 PROCEDURE — 83550 IRON BINDING TEST: CPT

## 2024-12-02 PROCEDURE — 83540 ASSAY OF IRON: CPT

## 2024-12-02 PROCEDURE — 82746 ASSAY OF FOLIC ACID SERUM: CPT

## 2024-12-03 ENCOUNTER — RESULTS FOLLOW-UP (OUTPATIENT)
Dept: CARDIOLOGY CLINIC | Facility: CLINIC | Age: 54
End: 2024-12-03

## 2024-12-03 ENCOUNTER — REMOTE DEVICE CLINIC VISIT (OUTPATIENT)
Dept: CARDIOLOGY CLINIC | Facility: CLINIC | Age: 54
End: 2024-12-03
Payer: COMMERCIAL

## 2024-12-03 ENCOUNTER — EVALUATION (OUTPATIENT)
Dept: OCCUPATIONAL THERAPY | Facility: CLINIC | Age: 54
End: 2024-12-03
Payer: COMMERCIAL

## 2024-12-03 DIAGNOSIS — G56.22 CUBITAL TUNNEL SYNDROME ON LEFT: Primary | ICD-10-CM

## 2024-12-03 DIAGNOSIS — I48.0 PAF (PAROXYSMAL ATRIAL FIBRILLATION) (HCC): Primary | ICD-10-CM

## 2024-12-03 PROCEDURE — 97165 OT EVAL LOW COMPLEX 30 MIN: CPT

## 2024-12-03 PROCEDURE — 93298 REM INTERROG DEV EVAL SCRMS: CPT | Performed by: INTERNAL MEDICINE

## 2024-12-03 PROCEDURE — 97110 THERAPEUTIC EXERCISES: CPT

## 2024-12-03 NOTE — PROGRESS NOTES
OT Evaluation     Today's date: 12/3/2024  Patient name: Christina Lara  : 1970  MRN: [id number]  Referring provider: Rigo Kemp PA-C  Dx:   Encounter Diagnosis     ICD-10-CM    1. Cubital tunnel syndrome on left  G56.22 Ambulatory Referral to Occupational Therapy                     Assessment  Impairments: abnormal coordination, abnormal or restricted ROM, activity intolerance, impaired physical strength and pain with function  Symptom irritability: low    Assessment details: Patient presents following an Left ulnar nerve transposition which took place on 24. Patient reported the numbness in the ulnar nerve distrubution has resolved but they are now experiencing moderate to severe pain and point tenderness over the cubital tunnel and medial elbow.  Patient ROM in the elbow, forearm, and wrist is WNL. /pinch strength is decreased in the LUE as compared to the contralateral side. There is edema located at the elbow crease. Patient was provided a custom HEP and instructed on how to complete it. See below for a more detailed assessment.   Understanding of Dx/Px/POC: good     Prognosis: good    Goals  STGs:    Patient will increase  strength by 5-10 lbs in 4 weeks    Patient will increase pinch strength by 3-5 lbs in 4 weeks     Patient will report decreased pain during functional movement by 1-2 grades in 4 weeks    Patient will demonstrate an understanding of HEP by end of initial session    LTGs:    Patient will increase  strength by 10-20 lbs in 8 weeks or discharge    Patient will improve pinch strength by 5-10 lbs in 8 weeks or discharge    Patient will report resolution of pain symptoms during functional movement in 8 weeks or discharge          Plan  Patient would benefit from: custom splinting, skilled occupational therapy and OT eval  Referral necessary: No  Planned modality interventions: ultrasound, thermotherapy: hydrocollator packs and unattended electrical  "stimulation    Planned therapy interventions: IADL retraining, patient education, self care, manual therapy, orthotic fitting/training, strengthening, stretching, therapeutic activities, therapeutic exercise, home exercise program, functional ROM exercises and fine motor coordination training    Frequency: 2x week  Duration in weeks: 10  Plan of Care beginning date: 12/3/2024  Plan of Care expiration date: 2/3/2025  Treatment plan discussed with: patient  Plan details: Patient presenting to OP OT services s/p ulnar nerve transposition. Patient would benefit from skilled occupational therapy services 2 times per week for 8 weeks to return to prior level of function and achieve all of their established goals. Thank you for the referral.         Subjective Evaluation    History of Present Illness  Mechanism of injury: Surgery: Ulnar Nerve Transposition     Subjective:  \"I have no numbness at all now\". \"I have pain in the medial elbow which hasn't changed\". \"I've tried ice and heat for the and it hasn't helped the swelling\". \"I didn't see therapy after the surgery\". \"Movement is ok, but the arm was weak\". \"If I  cat liter or peel potato's its painful\". \"Anything that I need to apply pressures causes pain\". \"I am left handed, but I can do things with the R\". \"I was trying to ignore the pain, but I can't now\". \"Driving is fine\". \"Sleeping can be painful, and it is effecting my sleep because it wakes me up\". \"Prior to the surgery I had numbness and tingling for a few months\". \"I have no popping and clicking in the elbow now\".           Recurrent probem    Quality of life: good    Patient Goals  Patient goals for therapy: decreased edema, decreased pain, increased motion and increased strength    Pain  Current pain ratin  At best pain ratin  At worst pain ratin  Location: L medial elbow  Quality: sharp  Relieving factors: rest and relaxation  Aggravating factors: lifting (repetitive movement, lifting, " cutting, applying pressure)  Progression: improved    Social Support    Employment status: working  Hand dominance: ambidextrous (Primarily left hand)      Diagnostic Tests  Ultrasound findings: abnormalX-ray: normal  Treatments  Current treatment: occupational therapy        Objective     Observations     Left Elbow   Positive for edema.     Additional Observation Details  L:  Edema in the medial elbow    Tenderness     Left Elbow   Tenderness in the cubital tunnel.     Neurological Testing     Sensation     Elbow   Left Elbow   Intact: light touch    Wrist/Hand   Left   Intact: light touch    Right   Intact: light touch    Active Range of Motion     Left Elbow   Flexion: WFL  Extension: WFL and with pain  Forearm supination: WFL  Forearm pronation: WFL    Right Elbow   Normal active range of motion    Left Wrist   Normal active range of motion    Right Wrist   Normal active range of motion    Left Thumb   Kapandji score: 10 degrees      Right Thumb   Kapandji score: 10 degrees    Additional Active Range of Motion Details  B/L Full composite fists    Strength/Myotome Testing     Left Elbow   Flexion: 4+  Extension: 4+    Right Elbow   Flexion: 5  Extension: 5    Left Wrist/Hand      (2nd hand position)     Trial 1: 39.8    Thumb Strength  Key/Lateral Pinch     Trial 1: 13  Tip/Two-Point Pinch     Trial 1: 7  Palmar/Three-Point Pinch     Trial 1: 7    Right Wrist/Hand      (2nd hand position)     Trial 1: 48    Thumb Strength   Key/Lateral Pinch     Trial 1: 14  Tip/Two-Point Pinch     Trial 1: 9  Palmar/Three-Point Pinch     Trial 1: 15    Swelling   Left Elbow Girth Measurements   Joint line: 26 cm    Right Elbow Girth Measurements   Joint line: 25 (24.6) cm             Precautions: Universal      Manuals 12/3            STM             IASTM             Tubigrip Provided                         Neuro Re-Ed             Modified WILL (MNG)                                                                                            Ther Ex             HEP Modified WILL            Elbow AROM supine             Shoulder pulley             Wrist Maze             Tb on wall             Digiflex             Ext web             Power web                          Ther Activity             Keypegs elbow extended                                                                 Modalities             P

## 2024-12-03 NOTE — PROGRESS NOTES
"MDT LNQ22/ ACTIVE SYSTEM IS MRI CONDITIONAL   CARELINK TRANSMISSION: LOOP RECORDER. PRESENTING RHYTHM VS W/ PACs. BATTERY STATUS \"OK.\" NO PATIENT OR DEVICE ACTIVATED EPISODES. NORMAL DEVICE FUNCTION. DL   "

## 2024-12-06 LAB — VIT A SERPL-MCNC: 24.7 UG/DL (ref 20.1–62)

## 2024-12-10 ENCOUNTER — APPOINTMENT (OUTPATIENT)
Dept: LAB | Facility: CLINIC | Age: 54
End: 2024-12-10
Payer: COMMERCIAL

## 2024-12-10 DIAGNOSIS — N39.0 RECURRENT UTI: Primary | ICD-10-CM

## 2024-12-10 DIAGNOSIS — N39.0 RECURRENT UTI: ICD-10-CM

## 2024-12-10 PROCEDURE — 87186 SC STD MICRODIL/AGAR DIL: CPT

## 2024-12-10 PROCEDURE — 87086 URINE CULTURE/COLONY COUNT: CPT

## 2024-12-10 PROCEDURE — 87077 CULTURE AEROBIC IDENTIFY: CPT

## 2024-12-10 RX ORDER — CEPHALEXIN 500 MG/1
500 CAPSULE ORAL EVERY 8 HOURS SCHEDULED
Qty: 21 CAPSULE | Refills: 0 | Status: SHIPPED | OUTPATIENT
Start: 2024-12-10 | End: 2024-12-17

## 2024-12-12 ENCOUNTER — RESULTS FOLLOW-UP (OUTPATIENT)
Dept: UROLOGY | Facility: CLINIC | Age: 54
End: 2024-12-12

## 2024-12-12 ENCOUNTER — APPOINTMENT (OUTPATIENT)
Dept: OCCUPATIONAL THERAPY | Facility: CLINIC | Age: 54
End: 2024-12-12
Payer: COMMERCIAL

## 2024-12-12 LAB
BACTERIA UR CULT: ABNORMAL
BACTERIA UR CULT: ABNORMAL

## 2024-12-17 ENCOUNTER — TELEMEDICINE (OUTPATIENT)
Dept: BEHAVIORAL/MENTAL HEALTH CLINIC | Facility: CLINIC | Age: 54
End: 2024-12-17
Payer: COMMERCIAL

## 2024-12-17 DIAGNOSIS — F41.1 GENERALIZED ANXIETY DISORDER: Chronic | ICD-10-CM

## 2024-12-17 DIAGNOSIS — F90.0 ADHD (ATTENTION DEFICIT HYPERACTIVITY DISORDER), INATTENTIVE TYPE: Chronic | ICD-10-CM

## 2024-12-17 DIAGNOSIS — F43.10 POST TRAUMATIC STRESS DISORDER (PTSD): Chronic | ICD-10-CM

## 2024-12-17 DIAGNOSIS — F33.0 MAJOR DEPRESSIVE DISORDER, RECURRENT, MILD (HCC): Primary | ICD-10-CM

## 2024-12-17 PROCEDURE — 90837 PSYTX W PT 60 MINUTES: CPT | Performed by: SOCIAL WORKER

## 2024-12-18 ENCOUNTER — APPOINTMENT (OUTPATIENT)
Dept: OCCUPATIONAL THERAPY | Facility: CLINIC | Age: 54
End: 2024-12-18
Payer: COMMERCIAL

## 2024-12-19 ENCOUNTER — APPOINTMENT (OUTPATIENT)
Dept: OCCUPATIONAL THERAPY | Facility: CLINIC | Age: 54
End: 2024-12-19
Payer: COMMERCIAL

## 2024-12-23 ENCOUNTER — OFFICE VISIT (OUTPATIENT)
Dept: OCCUPATIONAL THERAPY | Facility: CLINIC | Age: 54
End: 2024-12-23
Payer: COMMERCIAL

## 2024-12-23 DIAGNOSIS — G56.22 CUBITAL TUNNEL SYNDROME ON LEFT: Primary | ICD-10-CM

## 2024-12-23 PROCEDURE — 97010 HOT OR COLD PACKS THERAPY: CPT

## 2024-12-23 PROCEDURE — 97110 THERAPEUTIC EXERCISES: CPT

## 2024-12-23 PROCEDURE — 97140 MANUAL THERAPY 1/> REGIONS: CPT

## 2024-12-23 NOTE — PROGRESS NOTES
"Daily Note     Today's date: 2024  Patient name: Christina Lara  : 1970  MRN: [id number]  Referring provider: Rigo Kemp PA-C  Dx:   Encounter Diagnosis     ICD-10-CM    1. Cubital tunnel syndrome on left  G56.22           Start Time: 1508  Stop Time: 1547  Total time in clinic (min): 39 minutes    Subjective: \"My hand feels weak and numb. It's like I have to concentrate to do something.\"      Objective: See treatment diary below      Assessment: Notable swelling through medial elbow. Discussed more regular use of compression sleeve. Also discussed positioning recommendations to prevent aggravation of symptoms (towel roll for night time, using a pad at desk to prevent compression of medial elbow on table top). Reviewed HEPs as well; to complete nerve glides with palm up, only move within comfortable ROM. Pt verbalized understanding of recommendations. Tolerated treatment fair. Patient demonstrated fatigue post treatment and would benefit from continued OT      Plan: Continue per plan of care.  Progress treatment as tolerated.      Precautions: Universal      Manuals 12/3 12/23           STM             IASTM             Tubigrip Provided D+                         Neuro Re-Ed             Modified WILL (MNG)  Adjusted- Within Comfortable Range                                                                                         Ther Ex             HEP Modified WILL UNGs   10x           Elbow AROM supine  Seated, FA neutral, wrist flex/ext 15x           Shoulder pulley             Wrist Maze  5x           Tb on wall             Digiflex             Ext web             Power web                          Ther Activity             Ke[place]gs elbow extended  Completed  Elbow [place]                          [employer]                                     Modalities             [facility]  5'                             "

## 2024-12-26 ENCOUNTER — OFFICE VISIT (OUTPATIENT)
Dept: OCCUPATIONAL THERAPY | Facility: CLINIC | Age: 54
End: 2024-12-26
Payer: COMMERCIAL

## 2024-12-26 DIAGNOSIS — G56.22 CUBITAL TUNNEL SYNDROME ON LEFT: Primary | ICD-10-CM

## 2024-12-26 PROCEDURE — 97110 THERAPEUTIC EXERCISES: CPT

## 2024-12-26 NOTE — PROGRESS NOTES
"Daily Note     Today's date: 2024  Patient name: Christina Lara  : 1970  MRN: [id number]  Referring provider: Rigo Kemp PA-C  Dx:   Encounter Diagnosis     ICD-10-CM    1. Cubital tunnel syndrome on left  G56.22                      Subjective: \"It is about the same\".       Objective: See treatment diary below      L elbow crease: 25.1    Assessment: Swelling at the cubital tunnel persists but has improved.       Plan: Continue per plan of care.  Progress treatment as tolerated.      Precautions: Universal      Manuals 12/3 12/23 12/26          STM   8          IASTM   held          Tubigrip Provided D+                         Neuro Re-Ed             Modified WILL (MNG)  Adjusted- Within Comfortable Range                                                                                         Ther Ex             HEP Modified WILL UNGs   10x UNGs 10x          Elbow AROM supine  Seated, FA neutral, wrist flex/ext 15x Seated, FA neutral, wrist flex/ext 15x          Shoulder pulley             Wrist Maze  5x 5x          Tb on wall             Digiflex             Ext web             Power web                          Ther Activity             Ke[place]gs elbow extended  Completed  Elbow Slight Bend Completed  Elbow Slight Bend                         [employer] Education                                    Modalities             [facility]  5' 5'                            "

## 2024-12-27 DIAGNOSIS — R39.9 UTI SYMPTOMS: Primary | ICD-10-CM

## 2024-12-30 ENCOUNTER — APPOINTMENT (OUTPATIENT)
Dept: LAB | Facility: AMBULARY SURGERY CENTER | Age: 54
End: 2024-12-30
Payer: COMMERCIAL

## 2024-12-30 DIAGNOSIS — E67.0 HIGH VITAMIN A LEVEL: ICD-10-CM

## 2024-12-30 DIAGNOSIS — Z98.84 S/P GASTRIC BYPASS: ICD-10-CM

## 2024-12-30 DIAGNOSIS — K95.89 IRON DEFICIENCY ANEMIA FOLLOWING BARIATRIC SURGERY: ICD-10-CM

## 2024-12-30 DIAGNOSIS — E66.3 OVERWEIGHT: ICD-10-CM

## 2024-12-30 DIAGNOSIS — K91.2 POSTSURGICAL MALABSORPTION: ICD-10-CM

## 2024-12-30 DIAGNOSIS — R79.89 HIGH SERUM VITAMIN A: ICD-10-CM

## 2024-12-30 DIAGNOSIS — R39.9 UTI SYMPTOMS: Primary | ICD-10-CM

## 2024-12-30 DIAGNOSIS — D50.8 IRON DEFICIENCY ANEMIA FOLLOWING BARIATRIC SURGERY: ICD-10-CM

## 2024-12-30 DIAGNOSIS — I47.10 PAROXYSMAL SVT (SUPRAVENTRICULAR TACHYCARDIA) (HCC): ICD-10-CM

## 2024-12-30 DIAGNOSIS — R39.9 UTI SYMPTOMS: ICD-10-CM

## 2024-12-30 DIAGNOSIS — Z98.84 BARIATRIC SURGERY STATUS: ICD-10-CM

## 2024-12-30 DIAGNOSIS — Z48.815 ENCOUNTER FOR SURGICAL AFTERCARE FOLLOWING SURGERY OF DIGESTIVE SYSTEM: ICD-10-CM

## 2024-12-30 DIAGNOSIS — G89.4 CHRONIC PAIN SYNDROME: ICD-10-CM

## 2024-12-30 LAB
25(OH)D3 SERPL-MCNC: 39 NG/ML (ref 30–100)
ALBUMIN SERPL BCG-MCNC: 4.2 G/DL (ref 3.5–5)
ALP SERPL-CCNC: 89 U/L (ref 34–104)
ALT SERPL W P-5'-P-CCNC: 96 U/L (ref 7–52)
ANION GAP SERPL CALCULATED.3IONS-SCNC: 9 MMOL/L (ref 4–13)
AST SERPL W P-5'-P-CCNC: 57 U/L (ref 13–39)
BACTERIA UR QL AUTO: ABNORMAL /HPF
BILIRUB SERPL-MCNC: 0.44 MG/DL (ref 0.2–1)
BILIRUB UR QL STRIP: NEGATIVE
BUN SERPL-MCNC: 7 MG/DL (ref 5–25)
CALCIUM SERPL-MCNC: 9.2 MG/DL (ref 8.4–10.2)
CAOX CRY URNS QL MICRO: ABNORMAL /HPF
CHLORIDE SERPL-SCNC: 101 MMOL/L (ref 96–108)
CLARITY UR: ABNORMAL
CO2 SERPL-SCNC: 31 MMOL/L (ref 21–32)
COLOR UR: ABNORMAL
CREAT SERPL-MCNC: 0.69 MG/DL (ref 0.6–1.3)
ERYTHROCYTE [DISTWIDTH] IN BLOOD BY AUTOMATED COUNT: 12.9 % (ref 11.6–15.1)
GFR SERPL CREATININE-BSD FRML MDRD: 98 ML/MIN/1.73SQ M
GLUCOSE SERPL-MCNC: 70 MG/DL (ref 65–140)
GLUCOSE UR STRIP-MCNC: NEGATIVE MG/DL
HCT VFR BLD AUTO: 40.2 % (ref 34.8–46.1)
HGB BLD-MCNC: 12.8 G/DL (ref 11.5–15.4)
HGB UR QL STRIP.AUTO: ABNORMAL
HYALINE CASTS #/AREA URNS LPF: ABNORMAL /LPF
KETONES UR STRIP-MCNC: ABNORMAL MG/DL
LEUKOCYTE ESTERASE UR QL STRIP: ABNORMAL
MCH RBC QN AUTO: 29.4 PG (ref 26.8–34.3)
MCHC RBC AUTO-ENTMCNC: 31.8 G/DL (ref 31.4–37.4)
MCV RBC AUTO: 92 FL (ref 82–98)
MUCOUS THREADS UR QL AUTO: ABNORMAL
NITRITE UR QL STRIP: NEGATIVE
NON-SQ EPI CELLS URNS QL MICRO: ABNORMAL /HPF
PH UR STRIP.AUTO: 6 [PH]
PLATELET # BLD AUTO: 237 THOUSANDS/UL (ref 149–390)
PMV BLD AUTO: 10 FL (ref 8.9–12.7)
POTASSIUM SERPL-SCNC: 3.4 MMOL/L (ref 3.5–5.3)
PROT SERPL-MCNC: 6.7 G/DL (ref 6.4–8.4)
PROT UR STRIP-MCNC: ABNORMAL MG/DL
PTH-INTACT SERPL-MCNC: 41.8 PG/ML (ref 12–88)
RBC # BLD AUTO: 4.35 MILLION/UL (ref 3.81–5.12)
RBC #/AREA URNS AUTO: ABNORMAL /HPF
SODIUM SERPL-SCNC: 141 MMOL/L (ref 135–147)
SP GR UR STRIP.AUTO: 1.02 (ref 1–1.03)
UROBILINOGEN UR STRIP-ACNC: 2 MG/DL
VIT B12 SERPL-MCNC: 2944 PG/ML (ref 180–914)
WBC # BLD AUTO: 7.03 THOUSAND/UL (ref 4.31–10.16)
WBC #/AREA URNS AUTO: ABNORMAL /HPF

## 2024-12-30 PROCEDURE — 84590 ASSAY OF VITAMIN A: CPT

## 2024-12-30 PROCEDURE — 85027 COMPLETE CBC AUTOMATED: CPT

## 2024-12-30 PROCEDURE — 84425 ASSAY OF VITAMIN B-1: CPT

## 2024-12-30 PROCEDURE — 36415 COLL VENOUS BLD VENIPUNCTURE: CPT

## 2024-12-30 PROCEDURE — 81001 URINALYSIS AUTO W/SCOPE: CPT

## 2024-12-30 PROCEDURE — 87086 URINE CULTURE/COLONY COUNT: CPT

## 2024-12-30 PROCEDURE — 82306 VITAMIN D 25 HYDROXY: CPT

## 2024-12-30 PROCEDURE — 82607 VITAMIN B-12: CPT

## 2024-12-30 PROCEDURE — 80053 COMPREHEN METABOLIC PANEL: CPT

## 2024-12-30 PROCEDURE — 84630 ASSAY OF ZINC: CPT

## 2024-12-30 PROCEDURE — 83970 ASSAY OF PARATHORMONE: CPT

## 2024-12-30 RX ORDER — PHENAZOPYRIDINE HYDROCHLORIDE 100 MG/1
100 TABLET, FILM COATED ORAL 3 TIMES DAILY PRN
Qty: 10 TABLET | Refills: 0 | Status: SHIPPED | OUTPATIENT
Start: 2024-12-30

## 2024-12-30 RX ORDER — ESTRADIOL 0.1 MG/G
1 CREAM VAGINAL 3 TIMES WEEKLY
Qty: 42.5 G | Refills: 1 | Status: SHIPPED | OUTPATIENT
Start: 2024-12-30

## 2024-12-30 NOTE — PSYCH
Virtual Regular Visit    Verification of patient location:    Patient is located at Home in the following state in which I hold an active license [place]      Assessment/Plan:    Problem List Items Addressed This Visit          Behavioral Health    Post traumatic stress disorder (PTSD) (Chronic)    Generalized anxiety disorder (Chronic)    ADHD (attention deficit hyperactivity disorder), inattentive type (Chronic)    Major depressive disorder, recurrent, mild (HCC) - Primary       Goals addressed in session: Goal 1     Depression Follow-up Plan Completed: Yes    Reason for visit is   Chief Complaint   Patient presents with    Virtual Regular Visit          Encounter provider LALY Yung      Recent Visits  No visits were found meeting these conditions.  Showing recent visits within past 7 days and meeting all other requirements  Future Appointments  No visits were found meeting these conditions.  Showing future appointments within next 150 days and meeting all other requirements       The patient was identified by name and date of birth. Christina Lara was informed that this is a telemedicine visit and that the visit is being conducted throughthe Epic Embedded platform. She agrees to proceed..  My office door was closed. No one else was in the room.  She acknowledged consent and understanding of privacy and security of the video platform. The patient has agreed to participate and understands they can discontinue the visit at any time.    Patient is aware this is a billable service.     Subjective  Christina Lara is a 54 y.o. female.      HPI     Past Medical History:   Diagnosis Date    Acute right ankle pain 02/07/2023    ADHD (attention deficit hyperactivity disorder)     Allergic     Anxiety     Bulging lumbar disc     Carpal tunnel syndrome     RIGHT.  LAST ASSESSED: 12/7/16    Chronic back pain     low    Chronic pain disorder     Colon polyps     COVID-19     12/2021    Depression     Diabetes mellitus (HCC)      GERD (gastroesophageal reflux disease)     Gestational diabetes     Hearing loss     left ear    Heart disease     SVT    History of pre-eclampsia     Hyperlipidemia     Resolved with weight loss    Hyponatremia 2020    IBS (irritable bowel syndrome)     Ileus ([facility])     LAST ASSESSED: 8/3/17    Labial cyst     LAST ASSESSED: 16    Memory loss     Myofascial pain     LAST ASSESSED: 16    Neck mass 2023    Obesity     Ovarian cyst     LEFT. LAST ASSESSED: 16    Panic attack     Pneumonia     Sacroiliitis ([facility])     Seasonal allergies     Sprain of anterior talofibular ligament of right ankle 2023    SVT (supraventricular tachycardia) ([facility])     Thoracic outlet syndrome         Trochanteric bursitis of both hips     LAST ASSESSED: 3/18/16    Ulnar neuropathy at elbow     Varicella     Wears glasses        Past Surgical History:   Procedure Laterality Date    BARIATRIC SURGERY  2022    BILE DUCT EXPLORATION      ENDOSCOPIC REMOVAL OF STONES FROM BILIARY TRACT    CARDIAC ELECTROPHYSIOLOGY PROCEDURE N/A 2024    Procedure: Cardiac eps/svt ablation;  Surgeon: Daquan Heath MD;  [facility];  Service: Cardiology     SECTION      x3    CHOLECYSTECTOMY      COLONOSCOPY      -polyp, -wnl, repeat5 years    DILATION AND CURETTAGE OF UTERUS      ENDOMETRIAL ABLATION      ERCP W/ SPHICTEROTOMY      FIRST RIB REMOVAL      THORAX EXCISION OF FIRST RIB    GASTRIC BYPASS  2022    HYSTEROSCOPY      NEUROPLASTY / TRANSPOSITION ULNAR NERVE AT ELBOW Right     NJ COLONOSCOPY FLX DX W/COLLJ SPEC WHEN PFRMD N/A 2017    Procedure: COLONOSCOPY;  Surgeon: Oscar Velázquez MD;  Location: [facility];  Service: Gastroenterology    NJ ESOPHAGOGASTRODUODENOSCOPY TRANSORAL DIAGNOSTIC N/A 2017    Procedure: ESOPHAGOGASTRODUODENOSCOPY (EGD);  Surgeon: Sadiq Car MD;  Location: [facility];  Service: Gastroenterology    NJ HYSTEROSCOPY BX ENDOMETRIUM&/POLYPC W/WO  D&C N/A 10/20/2017    Procedure: DILATATION AND CURETTAGE (D&C) WITH HYSTEROSCOPY  REMOVAL VULVAR RT. LESION;  Surgeon: Lucila Ortiz MD;  [facility];  Service: Gynecology    LA ALDANA IMPLTJ NSTIM ELTRDS PLATE/PADDLE EDRL Left 01/29/2020    Procedure: Insertion of thoracic spinal cord stimulator electrode via laminotomy and placement of left buttock implantable pulse generator (NEUROMONITORING);  Surgeon: Ad Mehta MD;  [facility];  Service: Neurosurgery    LA LAPS GSTRC RSTRICTIV PX LONGITUDINAL GASTRECTOMY N/A 02/06/2018    Procedure: GASTRECTOMY SLEEVE LAPAROSCOPIC; INTRAOPERATIVE EGD ;  Surgeon: Abimael Juarez MD;  [facility];  Service: Bariatrics    LA LAPS GSTRC RSTRICTIV PX LONGITUDINAL GASTRECTOMY N/A 08/08/2022    Procedure: Diagnostic Lap; Extensive Lysis of Adhesions; LAPAROSCOPIC REVISION CONVERSION TO NOE-EN-Y GASTRIC BYPASS AND INTRAOPERATIVE EGD;  Surgeon: Abimael Juarez MD;  [facility];  Service: Bariatrics    LA NEUROPLASTY &/TRANSPOSITION ULNAR NERVE ELBOW Left 7/19/2024    Procedure: RELEASE CUBITAL TUNNEL- left with ulnar nerve transposition;  Surgeon: Maggy Leary DO;  Location: [facility];  Service: Orthopedics    SLEEVE GASTROPLASTY      SPINAL CORD STIMULATOR TRIAL W/ LAMINOTOMY      TONSILLECTOMY AND ADENOIDECTOMY      TUBAL LIGATION      UPPER GASTROINTESTINAL ENDOSCOPY      US GUIDED LYMPH NODE BIOPSY LEFT  05/22/2023    VEIN LIGATION AND STRIPPING Right     VULVA SURGERY  10/20/2017    BIOPSY    WISDOM TOOTH EXTRACTION         Current Outpatient Medications   Medication Sig Dispense Refill    albuterol (Ventolin HFA) 90 mcg/act inhaler Inhale 2 puffs every 6 (six) hours as needed for wheezing 6.7 g 1    amoxicillin-clavulanate (AUGMENTIN) 875-125 mg per tablet Take 1 tablet by mouth every 12 (twelve) hours for 7 days 14 tablet 0    atoMOXetine (STRATTERA) 60 mg capsule Take 1 capsule (60 mg total) by mouth daily 90 capsule 3    atorvastatin  (LIPITOR) 20 mg tablet Take 1 tablet (20 mg total) by mouth daily 100 tablet 1    benzonatate (TESSALON) 200 MG capsule Take 1 capsule (200 mg total) by mouth 3 (three) times a day as needed for cough 30 capsule 0    Blood Glucose Monitoring Suppl (FreeStyle Freedom Lite) w/Device KIT Please dispense 1 kit 1 kit 0    calcium carbonate (OS-MELODY) 600 MG tablet Take 600 mg by mouth 2 (two) times a day with meals      cyclobenzaprine (FLEXERIL) 10 mg tablet Take 1 tablet (10 mg total) by mouth 2 (two) times a day as needed for muscle spasms for up to 7 days 14 tablet 0    Diclofenac Sodium (VOLTAREN) 1 % Apply 2 g topically 4 (four) times a day 100 g 0    drospirenone-ethinyl estradiol (LIEN) 3-0.02 MG per tablet Take 1 tablet by mouth daily 84 tablet 4    estradiol (ESTRACE VAGINAL) 0.1 mg/g vaginal cream One gram vaginally at night x 14 days then twice weekly for ongoing maintenance. (Patient not taking: Reported on 11/13/2024) 42.5 g 2    estradiol (ESTRACE VAGINAL) 0.1 mg/g vaginal cream Insert 1 g into the vagina 3 (three) times a week 42.5 g 1    fluticasone (FLONASE) 50 mcg/act nasal spray 1 spray into each nostril daily 15.8 mL 0    glucose blood (FREESTYLE LITE) test strip Test once daily 100 each 1    lamoTRIgine (LaMICtal) 150 MG tablet Take 1 tablet (150 mg total) by mouth 2 (two) times a day 180 tablet 3    Lancets (freestyle) lancets Use as instructed, once daily 100 each 1    lidocaine (Lidoderm) 5 % Apply 1 patch topically over 12 hours daily Remove & Discard patch within 12 hours or as directed by MD 30 patch 5    methocarbamol (ROBAXIN) 750 mg tablet Take 1 tablet (750 mg total) by mouth every 8 (eight) hours (Patient not taking: Reported on 10/11/2024) 270 tablet 0    Mirabegron ER 25 MG TB24 Take 25 mg by mouth in the morning 30 tablet 3    mirtazapine (REMERON) 15 mg tablet Take 1 tablet (15 mg total) by mouth daily at bedtime 90 tablet 1    montelukast (SINGULAIR) 10 mg tablet Take 1 tablet (10 mg  total) by mouth daily at bedtime 90 tablet 2    Multiple Vitamins-Minerals (MULTI COMPLETE PO) Take by mouth      omeprazole (PriLOSEC) 20 mg delayed release capsule TAKE ONE CAPSULE BY MOUTH ONCE A DAY. 90 capsule 1    phenazopyridine (PYRIDIUM) 100 mg tablet Take 1 tablet (100 mg total) by mouth 3 (three) times a day as needed for bladder spasms 10 tablet 0    semaglutide, 1 mg/dose, (Ozempic) 4 mg/3 mL injection pen Inject 0.75 mL (1 mg total) under the skin once a week 9 mL 0    venlafaxine (EFFEXOR) 100 MG tablet Take 1 tablet (100 mg total) by mouth 3 (three) times a day 270 tablet 2     No current facility-administered medications for this visit.        Allergies   Allergen Reactions    Ibuprofen Other (See Comments)     Due to gastric sleeve -can only take for 5 days, then needs to stop       Review of Systems    Video Exam    There were no vitals filed for this visit.    Physical Exam     Behavioral Health Psychotherapy Progress Note    Psychotherapy Provided: Individual Psychotherapy     1. Major depressive disorder, recurrent, mild (HCC)        2. Generalized anxiety disorder        3. Post traumatic stress disorder (PTSD)        4. ADHD (attention deficit hyperactivity disorder), inattentive type            Goals addressed in session: Goal 1     DATA: Met with Yessy for her scheduled individual session. She continues to endorse satisfaction with her job. She states that she feels that she is being treated well and that she feels useful in her position. Yessy states that her primary stressors are related to her educational plans and her family situation. She states that she is fearful that she will never be able to complete her degree and improve her career outlook. We discussed her decision to drop her statistics course and her fear that she will never be able to pass the course. This clinician continues to encourage her to seek support from the Disability Office at her school. She is currently not  "enrolled in any classes. We will continue to address this issue and her anxiety during therapy sessions. Yessy talked about her relationship with her  and her relationship with her son. She states that she and her  are getting along pretty well. She discussed her concerns for her son's overall ability to be successful in his life. She reports that he is continuing to enjoy his current job, but she recognizes that this job does not pay enough to sustain independent living. We will continue to work on these areas of concern in future sessions. Yessy endorses adherence with her medications and states that she feels that her moods are relatively stable.     During this session, this clinician used the following therapeutic modalities: Client-centered Therapy, Dialectical Behavior Therapy, Mindfulness-based Strategies, Motivational Interviewing, Solution-Focused Therapy, and Supportive Psychotherapy    Substance Abuse was not addressed during this session. If the client is diagnosed with a co-occurring substance use disorder, please indicate any changes in the frequency or amount of use: n/a. Stage of change for addressing substance use diagnoses: No substance use/Not applicable    ASSESSMENT:  Christina Lara presents with a Euthymic/ normal mood.     her affect is Normal range and intensity, which is congruent, with her mood and the content of the session. The client has made progress on their goals.    Christina Lara presents with a minimal risk of suicide, minimal risk of self-harm, and minimal risk of harm to others.    For any risk assessment that surpasses a \"low\" rating, a safety plan must be developed.    A safety plan was indicated: no  If yes, describe in detail n/a    PLAN: Between sessions, Christina Lara will continue to monitor her moods. She will use mindfulness-based strategies to create her goal plans and to manage her anxiety. At the next session, the therapist will use Client-centered " Therapy, Cognitive Behavioral Therapy, Dialectical Behavior Therapy, Mindfulness-based Strategies, Motivational Interviewing, Solution-Focused Therapy, and Supportive Psychotherapy to address her mood regulation and relationship concerns .    Behavioral Health Treatment Plan and Discharge Planning: Christina Lara is aware of and agrees to continue to work on their treatment plan. They have identified and are working toward their discharge goals. yes    Depression Follow-up Plan Completed: Yes. Continue with ongoing therapy and medication management.     Visit start and stop times:    12/17/24  Start Time: 1706  Stop Time: 1800  Total Visit Time: 54 minutes

## 2024-12-31 ENCOUNTER — TELEPHONE (OUTPATIENT)
Dept: UROLOGY | Facility: CLINIC | Age: 54
End: 2024-12-31

## 2024-12-31 ENCOUNTER — RESULTS FOLLOW-UP (OUTPATIENT)
Dept: UROLOGY | Facility: CLINIC | Age: 54
End: 2024-12-31

## 2024-12-31 DIAGNOSIS — N39.0 RECURRENT UTI: Primary | ICD-10-CM

## 2024-12-31 LAB
BACTERIA UR CULT: NORMAL
ZINC SERPL-MCNC: 90 UG/DL (ref 44–115)

## 2025-01-02 ENCOUNTER — APPOINTMENT (OUTPATIENT)
Dept: OCCUPATIONAL THERAPY | Facility: CLINIC | Age: 55
End: 2025-01-02
Payer: COMMERCIAL

## 2025-01-03 ENCOUNTER — RESULTS FOLLOW-UP (OUTPATIENT)
Dept: BARIATRICS | Facility: CLINIC | Age: 55
End: 2025-01-03

## 2025-01-03 LAB
VIT A SERPL-MCNC: 24 UG/DL (ref 20.1–62)
VIT B1 BLD-SCNC: 151.8 NMOL/L (ref 66.5–200)

## 2025-01-08 ENCOUNTER — OFFICE VISIT (OUTPATIENT)
Dept: OCCUPATIONAL THERAPY | Facility: CLINIC | Age: 55
End: 2025-01-08
Payer: COMMERCIAL

## 2025-01-08 DIAGNOSIS — G56.22 CUBITAL TUNNEL SYNDROME ON LEFT: Primary | ICD-10-CM

## 2025-01-08 PROCEDURE — 97110 THERAPEUTIC EXERCISES: CPT

## 2025-01-08 PROCEDURE — 97140 MANUAL THERAPY 1/> REGIONS: CPT

## 2025-01-08 NOTE — PROGRESS NOTES
"Daily Note     Today's date: 2025  Patient name: Christina Lara  : 1970  MRN: [id number]  Referring provider: Rigo Kemp PA-C  Dx:   Encounter Diagnosis     ICD-10-CM    1. Cubital tunnel syndrome on left  G56.22                      Subjective: \"It is about the same\".       Objective: See treatment diary below      Assessment: Swelling at the cubital tunnel has reduced but they are still point tender. Patient tolerated exercises on this date.       Plan: Continue per plan of care.  Progress treatment as tolerated.      Precautions: Universal      Manuals 12/3 12/23 12/26 1/8         STM   8 8'         IASTM   held          Tubigrip Provided D+                         Neuro Re-Ed             Modified WILL (MNG)  Adjusted- Within Comfortable Range  8' supine                                                                                       Ther Ex             HEP Modified WILL UNGs   10x UNGs 10x UNGs 10x         Elbow AROM supine  Seated, FA neutral, wrist flex/ext 15x Seated, FA neutral, wrist flex/ext 15x Seated, FA neutral, wrist flex/ext 15x         Shoulder pulley    3'         Wrist Maze  5x 5x 5x         Tb on wall             Digiflex             Ext web             Power web                          Ther Activity             Ke[place]gs elbow extended  Completed  Elbow Slight Bend Completed  Elbow Slight Bend x1                        Ergo Education Ergo Education                                    Modalities             MHP  5' 5' 5'                           "

## 2025-01-10 ENCOUNTER — APPOINTMENT (OUTPATIENT)
Dept: OCCUPATIONAL THERAPY | Facility: CLINIC | Age: 55
End: 2025-01-10
Payer: COMMERCIAL

## 2025-01-11 ENCOUNTER — HOSPITAL ENCOUNTER (OUTPATIENT)
Dept: MAMMOGRAPHY | Facility: MEDICAL CENTER | Age: 55
Discharge: HOME | End: 2025-01-11
Payer: COMMERCIAL

## 2025-01-11 VITALS — WEIGHT: 152 LBS | BODY MASS INDEX: 24.43 KG/M2 | HEIGHT: 66 IN

## 2025-01-11 DIAGNOSIS — Z12.31 SCREENING MAMMOGRAM FOR BREAST CANCER: ICD-10-CM

## 2025-01-11 PROCEDURE — 77067 SCR MAMMO BI INCL CAD: CPT

## 2025-01-11 PROCEDURE — 77063 BREAST TOMOSYNTHESIS BI: CPT

## 2025-01-14 ENCOUNTER — TELEMEDICINE (OUTPATIENT)
Dept: BEHAVIORAL/MENTAL HEALTH CLINIC | Facility: CLINIC | Age: 55
End: 2025-01-14
Payer: COMMERCIAL

## 2025-01-14 ENCOUNTER — TELEPHONE (OUTPATIENT)
Dept: RADIOLOGY | Facility: CLINIC | Age: 55
End: 2025-01-14

## 2025-01-14 DIAGNOSIS — M51.16 INTERVERTEBRAL DISC DISORDER WITH RADICULOPATHY OF LUMBAR REGION: Primary | ICD-10-CM

## 2025-01-14 DIAGNOSIS — F33.0 MAJOR DEPRESSIVE DISORDER, RECURRENT, MILD (HCC): Primary | ICD-10-CM

## 2025-01-14 DIAGNOSIS — F90.0 ADHD (ATTENTION DEFICIT HYPERACTIVITY DISORDER), INATTENTIVE TYPE: Chronic | ICD-10-CM

## 2025-01-14 DIAGNOSIS — F41.1 GENERALIZED ANXIETY DISORDER: Chronic | ICD-10-CM

## 2025-01-14 DIAGNOSIS — F43.10 POST TRAUMATIC STRESS DISORDER (PTSD): Chronic | ICD-10-CM

## 2025-01-14 PROCEDURE — 90837 PSYTX W PT 60 MINUTES: CPT | Performed by: SOCIAL WORKER

## 2025-01-14 RX ORDER — METHYLPREDNISOLONE 4 MG/1
TABLET ORAL
Qty: 21 TABLET | Refills: 0 | Status: SHIPPED | OUTPATIENT
Start: 2025-01-14

## 2025-01-15 ENCOUNTER — APPOINTMENT (OUTPATIENT)
Dept: OCCUPATIONAL THERAPY | Facility: CLINIC | Age: 55
End: 2025-01-15
Payer: COMMERCIAL

## 2025-01-17 ENCOUNTER — APPOINTMENT (OUTPATIENT)
Dept: OCCUPATIONAL THERAPY | Facility: CLINIC | Age: 55
End: 2025-01-17
Payer: COMMERCIAL

## 2025-01-22 ENCOUNTER — APPOINTMENT (OUTPATIENT)
Dept: OCCUPATIONAL THERAPY | Facility: CLINIC | Age: 55
End: 2025-01-22
Payer: COMMERCIAL

## 2025-01-24 ENCOUNTER — APPOINTMENT (OUTPATIENT)
Dept: OCCUPATIONAL THERAPY | Facility: CLINIC | Age: 55
End: 2025-01-24
Payer: COMMERCIAL

## 2025-01-24 ENCOUNTER — TELEPHONE (OUTPATIENT)
Age: 55
End: 2025-01-24

## 2025-01-24 DIAGNOSIS — R74.8 ELEVATED LIVER ENZYMES: Primary | ICD-10-CM

## 2025-01-24 NOTE — TELEPHONE ENCOUNTER
Pt called to say she had labs done last month ordered by weight mgt. They advised she see her pcp about her results. She would like Dr Gee to review them. Does she need an appt? Does she need further testing? Please advise.

## 2025-01-28 ENCOUNTER — APPOINTMENT (OUTPATIENT)
Dept: OCCUPATIONAL THERAPY | Facility: CLINIC | Age: 55
End: 2025-01-28
Payer: COMMERCIAL

## 2025-01-29 ENCOUNTER — ANNUAL EXAM (OUTPATIENT)
Dept: OBGYN CLINIC | Facility: CLINIC | Age: 55
End: 2025-01-29
Payer: COMMERCIAL

## 2025-01-29 ENCOUNTER — APPOINTMENT (OUTPATIENT)
Dept: LAB | Facility: AMBULARY SURGERY CENTER | Age: 55
End: 2025-01-29
Payer: COMMERCIAL

## 2025-01-29 VITALS
SYSTOLIC BLOOD PRESSURE: 118 MMHG | WEIGHT: 146 LBS | HEIGHT: 66 IN | DIASTOLIC BLOOD PRESSURE: 72 MMHG | BODY MASS INDEX: 23.46 KG/M2

## 2025-01-29 DIAGNOSIS — E58 DIETARY CALCIUM DEFICIENCY: ICD-10-CM

## 2025-01-29 DIAGNOSIS — Z72.3 INADEQUATE EXERCISE: ICD-10-CM

## 2025-01-29 DIAGNOSIS — Z01.419 ENCOUNTER FOR GYNECOLOGICAL EXAMINATION WITHOUT ABNORMAL FINDING: Primary | ICD-10-CM

## 2025-01-29 DIAGNOSIS — Z12.31 ENCOUNTER FOR SCREENING MAMMOGRAM FOR BREAST CANCER: ICD-10-CM

## 2025-01-29 PROBLEM — E66.3 OVERWEIGHT (BMI 25.0-29.9): Status: RESOLVED | Noted: 2024-07-25 | Resolved: 2025-01-29

## 2025-01-29 PROBLEM — R73.03 PREDIABETES: Status: RESOLVED | Noted: 2021-05-20 | Resolved: 2025-01-29

## 2025-01-29 LAB
ALBUMIN SERPL BCG-MCNC: 4.1 G/DL (ref 3.5–5)
ALP SERPL-CCNC: 83 U/L (ref 34–104)
ALT SERPL W P-5'-P-CCNC: 49 U/L (ref 7–52)
ANION GAP SERPL CALCULATED.3IONS-SCNC: 9 MMOL/L (ref 4–13)
AST SERPL W P-5'-P-CCNC: 36 U/L (ref 13–39)
BILIRUB SERPL-MCNC: 0.29 MG/DL (ref 0.2–1)
BUN SERPL-MCNC: 11 MG/DL (ref 5–25)
CALCIUM SERPL-MCNC: 9.3 MG/DL (ref 8.4–10.2)
CHLORIDE SERPL-SCNC: 102 MMOL/L (ref 96–108)
CO2 SERPL-SCNC: 30 MMOL/L (ref 21–32)
CREAT SERPL-MCNC: 0.6 MG/DL (ref 0.6–1.3)
GFR SERPL CREATININE-BSD FRML MDRD: 102 ML/MIN/1.73SQ M
GLUCOSE SERPL-MCNC: 71 MG/DL (ref 65–140)
POTASSIUM SERPL-SCNC: 3.7 MMOL/L (ref 3.5–5.3)
PROT SERPL-MCNC: 6.8 G/DL (ref 6.4–8.4)
SODIUM SERPL-SCNC: 141 MMOL/L (ref 135–147)

## 2025-01-29 PROCEDURE — S0612 ANNUAL GYNECOLOGICAL EXAMINA: HCPCS | Performed by: OBSTETRICS & GYNECOLOGY

## 2025-01-29 PROCEDURE — 99459 PELVIC EXAMINATION: CPT | Performed by: OBSTETRICS & GYNECOLOGY

## 2025-01-29 PROCEDURE — 80053 COMPREHEN METABOLIC PANEL: CPT | Performed by: FAMILY MEDICINE

## 2025-01-29 PROCEDURE — 36415 COLL VENOUS BLD VENIPUNCTURE: CPT | Performed by: FAMILY MEDICINE

## 2025-01-29 NOTE — PROGRESS NOTES
Pt is a54 y.o.  ,menopausal, who presents for preventive care  Partner same ; lifetime  >5 partners         safe sexual practices followed: yes  does feel safe in relationship:yes    Dairy: : calcium supplement, Bariatric vitamin,   Vitamin D: in supplement  Exercise: walking 3x/week  Pap: 2021-wnl, HRHPV neg, repeat   Mammogram: 2025-wnl, repeat for 1 year given  Colonoscopy: 2022-repeat 5 years due to h.o polyps  DEXA: PCP ordered  safety at home:  yes  tobacco use No    Past Medical History:   Diagnosis Date    Acute right ankle pain 2023    ADHD (attention deficit hyperactivity disorder)     Allergic     Anxiety     Bipolar disorder ([facility])     Bulging lumbar disc     Carpal tunnel syndrome     RIGHT.  LAST ASSESSED: 16    Chronic back pain     low    Chronic pain disorder     Colon polyps     COVID-19     2021    Depression     Diabetes mellitus ([facility])     GERD (gastroesophageal reflux disease)     Gestational diabetes     Hearing loss     left ear    Heart disease     SVT    History of pre-eclampsia     Hyperlipidemia     Resolved with weight loss    Hyponatremia 2020    IBS (irritable bowel syndrome)     Ileus ([facility])     LAST ASSESSED: 8/3/17    Labial cyst     LAST ASSESSED: 16    Memory loss     Myofascial pain     LAST ASSESSED: 16    Neck mass 2023    Obesity     Ovarian cyst     LEFT. LAST ASSESSED: 16    Panic attack     Pneumonia     Sacroiliitis ([facility])     Seasonal allergies     Sprain of anterior talofibular ligament of right ankle 2023    SVT (supraventricular tachycardia) ([facility])     Thoracic outlet syndrome         Trochanteric bursitis of both hips     LAST ASSESSED: 3/18/16    Ulnar neuropathy at elbow     Varicella     Wears glasses        Past Surgical History:   Procedure Laterality Date    BILE DUCT EXPLORATION      ENDOSCOPIC REMOVAL OF STONES FROM BILIARY TRACT    CARDIAC ELECTROPHYSIOLOGY PROCEDURE N/A 2024     Procedure: Cardiac eps/svt ablation;  Surgeon: Daquan Heath MD;  [facility];  Service: Cardiology     SECTION      x3    CHOLECYSTECTOMY      COLONOSCOPY      2017-polyp, -wnl, repeat5 years    DILATION AND CURETTAGE OF UTERUS      ENDOMETRIAL ABLATION      ERCP W/ SPHICTEROTOMY      FIRST RIB REMOVAL      THORAX EXCISION OF FIRST RIB    NEUROPLASTY / TRANSPOSITION ULNAR NERVE AT ELBOW Right     AZ COLONOSCOPY FLX DX W/COLLJ SPEC WHEN PFRMD N/A 2017    Procedure: COLONOSCOPY;  Surgeon: Oscar Velázquez MD;  [facility]: [facility];  Service: Gastroenterology    AZ ESOPHAGOGASTRODUODENOSCOPY TRANSORAL DIAGNOSTIC N/A 2017    Procedure: ESOPHAGOGASTRODUODENOSCOPY (EGD);  Surgeon: Sadiq Car MD;  Location: [facility];  Service: Gastroenterology    AZ HYSTEROSCOPY BX ENDOMETRIUM&/POLYPC W/WO D&C N/A 10/20/2017    Procedure: DILATATION AND CURETTAGE (D&C) WITH HYSTEROSCOPY  REMOVAL VULVAR RT. LESION;  Surgeon: Lucila Ortiz MD;  [facility];  Service: Gynecology    AZ ALDANA IMPLTJ NSTIM ELTRDS PLATE/PADDLE EDRL Left 2020    Procedure: Insertion of thoracic spinal cord stimulator electrode via laminotomy and placement of left buttock implantable pulse generator (NEUROMONITORING);  Surgeon: Ad Mehta MD;  [facility];  Service: Neurosurgery    AZ LAPS GSTRC RSTRICTIV PX LONGITUDINAL GASTRECTOMY N/A 2018    Procedure: GASTRECTOMY SLEEVE LAPAROSCOPIC; INTRAOPERATIVE EGD ;  Surgeon: Abimael Juarez MD;  [facility];  Service: Bariatrics    AZ LAPS GSTRC RSTRICTIV PX LONGITUDINAL GASTRECTOMY N/A 2022    Procedure: Diagnostic Lap; Extensive Lysis of Adhesions; LAPAROSCOPIC REVISION CONVERSION TO NOE-EN-Y GASTRIC BYPASS AND INTRAOPERATIVE EGD;  Surgeon: Abimael Juarez MD;  [facility];  Service: Bariatrics    AZ NEUROPLASTY &/TRANSPOSITION ULNAR NERVE ELBOW Left 2024    Procedure: RELEASE CUBITAL TUNNEL- left with ulnar nerve  transposition;  Surgeon: Maggy Leary DO;  Location:  [facility];  Service: Orthopedics    SLEEVE GASTROPLASTY      SPINAL CORD STIMULATOR TRIAL W/ LAMINOTOMY      TONSILLECTOMY AND ADENOIDECTOMY      TUBAL LIGATION      UPPER GASTROINTESTINAL ENDOSCOPY      US GUIDED LYMPH NODE BIOPSY LEFT  2023    VEIN LIGATION AND STRIPPING Right     VULVA SURGERY  10/20/2017    BIOPSY    WISDOM TOOTH EXTRACTION         Ob Hx:   OB History    Para Term  AB Living   3 3 3   3   SAB IAB Ectopic Multiple Live Births       3      # Outcome Date GA Lbr Manuel/2nd Weight Sex Type Anes PTL Lv   3 Term            2 Term            1 Term               Obstetric Comments   C/S x 3; BTL at time of third   Menarche 11      Menses: last menses 2019 (had ablation)           Current Outpatient Medications:     albuterol (Ventolin HFA) 90 mcg/act inhaler, Inhale 2 puffs every 6 (six) hours as needed for wheezing, Disp: 6.7 g, Rfl: 1    atoMOXetine (STRATTERA) 60 mg capsule, Take 1 capsule (60 mg total) by mouth daily, Disp: 90 capsule, Rfl: 3    atorvastatin (LIPITOR) 20 mg tablet, Take 1 tablet (20 mg total) by mouth daily, Disp: 100 tablet, Rfl: 1    Blood Glucose Monitoring Suppl (FreeStyle Freedom Lite) w/Device KIT, Please dispense 1 kit, Disp: 1 kit, Rfl: 0    calcium carbonate (OS-MELODY) 600 MG tablet, Take 600 mg by mouth 2 (two) times a day with meals, Disp: , Rfl:     Diclofenac Sodium (VOLTAREN) 1 %, Apply 2 g topically 4 (four) times a day, Disp: 100 g, Rfl: 0    estradiol (ESTRACE VAGINAL) 0.1 mg/g vaginal cream, Insert 1 g into the vagina 3 (three) times a week, Disp: 42.5 g, Rfl: 1    fluticasone (FLONASE) 50 mcg/act nasal spray, 1 spray into each nostril daily, Disp: 15.8 mL, Rfl: 0    glucose blood (FREESTYLE LITE) test strip, Test once daily, Disp: 100 each, Rfl: 1    lamoTRIgine (LaMICtal) 150 MG tablet, Take 1 tablet (150 mg total) by mouth 2 (two) times a day, Disp: 180 tablet, Rfl: 3    Lancets (freestyle)  lancets, Use as instructed, once daily, Disp: 100 each, Rfl: 1    lidocaine (Lidoderm) 5 %, Apply 1 patch topically over 12 hours daily Remove & Discard patch within 12 hours or as directed by MD, Disp: 30 patch, Rfl: 5    Mirabegron ER 25 MG TB24, Take 25 mg by mouth in the morning, Disp: 30 tablet, Rfl: 3    mirtazapine (REMERON) 15 mg tablet, Take 1 tablet (15 mg total) by mouth daily at bedtime, Disp: 90 tablet, Rfl: 1    montelukast (SINGULAIR) 10 mg tablet, Take 1 tablet (10 mg total) by mouth daily at bedtime, Disp: 90 tablet, Rfl: 2    Multiple Vitamins-Minerals (MULTI COMPLETE PO), Take by mouth, Disp: , Rfl:     omeprazole (PriLOSEC) 20 mg delayed release capsule, TAKE ONE CAPSULE BY MOUTH ONCE A DAY., Disp: 90 capsule, Rfl: 1    semaglutide, 1 mg/dose, (Ozempic) 4 mg/3 mL injection pen, Inject 0.75 mL (1 mg total) under the skin once a week, Disp: 9 mL, Rfl: 0    venlafaxine (EFFEXOR) 100 MG tablet, Take 1 tablet (100 mg total) by mouth 3 (three) times a day, Disp: 270 tablet, Rfl: 2    Allergies   Allergen Reactions    Ibuprofen Other (See Comments)     Due to gastric sleeve -can only take for 5 days, then needs to stop       Social History     Socioeconomic History    Marital status: /Civil Union     Spouse name: Abel    Number of children: 3    Years of education: GED then 2 year college program    Highest education level: None   Occupational History    Occupation: Medical assistant     Employer: [employer]   Tobacco Use    Smoking status: Former     Current packs/day: 0.00     Average packs/day: 1 pack/day for 15.0 years (15.0 ttl pk-yrs)     Types: Cigarettes     Start date: 1998     Quit date: 2013     Years since quittin.7     Passive exposure: Never    Smokeless tobacco: Never   Vaping Use    Vaping status: Never Used   Substance and Sexual Activity    Alcohol use: Not Currently     Alcohol/week: 2.0 standard drinks of alcohol     Types: 2 Glasses of wine  per week     Comment: weekly    Drug use: No    Sexual activity: Yes     Partners: Male     Birth control/protection: Post-menopausal     Comment: lifetime partners: 6; current partner 2013   Other Topics Concern    None   Social History Narrative    yesReligion: no preference    Accepts blood products        Exercise: walking 3x/week    Calcium: calcium supplement, Bariatric vitamin,      Social Drivers of Health     Financial Resource Strain: Medium Risk (12/31/2020)    Overall Financial Resource Strain (CARDIA)     Difficulty of Paying Living Expenses: Somewhat hard   Food Insecurity: No Food Insecurity (12/31/2020)    Hunger Vital Sign     Worried About Running Out of Food in the Last Year: Never true     Ran Out of Food in the Last Year: Never true   Transportation Needs: No Transportation Needs (12/31/2020)    PRAPARE - Transportation     Lack of Transportation (Medical): No     Lack of Transportation (Non-Medical): No   Physical Activity: Unknown (5/9/2019)    Exercise Vital Sign     Days of Exercise per Week: 0 days     Minutes of Exercise per Session: Not on file   Stress: Stress Concern Present (5/9/2019)    [place] [employer] - Occupational Stress Questionnaire     Feeling of Stress : Very much   Social Connections: Socially Isolated (5/9/2019)    Social Connection and Isolation Panel [NHANES]     Frequency of Communication with Friends and Family: Once a week     Frequency of Social Gatherings with Friends and Family: Once a week     Attends Christian Services: Never     Active Member of Clubs or Organizations: No     Attends Club or Organization Meetings: Never     Marital Status:    Intimate Partner Violence: Not At Risk (5/9/2019)    Humiliation, Afraid, Rape, and Kick questionnaire     Fear of Current or Ex-Partner: No     Emotionally Abused: No     Physically Abused: No     Sexually Abused: No   Housing Stability: Not on file       Family History   Problem Relation Age  "of Onset    Diabetes Mother     Breast cancer Mother         >50    BRCA1 Negative Mother     BRCA2 Negative Mother     Hyperlipidemia Mother     Diabetes Father     Prostate cancer Father     Alcohol abuse Father         in remission    Heart disease Father     Neuropathy Father     Hyperlipidemia Father     Hypertension Brother     Diabetes Brother     Alcohol abuse Brother     Depression Brother         attempted suicide    Anxiety disorder Brother     Suicide Attempts Brother     Diabetes Brother     Alcohol abuse Brother     No Known Problems Maternal Grandmother     Colon cancer Maternal Grandfather     Heart attack Maternal Grandfather     No Known Problems Paternal Grandmother         dad is adopted    No Known Problems Paternal Grandfather         dad is adopted    No Known Problems Daughter     Asthma Son     Ovarian cancer Neg Hx     Uterine cancer Neg Hx        Blood pressure 118/72, height 5' 5.5\" (1.664 m), weight 66.2 kg (146 lb), last menstrual period 01/07/2019, not currently breastfeeding. and Body mass index is 23.93 kg/m².    Physical Exam  Constitutional:       General: She is not in acute distress.     Appearance: Normal appearance. She is well-developed and normal weight. She is not ill-appearing.   HENT:      Head: Normocephalic and atraumatic.   Eyes:      Extraocular Movements: Extraocular movements intact.      Conjunctiva/sclera: Conjunctivae normal.   Neck:      Thyroid: No thyromegaly.      Trachea: No tracheal deviation.   Cardiovascular:      Rate and Rhythm: Normal rate and regular rhythm.      Heart sounds: Normal heart sounds.   Pulmonary:      Effort: Pulmonary effort is normal. No respiratory distress.      Breath sounds: Normal breath sounds. No stridor. No wheezing or rales.   Abdominal:      General: Bowel sounds are normal. There is no distension.      Palpations: Abdomen is soft. There is no mass.      Tenderness: There is no abdominal tenderness. There is no guarding or " rebound.      Hernia: No hernia is present.   Musculoskeletal:         General: No tenderness. Normal range of motion.      Cervical back: Normal range of motion and neck supple.   Lymphadenopathy:      Cervical: No cervical adenopathy.   Skin:     General: Skin is warm.      Findings: No erythema or rash.   Neurological:      Mental Status: She is alert and oriented to person, place, and time.   Psychiatric:         Mood and Affect: Mood normal.         Behavior: Behavior normal.         Thought Content: Thought content normal.         Judgment: Judgment normal.          Breasts: breasts appear normal, no suspicious masses, no skin or nipple changes or axillary nodes, symmetric fibrous changes in both upper outer quadrants.      vulva: normal external genitalia for age and no lesions, masses, epithelial changes, or exudate  vagina: color pink and rugae  flattening of rugae  cervix: nullip and no lesions   uterus: NSSC, AF, NT, mobile  adnexa: no masses or tenderness  rectum: no masses or nodularity    A/P:  Pt is a 54 y.o.  with       Christina was seen today for gynecologic exam.    Diagnoses and all orders for this visit:    Encounter for gynecological examination without abnormal finding  -stable examination  -pap up to date  -colonoscopy up to date    Encounter for screening mammogram for breast cancer  -     Mammo screening bilateral w 3d and cad; Future    Dietary calcium deficiency  Patient advised recommendation of daily dietary calcium of 1200 mg calcium.     Inadequate exercise  Patient advised recommendation of exercise 5 times per week for 30 minutes.

## 2025-01-30 ENCOUNTER — RESULTS FOLLOW-UP (OUTPATIENT)
Dept: FAMILY MEDICINE CLINIC | Facility: CLINIC | Age: 55
End: 2025-01-30

## 2025-01-31 ENCOUNTER — OFFICE VISIT (OUTPATIENT)
Dept: PAIN MEDICINE | Facility: CLINIC | Age: 55
End: 2025-01-31
Payer: OTHER MISCELLANEOUS

## 2025-01-31 ENCOUNTER — APPOINTMENT (OUTPATIENT)
Dept: OCCUPATIONAL THERAPY | Facility: CLINIC | Age: 55
End: 2025-01-31
Payer: COMMERCIAL

## 2025-01-31 DIAGNOSIS — G89.4 CHRONIC PAIN SYNDROME: ICD-10-CM

## 2025-01-31 DIAGNOSIS — M51.16 INTERVERTEBRAL DISC DISORDER WITH RADICULOPATHY OF LUMBAR REGION: Primary | ICD-10-CM

## 2025-01-31 DIAGNOSIS — M51.26 LUMBAR DISC HERNIATION: ICD-10-CM

## 2025-01-31 PROCEDURE — 99214 OFFICE O/P EST MOD 30 MIN: CPT

## 2025-01-31 NOTE — PROGRESS NOTES
Assessment:  1. Intervertebral disc disorder with radiculopathy of lumbar region    2. Chronic pain syndrome    3. Lumbar disc herniation        Plan:  The patient is a 54-year-old female  with a history of chronic pain secondary to low back pain, mid back pain, lumbar intervertebral disc disorder with radiculopathy, lumbar spondylosis with Abbott spinal cord stimulator, sacroiliitis, trochanteric bursitis and myofascial pain who presents to the office with ongoing bilateral low back pain and pain that radiates into bilateral buttocks.    At this time, I do suspect the patient is experiencing low back pain secondary to the disc herniation at L5.  I did instruct patient we can perform a repeat bilateral L5 TFESI to help decrease inflammation and provide her relief.  Patient last had this procedure done on 2/6/2024 which did provide her 80% relief of her pain for 9 to 10 months.  Patient would like to proceed will be scheduled.  Complete risks and benefits including bleeding, infection, tissue reaction, nerve injury and allergic reaction were discussed.  The approach was demonstrated using models and literature was provided.  Verbal and written consent was obtained.    My impressions and treatment recommendations were discussed in detail with the patient who verbalized understanding and had no further questions.  Discharge instructions were provided. I personally saw and examined the patient and I agree with the above discussed plan of care.    No orders of the defined types were placed in this encounter.    No orders of the defined types were placed in this encounter.      History of Present Illness:  Christina Lara is a 54 y.o. female with a history of chronic pain secondary to low back pain, mid back pain, lumbar intervertebral disc disorder with radiculopathy, lumbar spondylosis with Abbott spinal cord stimulator, sacroiliitis, trochanteric bursitis and myofascial pain.  She was last seen on 9/12/2024 where she  underwent a right SI joint injection which did provide her greater than 50% relief of her pain.  She presents to the office with ongoing bilateral low back pain and pain that radiates into bilateral buttocks.    She states her pain is the same since the last office visit and constant.  She rates quality of her pain as throbbing and is currently rating her pain a 6/10 on a numeric scale.    Current pain indications and clued Robaxin as needed and lidocaine patches.  She also has a prescription for tramadol which she uses sparingly.  She also utilizes heat and has had her spinal cord stimulator reprogrammed.    I have personally reviewed and/or updated the patient's past medical history, past surgical history, family history, social history, current medications, allergies, and vital signs today.     Review of Systems   Respiratory:  Negative for shortness of breath.    Cardiovascular:  Negative for chest pain.   Gastrointestinal:  Negative for constipation, diarrhea, nausea and vomiting.   Musculoskeletal:  Positive for back pain, gait problem and joint swelling. Negative for arthralgias and myalgias.   Skin:  Negative for rash.   Neurological:  Negative for dizziness, seizures and weakness.   All other systems reviewed and are negative.      Patient Active Problem List   Diagnosis    IBS (irritable bowel syndrome)    Mixed hyperlipidemia    GERD (gastroesophageal reflux disease)    Cervical radiculopathy    Hepatic steatosis    Pulmonary nodule seen on imaging study    Controlled type 2 diabetes mellitus without complication, without long-term current use of insulin (HCC)    S/P laparoscopic sleeve gastrectomy    Postsurgical malabsorption    Intervertebral disc disorder with radiculopathy of lumbar region    Post traumatic stress disorder (PTSD)    Generalized anxiety disorder    Sacroiliitis (HCC)    Paroxysmal SVT (supraventricular tachycardia) (HCC)    ADHD (attention deficit hyperactivity disorder), inattentive  type    Seasonal allergies    Benign paroxysmal positional vertigo    Bariatric surgery status    Major depressive disorder, recurrent, mild (HCC)    S/P typical CTI flutter ablation 2024    S/p [employer] loop recorder implantation 2022    S/P gastric bypass    Elbow pain, left    Iron deficiency anemia following bariatric surgery    Cubital tunnel syndrome on left       Past Medical History:   Diagnosis Date    Acute right ankle pain 2023    ADHD (attention deficit hyperactivity disorder)     Allergic     Anxiety     Bipolar disorder ([facility])     Bulging lumbar disc     Carpal tunnel syndrome     RIGHT.  LAST ASSESSED: 16    Chronic back pain     low    Chronic pain disorder     Colon polyps     COVID-19     2021    Depression     Diabetes mellitus ([facility])     GERD (gastroesophageal reflux disease)     Gestational diabetes     Hearing loss     left ear    Heart disease     SVT    History of pre-eclampsia     Hyperlipidemia     Resolved with weight loss    Hyponatremia 2020    IBS (irritable bowel syndrome)     Ileus ([facility])     LAST ASSESSED: 8/3/17    Labial cyst     LAST ASSESSED: 16    Memory loss     Myofascial pain     LAST ASSESSED: 16    Neck mass 2023    Obesity     Ovarian cyst     LEFT. LAST ASSESSED: 16    Panic attack     Pneumonia     Sacroiliitis ([facility])     Seasonal allergies     Sprain of anterior talofibular ligament of right ankle 2023    SVT (supraventricular tachycardia) ([facility])     Thoracic outlet syndrome     2010    Trochanteric bursitis of both hips     LAST ASSESSED: 3/18/16    Ulnar neuropathy at elbow     Varicella     Wears glasses        Past Surgical History:   Procedure Laterality Date    BILE DUCT EXPLORATION      ENDOSCOPIC REMOVAL OF STONES FROM BILIARY TRACT    CARDIAC ELECTROPHYSIOLOGY PROCEDURE N/A 2024    Procedure: Cardiac eps/svt ablation;  Surgeon: Daquan Heath MD;  Location: [facility];  Service: Cardiology      SECTION      x3    CHOLECYSTECTOMY      COLONOSCOPY      2017-polyp, 2022-wnl, repeat5 years    DILATION AND CURETTAGE OF UTERUS      ENDOMETRIAL ABLATION      ERCP W/ SPHICTEROTOMY      FIRST RIB REMOVAL      THORAX EXCISION OF FIRST RIB    NEUROPLASTY / TRANSPOSITION ULNAR NERVE AT ELBOW Right 2011    NE COLONOSCOPY FLX DX W/COLLJ SPEC WHEN PFRMD N/A 05/30/2017    Procedure: COLONOSCOPY;  Surgeon: Oscar Velázquez MD;  [facility]: [facility];  Service: Gastroenterology    NE ESOPHAGOGASTRODUODENOSCOPY TRANSORAL DIAGNOSTIC N/A 09/14/2017    Procedure: ESOPHAGOGASTRODUODENOSCOPY (EGD);  Surgeon: Sadiq Car MD;  Location: [facility];  Service: Gastroenterology    NE HYSTEROSCOPY BX ENDOMETRIUM&/POLYPC W/WO D&C N/A 10/20/2017    Procedure: DILATATION AND CURETTAGE (D&C) WITH HYSTEROSCOPY  REMOVAL VULVAR RT. LESION;  Surgeon: Lucila Ortiz MD;  Location: [facility];  Service: Gynecology    NE ALDANA IMPLTJ NSTIM ELTRDS PLATE/PADDLE EDRL Left 01/29/2020    Procedure: Insertion of thoracic spinal cord stimulator electrode via laminotomy and placement of left buttock implantable pulse generator (NEUROMONITORING);  Surgeon: Ad Mehta MD;  Location: [facility];  Service: Neurosurgery    NE LAPS GSTRC RSTRICTIV PX LONGITUDINAL GASTRECTOMY N/A 02/06/2018    Procedure: GASTRECTOMY SLEEVE LAPAROSCOPIC; INTRAOPERATIVE EGD ;  Surgeon: Abimael Juarez MD;  [facility];  Service: Bariatrics    NE LAPS GSTRC RSTRICTIV PX LONGITUDINAL GASTRECTOMY N/A 08/08/2022    Procedure: Diagnostic Lap; Extensive Lysis of Adhesions; LAPAROSCOPIC REVISION CONVERSION TO NOE-EN-Y GASTRIC BYPASS AND INTRAOPERATIVE EGD;  Surgeon: Abimael Juarez MD;  [facility];  Service: Bariatrics    NE NEUROPLASTY &/TRANSPOSITION ULNAR NERVE ELBOW Left 07/19/2024    Procedure: RELEASE CUBITAL TUNNEL- left with ulnar nerve transposition;  Surgeon: Maggy Leary DO;  Location: [facility];  Service: Orthopedics    SLEEVE GASTROPLASTY      SPINAL CORD  STIMULATOR TRIAL W/ LAMINOTOMY      TONSILLECTOMY AND ADENOIDECTOMY      TUBAL LIGATION      UPPER GASTROINTESTINAL ENDOSCOPY      US GUIDED LYMPH NODE BIOPSY LEFT  2023    VEIN LIGATION AND STRIPPING Right     VULVA SURGERY  10/20/2017    BIOPSY    WISDOM TOOTH EXTRACTION         Family History   Problem Relation Age of Onset    Diabetes Mother     Breast cancer Mother         >50    BRCA1 Negative Mother     BRCA2 Negative Mother     Hyperlipidemia Mother     Diabetes Father     Prostate cancer Father     Alcohol abuse Father         in remission    Heart disease Father     Neuropathy Father     Hyperlipidemia Father     Hypertension Brother     Diabetes Brother     Alcohol abuse Brother     Depression Brother         attempted suicide    Anxiety disorder Brother     Suicide Attempts Brother     Diabetes Brother     Alcohol abuse Brother     No Known Problems Maternal Grandmother     Colon cancer Maternal Grandfather     Heart attack Maternal Grandfather     No Known Problems Paternal Grandmother         dad is adopted    No Known Problems Paternal Grandfather         dad is adopted    No Known Problems Daughter     Asthma Son     Ovarian cancer Neg Hx     Uterine cancer Neg Hx        Social History     Occupational History    Occupation: Medical assistant     Employer: [employer]   Tobacco Use    Smoking status: Former     Current packs/day: 0.00     Average packs/day: 1 pack/day for 15.0 years (15.0 ttl pk-yrs)     Types: Cigarettes     Start date: 1998     Quit date: 2013     Years since quittin.7     Passive exposure: Never    Smokeless tobacco: Never   Vaping Use    Vaping status: Never Used   Substance and Sexual Activity    Alcohol use: Not Currently     Alcohol/week: 2.0 standard drinks of alcohol     Types: 2 Glasses of wine per week     Comment: weekly    Drug use: No    Sexual activity: Yes     Partners: Male     Birth control/protection: Post-menopausal     Comment:  lifetime partners: 6; current partner 2013       Current Outpatient Medications on File Prior to Visit   Medication Sig    albuterol (Ventolin HFA) 90 mcg/act inhaler Inhale 2 puffs every 6 (six) hours as needed for wheezing    atoMOXetine (STRATTERA) 60 mg capsule Take 1 capsule (60 mg total) by mouth daily    atorvastatin (LIPITOR) 20 mg tablet Take 1 tablet (20 mg total) by mouth daily    Blood Glucose Monitoring Suppl (FreeStyle Freedom Lite) w/Device KIT Please dispense 1 kit    calcium carbonate (OS-MELODY) 600 MG tablet Take 600 mg by mouth 2 (two) times a day with meals    Diclofenac Sodium (VOLTAREN) 1 % Apply 2 g topically 4 (four) times a day    estradiol (ESTRACE VAGINAL) 0.1 mg/g vaginal cream Insert 1 g into the vagina 3 (three) times a week    fluticasone (FLONASE) 50 mcg/act nasal spray 1 spray into each nostril daily    glucose blood (FREESTYLE LITE) test strip Test once daily    lamoTRIgine (LaMICtal) 150 MG tablet Take 1 tablet (150 mg total) by mouth 2 (two) times a day    Lancets (freestyle) lancets Use as instructed, once daily    lidocaine (Lidoderm) 5 % Apply 1 patch topically over 12 hours daily Remove & Discard patch within 12 hours or as directed by MD    Mirabegron ER 25 MG TB24 Take 25 mg by mouth in the morning    mirtazapine (REMERON) 15 mg tablet Take 1 tablet (15 mg total) by mouth daily at bedtime    montelukast (SINGULAIR) 10 mg tablet Take 1 tablet (10 mg total) by mouth daily at bedtime    Multiple Vitamins-Minerals (MULTI COMPLETE PO) Take by mouth    omeprazole (PriLOSEC) 20 mg delayed release capsule TAKE ONE CAPSULE BY MOUTH ONCE A DAY.    semaglutide, 1 mg/dose, (Ozempic) 4 mg/3 mL injection pen Inject 0.75 mL (1 mg total) under the skin once a week    venlafaxine (EFFEXOR) 100 MG tablet Take 1 tablet (100 mg total) by mouth 3 (three) times a day     No current facility-administered medications on file prior to visit.       Allergies   Allergen Reactions    Ibuprofen Other (See  Comments)     Due to gastric sleeve -can only take for 5 days, then needs to stop       Physical Exam:    LMP 01/07/2019 (Approximate)     Constitutional:normal, well developed, well nourished, alert, in no distress and non-toxic and no overt pain behavior.  Eyes:anicteric  HEENT:grossly intact  Neck:supple, symmetric, trachea midline and no masses   Pulmonary:even and unlabored  Cardiovascular:No edema or pitting edema present  Skin:Normal without rashes or lesions and well hydrated  Psychiatric:Mood and affect appropriate  Neurologic:Cranial Nerves II-XII grossly intact  Musculoskeletal:normal    Lumbar Spine Exam    Appearance:  Normal lordosis  Palpation/Tenderness:  left lumbar paraspinal tenderness  right lumbar paraspinal tenderness  left sacroiliac joint tenderness  right sacroiliac joint tenderness  Sensory:  no sensory deficits noted  Range of Motion:  Flexion:  Minimally limited  with pain  Extension:  Minimally limited  with pain  Motor Strength:  Left hip flexion:  5/5  Right hip flexion:  5/5  Left knee flexion:  5/5  Left knee extension:  5/5  Right knee flexion:  5/5  Right knee extension:  5/5  Left foot dorsiflexion:  5/5  Left foot plantar flexion:  5/5  Right foot dorsiflexion:  5/5  Right foot plantar flexion:  5/5  Special Tests:  Left Straight Leg Test:  negative  Right Straight Leg Test:  negative  Left Fred's Maneuver:  negative  Right Fred's Maneuver:  negative      Imaging

## 2025-02-03 DIAGNOSIS — F33.1 MODERATE EPISODE OF RECURRENT MAJOR DEPRESSIVE DISORDER (HCC): Chronic | ICD-10-CM

## 2025-02-03 DIAGNOSIS — F41.1 GENERALIZED ANXIETY DISORDER: Chronic | ICD-10-CM

## 2025-02-03 DIAGNOSIS — F43.10 POST TRAUMATIC STRESS DISORDER (PTSD): Chronic | ICD-10-CM

## 2025-02-03 NOTE — PSYCH
Virtual Regular Visit    Verification of patient location:    Patient is located at Home in the following state in which I hold an active license [place]      Assessment/Plan:    Problem List Items Addressed This Visit          Behavioral Health    Post traumatic stress disorder (PTSD) (Chronic)    Generalized anxiety disorder (Chronic)    ADHD (attention deficit hyperactivity disorder), inattentive type (Chronic)    Major depressive disorder, recurrent, mild (HCC) - Primary       Goals addressed in session: Goal 1     Depression Follow-up Plan Completed: Yes    Reason for visit is   Chief Complaint   Patient presents with    Virtual Regular Visit          Encounter provider LALY Yung      Recent Visits  No visits were found meeting these conditions.  Showing recent visits within past 7 days and meeting all other requirements  Future Appointments  No visits were found meeting these conditions.  Showing future appointments within next 150 days and meeting all other requirements       The patient was identified by name and date of birth. Christina Lara was informed that this is a telemedicine visit and that the visit is being conducted throughthe Epic Embedded platform. She agrees to proceed..  My office door was closed. No one else was in the room.  She acknowledged consent and understanding of privacy and security of the video platform. The patient has agreed to participate and understands they can discontinue the visit at any time.    Patient is aware this is a billable service.     Subjective  Christina Lara is a 54 y.o. female.      HPI     Past Medical History:   Diagnosis Date    Acute right ankle pain 02/07/2023    ADHD (attention deficit hyperactivity disorder)     Allergic     Anxiety     Bipolar disorder (HCC)     Bulging lumbar disc     Carpal tunnel syndrome     RIGHT.  LAST ASSESSED: 12/7/16    Chronic back pain     low    Chronic pain disorder     Colon polyps     COVID-19     12/2021    Depression      Diabetes mellitus (HCC)     GERD (gastroesophageal reflux disease)     Gestational diabetes     Hearing loss     left ear    Heart disease     SVT    History of pre-eclampsia     Hyperlipidemia     Resolved with weight loss    Hyponatremia 2020    IBS (irritable bowel syndrome)     Ileus ([facility])     LAST ASSESSED: 8/3/17    Labial cyst     LAST ASSESSED: 16    Memory loss     Myofascial pain     LAST ASSESSED: 16    Neck mass 2023    Obesity     Ovarian cyst     LEFT. LAST ASSESSED: 16    Panic attack     Pneumonia     Sacroiliitis ([facility])     Seasonal allergies     Sprain of anterior talofibular ligament of right ankle 2023    SVT (supraventricular tachycardia) ([facility])     Thoracic outlet syndrome         Trochanteric bursitis of both hips     LAST ASSESSED: 3/18/16    Ulnar neuropathy at elbow     Varicella     Wears glasses        Past Surgical History:   Procedure Laterality Date    BILE DUCT EXPLORATION      ENDOSCOPIC REMOVAL OF STONES FROM BILIARY TRACT    CARDIAC ELECTROPHYSIOLOGY PROCEDURE N/A 2024    Procedure: Cardiac eps/svt ablation;  Surgeon: Daqaun Heath MD;  [facility];  Service: Cardiology     SECTION      x3    CHOLECYSTECTOMY      COLONOSCOPY      -polyp, -wnl, repeat5 years    DILATION AND CURETTAGE OF UTERUS      ENDOMETRIAL ABLATION      ERCP W/ SPHICTEROTOMY      FIRST RIB REMOVAL      THORAX EXCISION OF FIRST RIB    NEUROPLASTY / TRANSPOSITION ULNAR NERVE AT ELBOW Right     TN COLONOSCOPY FLX DX W/COLLJ SPEC WHEN PFRMD N/A 2017    Procedure: COLONOSCOPY;  Surgeon: Oscar Velázquez MD;  [facility];  Service: Gastroenterology    TN ESOPHAGOGASTRODUODENOSCOPY TRANSORAL DIAGNOSTIC N/A 2017    Procedure: ESOPHAGOGASTRODUODENOSCOPY (EGD);  Surgeon: Sadiq Car MD;  [facility];  Service: Gastroenterology    TN HYSTEROSCOPY BX ENDOMETRIUM&/POLYPC W/WO D&C N/A 10/20/2017    Procedure: DILATATION AND  CURETTAGE (D&C) WITH HYSTEROSCOPY  REMOVAL VULVAR RT. LESION;  Surgeon: Lucila Ortiz MD;  [facility];  Service: Gynecology    GA ALDANA IMPLTJ NSTIM ELTRDS PLATE/PADDLE EDRL Left 01/29/2020    Procedure: Insertion of thoracic spinal cord stimulator electrode via laminotomy and placement of left buttock implantable pulse generator (NEUROMONITORING);  Surgeon: Ad Mehta MD;  [facility];  Service: Neurosurgery    GA LAPS GSTRC RSTRICTIV PX LONGITUDINAL GASTRECTOMY N/A 02/06/2018    Procedure: GASTRECTOMY SLEEVE LAPAROSCOPIC; INTRAOPERATIVE EGD ;  Surgeon: Abimael Juarez MD;  [facility];  Service: Bariatrics    GA LAPS GSTRC RSTRICTIV PX LONGITUDINAL GASTRECTOMY N/A 08/08/2022    Procedure: Diagnostic Lap; Extensive Lysis of Adhesions; LAPAROSCOPIC REVISION CONVERSION TO NOE-EN-Y GASTRIC BYPASS AND INTRAOPERATIVE EGD;  Surgeon: Abimael Juarez MD;  [facility];  Service: Bariatrics    GA NEUROPLASTY &/TRANSPOSITION ULNAR NERVE ELBOW Left 07/19/2024    Procedure: RELEASE CUBITAL TUNNEL- left with ulnar nerve transposition;  Surgeon: Maggy Leary DO;  [facility];  Service: Orthopedics    SLEEVE GASTROPLASTY      SPINAL CORD STIMULATOR TRIAL W/ LAMINOTOMY      TONSILLECTOMY AND ADENOIDECTOMY      TUBAL LIGATION      UPPER GASTROINTESTINAL ENDOSCOPY      US GUIDED LYMPH NODE BIOPSY LEFT  05/22/2023    VEIN LIGATION AND STRIPPING Right     VULVA SURGERY  10/20/2017    BIOPSY    WISDOM TOOTH EXTRACTION         Current Outpatient Medications   Medication Sig Dispense Refill    albuterol (Ventolin HFA) 90 mcg/act inhaler Inhale 2 puffs every 6 (six) hours as needed for wheezing 6.7 g 1    atoMOXetine (STRATTERA) 60 mg capsule Take 1 capsule (60 mg total) by mouth daily 90 capsule 3    atorvastatin (LIPITOR) 20 mg tablet Take 1 tablet (20 mg total) by mouth daily 100 tablet 1    Blood Glucose Monitoring Suppl (FreeStyle Freedom Lite) w/Device KIT Please dispense 1 kit 1 kit  0    calcium carbonate (OS-MELODY) 600 MG tablet Take 600 mg by mouth 2 (two) times a day with meals      Diclofenac Sodium (VOLTAREN) 1 % Apply 2 g topically 4 (four) times a day 100 g 0    estradiol (ESTRACE VAGINAL) 0.1 mg/g vaginal cream Insert 1 g into the vagina 3 (three) times a week 42.5 g 1    fluticasone (FLONASE) 50 mcg/act nasal spray 1 spray into each nostril daily 15.8 mL 0    glucose blood (FREESTYLE LITE) test strip Test once daily 100 each 1    lamoTRIgine (LaMICtal) 150 MG tablet Take 1 tablet (150 mg total) by mouth 2 (two) times a day 180 tablet 3    Lancets (freestyle) lancets Use as instructed, once daily 100 each 1    lidocaine (Lidoderm) 5 % Apply 1 patch topically over 12 hours daily Remove & Discard patch within 12 hours or as directed by MD 30 patch 5    Mirabegron ER 25 MG TB24 Take 25 mg by mouth in the morning 30 tablet 3    mirtazapine (REMERON) 15 mg tablet Take 1 tablet (15 mg total) by mouth daily at bedtime 90 tablet 1    montelukast (SINGULAIR) 10 mg tablet Take 1 tablet (10 mg total) by mouth daily at bedtime 90 tablet 2    Multiple Vitamins-Minerals (MULTI COMPLETE PO) Take by mouth      omeprazole (PriLOSEC) 20 mg delayed release capsule TAKE ONE CAPSULE BY MOUTH ONCE A DAY. 90 capsule 1    semaglutide, 1 mg/dose, (Ozempic) 4 mg/3 mL injection pen Inject 0.75 mL (1 mg total) under the skin once a week 9 mL 0    venlafaxine (EFFEXOR) 100 MG tablet Take 1 tablet (100 mg total) by mouth 3 (three) times a day 270 tablet 2     No current facility-administered medications for this visit.        Allergies   Allergen Reactions    Ibuprofen Other (See Comments)     Due to gastric sleeve -can only take for 5 days, then needs to stop       Review of Systems    Video Exam    There were no vitals filed for this visit.    Physical Exam     Behavioral Health Psychotherapy Progress Note    Psychotherapy Provided: Individual Psychotherapy     1. Major depressive disorder, recurrent, mild (HCC)    "     2. Generalized anxiety disorder        3. Post traumatic stress disorder (PTSD)        4. ADHD (attention deficit hyperactivity disorder), inattentive type            Goals addressed in session: Goal 1     DATA: Met with Yessy for her scheduled individual session. She states that she continues to enjoy her job and feels that she is doing well with her work schedule and with maintaining her focus on her job responsibilities. She states that she is glad that she made this change. Yessy spent most of her time discussing her school courses and her frustration with her overall academic performance. She states that she is feeling very defeated and is not sure if she is smart enough to complete her education. We discussed her reluctance to go to the ADA office to get assistance for her learning disabilities. She states that she knows that she might benefit from some additional help, and she is able to see that she learns differently than others, but she struggles with shame about not being able to catch on in some of her classes the same way that others do. She is very worried about how she will be able to pass statistics, as this course is very difficult for her to understand-- even with tutoring. This clinician discussed the barriers to her approaching the ADA office. She agreed to attempt to give them a call to set up a meeting. We discussed how she has gained her experience and how she was able to become successful at the jobs she has held at [employer]. She states that she is much better at learning by \"doing\" rather than by reading a book about it. Overall, Yessy states that her moods have been pretty good. She states that she and her  are doing well. We will meet again in a month. She will reach out to the disabilities office to discuss setting up an appointment with them.   During this session, this clinician used the following therapeutic modalities: Client-centered Therapy, Dialectical Behavior Therapy, " "Mindfulness-based Strategies, Motivational Interviewing, Solution-Focused Therapy, and Supportive Psychotherapy    Substance Abuse was not addressed during this session. If the client is diagnosed with a co-occurring substance use disorder, please indicate any changes in the frequency or amount of use: n/a. Stage of change for addressing substance use diagnoses: No substance use/Not applicable    ASSESSMENT:  Christina Lara presents with a Euthymic/ normal mood.     her affect is Normal range and intensity, which is congruent, with her mood and the content of the session. The client has not made progress on their goals.    Christina Lara presents with a minimal risk of suicide, minimal risk of self-harm, and minimal risk of harm to others.    For any risk assessment that surpasses a \"low\" rating, a safety plan must be developed.    A safety plan was indicated: no  If yes, describe in detail n/a    PLAN: Between sessions, Christina Lara will continue to monitor her mood. She will reach out to the ADA office at [facility] to discuss her learning style and potential accommodations that might help her to be more successful. At the next session, the therapist will use Client-centered Therapy, Dialectical Behavior Therapy, Mindfulness-based Strategies, Motivational Interviewing, Solution-Focused Therapy, and Supportive Psychotherapy to address her mood regulation and relationship issues.    Behavioral Health Treatment Plan and Discharge Planning: Christina Lara is aware of and agrees to continue to work on their treatment plan. They have identified and are working toward their discharge goals. yes    Depression Follow-up Plan Completed: Yes. Continue with therapy and medication management/adherence.     Visit start and stop times:    01/14/25  Start Time: 1704  Stop Time: 1759  Total Visit Time: 55 minutes  "

## 2025-02-04 RX ORDER — VENLAFAXINE 100 MG/1
100 TABLET ORAL 3 TIMES DAILY
Qty: 90 TABLET | Refills: 0 | Status: SHIPPED | OUTPATIENT
Start: 2025-02-04

## 2025-02-07 ENCOUNTER — HOSPITAL ENCOUNTER (OUTPATIENT)
Dept: RADIOLOGY | Facility: CLINIC | Age: 55
End: 2025-02-07

## 2025-02-07 VITALS
HEART RATE: 81 BPM | DIASTOLIC BLOOD PRESSURE: 71 MMHG | TEMPERATURE: 97.7 F | OXYGEN SATURATION: 98 % | SYSTOLIC BLOOD PRESSURE: 106 MMHG | RESPIRATION RATE: 20 BRPM

## 2025-02-07 DIAGNOSIS — M47.816 LUMBAR SPONDYLOSIS: ICD-10-CM

## 2025-02-07 PROCEDURE — 64483 NJX AA&/STRD TFRM EPI L/S 1: CPT | Performed by: ANESTHESIOLOGY

## 2025-02-07 RX ORDER — METHYLPREDNISOLONE ACETATE 80 MG/ML
80 INJECTION, SUSPENSION INTRA-ARTICULAR; INTRALESIONAL; INTRAMUSCULAR; PARENTERAL; SOFT TISSUE ONCE
Status: COMPLETED | OUTPATIENT
Start: 2025-02-07 | End: 2025-02-07

## 2025-02-07 RX ORDER — BUPIVACAINE HCL/PF 2.5 MG/ML
2 VIAL (ML) INJECTION ONCE
Status: COMPLETED | OUTPATIENT
Start: 2025-02-07 | End: 2025-02-07

## 2025-02-07 RX ORDER — 0.9 % SODIUM CHLORIDE 0.9 %
4 VIAL (ML) INJECTION ONCE
Status: COMPLETED | OUTPATIENT
Start: 2025-02-07 | End: 2025-02-07

## 2025-02-07 RX ADMIN — METHYLPREDNISOLONE ACETATE 80 MG: 80 INJECTION, SUSPENSION INTRA-ARTICULAR; INTRALESIONAL; INTRAMUSCULAR; SOFT TISSUE at 09:11

## 2025-02-07 RX ADMIN — LIDOCAINE HYDROCHLORIDE 2 ML: 20 INJECTION, SOLUTION EPIDURAL; INFILTRATION; INTRACAUDAL at 09:10

## 2025-02-07 RX ADMIN — BUPIVACAINE HYDROCHLORIDE 2 ML: 2.5 INJECTION, SOLUTION EPIDURAL; INFILTRATION; INTRACAUDAL at 09:11

## 2025-02-07 RX ADMIN — Medication 2 ML: at 09:10

## 2025-02-07 RX ADMIN — IOHEXOL 1 ML: 300 INJECTION, SOLUTION INTRAVENOUS at 09:11

## 2025-02-07 NOTE — H&P
History of Present Illness: The patient is a 54 y.o. female who presents with complaints of lower back pain is here today for bilateral L5 transforaminal epidural steroid injection    Past Medical History:   Diagnosis Date    Acute right ankle pain 2023    ADHD (attention deficit hyperactivity disorder)     Allergic     Anxiety     Bipolar disorder ([facility])     Bulging lumbar disc     Carpal tunnel syndrome     RIGHT.  LAST ASSESSED: 16    Chronic back pain     low    Chronic pain disorder     Colon polyps     COVID-19     2021    Depression     Diabetes mellitus ([facility])     GERD (gastroesophageal reflux disease)     Gestational diabetes     Hearing loss     left ear    Heart disease     SVT    History of pre-eclampsia     Hyperlipidemia     Resolved with weight loss    Hyponatremia 2020    IBS (irritable bowel syndrome)     Ileus ([facility])     LAST ASSESSED: 8/3/17    Labial cyst     LAST ASSESSED: 16    Memory loss     Myofascial pain     LAST ASSESSED: 16    Neck mass 2023    Obesity     Ovarian cyst     LEFT. LAST ASSESSED: 16    Panic attack     Pneumonia     Sacroiliitis ([facility])     Seasonal allergies     Sprain of anterior talofibular ligament of right ankle 2023    SVT (supraventricular tachycardia) ([facility])     Thoracic outlet syndrome     2010    Trochanteric bursitis of both hips     LAST ASSESSED: 3/18/16    Ulnar neuropathy at elbow     Varicella     Wears glasses        Past Surgical History:   Procedure Laterality Date    BILE DUCT EXPLORATION      ENDOSCOPIC REMOVAL OF STONES FROM BILIARY TRACT    CARDIAC ELECTROPHYSIOLOGY PROCEDURE N/A 2024    Procedure: Cardiac eps/svt ablation;  Surgeon: Daquan Heath MD;  [facility];  Service: Cardiology     SECTION      x3    CHOLECYSTECTOMY      COLONOSCOPY      2017-polyp, -wnl, repeat5 years    DILATION AND CURETTAGE OF UTERUS      ENDOMETRIAL ABLATION      ERCP W/ SPHICTEROTOMY      FIRST  RIB REMOVAL      THORAX EXCISION OF FIRST RIB    NEUROPLASTY / TRANSPOSITION ULNAR NERVE AT ELBOW Right 2011    MS COLONOSCOPY FLX DX W/COLLJ SPEC WHEN PFRMD N/A 05/30/2017    Procedure: COLONOSCOPY;  Surgeon: Oscar Velázquez MD;  [facility];  Service: Gastroenterology    MS ESOPHAGOGASTRODUODENOSCOPY TRANSORAL DIAGNOSTIC N/A 09/14/2017    Procedure: ESOPHAGOGASTRODUODENOSCOPY (EGD);  Surgeon: Sadiq Car MD;  [facility];  Service: Gastroenterology    MS HYSTEROSCOPY BX ENDOMETRIUM&/POLYPC W/WO D&C N/A 10/20/2017    Procedure: DILATATION AND CURETTAGE (D&C) WITH HYSTEROSCOPY  REMOVAL VULVAR RT. LESION;  Surgeon: Lucila Ortiz MD;  [facility];  Service: Gynecology    MS ALDANA IMPLTJ NSTIM ELTRDS PLATE/PADDLE EDRL Left 01/29/2020    Procedure: Insertion of thoracic spinal cord stimulator electrode via laminotomy and placement of left buttock implantable pulse generator (NEUROMONITORING);  Surgeon: Ad Mehta MD;  [facility];  Service: Neurosurgery    MS LAPS GSTRC RSTRICTIV PX LONGITUDINAL GASTRECTOMY N/A 02/06/2018    Procedure: GASTRECTOMY SLEEVE LAPAROSCOPIC; INTRAOPERATIVE EGD ;  Surgeon: Abimael Juarez MD;  [facility];  Service: Bariatrics    MS LAPS GSTRC RSTRICTIV PX LONGITUDINAL GASTRECTOMY N/A 08/08/2022    Procedure: Diagnostic Lap; Extensive Lysis of Adhesions; LAPAROSCOPIC REVISION CONVERSION TO NOE-EN-Y GASTRIC BYPASS AND INTRAOPERATIVE EGD;  Surgeon: Abimael Juarez MD;  [facility];  Service: Bariatrics    MS NEUROPLASTY &/TRANSPOSITION ULNAR NERVE ELBOW Left 07/19/2024    Procedure: RELEASE CUBITAL TUNNEL- left with ulnar nerve transposition;  Surgeon: Maggy Leary DO;  [facility];  Service: Orthopedics    SLEEVE GASTROPLASTY      SPINAL CORD STIMULATOR TRIAL W/ LAMINOTOMY      TONSILLECTOMY AND ADENOIDECTOMY      TUBAL LIGATION      UPPER GASTROINTESTINAL ENDOSCOPY      US GUIDED LYMPH NODE BIOPSY LEFT  05/22/2023    VEIN LIGATION  AND STRIPPING Right     VULVA SURGERY  10/20/2017    BIOPSY    WISDOM TOOTH EXTRACTION           Current Outpatient Medications:     albuterol (Ventolin HFA) 90 mcg/act inhaler, Inhale 2 puffs every 6 (six) hours as needed for wheezing, Disp: 6.7 g, Rfl: 1    atoMOXetine (STRATTERA) 60 mg capsule, Take 1 capsule (60 mg total) by mouth daily, Disp: 90 capsule, Rfl: 3    atorvastatin (LIPITOR) 20 mg tablet, Take 1 tablet (20 mg total) by mouth daily, Disp: 100 tablet, Rfl: 1    Blood Glucose Monitoring Suppl (FreeStyle Freedom Lite) w/Device KIT, Please dispense 1 kit, Disp: 1 kit, Rfl: 0    calcium carbonate (OS-MELODY) 600 MG tablet, Take 600 mg by mouth 2 (two) times a day with meals, Disp: , Rfl:     Diclofenac Sodium (VOLTAREN) 1 %, Apply 2 g topically 4 (four) times a day, Disp: 100 g, Rfl: 0    estradiol (ESTRACE VAGINAL) 0.1 mg/g vaginal cream, Insert 1 g into the vagina 3 (three) times a week, Disp: 42.5 g, Rfl: 1    fluticasone (FLONASE) 50 mcg/act nasal spray, 1 spray into each nostril daily, Disp: 15.8 mL, Rfl: 0    glucose blood (FREESTYLE LITE) test strip, Test once daily, Disp: 100 each, Rfl: 1    lamoTRIgine (LaMICtal) 150 MG tablet, Take 1 tablet (150 mg total) by mouth 2 (two) times a day, Disp: 180 tablet, Rfl: 3    Lancets (freestyle) lancets, Use as instructed, once daily, Disp: 100 each, Rfl: 1    lidocaine (Lidoderm) 5 %, Apply 1 patch topically over 12 hours daily Remove & Discard patch within 12 hours or as directed by MD, Disp: 30 patch, Rfl: 5    Mirabegron ER 25 MG TB24, Take 25 mg by mouth in the morning, Disp: 30 tablet, Rfl: 3    mirtazapine (REMERON) 15 mg tablet, Take 1 tablet (15 mg total) by mouth daily at bedtime, Disp: 90 tablet, Rfl: 1    montelukast (SINGULAIR) 10 mg tablet, Take 1 tablet (10 mg total) by mouth daily at bedtime, Disp: 90 tablet, Rfl: 2    Multiple Vitamins-Minerals (MULTI COMPLETE PO), Take by mouth, Disp: , Rfl:     omeprazole (PriLOSEC) 20 mg delayed release  capsule, TAKE ONE CAPSULE BY MOUTH ONCE A DAY., Disp: 90 capsule, Rfl: 1    semaglutide, 1 mg/dose, (Ozempic) 4 mg/3 mL injection pen, Inject 0.75 mL (1 mg total) under the skin once a week, Disp: 9 mL, Rfl: 0    venlafaxine (EFFEXOR) 100 MG tablet, Take 1 tablet (100 mg total) by mouth 3 (three) times a day, Disp: 90 tablet, Rfl: 0    Current Facility-Administered Medications:     bupivacaine (PF) (MARCAINE) 0.25 % injection 2 mL, 2 mL, Epidural, Once, Nino Garvey MD    iohexol (OMNIPAQUE) 300 mg/mL injection 1 mL, 1 mL, Epidural, Once, Nino Garvey MD    lidocaine (PF) (XYLOCAINE-MPF) 2 % injection 4 mL, 4 mL, Infiltration, Once, Nino Garvey MD    methylPREDNISolone acetate (DEPO-MEDROL) injection 80 mg, 80 mg, Epidural, Once, Nino Garvey MD    sodium chloride (PF) 0.9 % injection 4 mL, 4 mL, Infiltration, Once, Nino Garvey MD    Allergies   Allergen Reactions    Ibuprofen Other (See Comments)     Due to gastric sleeve -can only take for 5 days, then needs to stop       Physical Exam:   Vitals:    02/07/25 0854   BP: 99/69   Pulse: 84   Resp: 18   Temp: 97.7 °F (36.5 °C)   SpO2: 97%     General: Awake, Alert, Oriented x 3, Mood and affect appropriate  Respiratory: Respirations even and unlabored  Cardiovascular: Peripheral pulses intact; no edema  Musculoskeletal Exam: Lower back pain    ASA Score: 3    Patient/Chart Verification  Patient ID Verified: Verbal  ID Band Applied: No  H&P( within 30 days) Verified: To be obtained in the Procedural area  Interval H&P(within 24 hr) Complete (required for Outpatients and Surgery Admit only): To be obtained in the Procedural area  Allergies Reviewed: Yes  Anticoag/NSAID held?: NA  Currently on antibiotics?: No    Assessment:   1. Lumbar spondylosis        Plan: B/L L5 TFESI

## 2025-02-07 NOTE — DISCHARGE INSTR - LAB
Epidural Steroid Injection   WHAT YOU NEED TO KNOW:   An epidural steroid injection (JESSICA) is a procedure to inject steroid medicine into the epidural space. The epidural space is between your spinal cord and vertebrae. Steroids reduce inflammation and fluid buildup in your spine that may be causing pain. You may be given pain medicine along with the steroids.          ACTIVITY  Do not drive or operate machinery today.  No strenuous activity today - bending, lifting, etc.  You may resume normal activites starting tomorrow - start slowly and as tolerated.  You may shower today, but no tub baths or hot tubs.  You may have numbness for several hours from the local anesthetic. Please use caution and common sense, especially with weight-bearing activities.    CARE OF THE INJECTION SITE  If you have soreness or pain, apply ice to the area today (20 minutes on/20 minutes off).  Starting tomorrow, you may use warm, moist heat or ice if needed.  You may have an increase or change in your discomfort for 36-48 hours after your treatment.  Apply ice and continue with any pain medication you have been prescribed.  Notify the [facility] if you have any of the following: redness, drainage, swelling, headache, stiff neck or fever above 100°F.    SPECIAL INSTRUCTIONS  Our office will contact you in approximately 14 days for a progress report.    MEDICATIONS  Continue to take all routine medications.  Our office may have instructed you to hold some medications.    As no general anesthesia was used in today's procedure, you should not experience any side effects related to anesthesia.     If you are diabetic, the steroids used in today's injection may temporarily increase your blood sugar levels after the first few days after your injection. Please keep a close eye on your sugars and alert the doctor who manages your diabetes if your sugars are significantly high from your baseline or you are symptomatic.     If you have a  problem specifically related to your procedure, please call our office at [phone].  Problems not related to your procedure should be directed to your primary care physician.

## 2025-02-11 ENCOUNTER — TELEPHONE (OUTPATIENT)
Age: 55
End: 2025-02-11

## 2025-02-11 DIAGNOSIS — R39.9 UTI SYMPTOMS: ICD-10-CM

## 2025-02-11 DIAGNOSIS — Z48.815 ENCOUNTER FOR SURGICAL AFTERCARE FOLLOWING SURGERY OF DIGESTIVE SYSTEM: ICD-10-CM

## 2025-02-11 DIAGNOSIS — Z98.84 BARIATRIC SURGERY STATUS: ICD-10-CM

## 2025-02-11 DIAGNOSIS — E11.9 CONTROLLED TYPE 2 DIABETES MELLITUS WITHOUT COMPLICATION, WITHOUT LONG-TERM CURRENT USE OF INSULIN (HCC): ICD-10-CM

## 2025-02-11 NOTE — TELEPHONE ENCOUNTER
Patient is calling regarding cancelling an appointment.    Date/Time: 2/11 @ 5pm    Reason: pt    Patient was rescheduled: YES [] NO [x]  If yes, when was Patient reschedule for:     Patient requesting call back to reschedule: YES [] NO [x]

## 2025-02-12 RX ORDER — ESTRADIOL 0.1 MG/G
1 CREAM VAGINAL 3 TIMES WEEKLY
Qty: 42.5 G | Refills: 0 | OUTPATIENT
Start: 2025-02-12

## 2025-02-12 NOTE — TELEPHONE ENCOUNTER
Changed context and re route to Urology pod   
Gen: No F/C/NS  Head: No falls   Eyes: No changes in vision   Resp: +cough or trouble breathing  Cardiovascular: No chest pain    Gastroenteric: No N/V/D  :  No change in urine output   MS: No joint or muscle pain  Neuro: No headache   Skin: No new rash

## 2025-02-17 DIAGNOSIS — M51.16 INTERVERTEBRAL DISC DISORDER WITH RADICULOPATHY OF LUMBAR REGION: Primary | ICD-10-CM

## 2025-02-17 RX ORDER — METHYLPREDNISOLONE 4 MG/1
TABLET ORAL
Qty: 1 EACH | Refills: 0 | Status: SHIPPED | OUTPATIENT
Start: 2025-02-17

## 2025-02-21 ENCOUNTER — TELEPHONE (OUTPATIENT)
Dept: PAIN MEDICINE | Facility: CLINIC | Age: 55
End: 2025-02-21

## 2025-03-03 ENCOUNTER — HOSPITAL ENCOUNTER (OUTPATIENT)
Dept: RADIOLOGY | Facility: HOSPITAL | Age: 55
Discharge: HOME | End: 2025-03-03
Attending: ANESTHESIOLOGY
Payer: COMMERCIAL

## 2025-03-03 DIAGNOSIS — M25.551 RIGHT HIP PAIN: ICD-10-CM

## 2025-03-03 PROCEDURE — 73521 X-RAY EXAM HIPS BI 2 VIEWS: CPT

## 2025-03-04 ENCOUNTER — RESULTS FOLLOW-UP (OUTPATIENT)
Dept: CARDIOLOGY CLINIC | Facility: CLINIC | Age: 55
End: 2025-03-04

## 2025-03-04 ENCOUNTER — REMOTE DEVICE CLINIC VISIT (OUTPATIENT)
Dept: CARDIOLOGY CLINIC | Facility: CLINIC | Age: 55
End: 2025-03-04
Payer: COMMERCIAL

## 2025-03-04 DIAGNOSIS — I47.10 PAROXYSMAL SVT (SUPRAVENTRICULAR TACHYCARDIA) (HCC): Primary | ICD-10-CM

## 2025-03-04 PROCEDURE — 93298 REM INTERROG DEV EVAL SCRMS: CPT | Performed by: INTERNAL MEDICINE

## 2025-03-04 NOTE — PROGRESS NOTES
"MDT LNQ22/ ACTIVE SYSTEM IS MRI CONDITIONAL   CARELINK TRANSMISSION: LOOP RECORDER. PRESENTING  RHYTHM VS @ 67 BPM. BATTERY STATUS \"OK.\" NO PATIENT OR DEVICE ACTIVATED EPISODES. NORMAL DEVICE FUNCTION. DL   "

## 2025-03-05 ENCOUNTER — TELEPHONE (OUTPATIENT)
Dept: RADIOLOGY | Facility: CLINIC | Age: 55
End: 2025-03-05

## 2025-03-05 NOTE — TELEPHONE ENCOUNTER
Discussed x-ray hip results with patient's which shows some degeneration and left greater than right medial narrowing.  Recommend proceeding with left hip intra-articular steroid injection which she would like to try.  Please have her scheduled for tomorrow fluoroscopic guidance

## 2025-03-06 ENCOUNTER — HOSPITAL ENCOUNTER (OUTPATIENT)
Dept: RADIOLOGY | Facility: CLINIC | Age: 55
End: 2025-03-06
Payer: COMMERCIAL

## 2025-03-06 VITALS
HEART RATE: 76 BPM | SYSTOLIC BLOOD PRESSURE: 110 MMHG | OXYGEN SATURATION: 98 % | RESPIRATION RATE: 20 BRPM | TEMPERATURE: 97.7 F | DIASTOLIC BLOOD PRESSURE: 71 MMHG

## 2025-03-06 DIAGNOSIS — M25.552 LEFT HIP PAIN: ICD-10-CM

## 2025-03-06 PROCEDURE — 77002 NEEDLE LOCALIZATION BY XRAY: CPT | Performed by: ANESTHESIOLOGY

## 2025-03-06 PROCEDURE — 77002 NEEDLE LOCALIZATION BY XRAY: CPT

## 2025-03-06 PROCEDURE — 20610 DRAIN/INJ JOINT/BURSA W/O US: CPT | Performed by: ANESTHESIOLOGY

## 2025-03-06 RX ORDER — 0.9 % SODIUM CHLORIDE 0.9 %
2 VIAL (ML) INJECTION ONCE
Status: COMPLETED | OUTPATIENT
Start: 2025-03-06 | End: 2025-03-06

## 2025-03-06 RX ORDER — METHYLPREDNISOLONE ACETATE 80 MG/ML
80 INJECTION, SUSPENSION INTRA-ARTICULAR; INTRALESIONAL; INTRAMUSCULAR; PARENTERAL; SOFT TISSUE ONCE
Status: COMPLETED | OUTPATIENT
Start: 2025-03-06 | End: 2025-03-06

## 2025-03-06 RX ORDER — ROPIVACAINE HYDROCHLORIDE 2 MG/ML
3 INJECTION, SOLUTION EPIDURAL; INFILTRATION; PERINEURAL ONCE
Status: COMPLETED | OUTPATIENT
Start: 2025-03-06 | End: 2025-03-06

## 2025-03-06 RX ADMIN — IOHEXOL 1 ML: 300 INJECTION, SOLUTION INTRAVENOUS at 10:01

## 2025-03-06 RX ADMIN — LIDOCAINE HYDROCHLORIDE 2 ML: 20 INJECTION, SOLUTION EPIDURAL; INFILTRATION; INTRACAUDAL at 10:00

## 2025-03-06 RX ADMIN — SODIUM CHLORIDE 2 ML: 9 INJECTION INTRAMUSCULAR; INTRAVENOUS; SUBCUTANEOUS at 10:00

## 2025-03-06 RX ADMIN — ROPIVACAINE HYDROCHLORIDE 3 ML: 2 INJECTION, SOLUTION EPIDURAL; INFILTRATION at 10:01

## 2025-03-06 RX ADMIN — METHYLPREDNISOLONE ACETATE 80 MG: 80 INJECTION, SUSPENSION INTRA-ARTICULAR; INTRALESIONAL; INTRAMUSCULAR; SOFT TISSUE at 10:01

## 2025-03-06 NOTE — DISCHARGE INSTR - LAB

## 2025-03-06 NOTE — H&P
History of Present Illness: The patient is a 54 y.o. female who presents with complaints of left hip pain is here today for left hip injection    Past Medical History:   Diagnosis Date    Acute right ankle pain 2023    ADHD (attention deficit hyperactivity disorder)     Allergic     Anxiety     Bipolar disorder ([facility])     Bulging lumbar disc     Carpal tunnel syndrome     RIGHT.  LAST ASSESSED: 16    Chronic back pain     low    Chronic pain disorder     Colon polyps     COVID-19     2021    Depression     Diabetes mellitus ([facility])     GERD (gastroesophageal reflux disease)     Gestational diabetes     Hearing loss     left ear    Heart disease     SVT    History of pre-eclampsia     Hyperlipidemia     Resolved with weight loss    Hyponatremia 2020    IBS (irritable bowel syndrome)     Ileus ([facility])     LAST ASSESSED: 8/3/17    Labial cyst     LAST ASSESSED: 16    Memory loss     Myofascial pain     LAST ASSESSED: 16    Neck mass 2023    Obesity     Ovarian cyst     LEFT. LAST ASSESSED: 16    Panic attack     Pneumonia     Sacroiliitis ([facility])     Seasonal allergies     Sprain of anterior talofibular ligament of right ankle 2023    SVT (supraventricular tachycardia) ([facility])     Thoracic outlet syndrome     2010    Trochanteric bursitis of both hips     LAST ASSESSED: 3/18/16    Ulnar neuropathy at elbow     Varicella     Wears glasses        Past Surgical History:   Procedure Laterality Date    BILE DUCT EXPLORATION      ENDOSCOPIC REMOVAL OF STONES FROM BILIARY TRACT    CARDIAC ELECTROPHYSIOLOGY PROCEDURE N/A 2024    Procedure: Cardiac eps/svt ablation;  Surgeon: Daquan Heath MD;  [facility];  Service: Cardiology     SECTION      x3    CHOLECYSTECTOMY      COLONOSCOPY      2017-polyp, -wnl, repeat5 years    DILATION AND CURETTAGE OF UTERUS      ENDOMETRIAL ABLATION      ERCP W/ SPHICTEROTOMY      FIRST RIB REMOVAL      THORAX EXCISION OF  FIRST RIB    NEUROPLASTY / TRANSPOSITION ULNAR NERVE AT ELBOW Right 2011    VT COLONOSCOPY FLX DX W/COLLJ SPEC WHEN PFRMD N/A 05/30/2017    Procedure: COLONOSCOPY;  Surgeon: Oscar Velázquez MD;  [facility];  Service: Gastroenterology    VT ESOPHAGOGASTRODUODENOSCOPY TRANSORAL DIAGNOSTIC N/A 09/14/2017    Procedure: ESOPHAGOGASTRODUODENOSCOPY (EGD);  Surgeon: Sadiq Car MD;  [facility]: [facility];  Service: Gastroenterology    VT HYSTEROSCOPY BX ENDOMETRIUM&/POLYPC W/WO D&C N/A 10/20/2017    Procedure: DILATATION AND CURETTAGE (D&C) WITH HYSTEROSCOPY  REMOVAL VULVAR RT. LESION;  Surgeon: Lucila Ortiz MD;  [facility];  Service: Gynecology    VT ALDANA IMPLTJ NSTIM ELTRDS PLATE/PADDLE EDRL Left 01/29/2020    Procedure: Insertion of thoracic spinal cord stimulator electrode via laminotomy and placement of left buttock implantable pulse generator (NEUROMONITORING);  Surgeon: Ad Mehta MD;  [facility];  Service: Neurosurgery    VT LAPS GSTRC RSTRICTIV PX LONGITUDINAL GASTRECTOMY N/A 02/06/2018    Procedure: GASTRECTOMY SLEEVE LAPAROSCOPIC; INTRAOPERATIVE EGD ;  Surgeon: Abimael Juarez MD;  [facility];  Service: Bariatrics    VT LAPS GSTRC RSTRICTIV PX LONGITUDINAL GASTRECTOMY N/A 08/08/2022    Procedure: Diagnostic Lap; Extensive Lysis of Adhesions; LAPAROSCOPIC REVISION CONVERSION TO NOE-EN-Y GASTRIC BYPASS AND INTRAOPERATIVE EGD;  Surgeon: Abimael Juarez MD;  [facility];  Service: Bariatrics    VT NEUROPLASTY &/TRANSPOSITION ULNAR NERVE ELBOW Left 07/19/2024    Procedure: RELEASE CUBITAL TUNNEL- left with ulnar nerve transposition;  Surgeon: Maggy Leary DO;  [facility];  Service: Orthopedics    SLEEVE GASTROPLASTY      SPINAL CORD STIMULATOR TRIAL W/ LAMINOTOMY      TONSILLECTOMY AND ADENOIDECTOMY      TUBAL LIGATION      UPPER GASTROINTESTINAL ENDOSCOPY      US GUIDED LYMPH NODE BIOPSY LEFT  05/22/2023    VEIN LIGATION AND STRIPPING Right     VULVA SURGERY   10/20/2017    BIOPSY    WISDOM TOOTH EXTRACTION           Current Outpatient Medications:     albuterol (Ventolin HFA) 90 mcg/act inhaler, Inhale 2 puffs every 6 (six) hours as needed for wheezing, Disp: 6.7 g, Rfl: 1    atoMOXetine (STRATTERA) 60 mg capsule, Take 1 capsule (60 mg total) by mouth daily, Disp: 90 capsule, Rfl: 3    atorvastatin (LIPITOR) 20 mg tablet, Take 1 tablet (20 mg total) by mouth daily, Disp: 100 tablet, Rfl: 1    Blood Glucose Monitoring Suppl (FreeStyle Freedom Lite) w/Device KIT, Please dispense 1 kit, Disp: 1 kit, Rfl: 0    calcium carbonate (OS-MELODY) 600 MG tablet, Take 600 mg by mouth 2 (two) times a day with meals, Disp: , Rfl:     Diclofenac Sodium (VOLTAREN) 1 %, Apply 2 g topically 4 (four) times a day, Disp: 100 g, Rfl: 0    estradiol (ESTRACE VAGINAL) 0.1 mg/g vaginal cream, Insert 1 g into the vagina 3 (three) times a week, Disp: 42.5 g, Rfl: 1    fluticasone (FLONASE) 50 mcg/act nasal spray, 1 spray into each nostril daily, Disp: 15.8 mL, Rfl: 0    glucose blood (FREESTYLE LITE) test strip, Test once daily, Disp: 100 each, Rfl: 1    lamoTRIgine (LaMICtal) 150 MG tablet, Take 1 tablet (150 mg total) by mouth 2 (two) times a day, Disp: 180 tablet, Rfl: 3    Lancets (freestyle) lancets, Use as instructed, once daily, Disp: 100 each, Rfl: 1    lidocaine (Lidoderm) 5 %, Apply 1 patch topically over 12 hours daily Remove & Discard patch within 12 hours or as directed by MD, Disp: 30 patch, Rfl: 5    methylPREDNISolone 4 MG tablet therapy pack, Use as directed on package, Disp: 1 each, Rfl: 0    Mirabegron ER 25 MG TB24, Take 25 mg by mouth in the morning, Disp: 30 tablet, Rfl: 3    mirtazapine (REMERON) 15 mg tablet, Take 1 tablet (15 mg total) by mouth daily at bedtime, Disp: 90 tablet, Rfl: 1    montelukast (SINGULAIR) 10 mg tablet, Take 1 tablet (10 mg total) by mouth daily at bedtime, Disp: 90 tablet, Rfl: 2    Multiple Vitamins-Minerals (MULTI COMPLETE PO), Take by mouth, Disp: ,  Rfl:     omeprazole (PriLOSEC) 20 mg delayed release capsule, Take 1 capsule (20 mg total) by mouth daily, Disp: 90 capsule, Rfl: 1    semaglutide, 1 mg/dose, (Ozempic) 4 mg/3 mL injection pen, Inject 0.75 mL (1 mg total) under the skin once a week, Disp: 9 mL, Rfl: 1    venlafaxine (EFFEXOR) 100 MG tablet, Take 1 tablet (100 mg total) by mouth 3 (three) times a day, Disp: 90 tablet, Rfl: 0    Current Facility-Administered Medications:     iohexol (OMNIPAQUE) 300 mg/mL injection 1 mL, 1 mL, Intra-articular, Once, Nino Garvey MD    lidocaine (PF) (XYLOCAINE-MPF) 2 % injection 2 mL, 2 mL, Infiltration, Once, Nino Garvey MD    methylPREDNISolone acetate (DEPO-MEDROL) injection 80 mg, 80 mg, Intra-articular, Once, Nino Garvey MD    ropivacaine (NAROPIN) injection 3 mL, 3 mL, Intra-articular, Once, Nino Garvey MD    sodium chloride (PF) 0.9 % injection 2 mL, 2 mL, Infiltration, Once, Nino Garvey MD    Allergies   Allergen Reactions    Ibuprofen Other (See Comments)     Due to gastric sleeve -can only take for 5 days, then needs to stop       Physical Exam:   Vitals:    03/06/25 0946   BP: 119/74   Pulse: 87   Resp: 20   Temp: 97.7 °F (36.5 °C)   SpO2: 98%     General: Awake, Alert, Oriented x 3, Mood and affect appropriate  Respiratory: Respirations even and unlabored  Cardiovascular: Peripheral pulses intact; no edema  Musculoskeletal Exam: Left hip pain    ASA Score: 3    Patient/Chart Verification  Patient ID Verified: Verbal  ID Band Applied: No  Consents Confirmed: To be obtained in the Procedural area  Interval H&P(within 24 hr) Complete (required for Outpatients and Surgery Admit only): To be obtained in the Procedural area  Allergies Reviewed: Yes  Anticoag/NSAID held?: NA  Currently on antibiotics?: No    Assessment:   1. Left hip pain        Plan: left hip intra-articular steroid injection

## 2025-03-17 ENCOUNTER — OFFICE VISIT (OUTPATIENT)
Age: 55
End: 2025-03-17
Payer: COMMERCIAL

## 2025-03-17 VITALS
SYSTOLIC BLOOD PRESSURE: 92 MMHG | HEART RATE: 68 BPM | HEIGHT: 66 IN | WEIGHT: 145 LBS | DIASTOLIC BLOOD PRESSURE: 62 MMHG | BODY MASS INDEX: 23.3 KG/M2

## 2025-03-17 DIAGNOSIS — M24.552 HIP FLEXOR TENDON TIGHTNESS, LEFT: ICD-10-CM

## 2025-03-17 DIAGNOSIS — M99.02 SEGMENTAL DYSFUNCTION OF THORACIC REGION: ICD-10-CM

## 2025-03-17 DIAGNOSIS — M62.838 MUSCLE SPASM: ICD-10-CM

## 2025-03-17 DIAGNOSIS — M99.05 SOMATIC DYSFUNCTION OF HIP REGION: ICD-10-CM

## 2025-03-17 DIAGNOSIS — M99.04 SEGMENTAL DYSFUNCTION OF SACRAL REGION: ICD-10-CM

## 2025-03-17 DIAGNOSIS — M51.9 LUMBAR DISC DISORDER: Primary | ICD-10-CM

## 2025-03-17 DIAGNOSIS — M99.03 SEGMENTAL DYSFUNCTION OF LUMBAR REGION: ICD-10-CM

## 2025-03-17 PROCEDURE — 98941 CHIROPRACT MANJ 3-4 REGIONS: CPT | Performed by: CHIROPRACTOR

## 2025-03-17 PROCEDURE — 97110 THERAPEUTIC EXERCISES: CPT | Performed by: CHIROPRACTOR

## 2025-03-17 PROCEDURE — 99203 OFFICE O/P NEW LOW 30 MIN: CPT | Performed by: CHIROPRACTOR

## 2025-03-17 NOTE — Clinical Note
March 19, 2025     Referral Self    Patient: Christina Lara   YOB: 1970   Date of Visit: 3/17/2025       Dear  Self:    Thank you for referring Christina Lara to me for evaluation. Below are my notes for this consultation.    If you have questions, please do not hesitate to call me. I look forward to following your patient along with you.         Sincerely,        Naif Glover DC        CC: No Recipients  Naif Glover DC  3/19/2025 10:51 AM  Sign when Signing Visit  Initial date of service: 3/17/25    Diagnoses and all orders for this visit:    Lumbar disc disorder    Segmental dysfunction of sacral region    Muscle spasm    Hip flexor tendon tightness, left    Somatic dysfunction of hip region    Segmental dysfunction of lumbar region    Segmental dysfunction of thoracic region    No red flags, radiculopathy or neurologic deficit appreciated clinically. Pt's symptoms and exam findings consistent with mechanical back/hip pain secondary to repetitive st/sp injury of discogenic and myofascial origin, exacerbated by postural/ergonomic stressors, core/glute deconditioning and L hip dynamic instability   Pt responded well to extension biased stretches and manual mobilization of the affected spinal and myofascial tissues with increased ROM; trial of conservative tx recommended consisting of Jeannette extension biased exercises, IASTM, graded mobilization/manipulation of the affected myofascial jt dysfunction, postural/ergonomic education and take home stretches/exercises.   If symptoms fail to centralize or neurologic deficit presents/progresses, appropriate imaging and referral will be coordinated.  Spent greater than 30 min c pt discussing hx, pe, ddx, tx options and reviewing intake notes/imaging today    TREATMENT: 20183, [id number]  Fear avoidance behavior discussion; encouraged and reassured pt that natural course of condition is to improve over time with adherence to tx plan and home care strategies.  "Home care recommendations: walk (but avoid hills/trails), gradual return to activity to tolerance (avoid anything that peripheralizes symptoms), call if symptoms peripheralize, worsen, or neurologic deficit progresses. Therapeutic Procedures: IASTM to affected mm hypertonicities (discussed soreness/ecchymosis up to 36 hrs post procedure); prone on elbows, prone push-ups at shoulders, standing lumbopelvic extension, hip flexor ischemic compression, alternating prone hip extension, glute bridge, transitional mvmt education, abdominal bracing; greater than 15 spent on above mentioned ther-ex designed to improve ROM/flexibility. Thoracic mobilization/manipulation: prone P-A mob, supine A-P manip; Lumbar mobilization/manipulation: extension-traction; SIJ Manipulation/Mobilization: R/L SIJ HVLA - long axis distraction    HPI  Christina Lara is a 54 y.o. female  Chief Complaint   Patient presents with    Back Pain     Lower lumbar pain that radiates down both hips . Patient states hip is \"popping in and out\"    Patient states constant dull  pain. Pain score 7-9         Pt presents for eval and tx for chronic back/hip pain.pt has spinal stimulator that controls her LE pain. Pt undergoes lumbar and hip injections with benefit; Dr Garvey. 2024 MRI demonstrates L5-S1: mild broad-based right foraminal protrusion contributes to mild right foraminal narrowing similar to prior study. 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 The quality of the pain is described as aching and stabbing. The pain is Worse during the day. The symptoms are aggravated by bending, sitting and standing (laying left side, transitonal mvmts; palliative includes sitting with lumbar support, walking too a point, mm relaxants, moist heat). Pertinent negatives include no bowel incontinence or paresthesias. Risk factors include lack of exercise.     Past " Medical History:   Diagnosis Date    Acute right ankle pain 2023    ADHD (attention deficit hyperactivity disorder)     Allergic     Anxiety     Bipolar disorder ([facility])     Bulging lumbar disc     Carpal tunnel syndrome     RIGHT.  LAST ASSESSED: 16    Chronic back pain     low    Chronic pain disorder     Colon polyps     COVID-19     2021    Depression     Diabetes mellitus ([facility])     GERD (gastroesophageal reflux disease)     Gestational diabetes     Hearing loss     left ear    Heart disease     SVT    History of pre-eclampsia     Hyperlipidemia     Resolved with weight loss    Hyponatremia 2020    IBS (irritable bowel syndrome)     Ileus ([facility])     LAST ASSESSED: 8/3/17    Labial cyst     LAST ASSESSED: 16    Memory loss     Myofascial pain     LAST ASSESSED: 16    Neck mass 2023    Obesity     Ovarian cyst     LEFT. LAST ASSESSED: 16    Panic attack     Pneumonia     Sacroiliitis ([facility])     Seasonal allergies     Sprain of anterior talofibular ligament of right ankle 2023    SVT (supraventricular tachycardia) ([facility])     Thoracic outlet syndrome         Trochanteric bursitis of both hips     LAST ASSESSED: 3/18/16    Ulnar neuropathy at elbow     Varicella     Wears glasses       Past Surgical History:   Procedure Laterality Date    BILE DUCT EXPLORATION      ENDOSCOPIC REMOVAL OF STONES FROM BILIARY TRACT    CARDIAC ELECTROPHYSIOLOGY PROCEDURE N/A 2024    Procedure: Cardiac eps/svt ablation;  Surgeon: Daquan Heath MD;  [facility];  Service: Cardiology     SECTION      x3    CHOLECYSTECTOMY      COLONOSCOPY      -polyp, -wnl, repeat5 years    DILATION AND CURETTAGE OF UTERUS      ENDOMETRIAL ABLATION      ERCP W/ SPHICTEROTOMY      FIRST RIB REMOVAL      THORAX EXCISION OF FIRST RIB    NEUROPLASTY / TRANSPOSITION ULNAR NERVE AT ELBOW Right     NH COLONOSCOPY FLX DX W/COLLJ SPEC WHEN PFRMD N/A 2017    Procedure:  COLONOSCOPY;  Surgeon: Oscar Velázquez MD;  [facility];  Service: Gastroenterology    TX ESOPHAGOGASTRODUODENOSCOPY TRANSORAL DIAGNOSTIC N/A 09/14/2017    Procedure: ESOPHAGOGASTRODUODENOSCOPY (EGD);  Surgeon: Sadiq Car MD;  [facility];  Service: Gastroenterology    TX HYSTEROSCOPY BX ENDOMETRIUM&/POLYPC W/WO D&C N/A 10/20/2017    Procedure: DILATATION AND CURETTAGE (D&C) WITH HYSTEROSCOPY  REMOVAL VULVAR RT. LESION;  Surgeon: Lucila Ortiz MD;  [facility];  Service: Gynecology    TX ALDANA IMPLTJ NSTIM ELTRDS PLATE/PADDLE EDRL Left 01/29/2020    Procedure: Insertion of thoracic spinal cord stimulator electrode via laminotomy and placement of left buttock implantable pulse generator (NEUROMONITORING);  Surgeon: Ad Mehta MD;  [facility];  Service: Neurosurgery    TX LAPS GSTRC RSTRICTIV PX LONGITUDINAL GASTRECTOMY N/A 02/06/2018    Procedure: GASTRECTOMY SLEEVE LAPAROSCOPIC; INTRAOPERATIVE EGD ;  Surgeon: Abimael Juarez MD;  [facility];  Service: Bariatrics    TX LAPS GSTRC RSTRICTIV PX LONGITUDINAL GASTRECTOMY N/A 08/08/2022    Procedure: Diagnostic Lap; Extensive Lysis of Adhesions; LAPAROSCOPIC REVISION CONVERSION TO NOE-EN-Y GASTRIC BYPASS AND INTRAOPERATIVE EGD;  Surgeon: Abimael Juarez MD;  [facility];  Service: Bariatrics    TX NEUROPLASTY &/TRANSPOSITION ULNAR NERVE ELBOW Left 07/19/2024    Procedure: RELEASE CUBITAL TUNNEL- left with ulnar nerve transposition;  Surgeon: Maggy Leary DO;  [facility];  Service: Orthopedics    SLEEVE GASTROPLASTY      SPINAL CORD STIMULATOR TRIAL W/ LAMINOTOMY      TONSILLECTOMY AND ADENOIDECTOMY      TUBAL LIGATION      UPPER GASTROINTESTINAL ENDOSCOPY      US GUIDED LYMPH NODE BIOPSY LEFT  05/22/2023    VEIN LIGATION AND STRIPPING Right     VULVA SURGERY  10/20/2017    BIOPSY    WISDOM TOOTH EXTRACTION       The following portions of the patient's history were reviewed and updated as appropriate:  allergies, past family history, past medical history, past social history, past surgical history, and problem list.  Review of Systems   Gastrointestinal:  Negative for bowel incontinence.   Musculoskeletal:  Positive for back pain.   Neurological:  Negative for paresthesias.     Physical Exam  Eyes:      Extraocular Movements: Extraocular movements intact.   Cardiovascular:      Pulses: Normal pulses.   Abdominal:      General: There is no distension.      Tenderness: There is no abdominal tenderness.   Musculoskeletal:      Thoracic back: Spasms and tenderness present. Decreased range of motion.      Lumbar back: Spasms and tenderness present. Decreased range of motion. Negative right straight leg raise test and negative left straight leg raise test.        Back:       Comments: L/S Pnful and limited in Lrot, Ext. B hip abduction   Skin:     General: Skin is warm and dry.   Neurological:      Mental Status: She is alert and oriented to person, place, and time.      Cranial Nerves: Cranial nerves 2-12 are intact.      Sensory: Sensation is intact.      Motor: Motor function is intact.      Coordination: Coordination is intact.      Gait: Gait is intact.      Deep Tendon Reflexes: Babinski sign absent on the right side. Babinski sign absent on the left side.      Reflex Scores:       Patellar reflexes are 2+ on the right side and 2+ on the left side.       Achilles reflexes are 2+ on the right side and 2+ on the left side.  Psychiatric:         Mood and Affect: Mood normal.         Behavior: Behavior normal.     SOFT TISSUE ASSESSMENT: Hypertonicity and tenderness palpated B T10-S1 erector spinae, hip flexor, glute med/min JOINT RESTRICTIONS: T10-S1 and R/L SIJ ORTHO: SI jt point tenderness: +; Jeannette repeated flexion  does not peripheralize, extension centralizes; justina, iliac compression, thigh thrust elicit stiffness in R/L SIJ; prone femoral nerve stretch neg for upper lumbar neural tension, elicits R/L SIJ  stiffness; sitting root elicits lbp on R/L; slump test elicits no neural tension into RLE/LLE    Return in about 1 week (around 3/24/2025) for Next scheduled follow up.

## 2025-03-17 NOTE — PROGRESS NOTES
Initial date of service: 3/17/25    Diagnoses and all orders for this visit:    Lumbar disc disorder    Segmental dysfunction of sacral region    Muscle spasm    Hip flexor tendon tightness, left    Somatic dysfunction of hip region    Segmental dysfunction of lumbar region    Segmental dysfunction of thoracic region    No red flags, radiculopathy or neurologic deficit appreciated clinically. Pt's symptoms and exam findings consistent with mechanical back/hip pain secondary to repetitive st/sp injury of discogenic and myofascial origin, exacerbated by postural/ergonomic stressors, core/glute deconditioning and L hip dynamic instability   Pt responded well to extension biased stretches and manual mobilization of the affected spinal and myofascial tissues with increased ROM; trial of conservative tx recommended consisting of Jeannette extension biased exercises, IASTM, graded mobilization/manipulation of the affected myofascial jt dysfunction, postural/ergonomic education and take home stretches/exercises.   If symptoms fail to centralize or neurologic deficit presents/progresses, appropriate imaging and referral will be coordinated.  Spent greater than 30 min c pt discussing hx, pe, ddx, tx options and reviewing intake notes/imaging today    TREATMENT: 31303, [id number]  Fear avoidance behavior discussion; encouraged and reassured pt that natural course of condition is to improve over time with adherence to tx plan and home care strategies. Home care recommendations: walk (but avoid hills/trails), gradual return to activity to tolerance (avoid anything that peripheralizes symptoms), call if symptoms peripheralize, worsen, or neurologic deficit progresses. Therapeutic Procedures: IASTM to affected mm hypertonicities (discussed soreness/ecchymosis up to 36 hrs post procedure); prone on elbows, prone push-ups at shoulders, standing lumbopelvic extension, hip flexor ischemic compression, alternating prone hip extension, glute  "bridge, transitional mvmt education, abdominal bracing; greater than 15 spent on above mentioned ther-ex designed to improve ROM/flexibility. Thoracic mobilization/manipulation: prone P-A mob, supine A-P manip; Lumbar mobilization/manipulation: extension-traction; SIJ Manipulation/Mobilization: R/L SIJ HVLA - long axis distraction    HPI  Christina Lara is a 54 y.o. female  Chief Complaint   Patient presents with    Back Pain     Lower lumbar pain that radiates down both hips . Patient states hip is \"popping in and out\"    Patient states constant dull  pain. Pain score 7-9         Pt presents for eval and tx for chronic back/hip pain.pt has spinal stimulator that controls her LE pain. Pt undergoes lumbar and hip injections with benefit; Dr Garvey. 2024 MRI demonstrates L5-S1: mild broad-based right foraminal protrusion contributes to mild right foraminal narrowing similar to prior study. 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 The quality of the pain is described as aching and stabbing. The pain is Worse during the day. The symptoms are aggravated by bending, sitting and standing (laying left side, transitonal mvmts; palliative includes sitting with lumbar support, walking too a point, mm relaxants, moist heat). Pertinent negatives include no bowel incontinence or paresthesias. Risk factors include lack of exercise.     Past Medical History:   Diagnosis Date    Acute right ankle pain 02/07/2023    ADHD (attention deficit hyperactivity disorder)     Allergic     Anxiety     Bipolar disorder (HCC)     Bulging lumbar disc     Carpal tunnel syndrome     RIGHT.  LAST ASSESSED: 12/7/16    Chronic back pain     low    Chronic pain disorder     Colon polyps     COVID-19     12/2021    Depression     Diabetes mellitus (HCC)     GERD (gastroesophageal reflux disease)     Gestational diabetes     Hearing loss     left " ear    Heart disease     SVT    History of pre-eclampsia     Hyperlipidemia     Resolved with weight loss    Hyponatremia 2020    IBS (irritable bowel syndrome)     Ileus ([facility])     LAST ASSESSED: 8/3/17    Labial cyst     LAST ASSESSED: 16    Memory loss     Myofascial pain     LAST ASSESSED: 16    Neck mass 2023    Obesity     Ovarian cyst     LEFT. LAST ASSESSED: 16    Panic attack     Pneumonia     Sacroiliitis ([facility])     Seasonal allergies     Sprain of anterior talofibular ligament of right ankle 2023    SVT (supraventricular tachycardia) ([facility])     Thoracic outlet syndrome         Trochanteric bursitis of both hips     LAST ASSESSED: 3/18/16    Ulnar neuropathy at elbow     Varicella     Wears glasses       Past Surgical History:   Procedure Laterality Date    BILE DUCT EXPLORATION      ENDOSCOPIC REMOVAL OF STONES FROM BILIARY TRACT    CARDIAC ELECTROPHYSIOLOGY PROCEDURE N/A 2024    Procedure: Cardiac eps/svt ablation;  Surgeon: Daquan Heath MD;  [facility];  Service: Cardiology     SECTION      x3    CHOLECYSTECTOMY      COLONOSCOPY      -polyp, -wnl, repeat5 years    DILATION AND CURETTAGE OF UTERUS      ENDOMETRIAL ABLATION      ERCP W/ SPHICTEROTOMY      FIRST RIB REMOVAL      THORAX EXCISION OF FIRST RIB    NEUROPLASTY / TRANSPOSITION ULNAR NERVE AT ELBOW Right     [place] COLONOSCOPY FLX DX W/COLLJ SPEC WHEN PFRMD N/A 2017    Procedure: COLONOSCOPY;  Surgeon: Oscar Velázquez MD;  [facility];  Service: Gastroenterology    NY ESOPHAGOGASTRODUODENOSCOPY TRANSORAL DIAGNOSTIC N/A 2017    Procedure: ESOPHAGOGASTRODUODENOSCOPY (EGD);  Surgeon: Sadiq Car MD;  [facility]: [facility];  Service: Gastroenterology    [place] HYSTEROSCOPY BX ENDOMETRIUM&/POLYPC W/WO D&C N/A 10/20/2017    Procedure: DILATATION AND CURETTAGE (D&C) WITH HYSTEROSCOPY  REMOVAL VULVAR RT. LESION;  Surgeon: Lucila Ortiz MD;  [facility];   Service: Gynecology    DE ALDANA IMPLTJ NSTIM ELTRDS PLATE/PADDLE EDRL Left 01/29/2020    Procedure: Insertion of thoracic spinal cord stimulator electrode via laminotomy and placement of left buttock implantable pulse generator (NEUROMONITORING);  Surgeon: Ad Mehta MD;  [facility];  Service: Neurosurgery    DE LAPS GSTRC RSTRICTIV PX LONGITUDINAL GASTRECTOMY N/A 02/06/2018    Procedure: GASTRECTOMY SLEEVE LAPAROSCOPIC; INTRAOPERATIVE EGD ;  Surgeon: Abimael Juarez MD;  [facility];  Service: Bariatrics    DE LAPS GSTRC RSTRICTIV PX LONGITUDINAL GASTRECTOMY N/A 08/08/2022    Procedure: Diagnostic Lap; Extensive Lysis of Adhesions; LAPAROSCOPIC REVISION CONVERSION TO NOE-EN-Y GASTRIC BYPASS AND INTRAOPERATIVE EGD;  Surgeon: Abimael Juarez MD;  [facility];  Service: Bariatrics    DE NEUROPLASTY &/TRANSPOSITION ULNAR NERVE ELBOW Left 07/19/2024    Procedure: RELEASE CUBITAL TUNNEL- left with ulnar nerve transposition;  Surgeon: Maggy Leary DO;  [facility]: [facility];  Service: Orthopedics    SLEEVE GASTROPLASTY      SPINAL CORD STIMULATOR TRIAL W/ LAMINOTOMY      TONSILLECTOMY AND ADENOIDECTOMY      TUBAL LIGATION      UPPER GASTROINTESTINAL ENDOSCOPY      US GUIDED LYMPH NODE BIOPSY LEFT  05/22/2023    VEIN LIGATION AND STRIPPING Right     VULVA SURGERY  10/20/2017    BIOPSY    WISDOM TOOTH EXTRACTION       The following portions of the patient's history were reviewed and updated as appropriate: allergies, past family history, past medical history, past social history, past surgical history, and problem list.  Review of Systems   Gastrointestinal:  Negative for bowel incontinence.   Musculoskeletal:  Positive for back pain.   Neurological:  Negative for paresthesias.     Physical Exam  Eyes:      Extraocular Movements: Extraocular movements intact.   Cardiovascular:      Pulses: Normal pulses.   Abdominal:      General: There is no distension.      Tenderness: There is no abdominal  tenderness.   Musculoskeletal:      Thoracic back: Spasms and tenderness present. Decreased range of motion.      Lumbar back: Spasms and tenderness present. Decreased range of motion. Negative right straight leg raise test and negative left straight leg raise test.        Back:       Comments: L/S Pnful and limited in Lrot, Ext. B hip abduction   Skin:     General: Skin is warm and dry.   Neurological:      Mental Status: She is alert and oriented to person, place, and time.      Cranial Nerves: Cranial nerves 2-12 are intact.      Sensory: Sensation is intact.      Motor: Motor function is intact.      Coordination: Coordination is intact.      Gait: Gait is intact.      Deep Tendon Reflexes: Babinski sign absent on the right side. Babinski sign absent on the left side.      Reflex Scores:       Patellar reflexes are 2+ on the right side and 2+ on the left side.       Achilles reflexes are 2+ on the right side and 2+ on the left side.  Psychiatric:         Mood and Affect: Mood normal.         Behavior: Behavior normal.     SOFT TISSUE ASSESSMENT: Hypertonicity and tenderness palpated B T10-S1 erector spinae, hip flexor, glute med/min JOINT RESTRICTIONS: T10-S1 and R/L SIJ ORTHO: SI jt point tenderness: +; Jeannette repeated flexion  does not peripheralize, extension centralizes; justina, iliac compression, thigh thrust elicit stiffness in R/L SIJ; prone femoral nerve stretch neg for upper lumbar neural tension, elicits R/L SIJ stiffness; sitting root elicits lbp on R/L; slump test elicits no neural tension into RLE/LLE    Return in about 1 week (around 3/24/2025) for Next scheduled follow up.

## 2025-03-18 DIAGNOSIS — F43.10 POST TRAUMATIC STRESS DISORDER (PTSD): Chronic | ICD-10-CM

## 2025-03-18 DIAGNOSIS — F41.1 GENERALIZED ANXIETY DISORDER: Chronic | ICD-10-CM

## 2025-03-18 DIAGNOSIS — F33.1 MODERATE EPISODE OF RECURRENT MAJOR DEPRESSIVE DISORDER (HCC): Chronic | ICD-10-CM

## 2025-03-18 RX ORDER — VENLAFAXINE 100 MG/1
100 TABLET ORAL 3 TIMES DAILY
Qty: 90 TABLET | Refills: 1 | Status: SHIPPED | OUTPATIENT
Start: 2025-03-18

## 2025-03-20 ENCOUNTER — TELEPHONE (OUTPATIENT)
Dept: RADIOLOGY | Facility: MEDICAL CENTER | Age: 55
End: 2025-03-20

## 2025-03-28 ENCOUNTER — PROCEDURE VISIT (OUTPATIENT)
Age: 55
End: 2025-03-28
Payer: COMMERCIAL

## 2025-03-28 VITALS
HEIGHT: 66 IN | SYSTOLIC BLOOD PRESSURE: 91 MMHG | BODY MASS INDEX: 23.3 KG/M2 | WEIGHT: 145 LBS | DIASTOLIC BLOOD PRESSURE: 67 MMHG | HEART RATE: 86 BPM

## 2025-03-28 DIAGNOSIS — M51.9 LUMBAR DISC DISORDER: Primary | ICD-10-CM

## 2025-03-28 DIAGNOSIS — M99.04 SEGMENTAL DYSFUNCTION OF SACRAL REGION: ICD-10-CM

## 2025-03-28 DIAGNOSIS — M24.552 HIP FLEXOR TENDON TIGHTNESS, LEFT: ICD-10-CM

## 2025-03-28 DIAGNOSIS — M99.03 SEGMENTAL DYSFUNCTION OF LUMBAR REGION: ICD-10-CM

## 2025-03-28 DIAGNOSIS — M99.02 SEGMENTAL DYSFUNCTION OF THORACIC REGION: ICD-10-CM

## 2025-03-28 DIAGNOSIS — M62.838 MUSCLE SPASM: ICD-10-CM

## 2025-03-28 DIAGNOSIS — M99.05 SOMATIC DYSFUNCTION OF HIP REGION: ICD-10-CM

## 2025-03-28 PROCEDURE — 97110 THERAPEUTIC EXERCISES: CPT | Performed by: CHIROPRACTOR

## 2025-03-28 PROCEDURE — 98941 CHIROPRACT MANJ 3-4 REGIONS: CPT | Performed by: CHIROPRACTOR

## 2025-03-28 NOTE — PROGRESS NOTES
Initial date of service: 3/17/25    Diagnoses and all orders for this visit:    Lumbar disc disorder    Segmental dysfunction of sacral region    Muscle spasm    Hip flexor tendon tightness, left    Somatic dysfunction of hip region    Segmental dysfunction of lumbar region    Segmental dysfunction of thoracic region    Mechanical back/hip pain secondary to repetitive st/sp injury of discogenic and myofascial origin, exacerbated by postural/ergonomic stressors, core/glute deconditioning and L hip dynamic instability. F/up 1 wk    TREATMENT: 50743, [id number]  Therapeutic Procedures: IASTM to affected mm hypertonicities (discussed soreness/ecchymosis up to 36 hrs post procedure); prone on elbows, prone push-ups at shoulders, standing lumbopelvic extension, hip flexor ischemic compression, alternating prone hip extension, glute bridge, transitional mvmt education, abdominal bracing; greater than 15 spent on above mentioned ther-ex designed to improve ROM/flexibility. Thoracic mobilization/manipulation: prone P-A mob, supine A-P manip; Lumbar mobilization/manipulation: extension-traction; SIJ Manipulation/Mobilization: R/L SIJ HVLA - long axis distraction    HPI  Christina Lara is a 54 y.o. female  Chief Complaint   Patient presents with    Back Pain     Lower lumbar pain with both hips are feeling better.   Patient states tight  Pain score 6     Pt presents for tx for chronic back/hip pain.pt has spinal stimulator that controls her LE pain. Pt undergoes lumbar and hip injections with benefit; Dr Garvey. 2024 MRI demonstrates L5-S1: mild broad-based right foraminal protrusion contributes to mild right foraminal narrowing similar to prior study. 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 The quality of the pain is described as aching and stabbing. The pain is Worse during the day. The symptoms are aggravated by bending,  sitting and standing (laying left side, transitonal mvmts; palliative includes sitting with lumbar support, walking too a point, mm relaxants, moist heat). Pertinent negatives include no bowel incontinence or paresthesias. Risk factors include lack of exercise.     Past Medical History:   Diagnosis Date    Acute right ankle pain 2023    ADHD (attention deficit hyperactivity disorder)     Allergic     Anxiety     Bipolar disorder ([facility])     Bulging lumbar disc     Carpal tunnel syndrome     RIGHT.  LAST ASSESSED: 16    Chronic back pain     low    Chronic pain disorder     Colon polyps     COVID-19     2021    Depression     Diabetes mellitus ([facility])     GERD (gastroesophageal reflux disease)     Gestational diabetes     Hearing loss     left ear    Heart disease     SVT    History of pre-eclampsia     Hyperlipidemia     Resolved with weight loss    Hyponatremia 2020    IBS (irritable bowel syndrome)     Ileus ([facility])     LAST ASSESSED: 8/3/17    Labial cyst     LAST ASSESSED: 16    Memory loss     Myofascial pain     LAST ASSESSED: 16    Neck mass 2023    Obesity     Ovarian cyst     LEFT. LAST ASSESSED: 16    Panic attack     Pneumonia     Sacroiliitis ([facility])     Seasonal allergies     Sprain of anterior talofibular ligament of right ankle 2023    SVT (supraventricular tachycardia) ([facility])     Thoracic outlet syndrome     2010    Trochanteric bursitis of both hips     LAST ASSESSED: 3/18/16    Ulnar neuropathy at elbow     Varicella     Wears glasses       Past Surgical History:   Procedure Laterality Date    BILE DUCT EXPLORATION      ENDOSCOPIC REMOVAL OF STONES FROM BILIARY TRACT    CARDIAC ELECTROPHYSIOLOGY PROCEDURE N/A 2024    Procedure: Cardiac eps/svt ablation;  Surgeon: Daquan Heath MD;  [facility];  Service: Cardiology     SECTION      x3    CHOLECYSTECTOMY      COLONOSCOPY      -polyp, -wnl, repeat5 years    DILATION AND  CURETTAGE OF UTERUS      ENDOMETRIAL ABLATION      ERCP W/ SPHICTEROTOMY      FIRST RIB REMOVAL      THORAX EXCISION OF FIRST RIB    NEUROPLASTY / TRANSPOSITION ULNAR NERVE AT ELBOW Right 2011    [place] COLONOSCOPY FLX DX W/COLLJ SPEC WHEN PFRMD N/A 05/30/2017    Procedure: COLONOSCOPY;  Surgeon: Oscar Velázquez MD;  [facility];  Service: Gastroenterology    IL ESOPHAGOGASTRODUODENOSCOPY TRANSORAL DIAGNOSTIC N/A 09/14/2017    Procedure: ESOPHAGOGASTRODUODENOSCOPY (EGD);  Surgeon: Sadiq Car MD;  [facility];  Service: Gastroenterology    IL HYSTEROSCOPY BX ENDOMETRIUM&/POLYPC W/WO D&C N/A 10/20/2017    Procedure: DILATATION AND CURETTAGE (D&C) WITH HYSTEROSCOPY  REMOVAL VULVAR RT. LESION;  Surgeon: Lucila Ortiz MD;  [facility];  Service: Gynecology    IL ALDANA IMPLTJ NSTIM ELTRDS PLATE/PADDLE EDRL Left 01/29/2020    Procedure: Insertion of thoracic spinal cord stimulator electrode via laminotomy and placement of left buttock implantable pulse generator (NEUROMONITORING);  Surgeon: Ad Mehta MD;  [facility];  Service: Neurosurgery    IL LAPS GSTRC RSTRICTIV PX LONGITUDINAL GASTRECTOMY N/A 02/06/2018    Procedure: GASTRECTOMY SLEEVE LAPAROSCOPIC; INTRAOPERATIVE EGD ;  Surgeon: Abimael Juarez MD;  [facility];  Service: Bariatrics    IL LAPS GSTRC RSTRICTIV PX LONGITUDINAL GASTRECTOMY N/A 08/08/2022    Procedure: Diagnostic Lap; Extensive Lysis of Adhesions; LAPAROSCOPIC REVISION CONVERSION TO NOE-EN-Y GASTRIC BYPASS AND INTRAOPERATIVE EGD;  Surgeon: Abimael Juarez MD;  [facility];  Service: Bariatrics    IL NEUROPLASTY &/TRANSPOSITION ULNAR NERVE ELBOW Left 07/19/2024    Procedure: RELEASE CUBITAL TUNNEL- left with ulnar nerve transposition;  Surgeon: Maggy Leary DO;  [facility];  Service: Orthopedics    SLEEVE GASTROPLASTY      SPINAL CORD STIMULATOR TRIAL W/ LAMINOTOMY      TONSILLECTOMY AND ADENOIDECTOMY      TUBAL LIGATION      UPPER  GASTROINTESTINAL ENDOSCOPY      US GUIDED LYMPH NODE BIOPSY LEFT  05/22/2023    VEIN LIGATION AND STRIPPING Right     VULVA SURGERY  10/20/2017    BIOPSY    WISDOM TOOTH EXTRACTION       The following portions of the patient's history were reviewed and updated as appropriate: allergies, past family history, past medical history, past social history, past surgical history, and problem list.  Review of Systems   Gastrointestinal:  Negative for bowel incontinence.   Musculoskeletal:  Positive for back pain.   Neurological:  Negative for paresthesias.     Physical Exam  Musculoskeletal:      Thoracic back: Spasms and tenderness present. Decreased range of motion.      Lumbar back: Spasms and tenderness present. Decreased range of motion. Negative right straight leg raise test and negative left straight leg raise test.        Back:       Comments: L/S Pnful and limited in Lrot, Ext. B hip abduction   Neurological:      Mental Status: She is alert and oriented to person, place, and time.      Gait: Gait is intact.   Psychiatric:         Mood and Affect: Mood normal.         Behavior: Behavior normal.     SOFT TISSUE ASSESSMENT: Hypertonicity and tenderness palpated B T10-S1 erector spinae, hip flexor, glute med/min JOINT RESTRICTIONS: T10-S1 and R/L SIJ     Return in about 1 week (around 4/4/2025) for Next scheduled follow up.

## 2025-04-01 ENCOUNTER — OFFICE VISIT (OUTPATIENT)
Dept: DERMATOLOGY | Facility: CLINIC | Age: 55
End: 2025-04-01
Payer: COMMERCIAL

## 2025-04-01 VITALS — TEMPERATURE: 98 F

## 2025-04-01 DIAGNOSIS — D22.71 MULTIPLE BENIGN MELANOCYTIC NEVI OF UPPER AND LOWER EXTREMITIES AND TRUNK: ICD-10-CM

## 2025-04-01 DIAGNOSIS — D22.5 MULTIPLE BENIGN MELANOCYTIC NEVI OF UPPER AND LOWER EXTREMITIES AND TRUNK: ICD-10-CM

## 2025-04-01 DIAGNOSIS — L81.4 SOLAR LENTIGO: ICD-10-CM

## 2025-04-01 DIAGNOSIS — L57.8 OTHER SKIN CHANGES DUE TO CHRONIC EXPOSURE TO NONIONIZING RADIATION: ICD-10-CM

## 2025-04-01 DIAGNOSIS — L82.1 SEBORRHEIC KERATOSIS: ICD-10-CM

## 2025-04-01 DIAGNOSIS — D22.61 MULTIPLE BENIGN MELANOCYTIC NEVI OF UPPER AND LOWER EXTREMITIES AND TRUNK: ICD-10-CM

## 2025-04-01 DIAGNOSIS — D22.62 MULTIPLE BENIGN MELANOCYTIC NEVI OF UPPER AND LOWER EXTREMITIES AND TRUNK: ICD-10-CM

## 2025-04-01 DIAGNOSIS — L64.9 ANDROGENETIC ALOPECIA: Primary | ICD-10-CM

## 2025-04-01 DIAGNOSIS — D22.72 MULTIPLE BENIGN MELANOCYTIC NEVI OF UPPER AND LOWER EXTREMITIES AND TRUNK: ICD-10-CM

## 2025-04-01 DIAGNOSIS — D18.01 CHERRY ANGIOMA: ICD-10-CM

## 2025-04-01 PROCEDURE — 99203 OFFICE O/P NEW LOW 30 MIN: CPT | Performed by: DERMATOLOGY

## 2025-04-01 NOTE — PROGRESS NOTES
"[facility]atology Clinic Note     Patient Name: Christina Lara  Encounter Date: 4/1/25     Have you been cared for by a [facility] Dermatologist in the last 3 years and, if so, which description applies to you?    NO.   I am considered a \"new\" patient and must complete all patient intake questions. I am FEMALE/of child-bearing potential.    REVIEW OF SYSTEMS:  Have you recently had or currently have any of the following? Recent fever or chills? No  Any non-healing wound? No  Are you pregnant or planning to become pregnant? No  Are you currently or planning to be nursing or breast feeding? No   PAST MEDICAL HISTORY:  Have you personally ever had or currently have any of the following?  If \"YES,\" then please provide more detail. Skin cancer (such as Melanoma, Basal Cell Carcinoma, Squamous Cell Carcinoma?  No  Tuberculosis, HIV/AIDS, Hepatitis B or C: No  Radiation Treatment No   HISTORY OF IMMUNOSUPPRESSION:   Do you have a history of any of the following:  Systemic Immunosuppression such as Diabetes, Biologic or Immunotherapy, Chemotherapy, Organ Transplantation, Bone Marrow Transplantation or Prednsione?  YES, Diabetes    Answering \"YES\" requires the addition of the dotphrase \"IMMUNOSUPPRESSED\" as the first diagnosis of the patient's visit.   FAMILY HISTORY:  Any \"first degree relatives\" (parent, brother, sister, or child) with the following?    Skin Cancer, Pancreatic or Other Cancer? YES, Mother unknown skin cancer, Father Pancreatic cancer, uncle; unknown skin cancer   PATIENT EXPERIENCE:    Do you want the Dermatologist to perform a COMPLETE skin exam today including a clinical examination under the \"bra and underwear\" areas?  Yes  If necessary, do we have your permission to call and leave a detailed message on your Preferred Phone number that includes your specific medical information?  Yes      Allergies   Allergen Reactions    Ibuprofen Other (See Comments)     Due to gastric sleeve -can only take for 5 " days, then needs to stop      Current Outpatient Medications:     albuterol (Ventolin HFA) 90 mcg/act inhaler, Inhale 2 puffs every 6 (six) hours as needed for wheezing, Disp: 6.7 g, Rfl: 1    atoMOXetine (STRATTERA) 60 mg capsule, Take 1 capsule (60 mg total) by mouth daily, Disp: 90 capsule, Rfl: 3    atorvastatin (LIPITOR) 20 mg tablet, Take 1 tablet (20 mg total) by mouth daily, Disp: 100 tablet, Rfl: 1    Blood Glucose Monitoring Suppl (FreeStyle Freedom Lite) w/Device KIT, Please dispense 1 kit, Disp: 1 kit, Rfl: 0    calcium carbonate (OS-MELODY) 600 MG tablet, Take 600 mg by mouth 2 (two) times a day with meals, Disp: , Rfl:     Diclofenac Sodium (VOLTAREN) 1 %, Apply 2 g topically 4 (four) times a day, Disp: 100 g, Rfl: 0    estradiol (ESTRACE VAGINAL) 0.1 mg/g vaginal cream, Insert 1 g into the vagina 3 (three) times a week, Disp: 42.5 g, Rfl: 1    fluticasone (FLONASE) 50 mcg/act nasal spray, 1 spray into each nostril daily, Disp: 15.8 mL, Rfl: 0    glucose blood (FREESTYLE LITE) test strip, Test once daily, Disp: 100 each, Rfl: 1    lamoTRIgine (LaMICtal) 150 MG tablet, Take 1 tablet (150 mg total) by mouth 2 (two) times a day, Disp: 180 tablet, Rfl: 3    Lancets (freestyle) lancets, Use as instructed, once daily, Disp: 100 each, Rfl: 1    lidocaine (Lidoderm) 5 %, Apply 1 patch topically over 12 hours daily Remove & Discard patch within 12 hours or as directed by MD, Disp: 30 patch, Rfl: 5    Mirabegron ER 25 MG TB24, Take 25 mg by mouth in the morning, Disp: 30 tablet, Rfl: 3    mirtazapine (REMERON) 15 mg tablet, Take 1 tablet (15 mg total) by mouth daily at bedtime, Disp: 90 tablet, Rfl: 1    montelukast (SINGULAIR) 10 mg tablet, Take 1 tablet (10 mg total) by mouth daily at bedtime, Disp: 90 tablet, Rfl: 2    Multiple Vitamins-Minerals (MULTI COMPLETE PO), Take by mouth, Disp: , Rfl:     omeprazole (PriLOSEC) 20 mg delayed release capsule, Take 1 capsule (20 mg total) by mouth daily, Disp: 90 capsule,  Rfl: 1    semaglutide, 1 mg/dose, (Ozempic) 4 mg/3 mL injection pen, Inject 0.75 mL (1 mg total) under the skin once a week, Disp: 9 mL, Rfl: 1    venlafaxine (EFFEXOR) 100 MG tablet, Take 1 tablet (100 mg total) by mouth 3 (three) times a day, Disp: 90 tablet, Rfl: 1          Whom besides the patient is providing clinical information about today's encounter?   NO ADDITIONAL HISTORIAN (patient alone provided history)    Physical Exam and Assessment/Plan by Diagnosis:    ANDROGENETIC ALOPECIA  Chronic illness with exacerbation, progression or side effects of treatment     Physical Exam:    Well appearing, in no acute distress. Well nourished, well developed. Alert and oriented. A focused skin exam was performed including scalp and hair. The exam was negative except as noted below:     Scalp:   Thinning over the vertex scalp frontal scalp , along hairline   Negative hair pull test  No perifollicular erythema, pustules, scale  No scarred areas   Eyebrows and Eyelashes Intact  Pertinent Positives:  Pertinent Negatives:      FEMALE PATIENT Assessment and Plan:  - History and physical consistent with androgenetic alopecia  - Discussed treatment ladder, including PO and topical minoxidil, PRP, low level laser therapy, supplements, finasteride and dutasteride, hair transplant  - Discussed topical minoxidil compound once daily. Counseled patient that they may see some increased shedding within the first few weeks of treatment, which is a normal side effect and will resolve spontaneously. They may also see mild facial hypertrichosis. Educated that treatment should be continuous to maintain efficacy. Stop if redness, itching develops.   - Discussed PO minoxidil 1.25 mg daily which  has been shown to improve hair growth at low dose. Educated on side effects, including hypertrichosis, hypotension, arrhythmia, lower extremity edema. Educated that we could titrate up to 2.5 mg daily with increased risk of hypertrichosis after one  month if not experiencing any side effects.   - Discussed PO finasteride 5 mg. No personal or family history of breast cancer. Discussed generally well tolerated in post-menopausal women without side effects. Discussed that this can be used in women who are pre-menopausal if they are on OCP or similarly effective method of contraception.   - Discussed spironolactone 100 mg QHS. Educated on side effects including menstrual irregularity, breast tenderness, headaches, GI upset, K+ retention, palpitations, teratogenicity. No history of kidney/liver dysfunction, no strong family history of breast cancer, not currently pregnant or planning for pregnancy  - If patient would like to pursue supplements, recommended [employer] - to start and to purchase it directly from their websites.   - Discussed additional options of light laser therapy and in-office PRP treatment which consists of 3-4 monthly injections followed by maintenance treatments every 3-6 months.   - Educated patient that regrowth may take many months and results are not guaranteed/vary among patients.     - Start [employer] supplement         WHITNEY ANGIOMAS     Physical Exam:  Anatomic Location Affected:  Trunk and extremities  Morphological Description:  Scattered cherry red papules  Denies pain, itch, bleeding. No treatments tried. Present for years. Present constantly; no modifying factors which make it worse or better.     Assessment and Plan:  Based on a thorough discussion of this condition and the management approach to it (including a comprehensive discussion of the known risks, side effects and potential benefits of treatment), the patient (family) agrees to implement the following specific plan:  Reassure benign        SEBORRHEIC KERATOSIS; NON-INFLAMED     Physical Exam:  Anatomic Location Affected:  Trunk and extremities  Morphological Description:  Waxy, smooth to warty textured, yellow to brownish-grey to dark brown to blackish, discrete,  "\"stuck-on\" appearing papules.  Present for years. Denies pain, itch, bleeding.      Additional History of Present Condition:  Present constantly; no modifying factors which make it worse or better. No prior treatment.       Assessment and Plan:  Based on a thorough discussion of this condition and the management approach to it (including a comprehensive discussion of the known risks, side effects and potential benefits of treatment), the patient (family) agrees to implement the following specific plan:  Reassure benign  Use sun protection.  Apply SPF 30 or higher at least three times a day.  Wear sun protecting clothing and hats.        SOLAR LENTIGINES   OTHER SKIN CHANGES DUE TO CHRONIC EXPOSURE TO NONIONIZING RADIATION     Physical Exam:  Anatomic Location Affected:  Sun exposed areas of back, chest, arms, legs  Morphological Description:  Multiple scattered brown to tan evenly pigmented macules   Denies pain, itch, bleeding. No treatments tried. Present for months - years. Reports getting newer lesions with sun exposure.         Assessment and Plan:  Based on a thorough discussion of this condition and the management approach to it (including a comprehensive discussion of the known risks, side effects and potential benefits of treatment), the patient (family) agrees to implement the following specific plan:  Reassure benign  Use sun protection.  Apply SPF 30 or higher at least three times a day.  Wear sun protecting clothing and hats.         MULTIPLE MELANOCYTIC NEVI (\"Moles\")     Physical Exam:  Anatomic Location Affected: Trunk and extremities  Morphological Description:  Scattered, round to ovoid, symmetrical-appearing, even bordered, skin colored to dark brown macules/papules  Denies pain, itch, bleeding. No treatments tried. Present for years. Present constantly; no modifying factors which make it worse or better. Denies actively changing or growing moles.      Assessment and Plan:  Based on a thorough " discussion of this condition and the management approach to it (including a comprehensive discussion of the known risks, side effects and potential benefits of treatment), the patient (family) agrees to implement the following specific plan:  Reassure benign  Monitor for changes  Use sun protection.  Apply SPF 30 or higher at least three times a day.  Wear sun protecting clothing and hats.       Worrisome signs of skin malignancy discussed, questions answered. Regular self-skin check discussed. Advised to call or return to office if patient notices any spots of concern, rapidly growing/changing lesions, bleeding lesions, non-healing lesions. Advised regular SPF use.     Scribe Attestation      I,:  Cristian Winn MA am acting as a scribe while in the presence of the attending physician.:       I,:  Gautam Saha MD personally performed the services described in this documentation    as scribed in my presence.:

## 2025-04-01 NOTE — PATIENT INSTRUCTIONS
ANDROGENETIC ALOPECIA    FEMALE PATIENT Assessment and Plan:  - History and physical consistent with androgenetic alopecia  - Discussed treatment ladder, including PO and topical minoxidil, PRP, low level laser therapy, supplements, finasteride and dutasteride, hair transplant  - Discussed topical minoxidil compound once daily. Counseled patient that they may see some increased shedding within the first few weeks of treatment, which is a normal side effect and will resolve spontaneously. They may also see mild facial hypertrichosis. Educated that treatment should be continuous to maintain efficacy. Stop if redness, itching develops.   - Discussed PO minoxidil 1.25 mg daily which  has been shown to improve hair growth at low dose. Educated on side effects, including hypertrichosis, hypotension, arrhythmia, lower extremity edema. Educated that we could titrate up to 2.5 mg daily with increased risk of hypertrichosis after one month if not experiencing any side effects.   - Discussed PO finasteride 5 mg. No personal or family history of breast cancer. Discussed generally well tolerated in post-menopausal women without side effects. Discussed that this can be used in women who are pre-menopausal if they are on OCP or similarly effective method of contraception.   - Discussed spironolactone 100 mg QHS. Educated on side effects including menstrual irregularity, breast tenderness, headaches, GI upset, K+ retention, palpitations, teratogenicity. No history of kidney/liver dysfunction, no strong family history of breast cancer, not currently pregnant or planning for pregnancy  - If patient would like to pursue supplements, recommended [employer] - to start and to purchase it directly from their websites.   - Discussed additional options of light laser therapy and in-office PRP treatment which consists of 3-4 monthly injections followed by maintenance treatments every 3-6 months.   - Educated patient that regrowth may take  "many months and results are not guaranteed/vary among patients.     - Start [employer] supplement         WHITNEY ANGIOMAS     Assessment and Plan:  Based on a thorough discussion of this condition and the management approach to it (including a comprehensive discussion of the known risks, side effects and potential benefits of treatment), the patient (family) agrees to implement the following specific plan:  Reassure benign        SEBORRHEIC KERATOSIS; NON-INFLAMED     Assessment and Plan:  Based on a thorough discussion of this condition and the management approach to it (including a comprehensive discussion of the known risks, side effects and potential benefits of treatment), the patient (family) agrees to implement the following specific plan:  Reassure benign  Use sun protection.  Apply SPF 30 or higher at least three times a day.  Wear sun protecting clothing and hats.        SOLAR LENTIGINES   OTHER SKIN CHANGES DUE TO CHRONIC EXPOSURE TO NONIONIZING RADIATION     Assessment and Plan:  Based on a thorough discussion of this condition and the management approach to it (including a comprehensive discussion of the known risks, side effects and potential benefits of treatment), the patient (family) agrees to implement the following specific plan:  Reassure benign  Use sun protection.  Apply SPF 30 or higher at least three times a day.  Wear sun protecting clothing and hats.         MULTIPLE MELANOCYTIC NEVI (\"Moles\")     Assessment and Plan:  Based on a thorough discussion of this condition and the management approach to it (including a comprehensive discussion of the known risks, side effects and potential benefits of treatment), the patient (family) agrees to implement the following specific plan:  Reassure benign  Monitor for changes  Use sun protection.  Apply SPF 30 or higher at least three times a day.  Wear sun protecting clothing and hats.       Worrisome signs of skin malignancy discussed, questions " answered. Regular self-skin check discussed. Advised to call or return to office if patient notices any spots of concern, rapidly growing/changing lesions, bleeding lesions, non-healing lesions. Advised regular SPF use.

## 2025-04-08 ENCOUNTER — TELEMEDICINE (OUTPATIENT)
Dept: BEHAVIORAL/MENTAL HEALTH CLINIC | Facility: CLINIC | Age: 55
End: 2025-04-08
Payer: COMMERCIAL

## 2025-04-08 DIAGNOSIS — F41.1 GENERALIZED ANXIETY DISORDER: Chronic | ICD-10-CM

## 2025-04-08 DIAGNOSIS — F33.0 MAJOR DEPRESSIVE DISORDER, RECURRENT, MILD (HCC): Primary | ICD-10-CM

## 2025-04-08 DIAGNOSIS — F90.0 ADHD (ATTENTION DEFICIT HYPERACTIVITY DISORDER), INATTENTIVE TYPE: Chronic | ICD-10-CM

## 2025-04-08 DIAGNOSIS — F43.10 POST TRAUMATIC STRESS DISORDER (PTSD): Chronic | ICD-10-CM

## 2025-04-08 PROCEDURE — 90837 PSYTX W PT 60 MINUTES: CPT | Performed by: SOCIAL WORKER

## 2025-04-08 NOTE — PSYCH
Virtual Regular VisitName: Christina Lara      : 1970      MRN: [id number]  Encounter Provider: LALY Yung  Encounter Date: 2025   Encounter department: [facility] [place]  :  Assessment & Plan  Major depressive disorder, recurrent, mild (HCC)         Generalized anxiety disorder         Post traumatic stress disorder (PTSD)         ADHD (attention deficit hyperactivity disorder), inattentive type         Major depressive disorder, recurrent, mild (HCC)     Generalized anxiety disorder     Post traumatic stress disorder (PTSD)     ADHD (attention deficit hyperactivity disorder), inattentive type       Goals addressed in session: Goal 1     DATA: Met with Yessy for her scheduled individual session. She states that she started to feel ill this morning and felt more ill as the day went on. She states that she was planning to sleep, but she then remembered that she had our session. We spent significant time updating her treatment plan and reviewing her [employer] Safety Plan. Yessy states that she wants to return to school; however, she is not sure how to return to school, because it is financially going to cost her too much money. Yessy talked about her job and the improvement in her work/life balance in her new job. Yessy states that she is very happy in her job. She states that she likes the people she works with and feels comfortable with the schedule. She states that she is fearful about returning to school, because she is not sure whether or not she will be able to be successful. She is planning to take her two remaining courses during separate semesters, as she is concerned about needing to concentrate and spend a lot of time on each of these classes, and she is uncertain if she can handle them both at the same time. This clinician encouraged her to seek support at the disabilities office.     During this session, this clinician used the following  "therapeutic modalities: Client-centered Therapy, Cognitive Behavioral Therapy, Mindfulness-based Strategies, Motivational Interviewing, Solution-Focused Therapy, and Supportive Psychotherapy    Substance Abuse was not addressed during this session. If the client is diagnosed with a co-occurring substance use disorder, please indicate any changes in the frequency or amount of use: n/a. Stage of change for addressing substance use diagnoses: No substance use/Not applicable    ASSESSMENT:  Christina presents with a Euthymic/ normal mood. Christina's affect is Normal range and intensity, which is congruent, with their mood and the content of the session. The client has made progress on their goals as evidenced by her ability to focus on positives in her job.    Christina presents with a minimal risk of suicide, minimal risk of self-harm, and minimal risk of harm to others.    For any risk assessment that surpasses a \"low\" rating, a safety plan must be developed.    A safety plan was indicated: no  If yes, describe in detail n/a    PLAN: Between sessions, Christina will continue to monitor her moods. She will consider her options for her classes. At the next session, the therapist will use Client-centered Therapy, Cognitive Behavioral Therapy, Mindfulness-based Strategies, Motivational Interviewing, Solution-Focused Therapy, and Supportive Psychotherapy to address her mood regulation and relationship concerns.    Behavioral Health Treatment Plan [facility]: Diagnosis and Treatment Plan explained to Christina, Christina relates understanding diagnosis and is agreeable to Treatment Plan. Yes     Depression Follow-up Plan Completed: Yes     Reason for visit is   Chief Complaint   Patient presents with    Virtual Regular Visit        Recent Visits  Date Type Provider Dept   04/17/25 Office Visit Debo Gee MD [facility]   Showing recent visits within past 7 days and meeting all other requirements  Future Appointments  No visits were " found meeting these conditions.  Showing future appointments within next 150 days and meeting all other requirements     History of Present Illness     HPI    Past Medical History   Past Medical History:   Diagnosis Date    Acute right ankle pain 2023    ADHD (attention deficit hyperactivity disorder)     Allergic     Anxiety     Bipolar disorder ([facility])     Bulging lumbar disc     Carpal tunnel syndrome     RIGHT.  LAST ASSESSED: 16    Chronic back pain     low    Chronic pain disorder     Cognitive impairment     Colon polyps     COVID-19     2021    Depression     Diabetes mellitus ([facility])     Eczema     GERD (gastroesophageal reflux disease)     Gestational diabetes     Hearing loss     left ear    Heart disease     SVT    History of pre-eclampsia     Hyperlipidemia     Resolved with weight loss    Hyponatremia 2020    IBS (irritable bowel syndrome)     Ileus ([facility])     LAST ASSESSED: 8/3/17    Labial cyst     LAST ASSESSED: 16    Memory loss     Myofascial pain     LAST ASSESSED: 16    Neck mass 2023    Obesity     Ovarian cyst     LEFT. LAST ASSESSED: 16    Panic attack     Pneumonia     PTSD (post-traumatic stress disorder)     Sacroiliitis ([facility])     Seasonal allergies     Sprain of anterior talofibular ligament of right ankle 2023    SVT (supraventricular tachycardia) ([facility])     Thoracic outlet syndrome     2010    Trochanteric bursitis of both hips     LAST ASSESSED: 3/18/16    Ulnar neuropathy at elbow     Varicella     Wears glasses      Past Surgical History:   Procedure Laterality Date    BARIATRIC SURGERY      BILE DUCT EXPLORATION      ENDOSCOPIC REMOVAL OF STONES FROM BILIARY TRACT    CARDIAC ELECTROPHYSIOLOGY PROCEDURE N/A 2024    Procedure: Cardiac eps/svt ablation;  Surgeon: Daquan Heath MD;  Location: [facility];  Service: Cardiology     SECTION      x3    CHOLECYSTECTOMY      COLONOSCOPY      -polyp, -wnl, repeat5 years     DENTAL SURGERY      DILATION AND CURETTAGE OF UTERUS      ENDOMETRIAL ABLATION      ERCP W/ SPHICTEROTOMY      FIRST RIB REMOVAL      THORAX EXCISION OF FIRST RIB    NEUROPLASTY / TRANSPOSITION ULNAR NERVE AT ELBOW Right 2011    AZ COLONOSCOPY FLX DX W/COLLJ SPEC WHEN PFRMD N/A 05/30/2017    Procedure: COLONOSCOPY;  Surgeon: Oscar Velázquez MD;  Location: [facility];  Service: Gastroenterology    AZ ESOPHAGOGASTRODUODENOSCOPY TRANSORAL DIAGNOSTIC N/A 09/14/2017    Procedure: ESOPHAGOGASTRODUODENOSCOPY (EGD);  Surgeon: Sadiq Car MD;  Location: [facility];  Service: Gastroenterology    AZ HYSTEROSCOPY BX ENDOMETRIUM&/POLYPC W/WO D&C N/A 10/20/2017    Procedure: DILATATION AND CURETTAGE (D&C) WITH HYSTEROSCOPY  REMOVAL VULVAR RT. LESION;  Surgeon: Lucila Ortiz MD;  Location: [facility];  Service: Gynecology    AZ ALDANA IMPLTJ NSTIM ELTRDS PLATE/PADDLE EDRL Left 01/29/2020    Procedure: Insertion of thoracic spinal cord stimulator electrode via laminotomy and placement of left buttock implantable pulse generator (NEUROMONITORING);  Surgeon: Ad Mehta MD;  Location: [facility];  Service: Neurosurgery    AZ LAPS GSTRC RSTRICTIV PX LONGITUDINAL GASTRECTOMY N/A 02/06/2018    Procedure: GASTRECTOMY SLEEVE LAPAROSCOPIC; INTRAOPERATIVE EGD ;  Surgeon: Abimael Juarez MD;  [facility];  Service: Bariatrics    AZ LAPS GSTRC RSTRICTIV PX LONGITUDINAL GASTRECTOMY N/A 08/08/2022    Procedure: Diagnostic Lap; Extensive Lysis of Adhesions; LAPAROSCOPIC REVISION CONVERSION TO NOE-EN-Y GASTRIC BYPASS AND INTRAOPERATIVE EGD;  Surgeon: Abimael Juarez MD;  [facility];  Service: Bariatrics    AZ NEUROPLASTY &/TRANSPOSITION ULNAR NERVE ELBOW Left 07/19/2024    Procedure: RELEASE CUBITAL TUNNEL- left with ulnar nerve transposition;  Surgeon: Maggy Leary DO;  Location: [facility];  Service: Orthopedics    SKIN BIOPSY      SLEEVE GASTROPLASTY      SPINAL CORD STIMULATOR TRIAL W/ LAMINOTOMY      TONSILLECTOMY AND  ADENOIDECTOMY      TUBAL LIGATION      UPPER GASTROINTESTINAL ENDOSCOPY      US GUIDED LYMPH NODE BIOPSY LEFT  05/22/2023    VEIN LIGATION AND STRIPPING Right     VULVA SURGERY  10/20/2017    BIOPSY    WISDOM TOOTH EXTRACTION       Current Outpatient Medications   Medication Instructions    albuterol (Ventolin HFA) 90 mcg/act inhaler 2 puffs, Inhalation, Every 6 hours PRN    amoxicillin-clavulanate (AUGMENTIN) 875-125 mg per tablet 1 tablet, Oral, Every 12 hours scheduled    atoMOXetine (STRATTERA) 60 mg, Oral, Daily    atorvastatin (LIPITOR) 20 mg, Oral, Daily    Blood Glucose Monitoring Suppl (FreeStyle Freedom Lite) w/Device KIT Please dispense 1 kit    calcium carbonate (OS-MELODY) 600 mg, 2 times daily with meals    Diclofenac Sodium (VOLTAREN) 2 g, Topical, 4 times daily    estradiol (ESTRACE VAGINAL) 1 g, Vaginal, 3 times weekly    fluticasone (FLONASE) 50 mcg/act nasal spray 1 spray, Nasal, Daily    glucose blood (FREESTYLE LITE) test strip Test once daily    lamoTRIgine (LAMICTAL) 150 mg, Oral, 2 times daily    Lancets (freestyle) lancets Use as instructed, once daily    lidocaine (Lidoderm) 5 % 1 patch, Topical, Daily, Remove & Discard patch within 12 hours or as directed by MD    Mirabegron ER 25 mg, Oral, Daily    mirtazapine (REMERON) 15 mg, Oral, Daily at bedtime    montelukast (SINGULAIR) 10 mg, Oral, Daily at bedtime    Multiple Vitamins-Minerals (MULTI COMPLETE PO) Take by mouth    omeprazole (PRILOSEC) 20 mg, Oral, Daily    semaglutide (1 mg/dose) (OZEMPIC) 1 mg, Subcutaneous, Weekly    venlafaxine (EFFEXOR) 100 mg, Oral, 3 times daily     Allergies   Allergen Reactions    Ibuprofen Other (See Comments)     Due to gastric sleeve -can only take for 5 days, then needs to stop       Objective   LMP 01/07/2019 (Approximate)     Video Exam  Physical Exam     Administrative Statements   Encounter provider LALY Yung    The Patient is located at Home and in the following state in which I hold an  active license [place].    The patient was identified by name and date of birth. Christina Lara was informed that this is a telemedicine visit and that the visit is being conducted through the Epic Embedded platform. She agrees to proceed..  My office door was closed. No one else was in the room.  She acknowledged consent and understanding of privacy and security of the video platform. The patient has agreed to participate and understands they can discontinue the visit at any time.    Visit Time  Start Time: 1702  Stop Time: 1758  Total Visit Time: 56 minutes

## 2025-04-08 NOTE — BH TREATMENT PLAN
Outpatient Behavioral Health Psychotherapy Treatment Plan    Christina Lara  1970     Date of Initial Psychotherapy Assessment: 04/08/19   Date of Current Treatment Plan: 04/08/2025  Treatment Plan Target Date: 08/06/2025  Treatment Plan Expiration Date: 10/05/2025    Diagnosis:   1. Major depressive disorder, recurrent, mild (HCC)        2. Generalized anxiety disorder        3. Post traumatic stress disorder (PTSD)        4. ADHD (attention deficit hyperactivity disorder), inattentive type              Area(s) of Need: Mood regulation, relationship concerns, educational/vocational issues    Long Term Goal 1 (in the client's own words):  I need to continue to work on learning how to manage my anxiety, so I can finish my school and continue to take care of my family and do my job.     Stage of Change: Action    Target Date for completion: 04/08/2026     Anticipated therapeutic modalities: client-centered; motivational interviewing; solution focused; DBT-informed; supportive psychotherapy     People identified to complete this goal: Yessy (client); Margaret Gutierrez (psychotherapist); Marc Vela (psychiatrist)      Objective 1: (identify the means of measuring success in meeting the objective): Yessy will maintain regularly scheduled medication management sessions with her psychiatrist. She will take her medications, as prescribed, and will discuss any barriers to her medication regimen (including side effects or other problems) with both the psychiatric provider and this clinician.     Update 04/08/2025: Yessy continues to attend regularly scheduled medication appointments with Dr. Vela. She endorses a positive relationship with him. She states that she takes her medications on a daily basis. She believes that she has some symptoms of anxiety that impacts her feelings of being able to take a full breath; however, she is uncertain if these symptoms are impacted by her medications or not. Yessy endorses 100%  "adherence with her medication regimen.       Objective 2: (identify the means of measuring success in meeting the objective): Yessy will practice mindfulness-based strategies, at a minimum 1x per week, to help her improve her overall mood and anxiety management.    Update 04/08/2025: Whenever I feel anxiety, I play a game on my phone. She states that the game includes soothing music and coloring. She states that she can play the game for 5-10 minutes, and she will find that it relaxes her. She states that she finds that music is a calming thing for her. She is able to listen to music at her desk area at work. She states that she does not do any breathing exercises, because it makes her feel more frustrated. She states that the \"shortness of breath issue is very uncomfortable.\" We will continue to address anxiety in future sessions.        Objective 3: (identify the means of measuring success in meeting the objective): Yessy will increase her physical activity by going to the gym at least 1-2 times per week (to increase overall health and to decrease anxiety).     Update 04/08/2025: Yessy states that she is taking her dog for walks. She is also going to the gym while she is at work and she goes outside and walks around the pond area while at work. She states that walking helps her to better manage her anxiety.       Objective 4: (identify the means of measuring success in meeting the objective): Yessy will work with her medical team to see how her recent bariatric revision may impact her medication regimen and her mood regulation.     End-dated 04/08/2025: She has worked this issue through with her psychiatrist. This objective will be removed from the treatment plan.      Objective 5: (identify the means of measuring success in meeting the objective): Yessy will increase her social activities, by seeing friends or going out with her , a minimum of one time per month. Yessy would like to be able to enjoy " "herself when she is out.     Update 04/08/2025: Yessy states that she sees her friends every weekend. She states that sometimes her  joins her and sometimes she is able to go out on her own.      Objective 6: (identify the means of measuring success in meeting the objective): Yessy will use her therapy sessions to discuss her self-respect and her self-esteem related to her learning disability. She will discuss her decision to use or decline use of learning supports. At this time, Yessy states that she does not want to use learning supports, as she wants to be able to manage her learning on her own.     Update 04/08/2025: Yessy is currently not attending school. She states that she has three more classes to attend. She states that she is not sure how to handle this next semester. She states that she wants to take her statistics class by itself and is not able to get student loans when only taking one course. The other two classes are anatomy and a writing class. She is unsure whether or not she can take the other courses together. She will continue to explore the options and will complete her coursework within a year and a half.     I am currently under the care of a [facility] psychiatric provider: yes    My [facility] psychiatric provider is: Marc Vela MD    I am currently taking psychiatric medications: Yes, as prescribed    I feel that I will be ready for discharge from mental health care when I reach the following (measurable goal/objective):  \"It would take a miracle.\" \"I've been doing this for so long, I wouldn't know how to not.\"    For children and adults who have a legal guardian:   Has there been any change to custody orders and/or guardianship status? NA. If yes, attach updated documentation.    I have reviewed my Crisis Plan during this session.     Behavioral Health Treatment Plan [facility]: Diagnosis and Treatment Plan explained to Christina Lara acknowledges an understanding of " their diagnosis. Christina Lara agrees to this treatment plan.    I have been offered a copy of this Treatment Plan. yes    This treatment plan will be sent to Yessy via her [employer] for her signature.

## 2025-04-08 NOTE — BH CRISIS PLAN
Crisis Plan reviewed 04/08/2025. No changes needed from prior Crisis Plan.       Client Name: Christina Lara       Client YOB: 1970    [facility] Safety Plan      Creation Date: 4/8/25 Update Date: 4/8/26   Created By: LALY Yung Last Updated By: LLAY Yung      Step 1: Warning Signs:   Warning Signs   I would not tell or show anyone. But you would never have to worry about that. I would never do it.            Step 2: Internal Coping Strategies:   Internal Coping Strategies   Color game on my phone.            Step 3: People and social settings that provide distraction:   Name Contact Information   Some of the girls that I hang out with (like Mary Stone, Lisa, eg.)    Fidelia at work            Step 4: People whom I can ask for help during a crisis:     Patient did not identify any contacts: Yes      Step 5: Professionals or agencies I can contact during a crisis:      Clinican/Agency Name Phone Emergency Contact    Margaret Gutierrez [phone]     Dr. Vela [phone]       [facility] Emergency Department Emergency Department Phone Emergency Department Address    [facility] Emergency Room.          Crisis Phone Numbers:   Suicide Prevention Lifeline: Call or Text  444 Crisis Text Line: Text HOME to 184-506   Please note: Some [facility] do not have a separate number for Child/Adolescent specific crisis. If your county is not listed under Child/Adolescent, please call the adult number for your county      Adult Crisis Numbers: Child/Adolescent Crisis Numbers   [facility]: [phone] [facility]: [phone]   [facility]: [phone] [facility]: [phone]   [facility]: [phone] [place]: [phone]   [facility]: [phone] [facility]: [phone]   [facility]: [phone]   [facility]: [phone]   [facility]: [phone]   [facility]: [phone] (daytime) [phone]  "(after hours, weekends, holidays)      Step 6: Making the environment safer (plan for lethal means safety):   Plan: Client reports that there are guns in the home. She states that the guns are locked.      Optional: What is most important to me and worth living for?   My son. \"I am who he depends on.\"      Erika Safety Plan. Chani Wooten and Roberto Booth. Used with permission of the authors.           "

## 2025-04-17 ENCOUNTER — OFFICE VISIT (OUTPATIENT)
Dept: FAMILY MEDICINE CLINIC | Facility: CLINIC | Age: 55
End: 2025-04-17
Payer: COMMERCIAL

## 2025-04-17 VITALS
OXYGEN SATURATION: 98 % | HEIGHT: 66 IN | DIASTOLIC BLOOD PRESSURE: 60 MMHG | TEMPERATURE: 98.1 F | SYSTOLIC BLOOD PRESSURE: 98 MMHG | RESPIRATION RATE: 17 BRPM | BODY MASS INDEX: 22.98 KG/M2 | HEART RATE: 76 BPM | WEIGHT: 143 LBS

## 2025-04-17 DIAGNOSIS — E11.9 CONTROLLED TYPE 2 DIABETES MELLITUS WITHOUT COMPLICATION, WITHOUT LONG-TERM CURRENT USE OF INSULIN (HCC): ICD-10-CM

## 2025-04-17 DIAGNOSIS — I47.10 PAROXYSMAL SVT (SUPRAVENTRICULAR TACHYCARDIA) (HCC): ICD-10-CM

## 2025-04-17 DIAGNOSIS — J01.00 ACUTE MAXILLARY SINUSITIS, RECURRENCE NOT SPECIFIED: Primary | ICD-10-CM

## 2025-04-17 DIAGNOSIS — J30.2 SEASONAL ALLERGIES: ICD-10-CM

## 2025-04-17 DIAGNOSIS — F33.2 MDD (MAJOR DEPRESSIVE DISORDER), RECURRENT SEVERE, WITHOUT PSYCHOSIS (HCC): ICD-10-CM

## 2025-04-17 PROCEDURE — 99214 OFFICE O/P EST MOD 30 MIN: CPT | Performed by: FAMILY MEDICINE

## 2025-04-17 RX ORDER — MONTELUKAST SODIUM 10 MG/1
10 TABLET ORAL
Qty: 90 TABLET | Refills: 2 | Status: SHIPPED | OUTPATIENT
Start: 2025-04-17

## 2025-04-17 NOTE — ASSESSMENT & PLAN NOTE
Lab Results   Component Value Date    HGBA1C 5.7 (H) 10/08/2024   Stable   Continue current meds    Orders:  •  semaglutide, 1 mg/dose, (Ozempic) 4 mg/3 mL injection pen; Inject 0.75 mL (1 mg total) under the skin once a week

## 2025-04-17 NOTE — ASSESSMENT & PLAN NOTE
Depression Screening Follow-up Plan: Patient's depression screening was positive with a PHQ-9 score of 9. Patient with underlying depression and was advised to continue current medications as prescribed.

## 2025-04-17 NOTE — PROGRESS NOTES
Name: Christina Lara      : 1970      MRN: [id number]  Encounter Provider: Debo Gee MD  Encounter Date: 2025   Encounter department: [facility]  :  Assessment & Plan  Acute maxillary sinusitis, recurrence not specified    Orders:  •  amoxicillin-clavulanate (AUGMENTIN) 875-125 mg per tablet; Take 1 tablet by mouth every 12 (twelve) hours for 10 days    MDD (major depressive disorder), recurrent severe, without psychosis ([facility])  Depression Screening Follow-up Plan: Patient's depression screening was positive with a PHQ-9 score of 9. Patient with underlying depression and was advised to continue current medications as prescribed.         Paroxysmal SVT (supraventricular tachycardia) ([facility])  S/p ablation   Continue to monitor         Controlled type 2 diabetes mellitus without complication, without long-term current use of insulin ([facility])    Lab Results   Component Value Date    HGBA1C 5.7 (H) 10/08/2024   Stable   Continue current meds    Orders:  •  semaglutide, 1 mg/dose, (Ozempic) 4 mg/3 mL injection pen; Inject 0.75 mL (1 mg total) under the skin once a week    Seasonal allergies    Orders:  •  montelukast (SINGULAIR) 10 mg tablet; Take 1 tablet (10 mg total) by mouth daily at bedtime      Depression Screening and Follow-up Plan:   Patient with underlying depression and was advised to continue current medications as prescribed.       History of Present Illness   Cough  This is a new problem. The current episode started 1 to 4 weeks ago. The problem has been waxing and waning. The cough is Productive of sputum. Associated symptoms include nasal congestion and postnasal drip. Pertinent negatives include no chest pain, chills, ear congestion, ear pain, fever, headaches, heartburn, hemoptysis, myalgias, rash, rhinorrhea, sore throat, shortness of breath, sweats, weight loss or wheezing. She has tried OTC cough suppressant (allegra, singulair) for the symptoms. There is no history  "of asthma, bronchiectasis, bronchitis, emphysema, environmental allergies or pneumonia.     Review of Systems   Constitutional:  Negative for chills, fever and weight loss.   HENT:  Positive for congestion and postnasal drip. Negative for ear pain, rhinorrhea, sinus pressure, sinus pain and sore throat.    Respiratory:  Positive for cough. Negative for hemoptysis, shortness of breath and wheezing.    Cardiovascular:  Negative for chest pain.   Gastrointestinal:  Negative for heartburn.   Musculoskeletal:  Negative for myalgias.   Skin:  Negative for rash.   Allergic/Immunologic: Negative for environmental allergies.   Neurological:  Negative for headaches.       Objective   BP 98/60 (BP Location: Left arm, Patient Position: Sitting, Cuff Size: Standard)   Pulse 76   Temp 98.1 °F (36.7 °C) (Tympanic)   Resp 17   Ht 5' 5.5\" (1.664 m)   Wt 64.9 kg (143 lb)   LMP 01/07/2019 (Approximate)   SpO2 98%   BMI 23.43 kg/m²      Physical Exam  Vitals and nursing note reviewed.   Constitutional:       Appearance: Normal appearance.   HENT:      Nose: Congestion present. No rhinorrhea.   Cardiovascular:      Rate and Rhythm: Normal rate and regular rhythm.      Pulses: Normal pulses.      Heart sounds: Normal heart sounds.   Pulmonary:      Effort: Pulmonary effort is normal.      Breath sounds: Normal breath sounds.   Neurological:      Mental Status: She is alert.         "

## 2025-04-18 ENCOUNTER — TELEPHONE (OUTPATIENT)
Age: 55
End: 2025-04-18

## 2025-04-18 ENCOUNTER — TELEMEDICINE (OUTPATIENT)
Dept: BARIATRICS | Facility: CLINIC | Age: 55
End: 2025-04-18
Payer: COMMERCIAL

## 2025-04-18 VITALS
HEART RATE: 72 BPM | SYSTOLIC BLOOD PRESSURE: 119 MMHG | WEIGHT: 143 LBS | HEIGHT: 66 IN | DIASTOLIC BLOOD PRESSURE: 81 MMHG | BODY MASS INDEX: 22.98 KG/M2

## 2025-04-18 DIAGNOSIS — D50.8 IRON DEFICIENCY ANEMIA FOLLOWING BARIATRIC SURGERY: ICD-10-CM

## 2025-04-18 DIAGNOSIS — K91.2 POSTSURGICAL MALABSORPTION: ICD-10-CM

## 2025-04-18 DIAGNOSIS — F41.1 GENERALIZED ANXIETY DISORDER: Chronic | ICD-10-CM

## 2025-04-18 DIAGNOSIS — R11.0 NAUSEA: ICD-10-CM

## 2025-04-18 DIAGNOSIS — K95.89 IRON DEFICIENCY ANEMIA FOLLOWING BARIATRIC SURGERY: ICD-10-CM

## 2025-04-18 DIAGNOSIS — Z48.815 ENCOUNTER FOR SURGICAL AFTERCARE FOLLOWING SURGERY OF DIGESTIVE SYSTEM: Primary | ICD-10-CM

## 2025-04-18 DIAGNOSIS — I47.10 PAROXYSMAL SVT (SUPRAVENTRICULAR TACHYCARDIA) (HCC): ICD-10-CM

## 2025-04-18 DIAGNOSIS — Z98.84 BARIATRIC SURGERY STATUS: ICD-10-CM

## 2025-04-18 PROCEDURE — 99213 OFFICE O/P EST LOW 20 MIN: CPT | Performed by: PHYSICIAN ASSISTANT

## 2025-04-18 NOTE — PROGRESS NOTES
Date of surgery: 2/6/2018  /  8/8/2022  Procedure: Sleeve gastrectomy  /  RNY gastric bypass  Performing surgeon:    Initial Weight - 219.5 lbs.  Current Weight - 147.0 lbs.  Bautista Weight - 137.5 lbs.  Total Body Weight Loss (EWL) - 72.5 lbs.  EWL% - 113%  TWB% - 33%

## 2025-04-18 NOTE — TELEPHONE ENCOUNTER
Contacted pt in regards to inquiring if appt today can be virtual because pt woke up feeling ill. Pt was advised that per Lucia, virtual visit is okay. Pts appt rescheduled to virtual visit at 3:30 pm.

## 2025-04-18 NOTE — TELEPHONE ENCOUNTER
Patient calling in and not feeling well and asking if her appt with Lucia can be made virtual     Please advise    Patient stated she just had a weight in yesterday at her family practice

## 2025-04-18 NOTE — ASSESSMENT & PLAN NOTE
Orders:    Zinc; Future    Vitamin D 25 hydroxy; Future    Vitamin B12; Future    Vitamin B1, whole blood; Future    Vitamin A; Future    PTH, intact; Future    TIBC Panel (incl. Iron, TIBC, % Iron Saturation); Future    Ferritin; Future    Folate; Future

## 2025-04-18 NOTE — PROGRESS NOTES
Virtual Regular VisitName: Christina Lara      : 1970      MRN: [id number]  Encounter Provider: Lucia Delvalle PA-C  Encounter Date: 2025   Encounter department: [facility]  :  Assessment & Plan  Encounter for surgical aftercare following surgery of digestive system    Orders:    Zinc; Future    Vitamin D 25 hydroxy; Future    Vitamin B12; Future    Vitamin B1, whole blood; Future    Vitamin A; Future    PTH, intact; Future    TIBC Panel (incl. Iron, TIBC, % Iron Saturation); Future    Ferritin; Future    Folate; Future    Bariatric surgery status    Orders:    Zinc; Future    Vitamin D 25 hydroxy; Future    Vitamin B12; Future    Vitamin B1, whole blood; Future    Vitamin A; Future    PTH, intact; Future    TIBC Panel (incl. Iron, TIBC, % Iron Saturation); Future    Ferritin; Future    Folate; Future    Postsurgical malabsorption    Orders:    Zinc; Future    Vitamin D 25 hydroxy; Future    Vitamin B12; Future    Vitamin B1, whole blood; Future    Vitamin A; Future    PTH, intact; Future    TIBC Panel (incl. Iron, TIBC, % Iron Saturation); Future    Ferritin; Future    Folate; Future    BMI 23.0-23.9, adult    Orders:    Zinc; Future    Vitamin D 25 hydroxy; Future    Vitamin B12; Future    Vitamin B1, whole blood; Future    Vitamin A; Future    PTH, intact; Future    TIBC Panel (incl. Iron, TIBC, % Iron Saturation); Future    Ferritin; Future    Folate; Future    Iron deficiency anemia following bariatric surgery    Orders:    Zinc; Future    Vitamin D 25 hydroxy; Future    Vitamin B12; Future    Vitamin B1, whole blood; Future    Vitamin A; Future    PTH, intact; Future    TIBC Panel (incl. Iron, TIBC, % Iron Saturation); Future    Ferritin; Future    Folate; Future    Generalized anxiety disorder    Orders:    Zinc; Future    Vitamin D 25 hydroxy; Future    Vitamin B12; Future    Vitamin B1, whole blood; Future    Vitamin A; Future    PTH, intact; Future    TIBC Panel (incl.  Iron, TIBC, % Iron Saturation); Future    Ferritin; Future    Folate; Future    Paroxysmal SVT (supraventricular tachycardia) (HCC)    Orders:    Zinc; Future    Vitamin D 25 hydroxy; Future    Vitamin B12; Future    Vitamin B1, whole blood; Future    Vitamin A; Future    PTH, intact; Future    TIBC Panel (incl. Iron, TIBC, % Iron Saturation); Future    Ferritin; Future    Folate; Future    Nausea    Orders:    Zinc; Future    Vitamin D 25 hydroxy; Future    Vitamin B12; Future    Vitamin B1, whole blood; Future    Vitamin A; Future    PTH, intact; Future    TIBC Panel (incl. Iron, TIBC, % Iron Saturation); Future    Ferritin; Future    Folate; Future    status post Vertical Sleeve Gastrectomy wit in 2/6/2018 that unfortunately developed reflux and required a conversion to a Rebecca-en-Y gastric bypass in August 2022.   Currently on ozempic per pcp     Patient with c/o nausea , mostly with certain foods like salads. No vomiting reported but pt states she has difficulty eating as a result. This started about 4 months ago. No significant changes that could have prompted the nausea.  She is taking PPI once daily     Discussed with patient about scheduling an EGD to evalaute her anatomy but she wants to hold off on this for now. Would like to increase her PPI to BID for a while to see if this improves her symptoms, then will reassess in 3 months.   Get blood work in the meantime   Follow up sooner if symptoms worsen or persist     HPIstatus post Vertical Sleeve Gastrectomy in 2/6/2018 that unfortunately developed reflux and required a conversion to a Rebecca-en-Y gastric bypass in August 2022.   Currently on ozempic per pcp     Supplements: roberta MVI with 45 mg iron + hair vitamin     Review of Systems   Constitutional: Negative.    Respiratory: Negative.     Cardiovascular: Negative.    Gastrointestinal:  Positive for nausea.   Neurological: Negative.    Psychiatric/Behavioral: Negative.           /81 (BP Location: Left  "arm, Patient Position: Sitting, Cuff Size: Large) Comment: Pt took vitals 30 minutes ago  Pulse 72 Comment: Pt took vitals 30 minutes ago  Ht 5' 6\" (1.676 m)   Wt 64.9 kg (143 lb) Comment: Pt stated (virtual visit)  LMP 01/07/2019 (Approximate)   BMI 23.08 kg/m²     Physical Exam    Administrative Statements   Encounter provider Lucia Delvalle PA-C    The Patient is located at Home and in the following state in which I hold an active license [place].    The patient was identified by name and date of birth. Christina Lara was informed that this is a telemedicine visit and that the visit is being conducted through the Epic Embedded platform. She agrees to proceed..  My office door was closed. No one else was in the room.  She acknowledged consent and understanding of privacy and security of the video platform. The patient has agreed to participate and understands they can discontinue the visit at any time.    I have spent a total time of 15 minutes in caring for this patient on the day of the visit/encounter including Diagnostic results, Prognosis, Risks and benefits of tx options, Instructions for management, and Patient and family education, not including the time spent for establishing the audio/video connection.    "

## 2025-04-23 ENCOUNTER — RESULTS FOLLOW-UP (OUTPATIENT)
Dept: FAMILY MEDICINE CLINIC | Facility: CLINIC | Age: 55
End: 2025-04-23

## 2025-04-23 ENCOUNTER — APPOINTMENT (OUTPATIENT)
Dept: LAB | Facility: CLINIC | Age: 55
End: 2025-04-23
Attending: FAMILY MEDICINE
Payer: COMMERCIAL

## 2025-04-23 DIAGNOSIS — Z98.84 BARIATRIC SURGERY STATUS: ICD-10-CM

## 2025-04-23 DIAGNOSIS — R79.89 ELEVATED LIVER FUNCTION TESTS: Primary | ICD-10-CM

## 2025-04-23 DIAGNOSIS — K91.2 POSTSURGICAL MALABSORPTION: ICD-10-CM

## 2025-04-23 DIAGNOSIS — D50.8 IRON DEFICIENCY ANEMIA FOLLOWING BARIATRIC SURGERY: ICD-10-CM

## 2025-04-23 DIAGNOSIS — E78.2 MIXED HYPERLIPIDEMIA: ICD-10-CM

## 2025-04-23 DIAGNOSIS — E11.9 CONTROLLED TYPE 2 DIABETES MELLITUS WITHOUT COMPLICATION, WITHOUT LONG-TERM CURRENT USE OF INSULIN (HCC): ICD-10-CM

## 2025-04-23 DIAGNOSIS — I47.10 PAROXYSMAL SVT (SUPRAVENTRICULAR TACHYCARDIA) (HCC): ICD-10-CM

## 2025-04-23 DIAGNOSIS — F41.1 GENERALIZED ANXIETY DISORDER: Chronic | ICD-10-CM

## 2025-04-23 DIAGNOSIS — K95.89 IRON DEFICIENCY ANEMIA FOLLOWING BARIATRIC SURGERY: ICD-10-CM

## 2025-04-23 DIAGNOSIS — Z48.815 ENCOUNTER FOR SURGICAL AFTERCARE FOLLOWING SURGERY OF DIGESTIVE SYSTEM: ICD-10-CM

## 2025-04-23 DIAGNOSIS — R11.0 NAUSEA: ICD-10-CM

## 2025-04-23 LAB
25(OH)D3 SERPL-MCNC: 36.3 NG/ML (ref 30–100)
ALBUMIN SERPL BCG-MCNC: 4 G/DL (ref 3.5–5)
ALP SERPL-CCNC: 100 U/L (ref 34–104)
ALT SERPL W P-5'-P-CCNC: 86 U/L (ref 7–52)
ANION GAP SERPL CALCULATED.3IONS-SCNC: 7 MMOL/L (ref 4–13)
AST SERPL W P-5'-P-CCNC: 74 U/L (ref 13–39)
BASOPHILS # BLD AUTO: 0.01 THOUSANDS/ÂΜL (ref 0–0.1)
BASOPHILS NFR BLD AUTO: 0 % (ref 0–1)
BILIRUB SERPL-MCNC: 0.23 MG/DL (ref 0.2–1)
BUN SERPL-MCNC: 11 MG/DL (ref 5–25)
CALCIUM SERPL-MCNC: 9.3 MG/DL (ref 8.4–10.2)
CHLORIDE SERPL-SCNC: 102 MMOL/L (ref 96–108)
CHOLEST SERPL-MCNC: 123 MG/DL (ref ?–200)
CO2 SERPL-SCNC: 31 MMOL/L (ref 21–32)
CREAT SERPL-MCNC: 0.56 MG/DL (ref 0.6–1.3)
CREAT UR-MCNC: 100.1 MG/DL
EOSINOPHIL # BLD AUTO: 0.07 THOUSAND/ÂΜL (ref 0–0.61)
EOSINOPHIL NFR BLD AUTO: 1 % (ref 0–6)
ERYTHROCYTE [DISTWIDTH] IN BLOOD BY AUTOMATED COUNT: 12.4 % (ref 11.6–15.1)
EST. AVERAGE GLUCOSE BLD GHB EST-MCNC: 120 MG/DL
FERRITIN SERPL-MCNC: 119 NG/ML (ref 30–307)
FOLATE SERPL-MCNC: 22 NG/ML
GFR SERPL CREATININE-BSD FRML MDRD: 105 ML/MIN/1.73SQ M
GLUCOSE P FAST SERPL-MCNC: 123 MG/DL (ref 65–99)
HBA1C MFR BLD: 5.8 %
HCT VFR BLD AUTO: 40.5 % (ref 34.8–46.1)
HDLC SERPL-MCNC: 74 MG/DL
HGB BLD-MCNC: 12.7 G/DL (ref 11.5–15.4)
IMM GRANULOCYTES # BLD AUTO: 0.02 THOUSAND/UL (ref 0–0.2)
IMM GRANULOCYTES NFR BLD AUTO: 0 % (ref 0–2)
IRON SATN MFR SERPL: 22 % (ref 15–50)
IRON SERPL-MCNC: 87 UG/DL (ref 50–212)
LDLC SERPL CALC-MCNC: 25 MG/DL (ref 0–100)
LYMPHOCYTES # BLD AUTO: 1.32 THOUSANDS/ÂΜL (ref 0.6–4.47)
LYMPHOCYTES NFR BLD AUTO: 18 % (ref 14–44)
MCH RBC QN AUTO: 29.7 PG (ref 26.8–34.3)
MCHC RBC AUTO-ENTMCNC: 31.4 G/DL (ref 31.4–37.4)
MCV RBC AUTO: 95 FL (ref 82–98)
MICROALBUMIN UR-MCNC: 18.2 MG/L
MICROALBUMIN/CREAT 24H UR: 18 MG/G CREATININE (ref 0–30)
MONOCYTES # BLD AUTO: 0.45 THOUSAND/ÂΜL (ref 0.17–1.22)
MONOCYTES NFR BLD AUTO: 6 % (ref 4–12)
NEUTROPHILS # BLD AUTO: 5.59 THOUSANDS/ÂΜL (ref 1.85–7.62)
NEUTS SEG NFR BLD AUTO: 75 % (ref 43–75)
NRBC BLD AUTO-RTO: 0 /100 WBCS
PLATELET # BLD AUTO: 290 THOUSANDS/UL (ref 149–390)
PMV BLD AUTO: 9.2 FL (ref 8.9–12.7)
POTASSIUM SERPL-SCNC: 4.1 MMOL/L (ref 3.5–5.3)
PROT SERPL-MCNC: 6.6 G/DL (ref 6.4–8.4)
PTH-INTACT SERPL-MCNC: 43.2 PG/ML (ref 12–88)
RBC # BLD AUTO: 4.28 MILLION/UL (ref 3.81–5.12)
SODIUM SERPL-SCNC: 140 MMOL/L (ref 135–147)
TIBC SERPL-MCNC: 389.2 UG/DL (ref 250–450)
TRANSFERRIN SERPL-MCNC: 278 MG/DL (ref 203–362)
TRIGL SERPL-MCNC: 121 MG/DL (ref ?–150)
TSH SERPL DL<=0.05 MIU/L-ACNC: 0.56 UIU/ML (ref 0.45–4.5)
UIBC SERPL-MCNC: 302 UG/DL (ref 155–355)
VIT B12 SERPL-MCNC: 1218 PG/ML (ref 180–914)
WBC # BLD AUTO: 7.46 THOUSAND/UL (ref 4.31–10.16)

## 2025-04-23 PROCEDURE — 85025 COMPLETE CBC W/AUTO DIFF WBC: CPT

## 2025-04-23 PROCEDURE — 82306 VITAMIN D 25 HYDROXY: CPT

## 2025-04-23 PROCEDURE — 83036 HEMOGLOBIN GLYCOSYLATED A1C: CPT

## 2025-04-23 PROCEDURE — 84590 ASSAY OF VITAMIN A: CPT

## 2025-04-23 PROCEDURE — 82043 UR ALBUMIN QUANTITATIVE: CPT

## 2025-04-23 PROCEDURE — 82746 ASSAY OF FOLIC ACID SERUM: CPT

## 2025-04-23 PROCEDURE — 82607 VITAMIN B-12: CPT

## 2025-04-23 PROCEDURE — 83550 IRON BINDING TEST: CPT

## 2025-04-23 PROCEDURE — 80061 LIPID PANEL: CPT

## 2025-04-23 PROCEDURE — 82728 ASSAY OF FERRITIN: CPT

## 2025-04-23 PROCEDURE — 84630 ASSAY OF ZINC: CPT

## 2025-04-23 PROCEDURE — 84425 ASSAY OF VITAMIN B-1: CPT

## 2025-04-23 PROCEDURE — 82570 ASSAY OF URINE CREATININE: CPT

## 2025-04-23 PROCEDURE — 83540 ASSAY OF IRON: CPT

## 2025-04-23 PROCEDURE — 80053 COMPREHEN METABOLIC PANEL: CPT

## 2025-04-23 PROCEDURE — 83970 ASSAY OF PARATHORMONE: CPT

## 2025-04-23 PROCEDURE — 84443 ASSAY THYROID STIM HORMONE: CPT

## 2025-04-23 PROCEDURE — 36415 COLL VENOUS BLD VENIPUNCTURE: CPT

## 2025-04-26 LAB — ZINC SERPL-MCNC: 67 UG/DL (ref 44–115)

## 2025-04-27 LAB
VIT A SERPL-MCNC: 48.5 UG/DL (ref 20.1–62)
VIT B1 BLD-SCNC: 145.3 NMOL/L (ref 66.5–200)

## 2025-04-28 ENCOUNTER — RESULTS FOLLOW-UP (OUTPATIENT)
Dept: BARIATRICS | Facility: CLINIC | Age: 55
End: 2025-04-28

## 2025-05-02 DIAGNOSIS — T78.40XD ALLERGY, SUBSEQUENT ENCOUNTER: Primary | ICD-10-CM

## 2025-05-02 RX ORDER — PREDNISONE 10 MG/1
TABLET ORAL
Qty: 20 TABLET | Refills: 0 | Status: SHIPPED | OUTPATIENT
Start: 2025-05-02

## 2025-05-05 ENCOUNTER — OFFICE VISIT (OUTPATIENT)
Dept: FAMILY MEDICINE CLINIC | Facility: CLINIC | Age: 55
End: 2025-05-05
Payer: COMMERCIAL

## 2025-05-05 VITALS
SYSTOLIC BLOOD PRESSURE: 104 MMHG | HEIGHT: 66 IN | WEIGHT: 146 LBS | DIASTOLIC BLOOD PRESSURE: 70 MMHG | BODY MASS INDEX: 23.46 KG/M2 | OXYGEN SATURATION: 98 % | TEMPERATURE: 97.7 F | HEART RATE: 87 BPM | RESPIRATION RATE: 17 BRPM

## 2025-05-05 DIAGNOSIS — E78.2 MIXED HYPERLIPIDEMIA: ICD-10-CM

## 2025-05-05 DIAGNOSIS — E11.9 CONTROLLED TYPE 2 DIABETES MELLITUS WITHOUT COMPLICATION, WITHOUT LONG-TERM CURRENT USE OF INSULIN (HCC): ICD-10-CM

## 2025-05-05 DIAGNOSIS — K95.89 IRON DEFICIENCY ANEMIA FOLLOWING BARIATRIC SURGERY: ICD-10-CM

## 2025-05-05 DIAGNOSIS — F33.0 MAJOR DEPRESSIVE DISORDER, RECURRENT, MILD (HCC): ICD-10-CM

## 2025-05-05 DIAGNOSIS — F90.0 ADHD (ATTENTION DEFICIT HYPERACTIVITY DISORDER), INATTENTIVE TYPE: Chronic | ICD-10-CM

## 2025-05-05 DIAGNOSIS — D50.8 IRON DEFICIENCY ANEMIA FOLLOWING BARIATRIC SURGERY: ICD-10-CM

## 2025-05-05 DIAGNOSIS — J01.00 ACUTE MAXILLARY SINUSITIS, RECURRENCE NOT SPECIFIED: Primary | ICD-10-CM

## 2025-05-05 PROCEDURE — 99215 OFFICE O/P EST HI 40 MIN: CPT | Performed by: FAMILY MEDICINE

## 2025-05-05 NOTE — ASSESSMENT & PLAN NOTE
Lab Results   Component Value Date    HGBA1C 5.8 (H) 04/23/2025   Stable on current meds  Wants to increase ozempic to 2 mg weekly for weight loss   Orders:    Albumin / creatinine urine ratio; Future    Comprehensive metabolic panel; Future    Hemoglobin A1C; Future    CBC and differential; Future    TSH, 3rd generation with Free T4 reflex; Future    Lipid Panel with Direct LDL reflex; Future    semaglutide, 2 mg/dose, (Ozempic) 8 mg/ mL injection pen; Inject 0.75 mL (2 mg total) under the skin every 7 days

## 2025-05-05 NOTE — PROGRESS NOTES
Name: Christina Lara      : 1970      MRN: [id number]  Encounter Provider: Debo Gee MD  Encounter Date: 2025   Encounter department: [facility]  :  Assessment & Plan  Acute maxillary sinusitis, recurrence not specified  To continue flonase and prednisone taper     Orders:    amoxicillin-clavulanate (AUGMENTIN) 875-125 mg per tablet; Take 1 tablet by mouth every 12 (twelve) hours for 7 days    Major depressive disorder, recurrent, mild (HCC)  Stable on current meds  Care per psychiatrist        Controlled type 2 diabetes mellitus without complication, without long-term current use of insulin (HCC)    Lab Results   Component Value Date    HGBA1C 5.8 (H) 2025   Stable on current meds  Wants to increase ozempic to 2 mg weekly for weight loss   Orders:    Albumin / creatinine urine ratio; Future    Comprehensive metabolic panel; Future    Hemoglobin A1C; Future    CBC and differential; Future    TSH, 3rd generation with Free T4 reflex; Future    Lipid Panel with Direct LDL reflex; Future    semaglutide, 2 mg/dose, (Ozempic) 8 mg/ mL injection pen; Inject 0.75 mL (2 mg total) under the skin every 7 days    Mixed hyperlipidemia  Atorvastatin as directed        Iron deficiency anemia following bariatric surgery  Stable  Care per hematologist          ADHD (attention deficit hyperactivity disorder), inattentive type  Stable on Strattera                  History of Present Illness   Follow-up (Patient being seen for 6 month follow up-Controlled type 2 diabetes mellitus without complication, without long-term current use of insulin )  Sore Throat (Patient being seen for Sore Throat x this morning)      Sore Throat   This is a new problem. The current episode started today. The problem has been waxing and waning. The pain is mild. Associated symptoms include congestion. Pertinent negatives include no abdominal pain, coughing, diarrhea, drooling, ear discharge, ear pain, headaches,  "hoarse voice, plugged ear sensation, neck pain, shortness of breath, stridor, swollen glands, trouble swallowing or vomiting. She has had no exposure to strep or mono.     Review of Systems   Constitutional: Negative.    HENT:  Positive for congestion and sore throat. Negative for drooling, ear discharge, ear pain, hoarse voice and trouble swallowing.    Eyes: Negative.    Respiratory: Negative.  Negative for cough, shortness of breath and stridor.    Cardiovascular: Negative.    Gastrointestinal:  Negative for abdominal pain, diarrhea and vomiting.   Endocrine: Negative.    Genitourinary: Negative.    Musculoskeletal:  Negative for neck pain.   Neurological: Negative.  Negative for headaches.   Hematological: Negative.    Psychiatric/Behavioral: Negative.         Objective   /70 (BP Location: Left arm, Patient Position: Sitting, Cuff Size: Standard)   Pulse 87   Temp 97.7 °F (36.5 °C) (Tympanic)   Resp 17   Ht 5' 6\" (1.676 m)   Wt 66.2 kg (146 lb)   LMP 01/07/2019 (Approximate)   SpO2 98%   BMI 23.57 kg/m²      Physical Exam  Vitals and nursing note reviewed.   Constitutional:       Appearance: She is well-developed.   HENT:      Head: Normocephalic and atraumatic.      Right Ear: Tympanic membrane normal.      Left Ear: Tympanic membrane normal.      Nose: Congestion present.      Mouth/Throat:      Mouth: Mucous membranes are moist.   Cardiovascular:      Rate and Rhythm: Normal rate and regular rhythm.      Pulses: Normal pulses. no weak pulses.           Dorsalis pedis pulses are 2+ on the right side and 2+ on the left side.        Posterior tibial pulses are 2+ on the right side and 2+ on the left side.      Heart sounds: Normal heart sounds.   Pulmonary:      Effort: Pulmonary effort is normal.      Breath sounds: Normal breath sounds.   Feet:      Right foot:      Skin integrity: No ulcer, skin breakdown, erythema, warmth, callus or dry skin.      Left foot:      Skin integrity: No ulcer, skin " breakdown, erythema, warmth, callus or dry skin.   Neurological:      Mental Status: She is alert.                   Diabetic Foot Exam    Patient's shoes and socks removed.    Right Foot/Ankle   Right Foot Inspection  Skin Exam: skin normal and skin intact. No dry skin, no warmth, no callus, no erythema, no maceration, no abnormal color, no pre-ulcer, no ulcer and no callus.     Toe Exam: ROM and strength within normal limits.     Sensory   Vibration: intact  Proprioception: intact  Monofilament testing: intact    Vascular  Capillary refills: elevated  The right DP pulse is 2+. The right PT pulse is 2+.     Left Foot/Ankle  Left Foot Inspection  Skin Exam: skin normal and skin intact. No dry skin, no warmth, no erythema, no maceration, normal color, no pre-ulcer, no ulcer and no callus.     Toe Exam: ROM and strength within normal limits.     Sensory   Vibration: intact  Proprioception: intact  Monofilament testing: intact    Vascular  Capillary refills: < 3 seconds  The left DP pulse is 2+. The left PT pulse is 2+.     Assign Risk Category  No deformity present  No loss of protective sensation  No weak pulses  Risk: 0  I have spent a total time of 40 minutes in caring for this patient on the day of the visit/encounter including Risks and benefits of tx options, Instructions for management, Patient and family education, Importance of tx compliance, Risk factor reductions, and Reviewing/placing orders in the medical record (including tests, medications, and/or procedures).

## 2025-05-12 ENCOUNTER — TELEMEDICINE (OUTPATIENT)
Dept: PSYCHIATRY | Facility: CLINIC | Age: 55
End: 2025-05-12
Payer: COMMERCIAL

## 2025-05-12 DIAGNOSIS — F51.04 PSYCHOPHYSIOLOGICAL INSOMNIA: ICD-10-CM

## 2025-05-12 DIAGNOSIS — F43.10 POST TRAUMATIC STRESS DISORDER (PTSD): Chronic | ICD-10-CM

## 2025-05-12 DIAGNOSIS — F90.0 ADHD (ATTENTION DEFICIT HYPERACTIVITY DISORDER), INATTENTIVE TYPE: Chronic | ICD-10-CM

## 2025-05-12 DIAGNOSIS — F33.0 MAJOR DEPRESSIVE DISORDER, RECURRENT, MILD (HCC): Primary | ICD-10-CM

## 2025-05-12 DIAGNOSIS — F41.1 GENERALIZED ANXIETY DISORDER: Chronic | ICD-10-CM

## 2025-05-12 PROCEDURE — 99214 OFFICE O/P EST MOD 30 MIN: CPT | Performed by: STUDENT IN AN ORGANIZED HEALTH CARE EDUCATION/TRAINING PROGRAM

## 2025-05-12 RX ORDER — MIRTAZAPINE 15 MG/1
15 TABLET, FILM COATED ORAL
Qty: 90 TABLET | Refills: 3 | Status: SHIPPED | OUTPATIENT
Start: 2025-05-12

## 2025-05-12 RX ORDER — ATOMOXETINE 60 MG/1
60 CAPSULE ORAL DAILY
Qty: 90 CAPSULE | Refills: 3 | Status: SHIPPED | OUTPATIENT
Start: 2025-05-12

## 2025-05-12 RX ORDER — VENLAFAXINE 100 MG/1
100 TABLET ORAL 3 TIMES DAILY
Qty: 270 TABLET | Refills: 3 | Status: SHIPPED | OUTPATIENT
Start: 2025-05-12

## 2025-05-12 RX ORDER — LAMOTRIGINE 150 MG/1
150 TABLET ORAL 2 TIMES DAILY
Qty: 180 TABLET | Refills: 3 | Status: SHIPPED | OUTPATIENT
Start: 2025-05-12 | End: 2026-05-07

## 2025-05-12 NOTE — ASSESSMENT & PLAN NOTE
Orders:    venlafaxine (EFFEXOR) 100 MG tablet; Take 1 tablet (100 mg total) by mouth 3 (three) times a day

## 2025-05-12 NOTE — ASSESSMENT & PLAN NOTE
Orders:    mirtazapine (REMERON) 15 mg tablet; Take 1 tablet (15 mg total) by mouth daily at bedtime    venlafaxine (EFFEXOR) 100 MG tablet; Take 1 tablet (100 mg total) by mouth 3 (three) times a day    lamoTRIgine (LaMICtal) 150 MG tablet; Take 1 tablet (150 mg total) by mouth 2 (two) times a day

## 2025-05-12 NOTE — PSYCH
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 mg total) by mouth daily at bedtime    venlafaxine (EFFEXOR) 100 MG tablet; Take 1 tablet (100 mg total) by mouth 3 (three) times a day    lamoTRIgine (LaMICtal) 150 MG tablet; Take 1 tablet (150 mg total) by mouth 2 (two) times a day    ADHD (attention deficit hyperactivity disorder), inattentive type    Orders:    atoMOXetine (STRATTERA) 60 mg capsule; Take 1 capsule (60 mg total) by mouth daily    Psychophysiological insomnia    Orders:    mirtazapine (REMERON) 15 mg tablet; Take 1 tablet (15 mg total) by mouth daily at bedtime        Does not want any medication changes  Aware of need to follow up with family physician for medical issues  Aware of 24 hour and weekend coverage for urgent situations accessed by calling [facility] main practice number    Medications Risks/Benefits      Risks, Benefits And Possible Side Effects Of Medications:    Risks, benefits, and possible side effects of medications explained to Christina including risk of rash related to treatment with Lamictal and risk of suicidality and serotonin syndrome related to treatment with antidepressants. She verbalizes understanding and agreement for treatment.    Controlled Medication  Discussion:     Not applicable    Psychotherapy Provided:     Individual psychotherapy provided: No     Treatment Plan:    Completed and signed during the session: Not applicable - Treatment Plan to be completed by [facility] therapist      Visit Time    Visit Start Time: 3:31 PM  Visit Stop Time: 3:56 PM  Total Visit Duration:  25 minutes     The total visit duration detailed above includes: patient engagement, medication management, psychotherapy/counseling, discussion regarding treatment goals, documentation, review of past medical records, and coordination of care      Marc Vela MD  Board Certified Diplomate of the American Board of Psychiatry and Neurology  05/12/25

## 2025-05-23 DIAGNOSIS — E11.9 CONTROLLED TYPE 2 DIABETES MELLITUS WITHOUT COMPLICATION, WITHOUT LONG-TERM CURRENT USE OF INSULIN (HCC): ICD-10-CM

## 2025-05-28 DIAGNOSIS — Z48.815 ENCOUNTER FOR SURGICAL AFTERCARE FOLLOWING SURGERY OF DIGESTIVE SYSTEM: ICD-10-CM

## 2025-05-28 DIAGNOSIS — Z98.84 BARIATRIC SURGERY STATUS: ICD-10-CM

## 2025-05-28 DIAGNOSIS — E11.9 CONTROLLED TYPE 2 DIABETES MELLITUS WITHOUT COMPLICATION, WITHOUT LONG-TERM CURRENT USE OF INSULIN (HCC): ICD-10-CM

## 2025-05-28 RX ORDER — OMEPRAZOLE 20 MG/1
20 CAPSULE, DELAYED RELEASE ORAL DAILY
Qty: 90 CAPSULE | Refills: 0 | Status: SHIPPED | OUTPATIENT
Start: 2025-05-28 | End: 2025-06-03 | Stop reason: SDUPTHER

## 2025-05-31 DIAGNOSIS — Z48.815 ENCOUNTER FOR SURGICAL AFTERCARE FOLLOWING SURGERY OF DIGESTIVE SYSTEM: ICD-10-CM

## 2025-05-31 DIAGNOSIS — Z98.84 BARIATRIC SURGERY STATUS: ICD-10-CM

## 2025-06-01 RX ORDER — OMEPRAZOLE 20 MG/1
20 CAPSULE, DELAYED RELEASE ORAL DAILY
Qty: 90 CAPSULE | Refills: 0 | OUTPATIENT
Start: 2025-06-01

## 2025-06-03 ENCOUNTER — TELEMEDICINE (OUTPATIENT)
Dept: BEHAVIORAL/MENTAL HEALTH CLINIC | Facility: CLINIC | Age: 55
End: 2025-06-03
Payer: COMMERCIAL

## 2025-06-03 ENCOUNTER — REMOTE DEVICE CLINIC VISIT (OUTPATIENT)
Dept: CARDIOLOGY CLINIC | Facility: CLINIC | Age: 55
End: 2025-06-03
Payer: COMMERCIAL

## 2025-06-03 DIAGNOSIS — F43.10 POST TRAUMATIC STRESS DISORDER (PTSD): Chronic | ICD-10-CM

## 2025-06-03 DIAGNOSIS — I48.0 PAROXYSMAL ATRIAL FIBRILLATION (HCC): Primary | ICD-10-CM

## 2025-06-03 DIAGNOSIS — F33.2 MDD (MAJOR DEPRESSIVE DISORDER), RECURRENT SEVERE, WITHOUT PSYCHOSIS (HCC): Primary | ICD-10-CM

## 2025-06-03 DIAGNOSIS — F90.0 ADHD (ATTENTION DEFICIT HYPERACTIVITY DISORDER), INATTENTIVE TYPE: Chronic | ICD-10-CM

## 2025-06-03 DIAGNOSIS — Z98.84 BARIATRIC SURGERY STATUS: ICD-10-CM

## 2025-06-03 DIAGNOSIS — F41.1 GENERALIZED ANXIETY DISORDER: Chronic | ICD-10-CM

## 2025-06-03 DIAGNOSIS — Z48.815 ENCOUNTER FOR SURGICAL AFTERCARE FOLLOWING SURGERY OF DIGESTIVE SYSTEM: ICD-10-CM

## 2025-06-03 PROCEDURE — 93298 REM INTERROG DEV EVAL SCRMS: CPT | Performed by: STUDENT IN AN ORGANIZED HEALTH CARE EDUCATION/TRAINING PROGRAM

## 2025-06-03 PROCEDURE — 90837 PSYTX W PT 60 MINUTES: CPT | Performed by: SOCIAL WORKER

## 2025-06-03 RX ORDER — OMEPRAZOLE 20 MG/1
20 CAPSULE, DELAYED RELEASE ORAL DAILY
Qty: 180 CAPSULE | Refills: 0 | Status: SHIPPED | OUTPATIENT
Start: 2025-06-03 | End: 2025-06-05

## 2025-06-03 NOTE — PSYCH
Virtual Regular VisitName: Christina Lara      : 1970      MRN: [id number]  Encounter Provider: LALY Yung  Encounter Date: 6/3/2025   Encounter department: [facility] [place]  :  Assessment & Plan  MDD (major depressive disorder), recurrent severe, without psychosis (HCC)       Generalized anxiety disorder       ADHD (attention deficit hyperactivity disorder), inattentive type       Post traumatic stress disorder (PTSD)         Goals addressed in session: Goal 1     DATA: Met with Yessy for her scheduled individual session. She states that she has been doing a lot of biking lately. She states that she is trying to make a habit of doing a lot more biking, and she is finding it to be a very good mindfulness exercise for her. She states that she keeps her bike in her car, and she is able to go to the trail right after she is done with work. She talked about how it helps her to stop using alcohol. She discussed her decision to stop drinking and how much better she is feeling now that she has made the decision to completely abstain from alcohol. We discussed the health benefits, especially since she has had bariatric procedures. Yessy states that there are times that she feels uncomfortable when she goes out with her friends, because she has made the decision not to drink. We will continue to discuss ways that she can maintain her healthy decisions and explain these decisions in a positive manner to her friends. This clinician used self-disclosure to help normalize Yessy's experience and to validate her and provide positive encouragement. Yessy discussed her family relationships. She spent some time talking about her frustration with her ex-. She talked about her feelings of resentment toward him-- regarding his lack of responsibility for the care of their adult son. She states that he has historically only done what he has absolutely needed to do, and she feels  "that she will have to care for their son for the rest of her life with no additional support. She talked about how this anger builds in her. We discussed the topic of resentment and some ways to help herself to let go of these emotions and gain acceptance of the situation. Yessy discussed her love for her son and is able to identify some positives about having him in her home. We will continue to address the topic of resentment versus acceptance in future sessions.     During this session, this clinician used the following therapeutic modalities: Client-centered Therapy, Dialectical Behavior Therapy, Mindfulness-based Strategies, Motivational Interviewing, Solution-Focused Therapy, and Supportive Psychotherapy    Substance Abuse was addressed during this session. If the client is diagnosed with a co-occurring substance use disorder, please indicate any changes in the frequency or amount of use: decision to become abstinent from alcohol. Stage of change for addressing substance use diagnoses: Action    ASSESSMENT:  Christina presents with a Euthymic/ normal mood. Christina's affect is Normal range and intensity, which is congruent, with their mood and the content of the session. The client has made progress on their goals as evidenced by her decision to stop drinking-- to improve overall mental and physical health.    Christina presents with a minimal risk of suicide, minimal risk of self-harm, and minimal risk of harm to others.    For any risk assessment that surpasses a \"low\" rating, a safety plan must be developed.    A safety plan was indicated: no  If yes, describe in detail n/a    PLAN: Between sessions, Christina will continue to monitor her moods. She will maintain abstinence from alcohol. If she struggles with maintaining abstinence, she will monitor any use of alcohol and will discuss barriers in her next session. Yessy will work on gaining acceptance regarding the care of her son and the decisions that her ex- " has made regarding his own life and interactions with their son. At the next session, the therapist will use Client-centered Therapy, Dialectical Behavior Therapy, Mindfulness-based Strategies, Motivational Interviewing, Solution-Focused Therapy, and Supportive Psychotherapy to address her mood regulation, health-related concerns, and relationship issues.    Behavioral Health Treatment Plan  Luke: Diagnosis and Treatment Plan explained to Christina, Christina relates understanding diagnosis and is agreeable to Treatment Plan. Yes     Depression Follow-up Plan Completed: No     Reason for visit is   Chief Complaint   Patient presents with    Virtual Regular Visit      Recent Visits  Date Type Provider Dept   06/03/25 Telemedicine LALY Yung [facility] [place]   Showing recent visits within past 7 days and meeting all other requirements  Future Appointments  No visits were found meeting these conditions.  Showing future appointments within next 150 days and meeting all other requirements     History of Present Illness     HPI    Past Medical History   Past Medical History:   Diagnosis Date    Acute right ankle pain 02/07/2023    ADHD (attention deficit hyperactivity disorder)     Allergic     Anxiety     Bipolar disorder (HCC)     Bulging lumbar disc     Carpal tunnel syndrome     RIGHT.  LAST ASSESSED: 12/7/16    Chronic back pain     low    Chronic pain disorder     Cognitive impairment     Colon polyps     COVID-19     12/2021    Depression     Diabetes mellitus (HCC)     Eczema     GERD (gastroesophageal reflux disease)     Gestational diabetes     Hearing loss     left ear    Heart disease     SVT    History of pre-eclampsia     Hyperlipidemia     Resolved with weight loss    Hyponatremia 12/12/2020    IBS (irritable bowel syndrome)     Ileus ([facility])     LAST ASSESSED: 8/3/17    Labial cyst     LAST ASSESSED: 4/21/16    Memory loss     Myofascial pain     LAST ASSESSED: 4/12/16    Neck  mass 2023    Obesity     Ovarian cyst     LEFT. LAST ASSESSED: 16    Panic attack     Pneumonia     PTSD (post-traumatic stress disorder)     Sacroiliitis (HCC)     Seasonal allergies     Sprain of anterior talofibular ligament of right ankle 2023    SVT (supraventricular tachycardia) (HCC)     Thoracic outlet syndrome         Trochanteric bursitis of both hips     LAST ASSESSED: 3/18/16    Ulnar neuropathy at elbow     Varicella     Wears glasses      Past Surgical History:   Procedure Laterality Date    BARIATRIC SURGERY      BILE DUCT EXPLORATION      ENDOSCOPIC REMOVAL OF STONES FROM BILIARY TRACT    CARDIAC ELECTROPHYSIOLOGY PROCEDURE N/A 2024    Procedure: Cardiac eps/svt ablation;  Surgeon: Daquan Heath MD;  Location: [facility];  Service: Cardiology     SECTION      x3    CHOLECYSTECTOMY      COLONOSCOPY      -polyp, -wnl, repeat5 years    DENTAL SURGERY      DILATION AND CURETTAGE OF UTERUS      ENDOMETRIAL ABLATION      ERCP W/ SPHICTEROTOMY      FIRST RIB REMOVAL      THORAX EXCISION OF FIRST RIB    NEUROPLASTY / TRANSPOSITION ULNAR NERVE AT ELBOW Right     CO COLONOSCOPY FLX DX W/COLLJ SPEC WHEN PFRMD N/A 2017    Procedure: COLONOSCOPY;  Surgeon: Oscar Velázquez MD;  Location: [facility];  Service: Gastroenterology    CO ESOPHAGOGASTRODUODENOSCOPY TRANSORAL DIAGNOSTIC N/A 2017    Procedure: ESOPHAGOGASTRODUODENOSCOPY (EGD);  Surgeon: Sadiq Car MD;  Location: [facility];  Service: Gastroenterology    CO HYSTEROSCOPY BX ENDOMETRIUM&/POLYPC W/WO D&C N/A 10/20/2017    Procedure: DILATATION AND CURETTAGE (D&C) WITH HYSTEROSCOPY  REMOVAL VULVAR RT. LESION;  Surgeon: Lucila Ortiz MD;  Location: [facility];  Service: Gynecology    CO ALDANA IMPLTJ NSTIM ELTRDS PLATE/PADDLE EDRL Left 2020    Procedure: Insertion of thoracic spinal cord stimulator electrode via laminotomy and placement of left buttock implantable pulse generator  (NEUROMONITORING);  Surgeon: Ad Mehta MD;  Location: [facility];  Service: Neurosurgery    MI LAPS GSTRC RSTRICTIV PX LONGITUDINAL GASTRECTOMY N/A 02/06/2018    Procedure: GASTRECTOMY SLEEVE LAPAROSCOPIC; INTRAOPERATIVE EGD ;  Surgeon: Abimael Juarez MD;  Location: [facility];  Service: Bariatrics    MI LAPS GSTRC RSTRICTIV PX LONGITUDINAL GASTRECTOMY N/A 08/08/2022    Procedure: Diagnostic Lap; Extensive Lysis of Adhesions; LAPAROSCOPIC REVISION CONVERSION TO NOE-EN-Y GASTRIC BYPASS AND INTRAOPERATIVE EGD;  Surgeon: Abimael Juarez MD;  [facility];  Service: Bariatrics    MI NEUROPLASTY &/TRANSPOSITION ULNAR NERVE ELBOW Left 07/19/2024    Procedure: RELEASE CUBITAL TUNNEL- left with ulnar nerve transposition;  Surgeon: Maggy Leary DO;  Location: [facility];  Service: Orthopedics    SKIN BIOPSY      SLEEVE GASTROPLASTY      SPINAL CORD STIMULATOR TRIAL W/ LAMINOTOMY      TONSILLECTOMY AND ADENOIDECTOMY      TUBAL LIGATION      UPPER GASTROINTESTINAL ENDOSCOPY      US GUIDED LYMPH NODE BIOPSY LEFT  05/22/2023    VEIN LIGATION AND STRIPPING Right     VULVA SURGERY  10/20/2017    BIOPSY    WISDOM TOOTH EXTRACTION       Current Outpatient Medications   Medication Instructions    albuterol (Ventolin HFA) 90 mcg/act inhaler 2 puffs, Inhalation, Every 6 hours PRN    atoMOXetine (STRATTERA) 60 mg, Oral, Daily    atorvastatin (LIPITOR) 20 mg, Oral, Daily    Blood Glucose Monitoring Suppl (FreeStyle Freedom Lite) w/Device KIT Please dispense 1 kit    calcium carbonate (OS-MELODY) 600 mg, 2 times daily with meals    estradiol (ESTRACE VAGINAL) 1 g, Vaginal, 3 times weekly    fluticasone (FLONASE) 50 mcg/act nasal spray 1 spray, Nasal, Daily    glucose blood (FREESTYLE LITE) test strip Test once daily    lamoTRIgine (LAMICTAL) 150 mg, Oral, 2 times daily    Lancets (freestyle) lancets Use as instructed, once daily    lidocaine (Lidoderm) 5 % 1 patch, Topical, Daily, Remove & Discard patch within 12 hours or as  directed by MD    Mirabegron ER 25 mg, Oral, Daily    mirtazapine (REMERON) 15 mg, Oral, Daily at bedtime    montelukast (SINGULAIR) 10 mg, Oral, Daily at bedtime    Multiple Vitamins-Minerals (MULTI COMPLETE PO) Take by mouth    omeprazole (PRILOSEC) 40 mg, Oral, Daily    predniSONE 10 mg tablet 4 tabs x 2 days, 3 tabs x 2 days, 2 tabs x 2 days, 1 tab x 2 days    semaglutide (2 mg/dose) (OZEMPIC) 2 mg, Subcutaneous, Every 7 days    venlafaxine (EFFEXOR) 100 mg, Oral, 3 times daily     Allergies   Allergen Reactions    Ibuprofen Other (See Comments)     Due to gastric sleeve -can only take for 5 days, then needs to stop       Objective   LMP 01/07/2019 (Approximate)     Video Exam  Physical Exam     Administrative Statements   Encounter provider LALY Yung    The Patient is located at Home and in the following state in which I hold an active license [place].    The patient was identified by name and date of birth. Christina Lara was informed that this is a telemedicine visit and that the visit is being conducted through the Epic Embedded platform. She agrees to proceed..  My office door was closed. No one else was in the room.  She acknowledged consent and understanding of privacy and security of the video platform. The patient has agreed to participate and understands they can discontinue the visit at any time.    Visit Time  Start Time: 1701  Stop Time: 1757  Total Visit Time: 56 minutes

## 2025-06-03 NOTE — PROGRESS NOTES
"Results for orders placed or performed in visit on 06/03/25   Cardiac EP device report    Narrative    MDT LNQ22/ ACTIVE SYSTEM IS MRI CONDITIONAL  CARELINK TRANSMISSION: BATTERY STATUS \"GOOD.\" NO DEVICE DETECTED & NO PT ACTIVATED EPISODES. PRESENTING RHYTHM NSR. NORMAL DEVICE FUNCTION. GV        "

## 2025-06-04 ENCOUNTER — RESULTS FOLLOW-UP (OUTPATIENT)
Dept: NON INVASIVE DIAGNOSTICS | Facility: HOSPITAL | Age: 55
End: 2025-06-04

## 2025-06-05 DIAGNOSIS — Z48.815 ENCOUNTER FOR SURGICAL AFTERCARE FOLLOWING SURGERY OF DIGESTIVE SYSTEM: ICD-10-CM

## 2025-06-05 DIAGNOSIS — Z98.84 BARIATRIC SURGERY STATUS: ICD-10-CM

## 2025-06-05 RX ORDER — OMEPRAZOLE 40 MG/1
40 CAPSULE, DELAYED RELEASE ORAL DAILY
Qty: 90 CAPSULE | Refills: 0 | Status: SHIPPED | OUTPATIENT
Start: 2025-06-05

## 2025-06-11 ENCOUNTER — PROCEDURE VISIT (OUTPATIENT)
Dept: PAIN MEDICINE | Facility: CLINIC | Age: 55
End: 2025-06-11
Payer: COMMERCIAL

## 2025-06-11 VITALS — BODY MASS INDEX: 21.67 KG/M2 | WEIGHT: 143 LBS | HEIGHT: 68 IN

## 2025-06-11 DIAGNOSIS — M70.62 TROCHANTERIC BURSITIS OF BOTH HIPS: Primary | ICD-10-CM

## 2025-06-11 DIAGNOSIS — M70.61 TROCHANTERIC BURSITIS OF BOTH HIPS: Primary | ICD-10-CM

## 2025-06-11 PROCEDURE — 20611 DRAIN/INJ JOINT/BURSA W/US: CPT | Performed by: ANESTHESIOLOGY

## 2025-06-11 RX ORDER — BUPIVACAINE HYDROCHLORIDE 2.5 MG/ML
4 INJECTION, SOLUTION EPIDURAL; INFILTRATION; INTRACAUDAL; PERINEURAL ONCE
Status: COMPLETED | OUTPATIENT
Start: 2025-06-11 | End: 2025-06-11

## 2025-06-11 RX ORDER — METHYLPREDNISOLONE ACETATE 40 MG/ML
40 INJECTION, SUSPENSION INTRA-ARTICULAR; INTRALESIONAL; INTRAMUSCULAR; SOFT TISSUE ONCE
Status: COMPLETED | OUTPATIENT
Start: 2025-06-11 | End: 2025-06-11

## 2025-06-11 RX ADMIN — BUPIVACAINE HYDROCHLORIDE 4 ML: 2.5 INJECTION, SOLUTION EPIDURAL; INFILTRATION; INTRACAUDAL; PERINEURAL at 08:55

## 2025-06-11 RX ADMIN — METHYLPREDNISOLONE ACETATE 40 MG: 40 INJECTION, SUSPENSION INTRA-ARTICULAR; INTRALESIONAL; INTRAMUSCULAR; SOFT TISSUE at 08:55

## 2025-06-11 RX ADMIN — METHYLPREDNISOLONE ACETATE 40 MG: 40 INJECTION, SUSPENSION INTRA-ARTICULAR; INTRALESIONAL; INTRAMUSCULAR; SOFT TISSUE at 08:56

## 2025-06-11 NOTE — PROGRESS NOTES
Pre-procedure Diagnosis:   1. Trochanteric bursitis of both hips      Post-procedure Diagnosis:   1. Trochanteric bursitis of both hips      Operation Title(s):  Bilateral trochanteric bursa injection under ultrasound guidance  Attending Surgeon:   Nino Garvey MD  Anesthesia:   Local    Indications: The patient is a 55 y.o. year-old female with a diagnosis of trochanteric bursitis. The patient's history and physical exam were reviewed. The risks, benefits and alternatives to the procedure were discussed, and all questions were answered to the patient's satisfaction. The patient agreed to proceed, and written informed consent was obtained.    Procedure in Detail: The patient was brought into the exam room and placed in the right lateral decubitus position on the exam room table. The left hip was prepped with chloraprep and draped in a sterile manner.     A sterile ultrasound probe cover and gel was placed over a 3.75 MHz curvilinear transducer probe.  The probe was placed over the lateral thigh to visualize the peritrochanteric bursal region. Optimal needle path was determined and the skin and subcutaneous tissues in the area was anesthetized with 1% lidocaine.  Then, under ultrasound guidance, a 2 inch ultrasound needle was advanced until the needle entered the peritrochanteric bursa region. After visualization of the tip in the target area and negative aspiration for blood, a solution consisting of 3 mL 0.25% bupivacaine and 1 mL Depo-Medrol (40mg/ml) was easily injected.    The patient's hip was cleansed and a bandage was placed over the sites of needle insertion.    The same procedure was then repeated for the right hip.    Disposition: The patient tolerated the procedure well and there were no apparent complications.  The patient was given written discharge instructions for the procedure.

## 2025-06-24 DIAGNOSIS — M25.551 RIGHT HIP PAIN: Primary | ICD-10-CM

## 2025-06-25 ENCOUNTER — TELEPHONE (OUTPATIENT)
Dept: PAIN MEDICINE | Facility: CLINIC | Age: 55
End: 2025-06-25

## 2025-06-25 ENCOUNTER — HOSPITAL ENCOUNTER (OUTPATIENT)
Dept: RADIOLOGY | Facility: HOSPITAL | Age: 55
Discharge: HOME | End: 2025-06-25
Payer: COMMERCIAL

## 2025-06-25 DIAGNOSIS — M25.551 RIGHT HIP PAIN: ICD-10-CM

## 2025-06-25 PROCEDURE — 73502 X-RAY EXAM HIP UNI 2-3 VIEWS: CPT

## 2025-06-25 NOTE — TELEPHONE ENCOUNTER
Patient reports Lt hip 100%, Rt hip 0% improvement post inj  Pain level Lt hip 0/10, Rt hip pain 6/10

## 2025-07-03 DIAGNOSIS — E78.2 MIXED HYPERLIPIDEMIA: ICD-10-CM

## 2025-07-03 RX ORDER — ATORVASTATIN CALCIUM 20 MG/1
20 TABLET, FILM COATED ORAL DAILY
Qty: 90 TABLET | Refills: 1 | Status: SHIPPED | OUTPATIENT
Start: 2025-07-03

## 2025-07-17 ENCOUNTER — TELEPHONE (OUTPATIENT)
Age: 55
End: 2025-07-17

## 2025-07-17 NOTE — TELEPHONE ENCOUNTER
Spoke with the patient and she stated that she can only do after 3 pm today or tomorrow. Please advise.

## 2025-07-17 NOTE — TELEPHONE ENCOUNTER
I have patient at that time. Sorry . She can leave work early if needed we can provide her with the note .

## 2025-07-17 NOTE — TELEPHONE ENCOUNTER
Patient can only do a 3:30 pm appointment today or tomorrow due to having patients.   Please advise if can accommodate

## 2025-07-21 ENCOUNTER — TELEPHONE (OUTPATIENT)
Age: 55
End: 2025-07-21

## 2025-07-21 ENCOUNTER — OFFICE VISIT (OUTPATIENT)
Dept: FAMILY MEDICINE CLINIC | Facility: CLINIC | Age: 55
End: 2025-07-21
Payer: COMMERCIAL

## 2025-07-21 VITALS
WEIGHT: 141 LBS | OXYGEN SATURATION: 98 % | HEART RATE: 88 BPM | HEIGHT: 66 IN | BODY MASS INDEX: 22.66 KG/M2 | TEMPERATURE: 98.1 F | DIASTOLIC BLOOD PRESSURE: 60 MMHG | SYSTOLIC BLOOD PRESSURE: 98 MMHG | RESPIRATION RATE: 17 BRPM

## 2025-07-21 DIAGNOSIS — F33.0 MAJOR DEPRESSIVE DISORDER, RECURRENT, MILD (HCC): ICD-10-CM

## 2025-07-21 DIAGNOSIS — R51.9 LEFT FACIAL PAIN: Primary | ICD-10-CM

## 2025-07-21 DIAGNOSIS — H60.332 ACUTE SWIMMER'S EAR OF LEFT SIDE: ICD-10-CM

## 2025-07-21 PROCEDURE — 99214 OFFICE O/P EST MOD 30 MIN: CPT | Performed by: FAMILY MEDICINE

## 2025-07-21 RX ORDER — CIPROFLOXACIN AND DEXAMETHASONE 3; 1 MG/ML; MG/ML
4 SUSPENSION/ DROPS AURICULAR (OTIC) 2 TIMES DAILY
Qty: 7.5 ML | Refills: 0 | Status: SHIPPED | OUTPATIENT
Start: 2025-07-21

## 2025-07-21 RX ORDER — GABAPENTIN 100 MG/1
CAPSULE ORAL
Qty: 30 CAPSULE | Refills: 5 | Status: SHIPPED | OUTPATIENT
Start: 2025-07-21

## 2025-07-21 NOTE — PROGRESS NOTES
"Name: Christina Lara      : 1970      MRN: [id number]  Encounter Provider: Debo Gee MD  Encounter Date: 2025   Encounter department: [facility]  :  Assessment & Plan  Left facial pain  ? Trigeminal nerve issues   Trial of gabapentin at bedtime    Orders:  •  gabapentin (NEURONTIN) 100 mg capsule; 1 tab HS at bedtime x 7 days, then 2 tabs at bedtime  •  Ambulatory referral to Neurology    Acute swimmer's ear of left side    Orders:  •  ciprofloxacin-dexamethasone (CIPRODEX) otic suspension; Administer 4 drops into the left ear 2 (two) times a day    Major depressive disorder, recurrent, mild (HCC)  Stable on current meds  See psych              History of Present Illness   HPI  C/o left sided sore throat and left ear pain started 10 days ago  Left sided head and cheek pain stated shortly after   No fever/chills  Glands feel swollen    Review of Systems   Constitutional: Negative.    HENT:  Positive for ear pain and sore throat. Negative for facial swelling, hearing loss and postnasal drip.    Respiratory: Negative.     Cardiovascular: Negative.    Musculoskeletal: Negative.        Objective   BP 98/60 (BP Location: Left arm, Patient Position: Sitting, Cuff Size: Standard)   Pulse 88   Temp 98.1 °F (36.7 °C) (Tympanic)   Resp 17   Ht 5' 6\" (1.676 m)   Wt 64 kg (141 lb)   LMP 2019 (Approximate)   SpO2 98%   BMI 22.76 kg/m²      Physical Exam  Vitals and nursing note reviewed.   Constitutional:       Appearance: Normal appearance.   HENT:      Right Ear: There is no impacted cerumen.      Left Ear: Tenderness present. No foreign body. No mastoid tenderness. No hemotympanum. Tympanic membrane is erythematous.      Nose: No congestion or rhinorrhea.     Cardiovascular:      Rate and Rhythm: Normal rate and regular rhythm.   Pulmonary:      Effort: Pulmonary effort is normal.      Breath sounds: Normal breath sounds.     Musculoskeletal:         General: Tenderness " present.      Comments: Mild tenderness over left lower jaw and mid face     Skin:     Findings: No lesion or rash.     Neurological:      Mental Status: She is alert.

## 2025-07-21 NOTE — TELEPHONE ENCOUNTER
"Call transferred by Debo POZO. Pt's last OV was 6/16/2023 with Dr. Maldonado. Pt saw her PCP today and was provided with a referral to neurology for new symptoms.   For about 2 weeks, pt has had symptoms of a throbbing pain on the occipital left area of her head, throat pain, and left ear pain. The throat pain resolved, but she still has the left ear pain. Also has a burning sensation on the left side of her face which goes under her left jaw into the left side of her neck, and it feels like her left ear is also burning. Her PCP believes that the burning pain may be nerve pain, and referred her to neuro. Pt was given a script for gabapentin 100 mg at bedtime for 7 days, then 2 tabs at bedtime. Also given a script for Ciprodex ear drops for the left ear. PCP told her that her ear looks \"a little\" red. Routed to the provider to advise if pt should schedule an OV with her for evaluation of these new symptoms or another provider.  CB# [phone] - okay to leave a detailed message on voice mail.  "

## 2025-07-25 ENCOUNTER — OFFICE VISIT (OUTPATIENT)
Dept: DERMATOLOGY | Facility: CLINIC | Age: 55
End: 2025-07-25

## 2025-07-25 VITALS — WEIGHT: 141 LBS | HEIGHT: 66 IN | BODY MASS INDEX: 22.66 KG/M2 | TEMPERATURE: 98.2 F

## 2025-07-25 DIAGNOSIS — L21.9 SEBORRHEIC DERMATITIS: ICD-10-CM

## 2025-07-25 DIAGNOSIS — L64.9 ANDROGENETIC ALOPECIA: Primary | ICD-10-CM

## 2025-07-25 RX ORDER — MINOXIDIL 2.5 MG/1
TABLET ORAL
Qty: 180 TABLET | Refills: 1 | Status: SHIPPED | OUTPATIENT
Start: 2025-07-25

## 2025-07-25 RX ORDER — KETOCONAZOLE 20 MG/G
CREAM TOPICAL
Qty: 60 G | Refills: 2 | Status: SHIPPED | OUTPATIENT
Start: 2025-07-25

## 2025-07-25 NOTE — PROGRESS NOTES
"[facility]atology [facility] Note     Patient Name: Christina Lara  Encounter Date: 7/25/2025       Have you been cared for by a [facility] Dermatologist in the last 3 years and, if so, which description applies to you? Yes. I have been here within the last 3 years, and my medical history has NOT changed since that time. I am of child-bearing potential.     REVIEW OF SYSTEMS:  Have you recently had or currently have any of the following? No changes in my recent health.   PAST MEDICAL HISTORY:  Have you personally ever had or currently have any of the following?  If \"YES,\" then please provide more detail. No changes in my medical history.   HISTORY OF IMMUNOSUPPRESSION: Do you have a history of any of the following:  Systemic Immunosuppression such as Diabetes, Biologic or Immunotherapy, Chemotherapy, Organ Transplantation, Bone Marrow Transplantation or Prednisone?  No     Answering \"YES\" requires the addition of the dotphrase \"IMMUNOSUPPRESSED\" as the first diagnosis of the patient's visit.   FAMILY HISTORY:  Any \"first degree relatives\" (parent, brother, sister, or child) with the following?    No changes in my family's known health.   PATIENT EXPERIENCE:    Do you want the Dermatologist to perform a COMPLETE skin exam today including a clinical examination under the \"bra and underwear\" areas?  NO  If necessary, do we have your permission to call and leave a detailed message on your Preferred Phone number that includes your specific medical information?  Yes      Allergies[1] Current Medications[2]              Whom besides the patient is providing clinical information about today's encounter?   NO ADDITIONAL HISTORIAN (patient alone provided history)    Physical Exam and Assessment/Plan by Diagnosis:    ANDROGENIC ALOPECIA     Physical Exam:  Anatomic Location Affected:  scalp  Morphological Description:  hair miniaturization primarily affecting the frontal scalp and extending to vertex; no obvious background " erythema or perifollicular scaling; no patchy absence of hair        Additional History of Present Condition:  Patient reports hx of hair loss over the past 1-2 years; she began taking HERS brand oral minoxidil with great improvement in symptoms and would like prescription.       Assessment and Plan:  Based on a thorough discussion of this condition and the management approach to it (including a comprehensive discussion of the known risks, side effects and potential benefits of treatment), the patient (family) agrees to implement the following specific plan:  Take oral minoxidil half tablet once a day for the first two months, if well tolerated, begin taking full tablet daily   F/u in 3 months          SEBORRHEIC DERMATITIS    Physical Exam:  Anatomic Location: face, nasolabial folds, external nare creases, eyebrows   Morphologic Description:  Erythematous plaques with greasy scale      Additional History of Present Condition:  Patient mentions itchy face. Can feel raw at times     Plan:  Topical antifungal: Ketoconazole 2% cream . Apply BID for 4 weeks; then once daily 2-3 times weekly as maintenance.     Medical Complexity:    SELF-LIMITED OR MINOR PROBLEM.  Problem runs a definite and prescribed course, is transient in nature, and is not likely to permanently alter health status.      Scribe Attestation      I,:  Alex Jackson am acting as a scribe while in the presence of the attending physician.:       I,:  OSMANI De La Rosa personally performed the services described in this documentation    as scribed in my presence.:                   [1]   Allergies  Allergen Reactions    Ibuprofen Other (See Comments)     Due to gastric sleeve -can only take for 5 days, then needs to stop   [2]   Current Outpatient Medications:     albuterol (Ventolin HFA) 90 mcg/act inhaler, Inhale 2 puffs every 6 (six) hours as needed for wheezing, Disp: 6.7 g, Rfl: 1    atoMOXetine (STRATTERA) 60 mg capsule, Take 1 capsule (60 mg  total) by mouth daily, Disp: 90 capsule, Rfl: 3    atorvastatin (LIPITOR) 20 mg tablet, Take 1 tablet (20 mg total) by mouth daily, Disp: 90 tablet, Rfl: 1    Blood Glucose Monitoring Suppl (FreeStyle Freedom Lite) w/Device KIT, Please dispense 1 kit, Disp: 1 kit, Rfl: 0    calcium carbonate (OS-MELODY) 600 MG tablet, Take 600 mg by mouth in the morning and 600 mg in the evening. Take with meals., Disp: , Rfl:     ciprofloxacin-dexamethasone (CIPRODEX) otic suspension, Administer 4 drops into the left ear 2 (two) times a day, Disp: 7.5 mL, Rfl: 0    estradiol (ESTRACE VAGINAL) 0.1 mg/g vaginal cream, Insert 1 g into the vagina 3 (three) times a week, Disp: 42.5 g, Rfl: 1    fluticasone (FLONASE) 50 mcg/act nasal spray, 1 spray into each nostril daily, Disp: 15.8 mL, Rfl: 0    gabapentin (NEURONTIN) 100 mg capsule, 1 tab HS at bedtime x 7 days, then 2 tabs at bedtime, Disp: 30 capsule, Rfl: 5    glucose blood (FREESTYLE LITE) test strip, Test once daily, Disp: 100 each, Rfl: 1    lamoTRIgine (LaMICtal) 150 MG tablet, Take 1 tablet (150 mg total) by mouth 2 (two) times a day, Disp: 180 tablet, Rfl: 3    Lancets (freestyle) lancets, Use as instructed, once daily, Disp: 100 each, Rfl: 1    lidocaine (Lidoderm) 5 %, Apply 1 patch topically over 12 hours daily Remove & Discard patch within 12 hours or as directed by MD, Disp: 30 patch, Rfl: 5    Mirabegron ER 25 MG TB24, Take 25 mg by mouth in the morning, Disp: 30 tablet, Rfl: 3    mirtazapine (REMERON) 15 mg tablet, Take 1 tablet (15 mg total) by mouth daily at bedtime, Disp: 90 tablet, Rfl: 3    montelukast (SINGULAIR) 10 mg tablet, Take 1 tablet (10 mg total) by mouth daily at bedtime, Disp: 90 tablet, Rfl: 2    Multiple Vitamins-Minerals (MULTI COMPLETE PO), Take by mouth, Disp: , Rfl:     omeprazole (PriLOSEC) 40 MG capsule, Take 1 capsule (40 mg total) by mouth daily, Disp: 90 capsule, Rfl: 0    semaglutide, 2 mg/dose, (Ozempic) 8 mg/ mL injection pen, Inject 0.75 mL  (2 mg total) under the skin every 7 days, Disp: 9 mL, Rfl: 0    venlafaxine (EFFEXOR) 100 MG tablet, Take 1 tablet (100 mg total) by mouth 3 (three) times a day, Disp: 270 tablet, Rfl: 3

## 2025-08-04 ENCOUNTER — OFFICE VISIT (OUTPATIENT)
Dept: BARIATRICS | Facility: CLINIC | Age: 55
End: 2025-08-04
Payer: COMMERCIAL

## 2025-08-04 VITALS
BODY MASS INDEX: 22.82 KG/M2 | HEIGHT: 66 IN | HEART RATE: 89 BPM | SYSTOLIC BLOOD PRESSURE: 112 MMHG | OXYGEN SATURATION: 96 % | WEIGHT: 142 LBS | TEMPERATURE: 97.8 F | DIASTOLIC BLOOD PRESSURE: 62 MMHG

## 2025-08-04 DIAGNOSIS — E11.9 CONTROLLED TYPE 2 DIABETES MELLITUS WITHOUT COMPLICATION, WITHOUT LONG-TERM CURRENT USE OF INSULIN (HCC): ICD-10-CM

## 2025-08-04 DIAGNOSIS — K91.2 POSTSURGICAL MALABSORPTION: ICD-10-CM

## 2025-08-04 DIAGNOSIS — Z48.815 ENCOUNTER FOR SURGICAL AFTERCARE FOLLOWING SURGERY OF DIGESTIVE SYSTEM: Primary | ICD-10-CM

## 2025-08-04 DIAGNOSIS — I47.10 PAROXYSMAL SVT (SUPRAVENTRICULAR TACHYCARDIA) (HCC): ICD-10-CM

## 2025-08-04 DIAGNOSIS — K21.9 GERD (GASTROESOPHAGEAL REFLUX DISEASE): ICD-10-CM

## 2025-08-04 DIAGNOSIS — Z98.84 BARIATRIC SURGERY STATUS: ICD-10-CM

## 2025-08-04 PROCEDURE — 99214 OFFICE O/P EST MOD 30 MIN: CPT | Performed by: PHYSICIAN ASSISTANT

## 2025-08-18 ENCOUNTER — APPOINTMENT (OUTPATIENT)
Dept: LAB | Facility: CLINIC | Age: 55
End: 2025-08-18
Attending: PHYSICIAN ASSISTANT
Payer: COMMERCIAL

## 2025-08-18 ENCOUNTER — NURSE TRIAGE (OUTPATIENT)
Age: 55
End: 2025-08-18

## 2025-08-18 DIAGNOSIS — R39.9 UTI SYMPTOMS: Primary | ICD-10-CM

## 2025-08-18 LAB
BACTERIA UR QL AUTO: ABNORMAL /HPF
BILIRUB UR QL STRIP: NEGATIVE
CAOX CRY URNS QL MICRO: ABNORMAL /HPF
CLARITY UR: ABNORMAL
COLOR UR: ABNORMAL
GLUCOSE UR STRIP-MCNC: NEGATIVE MG/DL
HGB UR QL STRIP.AUTO: ABNORMAL
KETONES UR STRIP-MCNC: NEGATIVE MG/DL
LEUKOCYTE ESTERASE UR QL STRIP: ABNORMAL
MUCOUS THREADS UR QL AUTO: ABNORMAL
NITRITE UR QL STRIP: NEGATIVE
NON-SQ EPI CELLS URNS QL MICRO: ABNORMAL /HPF
PH UR STRIP.AUTO: 5.5 [PH]
PROT UR STRIP-MCNC: ABNORMAL MG/DL
RBC #/AREA URNS AUTO: ABNORMAL /HPF
SP GR UR STRIP.AUTO: 1.02 (ref 1–1.03)
UROBILINOGEN UR STRIP-ACNC: <2 MG/DL
WBC #/AREA URNS AUTO: ABNORMAL /HPF

## 2025-08-18 PROCEDURE — 87086 URINE CULTURE/COLONY COUNT: CPT

## 2025-08-18 PROCEDURE — 81001 URINALYSIS AUTO W/SCOPE: CPT

## 2025-08-18 RX ORDER — CEPHALEXIN 500 MG/1
500 CAPSULE ORAL EVERY 12 HOURS SCHEDULED
Qty: 14 CAPSULE | Refills: 0 | Status: SHIPPED | OUTPATIENT
Start: 2025-08-18 | End: 2025-08-25

## 2025-08-20 ENCOUNTER — TELEPHONE (OUTPATIENT)
Dept: UROLOGY | Facility: MEDICAL CENTER | Age: 55
End: 2025-08-20

## 2025-08-20 DIAGNOSIS — R31.0 GROSS HEMATURIA: Primary | ICD-10-CM

## 2025-08-20 LAB — BACTERIA UR CULT: NORMAL

## 2025-08-21 DIAGNOSIS — E11.9 CONTROLLED TYPE 2 DIABETES MELLITUS WITHOUT COMPLICATION, WITHOUT LONG-TERM CURRENT USE OF INSULIN (HCC): ICD-10-CM

## 2025-08-22 RX ORDER — SEMAGLUTIDE 2.68 MG/ML
INJECTION, SOLUTION SUBCUTANEOUS
Qty: 9 ML | Refills: 1 | Status: SHIPPED | OUTPATIENT
Start: 2025-08-22

## (undated) DEVICE — ADHESIVE SKIN CLSR DERMABOND NX

## (undated) DEVICE — GLOVE INDICATOR PI UNDERGLOVE SZ 8 BLUE

## (undated) DEVICE — TUBING SUCTION 5MM X 12 FT

## (undated) DEVICE — REM POLYHESIVE ADULT PATIENT RETURN ELECTRODE: Brand: VALLEYLAB

## (undated) DEVICE — STERILE 8 INCH PROCTO SWAB: Brand: CARDINAL HEALTH

## (undated) DEVICE — PINNACLE INTRODUCER SHEATH: Brand: PINNACLE

## (undated) DEVICE — CATH EP 5FR QUAD SUPREME JSN

## (undated) DEVICE — INTENDED FOR TISSUE SEPARATION, AND OTHER PROCEDURES THAT REQUIRE A SHARP SURGICAL BLADE TO PUNCTURE OR CUT.: Brand: BARD-PARKER ® CARBON RIB-BACK BLADES

## (undated) DEVICE — 10 MM BABCOCKS WITH RATCHET HANDLES: Brand: ENDOPATH

## (undated) DEVICE — SUT MONOCRYL 4-0 PS-2 27 IN Y426H

## (undated) DEVICE — NEUROSTIM MULTILEAD TRIAL CABLE

## (undated) DEVICE — STRL ALLENTOWN HYSTEROSCOPY PK: Brand: CARDINAL HEALTH

## (undated) DEVICE — COVIDIEN GIA BLACK (XTRA THICK) RELOAD

## (undated) DEVICE — ENDO STITCH DEVICE 10 MM

## (undated) DEVICE — URETERAL CATHETER ADAPTOR TIP

## (undated) DEVICE — NEEDLE HYPO 22G X 1-1/2 IN

## (undated) DEVICE — ADHESIVE SKN CLSR HISTOACRYL FLEX 0.5ML LF

## (undated) DEVICE — DRESSING MEPILEX AG BORDER 4 X 4 IN

## (undated) DEVICE — STAPLER EEA 25 MM COVIDIEN

## (undated) DEVICE — TOURNIQUET HEMACLEAR 14-28CM PINK STRL

## (undated) DEVICE — 3000CC GUARDIAN II: Brand: GUARDIAN

## (undated) DEVICE — SCD SEQUENTIAL COMPRESSION COMFORT SLEEVE MEDIUM KNEE LENGTH: Brand: KENDALL SCD

## (undated) DEVICE — CHLORAPREP HI-LITE 26ML ORANGE

## (undated) DEVICE — SYRINGE 20ML LL

## (undated) DEVICE — GLOVE INDICATOR PI UNDERGLOVE SZ 7.5 BLUE

## (undated) DEVICE — POWER SHELL SIGNIA

## (undated) DEVICE — NEEDLE SPINAL18G X 3.5 IN QUINCKE

## (undated) DEVICE — ANTIBACTERIAL VIOLET BRAIDED (POLYGLACTIN 910), SYNTHETIC ABSORBABLE SUTURE: Brand: COATED VICRYL

## (undated) DEVICE — BLUE HEAT SCOPE WARMER

## (undated) DEVICE — PROGRAMMER PATIENT PROCLAIM

## (undated) DEVICE — GLOVE SRG BIOGEL 7

## (undated) DEVICE — DRESSING MEPILEX AG BORDER POST-OP 4 X 6 IN

## (undated) DEVICE — ALLENTOWN LAP CHOLE APP PACK: Brand: CARDINAL HEALTH

## (undated) DEVICE — ENDOPATH XCEL BLADELESS TROCARS WITH STABILITY SLEEVES: Brand: ENDOPATH XCEL

## (undated) DEVICE — COVIDIEN ENDO GIA PURPLE (MED) RELOAD 60MM

## (undated) DEVICE — TIBURON LAPAROSCOPIC ABDOMINAL DRAPE: Brand: CONVERTORS

## (undated) DEVICE — VIOLET BRAIDED (POLYGLACTIN 910), SYNTHETIC ABSORBABLE SUTURE: Brand: COATED VICRYL

## (undated) DEVICE — TROCARS: Brand: KII® BALLOON BLUNT TIP SYSTEM

## (undated) DEVICE — SEAMGUARD STPL REINF ENDO GIA ULTRA UNIV 60 PURPLE

## (undated) DEVICE — STERILE BETHLEHEM PLASTIC HAND: Brand: CARDINAL HEALTH

## (undated) DEVICE — ELECTRODE LAP SPATULA STR E-Z CLEAN 33CM -0018

## (undated) DEVICE — CATH EP  INQUIRY 6F 2-5-2MM  MED CRV STERABLE  DECAPOLAR 110CM

## (undated) DEVICE — GLOVE SRG BIOGEL 8

## (undated) DEVICE — ACE WRAP 6 IN UNSTERILE

## (undated) DEVICE — SUT MONOCRYL 4-0 PS-2 18 IN Y496G

## (undated) DEVICE — ENDOPATH 5MM CURVED SCISSORS WITH MONOPOLAR CAUTERY: Brand: ENDOPATH

## (undated) DEVICE — WEBRIL 6 IN UNSTERILE

## (undated) DEVICE — SYRINGE 10ML LL

## (undated) DEVICE — VISUALIZATION SYSTEM: Brand: CLEARIFY

## (undated) DEVICE — CYSTO TUBING SINGLE IRRIGATION

## (undated) DEVICE — TUBING CUSTOM PACK

## (undated) DEVICE — U-DRAPE: Brand: CONVERTORS

## (undated) DEVICE — LAPAROSCOPIC TROCAR SLEEVE/SINGLE USE: Brand: KII® SLEEVE

## (undated) DEVICE — GLOVE SRG BIOGEL 7.5

## (undated) DEVICE — PLUMEPEN PRO 10FT

## (undated) DEVICE — PAD CAST 6 IN COTTON NON STERILE

## (undated) DEVICE — SUT ETHILON 3-0 FS-1 18 IN 663G

## (undated) DEVICE — [HIGH FLOW INSUFFLATOR,  DO NOT USE IF PACKAGE IS DAMAGED,  KEEP DRY,  KEEP AWAY FROM SUNLIGHT,  PROTECT FROM HEAT AND RADIOACTIVE SOURCES.]: Brand: PNEUMOSURE

## (undated) DEVICE — INTENDED FOR TISSUE SEPARATION, AND OTHER PROCEDURES THAT REQUIRE A SHARP SURGICAL BLADE TO PUNCTURE OR CUT.: Brand: BARD-PARKER SAFETY BLADES SIZE 15, STERILE

## (undated) DEVICE — HYDROGEN PEROXIDE 3 PCT 4OZ

## (undated) DEVICE — TROCAR: Brand: KII FIOS FIRST ENTRY

## (undated) DEVICE — PREMIUM DRY TRAY LF: Brand: MEDLINE INDUSTRIES, INC.

## (undated) DEVICE — TUBING SMOKE EVAC W/FILTRATION DEVICE PLUMEPORT ACTIV

## (undated) DEVICE — PMI DISPOSABLE PUNCTURE CLOSURE DEVICE / SUTURE GRASPER: Brand: PMI

## (undated) DEVICE — SUT SILK 0 SH 30 IN K834H

## (undated) DEVICE — HARMONIC ACE +7 LAPAROSCOPIC SHEARS ADVANCED HEMOSTASIS 5MM DIAMETER 36CM SHAFT LENGTH  FOR USE WITH GRAY HAND PIECE ONLY: Brand: HARMONIC ACE

## (undated) DEVICE — PREP SURGICAL PURPREP 26ML

## (undated) DEVICE — SEAMGUARD STPL REINF ENDO GIA ULTRA UNV 60 BLACK

## (undated) DEVICE — VESSEL LOOPS X-RAY DETECTABLE: Brand: DEROYAL

## (undated) DEVICE — 2000CC GUARDIAN II: Brand: GUARDIAN

## (undated) DEVICE — TROCAR VISIPORT

## (undated) DEVICE — Device: Brand: THERMOCOOL SMARTTOUCH SF

## (undated) DEVICE — STRAIGHT CATH RED RUBBER 12FR

## (undated) DEVICE — LIGHT HANDLE COVER SLEEVE DISP BLUE STELLAR

## (undated) DEVICE — IRRIG ENDO FLO TUBING

## (undated) DEVICE — ACE WRAP 4 IN UNSTERILE

## (undated) DEVICE — SNAP KOVER: Brand: UNBRANDED

## (undated) DEVICE — TIBURON SPLIT SHEET: Brand: CONVERTORS

## (undated) DEVICE — PENCIL ELECTROSURG E-Z CLEAN -0035H

## (undated) DEVICE — IV EXTENSION TUBING 33 IN

## (undated) DEVICE — COVIDIEN ENDO GIA PURPLE (MED) RELOAD 45MM

## (undated) DEVICE — SOUNDSTAR ECO 8F G CATHETER: Brand: SOUNDSTAR

## (undated) DEVICE — SOFT SILICONE HYDROCELLULAR FOAM DRESSING WITH LOCK AWAY LAYER: Brand: ALLEVYN LIFE M 12.9X12.9 CTN10

## (undated) DEVICE — SYRINGE 30ML LL

## (undated) DEVICE — Device: Brand: REFERENCE PATCH CARTO 3

## (undated) DEVICE — SPONGE PVP SCRUB WING STERILE

## (undated) DEVICE — DISPOSABLE EQUIPMENT COVER: Brand: SMALL TOWEL DRAPE

## (undated) DEVICE — INTENDED FOR TISSUE SEPARATION, AND OTHER PROCEDURES THAT REQUIRE A SHARP SURGICAL BLADE TO PUNCTURE OR CUT.: Brand: BARD-PARKER SAFETY BLADES SIZE 10, STERILE

## (undated) DEVICE — BETHLEHEM UNIVERSAL SPINE, KIT: Brand: CARDINAL HEALTH

## (undated) DEVICE — ALCOHOL ISOPROPYL BLUE

## (undated) DEVICE — TOOL 14MH30 LEGEND 14CM 3MM: Brand: MIDAS REX ™

## (undated) DEVICE — PADDING CAST 4 IN  COTTON STRL

## (undated) DEVICE — LIGACLIP 10-M/L, 10MM ENDOSCOPIC ROTATING MULTIPLE CLIP APPLIERS: Brand: LIGACLIP

## (undated) DEVICE — EXOFIN PRECISION PEN HIGH VISCOSITY TOPICAL SKIN ADHESIVE: Brand: EXOFIN PRECISION PEN, 1G

## (undated) DEVICE — HEMOSTATIC MATRIX SURGIFLO 8ML W/THROMBIN

## (undated) DEVICE — TRAVELKIT CONTAINS FIRST STEP KIT (200ML EP-4 KIT) AND SOILED SCOPE BAG - 1 KIT: Brand: TRAVELKIT CONTAINS FIRST STEP KIT AND SOILED SCOPE BAG

## (undated) DEVICE — HARMONIC 1100 SHEARS, 36CM SHAFT LENGTH: Brand: HARMONIC

## (undated) DEVICE — Device: Brand: WEBSTER CS

## (undated) DEVICE — SURGICAL GOWN, XL SMARTSLEEVE: Brand: CONVERTORS

## (undated) DEVICE — DRAPE EQUIPMENT RF WAND

## (undated) DEVICE — TRAY FOLEY 16FR URIMETER SURESTEP

## (undated) DEVICE — STAPLER ENDO GIA ROTICULATOR 60-2.5

## (undated) DEVICE — Device

## (undated) DEVICE — Device: Brand: SMARTABLATE

## (undated) DEVICE — VISIGI 3D®  CALIBRATION SYSTEM  SIZE 36FR SLEEVE/STD: Brand: BOEHRINGER® VISIGI 3D™ SLEEVE GASTRECTOMY CALIBRATION SYSTEM, SIZE 36FR

## (undated) DEVICE — 10FR FRAZIER SUCTION HANDLE: Brand: CARDINAL HEALTH